# Patient Record
Sex: FEMALE | Race: WHITE | NOT HISPANIC OR LATINO | Employment: FULL TIME | URBAN - METROPOLITAN AREA
[De-identification: names, ages, dates, MRNs, and addresses within clinical notes are randomized per-mention and may not be internally consistent; named-entity substitution may affect disease eponyms.]

---

## 2017-02-01 ENCOUNTER — HOSPITAL ENCOUNTER (EMERGENCY)
Facility: HOSPITAL | Age: 42
Discharge: HOME | End: 2017-02-01
Attending: EMERGENCY MEDICINE | Admitting: EMERGENCY MEDICINE
Payer: COMMERCIAL

## 2017-02-01 ENCOUNTER — APPOINTMENT (EMERGENCY)
Dept: RADIOLOGY | Facility: HOSPITAL | Age: 42
End: 2017-02-01
Payer: COMMERCIAL

## 2017-02-01 VITALS
TEMPERATURE: 98.2 F | SYSTOLIC BLOOD PRESSURE: 154 MMHG | OXYGEN SATURATION: 96 % | DIASTOLIC BLOOD PRESSURE: 67 MMHG | RESPIRATION RATE: 22 BRPM | HEART RATE: 102 BPM | WEIGHT: 293 LBS

## 2017-02-01 DIAGNOSIS — M25.561 CHRONIC PAIN OF RIGHT KNEE: Primary | ICD-10-CM

## 2017-02-01 DIAGNOSIS — G89.29 CHRONIC PAIN OF RIGHT KNEE: Primary | ICD-10-CM

## 2017-02-01 PROCEDURE — 96372 THER/PROPH/DIAG INJ SC/IM: CPT

## 2017-02-01 PROCEDURE — 99283 EMERGENCY DEPT VISIT LOW MDM: CPT

## 2017-02-01 PROCEDURE — 73562 X-RAY EXAM OF KNEE 3: CPT

## 2017-02-01 RX ORDER — KETOROLAC TROMETHAMINE 30 MG/ML
30 INJECTION, SOLUTION INTRAMUSCULAR; INTRAVENOUS ONCE
Status: COMPLETED | OUTPATIENT
Start: 2017-02-01 | End: 2017-02-01

## 2017-02-01 RX ORDER — TRAMADOL HYDROCHLORIDE 50 MG/1
50 TABLET ORAL EVERY 6 HOURS PRN
Qty: 20 TABLET | Refills: 0 | Status: SHIPPED | OUTPATIENT
Start: 2017-02-01 | End: 2017-02-11

## 2017-02-01 RX ADMIN — KETOROLAC TROMETHAMINE 30 MG: 30 INJECTION, SOLUTION INTRAMUSCULAR at 11:08

## 2017-02-15 ENCOUNTER — ALLSCRIPTS OFFICE VISIT (OUTPATIENT)
Dept: OTHER | Facility: OTHER | Age: 42
End: 2017-02-15

## 2017-06-30 ENCOUNTER — GENERIC CONVERSION - ENCOUNTER (OUTPATIENT)
Dept: OTHER | Facility: OTHER | Age: 42
End: 2017-06-30

## 2017-08-07 ENCOUNTER — GENERIC CONVERSION - ENCOUNTER (OUTPATIENT)
Dept: OTHER | Facility: OTHER | Age: 42
End: 2017-08-07

## 2017-12-13 ENCOUNTER — APPOINTMENT (EMERGENCY)
Dept: CT IMAGING | Facility: HOSPITAL | Age: 42
End: 2017-12-13
Payer: COMMERCIAL

## 2017-12-13 ENCOUNTER — HOSPITAL ENCOUNTER (EMERGENCY)
Facility: HOSPITAL | Age: 42
Discharge: HOME | End: 2017-12-13
Attending: EMERGENCY MEDICINE
Payer: COMMERCIAL

## 2017-12-13 VITALS
DIASTOLIC BLOOD PRESSURE: 51 MMHG | RESPIRATION RATE: 20 BRPM | OXYGEN SATURATION: 94 % | HEART RATE: 94 BPM | SYSTOLIC BLOOD PRESSURE: 109 MMHG | TEMPERATURE: 99.5 F

## 2017-12-13 DIAGNOSIS — R19.7 ABDOMINAL PAIN, VOMITING, AND DIARRHEA: Primary | ICD-10-CM

## 2017-12-13 DIAGNOSIS — R10.9 ABDOMINAL PAIN, VOMITING, AND DIARRHEA: Primary | ICD-10-CM

## 2017-12-13 DIAGNOSIS — R11.10 ABDOMINAL PAIN, VOMITING, AND DIARRHEA: Primary | ICD-10-CM

## 2017-12-13 DIAGNOSIS — L21.9 SEBORRHEIC DERMATITIS OF SCALP: ICD-10-CM

## 2017-12-13 LAB
ALBUMIN SERPL BCP-MCNC: 3.7 G/DL (ref 3.5–5)
ALP SERPL-CCNC: 72 U/L (ref 46–116)
ALT SERPL W P-5'-P-CCNC: 73 U/L (ref 12–78)
ANION GAP SERPL CALCULATED.3IONS-SCNC: 7 MMOL/L (ref 4–13)
AST SERPL W P-5'-P-CCNC: 43 U/L (ref 5–45)
BASOPHILS # BLD AUTO: 0.02 THOUSANDS/ΜL (ref 0–0.1)
BASOPHILS NFR BLD AUTO: 0 % (ref 0–1)
BILIRUB SERPL-MCNC: 0.5 MG/DL (ref 0.2–1)
BILIRUB UR QL STRIP: NEGATIVE
BUN SERPL-MCNC: 12 MG/DL (ref 5–25)
CALCIUM SERPL-MCNC: 9.7 MG/DL (ref 8.3–10.1)
CHLORIDE SERPL-SCNC: 100 MMOL/L (ref 100–108)
CLARITY UR: CLEAR
CO2 SERPL-SCNC: 31 MMOL/L (ref 21–32)
COLOR UR: YELLOW
CREAT SERPL-MCNC: 0.82 MG/DL (ref 0.6–1.3)
EOSINOPHIL # BLD AUTO: 0.12 THOUSAND/ΜL (ref 0–0.61)
EOSINOPHIL NFR BLD AUTO: 1 % (ref 0–6)
ERYTHROCYTE [DISTWIDTH] IN BLOOD BY AUTOMATED COUNT: 18.2 % (ref 11.6–15.1)
EXT PREG TEST URINE: NEGATIVE
GFR SERPL CREATININE-BSD FRML MDRD: 87 ML/MIN/1.73SQ M
GLUCOSE SERPL-MCNC: 147 MG/DL (ref 65–140)
GLUCOSE UR STRIP-MCNC: NEGATIVE MG/DL
HCT VFR BLD AUTO: 44 % (ref 34.8–46.1)
HGB BLD-MCNC: 13.8 G/DL (ref 11.5–15.4)
HGB UR QL STRIP.AUTO: NEGATIVE
KETONES UR STRIP-MCNC: NEGATIVE MG/DL
LACTATE SERPL-SCNC: 1.9 MMOL/L (ref 0.5–2)
LEUKOCYTE ESTERASE UR QL STRIP: NEGATIVE
LIPASE SERPL-CCNC: 273 U/L (ref 73–393)
LYMPHOCYTES # BLD AUTO: 2.22 THOUSANDS/ΜL (ref 0.6–4.47)
LYMPHOCYTES NFR BLD AUTO: 21 % (ref 14–44)
MCH RBC QN AUTO: 27.1 PG (ref 26.8–34.3)
MCHC RBC AUTO-ENTMCNC: 31.4 G/DL (ref 31.4–37.4)
MCV RBC AUTO: 86 FL (ref 82–98)
MONOCYTES # BLD AUTO: 0.79 THOUSAND/ΜL (ref 0.17–1.22)
MONOCYTES NFR BLD AUTO: 8 % (ref 4–12)
NEUTROPHILS # BLD AUTO: 7.45 THOUSANDS/ΜL (ref 1.85–7.62)
NEUTS SEG NFR BLD AUTO: 70 % (ref 43–75)
NITRITE UR QL STRIP: NEGATIVE
PH UR STRIP.AUTO: 6 [PH] (ref 4.5–8)
PLATELET # BLD AUTO: 295 THOUSANDS/UL (ref 149–390)
PMV BLD AUTO: 9.6 FL (ref 8.9–12.7)
POTASSIUM SERPL-SCNC: 3.9 MMOL/L (ref 3.5–5.3)
PROT SERPL-MCNC: 8.1 G/DL (ref 6.4–8.2)
PROT UR STRIP-MCNC: NEGATIVE MG/DL
RBC # BLD AUTO: 5.1 MILLION/UL (ref 3.81–5.12)
SODIUM SERPL-SCNC: 138 MMOL/L (ref 136–145)
SP GR UR STRIP.AUTO: 1.02 (ref 1–1.03)
UROBILINOGEN UR QL STRIP.AUTO: 0.2 E.U./DL
WBC # BLD AUTO: 10.6 THOUSAND/UL (ref 4.31–10.16)

## 2017-12-13 PROCEDURE — 96361 HYDRATE IV INFUSION ADD-ON: CPT

## 2017-12-13 PROCEDURE — 80053 COMPREHEN METABOLIC PANEL: CPT | Performed by: PHYSICIAN ASSISTANT

## 2017-12-13 PROCEDURE — 85025 COMPLETE CBC W/AUTO DIFF WBC: CPT | Performed by: PHYSICIAN ASSISTANT

## 2017-12-13 PROCEDURE — 81003 URINALYSIS AUTO W/O SCOPE: CPT | Performed by: PHYSICIAN ASSISTANT

## 2017-12-13 PROCEDURE — 83690 ASSAY OF LIPASE: CPT | Performed by: PHYSICIAN ASSISTANT

## 2017-12-13 PROCEDURE — 99284 EMERGENCY DEPT VISIT MOD MDM: CPT

## 2017-12-13 PROCEDURE — 74177 CT ABD & PELVIS W/CONTRAST: CPT

## 2017-12-13 PROCEDURE — 36415 COLL VENOUS BLD VENIPUNCTURE: CPT | Performed by: PHYSICIAN ASSISTANT

## 2017-12-13 PROCEDURE — 96374 THER/PROPH/DIAG INJ IV PUSH: CPT

## 2017-12-13 PROCEDURE — 81025 URINE PREGNANCY TEST: CPT | Performed by: PHYSICIAN ASSISTANT

## 2017-12-13 PROCEDURE — 83605 ASSAY OF LACTIC ACID: CPT | Performed by: PHYSICIAN ASSISTANT

## 2017-12-13 RX ORDER — ONDANSETRON 2 MG/ML
4 INJECTION INTRAMUSCULAR; INTRAVENOUS ONCE
Status: COMPLETED | OUTPATIENT
Start: 2017-12-13 | End: 2017-12-13

## 2017-12-13 RX ORDER — DICYCLOMINE HCL 20 MG
20 TABLET ORAL EVERY 6 HOURS
Qty: 15 TABLET | Refills: 0 | Status: SHIPPED | OUTPATIENT
Start: 2017-12-13 | End: 2018-02-20

## 2017-12-13 RX ORDER — MAGNESIUM HYDROXIDE/ALUMINUM HYDROXICE/SIMETHICONE 120; 1200; 1200 MG/30ML; MG/30ML; MG/30ML
30 SUSPENSION ORAL ONCE
Status: COMPLETED | OUTPATIENT
Start: 2017-12-13 | End: 2017-12-13

## 2017-12-13 RX ORDER — DICYCLOMINE HCL 20 MG
20 TABLET ORAL ONCE
Status: COMPLETED | OUTPATIENT
Start: 2017-12-13 | End: 2017-12-13

## 2017-12-13 RX ORDER — KETOCONAZOLE 20 MG/ML
1 SHAMPOO TOPICAL 2 TIMES WEEKLY
Qty: 120 ML | Refills: 0 | Status: SHIPPED | OUTPATIENT
Start: 2017-12-14 | End: 2019-09-10 | Stop reason: HOSPADM

## 2017-12-13 RX ADMIN — SODIUM CHLORIDE 1000 ML: 0.9 INJECTION, SOLUTION INTRAVENOUS at 12:36

## 2017-12-13 RX ADMIN — IOHEXOL 100 ML: 350 INJECTION, SOLUTION INTRAVENOUS at 14:08

## 2017-12-13 RX ADMIN — ONDANSETRON 4 MG: 2 INJECTION INTRAMUSCULAR; INTRAVENOUS at 12:36

## 2017-12-13 RX ADMIN — ALUMINUM HYDROXIDE, MAGNESIUM HYDROXIDE, AND SIMETHICONE 30 ML: 200; 200; 20 SUSPENSION ORAL at 15:46

## 2017-12-13 RX ADMIN — LIDOCAINE HYDROCHLORIDE 15 ML: 20 SOLUTION ORAL; TOPICAL at 15:46

## 2017-12-13 RX ADMIN — DICYCLOMINE HYDROCHLORIDE 20 MG: 20 TABLET ORAL at 15:48

## 2017-12-13 NOTE — ED NOTES
Pt awake and alert, no distress noted, no other questions upon d/c     April M Radha Harris RN  12/13/17 7868

## 2017-12-13 NOTE — ED PROVIDER NOTES
History  Chief Complaint   Patient presents with    Diarrhea     Pt reports diarrhea and cramping that started yesterday with "bad acid reflux and gas " Pt reports fever yesterday of 80       39 yo F with history of IBS, DMII, s/p cholecystectomy who presents today for evaluation of diarrhea x 2 days  She had 6 episodes of watery, NB diarrhea yesterday and 2 today  Also had two episodes of NBNB vomiting today while in the ER waiting room  She admits to generalized abdominal cramping and bloating and sour belch  She states she had low grade fever last evening of 100, did not check her temp today, no antipyretics taken  She has been taking her pantoprazole without relief of symptoms, also taking imodium for diarrhea with some relief  She had similar symptoms one week ago which resolved spontaneously  She denies recent changes to diet, travel, new foods  Has been tolerating soup and crackers  She denies CP, SOB, dypsnea, urinary or vaginal complaints  She is seen by GI at [facility] for her IBS, states she has flares of diarrhea and constipation, is on not on any current medication regimen and her IBS is strictly diet controlled  Also would like to be evaluated for scalp and left ear rash, states she was diagnosed with seborrheic dermatitis by PCP in past and has been using antifungal cream without relief  None       Past Medical History:   Diagnosis Date    Anxiety     Depression     Diabetes mellitus ([address] )     Hypertension     Psychiatric disorder        Past Surgical History:   Procedure Laterality Date    CHOLECYSTECTOMY         History reviewed  No pertinent family history  I have reviewed and agree with the history as documented  Social History   Substance Use Topics    Smoking status: Never Smoker    Smokeless tobacco: Not on file    Alcohol use No        Review of Systems   Constitutional: Positive for appetite change and fever (low grade)  Negative for chills     HENT: Negative for congestion, ear discharge, ear pain and sore throat  Respiratory: Negative for cough, shortness of breath and wheezing  Cardiovascular: Negative for chest pain and palpitations  Gastrointestinal: Positive for abdominal pain, diarrhea, nausea and vomiting  Negative for abdominal distention and blood in stool  Genitourinary: Negative for dysuria, frequency, hematuria, pelvic pain, urgency, vaginal bleeding, vaginal discharge and vaginal pain  Musculoskeletal: Negative for back pain  Skin: Positive for rash  Neurological: Negative for dizziness, weakness, light-headedness, numbness and headaches  Physical Exam  ED Triage Vitals   Temperature Pulse Respirations Blood Pressure SpO2   12/13/17 1055 12/13/17 1055 12/13/17 1055 12/13/17 1055 12/13/17 1055   99 5 °F (37 5 °C) 96 20 (!) 190/96 98 %      Temp Source Heart Rate Source Patient Position - Orthostatic VS BP Location FiO2 (%)   12/13/17 1055 12/13/17 1055 12/13/17 1055 12/13/17 1055 --   Oral Monitor Sitting Left arm       Pain Score       12/13/17 1339       1           Orthostatic Vital Signs  Vitals:    12/13/17 1055 12/13/17 1339 12/13/17 1604 12/13/17 1645   BP: (!) 190/96 (!) 172/79 (!) 107/48 109/51   Pulse: 96 92 94    Patient Position - Orthostatic VS: Sitting Lying Lying Lying       Physical Exam   Constitutional: She is oriented to person, place, and time  She appears well-developed and well-nourished  Non-toxic appearance  She does not have a sickly appearance  She does not appear ill  No distress  Well in appearance, no apparent distress or discomfort  Obese  HENT:   Head: Normocephalic and atraumatic     Right Ear: Hearing and external ear normal    Left Ear: Hearing and external ear normal    Nose: Nose normal    Mouth/Throat: Oropharynx is clear and moist    Dried, erythematous scaly patches with flaking around scalp and external ears, no drainage   Eyes: Conjunctivae, EOM and lids are normal  Pupils are equal, round, and reactive to light  Neck: Normal range of motion  Neck supple  Cardiovascular: Normal rate, regular rhythm and normal heart sounds  Exam reveals no gallop and no friction rub  No murmur heard  Pulmonary/Chest: Effort normal and breath sounds normal  No respiratory distress  She has no decreased breath sounds  She has no wheezes  She has no rhonchi  She has no rales  Abdominal: Soft  Normal appearance  Bowel sounds are increased  There is generalized tenderness and tenderness in the periumbilical area  There is no rigidity, no rebound, no guarding and no CVA tenderness  Obese abdomen   Musculoskeletal: Normal range of motion  Neurological: She is alert and oriented to person, place, and time  Skin: Skin is warm and dry  Capillary refill takes less than 2 seconds  She is not diaphoretic  Psychiatric: She has a normal mood and affect  Nursing note and vitals reviewed        ED Medications  Medications   ondansetron (ZOFRAN) injection 4 mg (4 mg Intravenous Given 12/13/17 1236)   sodium chloride 0 9 % bolus 1,000 mL (0 mL Intravenous Stopped 12/13/17 1549)   iohexol (OMNIPAQUE) 350 MG/ML injection (MULTI-DOSE) 100 mL (100 mL Intravenous Given 12/13/17 1408)   dicyclomine (BENTYL) tablet 20 mg (20 mg Oral Given 12/13/17 1548)   lidocaine viscous (XYLOCAINE) 2 % mucosal solution 15 mL (15 mL Swish & Swallow Given 12/13/17 1546)   aluminum-magnesium hydroxide-simethicone (MYLANTA) 200-200-20 mg/5 mL oral suspension 30 mL (30 mL Oral Given 12/13/17 1546)       Diagnostic Studies  Results Reviewed     Procedure Component Value Units Date/Time    POCT pregnancy, urine [[id number]]  (Normal) Resulted:  12/13/17 1401    Lab Status:  Final result Specimen:  Urine Updated:  12/13/17 1401     EXT PREG TEST UR (Ref: Negative) Negative    UA w Reflex to Microscopic [[id number]]  (Normal) Collected:  12/13/17 1351    Lab Status:  Final result Specimen:  Urine from Urine, Clean Catch Updated:  12/13/17 1356 Color, UA Yellow     Clarity, UA Clear     Specific Gravity, UA 1 025     pH, UA 6 0     Leukocytes, UA Negative     Nitrite, UA Negative     Protein, UA Negative mg/dl      Glucose, UA Negative mg/dl      Ketones, UA Negative mg/dl      Urobilinogen, UA 0 2 E U /dl      Bilirubin, UA Negative     Blood, UA Negative    Comprehensive metabolic panel [[id number]]  (Abnormal) Collected:  12/13/17 1235    Lab Status:  Final result Specimen:  Blood from Arm, Right Updated:  12/13/17 1308     Sodium 138 mmol/L      Potassium 3 9 mmol/L      Chloride 100 mmol/L      CO2 31 mmol/L      Anion Gap 7 mmol/L      BUN 12 mg/dL      Creatinine 0 82 mg/dL      Glucose 147 (H) mg/dL      Calcium 9 7 mg/dL      AST 43 U/L      ALT 73 U/L      Alkaline Phosphatase 72 U/L      Total Protein 8 1 g/dL      Albumin 3 7 g/dL      Total Bilirubin 0 50 mg/dL      eGFR 87 ml/min/1 73sq m     Narrative:         National Kidney Disease Education Program recommendations are as follows:  GFR calculation is accurate only with a steady state creatinine  Chronic Kidney disease less than 60 ml/min/1 73 sq  meters  Kidney failure less than 15 ml/min/1 73 sq  meters  Lactic acid, plasma [[id number]]  (Normal) Collected:  12/13/17 1235    Lab Status:  Final result Specimen:  Blood from Arm, Right Updated:  12/13/17 1304     LACTIC ACID 1 9 mmol/L     Narrative:         Result may be elevated if tourniquet was used during collection      Lipase [[id number]]  (Normal) Collected:  12/13/17 1235    Lab Status:  Final result Specimen:  Blood from Arm, Right Updated:  12/13/17 1301     Lipase 273 u/L     CBC and differential [[id number]]  (Abnormal) Collected:  12/13/17 1235    Lab Status:  Final result Specimen:  Blood from Arm, Right Updated:  12/13/17 1247     WBC 10 60 (H) Thousand/uL      RBC 5 10 Million/uL      Hemoglobin 13 8 g/dL      Hematocrit 44 0 %      MCV 86 fL      MCH 27 1 pg      MCHC 31 4 g/dL      RDW 18 2 (H) %      MPV 9 6 fL      Platelets 295 Thousands/uL      Neutrophils Relative 70 %      Lymphocytes Relative 21 %      Monocytes Relative 8 %      Eosinophils Relative 1 %      Basophils Relative 0 %      Neutrophils Absolute 7 45 Thousands/µL      Lymphocytes Absolute 2 22 Thousands/µL      Monocytes Absolute 0 79 Thousand/µL      Eosinophils Absolute 0 12 Thousand/µL      Basophils Absolute 0 02 Thousands/µL                  CT abdomen pelvis with contrast   Final Result by Mello Sandy MD (12/13 2009)      No CT abnormality of bowel to definitively account for the patient's symptoms  Hepatomegaly and hepatic steatosis  Prominence of the uterus which is a nonspecific finding that may indicate presence of uterine fibroids  Workstation performed: [id number]                    Procedures  Procedures       Phone Contacts  ED Phone Contact    ED Course  ED Course as of Dec 15 1154   Wed Dec 13, 2017   1621 Patient reassessed, feeling better will d/c home                                MDM  Number of Diagnoses or Management Options  Abdominal pain, vomiting, and diarrhea: new and requires workup  Seborrheic dermatitis of scalp: established and worsening  Diagnosis management comments:   37 yo F with IBS presents c/o abdominal cramping and non-bloody diarrhea and vomiting  Labs and urine returned WNL, CT imaging negative for acute abnormality  Likely gastroenteritis vs IBS flare  No vomiting during ED course  Patient felt relief with GI cocktail and bentyl  Also requesting treatment for her Seborrheic dermatitis  Will d/c home with ketoconazole shampoo, bentyl for cramping  Recommended bland diet  Recommended f/u with her GI, Dr Ale Dutton, and her OBGYN for evaluation of enlarged uterus  RTED Precautions were given, patient verbalizes understanding and agrees with plan         Amount and/or Complexity of Data Reviewed  Clinical lab tests: ordered and reviewed  Tests in the radiology section of CPT®: ordered and reviewed    Patient Progress  Patient progress: stable    CritCare Time    Disposition  Final diagnoses:   Abdominal pain, vomiting, and diarrhea   Seborrheic dermatitis of scalp     Time reflects when diagnosis was documented in both MDM as applicable and the Disposition within this note     Time User Action Codes Description Comment    12/13/2017  4:23 PM Manuela Sanchez Add [R10 9,  R11 10,  R19 7] Abdominal pain, vomiting, and diarrhea     12/13/2017  4:23 PM Errol Sanchez Add [L21 9] Seborrheic dermatitis of scalp       ED Disposition     ED Disposition Condition Comment    Discharge  Ambar Smith discharge to home/self care  Condition at discharge: Good        Follow-up Information     Follow up With Specialties Details Why Contact Info Additional Information    Toshia Hardin MD Family Medicine Schedule an appointment as soon as possible for a visit  [address] [place] [id number]  [phone]       [facility]  Schedule an appointment as soon as possible for a visit       [address] [facility] Emergency Department Emergency Medicine  If symptoms worsen [address] [facility]  AN ED, [address], [place], [place], [address]        Discharge Medication List as of 12/13/2017  4:35 PM      START taking these medications    Details   dicyclomine (BENTYL) 20 mg tablet Take 1 tablet by mouth every 6 (six) hours for 5 days, Starting Wed 12/13/2017, Until Mon 12/18/2017, Print      ketoconazole (NIZORAL) 2 % shampoo Apply 1 application topically 2 (two) times a week, Starting Thu 12/14/2017, Print           No discharge procedures on file      ED Provider  Electronically Signed by           Nay Zabala PA-C  12/15/17 6271

## 2017-12-13 NOTE — DISCHARGE INSTRUCTIONS
Abdominal Pain   WHAT YOU NEED TO KNOW:   Abdominal pain can be dull, achy, or sharp  You may have pain in one area of your abdomen, or in your entire abdomen  Your pain may be caused by a condition such as constipation, food sensitivity or poisoning, infection, or a blockage  Abdominal pain can also be from a hernia, appendicitis, or an ulcer  Liver, gallbladder, or kidney conditions can also cause abdominal pain  The cause of your abdominal pain may be unknown  DISCHARGE INSTRUCTIONS:   Return to the emergency department if:   · You have new chest pain or shortness of breath  · You have pulsing pain in your upper abdomen or lower back that suddenly becomes constant  · Your pain is in the right lower abdominal area and worsens with movement  · You have a fever over 100 4°F (38°C) or shaking chills  · You are vomiting and cannot keep food or liquids down  · Your pain does not improve or gets worse over the next 8 to 12 hours  · You see blood in your vomit or bowel movements, or they look black and tarry  · Your skin or the whites of your eyes turn yellow  · You are a woman and have a large amount of vaginal bleeding that is not your monthly period  Contact your healthcare provider if:   · You have pain in your lower back  · You are a man and have pain in your testicles  · You have pain when you urinate  · You have questions or concerns about your condition or care  Follow up with your healthcare provider within 24 hours or as directed:  Write down your questions so you remember to ask them during your visits  Medicines:   · Medicines  may be given to calm your stomach and prevent vomiting or to decrease pain  Ask how to take pain medicine safely  · Take your medicine as directed  Contact your healthcare provider if you think your medicine is not helping or if you have side effects  Tell him of her if you are allergic to any medicine   Keep a list of the medicines, vitamins, and herbs you take  Include the amounts, and when and why you take them  Bring the list or the pill bottles to follow-up visits  Carry your medicine list with you in case of an emergency  © 2017 [address] Information is for End User's use only and may not be sold, redistributed or otherwise used for commercial purposes  All illustrations and images included in CareNotes® are the copyrighted property of A [employer] Inc  or [employer]  The above information is an  only  It is not intended as medical advice for individual conditions or treatments  Talk to your doctor, nurse or pharmacist before following any medical regimen to see if it is safe and effective for you  Irritable Bowel Syndrome   WHAT YOU NEED TO KNOW:   Irritable bowel syndrome (IBS) is a condition that prevents food from moving through your intestines normally  The food may move through too slowly or too quickly  This causes bloating, increased gas, constipation, or diarrhea  DISCHARGE INSTRUCTIONS:   Return to the emergency department if:   · You have severe abdominal pain  · Your bowel movements are dark or have blood in them  Contact your healthcare provider if:   · You have a fever  · You have pain in your rectum  · Your abdominal pain does not go away, even after treatment  · You have questions or concerns about your condition or care  Medicines:   · Diarrhea medicine  helps decrease the amount of diarrhea you have  Some of these medicines coat the intestine and make bowel movements less watery  Other medicines work by slowing down how fast the intestines move food through  · Laxatives  help treat constipation by moving food and liquids out of your stomach faster  · Stool softeners  soften your bowel movements to prevent straining  · Muscle relaxers  decrease abdominal pain and muscle spasms  · Take your medicine as directed    Contact your healthcare provider if you think your medicine is not helping or if you have side effects  Tell him of her if you are allergic to any medicine  Keep a list of the medicines, vitamins, and herbs you take  Include the amounts, and when and why you take them  Bring the list or the pill bottles to follow-up visits  Carry your medicine list with you in case of an emergency  Manage IBS:   · Eat a variety of healthy foods  Healthy foods include fruits, vegetables, whole-grain breads, low-fat dairy products, beans, lean meats, and fish  You may need to avoid certain foods to decrease your symptoms  · Drink liquids as directed  Ask how much liquid to drink each day and which liquids are best for you  For most people, good liquids to drink are water, juice, and milk  · Exercise regularly  Ask about the best exercise plan for you  Exercise can decrease your blood pressure and improve your health  · Manage stress  Stress may slow healing and cause illness  Learn new ways to relax, such as deep breathing  · Keep a record  of everything you eat and drink, and your symptoms, for 3 weeks  Bring this record with you to your follow-up visits  Follow up with your healthcare provider as directed:  Write down your questions so you remember to ask them during your visits  © 2017 [facility] Information is for End User's use only and may not be sold, redistributed or otherwise used for commercial purposes  All illustrations and images included in CareNotes® are the copyrighted property of [employer]  or [employer] Bryant  The above information is an  only  It is not intended as medical advice for individual conditions or treatments  Talk to your doctor, nurse or pharmacist before following any medical regimen to see if it is safe and effective for you

## 2018-01-11 NOTE — MISCELLANEOUS
Provider Comments  Provider Comments:   Dear Pat Lowry,    We called you about your scheduled appointment for today but were unable to reach you  It is very important that you follow up with us so that we can assess your physical and nutritional safety  Please call our office at [phone] to reschedule your appointment       Sincerely,     [facility]          Signatures   Electronically signed by : Brielle Lion, ; Aug  7 2017 11:19AM EST                       (Author)

## 2018-01-13 NOTE — MISCELLANEOUS
Provider Comments  Provider Comments:   Dear Daniel,    We called you about your scheduled appointment for today but were unable to reach you  It is very important that you follow up with us so that we can assess your physical and nutritional safety  Please call our office at [phone] to reschedule your appointment       Sincerely,     [facility]          Signatures   Electronically signed by : Hunter Hernandez, ; Jun 30 2017  2:15PM EST                       (Author)

## 2018-01-14 VITALS
DIASTOLIC BLOOD PRESSURE: 84 MMHG | HEIGHT: 60 IN | WEIGHT: 293 LBS | BODY MASS INDEX: 57.52 KG/M2 | SYSTOLIC BLOOD PRESSURE: 162 MMHG | HEART RATE: 101 BPM

## 2018-02-20 ENCOUNTER — APPOINTMENT (EMERGENCY)
Dept: RADIOLOGY | Facility: HOSPITAL | Age: 43
End: 2018-02-20
Payer: COMMERCIAL

## 2018-02-20 ENCOUNTER — HOSPITAL ENCOUNTER (EMERGENCY)
Facility: HOSPITAL | Age: 43
Discharge: HOME | End: 2018-02-20
Attending: EMERGENCY MEDICINE | Admitting: EMERGENCY MEDICINE
Payer: COMMERCIAL

## 2018-02-20 VITALS
SYSTOLIC BLOOD PRESSURE: 132 MMHG | HEART RATE: 96 BPM | TEMPERATURE: 98.8 F | DIASTOLIC BLOOD PRESSURE: 60 MMHG | BODY MASS INDEX: 65.82 KG/M2 | RESPIRATION RATE: 15 BRPM | OXYGEN SATURATION: 96 % | WEIGHT: 293 LBS

## 2018-02-20 DIAGNOSIS — T50.905A ADVERSE EFFECT OF DRUG, INITIAL ENCOUNTER: Primary | ICD-10-CM

## 2018-02-20 LAB
ANION GAP SERPL CALCULATED.3IONS-SCNC: 11 MMOL/L (ref 4–13)
BASOPHILS # BLD AUTO: 0.03 THOUSANDS/ΜL (ref 0–0.1)
BASOPHILS NFR BLD AUTO: 0 % (ref 0–1)
BUN SERPL-MCNC: 14 MG/DL (ref 5–25)
CALCIUM SERPL-MCNC: 10 MG/DL (ref 8.3–10.1)
CHLORIDE SERPL-SCNC: 99 MMOL/L (ref 100–108)
CO2 SERPL-SCNC: 30 MMOL/L (ref 21–32)
CREAT SERPL-MCNC: 0.86 MG/DL (ref 0.6–1.3)
EOSINOPHIL # BLD AUTO: 0.13 THOUSAND/ΜL (ref 0–0.61)
EOSINOPHIL NFR BLD AUTO: 1 % (ref 0–6)
ERYTHROCYTE [DISTWIDTH] IN BLOOD BY AUTOMATED COUNT: 14.3 % (ref 11.6–15.1)
GFR SERPL CREATININE-BSD FRML MDRD: 82 ML/MIN/1.73SQ M
GLUCOSE SERPL-MCNC: 173 MG/DL (ref 65–140)
GLUCOSE SERPL-MCNC: 178 MG/DL (ref 65–140)
HCT VFR BLD AUTO: 41.5 % (ref 34.8–46.1)
HGB BLD-MCNC: 13.2 G/DL (ref 11.5–15.4)
LYMPHOCYTES # BLD AUTO: 2.4 THOUSANDS/ΜL (ref 0.6–4.47)
LYMPHOCYTES NFR BLD AUTO: 24 % (ref 14–44)
MCH RBC QN AUTO: 28.1 PG (ref 26.8–34.3)
MCHC RBC AUTO-ENTMCNC: 31.8 G/DL (ref 31.4–37.4)
MCV RBC AUTO: 89 FL (ref 82–98)
MONOCYTES # BLD AUTO: 0.72 THOUSAND/ΜL (ref 0.17–1.22)
MONOCYTES NFR BLD AUTO: 7 % (ref 4–12)
NEUTROPHILS # BLD AUTO: 6.55 THOUSANDS/ΜL (ref 1.85–7.62)
NEUTS SEG NFR BLD AUTO: 68 % (ref 43–75)
PLATELET # BLD AUTO: 381 THOUSANDS/UL (ref 149–390)
PMV BLD AUTO: 9.4 FL (ref 8.9–12.7)
POTASSIUM SERPL-SCNC: 4.3 MMOL/L (ref 3.5–5.3)
RBC # BLD AUTO: 4.69 MILLION/UL (ref 3.81–5.12)
SODIUM SERPL-SCNC: 140 MMOL/L (ref 136–145)
TSH SERPL DL<=0.05 MIU/L-ACNC: 2.48 UIU/ML (ref 0.36–3.74)
WBC # BLD AUTO: 9.83 THOUSAND/UL (ref 4.31–10.16)

## 2018-02-20 PROCEDURE — 36415 COLL VENOUS BLD VENIPUNCTURE: CPT | Performed by: EMERGENCY MEDICINE

## 2018-02-20 PROCEDURE — 80048 BASIC METABOLIC PNL TOTAL CA: CPT | Performed by: EMERGENCY MEDICINE

## 2018-02-20 PROCEDURE — 85025 COMPLETE CBC W/AUTO DIFF WBC: CPT | Performed by: EMERGENCY MEDICINE

## 2018-02-20 PROCEDURE — 96360 HYDRATION IV INFUSION INIT: CPT

## 2018-02-20 PROCEDURE — 96361 HYDRATE IV INFUSION ADD-ON: CPT

## 2018-02-20 PROCEDURE — 99284 EMERGENCY DEPT VISIT MOD MDM: CPT

## 2018-02-20 PROCEDURE — 84443 ASSAY THYROID STIM HORMONE: CPT | Performed by: EMERGENCY MEDICINE

## 2018-02-20 PROCEDURE — 71046 X-RAY EXAM CHEST 2 VIEWS: CPT

## 2018-02-20 PROCEDURE — 93005 ELECTROCARDIOGRAM TRACING: CPT

## 2018-02-20 PROCEDURE — 82948 REAGENT STRIP/BLOOD GLUCOSE: CPT

## 2018-02-20 RX ORDER — LORAZEPAM 0.5 MG/1
TABLET ORAL EVERY 8 HOURS PRN
COMMUNITY
End: 2021-08-03 | Stop reason: HOSPADM

## 2018-02-20 RX ORDER — HYDROCHLOROTHIAZIDE 12.5 MG/1
12.5 TABLET ORAL EVERY OTHER DAY
COMMUNITY
End: 2020-06-26 | Stop reason: SDUPTHER

## 2018-02-20 RX ORDER — LISINOPRIL 10 MG/1
10 TABLET ORAL DAILY
COMMUNITY
End: 2020-06-26 | Stop reason: SDUPTHER

## 2018-02-20 RX ORDER — ESCITALOPRAM OXALATE 20 MG/1
20 TABLET ORAL DAILY
COMMUNITY
End: 2019-09-10 | Stop reason: HOSPADM

## 2018-02-20 RX ORDER — LAMOTRIGINE 200 MG/1
150 TABLET ORAL 2 TIMES DAILY
COMMUNITY
End: 2021-08-03 | Stop reason: HOSPADM

## 2018-02-20 RX ORDER — DICYCLOMINE HCL 20 MG
20 TABLET ORAL DAILY
COMMUNITY
End: 2020-08-24

## 2018-02-20 RX ADMIN — SODIUM CHLORIDE 1000 ML: 0.9 INJECTION, SOLUTION INTRAVENOUS at 20:17

## 2018-02-21 LAB
ATRIAL RATE: 103 BPM
P AXIS: 53 DEGREES
PR INTERVAL: 158 MS
QRS AXIS: 42 DEGREES
QRSD INTERVAL: 74 MS
QT INTERVAL: 322 MS
QTC INTERVAL: 421 MS
T WAVE AXIS: 36 DEGREES
VENTRICULAR RATE: 103 BPM

## 2018-02-21 NOTE — ED PROVIDER NOTES
History  Chief Complaint   Patient presents with    Medication Problem     Patient reports having "allergic reaction to marshall isl "  States, "I feel really shakey, sweaty, and weird after I take that medication " Patient denies taking too much insulin  Also on 2 other diabetic meds  13-year-old female presents with palpitations, sweats, nausea, all symptoms consistent previous episodes of hypoglycemia  Patient states she has not been hypoglycemic  This all started after being started on Tresiba  All the past month patient's blood sugars have been between 250 and 150 , was started on Tresiba, since then blood sugars have been 100-150 for check blood sugar just prior to coming to ER blood sugar was 170  Otherwise increasing anxiety, no cough no chest pain no fevers no chills no modifying or alleviating factors  Thinking initially was hypoglycemia, patient did each sugar containing foods but this did not resolve her symptoms as does with hypoglycemia  Patient believes it is a side effect of the new medication  History provided by:  Patient and spouse      Prior to Admission Medications   Prescriptions Last Dose Informant Patient Reported? Taking?    LORazepam (ATIVAN) 0 5 mg tablet   Yes Yes   Sig: Take by mouth every 8 (eight) hours as needed for anxiety   Liraglutide (VICTOZA SC)   Yes Yes   Sig: Inject 1 8 mL under the skin daily   dicyclomine (BENTYL) 20 mg tablet   Yes Yes   Sig: Take 20 mg by mouth daily   escitalopram (LEXAPRO) 20 mg tablet   Yes Yes   Sig: Take 5 mg by mouth daily   glucose blood test strip   Yes Yes   Si each by Other route as needed Use as instructed   hydrochlorothiazide (HYDRODIURIL) 12 5 mg tablet   Yes Yes   Sig: Take 12 5 mg by mouth every other day   insulin degludec (TRESIBA) injection pen 100 units/mL   Yes Yes   Sig: Inject 8 Units under the skin daily   ketoconazole (NIZORAL) 2 % shampoo   No No   Sig: Apply 1 application topically 2 (two) times a week lamoTRIgine (LaMICtal) 200 MG tablet   Yes Yes   Sig: Take 25 mg by mouth daily   lisinopril (ZESTRIL) 10 mg tablet   Yes Yes   Sig: Take 10 mg by mouth daily   metFORMIN (GLUCOPHAGE) 500 mg tablet   Yes Yes   Sig: Take 500 mg by mouth 2 (two) times a day with meals      Facility-Administered Medications: None       Past Medical History:   Diagnosis Date    Anxiety     Depression     Diabetes mellitus ([facility] [address] )     Hypertension     Psychiatric disorder        Past Surgical History:   Procedure Laterality Date    CHOLECYSTECTOMY         History reviewed  No pertinent family history  I have reviewed and agree with the history as documented  Social History   Substance Use Topics    Smoking status: Never Smoker    Smokeless tobacco: Not on file    Alcohol use No        Review of Systems   Constitutional: Positive for diaphoresis ("clamy")  Negative for activity change, chills and fever  HENT: Negative for congestion, sinus pressure and sore throat  Eyes: Negative for pain and visual disturbance  Respiratory: Negative for cough, chest tightness, shortness of breath, wheezing and stridor  Cardiovascular: Positive for palpitations  Negative for chest pain  Gastrointestinal: Negative for abdominal distention, abdominal pain, constipation, diarrhea, nausea and vomiting  Genitourinary: Negative for dysuria and frequency  Musculoskeletal: Negative for neck pain and neck stiffness  Skin: Negative for rash  Neurological: Negative for dizziness, speech difficulty, light-headedness, numbness and headaches         Physical Exam  ED Triage Vitals   Temperature Pulse Respirations Blood Pressure SpO2   02/20/18 1955 02/20/18 1954 02/20/18 1954 02/20/18 1955 02/20/18 1954   98 8 °F (37 1 °C) (!) 116 20 153/98 95 %      Temp Source Heart Rate Source Patient Position - Orthostatic VS BP Location FiO2 (%)   02/20/18 1954 02/20/18 1954 02/20/18 1954 02/20/18 1954 --   Oral Monitor Sitting Left arm       Pain Score       02/20/18 1954       No Pain           Orthostatic Vital Signs  Vitals:    02/20/18 1954 02/20/18 1955 02/20/18 2030 02/20/18 2048   BP:  153/98  170/73   Pulse: (!) 116  98 99   Patient Position - Orthostatic VS: Sitting Lying  Lying       Physical Exam   Constitutional: She is oriented to person, place, and time  She appears well-developed  No distress  HENT:   Head: Normocephalic and atraumatic  Eyes: Pupils are equal, round, and reactive to light  Neck: Normal range of motion  Neck supple  No tracheal deviation present  Cardiovascular: Normal rate, regular rhythm, normal heart sounds and intact distal pulses  No murmur heard  Pulmonary/Chest: Effort normal and breath sounds normal  No stridor  No respiratory distress  Abdominal: Soft  She exhibits no distension  There is no tenderness  There is no rebound and no guarding  Musculoskeletal: Normal range of motion  Neurological: She is alert and oriented to person, place, and time  Skin: Skin is warm and dry  She is not diaphoretic  No erythema  No pallor  Psychiatric: She has a normal mood and affect  Vitals reviewed  ED Medications  Medications   sodium chloride 0 9 % bolus 1,000 mL (1,000 mL Intravenous New Bag 2/20/18 2017)       Diagnostic Studies  Results Reviewed     Procedure Component Value Units Date/Time    Basic metabolic panel [[id number]]  (Abnormal) Collected:  02/20/18 2014    Lab Status:  Final result Specimen:  Blood from Arm, Right Updated:  02/20/18 2121     Sodium 140 mmol/L      Potassium 4 3 mmol/L      Chloride 99 (L) mmol/L      CO2 30 mmol/L      Anion Gap 11 mmol/L      BUN 14 mg/dL      Creatinine 0 86 mg/dL      Glucose 178 (H) mg/dL      Calcium 10 0 mg/dL      eGFR 82 ml/min/1 73sq m     Narrative:         National Kidney Disease Education Program recommendations are as follows:  GFR calculation is accurate only with a steady state creatinine  Chronic Kidney disease less than 60 ml/min/1 73 sq  meters  Kidney failure less than 15 ml/min/1 73 sq  meters  TSH [[id number]]  (Normal) Collected:  02/20/18 2014    Lab Status:  Final result Specimen:  Blood from Arm, Right Updated:  02/20/18 2121     TSH 3RD GENERATON 2 480 uIU/mL     Narrative:         Patients undergoing fluorescein dye angiography may retain small amounts of fluorescein in the body for 48-72 hours post procedure  Samples containing fluorescein can produce falsely depressed TSH values  If the patient had this procedure,a specimen should be resubmitted post fluorescein clearance            The recommended reference ranges for TSH during pregnancy are as follows:  First trimester 0 1 to 2 5 uIU/mL  Second trimester  0 2 to 3 0 uIU/mL  Third trimester 0 3 to 3 0 uIU/m      CBC and differential [[id number]]  (Normal) Collected:  02/20/18 2014    Lab Status:  Final result Specimen:  Blood from Arm, Right Updated:  02/20/18 2025     WBC 9 83 Thousand/uL      RBC 4 69 Million/uL      Hemoglobin 13 2 g/dL      Hematocrit 41 5 %      MCV 89 fL      MCH 28 1 pg      MCHC 31 8 g/dL      RDW 14 3 %      MPV 9 4 fL      Platelets 423 Thousands/uL      Neutrophils Relative 68 %      Lymphocytes Relative 24 %      Monocytes Relative 7 %      Eosinophils Relative 1 %      Basophils Relative 0 %      Neutrophils Absolute 6 55 Thousands/µL      Lymphocytes Absolute 2 40 Thousands/µL      Monocytes Absolute 0 72 Thousand/µL      Eosinophils Absolute 0 13 Thousand/µL      Basophils Absolute 0 03 Thousands/µL     Fingerstick Glucose (POCT) [[id number]]  (Abnormal) Collected:  02/20/18 1959    Lab Status:  Final result Updated:  02/20/18 2001     POC Glucose 173 (H) mg/dl                  XR chest 2 views   ED Interpretation by Shan Mensah DO (02/20 2135)   No acute pathology                 Procedures  ECG 12 Lead Documentation  Date/Time: 2/20/2018 9:01 PM  Performed by: Deep Acuna  Authorized by: Deep Acuna     ECG reviewed by me, the ED Provider: yes    Patient location:  ED  Previous ECG:     Previous ECG:  Compared to current  Interpretation:     Interpretation: non-specific    Rate:     ECG rate:  103    ECG rate assessment: tachycardic    Rhythm:     Rhythm: sinus tachycardia    Ectopy:     Ectopy: aberrant    QRS:     QRS axis:  Normal    QRS intervals:  Normal  Conduction:     Conduction: normal    ST segments:     ST segments:  Normal  T waves:     T waves: normal             Phone Contacts  ED Phone Contact    ED Course  ED Course as of Feb 20 2143   Tue Feb 20, 2018 2139  Patient feels large amount of improvement, will discharge, will follow up with her PCP tomorrow or later in the week agrees to return for any worsening symptoms                                MDM  Number of Diagnoses or Management Options  Adverse effect of drug, initial encounter: new and requires workup     Amount and/or Complexity of Data Reviewed  Clinical lab tests: ordered and reviewed  Decide to obtain previous medical records or to obtain history from someone other than the patient: yes  Obtain history from someone other than the patient: yes  Review and summarize past medical records: yes  Independent visualization of images, tracings, or specimens: yes      CritCare Time    Disposition  Final diagnoses: Adverse effect of drug, initial encounter     Time reflects when diagnosis was documented in both MDM as applicable and the Disposition within this note     Time User Action Codes Description Comment    2/20/2018  9:43 PM Jes Barrett Add [T88  7XXA] Adverse effect of drug, initial encounter       ED Disposition     ED Disposition Condition Comment    Discharge  [facility] discharge to home/self care  Condition at discharge: Good        Follow-up Information    None       Patient's Medications   Discharge Prescriptions    No medications on file     No discharge procedures on file      ED Provider  Electronically Signed by           Renetta Callahan DO  02/20/18 4368

## 2018-02-21 NOTE — ED NOTES
Pt discharge instructions reviewed, Pt has no further questions at this time  Pt awake and alert, no signs of acute distress noted        Yanira Ceballos, JANUSZ  02/20/18 0848

## 2018-05-18 ENCOUNTER — HOSPITAL ENCOUNTER (EMERGENCY)
Facility: HOSPITAL | Age: 43
Discharge: HOME | End: 2018-05-18
Attending: EMERGENCY MEDICINE
Payer: COMMERCIAL

## 2018-05-18 VITALS
RESPIRATION RATE: 16 BRPM | DIASTOLIC BLOOD PRESSURE: 64 MMHG | HEART RATE: 96 BPM | SYSTOLIC BLOOD PRESSURE: 156 MMHG | TEMPERATURE: 98.1 F | OXYGEN SATURATION: 100 % | WEIGHT: 293 LBS | BODY MASS INDEX: 67.98 KG/M2

## 2018-05-18 DIAGNOSIS — K52.9 GASTROENTERITIS, ACUTE: Primary | ICD-10-CM

## 2018-05-18 PROCEDURE — 99284 EMERGENCY DEPT VISIT MOD MDM: CPT

## 2018-05-18 RX ORDER — ONDANSETRON 4 MG/1
8 TABLET, ORALLY DISINTEGRATING ORAL ONCE
Status: COMPLETED | OUTPATIENT
Start: 2018-05-18 | End: 2018-05-18

## 2018-05-18 RX ORDER — ONDANSETRON 8 MG/1
8 TABLET, ORALLY DISINTEGRATING ORAL EVERY 8 HOURS PRN
Qty: 10 TABLET | Refills: 3 | Status: SHIPPED | OUTPATIENT
Start: 2018-05-18 | End: 2019-09-10 | Stop reason: HOSPADM

## 2018-05-18 RX ORDER — ACETAMINOPHEN 325 MG/1
975 TABLET ORAL ONCE
Status: COMPLETED | OUTPATIENT
Start: 2018-05-18 | End: 2018-05-18

## 2018-05-18 RX ADMIN — ONDANSETRON 8 MG: 4 TABLET, ORALLY DISINTEGRATING ORAL at 13:10

## 2018-05-18 RX ADMIN — ACETAMINOPHEN 975 MG: 325 TABLET ORAL at 13:50

## 2018-05-18 NOTE — ED PROVIDER NOTES
History  Chief Complaint   Patient presents with    Abdominal Pain     Pt  with abdomianl pain, nausea, vomiting, diarrhea since Tuesday  Pt  also reports fever 100 8  Pt with history of IBS     43 yr  feamle -- with ibs-- 3 days of imrpoving abd cramping- n/v/d-- 2-3 tiems per say - all non bloody with fevers-- no ill contacts/travel/ exposures- recent antibx --         History provided by:  Patient   used: No        Prior to Admission Medications   Prescriptions Last Dose Informant Patient Reported? Taking? LORazepam (ATIVAN) 0 5 mg tablet   Yes No   Sig: Take by mouth every 8 (eight) hours as needed for anxiety   Liraglutide (VICTOZA SC)   Yes No   Sig: Inject 1 8 mL under the skin daily   dicyclomine (BENTYL) 20 mg tablet   Yes No   Sig: Take 20 mg by mouth daily   escitalopram (LEXAPRO) 20 mg tablet   Yes No   Sig: Take 5 mg by mouth daily   glucose blood test strip   Yes No   Si each by Other route as needed Use as instructed   hydrochlorothiazide (HYDRODIURIL) 12 5 mg tablet   Yes No   Sig: Take 12 5 mg by mouth every other day   insulin degludec (TRESIBA) injection pen 100 units/mL   Yes No   Sig: Inject 8 Units under the skin daily   ketoconazole (NIZORAL) 2 % shampoo   No No   Sig: Apply 1 application topically 2 (two) times a week   lamoTRIgine (LaMICtal) 200 MG tablet   Yes No   Sig: Take 25 mg by mouth daily   lisinopril (ZESTRIL) 10 mg tablet   Yes No   Sig: Take 10 mg by mouth daily   metFORMIN (GLUCOPHAGE) 500 mg tablet   Yes No   Sig: Take 500 mg by mouth 2 (two) times a day with meals      Facility-Administered Medications: None       Past Medical History:   Diagnosis Date    Anxiety     Depression     [facility] ([facility] [address]     Hypertension     Psychiatric disorder        Past Surgical History:   Procedure Laterality Date    CHOLECYSTECTOMY         History reviewed  No pertinent family history  I have reviewed and agree with the history as documented      Social History   Substance Use Topics    Smoking status: Never Smoker    Smokeless tobacco: Never Used    Alcohol use No        Review of Systems   Constitutional: Positive for fatigue and fever  Negative for activity change, appetite change, chills, diaphoresis and unexpected weight change  HENT: Negative  Eyes: Negative  Respiratory: Negative  Cardiovascular: Negative  Gastrointestinal: Positive for abdominal pain, diarrhea, nausea and vomiting  Negative for abdominal distention, anal bleeding, blood in stool, constipation and rectal pain  Endocrine: Negative  Genitourinary: Negative  Musculoskeletal: Negative  Skin: Negative  Allergic/Immunologic: Negative  Neurological: Negative  Hematological: Negative  Psychiatric/Behavioral: Negative  Physical Exam  Physical Exam   Constitutional: She is oriented to person, place, and time  She appears well-developed and well-nourished  No distress  avss- htnsive-- pulse ox 100 % on ra- I ntepretation is normal- no intervention - in nad   HENT:   Head: Normocephalic and atraumatic  Eyes: Conjunctivae and EOM are normal  Pupils are equal, round, and reactive to light  Right eye exhibits no discharge  Left eye exhibits no discharge  No scleral icterus  Mm pink   Neck: Normal range of motion  Neck supple  No JVD present  No tracheal deviation present  No thyromegaly present  Cardiovascular: Normal rate, regular rhythm, normal heart sounds and intact distal pulses  Exam reveals no gallop and no friction rub  No murmur heard  Pulmonary/Chest: Effort normal and breath sounds normal  No stridor  No respiratory distress  She has no wheezes  She has no rales  She exhibits no tenderness  Abdominal: Soft  Bowel sounds are normal  She exhibits no distension and no mass  There is no tenderness  There is no rebound and no guarding  No hernia  No peritoenal signs- no cva tendenress   Musculoskeletal: Normal range of motion   She exhibits no edema, tenderness or deformity  Lymphadenopathy:     She has no cervical adenopathy  Neurological: She is alert and oriented to person, place, and time  No cranial nerve deficit or sensory deficit  She exhibits normal muscle tone  Coordination normal    Skin: Skin is warm  Capillary refill takes less than 2 seconds  No rash noted  She is not diaphoretic  No erythema  No pallor  Psychiatric: She has a normal mood and affect  Her behavior is normal  Judgment and thought content normal    Nursing note and vitals reviewed  Vital Signs  ED Triage Vitals [05/18/18 1204]   Temperature Pulse Respirations Blood Pressure SpO2   98 1 °F (36 7 °C) 105 18 (!) 187/88 98 %      Temp Source Heart Rate Source Patient Position - Orthostatic VS BP Location FiO2 (%)   Oral Monitor Lying Right arm --      Pain Score       8           Vitals:    05/18/18 1204 05/18/18 1354   BP: (!) 187/88 156/64   Pulse: 105 96   Patient Position - Orthostatic VS: Lying Lying       Visual Acuity      ED Medications  Medications   acetaminophen (TYLENOL) tablet 975 mg (975 mg Oral Given 5/18/18 1350)   ondansetron (ZOFRAN-ODT) dispersible tablet 8 mg (8 mg Oral Given 5/18/18 1310)       Diagnostic Studies  Results Reviewed     None                 No orders to display              Procedures  Procedures       Phone Contacts  ED Phone Contact    ED Course  ED Course as of May 18 1510   Fri May 18, 2018   1330 ER MD NOTE- PT NOW STARTING PO CHALLENGE- WILL CONTINUE TO - RE-EVAL    1509 - er md note- pt tolerated po challenge in er with no complaints prior to er d/c- feels improved- will d/c                                [facility]  CritCare Time    Disposition  Final diagnoses:   None     ED Disposition     None      Follow-up Information    None         Patient's Medications   Discharge Prescriptions    No medications on file     No discharge procedures on file      ED Provider  Electronically Signed by           Emerson Méndez MD  05/19/18 1012

## 2018-05-18 NOTE — DISCHARGE INSTRUCTIONS
Diagnosis: gastroenteritis - a vomiting/dairrheal illness- usually viral can last for several days to a week    - please keep well hydrated -- starting with sips and advancing gradually to more solid foods as tolerated     - expect more vomiting and even diarrhea    - for abdominal pain- over the counter tylenol 500 mg every 4 hrs     - for nausea/ vomiting: zofran 1 tablet - 8 mg- dissolves in the mouth every 6-8 hrs as needed     - for diarrhea- over the counter imodium ad as directed    - please return to  The er for any persistent/ intractable vomiting/ any bloody /coffee ground vomiting// any worsening/ intractable abdominal pain / any bloody /black tarry stools or any new/ worsening/concerning symptoms to you

## 2018-09-24 ENCOUNTER — HOSPITAL ENCOUNTER (EMERGENCY)
Facility: HOSPITAL | Age: 43
Discharge: HOME | End: 2018-09-24
Attending: EMERGENCY MEDICINE
Payer: COMMERCIAL

## 2018-09-24 VITALS
SYSTOLIC BLOOD PRESSURE: 164 MMHG | HEART RATE: 83 BPM | TEMPERATURE: 98.8 F | DIASTOLIC BLOOD PRESSURE: 76 MMHG | OXYGEN SATURATION: 98 % | RESPIRATION RATE: 18 BRPM

## 2018-09-24 DIAGNOSIS — H61.103: ICD-10-CM

## 2018-09-24 DIAGNOSIS — H92.03 EAR PAIN, BILATERAL: ICD-10-CM

## 2018-09-24 DIAGNOSIS — H60.90 OTITIS EXTERNA: Primary | ICD-10-CM

## 2018-09-24 PROCEDURE — 99282 EMERGENCY DEPT VISIT SF MDM: CPT

## 2018-09-24 RX ORDER — CIPROFLOXACIN AND DEXAMETHASONE 3; 1 MG/ML; MG/ML
4 SUSPENSION/ DROPS AURICULAR (OTIC) 2 TIMES DAILY
Qty: 7.5 ML | Refills: 0 | Status: SHIPPED | OUTPATIENT
Start: 2018-09-24 | End: 2019-09-10 | Stop reason: HOSPADM

## 2018-09-24 NOTE — ED PROVIDER NOTES
History  Chief Complaint   Patient presents with    Earache     pt here with bilateral ear pain and swelling  Left worse than right  Stef Wylie is a 37 y o  female who presents to the ED with complaints of left ear pain and swelling x 5 days and right ear pain and swelling x 1 day  Patient tried OTC ear pain drop with no relief  Patient admits to placing Q-tip in the ear as well with no relief  Patient has a history of this issue last occurred 1 year ago, treated with "drops" in the ear which improved  Patient admits to mild muffled sounds in both ear  Patient admits to intermittent watery discharge from the eyes and itching x 1 week  Denies pruritis, tinnitus, ear drainage, bleeding from the ear, dizziness, head injury, fever, chills, chest pain, SOB  Patient tried OTC ibuprofen with some relief  Prior to Admission Medications   Prescriptions Last Dose Informant Patient Reported? Taking?    LORazepam (ATIVAN) 0 5 mg tablet   Yes No   Sig: Take by mouth every 8 (eight) hours as needed for anxiety   Liraglutide (VICTOZA SC)   Yes No   Sig: Inject 1 8 mL under the skin daily   dicyclomine (BENTYL) 20 mg tablet   Yes No   Sig: Take 20 mg by mouth daily   escitalopram (LEXAPRO) 20 mg tablet   Yes No   Sig: Take 5 mg by mouth daily   glucose blood test strip   Yes No   Si each by Other route as needed Use as instructed   hydrochlorothiazide (HYDRODIURIL) 12 5 mg tablet   Yes No   Sig: Take 12 5 mg by mouth every other day   insulin degludec (TRESIBA) injection pen 100 units/mL   Yes No   Sig: Inject 8 Units under the skin daily   ketoconazole (NIZORAL) 2 % shampoo   No No   Sig: Apply 1 application topically 2 (two) times a week   lamoTRIgine (LaMICtal) 200 MG tablet   Yes No   Sig: Take 25 mg by mouth daily   lisinopril (ZESTRIL) 10 mg tablet   Yes No   Sig: Take 10 mg by mouth daily   metFORMIN (GLUCOPHAGE) 500 mg tablet   Yes No   Sig: Take 500 mg by mouth 2 (two) times a day with meals ondansetron (ZOFRAN-ODT) 8 mg disintegrating tablet   No No   Sig: Take 1 tablet (8 mg total) by mouth every 8 (eight) hours as needed for nausea or vomiting for up to 10 doses      Facility-Administered Medications: None       Past Medical History:   Diagnosis Date    Anxiety     Depression     Diabetes mellitus ([facility] [address] )     Hypertension     Psychiatric disorder        Past Surgical History:   Procedure Laterality Date    CHOLECYSTECTOMY         History reviewed  No pertinent family history  I have reviewed and agree with the history as documented  Social History   Substance Use Topics    Smoking status: Never Smoker    Smokeless tobacco: Never Used    Alcohol use No        Review of Systems   Constitutional: Negative for appetite change, chills, fever and unexpected weight change  HENT: Positive for ear pain  Negative for congestion, dental problem, drooling, ear discharge, facial swelling, hearing loss, mouth sores, nosebleeds, rhinorrhea, sinus pressure, sneezing, sore throat, tinnitus, trouble swallowing and voice change  Eyes: Negative for pain, discharge, redness and visual disturbance  Respiratory: Negative for cough, shortness of breath, wheezing and stridor  Cardiovascular: Negative for chest pain, palpitations and leg swelling  Gastrointestinal: Negative for abdominal pain, blood in stool, constipation, diarrhea, nausea and vomiting  Genitourinary: Negative for dysuria, flank pain, frequency, hematuria and urgency  Musculoskeletal: Negative for gait problem, joint swelling, neck pain and neck stiffness  Skin: Negative for color change, pallor, rash and wound  Neurological: Negative for dizziness, tremors, seizures, syncope, facial asymmetry, speech difficulty, weakness, light-headedness, numbness and headaches  Physical Exam  Physical Exam   Constitutional: She is oriented to person, place, and time  She appears well-developed and well-nourished  She is active  Non-toxic appearance  No distress  HENT:   Head: Normocephalic and atraumatic  Right Ear: There is swelling and tenderness  No drainage  No foreign bodies  No mastoid tenderness  Tympanic membrane is not erythematous and not retracted  Decreased hearing is noted  Left Ear: There is swelling and tenderness  No drainage  No foreign bodies  No mastoid tenderness  Tympanic membrane is not erythematous and not retracted  Decreased hearing is noted  Nose: Nose normal    Mouth/Throat: Uvula is midline, oropharynx is clear and moist and mucous membranes are normal    Erythema and edema noted to the pinna of the ear and external auditory canal, no drainage, TTP of the tragus and pinna, surrounding raised flaking erythematous skin   Eyes: Conjunctivae and EOM are normal  Pupils are equal, round, and reactive to light  Neck: Normal range of motion  Neck supple  No JVD present  Cardiovascular: Normal rate and regular rhythm  Pulmonary/Chest: Effort normal and breath sounds normal  No respiratory distress  She has no wheezes  She has no rales  Lymphadenopathy:     She has no cervical adenopathy  Neurological: She is alert and oriented to person, place, and time  Skin: Skin is warm and dry  Capillary refill takes less than 2 seconds  Psychiatric: She has a normal mood and affect  Nursing note and vitals reviewed        Vital Signs  ED Triage Vitals [09/24/18 1014]   Temperature Pulse Respirations Blood Pressure SpO2   98 8 °F (37 1 °C) (!) 106 18 (!) 196/88 98 %      Temp Source Heart Rate Source Patient Position - Orthostatic VS BP Location FiO2 (%)   Oral Monitor Sitting Right arm --      Pain Score       9           Vitals:    09/24/18 1014 09/24/18 1047   BP: (!) 196/88 164/76   Pulse: (!) 106 83   Patient Position - Orthostatic VS: Sitting Lying       Visual Acuity      ED Medications  Medications - No data to display    Diagnostic Studies  Results Reviewed     None                 No orders to display              Procedures  Procedures       Phone Contacts  ED Phone Contact    ED Course  ED Course as of Sep 24 1048   Mon Sep 24, 2018   1042 Educated patient regarding diagnosis and management  Advised patient to follow up with PCP  Advised patient to RTER for persistent or worsening symptoms  MDM  Number of Diagnoses or Management Options  Diagnosis management comments: Differential diagnosis included but not limited to: fungal otitis externa, otitis externa in a diabetic patient    Given abrupt onset - will treat with topical antibacterials at this time     Patient Progress  Patient progress: improved    CritCare Time    Disposition  Final diagnoses:   Otitis externa   Ear pain, bilateral   Pinna disorder, bilateral     Time reflects when diagnosis was documented in both MDM as applicable and the Disposition within this note     Time User Action Codes Description Comment    9/24/2018 10:45 AM Marvene Belvidere Add [H60 90] Otitis externa     9/24/2018 10:46 AM Marvene Belvidere Add [H92 03] Ear pain, bilateral     9/24/2018 10:46 AM Marvene Belvidere Add [H61 103] Pinna disorder, bilateral       ED Disposition     ED Disposition Condition Comment    Discharge  Keyla Zamora discharge to home/self care      Condition at discharge: Good        Follow-up Information     Follow up With Specialties Details Why Contact Info Additional [address] Emergency Department Emergency Medicine Go to If symptoms worsen [address] [facility] [address] [phone] [facility] ED, [address], [place], [place], [address]    Tabby Mitchell MD [facility]   [address] [facility] [address]  [phone]       [facility] Schedule an appointment as soon as possible for a visit  [address]  [phone]             Patient's Medications   Discharge Prescriptions    CIPROFLOXACIN-DEXAMETHASONE (CIPRODEX) OTIC SUSPENSION    Administer 4 drops into both ears 2 (two) times a day for 7 days       Start Date: 9/24/2018 End Date: 10/1/2018       Order Dose: 4 drops       Quantity: 7 5 mL    Refills: 0     No discharge procedures on file      ED Provider  Electronically Signed by           Winnie Narayan PA-C  09/24/18 1045

## 2018-09-24 NOTE — DISCHARGE INSTRUCTIONS
Otitis Externa   WHAT YOU NEED TO KNOW:   Otitis externa, or swimmer's ear, is an infection in the outer ear canal  This canal goes from the outside of the ear to the eardrum  DISCHARGE INSTRUCTIONS:   Return to the emergency department if:   · You have severe ear pain  · You are suddenly unable to hear at all  · You have new swelling in your face, behind your ears, or in your neck  · You suddenly cannot move part of your face  · Your face suddenly feels numb  Contact your healthcare provider if:   · You have a fever  · Your signs and symptoms do not get better after 2 days of treatment  · Your signs and symptoms go away for a time, but then come back  · You have questions or concerns about your condition or care  Medicines:   · NSAIDs , such as ibuprofen, help decrease swelling, pain, and fever  This medicine is available with or without a doctor's order  NSAIDs can cause stomach bleeding or kidney problems in certain people  If you take blood thinner medicine, always ask if NSAIDs are safe for you  Always read the medicine label and follow directions  Do not give these medicines to children under 10months of age without direction from your child's healthcare provider  · Acetaminophen  decreases pain and fever  It is available without a doctor's order  Ask how much to take and how often to take it  Follow directions  Acetaminophen can cause liver damage if not taken correctly  · Ear drops  that contain an antibiotic may be given  The antibiotic helps treat a bacterial infection  You may also be given steroid medicine  The steroid helps decrease redness, swelling, and pain  · Take your medicine as directed  Contact your healthcare provider if you think your medicine is not helping or if you have side effects  Tell him or her if you are allergic to any medicine  Keep a list of the medicines, vitamins, and herbs you take   Include the amounts, and when and why you take them  Bring the list or the pill bottles to follow-up visits  Carry your medicine list with you in case of an emergency  Follow up with your healthcare provider as directed:  Write down your questions so you remember to ask them during your visits  How to use eardrops:   · Lie down on your side with your infected ear facing up  · Carefully drip the correct number of eardrops into your ear  Have another person help you if possible  · Gently move the outside part of your ear back and forth to help the medicine reach your ear canal      · Stay lying down in the same position (with your ear facing up) for 3 to 5 minutes  Prevent otitis externa:   · Do not put cotton swabs or foreign objects in your ears  · Wrap a clean moist washcloth around your finger, and use it to clean your outer ear and remove extra ear wax  · Use ear plugs when you swim  Dry your outer ears completely after you swim or bathe  © 2017 [address] [facility] Information is for End User's use only and may not be sold, redistributed or otherwise used for commercial purposes  All illustrations and images included in CareNotes® are the copyrighted property of A D A M , Inc  or [employer]  The above information is an  only  It is not intended as medical advice for individual conditions or treatments  Talk to your doctor, nurse or pharmacist before following any medical regimen to see if it is safe and effective for you  Earache   WHAT YOU NEED TO KNOW:   An earache can be caused by a problem within your ear or from another body area  Common causes include earwax buildup, objects in your ear, injury, infections, or jaw or dental problems  Less often, earaches may be caused by arthritis in your upper spine  DISCHARGE INSTRUCTIONS:   Return to the emergency department if:   · You have a severe earache      · You have ear pain with itching, hearing loss, dizziness, a feeling of fullness in your ear, or ringing in your ears  Contact your healthcare provider if:   · Your ear pain worsens or does not go away with treatment  · You have drainage from your ear  · You have a fever  · Your outer ear becomes red, swollen, and warm  · You have questions or concerns about your condition or care  Medicines: You may need any of the following:  · NSAIDs , such as ibuprofen, help decrease swelling, pain, and fever  This medicine is available with or without a doctor's order  NSAIDs can cause stomach bleeding or kidney problems in certain people  If you take blood thinner medicine, always ask if NSAIDs are safe for you  Always read the medicine label and follow directions  Do not give these medicines to children under 10months of age without direction from your child's healthcare provider  · Acetaminophen  decreases pain and fever  It is available without a doctor's order  Ask how much to take and how often to take it  Follow directions  Acetaminophen can cause liver damage if not taken correctly  · Do not give aspirin to children under 25years of age  Your child could develop Reye syndrome if he takes aspirin  Reye syndrome can cause life-threatening brain and liver damage  Check your child's medicine labels for aspirin, salicylates, or oil of wintergreen  · Take your medicine as directed  Call your healthcare provider if you think your medicine is not helping or if you have side effects  Tell him if you are allergic to any medicine  Keep a list of the medicines, vitamins, and herbs you take  Include the amounts, and when and why you take them  Bring the list or the pill bottles to follow-up visits  Carry your medicine list with you in case of an emergency  Follow up with your healthcare provider as directed:  Write down your questions so you remember to ask them during your visits    © 2017 [employer] Information is for End User's use only and may not be sold, redistributed or otherwise used for commercial purposes  All illustrations and images included in CareNotes® are the copyrighted property of A D A M , Inc  or Demet[employer] Bryant  The above information is an  only  It is not intended as medical advice for individual conditions or treatments  Talk to your doctor, nurse or pharmacist before following any medical regimen to see if it is safe and effective for you

## 2018-09-24 NOTE — ED NOTES
D/C instructions discussed  Educated on f/u, medication, and s/s of when to return to the ED  All questions answered  Ambulatory off unit with steady gait and no s/s of acute distress        Becky Metcalf RN  09/24/18 0605

## 2018-09-29 ENCOUNTER — HOSPITAL ENCOUNTER (EMERGENCY)
Facility: HOSPITAL | Age: 43
Discharge: HOME | End: 2018-09-29
Attending: EMERGENCY MEDICINE
Payer: COMMERCIAL

## 2018-09-29 VITALS
BODY MASS INDEX: 67.98 KG/M2 | HEIGHT: 60 IN | OXYGEN SATURATION: 96 % | HEART RATE: 94 BPM | TEMPERATURE: 98.6 F | RESPIRATION RATE: 20 BRPM | DIASTOLIC BLOOD PRESSURE: 82 MMHG | SYSTOLIC BLOOD PRESSURE: 147 MMHG

## 2018-09-29 DIAGNOSIS — H60.8X3 OTHER OTITIS EXTERNA, BILATERAL: Primary | ICD-10-CM

## 2018-09-29 DIAGNOSIS — H61.20 CERUMEN IMPACTION: ICD-10-CM

## 2018-09-29 PROCEDURE — 99282 EMERGENCY DEPT VISIT SF MDM: CPT

## 2018-09-29 NOTE — DISCHARGE INSTRUCTIONS
Otitis Externa   WHAT YOU NEED TO KNOW:   Otitis externa, or swimmer's ear, is an infection in the outer ear canal  This canal goes from the outside of the ear to the eardrum  DISCHARGE INSTRUCTIONS:   Return to the emergency department if:   · You have severe ear pain  · You are suddenly unable to hear at all  · You have new swelling in your face, behind your ears, or in your neck  · You suddenly cannot move part of your face  · Your face suddenly feels numb  Contact your healthcare provider if:   · You have a fever  · Your signs and symptoms do not get better after 2 days of treatment  · Your signs and symptoms go away for a time, but then come back  · You have questions or concerns about your condition or care  Medicines:   · NSAIDs , such as ibuprofen, help decrease swelling, pain, and fever  This medicine is available with or without a doctor's order  NSAIDs can cause stomach bleeding or kidney problems in certain people  If you take blood thinner medicine, always ask if NSAIDs are safe for you  Always read the medicine label and follow directions  Do not give these medicines to children under 10months of age without direction from your child's healthcare provider  · Acetaminophen  decreases pain and fever  It is available without a doctor's order  Ask how much to take and how often to take it  Follow directions  Acetaminophen can cause liver damage if not taken correctly  · Ear drops  that contain an antibiotic may be given  The antibiotic helps treat a bacterial infection  You may also be given steroid medicine  The steroid helps decrease redness, swelling, and pain  · Take your medicine as directed  Contact your healthcare provider if you think your medicine is not helping or if you have side effects  Tell him or her if you are allergic to any medicine  Keep a list of the medicines, vitamins, and herbs you take   Include the amounts, and when and why you take them  Bring the list or the pill bottles to follow-up visits  Carry your medicine list with you in case of an emergency  Follow up with your healthcare provider as directed:  Write down your questions so you remember to ask them during your visits  How to use eardrops:   · Lie down on your side with your infected ear facing up  · Carefully drip the correct number of eardrops into your ear  Have another person help you if possible  · Gently move the outside part of your ear back and forth to help the medicine reach your ear canal      · Stay lying down in the same position (with your ear facing up) for 3 to 5 minutes  Prevent otitis externa:   · Do not put cotton swabs or foreign objects in your ears  · Wrap a clean moist washcloth around your finger, and use it to clean your outer ear and remove extra ear wax  · Use ear plugs when you swim  Dry your outer ears completely after you swim or bathe  © 2017 [address] [facility] Information is for End User's use only and may not be sold, redistributed or otherwise used for commercial purposes  All illustrations and images included in CareNotes® are the copyrighted property of A D A M , Inc  or [employer] Bryant  The above information is an  only  It is not intended as medical advice for individual conditions or treatments  Talk to your doctor, nurse or pharmacist before following any medical regimen to see if it is safe and effective for you  Cerumen Impaction   WHAT YOU NEED TO KNOW:   Cerumen impaction is the blockage of the outer ear canal by tightly packed cerumen (earwax)  It is generally treated with procedures such as flushing or suctioning the ear canal or the use of instruments to remove the impaction  DISCHARGE INSTRUCTIONS:   Medicines:  · Ear drops: These are used to soften the wax in your ear  Wax softening ear drops may be bought without a prescription   Ask your healthcare provider how often you should use this medicine  Read the instructions carefully before you use the ear drops  Do the following when you put in ear drops:     ¨ Warm the drops by holding the bottle in your hands for a few minutes  Cold ear drops may make you dizzy  ¨ Lie down with the affected ear toward the ceiling  You may also stand with your head tilted to one side  ¨ Pull your ear lobe up and back, and place the correct number of drops into the ear  ¨ Keep your ear facing up for 5 to 10 minutes so the drops coat the outer ear canal      ¨ Gently clean the outer part of the ear with a cotton swab  Do not  place the cotton swab or anything inside your ear canal  This increases the risk of damaging your eardrum  · Take your medicine as directed  Contact your healthcare provider if you think your medicine is not helping or if you have side effects  Tell him of her if you are allergic to any medicine  Keep a list of the medicines, vitamins, and herbs you take  Include the amounts, and when and why you take them  Bring the list or the pill bottles to follow-up visits  Carry your medicine list with you in case of an emergency  Follow up with your healthcare provider as directed:  Write down your questions so you remember to ask them during your visits  Contact your healthcare provider if:   · You have a fever  · You have trouble hearing or ringing in your ear  · You have questions about your condition or care  Return to the emergency department if:   · You feel dizzy  · You have discharge or blood coming out of your ear  · Your ear pain does not go away or gets worse  © 2017 [address] [employer] Information is for End User's use only and may not be sold, redistributed or otherwise used for commercial purposes  All illustrations and images included in CareNotes® are the copyrighted property of [employer]  or [employer]et[employer] Bryant  The above information is an  only   It is not intended as medical advice for individual conditions or treatments  Talk to your doctor, nurse or pharmacist before following any medical regimen to see if it is safe and effective for you

## 2018-09-29 NOTE — ED PROVIDER NOTES
History  Chief Complaint   Patient presents with   Phyllis Wilks     Was seen last monday here for ear pain, found to have bilateral external ear infection, provided drops at that time  States it is not gettign better now has muffled hearing in both ears and  increased pain in the R  Patient is a 53-year-old female who reports continued ear symptoms now complains of muffled hearing and clogged sensation in both ears  She has been taking Ciprodex as prescribed seen here last Monday for bilateral otitis externa  She has had some underlying congestion so started ibuprofen, Zyrtec and Flonase for symptoms  She denies fevers, chills, headaches, ear drainage, worsening ear pain or swelling  ROS:  Gen: no fevers, chills, fatigue  Eyes: no redness, pain, vision change, discharge  ENT: +decreased hearing, no ear pain, no runny nose, + congestion, no sinus pain, no sore throat  Heart: no chest pain, palpitations  Lungs: no cough, no chest tightness, no wheezing, no sob  GI: no abdominal pain, no nausea, no vomiting, no diarrhea  Gu: no dysuria  Skin: no rash  Neuro: no dizziness, headaches, weakness  Ms: no myalgias, no arthralgias     Physical:  Vitals reviewed  Gen: well nourished, cooperative, comfortable in no acute distress  Eyes: PERRL, EOM's intact, conjunctiva/sclera clear, no discharge  Ears: B/l pinnas with dermatitis not new per patient  Pinnas nontender to palpation bilaterally  Bilateral tragus with mild tenderness to palpation  Bilateral EACs with edema and trace erythema  No ear drainage  Left TM not visualized secondary to impacted cerumen  + cerumen and dry drops right EAC  TM partially visualized dull    Nose: no active drainage, turbinates with + inflammation, sinuses NTTP  Mouth: mucous membranes moist, pos pharynx clear with no erythema, no edema, uvula is midline, no tonsillar hypertrophy, no exudates  Neck: supple, no masses, no lymphadenopathy, FROM intact  Heart: regular rate and rhythm, no murmurs  Lungs: clear bilat, no wheezes, rales, rhonchi, no tachypnea  Abd: soft, +BS, NTTP, no guarding, no rebound  Skin: good skin turgor, no rash  Musc: Non-focal with FROM BL upper/lower extremities, neck, chest and back  Neuro: Non-focal  No sensory deficits noted  No motor deficits noted  Prior to Admission Medications   Prescriptions Last Dose Informant Patient Reported? Taking?    LORazepam (ATIVAN) 0 5 mg tablet   Yes No   Sig: Take by mouth every 8 (eight) hours as needed for anxiety   Liraglutide (VICTOZA SC)   Yes No   Sig: Inject 1 8 mL under the skin daily   ciprofloxacin-dexamethasone (CIPRODEX) otic suspension   No No   Sig: Administer 4 drops into both ears 2 (two) times a day for 7 days   dicyclomine (BENTYL) 20 mg tablet   Yes No   Sig: Take 20 mg by mouth daily   escitalopram (LEXAPRO) 20 mg tablet   Yes No   Sig: Take 5 mg by mouth daily   glucose blood test strip   Yes No   Si each by Other route as needed Use as instructed   hydrochlorothiazide (HYDRODIURIL) 12 5 mg tablet   Yes No   Sig: Take 12 5 mg by mouth every other day   insulin degludec (TRESIBA) injection pen 100 units/mL   Yes No   Sig: Inject 8 Units under the skin daily   ketoconazole (NIZORAL) 2 % shampoo   No No   Sig: Apply 1 application topically 2 (two) times a week   lamoTRIgine (LaMICtal) 200 MG tablet   Yes No   Sig: Take 25 mg by mouth daily   lisinopril (ZESTRIL) 10 mg tablet   Yes No   Sig: Take 10 mg by mouth daily   metFORMIN (GLUCOPHAGE) 500 mg tablet   Yes No   Sig: Take 500 mg by mouth 2 (two) times a day with meals   ondansetron (ZOFRAN-ODT) 8 mg disintegrating tablet   No No   Sig: Take 1 tablet (8 mg total) by mouth every 8 (eight) hours as needed for nausea or vomiting for up to 10 doses      Facility-Administered Medications: None       Past Medical History:   Diagnosis Date    Anxiety     Depression     Diabetes mellitus ([facility] [address] )     Hypertension     Psychiatric disorder Past Surgical History:   Procedure Laterality Date    CHOLECYSTECTOMY         No family history on file  I have reviewed and agree with the history as documented  Social History   Substance Use Topics    Smoking status: Never Smoker    Smokeless tobacco: Never Used    Alcohol use No        Review of Systems    Physical Exam  Physical Exam    Vital Signs  ED Triage Vitals [09/29/18 1015]   Temperature Pulse Respirations Blood Pressure SpO2   98 6 °F (37 °C) (!) 107 20 (!) 185/91 97 %      Temp Source Heart Rate Source Patient Position - Orthostatic VS BP Location FiO2 (%)   Oral Monitor Sitting Right arm --      Pain Score       8           Vitals:    09/29/18 1015 09/29/18 1158 09/29/18 1200   BP: (!) 185/91 147/82 147/82   Pulse: (!) 107 92 94   Patient Position - Orthostatic VS: Sitting Lying        Visual Acuity      ED Medications  Medications - No data to display    Diagnostic Studies  Results Reviewed     None                 No orders to display              Procedures  Procedures       Phone Contacts  ED Phone Contact    ED Course                               MDM  Number of Diagnoses or Management Options  Diagnosis management comments: Plan is to have patient use debrox solution bilaterally to loosen wax as well as continue full course Ciprodex treatments and follow up with PCP next week for ear recheck and possible ear flushing  Both verbal and written discharge instructions provided  Adequate time was allowed to answer any questions  Recommend follow up with family doctor or referral physician as discussed, sooner if symptoms persist, change or worsen  Red flag symptoms as well as reasons to return to the ED discussed  Pt verbally understood treatment plan and was discharged home in stable condition        CritCare Time    Disposition  Final diagnoses:   Other otitis externa, bilateral - Improving   Cerumen impaction     Time reflects when diagnosis was documented in both MDM as applicable and the Disposition within this note     Time User Action Codes Description Comment    9/29/2018 12:03 PM Andrew Roca Add [H60 8X3] Other otitis externa, bilateral     9/29/2018 12:03 PM Andrew Roca Modify [J13 6B1] Other otitis externa, bilateral Improving    9/29/2018 12:03 PM Andrew Roca Add [H61 20] Cerumen impaction       ED Disposition     ED Disposition Condition Comment    Discharge  Shweta Callahan discharge to home/self care  Condition at discharge: Good        Follow-up Information     Follow up With Specialties Details Why Contact Info    Mike Maria MD Family Medicine In 3 days Recommend follow up with PCP for ear recheck/?flushing in 3-5 days, sooner if symptoms change or worsen or return to ED   [id number] [address] [facility] [address] [facility] [address]  [phone]            Discharge Medication List as of 9/29/2018 12:05 PM      START taking these medications    Details   carbamide peroxide (DEBROX) 6 5 % otic solution Administer 5 drops into ears 2 (two) times a day, Starting Sat 9/29/2018, Print         CONTINUE these medications which have NOT CHANGED    Details   ciprofloxacin-dexamethasone (CIPRODEX) otic suspension Administer 4 drops into both ears 2 (two) times a day for 7 days, Starting Mon 9/24/2018, Until Mon 10/1/2018, Print      dicyclomine (BENTYL) 20 mg tablet Take 20 mg by mouth daily, Historical Med      escitalopram (LEXAPRO) 20 mg tablet Take 5 mg by mouth daily, Historical Med      glucose blood test strip 1 each by Other route as needed Use as instructed, Historical Med      hydrochlorothiazide (HYDRODIURIL) 12 5 mg tablet Take 12 5 mg by mouth every other day, Historical Med      insulin degludec (TRESIBA) injection pen 100 units/mL Inject 8 Units under the skin daily, Historical Med      ketoconazole (NIZORAL) 2 % shampoo Apply 1 application topically 2 (two) times a week, Starting Thu 12/14/2017, Print      lamoTRIgine (LaMICtal) 200 MG tablet Take 25 mg by mouth daily, Historical Med      Liraglutide (VICTOZA SC) Inject 1 8 mL under the skin daily, Historical Med      lisinopril (ZESTRIL) 10 mg tablet Take 10 mg by mouth daily, Historical Med      LORazepam (ATIVAN) 0 5 mg tablet Take by mouth every 8 (eight) hours as needed for anxiety, Historical Med      metFORMIN (GLUCOPHAGE) 500 mg tablet Take 500 mg by mouth 2 (two) times a day with meals, Historical Med      ondansetron (ZOFRAN-ODT) 8 mg disintegrating tablet Take 1 tablet (8 mg total) by mouth every 8 (eight) hours as needed for nausea or vomiting for up to 10 doses, Starting Fri 5/18/2018, Print           No discharge procedures on file      ED Provider  Electronically Signed by           Modesto Langston PA-C  09/29/18 8574

## 2018-10-09 ENCOUNTER — HOSPITAL ENCOUNTER (EMERGENCY)
Facility: HOSPITAL | Age: 43
Discharge: HOME | End: 2018-10-09
Attending: EMERGENCY MEDICINE | Admitting: EMERGENCY MEDICINE
Payer: COMMERCIAL

## 2018-10-09 ENCOUNTER — APPOINTMENT (EMERGENCY)
Dept: CT IMAGING | Facility: HOSPITAL | Age: 43
End: 2018-10-09
Payer: COMMERCIAL

## 2018-10-09 VITALS
SYSTOLIC BLOOD PRESSURE: 131 MMHG | OXYGEN SATURATION: 94 % | RESPIRATION RATE: 18 BRPM | DIASTOLIC BLOOD PRESSURE: 59 MMHG | TEMPERATURE: 98.7 F | HEART RATE: 101 BPM

## 2018-10-09 DIAGNOSIS — J31.0 RHINITIS: Primary | ICD-10-CM

## 2018-10-09 LAB
ANION GAP SERPL CALCULATED.3IONS-SCNC: 8 MMOL/L (ref 4–13)
BASOPHILS # BLD AUTO: 0.05 THOUSANDS/ΜL (ref 0–0.1)
BASOPHILS NFR BLD AUTO: 1 % (ref 0–1)
BUN SERPL-MCNC: 12 MG/DL (ref 5–25)
CALCIUM SERPL-MCNC: 9.5 MG/DL (ref 8.3–10.1)
CHLORIDE SERPL-SCNC: 99 MMOL/L (ref 100–108)
CO2 SERPL-SCNC: 29 MMOL/L (ref 21–32)
CREAT SERPL-MCNC: 0.86 MG/DL (ref 0.6–1.3)
EOSINOPHIL # BLD AUTO: 0.12 THOUSAND/ΜL (ref 0–0.61)
EOSINOPHIL NFR BLD AUTO: 1 % (ref 0–6)
ERYTHROCYTE [DISTWIDTH] IN BLOOD BY AUTOMATED COUNT: 16.6 % (ref 11.6–15.1)
GFR SERPL CREATININE-BSD FRML MDRD: 82 ML/MIN/1.73SQ M
GLUCOSE SERPL-MCNC: 190 MG/DL (ref 65–140)
HCT VFR BLD AUTO: 35.7 % (ref 34.8–46.1)
HGB BLD-MCNC: 10.7 G/DL (ref 11.5–15.4)
IMM GRANULOCYTES # BLD AUTO: 0.04 THOUSAND/UL (ref 0–0.2)
IMM GRANULOCYTES NFR BLD AUTO: 1 % (ref 0–2)
LYMPHOCYTES # BLD AUTO: 1.86 THOUSANDS/ΜL (ref 0.6–4.47)
LYMPHOCYTES NFR BLD AUTO: 21 % (ref 14–44)
MCH RBC QN AUTO: 24.3 PG (ref 26.8–34.3)
MCHC RBC AUTO-ENTMCNC: 30 G/DL (ref 31.4–37.4)
MCV RBC AUTO: 81 FL (ref 82–98)
MONOCYTES # BLD AUTO: 0.61 THOUSAND/ΜL (ref 0.17–1.22)
MONOCYTES NFR BLD AUTO: 7 % (ref 4–12)
NEUTROPHILS # BLD AUTO: 6.16 THOUSANDS/ΜL (ref 1.85–7.62)
NEUTS SEG NFR BLD AUTO: 69 % (ref 43–75)
NRBC BLD AUTO-RTO: 0 /100 WBCS
PLATELET # BLD AUTO: 368 THOUSANDS/UL (ref 149–390)
PMV BLD AUTO: 9.2 FL (ref 8.9–12.7)
POTASSIUM SERPL-SCNC: 4.1 MMOL/L (ref 3.5–5.3)
RBC # BLD AUTO: 4.41 MILLION/UL (ref 3.81–5.12)
SODIUM SERPL-SCNC: 136 MMOL/L (ref 136–145)
WBC # BLD AUTO: 8.84 THOUSAND/UL (ref 4.31–10.16)

## 2018-10-09 PROCEDURE — 99284 EMERGENCY DEPT VISIT MOD MDM: CPT

## 2018-10-09 PROCEDURE — 96374 THER/PROPH/DIAG INJ IV PUSH: CPT

## 2018-10-09 PROCEDURE — 36415 COLL VENOUS BLD VENIPUNCTURE: CPT | Performed by: PHYSICIAN ASSISTANT

## 2018-10-09 PROCEDURE — 96361 HYDRATE IV INFUSION ADD-ON: CPT

## 2018-10-09 PROCEDURE — 80048 BASIC METABOLIC PNL TOTAL CA: CPT | Performed by: PHYSICIAN ASSISTANT

## 2018-10-09 PROCEDURE — 70486 CT MAXILLOFACIAL W/O DYE: CPT

## 2018-10-09 PROCEDURE — 85025 COMPLETE CBC W/AUTO DIFF WBC: CPT | Performed by: PHYSICIAN ASSISTANT

## 2018-10-09 RX ORDER — ONDANSETRON 2 MG/ML
4 INJECTION INTRAMUSCULAR; INTRAVENOUS ONCE
Status: COMPLETED | OUTPATIENT
Start: 2018-10-09 | End: 2018-10-09

## 2018-10-09 RX ORDER — FLUTICASONE PROPIONATE 50 MCG
2 SPRAY, SUSPENSION (ML) NASAL EVERY 12 HOURS
Qty: 16 G | Refills: 0 | Status: SHIPPED | OUTPATIENT
Start: 2018-10-09 | End: 2019-09-10 | Stop reason: HOSPADM

## 2018-10-09 RX ORDER — LORATADINE 10 MG/1
10 TABLET ORAL DAILY
Qty: 20 TABLET | Refills: 0 | Status: SHIPPED | OUTPATIENT
Start: 2018-10-09 | End: 2019-09-10 | Stop reason: HOSPADM

## 2018-10-09 RX ADMIN — SODIUM CHLORIDE 1000 ML: 0.9 INJECTION, SOLUTION INTRAVENOUS at 12:57

## 2018-10-09 RX ADMIN — ONDANSETRON 4 MG: 2 INJECTION INTRAMUSCULAR; INTRAVENOUS at 12:56

## 2018-10-09 NOTE — DISCHARGE INSTRUCTIONS
Allergic Rhinitis   WHAT YOU NEED TO KNOW:   Allergic rhinitis, or hay fever, is swelling of the inside of your nose  The swelling is a reaction to allergens in the air  An allergen can be anything that causes an allergic reaction  Allergies to weeds, grass, trees, or mold often cause seasonal allergic rhinitis  Indoor dust mites, cockroaches, pet dander, or mold can also cause allergic rhinitis  DISCHARGE INSTRUCTIONS:   Call 911 for the following:   · You have chest pain or shortness of breath  Return to the emergency department if:   · You have severe pain  · You cough up blood  Contact your healthcare provider if:   · You have a fever  · You have ear or sinus pain, or a headache  · Your symptoms get worse, even after treatment  · You have yellow, green, brown, or bloody mucus coming from your nose  · Your nose is bleeding or you have pain inside your nose  · You have trouble sleeping because of your symptoms  · You have questions or concerns about your condition or care  Medicines:   · Medicines  help decrease your symptoms and clear your stuffy nose  · Take your medicine as directed  Contact your healthcare provider if you think your medicine is not helping or if you have side effects  Tell him of her if you are allergic to any medicine  Keep a list of the medicines, vitamins, and herbs you take  Include the amounts, and when and why you take them  Bring the list or the pill bottles to follow-up visits  Carry your medicine list with you in case of an emergency  How to manage allergic rhinitis:  The best way to manage allergic rhinitis is to avoid allergens that can trigger your symptoms  Any of the following may help decrease your symptoms:  · Rinse your nose and sinuses  with a salt water solution or use a salt water nasal spray  This will help thin the mucus in your nose and rinse away pollen and dirt   It will also help reduce swelling so you can breathe normally  Ask your healthcare provider how often to rinse your nose  · Reduce exposure to dust mites  Wash sheets and towels in hot water every week  Cover your pillows and mattresses with allergen-free covers  Limit the number of stuffed animals and soft toys your child has  Wash your child's toys in hot water regularly  Vacuum weekly and use a vacuum  with an air filter  If possible, get rid of carpets and curtains  These collect dust and dust mites  · Reduce exposure to pollen  Keep windows and doors closed in your house and car  Stay inside when air pollution or the pollen count is high  Run your air conditioner on recycle, and change air filters often  Shower and wash your hair before bed every night to rinse away pollen  · Reduce exposure to pet dander  If possible, do not keep cats, dogs, birds, or other pets  If you do keep pets in your home, keep them out of bedrooms and carpeted rooms  Bathe them often  · Reduce exposure to mold  Do not spend time in basements  Choose artificial plants instead of live plants  Keep your home's humidity at less than 45%  Do not have ponds or standing water in your home or yard  · Do not smoke  Avoid others who smoke  Ask your healthcare provider for information if you currently smoke and need help to quit  Follow up with your healthcare provider as directed: You may need to see an allergist often to control your symptoms  Write down your questions so you remember to ask them during your visits  © 2017 [address] Information is for End User's use only and may not be sold, redistributed or otherwise used for commercial purposes  All illustrations and images included in CareNotes® are the copyrighted property of [employer]  or [employer]  The above information is an  only  It is not intended as medical advice for individual conditions or treatments   Talk to your doctor, nurse or pharmacist before following any medical regimen to see if it is safe and effective for you  Rhinosinusitis   WHAT YOU NEED TO KNOW:   Rhinosinusitis (RS) is inflammation of your nose and sinuses  It commonly begins as a virus, often as a common cold  Viruses usually last 7 to 10 days and do not need treatment  When the virus does not get better on its own, you may have bacterial RS  This means that bacteria have begun to grow inside your sinuses  Acute RS lasts less than 4 weeks  Chronic RS lasts 12 weeks or more  Recurrent RS is when you have 4 or more episodes of RS in one year  DISCHARGE INSTRUCTIONS:   Return to the emergency department if:   · Your eye and eyelid are red, swollen, and painful  · You cannot open your eye  · You have double vision or you cannot see  · Your eyeball bulges out or you cannot move your eye  · You are more sleepy than normal or you notice changes in your ability to think, move, or talk  · You have a stiff neck, a fever, or a bad headache  · You have swelling of your forehead or scalp  Contact your healthcare provider if:   · Your symptoms are worse or do not improve after 3 to 5 days of treatment  · You have questions or concerns about your condition or care  Medicines: You may need any of the following:  · Acetaminophen  decreases pain and fever  It is available without a doctor's order  Ask how much to take and how often to take it  Follow directions  Acetaminophen can cause liver damage if not taken correctly  · NSAIDs , such as ibuprofen, help decrease swelling, pain, and fever  This medicine is available with or without a doctor's order  NSAIDs can cause stomach bleeding or kidney problems in certain people  If you take blood thinner medicine, always ask your healthcare provider if NSAIDs are safe for you  Always read the medicine label and follow directions  · Nasal steroid sprays  decrease inflammation in your nose and sinuses      · Decongestants reduce swelling and drain mucus in the nose and sinuses  They may help you breathe easier  · Antihistamines  dry mucus in the nose and relieve sneezing  · Antibiotics  treat a bacterial infection and may be needed if your symptoms do not improve or they get worse  · Take your medicine as directed  Contact your healthcare provider if you think your medicine is not helping or if you have side effects  Tell him or her if you are allergic to any medicine  Keep a list of the medicines, vitamins, and herbs you take  Include the amounts, and when and why you take them  Bring the list or the pill bottles to follow-up visits  Carry your medicine list with you in case of an emergency  Self-care:   · Rinse your sinuses  Use a sinus rinse device to rinse your nasal passages with a saline (salt water) solution  This will help thin the mucus in your nose and rinse away pollen and dirt  It will also help reduce swelling so you can breathe normally  Ask your healthcare provider how often to do this  · Breathe in steam   Heat a bowl of water until you see steam  Lean over the bowl and make a tent over your head with a large towel  Breathe deeply for about 20 minutes  Be careful not to get too close to the steam or burn yourself  Do this 3 times a day  You can also breathe deeply when you take a hot shower  · Sleep with your head elevated  Place an extra pillow under your head before you go to sleep to help your sinuses drain  · Drink liquids as directed  Ask your healthcare provider how much liquid to drink each day and which liquids are best for you  Liquids will thin the mucus in your nose and help it drain  Avoid drinks that contain alcohol or caffeine  · Do not smoke, and avoid secondhand smoke  Nicotine and other chemicals in cigarettes and cigars can make your symptoms worse  Ask your healthcare provider for information if you currently smoke and need help to quit   E-cigarettes or smokeless tobacco still contain nicotine  Talk to your healthcare provider before you use these products  Follow up with your healthcare provider as directed: Follow up if your symptoms are worse or not better after 3 to 5 days of treatment  Write down your questions so you remember to ask them during your visits  © 2017 [employer] Information is for End User's use only and may not be sold, redistributed or otherwise used for commercial purposes  All illustrations and images included in CareNotes® are the copyrighted property of [employer]  or [employer]  The above information is an  only  It is not intended as medical advice for individual conditions or treatments  Talk to your doctor, nurse or pharmacist before following any medical regimen to see if it is safe and effective for you

## 2018-10-09 NOTE — ED PROVIDER NOTES
History  Chief Complaint   Patient presents with    Sinus Problem     Pt c/o nasal pressure w/ post nasal drip, N/V even after taking an antibiotic from PCP  Nate Oliveira is a 37 y o  Female with a past medical history of hypertension, anxiety, diabetes who presents to the ED with complaints of nasal pressure, rhinorrhea, bilateral ear discomfort, bilateral muffled hearing, intermittent sinus pressure x2 weeks  Patient states she also had nausea beginning this morning  Patient states she also experienced intermittent body aches and chills beginning yesterday  Denies epistaxis, sore throat, dysphagia, trismus, ear discharge, fever, abdominal pain, leg pain, neck stiffness, chest pain, shortness breath, cough  Per chart review patient has been seen in the ER on multiple occasions in the past 2 weeks for multiple ear nose and throat complaints  Patient was diagnosed with otitis externa and given topical Cipro drops without relief  Patient states she was also told that she has excessive ear wax in her ear and begin taking Debrox with some relief  Patient was seen by PCP and given cefdinir for 10 days and Flonase  Patient has been taking Zyrtec as well  Patient states she does feel like her symptoms have improved           History provided by:  Patient  Sinus Problem   Pain details:     Location:  Maxillary and frontal    Quality:  Pressure and aching    Severity:  Moderate    Duration:  2 weeks    Timing:  Intermittent  Progression:  Worsening  Chronicity:  New  Context: allergies    Context: not chemical odor, not deviated nasal septum, not recent URI and not smoke inhalation    Ineffective treatments:  Saline sprays, steroid sprays, oral decongestants and acetaminophen  Associated symptoms: chills, congestion, ear pain, nausea and rhinorrhea    Associated symptoms: no chest pain, no cough, no fatigue, no fever, no headaches, no hoarse voice, no mouth breathing, no shortness of breath, no sneezing, no snoring, no sore throat, no swollen glands, no tooth pain, no vertigo, no vomiting and no wheezing    Congestion:     Location:  Nasal    Interferes with sleep: yes      Interferes with eating/drinking: no    Ear pain:     Location:  Bilateral    Severity:  Moderate    Onset quality:  Gradual    Duration:  2 weeks    Timing:  Intermittent    Progression:  Unchanged    Chronicity:  New  Nausea:     Severity:  Mild    Onset quality:  Gradual    Duration:  1 day    Timing:  Constant    Progression:  Unchanged  Rhinorrhea:     Quality:  Clear    Severity:  Moderate    Duration:  2 weeks    Timing:  Intermittent    Progression:  Unchanged  Risk factors: diabetes    Risk factors: no allergic reaction, no asthma, no COPD, no CPAP use, no nasal cannula, no nasal polyps and no smoke exposure        Prior to Admission Medications   Prescriptions Last Dose Informant Patient Reported? Taking?    LORazepam (ATIVAN) 0 5 mg tablet   Yes No   Sig: Take by mouth every 8 (eight) hours as needed for anxiety   Liraglutide (VICTOZA SC)   Yes No   Sig: Inject 1 8 mL under the skin daily   carbamide peroxide (DEBROX) 6 5 % otic solution   No No   Sig: Administer 5 drops into ears 2 (two) times a day   ciprofloxacin-dexamethasone (CIPRODEX) otic suspension   No No   Sig: Administer 4 drops into both ears 2 (two) times a day for 7 days   dicyclomine (BENTYL) 20 mg tablet   Yes No   Sig: Take 20 mg by mouth daily   escitalopram (LEXAPRO) 20 mg tablet   Yes No   Sig: Take 5 mg by mouth daily   glucose blood test strip   Yes No   Si each by Other route as needed Use as instructed   hydrochlorothiazide (HYDRODIURIL) 12 5 mg tablet   Yes No   Sig: Take 12 5 mg by mouth every other day   insulin degludec (TRESIBA) injection pen 100 units/mL   Yes No   Sig: Inject 8 Units under the skin daily   ketoconazole (NIZORAL) 2 % shampoo   No No   Sig: Apply 1 application topically 2 (two) times a week   lamoTRIgine (LaMICtal) 200 MG tablet   Yes No Sig: Take 25 mg by mouth daily   lisinopril (ZESTRIL) 10 mg tablet   Yes No   Sig: Take 10 mg by mouth daily   metFORMIN (GLUCOPHAGE) 500 mg tablet   Yes No   Sig: Take 500 mg by mouth 2 (two) times a day with meals   ondansetron (ZOFRAN-ODT) 8 mg disintegrating tablet   No No   Sig: Take 1 tablet (8 mg total) by mouth every 8 (eight) hours as needed for nausea or vomiting for up to 10 doses      Facility-Administered Medications: None       Past Medical History:   Diagnosis Date    Anxiety     Depression     Diabetes mellitus ([facility] [address] )     Hypertension     Psychiatric disorder        Past Surgical History:   Procedure Laterality Date    CHOLECYSTECTOMY         History reviewed  No pertinent family history  I have reviewed and agree with the history as documented  Social History   Substance Use Topics    Smoking status: Never Smoker    Smokeless tobacco: Never Used    Alcohol use No        Review of Systems   Constitutional: Positive for chills  Negative for appetite change, fatigue, fever and unexpected weight change  HENT: Positive for congestion, ear pain, hearing loss, postnasal drip, rhinorrhea, sinus pain and sinus pressure  Negative for drooling, ear discharge, facial swelling, hoarse voice, mouth sores, nosebleeds, sneezing, sore throat, tinnitus, trouble swallowing and voice change  Eyes: Negative for pain, discharge, redness and visual disturbance  Respiratory: Negative for snoring, cough, shortness of breath, wheezing and stridor  Cardiovascular: Negative for chest pain, palpitations and leg swelling  Gastrointestinal: Positive for nausea  Negative for abdominal pain, blood in stool, constipation, diarrhea and vomiting  Genitourinary: Negative for dysuria, flank pain, frequency, hematuria and urgency  Musculoskeletal: Positive for myalgias  Negative for arthralgias, back pain, gait problem, joint swelling, neck pain and neck stiffness     Skin: Negative for color change, pallor, rash and wound  Neurological: Negative for dizziness, vertigo, seizures, light-headedness and headaches  Physical Exam  Physical Exam   Constitutional: She is oriented to person, place, and time  Vital signs are normal  She appears well-developed and well-nourished  She is cooperative  Non-toxic appearance  No distress  Obese   HENT:   Head: Normocephalic and atraumatic  Right Ear: No drainage, swelling or tenderness  No mastoid tenderness  Tympanic membrane is bulging  Tympanic membrane is not erythematous and not retracted  A middle ear effusion is present  Left Ear: No drainage, swelling or tenderness  No mastoid tenderness  Tympanic membrane is bulging  Tympanic membrane is not erythematous and not retracted  A middle ear effusion is present  Nose: Mucosal edema and sinus tenderness present  No septal deviation  No epistaxis  No foreign bodies  Right sinus exhibits maxillary sinus tenderness and frontal sinus tenderness  Left sinus exhibits maxillary sinus tenderness and frontal sinus tenderness  Mouth/Throat: Uvula is midline and mucous membranes are normal  Posterior oropharyngeal erythema present  Tried flaking dermatitis noticed to the pinna of both ears  Tried flaking noted on the external auditory canal, no edema  No discharge noted  No bleeding  Eyes: Pupils are equal, round, and reactive to light  Conjunctivae and EOM are normal    Neck: Normal range of motion  Neck supple  Cardiovascular: Normal rate and regular rhythm  Pulmonary/Chest: Effort normal and breath sounds normal  No respiratory distress  She has no wheezes  Abdominal: Soft  Bowel sounds are normal  There is no tenderness  Neurological: She is alert and oriented to person, place, and time  Skin: Skin is warm and dry  Capillary refill takes less than 2 seconds  Erythematous raised plaques noted surrounding the auricle bilaterally, nontender to palpation, temperature appears normal by touch  Psychiatric: She has a normal mood and affect  Nursing note and vitals reviewed  Vital Signs  ED Triage Vitals   Temperature Pulse Respirations Blood Pressure SpO2   10/09/18 1157 10/09/18 1159 10/09/18 1159 10/09/18 1159 10/09/18 1159   98 7 °F (37 1 °C) (!) 114 20 (!) 176/95 97 %      Temp Source Heart Rate Source Patient Position - Orthostatic VS BP Location FiO2 (%)   10/09/18 1157 10/09/18 1434 10/09/18 1159 10/09/18 1159 --   Oral Monitor Lying Right arm       Pain Score       --                  Vitals:    10/09/18 1159 10/09/18 1434   BP: (!) 176/95 131/59   Pulse: (!) 114 101   Patient Position - Orthostatic VS: Lying Lying       Visual Acuity      ED Medications  Medications   ondansetron (ZOFRAN) injection 4 mg (4 mg Intravenous Given 10/9/18 1256)   sodium chloride 0 9 % bolus 1,000 mL (0 mL Intravenous Stopped 10/9/18 1449)       Diagnostic Studies  Results Reviewed     Procedure Component Value Units Date/Time    UA w Reflex to Microscopic [[id number]] Collected:  10/09/18 1351    Lab Status:  No result Specimen:  Urine from Urine, Clean Catch     Basic metabolic panel [[id number]]  (Abnormal) Collected:  10/09/18 1252    Lab Status:  Final result Specimen:  Blood from Arm, Right Updated:  10/09/18 1321     Sodium 136 mmol/L      Potassium 4 1 mmol/L      Chloride 99 (L) mmol/L      CO2 29 mmol/L      ANION GAP 8 mmol/L      BUN 12 mg/dL      Creatinine 0 86 mg/dL      Glucose 190 (H) mg/dL      Calcium 9 5 mg/dL      eGFR 82 ml/min/1 73sq m     Narrative:         National Kidney Disease Education Program recommendations are as follows:  GFR calculation is accurate only with a steady state creatinine  Chronic Kidney disease less than 60 ml/min/1 73 sq  meters  Kidney failure less than 15 ml/min/1 73 sq  meters      CBC and differential [[id number]]  (Abnormal) Collected:  10/09/18 1252    Lab Status:  Final result Specimen:  Blood from Arm, Right Updated:  10/09/18 1256     WBC 8 84 Thousand/uL RBC 4 41 Million/uL      Hemoglobin 10 7 (L) g/dL      Hematocrit 35 7 %      MCV 81 (L) fL      MCH 24 3 (L) pg      MCHC 30 0 (L) g/dL      RDW 16 6 (H) %      MPV 9 2 fL      Platelets 270 Thousands/uL      nRBC 0 /100 WBCs      Neutrophils Relative 69 %      Immat GRANS % 1 %      Lymphocytes Relative 21 %      Monocytes Relative 7 %      Eosinophils Relative 1 %      Basophils Relative 1 %      Neutrophils Absolute 6 16 Thousands/µL      Immature Grans Absolute 0 04 Thousand/uL      Lymphocytes Absolute 1 86 Thousands/µL      Monocytes Absolute 0 61 Thousand/µL      Eosinophils Absolute 0 12 Thousand/µL      Basophils Absolute 0 05 Thousands/µL                  CT sinus wo contrast   Final Result by Rosemary Osborn MD (10/09 1400)      Clear paranasal sinuses  Small amount of fluid within the mastoid air cells bilaterally, correlate for uncomplicated mastoiditis  Workstation performed: [id number]                    Procedures  Procedures       Phone Contacts  ED Phone Contact    ED Course  ED Course as of Oct 09 1629   Tue Oct 09, 2018   1411 Paged ENT    [phone] Spokes to PA ENT who reviewed the recommended outpatient long acting antihistamines and Flonase 2 sprays BID  Recommended patient follow-up with ENT at this time  56 Educated patient regarding diagnosis and management  Advised patient to follow up with PCP  Advised patient to RTER for persistent or worsening symptoms  MDM  Number of Diagnoses or Management Options  Rhinitis: new and does not require workup  Diagnosis management comments: Shayla Cross is a 37 y o  Female with a past medical history of hypertension, anxiety, diabetes who presented to the ED with complaints of nasal pressure, rhinorrhea, bilateral ear discomfort, bilateral muffled hearing, intermittent sinus pressure x2 weeks  Patient has been seen in the emergency room on multiple occasions in the past 2 weeks for ear and nasal complaints  Patient completed course of topical ciprofloxacin and had ear irrigation done by PCP  Patient recently finished a course of cefdinir for some suspected sinus infection  Patient has been taking Flonase and over-the-counter Zyrtec for nasal congestion without relief  Patient reports worsening symptoms, nausea, chills  Labs within normal limits  Nausea resolved with 1 dose of IV Zofran  CT sinus without evidence of sinusitis but small amount of fluid within the mastoid air cells bilaterally  Spoke to ENT who reviewed scan, believe there may be an allergic component to disease at this time, no concern for infectious mastoiditis at this time  Recommended increased Flonase 2 sprays each nostril b i d  And use a long-acting antihistamine such as Claritin  Advised patient follow up with ENT on an outpatient basis, welcomed patient to make an appointment in office  Patient verbalized understanding  Provided patient with strict return to ER precautions  Patient Progress  Patient progress: improved    CritCare Time    Disposition  Final diagnoses:   Rhinitis     Time reflects when diagnosis was documented in both MDM as applicable and the Disposition within this note     Time User Action Codes Description Comment    10/9/2018  2:39 PM Lexi Smith Add [J31 0] Rhinitis       ED Disposition     ED Disposition Condition Comment    Discharge  Li Schmitt discharge to home/self care      Condition at discharge: Good        Follow-up Information     Follow up With Specialties Details Why Contact Info Additional [address] Emergency Department Emergency Medicine Go to If symptoms worsen [address] [facility] [address] [phone] [facility] ED, [address] [facility], [address]    Shaji Rodriguez MD Family Medicine Schedule an appointment as soon as possible for a visit  [address] [facility] [address] [facility] [address] [address] [facility] Otolaryngology Schedule an appointment as soon as possible for a visit  [address] [address] [place] [address]  [phone]             Discharge Medication List as of 10/9/2018  2:43 PM      START taking these medications    Details   fluticasone (FLONASE) 50 mcg/act nasal spray 2 sprays into each nostril every 12 (twelve) hours, Starting Tue 10/9/2018, Print      loratadine (CLARITIN) 10 mg tablet Take 1 tablet (10 mg total) by mouth daily, Starting Tue 10/9/2018, Print         CONTINUE these medications which have NOT CHANGED    Details   carbamide peroxide (DEBROX) 6 5 % otic solution Administer 5 drops into ears 2 (two) times a day, Starting Sat 9/29/2018, Print      ciprofloxacin-dexamethasone (CIPRODEX) otic suspension Administer 4 drops into both ears 2 (two) times a day for 7 days, Starting Mon 9/24/2018, Until Mon 10/1/2018, Print      dicyclomine (BENTYL) 20 mg tablet Take 20 mg by mouth daily, Historical Med      escitalopram (LEXAPRO) 20 mg tablet Take 5 mg by mouth daily, Historical Med      glucose blood test strip 1 each by Other route as needed Use as instructed, Historical Med      hydrochlorothiazide (HYDRODIURIL) 12 5 mg tablet Take 12 5 mg by mouth every other day, Historical Med      insulin degludec (TRESIBA) injection pen 100 units/mL Inject 8 Units under the skin daily, Historical Med      ketoconazole (NIZORAL) 2 % shampoo Apply 1 application topically 2 (two) times a week, Starting u 12/14/2017, Print      lamoTRIgine (LaMICtal) 200 MG tablet Take 25 mg by mouth daily, Historical Med      Liraglutide (VICTOZA SC) Inject 1 8 mL under the skin daily, Historical Med      lisinopril (ZESTRIL) 10 mg tablet Take 10 mg by mouth daily, Historical Med      LORazepam (ATIVAN) 0 5 mg tablet Take by mouth every 8 (eight) hours as needed for anxiety, Historical Med      metFORMIN (GLUCOPHAGE) 500 mg tablet Take 500 mg by mouth 2 (two) times a day with meals, Historical Med      ondansetron (ZOFRAN-ODT) 8 mg disintegrating tablet Take 1 tablet (8 mg total) by mouth every 8 (eight) hours as needed for nausea or vomiting for up to 10 doses, Starting Fri 5/18/2018, Print           No discharge procedures on file      ED Provider  Electronically Signed by           Shweta Davis PA-C  10/09/18 1423

## 2019-03-12 ENCOUNTER — HOSPITAL ENCOUNTER (EMERGENCY)
Facility: HOSPITAL | Age: 44
Discharge: HOME | End: 2019-03-12
Attending: EMERGENCY MEDICINE | Admitting: EMERGENCY MEDICINE
Payer: COMMERCIAL

## 2019-03-12 VITALS
OXYGEN SATURATION: 97 % | HEART RATE: 99 BPM | DIASTOLIC BLOOD PRESSURE: 74 MMHG | SYSTOLIC BLOOD PRESSURE: 156 MMHG | TEMPERATURE: 98.6 F | RESPIRATION RATE: 20 BRPM

## 2019-03-12 DIAGNOSIS — J32.9 SINUSITIS: Primary | ICD-10-CM

## 2019-03-12 PROCEDURE — 99283 EMERGENCY DEPT VISIT LOW MDM: CPT

## 2019-03-12 RX ORDER — ONDANSETRON 4 MG/1
4 TABLET, ORALLY DISINTEGRATING ORAL ONCE
Status: COMPLETED | OUTPATIENT
Start: 2019-03-12 | End: 2019-03-12

## 2019-03-12 RX ORDER — GUAIFENESIN 600 MG
600 TABLET, EXTENDED RELEASE 12 HR ORAL ONCE
Status: COMPLETED | OUTPATIENT
Start: 2019-03-12 | End: 2019-03-12

## 2019-03-12 RX ORDER — GUAIFENESIN 600 MG
600 TABLET, EXTENDED RELEASE 12 HR ORAL 2 TIMES DAILY
Status: DISCONTINUED | OUTPATIENT
Start: 2019-03-13 | End: 2019-03-12

## 2019-03-12 RX ORDER — AZITHROMYCIN 250 MG/1
250 TABLET, FILM COATED ORAL DAILY
Qty: 4 TABLET | Refills: 0 | Status: SHIPPED | OUTPATIENT
Start: 2019-03-12 | End: 2019-03-16

## 2019-03-12 RX ORDER — GUAIFENESIN 600 MG
1200 TABLET, EXTENDED RELEASE 12 HR ORAL EVERY 12 HOURS SCHEDULED
Qty: 15 TABLET | Refills: 0 | Status: SHIPPED | OUTPATIENT
Start: 2019-03-12 | End: 2019-09-10 | Stop reason: HOSPADM

## 2019-03-12 RX ORDER — AZITHROMYCIN 250 MG/1
500 TABLET, FILM COATED ORAL ONCE
Status: COMPLETED | OUTPATIENT
Start: 2019-03-12 | End: 2019-03-12

## 2019-03-12 RX ADMIN — ONDANSETRON 4 MG: 4 TABLET, ORALLY DISINTEGRATING ORAL at 23:04

## 2019-03-12 RX ADMIN — AZITHROMYCIN 500 MG: 250 TABLET, FILM COATED ORAL at 23:26

## 2019-03-12 RX ADMIN — GUAIFENESIN 600 MG: 600 TABLET, EXTENDED RELEASE ORAL at 23:26

## 2019-03-13 NOTE — ED PROVIDER NOTES
History  Chief Complaint   Patient presents with    Nausea     Pt c/o nausea, vomiting, and sinus congestion since sunday  12-year-old female comes in with multiple complaints  Patient states it started off with sinus pressure and congestion and now postnasal drip and headache since Sunday  Patient states that she now has some nausea and vomiting  Patient denies any fever chest pain abdominal pain or diarrhea  Discussed with patient she is mostly looking for antibiotics and a work note  History provided by:  Patient   used: No    Sinus Problem   Pain details:     Location:  Maxillary    Quality:  Aching    Severity:  Moderate    Duration:  3 days    Timing:  Constant  Duration:  3 days  Progression:  Worsening  Chronicity:  Recurrent  Context: recent URI    Ineffective treatments:  Spray decongestants  Associated symptoms: nausea and vomiting    Associated symptoms: no chest pain, no congestion, no cough, no ear pain, no fatigue, no fever, no headaches, no shortness of breath and no wheezing    Nausea:     Severity:  Mild    Onset quality:  Gradual    Duration:  3 days    Timing:  Intermittent    Progression:  Waxing and waning  Vomiting:     Quality:  Stomach contents    Number of occurrences:  1    Severity:  Mild    Duration:  1 day    Progression:  Resolved  Risk factors: no allergic reaction, no nasal cannula and no smoke exposure        Prior to Admission Medications   Prescriptions Last Dose Informant Patient Reported? Taking?    LORazepam (ATIVAN) 0 5 mg tablet   Yes No   Sig: Take by mouth every 8 (eight) hours as needed for anxiety   Liraglutide (VICTOZA SC)   Yes No   Sig: Inject 1 8 mL under the skin daily   carbamide peroxide (DEBROX) 6 5 % otic solution   No No   Sig: Administer 5 drops into ears 2 (two) times a day   ciprofloxacin-dexamethasone (CIPRODEX) otic suspension   No No   Sig: Administer 4 drops into both ears 2 (two) times a day for 7 days   dicyclomine (BENTYL) 20 mg tablet   Yes No   Sig: Take 20 mg by mouth daily   escitalopram (LEXAPRO) 20 mg tablet   Yes No   Sig: Take 5 mg by mouth daily   fluticasone (FLONASE) 50 mcg/act nasal spray   No No   Si sprays into each nostril every 12 (twelve) hours   glucose blood test strip   Yes No   Si each by Other route as needed Use as instructed   hydrochlorothiazide (HYDRODIURIL) 12 5 mg tablet   Yes No   Sig: Take 12 5 mg by mouth every other day   insulin degludec (TRESIBA) injection pen 100 units/mL   Yes No   Sig: Inject 8 Units under the skin daily   ketoconazole (NIZORAL) 2 % shampoo   No No   Sig: Apply 1 application topically 2 (two) times a week   lamoTRIgine (LaMICtal) 200 MG tablet   Yes No   Sig: Take 25 mg by mouth daily   lisinopril (ZESTRIL) 10 mg tablet   Yes No   Sig: Take 10 mg by mouth daily   loratadine (CLARITIN) 10 mg tablet   No No   Sig: Take 1 tablet (10 mg total) by mouth daily   metFORMIN (GLUCOPHAGE) 500 mg tablet   Yes No   Sig: Take 500 mg by mouth 2 (two) times a day with meals   ondansetron (ZOFRAN-ODT) 8 mg disintegrating tablet   No No   Sig: Take 1 tablet (8 mg total) by mouth every 8 (eight) hours as needed for nausea or vomiting for up to 10 doses      Facility-Administered Medications: None       Past Medical History:   Diagnosis Date    Anxiety     Depression     Diabetes mellitus ([address] )     Hypertension     Psychiatric disorder        Past Surgical History:   Procedure Laterality Date    CHOLECYSTECTOMY         History reviewed  No pertinent family history  I have reviewed and agree with the history as documented  Social History     Tobacco Use    Smoking status: Never Smoker    Smokeless tobacco: Never Used   Substance Use Topics    Alcohol use: No    Drug use: No        Review of Systems   Constitutional: Negative for fatigue and fever  HENT: Negative for congestion and ear pain  Eyes: Negative for discharge and redness     Respiratory: Negative for apnea, cough, shortness of breath and wheezing  Cardiovascular: Negative for chest pain  Gastrointestinal: Positive for nausea and vomiting  Negative for abdominal pain and diarrhea  Endocrine: Negative for cold intolerance and polydipsia  Genitourinary: Negative for difficulty urinating and hematuria  Musculoskeletal: Negative for arthralgias and back pain  Skin: Negative for color change and rash  Allergic/Immunologic: Negative for environmental allergies and immunocompromised state  Neurological: Negative for numbness and headaches  Hematological: Negative for adenopathy  Does not bruise/bleed easily  Psychiatric/Behavioral: Negative for agitation and behavioral problems  Physical Exam  Physical Exam   Constitutional: She is oriented to person, place, and time  Vital signs are normal  She appears well-developed and well-nourished  Non-toxic appearance  HENT:   Head: Normocephalic and atraumatic  Right Ear: Tympanic membrane and external ear normal    Left Ear: Tympanic membrane and external ear normal    Nose: Mucosal edema, rhinorrhea and sinus tenderness present  No nasal deformity  Right sinus exhibits maxillary sinus tenderness  Left sinus exhibits maxillary sinus tenderness  Mouth/Throat: Uvula is midline and oropharynx is clear and moist  Normal dentition  Eyes: Pupils are equal, round, and reactive to light  Conjunctivae, EOM and lids are normal  Right eye exhibits no discharge  Left eye exhibits no discharge  Neck: Trachea normal and normal range of motion  Neck supple  No JVD present  Carotid bruit is not present  Cardiovascular: Normal rate, regular rhythm, intact distal pulses and normal pulses  No extrasystoles are present  PMI is not displaced  Pulmonary/Chest: Effort normal and breath sounds normal  No accessory muscle usage  No respiratory distress  She has no wheezes  She has no rhonchi  She has no rales  Abdominal: Soft   Normal appearance and bowel sounds are normal  She exhibits no mass  There is no tenderness  There is no rigidity, no rebound and no guarding  Musculoskeletal:        Right shoulder: She exhibits normal range of motion, no bony tenderness, no swelling and no deformity  Cervical back: Normal  She exhibits normal range of motion, no tenderness, no bony tenderness and no deformity  Lymphadenopathy:     She has no cervical adenopathy  She has no axillary adenopathy  Neurological: She is alert and oriented to person, place, and time  She has normal strength and normal reflexes  No cranial nerve deficit or sensory deficit  GCS eye subscore is 4  GCS verbal subscore is 5  GCS motor subscore is 6  Skin: Skin is warm and dry  No rash noted  Psychiatric: She has a normal mood and affect  Her speech is normal and behavior is normal    Nursing note and vitals reviewed        Vital Signs  ED Triage Vitals [03/12/19 2121]   Temperature Pulse Respirations Blood Pressure SpO2   98 6 °F (37 °C) 101 20 (!) 196/82 95 %      Temp Source Heart Rate Source Patient Position - Orthostatic VS BP Location FiO2 (%)   Oral Monitor Sitting Left arm --      Pain Score       --           Vitals:    03/12/19 2121 03/12/19 2232   BP: (!) 196/82 156/74   Pulse: 101 99   Patient Position - Orthostatic VS: Sitting Sitting       qSOFA     Row Name 03/12/19 2232 03/12/19 2121             Altered mental status GCS < 15  --  --       Respiratory Rate > / =22  0  0       Systolic BP < / =780  0  0       Q Sofa Score  0  0             Visual Acuity      ED Medications  Medications   ondansetron (ZOFRAN-ODT) dispersible tablet 4 mg (4 mg Oral Given 3/12/19 2304)   azithromycin (ZITHROMAX) tablet 500 mg (500 mg Oral Given 3/12/19 2326)   guaiFENesin (MUCINEX) 12 hr tablet 600 mg (600 mg Oral Given 3/12/19 2326)       Diagnostic Studies  Results Reviewed     None                 No orders to display              Procedures  Procedures       Phone Contacts  ED Phone Contact    ED Course                               MDM  Number of Diagnoses or Management Options  Sinusitis: new and does not require workup  Risk of Complications, Morbidity, and/or Mortality  General comments: Gave patient Zofran ODT for the nausea and started her on antibiotics she is currently taking Zyrtec and Flonase at home I told her to continue that and also added Mucinex as well    Patient Progress  Patient progress: improved      Disposition  Final diagnoses:   Sinusitis     Time reflects when diagnosis was documented in both MDM as applicable and the Disposition within this note     Time User Action Codes Description Comment    3/12/2019 11:00 PM Thu Jake Add [J32 9] Sinusitis       ED Disposition     ED Disposition Condition Date/Time Comment    Discharge Stable Tue Mar 12, 2019 11:00 PM Shayla Double discharge to home/self care              Follow-up Information     Follow up With Specialties Details Why Contact Info    Oneyda Wheatley MD Family Medicine   [address] [facility] Male [address] [address]  [phone]            Discharge Medication List as of 3/12/2019 11:02 PM      START taking these medications    Details   azithromycin (ZITHROMAX) 250 mg tablet Take 1 tablet (250 mg total) by mouth daily for 4 days, Starting Tue 3/12/2019, Until Sat 3/16/2019, Print      guaiFENesin (MUCINEX) 600 mg 12 hr tablet Take 2 tablets (1,200 mg total) by mouth every 12 (twelve) hours, Starting Tue 3/12/2019, Normal         CONTINUE these medications which have NOT CHANGED    Details   carbamide peroxide (DEBROX) 6 5 % otic solution Administer 5 drops into ears 2 (two) times a day, Starting Sat 9/29/2018, Print      ciprofloxacin-dexamethasone (CIPRODEX) otic suspension Administer 4 drops into both ears 2 (two) times a day for 7 days, Starting Mon 9/24/2018, Until Mon 10/1/2018, Print      dicyclomine (BENTYL) 20 mg tablet Take 20 mg by mouth daily, Historical Med      escitalopram (LEXAPRO) 20 mg tablet Take 5 mg by mouth daily, Historical Med      fluticasone (FLONASE) 50 mcg/act nasal spray 2 sprays into each nostril every 12 (twelve) hours, Starting Tue 10/9/2018, Print      glucose blood test strip 1 each by Other route as needed Use as instructed, Historical Med      hydrochlorothiazide (HYDRODIURIL) 12 5 mg tablet Take 12 5 mg by mouth every other day, Historical Med      insulin degludec (TRESIBA) injection pen 100 units/mL Inject 8 Units under the skin daily, Historical Med      ketoconazole (NIZORAL) 2 % shampoo Apply 1 application topically 2 (two) times a week, Starting u 12/14/2017, Print      lamoTRIgine (LaMICtal) 200 MG tablet Take 25 mg by mouth daily, Historical Med      Liraglutide (VICTOZA SC) Inject 1 8 mL under the skin daily, Historical Med      lisinopril (ZESTRIL) 10 mg tablet Take 10 mg by mouth daily, Historical Med      loratadine (CLARITIN) 10 mg tablet Take 1 tablet (10 mg total) by mouth daily, Starting Tue 10/9/2018, Print      LORazepam (ATIVAN) 0 5 mg tablet Take by mouth every 8 (eight) hours as needed for anxiety, Historical Med      metFORMIN (GLUCOPHAGE) 500 mg tablet Take 500 mg by mouth 2 (two) times a day with meals, Historical Med      ondansetron (ZOFRAN-ODT) 8 mg disintegrating tablet Take 1 tablet (8 mg total) by mouth every 8 (eight) hours as needed for nausea or vomiting for up to 10 doses, Starting Fri 5/18/2018, Print           No discharge procedures on file      ED Provider  Electronically Signed by           Tonya Ponce DO  03/14/19 1016

## 2019-06-12 ENCOUNTER — HOSPITAL ENCOUNTER (EMERGENCY)
Facility: HOSPITAL | Age: 44
Discharge: HOME | End: 2019-06-12
Attending: EMERGENCY MEDICINE | Admitting: EMERGENCY MEDICINE
Payer: COMMERCIAL

## 2019-06-12 VITALS
TEMPERATURE: 99.1 F | DIASTOLIC BLOOD PRESSURE: 125 MMHG | RESPIRATION RATE: 20 BRPM | HEART RATE: 94 BPM | BODY MASS INDEX: 68.2 KG/M2 | WEIGHT: 293 LBS | SYSTOLIC BLOOD PRESSURE: 232 MMHG | OXYGEN SATURATION: 98 %

## 2019-06-12 DIAGNOSIS — M25.50 POLYARTHRALGIA: Primary | ICD-10-CM

## 2019-06-12 LAB
ALBUMIN SERPL BCP-MCNC: 3.4 G/DL (ref 3.5–5)
ALP SERPL-CCNC: 85 U/L (ref 46–116)
ALT SERPL W P-5'-P-CCNC: 46 U/L (ref 12–78)
ANION GAP SERPL CALCULATED.3IONS-SCNC: 6 MMOL/L (ref 4–13)
AST SERPL W P-5'-P-CCNC: 21 U/L (ref 5–45)
BACTERIA UR QL AUTO: ABNORMAL /HPF
BASOPHILS # BLD AUTO: 0.06 THOUSANDS/ΜL (ref 0–0.1)
BASOPHILS NFR BLD AUTO: 1 % (ref 0–1)
BILIRUB SERPL-MCNC: 0.3 MG/DL (ref 0.2–1)
BILIRUB UR QL STRIP: NEGATIVE
BUN SERPL-MCNC: 14 MG/DL (ref 5–25)
CALCIUM SERPL-MCNC: 9.2 MG/DL (ref 8.3–10.1)
CHLORIDE SERPL-SCNC: 102 MMOL/L (ref 100–108)
CLARITY UR: CLEAR
CO2 SERPL-SCNC: 31 MMOL/L (ref 21–32)
COLOR UR: YELLOW
CREAT SERPL-MCNC: 0.74 MG/DL (ref 0.6–1.3)
CRP SERPL HS-MCNC: 11.53 MG/L
EOSINOPHIL # BLD AUTO: 0.11 THOUSAND/ΜL (ref 0–0.61)
EOSINOPHIL NFR BLD AUTO: 1 % (ref 0–6)
ERYTHROCYTE [DISTWIDTH] IN BLOOD BY AUTOMATED COUNT: 15.9 % (ref 11.6–15.1)
ERYTHROCYTE [SEDIMENTATION RATE] IN BLOOD: 38 MM/HOUR (ref 0–20)
GFR SERPL CREATININE-BSD FRML MDRD: 97 ML/MIN/1.73SQ M
GLUCOSE SERPL-MCNC: 153 MG/DL (ref 65–140)
GLUCOSE UR STRIP-MCNC: NEGATIVE MG/DL
HCT VFR BLD AUTO: 39.5 % (ref 34.8–46.1)
HGB BLD-MCNC: 11.9 G/DL (ref 11.5–15.4)
HGB UR QL STRIP.AUTO: NEGATIVE
IMM GRANULOCYTES # BLD AUTO: 0.05 THOUSAND/UL (ref 0–0.2)
IMM GRANULOCYTES NFR BLD AUTO: 1 % (ref 0–2)
KETONES UR STRIP-MCNC: NEGATIVE MG/DL
LEUKOCYTE ESTERASE UR QL STRIP: ABNORMAL
LYMPHOCYTES # BLD AUTO: 2.28 THOUSANDS/ΜL (ref 0.6–4.47)
LYMPHOCYTES NFR BLD AUTO: 24 % (ref 14–44)
MCH RBC QN AUTO: 25.3 PG (ref 26.8–34.3)
MCHC RBC AUTO-ENTMCNC: 30.1 G/DL (ref 31.4–37.4)
MCV RBC AUTO: 84 FL (ref 82–98)
MONOCYTES # BLD AUTO: 0.66 THOUSAND/ΜL (ref 0.17–1.22)
MONOCYTES NFR BLD AUTO: 7 % (ref 4–12)
NEUTROPHILS # BLD AUTO: 6.3 THOUSANDS/ΜL (ref 1.85–7.62)
NEUTS SEG NFR BLD AUTO: 66 % (ref 43–75)
NITRITE UR QL STRIP: NEGATIVE
NON-SQ EPI CELLS URNS QL MICRO: ABNORMAL /HPF
NRBC BLD AUTO-RTO: 0 /100 WBCS
OTHER STN SPEC: ABNORMAL
PH UR STRIP.AUTO: 6.5 [PH] (ref 4.5–8)
PLATELET # BLD AUTO: 351 THOUSANDS/UL (ref 149–390)
PMV BLD AUTO: 9.1 FL (ref 8.9–12.7)
POTASSIUM SERPL-SCNC: 4.2 MMOL/L (ref 3.5–5.3)
PROT SERPL-MCNC: 7.7 G/DL (ref 6.4–8.2)
PROT UR STRIP-MCNC: NEGATIVE MG/DL
RBC # BLD AUTO: 4.71 MILLION/UL (ref 3.81–5.12)
RBC #/AREA URNS AUTO: ABNORMAL /HPF
RHEUMATOID FACT SER QL LA: NEGATIVE
SODIUM SERPL-SCNC: 139 MMOL/L (ref 136–145)
SP GR UR STRIP.AUTO: 1.02 (ref 1–1.03)
UROBILINOGEN UR QL STRIP.AUTO: 0.2 E.U./DL
WBC # BLD AUTO: 9.46 THOUSAND/UL (ref 4.31–10.16)
WBC #/AREA URNS AUTO: ABNORMAL /HPF

## 2019-06-12 PROCEDURE — 86430 RHEUMATOID FACTOR TEST QUAL: CPT | Performed by: PHYSICIAN ASSISTANT

## 2019-06-12 PROCEDURE — 99284 EMERGENCY DEPT VISIT MOD MDM: CPT | Performed by: PHYSICIAN ASSISTANT

## 2019-06-12 PROCEDURE — 86141 C-REACTIVE PROTEIN HS: CPT | Performed by: PHYSICIAN ASSISTANT

## 2019-06-12 PROCEDURE — 85652 RBC SED RATE AUTOMATED: CPT | Performed by: PHYSICIAN ASSISTANT

## 2019-06-12 PROCEDURE — 86618 LYME DISEASE ANTIBODY: CPT | Performed by: PHYSICIAN ASSISTANT

## 2019-06-12 PROCEDURE — 86038 ANTINUCLEAR ANTIBODIES: CPT | Performed by: PHYSICIAN ASSISTANT

## 2019-06-12 PROCEDURE — 85025 COMPLETE CBC W/AUTO DIFF WBC: CPT | Performed by: PHYSICIAN ASSISTANT

## 2019-06-12 PROCEDURE — 36415 COLL VENOUS BLD VENIPUNCTURE: CPT | Performed by: PHYSICIAN ASSISTANT

## 2019-06-12 PROCEDURE — 80053 COMPREHEN METABOLIC PANEL: CPT | Performed by: PHYSICIAN ASSISTANT

## 2019-06-12 PROCEDURE — 81001 URINALYSIS AUTO W/SCOPE: CPT

## 2019-06-12 PROCEDURE — 96374 THER/PROPH/DIAG INJ IV PUSH: CPT

## 2019-06-12 PROCEDURE — 99283 EMERGENCY DEPT VISIT LOW MDM: CPT

## 2019-06-12 PROCEDURE — 96361 HYDRATE IV INFUSION ADD-ON: CPT

## 2019-06-12 RX ORDER — KETOROLAC TROMETHAMINE 30 MG/ML
15 INJECTION, SOLUTION INTRAMUSCULAR; INTRAVENOUS ONCE
Status: COMPLETED | OUTPATIENT
Start: 2019-06-12 | End: 2019-06-12

## 2019-06-12 RX ORDER — LISINOPRIL 5 MG/1
10 TABLET ORAL ONCE
Status: COMPLETED | OUTPATIENT
Start: 2019-06-12 | End: 2019-06-12

## 2019-06-12 RX ORDER — IBUPROFEN 800 MG/1
800 TABLET ORAL EVERY 6 HOURS PRN
Qty: 30 TABLET | Refills: 0 | Status: SHIPPED | OUTPATIENT
Start: 2019-06-12 | End: 2019-09-10 | Stop reason: HOSPADM

## 2019-06-12 RX ADMIN — SODIUM CHLORIDE 1000 ML: 0.9 INJECTION, SOLUTION INTRAVENOUS at 10:09

## 2019-06-12 RX ADMIN — KETOROLAC TROMETHAMINE 15 MG: 30 INJECTION, SOLUTION INTRAMUSCULAR at 10:21

## 2019-06-12 RX ADMIN — LISINOPRIL 10 MG: 5 TABLET ORAL at 11:10

## 2019-06-13 LAB
B BURGDOR IGG SER IA-ACNC: 0.12
B BURGDOR IGM SER IA-ACNC: 0.1

## 2019-06-14 LAB — RYE IGE QN: NEGATIVE

## 2019-09-03 ENCOUNTER — HOSPITAL ENCOUNTER (EMERGENCY)
Facility: HOSPITAL | Age: 44
End: 2019-09-04
Attending: EMERGENCY MEDICINE | Admitting: EMERGENCY MEDICINE
Payer: COMMERCIAL

## 2019-09-03 DIAGNOSIS — F32.A DEPRESSION: Primary | ICD-10-CM

## 2019-09-03 DIAGNOSIS — R45.851 SUICIDAL IDEATION: ICD-10-CM

## 2019-09-03 LAB
AMPHETAMINES SERPL QL SCN: NEGATIVE
BARBITURATES UR QL: NEGATIVE
BENZODIAZ UR QL: NEGATIVE
COCAINE UR QL: NEGATIVE
ETHANOL EXG-MCNC: 0 MG/DL
EXT PREG TEST URINE: NEGATIVE
EXT. CONTROL ED NAV: NORMAL
METHADONE UR QL: NEGATIVE
OPIATES UR QL SCN: NEGATIVE
PCP UR QL: NEGATIVE
THC UR QL: NEGATIVE

## 2019-09-03 PROCEDURE — 99284 EMERGENCY DEPT VISIT MOD MDM: CPT | Performed by: EMERGENCY MEDICINE

## 2019-09-03 PROCEDURE — 81025 URINE PREGNANCY TEST: CPT | Performed by: EMERGENCY MEDICINE

## 2019-09-03 PROCEDURE — 80307 DRUG TEST PRSMV CHEM ANLYZR: CPT | Performed by: EMERGENCY MEDICINE

## 2019-09-03 PROCEDURE — 82075 ASSAY OF BREATH ETHANOL: CPT | Performed by: EMERGENCY MEDICINE

## 2019-09-03 PROCEDURE — 99285 EMERGENCY DEPT VISIT HI MDM: CPT

## 2019-09-03 NOTE — ED PROVIDER NOTES
History  Chief Complaint   Patient presents with    Depression     Depressed because significant other is abusing prescription pills at home and their relationship is unstable  Pt took "too many" of her gabapentin yesterday and seen at PCP for eval  Pt is tearfull and reports having suicidal thoughts  Pt has planned to OD on medications    Suicidal     Patient presents to the emergency department via private transportation for psychiatric evaluation  Patient admittedly is depressed and suicidal   She does not have a specific plan but mentions taking medications and overdosing  Patient does of psychiatric history  She denies physical complaints at this time other than chronic pain which she takes gabapentin for  She denies other drug and alcohol issues  Prior to Admission Medications   Prescriptions Last Dose Informant Patient Reported? Taking?    LORazepam (ATIVAN) 0 5 mg tablet   Yes No   Sig: Take by mouth every 8 (eight) hours as needed for anxiety   Liraglutide (VICTOZA SC)   Yes No   Sig: Inject 1 8 mL under the skin daily   carbamide peroxide (DEBROX) 6 5 % otic solution   No No   Sig: Administer 5 drops into ears 2 (two) times a day   ciprofloxacin-dexamethasone (CIPRODEX) otic suspension   No No   Sig: Administer 4 drops into both ears 2 (two) times a day for 7 days   dicyclomine (BENTYL) 20 mg tablet   Yes No   Sig: Take 20 mg by mouth daily   escitalopram (LEXAPRO) 20 mg tablet   Yes No   Sig: Take 5 mg by mouth daily   fluticasone (FLONASE) 50 mcg/act nasal spray   No No   Si sprays into each nostril every 12 (twelve) hours   glucose blood test strip   Yes No   Si each by Other route as needed Use as instructed   guaiFENesin (MUCINEX) 600 mg 12 hr tablet   No No   Sig: Take 2 tablets (1,200 mg total) by mouth every 12 (twelve) hours   hydrochlorothiazide (HYDRODIURIL) 12 5 mg tablet   Yes No   Sig: Take 12 5 mg by mouth every other day   ibuprofen (MOTRIN) 800 mg tablet   No No Sig: Take 1 tablet (800 mg total) by mouth every 6 (six) hours as needed for mild pain for up to 10 days   insulin degludec (TRESIBA) injection pen 100 units/mL   Yes No   Sig: Inject 8 Units under the skin daily   ketoconazole (NIZORAL) 2 % shampoo   No No   Sig: Apply 1 application topically 2 (two) times a week   lamoTRIgine (LaMICtal) 200 MG tablet   Yes No   Sig: Take 25 mg by mouth daily   lisinopril (ZESTRIL) 10 mg tablet   Yes No   Sig: Take 10 mg by mouth daily   loratadine (CLARITIN) 10 mg tablet   No No   Sig: Take 1 tablet (10 mg total) by mouth daily   metFORMIN (GLUCOPHAGE) 500 mg tablet   Yes No   Sig: Take 500 mg by mouth 2 (two) times a day with meals   ondansetron (ZOFRAN-ODT) 8 mg disintegrating tablet   No No   Sig: Take 1 tablet (8 mg total) by mouth every 8 (eight) hours as needed for nausea or vomiting for up to 10 doses      Facility-Administered Medications: None       Past Medical History:   Diagnosis Date    Anxiety     Depression     Diabetes mellitus ([facility] [address]     Hypertension     Psychiatric disorder        Past Surgical History:   Procedure Laterality Date    CHOLECYSTECTOMY         History reviewed  No pertinent family history  I have reviewed and agree with the history as documented  Social History     Tobacco Use    Smoking status: Never Smoker    Smokeless tobacco: Never Used   Substance Use Topics    Alcohol use: No    Drug use: No        Review of Systems   Constitutional: Negative for activity change, appetite change, chills, diaphoresis, fatigue and fever  HENT: Negative  Negative for congestion, ear discharge, ear pain, rhinorrhea, sinus pain, sneezing, tinnitus, trouble swallowing and voice change  Eyes: Negative  Negative for photophobia and visual disturbance  Respiratory: Negative  Negative for cough, choking, chest tightness, shortness of breath, wheezing and stridor  Cardiovascular: Negative    Negative for chest pain, palpitations and leg swelling  Gastrointestinal: Negative  Negative for abdominal pain, anal bleeding, blood in stool, diarrhea, nausea, rectal pain and vomiting  Endocrine: Negative  Genitourinary: Negative  Negative for difficulty urinating, dysuria, flank pain, frequency, hematuria, urgency, vaginal bleeding and vaginal pain  Musculoskeletal: Negative for back pain, gait problem, myalgias, neck pain and neck stiffness  Skin: Negative  Negative for rash and wound  Allergic/Immunologic: Negative  Neurological: Negative  Negative for dizziness, tremors, seizures, syncope, facial asymmetry, speech difficulty, weakness, light-headedness, numbness and headaches  Hematological: Negative  Does not bruise/bleed easily  Psychiatric/Behavioral: Negative  Negative for confusion  Physical Exam  Physical Exam   Constitutional: She is oriented to person, place, and time  She appears well-developed and well-nourished  No distress  Nontoxic appearance  No respiratory distress  Patient looks comfortable sitting upright on the stretcher  HENT:   Head: Normocephalic and atraumatic  Right Ear: External ear normal    Left Ear: External ear normal    Mouth/Throat: Oropharynx is clear and moist    Eyes: Pupils are equal, round, and reactive to light  Conjunctivae and EOM are normal    Neck: Normal range of motion  Neck supple  Cardiovascular: Normal rate, regular rhythm, normal heart sounds and intact distal pulses  Pulmonary/Chest: Effort normal and breath sounds normal  No stridor  No respiratory distress  She has no wheezes  She has no rales  She exhibits no tenderness  Abdominal: Soft  Bowel sounds are normal  She exhibits no distension and no mass  There is no tenderness  There is no rebound and no guarding  No hernia  Musculoskeletal: Normal range of motion  She exhibits no edema, tenderness or deformity  Neurological: She is alert and oriented to person, place, and time  She has normal reflexes   She displays normal reflexes  No cranial nerve deficit or sensory deficit  She exhibits normal muscle tone  Coordination normal    Skin: Skin is warm and dry  No rash noted  She is not diaphoretic  Psychiatric: Her behavior is normal  Judgment and thought content normal    Flat affect   Nursing note and vitals reviewed  Vital Signs  ED Triage Vitals   Temperature Pulse Respirations Blood Pressure SpO2   09/03/19 1849 09/03/19 1848 09/03/19 1848 09/03/19 1848 09/03/19 1848   98 7 °F (37 1 °C) (!) 106 20 160/95 96 %      Temp Source Heart Rate Source Patient Position - Orthostatic VS BP Location FiO2 (%)   09/03/19 1849 09/03/19 1848 09/03/19 1848 09/03/19 1848 --   Oral Monitor Sitting Right arm       Pain Score       --                  Vitals:    09/03/19 1848 09/03/19 2237   BP: 160/95 118/54   Pulse: (!) 106 88   Patient Position - Orthostatic VS: Sitting Lying         Visual Acuity      ED Medications  Medications - No data to display    Diagnostic Studies  Results Reviewed     Procedure Component Value Units Date/Time    Rapid drug screen, urine [[id number]]  (Normal) Collected:  09/03/19 2230    Lab Status:  Final result Specimen:  Urine, Clean Catch Updated:  09/03/19 2247     Amph/Meth UR Negative     Barbiturate Ur Negative     Benzodiazepine Urine Negative     Cocaine Urine Negative     Methadone Urine Negative     Opiate Urine Negative     PCP Ur Negative     THC Urine Negative    Narrative:       FOR MEDICAL PURPOSES ONLY  IF CONFIRMATION NEEDED PLEASE CONTACT THE LAB WITHIN 5 DAYS      Drug Screen Cutoff Levels:  AMPHETAMINE/METHAMPHETAMINES  1000 ng/mL  BARBITURATES     200 ng/mL  BENZODIAZEPINES     200 ng/mL  COCAINE      300 ng/mL  METHADONE      300 ng/mL  OPIATES      300 ng/mL  PHENCYCLIDINE     25 ng/mL  THC       50 ng/mL      POCT pregnancy, urine [[id number]]  (Normal) Resulted:  09/03/19 2243    Lab Status:  Final result Updated:  09/03/19 2243     EXT PREG TEST UR (Ref: Negative) negative Control valid    POCT alcohol breath test [[id number]]  (Normal) Resulted:  09/03/19 2021    Lab Status:  Final result Updated:  09/03/19 2021     EXTBreath Alcohol 0 000                 No orders to display              Procedures  Procedures       ED Course  ED Course as of Sep 03 2339   Tue Sep 03, 2019   2026 Crisis aware of patient in the ED  Patient will be seen in the emergency department  2338 Bed placement still pending  Patient will be signed over to Carlos Delgado for definitive disposition  patient is medically cleared and medically stable be admitted to a psychiatric facility  MDM    Disposition  Final diagnoses:   Depression   Suicidal ideation     Time reflects when diagnosis was documented in both MDM as applicable and the Disposition within this note     Time User Action Codes Description Comment    9/3/2019 11:38 PM Lisandro Dowell Add [F32 9] Depression     9/3/2019 11:38 PM Alvaro Edmondson Add [R45 851] Suicidal ideation       ED Disposition     None      Follow-up Information    None         Patient's Medications   Discharge Prescriptions    No medications on file     No discharge procedures on file      ED Provider  Electronically Signed by           Christie Overton MD  09/03/19 Raymundo Hawley MD  09/03/19 9945

## 2019-09-04 ENCOUNTER — HOSPITAL ENCOUNTER (INPATIENT)
Facility: HOSPITAL | Age: 44
LOS: 6 days | Discharge: HOME | DRG: 885 | End: 2019-09-10
Attending: PSYCHIATRY & NEUROLOGY | Admitting: PSYCHIATRY & NEUROLOGY
Payer: COMMERCIAL

## 2019-09-04 VITALS
DIASTOLIC BLOOD PRESSURE: 57 MMHG | SYSTOLIC BLOOD PRESSURE: 132 MMHG | OXYGEN SATURATION: 95 % | RESPIRATION RATE: 16 BRPM | TEMPERATURE: 99 F | HEART RATE: 94 BPM

## 2019-09-04 DIAGNOSIS — F32.A DEPRESSION: ICD-10-CM

## 2019-09-04 DIAGNOSIS — F33.2 SEVERE EPISODE OF RECURRENT MAJOR DEPRESSIVE DISORDER, WITHOUT PSYCHOTIC FEATURES (HCC): Primary | ICD-10-CM

## 2019-09-04 DIAGNOSIS — E11.8 TYPE 2 DIABETES MELLITUS WITH COMPLICATION, WITH LONG-TERM CURRENT USE OF INSULIN (HCC): ICD-10-CM

## 2019-09-04 DIAGNOSIS — Z79.4 TYPE 2 DIABETES MELLITUS WITH COMPLICATION, WITH LONG-TERM CURRENT USE OF INSULIN (HCC): ICD-10-CM

## 2019-09-04 DIAGNOSIS — E13.9 DIABETES 1.5, MANAGED AS TYPE 2 (HCC): ICD-10-CM

## 2019-09-04 PROBLEM — K58.0 IRRITABLE BOWEL SYNDROME WITH DIARRHEA: Status: ACTIVE | Noted: 2019-09-04

## 2019-09-04 PROBLEM — I10 ESSENTIAL HYPERTENSION: Status: ACTIVE | Noted: 2019-09-04

## 2019-09-04 PROBLEM — Z00.8 MEDICAL CLEARANCE FOR PSYCHIATRIC ADMISSION: Status: ACTIVE | Noted: 2019-09-04

## 2019-09-04 LAB
GLUCOSE SERPL-MCNC: 109 MG/DL (ref 65–140)
GLUCOSE SERPL-MCNC: 138 MG/DL (ref 65–140)
GLUCOSE SERPL-MCNC: 141 MG/DL (ref 65–140)
GLUCOSE SERPL-MCNC: 144 MG/DL (ref 65–140)

## 2019-09-04 PROCEDURE — 82948 REAGENT STRIP/BLOOD GLUCOSE: CPT

## 2019-09-04 PROCEDURE — 99253 IP/OBS CNSLTJ NEW/EST LOW 45: CPT | Performed by: NURSE PRACTITIONER

## 2019-09-04 RX ORDER — TRAZODONE HYDROCHLORIDE 50 MG/1
50 TABLET ORAL
Status: DISCONTINUED | OUTPATIENT
Start: 2019-09-04 | End: 2019-09-10 | Stop reason: HOSPADM

## 2019-09-04 RX ORDER — LORAZEPAM 2 MG/ML
2 INJECTION INTRAMUSCULAR EVERY 6 HOURS PRN
Status: DISCONTINUED | OUTPATIENT
Start: 2019-09-04 | End: 2019-09-10 | Stop reason: HOSPADM

## 2019-09-04 RX ORDER — ACETAMINOPHEN 325 MG/1
975 TABLET ORAL EVERY 6 HOURS PRN
Status: DISCONTINUED | OUTPATIENT
Start: 2019-09-04 | End: 2019-09-10 | Stop reason: HOSPADM

## 2019-09-04 RX ORDER — FLUTICASONE PROPIONATE 50 MCG
2 SPRAY, SUSPENSION (ML) NASAL EVERY 12 HOURS
Status: DISCONTINUED | OUTPATIENT
Start: 2019-09-04 | End: 2019-09-08

## 2019-09-04 RX ORDER — ACETAMINOPHEN 325 MG/1
650 TABLET ORAL EVERY 4 HOURS PRN
Status: DISCONTINUED | OUTPATIENT
Start: 2019-09-04 | End: 2019-09-10 | Stop reason: HOSPADM

## 2019-09-04 RX ORDER — HALOPERIDOL 5 MG
5 TABLET ORAL EVERY 6 HOURS PRN
Status: DISCONTINUED | OUTPATIENT
Start: 2019-09-04 | End: 2019-09-10 | Stop reason: HOSPADM

## 2019-09-04 RX ORDER — LISINOPRIL 10 MG/1
10 TABLET ORAL ONCE
Status: COMPLETED | OUTPATIENT
Start: 2019-09-04 | End: 2019-09-04

## 2019-09-04 RX ORDER — OLANZAPINE 10 MG/1
10 INJECTION, POWDER, LYOPHILIZED, FOR SOLUTION INTRAMUSCULAR
Status: DISCONTINUED | OUTPATIENT
Start: 2019-09-04 | End: 2019-09-10 | Stop reason: HOSPADM

## 2019-09-04 RX ORDER — LORAZEPAM 1 MG/1
1 TABLET ORAL EVERY 6 HOURS PRN
Status: CANCELLED | OUTPATIENT
Start: 2019-09-04

## 2019-09-04 RX ORDER — PANTOPRAZOLE SODIUM 40 MG/1
40 TABLET, DELAYED RELEASE ORAL DAILY
Status: DISCONTINUED | OUTPATIENT
Start: 2019-09-05 | End: 2019-09-10 | Stop reason: HOSPADM

## 2019-09-04 RX ORDER — BENZTROPINE MESYLATE 0.5 MG/1
1 TABLET ORAL EVERY 6 HOURS PRN
Status: CANCELLED | OUTPATIENT
Start: 2019-09-04

## 2019-09-04 RX ORDER — TRAZODONE HYDROCHLORIDE 50 MG/1
50 TABLET ORAL
Status: CANCELLED | OUTPATIENT
Start: 2019-09-04

## 2019-09-04 RX ORDER — HALOPERIDOL 5 MG
5 TABLET ORAL EVERY 6 HOURS PRN
Status: DISCONTINUED | OUTPATIENT
Start: 2019-09-04 | End: 2019-09-04

## 2019-09-04 RX ORDER — MAGNESIUM HYDROXIDE/ALUMINUM HYDROXICE/SIMETHICONE 120; 1200; 1200 MG/30ML; MG/30ML; MG/30ML
30 SUSPENSION ORAL EVERY 4 HOURS PRN
Status: DISCONTINUED | OUTPATIENT
Start: 2019-09-04 | End: 2019-09-10 | Stop reason: HOSPADM

## 2019-09-04 RX ORDER — HALOPERIDOL 5 MG/ML
5 INJECTION INTRAMUSCULAR EVERY 6 HOURS PRN
Status: CANCELLED | OUTPATIENT
Start: 2019-09-04

## 2019-09-04 RX ORDER — ESCITALOPRAM OXALATE 20 MG/1
20 TABLET ORAL DAILY
Status: DISCONTINUED | OUTPATIENT
Start: 2019-09-04 | End: 2019-09-04 | Stop reason: HOSPADM

## 2019-09-04 RX ORDER — TOPIRAMATE 25 MG/1
25 TABLET ORAL 2 TIMES DAILY
Status: DISCONTINUED | OUTPATIENT
Start: 2019-09-04 | End: 2019-09-10 | Stop reason: HOSPADM

## 2019-09-04 RX ORDER — LORAZEPAM 2 MG/ML
2 INJECTION INTRAMUSCULAR EVERY 6 HOURS PRN
Status: CANCELLED | OUTPATIENT
Start: 2019-09-04

## 2019-09-04 RX ORDER — ACETAMINOPHEN 325 MG/1
650 TABLET ORAL EVERY 6 HOURS PRN
Status: CANCELLED | OUTPATIENT
Start: 2019-09-04

## 2019-09-04 RX ORDER — DICYCLOMINE HCL 20 MG
20 TABLET ORAL 3 TIMES DAILY PRN
Status: DISCONTINUED | OUTPATIENT
Start: 2019-09-04 | End: 2019-09-10 | Stop reason: HOSPADM

## 2019-09-04 RX ORDER — RISPERIDONE 1 MG/1
1 TABLET, ORALLY DISINTEGRATING ORAL EVERY 4 HOURS PRN
Status: DISCONTINUED | OUTPATIENT
Start: 2019-09-04 | End: 2019-09-10 | Stop reason: HOSPADM

## 2019-09-04 RX ORDER — LORATADINE 10 MG/1
10 TABLET ORAL DAILY
Status: DISCONTINUED | OUTPATIENT
Start: 2019-09-05 | End: 2019-09-10 | Stop reason: HOSPADM

## 2019-09-04 RX ORDER — BENZTROPINE MESYLATE 1 MG/ML
1 INJECTION INTRAMUSCULAR; INTRAVENOUS EVERY 6 HOURS PRN
Status: DISCONTINUED | OUTPATIENT
Start: 2019-09-04 | End: 2019-09-10 | Stop reason: HOSPADM

## 2019-09-04 RX ORDER — HALOPERIDOL 5 MG
5 TABLET ORAL EVERY 6 HOURS PRN
Status: CANCELLED | OUTPATIENT
Start: 2019-09-04

## 2019-09-04 RX ORDER — LAMOTRIGINE 100 MG/1
200 TABLET ORAL ONCE
Status: COMPLETED | OUTPATIENT
Start: 2019-09-04 | End: 2019-09-04

## 2019-09-04 RX ORDER — BENZTROPINE MESYLATE 1 MG/1
1 TABLET ORAL EVERY 6 HOURS PRN
Status: DISCONTINUED | OUTPATIENT
Start: 2019-09-04 | End: 2019-09-10 | Stop reason: HOSPADM

## 2019-09-04 RX ORDER — LISINOPRIL 10 MG/1
10 TABLET ORAL DAILY
Status: DISCONTINUED | OUTPATIENT
Start: 2019-09-05 | End: 2019-09-10 | Stop reason: HOSPADM

## 2019-09-04 RX ORDER — LORAZEPAM 1 MG/1
1 TABLET ORAL EVERY 6 HOURS PRN
Status: DISCONTINUED | OUTPATIENT
Start: 2019-09-04 | End: 2019-09-04

## 2019-09-04 RX ORDER — LAMOTRIGINE 100 MG/1
200 TABLET ORAL DAILY
Status: DISCONTINUED | OUTPATIENT
Start: 2019-09-05 | End: 2019-09-10 | Stop reason: HOSPADM

## 2019-09-04 RX ORDER — LORAZEPAM 2 MG/ML
2 INJECTION INTRAMUSCULAR EVERY 6 HOURS PRN
Status: DISCONTINUED | OUTPATIENT
Start: 2019-09-04 | End: 2019-09-04

## 2019-09-04 RX ORDER — MAGNESIUM HYDROXIDE/ALUMINUM HYDROXICE/SIMETHICONE 120; 1200; 1200 MG/30ML; MG/30ML; MG/30ML
30 SUSPENSION ORAL EVERY 4 HOURS PRN
Status: CANCELLED | OUTPATIENT
Start: 2019-09-04

## 2019-09-04 RX ORDER — HYDROCHLOROTHIAZIDE 12.5 MG/1
12.5 TABLET ORAL EVERY OTHER DAY
Status: DISCONTINUED | OUTPATIENT
Start: 2019-09-04 | End: 2019-09-10 | Stop reason: HOSPADM

## 2019-09-04 RX ORDER — ESCITALOPRAM OXALATE 10 MG/1
30 TABLET ORAL DAILY
Status: DISCONTINUED | OUTPATIENT
Start: 2019-09-04 | End: 2019-09-10 | Stop reason: HOSPADM

## 2019-09-04 RX ORDER — OXCARBAZEPINE 150 MG/1
150 TABLET, FILM COATED ORAL 2 TIMES DAILY
COMMUNITY
End: 2019-09-10 | Stop reason: HOSPADM

## 2019-09-04 RX ORDER — HYDROCHLOROTHIAZIDE 12.5 MG/1
12.5 TABLET ORAL DAILY
Status: DISCONTINUED | OUTPATIENT
Start: 2019-09-04 | End: 2019-09-04 | Stop reason: HOSPADM

## 2019-09-04 RX ORDER — HYDROXYZINE HYDROCHLORIDE 25 MG/1
50 TABLET, FILM COATED ORAL EVERY 4 HOURS PRN
Status: DISCONTINUED | OUTPATIENT
Start: 2019-09-04 | End: 2019-09-10 | Stop reason: HOSPADM

## 2019-09-04 RX ORDER — HALOPERIDOL 5 MG/ML
5 INJECTION INTRAMUSCULAR EVERY 6 HOURS PRN
Status: DISCONTINUED | OUTPATIENT
Start: 2019-09-04 | End: 2019-09-04

## 2019-09-04 RX ORDER — ACETAMINOPHEN 325 MG/1
650 TABLET ORAL EVERY 6 HOURS PRN
Status: DISCONTINUED | OUTPATIENT
Start: 2019-09-04 | End: 2019-09-10 | Stop reason: HOSPADM

## 2019-09-04 RX ORDER — HALOPERIDOL 5 MG/ML
5 INJECTION INTRAMUSCULAR EVERY 6 HOURS PRN
Status: DISCONTINUED | OUTPATIENT
Start: 2019-09-04 | End: 2019-09-10 | Stop reason: HOSPADM

## 2019-09-04 RX ORDER — LORAZEPAM 0.5 MG/1
0.5 TABLET ORAL ONCE
Status: COMPLETED | OUTPATIENT
Start: 2019-09-04 | End: 2019-09-04

## 2019-09-04 RX ORDER — INSULIN GLARGINE 100 [IU]/ML
40 INJECTION, SOLUTION SUBCUTANEOUS
COMMUNITY
End: 2020-08-31 | Stop reason: SDUPTHER

## 2019-09-04 RX ORDER — HYDROXYZINE HYDROCHLORIDE 25 MG/1
25 TABLET, FILM COATED ORAL EVERY 6 HOURS PRN
Status: DISCONTINUED | OUTPATIENT
Start: 2019-09-04 | End: 2019-09-10 | Stop reason: HOSPADM

## 2019-09-04 RX ORDER — INSULIN GLARGINE 100 [IU]/ML
40 INJECTION, SOLUTION SUBCUTANEOUS
Status: DISCONTINUED | OUTPATIENT
Start: 2019-09-04 | End: 2019-09-10 | Stop reason: HOSPADM

## 2019-09-04 RX ORDER — BENZTROPINE MESYLATE 1 MG/ML
1 INJECTION INTRAMUSCULAR; INTRAVENOUS EVERY 6 HOURS PRN
Status: CANCELLED | OUTPATIENT
Start: 2019-09-04

## 2019-09-04 RX ORDER — PANTOPRAZOLE SODIUM 40 MG/1
40 TABLET, DELAYED RELEASE ORAL ONCE
Status: COMPLETED | OUTPATIENT
Start: 2019-09-04 | End: 2019-09-04

## 2019-09-04 RX ORDER — PANTOPRAZOLE SODIUM 40 MG/1
40 TABLET, DELAYED RELEASE ORAL DAILY
COMMUNITY
End: 2020-04-01 | Stop reason: SDUPTHER

## 2019-09-04 RX ORDER — DICYCLOMINE HCL 20 MG
20 TABLET ORAL DAILY
Status: DISCONTINUED | OUTPATIENT
Start: 2019-09-05 | End: 2019-09-04

## 2019-09-04 RX ORDER — ACETAMINOPHEN 325 MG/1
650 TABLET ORAL EVERY 6 HOURS PRN
Status: DISCONTINUED | OUTPATIENT
Start: 2019-09-04 | End: 2019-09-04

## 2019-09-04 RX ADMIN — INSULIN GLARGINE 40 UNITS: 100 INJECTION, SOLUTION SUBCUTANEOUS at 22:43

## 2019-09-04 RX ADMIN — ESCITALOPRAM OXALATE 20 MG: 20 TABLET ORAL at 09:11

## 2019-09-04 RX ADMIN — PANTOPRAZOLE SODIUM 40 MG: 40 TABLET, DELAYED RELEASE ORAL at 09:12

## 2019-09-04 RX ADMIN — LORAZEPAM 0.5 MG: 0.5 TABLET ORAL at 09:11

## 2019-09-04 RX ADMIN — METFORMIN HYDROCHLORIDE 500 MG: 500 TABLET ORAL at 17:45

## 2019-09-04 RX ADMIN — TOPIRAMATE 25 MG: 25 TABLET, FILM COATED ORAL at 17:45

## 2019-09-04 RX ADMIN — LAMOTRIGINE 200 MG: 100 TABLET ORAL at 09:11

## 2019-09-04 RX ADMIN — HYDROXYZINE HYDROCHLORIDE 50 MG: 25 TABLET ORAL at 17:45

## 2019-09-04 RX ADMIN — HYDROCHLOROTHIAZIDE 12.5 MG: 12.5 TABLET ORAL at 09:12

## 2019-09-04 RX ADMIN — LISINOPRIL 10 MG: 10 TABLET ORAL at 09:12

## 2019-09-04 RX ADMIN — METFORMIN HYDROCHLORIDE 500 MG: 500 TABLET, FILM COATED ORAL at 09:12

## 2019-09-04 NOTE — ED NOTES
PT given hospital gown, ties cut off for safety, because pt unable to fit largest scrubs available  Charge RN, hosp sup, and secure holding RN aware and ok with pt wearing gown   given [employer] and gold [employer] after she stated she's diabetic and hasn't eaten all day     [address]  09/03/19 2046 [address]  09/03/19 2048

## 2019-09-04 NOTE — ASSESSMENT & PLAN NOTE
No results found for: HGBA1C    Recent Labs     09/04/19  0049 09/04/19  0910 09/04/19  1656   POCGLU 109 138 141*       Blood Sugar Average: Last 72 hrs:  (P) 141   Will place on insulin sliding scale  Continue metformin and Lantus

## 2019-09-04 NOTE — ED NOTES
Intake / Safety assessment completed with patient  Patient presents to ED due to increased depression / anxiety  Suicidal ideation with no specific plan, but patient reports taking more of her medication than prescribed  According to patient she reports taking more of her gabapentin to feel numb  Patient  reports being unable to cope with life at this time  She  notes she and her significant other have been having relationship isssues  She reports she feels he has been abusing drugs and refuses help  According to patient her significant other has told her he no longer wants to be together  She reports crying all the time  She also reports having financial difficulties  Patient reports poor sleep and poor appetite  Patient denies any past suicidal attempts  She also denies any homicidal ideation or hallucinations  She did note one past inpatient treatment episode for increased depression  Patient is requesting to sign 201 for psych admission which  is in agreement with

## 2019-09-04 NOTE — PLAN OF CARE
Problem: SELF HARM/SUICIDALITY  Goal: Will have no self-injury during hospital stay  Description  INTERVENTIONS:  - Q 15 MINUTES: Routine safety checks  - Q WAKING SHIFT & PRN: Assess risk to determine if routine checks are adequate to maintain patient safety  - Encourage patient to participate actively in care by formulating a plan to combat response to suicidal ideation, identify supports and resources  Outcome: Progressing     Problem: DEPRESSION  Goal: Will be euthymic at discharge  Description  INTERVENTIONS:  - Administer medication as ordered  - Provide emotional support via 1:1 interaction with staff  - Encourage involvement in milieu/groups/activities  - Monitor for social isolation  Outcome: Not Progressing     Problem: ANXIETY  Goal: Will report anxiety at manageable levels  Description  INTERVENTIONS:  - Administer medication as ordered  - Teach and encourage coping skills  - Provide emotional support  - Assess patient/family for anxiety and ability to cope  Outcome: Not Progressing  Goal: By discharge: Patient will verbalize 2 strategies to deal with anxiety  Description  Interventions:  - Identify any obvious source/trigger to anxiety  - Staff will assist patient in applying identified coping technique/skills  - Encourage attendance of scheduled groups and activities  Outcome: Not Progressing     Problem: SLEEP DISTURBANCE  Goal: Will exhibit normal sleeping pattern  Description  Interventions:  -  Assess the patients sleep pattern, noting recent changes  - Administer medication as ordered  - Decrease environmental stimuli, including noise, as appropriate during the night  - Encourage the patient to actively participate in unit groups and or exercise during the day to enhance ability to achieve adequate sleep at night  - Assess the patient, in the morning, encouraging a description of sleep experience  Outcome: Not Progressing

## 2019-09-04 NOTE — ED NOTES
Insurance Authorization:   Phone call placed to [employer]  Phone number: [phone]     Spoke to Anahi Christian stated they will not give days to transporting facility  Level of care: IPU  Review on pending placement  Authorization # call upon arrival         EVS (Eligibility Verification System) called - [phone]  Automated system indicates: not on file

## 2019-09-04 NOTE — ED NOTES
Ordered food tray     [facility] [facility]  09/04/19 0836

## 2019-09-04 NOTE — ED NOTES
Pt soundly asleep on bed, with lights and tv off  No concerns at this time       Kaleigh Gomez  09/04/19 3667

## 2019-09-04 NOTE — ED CARE HANDOFF
Emergency Department Sign Out Note        Sign out and transfer of care from Dr Dhaval Kuhn  See Separate Emergency Department note  The patient, Charley Montalvo, was evaluated by the previous provider for Depression, SI  Workup Completed:  Medically Cleared, 201 signed  Pending Crisis dispo  ED Course / Workup Pending (followup):  201 needed to be resigned due to technical error in processing - I signed with the patient  [facility] ALAN Melgoza  at 1200  Procedures  MDM    Disposition  Final diagnoses:   Depression   Suicidal ideation     Time reflects when diagnosis was documented in both MDM as applicable and the Disposition within this note     Time User Action Codes Description Comment    9/3/2019 11:38 PM En Jacob Add [F32 9] Depression     9/3/2019 11:38 PM Tammi Melendez Add [H36 374] Suicidal ideation       ED Disposition     ED Disposition Condition Date/Time Comment    Transfer to [facility] 7066 Sep 4, 2019  5:02 AM Charley Montalvo should be transferred out to [facility] and has been medically cleared  MD Documentation      Most Recent Value   Accepting Physician  Dr Anu De La Garza Name, Merle Patel      RN Documentation      Most [facility] Name, [address] [place], [place]      Follow-up Information    None       Patient's Medications   Discharge Prescriptions    No medications on file     No discharge procedures on file         ED Provider  Electronically Signed by     Lilian Forde MD  09/04/19 0458

## 2019-09-04 NOTE — PROGRESS NOTES
Discussed assessment findings with provider MAGGIE ERNST  Patient denies any current SI, Denies Taking extra pills as SA  Provider agrees on continuing with Q 7 minute checks  1-1 observation not necessary

## 2019-09-04 NOTE — PROGRESS NOTES
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

## 2019-09-04 NOTE — CONSULTS
Consult- Diana Deleon 1975, 53 y o  female MRN: [id number]    Unit/Bed#: [facility] 249-01 Encounter: [id number]    Primary Care Provider: Harman Stover MD   Date and time admitted to hospital: 9/4/2019  1:32 PM      Inpatient consult to Internal Medicine  Consult performed by: SUJATA Askew  Consult ordered by: Javier Luong MD          * Type 2 diabetes mellitus with complication, with long-term current use of insulin [facility])  Assessment & Plan  No results found for: HGBA1C    Recent Labs     09/04/19  0049 09/04/19  0910 09/04/19  1656   POCGLU 109 138 141*       Blood Sugar Average: Last 72 hrs:  (P) 141   Will place on insulin sliding scale  Continue metformin and Lantus    Irritable bowel syndrome with diarrhea  Assessment & Plan  · Continue with Bentyl  · Will monitor closely if worsens will consider getting stool culture and ova parasite to rule out for any bacteria infection    Essential hypertension  Assessment & Plan  · BP appears to be controlled  · Continue with lisinopril and hydrochlorothiazide  · Monitor BP closely    Medical clearance for psychiatric admission  Assessment & Plan  · Please refer to chronic medical conditions stated  · Currently symptoms are well controlled will continue with current medications      VTE Prophylaxis: Reason for no pharmacologic prophylaxis patient is ambulatory       Recommendations for Discharge:  · Per Primary Service    History of Present Illness:  Diana Deleon is a 53 y o  female who is originally admitted to the [facility] service due to depression and suicidal ideation  We are consulted for medical clearance/medical management  The patient was brought to the ED with depression and suicidal ideation with no specific plan but mentions taking medications and overdosing  The patient denies any chest pain, dyspnea, nausea, vomiting, abdominal pain  She has history of IBS-diarrhea and is having some loose stools      Review of Systems:  Review of Systems Constitutional: Negative for activity change, appetite change, chills, diaphoresis and fever  HENT: Negative for congestion, ear pain, hearing loss, tinnitus and trouble swallowing  Eyes: Negative for photophobia, pain, discharge, itching and visual disturbance  Respiratory: Negative for cough, shortness of breath, wheezing and stridor  Cardiovascular: Negative for chest pain, palpitations and leg swelling  Gastrointestinal: Positive for diarrhea  Negative for abdominal pain, blood in stool, constipation, nausea and vomiting  Endocrine: Negative for cold intolerance, heat intolerance, polydipsia, polyphagia and polyuria  Genitourinary: Negative for difficulty urinating, dysuria, frequency, hematuria and urgency  Musculoskeletal: Negative for back pain, gait problem and neck stiffness  Skin: Negative for pallor, rash and wound  Allergic/Immunologic: Negative for environmental allergies, food allergies and immunocompromised state  Neurological: Negative for dizziness, tremors, speech difficulty, weakness, light-headedness, numbness and headaches  Hematological: Negative for adenopathy  Does not bruise/bleed easily  Psychiatric/Behavioral: Negative for confusion, hallucinations and sleep disturbance  Past Medical and Surgical History:   Past Medical History:   Diagnosis Date    Anxiety     Depression     Diabetes mellitus ([facility] [address] )     Hypertension     Psychiatric disorder        Past Surgical History:   Procedure Laterality Date    CHOLECYSTECTOMY         Meds/Allergies:  all medications and allergies reviewed    Allergies:    Allergies   Allergen Reactions    Bactrim [Sulfamethoxazole-Trimethoprim]     Keflex [Cephalexin]        Social History:     Marital Status:     Substance Use History:   Social History     Substance and Sexual Activity   Alcohol Use No     Social History     Tobacco Use   Smoking Status Never Smoker   Smokeless Tobacco Never Used     Social History Substance and Sexual Activity   Drug Use No       Family History:  I have reviewed the patients family history    Physical Exam:   Vitals:   Blood Pressure: 144/80 (09/04/19 1638)  Pulse: 102 (09/04/19 1638)  Temperature: 99 °F (37 2 °C) (09/04/19 1638)  Temp Source: Temporal (09/04/19 1638)  Respirations: 16 (09/04/19 1638)  Height: 5' (152 4 cm) (09/04/19 1432)  Weight - Scale: (!) 152 kg (334 lb) (09/04/19 1432)  SpO2: 95 % (09/04/19 1638)    Physical Exam   Constitutional: She is oriented to person, place, and time  She appears well-developed and well-nourished  No distress  HENT:   Head: Normocephalic and atraumatic  Mouth/Throat: Oropharynx is clear and moist    Eyes: Pupils are equal, round, and reactive to light  Conjunctivae and EOM are normal    Neck: Normal range of motion  Neck supple  No thyromegaly present  Cardiovascular: Normal rate, regular rhythm and normal heart sounds  Exam reveals no gallop and no friction rub  No murmur heard  Pulmonary/Chest: Effort normal and breath sounds normal  She has no wheezes  She has no rales  Abdominal: Soft  Bowel sounds are normal  She exhibits no distension  There is no tenderness  There is no rebound  Musculoskeletal: Normal range of motion  She exhibits no edema, tenderness or deformity  Neurological: She is alert and oriented to person, place, and time  No cranial nerve deficit  Skin: Skin is warm and dry  No rash noted  No erythema  Psychiatric: She has a normal mood and affect  Her behavior is normal  Judgment and thought content normal    Vitals reviewed  Additional Data:   Lab Results: I have personally reviewed pertinent reports  Invalid input(s): LABALBU          No results found for: HGBA1C  Results from last 7 days   Lab Units 09/04/19  1656 09/04/19  0910 09/04/19  0049   POC GLUCOSE mg/dl 141* 138 109       Imaging: I have personally reviewed pertinent reports      No orders to display       EKG, Pathology, and Other Studies Reviewed on Admission:   · EKG: n/a     ** Please Note: This note has been constructed using a voice recognition system   **

## 2019-09-04 NOTE — ED NOTES
Room was checked for behavioral patient  No signs of clutter, nothing pt  Could use to harm self with  Pt  Sleeping  Will continue continual observation at this time        Preet Russell, JANUSZ  09/04/19 1080

## 2019-09-04 NOTE — CONSULTS
Telepsychiatry Telephone Admission Note    I was consulted by phone to review the case of this 51yo woman who was medically cleared and admitted for suicidal ideation  PMH significant for HTN, DM  Allergy to Bactrim, Kelfex  AVSS, reassuring admission labs  UDS/BAL negative  Pregnancy test negative  Plan:   --Admit to psychiatry under voluntary status  --Suicide precautions  --Hold psychiatric medications; inpatient team to decide on standing psychopharmacology pending reevaluation  --Medical consult to advise on medial medications     --FSG, ISS

## 2019-09-04 NOTE — ED NOTES
Hospital sup asked to get bigger scrubs that will fit patient     [address]  09/03/19 2023

## 2019-09-04 NOTE — LETTER
September 11, 2019     Harman Stover MD  [address] [facility] [address] [facility] [address]    Patient: Diana Deleon   YOB: 1975   Date of Visit: 9/4/2019       Dear Dr Joel Wade:    Thank you for referring Jackie Vazquez to me for evaluation  Below are the relevant portions of my H&P  If you have questions, please do not hesitate to call me  I look forward to following Rosalinda Sen along with you  Sincerely,        No name on file  CC: MD Dr Harrison Nunes MD  9/5/2019 12:32 PM  Signed  Psychiatric Evaluation - [address] 53 y o  female MRN: [id number]  Unit/Bed#: [facility] 249-01 Encounter: [id number]    Assessment/Plan   Principal Problem:    Severe episode of recurrent major depressive disorder, without psychotic features ([facility] [address] )  Active Problems:    Type 2 diabetes mellitus with complication, with long-term current use of insulin ([facility])    Irritable bowel syndrome with diarrhea    Plan:   Risks, benefits and possible side effects of Medications:   Risks, benefits, and possible side effects of medications explained to patient and patient verbalizes understanding  1  Admit to acute psychiatric level of care for safety and stability  2  Start Topamax 25 mg b i d  To help with mood  3  Start Librium 10 mg t i d  P r n  instead of the Ativan to help with anxiety  Chief Complaint: "I wanted to die"    History of Present Illness     Patient is a 53 y o  female with long history of depression who reports she was getting increasingly depressed lately and her main stressor was problems with her boyfriend and the relationship she is becoming strained  She reports that she had suicidal thoughts and she took a handful of gabapentin pills with suicidal intention  Patient reports history of previous psychiatric hospitalization but no history of suicide attempts    Patient reports she has been on multiple medications but had numerous side effects from Wellbutrin, Seroquel and others  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  Patient denies any alcohol or substance use  Psychiatric Review Of Systems:  sleep: no  appetite changes: no  weight changes: yes  energy/anergy: no  interest/pleasure/anhedonia: no  somatic symptoms: yes  anxiety/panic: yes  cholo: no  guilty/hopeless: yes  self injurious behavior/risky behavior: no    Historical Information     Past Psychiatric History:   Therapy, Out Patient     Substance Abuse History:  Social History     Tobacco History     Smoking Status  Never Smoker    Smokeless Tobacco Use  Never Used          Alcohol History     Alcohol Use Status  No          Drug Use     Drug Use Status  No          Sexual Activity     Sexually Active  Yes          Activities of Daily Living    Not Asked                 I have assessed this patient for substance use within the past 12 months    Family Psychiatric History:   Psychiatric Illness Mother  Illness: depression    Social History:  Education: high school diploma/GED      Past Medical History:   Diagnosis Date    Anxiety     Depression     Diabetes mellitus ([facility] [address] )     Hypertension     Psychiatric disorder        Medical Review Of Systems:  Pertinent items are noted in HPI      Meds/Allergies   all current active meds have been reviewed  Allergies   Allergen Reactions    Bactrim [Sulfamethoxazole-Trimethoprim]     Keflex [Cephalexin]        Objective   Vital signs in last 24 hours:  Temp:  [98 °F (36 7 °C)-99 °F (37 2 °C)] 98 °F (36 7 °C)  HR:  [] 79  Resp:  [16] 16  BP: (131-169)/(57-95) 131/57    No intake or output data in the 24 hours ending 09/05/19 1224    Mental Status Evaluation:  Appearance:  disheveled   Behavior:  guarded   Speech:  soft   Mood:  anxious and depressed   Affect:  constricted   Language: repeating phrases   Thought Process:  circumstantial   Thought Content:  normal Perceptual Disturbances: None   Risk Potential: Suicidal Ideations without plan   Sensorium:  person and place   Cognition:  grossly intact   Consciousness:  awake    Attention: attention span appeared shorter than expected for age   Intellect: within normal limits   Fund of Knowledge: vocabulary: fair   Insight:  limited   Judgment: limited   Muscle Strength and Tone: face   Gait/Station: normal balance   Motor Activity: no abnormal movements     Lab Results: I have personally reviewed pertinent lab results  Patient Strengths/Assets: ability for insight    Patient Barriers/Limitations: unresourceful    Counseling / Coordination of Care  Total floor / unit time spent today 60 minutes  Greater than 50% of total time was spent with the patient and / or family counseling and / or coordination of care   A description of the counseling / coordination of care:

## 2019-09-04 NOTE — ASSESSMENT & PLAN NOTE
· Continue with Bentyl  · Will monitor closely if worsens will consider getting stool culture and ova parasite to rule out for any bacteria infection

## 2019-09-04 NOTE — ED NOTES
Patient request to see her nurse she is feeling anxious     Maggi Sánchez [facility]  09/04/19 4941

## 2019-09-04 NOTE — ED NOTES
Per Jose Luis Schmitz from intake 201 given to [facility] unit, Emilie Thomason told him it might take awhile because she is working on a new admission

## 2019-09-04 NOTE — ASSESSMENT & PLAN NOTE
· Please refer to chronic medical conditions stated    · Currently symptoms are well controlled will continue with current medications

## 2019-09-04 NOTE — EMTALA/ACUTE CARE TRANSFER
[address] [place] [address]  Dept: [phone]      EMTALA TRANSFER CONSENT    NAME Bria Alcaraz                                         1975                              MRN [id number]    I have been informed of my rights regarding examination, treatment, and transfer   by Dr Moo Pineda MD    Benefits: Specialized equipment and/or services available at the receiving facility (Include comment)________________________(IP Psych)    Risks: Potential for delay in receiving treatment, Potential deterioration of medical condition, Increased discomfort during transfer, Possible worsening of condition or death during transfer      Consent for Transfer:  I acknowledge that my medical condition has been evaluated and explained to me by the emergency department physician or other qualified medical person and/or my attending physician, who has recommended that I be transferred to the service of  Accepting Physician: Dr Barron Ganser at [address] [facility] Name, [address] : [place]  The above potential benefits of such transfer, the potential risks associated with such transfer, and the probable risks of not being transferred have been explained to me, and I fully understand them  The doctor has explained that, in my case, the benefits of transfer outweigh the risks  I agree to be transferred  I authorize the performance of emergency medical procedures and treatments upon me in both transit and upon arrival at the receiving facility  Additionally, I authorize the release of any and all medical records to the receiving facility and request they be transported with me, if possible  I understand that the safest mode of transportation during a medical emergency is an ambulance and that the Hospital advocates the use of this mode of transport   Risks of traveling to the receiving facility by car, including absence of medical control, life sustaining equipment, such as oxygen, and medical personnel has been explained to me and I fully understand them  (MICAH CORRECT BOX BELOW)  [  ]  I consent to the stated transfer and to be transported by ambulance/helicopter  [  ]  I consent to the stated transfer, but refuse transportation by ambulance and accept full responsibility for my transportation by car  I understand the risks of non-ambulance transfers and I exonerate the Hospital and its staff from any deterioration in my condition that results from this refusal     X___________________________________________    DATE  19  TIME________  Signature of patient or legally responsible individual signing on patient behalf           RELATIONSHIP TO PATIENT_________________________          Provider Certification    NAME Morales Arias                                         1975                              MRN [id number]    A medical screening exam was performed on the above named patient  Based on the examination:    Condition Necessitating Transfer The primary encounter diagnosis was Depression  A diagnosis of Suicidal ideation was also pertinent to this visit      Patient Condition: The patient has been stabilized such that within reasonable medical probability, no material deterioration of the patient condition or the condition of the unborn child(kellee) is likely to result from the transfer    Reason for Transfer: Level of Care needed not available at this facility    Transfer Requirements: Facility [address] [place], [place]   · Space available and qualified personnel available for treatment as acknowledged by [employer]  · Agreed to accept transfer and to provide appropriate medical treatment as acknowledged by       Dr Nimisha Harrison  · Appropriate medical records of the examination and treatment of the patient are provided at the time of transfer   [address] _______  · Transfer will be performed by qualified personnel from [facility]  and appropriate transfer equipment as required, including the use of necessary and appropriate life support measures  Provider Certification: I have examined the patient and explained the following risks and benefits of being transferred/refusing transfer to the patient/family:  General risk, such as traffic hazards, adverse weather conditions, rough terrain or turbulence, possible failure of equipment (including vehicle or aircraft), or consequences of actions of persons outside the control of the transport personnel, The patient is stable for psychiatric transfer because they are medically stable, and is protected from harming him/herself or others during transport      Based on these reasonable risks and benefits to the patient and/or the unborn child(kellee), and based upon the information available at the time of the patients examination, I certify that the medical benefits reasonably to be expected from the provision of appropriate medical treatments at another medical facility outweigh the increasing risks, if any, to the individuals medical condition, and in the case of labor to the unborn child, from effecting the transfer      X____________________________________________ DATE 09/04/19        TIME_______      ORIGINAL - SEND TO MEDICAL RECORDS   COPY - SEND WITH PATIENT DURING TRANSFER

## 2019-09-04 NOTE — ED NOTES
Pt sign behavioral health crisis form  At [place]  09/04/19 0018       Ke[facility]  09/04/19 0019

## 2019-09-04 NOTE — ED NOTES
Patient is accepted at [facility]  Patient is accepted by Dr Ahmet Landry is arranged with [facility] [employer] [facility]inder is scheduled for 0930  Nurse report is to be called to [phone] prior to patient transfer

## 2019-09-04 NOTE — ASSESSMENT & PLAN NOTE
· BP appears to be controlled  · Continue with lisinopril and hydrochlorothiazide  · Monitor BP closely

## 2019-09-04 NOTE — ED NOTES
Home medications reviewed with pt  Updated med rec  Dr Luzma Morales aware, will get pt  Am medications when available  Will continue to monitor  Pt  Calm and cooperative at this time        Dot Pacheco RN  09/04/19 2333

## 2019-09-05 PROBLEM — F20.9 SCHIZOPHRENIA (HCC): Status: ACTIVE | Noted: 2019-09-05

## 2019-09-05 PROBLEM — F33.2 SEVERE EPISODE OF RECURRENT MAJOR DEPRESSIVE DISORDER, WITHOUT PSYCHOTIC FEATURES (HCC): Status: ACTIVE | Noted: 2019-09-05

## 2019-09-05 LAB
ALBUMIN SERPL BCP-MCNC: 3.8 G/DL (ref 3.5–5.7)
ALP SERPL-CCNC: 57 U/L (ref 40–150)
ALT SERPL W P-5'-P-CCNC: 31 U/L (ref 7–52)
ANION GAP SERPL CALCULATED.3IONS-SCNC: 6 MMOL/L (ref 4–13)
AST SERPL W P-5'-P-CCNC: 19 U/L (ref 13–39)
ATRIAL RATE: 80 BPM
BASOPHILS # BLD AUTO: 0 THOUSANDS/ΜL (ref 0–0.1)
BASOPHILS NFR BLD AUTO: 1 % (ref 0–2)
BILIRUB SERPL-MCNC: 0.4 MG/DL (ref 0.2–1)
BUN SERPL-MCNC: 13 MG/DL (ref 7–25)
CALCIUM SERPL-MCNC: 9.6 MG/DL (ref 8.6–10.5)
CHLORIDE SERPL-SCNC: 102 MMOL/L (ref 98–107)
CHOLEST SERPL-MCNC: 188 MG/DL (ref 0–200)
CO2 SERPL-SCNC: 30 MMOL/L (ref 21–31)
CREAT SERPL-MCNC: 0.81 MG/DL (ref 0.6–1.2)
EOSINOPHIL # BLD AUTO: 0.1 THOUSAND/ΜL (ref 0–0.61)
EOSINOPHIL NFR BLD AUTO: 2 % (ref 0–5)
ERYTHROCYTE [DISTWIDTH] IN BLOOD BY AUTOMATED COUNT: 16.5 % (ref 11.5–14.5)
GFR SERPL CREATININE-BSD FRML MDRD: 87 ML/MIN/1.73SQ M
GLUCOSE P FAST SERPL-MCNC: 147 MG/DL (ref 65–99)
GLUCOSE SERPL-MCNC: 143 MG/DL (ref 65–140)
GLUCOSE SERPL-MCNC: 146 MG/DL (ref 65–140)
GLUCOSE SERPL-MCNC: 147 MG/DL (ref 65–99)
GLUCOSE SERPL-MCNC: 148 MG/DL (ref 65–140)
GLUCOSE SERPL-MCNC: 148 MG/DL (ref 65–140)
HCT VFR BLD AUTO: 35.6 % (ref 42–47)
HDLC SERPL-MCNC: 38 MG/DL (ref 40–60)
HGB BLD-MCNC: 11.3 G/DL (ref 12–16)
LDLC SERPL CALC-MCNC: 130 MG/DL (ref 0–100)
LYMPHOCYTES # BLD AUTO: 2.2 THOUSANDS/ΜL (ref 0.6–4.47)
LYMPHOCYTES NFR BLD AUTO: 36 % (ref 21–51)
MCH RBC QN AUTO: 25.3 PG (ref 26–34)
MCHC RBC AUTO-ENTMCNC: 31.8 G/DL (ref 31–37)
MCV RBC AUTO: 80 FL (ref 81–99)
MONOCYTES # BLD AUTO: 0.5 THOUSAND/ΜL (ref 0.17–1.22)
MONOCYTES NFR BLD AUTO: 9 % (ref 2–12)
NEUTROPHILS # BLD AUTO: 3.2 THOUSANDS/ΜL (ref 1.4–6.5)
NEUTS SEG NFR BLD AUTO: 53 % (ref 42–75)
NONHDLC SERPL-MCNC: 150 MG/DL
P AXIS: 58 DEGREES
PLATELET # BLD AUTO: 332 THOUSANDS/UL (ref 149–390)
PMV BLD AUTO: 7.6 FL (ref 8.6–11.7)
POTASSIUM SERPL-SCNC: 3.8 MMOL/L (ref 3.5–5.5)
PR INTERVAL: 160 MS
PROT SERPL-MCNC: 7.2 G/DL (ref 6.4–8.9)
QRS AXIS: 30 DEGREES
QRSD INTERVAL: 84 MS
QT INTERVAL: 378 MS
QTC INTERVAL: 435 MS
RBC # BLD AUTO: 4.48 MILLION/UL (ref 3.9–5.2)
SODIUM SERPL-SCNC: 138 MMOL/L (ref 134–143)
T WAVE AXIS: 44 DEGREES
TRIGL SERPL-MCNC: 101 MG/DL (ref 44–166)
TSH SERPL DL<=0.05 MIU/L-ACNC: 1.23 UIU/ML (ref 0.45–5.33)
VENTRICULAR RATE: 80 BPM
WBC # BLD AUTO: 6 THOUSAND/UL (ref 4.8–10.8)

## 2019-09-05 PROCEDURE — 85025 COMPLETE CBC W/AUTO DIFF WBC: CPT | Performed by: NURSE PRACTITIONER

## 2019-09-05 PROCEDURE — 84443 ASSAY THYROID STIM HORMONE: CPT | Performed by: NURSE PRACTITIONER

## 2019-09-05 PROCEDURE — 80053 COMPREHEN METABOLIC PANEL: CPT | Performed by: NURSE PRACTITIONER

## 2019-09-05 PROCEDURE — 93010 ELECTROCARDIOGRAM REPORT: CPT | Performed by: INTERNAL MEDICINE

## 2019-09-05 PROCEDURE — 99223 1ST HOSP IP/OBS HIGH 75: CPT | Performed by: PSYCHIATRY & NEUROLOGY

## 2019-09-05 PROCEDURE — 93005 ELECTROCARDIOGRAM TRACING: CPT

## 2019-09-05 PROCEDURE — 80061 LIPID PANEL: CPT | Performed by: NURSE PRACTITIONER

## 2019-09-05 PROCEDURE — 82948 REAGENT STRIP/BLOOD GLUCOSE: CPT

## 2019-09-05 RX ORDER — CHLORDIAZEPOXIDE HYDROCHLORIDE 5 MG/1
10 CAPSULE, GELATIN COATED ORAL EVERY 6 HOURS PRN
Status: DISCONTINUED | OUTPATIENT
Start: 2019-09-05 | End: 2019-09-06

## 2019-09-05 RX ADMIN — INSULIN GLARGINE 40 UNITS: 100 INJECTION, SOLUTION SUBCUTANEOUS at 21:15

## 2019-09-05 RX ADMIN — PANTOPRAZOLE SODIUM 40 MG: 40 TABLET, DELAYED RELEASE ORAL at 08:10

## 2019-09-05 RX ADMIN — TOPIRAMATE 25 MG: 25 TABLET, FILM COATED ORAL at 17:11

## 2019-09-05 RX ADMIN — CHLORDIAZEPOXIDE HYDROCHLORIDE 10 MG: 5 CAPSULE ORAL at 21:22

## 2019-09-05 RX ADMIN — METFORMIN HYDROCHLORIDE 500 MG: 500 TABLET ORAL at 16:21

## 2019-09-05 RX ADMIN — LISINOPRIL 10 MG: 10 TABLET ORAL at 08:10

## 2019-09-05 RX ADMIN — LAMOTRIGINE 200 MG: 100 TABLET ORAL at 08:10

## 2019-09-05 RX ADMIN — ACETAMINOPHEN 650 MG: 325 TABLET ORAL at 08:24

## 2019-09-05 RX ADMIN — FLUTICASONE PROPIONATE 2 SPRAY: 50 SPRAY, METERED NASAL at 17:11

## 2019-09-05 RX ADMIN — LORATADINE 10 MG: 10 TABLET ORAL at 08:10

## 2019-09-05 RX ADMIN — TOPIRAMATE 25 MG: 25 TABLET, FILM COATED ORAL at 08:08

## 2019-09-05 RX ADMIN — DICYCLOMINE HYDROCHLORIDE 20 MG: 20 TABLET ORAL at 08:14

## 2019-09-05 RX ADMIN — METFORMIN HYDROCHLORIDE 500 MG: 500 TABLET ORAL at 08:08

## 2019-09-05 RX ADMIN — ESCITALOPRAM OXALATE 30 MG: 10 TABLET ORAL at 08:08

## 2019-09-05 NOTE — PROGRESS NOTES
Pt was seen on the unit  Pt was reporting leg pain due to arthritis and abdominal cramping due to her IBS  Pt appears to be calm and cooperative, pt denies any current thoughts of si/hi at this time  Pt remains disheveled and malodorous at this time  Will continue to monitor

## 2019-09-05 NOTE — MALNUTRITION/BMI
This medical record reflects one or more clinical indicators suggestive of malnutrition and/or morbid obesity  Malnutrition Findings:              BMI Findings:  BMI Classifications: Morbid Obesity 60-69 9     Body mass index is 65 23 kg/m²  See Nutrition note dated 9-5-19for additional details  Completed nutrition assessment is viewable in the nutrition documentation

## 2019-09-05 NOTE — CASE MANAGEMENT
Insurance Authorization for admission:   Phone call placed to [employer])  Phone number: [phone]  Spoke to Topher Guerrero  3 days approved    Level of care: 201 inpatient psychiatric treatment   Review on 9/6/19  Authorization # [id number]

## 2019-09-05 NOTE — PROGRESS NOTES
09/05/19 1437   Team Meeting   Meeting Type Tx Team Meeting   Initial Conference Date 09/05/19   Next Conference Date 10/04/19   Team Members Present   Team Members Present Physician;Nurse;   Physician Team Member Dr Emily Muñoz MD   Nursing Team Member Deny Tejada, RN   Social Work Team Member Nusrat Segovia, [place]   Patient/Family Present   Patient Present Yes   Patient's Family Present No     Purpose of the meeting explained  Pt started by sharing her goals: attend groups, improve anxiety (pt has been having stomach issues), and to improve coping  Pt is very concerned about her job as she called her boss and she was not happy she is out and stated that they will need to talk when she returns; she also states this was said in front of a coworker  RN in agreement with pt goals; feels she has a good plan  Pt then expressed concern about her boyfriend who is abusing pills but won't admit it and also she is unsure if he wants the relationship   SW discussed improvement in depression; pt agreeable  Pt states that her functioning is impaired  Discussed sleeping; slept well with medication last night  Reports self harm thoughts/SI is resolved  Psychiatrist spoke to pt about seeing a therapist; there is a financial barrier but she is agreeable

## 2019-09-05 NOTE — TREATMENT PLAN
Treatment Plan [address] 53 y o  female MRN: [id number]    [address] [facility], [facility] Encounter: [id number]          Admit Date/Time:  9/4/2019  1:32 PM    Treatment Team: Attending Provider: Delio Gandhi MD; Consulting Physician: Yamileth Garcia MD; Patient Care Assistant: Gloria Velázquez; Patient Care Assistant: Karin Soto; Patient Care Technician: Kiana Carpenter; Patient Care Technician: Radha Mayorga; Patient Care Assistant: Mary Baxter; Care Manager: Adair Boucher, JANUSZ; Registered Nurse: Marga Severance, JANUSZ; Recreational Therapist: Devan Rojas;  : MARIBEL Beard; Patient Care Assistant: Mariluz Sheppard    Diagnosis: Principal Problem:    Severe episode of recurrent major depressive disorder, without psychotic features ([facility] [address] )  Active Problems:    Type 2 diabetes mellitus with complication, with long-term current use of insulin ([facility])    Irritable bowel syndrome with diarrhea      Mental Status Evaluation:  Appearance:  casually dressed   Behavior:  guarded   Speech:  soft   Mood:  sad   Affect:  constricted   Language: repeating phrases   Thought Process:  circumstantial   Thought Content:  normal   Perceptual Disturbances: None   Risk Potential: Suicidal Ideations without plan   Sensorium:  person and place   Cognition:  grossly intact   Consciousness:  awake    Attention: attention span appeared shorter than expected for age   Intellect: within normal limits   Fund of Knowledge: awareness of current events: fair   Insight:  limited   Judgment: limited   Muscle Location: face   Gait/Station: slow   Motor Activity: no abnormal movements     Patient Strengths: work skills     Patient Barriers: self-care deficit    Progress Toward Goals: ongoing    Recommended Treatment: discharge planning     Treatment Frequency: 2-3 times per week     Expected Discharge Date:     Discharge Plan: referrals as indicated     Treatment Plan Created/Updated By: Ebony Moncada MD

## 2019-09-05 NOTE — SOCIAL WORK
SW met with pt privately in her room to complete psychosocial assessment  Pt found in bed  Pt presents as depressed but was pleasant and cooperative with assessment  Pt admitted to the unit due to SI and increased depression and anxiety; she took extra Gabapentin in an attempt to numb herself  Triggers include issues with her significant other; he is abusing pills and unsure if he wants to be with her and financial stressors  Pt reports that she is having a hard time with coping with her relationship issues and is unmotivated  Pt denies current SI  Pt reports that SI was present for 3 days prior to admission  Pt denies a hx of SA or self harm  Pt denies current and past HI/HA/aggression towards others/AH/VH  Pt is not paranoid or delusional   PT has no access to fire arms  Pt reports that she is diagnosed with MDD and Generalized Anxiety  Pt reports that she was last hospitalized in 2010 at [place] [address]  Pt's only other hospitalization was at the age of 14 at [facility]   Pt reports that she was on medication prior to admission ad other than over taking the Gabapentin she has been compliant  Pt sees Dr Lin Houser for medication management; JASMIN signed  Pt would like a therapist but has always refrained from getting one due to financial barriers; pt agreeable to referral but may only be able to go monthly  Pt's PCP is Dr Florin Rangel; JASMIN signed; she will schedule her own appointment  Pt would like to know if there are any support groups for people that have loved ones struggling with addiction  Pt declines referral for PHP due to FT work  Pt denies current and past trauma and abuse  Pt is not a smoker  Pt denies current and past substance abuse  Pt denies current and past legal issues  Pt lives in an apartment with her significant other Lyle Carmona; there are significant stressors  Pt will return home on discharge  Pt has one child; uYe Edwards ( 32) whom is supportive    Pt is also close with her mother Benjamin Echavarria; JASMIN signed  Pt's father passed 3 years ago  Pt reports having two very supportive friends: Fariba Duran and Love Valadez  Pt has no siblings  Pt has a dog; no concerns  Pt reports that her mom has Bipolar and that dad had depression and anxiety  Pt reports that her mom attempted suicide several times since she was 3 up until 6 or 7 years ago  She denies a family hx of substance abuse  Father had early onset Dementia and MGM has Dementia  Pt works for time for [employer]  Pt uses [facility] in [facility] and can afford her medications  Pt drives  Pt's goal for this hospitalization is to improve her depression, anxiety, and coping  Pt's limitations include her depression, poor coping, and financial   Pt's strengths include her work ethic, helping others, and her support system  Coping skills include her dog and talking to her friends

## 2019-09-05 NOTE — SOCIAL WORK
Phone call placed to pt's PCP office; spoke to Sarah Thompson and informed her of pt's admission and reason for admission  Phone call placed to Dr Eula Miller office; spoke to [facility] and informed her of pt's admission and reason for admission  Next appointment in October was cancelled and sooner appointment given for 9/16/19 at 11:30AM       Phone call placed to pt's mother Stacey Real  JENN provided an update on pt  SW spoke about aftercare plans for pt; she agrees that pt needs a therapist   Lamont Soria informed her of visiting hours and provided her contact number should she need anything  No other questions or concerns noted at this time

## 2019-09-05 NOTE — PROGRESS NOTES
Patient withdrawn to room  Affect flat, appeared guarded in conversation  Patient endorsed anxiety/depression however unable to assign number of severity  Patient denied SI/HI, hallucinations  Remains medication compliant and on 7" checks for safety and behaviors

## 2019-09-05 NOTE — H&P
Psychiatric Evaluation - [address] [facility] 53 y o  female MRN: [id number]  Unit/Bed#: [facility] 249-01 Encounter: [id number]    Assessment/Plan   Principal Problem:    Severe episode of recurrent major depressive disorder, without psychotic features ([address] )  Active Problems:    Type 2 diabetes mellitus with complication, with long-term current use of insulin ([facility])    Irritable bowel syndrome with diarrhea    Plan:   Risks, benefits and possible side effects of Medications:   Risks, benefits, and possible side effects of medications explained to patient and patient verbalizes understanding  1  Admit to acute psychiatric level of care for safety and stability  2  Start Topamax 25 mg b i d  To help with mood  3  Start Librium 10 mg t i d  P r n  instead of the Ativan to help with anxiety  Chief Complaint: "I wanted to die"    History of Present Illness     Patient is a 53 y o  female with long history of depression who reports she was getting increasingly depressed lately and her main stressor was problems with her boyfriend and the relationship she is becoming strained  She reports that she had suicidal thoughts and she took a handful of gabapentin pills with suicidal intention  Patient reports history of previous psychiatric hospitalization but no history of suicide attempts  Patient reports she has been on multiple medications but had numerous side effects from Wellbutrin, Seroquel and others  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  Patient denies any alcohol or substance use    Psychiatric Review Of Systems:  sleep: no  appetite changes: no  weight changes: yes  energy/anergy: no  interest/pleasure/anhedonia: no  somatic symptoms: yes  anxiety/panic: yes  cholo: no  guilty/hopeless: yes  self injurious behavior/risky behavior: no    Historical Information     Past Psychiatric History:   Therapy, Out Patient Substance Abuse History:  Social History     Tobacco History     Smoking Status  Never Smoker    Smokeless Tobacco Use  Never Used          Alcohol History     Alcohol Use Status  No          Drug Use     Drug Use Status  No          Sexual Activity     Sexually Active  Yes          Activities of Daily Living    Not Asked                 I have assessed this patient for substance use within the past 12 months    Family Psychiatric History:   Psychiatric Illness Mother  Illness: depression    Social History:  Education: high school diploma/GED      Past Medical History:   Diagnosis Date    Anxiety     Depression     Diabetes mellitus ([address] )     Hypertension     Psychiatric disorder        Medical Review Of Systems:  Pertinent items are noted in HPI  Meds/Allergies   all current active meds have been reviewed  Allergies   Allergen Reactions    Bactrim [Sulfamethoxazole-Trimethoprim]     Keflex [Cephalexin]        Objective   Vital signs in last 24 hours:  Temp:  [98 °F (36 7 °C)-99 °F (37 2 °C)] 98 °F (36 7 °C)  HR:  [] 79  Resp:  [16] 16  BP: (131-169)/(57-95) 131/57    No intake or output data in the 24 hours ending 09/05/19 1224    Mental Status Evaluation:  Appearance:  disheveled   Behavior:  guarded   Speech:  soft   Mood:  anxious and depressed   Affect:  constricted   Language: repeating phrases   Thought Process:  circumstantial   Thought Content:  normal   Perceptual Disturbances: None   Risk Potential: Suicidal Ideations without plan   Sensorium:  person and place   Cognition:  grossly intact   Consciousness:  awake    Attention: attention span appeared shorter than expected for age   Intellect: within normal limits   Fund of Knowledge: vocabulary: fair   Insight:  limited   Judgment: limited   Muscle Strength and Tone: face   Gait/Station: normal balance   Motor Activity: no abnormal movements     Lab Results: I have personally reviewed pertinent lab results        Patient Strengths/Assets: ability for insight    Patient Barriers/Limitations: unresourceful    Counseling / Coordination of Care  Total floor / unit time spent today 60 minutes  Greater than 50% of total time was spent with the patient and / or family counseling and / or coordination of care   A description of the counseling / coordination of care:

## 2019-09-05 NOTE — PROGRESS NOTES
Team Members Present:   MD Washington Ortega, SUJATA Montanez, RN, SUJATA Student  Dina Morales, JANUSZ Ott, [address] [place] [address]  SI, no plan  OD due to rejection by boyfriend  Works in mental health field  Has insight

## 2019-09-06 LAB
GLUCOSE SERPL-MCNC: 135 MG/DL (ref 65–140)
GLUCOSE SERPL-MCNC: 152 MG/DL (ref 65–140)
GLUCOSE SERPL-MCNC: 159 MG/DL (ref 65–140)
GLUCOSE SERPL-MCNC: 181 MG/DL (ref 65–140)

## 2019-09-06 PROCEDURE — 99231 SBSQ HOSP IP/OBS SF/LOW 25: CPT | Performed by: PSYCHIATRY & NEUROLOGY

## 2019-09-06 PROCEDURE — 82948 REAGENT STRIP/BLOOD GLUCOSE: CPT

## 2019-09-06 RX ORDER — LORAZEPAM 0.5 MG/1
0.5 TABLET ORAL 3 TIMES DAILY PRN
Status: DISCONTINUED | OUTPATIENT
Start: 2019-09-06 | End: 2019-09-10 | Stop reason: HOSPADM

## 2019-09-06 RX ADMIN — HYDROCHLOROTHIAZIDE 12.5 MG: 12.5 TABLET ORAL at 08:20

## 2019-09-06 RX ADMIN — LORATADINE 10 MG: 10 TABLET ORAL at 08:21

## 2019-09-06 RX ADMIN — METFORMIN HYDROCHLORIDE 500 MG: 500 TABLET ORAL at 08:21

## 2019-09-06 RX ADMIN — TOPIRAMATE 25 MG: 25 TABLET, FILM COATED ORAL at 08:21

## 2019-09-06 RX ADMIN — METFORMIN HYDROCHLORIDE 500 MG: 500 TABLET ORAL at 17:12

## 2019-09-06 RX ADMIN — LORAZEPAM 0.5 MG: 0.5 TABLET ORAL at 17:32

## 2019-09-06 RX ADMIN — PANTOPRAZOLE SODIUM 40 MG: 40 TABLET, DELAYED RELEASE ORAL at 08:20

## 2019-09-06 RX ADMIN — LISINOPRIL 10 MG: 10 TABLET ORAL at 08:20

## 2019-09-06 RX ADMIN — INSULIN LISPRO 2 UNITS: 100 INJECTION, SOLUTION INTRAVENOUS; SUBCUTANEOUS at 08:23

## 2019-09-06 RX ADMIN — TOPIRAMATE 25 MG: 25 TABLET, FILM COATED ORAL at 17:12

## 2019-09-06 RX ADMIN — FLUTICASONE PROPIONATE 2 SPRAY: 50 SPRAY, METERED NASAL at 17:14

## 2019-09-06 RX ADMIN — INSULIN LISPRO 2 UNITS: 100 INJECTION, SOLUTION INTRAVENOUS; SUBCUTANEOUS at 21:49

## 2019-09-06 RX ADMIN — INSULIN GLARGINE 40 UNITS: 100 INJECTION, SOLUTION SUBCUTANEOUS at 21:48

## 2019-09-06 RX ADMIN — FLUTICASONE PROPIONATE 2 SPRAY: 50 SPRAY, METERED NASAL at 05:55

## 2019-09-06 RX ADMIN — INSULIN LISPRO 2 UNITS: 100 INJECTION, SOLUTION INTRAVENOUS; SUBCUTANEOUS at 12:30

## 2019-09-06 RX ADMIN — LAMOTRIGINE 200 MG: 100 TABLET ORAL at 08:20

## 2019-09-06 RX ADMIN — ESCITALOPRAM OXALATE 30 MG: 10 TABLET ORAL at 08:21

## 2019-09-06 RX ADMIN — DICYCLOMINE HYDROCHLORIDE 20 MG: 20 TABLET ORAL at 10:21

## 2019-09-06 NOTE — PROGRESS NOTES
No overt signs or symptoms of hypo/hyperglycemia  Slept well during most q 7 minute safety checks  No respiratory distress or changes in medical condition  Sleep has been sound and undisturbed  No suicidal ideations noted  Will continue to monitor

## 2019-09-06 NOTE — PROGRESS NOTES
Withdrawn to room  Appears very depressed  Brightens upon approach  No overt signs or symptoms of hypo/hyperglycemia  Compliant with medications  Maintained on Q 7 minute safety checks  No acting out, suicidal ideations, and/or homicidal behaviors  No changes in medical condition or complaints voiced  Fluids maintained at bedside to promote hydration

## 2019-09-06 NOTE — PROGRESS NOTES
Pt was seen on theu nit, pt present with no changes at this time, pt remains compliant with both medication and meals  Pt denies any current si/hi at this time  Will continue to monitor

## 2019-09-06 NOTE — PROGRESS NOTES
09/06/19 0936   Team Meeting   Meeting Type Daily Rounds   Initial Conference Date 09/06/19   Patient/Family Present   Patient Present No   Patient's Family Present No     Daily Rounds Documentation    Team Members Present:   Dr Orlena Olmsted, MD Lesle Dancer, CRNP Tillie Beecham, RN  Nilda Flores, [facility], [facility]    Anxious  Mostly withdrawn to room

## 2019-09-07 LAB
GLUCOSE SERPL-MCNC: 133 MG/DL (ref 65–140)
GLUCOSE SERPL-MCNC: 135 MG/DL (ref 65–140)
GLUCOSE SERPL-MCNC: 162 MG/DL (ref 65–140)
GLUCOSE SERPL-MCNC: 168 MG/DL (ref 65–140)

## 2019-09-07 PROCEDURE — 99231 SBSQ HOSP IP/OBS SF/LOW 25: CPT | Performed by: NURSE PRACTITIONER

## 2019-09-07 PROCEDURE — 82948 REAGENT STRIP/BLOOD GLUCOSE: CPT

## 2019-09-07 RX ADMIN — DICYCLOMINE HYDROCHLORIDE 20 MG: 20 TABLET ORAL at 08:48

## 2019-09-07 RX ADMIN — METFORMIN HYDROCHLORIDE 500 MG: 500 TABLET ORAL at 08:30

## 2019-09-07 RX ADMIN — METFORMIN HYDROCHLORIDE 500 MG: 500 TABLET ORAL at 17:30

## 2019-09-07 RX ADMIN — FLUTICASONE PROPIONATE 2 SPRAY: 50 SPRAY, METERED NASAL at 05:44

## 2019-09-07 RX ADMIN — LISINOPRIL 10 MG: 10 TABLET ORAL at 08:49

## 2019-09-07 RX ADMIN — LORAZEPAM 0.5 MG: 0.5 TABLET ORAL at 08:48

## 2019-09-07 RX ADMIN — INSULIN GLARGINE 40 UNITS: 100 INJECTION, SOLUTION SUBCUTANEOUS at 21:38

## 2019-09-07 RX ADMIN — LORAZEPAM 0.5 MG: 0.5 TABLET ORAL at 17:51

## 2019-09-07 RX ADMIN — INSULIN LISPRO 2 UNITS: 100 INJECTION, SOLUTION INTRAVENOUS; SUBCUTANEOUS at 21:38

## 2019-09-07 RX ADMIN — TOPIRAMATE 25 MG: 25 TABLET, FILM COATED ORAL at 08:48

## 2019-09-07 RX ADMIN — ESCITALOPRAM OXALATE 30 MG: 10 TABLET ORAL at 08:48

## 2019-09-07 RX ADMIN — LORATADINE 10 MG: 10 TABLET ORAL at 08:48

## 2019-09-07 RX ADMIN — LAMOTRIGINE 200 MG: 100 TABLET ORAL at 08:48

## 2019-09-07 RX ADMIN — TOPIRAMATE 25 MG: 25 TABLET, FILM COATED ORAL at 17:41

## 2019-09-07 RX ADMIN — INSULIN LISPRO 2 UNITS: 100 INJECTION, SOLUTION INTRAVENOUS; SUBCUTANEOUS at 08:00

## 2019-09-07 RX ADMIN — PANTOPRAZOLE SODIUM 40 MG: 40 TABLET, DELAYED RELEASE ORAL at 08:48

## 2019-09-07 NOTE — QUICK NOTE
Staff reports that patient is having orthostatic dizziness  Will check orthostatic blood pressure  Will check urinalysis for foul smelling urine

## 2019-09-07 NOTE — PROGRESS NOTES
Progress Note - [address] [facility] 53 y o  female MRN: [id number]  Unit/Bed#: [facility] 249-01 Encounter: [id number]    Patient was seen for continuing care and treatment  Case was discussed with the nursing staff  On examination today, the patient is reporting that she is feeling slightly better from admission  She is also experiencing some mild orthostasis  We will check ortho BP every shift while awake  Will do this for the next 3 days  Mood remains anxious  We will continue to monitor closely  Discharge planning and disposition is ongoing      Behavior over the last 24 hours:  Remains anxious  Sleep: normal  Appetite: normal  Medication side effects: No  ROS: no complaints    Medications:   Current Facility-Administered Medications   Medication Dose Route Frequency    acetaminophen (TYLENOL) tablet 650 mg  650 mg Oral Q6H PRN    acetaminophen (TYLENOL) tablet 650 mg  650 mg Oral Q4H PRN    acetaminophen (TYLENOL) tablet 975 mg  975 mg Oral Q6H PRN    aluminum-magnesium hydroxide-simethicone (MYLANTA) 200-200-20 mg/5 mL oral suspension 30 mL  30 mL Oral Q4H PRN    benztropine (COGENTIN) injection 1 mg  1 mg Intramuscular Q6H PRN    benztropine (COGENTIN) tablet 1 mg  1 mg Oral Q6H PRN    dicyclomine (BENTYL) tablet 20 mg  20 mg Oral TID PRN    escitalopram (LEXAPRO) tablet 30 mg  30 mg Oral Daily    fluticasone (FLONASE) 50 mcg/act nasal spray 2 spray  2 spray Nasal Q12H    haloperidol (HALDOL) tablet 5 mg  5 mg Oral Q6H PRN    haloperidol lactate (HALDOL) injection 5 mg  5 mg Intramuscular Q6H PRN    hydrochlorothiazide (HYDRODIURIL) tablet 12 5 mg  12 5 mg Oral Every Other Day    hydrOXYzine HCL (ATARAX) tablet 25 mg  25 mg Oral Q6H PRN    hydrOXYzine HCL (ATARAX) tablet 50 mg  50 mg Oral Q4H PRN    insulin glargine (LANTUS) subcutaneous injection 40 Units 0 4 mL  40 Units Subcutaneous HS    insulin lispro (HumaLOG) 100 units/mL subcutaneous injection 2-12 Units  2-12 Units Subcutaneous TID AC    insulin lispro (HumaLOG) 100 units/mL subcutaneous injection 2-12 Units  2-12 Units Subcutaneous HS    lamoTRIgine (LaMICtal) tablet 200 mg  200 mg Oral Daily    lisinopril (ZESTRIL) tablet 10 mg  10 mg Oral Daily    loratadine (CLARITIN) tablet 10 mg  10 mg Oral Daily    LORazepam (ATIVAN) 2 mg/mL injection 2 mg  2 mg Intramuscular Q6H PRN    LORazepam (ATIVAN) tablet 0 5 mg  0 5 mg Oral TID PRN    magnesium hydroxide (MILK OF MAGNESIA) 400 mg/5 mL oral suspension 30 mL  30 mL Oral Daily PRN    metFORMIN (GLUCOPHAGE) tablet 500 mg  500 mg Oral BID With Meals    OLANZapine (ZyPREXA) IM injection 10 mg  10 mg Intramuscular Q3H PRN    pantoprazole (PROTONIX) EC tablet 40 mg  40 mg Oral Daily    pneumococcal 23-valent polysaccharide vaccine (PNEUMOVAX-23) injection 0 5 mL  0 5 mL Subcutaneous Prior to discharge    risperiDONE (RisperDAL M-TABS) dispersible tablet 1 mg  1 mg Oral Q4H PRN    topiramate (TOPAMAX) tablet 25 mg  25 mg Oral BID    traZODone (DESYREL) tablet 50 mg  50 mg Oral HS PRN       Labs:   Admission on 09/04/2019   Component Date Value Ref Range Status    POC Glucose 09/04/2019 141* 65 - 140 mg/dl Final    POC Glucose 09/04/2019 144* 65 - 140 mg/dl Final    WBC 09/05/2019 6 00  4 80 - 10 80 Thousand/uL Final    RBC 09/05/2019 4 48  3 90 - 5 20 Million/uL Final    Hemoglobin 09/05/2019 11 3* 12 0 - 16 0 g/dL Final    Hematocrit 09/05/2019 35 6* 42 0 - 47 0 % Final    MCV 09/05/2019 80* 81 - 99 fL Final    MCH 09/05/2019 25 3* 26 0 - 34 0 pg Final    MCHC 09/05/2019 31 8  31 0 - 37 0 g/dL Final    RDW 09/05/2019 16 5* 11 5 - 14 5 % Final    MPV 09/05/2019 7 6* 8 6 - 11 7 fL Final    Platelets 52/93/3505 332  149 - 390 Thousands/uL Final    Neutrophils Relative 09/05/2019 53  42 - 75 % Final    Lymphocytes Relative 09/05/2019 36  21 - 51 % Final    Monocytes Relative 09/05/2019 9  2 - 12 % Final    Eosinophils Relative 09/05/2019 2  0 - 5 % Final    Basophils Relative 09/05/2019 1  0 - 2 % Final    Neutrophils Absolute 09/05/2019 3 20  1 40 - 6 50 Thousands/µL Final    Lymphocytes Absolute 09/05/2019 2 20  0 60 - 4 47 Thousands/µL Final    Monocytes Absolute 09/05/2019 0 50  0 17 - 1 22 Thousand/µL Final    Eosinophils Absolute 09/05/2019 0 10  0 00 - 0 61 Thousand/µL Final    Basophils Absolute 09/05/2019 0 00  0 00 - 0 10 Thousands/µL Final    Sodium 09/05/2019 138  134 - 143 mmol/L Final    Potassium 09/05/2019 3 8  3 5 - 5 5 mmol/L Final    Chloride 09/05/2019 102  98 - 107 mmol/L Final    CO2 09/05/2019 30  21 - 31 mmol/L Final    ANION GAP 09/05/2019 6  4 - 13 mmol/L Final    BUN 09/05/2019 13  7 - 25 mg/dL Final    Creatinine 09/05/2019 0 81  0 60 - 1 20 mg/dL Final    Glucose 09/05/2019 147* 65 - 99 mg/dL Final    Glucose, Fasting 09/05/2019 147* 65 - 99 mg/dL Final    Calcium 09/05/2019 9 6  8 6 - 10 5 mg/dL Final    AST 09/05/2019 19  13 - 39 U/L Final    ALT 09/05/2019 31  7 - 52 U/L Final    Alkaline Phosphatase 09/05/2019 57  40 - 150 U/L Final    Total Protein 09/05/2019 7 2  6 4 - 8 9 g/dL Final    Albumin 09/05/2019 3 8  3 5 - 5 7 g/dL Final    Total Bilirubin 09/05/2019 0 40  0 20 - 1 00 mg/dL Final    eGFR 09/05/2019 87  ml/min/1 73sq m Final    TSH 3RD GENERATON 09/05/2019 1 230  0 450 - 5 330 uIU/mL Final    Cholesterol 09/05/2019 188  0 - 200 mg/dL Final    Triglycerides 09/05/2019 101  44 - 166 mg/dL Final    HDL, Direct 09/05/2019 38* 40 - 60 mg/dL Final    LDL Calculated 09/05/2019 130* 0 - 100 mg/dL Final    Non-HDL-Chol (CHOL-HDL) 09/05/2019 150  mg/dl Final    POC Glucose 09/05/2019 143* 65 - 140 mg/dl Final    Ventricular Rate 09/05/2019 80  BPM Final    Atrial Rate 09/05/2019 80  BPM Final    MS Interval 09/05/2019 160  ms Final    QRSD Interval 09/05/2019 84  ms Final    QT Interval 09/05/2019 378  ms Final    QTC Interval 09/05/2019 435  ms Final    P Axis 09/05/2019 58  degrees Final    QRS Thayer 09/05/2019 30  degrees Final    T Wave Axis 09/05/2019 44  degrees Final    POC Glucose 09/05/2019 148* 65 - 140 mg/dl Final    POC Glucose 09/05/2019 148* 65 - 140 mg/dl Final    POC Glucose 09/05/2019 146* 65 - 140 mg/dl Final    POC Glucose 09/06/2019 152* 65 - 140 mg/dl Final    POC Glucose 09/06/2019 159* 65 - 140 mg/dl Final    POC Glucose 09/06/2019 135  65 - 140 mg/dl Final    POC Glucose 09/06/2019 181* 65 - 140 mg/dl Final    POC Glucose 09/07/2019 162* 65 - 140 mg/dl Final    POC Glucose 09/07/2019 135  65 - 140 mg/dl Final       Mental Status Evaluation:  Appearance:  casually dressed and disheveled   Behavior:  normal   Speech:  normal volume   Mood:  normal   Affect:  constricted   Thought Process:  circumstantial   Thought Content:  obsessions   Perceptual Disturbances: None   Risk Potential: none   Sensorium:  person and place   Cognition:  grossly intact   Consciousness:  alert and awake    Attention: attention span appeared shorter than expected for age   Insight:  fair   Judgment: fair   Gait/Station: normal gait/station   Motor Activity: no abnormal movements     Progress Toward Goals: Remains anxious    Assessment/Plan   Principal Problem:    Severe episode of recurrent major depressive disorder, without psychotic features ([facility])  Active Problems:    Type 2 diabetes mellitus with complication, with long-term current use of insulin ([facility])    Irritable bowel syndrome with diarrhea      Recommended Treatment: Continue with group therapy, milieu therapy and occupational therapy  Risks, benefits and possible side effects of Medications:   Risks, benefits, and possible side effects of medications explained to patient and patient verbalizes understanding  Counseling / Coordination of Care  Total floor / unit time spent today 15 minutes  Greater than 50% of total time was spent with the patient and / or family counseling and / or coordination of care   A description of the counseling / coordination of care: 10

## 2019-09-07 NOTE — PLAN OF CARE
Problem: DEPRESSION  Goal: Will be euthymic at discharge  Description  INTERVENTIONS:  - Administer medication as ordered  - Provide emotional support via 1:1 interaction with staff  - Encourage involvement in milieu/groups/activities  - Monitor for social isolation  Outcome: Progressing     Problem: ANXIETY  Goal: Will report anxiety at manageable levels  Description  INTERVENTIONS:  - Administer medication as ordered  - Teach and encourage coping skills  - Provide emotional support  - Assess patient/family for anxiety and ability to cope  Outcome: Progressing  Goal: By discharge: Patient will verbalize 2 strategies to deal with anxiety  Description  Interventions:  - Identify any obvious source/trigger to anxiety  - Staff will assist patient in applying identified coping technique/skills  - Encourage attendance of scheduled groups and activities  Outcome: Progressing     Problem: SLEEP DISTURBANCE  Goal: Will exhibit normal sleeping pattern  Description  Interventions:  -  Assess the patients sleep pattern, noting recent changes  - Administer medication as ordered  - Decrease environmental stimuli, including noise, as appropriate during the night  - Encourage the patient to actively participate in unit groups and or exercise during the day to enhance ability to achieve adequate sleep at night  - Assess the patient, in the morning, encouraging a description of sleep experience  Outcome: Progressing     Problem: Nutrition/Hydration-ADULT  Goal: Nutrient/Hydration intake appropriate for improving, restoring or maintaining nutritional needs  Description  Monitor and assess patient's nutrition/hydration status for malnutrition  Collaborate with interdisciplinary team and initiate plan and interventions as ordered  Monitor patient's weight and dietary intake as ordered or per policy  Utilize nutrition screening tool and intervene as necessary   Determine patient's food preferences and provide high-protein, high-caloric foods as appropriate       INTERVENTIONS:  - Monitor oral intake, urinary output, labs, and treatment plans  - Assess nutrition and hydration status and recommend course of action  - Evaluate amount of meals eaten  - Assist patient with eating if necessary   - Allow adequate time for meals  - Recommend/ encourage appropriate diets, oral nutritional supplements, and vitamin/mineral supplements  - Order, calculate, and assess calorie counts as needed  - Recommend, monitor, and adjust tube feedings and TPN/PPN based on assessed needs  - Assess need for intravenous fluids  - Provide specific nutrition/hydration education as appropriate  - Include patient/family/caregiver in decisions related to nutrition  Outcome: Progressing     Problem: DISCHARGE PLANNING - CARE MANAGEMENT  Goal: Discharge to post-acute care or home with appropriate resources  Description  INTERVENTIONS:  - Conduct assessment to determine patient/family and health care team treatment goals, and need for post-acute services based on payer coverage, community resources, and patient preferences, and barriers to discharge  - Address psychosocial, clinical, and financial barriers to discharge as identified in assessment in conjunction with the patient/family and health care team  - Arrange appropriate level of post-acute services according to patient's   needs and preference and payer coverage in collaboration with the physician and health care team  - Communicate with and update the patient/family, physician, and health care team regarding progress on the discharge plan  - Arrange appropriate transportation to post-acute venues   Outcome: Progressing

## 2019-09-07 NOTE — PROGRESS NOTES
Patient complained of dizziness when going from sitting to standing  Also noted, urine has a foul odor this morning

## 2019-09-07 NOTE — PROGRESS NOTES
Progress Note - Behavioral Health   Meli García 53 y o  female MRN: [id number]  Unit/Bed#: [facility] 249-01 Encounter: [id number]    Assessment/Plan   Principal Problem:    Severe episode of recurrent major depressive disorder, without psychotic features ([facility] [address] )  Active Problems:    Type 2 diabetes mellitus with complication, with long-term current use of insulin ([facility])    Irritable bowel syndrome with diarrhea      Behavior over the last 24 hours:  Unchanged  Patient reports that Librium made her feel groggy and tired and she would prefer to go back on using ativan instead  Reports her mood is a bit better but continues to be anxious  Sleep: normal  Appetite: normal  Medication side effects: No  ROS: no complaints    Mental Status Evaluation:  Appearance:  age appropriate   Behavior:  normal   Speech:  soft   Mood:  anxious   Affect:  normal   Thought Process:  normal   Thought Content:  obsessions   Perceptual Disturbances: None   Risk Potential: Suicidal Ideations none   Sensorium:  person and place   Cognition:  grossly intact   Consciousness:  awake    Attention: attention span appeared shorter than expected for age   Insight:  age appropriate   Judgment: limited   Gait/Station: slow   Motor Activity: no abnormal movements     Progress Toward Goals: ongoing    Recommended Treatment: Continue with group therapy, milieu therapy and occupational therapy  Risks, benefits and possible side effects of Medications:   Risks, benefits, and possible side effects of medications explained to patient and patient verbalizes understanding  Medications: continue current psychiatric medications  Labs: reviewed    Counseling / Coordination of Care  Total floor / unit time spent today 15 minutes  Greater than 50% of total time was spent with the patient and / or family counseling and / or coordination of care   A description of the counseling / coordination of care:

## 2019-09-07 NOTE — PROGRESS NOTES
No overt signs or symptoms of hypo/hyperglycemia  Sleep has been sound and undisturbed  No suicidal ideations noted  Will continue to monitor  Slept well during most q 7 minute safety checks    No respiratory distress or changes in medical condition

## 2019-09-07 NOTE — PROGRESS NOTES
Out in day room most of shift  Accucheck at 2000 was 181  Pleasant and complaint with treatment  Brightens upon approach  No overt signs or symptoms of hypo/hyperglycemia  Compliant with medications  Maintained on Q 7 minute safety checks  No acting out, suicidal ideations, and/or homicidal behaviors  No changes in medical condition or complaints voiced    Fluids maintained at bedside to promote hydration

## 2019-09-07 NOTE — PROGRESS NOTES
Pt is flat, but will brighten on approach  Pt had c/o stomach cramping and diarrhea  Pt reports having IBS and is requesting her PRN Bentyl  PRN Bentyl given @ 0848  Pt also reported having some anxiety and requested PRN Ativan  PRN Ativan 0 5 mg given @ 0848 for 5/10 anxiety  Pt stated she was anxious about "life stressors" and is worried about losing her job  Pt stated that her boss did not seem supportive of her being in the hospital  Pt has been compliant with meds and meals  Pt visible on the unit at times  Pt will socialize with select peers at times  Cooperative with staff  Behaviors appropriate  Calm and controlled  Denies SI/HI  Will continue to monitor and assess

## 2019-09-08 LAB
GLUCOSE SERPL-MCNC: 147 MG/DL (ref 65–140)
GLUCOSE SERPL-MCNC: 174 MG/DL (ref 65–140)
GLUCOSE SERPL-MCNC: 174 MG/DL (ref 65–140)

## 2019-09-08 PROCEDURE — 82948 REAGENT STRIP/BLOOD GLUCOSE: CPT

## 2019-09-08 PROCEDURE — 99231 SBSQ HOSP IP/OBS SF/LOW 25: CPT | Performed by: NURSE PRACTITIONER

## 2019-09-08 RX ORDER — FLUTICASONE PROPIONATE 50 MCG
2 SPRAY, SUSPENSION (ML) NASAL 2 TIMES DAILY
Status: DISCONTINUED | OUTPATIENT
Start: 2019-09-08 | End: 2019-09-10 | Stop reason: HOSPADM

## 2019-09-08 RX ADMIN — LISINOPRIL 10 MG: 10 TABLET ORAL at 08:24

## 2019-09-08 RX ADMIN — INSULIN GLARGINE 40 UNITS: 100 INJECTION, SOLUTION SUBCUTANEOUS at 21:55

## 2019-09-08 RX ADMIN — ESCITALOPRAM OXALATE 30 MG: 10 TABLET ORAL at 08:21

## 2019-09-08 RX ADMIN — FLUTICASONE PROPIONATE 2 SPRAY: 50 SPRAY, METERED NASAL at 05:41

## 2019-09-08 RX ADMIN — TOPIRAMATE 25 MG: 25 TABLET, FILM COATED ORAL at 08:21

## 2019-09-08 RX ADMIN — LORAZEPAM 0.5 MG: 0.5 TABLET ORAL at 08:23

## 2019-09-08 RX ADMIN — PANTOPRAZOLE SODIUM 40 MG: 40 TABLET, DELAYED RELEASE ORAL at 08:21

## 2019-09-08 RX ADMIN — INSULIN LISPRO 2 UNITS: 100 INJECTION, SOLUTION INTRAVENOUS; SUBCUTANEOUS at 21:54

## 2019-09-08 RX ADMIN — METFORMIN HYDROCHLORIDE 500 MG: 500 TABLET ORAL at 17:20

## 2019-09-08 RX ADMIN — TOPIRAMATE 25 MG: 25 TABLET, FILM COATED ORAL at 17:20

## 2019-09-08 RX ADMIN — DICYCLOMINE HYDROCHLORIDE 20 MG: 20 TABLET ORAL at 20:25

## 2019-09-08 RX ADMIN — Medication 2 SPRAY: at 17:34

## 2019-09-08 RX ADMIN — INSULIN LISPRO 2 UNITS: 100 INJECTION, SOLUTION INTRAVENOUS; SUBCUTANEOUS at 12:30

## 2019-09-08 RX ADMIN — HYDROCHLOROTHIAZIDE 12.5 MG: 12.5 TABLET ORAL at 08:21

## 2019-09-08 RX ADMIN — METFORMIN HYDROCHLORIDE 500 MG: 500 TABLET ORAL at 08:21

## 2019-09-08 RX ADMIN — INSULIN LISPRO 2 UNITS: 100 INJECTION, SOLUTION INTRAVENOUS; SUBCUTANEOUS at 17:00

## 2019-09-08 RX ADMIN — DICYCLOMINE HYDROCHLORIDE 20 MG: 20 TABLET ORAL at 09:44

## 2019-09-08 RX ADMIN — LORAZEPAM 0.5 MG: 0.5 TABLET ORAL at 17:34

## 2019-09-08 RX ADMIN — LORATADINE 10 MG: 10 TABLET ORAL at 08:22

## 2019-09-08 RX ADMIN — LAMOTRIGINE 200 MG: 100 TABLET ORAL at 08:21

## 2019-09-08 NOTE — PROGRESS NOTES
Pt is pleasant and brightens on approach, but presents with some anxiety  Pt requested PRN Ativan  PRN Ativan 0 5 mg given @ 8520 for 5/10 anxiety  Pt reported good sleep last night  Pt visible on the unit and socializing with peers  Compliant with meals and medications  Behaviors appropriate  Calm and controlled  Denies hallucinations  Denies SI/HI  Cooperative with staff  Will continue to monitor and assess

## 2019-09-08 NOTE — PROGRESS NOTES
Progress Note - Behavioral Health   Opal Renee 53 y o  female MRN: [id number]  Unit/Bed#: [facility] 249-01 Encounter: [id number]    Assessment/Plan   Principal Problem:    Severe episode of recurrent major depressive disorder, without psychotic features ([facility] [address] )  Active Problems:    Type 2 diabetes mellitus with complication, with long-term current use of insulin ([facility])    Irritable bowel syndrome with diarrhea    Patient was seen for continuing care and treatment  Case was discussed with the nursing staff  On examination today, the patient's mood is pleasant stable  Have been checking orthostatic blood pressures on this patient in thus far, her blood pressures been stable  No orthostasis noted  We will continue current meds and treatment  Discharge plan and disposition is ongoing  Behavior over the last 24 hours:  improved  Sleep: normal  Appetite: normal  Medication side effects: No  ROS: no complaints    Mental Status Evaluation:  Appearance:  casually dressed   Behavior:  normal   Speech:  normal volume   Mood:  normal   Affect:  normal   Thought Process:  normal   Thought Content:  normal   Perceptual Disturbances: None   Risk Potential: None   Sensorium:  person and place   Cognition:  grossly intact   Consciousness:  alert and awake    Attention: attention span and concentration were age appropriate   Insight:  fair   Judgment: fair   Gait/Station: normal gait/station   Motor Activity: no abnormal movements     Progress Toward Goals: Stabilizing mood    Recommended Treatment: Continue with group therapy, milieu therapy and occupational therapy  Risks, benefits and possible side effects of Medications:   Risks, benefits, and possible side effects of medications explained to patient and patient verbalizes understanding  Medications: continue current psychiatric medications  Labs: Reviewed    Counseling / Coordination of Care  Total floor / unit time spent today 15 minutes   Greater than 50% of total time was spent with the patient and / or family counseling and / or coordination of care   A description of the counseling / coordination of care: 10

## 2019-09-08 NOTE — PROGRESS NOTES
No issues with anxiety 2nd portion of shift  Patient observed sleeping during most Q 7 minute safety checks (second portion of shift)  No suicidal ideations, acting out or homicidal behaviors  No change in medical condition or complaints voiced  Fluids maintained at bedside to promote hydration

## 2019-09-08 NOTE — PROGRESS NOTES
Assessed patient in her room  Withdrawn this shift  States her depression has lessoned  Anxiety remains controlled, states Ativan has been effective for decreasing symptoms  No suicidal ideations presently  Hygiene is fair  Maintained on q 7 minute checks

## 2019-09-08 NOTE — PROGRESS NOTES
Pt requested PRN Bentyl for c/o diarrhea  Pt was given PRN Bentyl @ [phone]  Will continue to monitor and assess

## 2019-09-09 ENCOUNTER — TELEPHONE (OUTPATIENT)
Dept: OBGYN CLINIC | Facility: CLINIC | Age: 44
End: 2019-09-09

## 2019-09-09 LAB
BILIRUB UR QL STRIP: NEGATIVE
CLARITY UR: CLEAR
COLOR UR: YELLOW
GLUCOSE SERPL-MCNC: 151 MG/DL (ref 65–140)
GLUCOSE SERPL-MCNC: 156 MG/DL (ref 65–140)
GLUCOSE SERPL-MCNC: 158 MG/DL (ref 65–140)
GLUCOSE SERPL-MCNC: 165 MG/DL (ref 65–140)
GLUCOSE SERPL-MCNC: 179 MG/DL (ref 65–140)
GLUCOSE UR STRIP-MCNC: NEGATIVE MG/DL
HGB UR QL STRIP.AUTO: NEGATIVE
KETONES UR STRIP-MCNC: NEGATIVE MG/DL
LEUKOCYTE ESTERASE UR QL STRIP: NEGATIVE
NITRITE UR QL STRIP: NEGATIVE
PH UR STRIP.AUTO: 5.5 [PH]
PROT UR STRIP-MCNC: NEGATIVE MG/DL
SP GR UR STRIP.AUTO: 1.01 (ref 1–1.03)
UROBILINOGEN UR QL STRIP.AUTO: 0.2 E.U./DL

## 2019-09-09 PROCEDURE — 81003 URINALYSIS AUTO W/O SCOPE: CPT | Performed by: PHYSICIAN ASSISTANT

## 2019-09-09 PROCEDURE — 99232 SBSQ HOSP IP/OBS MODERATE 35: CPT | Performed by: PSYCHIATRY & NEUROLOGY

## 2019-09-09 PROCEDURE — 82948 REAGENT STRIP/BLOOD GLUCOSE: CPT

## 2019-09-09 RX ADMIN — INSULIN GLARGINE 40 UNITS: 100 INJECTION, SOLUTION SUBCUTANEOUS at 21:51

## 2019-09-09 RX ADMIN — LORAZEPAM 0.5 MG: 0.5 TABLET ORAL at 08:49

## 2019-09-09 RX ADMIN — INSULIN LISPRO 2 UNITS: 100 INJECTION, SOLUTION INTRAVENOUS; SUBCUTANEOUS at 16:48

## 2019-09-09 RX ADMIN — Medication 2 SPRAY: at 18:00

## 2019-09-09 RX ADMIN — LISINOPRIL 10 MG: 10 TABLET ORAL at 08:49

## 2019-09-09 RX ADMIN — LAMOTRIGINE 200 MG: 100 TABLET ORAL at 08:48

## 2019-09-09 RX ADMIN — TOPIRAMATE 25 MG: 25 TABLET, FILM COATED ORAL at 17:59

## 2019-09-09 RX ADMIN — INSULIN LISPRO 2 UNITS: 100 INJECTION, SOLUTION INTRAVENOUS; SUBCUTANEOUS at 22:01

## 2019-09-09 RX ADMIN — DICYCLOMINE HYDROCHLORIDE 20 MG: 20 TABLET ORAL at 08:48

## 2019-09-09 RX ADMIN — ESCITALOPRAM OXALATE 30 MG: 10 TABLET ORAL at 08:48

## 2019-09-09 RX ADMIN — METFORMIN HYDROCHLORIDE 500 MG: 500 TABLET ORAL at 08:30

## 2019-09-09 RX ADMIN — INSULIN LISPRO 2 UNITS: 100 INJECTION, SOLUTION INTRAVENOUS; SUBCUTANEOUS at 12:30

## 2019-09-09 RX ADMIN — INSULIN LISPRO 2 UNITS: 100 INJECTION, SOLUTION INTRAVENOUS; SUBCUTANEOUS at 08:00

## 2019-09-09 RX ADMIN — TOPIRAMATE 25 MG: 25 TABLET, FILM COATED ORAL at 08:48

## 2019-09-09 RX ADMIN — LORATADINE 10 MG: 10 TABLET ORAL at 08:48

## 2019-09-09 RX ADMIN — PANTOPRAZOLE SODIUM 40 MG: 40 TABLET, DELAYED RELEASE ORAL at 08:49

## 2019-09-09 RX ADMIN — METFORMIN HYDROCHLORIDE 500 MG: 500 TABLET ORAL at 16:48

## 2019-09-09 NOTE — QUICK NOTE
Nursing contacted me stating that patient was having increased urinary frequency with foul-smelling urine was requesting order for UA be placed and order for same was entered

## 2019-09-09 NOTE — PROGRESS NOTES
Patient has been visible on the unit  Attending and participating in evening unit activities  Req and received prn Bentyl for IBS symptoms  Denies s/i or thoughts to self harm  Reports depression is down to 3/10 from 9/10 on admission  States she continues to have some anxiety, however she related that to "being cooped up in the hospital"  Otherwise she says her anxiety has been well controlled

## 2019-09-09 NOTE — PROGRESS NOTES
Team Members Present:   MD Arjun Braga, SUJATA Delgadillo, RN  Sherri Feliciano, LSW   Marina Thompson, [facility]    Doing well  Some anxiety  Concerned about job  DC tomorrow  Needs work note

## 2019-09-09 NOTE — TELEPHONE ENCOUNTER
lmom for patient to be evaluated by her PCP before being seen by us and to call us back regarding her appt on 9/11

## 2019-09-09 NOTE — PROGRESS NOTES
Pt is pleasant and brightens on approach  Pt reports having "some anxiety," but it has "really improved" since she has been here  Pt had c/o stomach pains and diarrhea this morning  PRN Bentyl was given @ 0848  PRN Ativan 0 5 mg given @ 3684 for 5/10 anxiety  Compliant with meds and meals  Pt visible in the community and socializing with peers  Pt attending groups  Behaviors appropriate  Calm and controlled  Cooperative with staff  Denies hallucinations  Denies SI/HI  Will continue to monitor and assess

## 2019-09-09 NOTE — SOCIAL WORK
Aftercare Planning:    [facility] ([place]): left message  Linda Quiles LCSW ([place]): left message  [facility]: Accepts pt's insurance and have available providers but must speak to pt directly  Met with pt to contact [facility] directly  We were transferred to the intake line and left message stating we'd call back tomorrow at [employer]  Pt presents as bright  She reports that her anxiety is much improved; she feels that at this point being inside is giving her more anxiety  Pt reports that she feels safe for discharge tomorrow  Pt reports that her depression is improved  She denies thoughts of SI  She has made friends on the unit and has been participating in groups

## 2019-09-09 NOTE — SOCIAL WORK
Phone call placed to pt's mother Lianna Prince  Lianna Prince informed SW that she is concerned about pt as she is still pretty anxious and she isn't sure how she will be able to cope with her relationship issues when she gets home  Lianna Prince does not feel that pt is a danger to herself or others  SW explained that pt's daughter Garrett Patel has been calling but she can not speak to her because there is no release  Lianna Prince reports that aGrrett Patel has the same concerns as she was in for a visit last night  SW explained that anxiety alone isn't a reason to keep someone on the unit but that she will pass the concern on to the provider and they will assess pt today  SW did confirm that as of right now pt is set for discharge tomorrow but if anything changes she will call back  SW asked Lianna Prince to tell Garrett Patel why she can't call her but if she does have concerns she can leave it on her vm or have Lianna Prince tell SW  No other concerns reported or needs  Phone call ended mutually  Message received from pt's daughter stating she is concerned for pt outside of the hospital   She reports that pt has not been taking care of her hygiene, sleeping excessively, not going to all her appointments, and sometimes taking too much of her medications  She reports that this doesn't seem to be just anxiety

## 2019-09-09 NOTE — PROGRESS NOTES
Progress Note - [address] 53 y o  female MRN: [id number]   Unit/Bed#: [facility] 249-01 Encounter: [id number]    Behavior over the last 24 hours: Angela Meza was seen for an inpatient follow-up psychiatric visit this date  She denies any suicidal or homicidal ideation  Her affect was bright and she stated she is feeling much better than when she was admitted  She still complains of anxiety but attributes it to being unable to leave hospital   Appetite and sleep are within normal limits  She is looking forward to discharge tomorrow and states she feels ready to leave  ROS: no complaints    Mental Status Evaluation:    Appearance:  casually dressed, dressed appropriately   Behavior:  pleasant, cooperative   Speech:  normal rate and volume   Mood:  improved   Affect:  normal range and intensity   Thought Process:  organized, logical, coherent   Associations: intact associations   Thought Content:  normal, no overt delusions   Perceptual Disturbances: none   Risk Potential: Suicidal ideation - None  Homicidal ideation - None  Potential for aggression - No   Sensorium:  oriented to person, place and time/date   Memory:  recent and remote memory grossly intact   Consciousness:  alert and awake   Attention: attention span and concentration are age appropriate   Insight:  fair   Judgment: fair   Gait/Station: normal gait/station, normal balance   Motor Activity: no abnormal movements     Vital signs in last 24 hours:    Temp:  [98 3 °F (36 8 °C)-98 6 °F (37 °C)] 98 6 °F (37 °C)  HR:  [] 84  Resp:  [16-18] 16  BP: (103-179)/(51-77) 179/76    Laboratory results:  I have personally reviewed all pertinent laboratory/tests results      Progress Toward Goals: progressing    Assessment/Plan   Principal Problem:    Severe episode of recurrent major depressive disorder, without [facility] features [facility] )  Active Problems:    Type 2 diabetes mellitus with complication, with long-term current use of insulin ([facility] [address] )    Irritable bowel syndrome with diarrhea    Recommended Treatment:   Continue current medications as prescribed  Continue to monitor  Plan is for discharge tomorrow pending any change in patient's status  All current active medications have been reviewed    Encourage group therapy, milieu therapy and occupational therapy  Behavioral Health checks every 7 minutes      Current Facility-Administered Medications:  acetaminophen 650 mg Oral Q6H PRN Bessie Coving[employer]ics, CRNP   acetaminophen 650 mg Oral Q4H PRN Bessie Cha, CRNP   acetaminophen 975 mg Oral Q6H PRN Bessie Cha, CRNP   aluminum-magnesium hydroxide-simethicone 30 mL Oral Q4H PRN Bessie Cha, CRNP   benztropine 1 mg Intramuscular Q6H PRN Bessie Cha, CRNP   benztropine 1 mg Oral Q6H PRN Bessie Cha, CRNP   dicyclomine 20 mg Oral TID PRN Calvin Aleman, CRNP   escitalopram 30 mg Oral Daily Rogerio Ramos MD   fluticasone 2 spray Nasal BID Bessie Cha, CRNP   haloperidol 5 mg Oral Q6H PRN Bessie Cha, CRNP   haloperidol lactate 5 mg Intramuscular Q6H PRN Bessie Cha, CRNP   hydrochlorothiazide 12 5 mg Oral Every Other Day Twilla Ash, CRNP   hydrOXYzine HCL 25 mg Oral Q6H PRN Bessie Cha, CRNP   hydrOXYzine HCL 50 mg Oral Q4H PRN Bessie Cha, CRNP   insulin glargine 40 Units Subcutaneous HS Twilla Ash, CRNP   insulin lispro 2-12 Units Subcutaneous TID AC Miguelina Page, CRNP   insulin lispro 2-12 Units Subcutaneous HS Twilla Ash, CRNP   lamoTRIgine 200 mg Oral Daily Rogerio Ramos MD   lisinopril 10 mg Oral Daily Twilla Ash, CRGLORIA   loratadine 10 mg Oral Daily Miguelina Page, CRNP   LORazepam 2 mg Intramuscular Q6H PRN Bessie Cha, CRNP   LORazepam 0 5 mg Oral TID PRN Rogerio aRmos MD   magnesium hydroxide 30 mL Oral Daily PRN Bessie Cha, CRNP   metFORMIN 500 mg Oral BID With Meals Twshyam Aleman, SUJATA   OLANZapine 10 mg Intramuscular Q3H PRN SUJATA Cummings   pantoprazole 40 mg Oral Daily SUJATA Carter   pneumococcal 23-valent polysaccharide vaccine 0 5 mL Subcutaneous Prior to discharge SUJATA Cummings   risperiDONE 1 mg Oral Q4H PRN SUJATA Cummings   topiramate 25 mg Oral BID Estiven Silva MD   traZODone 50 mg Oral HS PRN SUJATA Cummings       Risks / Benefits of Treatment:    Risks, benefits, and possible side effects of medications explained to patient and patient verbalizes understanding and agreement for treatment  Counseling / Coordination of Care:      Medication changes reviewed with staff in treatment team meeting  Medications, treatment progress and treatment plan reviewed with patient

## 2019-09-10 VITALS
WEIGHT: 293 LBS | RESPIRATION RATE: 16 BRPM | TEMPERATURE: 98.6 F | OXYGEN SATURATION: 97 % | DIASTOLIC BLOOD PRESSURE: 63 MMHG | BODY MASS INDEX: 57.52 KG/M2 | HEIGHT: 60 IN | HEART RATE: 82 BPM | SYSTOLIC BLOOD PRESSURE: 98 MMHG

## 2019-09-10 LAB
GLUCOSE SERPL-MCNC: 141 MG/DL (ref 65–140)
GLUCOSE SERPL-MCNC: 145 MG/DL (ref 65–140)

## 2019-09-10 PROCEDURE — 82948 REAGENT STRIP/BLOOD GLUCOSE: CPT

## 2019-09-10 PROCEDURE — 99239 HOSP IP/OBS DSCHRG MGMT >30: CPT | Performed by: NURSE PRACTITIONER

## 2019-09-10 RX ORDER — TOPIRAMATE 25 MG/1
25 TABLET ORAL 2 TIMES DAILY
Qty: 60 TABLET | Refills: 0 | Status: SHIPPED | OUTPATIENT
Start: 2019-09-10 | End: 2020-08-24

## 2019-09-10 RX ORDER — ESCITALOPRAM OXALATE 10 MG/1
30 TABLET ORAL DAILY
Qty: 90 TABLET | Refills: 0 | Status: SHIPPED | OUTPATIENT
Start: 2019-09-11 | End: 2021-08-03 | Stop reason: HOSPADM

## 2019-09-10 RX ADMIN — DICYCLOMINE HYDROCHLORIDE 20 MG: 20 TABLET ORAL at 08:25

## 2019-09-10 RX ADMIN — ESCITALOPRAM OXALATE 30 MG: 10 TABLET ORAL at 08:19

## 2019-09-10 RX ADMIN — LORATADINE 10 MG: 10 TABLET ORAL at 08:17

## 2019-09-10 RX ADMIN — Medication 2 SPRAY: at 08:19

## 2019-09-10 RX ADMIN — TOPIRAMATE 25 MG: 25 TABLET, FILM COATED ORAL at 08:17

## 2019-09-10 RX ADMIN — LAMOTRIGINE 200 MG: 100 TABLET ORAL at 08:18

## 2019-09-10 RX ADMIN — PANTOPRAZOLE SODIUM 40 MG: 40 TABLET, DELAYED RELEASE ORAL at 08:18

## 2019-09-10 RX ADMIN — METFORMIN HYDROCHLORIDE 500 MG: 500 TABLET ORAL at 08:18

## 2019-09-10 RX ADMIN — LORAZEPAM 0.5 MG: 0.5 TABLET ORAL at 08:25

## 2019-09-10 NOTE — DISCHARGE INSTR - OTHER ORDERS
You will discharge home to [address], [address] Phone: [phone]  Crisis Plan:    Triggers you identified during your hospital stay that led to suicidal ideations, increased depression, and increased anxiety causing you to take extra doses of your medications include: relationship issues and financial stressors  Coping skills you identified during your hospital stay include: spending time with your dog and talking with your friends  After discharge, if you find your coping skills are not effective and you continue to feel distressed please reach out to your friends and schedule an appointment with your psychologist     If that is not effective and you feel you are a danger to yourself or others please contact [facility] Intervention: [phone], Peer Support Talk Line: Seven days a week, 1:00 PM  9:00 PM) Call: [phone] or Text: [phone], [address]:  [phone], [facility] on [id number] [place] [address] ([facility]) [employer]: [phone]/Email: www mary  org, or Substance Abuse and [place] [address] [place] [address])  [employer], which is a confidential, free, 24-hour-a-day, 365-day-a-year, information service for individuals and family members facing mental health and/or substance use disorders  This service provides referrals to local treatment facilities, support groups, and community-based organizations  Callers can also order free publications and other information    Call 8-892.440.8968/UUPLP: www [facility] gov

## 2019-09-10 NOTE — SOCIAL WORK
SW met with pt to contact [facility]; we again reached the mailbox of the intake line; SW left another message and requested a return phone call  Phone call placed to [facility] in [place]; spoke to Dr Kelle Harada who agreed to accept client for services  Appointment scheduled for 9/12/19 at 11AM  Pt signed JASMIN for provider  Pt bright but malodorous  Pt feels safe for discharge  Pt has transport coming at 1PM today

## 2019-09-10 NOTE — PLAN OF CARE
Problem: DEPRESSION  Goal: Will be euthymic at discharge  Description  INTERVENTIONS:  - Administer medication as ordered  - Provide emotional support via 1:1 interaction with staff  - Encourage involvement in milieu/groups/activities  - Monitor for social isolation  Outcome: Completed     Problem: ANXIETY  Goal: Will report anxiety at manageable levels  Description  INTERVENTIONS:  - Administer medication as ordered  - Teach and encourage coping skills  - Provide emotional support  - Assess patient/family for anxiety and ability to cope  Outcome: Completed  Goal: By discharge: Patient will verbalize 2 strategies to deal with anxiety  Description  Interventions:  - Identify any obvious source/trigger to anxiety  - Staff will assist patient in applying identified coping technique/skills  - Encourage attendance of scheduled groups and activities  Outcome: Completed     Problem: SLEEP DISTURBANCE  Goal: Will exhibit normal sleeping pattern  Description  Interventions:  -  Assess the patients sleep pattern, noting recent changes  - Administer medication as ordered  - Decrease environmental stimuli, including noise, as appropriate during the night  - Encourage the patient to actively participate in unit groups and or exercise during the day to enhance ability to achieve adequate sleep at night  - Assess the patient, in the morning, encouraging a description of sleep experience  Outcome: Completed     Problem: Ineffective Coping  Goal: Participates in unit activities  Description  Interventions:  - Provide therapeutic environment   - Provide required programming   - Redirect inappropriate behaviors   Outcome: Completed     Problem: Nutrition/Hydration-ADULT  Goal: Nutrient/Hydration intake appropriate for improving, restoring or maintaining nutritional needs  Description  Monitor and assess patient's nutrition/hydration status for malnutrition   Collaborate with interdisciplinary team and initiate plan and interventions as ordered  Monitor patient's weight and dietary intake as ordered or per policy  Utilize nutrition screening tool and intervene as necessary  Determine patient's food preferences and provide high-protein, high-caloric foods as appropriate       INTERVENTIONS:  - Monitor oral intake, urinary output, labs, and treatment plans  - Assess nutrition and hydration status and recommend course of action  - Evaluate amount of meals eaten  - Assist patient with eating if necessary   - Allow adequate time for meals  - Recommend/ encourage appropriate diets, oral nutritional supplements, and vitamin/mineral supplements  - Order, calculate, and assess calorie counts as needed  - Recommend, monitor, and adjust tube feedings and TPN/PPN based on assessed needs  - Assess need for intravenous fluids  - Provide specific nutrition/hydration education as appropriate  - Include patient/family/caregiver in decisions related to nutrition  Outcome: Completed

## 2019-09-10 NOTE — PROGRESS NOTES
Patient has been visible on the unit  Attending and participation in evening unit activities  Social with peers  Denies s/i or thoughts to self harm  Depression is down to 3/10 and anxiety is 2/10    Feels ready for dc tomorrow

## 2019-09-10 NOTE — DISCHARGE SUMMARY
Discharge Summary - [address] [facility] 53 y o  female MRN: [id number]  Unit/Bed#: Adrian[facility] Chacko 249-01 Encounter: [id number]     Admission Date: 9/4/2019         Discharge Date: 9/10/2019  1:14 PM    Attending Psychiatrist:  Stephen Ross MD    Reason for Admission/HPI:     Principal Problem:    Severe episode of recurrent major depressive disorder, without psychotic features ([facility] )  Active Problems:    Type 2 diabetes mellitus with complication, with long-term current use of insulin ([facility] )    Irritable bowel syndrome with diarrhea    Janeice Felty is a 40-year-old female patient admitted on a voluntary 201 commitment basis to the adult behavioral health unit due to depression with suicidal ideation  Per initial psychiatric evaluation completed by Dr Isidro Guerra:    "Patient presents to the emergency department via private transportation for psychiatric evaluation  Patient admittedly is depressed and suicidal   She does not have a specific plan but mentions taking medications and overdosing  Patient does of psychiatric history  She denies physical complaints at this time other than chronic pain which she takes gabapentin for  She denies other drug and alcohol issues "    Per initial psychiatric evaluation completed by Dr Stephen Ross:    "Patient is a 53 y o  female with long history of depression who reports she was getting increasingly depressed lately and her main stressor was problems with her boyfriend and the relationship she is becoming strained  She reports that she had suicidal thoughts and she took a handful of gabapentin pills with suicidal intention  Patient reports history of previous psychiatric hospitalization but no history of suicide attempts  Patient reports she has been on multiple medications but had numerous side effects from Wellbutrin, Seroquel and others    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  Patient denies any alcohol or substance use "    Dionne Florian has a psychiatric history of major depressive disorder and sees both a psychiatrist and a therapist on an outpatient basis  She is compliant with both medications and treatment  She has been hospitalized in the past on the psychiatric unit  Hospital Course: Dionne Florian was admitted to the [facility] unit after being medically cleared  Once on the unit, she was seen by medical doctor for a physical examination  She was placed on 7 minutes behavioral health checks for patient safety  She was encouraged to attend both group and occupational therapy  Psychiatric medications were adjusted to appropriate dosages  Before any medications were administered, risk versus benefit was discussed  Dionne Florian agreed to take medications as prescribed and participate in psychiatric treatment  Initially after admission, Dionne Florian remained very depressed and anxious  Her Lexapro was increased to 30 mg daily and the rest of her psychiatric medications remained the same as prescribed  Once she adjusted to the therapeutic milieu of the unit as well as her medication increase, her mood began to improve  Her appetite and sleep returned to normal   Her anxiety return to a manageable level  She remains slightly depressed but was feeling much better than when admitted  She became less isolative and enjoyed participating in group activities on the unit  She was taking her medication without side effects  Because she was doing so much better, the Psychiatric Care Team felt it would be both safe and appropriate to discharge her to care on an outpatient basis  Follow-up appointments were scheduled on her behalf with her outpatient psychiatrist and therapist   On day of discharge, Dionne Florian denied any suicidal or homicidal ideations as well as any symptoms of psychosis  Her mood was stable, and she was looking forward to going home      Mental Status at time of Discharge: Appearance:  casually dressed   Behavior:  normal   Speech:  normal pitch and normal volume   Mood:  normal   Affect:  normal   Thought Process:  normal   Thought Content:  normal   Perceptual Disturbances: None   Risk Potential: Patient denies any suicidal or homicidal ideations  Sensorium:  person, place, time/date and situation   Cognition:  grossly intact   Consciousness:  alert and awake    Attention: attention span and concentration were age appropriate   Insight:  fair   Judgment: fair   Gait/Station: normal gait/station and normal balance   Motor Activity: no abnormal movements     Admission Diagnosis:Major depression [F32 9]    Discharge Diagnosis:   Principal Problem:    Severe episode of recurrent major depressive disorder, without psychotic features ([facility] )  Active Problems:    Type 2 diabetes mellitus with complication, with long-term current use of insulin ([facility] )    Irritable bowel syndrome with diarrhea  Resolved Problems:    * No resolved hospital problems   *        Lab results:  Admission on 09/04/2019, Discharged on 09/10/2019   Component Date Value    POC Glucose 09/04/2019 141*    POC Glucose 09/04/2019 144*    WBC 09/05/2019 6 00     RBC 09/05/2019 4 48     Hemoglobin 09/05/2019 11 3*    Hematocrit 09/05/2019 35 6*    MCV 09/05/2019 80*    MCH 09/05/2019 25 3*    MCHC 09/05/2019 31 8     RDW 09/05/2019 16 5*    MPV 09/05/2019 7 6*    Platelets 60/33/0927 332     Neutrophils Relative 09/05/2019 53     Lymphocytes Relative 09/05/2019 36     Monocytes Relative 09/05/2019 9     Eosinophils Relative 09/05/2019 2     Basophils Relative 09/05/2019 1     Neutrophils Absolute 09/05/2019 3 20     Lymphocytes Absolute 09/05/2019 2 20     Monocytes Absolute 09/05/2019 0 50     Eosinophils Absolute 09/05/2019 0 10     Basophils Absolute 09/05/2019 0 00     Sodium 09/05/2019 138     Potassium 09/05/2019 3 8     Chloride 09/05/2019 102     CO2 09/05/2019 30     ANION GAP 09/05/2019 6     BUN 09/05/2019 13     Creatinine 09/05/2019 0 81     Glucose 09/05/2019 147*    Glucose, Fasting 09/05/2019 147*    Calcium 09/05/2019 9 6     AST 09/05/2019 19     ALT 09/05/2019 31     Alkaline Phosphatase 09/05/2019 57     Total Protein 09/05/2019 7 2     Albumin 09/05/2019 3 8     Total Bilirubin 09/05/2019 0 40     eGFR 09/05/2019 87     TSH 3RD GENERATON 09/05/2019 1 230     Cholesterol 09/05/2019 188     Triglycerides 09/05/2019 101     HDL, Direct 09/05/2019 38*    LDL Calculated 09/05/2019 130*    Non-HDL-Chol (CHOL-HDL) 09/05/2019 150     POC Glucose 09/05/2019 143*    Ventricular Rate 09/05/2019 80     Atrial Rate 09/05/2019 80     MN Interval 09/05/2019 160     QRSD Interval 09/05/2019 84     QT Interval 09/05/2019 378     QTC Interval 09/05/2019 435     P Axis 09/05/2019 58     QRS Axis 09/05/2019 30     T Wave Axis 09/05/2019 44     POC Glucose 09/05/2019 148*    POC Glucose 09/05/2019 148*    POC Glucose 09/05/2019 146*    POC Glucose 09/06/2019 152*    POC Glucose 09/06/2019 159*    POC Glucose 09/06/2019 135     POC Glucose 09/06/2019 181*    POC Glucose 09/07/2019 162*    POC Glucose 09/07/2019 135     POC Glucose 09/07/2019 133     POC Glucose 09/07/2019 168*    POC Glucose 09/08/2019 147*    POC Glucose 09/08/2019 174*    POC Glucose 09/08/2019 174*    Color, UA 09/09/2019 Yellow     Clarity, UA 09/09/2019 Clear     Specific Gravity, UA 09/09/2019 1 015     pH, UA 09/09/2019 5 5     Leukocytes, UA 09/09/2019 Negative     Nitrite, UA 09/09/2019 Negative     Protein, UA 09/09/2019 Negative     Glucose, UA 09/09/2019 Negative     Ketones, UA 09/09/2019 Negative     Urobilinogen, UA 09/09/2019 0 2     Bilirubin, UA 09/09/2019 Negative     Blood, UA 09/09/2019 Negative     POC Glucose 09/08/2019 165*    POC Glucose 09/09/2019 158*    POC Glucose 09/09/2019 156*    POC Glucose 09/09/2019 151*    POC Glucose 09/09/2019 179*    POC Glucose 09/10/2019 141*    POC Glucose 09/10/2019 145*       Discharge Medications:  Discharge Medication List as of 9/10/2019 11:59 AM      START taking these medications    Details   topiramate (TOPAMAX) 25 mg tablet Take 1 tablet (25 mg total) by mouth 2 (two) times a day for 30 days, Starting Tue 9/10/2019, Until Thu 10/10/2019, Print            Discharge Medication List as of 9/10/2019 11:59 AM      STOP taking these medications       carbamide peroxide (DEBROX) 6 5 % otic solution Comments:   Reason for Stopping:         ciprofloxacin-dexamethasone (CIPRODEX) otic suspension Comments:   Reason for Stopping:         fluticasone (FLONASE) 50 mcg/act nasal spray Comments:   Reason for Stopping:         glucose blood test strip Comments:   Reason for Stopping:         guaiFENesin (MUCINEX) 600 mg 12 hr tablet Comments:   Reason for Stopping:         ibuprofen (MOTRIN) 800 mg tablet Comments:   Reason for Stopping:         insulin degludec (TRESIBA) injection pen 100 units/mL Comments:   Reason for Stopping:         ketoconazole (NIZORAL) 2 % shampoo Comments:   Reason for Stopping:         Liraglutide (VICTOZA SC) Comments:   Reason for Stopping:         loratadine (CLARITIN) 10 mg tablet Comments:   Reason for Stopping:         ondansetron (ZOFRAN-ODT) 8 mg disintegrating tablet Comments:   Reason for Stopping:         OXcarbazepine (TRILEPTAL) 150 mg tablet Comments:   Reason for Stopping:              Discharge Medication List as of 9/10/2019 11:59 AM      CONTINUE these medications which have CHANGED    Details   escitalopram (LEXAPRO) 10 mg tablet Take 3 tablets (30 mg total) by mouth daily for 30 days, Starting Wed 9/11/2019, Until Fri 10/11/2019, Print            Discharge Medication List as of 9/10/2019 11:59 AM      CONTINUE these medications which have NOT CHANGED    Details   hydrochlorothiazide (HYDRODIURIL) 12 5 mg tablet Take 12 5 mg by mouth every other day, Historical Med      insulin glargine (LANTUS) 100 units/mL subcutaneous injection Inject 40 Units under the skin daily at bedtime, Historical Med      lamoTRIgine (LaMICtal) 200 MG tablet Take 200 mg by mouth daily , Historical Med      lisinopril (ZESTRIL) 10 mg tablet Take 10 mg by mouth daily, Historical Med      LORazepam (ATIVAN) 0 5 mg tablet Take by mouth every 8 (eight) hours as needed for anxiety, Historical Med      metFORMIN (GLUCOPHAGE) 500 mg tablet Take 500 mg by mouth 2 (two) times a day with meals, Historical Med      pantoprazole (PROTONIX) 40 mg tablet Take 40 mg by mouth daily, Historical Med      dicyclomine (BENTYL) 20 mg tablet Take 20 mg by mouth daily, Historical Med              Discharge instructions/Information to patient and family:   See after visit summary for information provided to patient and family  Provisions for Follow-Up Care:  See after visit summary for information related to follow-up care and any pertinent home health orders  Discharge Statement   I spent 40 minutes discharging the patient  This time was spent on the day of discharge  I had direct contact with the patient on the day of discharge  Additional documentation is required if more than 30 minutes were spent on discharge

## 2019-09-10 NOTE — PROGRESS NOTES
Pt visible on unit for groups  Social with select peers  Mild anxiety of unknown origin this AM  PRN Ativan given as ordered  Denies SI, HI, A/T/V  Pt is for DC today and feels ready to go  Monitoring continues

## 2019-09-10 NOTE — DISCHARGE INSTR - APPOINTMENTS
The treatment team recommends ongoing medication management and individual therapy  A referral has been made on your behalf to [facility] Phone: [phone] Fax: [phone]) for individual therapy  You will see Dr Lary Babin on Thursday, September 12, 2019 at 11:AM; please arrive 15 minutes early and bring your insurance card  Your summary of care will be faxed to this provider  Please continue with Dr Dilma Parks ([id number] [facility], [address] [facility] Phone: [phone] Fax: [phone]) for ongoing medication management  Your next appointment is scheduled for Monday, September 16, 2019 at 11:30AM   Your summary of care will be faxed to this provider  You identified your Primary Care Physician as Dr Debbie Ortega  You declined to have an appointment scheduled on your behalf  Your summary of care will be faxed to this provider        You requested a list of [facility] groups (support groups for families and friends affected by loved one's addictions) in your area; the closest group is located at [facility] ([address] [place] [address], [address] [place] [address]; groups are held [id number] [facility] at 7:30PM

## 2019-09-10 NOTE — DISCHARGE INSTRUCTIONS
Depression   WHAT YOU NEED TO KNOW:   Depression is a medical condition that causes feelings of sadness or hopelessness that do not go away  Depression may cause you to lose interest in things you used to enjoy  These feelings may interfere with your daily life  DISCHARGE INSTRUCTIONS:   Call 911 for any of the following:   · You think about harming yourself or someone else  Contact your healthcare provider if:   · Your symptoms do not improve  · You cannot make it to your next appointment  · You have new symptoms  · You have questions or concerns about your condition or care  Medicines:   · Antidepressants  may be given to improve or balance your mood  You may need to take this medicine for several weeks before you begin to feel better  · Take your medicine as directed  Contact your healthcare provider if you think your medicine is not helping or if you have side effects  Tell him of her if you are allergic to any medicine  Keep a list of the medicines, vitamins, and herbs you take  Include the amounts, and when and why you take them  Bring the list or the pill bottles to follow-up visits  Carry your medicine list with you in case of an emergency  Therapy  may be used to treat your depression  A therapist will help you learn to cope with your thoughts and feelings  This can be done alone or in a group  It may also be done with family members or a significant other  Self-care:   · Get regular physical activity  Try to exercise for 30 minutes, 3 to 5 days a week  Work with your healthcare provider to develop an exercise plan that you enjoy  Physical activity may improve your symptoms  · Get enough sleep  Create a routine to help you relax before bed  You can listen to music, read, or do yoga  Try to go to bed and wake up at the same time every day  Sleep is important for emotional health  · Eat a variety of healthy foods from all of the food groups    A healthy meal plan is low in fat, salt, and added sugar  Ask your healthcare provider for more information about a meal plan that is right for you  · Do not drink alcohol or use drugs  Alcohol and drugs can make your symptoms worse  Follow up with your healthcare provider as directed: Your healthcare provider will monitor your progress at follow-up visits  He or she will also monitor your medicine if you take antidepressants  Your healthcare provider will ask if the medicine is helping  Tell him or her about any side effects or problems you may have with your medicine  The type or amount of medicine may need to be changed  Write down your questions so you remember to ask them during your visits  © 2017 [address] [employer] Information is for End User's use only and may not be sold, redistributed or otherwise used for commercial purposes  All illustrations and images included in CareNotes® are the copyrighted property of [employer] D [employer]  or Demetrio Bryant  The above information is an  only  It is not intended as medical advice for individual conditions or treatments  Talk to your doctor, nurse or pharmacist before following any medical regimen to see if it is safe and effective for you  Dicyclomine (By mouth)   Dicyclomine (dye-SYE-kloe-meen)  Treats irritable bowel syndrome  Brand Name(s): Bentyl   There may be other brand names for this medicine  When This Medicine Should Not Be Used: You should not use this medicine if you have had an allergic reaction to dicyclomine  Do not use this medicine if you have glaucoma, myasthenia gravis, or trouble urinating because of a blockage (such as an enlarged prostate)  Make sure your doctor knows about all digestion problems you have, including reflux esophagitis (GERD), or severe ulcerative colitis  You should not use this medicine if you are breast feeding   This medicine should not be given to infants less than 6 months old   How to Use This Medicine:   Capsule, Tablet, Long Acting Tablet, Liquid  · Take your medicine as directed  Your dose may need to be changed several times to find what works best for you  · Swallow the extended-release tablet whole  Do not break, crush or chew  · Measure the oral liquid medicine with a marked measuring spoon, oral syringe, or medicine cup  If a dose is missed:   · Take a dose as soon as you remember  If it is almost time for your next dose, wait until then and take a regular dose  Do not take extra medicine to make up for a missed dose  How to Store and Dispose of This Medicine:   · Store the medicine in a closed container at room temperature, away from heat, moisture, and direct light  Do not freeze the oral liquid  · Ask your pharmacist, doctor, or health caregiver about the best way to dispose of any outdated medicine or medicine no longer needed  · Keep all medicine out of the reach of children  Never share your medicine with anyone  Drugs and Foods to Avoid:   Ask your doctor or pharmacist before using any other medicine, including over-the-counter medicines, vitamins, and herbal products  · Make sure your doctor knows if you are using amantadine (Symmetrel®), digoxin (Lanoxin®), or a belladonna medicine such as atropine, hyoscyamine, scopolamine, Anaspaz®, Arco-Lase® Plus, or Donnatal®  Tell your doctor if you are using an MAO inhibitor such as Eldepryl®, Marplan®, [place], or Parnate®  · Tell your doctor if you are also using narcotic pain medicine, medicine for heart rhythm problems, an antihistamine, medicine to treat depression, phenothiazines (medicines to treat certain mental problems or severe nausea or vomiting), or a steroid medicine  Meperidine (Demerol®) is a narcotic pain medicine  Some medicines for heart rhythm problems are disopyramide, procainamide, quinidine, Cardioquin®, Norpace®, Procanbid®, or Basia Colace  Diphenhydramine (Benadryl®) is an antihistamine   Some phenothiazine medicines are Compazine®, Mellaril®, Phenergan®, Serentil®, Tacaryl®, Thorazine®, or Trilafon®  Some medicines to treat depression are amitriptyline, nortriptyline, Norpramin®, or Vivactil®  Cortisone and prednisone are steroid medicines  · You should not use dicyclomine within 2 to 3 hours of taking antacids (Maalox®, Mylanta®) or medicine to stop diarrhea  Warnings While Using This Medicine:   · Make sure your doctor knows if you are pregnant, if you have liver disease, kidney disease, or overactive thyroid  Tell your doctor about any heart or blood vessel problems you have, including heart rhythm problems, congestive heart failure, or high blood pressure  Make sure your doctor knows if you have ongoing diarrhea, or an ileostomy or colostomy  Tell your doctor if you have autonomic neuropathy (a nerve problem), or a hiatal hernia (problems with your esophagus)  · This medicine may make you sweat less  Your body could get too hot if you do not sweat enough  Stay out of hot places  Try to stay indoors or somewhere cool during hot weather  If you have a fever, call your doctor for advice  If your body gets too hot, you might feel dizzy, weak, tired, or confused  You might have an upset stomach or vomit  Call your doctor if you are too hot and cannot cool down  · This medicine may make you drowsy  Avoid driving, using machines, or doing anything else that could be dangerous if you are not alert  · Your eyes may be more sensitive to bright light while you are using this medicine  You may want to wear sunglasses in bright sunlight  Possible Side Effects While Using This Medicine:   Call your doctor right away if you notice any of these side effects:  · Allergic reaction: Itching or hives, swelling in your face or hands, swelling or tingling in your mouth or throat, chest tightness, trouble breathing  · Fast or uneven heartbeat  · Restlessness, agitation, or confusion    · Severe dizziness or light-headedness  If you notice these less serious side effects, talk with your doctor:   · Decrease in how much or how often you urinate  · Drowsiness or weakness  · Dry mouth  · Nausea, vomiting, constipation, or stomach pain  · Trouble focusing or other vision changes  If you notice other side effects that you think are caused by this medicine, tell your doctor  Call your doctor for medical advice about side effects  You may report side effects to FDA at 7-491-FDA-5105  © 2017 [address] [employer] Information is for End User's use only and may not be sold, redistributed or otherwise used for commercial purposes  The above information is an  only  It is not intended as medical advice for individual conditions or treatments  Talk to your doctor, nurse or pharmacist before following any medical regimen to see if it is safe and effective for you  Escitalopram (By mouth)   Escitalopram (fi-coq-NFI-oh-pram)  Treats depression and generalized anxiety disorder (ALLYSON)  Brand Name(s): Lexapro   There may be other brand names for this medicine  When This Medicine Should Not Be Used: This medicine is not right for everyone  Do not use it if you had an allergic reaction to escitalopram or citalopram   How to Use This Medicine:   Liquid, Tablet  · Take this medicine as directed  You may need to take it for a month or more before you feel better  Your dose may need to be changed to find out what works best for you  · Measure the oral liquid medicine with a marked measuring spoon, oral syringe, or medicine cup  · This medicine should come with a Medication Guide  Ask your pharmacist for a copy if you do not have one  · Missed dose: Take a dose as soon as you remember  If it is almost time for your next dose, wait until then and take a regular dose  Do not take extra medicine to make up for a missed dose    · Store the medicine in a closed container at room temperature, away from heat, moisture, and direct light  Drugs and Foods to Avoid:   Ask your doctor or pharmacist before using any other medicine, including over-the-counter medicines, vitamins, and herbal products  · Do not use this medicine together with pimozide  Do not use this medicine and an MAO inhibitor (MAOI) within 14 days of each other  · Some medicines can affect how escitalopram works  Tell your doctor if you are using the following:   ¨ Buspirone, carbamazepine, fentanyl, lithium, Roxane's wort, tramadol, or tryptophan supplements  ¨ Amphetamines  ¨ Blood thinner (including warfarin)  ¨ Diuretic (water pill)  ¨ NSAID pain or arthritis medicine (including aspirin, celecoxib, diclofenac, ibuprofen, naproxen)  ¨ Triptan medicine to treat migraine headaches (including sumatriptan)  · Tell your doctor if you use anything else that makes you sleepy  Some examples are allergy medicine, narcotic pain medicine, and alcohol  · Do not drink alcohol while you are using this medicine  Warnings While Using This Medicine:   · Tell your doctor if you are pregnant or breastfeeding, or if you have kidney disease, liver disease, bleeding problems, glaucoma, heart disease, or a seizure disorder  · For some children, teenagers, and young adults, this medicine may increase mental or emotional problems  This may lead to thoughts of suicide and violence  Talk with your doctor right away if you have any thoughts or behavior changes that concern you  Tell your doctor if you or anyone in your family has a history of bipolar disorder or suicide attempts  · This medicine may cause the following problems:   ¨ Serotonin syndrome (more likely when taken with certain medicines)  ¨ Low sodium levels  ¨ Increased risk of bleeding problems  · This medicine may make you dizzy or drowsy  Do not drive or do anything that could be dangerous until you know how this medicine affects you    · Your doctor may want to monitor your child's weight and height, because this medicine may cause decreased appetite and weight loss in children  · Do not stop using this medicine suddenly  Your doctor will need to slowly decrease your dose before you stop it completely  · Your doctor will check your progress and the effects of this medicine at regular visits  Keep all appointments  · Keep all medicine out of the reach of children  Never share your medicine with anyone  Possible Side Effects While Using This Medicine:   Call your doctor right away if you notice any of these side effects:  · Allergic reaction: Itching or hives, swelling in your face or hands, swelling or tingling in your mouth or throat, chest tightness, trouble breathing  · Anxiety, restlessness, fever, sweating, muscle spasms, nausea, vomiting, diarrhea, seeing or hearing things that are not there  · Confusion, weakness, and muscle twitching  · Eye pain, vision changes, seeing halos around lights  · Fast, pounding, or uneven heartbeat  · Feeling more excited or energetic than usual, racing thoughts, trouble sleeping  · Seizures  · Thoughts of hurting yourself or others, unusual behavior  · Unusual bleeding or bruising  If you notice these less serious side effects, talk with your doctor:   · Dizziness, drowsiness, or sleepiness  · Dry mouth  · Headache  · Nausea, constipation, diarrhea  · Sexual problems  If you notice other side effects that you think are caused by this medicine, tell your doctor  Call your doctor for medical advice about side effects  You may report side effects to FDA at 2-003-FDA-4457  © 2017 [address] [employer] Information is for End User's use only and may not be sold, redistributed or otherwise used for commercial purposes  The above information is an  only  It is not intended as medical advice for individual conditions or treatments   Talk to your doctor, nurse or pharmacist before following any medical regimen to see if it is safe and effective for you       Hydrochlorothiazide (By mouth)   Hydrochlorothiazide (young-droe-klor-cf-XTAT-d-zide)  Treats high blood pressure and fluid retention (edema)  This medicine is a diuretic (water pill)  Brand Name(s): Microzide   There may be other brand names for this medicine  When This Medicine Should Not Be Used: This medicine is not right for everyone  Do not use this medicine if you had an allergic reaction to hydrochlorothiazide or a sulfa drug  How to Use This Medicine:   Capsule, Liquid, Tablet  · Take your medicine as directed  Your dose may need to be changed several times to find what works best for you  · Measure the oral liquid medicine with a marked measuring spoon, oral syringe, or medicine cup  · Missed dose: Take a dose as soon as you remember  If it is almost time for your next dose, wait until then and take a regular dose  Do not take extra medicine to make up for a missed dose  · Store the medicine in a closed container at room temperature, away from heat, moisture, and direct light  Drugs and Foods to Avoid:   Ask your doctor or pharmacist before using any other medicine, including over-the-counter medicines, vitamins, and herbal products  · Some medicines and foods can affect how hydrochlorothiazide works  Tell your doctor if you are also using any of the following:   ¨ Cholestyramine, colestipol, digoxin, lithium, insulin or other diabetes medicine  ¨ An NSAID pain or arthritis medicine (such as aspirin, diclofenac, ibuprofen, naproxen, celecoxib), or a steroid medicine (such as hydrocortisone, methylprednisolone, prednisone, prednisolone, dexamethasone)  Warnings While Using This Medicine:   · Tell your doctor if you are pregnant or breastfeeding, or if you have kidney disease, liver disease, heart disease or heart failure, high cholesterol, diabetes, gout, trouble urinating, or lupus    · This medicine may cause the following problems:  ¨ Glaucoma and other vision problems  ¨ Acute gout  ¨ Damage to the parathyroid gland  ¨ Low or high levels of minerals in your blood (including potassium and sodium)  · This medicine may make you dizzy  Do not drive or do anything else that could be dangerous until you know how this medicine affects you  · This medicine could lower your blood pressure too much, especially when you first use it or if you are dehydrated  Stand or sit up slowly if you feel lightheaded or dizzy  Alcohol may make this problem worse  · Tell any doctor or dentist who treats you that you are using this medicine  · Your doctor will check your progress and the effects of this medicine at regular visits  Keep all appointments  · Keep all medicine out of the reach of children  Never share your medicine with anyone  Possible Side Effects While Using This Medicine:   Call your doctor right away if you notice any of these side effects:  · Allergic reaction: Itching or hives, swelling in your face or hands, swelling or tingling in your mouth or throat, chest tightness, trouble breathing  · Blistering, peeling, or red skin rash  · Confusion, weakness, and muscle twitching  · Dry mouth, increased thirst, muscle cramps, nausea or vomiting, uneven heartbeat  · Lightheadedness, dizziness, or fainting  · Nausea, vomiting, unusual tiredness or weakness, muscle cramps, confusion  · Trouble seeing, eye pain, blurred vision or other vision changes  If you notice these less serious side effects, talk with your doctor:   · Headache  · Mild diarrhea, constipation, nausea  If you notice other side effects that you think are caused by this medicine, tell your doctor  Call your doctor for medical advice about side effects  You may report side effects to FDA at 7-253-FDA-6482  © 2017 [address] [employer] Information is for End User's use only and may not be sold, redistributed or otherwise used for commercial purposes  The above information is an  only   It is not intended as medical advice for individual conditions or treatments  Talk to your doctor, nurse or pharmacist before following any medical regimen to see if it is safe and effective for you  Insulin Glargine (By injection)   Insulin Glargine, Recombinant (IN-miriamlb GLAR-jeen, ree-KOM-bi-nant)  Treats diabetes  Brand Name(s): Basaglar, Lantus, Lantus SoloStar, Toujeo   There may be other brand names for this medicine  When This Medicine Should Not Be Used: This medicine is not right for everyone  Do not use it if you had an allergic reaction to insulin glargine  How to Use This Medicine:   Injectable  · Your healthcare provider will work with you to personalize your dose and treatment based on your insulin needs and lifestyle  You will be taught how to give yourself the injections  Make sure you understand all instructions  Ask the doctor, nurse, or pharmacist if you have questions  · If you use insulin once a day, it is best to use it at about the same time every day  · Always double-check both the concentration (strength) of your insulin and your dose  Concentration and dose are not the same  The dose is how many units of insulin you will use  The concentration tells how many units of insulin are in each milliliter (mL), such as 100 units/mL (U-100), but this does not mean you will use 100 units at a time  · Read and follow the patient instructions that come with this medicine  Talk to your doctor or pharmacist if you have any questions  · This medicine should look clear before you use it  Do not shake the vial  Do not mix this medicine with any other insulin or with water  · You will be shown the body areas where this shot can be given  Use a different body area each time you give yourself a shot  Keep track of where you give each shot to make sure you rotate body areas  · Use a new needle and syringe each time you inject your medicine   If you use a syringe, use only the kind that is made for insulin injections  Some insulin must be given with a specific type of syringe or needle  Ask your pharmacist if you are not sure which one to use  · Always check the label before use, to make sure you have the correct type of insulin  Do not change the brand, type, or concentration unless your doctor tells you to  If you use a pump or other device, make sure the insulin is made for that device  · Unopened medicine: Store the vials, cartridges, and SoloStar® pens in the refrigerator  Protect from light  Do not freeze  · Opened medicine:   ¨ Vials: Store in the refrigerator or at room temperature in a cool place, away from sunlight and heat  Use within 28 days  ¨ Cartridge or [employer]: Store at room temperature, away from direct heat and light  Do not refrigerate  Throw away any opened cartridge or Lantus® SoloStar® pen after 28 days  Throw away any opened [employer] after 42 days  · Throw away used needles in a hard, closed container that the needles cannot poke through  Keep this container away from children and pets  Drugs and Foods to Avoid:   Ask your doctor or pharmacist before using any other medicine, including over-the-counter medicines, vitamins, and herbal products  · Some medicines can change the amount of insulin you need to use and make it harder for you to control your diabetes  Tell your doctor about all other medicines that you are using  · Do not drink alcohol while you are using this medicine  Warnings While Using This Medicine:   · Tell your doctor if you are pregnant or breastfeeding, or if you have kidney disease, liver disease, heart disease, or heart failure  · This medicine may cause the following problems:  ¨ Low blood sugar or low potassium levels in the blood  ¨ Fluid retention or heart failure (when used with thiazolidinedione [TZD] medicine)  · Never share insulin pens, needles, or cartridges with anyone   Sharing these can pass hepatitis viruses, HIV, or other illnesses from one person to another  · Keep all medicine out of the reach of children  Never share your medicine with anyone  Possible Side Effects While Using This Medicine:   Call your doctor right away if you notice any of these side effects:  · Allergic reaction: Itching or hives, swelling in your face or hands, swelling or tingling in your mouth or throat, chest tightness, trouble breathing  · Dry mouth, increased thirst, muscle cramps, nausea or vomiting, uneven heartbeat  · Rapid weight gain, swelling in your hands, ankles, or feet, trouble breathing, tiredness  · Shaking, trembling, sweating, fast or pounding heartbeat, lightheadedness, hunger, confusion  If you notice these less serious side effects, talk with your doctor:   · Redness, pain, itching, swelling, or any skin changes where the shot was given  If you notice other side effects that you think are caused by this medicine, tell your doctor  Call your doctor for medical advice about side effects  You may report side effects to FDA at 9-335-FDA-9540  © 2017 [address] [facility] Information is for End User's use only and may not be sold, redistributed or otherwise used for commercial purposes  The above information is an  only  It is not intended as medical advice for individual conditions or treatments  Talk to your doctor, nurse or pharmacist before following any medical regimen to see if it is safe and effective for you  Lamotrigine (By mouth)   Lamotrigine (la-TEMO-tri-jeen)  Treats seizures and bipolar disorder  Brand Name(s):  LaMICtal, LaMICtal CD, LaMICtal ODT, LaMICtal ODT Patient Titration Kit Blue, LaMICtal ODT Patient Titration Kit Roxanna Pitkin, LaMICtal ODT Patient Titration Kit Orange, [employer] Kit Green, [employer], LaMICtal XR, LaMICtal XR Patient Titration Kit Blue, LaMICtal XR Patient Titration Kit Green, LaMICtal XR Patient Titration Kit Orange   There may be other brand names for this medicine  When This Medicine Should Not Be Used: This medicine is not right for everyone  Do not use it if you had an allergic reaction to lamotrigine  How to Use This Medicine:   Tablet, Chewable Tablet, Dissolving Tablet, Long Acting Tablet  · Take your medicine as directed  Your dose may need to be changed several times to find what works best for you  · Chewable tablet: You may swallow the tablet whole, or you may chew it and then swallow a small amount of water or diluted fruit juice  You may also dissolve the chewable tablet  To do this, put about 1 teaspoon of water or juice in a glass, drop in the tablet, let it sit for about 1 minute so it dissolves, swirl the glass to mix, and then swallow the entire mixture  · Disintegrating tablet: Make sure your hands are dry before you handle the tablet  Place the tablet on your tongue  Move the tablet around in your mouth so it dissolves  · Regular tablet: Swallow the tablet whole  You may break or crush the tablet if your doctor tells you to, but the medicine might leave a bitter taste in your mouth  · Swallow the extended-release tablet whole  Do not crush, break, or chew it  · This medicine should come with a Medication Guide  Ask your pharmacist for a copy if you do not have one  · Missed dose: Take a dose as soon as you remember  If it is almost time for your next dose, wait until then and take a regular dose  Do not take extra medicine to make up for a missed dose  · Store the medicine in a closed container at room temperature, away from heat, moisture, and direct light  Do not use if the blister pack is torn or broken  Drugs and Foods to Avoid:   Ask your doctor or pharmacist before using any other medicine, including over-the-counter medicines, vitamins, and herbal products  · Some foods and medicines can affect how lamotrigine works   Tell your doctor if you are using any of the following:  ¨ Atazanavir, ritonavir, lopinavir, rifampin  ¨ Birth control pills  ¨ Other medicine to control seizures, including carbamazepine, divalproex, oxcarbazepine, phenobarbital, phenytoin, primidone, valproic acid, valproate  Warnings While Using This Medicine:   · Tell your doctor if you are pregnant or breastfeeding, or if you have kidney disease, liver disease, or a history of depression  Tell your doctor if you have had a rash or an allergic reaction to other seizure medicine  · This medicine may cause the following problems:  ¨ Drug reaction with eosinophilia and systemic symptoms (DRESS), which may damage organs such as the liver, kidney, or heart  ¨ Increased risk of thoughts of suicide or other serious mood changes  ¨ Low blood cell counts, which may cause bleeding problems or increase your risk for infection  ¨ Meningitis  ¨ Severe skin rash that can lead to hospitalization or death  · This medicine may make you dizzy or drowsy  Do not drive or do anything else that could be dangerous until you know how this medicine affects you  · Do not stop using this medicine suddenly  Your doctor will need to slowly decrease your dose before you stop it completely  · Your doctor will do lab tests at regular visits to check on the effects of this medicine  Keep all appointments  · Keep all medicine out of the reach of children  Never share your medicine with anyone    Possible Side Effects While Using This Medicine:   Call your doctor right away if you notice any of these side effects:  · Allergic reaction: Itching or hives, swelling in your face or hands, swelling or tingling in your mouth or throat, chest tightness, trouble breathing  · Blistering, peeling, or red skin rash  · Feeling depressed, irritable, or restless  · Fever, chills, cough, sore throat, and body aches  · Fever, skin rash, or swollen glands in your armpits, neck, or groin  · Painful sores in your mouth or around your eyes  · Stiff neck or back, headache, fever, nausea, vomiting  · Thoughts of hurting yourself, other unusual thoughts or behaviors  · Unusual bleeding, bruising, or weakness  · Yellow skin or eyes  If you notice these less serious side effects, talk with your doctor:   · Blurred vision, double vision, or other vision problems  · Clumsiness, dizziness, sleepiness, problems with balance or walking  · Nausea, vomiting  · Runny or stuffy nose  If you notice other side effects that you think are caused by this medicine, tell your doctor  Call your doctor for medical advice about side effects  You may report side effects to FDA at 0-158-FDA-0989  © 2017 [address]  Information is for End User's use only and may not be sold, redistributed or otherwise used for commercial purposes  The above information is an  only  It is not intended as medical advice for individual conditions or treatments  Talk to your doctor, nurse or pharmacist before following any medical regimen to see if it is safe and effective for you  Lisinopril (By mouth)   Lisinopril (lye-SIN-oh-pril)  Treats high blood pressure and heart failure  Also given to reduce the risk of death after a heart attack  This medicine is an ACE inhibitor  Brand Name(s): Prinivil, Qbrelis, Zestril   There may be other brand names for this medicine  When This Medicine Should Not Be Used: This medicine is not right for everyone  Do not use it if you had an allergic reaction to lisinopril or another ACE inhibitor, or if you are pregnant  How to Use This Medicine:   Liquid, Tablet  · Take your medicine as directed  Your dose may need to be changed several times to find what works best for you  · Oral liquid: Measure the oral liquid medicine with a marked measuring spoon, oral syringe, or medicine cup  · Missed dose: Take a dose as soon as you remember  If it is almost time for your next dose, wait until then and take a regular dose  Do not take extra medicine to make up for a missed dose    · Store the medicine in a closed container at room temperature, away from heat, moisture, and direct light  Drugs and Foods to Avoid:   Ask your doctor or pharmacist before using any other medicine, including over-the-counter medicines, vitamins, and herbal products  · Do not use this medicine together with aliskiren if you have diabetes  · Some foods and medicines may affect how lisinopril works  Tell your doctor if you are using any of the following:   ¨ Aliskiren, everolimus, lithium, sirolimus, temsirolimus  ¨ Another blood pressure medicine, including an angiotensin receptor blocker (ARB)  ¨ Diuretic (water pill, including amiloride, spironolactone, triamterene)  ¨ Insulin or diabetes medicine  ¨ NSAID pain or arthritis medicine (including aspirin, celecoxib, diclofenac, ibuprofen, naproxen)  · Ask your doctor before you use any medicine, supplement, or salt substitute that contains potassium  Warnings While Using This Medicine:   · It is not safe to take this medicine during pregnancy  It could harm an unborn baby  Tell your doctor right away if you become pregnant  · Tell your doctor if you are breastfeeding, or if you have kidney disease, liver disease, diabetes, or heart or blood vessel disease  · This medicine may cause the following problems:  ¨ Angioedema (severe swelling)  ¨ Kidney problems  ¨ Serious liver problems  · This medicine could lower your blood pressure too much, especially when you first use it or if you are dehydrated  Stand or sit up slowly if you feel lightheaded or dizzy  · Do not stop using this medicine without asking your doctor, even if you feel well  This medicine will not cure your high blood pressure, but it will help keep it in a normal range  You may have to take blood pressure medicine for the rest of your life  · Tell any doctor or dentist who treats you that you are using this medicine  · Your doctor will do lab tests at regular visits to check on the effects of this medicine   Keep all appointments  · Keep all medicine out of the reach of children  Never share your medicine with anyone  Possible Side Effects While Using This Medicine:   Call your doctor right away if you notice any of these side effects:  · Allergic reaction: Itching or hives, swelling in your face or hands, swelling or tingling in your mouth or throat, chest tightness, trouble breathing  · Blistering, peeling, or red skin rash  · Change in how much or how often you urinate  · Confusion, weakness, uneven heartbeat, trouble breathing, numbness or tingling in your hands, feet, or lips  · Dark urine or pale stools, nausea, vomiting, loss of appetite, stomach pain, yellow skin or eyes  · Fever, chills, sore throat, body aches  · Lightheadedness, dizziness, fainting  · Severe stomach pain (with or without nausea or vomiting)  If you notice these less serious side effects, talk with your doctor:   · Dry cough  If you notice other side effects that you think are caused by this medicine, tell your doctor  Call your doctor for medical advice about side effects  You may report side effects to FDA at 9-174-FDA-3581  © 2017 [address]  Information is for End User's use only and may not be sold, redistributed or otherwise used for commercial purposes  The above information is an  only  It is not intended as medical advice for individual conditions or treatments  Talk to your doctor, nurse or pharmacist before following any medical regimen to see if it is safe and effective for you  Lorazepam (By mouth)   Lorazepam (mkm-NZ-n-nura)  Treats anxiety  Brand Name(s): Ativan, LORazepam Intensol   There may be other brand names for this medicine  When This Medicine Should Not Be Used: This medicine is not right for everyone  Do not use it if you had an allergic reaction to lorazepam or similar medicines, or you are pregnant or breastfeeding, or you have acute narrow-angle glaucoma    How to Use This Medicine:   Liquid, Tablet  · Take your medicine as directed  Your dose may need to be changed several times to find what works best for you  · Oral liquid:   ¨ Measure the oral liquid medicine with a marked measuring spoon, oral syringe, or medicine cup  ¨ Mix the medicine with water, juice, soda, applesauce, or pudding  Drink or eat the mixture right away  Do not store it for later use  · This medicine should come with a Medication Guide  Ask your pharmacist for a copy if you do not have one  · Missed dose: Take a dose as soon as you remember  If you are more than 1 hour late, skip the missed dose and wait until it is time for your next dose  Do not use extra medicine to make up for a missed dose  ·   ¨ Oral liquid: Refrigerate the oral liquid  Throw away an opened bottle after 90 days  ¨ Tablets: Store the medicine in a closed container at room temperature, away from heat, moisture, and direct light  Drugs and Foods to Avoid:   Ask your doctor or pharmacist before using any other medicine, including over-the-counter medicines, vitamins, and herbal products  · Some medicines can affect how lorazepam works  Tell your doctor if you are using any of the following:   ¨ Aminophylline, clozapine, probenecid, theophylline, valproate  ¨ Medicine to treat depression or mental health problems  ¨ Medicine to treat seizures  · Do not drink alcohol while you are using this medicine  · Tell your doctor if you use anything else that makes you sleepy  Some examples are allergy medicine, narcotic pain medicine, and alcohol  Warnings While Using This Medicine:   · It is not safe to take this medicine during pregnancy  It could harm an unborn baby  Tell your doctor right away if you become pregnant  · Tell your doctor if you have kidney disease, liver disease, lung or breathing problems (such as COPD, sleep apnea), or a history of drug or alcohol abuse, depression, or seizures  · This medicine can be habit-forming   Do not use more than your prescribed dose  Call your doctor if you think your medicine is not working  · Do not stop using this medicine suddenly  Your doctor will need to slowly decrease your dose before you stop it completely  · This medicine may make you drowsy  Do not drive or do anything else that could be dangerous until you know how this medicine affects you  · Your doctor will do lab tests at regular visits to check on the effects of this medicine  Keep all appointments  · Keep all medicine out of the reach of children  Never share your medicine with anyone  Possible Side Effects While Using This Medicine:   Call your doctor right away if you notice any of these side effects:  · Allergic reaction: Itching or hives, swelling in your face or hands, swelling or tingling in your mouth or throat, chest tightness, trouble breathing  · Confusion, unusual mood or behavior, thoughts of hurting yourself  · Seizures  · Severe drowsiness or weakness, slow heartbeat, trouble breathing  · Worsening of depression  If you notice these less serious side effects, talk with your doctor:   · Dizziness, clumsiness  If you notice other side effects that you think are caused by this medicine, tell your doctor  Call your doctor for medical advice about side effects  You may report side effects to FDA at 8-125-FDA-6955  © 2017 [address]  Information is for End User's use only and may not be sold, redistributed or otherwise used for commercial purposes  The above information is an  only  It is not intended as medical advice for individual conditions or treatments  Talk to your doctor, nurse or pharmacist before following any medical regimen to see if it is safe and effective for you  Metformin (By mouth)   Metformin Hydrochloride (met-FOR-min young-droe-KLOR-david)  Treats type 2 diabetes     Brand Name(s): Fortamet, Glucophage, Glucophage XR, Glumetza, Riomet   There may be other brand names for this medicine  When This Medicine Should Not Be Used: This medicine is not right for everyone  Do not use if you had an allergic reaction to metformin, or if you have severe kidney problems or metabolic acidosis  How to Use This Medicine:   Liquid, Tablet, Long Acting Tablet  · Take your medicine as directed  Your dose may need to be changed several times to find what works best for you  · It is best to take this medicine with food or milk  · Swallow the extended-release tablet whole  Do not crush, break, or chew it  Tell your doctor if you have trouble swallowing the tablets whole  · Measure the oral liquid medicine with a marked measuring spoon, oral syringe, or medicine cup  · Read and follow the patient instructions that come with this medicine  Talk to your doctor or pharmacist if you have any questions  · Missed dose: Take a dose as soon as you remember  If it is almost time for your next dose, wait until then and take a regular dose  Do not take extra medicine to make up for a missed dose  · Store the medicine in a closed container at room temperature, away from heat, moisture, and direct light  Drugs and Foods to Avoid:   Ask your doctor or pharmacist before using any other medicine, including over-the-counter medicines, vitamins, and herbal products  · Some medicines can affect how metformin works   Tell your doctor if you are using any of the following:  ¨ Acetazolamide  ¨ Dichlorphenamide  ¨ Diuretics (water pills)  ¨ Estrogen or birth control pills  ¨ Heart or blood pressure medicine  ¨ Isoniazid  ¨ Nicotinic acid  ¨ Phenothiazine medicine  ¨ Phenytoin  ¨ Steroid medicine  ¨ Thyroid medicine  ¨ Topiramate  ¨ Zonisamide  Warnings While Using This Medicine:   · Tell your doctor if you are pregnant or breastfeeding, or if you have heart or blood vessel disease, heart failure, blood circulation problems, kidney disease, liver disease, anemia, an adrenal gland or pituitary gland disorder, or a vitamin B12 deficiency  Tell your doctor if you had a heart attack  Tell your doctor if you drink alcohol  · Too much of this medicine can cause a rare, but serious condition called lactic acidosis  · Part of the extended-release tablet may pass in your stool  This is normal   · Make sure any doctor or dentist who treats you knows that you are using this medicine  You may need to stop using this medicine before you have surgery, an x-ray, CT scan, or other medical test   · Your doctor will do lab tests at regular visits to check on the effects of this medicine  Keep all appointments  · Keep all medicine out of the reach of children  Never share your medicine with anyone  Possible Side Effects While Using This Medicine:   Call your doctor right away if you notice any of these side effects:  · Allergic reaction: Itching or hives, swelling in your face or hands, swelling or tingling in your mouth or throat, chest tightness, trouble breathing  · Confusion, fast heartbeat, increased hunger, shakiness  · Fever or chills  · Stomach pain, nausea, vomiting, muscle pain or cramping  · Trouble breathing, slow heartbeat, lightheadedness, dizziness  · Unusual tiredness or weakness  If you notice these less serious side effects, talk with your doctor:   · Diarrhea, gas, nausea  If you notice other side effects that you think are caused by this medicine, tell your doctor  Call your doctor for medical advice about side effects  You may report side effects to FDA at 0-255-FDA-8223  © 2017 [address] Information is for End User's use only and may not be sold, redistributed or otherwise used for commercial purposes  The above information is an  only  It is not intended as medical advice for individual conditions or treatments  Talk to your doctor, nurse or pharmacist before following any medical regimen to see if it is safe and effective for you        Pantoprazole (By mouth)   Pantoprazole (pan-TOE-pra-zole)  Treats gastroesophageal reflux disease (GERD), a damaged esophagus, and high levels of stomach acid  This medicine is a proton pump inhibitor (PPI)  Brand Name(s): Protonix   There may be other brand names for this medicine  When This Medicine Should Not Be Used: This medicine is not right for everyone  Do not use it if you had an allergic reaction to pantoprazole or similar medicines  How to Use This Medicine:   Packet, Tablet, Delayed Release Tablet, Long Acting Tablet  · Your doctor will tell you how much medicine to use  Do not use more than directed  Take the medicine at least 30 minutes before a meal   · Delayed-release tablet: Swallow the tablet whole  Do not crush, break, or chew it  · Delayed-release packet:   ¨ To prepare with applesauce:   § Mix the packet contents with 1 teaspoon of applesauce  Do not mix with water, or other liquids or food  Do not divide the packet contents to make smaller doses  § Swallow the mixture within 10 minutes after you mix it  Do not chew or crush the granules  § Sip some water after you take the mixture to make sure you swallow all of the medicine  ¨ To prepare with apple juice:   § Mix the packet contents with 1 teaspoon of apple juice in a small cup  Do not divide the packet contents to make smaller doses  § Stir for 5 seconds and drink the mixture immediately  Do not chew or crush the granules  § To make sure you get all of the medicine, add more apple juice to the cup  Drink it immediately  ¨ To prepare for a feeding tube:   § Pour the packet contents in a 2-ounce (60 milliliter [mL]) catheter-tip syringe  § Add 10 mL of apple juice to the syringe  Add the mixture to the tube  Gently tap or shake the barrel of the syringe to help empty it  § Add 10 mL of apple juice to the syringe and put it in the tube  Do this at least 2 times  There should be no granules left in the syringe  · This medicine should come with a Medication Guide   Ask your pharmacist for a copy if you do not have one  · Missed dose: Take a dose as soon as you remember  If it is almost time for your next dose, wait until then and take a regular dose  Do not take extra medicine to make up for a missed dose  · Store the medicine in a closed container at room temperature, away from heat, moisture, and direct light  Drugs and Foods to Avoid:   Ask your doctor or pharmacist before using any other medicine, including over-the-counter medicines, vitamins, and herbal products  · Some foods and medicines can affect how pantoprazole works  Tell your doctor if you are using any of the following:   ¨ Ampicillin, atazanavir, digoxin, erlotinib, ketoconazole, methotrexate, mycophenolate mofetil, nelfinavir  ¨ Blood thinner (including warfarin)  ¨ Diuretic (water pill)  ¨ Iron supplements  Warnings While Using This Medicine:   · Tell your doctor if you are pregnant or breastfeeding, or if you have liver disease, lupus, or osteoporosis  · This medicine may cause the following problems:  ¨ Kidney problems  ¨ Low vitamin B12 or magnesium levels  ¨ Increased risk of broken bones in the hip, wrist, or spine  · This medicine can cause diarrhea  Call your doctor if the diarrhea becomes severe, does not stop, or is bloody  Do not take any medicine to stop diarrhea until you have talked to your doctor  Diarrhea can occur 2 months or more after you stop taking this medicine  · Tell any doctor or dentist who treats you that you are using this medicine  This medicine may affect certain medical test results  · Your doctor will check your progress and the effects of this medicine at regular visits  Keep all appointments  · Keep all medicine out of the reach of children  Never share your medicine with anyone    Possible Side Effects While Using This Medicine:   Call your doctor right away if you notice any of these side effects:  · Allergic reaction: Itching or hives, swelling in your face or hands, swelling or tingling in your mouth or throat, chest tightness, trouble breathing  · Blistering, peeling, red skin rash  · Fever, joint pain, swelling in your body, unusual weight gain, change in how much or how often you urinate  · Joint pain, rash on your cheeks or arms that gets worse in the sun  · Seizures, dizziness, uneven heartbeat, muscle cramps or twitching  · Severe diarrhea, stomach cramps, fever  · Stomach pain, nausea, vomiting, weight loss  If you notice other side effects that you think are caused by this medicine, tell your doctor  Call your doctor for medical advice about side effects  You may report side effects to FDA at 0-283-FDA-1960  © 2017 [address]  Information is for End User's use only and may not be sold, redistributed or otherwise used for commercial purposes  The above information is an  only  It is not intended as medical advice for individual conditions or treatments  Talk to your doctor, nurse or pharmacist before following any medical regimen to see if it is safe and effective for you  Topiramate (By mouth)   Topiramate (toe-PIR-a-mate)  Treats and prevents seizures, and helps prevent migraine headaches  Brand Name(s): Qudexy XR, Topamax, Trokendi XR   There may be other brand names for this medicine  When This Medicine Should Not Be Used: This medicine is not right for everyone  Do not use it if you had an allergic reaction to topiramate, or if you are pregnant  How to Use This Medicine:   Capsule, Long Acting Capsule, Tablet  · Take your medicine as directed  Your dose may need to be changed several times to find what works best for you  · Tablet: Swallow whole  Do not break, crush, or chew the tablet  It has a very bitter taste  · Capsule or extended-release capsule: Do not crush or chew the capsule  Swallow whole or open the capsule and pour the medicine into a small amount (1 teaspoon) of soft food, such as applesauce   Swallow the mixture right away without chewing  Do not store the mixture for use at a later time  · Drink extra fluids so you will urinate more often and help prevent kidney problems  · This medicine should come with a Medication Guide  Ask your pharmacist for a copy if you do not have one  · Missed dose: Take a dose as soon as you remember  If it is almost time for your next dose, wait until then and take a regular dose  Do not take extra medicine to make up for a missed dose  If you miss a dose or forget to use your medicine, use it as soon as you can  If your next regular dose of Topamax® is less than 6 hours away, wait until then to use the medicine and skip the missed dose  If you miss more than 1 dose of Topamax®, call your doctor for instructions  · Store the medicine in a closed container at room temperature, away from heat, moisture, and direct light  Drugs and Foods to Avoid:   Ask your doctor or pharmacist before using any other medicine, including over-the-counter medicines, vitamins, and herbal products  · Do not drink alcohol with Qudexy XR or Topamax®  Do not drink alcohol for 6 hours before and 6 hours after you take the Trokendi XR capsule  · Some medicines can affect how topiramate works  Tell your doctor if you are using acetazolamide, dichlorphenamide, dichloralphenazone, digoxin, lithium, metformin, zonisamide, other medicine for seizures (such as carbamazepine, phenytoin, valproic acid), or birth control pills  · Tell your doctor if you are using any medicine that makes you sleepy, such as allergy medicine or narcotic pain medicine  Warnings While Using This Medicine:   · It is not safe to take this medicine during pregnancy  It could harm an unborn baby  Tell your doctor right away if you become pregnant  · Tell your doctor if you are breastfeeding, or if you have kidney disease, liver disease, glaucoma, lung or breathing problems, osteoporosis, or a history of depression or mood disorders  Tell your doctor if you are on a ketogenic diet (high in fat and low in carbohydrates)  · This medicine may cause the following problems:  ¨ Eye pain or vision changes, including glaucoma  ¨ Changes in body temperature  ¨ Metabolic acidosis (too much acid in the blood)  ¨ Kidney stones  · This medicine may increase depression or thoughts of suicide  Tell your doctor right away if you start to feel more depressed or think about hurting yourself  · This medicine may make you dizzy, drowsy, or tired  Do not drive or do anything else that could be dangerous until you know how this medicine affects you  · Do not stop using this medicine suddenly  Your doctor will need to slowly decrease your dose before you stop it completely  · Your doctor will do lab tests at regular visits to check on the effects of this medicine  Keep all appointments  · Keep all medicine out of the reach of children  Never share your medicine with anyone    Possible Side Effects While Using This Medicine:   Call your doctor right away if you notice any of these side effects:  · Allergic reaction: Itching or hives, swelling in your face or hands, swelling or tingling in your mouth or throat, chest tightness, trouble breathing  · Bloody or cloudy urine, painful urination, sudden lower back or stomach pain  · Changes in vision, eye pain  · Confusion, problems with walking, clumsiness, dizziness, or trouble talking, concentrating, or remembering  · Feeling agitated, depressed, nervous, or irritable, thoughts of hurting yourself or others, unusual mood or behavior  · Fever, decreased sweating  · Numbness, tingling, or burning pain in your hands, arms, legs, or feet  · Rapid, deep breathing, loss of appetite, fast or uneven heartbeat  · Vomiting, unusual drowsiness, tiredness, or weakness  If you notice these less serious side effects, talk with your doctor:   · Change in taste  · Nausea, diarrhea  · Stuffy or runny nose  · Weight loss  If you notice other side effects that you think are caused by this medicine, tell your doctor  Call your doctor for medical advice about side effects  You may report side effects to FDA at 4-427-FDA-2461  © 2017 [address] Information is for End User's use only and may not be sold, redistributed or otherwise used for commercial purposes  The above information is an  only  It is not intended as medical advice for individual conditions or treatments  Talk to your doctor, nurse or pharmacist before following any medical regimen to see if it is safe and effective for you

## 2019-09-10 NOTE — PROGRESS NOTES
DC instructions reviewed; pt verbalized understanding  Belongings returned and Rxs given  PT picked up by spouse

## 2019-09-20 ENCOUNTER — HOSPITAL ENCOUNTER (EMERGENCY)
Facility: HOSPITAL | Age: 44
Discharge: HOME | End: 2019-09-20
Attending: EMERGENCY MEDICINE
Payer: COMMERCIAL

## 2019-09-20 VITALS
SYSTOLIC BLOOD PRESSURE: 166 MMHG | WEIGHT: 293 LBS | BODY MASS INDEX: 65.62 KG/M2 | RESPIRATION RATE: 18 BRPM | OXYGEN SATURATION: 96 % | DIASTOLIC BLOOD PRESSURE: 93 MMHG | HEART RATE: 81 BPM | TEMPERATURE: 98.4 F

## 2019-09-20 DIAGNOSIS — R07.89 CHEST TIGHTNESS: ICD-10-CM

## 2019-09-20 DIAGNOSIS — T50.905A ADVERSE EFFECT OF DRUG, INITIAL ENCOUNTER: Primary | ICD-10-CM

## 2019-09-20 LAB
ATRIAL RATE: 87 BPM
P AXIS: 39 DEGREES
PR INTERVAL: 160 MS
QRS AXIS: 24 DEGREES
QRSD INTERVAL: 70 MS
QT INTERVAL: 352 MS
QTC INTERVAL: 418 MS
T WAVE AXIS: 46 DEGREES
VENTRICULAR RATE: 85 BPM

## 2019-09-20 PROCEDURE — 99283 EMERGENCY DEPT VISIT LOW MDM: CPT

## 2019-09-20 PROCEDURE — 93010 ELECTROCARDIOGRAM REPORT: CPT | Performed by: INTERNAL MEDICINE

## 2019-09-20 PROCEDURE — 93005 ELECTROCARDIOGRAM TRACING: CPT

## 2019-09-20 PROCEDURE — 99284 EMERGENCY DEPT VISIT MOD MDM: CPT | Performed by: EMERGENCY MEDICINE

## 2019-09-20 NOTE — ED PROVIDER NOTES
History  Chief Complaint   Patient presents with    Medical Problem     Pt reports adverse reactions such as dizziness, headache, nervousness from new antidepressant medication  History provided by:  Patient and medical records   used: No     42-year-old female with history of diabetes, anxiety, depressed sent with recent inpatient behavioral health admission started on Topamax and increased dose of Lexapro with improvement in her mood and anxiety but now over the last week has felt increased jitteriness, increased anxiety, headaches and feels it is related to the Topamax  She has not had this before  She has been on Lexapro for years and has not had any issues  She states that she was taking 25 mg twice daily  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 Taking?    LORazepam (ATIVAN) 0 5 mg tablet   Yes No   Sig: Take by mouth every 8 (eight) hours as needed for anxiety   dicyclomine (BENTYL) 20 mg tablet   Yes No   Sig: Take 20 mg by mouth daily   escitalopram (LEXAPRO) 10 mg tablet   No No   Sig: Take 3 tablets (30 mg total) by mouth daily for 30 days   hydrochlorothiazide (HYDRODIURIL) 12 5 mg tablet   Yes No   Sig: Take 12 5 mg by mouth every other day   insulin glargine (LANTUS) 100 units/mL subcutaneous injection   Yes No   Sig: Inject 40 Units under the skin daily at bedtime   lamoTRIgine (LaMICtal) 200 MG tablet   Yes No   Sig: Take 200 mg by mouth daily    lisinopril (ZESTRIL) 10 mg tablet   Yes No   Sig: Take 10 mg by mouth daily   metFORMIN (GLUCOPHAGE) 500 mg tablet   Yes No   Sig: Take 500 mg by mouth 2 (two) times a day with meals   pantoprazole (PROTONIX) 40 mg tablet   Yes No   Sig: Take 40 mg by mouth daily   topiramate (TOPAMAX) 25 mg tablet   No No   Sig: Take 1 tablet (25 mg total) by mouth 2 (two) times a day for 30 days      Facility-Administered Medications: None       Past Medical History:   Diagnosis Date    Anxiety     Depression     Diabetes mellitus ([facility] [address] )     Hypertension     Psychiatric disorder        Past Surgical History:   Procedure Laterality Date    CHOLECYSTECTOMY         History reviewed  No pertinent family history  I have reviewed and agree with the history as documented  Social History     Tobacco Use    Smoking status: Never Smoker    Smokeless tobacco: Never Used   Substance Use Topics    Alcohol use: No    Drug use: No        Review of Systems   Constitutional: Negative for activity change, appetite change, fatigue and fever  Respiratory: Positive for chest tightness  Negative for shortness of breath  Gastrointestinal: Negative for abdominal pain, nausea and vomiting  Musculoskeletal: Negative for back pain and neck pain  Skin: Negative for color change  Neurological: Positive for tremors and headaches  Psychiatric/Behavioral: Negative for confusion and decreased concentration  The patient is nervous/anxious  All other systems reviewed and are negative  Physical Exam  Physical Exam   Constitutional: She is oriented to person, place, and time  She appears well-developed and well-nourished  No distress  HENT:   Head: Normocephalic  Mouth/Throat: Oropharynx is clear and moist    Cardiovascular: Normal rate and regular rhythm  Pulmonary/Chest: Effort normal and breath sounds normal    Musculoskeletal: Normal range of motion   She exhibits no edema  Neurological: She is alert and oriented to person, place, and time  She exhibits normal muscle tone  Skin: Skin is warm and dry  Psychiatric: She has a normal mood and affect  Her behavior is normal    Nursing note and vitals reviewed  Vital Signs  ED Triage Vitals [09/20/19 1709]   Temperature Pulse Respirations Blood Pressure SpO2   98 4 °F (36 9 °C) 81 18 166/93 96 %      Temp Source Heart Rate Source Patient Position - Orthostatic VS BP Location FiO2 (%)   Oral Monitor Sitting Right arm --      Pain Score       No Pain           Vitals:    09/20/19 1709   BP: 166/93   Pulse: 81   Patient Position - Orthostatic VS: Sitting         Visual Acuity      ED Medications  Medications - No data to display    Diagnostic Studies  Results Reviewed     None                 No orders to display              Procedures  ECG 12 Lead Documentation Only  Date/Time: 9/20/2019 6:16 PM  Performed by: Dante Jc MD  Authorized by: Dante Jc MD     Indications / Diagnosis:  Chest tightness  ECG reviewed by me, the ED Provider: yes    Patient location:  ED  Previous ECG:     Previous ECG:  Compared to current    Comparison ECG info:  9/5/19    Similarity:  No change  Rate:     ECG rate:  85  Rhythm:     Rhythm: sinus rhythm    Ectopy:     Ectopy: none    QRS:     QRS axis:  Normal  Conduction:     Conduction: normal    ST segments:     ST segments:  Normal  T waves:     T waves: normal             ED Course                               MDM  Number of Diagnoses or Management Options  Diagnosis management comments: 49-year-old female presented for evaluation of tremors/headache/increased tiredness since starting Topamax about 10 days ago while in inpatient behavioral health for anxiety/depression  Her Lexapro was also increased  She started decreasing the Topamax dose on her own with improvement in side effects but not complete resolution  Would like to discontinue    Discussed continuing to taper off with taking 25 mg once daily for the next few days than 12 5 mg daily for a few days after that then stop  She will follow up with her psychiatrist   No SI/HI  She had complained of some intermittent chest tightness as well  Asymptomatic now  EKG normal        Amount and/or Complexity of Data Reviewed  Independent visualization of images, tracings, or specimens: yes    Patient Progress  Patient progress: stable      Disposition  Final diagnoses: Adverse effect of drug, initial encounter   Chest tightness     Time reflects when diagnosis was documented in both MDM as applicable and the Disposition within this note     Time User Action Codes Description Comment    9/20/2019  6:18 PM Snehal, [facility] Adverse effect of drug, initial encounter     9/20/2019  6:18 PM Adrienne Woodward Add [R07 [id number]] Chest tightness       ED Disposition     ED Disposition Condition Date/Time Comment    Discharge Stable Fri Sep 20, 2019  6:18 PM Perlita Dutton discharge to home/self care  Follow-up Information     Follow up With Specialties Details Why Contact Info Additional Information    Your psychiatrist         [facility]  If symptoms worsen [address] [facility]  AN ED, [address], [place], [place], [address]          Patient's Medications   Discharge Prescriptions    No medications on file     No discharge procedures on file      ED Provider  Electronically Signed by           Mohsen Burroughs MD  09/20/19 2673

## 2019-12-12 ENCOUNTER — HOSPITAL ENCOUNTER (EMERGENCY)
Facility: HOSPITAL | Age: 44
Discharge: HOME | End: 2019-12-12
Attending: EMERGENCY MEDICINE | Admitting: EMERGENCY MEDICINE
Payer: COMMERCIAL

## 2019-12-12 VITALS
DIASTOLIC BLOOD PRESSURE: 96 MMHG | SYSTOLIC BLOOD PRESSURE: 180 MMHG | BODY MASS INDEX: 70.74 KG/M2 | WEIGHT: 293 LBS | TEMPERATURE: 98.5 F | HEART RATE: 99 BPM | RESPIRATION RATE: 18 BRPM | OXYGEN SATURATION: 98 %

## 2019-12-12 DIAGNOSIS — M79.604 RIGHT LEG PAIN: ICD-10-CM

## 2019-12-12 DIAGNOSIS — M79.605 LEFT LEG PAIN: Primary | ICD-10-CM

## 2019-12-12 LAB
ALBUMIN SERPL BCP-MCNC: 3.2 G/DL (ref 3.5–5)
ALP SERPL-CCNC: 79 U/L (ref 46–116)
ALT SERPL W P-5'-P-CCNC: 44 U/L (ref 12–78)
ANION GAP SERPL CALCULATED.3IONS-SCNC: 8 MMOL/L (ref 4–13)
AST SERPL W P-5'-P-CCNC: 24 U/L (ref 5–45)
BACTERIA UR QL AUTO: ABNORMAL /HPF
BASOPHILS # BLD AUTO: 0.05 THOUSANDS/ΜL (ref 0–0.1)
BASOPHILS NFR BLD AUTO: 1 % (ref 0–1)
BILIRUB SERPL-MCNC: 0.29 MG/DL (ref 0.2–1)
BILIRUB UR QL STRIP: NEGATIVE
BUN SERPL-MCNC: 17 MG/DL (ref 5–25)
CALCIUM SERPL-MCNC: 9.1 MG/DL (ref 8.3–10.1)
CHLORIDE SERPL-SCNC: 103 MMOL/L (ref 100–108)
CLARITY UR: CLEAR
CO2 SERPL-SCNC: 29 MMOL/L (ref 21–32)
COLOR UR: YELLOW
CREAT SERPL-MCNC: 0.92 MG/DL (ref 0.6–1.3)
EOSINOPHIL # BLD AUTO: 0.33 THOUSAND/ΜL (ref 0–0.61)
EOSINOPHIL NFR BLD AUTO: 4 % (ref 0–6)
ERYTHROCYTE [DISTWIDTH] IN BLOOD BY AUTOMATED COUNT: 15.9 % (ref 11.6–15.1)
GFR SERPL CREATININE-BSD FRML MDRD: 75 ML/MIN/1.73SQ M
GLUCOSE SERPL-MCNC: 147 MG/DL (ref 65–140)
GLUCOSE UR STRIP-MCNC: NEGATIVE MG/DL
HCT VFR BLD AUTO: 36.9 % (ref 34.8–46.1)
HGB BLD-MCNC: 11.2 G/DL (ref 11.5–15.4)
HGB UR QL STRIP.AUTO: ABNORMAL
IMM GRANULOCYTES # BLD AUTO: 0.03 THOUSAND/UL (ref 0–0.2)
IMM GRANULOCYTES NFR BLD AUTO: 0 % (ref 0–2)
KETONES UR STRIP-MCNC: NEGATIVE MG/DL
LEUKOCYTE ESTERASE UR QL STRIP: NEGATIVE
LYMPHOCYTES # BLD AUTO: 2.33 THOUSANDS/ΜL (ref 0.6–4.47)
LYMPHOCYTES NFR BLD AUTO: 27 % (ref 14–44)
MCH RBC QN AUTO: 25.6 PG (ref 26.8–34.3)
MCHC RBC AUTO-ENTMCNC: 30.4 G/DL (ref 31.4–37.4)
MCV RBC AUTO: 84 FL (ref 82–98)
MONOCYTES # BLD AUTO: 0.64 THOUSAND/ΜL (ref 0.17–1.22)
MONOCYTES NFR BLD AUTO: 8 % (ref 4–12)
NEUTROPHILS # BLD AUTO: 5.21 THOUSANDS/ΜL (ref 1.85–7.62)
NEUTS SEG NFR BLD AUTO: 60 % (ref 43–75)
NITRITE UR QL STRIP: NEGATIVE
NON-SQ EPI CELLS URNS QL MICRO: ABNORMAL /HPF
NRBC BLD AUTO-RTO: 0 /100 WBCS
PH UR STRIP.AUTO: 5 [PH]
PLATELET # BLD AUTO: 340 THOUSANDS/UL (ref 149–390)
PMV BLD AUTO: 9.5 FL (ref 8.9–12.7)
POTASSIUM SERPL-SCNC: 3.7 MMOL/L (ref 3.5–5.3)
PROT SERPL-MCNC: 7 G/DL (ref 6.4–8.2)
PROT UR STRIP-MCNC: NEGATIVE MG/DL
RBC # BLD AUTO: 4.38 MILLION/UL (ref 3.81–5.12)
RBC #/AREA URNS AUTO: ABNORMAL /HPF
SODIUM SERPL-SCNC: 140 MMOL/L (ref 136–145)
SP GR UR STRIP.AUTO: >=1.03 (ref 1–1.03)
UROBILINOGEN UR QL STRIP.AUTO: 0.2 E.U./DL
WBC # BLD AUTO: 8.59 THOUSAND/UL (ref 4.31–10.16)
WBC #/AREA URNS AUTO: ABNORMAL /HPF

## 2019-12-12 PROCEDURE — 36415 COLL VENOUS BLD VENIPUNCTURE: CPT | Performed by: EMERGENCY MEDICINE

## 2019-12-12 PROCEDURE — 99283 EMERGENCY DEPT VISIT LOW MDM: CPT | Performed by: EMERGENCY MEDICINE

## 2019-12-12 PROCEDURE — 80053 COMPREHEN METABOLIC PANEL: CPT | Performed by: EMERGENCY MEDICINE

## 2019-12-12 PROCEDURE — 99283 EMERGENCY DEPT VISIT LOW MDM: CPT

## 2019-12-12 PROCEDURE — 81001 URINALYSIS AUTO W/SCOPE: CPT | Performed by: EMERGENCY MEDICINE

## 2019-12-12 PROCEDURE — 85025 COMPLETE CBC W/AUTO DIFF WBC: CPT | Performed by: EMERGENCY MEDICINE

## 2019-12-12 RX ADMIN — DICLOFENAC 4 G: 10 GEL TOPICAL at 09:19

## 2019-12-12 NOTE — ED PROVIDER NOTES
History  Chief Complaint   Patient presents with    Leg Pain     Pt  reports arthritis both legs, pain ongoing for "sometime now" Recently noting swelling of b/l lower legs and feet, and last night pt  noted "throbbing pain in calfs  "     53 yr  Female-- c/o   Long time of bilateral knee pain -- right greater than left-- no injury -  States has r knee bone on bone --  -- and gives out at times-- also c/o  throbbing in both calves  For last week - no tupon ambulation --  --and bel swelling -- tinging of bilateral feet-- no cp/sob/ bowles/ normal amount of urine output-- no melena?brbpr- pt has been taking a lot of ibuprofen for knee pain --       History provided by:  Patient   used: No        Prior to Admission Medications   Prescriptions Last Dose Informant Patient Reported? Taking?    LORazepam (ATIVAN) 0 5 mg tablet   Yes No   Sig: Take by mouth every 8 (eight) hours as needed for anxiety   dicyclomine (BENTYL) 20 mg tablet   Yes No   Sig: Take 20 mg by mouth daily   escitalopram (LEXAPRO) 10 mg tablet   No No   Sig: Take 3 tablets (30 mg total) by mouth daily for 30 days   hydrochlorothiazide (HYDRODIURIL) 12 5 mg tablet   Yes No   Sig: Take 12 5 mg by mouth every other day   insulin glargine (LANTUS) 100 units/mL subcutaneous injection   Yes No   Sig: Inject 40 Units under the skin daily at bedtime   lamoTRIgine (LaMICtal) 200 MG tablet   Yes No   Sig: Take 200 mg by mouth daily    lisinopril (ZESTRIL) 10 mg tablet   Yes No   Sig: Take 10 mg by mouth daily   metFORMIN (GLUCOPHAGE) 500 mg tablet   Yes No   Sig: Take 500 mg by mouth 2 (two) times a day with meals   pantoprazole (PROTONIX) 40 mg tablet   Yes No   Sig: Take 40 mg by mouth daily   topiramate (TOPAMAX) 25 mg tablet   No No   Sig: Take 1 tablet (25 mg total) by mouth 2 (two) times a day for 30 days      Facility-Administered Medications: None       Past Medical History:   Diagnosis Date    Anxiety     Depression     [facility] mellitus [facility] [address] [facility]     Hypertension     Psychiatric disorder        Past Surgical History:   Procedure Laterality Date     SECTION      CHOLECYSTECTOMY         History reviewed  No pertinent family history  I have reviewed and agree with the history as documented  Social History     Tobacco Use    Smoking status: Never Smoker    Smokeless tobacco: Never Used   Substance Use Topics    Alcohol use: No    Drug use: No        Review of Systems   Constitutional: Negative  HENT: Negative  Eyes: Negative  Respiratory: Negative  Cardiovascular: Positive for leg swelling  Negative for chest pain and palpitations  Gastrointestinal: Negative  Endocrine: Negative  Genitourinary: Negative  Musculoskeletal: Negative  Bilateral knee pain    Skin: Negative  Allergic/Immunologic: Negative  Neurological: Negative  Hematological: Negative  Psychiatric/Behavioral: Negative  Physical Exam  Physical Exam   Constitutional: She is oriented to person, place, and time  No distress  avss-- htnsive--    HENT:   Head: Normocephalic and atraumatic  Eyes: Pupils are equal, round, and reactive to light  Conjunctivae and EOM are normal  Right eye exhibits no discharge  Left eye exhibits no discharge  No scleral icterus  Mm pink   Neck: Normal range of motion  Neck supple  No JVD present  No tracheal deviation present  No thyromegaly present  Cardiovascular: Normal rate, regular rhythm and intact distal pulses  Exam reveals no gallop and no friction rub  No murmur heard  distant hs    Pulmonary/Chest: Effort normal and breath sounds normal  No stridor  No respiratory distress  She has no wheezes  She has no rales  She exhibits no tenderness  Abdominal: Soft  Bowel sounds are normal  She exhibits no distension and no mass  There is no tenderness  There is no rebound and no guarding  No hernia     Obese abd- soft nt/nd- no cva tenderness- no peritoneal signs Musculoskeletal: Normal range of motion  She exhibits edema  She exhibits no tenderness or deformity  Trace ble pretibial edema-- nt- no asym/ erythema- no signs of cellulitis-- equal bilateral radial/dp pulses   Lymphadenopathy:     She has no cervical adenopathy  Neurological: She is alert and oriented to person, place, and time  No cranial nerve deficit or sensory deficit  She exhibits normal muscle tone  Coordination normal    Skin: Skin is warm  Capillary refill takes less than 2 seconds  No rash noted  She is not diaphoretic  No erythema  No pallor  Psychiatric: She has a normal mood and affect  Her behavior is normal  Judgment and thought content normal    Nursing note and vitals reviewed        Vital Signs  ED Triage Vitals [12/12/19 0806]   Temperature Pulse Respirations Blood Pressure SpO2   98 5 °F (36 9 °C) 99 18 (!) 180/96 --      Temp Source Heart Rate Source Patient Position - Orthostatic VS BP Location FiO2 (%)   Oral Monitor Lying Right arm --      Pain Score       9           Vitals:    12/12/19 0806   BP: (!) 180/96   Pulse: 99   Patient Position - Orthostatic VS: Lying         Visual Acuity      ED Medications  Medications   diclofenac sodium (VOLTAREN) 1 % topical gel 4 g (has no administration in time range)       Diagnostic Studies  Results Reviewed     Procedure Component Value Units Date/Time    CBC and differential [[id number]]     Lab Status:  No result Specimen:  Blood     Comprehensive metabolic panel [[id number]]     Lab Status:  No result Specimen:  Blood     UA (URINE) with reflex to Scope [[id number]]     Lab Status:  No result Specimen:  Urine                  No orders to display              Procedures  Procedures         ED Course  ED Course as of Dec 15 1545   Thu Dec 12, 2019   1204 - NOTE- D/C DELAY TO TO WAITING FOR LAB- PT AWARE OF THIS                                  MDM      Disposition  Final diagnoses:   None     ED Disposition     None      Follow-up Information None         Patient's Medications   Discharge Prescriptions    No medications on file     No discharge procedures on file      ED Provider  Electronically Signed by           Gela Elizabeth MD  12/15/19 3507

## 2019-12-12 NOTE — DISCHARGE INSTRUCTIONS
DIAGNOSIS; BILATERAL LOWER EXTREMITY PAIN -- BILATERAL KNEE OSTEOARTHRITIS     - ACTIVITY AS TOLERATED    - CAN APPLY DICLOFENAC GEL - OVER BOTH KNEES  4 GRAM STRIP 4 TIMES PER DAY -- PLEASE DO NOT TAKE ANY  OVER THE COUNTER IBUPROFEN / NAPROXEN    - PLEASE CALL YOUR PRIMARY DOCTOR  WHEN YOU GET HOME TO SCHEDULE AN APPOINTMENT FOR A RECHECK WITHIN 1 WEEK     - PLEASE RETURN TO  THE ER FOR ANY NEW/ WORSENING/CONCERNING SYMPTOMS TO YOU

## 2020-02-28 ENCOUNTER — APPOINTMENT (EMERGENCY)
Dept: RADIOLOGY | Facility: HOSPITAL | Age: 45
End: 2020-02-28
Payer: COMMERCIAL

## 2020-02-28 ENCOUNTER — HOSPITAL ENCOUNTER (EMERGENCY)
Facility: HOSPITAL | Age: 45
Discharge: HOME | End: 2020-02-28
Attending: EMERGENCY MEDICINE | Admitting: EMERGENCY MEDICINE
Payer: COMMERCIAL

## 2020-02-28 VITALS
HEART RATE: 89 BPM | WEIGHT: 293 LBS | BODY MASS INDEX: 57.52 KG/M2 | RESPIRATION RATE: 20 BRPM | SYSTOLIC BLOOD PRESSURE: 152 MMHG | DIASTOLIC BLOOD PRESSURE: 86 MMHG | HEIGHT: 60 IN | TEMPERATURE: 98 F | OXYGEN SATURATION: 98 %

## 2020-02-28 DIAGNOSIS — M25.562 ACUTE PAIN OF LEFT KNEE: Primary | ICD-10-CM

## 2020-02-28 PROCEDURE — 99283 EMERGENCY DEPT VISIT LOW MDM: CPT

## 2020-02-28 PROCEDURE — 99284 EMERGENCY DEPT VISIT MOD MDM: CPT | Performed by: PHYSICIAN ASSISTANT

## 2020-02-28 PROCEDURE — 73564 X-RAY EXAM KNEE 4 OR MORE: CPT

## 2020-02-28 RX ORDER — OXYCODONE HYDROCHLORIDE 5 MG/1
5 TABLET ORAL ONCE
Status: COMPLETED | OUTPATIENT
Start: 2020-02-28 | End: 2020-02-28

## 2020-02-28 RX ORDER — OXYCODONE HYDROCHLORIDE 5 MG/1
5 TABLET ORAL EVERY 6 HOURS PRN
Qty: 12 TABLET | Refills: 0 | Status: SHIPPED | OUTPATIENT
Start: 2020-02-28 | End: 2020-03-02

## 2020-02-28 RX ADMIN — OXYCODONE HYDROCHLORIDE 5 MG: 5 TABLET ORAL at 12:39

## 2020-02-28 NOTE — ED PROVIDER NOTES
History  Chief Complaint   Patient presents with    Knee Pain     c/o left knee pain x 1 5 weeks  Increased pain going down steps vs up  Pt denies recent trauma or numbness/tingling     39-year-old female presents to the emergency department with complaints left-sided knee pain  States she has had worsening pain over the past week  Describes pain as sharp in nature with radiation down the front of the left leg to the foot  States that symptoms are worse with ambulation  Denies injury to the knee  States she has history of arthritis in the right knee was previously seen by Orthopedics for cortisone shots  Currently taking ibuprofen 800 mg every 6-7 hours as needed for her symptoms over the past 3 days  History provided by:  Patient   used: No    Knee Pain   Location:  Knee  Time since incident:  1 week  Injury: no    Knee location:  L knee  Pain details:     Quality:  Aching    Radiates to:  L leg    Severity:  Moderate    Onset quality:  Gradual    Duration:  1 week    Timing:  Constant    Progression:  Waxing and waning  Chronicity:  New  Prior injury to area:  No  Relieved by:  Rest  Worsened by:  Bearing weight  Ineffective treatments:  NSAIDs  Associated symptoms: decreased ROM    Associated symptoms: no back pain, no fatigue, no fever, no itching, no muscle weakness, no neck pain, no numbness, no stiffness, no swelling and no tingling        Prior to Admission Medications   Prescriptions Last Dose Informant Patient Reported? Taking?    LORazepam (ATIVAN) 0 5 mg tablet   Yes No   Sig: Take by mouth every 8 (eight) hours as needed for anxiety   dicyclomine (BENTYL) 20 mg tablet   Yes No   Sig: Take 20 mg by mouth daily   escitalopram (LEXAPRO) 10 mg tablet   No No   Sig: Take 3 tablets (30 mg total) by mouth daily for 30 days   hydrochlorothiazide (HYDRODIURIL) 12 5 mg tablet   Yes No   Sig: Take 12 5 mg by mouth every other day   insulin glargine (LANTUS) 100 units/mL subcutaneous injection   Yes No   Sig: Inject 40 Units under the skin daily at bedtime   lamoTRIgine (LaMICtal) 200 MG tablet   Yes No   Sig: Take 200 mg by mouth daily    lisinopril (ZESTRIL) 10 mg tablet   Yes No   Sig: Take 10 mg by mouth daily   metFORMIN (GLUCOPHAGE) 500 mg tablet   Yes No   Sig: Take 500 mg by mouth 2 (two) times a day with meals   pantoprazole (PROTONIX) 40 mg tablet   Yes No   Sig: Take 40 mg by mouth daily   topiramate (TOPAMAX) 25 mg tablet   No No   Sig: Take 1 tablet (25 mg total) by mouth 2 (two) times a day for 30 days      Facility-Administered Medications: None       Past Medical History:   Diagnosis Date    Anxiety     Depression     Diabetes mellitus ([facility] [address]     Hypertension     Psychiatric disorder        Past Surgical History:   Procedure Laterality Date     SECTION      CHOLECYSTECTOMY         History reviewed  No pertinent family history  I have reviewed and agree with the history as documented  E-Cigarette/Vaping    E-Cigarette Use Never User      E-Cigarette/Vaping Substances     Social History     Tobacco Use    Smoking status: Never Smoker    Smokeless tobacco: Never Used   Substance Use Topics    Alcohol use: No    Drug use: No       Review of Systems   Constitutional: Negative for chills, fatigue and fever  HENT: Negative for congestion, dental problem and sore throat  Respiratory: Negative for cough  Cardiovascular: Negative for chest pain  Gastrointestinal: Negative for abdominal pain  Musculoskeletal: Negative for back pain, neck pain and stiffness  Knee pain     Skin: Negative for itching, rash and wound  All other systems reviewed and are negative  Physical Exam  Physical Exam   Constitutional: She is oriented to person, place, and time  Vital signs are normal  She appears well-developed and well-nourished  HENT:   Head: Normocephalic and atraumatic  Cardiovascular: Normal rate and regular rhythm     Pulmonary/Chest: Effort normal and breath sounds normal  No respiratory distress  She has no wheezes  She has no rhonchi  She has no rales  Musculoskeletal:        Left knee: She exhibits decreased range of motion  She exhibits no swelling, no effusion, no ecchymosis, no deformity, no laceration and no erythema  Tenderness (anteriorly) found  Neurological: She is alert and oriented to person, place, and time  Skin: Skin is warm and dry  Psychiatric: She has a normal mood and affect  Her behavior is normal    Nursing note and vitals reviewed  Vital Signs  ED Triage Vitals [02/28/20 0900]   Temperature Pulse Respirations Blood Pressure SpO2   98 °F (36 7 °C) 89 20 152/86 98 %      Temp Source Heart Rate Source Patient Position - Orthostatic VS BP Location FiO2 (%)   Oral Monitor Sitting Left arm --      Pain Score       Worst Possible Pain           Vitals:    02/28/20 0900   BP: 152/86   Pulse: 89   Patient Position - Orthostatic VS: Sitting         Visual Acuity      ED Medications  Medications   oxyCODONE (ROXICODONE) IR tablet 5 mg (has no administration in time range)       Diagnostic Studies  Results Reviewed     None                 XR knee 4+ views left injury   ED Interpretation by Davida Robledo PA-C (02/28 1200)   Degenerative changes  No fracture                   Procedures  Procedures         ED Course                               MDM  Number of Diagnoses or Management Options  Acute pain of left knee:   Diagnosis management comments: Differential diagnosis includes but not limited to:  Knee strain, arthritis bursitis         Amount and/or Complexity of Data Reviewed  Tests in the radiology section of CPT®: ordered and reviewed  Independent visualization of images, tracings, or specimens: yes          Disposition  Final diagnoses:   Acute pain of left knee     Time reflects when diagnosis was documented in both MDM as applicable and the Disposition within this note     Time User Action Codes Description Comment 2/28/2020 12:09 PM Brennan Lord Pert Add [P51 [id number]] Acute pain of left knee       ED Disposition     ED Disposition Condition Date/Time Comment    Discharge Stable Fri Feb 28, 2020 12:09 PM Suzie Given discharge to home/self care  Follow-up Information     Follow up With Specialties Details Why Contact Info Additional [address] [address] Orthopedic Surgery Schedule an appointment as soon as possible for a visit   [address] [place] [address], [phone]          Patient's Medications   Discharge Prescriptions    OXYCODONE (ROXICODONE) 5 MG IMMEDIATE RELEASE TABLET    Take 1 tablet (5 mg total) by mouth every 6 (six) hours as needed for moderate pain for up to 3 daysMax Daily Amount: 20 mg       Start Date: 2/28/2020 End Date: 3/2/2020       Order Dose: 5 mg       Quantity: 12 tablet    Refills: 0     No discharge procedures on file      PDMP Review     None          ED Provider  Electronically Signed by           Jabari Molina PA-C  02/28/20 7111

## 2020-04-01 DIAGNOSIS — K21.9 GASTROESOPHAGEAL REFLUX DISEASE WITHOUT ESOPHAGITIS: Primary | ICD-10-CM

## 2020-04-01 RX ORDER — PANTOPRAZOLE SODIUM 40 MG/1
40 TABLET, DELAYED RELEASE ORAL DAILY
Qty: 90 TABLET | Refills: 1 | Status: SHIPPED | OUTPATIENT
Start: 2020-04-01 | End: 2021-04-03

## 2020-04-06 ENCOUNTER — TELEMEDICINE (OUTPATIENT)
Dept: FAMILY MEDICINE CLINIC | Facility: CLINIC | Age: 45
End: 2020-04-06
Payer: COMMERCIAL

## 2020-04-06 DIAGNOSIS — N34.3 DYSURIA-FREQUENCY SYNDROME: ICD-10-CM

## 2020-04-06 DIAGNOSIS — R35.0 INCREASED URINARY FREQUENCY: Primary | ICD-10-CM

## 2020-04-06 PROCEDURE — 99213 OFFICE O/P EST LOW 20 MIN: CPT | Performed by: NURSE PRACTITIONER

## 2020-04-06 RX ORDER — NITROFURANTOIN 25; 75 MG/1; MG/1
100 CAPSULE ORAL 2 TIMES DAILY
Qty: 14 CAPSULE | Refills: 0 | Status: SHIPPED | OUTPATIENT
Start: 2020-04-06 | End: 2020-04-13

## 2020-04-10 ENCOUNTER — APPOINTMENT (EMERGENCY)
Dept: CT IMAGING | Facility: HOSPITAL | Age: 45
End: 2020-04-10
Payer: COMMERCIAL

## 2020-04-10 ENCOUNTER — HOSPITAL ENCOUNTER (EMERGENCY)
Facility: HOSPITAL | Age: 45
Discharge: HOME | End: 2020-04-10
Attending: EMERGENCY MEDICINE | Admitting: EMERGENCY MEDICINE
Payer: COMMERCIAL

## 2020-04-10 VITALS
TEMPERATURE: 98 F | BODY MASS INDEX: 66.4 KG/M2 | HEART RATE: 108 BPM | RESPIRATION RATE: 18 BRPM | DIASTOLIC BLOOD PRESSURE: 63 MMHG | OXYGEN SATURATION: 94 % | SYSTOLIC BLOOD PRESSURE: 136 MMHG | WEIGHT: 293 LBS

## 2020-04-10 DIAGNOSIS — R10.9 FLANK PAIN: Primary | ICD-10-CM

## 2020-04-10 DIAGNOSIS — R11.2 NAUSEA AND VOMITING: ICD-10-CM

## 2020-04-10 LAB
ALBUMIN SERPL BCP-MCNC: 3.6 G/DL (ref 3.5–5)
ALP SERPL-CCNC: 98 U/L (ref 46–116)
ALT SERPL W P-5'-P-CCNC: 58 U/L (ref 12–78)
ANION GAP SERPL CALCULATED.3IONS-SCNC: 8 MMOL/L (ref 4–13)
AST SERPL W P-5'-P-CCNC: 24 U/L (ref 5–45)
BASOPHILS # BLD AUTO: 0.05 THOUSANDS/ΜL (ref 0–0.1)
BASOPHILS NFR BLD AUTO: 1 % (ref 0–1)
BILIRUB SERPL-MCNC: 0.31 MG/DL (ref 0.2–1)
BILIRUB UR QL STRIP: NEGATIVE
BUN SERPL-MCNC: 12 MG/DL (ref 5–25)
CALCIUM SERPL-MCNC: 9.5 MG/DL (ref 8.3–10.1)
CHLORIDE SERPL-SCNC: 99 MMOL/L (ref 100–108)
CLARITY UR: CLEAR
CO2 SERPL-SCNC: 30 MMOL/L (ref 21–32)
COLOR UR: YELLOW
CREAT SERPL-MCNC: 0.91 MG/DL (ref 0.6–1.3)
EOSINOPHIL # BLD AUTO: 0.18 THOUSAND/ΜL (ref 0–0.61)
EOSINOPHIL NFR BLD AUTO: 2 % (ref 0–6)
ERYTHROCYTE [DISTWIDTH] IN BLOOD BY AUTOMATED COUNT: 15.2 % (ref 11.6–15.1)
EXT PREG TEST URINE: NEGATIVE
EXT. CONTROL ED NAV: NORMAL
GFR SERPL CREATININE-BSD FRML MDRD: 75 ML/MIN/1.73SQ M
GLUCOSE SERPL-MCNC: 227 MG/DL (ref 65–140)
GLUCOSE UR STRIP-MCNC: ABNORMAL MG/DL
HCG SERPL QL: NEGATIVE
HCT VFR BLD AUTO: 40.5 % (ref 34.8–46.1)
HGB BLD-MCNC: 12.4 G/DL (ref 11.5–15.4)
HGB UR QL STRIP.AUTO: NEGATIVE
IMM GRANULOCYTES # BLD AUTO: 0.05 THOUSAND/UL (ref 0–0.2)
IMM GRANULOCYTES NFR BLD AUTO: 1 % (ref 0–2)
KETONES UR STRIP-MCNC: NEGATIVE MG/DL
LEUKOCYTE ESTERASE UR QL STRIP: NEGATIVE
LYMPHOCYTES # BLD AUTO: 2.57 THOUSANDS/ΜL (ref 0.6–4.47)
LYMPHOCYTES NFR BLD AUTO: 27 % (ref 14–44)
MAGNESIUM SERPL-MCNC: 1.7 MG/DL (ref 1.6–2.6)
MCH RBC QN AUTO: 26.3 PG (ref 26.8–34.3)
MCHC RBC AUTO-ENTMCNC: 30.6 G/DL (ref 31.4–37.4)
MCV RBC AUTO: 86 FL (ref 82–98)
MONOCYTES # BLD AUTO: 0.82 THOUSAND/ΜL (ref 0.17–1.22)
MONOCYTES NFR BLD AUTO: 9 % (ref 4–12)
NEUTROPHILS # BLD AUTO: 5.94 THOUSANDS/ΜL (ref 1.85–7.62)
NEUTS SEG NFR BLD AUTO: 60 % (ref 43–75)
NITRITE UR QL STRIP: NEGATIVE
NRBC BLD AUTO-RTO: 0 /100 WBCS
PH UR STRIP.AUTO: 6 [PH]
PLATELET # BLD AUTO: 392 THOUSANDS/UL (ref 149–390)
PMV BLD AUTO: 9.2 FL (ref 8.9–12.7)
POTASSIUM SERPL-SCNC: 4.1 MMOL/L (ref 3.5–5.3)
PROT SERPL-MCNC: 8.3 G/DL (ref 6.4–8.2)
PROT UR STRIP-MCNC: NEGATIVE MG/DL
RBC # BLD AUTO: 4.72 MILLION/UL (ref 3.81–5.12)
SODIUM SERPL-SCNC: 137 MMOL/L (ref 136–145)
SP GR UR STRIP.AUTO: 1.02 (ref 1–1.03)
UROBILINOGEN UR QL STRIP.AUTO: 0.2 E.U./DL
WBC # BLD AUTO: 9.61 THOUSAND/UL (ref 4.31–10.16)

## 2020-04-10 PROCEDURE — 96374 THER/PROPH/DIAG INJ IV PUSH: CPT

## 2020-04-10 PROCEDURE — 36415 COLL VENOUS BLD VENIPUNCTURE: CPT | Performed by: EMERGENCY MEDICINE

## 2020-04-10 PROCEDURE — 80053 COMPREHEN METABOLIC PANEL: CPT | Performed by: EMERGENCY MEDICINE

## 2020-04-10 PROCEDURE — 99284 EMERGENCY DEPT VISIT MOD MDM: CPT

## 2020-04-10 PROCEDURE — 83735 ASSAY OF MAGNESIUM: CPT | Performed by: EMERGENCY MEDICINE

## 2020-04-10 PROCEDURE — 81025 URINE PREGNANCY TEST: CPT | Performed by: EMERGENCY MEDICINE

## 2020-04-10 PROCEDURE — 96375 TX/PRO/DX INJ NEW DRUG ADDON: CPT

## 2020-04-10 PROCEDURE — 85025 COMPLETE CBC W/AUTO DIFF WBC: CPT | Performed by: EMERGENCY MEDICINE

## 2020-04-10 PROCEDURE — 96361 HYDRATE IV INFUSION ADD-ON: CPT

## 2020-04-10 PROCEDURE — 81003 URINALYSIS AUTO W/O SCOPE: CPT | Performed by: EMERGENCY MEDICINE

## 2020-04-10 PROCEDURE — 99284 EMERGENCY DEPT VISIT MOD MDM: CPT | Performed by: EMERGENCY MEDICINE

## 2020-04-10 PROCEDURE — 84703 CHORIONIC GONADOTROPIN ASSAY: CPT | Performed by: EMERGENCY MEDICINE

## 2020-04-10 PROCEDURE — 74177 CT ABD & PELVIS W/CONTRAST: CPT

## 2020-04-10 RX ORDER — ONDANSETRON 2 MG/ML
4 INJECTION INTRAMUSCULAR; INTRAVENOUS ONCE
Status: COMPLETED | OUTPATIENT
Start: 2020-04-10 | End: 2020-04-10

## 2020-04-10 RX ORDER — KETOROLAC TROMETHAMINE 30 MG/ML
15 INJECTION, SOLUTION INTRAMUSCULAR; INTRAVENOUS ONCE
Status: COMPLETED | OUTPATIENT
Start: 2020-04-10 | End: 2020-04-10

## 2020-04-10 RX ORDER — METOCLOPRAMIDE HYDROCHLORIDE 5 MG/ML
10 INJECTION INTRAMUSCULAR; INTRAVENOUS ONCE
Status: COMPLETED | OUTPATIENT
Start: 2020-04-10 | End: 2020-04-10

## 2020-04-10 RX ORDER — ONDANSETRON 4 MG/1
4 TABLET, ORALLY DISINTEGRATING ORAL EVERY 8 HOURS PRN
Qty: 10 TABLET | Refills: 0 | Status: SHIPPED | OUTPATIENT
Start: 2020-04-10 | End: 2020-08-24

## 2020-04-10 RX ADMIN — METOCLOPRAMIDE HYDROCHLORIDE 10 MG: 5 INJECTION INTRAMUSCULAR; INTRAVENOUS at 18:24

## 2020-04-10 RX ADMIN — ONDANSETRON 4 MG: 2 INJECTION INTRAMUSCULAR; INTRAVENOUS at 15:55

## 2020-04-10 RX ADMIN — KETOROLAC TROMETHAMINE 15 MG: 30 INJECTION, SOLUTION INTRAMUSCULAR at 18:22

## 2020-04-10 RX ADMIN — HYOSCYAMINE SULFATE 0.12 MG: 0.12 TABLET SUBLINGUAL at 18:21

## 2020-04-10 RX ADMIN — IOHEXOL 100 ML: 350 INJECTION, SOLUTION INTRAVENOUS at 17:10

## 2020-04-10 RX ADMIN — SODIUM CHLORIDE 1000 ML: 0.9 INJECTION, SOLUTION INTRAVENOUS at 15:55

## 2020-06-26 ENCOUNTER — TELEMEDICINE (OUTPATIENT)
Dept: FAMILY MEDICINE CLINIC | Facility: CLINIC | Age: 45
End: 2020-06-26
Payer: COMMERCIAL

## 2020-06-26 DIAGNOSIS — E78.2 MIXED HYPERLIPIDEMIA: ICD-10-CM

## 2020-06-26 DIAGNOSIS — R53.83 OTHER FATIGUE: ICD-10-CM

## 2020-06-26 DIAGNOSIS — I10 ESSENTIAL HYPERTENSION: Primary | ICD-10-CM

## 2020-06-26 DIAGNOSIS — E66.01 CLASS 3 SEVERE OBESITY DUE TO EXCESS CALORIES WITHOUT SERIOUS COMORBIDITY IN ADULT, UNSPECIFIED BMI (HCC): ICD-10-CM

## 2020-06-26 DIAGNOSIS — D50.8 OTHER IRON DEFICIENCY ANEMIA: ICD-10-CM

## 2020-06-26 DIAGNOSIS — E55.9 VITAMIN D DEFICIENCY: ICD-10-CM

## 2020-06-26 DIAGNOSIS — E11.8 TYPE 2 DIABETES MELLITUS WITH COMPLICATION, WITH LONG-TERM CURRENT USE OF INSULIN (HCC): ICD-10-CM

## 2020-06-26 DIAGNOSIS — Z79.4 TYPE 2 DIABETES MELLITUS WITH COMPLICATION, WITH LONG-TERM CURRENT USE OF INSULIN (HCC): ICD-10-CM

## 2020-06-26 PROCEDURE — 99442 PR PHYS/QHP TELEPHONE EVALUATION 11-20 MIN: CPT | Performed by: NURSE PRACTITIONER

## 2020-06-26 PROCEDURE — 4010F ACE/ARB THERAPY RXD/TAKEN: CPT | Performed by: INTERNAL MEDICINE

## 2020-06-26 RX ORDER — LISINOPRIL 10 MG/1
10 TABLET ORAL DAILY
Qty: 30 TABLET | Refills: 1 | Status: SHIPPED | OUTPATIENT
Start: 2020-06-26 | End: 2020-09-08

## 2020-06-26 RX ORDER — HYDROCHLOROTHIAZIDE 12.5 MG/1
12.5 TABLET ORAL EVERY OTHER DAY
Qty: 30 TABLET | Refills: 0 | Status: SHIPPED | OUTPATIENT
Start: 2020-06-26 | End: 2020-08-31 | Stop reason: SDUPTHER

## 2020-07-10 ENCOUNTER — TELEPHONE (OUTPATIENT)
Dept: OBGYN CLINIC | Facility: HOSPITAL | Age: 45
End: 2020-07-10

## 2020-07-18 DIAGNOSIS — Z79.4 TYPE 2 DIABETES MELLITUS WITH COMPLICATION, WITH LONG-TERM CURRENT USE OF INSULIN (HCC): ICD-10-CM

## 2020-07-18 DIAGNOSIS — R53.83 OTHER FATIGUE: ICD-10-CM

## 2020-07-18 DIAGNOSIS — E11.8 TYPE 2 DIABETES MELLITUS WITH COMPLICATION, WITH LONG-TERM CURRENT USE OF INSULIN (HCC): ICD-10-CM

## 2020-07-23 ENCOUNTER — HOSPITAL ENCOUNTER (EMERGENCY)
Facility: HOSPITAL | Age: 45
Discharge: HOME | End: 2020-07-23
Attending: EMERGENCY MEDICINE
Payer: COMMERCIAL

## 2020-07-23 ENCOUNTER — APPOINTMENT (EMERGENCY)
Dept: CT IMAGING | Facility: HOSPITAL | Age: 45
End: 2020-07-23
Payer: COMMERCIAL

## 2020-07-23 VITALS
HEART RATE: 104 BPM | OXYGEN SATURATION: 95 % | SYSTOLIC BLOOD PRESSURE: 136 MMHG | RESPIRATION RATE: 20 BRPM | BODY MASS INDEX: 57.52 KG/M2 | DIASTOLIC BLOOD PRESSURE: 69 MMHG | TEMPERATURE: 97.9 F | WEIGHT: 293 LBS | HEIGHT: 60 IN

## 2020-07-23 DIAGNOSIS — N39.0 UTI (URINARY TRACT INFECTION): ICD-10-CM

## 2020-07-23 DIAGNOSIS — R21 RASH: ICD-10-CM

## 2020-07-23 DIAGNOSIS — K21.9 GERD (GASTROESOPHAGEAL REFLUX DISEASE): ICD-10-CM

## 2020-07-23 DIAGNOSIS — K29.70 GASTRITIS: Primary | ICD-10-CM

## 2020-07-23 LAB
ALBUMIN SERPL BCP-MCNC: 3.6 G/DL (ref 3.5–5)
ALP SERPL-CCNC: 87 U/L (ref 46–116)
ALT SERPL W P-5'-P-CCNC: 61 U/L (ref 12–78)
ANION GAP SERPL CALCULATED.3IONS-SCNC: 10 MMOL/L (ref 4–13)
AST SERPL W P-5'-P-CCNC: 31 U/L (ref 5–45)
BACTERIA UR QL AUTO: ABNORMAL /HPF
BASOPHILS # BLD AUTO: 0.04 THOUSANDS/ΜL (ref 0–0.1)
BASOPHILS NFR BLD AUTO: 0 % (ref 0–1)
BILIRUB SERPL-MCNC: 0.22 MG/DL (ref 0.2–1)
BILIRUB UR QL STRIP: ABNORMAL
BUN SERPL-MCNC: 15 MG/DL (ref 5–25)
CALCIUM SERPL-MCNC: 9.6 MG/DL (ref 8.3–10.1)
CHLORIDE SERPL-SCNC: 102 MMOL/L (ref 100–108)
CLARITY UR: ABNORMAL
CO2 SERPL-SCNC: 27 MMOL/L (ref 21–32)
COLOR UR: YELLOW
CREAT SERPL-MCNC: 1 MG/DL (ref 0.6–1.3)
EOSINOPHIL # BLD AUTO: 0.18 THOUSAND/ΜL (ref 0–0.61)
EOSINOPHIL NFR BLD AUTO: 2 % (ref 0–6)
ERYTHROCYTE [DISTWIDTH] IN BLOOD BY AUTOMATED COUNT: 14.8 % (ref 11.6–15.1)
GFR SERPL CREATININE-BSD FRML MDRD: 67 ML/MIN/1.73SQ M
GLUCOSE SERPL-MCNC: 160 MG/DL (ref 65–140)
GLUCOSE UR STRIP-MCNC: NEGATIVE MG/DL
HCT VFR BLD AUTO: 38.4 % (ref 34.8–46.1)
HGB BLD-MCNC: 11.5 G/DL (ref 11.5–15.4)
HGB UR QL STRIP.AUTO: ABNORMAL
IMM GRANULOCYTES # BLD AUTO: 0.04 THOUSAND/UL (ref 0–0.2)
IMM GRANULOCYTES NFR BLD AUTO: 0 % (ref 0–2)
KETONES UR STRIP-MCNC: NEGATIVE MG/DL
LEUKOCYTE ESTERASE UR QL STRIP: NEGATIVE
LIPASE SERPL-CCNC: 208 U/L (ref 73–393)
LYMPHOCYTES # BLD AUTO: 2.22 THOUSANDS/ΜL (ref 0.6–4.47)
LYMPHOCYTES NFR BLD AUTO: 22 % (ref 14–44)
MCH RBC QN AUTO: 25.8 PG (ref 26.8–34.3)
MCHC RBC AUTO-ENTMCNC: 29.9 G/DL (ref 31.4–37.4)
MCV RBC AUTO: 86 FL (ref 82–98)
MONOCYTES # BLD AUTO: 0.71 THOUSAND/ΜL (ref 0.17–1.22)
MONOCYTES NFR BLD AUTO: 7 % (ref 4–12)
NEUTROPHILS # BLD AUTO: 6.95 THOUSANDS/ΜL (ref 1.85–7.62)
NEUTS SEG NFR BLD AUTO: 69 % (ref 43–75)
NITRITE UR QL STRIP: NEGATIVE
NON-SQ EPI CELLS URNS QL MICRO: ABNORMAL /HPF
NRBC BLD AUTO-RTO: 0 /100 WBCS
PH UR STRIP.AUTO: 6 [PH] (ref 4.5–8)
PLATELET # BLD AUTO: 365 THOUSANDS/UL (ref 149–390)
PMV BLD AUTO: 9.7 FL (ref 8.9–12.7)
POTASSIUM SERPL-SCNC: 4.8 MMOL/L (ref 3.5–5.3)
PROT SERPL-MCNC: 7.5 G/DL (ref 6.4–8.2)
PROT UR STRIP-MCNC: ABNORMAL MG/DL
RBC # BLD AUTO: 4.46 MILLION/UL (ref 3.81–5.12)
RBC #/AREA URNS AUTO: ABNORMAL /HPF
SODIUM SERPL-SCNC: 139 MMOL/L (ref 136–145)
SP GR UR STRIP.AUTO: >=1.03 (ref 1–1.03)
UROBILINOGEN UR QL STRIP.AUTO: 0.2 E.U./DL
WBC # BLD AUTO: 10.14 THOUSAND/UL (ref 4.31–10.16)
WBC #/AREA URNS AUTO: ABNORMAL /HPF

## 2020-07-23 PROCEDURE — 99284 EMERGENCY DEPT VISIT MOD MDM: CPT | Performed by: PHYSICIAN ASSISTANT

## 2020-07-23 PROCEDURE — 99284 EMERGENCY DEPT VISIT MOD MDM: CPT

## 2020-07-23 PROCEDURE — 85025 COMPLETE CBC W/AUTO DIFF WBC: CPT | Performed by: PHYSICIAN ASSISTANT

## 2020-07-23 PROCEDURE — 96376 TX/PRO/DX INJ SAME DRUG ADON: CPT

## 2020-07-23 PROCEDURE — 36415 COLL VENOUS BLD VENIPUNCTURE: CPT | Performed by: PHYSICIAN ASSISTANT

## 2020-07-23 PROCEDURE — 81001 URINALYSIS AUTO W/SCOPE: CPT

## 2020-07-23 PROCEDURE — 83690 ASSAY OF LIPASE: CPT | Performed by: PHYSICIAN ASSISTANT

## 2020-07-23 PROCEDURE — 96374 THER/PROPH/DIAG INJ IV PUSH: CPT

## 2020-07-23 PROCEDURE — 74177 CT ABD & PELVIS W/CONTRAST: CPT

## 2020-07-23 PROCEDURE — 80053 COMPREHEN METABOLIC PANEL: CPT | Performed by: PHYSICIAN ASSISTANT

## 2020-07-23 RX ORDER — MAGNESIUM HYDROXIDE/ALUMINUM HYDROXICE/SIMETHICONE 120; 1200; 1200 MG/30ML; MG/30ML; MG/30ML
30 SUSPENSION ORAL ONCE
Status: COMPLETED | OUTPATIENT
Start: 2020-07-23 | End: 2020-07-23

## 2020-07-23 RX ORDER — SUCRALFATE ORAL 1 G/10ML
1 SUSPENSION ORAL 4 TIMES DAILY
Qty: 420 ML | Refills: 0 | Status: SHIPPED | OUTPATIENT
Start: 2020-07-23 | End: 2020-08-11 | Stop reason: SDUPTHER

## 2020-07-23 RX ORDER — CLOTRIMAZOLE 1 %
CREAM (GRAM) TOPICAL
Qty: 15 G | Refills: 0 | Status: SHIPPED | OUTPATIENT
Start: 2020-07-23 | End: 2021-08-03 | Stop reason: HOSPADM

## 2020-07-23 RX ORDER — LIDOCAINE HYDROCHLORIDE 20 MG/ML
15 SOLUTION OROPHARYNGEAL ONCE
Status: COMPLETED | OUTPATIENT
Start: 2020-07-23 | End: 2020-07-23

## 2020-07-23 RX ORDER — ONDANSETRON 2 MG/ML
4 INJECTION INTRAMUSCULAR; INTRAVENOUS ONCE
Status: COMPLETED | OUTPATIENT
Start: 2020-07-23 | End: 2020-07-23

## 2020-07-23 RX ORDER — ONDANSETRON 4 MG/1
4 TABLET, FILM COATED ORAL EVERY 6 HOURS
Qty: 12 TABLET | Refills: 0 | Status: SHIPPED | OUTPATIENT
Start: 2020-07-23 | End: 2020-08-24

## 2020-07-23 RX ADMIN — LIDOCAINE HYDROCHLORIDE 15 ML: 20 SOLUTION ORAL; TOPICAL at 18:10

## 2020-07-23 RX ADMIN — IOHEXOL 100 ML: 350 INJECTION, SOLUTION INTRAVENOUS at 19:02

## 2020-07-23 RX ADMIN — ONDANSETRON 4 MG: 2 INJECTION INTRAMUSCULAR; INTRAVENOUS at 18:09

## 2020-07-23 RX ADMIN — ONDANSETRON 4 MG: 2 INJECTION INTRAMUSCULAR; INTRAVENOUS at 19:58

## 2020-07-23 RX ADMIN — ALUMINUM HYDROXIDE, MAGNESIUM HYDROXIDE, AND SIMETHICONE 30 ML: 200; 200; 20 SUSPENSION ORAL at 18:09

## 2020-07-23 NOTE — ED PROVIDER NOTES
History  Chief Complaint   Patient presents with    Abdominal Pain     Pt reports having a lot of stomach issues  Pt reports gas, diarhea, vomiting, nausea, burning pain that started about 1 5 weeks ago  Pt thought it was her acid reflex but it has gotten worse  This is a 50year old female with a history of GERD and IBS presenting with 10 days of increasing acid reflux  For the past two days she has also had worsening burning epigastric pain which is worse immediately following eating  She states the pain radiates to her back  It is not improved or worsened by eating certain foods  She has a decreased appetite and has vomited once today  She has had multiple episodes of loose stools in the past 48 hours  She denies hematemesis, hematochezia, or melena  She has had a cholecystectomy but no other abdominal surgeries  She denies fevers, chills, dysphagia, odynophagia, or urinary symptoms  The patient generally takes Protonix and did take a dose of Protonix today  She states that has not been helping over the last week and a half  History provided by:  Patient   used: No        Prior to Admission Medications   Prescriptions Last Dose Informant Patient Reported? Taking?    LORazepam (ATIVAN) 0 5 mg tablet   Yes No   Sig: Take by mouth every 8 (eight) hours as needed for anxiety   dicyclomine (BENTYL) 20 mg tablet   Yes No   Sig: Take 20 mg by mouth daily   escitalopram (LEXAPRO) 10 mg tablet   No No   Sig: Take 3 tablets (30 mg total) by mouth daily for 30 days   hydrochlorothiazide (HYDRODIURIL) 12 5 mg tablet   No No   Sig: Take 1 tablet (12 5 mg total) by mouth every other day   insulin glargine (LANTUS) 100 units/mL subcutaneous injection   Yes No   Sig: Inject 40 Units under the skin daily at bedtime   lamoTRIgine (LaMICtal) 200 MG tablet   Yes No   Sig: Take 200 mg by mouth daily    lisinopril (ZESTRIL) 10 mg tablet   No No   Sig: Take 1 tablet (10 mg total) by mouth daily metFORMIN (GLUCOPHAGE) 500 mg tablet   No No   Sig: Take 1 tablet (500 mg total) by mouth 2 (two) times a day with meals   ondansetron (ZOFRAN-ODT) 4 mg disintegrating tablet   No No   Sig: Take 1 tablet (4 mg total) by mouth every 8 (eight) hours as needed for nausea or vomiting   pantoprazole (PROTONIX) 40 mg tablet   No No   Sig: Take 1 tablet (40 mg total) by mouth daily   topiramate (TOPAMAX) 25 mg tablet   No No   Sig: Take 1 tablet (25 mg total) by mouth 2 (two) times a day for 30 days      Facility-Administered Medications: None       Past Medical History:   Diagnosis Date    Anemia     Anxiety     Depression     Diabetes mellitus (HCC)     GERD (gastroesophageal reflux disease)     Hypertension     Irritable bowel syndrome     Obesity     Psychiatric disorder     Seasonal allergies        Past Surgical History:   Procedure Laterality Date     SECTION      CHOLECYSTECTOMY      WISDOM TOOTH EXTRACTION         Family History   Problem Relation Age of Onset    Diabetes Mother     Hypertension Father      I have reviewed and agree with the history as documented  E-Cigarette/Vaping    E-Cigarette Use Never User      E-Cigarette/Vaping Substances     Social History     Tobacco Use    Smoking status: Never Smoker    Smokeless tobacco: Never Used   Substance Use Topics    Alcohol use: No    Drug use: No       Review of Systems   Constitutional: Negative for chills and fever  HENT: Negative for ear pain and sore throat  Eyes: Negative for redness and visual disturbance  Respiratory: Negative for cough and shortness of breath  Cardiovascular: Negative for chest pain  Gastrointestinal: Positive for abdominal pain, nausea and vomiting  Negative for diarrhea  Genitourinary: Negative for dysuria and hematuria  Musculoskeletal: Negative for back pain, neck pain and neck stiffness  Skin: Negative for color change and rash     Neurological: Negative for dizziness, light-headedness and headaches  All other systems reviewed and are negative  Physical Exam  Physical Exam   Constitutional: She is oriented to person, place, and time  She appears well-developed and well-nourished  Non-toxic appearance  She does not have a sickly appearance  She does not appear ill  No distress  HENT:   Head: Normocephalic and atraumatic  Mouth/Throat: Uvula is midline, oropharynx is clear and moist and mucous membranes are normal    Eyes: Conjunctivae and lids are normal    Neck: Normal range of motion  Neck supple  Cardiovascular: Normal rate, regular rhythm and normal heart sounds  Pulmonary/Chest: Effort normal and breath sounds normal    Abdominal: Soft  She exhibits no distension  There is tenderness in the epigastric area and left lower quadrant  Neurological: She is alert and oriented to person, place, and time  Skin: Skin is warm and dry  Nursing note and vitals reviewed        Vital Signs  ED Triage Vitals [07/23/20 1645]   Temperature Pulse Respirations Blood Pressure SpO2   97 9 °F (36 6 °C) 105 20 (!) 196/95 98 %      Temp Source Heart Rate Source Patient Position - Orthostatic VS BP Location FiO2 (%)   Oral Monitor Sitting Right arm --      Pain Score       8           Vitals:    07/23/20 1645 07/23/20 1935   BP: (!) 196/95 136/69   Pulse: 105 104   Patient Position - Orthostatic VS: Sitting Lying         Visual Acuity      ED Medications  Medications   ondansetron (ZOFRAN) injection 4 mg (4 mg Intravenous Given 7/23/20 1809)   Lidocaine Viscous HCl (XYLOCAINE) 2 % mucosal solution 15 mL (15 mL Swish & Swallow Given 7/23/20 1810)   aluminum-magnesium hydroxide-simethicone (MYLANTA) 200-200-20 mg/5 mL oral suspension 30 mL (30 mL Oral Given 7/23/20 1809)   iohexol (OMNIPAQUE) 350 MG/ML injection (MULTI-DOSE) 100 mL (100 mL Intravenous Given 7/23/20 1902)   ondansetron (ZOFRAN) injection 4 mg (4 mg Intravenous Given 7/23/20 1958)       Diagnostic Studies  Results Reviewed     Procedure Component Value Units Date/Time    Comprehensive metabolic panel [[id number]]  (Abnormal) Collected:  07/23/20 1747    Lab Status:  Final result Specimen:  Blood from Arm, Left Updated:  07/23/20 1833     Sodium 139 mmol/L      Potassium 4 8 mmol/L      Chloride 102 mmol/L      CO2 27 mmol/L      ANION GAP 10 mmol/L      BUN 15 mg/dL      Creatinine 1 00 mg/dL      Glucose 160 mg/dL      Calcium 9 6 mg/dL      AST 31 U/L      ALT 61 U/L      Alkaline Phosphatase 87 U/L      Total Protein 7 5 g/dL      Albumin 3 6 g/dL      Total Bilirubin 0 22 mg/dL      eGFR 67 ml/min/1 73sq m     Narrative:       [place] guidelines for Chronic Kidney Disease (CKD):     Stage 1 with normal or high GFR (GFR > 90 mL/min/1 73 square meters)    Stage 2 Mild CKD (GFR = 60-89 mL/min/1 73 square meters)    Stage 3A Moderate CKD (GFR = 45-59 mL/min/1 73 square meters)    Stage 3B Moderate CKD (GFR = 30-44 mL/min/1 73 square meters)    Stage 4 Severe CKD (GFR = 15-29 mL/min/1 73 square meters)    Stage 5 End Stage CKD (GFR <15 mL/min/1 73 square meters)  Note: GFR calculation is accurate only with a steady state creatinine    Lipase [[id number]]  (Normal) Collected:  07/23/20 1747    Lab Status:  Final result Specimen:  Blood from Arm, Left Updated:  07/23/20 1829     Lipase 208 u/L     Urine Microscopic [[id number]]  (Abnormal) Collected:  07/23/20 1739    Lab Status:  Final result Specimen:  Urine, Clean Catch Updated:  07/23/20 1823     RBC, UA 10-20 /hpf      WBC, UA 4-10 /hpf      Epithelial Cells Occasional /hpf      Bacteria, UA Innumerable /hpf     CBC and differential [[id number]]  (Abnormal) Collected:  07/23/20 1746    Lab Status:  Final result Specimen:  Blood from Arm, Left Updated:  07/23/20 1755     WBC 10 14 Thousand/uL      RBC 4 46 Million/uL      Hemoglobin 11 5 g/dL      Hematocrit 38 4 %      MCV 86 fL      MCH 25 8 pg      MCHC 29 9 g/dL      RDW 14 8 %      MPV 9 7 fL      Platelets 745 Thousands/uL      nRBC 0 /100 WBCs      Neutrophils Relative 69 %      Immat GRANS % 0 %      Lymphocytes Relative 22 %      Monocytes Relative 7 %      Eosinophils Relative 2 %      Basophils Relative 0 %      Neutrophils Absolute 6 95 Thousands/µL      Immature Grans Absolute 0 04 Thousand/uL      Lymphocytes Absolute 2 22 Thousands/µL      Monocytes Absolute 0 71 Thousand/µL      Eosinophils Absolute 0 18 Thousand/µL      Basophils Absolute 0 04 Thousands/µL     Urine Macroscopic, POC [[id number]]  (Abnormal) Collected:  07/23/20 1739    Lab Status:  Final result Specimen:  Urine Updated:  07/23/20 1723     Color, UA Yellow     Clarity, UA Cloudy     pH, UA 6 0     Leukocytes, UA Negative     Nitrite, UA Negative     Protein,  (2+) mg/dl      Glucose, UA Negative mg/dl      Ketones, UA Negative mg/dl      Urobilinogen, UA 0 2 E U /dl      Bilirubin, UA Interference- unable to analyze     Blood, UA Small     Specific Hulbert, UA >=1 030    Narrative:       CLINITEK RESULT                 CT abdomen pelvis with contrast   Final Result by Komal Hoang MD (07/23 1914)      Hepatic steatosis and hepatomegaly  2 to 3 mm scattered left renal nonobstructing calculi  Colonic diverticulosis  Workstation performed: [id number]                    Procedures  Procedures         ED Course       US AUDIT      Most Recent Value   Initial Alcohol Screen: US AUDIT-C    1  How often do you have a drink containing alcohol?  0 Filed at: 07/23/2020 1643   2  How many drinks containing alcohol do you have on a typical day you are drinking? 0 Filed at: 07/23/2020 1643   3b  FEMALE Any Age, or MALE 65+: How often do you have 4 or more drinks on one occassion? 0 Filed at: 07/23/2020 1643   Audit-C Score  0 Filed at: 07/23/2020 1643                  JUAN/DAST-10      Most Recent Value   How many times in the past year have you       Used an illegal drug or used a prescription medication for non-medical reasons? Never Filed at: 07/23/2020 1644                                [facility]  Number of Diagnoses or Management Options  Gastritis: new and requires workup  GERD (gastroesophageal reflux disease): new and requires workup  Rash: new and requires workup  Diagnosis management comments: Patient presents for epigastric abdominal pain along with nausea, vomiting and diarrhea  Differential includes but not to gastritis versus PUD versus pancreatitis versus GERD versus gastroenteritis versus bowel obstruction    Labs and imaging ordered  GI cocktail and Zofran ordered for symptoms management  Labs reviewed with significant findings  These results were discussed with the patient  CT reviewed acute findings  Incidental findings were discussed patient  I advised the patient that she needs follow-up with gastroenterology and likely needs to be scheduled endoscopy  She acknowledged understanding  Will prescribe patient Zofran and Carafate to further help manage her symptoms  Of note, the patient asked me to look at a rash on her neck  She reports a history of fungal infections  She is prescription for clotrimazole as that is what has worked for her in the past   There is a area on her neck skin breakdown  It appears more consistent with chafing than fungal, however I agreed to prescribe her with the cream   I advised she follow up with her doctor reference this  Patient was given strict return to ED precautions for any new or significantly worsening symptoms  At the time of discharge, the patient reports feeling a little better  She is reporting little bit of ongoing nausea  She has had no episodes of vomiting  She is able to tolerate fluid intake  Patient advised stay well hydrated  Patient appropriate for discharge         Amount and/or Complexity of Data Reviewed  Clinical lab tests: ordered and reviewed  Tests in the radiology section of CPT®: ordered and reviewed  Decide to obtain previous medical records or to obtain history from someone other than the patient: yes  Review and summarize past medical records: yes    Risk of Complications, Morbidity, and/or Mortality  Presenting problems: low  Diagnostic procedures: low  Management options: low    Patient Progress  Patient progress: improved        Disposition  Final diagnoses:   Gastritis   GERD (gastroesophageal reflux disease)   Rash     Time reflects when diagnosis was documented in both MDM as applicable and the Disposition within this note     Time User Action Codes Description Comment    7/23/2020  7:48 PM Ankit Cheney Add [K29 70] Gastritis     7/23/2020  7:48 PM Sonal Duncan Add [K21 9] GERD (gastroesophageal reflux disease)     7/23/2020  7:48 PM Ankit Cheney Add [R21] Rash       ED Disposition     ED Disposition Condition Date/Time Comment    Discharge Stable u Jul 23, 2020  7:48 PM Kana Solorzano discharge to home/self care              Follow-up Information     Follow up With Specialties Details Why Contact Info Additional Paige 68, CRNP Nurse Practitioner Schedule an appointment as soon as possible for a visit  As needed [id number] [facility] Male [address]  [address] [facility] [place] [facility] Schedule an appointment as soon as possible for a visit in 1 week  Andrew Dukes [address] [facility] [place], Andrew Manzanares [address] [facility], [address] [facility], [facility] [address] Emergency Department Emergency Medicine  If symptoms worsen [address] [facility]  AN ED, [address], [facility], [address] [facility], [address]          Discharge Medication List as of 7/23/2020  7:50 PM      START taking these medications    Details   clotrimazole (LOTRIMIN) 1 % cream Apply to affected area 2 times daily, Normal      ondansetron (ZOFRAN) 4 mg tablet Take 1 tablet (4 mg total) by mouth every 6 (six) hours, Starting Thu 7/23/2020, Normal      sucralfate (CARAFATE) 1 g/10 mL suspension Take 10 mL (1 g total) by mouth 4 (four) times a day, Starting Thu 7/23/2020, Normal         CONTINUE these medications which have NOT CHANGED    Details   dicyclomine (BENTYL) 20 mg tablet Take 20 mg by mouth daily, Historical Med      escitalopram (LEXAPRO) 10 mg tablet Take 3 tablets (30 mg total) by mouth daily for 30 days, Starting Wed 9/11/2019, Until Fri 10/11/2019, Print      hydrochlorothiazide (HYDRODIURIL) 12 5 mg tablet Take 1 tablet (12 5 mg total) by mouth every other day, Starting Fri 6/26/2020, Until Sun 7/26/2020, Normal      insulin glargine (LANTUS) 100 units/mL subcutaneous injection Inject 40 Units under the skin daily at bedtime, Historical Med      lamoTRIgine (LaMICtal) 200 MG tablet Take 200 mg by mouth daily , Historical Med      lisinopril (ZESTRIL) 10 mg tablet Take 1 tablet (10 mg total) by mouth daily, Starting Fri 6/26/2020, Normal      LORazepam (ATIVAN) 0 5 mg tablet Take by mouth every 8 (eight) hours as needed for anxiety, Historical Med      metFORMIN (GLUCOPHAGE) 500 mg tablet Take 1 tablet (500 mg total) by mouth 2 (two) times a day with meals, Starting Fri 6/26/2020, Until Sun 7/26/2020, Normal      ondansetron (ZOFRAN-ODT) 4 mg disintegrating tablet Take 1 tablet (4 mg total) by mouth every 8 (eight) hours as needed for nausea or vomiting, Starting Fri 4/10/2020, Print      pantoprazole (PROTONIX) 40 mg tablet Take 1 tablet (40 mg total) by mouth daily, Starting Wed 4/1/2020, Normal      topiramate (TOPAMAX) 25 mg tablet Take 1 tablet (25 mg total) by mouth 2 (two) times a day for 30 days, Starting Tue 9/10/2019, Until Thu 10/10/2019, Print           No discharge procedures on file      PDMP Review     None          ED Provider  Electronically Signed by           Arjun Hung, BRYNN  07/23/20 2020

## 2020-07-28 RX ORDER — NITROFURANTOIN 25; 75 MG/1; MG/1
100 CAPSULE ORAL 2 TIMES DAILY
Qty: 10 CAPSULE | Refills: 0 | Status: SHIPPED | OUTPATIENT
Start: 2020-07-28 | End: 2020-08-24

## 2020-07-28 NOTE — QUICK NOTE
Identified patient by name and   Patient called in stating that she never received her Abx for UTI  She states that she continues to have dysuria, urinary frequency, pelvic and back pain  Chart review revealed that patient did have innumerable bacteria, +WBCs, +RBCs in urine  Prescription for macrobid sent to patient pharmacy  Patient was instructed on Abx use  Advised to follow up with PCP

## 2020-07-30 ENCOUNTER — HOSPITAL ENCOUNTER (EMERGENCY)
Facility: HOSPITAL | Age: 45
Discharge: HOME | End: 2020-07-31
Attending: EMERGENCY MEDICINE | Admitting: EMERGENCY MEDICINE
Payer: COMMERCIAL

## 2020-07-30 VITALS
RESPIRATION RATE: 20 BRPM | DIASTOLIC BLOOD PRESSURE: 70 MMHG | HEART RATE: 95 BPM | WEIGHT: 293 LBS | BODY MASS INDEX: 57.52 KG/M2 | TEMPERATURE: 98.5 F | HEIGHT: 60 IN | OXYGEN SATURATION: 98 % | SYSTOLIC BLOOD PRESSURE: 143 MMHG

## 2020-07-30 DIAGNOSIS — K29.70 GASTRITIS: Primary | ICD-10-CM

## 2020-07-30 LAB
ALBUMIN SERPL BCP-MCNC: 3.7 G/DL (ref 3.5–5)
ALP SERPL-CCNC: 78 U/L (ref 46–116)
ALT SERPL W P-5'-P-CCNC: 69 U/L (ref 12–78)
ANION GAP SERPL CALCULATED.3IONS-SCNC: 10 MMOL/L (ref 4–13)
AST SERPL W P-5'-P-CCNC: 36 U/L (ref 5–45)
BASOPHILS # BLD AUTO: 0.06 THOUSANDS/ΜL (ref 0–0.1)
BASOPHILS NFR BLD AUTO: 1 % (ref 0–1)
BILIRUB SERPL-MCNC: 0.39 MG/DL (ref 0.2–1)
BUN SERPL-MCNC: 13 MG/DL (ref 5–25)
CALCIUM SERPL-MCNC: 9.1 MG/DL (ref 8.3–10.1)
CHLORIDE SERPL-SCNC: 99 MMOL/L (ref 100–108)
CO2 SERPL-SCNC: 29 MMOL/L (ref 21–32)
CREAT SERPL-MCNC: 0.82 MG/DL (ref 0.6–1.3)
EOSINOPHIL # BLD AUTO: 0.18 THOUSAND/ΜL (ref 0–0.61)
EOSINOPHIL NFR BLD AUTO: 2 % (ref 0–6)
ERYTHROCYTE [DISTWIDTH] IN BLOOD BY AUTOMATED COUNT: 14.8 % (ref 11.6–15.1)
GFR SERPL CREATININE-BSD FRML MDRD: 85 ML/MIN/1.73SQ M
GLUCOSE SERPL-MCNC: 142 MG/DL (ref 65–140)
HCT VFR BLD AUTO: 39.9 % (ref 34.8–46.1)
HGB BLD-MCNC: 11.9 G/DL (ref 11.5–15.4)
HOLD SPECIMEN: NORMAL
IMM GRANULOCYTES # BLD AUTO: 0.05 THOUSAND/UL (ref 0–0.2)
IMM GRANULOCYTES NFR BLD AUTO: 1 % (ref 0–2)
LIPASE SERPL-CCNC: 175 U/L (ref 73–393)
LYMPHOCYTES # BLD AUTO: 2.98 THOUSANDS/ΜL (ref 0.6–4.47)
LYMPHOCYTES NFR BLD AUTO: 33 % (ref 14–44)
MCH RBC QN AUTO: 25.5 PG (ref 26.8–34.3)
MCHC RBC AUTO-ENTMCNC: 29.8 G/DL (ref 31.4–37.4)
MCV RBC AUTO: 85 FL (ref 82–98)
MONOCYTES # BLD AUTO: 0.81 THOUSAND/ΜL (ref 0.17–1.22)
MONOCYTES NFR BLD AUTO: 9 % (ref 4–12)
NEUTROPHILS # BLD AUTO: 5.09 THOUSANDS/ΜL (ref 1.85–7.62)
NEUTS SEG NFR BLD AUTO: 54 % (ref 43–75)
NRBC BLD AUTO-RTO: 0 /100 WBCS
PLATELET # BLD AUTO: 415 THOUSANDS/UL (ref 149–390)
PMV BLD AUTO: 9.1 FL (ref 8.9–12.7)
POTASSIUM SERPL-SCNC: 3.9 MMOL/L (ref 3.5–5.3)
PROT SERPL-MCNC: 8 G/DL (ref 6.4–8.2)
RBC # BLD AUTO: 4.67 MILLION/UL (ref 3.81–5.12)
SODIUM SERPL-SCNC: 138 MMOL/L (ref 136–145)
WBC # BLD AUTO: 9.17 THOUSAND/UL (ref 4.31–10.16)

## 2020-07-30 PROCEDURE — C9113 INJ PANTOPRAZOLE SODIUM, VIA: HCPCS | Performed by: EMERGENCY MEDICINE

## 2020-07-30 PROCEDURE — 99284 EMERGENCY DEPT VISIT MOD MDM: CPT

## 2020-07-30 PROCEDURE — 96375 TX/PRO/DX INJ NEW DRUG ADDON: CPT

## 2020-07-30 PROCEDURE — 99284 EMERGENCY DEPT VISIT MOD MDM: CPT | Performed by: EMERGENCY MEDICINE

## 2020-07-30 PROCEDURE — 85025 COMPLETE CBC W/AUTO DIFF WBC: CPT | Performed by: EMERGENCY MEDICINE

## 2020-07-30 PROCEDURE — 80053 COMPREHEN METABOLIC PANEL: CPT | Performed by: EMERGENCY MEDICINE

## 2020-07-30 PROCEDURE — 36415 COLL VENOUS BLD VENIPUNCTURE: CPT

## 2020-07-30 PROCEDURE — 96361 HYDRATE IV INFUSION ADD-ON: CPT

## 2020-07-30 PROCEDURE — 83690 ASSAY OF LIPASE: CPT | Performed by: EMERGENCY MEDICINE

## 2020-07-30 PROCEDURE — 96374 THER/PROPH/DIAG INJ IV PUSH: CPT

## 2020-07-30 RX ORDER — MAGNESIUM HYDROXIDE/ALUMINUM HYDROXICE/SIMETHICONE 120; 1200; 1200 MG/30ML; MG/30ML; MG/30ML
30 SUSPENSION ORAL ONCE
Status: COMPLETED | OUTPATIENT
Start: 2020-07-30 | End: 2020-07-30

## 2020-07-30 RX ORDER — DIPHENHYDRAMINE HYDROCHLORIDE 50 MG/ML
25 INJECTION INTRAMUSCULAR; INTRAVENOUS ONCE
Status: COMPLETED | OUTPATIENT
Start: 2020-07-30 | End: 2020-07-30

## 2020-07-30 RX ORDER — LIDOCAINE HYDROCHLORIDE 20 MG/ML
15 SOLUTION OROPHARYNGEAL ONCE
Status: COMPLETED | OUTPATIENT
Start: 2020-07-30 | End: 2020-07-30

## 2020-07-30 RX ORDER — PANTOPRAZOLE SODIUM 40 MG/1
40 INJECTION, POWDER, FOR SOLUTION INTRAVENOUS ONCE
Status: COMPLETED | OUTPATIENT
Start: 2020-07-30 | End: 2020-07-30

## 2020-07-30 RX ORDER — METOCLOPRAMIDE HYDROCHLORIDE 5 MG/ML
10 INJECTION INTRAMUSCULAR; INTRAVENOUS ONCE
Status: COMPLETED | OUTPATIENT
Start: 2020-07-30 | End: 2020-07-30

## 2020-07-30 RX ADMIN — PANTOPRAZOLE SODIUM 40 MG: 40 INJECTION, POWDER, FOR SOLUTION INTRAVENOUS at 22:12

## 2020-07-30 RX ADMIN — DIPHENHYDRAMINE HYDROCHLORIDE 25 MG: 50 INJECTION, SOLUTION INTRAMUSCULAR; INTRAVENOUS at 23:38

## 2020-07-30 RX ADMIN — SODIUM CHLORIDE 1000 ML: 0.9 INJECTION, SOLUTION INTRAVENOUS at 22:12

## 2020-07-30 RX ADMIN — METOCLOPRAMIDE HYDROCHLORIDE 10 MG: 5 INJECTION INTRAMUSCULAR; INTRAVENOUS at 23:40

## 2020-07-30 RX ADMIN — ALUMINUM HYDROXIDE, MAGNESIUM HYDROXIDE, AND SIMETHICONE 30 ML: 200; 200; 20 SUSPENSION ORAL at 22:14

## 2020-07-30 RX ADMIN — LIDOCAINE HYDROCHLORIDE 15 ML: 20 SOLUTION ORAL; TOPICAL at 22:14

## 2020-07-31 NOTE — ED PROVIDER NOTES
History  Chief Complaint   Patient presents with    Abdominal Pain     Patient c/o ongoing abd pain/burning (central, upper), nausea and vomitting - present x2 wks, and unrelieved by prescribed Zofran and carafate   Nausea     51 yo female with depression, DM, GERD, HTN, obesity,and IBS presents with intermittent nausea and burning epigastric pain  Seen for same one week prior, had reassuring labs and negative CT scan  Was prescribed carafate and zofran with some improvement but became intolerable tonight  Pt has appointment with GI in next 5 days  Denies f/c/cp/sob  Denies dark/tarry or black stool  Reports last BM yesterday wnl  Reports adhering to bland diet, avoiding NSAIDs, EtOH and caffeine  Takes protonix daily, does not think it is helping  History provided by:  Medical records and patient   used: No    Abdominal Pain   Pain location:  Epigastric  Pain quality: burning    Pain radiates to:  Does not radiate  Pain severity:  Moderate  Onset quality:  Gradual  Duration:  1 week  Timing:  Intermittent  Progression:  Worsening  Chronicity:  New  Context: not alcohol use, not retching, not sick contacts, not suspicious food intake and not trauma    Relieved by:  Nothing  Worsened by:  Nothing  Associated symptoms: anorexia, nausea and vomiting    Associated symptoms: no constipation, no cough, no diarrhea, no dysuria, no fever, no hematochezia and no melena    Risk factors: no alcohol abuse and no NSAID use    Nausea   The primary symptoms include abdominal pain, nausea and vomiting  Primary symptoms do not include fever, diarrhea, melena, hematochezia or dysuria  The illness is also significant for anorexia  The illness does not include constipation  Prior to Admission Medications   Prescriptions Last Dose Informant Patient Reported? Taking?    LORazepam (ATIVAN) 0 5 mg tablet   Yes No   Sig: Take by mouth every 8 (eight) hours as needed for anxiety   clotrimazole (LOTRIMIN) 1 % cream   No No   Sig: Apply to affected area 2 times daily   dicyclomine (BENTYL) 20 mg tablet   Yes No   Sig: Take 20 mg by mouth daily   escitalopram (LEXAPRO) 10 mg tablet   No No   Sig: Take 3 tablets (30 mg total) by mouth daily for 30 days   hydrochlorothiazide (HYDRODIURIL) 12 5 mg tablet   No No   Sig: Take 1 tablet (12 5 mg total) by mouth every other day   insulin glargine (LANTUS) 100 units/mL subcutaneous injection   Yes No   Sig: Inject 40 Units under the skin daily at bedtime   lamoTRIgine (LaMICtal) 200 MG tablet   Yes No   Sig: Take 200 mg by mouth daily    lisinopril (ZESTRIL) 10 mg tablet   No No   Sig: Take 1 tablet (10 mg total) by mouth daily   metFORMIN (GLUCOPHAGE) 500 mg tablet   No No   Sig: Take 1 tablet (500 mg total) by mouth 2 (two) times a day with meals   nitrofurantoin (MACROBID) 100 mg capsule   No No   Sig: Take 1 capsule (100 mg total) by mouth 2 (two) times a day   ondansetron (ZOFRAN) 4 mg tablet   No No   Sig: Take 1 tablet (4 mg total) by mouth every 6 (six) hours   ondansetron (ZOFRAN-ODT) 4 mg disintegrating tablet   No No   Sig: Take 1 tablet (4 mg total) by mouth every 8 (eight) hours as needed for nausea or vomiting   pantoprazole (PROTONIX) 40 mg tablet   No No   Sig: Take 1 tablet (40 mg total) by mouth daily   sucralfate (CARAFATE) 1 g/10 mL suspension   No No   Sig: Take 10 mL (1 g total) by mouth 4 (four) times a day   topiramate (TOPAMAX) 25 mg tablet   No No   Sig: Take 1 tablet (25 mg total) by mouth 2 (two) times a day for 30 days      Facility-Administered Medications: None       Past Medical History:   Diagnosis Date    Anemia     Anxiety     Depression     Diabetes mellitus (HCC)     GERD (gastroesophageal reflux disease)     Hypertension     Irritable bowel syndrome     Obesity     Psychiatric disorder     Seasonal allergies        Past Surgical History:   Procedure Laterality Date     SECTION  1992    CHOLECYSTECTOMY    WISDOM TOOTH EXTRACTION  2009       Family History   Problem Relation Age of Onset    Diabetes Mother     Hypertension Father      I have reviewed and agree with the history as documented  E-Cigarette/Vaping    E-Cigarette Use Never User      E-Cigarette/Vaping Substances     Social History     Tobacco Use    Smoking status: Never Smoker    Smokeless tobacco: Never Used   Substance Use Topics    Alcohol use: No    Drug use: No       Review of Systems   Constitutional: Negative for fever  Respiratory: Negative for cough  Gastrointestinal: Positive for abdominal pain, anorexia, nausea and vomiting  Negative for constipation, diarrhea, hematochezia and melena  Genitourinary: Negative for dysuria  All other systems reviewed and are negative  Physical Exam  Physical Exam   Constitutional: She is oriented to person, place, and time  She appears well-developed and well-nourished  HENT:   Head: Normocephalic and atraumatic  Eyes: Conjunctivae are normal    Neck: Normal range of motion  Cardiovascular: Normal rate, regular rhythm, normal heart sounds and intact distal pulses  No murmur heard  Pulmonary/Chest: Effort normal and breath sounds normal  No respiratory distress  Abdominal: Soft  Bowel sounds are normal  There is no tenderness  There is no rigidity, no rebound and no guarding  Musculoskeletal: Normal range of motion  She exhibits no deformity  Neurological: She is alert and oriented to person, place, and time  Skin: Skin is warm and dry  She is not diaphoretic  Psychiatric: She has a normal mood and affect  Her behavior is normal  Judgment and thought content normal    Nursing note and vitals reviewed        Vital Signs  ED Triage Vitals [07/30/20 1937]   Temperature Pulse Respirations Blood Pressure SpO2   98 5 °F (36 9 °C) 95 20 (!) 179/97 97 %      Temp Source Heart Rate Source Patient Position - Orthostatic VS BP Location FiO2 (%)   Oral Monitor Sitting Left arm -- Pain Score       Worst Possible Pain           Vitals:    07/30/20 1937 07/30/20 2125   BP: (!) 179/97 (!) 192/84   Pulse: 95 104   Patient Position - Orthostatic VS: Sitting Lying         Visual Acuity      ED Medications  Medications   sodium chloride 0 9 % bolus 1,000 mL (1,000 mL Intravenous New Bag 7/30/20 2212)   pantoprazole (PROTONIX) injection 40 mg (40 mg Intravenous Given 7/30/20 2212)   aluminum-magnesium hydroxide-simethicone (MYLANTA) 200-200-20 mg/5 mL oral suspension 30 mL (30 mL Oral Given 7/30/20 2214)   Lidocaine Viscous HCl (XYLOCAINE) 2 % mucosal solution 15 mL (15 mL Swish & Spit Given 7/30/20 2214)       Diagnostic Studies  Results Reviewed     Procedure Component Value Units Date/Time    Comprehensive metabolic panel [[id number]]  (Abnormal) Collected:  07/30/20 2012    Lab Status:  Final result Specimen:  Blood from Arm, Right Updated:  07/30/20 2048     Sodium 138 mmol/L      Potassium 3 9 mmol/L      Chloride 99 mmol/L      CO2 29 mmol/L      ANION GAP 10 mmol/L      BUN 13 mg/dL      Creatinine 0 82 mg/dL      Glucose 142 mg/dL      Calcium 9 1 mg/dL      AST 36 U/L      ALT 69 U/L      Alkaline Phosphatase 78 U/L      Total Protein 8 0 g/dL      Albumin 3 7 g/dL      Total Bilirubin 0 39 mg/dL      eGFR 85 ml/min/1 73sq m     Narrative:       [place] guidelines for Chronic Kidney Disease (CKD):     Stage 1 with normal or high GFR (GFR > 90 mL/min/1 73 square meters)    Stage 2 Mild CKD (GFR = 60-89 mL/min/1 73 square meters)    Stage 3A Moderate CKD (GFR = 45-59 mL/min/1 73 square meters)    Stage 3B Moderate CKD (GFR = 30-44 mL/min/1 73 square meters)    Stage 4 Severe CKD (GFR = 15-29 mL/min/1 73 square meters)    Stage 5 End Stage CKD (GFR <15 mL/min/1 73 square meters)  Note: GFR calculation is accurate only with a steady state creatinine    Lipase [[id number]]  (Normal) Collected:  07/30/20 2012    Lab Status:  Final result Specimen:  Blood from Sánchez, Right Updated:  07/30/20 2041     Lipase 175 u/L     CBC and differential [[id number]]  (Abnormal) Collected:  07/30/20 2012    Lab Status:  Final result Specimen:  Blood from Arm, Right Updated:  07/30/20 2023     WBC 9 17 Thousand/uL      RBC 4 67 Million/uL      Hemoglobin 11 9 g/dL      Hematocrit 39 9 %      MCV 85 fL      MCH 25 5 pg      MCHC 29 8 g/dL      RDW 14 8 %      MPV 9 1 fL      Platelets 063 Thousands/uL      nRBC 0 /100 WBCs      Neutrophils Relative 54 %      Immat GRANS % 1 %      Lymphocytes Relative 33 %      Monocytes Relative 9 %      Eosinophils Relative 2 %      Basophils Relative 1 %      Neutrophils Absolute 5 09 Thousands/µL      Immature Grans Absolute 0 05 Thousand/uL      Lymphocytes Absolute 2 98 Thousands/µL      Monocytes Absolute 0 81 Thousand/µL      Eosinophils Absolute 0 18 Thousand/µL      Basophils Absolute 0 06 Thousands/µL     UA (URINE) with reflex to Scope [[id number]]     Lab Status:  No result Specimen:  Urine                  No orders to display              Procedures  Procedures         ED Course       US AUDIT      Most Recent Value   Initial Alcohol Screen: US AUDIT-C    1  How often do you have a drink containing alcohol?  0 Filed at: 07/30/2020 2023   2  How many drinks containing alcohol do you have on a typical day you are drinking? 0 Filed at: 07/30/2020 2023   3a  Male UNDER 65: How often do you have five or more drinks on one occasion? 0 Filed at: 07/30/2020 2023   3b  FEMALE Any Age, or MALE 65+: How often do you have 4 or more drinks on one occassion? 0 Filed at: 07/30/2020 2023   Audit-C Score  0 Filed at: 07/30/2020 2023                  JUAN/DAST-10      Most Recent Value   How many times in the past year have you    Used an illegal drug or used a prescription medication for non-medical reasons?   Never Filed at: 07/30/2020 1940                                MDM  Number of Diagnoses or Management Options  Gastritis:   Diagnosis management comments: Pt with reassuring labs, benign abdomen, mild symptomatic improvement with ED treatment  No h/o GI bleeding  Pt safe for previously scheduled GI follow up in several days  Return with worsening symptoms or any new concerns  Amount and/or Complexity of Data Reviewed  Clinical lab tests: ordered and reviewed  Tests in the radiology section of CPT®: ordered and reviewed  Tests in the medicine section of CPT®: ordered and reviewed  Review and summarize past medical records: yes  Independent visualization of images, tracings, or specimens: yes    Risk of Complications, Morbidity, and/or Mortality  Presenting problems: moderate  Diagnostic procedures: moderate  Management options: moderate    Patient Progress  Patient progress: stable        Disposition  Final diagnoses:   None     ED Disposition     None      Follow-up Information    None         Patient's Medications   Discharge Prescriptions    No medications on file     No discharge procedures on file      PDMP Review     None          ED Provider  Electronically Signed by           Emmanuel Ceballos MD  07/31/20 6400

## 2020-07-31 NOTE — ED NOTES
Pt in negative distress at this time  Ambulated off unit with steady gait  No other questions upon discharge       Clarisa Zhao RN  07/31/20 2728

## 2020-07-31 NOTE — DISCHARGE INSTRUCTIONS
Your labs and exam were reassuring  You had some improvement of symptoms and have follow up already arranged with GI  Please adhere to a bland diet, stay hydrated, and follow up with GI as previously scheduled  Return to the ER if your symptoms progress, you cannot eat/drink, have dark/tarry or bloody bowel movements, have chest pain or shortness of breath or any new concerns

## 2020-08-04 ENCOUNTER — TELEPHONE (OUTPATIENT)
Dept: GASTROENTEROLOGY | Facility: AMBULARY SURGERY CENTER | Age: 45
End: 2020-08-04

## 2020-08-11 ENCOUNTER — OFFICE VISIT (OUTPATIENT)
Dept: GASTROENTEROLOGY | Facility: AMBULARY SURGERY CENTER | Age: 45
End: 2020-08-11
Payer: COMMERCIAL

## 2020-08-11 VITALS — TEMPERATURE: 97.9 F | BODY MASS INDEX: 57.52 KG/M2 | WEIGHT: 293 LBS | HEART RATE: 108 BPM | HEIGHT: 60 IN

## 2020-08-11 DIAGNOSIS — R11.0 NAUSEA: ICD-10-CM

## 2020-08-11 DIAGNOSIS — K29.70 GASTRITIS: ICD-10-CM

## 2020-08-11 DIAGNOSIS — K21.9 GERD (GASTROESOPHAGEAL REFLUX DISEASE): ICD-10-CM

## 2020-08-11 DIAGNOSIS — R10.13 EPIGASTRIC PAIN: Primary | ICD-10-CM

## 2020-08-11 DIAGNOSIS — R19.7 DIARRHEA, UNSPECIFIED TYPE: ICD-10-CM

## 2020-08-11 PROCEDURE — 99244 OFF/OP CNSLTJ NEW/EST MOD 40: CPT | Performed by: INTERNAL MEDICINE

## 2020-08-11 PROCEDURE — 1036F TOBACCO NON-USER: CPT | Performed by: INTERNAL MEDICINE

## 2020-08-11 RX ORDER — DULOXETIN HYDROCHLORIDE 30 MG/1
CAPSULE, DELAYED RELEASE ORAL
COMMUNITY
Start: 2020-07-25 | End: 2021-08-03 | Stop reason: HOSPADM

## 2020-08-11 RX ORDER — PROCHLORPERAZINE MALEATE 10 MG
10 TABLET ORAL EVERY 8 HOURS PRN
Qty: 15 TABLET | Refills: 1 | Status: SHIPPED | OUTPATIENT
Start: 2020-08-11 | End: 2020-08-24

## 2020-08-11 RX ORDER — SUCRALFATE ORAL 1 G/10ML
1 SUSPENSION ORAL 4 TIMES DAILY
Qty: 420 ML | Refills: 1 | Status: SHIPPED | OUTPATIENT
Start: 2020-08-11 | End: 2021-08-03 | Stop reason: HOSPADM

## 2020-08-11 RX ORDER — LIRAGLUTIDE 6 MG/ML
INJECTION SUBCUTANEOUS
COMMUNITY
End: 2020-12-29 | Stop reason: SDUPTHER

## 2020-08-11 RX ORDER — SODIUM, POTASSIUM,MAG SULFATES 17.5-3.13G
2 SOLUTION, RECONSTITUTED, ORAL ORAL SEE ADMIN INSTRUCTIONS
Qty: 2 BOTTLE | Refills: 0 | Status: SHIPPED | OUTPATIENT
Start: 2020-08-11 | End: 2020-08-24

## 2020-08-11 NOTE — ASSESSMENT & PLAN NOTE
Burning pain in the epigastric area, appear to most likely from gastritis or functional nonulcer dyspepsia  She also has associated nausea which could be possible from diabetic gastroparesis  -continue Protonix and Carafate    -advised about small frequent low-fat diet    -schedule for EGD    -patient reports that Zofran is not helping her  Will put her on Compazine as needed  Discussed about the side effects  Pt reports mid bilateral back pain starting for the past 2 days. Denies urinary s/s, n/v/d. No known injury.

## 2020-08-11 NOTE — PROGRESS NOTES
Consultation - [facility]  Chula Adjutant 1975 female         Chief Complaint:  Abdominal pain, nausea, and diarrhea    HPI:  78-year-old morbidly obese female with history of diabetes mellitus, depression, IBS reports having burning epigastric pain for about a month  She also has associated nausea and also vomiting at times  Complaining about diarrhea which she describes as episodes of loose bowel movements associated with gassiness  Denies any blood or mucus in the stool  Good appetite, no recent weight loss  She has problems with chronic acid reflux for which she takes Protonix regularly  She was seen in the emergency room recently because of abdominal pain and had a negative CT scan/lab workup  She was discharged home on Carafate  Good appetite, no recent weight loss  REVIEW OF SYSTEMS: Review of Systems   Constitutional: Negative for activity change, appetite change, chills, diaphoresis, fatigue, fever and unexpected weight change  HENT: Negative for ear discharge, ear pain, facial swelling, hearing loss, nosebleeds, sore throat, tinnitus and voice change  Eyes: Negative for pain, discharge, redness, itching and visual disturbance  Respiratory: Negative for apnea, cough, chest tightness, shortness of breath and wheezing  Cardiovascular: Negative for chest pain and palpitations  Gastrointestinal:        As noted in HPI   Endocrine: Negative for cold intolerance, heat intolerance and polyuria  Genitourinary: Negative for difficulty urinating, dysuria, flank pain, hematuria and urgency  Musculoskeletal: Negative for arthralgias, back pain, gait problem, joint swelling and myalgias  Skin: Negative for rash and wound  Neurological: Negative for dizziness, tremors, seizures, speech difficulty, light-headedness, numbness and headaches  Hematological: Negative for adenopathy  Does not bruise/bleed easily     Psychiatric/Behavioral: Negative for agitation, behavioral problems and confusion  The patient is not nervous/anxious           Past Medical History:   Diagnosis Date    Anemia     Anxiety     Depression     Diabetes mellitus (HCC)     GERD (gastroesophageal reflux disease)     Hypertension     Irritable bowel syndrome     Obesity     Psychiatric disorder     Seasonal allergies       Past Surgical History:   Procedure Laterality Date     SECTION  1992    CHOLECYSTECTOMY  2008    WISDOM TOOTH EXTRACTION  2009     Social History     Socioeconomic History    Marital status:      Spouse name: Not on file    Number of children: 1    Years of education: Not on file    Highest education level: Not on file   Occupational History    Not on file   Social Needs    Financial resource strain: Not on file    Food insecurity     Worry: Not on file     Inability: Not on file   [employer] needs     Medical: Not on file     Non-medical: Not on file   Tobacco Use    Smoking status: Never Smoker    Smokeless tobacco: Never Used   Substance and Sexual Activity    Alcohol use: No    Drug use: No    Sexual activity: Yes   Lifestyle    Physical activity     Days per week: Not on file     Minutes per session: Not on file    Stress: Not on file   Relationships    Social connections     Talks on phone: Not on file     Gets together: Not on file     Attends Gnosticist service: Not on file     Active member of club or organization: Not on file     Attends meetings of clubs or organizations: Not on file     Relationship status: Not on file    Intimate partner violence     Fear of current or ex partner: Not on file     Emotionally abused: Not on file     Physically abused: Not on file     Forced sexual activity: Not on file   Other Topics Concern    Not on file   Social History Narrative    · Most recent tobacco use screenin2019      · Do you currently or have you served in the [employer]:   No      · Were you activated, into active duty, as a member of the [id number] or as a Reservist:   No      · General stress level:   High       · Caffeine intake:   Occasional      · Seat belts used routinely:   Yes      · Sunscreen used routinely:   Yes      · Smoke alarm in home:   Yes      Family History   Problem Relation Age of Onset    Diabetes Mother     Hypertension Father      Cephalexin;  Insulin degludec; and Sulfamethoxazole-trimethoprim  Current Outpatient Medications   Medication Sig Dispense Refill    clotrimazole (LOTRIMIN) 1 % cream Apply to affected area 2 times daily 15 g 0    dicyclomine (BENTYL) 20 mg tablet Take 20 mg by mouth daily      DULoxetine (CYMBALTA) 30 mg delayed release capsule       escitalopram (LEXAPRO) 10 mg tablet Take 3 tablets (30 mg total) by mouth daily for 30 days 90 tablet 0    insulin glargine (LANTUS) 100 units/mL subcutaneous injection Inject 40 Units under the skin daily at bedtime      lamoTRIgine (LaMICtal) 200 MG tablet Take 200 mg by mouth daily       liraglutide (Victoza) injection Victoza 3-Chi 0 6 mg/0 1 mL (18 mg/3 mL) subcutaneous pen injector   INJECT 1 8MG SUBCUTANEOUSLY EVERY DAY      lisinopril (ZESTRIL) 10 mg tablet Take 1 tablet (10 mg total) by mouth daily 30 tablet 1    LORazepam (ATIVAN) 0 5 mg tablet Take by mouth every 8 (eight) hours as needed for anxiety      ondansetron (ZOFRAN) 4 mg tablet Take 1 tablet (4 mg total) by mouth every 6 (six) hours 12 tablet 0    pantoprazole (PROTONIX) 40 mg tablet Take 1 tablet (40 mg total) by mouth daily 90 tablet 1    sucralfate (CARAFATE) 1 g/10 mL suspension Take 10 mL (1 g total) by mouth 4 (four) times a day 420 mL 1    hydrochlorothiazide (HYDRODIURIL) 12 5 mg tablet Take 1 tablet (12 5 mg total) by mouth every other day 30 tablet 0    metFORMIN (GLUCOPHAGE) 500 mg tablet Take 1 tablet (500 mg total) by mouth 2 (two) times a day with meals 60 tablet 0    Na Sulfate-K Sulfate-Mg Sulf (Suprep Bowel Prep Kit) 17 5-3 13-1 6 GM/177ML SOLN Take 2 Bottles by mouth see administration instructions Please follow the instructions from the office 2 Bottle 0    nitrofurantoin (MACROBID) 100 mg capsule Take 1 capsule (100 mg total) by mouth 2 (two) times a day (Patient not taking: Reported on 8/11/2020) 10 capsule 0    ondansetron (ZOFRAN-ODT) 4 mg disintegrating tablet Take 1 tablet (4 mg total) by mouth every 8 (eight) hours as needed for nausea or vomiting (Patient not taking: Reported on 8/11/2020) 10 tablet 0    prochlorperazine (COMPAZINE) 10 mg tablet Take 1 tablet (10 mg total) by mouth every 8 (eight) hours as needed for nausea or vomiting 15 tablet 1    topiramate (TOPAMAX) 25 mg tablet Take 1 tablet (25 mg total) by mouth 2 (two) times a day for 30 days (Patient not taking: Reported on 8/11/2020) 60 tablet 0     No current facility-administered medications for this visit  Pulse (!) 108, temperature 97 9 °F (36 6 °C), height 5' (1 524 m), weight (!) 162 kg (358 lb)  PHYSICAL EXAM: Physical Exam     Lab Results   Component Value Date    WBC 9 17 07/30/2020    HGB 11 9 07/30/2020    HCT 39 9 07/30/2020    MCV 85 07/30/2020     (H) 07/30/2020     Lab Results   Component Value Date    GLUCOSE 159 (H) 09/30/2015    CALCIUM 9 1 07/30/2020     09/30/2015    K 3 9 07/30/2020    CO2 29 07/30/2020    CL 99 (L) 07/30/2020    BUN 13 07/30/2020    CREATININE 0 82 07/30/2020     Lab Results   Component Value Date    ALT 69 07/30/2020    AST 36 07/30/2020    ALKPHOS 78 07/30/2020    BILITOT 0 27 09/30/2015     No results found for: INR, PROTIME    No results found  ASSESSMENT & PLAN:    Epigastric pain  Burning pain in the epigastric area, appear to most likely from gastritis or functional nonulcer dyspepsia  She also has associated nausea which could be possible from diabetic gastroparesis      -continue Protonix and Carafate    -advised about small frequent low-fat diet    -schedule for EGD    -patient reports that Zofran is not helping her  Will put her on Compazine as needed  Discussed about the side effects  GERD (gastroesophageal reflux disease)  Gastroesophageal reflux disease - Patient has the symptoms of chronic acid reflux for a long time  Possible hiatal hernia or LES weakness  Should rule out Camilo's esophagus because of chronic symptoms         -Patient was explained about the lifestyle and dietary modifications  Advised to avoid fatty foods, chocolates, caffeine, alcohol and any other triggering foods  Avoid eating for at least 3 hours before going to bed         -continue Protonix  Discussed about the long-term side effects     -schedule for EGD    Diarrhea  Possible from irritable bowel syndrome or diabetic autonomic neuropathy  Rule out infectious etiology or IBD which seems to be less likely  Rule out celiac disease or lactose intolerance     -check stool studies including C diff, cultures    -celiac disease panel and food allergy profile    -may try to stop milk and dairy products for few weeks to see the response    -Schedule for colonoscopy  -High-fiber diet     -Patient was given instructions about the colonoscopy prep     -Patient was explained about  the risks and benefits of the procedure  Risks including but not limited to bleeding, infection, perforation were explained in detail  Also explained about less than 100% sensitivity with the exam and other alternatives

## 2020-08-11 NOTE — ASSESSMENT & PLAN NOTE
Gastroesophageal reflux disease - Patient has the symptoms of chronic acid reflux for a long time  Possible hiatal hernia or LES weakness  Should rule out Camilo's esophagus because of chronic symptoms         -Patient was explained about the lifestyle and dietary modifications  Advised to avoid fatty foods, chocolates, caffeine, alcohol and any other triggering foods  Avoid eating for at least 3 hours before going to bed         -continue Protonix    Discussed about the long-term side effects     -schedule for EGD

## 2020-08-11 NOTE — ASSESSMENT & PLAN NOTE
Possible from irritable bowel syndrome or diabetic autonomic neuropathy  Rule out infectious etiology or IBD which seems to be less likely  Rule out celiac disease or lactose intolerance     -check stool studies including C diff, cultures    -celiac disease panel and food allergy profile    -may try to stop milk and dairy products for few weeks to see the response    -Schedule for colonoscopy  -High-fiber diet     -Patient was given instructions about the colonoscopy prep     -Patient was explained about  the risks and benefits of the procedure  Risks including but not limited to bleeding, infection, perforation were explained in detail  Also explained about less than 100% sensitivity with the exam and other alternatives

## 2020-08-11 NOTE — H&P (VIEW-ONLY)
Consultation - [facility]  Leopold Sou 1975 female         Chief Complaint:  Abdominal pain, nausea, and diarrhea    HPI:  18-year-old morbidly obese female with history of diabetes mellitus, depression, IBS reports having burning epigastric pain for about a month  She also has associated nausea and also vomiting at times  Complaining about diarrhea which she describes as episodes of loose bowel movements associated with gassiness  Denies any blood or mucus in the stool  Good appetite, no recent weight loss  She has problems with chronic acid reflux for which she takes Protonix regularly  She was seen in the emergency room recently because of abdominal pain and had a negative CT scan/lab workup  She was discharged home on Carafate  Good appetite, no recent weight loss  REVIEW OF SYSTEMS: Review of Systems   Constitutional: Negative for activity change, appetite change, chills, diaphoresis, fatigue, fever and unexpected weight change  HENT: Negative for ear discharge, ear pain, facial swelling, hearing loss, nosebleeds, sore throat, tinnitus and voice change  Eyes: Negative for pain, discharge, redness, itching and visual disturbance  Respiratory: Negative for apnea, cough, chest tightness, shortness of breath and wheezing  Cardiovascular: Negative for chest pain and palpitations  Gastrointestinal:        As noted in HPI   Endocrine: Negative for cold intolerance, heat intolerance and polyuria  Genitourinary: Negative for difficulty urinating, dysuria, flank pain, hematuria and urgency  Musculoskeletal: Negative for arthralgias, back pain, gait problem, joint swelling and myalgias  Skin: Negative for rash and wound  Neurological: Negative for dizziness, tremors, seizures, speech difficulty, light-headedness, numbness and headaches  Hematological: Negative for adenopathy  Does not bruise/bleed easily     Psychiatric/Behavioral: Negative for agitation, behavioral problems and confusion  The patient is not nervous/anxious           Past Medical History:   Diagnosis Date    Anemia     Anxiety     Depression     Diabetes mellitus (HCC)     GERD (gastroesophageal reflux disease)     Hypertension     Irritable bowel syndrome     Obesity     Psychiatric disorder     Seasonal allergies       Past Surgical History:   Procedure Laterality Date     SECTION  1992    CHOLECYSTECTOMY  2008    WISDOM TOOTH EXTRACTION  2009     Social History     Socioeconomic History    Marital status:      Spouse name: Not on file    Number of children: 1    Years of education: Not on file    Highest education level: Not on file   Occupational History    Not on file   Social Needs    Financial resource strain: Not on file    Food insecurity     Worry: Not on file     Inability: Not on file   [employer] needs     Medical: Not on file     Non-medical: Not on file   Tobacco Use    Smoking status: Never Smoker    Smokeless tobacco: Never Used   Substance and Sexual Activity    Alcohol use: No    Drug use: No    Sexual activity: Yes   Lifestyle    Physical activity     Days per week: Not on file     Minutes per session: Not on file    Stress: Not on file   Relationships    Social connections     Talks on phone: Not on file     Gets together: Not on file     Attends Judaism service: Not on file     Active member of club or organization: Not on file     Attends meetings of clubs or organizations: Not on file     Relationship status: Not on file    Intimate partner violence     Fear of current or ex partner: Not on file     Emotionally abused: Not on file     Physically abused: Not on file     Forced sexual activity: Not on file   Other Topics Concern    Not on file   Social History Narrative    · Most recent tobacco use screenin2019      · Do you currently or have you served in the [employer]:   No      · Were you activated, into active duty, as a member of the [employer] or as a Reservist:   No      · General stress level:   High       · Caffeine intake:   Occasional      · Seat belts used routinely:   Yes      · Sunscreen used routinely:   Yes      · Smoke alarm in home:   Yes      Family History   Problem Relation Age of Onset    Diabetes Mother     Hypertension Father      Cephalexin;  Insulin degludec; and Sulfamethoxazole-trimethoprim  Current Outpatient Medications   Medication Sig Dispense Refill    clotrimazole (LOTRIMIN) 1 % cream Apply to affected area 2 times daily 15 g 0    dicyclomine (BENTYL) 20 mg tablet Take 20 mg by mouth daily      DULoxetine (CYMBALTA) 30 mg delayed release capsule       escitalopram (LEXAPRO) 10 mg tablet Take 3 tablets (30 mg total) by mouth daily for 30 days 90 tablet 0    insulin glargine (LANTUS) 100 units/mL subcutaneous injection Inject 40 Units under the skin daily at bedtime      lamoTRIgine (LaMICtal) 200 MG tablet Take 200 mg by mouth daily       liraglutide (Victoza) injection Victoza 3-Chi 0 6 mg/0 1 mL (18 mg/3 mL) subcutaneous pen injector   INJECT 1 8MG SUBCUTANEOUSLY EVERY DAY      lisinopril (ZESTRIL) 10 mg tablet Take 1 tablet (10 mg total) by mouth daily 30 tablet 1    LORazepam (ATIVAN) 0 5 mg tablet Take by mouth every 8 (eight) hours as needed for anxiety      ondansetron (ZOFRAN) 4 mg tablet Take 1 tablet (4 mg total) by mouth every 6 (six) hours 12 tablet 0    pantoprazole (PROTONIX) 40 mg tablet Take 1 tablet (40 mg total) by mouth daily 90 tablet 1    sucralfate (CARAFATE) 1 g/10 mL suspension Take 10 mL (1 g total) by mouth 4 (four) times a day 420 mL 1    hydrochlorothiazide (HYDRODIURIL) 12 5 mg tablet Take 1 tablet (12 5 mg total) by mouth every other day 30 tablet 0    metFORMIN (GLUCOPHAGE) 500 mg tablet Take 1 tablet (500 mg total) by mouth 2 (two) times a day with meals 60 tablet 0    Na Sulfate-K Sulfate-Mg Sulf (Suprep Bowel Prep Kit) 17 5-3 13-1 6 GM/177ML SOLN Take 2 Bottles by mouth see administration instructions Please follow the instructions from the office 2 Bottle 0    nitrofurantoin (MACROBID) 100 mg capsule Take 1 capsule (100 mg total) by mouth 2 (two) times a day (Patient not taking: Reported on 8/11/2020) 10 capsule 0    ondansetron (ZOFRAN-ODT) 4 mg disintegrating tablet Take 1 tablet (4 mg total) by mouth every 8 (eight) hours as needed for nausea or vomiting (Patient not taking: Reported on 8/11/2020) 10 tablet 0    prochlorperazine (COMPAZINE) 10 mg tablet Take 1 tablet (10 mg total) by mouth every 8 (eight) hours as needed for nausea or vomiting 15 tablet 1    topiramate (TOPAMAX) 25 mg tablet Take 1 tablet (25 mg total) by mouth 2 (two) times a day for 30 days (Patient not taking: Reported on 8/11/2020) 60 tablet 0     No current facility-administered medications for this visit  Pulse (!) 108, temperature 97 9 °F (36 6 °C), height 5' (1 524 m), weight (!) 162 kg (358 lb)  PHYSICAL EXAM: Physical Exam     Lab Results   Component Value Date    WBC 9 17 07/30/2020    HGB 11 9 07/30/2020    HCT 39 9 07/30/2020    MCV 85 07/30/2020     (H) 07/30/2020     Lab Results   Component Value Date    GLUCOSE 159 (H) 09/30/2015    CALCIUM 9 1 07/30/2020     09/30/2015    K 3 9 07/30/2020    CO2 29 07/30/2020    CL 99 (L) 07/30/2020    BUN 13 07/30/2020    CREATININE 0 82 07/30/2020     Lab Results   Component Value Date    ALT 69 07/30/2020    AST 36 07/30/2020    ALKPHOS 78 07/30/2020    BILITOT 0 27 09/30/2015     No results found for: INR, PROTIME    No results found  ASSESSMENT & PLAN:    Epigastric pain  Burning pain in the epigastric area, appear to most likely from gastritis or functional nonulcer dyspepsia  She also has associated nausea which could be possible from diabetic gastroparesis      -continue Protonix and Carafate    -advised about small frequent low-fat diet    -schedule for EGD    -patient reports that Zofran is not helping her  Will put her on Compazine as needed  Discussed about the side effects  GERD (gastroesophageal reflux disease)  Gastroesophageal reflux disease - Patient has the symptoms of chronic acid reflux for a long time  Possible hiatal hernia or LES weakness  Should rule out Camilo's esophagus because of chronic symptoms         -Patient was explained about the lifestyle and dietary modifications  Advised to avoid fatty foods, chocolates, caffeine, alcohol and any other triggering foods  Avoid eating for at least 3 hours before going to bed         -continue Protonix  Discussed about the long-term side effects     -schedule for EGD    Diarrhea  Possible from irritable bowel syndrome or diabetic autonomic neuropathy  Rule out infectious etiology or IBD which seems to be less likely  Rule out celiac disease or lactose intolerance     -check stool studies including C diff, cultures    -celiac disease panel and food allergy profile    -may try to stop milk and dairy products for few weeks to see the response    -Schedule for colonoscopy  -High-fiber diet     -Patient was given instructions about the colonoscopy prep     -Patient was explained about  the risks and benefits of the procedure  Risks including but not limited to bleeding, infection, perforation were explained in detail  Also explained about less than 100% sensitivity with the exam and other alternatives

## 2020-08-14 ENCOUNTER — APPOINTMENT (OUTPATIENT)
Dept: LAB | Facility: MEDICAL CENTER | Age: 45
End: 2020-08-14
Payer: COMMERCIAL

## 2020-08-14 DIAGNOSIS — E78.2 MIXED HYPERLIPIDEMIA: ICD-10-CM

## 2020-08-14 DIAGNOSIS — E11.8 TYPE 2 DIABETES MELLITUS WITH COMPLICATION, WITH LONG-TERM CURRENT USE OF INSULIN (HCC): ICD-10-CM

## 2020-08-14 DIAGNOSIS — R53.83 OTHER FATIGUE: ICD-10-CM

## 2020-08-14 DIAGNOSIS — I10 ESSENTIAL HYPERTENSION: ICD-10-CM

## 2020-08-14 DIAGNOSIS — Z79.4 TYPE 2 DIABETES MELLITUS WITH COMPLICATION, WITH LONG-TERM CURRENT USE OF INSULIN (HCC): ICD-10-CM

## 2020-08-14 DIAGNOSIS — R19.7 DIARRHEA, UNSPECIFIED TYPE: ICD-10-CM

## 2020-08-14 DIAGNOSIS — D50.8 OTHER IRON DEFICIENCY ANEMIA: ICD-10-CM

## 2020-08-14 DIAGNOSIS — E55.9 VITAMIN D DEFICIENCY: ICD-10-CM

## 2020-08-14 LAB
25(OH)D3 SERPL-MCNC: 7.9 NG/ML (ref 30–100)
ALBUMIN SERPL BCP-MCNC: 3.5 G/DL (ref 3.5–5)
ALP SERPL-CCNC: 84 U/L (ref 46–116)
ALT SERPL W P-5'-P-CCNC: 41 U/L (ref 12–78)
ANION GAP SERPL CALCULATED.3IONS-SCNC: 8 MMOL/L (ref 4–13)
AST SERPL W P-5'-P-CCNC: 26 U/L (ref 5–45)
BASOPHILS # BLD AUTO: 0.06 THOUSANDS/ΜL (ref 0–0.1)
BASOPHILS NFR BLD AUTO: 1 % (ref 0–1)
BILIRUB SERPL-MCNC: 0.34 MG/DL (ref 0.2–1)
BUN SERPL-MCNC: 14 MG/DL (ref 5–25)
CALCIUM SERPL-MCNC: 9 MG/DL (ref 8.3–10.1)
CHLORIDE SERPL-SCNC: 104 MMOL/L (ref 100–108)
CHOLEST SERPL-MCNC: 198 MG/DL (ref 50–200)
CO2 SERPL-SCNC: 26 MMOL/L (ref 21–32)
CREAT SERPL-MCNC: 0.81 MG/DL (ref 0.6–1.3)
EOSINOPHIL # BLD AUTO: 0.21 THOUSAND/ΜL (ref 0–0.61)
EOSINOPHIL NFR BLD AUTO: 2 % (ref 0–6)
ERYTHROCYTE [DISTWIDTH] IN BLOOD BY AUTOMATED COUNT: 15.5 % (ref 11.6–15.1)
EST. AVERAGE GLUCOSE BLD GHB EST-MCNC: 183 MG/DL
FERRITIN SERPL-MCNC: 10 NG/ML (ref 8–388)
GFR SERPL CREATININE-BSD FRML MDRD: 87 ML/MIN/1.73SQ M
GLUCOSE P FAST SERPL-MCNC: 170 MG/DL (ref 65–99)
HBA1C MFR BLD: 8 %
HCT VFR BLD AUTO: 37.5 % (ref 34.8–46.1)
HDLC SERPL-MCNC: 48 MG/DL
HGB BLD-MCNC: 11.2 G/DL (ref 11.5–15.4)
IMM GRANULOCYTES # BLD AUTO: 0.07 THOUSAND/UL (ref 0–0.2)
IMM GRANULOCYTES NFR BLD AUTO: 1 % (ref 0–2)
IRON SATN MFR SERPL: 10 %
IRON SERPL-MCNC: 39 UG/DL (ref 50–170)
LDLC SERPL CALC-MCNC: 124 MG/DL (ref 0–100)
LYMPHOCYTES # BLD AUTO: 2.21 THOUSANDS/ΜL (ref 0.6–4.47)
LYMPHOCYTES NFR BLD AUTO: 25 % (ref 14–44)
MCH RBC QN AUTO: 25.4 PG (ref 26.8–34.3)
MCHC RBC AUTO-ENTMCNC: 29.9 G/DL (ref 31.4–37.4)
MCV RBC AUTO: 85 FL (ref 82–98)
MONOCYTES # BLD AUTO: 0.66 THOUSAND/ΜL (ref 0.17–1.22)
MONOCYTES NFR BLD AUTO: 7 % (ref 4–12)
NEUTROPHILS # BLD AUTO: 5.78 THOUSANDS/ΜL (ref 1.85–7.62)
NEUTS SEG NFR BLD AUTO: 64 % (ref 43–75)
NONHDLC SERPL-MCNC: 150 MG/DL
NRBC BLD AUTO-RTO: 0 /100 WBCS
PLATELET # BLD AUTO: 301 THOUSANDS/UL (ref 149–390)
PMV BLD AUTO: 10.8 FL (ref 8.9–12.7)
POTASSIUM SERPL-SCNC: 4.3 MMOL/L (ref 3.5–5.3)
PROT SERPL-MCNC: 7.9 G/DL (ref 6.4–8.2)
RBC # BLD AUTO: 4.41 MILLION/UL (ref 3.81–5.12)
SODIUM SERPL-SCNC: 138 MMOL/L (ref 136–145)
TIBC SERPL-MCNC: 395 UG/DL (ref 250–450)
TRIGL SERPL-MCNC: 128 MG/DL
TSH SERPL DL<=0.05 MIU/L-ACNC: 1.67 UIU/ML (ref 0.36–3.74)
WBC # BLD AUTO: 8.99 THOUSAND/UL (ref 4.31–10.16)

## 2020-08-14 PROCEDURE — 85025 COMPLETE CBC W/AUTO DIFF WBC: CPT

## 2020-08-14 PROCEDURE — 82784 ASSAY IGA/IGD/IGG/IGM EACH: CPT

## 2020-08-14 PROCEDURE — 3052F HG A1C>EQUAL 8.0%<EQUAL 9.0%: CPT | Performed by: INTERNAL MEDICINE

## 2020-08-14 PROCEDURE — 83540 ASSAY OF IRON: CPT

## 2020-08-14 PROCEDURE — 36415 COLL VENOUS BLD VENIPUNCTURE: CPT

## 2020-08-14 PROCEDURE — 83550 IRON BINDING TEST: CPT

## 2020-08-14 PROCEDURE — 86255 FLUORESCENT ANTIBODY SCREEN: CPT

## 2020-08-14 PROCEDURE — 82785 ASSAY OF IGE: CPT

## 2020-08-14 PROCEDURE — 80061 LIPID PANEL: CPT

## 2020-08-14 PROCEDURE — 82728 ASSAY OF FERRITIN: CPT

## 2020-08-14 PROCEDURE — 86003 ALLG SPEC IGE CRUDE XTRC EA: CPT

## 2020-08-14 PROCEDURE — 83516 IMMUNOASSAY NONANTIBODY: CPT

## 2020-08-14 PROCEDURE — 82306 VITAMIN D 25 HYDROXY: CPT

## 2020-08-14 PROCEDURE — 83036 HEMOGLOBIN GLYCOSYLATED A1C: CPT

## 2020-08-14 PROCEDURE — 80053 COMPREHEN METABOLIC PANEL: CPT

## 2020-08-14 PROCEDURE — 84443 ASSAY THYROID STIM HORMONE: CPT

## 2020-08-15 LAB
ENDOMYSIUM IGA SER QL: NEGATIVE
GLIADIN PEPTIDE IGA SER-ACNC: 27 UNITS (ref 0–19)
GLIADIN PEPTIDE IGG SER-ACNC: 3 UNITS (ref 0–19)
IGA SERPL-MCNC: 200 MG/DL (ref 87–352)
TTG IGA SER-ACNC: <2 U/ML (ref 0–3)
TTG IGG SER-ACNC: <2 U/ML (ref 0–5)

## 2020-08-17 LAB
ALLERGEN COMMENT: NORMAL
ALMOND IGE QN: <0.1 KUA/I
CASHEW NUT IGE QN: <0.1 KUA/I
CODFISH IGE QN: <0.1 KUA/I
EGG WHITE IGE QN: <0.1 KUA/I
GLUTEN IGE QN: <0.1 KUA/I
HAZELNUT IGE QN: <0.1 KUA/L
MILK IGE QN: <0.1 KUA/I
PEANUT IGE QN: <0.1 KUA/I
SALMON IGE QN: <0.1 KUA/I
SCALLOP IGE QN: <0.1 KUA/L
SESAME SEED IGE QN: <0.1 KUA/I
SHRIMP IGE QN: <0.1 KUA/L
SOYBEAN IGE QN: <0.1 KUA/I
TOTAL IGE SMQN RAST: 18.3 KU/L (ref 0–113)
TUNA IGE QN: <0.1 KUA/I
WALNUT IGE QN: <0.1 KUA/I
WHEAT IGE QN: <0.1 KUA/I

## 2020-08-21 ENCOUNTER — TELEPHONE (OUTPATIENT)
Dept: GASTROENTEROLOGY | Facility: AMBULARY SURGERY CENTER | Age: 45
End: 2020-08-21

## 2020-08-21 NOTE — TELEPHONE ENCOUNTER
----- Message from Mekhi Love MD sent at 8/18/2020  1:45 PM EDT -----  Celiac disease panel and food allergy profile are negative

## 2020-08-24 ENCOUNTER — ANESTHESIA EVENT (OUTPATIENT)
Dept: GASTROENTEROLOGY | Facility: HOSPITAL | Age: 45
End: 2020-08-24

## 2020-08-24 ENCOUNTER — HOSPITAL ENCOUNTER (OUTPATIENT)
Dept: GASTROENTEROLOGY | Facility: HOSPITAL | Age: 45
Setting detail: OUTPATIENT SURGERY
Discharge: HOME | End: 2020-08-24
Attending: INTERNAL MEDICINE | Admitting: INTERNAL MEDICINE
Payer: COMMERCIAL

## 2020-08-24 ENCOUNTER — APPOINTMENT (OUTPATIENT)
Dept: LAB | Facility: MEDICAL CENTER | Age: 45
End: 2020-08-24
Payer: COMMERCIAL

## 2020-08-24 ENCOUNTER — ANESTHESIA (OUTPATIENT)
Dept: GASTROENTEROLOGY | Facility: HOSPITAL | Age: 45
End: 2020-08-24

## 2020-08-24 VITALS
DIASTOLIC BLOOD PRESSURE: 70 MMHG | SYSTOLIC BLOOD PRESSURE: 136 MMHG | OXYGEN SATURATION: 95 % | WEIGHT: 293 LBS | BODY MASS INDEX: 57.52 KG/M2 | HEART RATE: 91 BPM | RESPIRATION RATE: 17 BRPM | HEIGHT: 60 IN | TEMPERATURE: 97 F

## 2020-08-24 DIAGNOSIS — R10.13 EPIGASTRIC PAIN: ICD-10-CM

## 2020-08-24 DIAGNOSIS — R19.7 DIARRHEA, UNSPECIFIED TYPE: ICD-10-CM

## 2020-08-24 DIAGNOSIS — R11.0 NAUSEA: ICD-10-CM

## 2020-08-24 DIAGNOSIS — K21.9 GASTROESOPHAGEAL REFLUX DISEASE WITHOUT ESOPHAGITIS: ICD-10-CM

## 2020-08-24 DIAGNOSIS — K29.70 GASTRITIS WITHOUT BLEEDING, UNSPECIFIED CHRONICITY, UNSPECIFIED GASTRITIS TYPE: ICD-10-CM

## 2020-08-24 LAB
GLUCOSE SERPL-MCNC: 202 MG/DL (ref 65–140)
GLUCOSE SERPL-MCNC: 243 MG/DL (ref 65–140)

## 2020-08-24 PROCEDURE — 87209 SMEAR COMPLEX STAIN: CPT

## 2020-08-24 PROCEDURE — 88305 TISSUE EXAM BY PATHOLOGIST: CPT | Performed by: PATHOLOGY

## 2020-08-24 PROCEDURE — 87505 NFCT AGENT DETECTION GI: CPT

## 2020-08-24 PROCEDURE — 82948 REAGENT STRIP/BLOOD GLUCOSE: CPT

## 2020-08-24 PROCEDURE — 89055 LEUKOCYTE ASSESSMENT FECAL: CPT

## 2020-08-24 PROCEDURE — 45380 COLONOSCOPY AND BIOPSY: CPT | Performed by: INTERNAL MEDICINE

## 2020-08-24 PROCEDURE — 43239 EGD BIOPSY SINGLE/MULTIPLE: CPT | Performed by: INTERNAL MEDICINE

## 2020-08-24 PROCEDURE — 87177 OVA AND PARASITES SMEARS: CPT

## 2020-08-24 PROCEDURE — 43251 EGD REMOVE LESION SNARE: CPT | Performed by: INTERNAL MEDICINE

## 2020-08-24 RX ORDER — PROPOFOL 10 MG/ML
INJECTION, EMULSION INTRAVENOUS AS NEEDED
Status: DISCONTINUED | OUTPATIENT
Start: 2020-08-24 | End: 2020-08-24

## 2020-08-24 RX ORDER — PROPOFOL 10 MG/ML
INJECTION, EMULSION INTRAVENOUS CONTINUOUS PRN
Status: DISCONTINUED | OUTPATIENT
Start: 2020-08-24 | End: 2020-08-24

## 2020-08-24 RX ORDER — SODIUM CHLORIDE, SODIUM LACTATE, POTASSIUM CHLORIDE, CALCIUM CHLORIDE 600; 310; 30; 20 MG/100ML; MG/100ML; MG/100ML; MG/100ML
INJECTION, SOLUTION INTRAVENOUS CONTINUOUS PRN
Status: DISCONTINUED | OUTPATIENT
Start: 2020-08-24 | End: 2020-08-24

## 2020-08-24 RX ORDER — LIDOCAINE HYDROCHLORIDE 10 MG/ML
INJECTION, SOLUTION EPIDURAL; INFILTRATION; INTRACAUDAL; PERINEURAL AS NEEDED
Status: DISCONTINUED | OUTPATIENT
Start: 2020-08-24 | End: 2020-08-24

## 2020-08-24 RX ORDER — GLYCOPYRROLATE 0.2 MG/ML
INJECTION INTRAMUSCULAR; INTRAVENOUS AS NEEDED
Status: DISCONTINUED | OUTPATIENT
Start: 2020-08-24 | End: 2020-08-24

## 2020-08-24 RX ADMIN — PROPOFOL 50 MG: 10 INJECTION, EMULSION INTRAVENOUS at 13:02

## 2020-08-24 RX ADMIN — INSULIN HUMAN 6 UNITS: 100 INJECTION, SOLUTION PARENTERAL at 12:21

## 2020-08-24 RX ADMIN — GLYCOPYRROLATE 0.2 MG: 0.2 INJECTION, SOLUTION INTRAMUSCULAR; INTRAVENOUS at 12:50

## 2020-08-24 RX ADMIN — PROPOFOL 100 MCG/KG/MIN: 10 INJECTION, EMULSION INTRAVENOUS at 12:45

## 2020-08-24 RX ADMIN — PROPOFOL 50 MG: 10 INJECTION, EMULSION INTRAVENOUS at 12:48

## 2020-08-24 RX ADMIN — SODIUM CHLORIDE, SODIUM LACTATE, POTASSIUM CHLORIDE, AND CALCIUM CHLORIDE: .6; .31; .03; .02 INJECTION, SOLUTION INTRAVENOUS at 12:40

## 2020-08-24 RX ADMIN — PROPOFOL 50 MG: 10 INJECTION, EMULSION INTRAVENOUS at 12:47

## 2020-08-24 RX ADMIN — PROPOFOL 100 MG: 10 INJECTION, EMULSION INTRAVENOUS at 13:00

## 2020-08-24 RX ADMIN — PROPOFOL 100 MG: 10 INJECTION, EMULSION INTRAVENOUS at 12:45

## 2020-08-24 RX ADMIN — LIDOCAINE HYDROCHLORIDE 50 MG: 10 INJECTION, SOLUTION EPIDURAL; INFILTRATION; INTRACAUDAL; PERINEURAL at 12:45

## 2020-08-24 NOTE — DISCHARGE INSTRUCTIONS
Diverticulosis   WHAT YOU NEED TO KNOW:   Diverticulosis is a condition that causes small pockets called diverticula to form in your intestine  These pockets make it difficult for bowel movements to pass through your digestive system  DISCHARGE INSTRUCTIONS:   Seek care immediately if:   · You have severe pain on the left side of your lower abdomen  · Your bowel movements are bright or dark red  Contact your healthcare provider if:   · You have a fever and chills  · You feel dizzy or lightheaded  · You have nausea, or you are vomiting  · You have a change in your bowel movements  · You have questions or concerns about your condition or care  Medicines:   · Medicines  to soften your bowel movements may be given  You may also need medicines to treat symptoms such as bloating and pain  · Take your medicine as directed  Contact your healthcare provider if you think your medicine is not helping or if you have side effects  Tell him or her if you are allergic to any medicine  Keep a list of the medicines, vitamins, and herbs you take  Include the amounts, and when and why you take them  Bring the list or the pill bottles to follow-up visits  Carry your medicine list with you in case of an emergency  Self-care: The goal of treatment is to manage any symptoms you have and prevent other problems such as diverticulitis  Diverticulitis is swelling or infection of the diverticula  Your healthcare provider may recommend any of the following:  · Eat a variety of high-fiber foods  High-fiber foods help you have regular bowel movements  High-fiber foods include cooked beans, fruits, vegetables, and some cereals  Most adults need 25 to 35 grams of fiber each day  Your healthcare provider may recommend that you have more  Ask your healthcare provider how much fiber you need  Increase fiber slowly  You may have abdominal discomfort, bloating, and gas if you add fiber to your diet too quickly   You may need to take a fiber supplement if you are not getting enough fiber from food  · Drink liquids as directed  You may need to drink 2 to 3 liters (8 to 12 cups) of liquids every day  Ask your healthcare provider how much liquid to drink each day and which liquids are best for you  · Apply heat  on your abdomen for 20 to 30 minutes every 2 hours for as many days as directed  Heat helps decrease pain and muscle spasms  Help prevent diverticulitis or other symptoms: The following may help decrease your risk for diverticulitis or symptoms, such as bleeding  Talk to your provider about these or other things you can do to prevent problems that may occur with diverticulosis  · Exercise regularly  Ask your healthcare provider about the best exercise plan for you  Exercise can help you have regular bowel movements  Get 30 minutes of exercise on most days of the week  · Maintain a healthy weight  Ask your healthcare provider how much you should weigh  Ask him or her to help you create a weight loss plan if you are overweight  · Do not smoke  Nicotine and other chemicals in cigarettes increase your risk for diverticulitis  Ask your healthcare provider for information if you currently smoke and need help to quit  E-cigarettes or smokeless tobacco still contain nicotine  Talk to your healthcare provider before you use these products  · Ask your healthcare provider if it is safe to take NSAIDs  NSAIDs may increase your risk of diverticulitis  Follow up with your healthcare provider as directed:  Write down your questions so you remember to ask them during your visits  © 2017 [address] Information is for End User's use only and may not be sold, redistributed or otherwise used for commercial purposes  All illustrations and images included in CareNotes® are the copyrighted property of [employer]  or Demetrio Bryant  The above information is an  only  It is not intended as medical advice for individual conditions or treatments  Talk to your doctor, nurse or pharmacist before following any medical regimen to see if it is safe and effective for you  Gastric Polyps   WHAT YOU NEED TO KNOW:   Gastric polyps are growths that form in the lining of your stomach  They are not cancerous, but certain types of polyps can change into cancer  DISCHARGE INSTRUCTIONS:   Follow up with your healthcare provider as directed: You may need more tests or procedures  Write down any questions so you remember to ask them at your visits  Medicines:   · Medicines may  be given if you have an infection caused by H  pylori bacteria  · Take your medicine as directed  Contact your healthcare provider if you think your medicine is not helping or if you have side effects  Tell him or her if you are allergic to any medicine  Keep a list of the medicines, vitamins, and herbs you take  Include the amounts, and when and why you take them  Bring the list or the pill bottles to follow-up visits  Carry your medicine list with you in case of an emergency  Seek care immediately if:   · You have blood in your vomit  · You have dark bowel movements  · You have severe pain in your abdomen that does not go away after you take medicine  Contact your healthcare provider if:   · You have indigestion that does not go away with treatment  · You vomit after meals  · You have questions or concerns about your condition or care  © 2017 [facility] Information is for End User's use only and may not be sold, redistributed or otherwise used for commercial purposes  All illustrations and images included in CareNotes® are the copyrighted property of A D A M [employer] Inc  or Demetrio Bryant  The above information is an  only  It is not intended as medical advice for individual conditions or treatments   Talk to your doctor, nurse or pharmacist before following any medical regimen to see if it is safe and effective for you

## 2020-08-24 NOTE — ANESTHESIA PREPROCEDURE EVALUATION
Procedure:  COLONOSCOPY  EGD    Relevant Problems   CARDIO   (+) Essential hypertension      ENDO   (+) Diabetes 1 5, managed as type 2 (HCC)   (+) Type 2 diabetes mellitus with complication, with long-term current use of insulin (HCC)      GI/HEPATIC   (+) GERD (gastroesophageal reflux disease)      NEURO/PSYCH   (+) Severe episode of recurrent major depressive disorder, without psychotic features (HCC)        Physical Exam    Airway    Mallampati score: II  TM Distance: >3 FB  Neck ROM: full     Dental   No notable dental hx     Cardiovascular  Cardiovascular exam normal    Pulmonary  Pulmonary exam normal     Other Findings        Anesthesia Plan  ASA Score- 3     Anesthesia Type- IV sedation with anesthesia with ASA Monitors  Additional Monitors:   Airway Plan:           Plan Factors-Exercise tolerance (METS): <4 METS  Chart reviewed  Imaging results reviewed  Existing labs reviewed  Patient is not a current smoker  Obstructive sleep apnea risk education given perioperatively  Induction-     Postoperative Plan-     Informed Consent- Anesthetic plan and risks discussed with patient  I personally reviewed this patient with the CRNA  Discussed and agreed on the Anesthesia Plan with the CRNA  Misael Gonzalez

## 2020-08-24 NOTE — ANESTHESIA POSTPROCEDURE EVALUATION
Post-Op Assessment Note    CV Status:  Stable    Pain management: adequate     Mental Status:  Alert and awake   Hydration Status:  Euvolemic   PONV Controlled:  Controlled   Airway Patency:  Patent      Post Op Vitals Reviewed: Yes      Staff: CRNA   Comments: vss report rn        No complications documented      BP     Temp     Pulse    Resp      SpO2

## 2020-08-25 ENCOUNTER — TELEPHONE (OUTPATIENT)
Dept: GASTROENTEROLOGY | Facility: AMBULARY SURGERY CENTER | Age: 45
End: 2020-08-25

## 2020-08-25 LAB
C DIFF TOX GENS STL QL NAA+PROBE: NEGATIVE
CAMPYLOBACTER DNA SPEC NAA+PROBE: NORMAL
SALMONELLA DNA SPEC QL NAA+PROBE: NORMAL
SHIGA TOXIN STX GENE SPEC NAA+PROBE: NORMAL
SHIGELLA DNA SPEC QL NAA+PROBE: NORMAL

## 2020-08-25 NOTE — TELEPHONE ENCOUNTER
----- Message from Rashaun Contreras MD sent at 8/25/2020  9:49 AM EDT -----  Stool studies are negative for infection

## 2020-08-26 LAB — O+P STL CONC: NORMAL

## 2020-08-27 LAB — WBC SPEC QL GRAM STN: NORMAL

## 2020-08-28 ENCOUNTER — TELEPHONE (OUTPATIENT)
Dept: GASTROENTEROLOGY | Facility: AMBULARY SURGERY CENTER | Age: 45
End: 2020-08-28

## 2020-08-28 NOTE — TELEPHONE ENCOUNTER
----- Message from Alirio Ibrahim MD sent at 8/27/2020  4:50 PM EDT -----  Gastric biopsies are benign and negative for H pylori  Duodenal biopsies are negative for celiac disease    Colon biopsies are negative for microscopic colitis    Office visit with PA in couple of months

## 2020-08-31 ENCOUNTER — TELEPHONE (OUTPATIENT)
Dept: SLEEP CENTER | Facility: CLINIC | Age: 45
End: 2020-08-31

## 2020-08-31 ENCOUNTER — OFFICE VISIT (OUTPATIENT)
Dept: FAMILY MEDICINE CLINIC | Facility: CLINIC | Age: 45
End: 2020-08-31
Payer: COMMERCIAL

## 2020-08-31 VITALS
HEART RATE: 98 BPM | WEIGHT: 293 LBS | BODY MASS INDEX: 57.52 KG/M2 | TEMPERATURE: 98.6 F | RESPIRATION RATE: 20 BRPM | HEIGHT: 60 IN | OXYGEN SATURATION: 96 % | SYSTOLIC BLOOD PRESSURE: 126 MMHG | DIASTOLIC BLOOD PRESSURE: 82 MMHG

## 2020-08-31 DIAGNOSIS — E78.2 MIXED HYPERLIPIDEMIA: ICD-10-CM

## 2020-08-31 DIAGNOSIS — B37.0 ORAL THRUSH: ICD-10-CM

## 2020-08-31 DIAGNOSIS — E11.8 TYPE 2 DIABETES MELLITUS WITH COMPLICATION, WITH LONG-TERM CURRENT USE OF INSULIN (HCC): Primary | ICD-10-CM

## 2020-08-31 DIAGNOSIS — R53.83 OTHER FATIGUE: ICD-10-CM

## 2020-08-31 DIAGNOSIS — E66.01 MORBID OBESITY WITH BMI OF 60.0-69.9, ADULT (HCC): ICD-10-CM

## 2020-08-31 DIAGNOSIS — E66.01 MORBID OBESITY WITH BODY MASS INDEX OF 60.0-69.9 IN ADULT (HCC): Primary | ICD-10-CM

## 2020-08-31 DIAGNOSIS — E11.8 TYPE 2 DIABETES MELLITUS WITH COMPLICATION, WITH LONG-TERM CURRENT USE OF INSULIN (HCC): ICD-10-CM

## 2020-08-31 DIAGNOSIS — Z12.39 SCREENING FOR BREAST CANCER: ICD-10-CM

## 2020-08-31 DIAGNOSIS — Z79.4 TYPE 2 DIABETES MELLITUS WITH COMPLICATION, WITH LONG-TERM CURRENT USE OF INSULIN (HCC): Primary | ICD-10-CM

## 2020-08-31 DIAGNOSIS — Z79.4 TYPE 2 DIABETES MELLITUS WITH COMPLICATION, WITH LONG-TERM CURRENT USE OF INSULIN (HCC): ICD-10-CM

## 2020-08-31 DIAGNOSIS — I10 ESSENTIAL HYPERTENSION: ICD-10-CM

## 2020-08-31 DIAGNOSIS — R11.0 NAUSEA: ICD-10-CM

## 2020-08-31 DIAGNOSIS — G47.33 OSA (OBSTRUCTIVE SLEEP APNEA): ICD-10-CM

## 2020-08-31 PROCEDURE — 3074F SYST BP LT 130 MM HG: CPT | Performed by: NURSE PRACTITIONER

## 2020-08-31 PROCEDURE — 3008F BODY MASS INDEX DOCD: CPT | Performed by: NURSE PRACTITIONER

## 2020-08-31 PROCEDURE — 1036F TOBACCO NON-USER: CPT | Performed by: NURSE PRACTITIONER

## 2020-08-31 PROCEDURE — 3079F DIAST BP 80-89 MM HG: CPT | Performed by: NURSE PRACTITIONER

## 2020-08-31 PROCEDURE — 3052F HG A1C>EQUAL 8.0%<EQUAL 9.0%: CPT | Performed by: NURSE PRACTITIONER

## 2020-08-31 PROCEDURE — 99215 OFFICE O/P EST HI 40 MIN: CPT | Performed by: NURSE PRACTITIONER

## 2020-08-31 PROCEDURE — 3008F BODY MASS INDEX DOCD: CPT | Performed by: INTERNAL MEDICINE

## 2020-08-31 RX ORDER — HYDROCHLOROTHIAZIDE 12.5 MG/1
12.5 TABLET ORAL EVERY OTHER DAY
Qty: 30 TABLET | Refills: 3 | Status: SHIPPED | OUTPATIENT
Start: 2020-08-31 | End: 2021-10-20 | Stop reason: SDUPTHER

## 2020-08-31 RX ORDER — ONDANSETRON 4 MG/1
4 TABLET, FILM COATED ORAL EVERY 8 HOURS PRN
Qty: 20 TABLET | Refills: 0 | Status: SHIPPED | OUTPATIENT
Start: 2020-08-31 | End: 2021-07-29

## 2020-08-31 RX ORDER — INSULIN GLARGINE 100 [IU]/ML
40 INJECTION, SOLUTION SUBCUTANEOUS
Qty: 15 ML | Refills: 2 | Status: ON HOLD | OUTPATIENT
Start: 2020-08-31 | End: 2021-08-02 | Stop reason: SDUPTHER

## 2020-08-31 NOTE — PROGRESS NOTES
Patient's shoes and socks removed  Right Foot/Ankle   Right Foot Inspection  Skin Exam: skin normal skin not intact, no dry skin, no warmth, no callus, no erythema, no maceration, no abnormal color, no pre-ulcer, no ulcer and no callus                          Toe Exam: ROM and strength within normal limits  Sensory     Proprioception: intact   Monofilament testing: intact  Vascular    The right DP pulse is 2+  Left Foot/Ankle  Left Foot Inspection  Skin Exam: skin normalskin not intact, no dry skin, no warmth, no erythema, no maceration, normal color, no pre-ulcer, no ulcer and no callus                         Toe Exam: ROM and strength within normal limits                   Sensory     Proprioception: intact  Monofilament: intact  Vascular    The left DP pulse is 2+  Assign Risk Category:  No deformity present; No loss of protective sensation; No weak pulses       Risk: 0    Assessment/Plan:  Obdulio Escalante if she continues down her current health path she will have an early death with several complications  I let her know if she is ready to address all her health issues I can facilitate getting her to the right specialities but she has to be ready  She informed ne that she is ready  Until she gets into see Endo I refilled her current medications as I consider her a candidate for rapid acting insulin but endo can make that change as she will need education concerning dosage etc  I ordered a sleep study for her FAREED  Also a consult with weight management  Dosing and all possible se of the rx medications were discussed and all questions were answered  Pt verbalized apreciation for listening and encouraging her to take the next step  I advised It is my job to facilitate her being the happiest and healthiest person she can be  Pt verbalized agreement and understanding of the plan of care as outlined during her visit  No problem-specific Assessment & Plan notes found for this encounter  BMI Counseling:  Body mass index is 68 35 kg/m²  The BMI is above normal  Nutrition recommendations include decreasing portion sizes, encouraging healthy choices of fruits and vegetables, decreasing fast food intake, limiting drinks that contain sugar, moderation in carbohydrate intake, increasing intake of lean protein and reducing intake of saturated and trans fat  Exercise recommendations include vigorous physical activity 75 minutes/week, exercising 3-5 times per week and strength training exercises  Patient referred to weight management, bariatric surgery and PCP due to patient being overweight  Problem List Items Addressed This Visit        Endocrine    Type 2 diabetes mellitus with complication, with long-term current use of insulin ([facility] [address] ) - Primary       Cardiovascular and Mediastinum    Essential hypertension      Other Visit Diagnoses     Morbid obesity with BMI of 60 0-69 9, adult ([facility] [address] )        Mixed hyperlipidemia        Other fatigue        Nausea        FAREED (obstructive sleep apnea)        Oral thrush        Screening for breast cancer                Subjective:      Patient ID: Leopold Sou is a 50 y o  female  Follow up to review labs  The following portions of the patient's history were reviewed and updated as appropriate: allergies, current medications, past family history, past medical history, past social history, past surgical history and problem list     Review of Systems   Constitutional: Negative for appetite change and fever  HENT: Negative for sinus pressure and sore throat  Eyes: Negative for pain  Respiratory: Negative for shortness of breath  Cardiovascular: Negative for chest pain  Gastrointestinal: Positive for diarrhea and nausea  Negative for abdominal pain  Genitourinary: Negative for dysuria  Musculoskeletal: Positive for arthralgias (bl knee pain)  Negative for myalgias  Skin: Negative for color change  Neurological: Negative for light-headedness  Psychiatric/Behavioral: Negative for behavioral problems  The patient is nervous/anxious  Objective:      /82 (BP Location: Left arm, Patient Position: Sitting, Cuff Size: Large)   Pulse 98   Temp 98 6 °F (37 °C) (Temporal)   Resp 20   Ht 5' (1 524 m)   Wt (!) 159 kg (350 lb)   SpO2 96%   BMI 68 35 kg/m²          Physical Exam  Vitals signs and nursing note reviewed  Constitutional:       General: She is not in acute distress  Appearance: She is well-developed  She is obese  She is not diaphoretic  Comments: Morbidly  obese   HENT:      Head: Normocephalic and atraumatic  Right Ear: External ear normal       Left Ear: External ear normal       Mouth/Throat:      Mouth: Mucous membranes are moist       Comments: Slight coating glossal  Eyes:      Pupils: Pupils are equal, round, and reactive to light  Neck:      Musculoskeletal: Normal range of motion and neck supple  Cardiovascular:      Rate and Rhythm: Normal rate and regular rhythm  Pulses: no weak pulses          Dorsalis pedis pulses are 2+ on the right side and 2+ on the left side  Pulmonary:      Effort: Pulmonary effort is normal       Breath sounds: Normal breath sounds  Abdominal:      General: Bowel sounds are normal       Palpations: Abdomen is soft  Musculoskeletal: Normal range of motion  General: Tenderness (bl knees) present  Right lower leg: No edema  Left lower leg: No edema  Feet:      Right foot:      Skin integrity: No ulcer, skin breakdown, erythema, warmth, callus or dry skin  Left foot:      Skin integrity: No ulcer, skin breakdown, erythema, warmth, callus or dry skin  Skin:     General: Skin is dry  Neurological:      General: No focal deficit present  Mental Status: She is alert and oriented to person, place, and time  Psychiatric:         Mood and Affect: Mood normal          Behavior: Behavior normal          Thought Content:  Thought content normal          Judgment: Judgment normal

## 2020-09-01 ENCOUNTER — TRANSCRIBE ORDERS (OUTPATIENT)
Dept: SLEEP CENTER | Facility: CLINIC | Age: 45
End: 2020-09-01

## 2020-09-08 DIAGNOSIS — I10 ESSENTIAL HYPERTENSION: ICD-10-CM

## 2020-09-08 DIAGNOSIS — R53.83 OTHER FATIGUE: ICD-10-CM

## 2020-09-08 RX ORDER — LISINOPRIL 10 MG/1
TABLET ORAL
Qty: 30 TABLET | Refills: 1 | Status: SHIPPED | OUTPATIENT
Start: 2020-09-08 | End: 2021-01-15

## 2020-09-12 ENCOUNTER — TELEPHONE (OUTPATIENT)
Dept: SLEEP CENTER | Facility: CLINIC | Age: 45
End: 2020-09-12

## 2020-09-12 NOTE — TELEPHONE ENCOUNTER
----- Message from Xiang Carranza MD sent at 9/7/2020  2:36 PM EDT -----  approved  ----- Message -----  From: Denece Riedel, MA  Sent: 9/2/2020   3:40 PM EDT  To: Sleep [facility]    This sleep study needs approval      If approved please sign and return to clerical pool  If denied please include reasons why  Also provide alternative testing if warranted  Please sign and return to clerical pool

## 2020-10-12 ENCOUNTER — APPOINTMENT (EMERGENCY)
Dept: CT IMAGING | Facility: HOSPITAL | Age: 45
End: 2020-10-12
Payer: COMMERCIAL

## 2020-10-12 ENCOUNTER — HOSPITAL ENCOUNTER (EMERGENCY)
Facility: HOSPITAL | Age: 45
Discharge: HOME | End: 2020-10-12
Attending: EMERGENCY MEDICINE
Payer: COMMERCIAL

## 2020-10-12 VITALS
WEIGHT: 293 LBS | RESPIRATION RATE: 20 BRPM | HEART RATE: 104 BPM | DIASTOLIC BLOOD PRESSURE: 79 MMHG | HEIGHT: 60 IN | BODY MASS INDEX: 57.52 KG/M2 | TEMPERATURE: 98.5 F | SYSTOLIC BLOOD PRESSURE: 179 MMHG | OXYGEN SATURATION: 99 %

## 2020-10-12 DIAGNOSIS — R10.9 ABDOMINAL PAIN: ICD-10-CM

## 2020-10-12 DIAGNOSIS — R10.9 FLANK PAIN: Primary | ICD-10-CM

## 2020-10-12 DIAGNOSIS — R19.7 NAUSEA VOMITING AND DIARRHEA: ICD-10-CM

## 2020-10-12 DIAGNOSIS — R11.2 NAUSEA VOMITING AND DIARRHEA: ICD-10-CM

## 2020-10-12 LAB
ALBUMIN SERPL BCP-MCNC: 3.1 G/DL (ref 3.5–5)
ALP SERPL-CCNC: 78 U/L (ref 46–116)
ALT SERPL W P-5'-P-CCNC: 44 U/L (ref 12–78)
ANION GAP SERPL CALCULATED.3IONS-SCNC: 4 MMOL/L (ref 4–13)
AST SERPL W P-5'-P-CCNC: 21 U/L (ref 5–45)
BASOPHILS # BLD AUTO: 0.04 THOUSANDS/ΜL (ref 0–0.1)
BASOPHILS NFR BLD AUTO: 0 % (ref 0–1)
BILIRUB SERPL-MCNC: 0.29 MG/DL (ref 0.2–1)
BILIRUB UR QL STRIP: NEGATIVE
BUN SERPL-MCNC: 13 MG/DL (ref 5–25)
CALCIUM ALBUM COR SERPL-MCNC: 9.8 MG/DL (ref 8.3–10.1)
CALCIUM SERPL-MCNC: 9.1 MG/DL (ref 8.3–10.1)
CHLORIDE SERPL-SCNC: 103 MMOL/L (ref 100–108)
CLARITY UR: NORMAL
CO2 SERPL-SCNC: 30 MMOL/L (ref 21–32)
COLOR UR: NORMAL
CREAT SERPL-MCNC: 0.82 MG/DL (ref 0.6–1.3)
EOSINOPHIL # BLD AUTO: 0.33 THOUSAND/ΜL (ref 0–0.61)
EOSINOPHIL NFR BLD AUTO: 3 % (ref 0–6)
ERYTHROCYTE [DISTWIDTH] IN BLOOD BY AUTOMATED COUNT: 15.9 % (ref 11.6–15.1)
GFR SERPL CREATININE-BSD FRML MDRD: 85 ML/MIN/1.73SQ M
GLUCOSE SERPL-MCNC: 240 MG/DL (ref 65–140)
GLUCOSE UR STRIP-MCNC: NEGATIVE MG/DL
HCG SERPL QL: NEGATIVE
HCT VFR BLD AUTO: 35.8 % (ref 34.8–46.1)
HGB BLD-MCNC: 10.8 G/DL (ref 11.5–15.4)
HGB UR QL STRIP.AUTO: NEGATIVE
IMM GRANULOCYTES # BLD AUTO: 0.06 THOUSAND/UL (ref 0–0.2)
IMM GRANULOCYTES NFR BLD AUTO: 1 % (ref 0–2)
KETONES UR STRIP-MCNC: NEGATIVE MG/DL
LEUKOCYTE ESTERASE UR QL STRIP: NEGATIVE
LIPASE SERPL-CCNC: 172 U/L (ref 73–393)
LYMPHOCYTES # BLD AUTO: 1.78 THOUSANDS/ΜL (ref 0.6–4.47)
LYMPHOCYTES NFR BLD AUTO: 15 % (ref 14–44)
MCH RBC QN AUTO: 25.7 PG (ref 26.8–34.3)
MCHC RBC AUTO-ENTMCNC: 30.2 G/DL (ref 31.4–37.4)
MCV RBC AUTO: 85 FL (ref 82–98)
MONOCYTES # BLD AUTO: 0.85 THOUSAND/ΜL (ref 0.17–1.22)
MONOCYTES NFR BLD AUTO: 7 % (ref 4–12)
NEUTROPHILS # BLD AUTO: 8.51 THOUSANDS/ΜL (ref 1.85–7.62)
NEUTS SEG NFR BLD AUTO: 74 % (ref 43–75)
NITRITE UR QL STRIP: NEGATIVE
NRBC BLD AUTO-RTO: 0 /100 WBCS
PH UR STRIP.AUTO: 6 [PH] (ref 4.5–8)
PLATELET # BLD AUTO: 331 THOUSANDS/UL (ref 149–390)
PMV BLD AUTO: 9.4 FL (ref 8.9–12.7)
POTASSIUM SERPL-SCNC: 4.1 MMOL/L (ref 3.5–5.3)
PROT SERPL-MCNC: 7.1 G/DL (ref 6.4–8.2)
PROT UR STRIP-MCNC: NEGATIVE MG/DL
RBC # BLD AUTO: 4.21 MILLION/UL (ref 3.81–5.12)
SODIUM SERPL-SCNC: 137 MMOL/L (ref 136–145)
SP GR UR STRIP.AUTO: 1.02 (ref 1–1.03)
UROBILINOGEN UR QL STRIP.AUTO: 1 E.U./DL
WBC # BLD AUTO: 11.57 THOUSAND/UL (ref 4.31–10.16)

## 2020-10-12 PROCEDURE — 80053 COMPREHEN METABOLIC PANEL: CPT | Performed by: PHYSICIAN ASSISTANT

## 2020-10-12 PROCEDURE — 81003 URINALYSIS AUTO W/O SCOPE: CPT

## 2020-10-12 PROCEDURE — 84703 CHORIONIC GONADOTROPIN ASSAY: CPT | Performed by: PHYSICIAN ASSISTANT

## 2020-10-12 PROCEDURE — 96361 HYDRATE IV INFUSION ADD-ON: CPT

## 2020-10-12 PROCEDURE — 83690 ASSAY OF LIPASE: CPT | Performed by: PHYSICIAN ASSISTANT

## 2020-10-12 PROCEDURE — 99284 EMERGENCY DEPT VISIT MOD MDM: CPT

## 2020-10-12 PROCEDURE — G1004 CDSM NDSC: HCPCS

## 2020-10-12 PROCEDURE — 96375 TX/PRO/DX INJ NEW DRUG ADDON: CPT

## 2020-10-12 PROCEDURE — 96374 THER/PROPH/DIAG INJ IV PUSH: CPT

## 2020-10-12 PROCEDURE — 96376 TX/PRO/DX INJ SAME DRUG ADON: CPT

## 2020-10-12 PROCEDURE — 74176 CT ABD & PELVIS W/O CONTRAST: CPT

## 2020-10-12 PROCEDURE — 36415 COLL VENOUS BLD VENIPUNCTURE: CPT | Performed by: PHYSICIAN ASSISTANT

## 2020-10-12 PROCEDURE — 99285 EMERGENCY DEPT VISIT HI MDM: CPT | Performed by: PHYSICIAN ASSISTANT

## 2020-10-12 PROCEDURE — 85025 COMPLETE CBC W/AUTO DIFF WBC: CPT | Performed by: PHYSICIAN ASSISTANT

## 2020-10-12 RX ORDER — KETOROLAC TROMETHAMINE 30 MG/ML
15 INJECTION, SOLUTION INTRAMUSCULAR; INTRAVENOUS ONCE
Status: COMPLETED | OUTPATIENT
Start: 2020-10-12 | End: 2020-10-12

## 2020-10-12 RX ORDER — DICYCLOMINE HCL 20 MG
20 TABLET ORAL 2 TIMES DAILY
Qty: 30 TABLET | Refills: 0 | Status: SHIPPED | OUTPATIENT
Start: 2020-10-12 | End: 2021-07-29

## 2020-10-12 RX ORDER — DICYCLOMINE HCL 20 MG
20 TABLET ORAL ONCE
Status: COMPLETED | OUTPATIENT
Start: 2020-10-12 | End: 2020-10-12

## 2020-10-12 RX ORDER — HYDROMORPHONE HCL/PF 1 MG/ML
0.5 SYRINGE (ML) INJECTION ONCE
Status: COMPLETED | OUTPATIENT
Start: 2020-10-12 | End: 2020-10-12

## 2020-10-12 RX ORDER — ONDANSETRON 2 MG/ML
4 INJECTION INTRAMUSCULAR; INTRAVENOUS ONCE
Status: COMPLETED | OUTPATIENT
Start: 2020-10-12 | End: 2020-10-12

## 2020-10-12 RX ORDER — ONDANSETRON 4 MG/1
4 TABLET, ORALLY DISINTEGRATING ORAL EVERY 8 HOURS PRN
Qty: 10 TABLET | Refills: 0 | Status: SHIPPED | OUTPATIENT
Start: 2020-10-12

## 2020-10-12 RX ADMIN — ONDANSETRON 4 MG: 2 INJECTION INTRAMUSCULAR; INTRAVENOUS at 02:41

## 2020-10-12 RX ADMIN — HYDROMORPHONE HYDROCHLORIDE 0.5 MG: 1 INJECTION, SOLUTION INTRAMUSCULAR; INTRAVENOUS; SUBCUTANEOUS at 03:27

## 2020-10-12 RX ADMIN — DICYCLOMINE HYDROCHLORIDE 20 MG: 20 TABLET ORAL at 04:59

## 2020-10-12 RX ADMIN — ONDANSETRON 4 MG: 2 INJECTION INTRAMUSCULAR; INTRAVENOUS at 03:30

## 2020-10-12 RX ADMIN — SODIUM CHLORIDE 1000 ML: 0.9 INJECTION, SOLUTION INTRAVENOUS at 02:48

## 2020-10-12 RX ADMIN — KETOROLAC TROMETHAMINE 15 MG: 30 INJECTION, SOLUTION INTRAMUSCULAR at 02:41

## 2020-12-29 DIAGNOSIS — E11.9 TYPE II DIABETES MELLITUS, WELL CONTROLLED (HCC): Primary | ICD-10-CM

## 2021-01-01 RX ORDER — LIRAGLUTIDE 6 MG/ML
INJECTION SUBCUTANEOUS
Qty: 3 PEN | Refills: 3 | Status: SHIPPED | OUTPATIENT
Start: 2021-01-01

## 2021-01-15 DIAGNOSIS — I10 ESSENTIAL HYPERTENSION: ICD-10-CM

## 2021-01-15 DIAGNOSIS — R53.83 OTHER FATIGUE: ICD-10-CM

## 2021-01-15 RX ORDER — LISINOPRIL 10 MG/1
TABLET ORAL
Qty: 30 TABLET | Refills: 1 | Status: SHIPPED | OUTPATIENT
Start: 2021-01-15 | End: 2021-04-14 | Stop reason: SDUPTHER

## 2021-02-03 RX ORDER — INSULIN ASPART 100 [IU]/ML
16 INJECTION, SOLUTION INTRAVENOUS; SUBCUTANEOUS
COMMUNITY
Start: 2020-12-11 | End: 2021-10-20 | Stop reason: ALTCHOICE

## 2021-02-03 RX ORDER — DOXEPIN HYDROCHLORIDE 25 MG/1
CAPSULE ORAL
COMMUNITY
Start: 2020-11-02 | End: 2021-07-29

## 2021-02-05 NOTE — BH TRANSITION RECORD
Contact Information: If you have any questions, concerns, pended studies, tests and/or procedures, or emergencies regarding your inpatient behavioral health visit  Please contact [facility] [phone] and ask to speak to a , nurse or physician  A contact is available 24 hours/ 7 days a week at this number  Summary of Procedures Performed During your Stay:  Below is a list of major procedures performed during your hospital stay and a summary of results:  - No major procedures performed  Pending Studies (From admission, onward)    None        If studies are pending at discharge, follow up with your PCP and/or referring provider 
show

## 2021-02-21 DIAGNOSIS — R53.83 OTHER FATIGUE: ICD-10-CM

## 2021-02-21 DIAGNOSIS — E11.8 TYPE 2 DIABETES MELLITUS WITH COMPLICATION, WITH LONG-TERM CURRENT USE OF INSULIN (HCC): ICD-10-CM

## 2021-02-21 DIAGNOSIS — Z79.4 TYPE 2 DIABETES MELLITUS WITH COMPLICATION, WITH LONG-TERM CURRENT USE OF INSULIN (HCC): ICD-10-CM

## 2021-03-24 ENCOUNTER — TELEPHONE (OUTPATIENT)
Dept: BARIATRICS | Facility: CLINIC | Age: 46
End: 2021-03-24

## 2021-04-01 DIAGNOSIS — K21.9 GASTROESOPHAGEAL REFLUX DISEASE WITHOUT ESOPHAGITIS: ICD-10-CM

## 2021-04-03 RX ORDER — PANTOPRAZOLE SODIUM 40 MG/1
TABLET, DELAYED RELEASE ORAL
Qty: 30 TABLET | Refills: 5 | Status: SHIPPED | OUTPATIENT
Start: 2021-04-03 | End: 2021-08-03 | Stop reason: HOSPADM

## 2021-04-11 ENCOUNTER — HOSPITAL ENCOUNTER (EMERGENCY)
Facility: HOSPITAL | Age: 46
Discharge: HOME | End: 2021-04-11
Attending: EMERGENCY MEDICINE
Payer: COMMERCIAL

## 2021-04-11 VITALS
RESPIRATION RATE: 18 BRPM | DIASTOLIC BLOOD PRESSURE: 87 MMHG | HEART RATE: 97 BPM | SYSTOLIC BLOOD PRESSURE: 175 MMHG | OXYGEN SATURATION: 97 % | BODY MASS INDEX: 68.98 KG/M2 | WEIGHT: 293 LBS | TEMPERATURE: 98.1 F

## 2021-04-11 DIAGNOSIS — R19.7 DIARRHEA: Primary | ICD-10-CM

## 2021-04-11 DIAGNOSIS — K58.0 IRRITABLE BOWEL SYNDROME WITH DIARRHEA: ICD-10-CM

## 2021-04-11 DIAGNOSIS — N39.0 UTI (URINARY TRACT INFECTION): ICD-10-CM

## 2021-04-11 LAB
ALBUMIN SERPL BCP-MCNC: 3.5 G/DL (ref 3.5–5)
ALP SERPL-CCNC: 92 U/L (ref 46–116)
ALT SERPL W P-5'-P-CCNC: 71 U/L (ref 12–78)
ANION GAP SERPL CALCULATED.3IONS-SCNC: 12 MMOL/L (ref 4–13)
APTT PPP: 34 SECONDS (ref 23–37)
AST SERPL W P-5'-P-CCNC: 25 U/L (ref 5–45)
BACTERIA UR QL AUTO: ABNORMAL /HPF
BASOPHILS # BLD AUTO: 0.06 THOUSANDS/ΜL (ref 0–0.1)
BASOPHILS NFR BLD AUTO: 1 % (ref 0–1)
BILIRUB SERPL-MCNC: 0.32 MG/DL (ref 0.2–1)
BILIRUB UR QL STRIP: NEGATIVE
BUN SERPL-MCNC: 16 MG/DL (ref 5–25)
CALCIUM SERPL-MCNC: 9.2 MG/DL (ref 8.3–10.1)
CHLORIDE SERPL-SCNC: 101 MMOL/L (ref 100–108)
CLARITY UR: CLEAR
CO2 SERPL-SCNC: 26 MMOL/L (ref 21–32)
COLOR UR: YELLOW
CREAT SERPL-MCNC: 0.93 MG/DL (ref 0.6–1.3)
EOSINOPHIL # BLD AUTO: 0.21 THOUSAND/ΜL (ref 0–0.61)
EOSINOPHIL NFR BLD AUTO: 2 % (ref 0–6)
ERYTHROCYTE [DISTWIDTH] IN BLOOD BY AUTOMATED COUNT: 15.6 % (ref 11.6–15.1)
EXT PREG TEST URINE: NEGATIVE
EXT. CONTROL ED NAV: NORMAL
FLUAV RNA RESP QL NAA+PROBE: NEGATIVE
FLUBV RNA RESP QL NAA+PROBE: NEGATIVE
GFR SERPL CREATININE-BSD FRML MDRD: 73 ML/MIN/1.73SQ M
GLUCOSE SERPL-MCNC: 222 MG/DL (ref 65–140)
GLUCOSE UR STRIP-MCNC: NEGATIVE MG/DL
HCT VFR BLD AUTO: 42 % (ref 34.8–46.1)
HGB BLD-MCNC: 12.7 G/DL (ref 11.5–15.4)
HGB UR QL STRIP.AUTO: ABNORMAL
IMM GRANULOCYTES # BLD AUTO: 0.06 THOUSAND/UL (ref 0–0.2)
IMM GRANULOCYTES NFR BLD AUTO: 1 % (ref 0–2)
INR PPP: 0.9 (ref 0.84–1.19)
KETONES UR STRIP-MCNC: ABNORMAL MG/DL
LEUKOCYTE ESTERASE UR QL STRIP: ABNORMAL
LIPASE SERPL-CCNC: 243 U/L (ref 73–393)
LYMPHOCYTES # BLD AUTO: 2.33 THOUSANDS/ΜL (ref 0.6–4.47)
LYMPHOCYTES NFR BLD AUTO: 23 % (ref 14–44)
MCH RBC QN AUTO: 25.1 PG (ref 26.8–34.3)
MCHC RBC AUTO-ENTMCNC: 30.2 G/DL (ref 31.4–37.4)
MCV RBC AUTO: 83 FL (ref 82–98)
MONOCYTES # BLD AUTO: 0.88 THOUSAND/ΜL (ref 0.17–1.22)
MONOCYTES NFR BLD AUTO: 9 % (ref 4–12)
NEUTROPHILS # BLD AUTO: 6.63 THOUSANDS/ΜL (ref 1.85–7.62)
NEUTS SEG NFR BLD AUTO: 64 % (ref 43–75)
NITRITE UR QL STRIP: NEGATIVE
NON-SQ EPI CELLS URNS QL MICRO: ABNORMAL /HPF
NRBC BLD AUTO-RTO: 0 /100 WBCS
PH UR STRIP.AUTO: 6 [PH]
PLATELET # BLD AUTO: 370 THOUSANDS/UL (ref 149–390)
PMV BLD AUTO: 9.2 FL (ref 8.9–12.7)
POTASSIUM SERPL-SCNC: 4 MMOL/L (ref 3.5–5.3)
PROT SERPL-MCNC: 8.2 G/DL (ref 6.4–8.2)
PROT UR STRIP-MCNC: ABNORMAL MG/DL
PROTHROMBIN TIME: 12.2 SECONDS (ref 11.6–14.5)
RBC # BLD AUTO: 5.05 MILLION/UL (ref 3.81–5.12)
RBC #/AREA URNS AUTO: ABNORMAL /HPF
RSV RNA RESP QL NAA+PROBE: NEGATIVE
SARS-COV-2 RNA RESP QL NAA+PROBE: NEGATIVE
SODIUM SERPL-SCNC: 139 MMOL/L (ref 136–145)
SP GR UR STRIP.AUTO: 1.02 (ref 1–1.03)
UROBILINOGEN UR QL STRIP.AUTO: 1 E.U./DL
WBC # BLD AUTO: 10.17 THOUSAND/UL (ref 4.31–10.16)
WBC #/AREA URNS AUTO: ABNORMAL /HPF

## 2021-04-11 PROCEDURE — 85730 THROMBOPLASTIN TIME PARTIAL: CPT | Performed by: EMERGENCY MEDICINE

## 2021-04-11 PROCEDURE — 36415 COLL VENOUS BLD VENIPUNCTURE: CPT | Performed by: EMERGENCY MEDICINE

## 2021-04-11 PROCEDURE — 99284 EMERGENCY DEPT VISIT MOD MDM: CPT

## 2021-04-11 PROCEDURE — 96374 THER/PROPH/DIAG INJ IV PUSH: CPT

## 2021-04-11 PROCEDURE — 81025 URINE PREGNANCY TEST: CPT | Performed by: EMERGENCY MEDICINE

## 2021-04-11 PROCEDURE — 81001 URINALYSIS AUTO W/SCOPE: CPT | Performed by: EMERGENCY MEDICINE

## 2021-04-11 PROCEDURE — 85025 COMPLETE CBC W/AUTO DIFF WBC: CPT | Performed by: EMERGENCY MEDICINE

## 2021-04-11 PROCEDURE — 80053 COMPREHEN METABOLIC PANEL: CPT | Performed by: EMERGENCY MEDICINE

## 2021-04-11 PROCEDURE — 96361 HYDRATE IV INFUSION ADD-ON: CPT

## 2021-04-11 PROCEDURE — 87186 SC STD MICRODIL/AGAR DIL: CPT | Performed by: EMERGENCY MEDICINE

## 2021-04-11 PROCEDURE — 96375 TX/PRO/DX INJ NEW DRUG ADDON: CPT

## 2021-04-11 PROCEDURE — 87077 CULTURE AEROBIC IDENTIFY: CPT | Performed by: EMERGENCY MEDICINE

## 2021-04-11 PROCEDURE — 87086 URINE CULTURE/COLONY COUNT: CPT | Performed by: EMERGENCY MEDICINE

## 2021-04-11 PROCEDURE — 85610 PROTHROMBIN TIME: CPT | Performed by: EMERGENCY MEDICINE

## 2021-04-11 PROCEDURE — 99284 EMERGENCY DEPT VISIT MOD MDM: CPT | Performed by: EMERGENCY MEDICINE

## 2021-04-11 PROCEDURE — 83690 ASSAY OF LIPASE: CPT | Performed by: EMERGENCY MEDICINE

## 2021-04-11 PROCEDURE — 0241U HB NFCT DS VIR RESP RNA 4 TRGT: CPT | Performed by: EMERGENCY MEDICINE

## 2021-04-11 RX ORDER — LEVOFLOXACIN 250 MG/1
750 TABLET ORAL DAILY
Qty: 18 TABLET | Refills: 0 | Status: SHIPPED | OUTPATIENT
Start: 2021-04-11 | End: 2021-04-17

## 2021-04-11 RX ORDER — SUCRALFATE 1 G/1
1 TABLET ORAL ONCE
Status: COMPLETED | OUTPATIENT
Start: 2021-04-11 | End: 2021-04-11

## 2021-04-11 RX ORDER — ONDANSETRON 4 MG/1
4 TABLET, ORALLY DISINTEGRATING ORAL EVERY 6 HOURS PRN
Qty: 16 TABLET | Refills: 0 | Status: SHIPPED | OUTPATIENT
Start: 2021-04-11 | End: 2021-07-29

## 2021-04-11 RX ORDER — ONDANSETRON 2 MG/ML
4 INJECTION INTRAMUSCULAR; INTRAVENOUS ONCE
Status: COMPLETED | OUTPATIENT
Start: 2021-04-11 | End: 2021-04-11

## 2021-04-11 RX ORDER — NITROFURANTOIN 25; 75 MG/1; MG/1
100 CAPSULE ORAL 2 TIMES DAILY
Qty: 10 CAPSULE | Refills: 0 | Status: SHIPPED | OUTPATIENT
Start: 2021-04-11 | End: 2021-04-16

## 2021-04-11 RX ORDER — KETOROLAC TROMETHAMINE 30 MG/ML
15 INJECTION, SOLUTION INTRAMUSCULAR; INTRAVENOUS ONCE
Status: COMPLETED | OUTPATIENT
Start: 2021-04-11 | End: 2021-04-11

## 2021-04-11 RX ORDER — DICYCLOMINE HCL 20 MG
20 TABLET ORAL 2 TIMES DAILY
Qty: 20 TABLET | Refills: 0 | Status: SHIPPED | OUTPATIENT
Start: 2021-04-11 | End: 2021-08-03 | Stop reason: HOSPADM

## 2021-04-11 RX ORDER — ACETAMINOPHEN 325 MG/1
650 TABLET ORAL ONCE
Status: COMPLETED | OUTPATIENT
Start: 2021-04-11 | End: 2021-04-11

## 2021-04-11 RX ORDER — DICYCLOMINE HCL 20 MG
20 TABLET ORAL ONCE
Status: COMPLETED | OUTPATIENT
Start: 2021-04-11 | End: 2021-04-11

## 2021-04-11 RX ORDER — LISINOPRIL 10 MG/1
10 TABLET ORAL ONCE
Status: COMPLETED | OUTPATIENT
Start: 2021-04-11 | End: 2021-04-11

## 2021-04-11 RX ADMIN — DICYCLOMINE HYDROCHLORIDE 20 MG: 20 TABLET ORAL at 02:14

## 2021-04-11 RX ADMIN — KETOROLAC TROMETHAMINE 15 MG: 30 INJECTION, SOLUTION INTRAMUSCULAR at 02:13

## 2021-04-11 RX ADMIN — SODIUM CHLORIDE 1000 ML: 0.9 INJECTION, SOLUTION INTRAVENOUS at 02:07

## 2021-04-11 RX ADMIN — LISINOPRIL 10 MG: 10 TABLET ORAL at 02:14

## 2021-04-11 RX ADMIN — ACETAMINOPHEN 650 MG: 325 TABLET, FILM COATED ORAL at 02:14

## 2021-04-11 RX ADMIN — SUCRALFATE 1 G: 1 TABLET ORAL at 02:14

## 2021-04-11 RX ADMIN — LEVOFLOXACIN 750 MG: 500 TABLET, FILM COATED ORAL at 02:45

## 2021-04-11 RX ADMIN — ONDANSETRON 4 MG: 2 INJECTION INTRAMUSCULAR; INTRAVENOUS at 02:13

## 2021-04-11 NOTE — Clinical Note
Yrisindio Severin was seen and treated in our emergency department on 4/11/2021  Diagnosis:     Dulce Maria Swenson  may return to work on return date  She may return on this date: 04/13/2021         If you have any questions or concerns, please don't hesitate to call        Swetha Keller MD    ______________________________           _______________          _______________  [facility] Representative                              Date                                Time

## 2021-04-11 NOTE — ED PROVIDER NOTES
History  Chief Complaint   Patient presents with    Diarrhea     patient states her BS has been in the 200's has had diarrhea beginning tonight with back pain     HPI    Patient is a 48 yof with several days of fatigue, achi low back pain and diarrhea  Denies fevers, chills, sweats  No saddle anesthesia  Full strength and sensation bilateral lower extremities  No loss of bowel or bladder function  Denies IV drug use  Denies history of cancer  Denies direct trauma  No pulsatile abdominal mass  No hematuria  No HIV, Transplant or systemic corticosteroids  No midline tenderness  Notes some mild lower abdominal cramping  No chest pain or sob  No vomiting but does note nausea  No focal neuro defects  No bloody diarrhea  Medical decision makin-year-old female, fatigue, achy low back pain, diarrhea, will check labs, check glucose, treat symptoms, suspect viral syndrome, possible viral diarrhea, discussed possibly imaging abdomen, however, for now, will defer, if patient without any abdominal pain and feeling well after fluids, antiemetics, will discharge home, strict return precautions  Of note, no dysuria hematuria but given innumerable bacteria, will treat as uti  Patient feeling much better at time of discharge  Prior to Admission Medications   Prescriptions Last Dose Informant Patient Reported? Taking?    DULoxetine (CYMBALTA) 30 mg delayed release capsule  Self Yes No   LORazepam (ATIVAN) 0 5 mg tablet  Self Yes No   Sig: Take by mouth every 8 (eight) hours as needed for anxiety   NovoLOG FlexPen 100 units/mL injection pen   Yes No   Sig: TAKE 8 UNITS WITH LARGEST MEAL DAILY   clotrimazole (LOTRIMIN) 1 % cream  Self No No   Sig: Apply to affected area 2 times daily   dicyclomine (BENTYL) 20 mg tablet   No No   Sig: Take 1 tablet (20 mg total) by mouth 2 (two) times a day   doxepin (SINEquan) 25 mg capsule   Yes No   Sig: TAKE 2 TO 3 CAPSULES AT BEDTIME   escitalopram (LEXAPRO) 10 mg tablet  Self No No   Sig: Take 3 tablets (30 mg total) by mouth daily for 30 days   hydrochlorothiazide (HYDRODIURIL) 12 5 mg tablet   No No   Sig: Take 1 tablet (12 5 mg total) by mouth every other day   insulin glargine (LANTUS) 100 units/mL subcutaneous injection   No No   Sig: Inject 40 Units under the skin daily at bedtime   lamoTRIgine (LaMICtal) 200 MG tablet  Self Yes No   Sig: Take 200 mg by mouth daily    liraglutide (Victoza) injection   No No   Sig: Inject 1 8 MG daily     lisinopril (ZESTRIL) 10 mg tablet   No No   Sig: TAKE 1 TABLET BY MOUTH EVERY DAY   metFORMIN (GLUCOPHAGE) 500 mg tablet   No No   Sig: Take 1 tablet (500 mg total) by mouth 2 (two) times a day with meals   nystatin (MYCOSTATIN) 500,000 units/5 mL suspension   No No   Sig: Apply 5 mL (500,000 Units total) to the mouth or throat 4 (four) times a day   ondansetron (ZOFRAN) 4 mg tablet   No No   Sig: Take 1 tablet (4 mg total) by mouth every 8 (eight) hours as needed for nausea or vomiting   ondansetron (ZOFRAN-ODT) 4 mg disintegrating tablet   No No   Sig: Take 1 tablet (4 mg total) by mouth every 8 (eight) hours as needed for nausea or vomiting   pantoprazole (PROTONIX) 40 mg tablet   No No   Sig: TAKE 1 TABLET BY MOUTH EVERY DAY   sucralfate (CARAFATE) 1 g/10 mL suspension   No No   Sig: Take 10 mL (1 g total) by mouth 4 (four) times a day      Facility-Administered Medications: None       Past Medical History:   Diagnosis Date    Anemia     Anxiety     Depression     Diabetes mellitus (HCC)     GERD (gastroesophageal reflux disease)     Hypertension     Irritable bowel syndrome     Morbid obesity with BMI of 60 0-69 9, adult (HCC)     Obesity     Psychiatric disorder     Seasonal allergies        Past Surgical History:   Procedure Laterality Date     SECTION      CHOLECYSTECTOMY      WISDOM TOOTH EXTRACTION         Family History   Problem Relation Age of Onset    Diabetes Mother    Sherley Dao Hypertension Father      I have reviewed and agree with the history as documented  E-Cigarette/Vaping    E-Cigarette Use Never User      E-Cigarette/Vaping Substances    Nicotine No     THC No     CBD No     Flavoring No     Other No     Unknown No      Social History     Tobacco Use    Smoking status: Never Smoker    Smokeless tobacco: Never Used   Substance Use Topics    Alcohol use: No    Drug use: No       Review of Systems   Constitutional: Positive for fatigue  Gastrointestinal: Positive for diarrhea  Musculoskeletal: Positive for back pain  All other systems reviewed and are negative  Physical Exam  Physical Exam  Vitals signs and nursing note reviewed  Constitutional:       Appearance: She is well-developed  HENT:      Head: Normocephalic and atraumatic  Right Ear: External ear normal       Left Ear: External ear normal    Eyes:      Conjunctiva/sclera: Conjunctivae normal    Neck:      Musculoskeletal: Normal range of motion and neck supple  Vascular: No JVD  Trachea: No tracheal deviation  Cardiovascular:      Rate and Rhythm: Normal rate and regular rhythm  Heart sounds: Normal heart sounds  Pulmonary:      Effort: Pulmonary effort is normal  No respiratory distress  Breath sounds: No wheezing or rales  Abdominal:      Palpations: Abdomen is soft  Tenderness: There is no abdominal tenderness  There is no guarding or rebound  Musculoskeletal:         General: No tenderness  Skin:     General: Skin is warm and dry  Findings: No erythema or rash  Neurological:      General: No focal deficit present  Mental Status: She is alert and oriented to person, place, and time  Motor: No weakness  Psychiatric:         Behavior: Behavior normal          Thought Content:  Thought content normal          Vital Signs  ED Triage Vitals   Temperature Pulse Respirations Blood Pressure SpO2   04/11/21 0127 04/11/21 0127 04/11/21 0127 04/11/21 0130 04/11/21 0127   98 1 °F (36 7 °C) 100 18 (!) 247/107 100 %      Temp Source Heart Rate Source Patient Position - Orthostatic VS BP Location FiO2 (%)   04/11/21 0127 04/11/21 0127 04/11/21 0246 04/11/21 0246 --   Oral Monitor Lying Right arm       Pain Score       04/11/21 0213       5           Vitals:    04/11/21 0130 04/11/21 0136 04/11/21 0228 04/11/21 0246   BP: (!) 247/107 (!) 177/95 163/94 (!) 175/87   Pulse: 102  95 97   Patient Position - Orthostatic VS:    Lying         Visual Acuity      ED Medications  Medications   lisinopril (ZESTRIL) tablet 10 mg (10 mg Oral Given 4/11/21 0214)   sodium chloride 0 9 % bolus 1,000 mL (0 mL Intravenous Stopped 4/11/21 0300)   ondansetron (ZOFRAN) injection 4 mg (4 mg Intravenous Given 4/11/21 0213)   dicyclomine (BENTYL) tablet 20 mg (20 mg Oral Given 4/11/21 0214)   ketorolac (TORADOL) injection 15 mg (15 mg Intravenous Given 4/11/21 0213)   acetaminophen (TYLENOL) tablet 650 mg (650 mg Oral Given 4/11/21 0214)   sucralfate (CARAFATE) tablet 1 g (1 g Oral Given 4/11/21 0214)   levofloxacin (LEVAQUIN) tablet 750 mg (750 mg Oral Given 4/11/21 0245)       Diagnostic Studies  Results Reviewed     Procedure Component Value Units Date/Time    COVID19, Influenza A/B, RSV PCR, [facility] [[id number]]  (Normal) Collected: 04/11/21 0206    Lab Status: Final result Specimen: Nares from Nose Updated: 04/11/21 0253     SARS-CoV-2 Negative     INFLUENZA A PCR Negative     INFLUENZA B PCR Negative     RSV PCR Negative    Narrative: This test has been authorized by FDA under an EUA (Emergency Use Assay) for use by authorized laboratories  Clinical caution and judgement should be used with the interpretation of these results with consideration of the clinical impression and other laboratory testing  Testing reported as "Positive" or "Negative" has been proven to be accurate according to standard laboratory validation requirements    All testing is performed with control materials showing appropriate reactivity at standard intervals  Comprehensive metabolic panel [[id number]]  (Abnormal) Collected: 04/11/21 0206    Lab Status: Final result Specimen: Blood from Arm, Left Updated: 04/11/21 0229     Sodium 139 mmol/L      Potassium 4 0 mmol/L      Chloride 101 mmol/L      CO2 26 mmol/L      ANION GAP 12 mmol/L      BUN 16 mg/dL      Creatinine 0 93 mg/dL      Glucose 222 mg/dL      Calcium 9 2 mg/dL      AST 25 U/L      ALT 71 U/L      Alkaline Phosphatase 92 U/L      Total Protein 8 2 g/dL      Albumin 3 5 g/dL      Total Bilirubin 0 32 mg/dL      eGFR 73 ml/min/1 73sq m     Narrative:      [place] guidelines for Chronic Kidney Disease (CKD):     Stage 1 with normal or high GFR (GFR > 90 mL/min/1 73 square meters)    Stage 2 Mild CKD (GFR = 60-89 mL/min/1 73 square meters)    Stage 3A Moderate CKD (GFR = 45-59 mL/min/1 73 square meters)    Stage 3B Moderate CKD (GFR = 30-44 mL/min/1 73 square meters)    Stage 4 Severe CKD (GFR = 15-29 mL/min/1 73 square meters)    Stage 5 End Stage CKD (GFR <15 mL/min/1 73 square meters)  Note: GFR calculation is accurate only with a steady state creatinine    Lipase [[id number]]  (Normal) Collected: 04/11/21 0206    Lab Status: Final result Specimen: Blood from Arm, Left Updated: 04/11/21 0229     Lipase 243 u/L     Urine Microscopic [[id number]]  (Abnormal) Collected: 04/11/21 0156    Lab Status: Final result Specimen: Urine, Clean Catch Updated: 04/11/21 0226     RBC, UA 0-1 /hpf      WBC, UA 10-20 /hpf      Epithelial Cells Innumerable /hpf      Bacteria, UA Innumerable /hpf     Urine culture [[id number]] Collected: 04/11/21 0156    Lab Status:  In process Specimen: Urine, Clean Catch Updated: 04/11/21 0226    Protime-INR [[id number]]  (Normal) Collected: 04/11/21 0206    Lab Status: Final result Specimen: Blood from Arm, Left Updated: 04/11/21 0223     Protime 12 2 seconds      INR 0 90    APTT [[id number]]  (Normal) Collected: 04/11/21 0206    Lab Status: Final result Specimen: Blood from Arm, Left Updated: 04/11/21 0223     PTT 34 seconds     UA w Reflex to Microscopic w Reflex to Culture [[id number]]  (Abnormal) Collected: 04/11/21 0156    Lab Status: Final result Specimen: Urine, Clean Catch Updated: 04/11/21 0214     Color, UA Yellow     Clarity, UA Clear     Specific Gravity, UA 1 025     pH, UA 6 0     Leukocytes, UA Small     Nitrite, UA Negative     Protein, UA Trace mg/dl      Glucose, UA Negative mg/dl      Ketones, UA Trace mg/dl      Urobilinogen, UA 1 0 E U /dl      Bilirubin, UA Negative     Blood, UA Small    CBC and differential [[id number]]  (Abnormal) Collected: 04/11/21 0206    Lab Status: Final result Specimen: Blood from Arm, Left Updated: 04/11/21 0212     WBC 10 17 Thousand/uL      RBC 5 05 Million/uL      Hemoglobin 12 7 g/dL      Hematocrit 42 0 %      MCV 83 fL      MCH 25 1 pg      MCHC 30 2 g/dL      RDW 15 6 %      MPV 9 2 fL      Platelets 027 Thousands/uL      nRBC 0 /100 WBCs      Neutrophils Relative 64 %      Immat GRANS % 1 %      Lymphocytes Relative 23 %      Monocytes Relative 9 %      Eosinophils Relative 2 %      Basophils Relative 1 %      Neutrophils Absolute 6 63 Thousands/µL      Immature Grans Absolute 0 06 Thousand/uL      Lymphocytes Absolute 2 33 Thousands/µL      Monocytes Absolute 0 88 Thousand/µL      Eosinophils Absolute 0 21 Thousand/µL      Basophils Absolute 0 06 Thousands/µL     POCT pregnancy, urine [[id number]]  (Normal) Resulted: 04/11/21 0200    Lab Status: Final result Updated: 04/11/21 0203     EXT PREG TEST UR (Ref: Negative) negative     Control valid                 No orders to display              Procedures  Procedures         ED Course                                           MDM    Disposition  Final diagnoses:   Diarrhea   Irritable bowel syndrome with diarrhea   UTI (urinary tract infection)     Time reflects when diagnosis was documented in both MDM as applicable and the Disposition within this note     Time User Action Codes Description Comment    4/11/2021  2:19 AM GloCecilia Mcclure Add [R19 7] Diarrhea     4/11/2021  2:19 AM Cecilia Carnes Add [K58 0] Irritable bowel syndrome with diarrhea     4/11/2021  2:23 AM Cecilia Garcia Add [N39 0] UTI (urinary tract infection)       ED Disposition     ED Disposition Condition Date/Time Comment    Discharge Stable Sun Apr 11, 2021  2:19 AM Rufina Munoz discharge to home/self care  Follow-up Information     Follow up With Specialties Details Why [id number], SUJATA Nurse Practitioner In 1 day  [address] [facility] Male [address] [place] [address]  [phone]            Discharge Medication List as of 4/11/2021  2:32 AM      START taking these medications    Details   !! dicyclomine (BENTYL) 20 mg tablet Take 1 tablet (20 mg total) by mouth 2 (two) times a day, Starting Sun 4/11/2021, Print      levofloxacin (LEVAQUIN) 250 mg tablet Take 3 tablets (750 mg total) by mouth daily for 6 days, Starting Sun 4/11/2021, Until Sat 4/17/2021, Print      nitrofurantoin (MACROBID) 100 mg capsule Take 1 capsule (100 mg total) by mouth 2 (two) times a day for 5 days, Starting Sun 4/11/2021, Until Fri 4/16/2021, Print      !! ondansetron (ZOFRAN-ODT) 4 mg disintegrating tablet Take 1 tablet (4 mg total) by mouth every 6 (six) hours as needed for nausea or vomiting, Starting Sun 4/11/2021, Print       !! - Potential duplicate medications found  Please discuss with provider        CONTINUE these medications which have NOT CHANGED    Details   clotrimazole (LOTRIMIN) 1 % cream Apply to affected area 2 times daily, Normal      !! dicyclomine (BENTYL) 20 mg tablet Take 1 tablet (20 mg total) by mouth 2 (two) times a day, Starting Mon 10/12/2020, Normal      doxepin (SINEquan) 25 mg capsule TAKE 2 TO 3 CAPSULES AT BEDTIME, Historical Med      DULoxetine (CYMBALTA) 30 mg delayed release capsule Starting Sat 7/25/2020, Historical Med escitalopram (LEXAPRO) 10 mg tablet Take 3 tablets (30 mg total) by mouth daily for 30 days, Starting Wed 9/11/2019, Until Mon 10/12/2020, Print      hydrochlorothiazide (HYDRODIURIL) 12 5 mg tablet Take 1 tablet (12 5 mg total) by mouth every other day, Starting Mon 8/31/2020, Until Mon 10/12/2020, Normal      insulin glargine (LANTUS) 100 units/mL subcutaneous injection Inject 40 Units under the skin daily at bedtime, Starting Mon 8/31/2020, Normal      lamoTRIgine (LaMICtal) 200 MG tablet Take 200 mg by mouth daily , Historical Med      liraglutide (Victoza) injection Inject 1 8 MG daily  , Normal      lisinopril (ZESTRIL) 10 mg tablet TAKE 1 TABLET BY MOUTH EVERY DAY, Normal      LORazepam (ATIVAN) 0 5 mg tablet Take by mouth every 8 (eight) hours as needed for anxiety, Historical Med      metFORMIN (GLUCOPHAGE) 500 mg tablet Take 1 tablet (500 mg total) by mouth 2 (two) times a day with meals, Starting Mon 8/31/2020, Until Mon 10/12/2020, Normal      NovoLOG FlexPen 100 units/mL injection pen TAKE 8 UNITS WITH LARGEST MEAL DAILY, Historical Med      nystatin (MYCOSTATIN) 500,000 units/5 mL suspension Apply 5 mL (500,000 Units total) to the mouth or throat 4 (four) times a day, Starting Mon 8/31/2020, Normal      ondansetron (ZOFRAN) 4 mg tablet Take 1 tablet (4 mg total) by mouth every 8 (eight) hours as needed for nausea or vomiting, Starting Mon 8/31/2020, Normal      !! ondansetron (ZOFRAN-ODT) 4 mg disintegrating tablet Take 1 tablet (4 mg total) by mouth every 8 (eight) hours as needed for nausea or vomiting, Starting Mon 10/12/2020, Normal      pantoprazole (PROTONIX) 40 mg tablet TAKE 1 TABLET BY MOUTH EVERY DAY, Normal      sucralfate (CARAFATE) 1 g/10 mL suspension Take 10 mL (1 g total) by mouth 4 (four) times a day, Starting Tue 8/11/2020, Normal       !! - Potential duplicate medications found  Please discuss with provider  No discharge procedures on file      PDMP Review     None ED Provider  Electronically Signed by           Eleazar Bryant MD  04/12/21 1016

## 2021-04-13 LAB — BACTERIA UR CULT: ABNORMAL

## 2021-04-14 DIAGNOSIS — R53.83 OTHER FATIGUE: ICD-10-CM

## 2021-04-14 DIAGNOSIS — I10 ESSENTIAL HYPERTENSION: ICD-10-CM

## 2021-04-14 RX ORDER — LISINOPRIL 10 MG/1
10 TABLET ORAL DAILY
Qty: 30 TABLET | Refills: 0 | Status: SHIPPED | OUTPATIENT
Start: 2021-04-14 | End: 2021-07-23

## 2021-04-14 RX ORDER — LISINOPRIL 10 MG/1
TABLET ORAL
Qty: 30 TABLET | Refills: 1 | OUTPATIENT
Start: 2021-04-14

## 2021-04-17 ENCOUNTER — HOSPITAL ENCOUNTER (EMERGENCY)
Facility: HOSPITAL | Age: 46
Discharge: HOME | End: 2021-04-17
Attending: EMERGENCY MEDICINE | Admitting: EMERGENCY MEDICINE
Payer: COMMERCIAL

## 2021-04-17 VITALS
HEART RATE: 100 BPM | BODY MASS INDEX: 69.71 KG/M2 | SYSTOLIC BLOOD PRESSURE: 153 MMHG | RESPIRATION RATE: 18 BRPM | DIASTOLIC BLOOD PRESSURE: 64 MMHG | WEIGHT: 293 LBS | OXYGEN SATURATION: 95 % | TEMPERATURE: 98.6 F

## 2021-04-17 DIAGNOSIS — E11.65 TYPE 2 DIABETES MELLITUS WITH HYPERGLYCEMIA, WITH LONG-TERM CURRENT USE OF INSULIN (HCC): Primary | ICD-10-CM

## 2021-04-17 DIAGNOSIS — Z79.4 TYPE 2 DIABETES MELLITUS WITH HYPERGLYCEMIA, WITH LONG-TERM CURRENT USE OF INSULIN (HCC): Primary | ICD-10-CM

## 2021-04-17 LAB
ANION GAP SERPL CALCULATED.3IONS-SCNC: 7 MMOL/L (ref 4–13)
BACTERIA UR QL AUTO: ABNORMAL /HPF
BASE EX.OXY STD BLDV CALC-SCNC: 87 % (ref 60–80)
BASE EXCESS BLDV CALC-SCNC: 3.2 MMOL/L
BASOPHILS # BLD AUTO: 0.04 THOUSANDS/ΜL (ref 0–0.1)
BASOPHILS NFR BLD AUTO: 1 % (ref 0–1)
BETA-HYDROXYBUTYRATE: 0.1 MMOL/L
BILIRUB UR QL STRIP: ABNORMAL
BILIRUB UR QL STRIP: NEGATIVE
BUN SERPL-MCNC: 14 MG/DL (ref 5–25)
CALCIUM SERPL-MCNC: 9.4 MG/DL (ref 8.3–10.1)
CHLORIDE SERPL-SCNC: 100 MMOL/L (ref 100–108)
CLARITY UR: ABNORMAL
CLARITY UR: ABNORMAL
CO2 SERPL-SCNC: 28 MMOL/L (ref 21–32)
COLOR UR: ABNORMAL
COLOR UR: YELLOW
CREAT SERPL-MCNC: 0.87 MG/DL (ref 0.6–1.3)
EOSINOPHIL # BLD AUTO: 0.18 THOUSAND/ΜL (ref 0–0.61)
EOSINOPHIL NFR BLD AUTO: 3 % (ref 0–6)
ERYTHROCYTE [DISTWIDTH] IN BLOOD BY AUTOMATED COUNT: 15.7 % (ref 11.6–15.1)
GFR SERPL CREATININE-BSD FRML MDRD: 79 ML/MIN/1.73SQ M
GLUCOSE SERPL-MCNC: 219 MG/DL (ref 65–140)
GLUCOSE SERPL-MCNC: 253 MG/DL (ref 65–140)
GLUCOSE UR STRIP-MCNC: NEGATIVE MG/DL
GLUCOSE UR STRIP-MCNC: NEGATIVE MG/DL
HCO3 BLDV-SCNC: 27.6 MMOL/L (ref 24–30)
HCT VFR BLD AUTO: 38.8 % (ref 34.8–46.1)
HGB BLD-MCNC: 11.7 G/DL (ref 11.5–15.4)
HGB UR QL STRIP.AUTO: ABNORMAL
HGB UR QL STRIP.AUTO: ABNORMAL
IMM GRANULOCYTES # BLD AUTO: 0.04 THOUSAND/UL (ref 0–0.2)
IMM GRANULOCYTES NFR BLD AUTO: 1 % (ref 0–2)
KETONES UR STRIP-MCNC: NEGATIVE MG/DL
KETONES UR STRIP-MCNC: NEGATIVE MG/DL
LEUKOCYTE ESTERASE UR QL STRIP: ABNORMAL
LEUKOCYTE ESTERASE UR QL STRIP: ABNORMAL
LYMPHOCYTES # BLD AUTO: 2 THOUSANDS/ΜL (ref 0.6–4.47)
LYMPHOCYTES NFR BLD AUTO: 28 % (ref 14–44)
MCH RBC QN AUTO: 25.1 PG (ref 26.8–34.3)
MCHC RBC AUTO-ENTMCNC: 30.2 G/DL (ref 31.4–37.4)
MCV RBC AUTO: 83 FL (ref 82–98)
MONOCYTES # BLD AUTO: 0.59 THOUSAND/ΜL (ref 0.17–1.22)
MONOCYTES NFR BLD AUTO: 8 % (ref 4–12)
NEUTROPHILS # BLD AUTO: 4.42 THOUSANDS/ΜL (ref 1.85–7.62)
NEUTS SEG NFR BLD AUTO: 59 % (ref 43–75)
NITRITE UR QL STRIP: NEGATIVE
NITRITE UR QL STRIP: NEGATIVE
NON-SQ EPI CELLS URNS QL MICRO: ABNORMAL /HPF
NRBC BLD AUTO-RTO: 0 /100 WBCS
O2 CT BLDV-SCNC: 16 ML/DL
OTHER STN SPEC: ABNORMAL
PCO2 BLDV: 41.3 MM HG (ref 42–50)
PH BLDV: 7.44 [PH] (ref 7.3–7.4)
PH UR STRIP.AUTO: 5.5 [PH]
PH UR STRIP.AUTO: 5.5 [PH] (ref 4.5–8)
PLATELET # BLD AUTO: 340 THOUSANDS/UL (ref 149–390)
PMV BLD AUTO: 9.5 FL (ref 8.9–12.7)
PO2 BLDV: 52.8 MM HG (ref 35–45)
POTASSIUM SERPL-SCNC: 4.8 MMOL/L (ref 3.5–5.3)
PROT UR STRIP-MCNC: ABNORMAL MG/DL
PROT UR STRIP-MCNC: ABNORMAL MG/DL
RBC # BLD AUTO: 4.66 MILLION/UL (ref 3.81–5.12)
RBC #/AREA URNS AUTO: ABNORMAL /HPF
SODIUM SERPL-SCNC: 135 MMOL/L (ref 136–145)
SP GR UR STRIP.AUTO: >=1.03 (ref 1–1.03)
SP GR UR STRIP.AUTO: >=1.03 (ref 1–1.03)
UROBILINOGEN UR QL STRIP.AUTO: 0.2 E.U./DL
UROBILINOGEN UR QL STRIP.AUTO: 0.2 E.U./DL
WBC # BLD AUTO: 7.27 THOUSAND/UL (ref 4.31–10.16)
WBC #/AREA URNS AUTO: ABNORMAL /HPF

## 2021-04-17 PROCEDURE — 81001 URINALYSIS AUTO W/SCOPE: CPT | Performed by: EMERGENCY MEDICINE

## 2021-04-17 PROCEDURE — 87086 URINE CULTURE/COLONY COUNT: CPT | Performed by: EMERGENCY MEDICINE

## 2021-04-17 PROCEDURE — 96360 HYDRATION IV INFUSION INIT: CPT

## 2021-04-17 PROCEDURE — 82010 KETONE BODYS QUAN: CPT | Performed by: EMERGENCY MEDICINE

## 2021-04-17 PROCEDURE — 82805 BLOOD GASES W/O2 SATURATION: CPT | Performed by: EMERGENCY MEDICINE

## 2021-04-17 PROCEDURE — 85025 COMPLETE CBC W/AUTO DIFF WBC: CPT | Performed by: EMERGENCY MEDICINE

## 2021-04-17 PROCEDURE — 36415 COLL VENOUS BLD VENIPUNCTURE: CPT | Performed by: EMERGENCY MEDICINE

## 2021-04-17 PROCEDURE — 99285 EMERGENCY DEPT VISIT HI MDM: CPT

## 2021-04-17 PROCEDURE — 87106 FUNGI IDENTIFICATION YEAST: CPT | Performed by: EMERGENCY MEDICINE

## 2021-04-17 PROCEDURE — 99282 EMERGENCY DEPT VISIT SF MDM: CPT | Performed by: EMERGENCY MEDICINE

## 2021-04-17 PROCEDURE — 96361 HYDRATE IV INFUSION ADD-ON: CPT

## 2021-04-17 PROCEDURE — 80048 BASIC METABOLIC PNL TOTAL CA: CPT | Performed by: EMERGENCY MEDICINE

## 2021-04-17 PROCEDURE — 82948 REAGENT STRIP/BLOOD GLUCOSE: CPT

## 2021-04-17 RX ADMIN — SODIUM CHLORIDE 1000 ML: 0.9 INJECTION, SOLUTION INTRAVENOUS at 10:01

## 2021-04-17 RX ADMIN — SODIUM CHLORIDE 1000 ML: 0.9 INJECTION, SOLUTION INTRAVENOUS at 08:09

## 2021-04-17 NOTE — ED NOTES
Pt resting at this time with family at the bedside  Lights were turned off for comfort, call bell within reach and side rails up accordingly  No distress noted at this time, will continue to monitor        Edwin Thomas RN  04/17/21 4853

## 2021-04-17 NOTE — ED PROVIDER NOTES
History  Chief Complaint   Patient presents with    Hyperglycemia - Symptomatic     Blood sugar at 0600 today was 282, reports vomiting and did not take any medicaiton for blood sugar this morning  Has not take victoza for 1 week, had insulin increased monday by PCP       History provided by:  Patient and spouse  Hyperglycemia - Symptomatic  Blood sugar level PTA:  300  Severity:  Moderate  Onset quality:  Gradual  Duration:  1 week  Timing:  Intermittent  Progression:  Waxing and waning  Chronicity:  New  Diabetes status:  Controlled with insulin  Current diabetic therapy:  Mealtime insulin, long-acting insulin, metformin, Victoza, unfortunately Victoza is not being administered right now secondary to insurance change  Context: change in medication    Relieved by:  None tried  Ineffective treatments:  None tried  Associated symptoms: no abdominal pain, no chest pain, no dehydration, no diaphoresis, no dizziness, no dysuria, no fever, no nausea, no shortness of breath and no vomiting    Associated symptoms comment:  Recent UTI currently on Levaquin      Prior to Admission Medications   Prescriptions Last Dose Informant Patient Reported? Taking?    DULoxetine (CYMBALTA) 30 mg delayed release capsule  Self Yes No   LORazepam (ATIVAN) 0 5 mg tablet  Self Yes No   Sig: Take by mouth every 8 (eight) hours as needed for anxiety   NovoLOG FlexPen 100 units/mL injection pen   Yes No   Sig: TAKE 8 UNITS WITH LARGEST MEAL DAILY   clotrimazole (LOTRIMIN) 1 % cream  Self No No   Sig: Apply to affected area 2 times daily   dicyclomine (BENTYL) 20 mg tablet   No No   Sig: Take 1 tablet (20 mg total) by mouth 2 (two) times a day   dicyclomine (BENTYL) 20 mg tablet   No No   Sig: Take 1 tablet (20 mg total) by mouth 2 (two) times a day   doxepin (SINEquan) 25 mg capsule   Yes No   Sig: TAKE 2 TO 3 CAPSULES AT BEDTIME   escitalopram (LEXAPRO) 10 mg tablet  Self No No   Sig: Take 3 tablets (30 mg total) by mouth daily for 30 days hydrochlorothiazide (HYDRODIURIL) 12 5 mg tablet   No No   Sig: Take 1 tablet (12 5 mg total) by mouth every other day   insulin glargine (LANTUS) 100 units/mL subcutaneous injection   No No   Sig: Inject 40 Units under the skin daily at bedtime   lamoTRIgine (LaMICtal) 200 MG tablet  Self Yes No   Sig: Take 200 mg by mouth daily    levofloxacin (LEVAQUIN) 250 mg tablet   No No   Sig: Take 3 tablets (750 mg total) by mouth daily for 6 days   liraglutide (Victoza) injection   No No   Sig: Inject 1 8 MG daily     lisinopril (ZESTRIL) 10 mg tablet   No No   Sig: Take 1 tablet (10 mg total) by mouth daily   metFORMIN (GLUCOPHAGE) 500 mg tablet   No No   Sig: Take 1 tablet (500 mg total) by mouth 2 (two) times a day with meals   nitrofurantoin (MACROBID) 100 mg capsule   No No   Sig: Take 1 capsule (100 mg total) by mouth 2 (two) times a day for 5 days   nystatin (MYCOSTATIN) 500,000 units/5 mL suspension   No No   Sig: Apply 5 mL (500,000 Units total) to the mouth or throat 4 (four) times a day   ondansetron (ZOFRAN) 4 mg tablet   No No   Sig: Take 1 tablet (4 mg total) by mouth every 8 (eight) hours as needed for nausea or vomiting   ondansetron (ZOFRAN-ODT) 4 mg disintegrating tablet   No No   Sig: Take 1 tablet (4 mg total) by mouth every 8 (eight) hours as needed for nausea or vomiting   ondansetron (ZOFRAN-ODT) 4 mg disintegrating tablet   No No   Sig: Take 1 tablet (4 mg total) by mouth every 6 (six) hours as needed for nausea or vomiting   pantoprazole (PROTONIX) 40 mg tablet   No No   Sig: TAKE 1 TABLET BY MOUTH EVERY DAY   sucralfate (CARAFATE) 1 g/10 mL suspension   No No   Sig: Take 10 mL (1 g total) by mouth 4 (four) times a day      Facility-Administered Medications: None       Past Medical History:   Diagnosis Date    Anemia     Anxiety     Depression     Diabetes mellitus (HCC)     GERD (gastroesophageal reflux disease)     Hypertension     Irritable bowel syndrome     Morbid obesity with BMI of 60 0-69 9, adult ([facility] [address] )     Obesity     Psychiatric disorder     Seasonal allergies        Past Surgical History:   Procedure Laterality Date     SECTION  1992    CHOLECYSTECTOMY  2008    WISDOM TOOTH EXTRACTION  2009       Family History   Problem Relation Age of Onset    Diabetes Mother     Hypertension Father      I have reviewed and agree with the history as documented  E-Cigarette/Vaping    E-Cigarette Use Never User      E-Cigarette/Vaping Substances    Nicotine No     THC No     CBD No     Flavoring No     Other No     Unknown No      Social History     Tobacco Use    Smoking status: Never Smoker    Smokeless tobacco: Never Used   Substance Use Topics    Alcohol use: No    Drug use: No       Review of Systems   Constitutional: Negative for activity change, chills, diaphoresis and fever  HENT: Negative for congestion, sinus pressure and sore throat  Eyes: Negative for pain and visual disturbance  Respiratory: Negative for cough, chest tightness, shortness of breath, wheezing and stridor  Cardiovascular: Negative for chest pain and palpitations  Gastrointestinal: Negative for abdominal distention, abdominal pain, constipation, diarrhea, nausea and vomiting  Genitourinary: Negative for dysuria and frequency  Musculoskeletal: Negative for neck pain and neck stiffness  Skin: Negative for rash  Neurological: Negative for dizziness, speech difficulty, light-headedness, numbness and headaches  Physical Exam  Physical Exam  Vitals signs reviewed  Constitutional:       General: She is not in acute distress  Appearance: She is well-developed  She is not diaphoretic  HENT:      Head: Normocephalic and atraumatic  Right Ear: External ear normal       Left Ear: External ear normal       Nose: Nose normal    Eyes:      General: No scleral icterus  Right eye: No discharge  Left eye: No discharge        Conjunctiva/sclera: Conjunctivae normal  Pupils: Pupils are equal, round, and reactive to light  Neck:      Musculoskeletal: Normal range of motion and neck supple  Trachea: No tracheal deviation  Cardiovascular:      Rate and Rhythm: Normal rate and regular rhythm  Heart sounds: Normal heart sounds  No murmur  Pulmonary:      Effort: Pulmonary effort is normal  No respiratory distress  Breath sounds: Normal breath sounds  No stridor  Abdominal:      General: There is no distension  Palpations: Abdomen is soft  Tenderness: There is no abdominal tenderness  There is no guarding or rebound  Musculoskeletal: Normal range of motion  General: No deformity  Skin:     General: Skin is warm and dry  Coloration: Skin is not pale  Findings: No erythema or rash  Neurological:      General: No focal deficit present  Mental Status: She is alert and oriented to person, place, and time  Motor: No abnormal muscle tone  Coordination: Coordination normal          Vital Signs  ED Triage Vitals [04/17/21 0736]   Temperature Pulse Respirations Blood Pressure SpO2   98 6 °F (37 °C) (!) 106 18 165/74 95 %      Temp Source Heart Rate Source Patient Position - Orthostatic VS BP Location FiO2 (%)   Oral Monitor -- Right arm --      Pain Score       6           Vitals:    04/17/21 0736 04/17/21 0815 04/17/21 0900   BP: 165/74 158/74 153/64   Pulse: (!) 106 96 100         Visual Acuity      ED Medications  Medications   sodium chloride 0 9 % bolus 1,000 mL (1,000 mL Intravenous New Bag 4/17/21 1001)   sodium chloride 0 9 % bolus 1,000 mL (0 mL Intravenous Stopped 4/17/21 1001)       Diagnostic Studies  Results Reviewed     Procedure Component Value Units Date/Time    UA w Reflex to Microscopic w Reflex to Culture [[id number]] Collected: 04/17/21 0955    Lab Status: In process Specimen: Urine, Other Updated: 04/17/21 1015    Urine Microscopic [[id number]] Collected: 04/17/21 0955    Lab Status:  In process Specimen: Urine, Other Updated: 04/17/21 1015    Urine Macroscopic, POC [[id number]]  (Abnormal) Collected: 04/17/21 0955    Lab Status: Final result Specimen: Urine Updated: 04/17/21 0956     Color, UA Sonal     Clarity, UA Slightly Cloudy     pH, UA 5 5     Leukocytes, UA Small     Nitrite, UA Negative     Protein, UA 30 (1+) mg/dl      Glucose, UA Negative mg/dl      Ketones, UA Negative mg/dl      Urobilinogen, UA 0 2 E U /dl      Bilirubin, UA Interference- unable to analyze     Blood, UA Small     Specific [place], UA >=1 030    Narrative:      CLINITEK RESULT    Basic metabolic panel [[id number]]  (Abnormal) Collected: 04/17/21 0810    Lab Status: Final result Specimen: Blood from Arm, Right Updated: 04/17/21 0849     Sodium 135 mmol/L      Potassium 4 8 mmol/L      Chloride 100 mmol/L      CO2 28 mmol/L      ANION GAP 7 mmol/L      BUN 14 mg/dL      Creatinine 0 87 mg/dL      Glucose 253 mg/dL      Calcium 9 4 mg/dL      eGFR 79 ml/min/1 73sq m     Narrative:      [facility] guidelines for Chronic Kidney Disease (CKD):     Stage 1 with normal or high GFR (GFR > 90 mL/min/1 73 square meters)    Stage 2 Mild CKD (GFR = 60-89 mL/min/1 73 square meters)    Stage 3A Moderate CKD (GFR = 45-59 mL/min/1 73 square meters)    Stage 3B Moderate CKD (GFR = 30-44 mL/min/1 73 square meters)    Stage 4 Severe CKD (GFR = 15-29 mL/min/1 73 square meters)    Stage 5 End Stage CKD (GFR <15 mL/min/1 73 square meters)  Note: GFR calculation is accurate only with a steady state creatinine    Beta Hydroxybutyrate [[id number]]  (Normal) Collected: 04/17/21 0810    Lab Status: Final result Specimen: Blood from Arm, Right Updated: 04/17/21 0824     BETA-HYDROXYBUTYRATE 0 1 mmol/L     CBC and differential [[id number]]  (Abnormal) Collected: 04/17/21 0810    Lab Status: Final result Specimen: Blood from Arm, Right Updated: 04/17/21 0819     WBC 7 27 Thousand/uL      RBC 4 66 Million/uL      Hemoglobin 11 7 g/dL      Hematocrit 38 8 %      MCV 83 fL      MCH 25 1 pg      MCHC 30 2 g/dL      RDW 15 7 %      MPV 9 5 fL      Platelets 576 Thousands/uL      nRBC 0 /100 WBCs      Neutrophils Relative 59 %      Immat GRANS % 1 %      Lymphocytes Relative 28 %      Monocytes Relative 8 %      Eosinophils Relative 3 %      Basophils Relative 1 %      Neutrophils Absolute 4 42 Thousands/µL      Immature Grans Absolute 0 04 Thousand/uL      Lymphocytes Absolute 2 00 Thousands/µL      Monocytes Absolute 0 59 Thousand/µL      Eosinophils Absolute 0 18 Thousand/µL      Basophils Absolute 0 04 Thousands/µL     Blood gas, venous [[id number]]  (Abnormal) Collected: 04/17/21 0810    Lab Status: Final result Specimen: Blood from Arm, Right Updated: 04/17/21 0818     pH, Gavin 7 443     pCO2, Gavin 41 3 mm Hg      pO2, Gavin 52 8 mm Hg      HCO3, Gavin 27 6 mmol/L      Base Excess, Gavin 3 2 mmol/L      O2 Content, Gavin 16 0 ml/dL      O2 HGB, VENOUS 87 0 %     Fingerstick Glucose (POCT) [[id number]]  (Abnormal) Collected: 04/17/21 0756    Lab Status: Final result Updated: 04/17/21 0759     POC Glucose 219 mg/dl                  No orders to display              Procedures  Procedures         ED Course                             SBIRT 20yo+      Most Recent Value   SBIRT (24 yo +)   In order to provide better care to our patients, we are screening all of our patients for alcohol and drug use  Would it be okay to ask you these screening questions? No Filed at: 04/17/2021 0815   Initial Alcohol Screen: US AUDIT-C    1  How often do you have a drink containing alcohol?  0 Filed at: 04/17/2021 0815   2  How many drinks containing alcohol do you have on a typical day you are drinking? 0 Filed at: 04/17/2021 0815   3a  Male UNDER 65: How often do you have five or more drinks on one occasion? 0 Filed at: 04/17/2021 0815   3b  FEMALE Any Age, or MALE 65+: How often do you have 4 or more drinks on one occassion?   0 Filed at: 04/17/2021 0815   Audit-C Score  0 Filed at: 04/17/2021 3487   JUAN: How many times in the past year have you    Used an illegal drug or used a prescription medication for non-medical reasons? Never Filed at: 04/17/2021 0815                    [facility]  Number of Diagnoses or Management Options  Type 2 diabetes mellitus with hyperglycemia, with long-term [facility] ([facility] ): new and requires workup  Diagnosis management comments:       Initial ED assessment:  53-year-old female, elevated blood sugars, recent UTI currently on Levaquin, also medication change that her fit ptosis not being covered by insurance so she is not taking it    Initial DDx includes but is not limited to:   Levaquin induced hyperglycemia, hypoglycemia secondary to not taking Victoza, less likely infectious etiology  Unlikely DKA but possibly    Initial ED plan:   Blood work IV fluids urinalysis check for signs DKA        Final ED summary/disposition:   After evaluation and workup in the emergency department, workup on ventrally unremarkable given L of fluid patient feels improvement  Encouraged increased oral hydration  , no change to be made a medications at this time leave this for Endocrinology    Patient be discharge        Amount and/or Complexity of Data Reviewed  Clinical lab tests: ordered  Decide to obtain previous medical records or to obtain history from someone other than the patient: yes  Obtain history from someone other than the patient: yes  Review and summarize past medical records: yes        Disposition  Final diagnoses:   Type 2 diabetes mellitus with hyperglycemia, with long-term current use of insulin ([facility] )     Time reflects when diagnosis was documented in both MDM as applicable and the Disposition within this note     Time User Action Codes Description Comment    4/17/2021 10:14 AM Virginia Do Add [R73 9] Hyperglycemia     4/17/2021 10:14 AM Virginia Do Add [E11 65,  Z79 4] Type 2 diabetes mellitus with hyperglycemia, with long-term current use of insulin ([facility] )     4/17/2021 10:14 AM Demetrius Morales Modify [E11 65,  Z79 4] Type 2 diabetes mellitus with hyperglycemia, with long-term current use of insulin ([facility] )     4/17/2021 10:14 AM Demetrius Morales Remove [R73 9] Hyperglycemia       ED Disposition     ED Disposition Condition Date/Time Comment    Discharge Stable Sat Apr 17, 2021 10:14 AM Maicol oDan discharge to home/self care  Follow-up Information    None         Patient's Medications   Discharge Prescriptions    No medications on file     No discharge procedures on file      PDMP Review     None          ED Provider  Electronically Signed by           Shan Mensah DO  04/17/21 1012

## 2021-04-19 LAB
BACTERIA UR CULT: ABNORMAL
BACTERIA UR CULT: ABNORMAL

## 2021-05-15 DIAGNOSIS — E11.9 TYPE II DIABETES MELLITUS, WELL CONTROLLED (HCC): ICD-10-CM

## 2021-05-17 RX ORDER — LIRAGLUTIDE 6 MG/ML
INJECTION SUBCUTANEOUS
Refills: 3 | OUTPATIENT
Start: 2021-05-17

## 2021-05-24 DIAGNOSIS — E11.9 TYPE II DIABETES MELLITUS, WELL CONTROLLED (HCC): ICD-10-CM

## 2021-05-25 RX ORDER — LIRAGLUTIDE 6 MG/ML
INJECTION SUBCUTANEOUS
Refills: 3 | OUTPATIENT
Start: 2021-05-25

## 2021-06-30 ENCOUNTER — APPOINTMENT (EMERGENCY)
Dept: RADIOLOGY | Facility: HOSPITAL | Age: 46
End: 2021-06-30
Payer: COMMERCIAL

## 2021-06-30 ENCOUNTER — HOSPITAL ENCOUNTER (EMERGENCY)
Facility: HOSPITAL | Age: 46
Discharge: HOME | End: 2021-06-30
Attending: EMERGENCY MEDICINE | Admitting: EMERGENCY MEDICINE
Payer: COMMERCIAL

## 2021-06-30 VITALS
OXYGEN SATURATION: 95 % | SYSTOLIC BLOOD PRESSURE: 190 MMHG | BODY MASS INDEX: 57.52 KG/M2 | HEIGHT: 60 IN | WEIGHT: 293 LBS | RESPIRATION RATE: 18 BRPM | HEART RATE: 112 BPM | DIASTOLIC BLOOD PRESSURE: 93 MMHG | TEMPERATURE: 98.1 F

## 2021-06-30 DIAGNOSIS — R42 DIZZINESS: ICD-10-CM

## 2021-06-30 DIAGNOSIS — E86.0 DEHYDRATION: ICD-10-CM

## 2021-06-30 DIAGNOSIS — R73.9 HYPERGLYCEMIA: ICD-10-CM

## 2021-06-30 DIAGNOSIS — F41.9 ANXIETY: Primary | ICD-10-CM

## 2021-06-30 DIAGNOSIS — I10 HYPERTENSION, UNSPECIFIED TYPE: ICD-10-CM

## 2021-06-30 LAB
ALBUMIN SERPL BCP-MCNC: 3.8 G/DL (ref 3.5–5)
ALP SERPL-CCNC: 82 U/L (ref 46–116)
ALT SERPL W P-5'-P-CCNC: 69 U/L (ref 12–78)
ANION GAP SERPL CALCULATED.3IONS-SCNC: 9 MMOL/L (ref 4–13)
AST SERPL W P-5'-P-CCNC: 38 U/L (ref 5–45)
BACTERIA UR QL AUTO: ABNORMAL /HPF
BASE EX.OXY STD BLDV CALC-SCNC: 84.5 % (ref 60–80)
BASE EXCESS BLDV CALC-SCNC: -1 MMOL/L
BASOPHILS # BLD AUTO: 0.06 THOUSANDS/ΜL (ref 0–0.1)
BASOPHILS NFR BLD AUTO: 1 % (ref 0–1)
BILIRUB SERPL-MCNC: 0.48 MG/DL (ref 0.2–1)
BILIRUB UR QL STRIP: NEGATIVE
BUN SERPL-MCNC: 14 MG/DL (ref 5–25)
CALCIUM SERPL-MCNC: 9.4 MG/DL (ref 8.3–10.1)
CHLORIDE SERPL-SCNC: 104 MMOL/L (ref 100–108)
CLARITY UR: CLEAR
CO2 SERPL-SCNC: 28 MMOL/L (ref 21–32)
COLOR UR: YELLOW
CREAT SERPL-MCNC: 1.07 MG/DL (ref 0.6–1.3)
EOSINOPHIL # BLD AUTO: 0.16 THOUSAND/ΜL (ref 0–0.61)
EOSINOPHIL NFR BLD AUTO: 2 % (ref 0–6)
ERYTHROCYTE [DISTWIDTH] IN BLOOD BY AUTOMATED COUNT: 15.7 % (ref 11.6–15.1)
EXT PREG TEST URINE: NEGATIVE
EXT. CONTROL ED NAV: NORMAL
GFR SERPL CREATININE-BSD FRML MDRD: 62 ML/MIN/1.73SQ M
GLUCOSE SERPL-MCNC: 176 MG/DL (ref 65–140)
GLUCOSE SERPL-MCNC: 185 MG/DL (ref 65–140)
GLUCOSE UR STRIP-MCNC: NEGATIVE MG/DL
HCO3 BLDV-SCNC: 25.2 MMOL/L (ref 24–30)
HCT VFR BLD AUTO: 42.4 % (ref 34.8–46.1)
HGB BLD-MCNC: 13.1 G/DL (ref 11.5–15.4)
HGB UR QL STRIP.AUTO: NEGATIVE
IMM GRANULOCYTES # BLD AUTO: 0.03 THOUSAND/UL (ref 0–0.2)
IMM GRANULOCYTES NFR BLD AUTO: 0 % (ref 0–2)
KETONES UR STRIP-MCNC: NEGATIVE MG/DL
LEUKOCYTE ESTERASE UR QL STRIP: NEGATIVE
LIPASE SERPL-CCNC: 119 U/L (ref 73–393)
LYMPHOCYTES # BLD AUTO: 2.04 THOUSANDS/ΜL (ref 0.6–4.47)
LYMPHOCYTES NFR BLD AUTO: 28 % (ref 14–44)
MAGNESIUM SERPL-MCNC: 1.6 MG/DL (ref 1.6–2.6)
MCH RBC QN AUTO: 25.8 PG (ref 26.8–34.3)
MCHC RBC AUTO-ENTMCNC: 30.9 G/DL (ref 31.4–37.4)
MCV RBC AUTO: 84 FL (ref 82–98)
MONOCYTES # BLD AUTO: 0.55 THOUSAND/ΜL (ref 0.17–1.22)
MONOCYTES NFR BLD AUTO: 8 % (ref 4–12)
MUCOUS THREADS UR QL AUTO: ABNORMAL
NEUTROPHILS # BLD AUTO: 4.41 THOUSANDS/ΜL (ref 1.85–7.62)
NEUTS SEG NFR BLD AUTO: 61 % (ref 43–75)
NITRITE UR QL STRIP: NEGATIVE
NON-SQ EPI CELLS URNS QL MICRO: ABNORMAL /HPF
NRBC BLD AUTO-RTO: 0 /100 WBCS
O2 CT BLDV-SCNC: 16.8 ML/DL
PCO2 BLDV: 47.3 MM HG (ref 42–50)
PH BLDV: 7.34 [PH] (ref 7.3–7.4)
PH UR STRIP.AUTO: 6 [PH]
PLATELET # BLD AUTO: 330 THOUSANDS/UL (ref 149–390)
PMV BLD AUTO: 9.9 FL (ref 8.9–12.7)
PO2 BLDV: 50.5 MM HG (ref 35–45)
POTASSIUM SERPL-SCNC: 3.5 MMOL/L (ref 3.5–5.3)
PROT SERPL-MCNC: 8.1 G/DL (ref 6.4–8.2)
PROT UR STRIP-MCNC: ABNORMAL MG/DL
RBC # BLD AUTO: 5.07 MILLION/UL (ref 3.81–5.12)
RBC #/AREA URNS AUTO: ABNORMAL /HPF
SODIUM SERPL-SCNC: 141 MMOL/L (ref 136–145)
SP GR UR STRIP.AUTO: >=1.03 (ref 1–1.03)
TROPONIN I SERPL-MCNC: <0.03 NG/ML
UROBILINOGEN UR QL STRIP.AUTO: 0.2 E.U./DL
WBC # BLD AUTO: 7.25 THOUSAND/UL (ref 4.31–10.16)
WBC #/AREA URNS AUTO: ABNORMAL /HPF

## 2021-06-30 PROCEDURE — 71046 X-RAY EXAM CHEST 2 VIEWS: CPT

## 2021-06-30 PROCEDURE — 99284 EMERGENCY DEPT VISIT MOD MDM: CPT

## 2021-06-30 PROCEDURE — 81025 URINE PREGNANCY TEST: CPT | Performed by: EMERGENCY MEDICINE

## 2021-06-30 PROCEDURE — 83690 ASSAY OF LIPASE: CPT | Performed by: EMERGENCY MEDICINE

## 2021-06-30 PROCEDURE — 85025 COMPLETE CBC W/AUTO DIFF WBC: CPT | Performed by: EMERGENCY MEDICINE

## 2021-06-30 PROCEDURE — 96361 HYDRATE IV INFUSION ADD-ON: CPT

## 2021-06-30 PROCEDURE — 84484 ASSAY OF TROPONIN QUANT: CPT | Performed by: EMERGENCY MEDICINE

## 2021-06-30 PROCEDURE — 82805 BLOOD GASES W/O2 SATURATION: CPT | Performed by: EMERGENCY MEDICINE

## 2021-06-30 PROCEDURE — 93005 ELECTROCARDIOGRAM TRACING: CPT

## 2021-06-30 PROCEDURE — 36415 COLL VENOUS BLD VENIPUNCTURE: CPT | Performed by: EMERGENCY MEDICINE

## 2021-06-30 PROCEDURE — 80053 COMPREHEN METABOLIC PANEL: CPT | Performed by: EMERGENCY MEDICINE

## 2021-06-30 PROCEDURE — 81001 URINALYSIS AUTO W/SCOPE: CPT | Performed by: EMERGENCY MEDICINE

## 2021-06-30 PROCEDURE — 96374 THER/PROPH/DIAG INJ IV PUSH: CPT

## 2021-06-30 PROCEDURE — 83735 ASSAY OF MAGNESIUM: CPT | Performed by: EMERGENCY MEDICINE

## 2021-06-30 PROCEDURE — 99285 EMERGENCY DEPT VISIT HI MDM: CPT | Performed by: EMERGENCY MEDICINE

## 2021-06-30 PROCEDURE — 87086 URINE CULTURE/COLONY COUNT: CPT | Performed by: EMERGENCY MEDICINE

## 2021-06-30 PROCEDURE — 82948 REAGENT STRIP/BLOOD GLUCOSE: CPT

## 2021-06-30 RX ORDER — ONDANSETRON 2 MG/ML
4 INJECTION INTRAMUSCULAR; INTRAVENOUS ONCE
Status: COMPLETED | OUTPATIENT
Start: 2021-06-30 | End: 2021-06-30

## 2021-06-30 RX ORDER — LORAZEPAM 1 MG/1
1 TABLET ORAL ONCE
Status: COMPLETED | OUTPATIENT
Start: 2021-06-30 | End: 2021-06-30

## 2021-06-30 RX ADMIN — ONDANSETRON 4 MG: 2 INJECTION INTRAMUSCULAR; INTRAVENOUS at 08:57

## 2021-06-30 RX ADMIN — SODIUM CHLORIDE 1000 ML: 0.9 INJECTION, SOLUTION INTRAVENOUS at 12:15

## 2021-06-30 RX ADMIN — SODIUM CHLORIDE 1000 ML: 0.9 INJECTION, SOLUTION INTRAVENOUS at 08:56

## 2021-06-30 RX ADMIN — LORAZEPAM 1 MG: 1 TABLET ORAL at 14:35

## 2021-06-30 NOTE — ED PROVIDER NOTES
History  Chief Complaint   Patient presents with    Dizziness     PT c/o dizziness x2 weeks with nausea and vomiting, PT reports NOT taking meds consistantly at home     43-year-old female presenting to the ED for evaluation of nausea, diarrhea, anxiety for the past 2 weeks, gradually worsening  She states that she has been under lot of stress anxiety lately related to a lot of stress with relationship difficulties  She states that because that she is not really able to eat or drink much now, has no appetite and has not been taking her insulin medications regularly  She is a type 2 diabetic but takes combination of pills and insulin  She denies any fevers or chills, no urinary symptoms  History provided by:  Patient   used: No    Dizziness  Associated symptoms: diarrhea and nausea        Prior to Admission Medications   Prescriptions Last Dose Informant Patient Reported? Taking?    DULoxetine (CYMBALTA) 30 mg delayed release capsule  Self Yes No   LORazepam (ATIVAN) 0 5 mg tablet  Self Yes No   Sig: Take by mouth every 8 (eight) hours as needed for anxiety   NovoLOG FlexPen 100 units/mL injection pen   Yes No   Sig: TAKE 8 UNITS WITH LARGEST MEAL DAILY   clotrimazole (LOTRIMIN) 1 % cream  Self No No   Sig: Apply to affected area 2 times daily   dicyclomine (BENTYL) 20 mg tablet   No No   Sig: Take 1 tablet (20 mg total) by mouth 2 (two) times a day   dicyclomine (BENTYL) 20 mg tablet   No No   Sig: Take 1 tablet (20 mg total) by mouth 2 (two) times a day   doxepin (SINEquan) 25 mg capsule   Yes No   Sig: TAKE 2 TO 3 CAPSULES AT BEDTIME   escitalopram (LEXAPRO) 10 mg tablet  Self No No   Sig: Take 3 tablets (30 mg total) by mouth daily for 30 days   hydrochlorothiazide (HYDRODIURIL) 12 5 mg tablet   No No   Sig: Take 1 tablet (12 5 mg total) by mouth every other day   insulin glargine (LANTUS) 100 units/mL subcutaneous injection   No No   Sig: Inject 40 Units under the skin daily at bedtime   lamoTRIgine (LaMICtal) 200 MG tablet  Self Yes No   Sig: Take 200 mg by mouth daily    lamoTRIgine (LaMICtal) 25 mg tablet   Yes No   liraglutide (Victoza) injection   No No   Sig: Inject 1 8 MG daily  lisinopril (ZESTRIL) 10 mg tablet   No No   Sig: Take 1 tablet (10 mg total) by mouth daily   metFORMIN (GLUCOPHAGE) 500 mg tablet   No No   Sig: Take 1 tablet (500 mg total) by mouth 2 (two) times a day with meals   nystatin (MYCOSTATIN) 500,000 units/5 mL suspension   No No   Sig: Apply 5 mL (500,000 Units total) to the mouth or throat 4 (four) times a day   ondansetron (ZOFRAN) 4 mg tablet   No No   Sig: Take 1 tablet (4 mg total) by mouth every 8 (eight) hours as needed for nausea or vomiting   ondansetron (ZOFRAN-ODT) 4 mg disintegrating tablet   No No   Sig: Take 1 tablet (4 mg total) by mouth every 8 (eight) hours as needed for nausea or vomiting   ondansetron (ZOFRAN-ODT) 4 mg disintegrating tablet   No No   Sig: Take 1 tablet (4 mg total) by mouth every 6 (six) hours as needed for nausea or vomiting   pantoprazole (PROTONIX) 40 mg tablet   No No   Sig: TAKE 1 TABLET BY MOUTH EVERY DAY   sucralfate (CARAFATE) 1 g/10 mL suspension   No No   Sig: Take 10 mL (1 g total) by mouth 4 (four) times a day      Facility-Administered Medications: None       Past Medical History:   Diagnosis Date    Anemia     Anxiety     Depression     Diabetes mellitus (HCC)     GERD (gastroesophageal reflux disease)     Hypertension     Irritable bowel syndrome     Morbid obesity with BMI of 60 0-69 9, adult (HCC)     Obesity     Psychiatric disorder     Seasonal allergies        Past Surgical History:   Procedure Laterality Date     SECTION  1992    CHOLECYSTECTOMY  2008    WISDOM TOOTH EXTRACTION         Family History   Problem Relation Age of Onset    Diabetes Mother     Hypertension Father      I have reviewed and agree with the history as documented      E-Cigarette/Vaping    E-Cigarette Use Never User      E-Cigarette/Vaping Substances    Nicotine No     THC No     CBD No     Flavoring No     Other No     Unknown No      Social History     Tobacco Use    Smoking status: Never Smoker    Smokeless tobacco: Never Used   Vaping Use    Vaping Use: Never used   Substance Use Topics    Alcohol use: Yes     Comment: occ    Drug use: No       Review of Systems   Gastrointestinal: Positive for diarrhea and nausea  Neurological: Positive for dizziness  Psychiatric/Behavioral: The patient is nervous/anxious  All other systems reviewed and are negative  Physical Exam  Physical Exam  Vitals and nursing note reviewed  Constitutional:       General: She is not in acute distress  Appearance: She is well-developed  She is obese  She is not ill-appearing, toxic-appearing or diaphoretic  HENT:      Head: Normocephalic and atraumatic  Right Ear: External ear normal       Left Ear: External ear normal       Nose: Nose normal       Mouth/Throat:      Mouth: Mucous membranes are moist       Pharynx: Oropharynx is clear  Eyes:      Extraocular Movements: Extraocular movements intact  Conjunctiva/sclera: Conjunctivae normal       Pupils: Pupils are equal, round, and reactive to light  Cardiovascular:      Rate and Rhythm: Normal rate and regular rhythm  Pulses: Normal pulses  Heart sounds: Normal heart sounds  Pulmonary:      Effort: Pulmonary effort is normal       Breath sounds: Normal breath sounds  Abdominal:      General: Abdomen is flat  Bowel sounds are normal  There is no distension or abdominal bruit  There are no signs of injury  Palpations: Abdomen is soft  There is no shifting dullness  Tenderness: There is no abdominal tenderness  Genitourinary:     Adnexa: Right adnexa normal and left adnexa normal    Musculoskeletal:         General: Normal range of motion  Skin:     General: Skin is warm and dry        Capillary Refill: Capillary refill takes less than 2 seconds  Neurological:      General: No focal deficit present  Mental Status: She is alert and oriented to person, place, and time  Mental status is at baseline  Psychiatric:         Mood and Affect: Mood normal          Behavior: Behavior normal          Vital Signs  ED Triage Vitals [06/30/21 0811]   Temperature Pulse Respirations Blood Pressure SpO2   98 1 °F (36 7 °C) (!) 107 18 167/77 93 %      Temp Source Heart Rate Source Patient Position - Orthostatic VS BP Location FiO2 (%)   Oral -- -- -- --      Pain Score       No Pain           Vitals:    06/30/21 0811 06/30/21 1007 06/30/21 1245   BP: 167/77 (!) 176/81 (!) 190/93   Pulse: (!) 107 102 (!) 112         Visual Acuity      ED Medications  Medications   sodium chloride 0 9 % bolus 1,000 mL (0 mL Intravenous Stopped 6/30/21 1008)   ondansetron (ZOFRAN) injection 4 mg (4 mg Intravenous Given 6/30/21 0857)   sodium chloride 0 9 % bolus 1,000 mL (0 mL Intravenous Stopped 6/30/21 1436)   LORazepam (ATIVAN) tablet 1 mg (1 mg Oral Given 6/30/21 1435)       Diagnostic Studies  Results Reviewed     Procedure Component Value Units Date/Time    Urine culture [[id number]] Collected: 06/30/21 1332    Lab Status:  In process Specimen: Urine, Clean Catch Updated: 06/30/21 1632    UA w Reflex to Microscopic w Reflex to Culture [[id number]]  (Abnormal) Collected: 06/30/21 1332    Lab Status: Final result Specimen: Urine, Clean Catch Updated: 06/30/21 1421     Color, UA Yellow     Clarity, UA Clear     Specific Gravity, UA >=1 030     pH, UA 6 0     Leukocytes, UA Negative     Nitrite, UA Negative     Protein, UA Trace mg/dl      Glucose, UA Negative mg/dl      Ketones, UA Negative mg/dl      Urobilinogen, UA 0 2 E U /dl      Bilirubin, UA Negative     Blood, UA Negative    Urine Microscopic [[id number]]  (Abnormal) Collected: 06/30/21 1332    Lab Status: Final result Specimen: Urine, Clean Catch Updated: 06/30/21 1421     RBC, UA 0-1 /hpf      WBC, UA 4-10 /hpf      Epithelial Cells Moderate /hpf      Bacteria, UA Occasional /hpf      MUCUS THREADS Moderate    POCT pregnancy, urine [[id number]]  (Normal) Resulted: 06/30/21 1339    Lab Status: Final result Updated: 06/30/21 1339     EXT PREG TEST UR (Ref: Negative) negative     Control valid    Troponin I [[id number]]  (Normal) Collected: 06/30/21 0905    Lab Status: Final result Specimen: Blood from Arm, Right Updated: 06/30/21 1225     Troponin I <0 03 ng/mL     Comprehensive metabolic panel [[id number]]  (Abnormal) Collected: 06/30/21 0905    Lab Status: Final result Specimen: Blood from Arm, Right Updated: 06/30/21 1007     Sodium 141 mmol/L      Potassium 3 5 mmol/L      Chloride 104 mmol/L      CO2 28 mmol/L      ANION GAP 9 mmol/L      BUN 14 mg/dL      Creatinine 1 07 mg/dL      Glucose 185 mg/dL      Calcium 9 4 mg/dL      AST 38 U/L      ALT 69 U/L      Alkaline Phosphatase 82 U/L      Total Protein 8 1 g/dL      Albumin 3 8 g/dL      Total Bilirubin 0 48 mg/dL      eGFR 62 ml/min/1 73sq m     Narrative:      [place] guidelines for Chronic Kidney Disease (CKD):     Stage 1 with normal or high GFR (GFR > 90 mL/min/1 73 square meters)    Stage 2 Mild CKD (GFR = 60-89 mL/min/1 73 square meters)    Stage 3A Moderate CKD (GFR = 45-59 mL/min/1 73 square meters)    Stage 3B Moderate CKD (GFR = 30-44 mL/min/1 73 square meters)    Stage 4 Severe CKD (GFR = 15-29 mL/min/1 73 square meters)    Stage 5 End Stage CKD (GFR <15 mL/min/1 73 square meters)  Note: GFR calculation is accurate only with a steady state creatinine    Lipase [[id number]]  (Normal) Collected: 06/30/21 0905    Lab Status: Final result Specimen: Blood from Arm, Right Updated: 06/30/21 0947     Lipase 119 u/L     Magnesium [[id number]]  (Normal) Collected: 06/30/21 0905    Lab Status: Final result Specimen: Blood from Arm, Right Updated: 06/30/21 0947     Magnesium 1 6 mg/dL     Blood gas, venous [[id number]]  (Abnormal) Collected: 06/30/21 0905    Lab Status: Final result Specimen: Blood from Arm, Right Updated: 06/30/21 0935     pH, Gavin 7 344     pCO2, Gavin 47 3 mm Hg      pO2, Gavin 50 5 mm Hg      HCO3, Gavin 25 2 mmol/L      Base Excess, Gavin -1 0 mmol/L      O2 Content, Gavin 16 8 ml/dL      O2 HGB, VENOUS 84 5 %     CBC and differential [[id number]]  (Abnormal) Collected: 06/30/21 0905    Lab Status: Final result Specimen: Blood from Arm, Right Updated: 06/30/21 0922     WBC 7 25 Thousand/uL      RBC 5 07 Million/uL      Hemoglobin 13 1 g/dL      Hematocrit 42 4 %      MCV 84 fL      MCH 25 8 pg      MCHC 30 9 g/dL      RDW 15 7 %      MPV 9 9 fL      Platelets 020 Thousands/uL      nRBC 0 /100 WBCs      Neutrophils Relative 61 %      Immat GRANS % 0 %      Lymphocytes Relative 28 %      Monocytes Relative 8 %      Eosinophils Relative 2 %      Basophils Relative 1 %      Neutrophils Absolute 4 41 Thousands/µL      Immature Grans Absolute 0 03 Thousand/uL      Lymphocytes Absolute 2 04 Thousands/µL      Monocytes Absolute 0 55 Thousand/µL      Eosinophils Absolute 0 16 Thousand/µL      Basophils Absolute 0 06 Thousands/µL     Fingerstick Glucose (POCT) [[id number]]  (Abnormal) Collected: 06/30/21 0911    Lab Status: Final result Updated: 06/30/21 0914     POC Glucose 176 mg/dl                  XR chest 2 views    (Results Pending)              Procedures  ECG 12 Lead Documentation Only    Date/Time: 6/30/2021 9:40 AM  Performed by: Odalys Hogan DO  Authorized by: Odalys Hogan DO     Indications / Diagnosis:  Dizzy, weak  ECG reviewed by me, the ED Provider: yes    Patient location:  ED  Previous ECG:     Previous ECG:  Compared to current    Similarity:  No change  Interpretation:     Interpretation: normal    Rate:     ECG rate:  102    ECG rate assessment: tachycardic    Rhythm:     Rhythm: sinus tachycardia    Ectopy:     Ectopy: none    QRS:     QRS axis:  Normal    QRS intervals:  Normal  Conduction:     Conduction: normal ST segments:     ST segments:  Normal  T waves:     T waves: normal               ED Course  ED Course as of Jun 30 2150   Wed Jun 30, 2021   1427 Workup here unremarkable, the patient does appear markedly anxious  We are giving her Ativan although she already has at home  When she follow-up with her therapist for medication adjustments, discussed ways to address her anxiety at home                                                MDM  Number of Diagnoses or Management Options  Anxiety: established and worsening  Dehydration: new and requires workup  Dizziness: new and requires workup  Hypertension, unspecified type: established and worsening     Amount and/or Complexity of Data Reviewed  Clinical lab tests: ordered and reviewed  Tests in the radiology section of CPT®: ordered and reviewed  Tests in the medicine section of CPT®: ordered and reviewed  Decide to obtain previous medical records or to obtain history from someone other than the patient: yes    Risk of Complications, Morbidity, and/or Mortality  Presenting problems: moderate  Diagnostic procedures: moderate  Management options: moderate    Patient Progress  Patient progress: stable      Disposition  Final diagnoses:   Dizziness   Hypertension, unspecified type   Anxiety   Dehydration   Hyperglycemia     Time reflects when diagnosis was documented in both MDM as applicable and the Disposition within this note     Time User Action Codes Description Comment    6/30/2021  2:33 PM Margarito Cogan Add [R42] Dizziness     6/30/2021  2:33 PM Darryn Ziegler Add [I10] Hypertension, unspecified type     6/30/2021  2:33 PM Misha Rich [F41 9] Anxiety     6/30/2021  2:33 PM Misha Rich [E86 0] Dehydration     6/30/2021  2:33 PM Darryn Sepulveda Add [R73 9] Hyperglycemia     6/30/2021  2:33 PM Margarito Cogan Modify [R42] Dizziness     6/30/2021  2:33 PM Jensen Cogan Modify [F41 9] Anxiety       ED Disposition     ED Disposition Condition Date/Time Comment Discharge Stable Wed Jun 30, 2021  2:33 PM Nonda Ni discharge to home/self care  Follow-up Information     Follow up With Specialties Details Why [address] Nurse Practitioner In 3 days  [address] [facility] [address] [place] [id number]  [phone]            Discharge Medication List as of 6/30/2021  2:33 PM      CONTINUE these medications which have NOT CHANGED    Details   clotrimazole (LOTRIMIN) 1 % cream Apply to affected area 2 times daily, Normal      !! dicyclomine (BENTYL) 20 mg tablet Take 1 tablet (20 mg total) by mouth 2 (two) times a day, Starting Mon 10/12/2020, Normal      !! dicyclomine (BENTYL) 20 mg tablet Take 1 tablet (20 mg total) by mouth 2 (two) times a day, Starting Sun 4/11/2021, Print      doxepin (SINEquan) 25 mg capsule TAKE 2 TO 3 CAPSULES AT BEDTIME, Historical Med      DULoxetine (CYMBALTA) 30 mg delayed release capsule Starting Sat 7/25/2020, Historical Med      escitalopram (LEXAPRO) 10 mg tablet Take 3 tablets (30 mg total) by mouth daily for 30 days, Starting Wed 9/11/2019, Until Mon 10/12/2020, Print      hydrochlorothiazide (HYDRODIURIL) 12 5 mg tablet Take 1 tablet (12 5 mg total) by mouth every other day, Starting Mon 8/31/2020, Until Mon 10/12/2020, Normal      insulin glargine (LANTUS) 100 units/mL subcutaneous injection Inject 40 Units under the skin daily at bedtime, Starting Mon 8/31/2020, Normal      !! lamoTRIgine (LaMICtal) 200 MG tablet Take 200 mg by mouth daily , Historical Med      !! lamoTRIgine (LaMICtal) 25 mg tablet Starting Sun 5/9/2021, Historical Med      liraglutide (Victoza) injection Inject 1 8 MG daily  , Normal      lisinopril (ZESTRIL) 10 mg tablet Take 1 tablet (10 mg total) by mouth daily, Starting Wed 4/14/2021, Normal      LORazepam (ATIVAN) 0 5 mg tablet Take by mouth every 8 (eight) hours as needed for anxiety, Historical Med      metFORMIN (GLUCOPHAGE) 500 mg tablet Take 1 tablet (500 mg total) by mouth 2 (two) times a day with meals, Starting Mon 8/31/2020, Until Mon 10/12/2020, Normal      NovoLOG FlexPen 100 units/mL injection pen TAKE 8 UNITS WITH LARGEST MEAL DAILY, Historical Med      nystatin (MYCOSTATIN) 500,000 units/5 mL suspension Apply 5 mL (500,000 Units total) to the mouth or throat 4 (four) times a day, Starting Mon 8/31/2020, Normal      ondansetron (ZOFRAN) 4 mg tablet Take 1 tablet (4 mg total) by mouth every 8 (eight) hours as needed for nausea or vomiting, Starting Mon 8/31/2020, Normal      !! ondansetron (ZOFRAN-ODT) 4 mg disintegrating tablet Take 1 tablet (4 mg total) by mouth every 8 (eight) hours as needed for nausea or vomiting, Starting Mon 10/12/2020, Normal      !! ondansetron (ZOFRAN-ODT) 4 mg disintegrating tablet Take 1 tablet (4 mg total) by mouth every 6 (six) hours as needed for nausea or vomiting, Starting Sun 4/11/2021, Print      pantoprazole (PROTONIX) 40 mg tablet TAKE 1 TABLET BY MOUTH EVERY DAY, Normal      sucralfate (CARAFATE) 1 g/10 mL suspension Take 10 mL (1 g total) by mouth 4 (four) times a day, Starting Tue 8/11/2020, Normal       !! - Potential duplicate medications found  Please discuss with provider  No discharge procedures on file      PDMP Review     None          ED Provider  Electronically Signed by           Alisha Cleaning DO  06/30/21 3351

## 2021-06-30 NOTE — Clinical Note
Minerva Tian was seen and treated in our emergency department on 6/30/2021  Diagnosis:     Gely Villalba  may return to work on return date  She may return on this date: 07/01/2021         If you have any questions or concerns, please don't hesitate to call        David Clark RN    ______________________________           _______________          _______________  Hospital Representative                              Date                                Time

## 2021-07-01 LAB
ATRIAL RATE: 102 BPM
BACTERIA UR CULT: NORMAL
P AXIS: 59 DEGREES
PR INTERVAL: 146 MS
QRS AXIS: 18 DEGREES
QRSD INTERVAL: 76 MS
QT INTERVAL: 352 MS
QTC INTERVAL: 458 MS
T WAVE AXIS: 55 DEGREES
VENTRICULAR RATE: 102 BPM

## 2021-07-01 PROCEDURE — 93010 ELECTROCARDIOGRAM REPORT: CPT | Performed by: INTERNAL MEDICINE

## 2021-07-01 NOTE — ED NOTES
Crisis worker at bedside     [employer]  09/03/19 3469 Patient/Caregiver provided printed discharge information.

## 2021-07-23 DIAGNOSIS — I10 ESSENTIAL HYPERTENSION: ICD-10-CM

## 2021-07-23 RX ORDER — LISINOPRIL 10 MG/1
TABLET ORAL
Qty: 30 TABLET | Refills: 0 | Status: SHIPPED | OUTPATIENT
Start: 2021-07-23 | End: 2021-10-20 | Stop reason: SDUPTHER

## 2021-07-23 NOTE — TELEPHONE ENCOUNTER
Patient is [facility] office visit for refill on this I do not know if she is even actually still taking it   Let me know

## 2021-07-29 ENCOUNTER — HOSPITAL ENCOUNTER (EMERGENCY)
Facility: HOSPITAL | Age: 46
End: 2021-07-30
Attending: EMERGENCY MEDICINE | Admitting: EMERGENCY MEDICINE
Payer: COMMERCIAL

## 2021-07-29 DIAGNOSIS — T50.902A INTENTIONAL DRUG OVERDOSE, INITIAL ENCOUNTER (HCC): Primary | ICD-10-CM

## 2021-07-29 DIAGNOSIS — R45.851 SUICIDAL IDEATION: ICD-10-CM

## 2021-07-29 LAB
ALBUMIN SERPL BCP-MCNC: 3.9 G/DL (ref 3.5–5)
ALP SERPL-CCNC: 86 U/L (ref 46–116)
ALT SERPL W P-5'-P-CCNC: 75 U/L (ref 12–78)
ANION GAP SERPL CALCULATED.3IONS-SCNC: 13 MMOL/L (ref 4–13)
APAP SERPL-MCNC: <2 UG/ML (ref 10–20)
AST SERPL W P-5'-P-CCNC: 38 U/L (ref 5–45)
BASOPHILS # BLD AUTO: 0.04 THOUSANDS/ΜL (ref 0–0.1)
BASOPHILS NFR BLD AUTO: 0 % (ref 0–1)
BILIRUB SERPL-MCNC: 0.39 MG/DL (ref 0.2–1)
BUN SERPL-MCNC: 18 MG/DL (ref 5–25)
CALCIUM SERPL-MCNC: 9.4 MG/DL (ref 8.3–10.1)
CHLORIDE SERPL-SCNC: 101 MMOL/L (ref 100–108)
CO2 SERPL-SCNC: 24 MMOL/L (ref 21–32)
CREAT SERPL-MCNC: 0.91 MG/DL (ref 0.6–1.3)
EOSINOPHIL # BLD AUTO: 0.16 THOUSAND/ΜL (ref 0–0.61)
EOSINOPHIL NFR BLD AUTO: 2 % (ref 0–6)
ERYTHROCYTE [DISTWIDTH] IN BLOOD BY AUTOMATED COUNT: 15.6 % (ref 11.6–15.1)
ETHANOL SERPL-MCNC: <3 MG/DL (ref 0–3)
GFR SERPL CREATININE-BSD FRML MDRD: 75 ML/MIN/1.73SQ M
GLUCOSE SERPL-MCNC: 158 MG/DL (ref 65–140)
GLUCOSE SERPL-MCNC: 169 MG/DL (ref 65–140)
HCG SERPL QL: NEGATIVE
HCT VFR BLD AUTO: 43 % (ref 34.8–46.1)
HGB BLD-MCNC: 13.1 G/DL (ref 11.5–15.4)
IMM GRANULOCYTES # BLD AUTO: 0.05 THOUSAND/UL (ref 0–0.2)
IMM GRANULOCYTES NFR BLD AUTO: 1 % (ref 0–2)
LYMPHOCYTES # BLD AUTO: 2.18 THOUSANDS/ΜL (ref 0.6–4.47)
LYMPHOCYTES NFR BLD AUTO: 25 % (ref 14–44)
MCH RBC QN AUTO: 25.9 PG (ref 26.8–34.3)
MCHC RBC AUTO-ENTMCNC: 30.5 G/DL (ref 31.4–37.4)
MCV RBC AUTO: 85 FL (ref 82–98)
MONOCYTES # BLD AUTO: 0.63 THOUSAND/ΜL (ref 0.17–1.22)
MONOCYTES NFR BLD AUTO: 7 % (ref 4–12)
NEUTROPHILS # BLD AUTO: 5.85 THOUSANDS/ΜL (ref 1.85–7.62)
NEUTS SEG NFR BLD AUTO: 65 % (ref 43–75)
NRBC BLD AUTO-RTO: 0 /100 WBCS
PLATELET # BLD AUTO: 331 THOUSANDS/UL (ref 149–390)
PMV BLD AUTO: 9.5 FL (ref 8.9–12.7)
POTASSIUM SERPL-SCNC: 4.1 MMOL/L (ref 3.5–5.3)
PROT SERPL-MCNC: 7.7 G/DL (ref 6.4–8.2)
RBC # BLD AUTO: 5.05 MILLION/UL (ref 3.81–5.12)
SALICYLATES SERPL-MCNC: <3 MG/DL (ref 3–20)
SARS-COV-2 RNA RESP QL NAA+PROBE: NEGATIVE
SODIUM SERPL-SCNC: 138 MMOL/L (ref 136–145)
WBC # BLD AUTO: 8.91 THOUSAND/UL (ref 4.31–10.16)

## 2021-07-29 PROCEDURE — 82077 ASSAY SPEC XCP UR&BREATH IA: CPT | Performed by: EMERGENCY MEDICINE

## 2021-07-29 PROCEDURE — 80053 COMPREHEN METABOLIC PANEL: CPT | Performed by: EMERGENCY MEDICINE

## 2021-07-29 PROCEDURE — 99285 EMERGENCY DEPT VISIT HI MDM: CPT

## 2021-07-29 PROCEDURE — 82948 REAGENT STRIP/BLOOD GLUCOSE: CPT

## 2021-07-29 PROCEDURE — 80179 DRUG ASSAY SALICYLATE: CPT | Performed by: EMERGENCY MEDICINE

## 2021-07-29 PROCEDURE — U0003 INFECTIOUS AGENT DETECTION BY NUCLEIC ACID (DNA OR RNA); SEVERE ACUTE RESPIRATORY SYNDROME CORONAVIRUS 2 (SARS-COV-2) (CORONAVIRUS DISEASE [COVID-19]), AMPLIFIED PROBE TECHNIQUE, MAKING USE OF HIGH THROUGHPUT TECHNOLOGIES AS DESCRIBED BY CMS-2020-01-R: HCPCS | Performed by: EMERGENCY MEDICINE

## 2021-07-29 PROCEDURE — 84703 CHORIONIC GONADOTROPIN ASSAY: CPT | Performed by: EMERGENCY MEDICINE

## 2021-07-29 PROCEDURE — 96360 HYDRATION IV INFUSION INIT: CPT

## 2021-07-29 PROCEDURE — 93005 ELECTROCARDIOGRAM TRACING: CPT

## 2021-07-29 PROCEDURE — U0005 INFEC AGEN DETEC AMPLI PROBE: HCPCS | Performed by: EMERGENCY MEDICINE

## 2021-07-29 PROCEDURE — 85025 COMPLETE CBC W/AUTO DIFF WBC: CPT | Performed by: EMERGENCY MEDICINE

## 2021-07-29 PROCEDURE — 36415 COLL VENOUS BLD VENIPUNCTURE: CPT | Performed by: EMERGENCY MEDICINE

## 2021-07-29 PROCEDURE — 80143 DRUG ASSAY ACETAMINOPHEN: CPT | Performed by: EMERGENCY MEDICINE

## 2021-07-29 PROCEDURE — 96361 HYDRATE IV INFUSION ADD-ON: CPT

## 2021-07-29 RX ORDER — ARIPIPRAZOLE 2 MG/1
2 TABLET ORAL DAILY
COMMUNITY
Start: 2021-07-13 | End: 2021-08-03 | Stop reason: HOSPADM

## 2021-07-29 RX ADMIN — SODIUM CHLORIDE 1000 ML: 0.9 INJECTION, SOLUTION INTRAVENOUS at 21:43

## 2021-07-30 ENCOUNTER — HOSPITAL ENCOUNTER (INPATIENT)
Facility: HOSPITAL | Age: 46
LOS: 4 days | Discharge: HOME | DRG: 885 | End: 2021-08-03
Attending: PSYCHIATRY & NEUROLOGY | Admitting: PSYCHIATRY & NEUROLOGY
Payer: COMMERCIAL

## 2021-07-30 VITALS
OXYGEN SATURATION: 97 % | WEIGHT: 293 LBS | HEART RATE: 90 BPM | BODY MASS INDEX: 57.52 KG/M2 | RESPIRATION RATE: 18 BRPM | TEMPERATURE: 98 F | SYSTOLIC BLOOD PRESSURE: 131 MMHG | DIASTOLIC BLOOD PRESSURE: 60 MMHG | HEIGHT: 60 IN

## 2021-07-30 DIAGNOSIS — E11.8 TYPE 2 DIABETES MELLITUS WITH COMPLICATION, WITH LONG-TERM CURRENT USE OF INSULIN (HCC): ICD-10-CM

## 2021-07-30 DIAGNOSIS — Z79.4 TYPE 2 DIABETES MELLITUS WITH COMPLICATION, WITH LONG-TERM CURRENT USE OF INSULIN (HCC): ICD-10-CM

## 2021-07-30 DIAGNOSIS — F33.2 MAJOR DEPRESSIVE DISORDER, RECURRENT SEVERE WITHOUT PSYCHOTIC FEATURES (HCC): Primary | ICD-10-CM

## 2021-07-30 DIAGNOSIS — T50.902A INTENTIONAL DRUG OVERDOSE, INITIAL ENCOUNTER (HCC): ICD-10-CM

## 2021-07-30 LAB
AMPHETAMINES SERPL QL SCN: NEGATIVE
BARBITURATES UR QL: NEGATIVE
BENZODIAZ UR QL: NEGATIVE
COCAINE UR QL: NEGATIVE
GLUCOSE SERPL-MCNC: 223 MG/DL (ref 65–140)
GLUCOSE SERPL-MCNC: 276 MG/DL (ref 65–140)
METHADONE UR QL: NEGATIVE
OPIATES UR QL SCN: NEGATIVE
OXYCODONE+OXYMORPHONE UR QL SCN: NEGATIVE
PCP UR QL: NEGATIVE
THC UR QL: NEGATIVE

## 2021-07-30 PROCEDURE — 99253 IP/OBS CNSLTJ NEW/EST LOW 45: CPT | Performed by: PHYSICIAN ASSISTANT

## 2021-07-30 PROCEDURE — 99243 OFF/OP CNSLTJ NEW/EST LOW 30: CPT | Performed by: PSYCHIATRY & NEUROLOGY

## 2021-07-30 PROCEDURE — 80307 DRUG TEST PRSMV CHEM ANLYZR: CPT | Performed by: EMERGENCY MEDICINE

## 2021-07-30 PROCEDURE — 99285 EMERGENCY DEPT VISIT HI MDM: CPT | Performed by: EMERGENCY MEDICINE

## 2021-07-30 PROCEDURE — 99252 IP/OBS CONSLTJ NEW/EST SF 35: CPT | Performed by: PHYSICIAN ASSISTANT

## 2021-07-30 PROCEDURE — 82948 REAGENT STRIP/BLOOD GLUCOSE: CPT

## 2021-07-30 RX ORDER — LISINOPRIL 10 MG/1
10 TABLET ORAL DAILY
Status: DISCONTINUED | OUTPATIENT
Start: 2021-07-31 | End: 2021-08-03 | Stop reason: HOSPADM

## 2021-07-30 RX ORDER — TRAZODONE HYDROCHLORIDE 50 MG/1
50 TABLET ORAL
Status: DISCONTINUED | OUTPATIENT
Start: 2021-07-30 | End: 2021-08-03 | Stop reason: HOSPADM

## 2021-07-30 RX ORDER — BENZTROPINE MESYLATE 1 MG/ML
1 INJECTION INTRAMUSCULAR; INTRAVENOUS
Status: DISCONTINUED | OUTPATIENT
Start: 2021-07-30 | End: 2021-08-03 | Stop reason: HOSPADM

## 2021-07-30 RX ORDER — LISINOPRIL 10 MG/1
10 TABLET ORAL ONCE
Status: COMPLETED | OUTPATIENT
Start: 2021-07-30 | End: 2021-07-30

## 2021-07-30 RX ORDER — HYDROXYZINE HYDROCHLORIDE 25 MG/1
50 TABLET, FILM COATED ORAL
Status: CANCELLED | OUTPATIENT
Start: 2021-07-30

## 2021-07-30 RX ORDER — HYDROXYZINE 50 MG/1
50 TABLET, FILM COATED ORAL
Status: DISCONTINUED | OUTPATIENT
Start: 2021-07-30 | End: 2021-08-03 | Stop reason: HOSPADM

## 2021-07-30 RX ORDER — OLANZAPINE 5 MG/1
5 TABLET ORAL
Status: DISCONTINUED | OUTPATIENT
Start: 2021-07-30 | End: 2021-08-03 | Stop reason: HOSPADM

## 2021-07-30 RX ORDER — OLANZAPINE 10 MG/1
10 INJECTION, POWDER, LYOPHILIZED, FOR SOLUTION INTRAMUSCULAR
Status: CANCELLED | OUTPATIENT
Start: 2021-07-30

## 2021-07-30 RX ORDER — MAGNESIUM HYDROXIDE/ALUMINUM HYDROXICE/SIMETHICONE 120; 1200; 1200 MG/30ML; MG/30ML; MG/30ML
30 SUSPENSION ORAL EVERY 4 HOURS PRN
Status: DISCONTINUED | OUTPATIENT
Start: 2021-07-30 | End: 2021-08-03 | Stop reason: HOSPADM

## 2021-07-30 RX ORDER — AMOXICILLIN 250 MG
1 CAPSULE ORAL DAILY PRN
Status: CANCELLED | OUTPATIENT
Start: 2021-07-30

## 2021-07-30 RX ORDER — HYDROXYZINE HYDROCHLORIDE 25 MG/1
25 TABLET, FILM COATED ORAL
Status: CANCELLED | OUTPATIENT
Start: 2021-07-30

## 2021-07-30 RX ORDER — OLANZAPINE 10 MG/1
5 INJECTION, POWDER, LYOPHILIZED, FOR SOLUTION INTRAMUSCULAR
Status: DISCONTINUED | OUTPATIENT
Start: 2021-07-30 | End: 2021-08-03 | Stop reason: HOSPADM

## 2021-07-30 RX ORDER — OLANZAPINE 10 MG/1
10 TABLET ORAL
Status: DISCONTINUED | OUTPATIENT
Start: 2021-07-30 | End: 2021-08-03 | Stop reason: HOSPADM

## 2021-07-30 RX ORDER — OLANZAPINE 2.5 MG/1
5 TABLET ORAL
Status: CANCELLED | OUTPATIENT
Start: 2021-07-30

## 2021-07-30 RX ORDER — NYSTATIN 100000 U/G
CREAM TOPICAL 2 TIMES DAILY
Status: DISCONTINUED | OUTPATIENT
Start: 2021-07-30 | End: 2021-07-30 | Stop reason: HOSPADM

## 2021-07-30 RX ORDER — ACETAMINOPHEN 325 MG/1
650 TABLET ORAL EVERY 6 HOURS PRN
Status: CANCELLED | OUTPATIENT
Start: 2021-07-30

## 2021-07-30 RX ORDER — LISINOPRIL 5 MG/1
5 TABLET ORAL ONCE
Status: COMPLETED | OUTPATIENT
Start: 2021-07-30 | End: 2021-07-30

## 2021-07-30 RX ORDER — LAMOTRIGINE 100 MG/1
300 TABLET ORAL ONCE
Status: COMPLETED | OUTPATIENT
Start: 2021-07-30 | End: 2021-07-30

## 2021-07-30 RX ORDER — NYSTATIN 100000 [USP'U]/G
POWDER TOPICAL 2 TIMES DAILY
Status: DISCONTINUED | OUTPATIENT
Start: 2021-07-31 | End: 2021-07-30

## 2021-07-30 RX ORDER — ACETAMINOPHEN 325 MG/1
975 TABLET ORAL EVERY 6 HOURS PRN
Status: DISCONTINUED | OUTPATIENT
Start: 2021-07-30 | End: 2021-07-31

## 2021-07-30 RX ORDER — HYDROXYZINE 50 MG/1
100 TABLET, FILM COATED ORAL
Status: DISCONTINUED | OUTPATIENT
Start: 2021-07-30 | End: 2021-08-03 | Stop reason: HOSPADM

## 2021-07-30 RX ORDER — TRAZODONE HYDROCHLORIDE 50 MG/1
50 TABLET ORAL
Status: CANCELLED | OUTPATIENT
Start: 2021-07-30

## 2021-07-30 RX ORDER — HYDROCHLOROTHIAZIDE 12.5 MG/1
12.5 TABLET ORAL DAILY
Status: DISCONTINUED | OUTPATIENT
Start: 2021-07-30 | End: 2021-07-30 | Stop reason: HOSPADM

## 2021-07-30 RX ORDER — LORAZEPAM 2 MG/ML
2 INJECTION INTRAMUSCULAR EVERY 6 HOURS PRN
Status: DISCONTINUED | OUTPATIENT
Start: 2021-07-30 | End: 2021-08-03 | Stop reason: HOSPADM

## 2021-07-30 RX ORDER — ACETAMINOPHEN 325 MG/1
650 TABLET ORAL EVERY 4 HOURS PRN
Status: DISCONTINUED | OUTPATIENT
Start: 2021-07-30 | End: 2021-07-31

## 2021-07-30 RX ORDER — BENZTROPINE MESYLATE 1 MG/ML
1 INJECTION INTRAMUSCULAR; INTRAVENOUS
Status: CANCELLED | OUTPATIENT
Start: 2021-07-30

## 2021-07-30 RX ORDER — HYDROXYZINE HYDROCHLORIDE 25 MG/1
100 TABLET, FILM COATED ORAL
Status: CANCELLED | OUTPATIENT
Start: 2021-07-30

## 2021-07-30 RX ORDER — NYSTATIN 100000 U/G
CREAM TOPICAL 2 TIMES DAILY
Status: DISCONTINUED | OUTPATIENT
Start: 2021-07-31 | End: 2021-08-03 | Stop reason: HOSPADM

## 2021-07-30 RX ORDER — HYDROCHLOROTHIAZIDE 12.5 MG/1
12.5 TABLET ORAL EVERY OTHER DAY
Status: DISCONTINUED | OUTPATIENT
Start: 2021-07-31 | End: 2021-08-03 | Stop reason: HOSPADM

## 2021-07-30 RX ORDER — AMOXICILLIN 250 MG
1 CAPSULE ORAL DAILY PRN
Status: DISCONTINUED | OUTPATIENT
Start: 2021-07-30 | End: 2021-08-03 | Stop reason: HOSPADM

## 2021-07-30 RX ORDER — OLANZAPINE 10 MG/1
10 TABLET ORAL
Status: CANCELLED | OUTPATIENT
Start: 2021-07-30

## 2021-07-30 RX ORDER — ACETAMINOPHEN 325 MG/1
650 TABLET ORAL EVERY 4 HOURS PRN
Status: CANCELLED | OUTPATIENT
Start: 2021-07-30

## 2021-07-30 RX ORDER — LORAZEPAM 0.5 MG/1
0.5 TABLET ORAL ONCE
Status: COMPLETED | OUTPATIENT
Start: 2021-07-30 | End: 2021-07-30

## 2021-07-30 RX ORDER — ACETAMINOPHEN 325 MG/1
975 TABLET ORAL EVERY 6 HOURS PRN
Status: CANCELLED | OUTPATIENT
Start: 2021-07-30

## 2021-07-30 RX ORDER — INSULIN GLARGINE 100 [IU]/ML
64 INJECTION, SOLUTION SUBCUTANEOUS
Status: DISCONTINUED | OUTPATIENT
Start: 2021-07-30 | End: 2021-08-03 | Stop reason: HOSPADM

## 2021-07-30 RX ORDER — OLANZAPINE 10 MG/1
10 INJECTION, POWDER, LYOPHILIZED, FOR SOLUTION INTRAMUSCULAR
Status: DISCONTINUED | OUTPATIENT
Start: 2021-07-30 | End: 2021-08-03 | Stop reason: HOSPADM

## 2021-07-30 RX ORDER — HYDROXYZINE HYDROCHLORIDE 25 MG/1
25 TABLET, FILM COATED ORAL
Status: DISCONTINUED | OUTPATIENT
Start: 2021-07-30 | End: 2021-08-03 | Stop reason: HOSPADM

## 2021-07-30 RX ORDER — DIPHENHYDRAMINE HYDROCHLORIDE 50 MG/ML
50 INJECTION INTRAMUSCULAR; INTRAVENOUS EVERY 6 HOURS PRN
Status: DISCONTINUED | OUTPATIENT
Start: 2021-07-30 | End: 2021-08-03 | Stop reason: HOSPADM

## 2021-07-30 RX ORDER — ACETAMINOPHEN 325 MG/1
650 TABLET ORAL EVERY 6 HOURS PRN
Status: DISCONTINUED | OUTPATIENT
Start: 2021-07-30 | End: 2021-07-31

## 2021-07-30 RX ORDER — DULOXETIN HYDROCHLORIDE 30 MG/1
30 CAPSULE, DELAYED RELEASE ORAL ONCE
Status: COMPLETED | OUTPATIENT
Start: 2021-07-30 | End: 2021-07-30

## 2021-07-30 RX ORDER — MAGNESIUM HYDROXIDE/ALUMINUM HYDROXICE/SIMETHICONE 120; 1200; 1200 MG/30ML; MG/30ML; MG/30ML
30 SUSPENSION ORAL EVERY 4 HOURS PRN
Status: CANCELLED | OUTPATIENT
Start: 2021-07-30

## 2021-07-30 RX ORDER — PANTOPRAZOLE SODIUM 40 MG/1
40 TABLET, DELAYED RELEASE ORAL DAILY
Status: DISCONTINUED | OUTPATIENT
Start: 2021-07-31 | End: 2021-08-03 | Stop reason: HOSPADM

## 2021-07-30 RX ORDER — LORAZEPAM 2 MG/ML
2 INJECTION INTRAMUSCULAR EVERY 6 HOURS PRN
Status: CANCELLED | OUTPATIENT
Start: 2021-07-30

## 2021-07-30 RX ORDER — ARIPIPRAZOLE 2 MG/1
2 TABLET ORAL ONCE
Status: COMPLETED | OUTPATIENT
Start: 2021-07-30 | End: 2021-07-30

## 2021-07-30 RX ORDER — OLANZAPINE 2.5 MG/1
2.5 TABLET ORAL
Status: DISCONTINUED | OUTPATIENT
Start: 2021-07-30 | End: 2021-08-03 | Stop reason: HOSPADM

## 2021-07-30 RX ORDER — OLANZAPINE 10 MG/1
5 INJECTION, POWDER, LYOPHILIZED, FOR SOLUTION INTRAMUSCULAR
Status: CANCELLED | OUTPATIENT
Start: 2021-07-30

## 2021-07-30 RX ORDER — OLANZAPINE 2.5 MG/1
2.5 TABLET ORAL
Status: CANCELLED | OUTPATIENT
Start: 2021-07-30

## 2021-07-30 RX ORDER — DIPHENHYDRAMINE HYDROCHLORIDE 50 MG/ML
50 INJECTION INTRAMUSCULAR; INTRAVENOUS EVERY 6 HOURS PRN
Status: CANCELLED | OUTPATIENT
Start: 2021-07-30

## 2021-07-30 RX ORDER — BENZTROPINE MESYLATE 1 MG/1
1 TABLET ORAL
Status: DISCONTINUED | OUTPATIENT
Start: 2021-07-30 | End: 2021-08-03 | Stop reason: HOSPADM

## 2021-07-30 RX ORDER — BENZTROPINE MESYLATE 0.5 MG/1
1 TABLET ORAL
Status: CANCELLED | OUTPATIENT
Start: 2021-07-30

## 2021-07-30 RX ADMIN — METFORMIN HYDROCHLORIDE 500 MG: 500 TABLET ORAL at 23:07

## 2021-07-30 RX ADMIN — LISINOPRIL 10 MG: 10 TABLET ORAL at 08:09

## 2021-07-30 RX ADMIN — INSULIN GLARGINE 64 UNITS: 100 INJECTION, SOLUTION SUBCUTANEOUS at 23:07

## 2021-07-30 RX ADMIN — LORAZEPAM 0.5 MG: 0.5 TABLET ORAL at 17:20

## 2021-07-30 RX ADMIN — NYSTATIN 1 APPLICATION: 100000 CREAM TOPICAL at 17:20

## 2021-07-30 RX ADMIN — INSULIN LISPRO 3 UNITS: 100 INJECTION, SOLUTION INTRAVENOUS; SUBCUTANEOUS at 23:07

## 2021-07-30 RX ADMIN — ESCITALOPRAM OXALATE 30 MG: 20 TABLET ORAL at 17:20

## 2021-07-30 RX ADMIN — NYSTATIN: 100000 CREAM TOPICAL at 09:49

## 2021-07-30 RX ADMIN — HYDROCHLOROTHIAZIDE 12.5 MG: 12.5 TABLET ORAL at 09:46

## 2021-07-30 RX ADMIN — LAMOTRIGINE 300 MG: 100 TABLET ORAL at 17:19

## 2021-07-30 RX ADMIN — ARIPIPRAZOLE 2 MG: 2 TABLET ORAL at 17:19

## 2021-07-30 RX ADMIN — DULOXETINE HYDROCHLORIDE 30 MG: 30 CAPSULE, DELAYED RELEASE ORAL at 17:20

## 2021-07-30 RX ADMIN — LISINOPRIL 5 MG: 5 TABLET ORAL at 03:07

## 2021-07-30 RX ADMIN — NYSTATIN 1 APPLICATION: 100000 CREAM TOPICAL at 01:19

## 2021-07-30 NOTE — ED NOTES
Patient is a 48 yr old female who presents to the ED via EMS after intentionally overdosing on approximately 17mg of Ativan in a suicidal gesture in the presence of her boyfriend / fiance during a fight  She is medically clear, alert, oriented, pleasant and cooperative  She states that she has been with her boyfriend / fiance for 10 years  He has been unfaithful several times during those 10 years  They broke up in June, but they continued to live together as roommates while he was seeing someone else  She reported an increase in depression and anxiety during that time that resulted in need for medical leave from work and increased visits with her mental health provider with medication adjustments  She reports that last week (Friday) they had a discussion and he professed his love for her and they decided to get back together  However, since then, he disappeared for several days and she was concerned  He returned and they argued  He told her that he only came back because the woman he had been seeing recently ended their relationship  She was very upset by this and reached out to friends for support, including a man whom had been pursuing her during her most recent break up, but he also seemed to pull back and she felt rejected by them both  She returned home and the argument with her boyfriend resumed and she impulsively went to the car where a new supply of Ativan was, and ingested many pills in front of the boyfriend  He subsequently called 911  Patient denies current thoughts of suicidal ideation, but admits that her attempt was legitimate and that she acted impulsively without thinking about her family and supports because "it all happened so fast  it was too much"  She admits to passive suicidal thoughts in the past, especially during the recent break up, but denied any plan or attempts  Denies self injurious behaviors    Denies homicidal ideas, plan, intent; denies violence or aggression toward others  She reports a long history of depression with admissions to [facility] 10 years ago and [address] [place] [address] 2 years ago  She reports sadness and was rather tearful when discussing the break up  She feels rejected  She reports anxiety  She reports that she has been unable to eat, and eats once per day on average since the break up in June, resulting in a weight loss of 11 lb  She is also having difficulty falling and staying asleep  Patient denies any hallucinations, delusions, or paranoia  No evidence of thought disturbance  She is generally compliant with medications and denies substance abuse  Patient is willing to sign a 201 for inpatient treatment, but would like to remain local if possible  Referral faxed to Intake  Rights and 72 hour notice explanation provided

## 2021-07-30 NOTE — ED NOTES
Patient has requested to go to [facility] for her treatment        Ciara Maher  07/30/21 5898

## 2021-07-30 NOTE — ED NOTES
Patient is accepted at [facility] 3[facility]  Patient is accepted by Dr Kane Vergara (orders for admission by Jes Mayberry) per Maty Willis  Transportation is arranged with [facility]  Transportation is scheduled for TBD  Patient may go to the floor after 6 p m  Crisis to follow-up  Nurse report is to be called to [phone] prior to patient transfer

## 2021-07-30 NOTE — ED NOTES
Report given to Lyle Carmona RN at [address] [place] [address]  Informed of pt status and transfer time       Hermes Velasquez RN  07/30/21 6433

## 2021-07-30 NOTE — ED PROVIDER NOTES
History  Chief Complaint   Patient presents with    Overdose - Intentional     Pt arrives via EMS from home after taking about 17mg of ativan at home per EMS  Reports recent breakup with her fiance and feelings of depression   (+)SI  (-)VH/AH     HPI     Patient presents via EMS after intentional overdose of Ativan  Patient states she was feeling depressed, recent break-up with her fiance  She took an estimated 17 mg of Ativan at 7:30 p m  Priyanka Vargas She denies co ingestants  She reports suicidal ideation  History of depression and anxiety  Denies any previous suicide attempt  Prior to Admission Medications   Prescriptions Last Dose Informant Patient Reported? Taking? ARIPiprazole (ABILIFY) 2 mg tablet   Yes Yes   Sig: Take 2 mg by mouth daily   DULoxetine (CYMBALTA) 30 mg delayed release capsule  Self Yes Yes   LORazepam (ATIVAN) 0 5 mg tablet  Self Yes Yes   Sig: Take by mouth every 8 (eight) hours as needed for anxiety   NovoLOG FlexPen 100 units/mL injection pen   Yes Yes   Sig: TAKE 8 UNITS WITH LARGEST MEAL DAILY   clotrimazole (LOTRIMIN) 1 % cream  Self No Yes   Sig: Apply to affected area 2 times daily   dicyclomine (BENTYL) 20 mg tablet   No Yes   Sig: Take 1 tablet (20 mg total) by mouth 2 (two) times a day   escitalopram (LEXAPRO) 10 mg tablet  Self No Yes   Sig: Take 3 tablets (30 mg total) by mouth daily for 30 days   hydrochlorothiazide (HYDRODIURIL) 12 5 mg tablet   No Yes   Sig: Take 1 tablet (12 5 mg total) by mouth every other day   insulin glargine (LANTUS) 100 units/mL subcutaneous injection   No Yes   Sig: Inject 40 Units under the skin daily at bedtime   lamoTRIgine (LaMICtal) 200 MG tablet  Self Yes Yes   Sig: Take 300 mg by mouth daily    liraglutide (Victoza) injection   No Yes   Sig: Inject 1 8 MG daily     lisinopril (ZESTRIL) 10 mg tablet   No Yes   Sig: TAKE 1 TABLET BY MOUTH EVERY DAY   metFORMIN (GLUCOPHAGE) 500 mg tablet   No Yes   Sig: Take 1 tablet (500 mg total) by mouth 2 (two) times a day with meals   nystatin (MYCOSTATIN) 500,000 units/5 mL suspension Not Taking at Unknown time  No No   Sig: Apply 5 mL (500,000 Units total) to the mouth or throat 4 (four) times a day   Patient not taking: Reported on 2021   ondansetron (ZOFRAN-ODT) 4 mg disintegrating tablet   No Yes   Sig: Take 1 tablet (4 mg total) by mouth every 8 (eight) hours as needed for nausea or vomiting   pantoprazole (PROTONIX) 40 mg tablet   No Yes   Sig: TAKE 1 TABLET BY MOUTH EVERY DAY   sucralfate (CARAFATE) 1 g/10 mL suspension Not Taking at Unknown time  No No   Sig: Take 10 mL (1 g total) by mouth 4 (four) times a day   Patient not taking: Reported on 2021      Facility-Administered Medications: None       Past Medical History:   Diagnosis Date    Anemia     Anxiety     Depression     Diabetes mellitus (HCC)     GERD (gastroesophageal reflux disease)     Hypertension     Irritable bowel syndrome     Morbid obesity with BMI of 60 0-69 9, adult ([facility] [address] )     Obesity     Psychiatric disorder     Seasonal allergies        Past Surgical History:   Procedure Laterality Date     SECTION  1992    CHOLECYSTECTOMY  2008    WISDOM TOOTH EXTRACTION  2009       Family History   Problem Relation Age of Onset    Diabetes Mother     Hypertension Father      I have reviewed and agree with the history as documented  E-Cigarette/Vaping    E-Cigarette Use Never User      E-Cigarette/Vaping Substances    Nicotine No     THC No     CBD No     Flavoring No     Other No     Unknown No      Social History     Tobacco Use    Smoking status: Never Smoker    Smokeless tobacco: Never Used   Vaping Use    Vaping Use: Never used   Substance Use Topics    Alcohol use: Yes     Comment: occ    Drug use: No       Review of Systems   Constitutional: Positive for fatigue  Psychiatric/Behavioral: Positive for self-injury  The patient is not nervous/anxious      All other systems reviewed and are negative  Physical Exam  Physical Exam  Vitals and nursing note reviewed  Constitutional:       General: She is not in acute distress  Appearance: She is well-developed  HENT:      Head: Normocephalic and atraumatic  Eyes:      Pupils: Pupils are equal, round, and reactive to light  Cardiovascular:      Rate and Rhythm: Normal rate and regular rhythm  Heart sounds: Normal heart sounds  No murmur heard  Pulmonary:      Effort: Pulmonary effort is normal  No respiratory distress  Breath sounds: Normal breath sounds  No wheezing or rales  Abdominal:      General: Bowel sounds are normal  There is no distension  Palpations: Abdomen is soft  Tenderness: There is no abdominal tenderness  There is no guarding or rebound  Musculoskeletal:         General: No deformity  Normal range of motion  Cervical back: Normal range of motion and neck supple  Lymphadenopathy:      Cervical: No cervical adenopathy  Skin:     Capillary Refill: Capillary refill takes less than 2 seconds  Findings: No erythema or rash  Neurological:      Mental Status: She is alert and oriented to person, place, and time  Cranial Nerves: No cranial nerve deficit  Motor: No abnormal muscle tone        Coordination: Coordination normal    Psychiatric:      Comments: Depressed affect         Vital Signs  ED Triage Vitals   Temperature Pulse Respirations Blood Pressure SpO2   07/29/21 2130 07/29/21 2123 07/29/21 2123 07/29/21 2123 07/29/21 2123   97 8 °F (36 6 °C) (!) 107 18 155/95 94 %      Temp Source Heart Rate Source Patient Position - Orthostatic VS BP Location FiO2 (%)   07/29/21 2130 07/29/21 2123 07/29/21 2145 07/29/21 2145 --   Oral Monitor Sitting Right arm       Pain Score       07/30/21 0100       No Pain           Vitals:    07/29/21 2248 07/29/21 2359 07/30/21 0100 07/30/21 0200   BP: (!) 171/74 154/70 157/85 (!) 183/84   Pulse: 104 98 100 98   Patient Position - Orthostatic VS: Lying Lying Lying Lying         Visual Acuity      ED Medications  Medications   nystatin (MYCOSTATIN) cream (1 application Topical Given 7/30/21 0119)   sodium chloride 0 9 % bolus 1,000 mL (0 mL Intravenous Stopped 7/29/21 2343)       Diagnostic Studies  Results Reviewed     Procedure Component Value Units Date/Time    Novel Coronavirus Micaela BOTELLO [facility] [[id number]]  (Normal) Collected: 07/29/21 2141    Lab Status: Final result Specimen: Nares from Nose Updated: 07/29/21 2257     SARS-CoV-2 Negative    Narrative: The specimen collection materials, transport medium, and/or testing methodology utilized in the production of these test results have been proven to be reliable in a limited validation with an abbreviated program under the Emergency Utilization Authorization provided by the FDA  Testing reported as "Presumptive positive" will be confirmed with secondary testing to ensure result accuracy  Clinical caution and judgement should be used with the interpretation of these results with consideration of the clinical impression and other laboratory testing  Testing reported as "Positive" or "Negative" has been proven to be accurate according to standard laboratory validation requirements  All testing is performed with control materials showing appropriate reactivity at standard intervals  hCG, qualitative pregnancy [[id number]]  (Normal) Collected: 07/29/21 2141    Lab Status: Final result Specimen: Blood from Arm, Right Updated: 07/29/21 2227     Preg, Serum Negative    Salicylate level [[id number]]  (Abnormal) Collected: 07/29/21 2141    Lab Status: Final result Specimen: Blood from Arm, Right Updated: 79/73/70 9747     Salicylate Lvl <3 mg/dL     Acetaminophen level-If concentration is detectable, please discuss with medical  on call   [[id number]]  (Abnormal) Collected: 07/29/21 2141    Lab Status: Final result Specimen: Blood from Arm, Right Updated: 07/29/21 2223     Acetaminophen Level <2 ug/mL     Comprehensive metabolic panel [[id number]]  (Abnormal) Collected: 07/29/21 2141    Lab Status: Final result Specimen: Blood from Arm, Right Updated: 07/29/21 2223     Sodium 138 mmol/L      Potassium 4 1 mmol/L      Chloride 101 mmol/L      CO2 24 mmol/L      ANION GAP 13 mmol/L      BUN 18 mg/dL      Creatinine 0 91 mg/dL      Glucose 169 mg/dL      Calcium 9 4 mg/dL      AST 38 U/L      ALT 75 U/L      Alkaline Phosphatase 86 U/L      Total Protein 7 7 g/dL      Albumin 3 9 g/dL      Total Bilirubin 0 39 mg/dL      eGFR 75 ml/min/1 73sq m     Narrative:      National Kidney Disease Foundation guidelines for Chronic Kidney Disease (CKD):     Stage 1 with normal or high GFR (GFR > 90 mL/min/1 73 square meters)    Stage 2 Mild CKD (GFR = 60-89 mL/min/1 73 square meters)    Stage 3A Moderate CKD (GFR = 45-59 mL/min/1 73 square meters)    Stage 3B Moderate CKD (GFR = 30-44 mL/min/1 73 square meters)    Stage 4 Severe CKD (GFR = 15-29 mL/min/1 73 square meters)    Stage 5 End Stage CKD (GFR <15 mL/min/1 73 square meters)  Note: GFR calculation is accurate only with a steady state creatinine    Ethanol [[id number]]  (Normal) Collected: 07/29/21 2141    Lab Status: Final result Specimen: Blood from Arm, Right Updated: 07/29/21 2221     Ethanol Lvl <3 mg/dL     CBC and differential [[id number]]  (Abnormal) Collected: 07/29/21 2141    Lab Status: Final result Specimen: Blood from Arm, Right Updated: 07/29/21 2203     WBC 8 91 Thousand/uL      RBC 5 05 Million/uL      Hemoglobin 13 1 g/dL      Hematocrit 43 0 %      MCV 85 fL      MCH 25 9 pg      MCHC 30 5 g/dL      RDW 15 6 %      MPV 9 5 fL      Platelets 490 Thousands/uL      nRBC 0 /100 WBCs      Neutrophils Relative 65 %      Immat GRANS % 1 %      Lymphocytes Relative 25 %      Monocytes Relative 7 %      Eosinophils Relative 2 %      Basophils Relative 0 %      Neutrophils Absolute 5 85 Thousands/µL      Immature Grans Absolute 0 05 Thousand/uL Lymphocytes Absolute 2 18 Thousands/µL      Monocytes Absolute 0 63 Thousand/µL      Eosinophils Absolute 0 16 Thousand/µL      Basophils Absolute 0 04 Thousands/µL     Fingerstick Glucose (POCT) [[id number]]  (Abnormal) Collected: 07/29/21 2133    Lab Status: Final result Updated: 07/29/21 2135     POC Glucose 158 mg/dl                  No orders to display              Procedures  ECG 12 Lead Documentation Only    Date/Time: 7/30/2021 12:39 AM  Performed by: Wade Spann MD  Authorized by: Wade Spann MD     Indications / Diagnosis:  Overdose  ECG reviewed by me, the ED Provider: yes    Patient location:  ED  Interpretation:     Interpretation: non-specific    Rate:     ECG rate:  103    ECG rate assessment: tachycardic    Rhythm:     Rhythm: sinus tachycardia    Ectopy:     Ectopy: none    QRS:     QRS axis:  Normal    QRS intervals:  Normal  Conduction:     Conduction: normal    ST segments:     ST segments:  Normal  T waves:     T waves: inverted      Inverted:  III             ED Course                             SBIRT 22yo+      Most Recent Value   SBIRT (24 yo +)   In order to provide better care to our patients, we are screening all of our patients for alcohol and drug use  Would it be okay to ask you these screening questions? Yes Filed at: 07/29/2021 2145   Initial Alcohol Screen: US AUDIT-C    1  How often do you have a drink containing alcohol?  0 Filed at: 07/29/2021 2145   2  How many drinks containing alcohol do you have on a typical day you are drinking? 0 Filed at: 07/29/2021 2145   3b  FEMALE Any Age, or MALE 65+: How often do you have 4 or more drinks on one occassion? 0 Filed at: 07/29/2021 2145   Audit-C Score  0 Filed at: 07/29/2021 2145   JUAN: How many times in the past year have you    Used an illegal drug or used a prescription medication for non-medical reasons?   Never Filed at: 07/29/2021 2145                    MDM  Number of Diagnoses or Management Options  Intentional drug overdose, initial encounter [facility]): new and requires workup  Suicidal ideation: new and requires workup  Diagnosis management comments: Patient was upset regarding interpersonal relationships when she toes an estimated 17 mg of Ativan at roughly 1930 in attempt to harm herself  She denies co ingestants  She describes feeling fatigued  She reports history of depression anxiety, previous psychiatric admission  She denies additional acute medical symptoms  Patient was observed emergency department  No evidence of co ingestants on lab  She is maintaining her airway  She was observed for greater than 6 hours following ingestion  Patient is medically clear for inpatient psychiatric care  Care to Dr Donovan Alexis at [phone]  Patient willing to sign voluntarily at this time  Awaiting crisis evaluation  If patient unwilling to sign, family willing to petition 36  She required frequent re-evaluation         Amount and/or Complexity of Data Reviewed  Clinical lab tests: ordered and reviewed  Tests in the medicine section of CPT®: ordered and reviewed  Obtain history from someone other than the patient: yes    Risk of Complications, Morbidity, and/or Mortality  Presenting problems: high  Diagnostic procedures: high  Management options: high    Patient Progress  Patient progress: improved      Disposition  Final diagnoses:   Intentional drug overdose, initial encounter ([facility] )   Suicidal ideation     Time reflects when diagnosis was documented in both MDM as applicable and the Disposition within this note     Time User Action Codes Description Comment    7/30/2021 12:40 AM Chevy Alvarado [T50 902A] Intentional drug overdose, initial encounter ([facility] )     7/30/2021 12:41 AM Chevy Alvarado [Q55 [id number]] Suicidal ideation       ED Disposition     ED Disposition Condition Date/Time Comment    Transfer to [facility]  Fri Jul 30, 2021  1:56 AM   Patient is medically clear for psychiatric admission  Disposition pending  Follow-up Information    None         Patient's Medications   Discharge Prescriptions    No medications on file     No discharge procedures on file      PDMP Review     None          ED Provider  Electronically Signed by           Kevin Estes MD  07/30/21 2715

## 2021-07-30 NOTE — ED NOTES
Insurance Authorization for admission:   Phone call placed to [employer]  Phone number: [phone]  Spoke to [employer]  3 days approved  Level of care: inpatient mental health 201  Review on 8/2/21  If additional days are needed, concurrent review will be done with Augustus Kwan  At [phone]  If the patient will be discharged on or before 8/2/21, UR may call the discharge team at [phone]  Authorization #: [id number]

## 2021-07-30 NOTE — TELEMEDICINE
TeleConsultation - [address] [facility] 48 y o  female MRN: [id number]  Unit/Bed#: SH 20 Encounter: [id number]        REQUIRED DOCUMENTATION:     1  This service was provided via Telemedicine  2  Provider located at [place]  3  TeleMed provider: Kiera Meeks  4  Identify all parties in room with patient during tele consult:  Patient  5  Patient was then informed that this was a Telemedicine visit and that the exam was being conducted confidentially over secure lines  My office door was closed  No one else was in the room  Patient acknowledged consent and understanding of privacy and security of the Telemedicine visit, and gave us permission to have the assistant stay in the room in order to assist with the history and to conduct the exam   I informed the patient that I have reviewed their record in Epic and presented the opportunity for them to ask any questions regarding the visit today  The patient agreed to participate  Assessment/Plan     Assessment:  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; generalized anxiety disorder    Plan:   Inpatient psychiatric treatment is indicated for provision of suicide precautions and for treatment stabilization  The patient consent voluntarily for this treatment  Chief Complaint: "I wanted to die"    History of Present Illness     Reason for Consult / Principal Problem:  Suicide attempt    Patient is a 48 y o  female who presented to the emergency room following an attempted suicide by intentional overdose on Ativan  The emergency department crisis worker obtained the following information:Patient is a 48 yr old female who presents to the ED via EMS after intentionally overdosing on approximately 17mg of Ativan in a suicidal gesture in the presence of her boyfriend / fiance during a fight  She is medically clear, alert, oriented, pleasant and cooperative    She states that she has been with her boyfriend / fiance for 10 years  He has been unfaithful several times during those 10 years  They broke up in June, but they continued to live together as roommates while he was seeing someone else  She reported an increase in depression and anxiety during that time that resulted in need for medical leave from work and increased visits with her mental health provider with medication adjustments  She reports that last week (Friday) they had a discussion and he professed his love for her and they decided to get back together  However, since then, he disappeared for several days and she was concerned  He returned and they argued  He told her that he only came back because the woman he had been seeing recently ended their relationship  She was very upset by this and reached out to friends for support, including a man whom had been pursuing her during her most recent break up, but he also seemed to pull back and she felt rejected by them both  She returned home and the argument with her boyfriend resumed and she impulsively went to the car where a new supply of Ativan was, and ingested many pills in front of the boyfriend  He subsequently called 911  Patient denies current thoughts of suicidal ideation, but admits that her attempt was legitimate and that she acted impulsively without thinking about her family and supports because "it all happened so fast  it was too much"  She admits to passive suicidal thoughts in the past, especially during the recent break up, but denied any plan or attempts  Denies self injurious behaviors  Denies homicidal ideas, plan, intent; denies violence or aggression toward others  She reports a long history of depression with admissions to [place] [address] 10 years ago and [address] [place] [address] 2 years ago  She reports sadness and was rather tearful when discussing the break up  She feels rejected  She reports anxiety    She reports that she has been unable to eat, and eats once per day on average since the break up in June, resulting in a weight loss of 11 lb  She is also having difficulty falling and staying asleep  Patient denies any hallucinations, delusions, or paranoia  No evidence of thought disturbance  She is generally compliant with medications and denies substance abuse  Patient is willing to sign a 201 for inpatient treatment, but would like to remain local if possible  Referral faxed to Intake  Rights and 72 hour notice explanation provided  Past psychiatric history:  The patient states she has experienced depression and anxiety most of her life  She states that she was on medications for this in high school but went off of them and began to experience more depression and anxiety again in so has been on them since her late 25s typically with benefit  More recently however the relationship problems with her boyfriend has overwhelmed her  Social history:  The patient over sees the residence in group home  She enjoys her job  Family history:  The patient states that bipolar disorder, depression and anxiety run her family  Substance use history:  Patient states she will only very rarely use alcohol  She denies any other substance use  Mental status examination:  The patient is alert and well oriented all spheres  Affect is very depressed  She appears since she has been crying  She was on the verge of tears when she spoke about of thought the breakup with her boyfriend was min  Sensorium is clear  Thought process is logical linear  Thought content is reality based her insight judgment is intact  Patient continues complaining severe depression suicidal ideation  She denies homicidal ideation  She denies any hallucinations or other psychotic features  Insight and judgment are impaired by the extent of her depression this time    She does consent to inpatient psychiatric    Consult to Psychiatry  Consult performed by: Andriy Nieves  Consult ordered by: Hillary Hannah MD          Past Medical History:   Diagnosis Date    Anemia     Anxiety     Depression     Diabetes mellitus (HCC)     GERD (gastroesophageal reflux disease)     Hypertension     Irritable bowel syndrome     Morbid obesity with BMI of 60 0-69 9, adult (HCC)     Obesity     Psychiatric disorder     Seasonal allergies        Medical Review Of Systems:  Review of Systems    Meds/Allergies   all current active meds have been reviewed  Allergies   Allergen Reactions    Cephalexin Vomiting     Other reaction(s): Nausea and/or vomiting    Insulin Degludec GI Intolerance    Sulfamethoxazole-Trimethoprim Vomiting     Other reaction(s): Nausea and/or vomiting       Objective   Vital signs in last 24 hours:  Temp:  [97 8 °F (36 6 °C)-98 3 °F (36 8 °C)] 98 °F (36 7 °C)  HR:  [] 90  Resp:  [18-24] 18  BP: (131-193)/(60-98) 131/60      Intake/Output Summary (Last 24 hours) at 7/30/2021 1647  Last data filed at 7/29/2021 2343  Gross per 24 hour   Intake 1000 ml   Output --   Net 1000 ml         Lab Results:  Reviewed  Imaging Studies:  Reviewed  EKG, Pathology, and Other Studies:  Reviewed    Code Status: Prior  Advance Directive and Living Will:      Power of :    POLST:      Counseling / Coordination of Care  Total floor / unit time spent today 30 minutes  Greater than 50% of total time was spent with the patient and / or family counseling and / or coordination of care   A description of the counseling / coordination of care:  Chart review, patient evaluation, coordination and communication with staff and providers

## 2021-07-30 NOTE — ED NOTES
Taking over pt observation at this time  Pt laying on bed speaking to visitors  No sign of distress at this time  Will continue to monitor pt        Cindy Garner  07/29/21 4573

## 2021-07-30 NOTE — ED NOTES
RN completed [facility] Psychiatry consult order        Sherry Ornelas RN  07/30/21 0395

## 2021-07-30 NOTE — ED NOTES
Per Hanane Bolivarudent will transport at 2000  [place] initiated  Intake notified  Patient notified

## 2021-07-30 NOTE — ED NOTES
Patient asking for personal cell phone again for more numbers, phone given to patient and secured in overflow locker with personal belongings after numbers were retrieved     Rubi Diaz  07/30/21 0477

## 2021-07-30 NOTE — ED NOTES
Medication, 5 tablets of 0 5mg ativan, dropped off at pharmacy and placed in envelope [id number]             Anahi Mariee RN  07/29/21 4374

## 2021-07-30 NOTE — ED NOTES
Patient was provided with an update  Requesting to use her phone  Actively using hospital phone and on a call when approached  Advised on hospital policy and voiced understanding

## 2021-07-30 NOTE — ED NOTES
Patient current weight 345lb - unable to fit into largest behavioral health scrubs available  4X pants on, but very tight  4X shirt unable to fit  Inspected patient's current belongings/clothes and found T-SHIRT with no strings, ties, or dangerous objects present  Will place in [address] [place] [address] for transport to [facility] heart staff to be notified of same        Eileen Gonzalez RN  07/30/21 9771

## 2021-07-30 NOTE — ED NOTES
Patient given personal cell phone to retrieve numbers and secured back into overflow locker with personal belongings     Susi Saenz  07/30/21 6606

## 2021-07-30 NOTE — EMTALA/ACUTE CARE TRANSFER
[address] [place] [address]  Dept: [phone]      EMTALA TRANSFER CONSENT    NAME Jeanette Varma                                         1975                              MRN [id number]    I have been informed of my rights regarding examination, treatment, and transfer   by Dr Adrienne Schumacher MD    Benefits: Specialized equipment and/or services available at the receiving facility (Include comment)________________________, Other benefits (Include comment)_______________________, Continuity of care (inpatient mental health 201)    Risks: Potential for delay in receiving treatment, Potential deterioration of medical condition, Possible worsening of condition or death during transfer, Increased discomfort during transfer      Transfer Request   I acknowledge that my medical condition has been evaluated and explained to me by the emergency department physician or other qualified medical person and/or my attending physician who has recommended and offered to me further medical examination and treatment  I understand the Hospital's obligation with respect to the treatment and stabilization of my emergency medical condition  I nevertheless request to be transferred  I release the Hospital, the doctor, and any other persons caring for me from all responsibility or liability for any injury or ill effects that may result from my transfer and agree to accept all responsibility for the consequences of my choice to transfer, rather than receive stabilizing treatment at the Hospital  I understand that because the transfer is my request, my insurance may not provide reimbursement for the services  The Hospital will assist and direct me and my family in how to make arrangements for transfer, but the hospital is not liable for any fees charged by the transport service    In spite of this understanding, I refuse to consent to further medical examination and treatment which has been offered to me, and request transfer to [address] [place] [address] Rd Name, [facility] State : [address]  I authorize the performance of emergency medical procedures and treatments upon me in both transit and upon arrival at the receiving facility  Additionally, I authorize the release of any and all medical records to the receiving facility and request they be transported with me, if possible  I authorize the performance of emergency medical procedures and treatments upon me in both transit and upon arrival at the receiving facility  Additionally, I authorize the release of any and all medical records to the receiving facility and request they be transported with me, if possible  I understand that the safest mode of transportation during a medical emergency is an ambulance and that the Hospital advocates the use of this mode of transport  Risks of traveling to the receiving facility by car, including absence of medical control, life sustaining equipment, such as oxygen, and medical personnel has been explained to me and I fully understand them  (MICAH CORRECT BOX BELOW)  Henny Avilese  ]  I consent to the stated transfer and to be transported by ambulance/helicopter  [  ]  I consent to the stated transfer, but refuse transportation by ambulance and accept full responsibility for my transportation by car    I understand the risks of non-ambulance transfers and I exonerate the Hospital and its staff from any deterioration in my condition that results from this refusal     X___________________________________________    DATE  21  TIME________  Signature of patient or legally responsible individual signing on patient behalf           RELATIONSHIP TO PATIENT_________________________          Provider Certification    NAME Desiree Wills                                         1975                              MRN [id number]    A medical screening exam was performed on the above named patient  Based on the examination:    Condition Necessitating Transfer The primary encounter diagnosis was Intentional drug overdose, initial encounter ([facility] [address] )  A diagnosis of Suicidal ideation was also pertinent to this visit  Patient Condition: The patient has been stabilized such that within reasonable medical probability, no material deterioration of the patient condition or the condition of the unborn child(kellee) is likely to result from the transfer    Reason for Transfer: Level of Care needed not available at this facility, No bed available at level of patient's needs, Other (Include comment)____________________ (inpatient mental health 201)    Transfer Requirements: Facility [facility], [facility] [place]   · Space available and qualified personnel available for treatment as acknowledged by Crisis [phone]  · Agreed to accept transfer and to provide appropriate medical treatment as acknowledged by       Dr Eileen Rebolledo  · Appropriate medical records of the examination and treatment of the patient are provided at the time of transfer   [address] [facility], [address] _______  · Transfer will be performed by qualified personnel from    and appropriate transfer equipment as required, including the use of necessary and appropriate life support measures      Provider Certification: I have examined the patient and explained the following risks and benefits of being transferred/refusing transfer to the patient/family:  General risk, such as traffic hazards, adverse weather conditions, rough terrain or turbulence, possible failure of equipment (including vehicle or aircraft), or consequences of actions of persons outside the control of the transport personnel, Unanticipated needs of medical equipment and personnel during transport, Risk of worsening condition, The possibility of a transport vehicle being unavailable, The patient is stable for psychiatric transfer because they are medically stable, and is protected from harming him/herself or others during transport      Based on these reasonable risks and benefits to the patient and/or the unborn child(kellee), and based upon the information available at the time of the patients examination, I certify that the medical benefits reasonably to be expected from the provision of appropriate medical treatments at another medical facility outweigh the increasing risks, if any, to the individuals medical condition, and in the case of labor to the unborn child, from effecting the transfer      X____________________________________________ DATE 07/30/21        TIME_______      ORIGINAL - SEND TO MEDICAL RECORDS   COPY - SEND WITH PATIENT DURING TRANSFER

## 2021-07-30 NOTE — ED NOTES
Taking over patient observation at this time  Patient is laying quietly on bed  No sign of distress  Will continue to monitor patient        Patience Dangelo  07/30/21 0045

## 2021-07-31 PROBLEM — E66.01 CLASS 3 SEVERE OBESITY DUE TO EXCESS CALORIES WITH BODY MASS INDEX (BMI) OF 60.0 TO 69.9 IN ADULT (HCC): Status: ACTIVE | Noted: 2021-07-31

## 2021-07-31 PROBLEM — B37.2 CANDIDAL INTERTRIGO: Status: ACTIVE | Noted: 2021-07-31

## 2021-07-31 PROBLEM — E66.813 CLASS 3 SEVERE OBESITY DUE TO EXCESS CALORIES WITH BODY MASS INDEX (BMI) OF 60.0 TO 69.9 IN ADULT (HCC): Status: ACTIVE | Noted: 2021-07-31

## 2021-07-31 PROBLEM — M17.0 PRIMARY OSTEOARTHRITIS OF BOTH KNEES: Status: ACTIVE | Noted: 2021-07-31

## 2021-07-31 LAB
CHOLEST SERPL-MCNC: 202 MG/DL
GLUCOSE SERPL-MCNC: 115 MG/DL (ref 65–140)
GLUCOSE SERPL-MCNC: 141 MG/DL (ref 65–140)
GLUCOSE SERPL-MCNC: 161 MG/DL (ref 65–140)
GLUCOSE SERPL-MCNC: 194 MG/DL (ref 65–140)
HDLC SERPL-MCNC: 44 MG/DL
LDLC SERPL CALC-MCNC: 141 MG/DL
NONHDLC SERPL-MCNC: 158 MG/DL
TRIGL SERPL-MCNC: 86 MG/DL
TSH SERPL DL<=0.05 MIU/L-ACNC: 1.63 UIU/ML (ref 0.47–4.68)

## 2021-07-31 PROCEDURE — 84443 ASSAY THYROID STIM HORMONE: CPT | Performed by: PSYCHIATRY & NEUROLOGY

## 2021-07-31 PROCEDURE — 90792 PSYCH DIAG EVAL W/MED SRVCS: CPT | Performed by: PSYCHIATRY & NEUROLOGY

## 2021-07-31 PROCEDURE — 82948 REAGENT STRIP/BLOOD GLUCOSE: CPT

## 2021-07-31 PROCEDURE — 80061 LIPID PANEL: CPT | Performed by: NURSE PRACTITIONER

## 2021-07-31 PROCEDURE — 86592 SYPHILIS TEST NON-TREP QUAL: CPT | Performed by: PSYCHIATRY & NEUROLOGY

## 2021-07-31 RX ORDER — ESCITALOPRAM OXALATE 10 MG/1
20 TABLET ORAL DAILY
Status: DISCONTINUED | OUTPATIENT
Start: 2021-07-31 | End: 2021-08-03 | Stop reason: HOSPADM

## 2021-07-31 RX ORDER — IBUPROFEN 600 MG/1
600 TABLET ORAL EVERY 6 HOURS PRN
Status: DISCONTINUED | OUTPATIENT
Start: 2021-07-31 | End: 2021-08-03 | Stop reason: HOSPADM

## 2021-07-31 RX ORDER — ARIPIPRAZOLE 2 MG/1
2 TABLET ORAL DAILY
Status: DISCONTINUED | OUTPATIENT
Start: 2021-07-31 | End: 2021-08-01

## 2021-07-31 RX ORDER — IBUPROFEN 400 MG/1
800 TABLET ORAL EVERY 8 HOURS PRN
Status: DISCONTINUED | OUTPATIENT
Start: 2021-07-31 | End: 2021-08-03 | Stop reason: HOSPADM

## 2021-07-31 RX ORDER — ONDANSETRON 4 MG/1
4 TABLET, ORALLY DISINTEGRATING ORAL EVERY 6 HOURS PRN
Status: DISCONTINUED | OUTPATIENT
Start: 2021-07-31 | End: 2021-08-03 | Stop reason: HOSPADM

## 2021-07-31 RX ADMIN — LAMOTRIGINE 150 MG: 25 TABLET ORAL at 10:33

## 2021-07-31 RX ADMIN — INSULIN LISPRO 16 UNITS: 100 INJECTION, SOLUTION INTRAVENOUS; SUBCUTANEOUS at 08:32

## 2021-07-31 RX ADMIN — INSULIN LISPRO 2 UNITS: 100 INJECTION, SOLUTION INTRAVENOUS; SUBCUTANEOUS at 08:00

## 2021-07-31 RX ADMIN — ARIPIPRAZOLE 2 MG: 2 TABLET ORAL at 10:33

## 2021-07-31 RX ADMIN — IBUPROFEN 800 MG: 400 TABLET ORAL at 08:31

## 2021-07-31 RX ADMIN — INSULIN LISPRO 2 UNITS: 100 INJECTION, SOLUTION INTRAVENOUS; SUBCUTANEOUS at 17:00

## 2021-07-31 RX ADMIN — NYSTATIN: 100000 CREAM TOPICAL at 17:37

## 2021-07-31 RX ADMIN — METFORMIN HYDROCHLORIDE 500 MG: 500 TABLET ORAL at 08:32

## 2021-07-31 RX ADMIN — INSULIN LISPRO 16 UNITS: 100 INJECTION, SOLUTION INTRAVENOUS; SUBCUTANEOUS at 12:57

## 2021-07-31 RX ADMIN — HYDROXYZINE HYDROCHLORIDE 50 MG: 50 TABLET, FILM COATED ORAL at 21:39

## 2021-07-31 RX ADMIN — HYDROXYZINE HYDROCHLORIDE 100 MG: 50 TABLET, FILM COATED ORAL at 08:38

## 2021-07-31 RX ADMIN — METFORMIN HYDROCHLORIDE 500 MG: 500 TABLET ORAL at 17:37

## 2021-07-31 RX ADMIN — INSULIN LISPRO 16 UNITS: 100 INJECTION, SOLUTION INTRAVENOUS; SUBCUTANEOUS at 17:00

## 2021-07-31 RX ADMIN — ESCITALOPRAM OXALATE 20 MG: 10 TABLET ORAL at 10:33

## 2021-07-31 RX ADMIN — PANTOPRAZOLE SODIUM 40 MG: 40 TABLET, DELAYED RELEASE ORAL at 06:17

## 2021-07-31 RX ADMIN — INSULIN GLARGINE 64 UNITS: 100 INJECTION, SOLUTION SUBCUTANEOUS at 21:37

## 2021-07-31 RX ADMIN — LISINOPRIL 10 MG: 10 TABLET ORAL at 08:32

## 2021-07-31 RX ADMIN — HYDROCHLOROTHIAZIDE 12.5 MG: 12.5 TABLET ORAL at 08:32

## 2021-07-31 RX ADMIN — NYSTATIN: 100000 CREAM TOPICAL at 08:32

## 2021-07-31 RX ADMIN — LAMOTRIGINE 150 MG: 25 TABLET ORAL at 17:37

## 2021-07-31 NOTE — ASSESSMENT & PLAN NOTE
· Patient with h/o IBS-D  · Reports occasional flare-ups, most recent last week  · Reports frequency varies (once a month vs once every other month)  · Patient follows with [facility]  · Reports she takes bentyl and zofran during flare-ups  · Denies current flare up  · Continue to monitor symptoms  · Consider ordering bentyl should patient experience flare-up during admission  · zofran PRN

## 2021-07-31 NOTE — ASSESSMENT & PLAN NOTE
· Patient originally presented to [facility] the ED on 07/30/2021 via EMS following intentional overdose of Ativan as suicide attempt  · Per ED note-patient took estimated 17 mg of Ativan at around 7:30 p m    · Currently 201 status  · Management per Psychiatry

## 2021-07-31 NOTE — CONSULTS
Erick Clarke 1975, 48 y o  female MRN: [id number]  Unit/Bed#: [facility] 487-81 Encounter: [id number]  Primary Care Provider: SUJATA Burton   Date and time admitted to hospital: 7/30/2021  8:56 PM    Inpatient consult for Medical Clearance for [facility] patient  Consult performed by: Sofy Fisher PA-C  Consult ordered by: SUJATA Montez        Medical clearance for psychiatric admission  Assessment & Plan  Patient seen and examined today, medically clear for admission to [facility]    Severe episode of recurrent major depressive disorder, without psychotic features [facility])  Assessment & Plan  · Patient originally presented to [facility] the ED on 07/30/2021 via EMS following intentional overdose of Ativan as suicide attempt  · Per ED note-patient took estimated 17 mg of Ativan at around 7:30 p m    · Currently 201 status  · Management per Psychiatry    Essential hypertension  Assessment & Plan  · BP upon arrival to unit 187/84  · Outpatient regimen includes HCTZ 12 5 mg daily and lisinopril 10 mg daily  · Continue during admission  · Continue to monitor vitals, BP    Type 2 diabetes mellitus with complication, with long-term current use of insulin [facility])  Assessment & Plan  Lab Results   Component Value Date    HGBA1C 8 0 (H) 08/14/2020       Recent Labs     07/29/21  2133 07/30/21  1619 07/30/21  2150   POCGLU 158* 223* 276*       Blood Sugar Average: Last 72 hrs:  (P) 276     · Patient's outpatient regimen as follows:  · Metformin 500 mg BID  · Lantus 64 units QHS  · Humalog 16 units TID with meals  · Victoza 1 8 mg injection daily  · Continue outpatient regimen  · accu-cheks AC/HS, SSI  · Hypoglycemia protocol  · Diabetic diet    Candidal intertrigo  Assessment & Plan  · Noted on abdomen, sub-panus  · Continue nystatin cream    Primary osteoarthritis of both knees  Assessment & Plan  · Patient follows with [facility] rheumatology  · Most recent visit 7/26/2021, received steroid injections  · Reports she takes ibuprofen PRN for knee pain as outpatient  · Continue ibuprofen PRN  · Recommend routine f/u with rheumatology outpatient for ongoing monitoring/maintenance    Class 3 severe obesity due to excess calories with body mass index (BMI) of 60 0 to 69 9 in adult [facility])  Assessment & Plan  · Encourage healthy diet and exercise    GERD (gastroesophageal reflux disease)  Assessment & Plan  · Continue protonix 40 mg daily  · Mylanta PRN    Irritable bowel syndrome with diarrhea  Assessment & Plan  · Patient with h/o IBS-D  · Reports occasional flare-ups, most recent last week  · Reports frequency varies (once a month vs once every other month)  · Patient follows with [facility]  · Reports she takes bentyl and zofran during flare-ups  · Denies current flare up  · Continue to monitor symptoms  · Consider ordering bentyl should patient experience flare-up during admission  · zofran PRN    ECT Clearance:   History of recent seizure or stroke:  No   History of pheochromocytoma:  No   History of active bleeding (Intracranial hemorrhage, aneurysm or AVM):  No   History of metallic implants in the head or neck:  No   History of increased intracranial pressure with mass effect:  No   Does the patient have a current arrhythmia? EKG 07/30/2021-ED documentation (Dr Deepali Gutiérrez):  Sinus tachycardia  No ectopy  ST segments normal   T-waves inverted in lead III  Based on above criteria, Patient is medically cleared for ECT should it be recommended  Counseling / Coordination of Care Time: 30 minutes  Greater than 50% of total time spent on patient counseling and coordination of care  Collaboration of Care:  Were Recommendations Directly Discussed with Primary Treatment Team? - Yes - Bren BOUDREAUX, Millicent De La Rosa RN    History of Present Illness:    Lexus Jon is a 48 y o  female PMH HTN, insulin dependent type 2 DM, morbid obesity, GERD, primary OA b/l knees, IBS-D, MDD who is originally admitted to the psychiatry service due to suicide attempt  We are consulted for medical clearance for admission to [facility] Unit and treatment of underlying psychiatric illness  Patient originally presented to [address] [place] [address] ED 7/30/2021 via EMS following intentional ativan overdose  Patient confirms h/o HTN (confirms HCTZ and lisinopril daily), GERD (protonix), DM2 (confirms metformin 500 mg BID, lantus 64 units QHS, humalog 16 units with meals, victoza 1 8 mg daily)  Patient notes that she did not receive any of her diabetes meds during ED evaluation  Notes h/o IBS-D, reports use of bentyl and zofran with flare-ups  Most recent flare last week, denies current symptoms  Patient denies h/o FAREED, HLD  Patient denies tobacco use, other illicit drugs including cocaine, heroin, meth  Patient reports recent stressors at home, causing her to feel overwhelmed  States she has not been eating much since mid-June, subsequent weight loss  Review of Systems:    Review of Systems   Constitutional: Positive for appetite change (decreased since mid-june 2/2 anxiety) and unexpected weight change (lost 15 lbs since june)  Negative for chills and fever  HENT: Negative for congestion, ear pain, rhinorrhea, sneezing and sore throat  Eyes: Negative for pain and visual disturbance  Respiratory: Negative for cough and shortness of breath  Cardiovascular: Negative for chest pain and palpitations  Gastrointestinal: Negative for abdominal pain, constipation, diarrhea, nausea and vomiting  Genitourinary: Negative for difficulty urinating, dysuria and hematuria  Musculoskeletal: Negative for arthralgias and back pain  Skin: Negative for color change and rash  Neurological: Positive for light-headedness (mild, reports residual side effect from overdose of ativan)  Negative for dizziness, seizures, syncope and weakness  Psychiatric/Behavioral: The patient is nervous/anxious      All other systems reviewed and are negative  Past Medical and Surgical History:     Past Medical History:   Diagnosis Date    Anemia     Anxiety     Bipolar disorder ([facility] )     Depression     Diabetes mellitus ([facility] )     GERD (gastroesophageal reflux disease)     Hypertension     Irritable bowel syndrome     Morbid obesity with BMI of 60 0-69 9, adult ([facility] )     Obesity     Psychiatric disorder     Psychiatric illness     Seasonal allergies     Sleep difficulties     Suicide attempt [facility])        Past Surgical History:   Procedure Laterality Date     SECTION  1992    CHOLECYSTECTOMY      WISDOM TOOTH EXTRACTION         Meds/Allergies:    PTA meds:   Prior to Admission Medications   Prescriptions Last Dose Informant Patient Reported? Taking?    ARIPiprazole (ABILIFY) 2 mg tablet   Yes No   Sig: Take 2 mg by mouth daily   DULoxetine (CYMBALTA) 30 mg delayed release capsule  Self Yes No   LORazepam (ATIVAN) 0 5 mg tablet  Self Yes No   Sig: Take by mouth every 8 (eight) hours as needed for anxiety   NovoLOG FlexPen 100 units/mL injection pen   Yes No   Siunits   clotrimazole (LOTRIMIN) 1 % cream  Self No No   Sig: Apply to affected area 2 times daily   Patient not taking: Reported on 2021   dicyclomine (BENTYL) 20 mg tablet Not Taking at Unknown time  No No   Sig: Take 1 tablet (20 mg total) by mouth 2 (two) times a day   Patient not taking: Reported on 2021   escitalopram (LEXAPRO) 10 mg tablet  Self No No   Sig: Take 3 tablets (30 mg total) by mouth daily for 30 days   hydrochlorothiazide (HYDRODIURIL) 12 5 mg tablet   No No   Sig: Take 1 tablet (12 5 mg total) by mouth every other day   insulin glargine (LANTUS) 100 units/mL subcutaneous injection   No No   Sig: Inject 40 Units under the skin daily at bedtime   Patient taking differently: Inject 64 Units under the skin daily at bedtime    lamoTRIgine (LaMICtal) 200 MG tablet  Self Yes No   Sig: Take 150 mg by mouth 2 (two) times a day    liraglutide (Victoza) injection   No No   Sig: Inject 1 8 MG daily  lisinopril (ZESTRIL) 10 mg tablet   No No   Sig: TAKE 1 TABLET BY MOUTH EVERY DAY   metFORMIN (GLUCOPHAGE) 500 mg tablet   No No   Sig: Take 1 tablet (500 mg total) by mouth 2 (two) times a day with meals   nystatin (MYCOSTATIN) 500,000 units/5 mL suspension   No No   Sig: Apply 5 mL (500,000 Units total) to the mouth or throat 4 (four) times a day   Patient not taking: Reported on 7/29/2021   ondansetron (ZOFRAN-ODT) 4 mg disintegrating tablet   No No   Sig: Take 1 tablet (4 mg total) by mouth every 8 (eight) hours as needed for nausea or vomiting   pantoprazole (PROTONIX) 40 mg tablet Not Taking at Unknown time  No No   Sig: TAKE 1 TABLET BY MOUTH EVERY DAY   Patient not taking: Reported on 7/30/2021   sucralfate (CARAFATE) 1 g/10 mL suspension Not Taking at Unknown time  No No   Sig: Take 10 mL (1 g total) by mouth 4 (four) times a day   Patient not taking: Reported on 7/29/2021      Facility-Administered Medications: None       Allergies:    Allergies   Allergen Reactions    Cephalexin Vomiting     Other reaction(s): Nausea and/or vomiting    Insulin Degludec GI Intolerance    Sulfamethoxazole-Trimethoprim Vomiting     Other reaction(s): Nausea and/or vomiting       Social History:     Marital Status:     Substance Use History:   Social History     Substance and Sexual Activity   Alcohol Use Yes    Comment: occassionaly      Social History     Tobacco Use   Smoking Status Never Smoker   Smokeless Tobacco Never Used     Social History     Substance and Sexual Activity   Drug Use No       Family History:    Family History   Problem Relation Age of Onset    Diabetes Mother     Hypertension Father        Physical Exam:     Vitals:   Blood Pressure: (!) 172/96 (07/31/21 0744)  Pulse: (!) 107 (07/31/21 0744)  Temperature: 97 5 °F (36 4 °C) (07/31/21 0744)  Temp Source: Temporal (07/31/21 0744)  Respirations: 16 (07/31/21 2821)  Height: 5' (152 4 cm) (07/30/21 2056)  Weight - Scale: (!) 152 kg (335 lb) (07/30/21 2056)  SpO2: 99 % (07/31/21 0744)    Physical Exam  Vitals reviewed  Constitutional:       General: She is not in acute distress  Appearance: Normal appearance  She is obese  She is not ill-appearing or diaphoretic  HENT:      Head: Normocephalic and atraumatic  Nose: Nose normal  No rhinorrhea  Mouth/Throat:      Mouth: Mucous membranes are moist       Pharynx: Oropharynx is clear  Eyes:      General: No scleral icterus  Extraocular Movements: Extraocular movements intact  Conjunctiva/sclera: Conjunctivae normal       Pupils: Pupils are equal, round, and reactive to light  Cardiovascular:      Rate and Rhythm: Normal rate and regular rhythm  Pulses: Normal pulses  Heart sounds: Normal heart sounds  No murmur heard  No friction rub  No gallop  Pulmonary:      Effort: Pulmonary effort is normal  No respiratory distress  Breath sounds: No wheezing, rhonchi or rales  Abdominal:      General: Abdomen is flat  Bowel sounds are normal  There is no distension  Palpations: Abdomen is soft  Tenderness: There is no abdominal tenderness  There is no guarding  Musculoskeletal:         General: No swelling  Normal range of motion  Cervical back: Normal range of motion  Right lower leg: No edema  Left lower leg: No edema  Skin:     General: Skin is warm and dry  Capillary Refill: Capillary refill takes less than 2 seconds  Neurological:      General: No focal deficit present  Mental Status: She is alert and oriented to person, place, and time  Mental status is at baseline  Sensory: No sensory deficit  Psychiatric:         Attention and Perception: Attention normal          Mood and Affect: Mood is anxious and depressed  Affect is tearful  Speech: Speech normal          Behavior: Behavior is withdrawn  Behavior is cooperative  Additional Data:     Lab Results: I have personally reviewed pertinent reports  Results from last 7 days   Lab Units 07/29/21  2141   WBC Thousand/uL 8 91   HEMOGLOBIN g/dL 13 1   HEMATOCRIT % 43 0   PLATELETS Thousands/uL 331   NEUTROS PCT % 65   LYMPHS PCT % 25   MONOS PCT % 7   EOS PCT % 2     Results from last 7 days   Lab Units 07/29/21  2141   SODIUM mmol/L 138   POTASSIUM mmol/L 4 1   CHLORIDE mmol/L 101   CO2 mmol/L 24   BUN mg/dL 18   CREATININE mg/dL 0 91   ANION GAP mmol/L 13   CALCIUM mg/dL 9 4   ALBUMIN g/dL 3 9   TOTAL BILIRUBIN mg/dL 0 39   ALK PHOS U/L 86   ALT U/L 75   AST U/L 38   GLUCOSE RANDOM mg/dL 169*             Lab Results   Component Value Date/Time    HGBA1C 8 0 (H) 08/14/2020 08:04 AM     Results from last 7 days   Lab Units 07/31/21  0748 07/30/21  2150 07/30/21  1619 07/29/21  2133   POC GLUCOSE mg/dl 161* 276* 223* 158*       EKG, Pathology, and Other Studies Reviewed on Admission:   · EKG 07/30/2021-ED documentation (Dr Yamini Yuan):  Sinus tachycardia  No ectopy  ST segments normal   T-waves inverted in lead III  · EKG 06/30/2021:  "Sinus tachycardia, possible left atrial enlargement  Borderline EKG  When compared to EKG 09/20/2019, no significant change found "  (Confirmed by Rossana Lizarraga)  · QT//458 ms    ** Please Note: This note has been constructed using a voice recognition system   **

## 2021-07-31 NOTE — PLAN OF CARE
Problem: Risk for Self Injury/Neglect  Goal: Treatment Goal: Remain safe during length of stay, learn and adopt new coping skills, and be free of self-injurious ideation, impulses and acts at the time of discharge  Outcome: Progressing  Goal: Verbalize thoughts and feelings  Description: Interventions:  - Assess and re-assess patient's lethality and potential for self-injury  - Engage patient in 1:1 interactions, daily, for a minimum of 15 minutes  - Encourage patient to express feelings, fears, frustrations, hopes  - Establish rapport/trust with patient   Outcome: Progressing     Problem: Depression  Goal: Treatment Goal: Demonstrate behavioral control of depressive symptoms, verbalize feelings of improved mood/affect, and adopt new coping skills prior to discharge  Outcome: Progressing     Problem: Anxiety  Goal: Anxiety is at manageable level  Description: Interventions:  - Assess and monitor patient's anxiety level  - Monitor for signs and symptoms (heart palpitations, chest pain, shortness of breath, headaches, nausea, feeling jumpy, restlessness, irritable, apprehensive)  - Collaborate with interdisciplinary team and initiate plan and interventions as ordered    - [place] patient to unit/surroundings  - Explain treatment plan  - Encourage participation in care  - Encourage verbalization of concerns/fears  - Identify coping mechanisms  - Assist in developing anxiety-reducing skills  - Administer/offer alternative therapies  - Limit or eliminate stimulants  Outcome: Progressing     Problem: Ineffective Coping  Goal: Participates in unit activities  Description: Interventions:  - Provide therapeutic environment   - Provide required programming   - Redirect inappropriate behaviors   Outcome: Progressing     Problem: DISCHARGE PLANNING  Goal: Discharge to home or other facility with appropriate resources  Description: INTERVENTIONS:  - Identify barriers to discharge w/patient and caregiver  - Arrange for needed discharge resources and transportation as appropriate  - Identify discharge learning needs (meds, wound care, etc )  - Arrange for interpretive services to assist at discharge as needed  - Refer to Case Management Department for coordinating discharge planning if the patient needs post-hospital services based on physician/advanced practitioner order or complex needs related to functional status, cognitive ability, or social support system  Outcome: Progressing

## 2021-07-31 NOTE — TREATMENT PLAN
TREATMENT PLAN REVIEW - [facility] [facility] 48 y o  1975 female MRN: [id number]    [address] Room / Bed: Neisha Cash [facility] 905-81 Encounter: [id number]        Admit Date/Time:  7/30/2021  8:56 PM    Treatment Team: Attending Provider: Phillip Sánchez MD; Consulting Physician: Melissa Owen PA-C; Patient Care Assistant: Blossom Elizondo;  Patient Care Technician: Maurizio Sosa; Registered Nurse: Michael Smith RN    Diagnosis: Principal Problem:    Severe episode of recurrent major depressive disorder, without psychotic features ([facility] )  Active Problems:    Type 2 diabetes mellitus with complication, with long-term current use of insulin ([facility] )    Medical clearance for psychiatric admission    Essential hypertension    Irritable bowel syndrome with diarrhea    GERD (gastroesophageal reflux disease)    Class 3 severe obesity due to excess calories with body mass index (BMI) of 60 0 to 69 9 in adult [facility])    Primary osteoarthritis of both knees    Candidal intertrigo      Patient Strengths/Assets: average or above intelligence, capable of independent living, cooperative, communication skills, good past treatment response, interpersonal skills, patient is on a voluntary commitment, supportive family, work skills      Patient Barriers/Limitations: chronic mental illness, limited insight, marital conflict    Short Term Goals: decrease in depressive symptoms, decrease in frequency of self abusive behaviors, ability to stay safe on the unit, ability to stay free from restraints, improvement in impulse control, sleep improvement, increase in group attendance, increase in group participation, increase in socialization with peers on the unit    Long Term Goals: improvement in depression, improvement in anxiety, free of suicidal thoughts, improved impulse control, improved insight, no agitation on the unit, no aggressive behavior on the unit, adequate self care, adequate sleep, appropriate interaction with peers, appropriate interaction with family    Progress Towards Goals: starting psychiatric medications as prescribed    Recommended Treatment: medication management, patient medication education, group therapy, milieu therapy, continued Behavioral Health psychiatric evaluation/assessment process     Treatment Frequency: daily medication monitoring, group and milieu therapy daily, monitoring through interdisciplinary rounds, monitoring through weekly patient care conferences    Expected Discharge Date: 5 days - 8/5/2021    Discharge Plan: discharge to home, referral for outpatient medication management with a psychiatrist, referral for outpatient psychotherapy, referrals as indicated    Treatment Plan Created/Updated By: Stan Ma MD

## 2021-07-31 NOTE — PROGRESS NOTES
Progress Note - Behavioral Health     Harish Cooper 48 y o  female MRN: [id number]  Unit/Bed#: [facility] 120-51 Encounter: [id number]    Harish Cooper was seen for continuing care and reviewed with treatment team   Pt is here s/p depression with OD on Ativan (with a completely negative UDS)  She presently reports "I have some anxiety, my hands are shaking, but I feel better depression-wise    Pt reports anxiety level is the same  She goes on to say that I do not even know why I did that--in reference to her OD on Ativan, but states it was Strictly on impulse "  She presently denies SI, HI, or psychotic Sxs  She reported sleeping and eating adequately but felt dizzy --she thinks due to not drinking enough water and partly due to Hydroxyzine (I encouraged her to do so and encouraged her to get up slowly from lying and seated positions )  She requested an HIV test because her fiance was cheating with someone whom she believes had an exposure  Floor team relayed that Pt has been generally calm and medication compliant but hygiene was not very good  She has been mainly isolative to her room  She signed a 72hour notice 7/31/2021 at 1:00PM and I encouraged her stay as long for the purpose of fully managing her Sxs in light of reason for admission and she is giving consideration to staying longer for her health  Trazodone 100mg helps at night for sleep but can be oversedating in the day at that dose  I encouraged Pt to use the 25mg dose during the day       Sleep:Adequate  Appetite: Adequate  Medication side effects: As per HPI    ROS: As per HPI, (All else negative)    Labs/Tests: Reviewed    Mental Status Evaluation:  Appearance:  Dressed, disheveled, impaired hygiene, good eye contact   Behavior:  Calm, cooperative, pleasant   Speech:  Clear, normal rate and volume   Mood:  Anxious, Less depressed   Affect:  Constricted   Thought Process:  Organized, Goal directed, Ruminative   Associations: Intact associations   Thought Content:  No verbalized delusions   Perceptual Disturbances: Pt denies any hallucinations or paranoia and does not appear to be responding to internal stimuli   Risk Potential: Pt presently denies SI or HI    Sensorium:  Self, birthday, Place, Day of the week, Month, Year   Memory:  short term memory grossly intact   Consciousness:  alert, awake   Attention: Good   Insight:  Limited   Judgment: Limited   Gait/Station: Normal gait/station   Motor Activity: Mild tremors of hands CADENCE     Vitals:    07/31/21 1458 07/31/21 1559 08/01/21 0745 08/01/21 1008   BP: 143/68 130/70 148/68    BP Location: Left arm Right arm Left arm    Pulse: 97 102 93    Resp: 18 16 18    Temp:  98 4 °F (36 9 °C) 98 °F (36 7 °C)    TempSrc:  Temporal Temporal    SpO2:  98% 94%    Weight:    (!) 151 kg (332 lb 6 4 oz)   Height:           Admission on 07/30/2021   Component Date Value    POC Glucose 07/30/2021 276*    Cholesterol 07/31/2021 202*    Triglycerides 07/31/2021 86     HDL, Direct 07/31/2021 44     LDL Calculated 07/31/2021 141*    Non-HDL-Chol (CHOL-HDL) 07/31/2021 158     TSH 3RD GENERATON 07/31/2021 1 630     POC Glucose 07/31/2021 161*    POC Glucose 07/31/2021 115     POC Glucose 07/31/2021 194*    POC Glucose 07/31/2021 141*    POC Glucose 08/01/2021 137           Most Recent Labs:   Lab Results   Component Value Date    WBC 8 91 07/29/2021    RBC 5 05 07/29/2021    HGB 13 1 07/29/2021    HCT 43 0 07/29/2021     07/29/2021    RDW 15 6 (H) 07/29/2021    NEUTROABS 5 85 07/29/2021    SODIUM 138 07/29/2021    K 4 1 07/29/2021     07/29/2021    CO2 24 07/29/2021    BUN 18 07/29/2021    CREATININE 0 91 07/29/2021    GLUC 169 (H) 07/29/2021    GLUF 170 (H) 08/14/2020    CALCIUM 9 4 07/29/2021    AST 38 07/29/2021    ALT 75 07/29/2021    ALKPHOS 86 07/29/2021    TP 7 7 07/29/2021    ALB 3 9 07/29/2021    TBILI 0 39 07/29/2021    CHOLESTEROL 202 (H) 07/31/2021    HDL 44 07/31/2021    TRIG 86 07/31/2021    LDLCALC 141 (H) 07/31/2021    [place] 158 07/31/2021    DEO6TDEFRYDO 1 630 07/31/2021    PREGSERUM Negative 07/29/2021    HGBA1C 8 0 (H) 08/14/2020     08/14/2020     COVID19:   Lab Results   Component Value Date    SARSCOV2 Negative 07/29/2021       Magnesium   Lab Results   Component Value Date    MG 1 6 06/30/2021     Lipase   Lab Results   Component Value Date    LIPASE 119 06/30/2021     Urinalysis   Lab Results   Component Value Date    COLORU Yellow 06/30/2021    CLARITYU Clear 06/30/2021    SPECGRAV >=1 030 06/30/2021    PHUR 6 0 06/30/2021    LEUKOCYTESUR Negative 06/30/2021    NITRITE Negative 06/30/2021    PROTEIN UA Trace (A) 06/30/2021    GLUCOSEU Negative 06/30/2021    KETONESU Negative 06/30/2021    UROBILINOGEN 0 2 06/30/2021    BILIRUBINUR Negative 06/30/2021    BLOODU Negative 06/30/2021    RBCUA 0-1 06/30/2021    WBCUA 4-10 (A) 06/30/2021    EPIS Moderate (A) 06/30/2021    BACTERIA Occasional 06/30/2021     Urine Culture   Lab Results   Component Value Date    URINECX 10,000-19,000 cfu/ml  06/30/2021     EKG   Lab Results   Component Value Date    VENTRATE 102 06/30/2021    ATRIALRATE 102 06/30/2021    PRINT 146 06/30/2021    QRSDINT 76 06/30/2021    QTINT 352 06/30/2021    QTCINT 458 06/30/2021    PAXIS 59 06/30/2021    QRSAXIS 18 06/30/2021    TWAVEAXIS 55 06/30/2021     Cardiac Profile   Lab Results   Component Value Date    TROPONINI <0 03 06/30/2021         Imaging Studies: 6/30/2021 CXR No acute cardiopulmonary disease    Progress Toward Goals:  Based on today's interview and review of prior notes, No major change over the weekend  Insight is limited and historical circumstances a bit contradictory  It concerns me to get an HIV test if she is not in a stable enough state of mind to handle the potential results  I will increase Aripiprazole to 5mg qhs for improved mood management  I encouraged use of the 25mg dose of Hydroxyzine for anxiety in the daytime to mitigate SE of sedation  Assessment/Plan   Principal Problem:    Severe episode of recurrent major depressive disorder, without psychotic features ([address] )  Active Problems:    Type 2 diabetes mellitus with complication, with long-term current use of insulin ([facility])    Medical clearance for psychiatric admission    Essential hypertension    Irritable bowel syndrome with diarrhea    GERD (gastroesophageal reflux disease)    Class 3 severe obesity due to excess calories with body mass index (BMI) of 60 0 to 69 9 in adult [facility])    Primary osteoarthritis of both knees    Candidal intertrigo      Recommended Treatment: Continue with pharmacotherapy, group therapy, milieu therapy and occupational therapy    The patient will be maintained on the following medications:  Current Facility-Administered Medications   Medication Dose Route Frequency Provider Last Rate    aluminum-magnesium hydroxide-simethicone  30 mL Oral Q4H PRN SUJATA Cummings      [START ON 8/2/2021] ARIPiprazole  5 mg Oral Daily Margareth Luong PA-C      benztropine  1 mg Intramuscular Q4H PRN Max 6/day SUJATA Cummings      benztropine  1 mg Oral Q4H PRN Max 6/day SUJATA Cummings      hydrOXYzine HCL  50 mg Oral Q6H PRN Max 4/day SUJATA Cummings      Or    diphenhydrAMINE  50 mg Intramuscular Q6H PRN SUJATA Tate      escitalopram  20 mg Oral Daily Trinity Marie MD      hydrochlorothiazide  12 5 mg Oral Every Other Day Maryandrew Allen PA-C      hydrOXYzine HCL  100 mg Oral Q6H PRN Max 4/day SUJATA Cummings      Or    LORazepam  2 mg Intramuscular Q6H PRN SUJATA Tate      hydrOXYzine HCL  25 mg Oral Q6H PRN Max 4/day SUJATA Cummings      ibuprofen  600 mg Oral Q6H PRN Mary DefranciBRYNN starks      ibuprofen  800 mg Oral Q8H PRN Mary Defranciraudel PA-C      insulin glargine  64 Units Subcutaneous HS Mary Defrancisco, PA-C      insulin lispro  1-6 Units Subcutaneous HS Mary CAROL Allen-C      insulin lispro  16 Units Subcutaneous Daily With Breakfast Mary Alejandro PA-C      insulin lispro  16 Units Subcutaneous Daily With [employer] CAROL Allen-CANDACE      insulin lispro  16 Units Subcutaneous Daily With [employer] CAROL Allen-CANDACE      insulin lispro  2-12 Units Subcutaneous TID AC Mary CAROL Allen-C      lamoTRIgine  150 mg Oral BID Shanta Bhagat MD      liraglutide  1 8 mg Subcutaneous Daily Mary BRYNN Allen      lisinopril  10 mg Oral Daily Mary CADENCE AllenC      metFORMIN  500 mg Oral BID With Meals Marymarilynn Allen PA-C      nystatin   Topical BID Mary CAROL Allen-C      OLANZapine  10 mg Oral Q3H PRN Max 3/day SUJATA Cummings      Or    OLANZapine  10 mg Intramuscular Q3H PRN Max 3/day SUJATA Cummings      OLANZapine  5 mg Oral Q3H PRN Max 6/day SUJATA Cummings      Or    OLANZapine  5 mg Intramuscular Q3H PRN Max 6/day SUJATA Cummings      OLANZapine  2 5 mg Oral Q3H PRN Max 8/day SUJATA Cummings      ondansetron  4 mg Oral Q6H PRN CAROL Diamond-C      pantoprazole  40 mg Oral Daily Mary CAROL Allen-C      senna-docusate sodium  1 tablet Oral Daily PRN SUJATA Cummings      traZODone  50 mg Oral HS PRN SUJATA Cummings         Risks, benefits and possible side effects of Medications:   Risks, benefits, and possible side effects of medications explained to patient and patient verbalizes understanding and Risks of medications explained if female patient   Patient verbalizes understanding and agrees to notify her doctor if she becomes pregnant

## 2021-07-31 NOTE — ASSESSMENT & PLAN NOTE
Lab Results   Component Value Date    HGBA1C 8 0 (H) 08/14/2020       Recent Labs     07/29/21  2133 07/30/21  1619 07/30/21  2150   POCGLU 158* 223* 276*       Blood Sugar Average: Last 72 hrs:  (P) 276     · Patient's outpatient regimen as follows:  · Metformin 500 mg BID  · Lantus 64 units QHS  · Humalog 16 units TID with meals  · Victoza 1 8 mg injection daily  · Continue outpatient regimen  · accu-cheks AC/HS, SSI  · Hypoglycemia protocol  · Diabetic diet

## 2021-07-31 NOTE — H&P
Psychiatric Evaluation - Behavioral Health     Identification Data:Montse Ward 48 y o  female MRN: [id number]  Unit/Bed#: [facility] 376-02 Encounter: [id number]    Chief Complaint: "I had a meltdown"    History of Present Illness     Tesha Monroy is a 48 y o  female with a history of major depressive disorder without psychotic features who presents after intentional overdose of Ativan  As per records, patient took an estimated 17mg of her Ativan tablets, however, patient's urine drug screen was negative for substances including benzodiazepines  As per records, patient did this in the presence of her boyfriend/fiance during a fight that they were having  Patient is currently on a voluntary 201 commitment  During the interview, patient reports that her ex-fiance and her split up 6-7 weeks ago, and her fiance was dating another woman  She states, we got back together little while ago and then the last few nights, he did not end up coming home at night, so I ended up getting really angry after a fight with him and then I took my lorazepam in front of him and he ended up calling the     Patient admits that this was an impulsive attempt step, stating that she was angry about his behavior and being unclear about her fiance's behavior and being unclear whether or not she and her fiance were still in a relationship patient describes her mood as doing a little better until I got angry with him and reports that Abilify was added to her medication regimen, and states, I have not felt this good in a long time, and I feel like the Abilify has been really helpful for me  Patient reports that she has been feeling hopeful and positive  She does note decreased sleep over the last few weeks and notes that she has been getting 4-5 hours of sleep at night  She does report doing "ok" with her motivation and has been pushing herself to clean up around her house, spend time with her family, and spend time with friends    She reports decreased overall energy  She describes her appetite as up and down and notes a 15 lb weight loss over the last 2-3 weeks  She reports doing well overall with her concentration  She denies SI/HI/AVH/delusions and denies any plan or intent to harm herself or others  She denies any manic symptoms including decreased need for sleep, increase in goal oriented activity, expansive or elated mood, rapid or pressured speech, or impulsivity  She reports feeling hopeful overall  She reports increase to her overall anxiety and states that she has racing thoughts which makes it difficult for her to fall asleep at night and makes her feel restless during the day  She denies any symptoms of OCD, or eating disorder  She denies any access to firearms at home  Psychiatric Review Of Systems:    Sleep changes: decreased  Appetite changes: "up and down"  Weight changes: weight loss 15 lb over last 2-3 weeks  Energy/anergy: decreased  Interest/pleasure/anhedonia: no change  Somatic symptoms: no  Anxiety/panic: worrying  Nita: no  Guilty/hopeless: no  Self injurious behavior/risky behavior: yes, Overdose on 17 mg of Ativan  Suicidal ideation: status post suicidal gesture by Overdose on 17 mg of Ativan  Homicidal ideation: no  Auditory hallucinations: no  Visual hallucinations: no  Other hallucinations: no  Delusional thinking: no  Eating disorder history: no  Obsessive/compulsive symptoms: no    Historical Information     Past Psychiatric History:     Past Inpatient Psychiatric Treatment:   Patient reports 5 total inpatient hospitalizations for mental health since the age of 14   She was last admitted for similar presentation 2 years ago at [facility]   Past Outpatient Psychiatric Treatment:    Patient sees a mental health provider, Debbie Mckinnon, at [facility]  Past Suicide Attempts: no  Past Violent Behavior: no  Past Psychiatric Medication Trials: Patient reports that she has been tried on Zoloft, Effexor, Abilify, Lamictal, Lexapro, and Cymbalta     Substance Abuse History:    Social History     Tobacco History     Smoking Status  Never Smoker    Smokeless Tobacco Use  Never Used          Alcohol History     Alcohol Use Status  Yes Comment  occassionaly           Drug Use     Drug Use Status  No          Sexual Activity     Sexually Active  Not Currently          Activities of Daily Living    Not Asked               Additional Substance Use Detail     Questions Responses    Problems Due to Past Use of Alcohol? No    Problems Due to Past Use of Substances?  No    Substance Use Assessment Denies substance use within the past 12 months    Alcohol Use Frequency Denies use in past 12 months    Cannabis frequency Never used    Comment: Never used on 7/30/2021     Heroin Frequency Denies use in past 12 months    Cocaine frequency Never used    Comment: Never used on 7/30/2021     Crack Cocaine Frequency Denies use in past 12 months    Methamphetamine Frequency Denies use in past 12 months    Narcotic Frequency Denies use in past 12 months    Benzodiazepine Frequency Denies use in past 12 months    Amphetamine frequency Denies use in past 12 months    Barbituate Frequency Denies use use in past 12 months    Inhalant frequency Never used    Comment: Never used on 7/30/2021     Hallucinogen frequency Never used    Comment: Never used on 7/30/2021     Ecstasy frequency Never used    Comment: Never used on 7/30/2021     Other drug frequency Never used    Comment: Never used on 7/30/2021     Opiate frequency Denies use in past 12 months    Last reviewed by Yazan Duarte RN on 7/30/2021        I have assessed this patient for substance use within the past 12 months    Alcohol use: Patient reports last having alcohol 1 week ago where she had 1 wine cooler and notes that she drinks alcohol occasionally"  Recreational drug use:   Cocaine:  denies use  Heroin:  denies use  Marijuana:  denies use  Other drugs: denies use Longest clean time: not applicable  History of Inpatient/Outpatient rehabilitation program: no  Smoking history: denies use  Use of caffeine: denies use and Patient reports drinking 1 iced coffee 2-3 times per week    Family Psychiatric History:     Psychiatric Illness:  Patient reports that her mother has a history of bipolar disorder, her daughter has history of bipolar disorder, and her father who is  had a history of anxiety and depression    Substance Abuse:  no family history of substance abuse  Suicide Attempts:  no family history of suicide attempts    Social History:    Education: Some college" completed high school  Learning Disabilities: none  Marital History: Was  from [id number], currently single  Children: 3 daughter 34years old  Living Arrangement: Patient lives on her own and notes that her ex-fiance comes to visit her occasionally  Occupational History: 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  Functioning Relationships: Mother and daughter are supportive  Legal History: none   History: None    Traumatic History:     Abuse: none  Other Traumatic Events: none     Past Medical History:    History of Seizures: no  History of Head injury with loss of consciousness: no    Past Medical History:   Diagnosis Date    Anemia     Anxiety     [facility] disorder ([facility] )     Depression     Diabetes mellitus ([place] [address] )     GERD (gastroesophageal reflux disease)     Hypertension     Irritable bowel syndrome     Morbid obesity with BMI of 60 0-69 9, adult ([place] [address] )     Obesity     Psychiatric disorder     Psychiatric illness     Seasonal allergies     Sleep difficulties     Suicide attempt [facility])      Past Surgical History:   Procedure Laterality Date     SECTION  1992    CHOLECYSTECTOMY      WISDOM TOOTH EXTRACTION         Medical Review Of Systems:    A comprehensive review of systems was negative  Allergies: Allergies   Allergen Reactions    Cephalexin Vomiting     Other reaction(s): Nausea and/or vomiting    Insulin Degludec GI Intolerance    Sulfamethoxazole-Trimethoprim Vomiting     Other reaction(s): Nausea and/or vomiting       Medications: All current active medications have been reviewed  Medications prior to admission:    Prior to Admission Medications   Prescriptions Last Dose Informant Patient Reported? Taking? ARIPiprazole (ABILIFY) 2 mg tablet   Yes No   Sig: Take 2 mg by mouth daily   DULoxetine (CYMBALTA) 30 mg delayed release capsule  Self Yes No   LORazepam (ATIVAN) 0 5 mg tablet  Self Yes No   Sig: Take by mouth every 8 (eight) hours as needed for anxiety   NovoLOG FlexPen 100 units/mL injection pen   Yes No   Siunits   clotrimazole (LOTRIMIN) 1 % cream  Self No No   Sig: Apply to affected area 2 times daily   Patient not taking: Reported on 2021   dicyclomine (BENTYL) 20 mg tablet Not Taking at Unknown time  No No   Sig: Take 1 tablet (20 mg total) by mouth 2 (two) times a day   Patient not taking: Reported on 2021   escitalopram (LEXAPRO) 10 mg tablet  Self No No   Sig: Take 3 tablets (30 mg total) by mouth daily for 30 days   hydrochlorothiazide (HYDRODIURIL) 12 5 mg tablet   No No   Sig: Take 1 tablet (12 5 mg total) by mouth every other day   insulin glargine (LANTUS) 100 units/mL subcutaneous injection   No No   Sig: Inject 40 Units under the skin daily at bedtime   Patient taking differently: Inject 64 Units under the skin daily at bedtime    lamoTRIgine (LaMICtal) 200 MG tablet  Self Yes No   Sig: Take 150 mg by mouth 2 (two) times a day    liraglutide (Victoza) injection   No No   Sig: Inject 1 8 MG daily     lisinopril (ZESTRIL) 10 mg tablet   No No   Sig: TAKE 1 TABLET BY MOUTH EVERY DAY   metFORMIN (GLUCOPHAGE) 500 mg tablet   No No   Sig: Take 1 tablet (500 mg total) by mouth 2 (two) times a day with meals   nystatin (MYCOSTATIN) 500,000 units/5 mL suspension   No No   Sig: Apply 5 mL (500,000 Units total) to the mouth or throat 4 (four) times a day   Patient not taking: Reported on 7/29/2021   ondansetron (ZOFRAN-ODT) 4 mg disintegrating tablet   No No   Sig: Take 1 tablet (4 mg total) by mouth every 8 (eight) hours as needed for nausea or vomiting   pantoprazole (PROTONIX) 40 mg tablet Not Taking at Unknown time  No No   Sig: TAKE 1 TABLET BY MOUTH EVERY DAY   Patient not taking: Reported on 7/30/2021   sucralfate (CARAFATE) 1 g/10 mL suspension Not Taking at Unknown time  No No   Sig: Take 10 mL (1 g total) by mouth 4 (four) times a day   Patient not taking: Reported on 7/29/2021      Facility-Administered Medications: None       OBJECTIVE:    Vital signs in last 24 hours:    Temp:  [97 5 °F (36 4 °C)-98 °F (36 7 °C)] 97 5 °F (36 4 °C)  HR:  [] 107  Resp:  [16-20] 16  BP: (131-187)/(60-96) 172/96    No intake or output data in the 24 hours ending 07/31/21 1121     Mental Status Evaluation:    Appearance:  disheveled,  female, no acute distress   Behavior:  cooperative, Anxious appearing, good eye contact   Speech:  clear, coherent, Soft at times, spontaneous   Mood:  Was doing better angry   Affect:  constricted, anxious   Language: naming objects   Thought Process:  organized, logical, goal directed   Associations: intact associations   Thought Content:  normal, no overt delusions   Perceptual Disturbances: denies auditory hallucinations when asked, does not appear responding to internal stimuli   Risk Potential: Suicidal ideation - status post suicidal gesture by Overdose on 17 mg of Ativan  Homicidal ideation - None  Potential for aggression - No   Sensorium:  oriented to person, place and time/date   Memory:  recent and remote memory grossly intact   Consciousness:  alert and awake   Attention/Concentration: attention span and concentration are age appropriate   Intellect: within normal limits   Fund of Knowledge: awareness of current events: yes   Insight:  limited   Judgment: limited   Muscle Strength Muscle Tone: normal  normal   Gait/Station: normal gait/station   Motor Activity: no abnormal movements       Laboratory Results: I have personally reviewed all pertinent laboratory/tests results    Most Recent Labs:   Lab Results   Component Value Date    WBC 8 91 07/29/2021    RBC 5 05 07/29/2021    HGB 13 1 07/29/2021    HCT 43 0 07/29/2021     07/29/2021    RDW 15 6 (H) 07/29/2021    NEUTROABS 5 85 07/29/2021    SODIUM 138 07/29/2021    K 4 1 07/29/2021     07/29/2021    CO2 24 07/29/2021    BUN 18 07/29/2021    CREATININE 0 91 07/29/2021    GLUC 169 (H) 07/29/2021    CALCIUM 9 4 07/29/2021    AST 38 07/29/2021    ALT 75 07/29/2021    ALKPHOS 86 07/29/2021    TP 7 7 07/29/2021    ALB 3 9 07/29/2021    TBILI 0 39 07/29/2021    CHOLESTEROL 202 (H) 07/31/2021    HDL 44 07/31/2021    TRIG 86 07/31/2021    LDLCALC 141 (H) 07/31/2021    NONHDLC 158 07/31/2021    CHL9ORGLPOUC 1 630 07/31/2021    PREGUR negative 06/30/2021    PREGSERUM Negative 07/29/2021    HGBA1C 8 0 (H) 08/14/2020     08/14/2020       Imaging Studies: No results found      Code Status: Level 1 - Full Code  Advance Directive and Living Will: <no information>    Suicide/Homicide Risk Assessment:    Risk of Harm to Self:   The following ratings are based on assessment at the time of the interview  Nursing Suicide Risk Assessment Last 24 hours: C-SSRS Risk (Since Last Contact)  Calculated C-SSRS Risk Score (Since Last Contact): No Risk Indicated  Demographic risk factors include:   Historical Risk Factors include: chronic psychiatric problems, history of anxiety, history of mood disorder  Current Specific Risk Factors include: recent suicidal gesture, diagnosis of depression, current anxiety symptoms  Protective Factors: no current suicidal plan or intent, ability to communicate with staff on the unit, able to contract for safety on the unit, taking medications as ordered on the unit, ability to make plans for the future, stable job, responsibilities and duties to others, supportive family  Weapons/Firearms: none  The following steps have been taken to ensure weapons are properly secured: not applicable  Based on today's Vicky Ruiz presents the following risk of harm to self: minimal    Risk of Harm to Others: The following ratings are based on assessment at the time of the interview  Nursing Homicide Risk Assessment: Violence Risk to Others: Denies within past 6 months  Demographic Risk Factors include: none  Historical Risk Factors include: none  Current Specific Risk Factors include: poor impulse control, diagnosis of mood disorder, multiple stressors, risk taking  Protective Factors: no current homicidal ideation, able to communicate with staff on the unit, compliant with medications on the unit as ordered, compliant with unit milieu, follows staff redirection, no current psychotic symptoms, no current substance use problems, supportive family, restricted access to lethal means  Based on today's assessment, Tom Humble presents the following risk of harm to others: minimal    The following interventions are recommended: behavioral checks every 7 minutes, continued hospitalization on locked unit     Assessment/Plan   Principal Problem:    Severe episode of recurrent major depressive disorder, without psychotic features ([facility] )  Active Problems:    Type 2 diabetes mellitus with complication, with long-term current use of insulin ([facility] )    Medical clearance for psychiatric admission    Essential hypertension    Irritable bowel syndrome with diarrhea    GERD (gastroesophageal reflux disease)    Class 3 severe obesity due to excess calories with body mass index (BMI) of 60 0 to 69 9 in adult [facility])    Primary osteoarthritis of both knees    Candidal intertrigo      Treatment Plan:     Planned Treatment and Medication Changes:     All current active medications have been reviewed  Encourage group therapy, milieu therapy and occupational therapy  Behavioral Health checks every 7 minutes    1) Restart home medications:  -Lexapro 20mg daily for mood  -Abilify 2mg daily as mood adjunct  -Lamictal 150mg BID for mood (as patient did receive a 300mg dose in ED last night and has been medication adherent; patient agrees to continue with twice a day dosing)  -D/C Cymbalta for now due to risk of serotonin syndrome (patient is in agreement with plan)  2) Encourage groups  3) Encourage patient to stay visible on the unit  4) Encourage patient to communicate thoughts and feelings with nursing/staff    Current Facility-Administered Medications   Medication Dose Route Frequency Provider Last Rate    aluminum-magnesium hydroxide-simethicone  30 mL Oral Q4H PRN Bessie Cha, CRNP      ARIPiprazole  2 mg Oral Daily Vincente Olszewski, MD      benztropine  1 mg Intramuscular Q4H PRN Max 6/day Bessie Cha, CRNP      benztropine  1 mg Oral Q4H PRN Max 6/day Bessie Cha, CRNP      hydrOXYzine HCL  50 mg Oral Q6H PRN Max 4/day Bessie Cha, CRNP      Or    diphenhydrAMINE  50 mg Intramuscular Q6H PRN Manya Kanner Lodics, MINNANP      escitalopram  20 mg Oral Daily Vincente Olszewski, MD      hydrochlorothiazide  12 5 mg Oral Every Other Day Mary Lesleyranciscjose alberto PA-C      hydrOXYzine HCL  100 mg Oral Q6H PRN Max 4/day Bessie Cha, CRNP      Or    LORazepam  2 mg Intramuscular Q6H PRN Bessie Cha, CRNP      hydrOXYzine HCL  25 mg Oral Q6H PRN Max 4/day Bessie Cha, CRNP      ibuprofen  600 mg Oral Q6H PRN Mary Defrancisco, PA-C      ibuprofen  800 mg Oral Q8H PRN Mary Defrancisco, PA-C      insulin glargine  64 Units Subcutaneous HS Mary Defrancisco, PA-C      insulin lispro  1-6 Units Subcutaneous HS Mary Defrancisco, PA-C      insulin lispro  16 Units Subcutaneous Daily With Breakfast Mary Defrancisco, PA-C      insulin lispro  16 Units Subcutaneous Daily With Yohannesashley Allen PA-C      insulin lispro  16 Units Subcutaneous Daily With ADAM Saba BRYNN Allen      insulin lispro  2-12 Units Subcutaneous TID AC Mary BRYNN Allen      lamoTRIgine  150 mg Oral BID Aden Lezama MD      liraglutide  1 8 mg Subcutaneous Daily Mary BRYNN Allen      lisinopril  10 mg Oral Daily Mary BRYNN Allen      metFORMIN  500 mg Oral BID With Meals Mary Allen PA-C      nystatin   Topical BID Mary BRYNN Allen      OLANZapine  10 mg Oral Q3H PRN Max 3/day SUJATA Cummings      Or    OLANZapine  10 mg Intramuscular Q3H PRN Max 3/day SUJATA Cummings      OLANZapine  5 mg Oral Q3H PRN Max 6/day SUJATA Cummings      Or    OLANZapine  5 mg Intramuscular Q3H PRN Max 6/day SUJATA Cummings      OLANZapine  2 5 mg Oral Q3H PRN Max 8/day SUJATA Cummings      ondansetron  4 mg Oral Q6H PRN Mary Allen PA-C      pantoprazole  40 mg Oral Daily Mary Allen PA-C      senna-docusate sodium  1 tablet Oral Daily PRN SUJATA Cummings      traZODone  50 mg Oral HS PRN SUJATA Cummings       Risks / Benefits of Treatment:    Risks, benefits, and possible side effects of medications explained to patient and patient verbalizes understanding and agreement for treatment  Counseling / Coordination of Care: Total floor / unit time spent today 45 minutes  Greater than 50% of total time was spent with the patient and / or family counseling and / or coordination of care  A description of the counseling / coordination of care:   Patient's presentation on admission and proposed treatment plan discussed with treatment team   Diagnosis, medication changes and treatment plan reviewed with patient  Coping with relationship issues and stress discussed with patient  Supportive therapy provided to patient      Inpatient Psychiatric Certification:    Estimated length of stay: 5 midnights    Based upon physical, mental and social evaluations, I certify that inpatient psychiatric services are medically necessary for this patient for a duration of 5 midnights for the treatment of Severe episode of recurrent major depressive disorder, without psychotic features ([facility] )    Available alternative community resources do not meet the patient's mental health care needs  I further attest that an established written individualized plan of care has been implemented and is outlined in the patient's medical records      Aden Lezama MD 07/31/21

## 2021-07-31 NOTE — NURSING NOTE
Per ER note:  Patient presents via EMS after intentional overdose of Ativan  Patient states she was feeling depressed, recent break-up with her fiance  She took an estimated 17 mg of Ativan at 7:30 p m  Padmini Guzman She denies co ingestants  She reports suicidal ideation  History of depression and anxiety  Denies any previous suicide attempt  Per this writer:  Pt adm as a 10 [facility] [address] [facility] after she took " 17 pills of my Ativan because my ex boyfriend started to see another woman  " Pt's UDS was negative but insists she took 17 pills of Ativan  Pt stated she realized that she made a big mistake after she took the pills  Pt stated that was her first suicidal attempt and denies SI/HI and AVH on adm  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

## 2021-07-31 NOTE — ASSESSMENT & PLAN NOTE
· Patient follows with [facility] rheumatology  · Most recent visit 7/26/2021, received steroid injections  · Reports she takes ibuprofen PRN for knee pain as outpatient  · Continue ibuprofen PRN  · Recommend routine f/u with rheumatology outpatient for ongoing monitoring/maintenance

## 2021-07-31 NOTE — ASSESSMENT & PLAN NOTE
· BP upon arrival to unit 187/84  · Outpatient regimen includes HCTZ 12 5 mg daily and lisinopril 10 mg daily  · Continue during admission  · Continue to monitor vitals, BP

## 2021-07-31 NOTE — PLAN OF CARE
Problem: Risk for Self Injury/Neglect  Goal: Treatment Goal: Remain safe during length of stay, learn and adopt new coping skills, and be free of self-injurious ideation, impulses and acts at the time of discharge  Outcome: Not Progressing  Goal: Verbalize thoughts and feelings  Description: Interventions:  - Assess and re-assess patient's lethality and potential for self-injury  - Engage patient in 1:1 interactions, daily, for a minimum of 15 minutes  - Encourage patient to express feelings, fears, frustrations, hopes  - Establish rapport/trust with patient   Outcome: Not Progressing     Problem: Depression  Goal: Treatment Goal: Demonstrate behavioral control of depressive symptoms, verbalize feelings of improved mood/affect, and adopt new coping skills prior to discharge  Outcome: Not Progressing     Problem: Anxiety  Goal: Anxiety is at manageable level  Description: Interventions:  - Assess and monitor patient's anxiety level  - Monitor for signs and symptoms (heart palpitations, chest pain, shortness of breath, headaches, nausea, feeling jumpy, restlessness, irritable, apprehensive)  - Collaborate with interdisciplinary team and initiate plan and interventions as ordered    - [place] patient to unit/surroundings  - Explain treatment plan  - Encourage participation in care  - Encourage verbalization of concerns/fears  - Identify coping mechanisms  - Assist in developing anxiety-reducing skills  - Administer/offer alternative therapies  - Limit or eliminate stimulants  Outcome: Not Progressing     Problem: DISCHARGE PLANNING  Goal: Discharge to home or other facility with appropriate resources  Description: INTERVENTIONS:  - Identify barriers to discharge w/patient and caregiver  - Arrange for needed discharge resources and transportation as appropriate  - Identify discharge learning needs (meds, wound care, etc )  - Arrange for interpretive services to assist at discharge as needed  - Refer to Case Management Department for coordinating discharge planning if the patient needs post-hospital services based on physician/advanced practitioner order or complex needs related to functional status, cognitive ability, or social support system  Outcome: Not Progressing     Problem: Ineffective Coping  Goal: Participates in unit activities  Description: Interventions:  - Provide therapeutic environment   - Provide required programming   - Redirect inappropriate behaviors   Outcome: Not Progressing

## 2021-07-31 NOTE — NURSING NOTE
Patient denies SI, HI, AH, VH  She reports feeling anxious but states that her medications have been helping  Pt was focused on being d/c today and signed a 72 hour notice 7/31/21 @ 1:00 pm  Pleasant and cooperative  Mainly seclusive to her room sleeping  Out of room for needs  Will continue to monitor

## 2021-08-01 LAB
GLUCOSE SERPL-MCNC: 113 MG/DL (ref 65–140)
GLUCOSE SERPL-MCNC: 137 MG/DL (ref 65–140)
GLUCOSE SERPL-MCNC: 169 MG/DL (ref 65–140)
GLUCOSE SERPL-MCNC: 179 MG/DL (ref 65–140)

## 2021-08-01 PROCEDURE — 82948 REAGENT STRIP/BLOOD GLUCOSE: CPT

## 2021-08-01 PROCEDURE — 99232 SBSQ HOSP IP/OBS MODERATE 35: CPT | Performed by: PHYSICIAN ASSISTANT

## 2021-08-01 RX ORDER — ARIPIPRAZOLE 5 MG/1
5 TABLET ORAL DAILY
Status: DISCONTINUED | OUTPATIENT
Start: 2021-08-02 | End: 2021-08-03 | Stop reason: HOSPADM

## 2021-08-01 RX ADMIN — LISINOPRIL 10 MG: 10 TABLET ORAL at 09:18

## 2021-08-01 RX ADMIN — INSULIN LISPRO 16 UNITS: 100 INJECTION, SOLUTION INTRAVENOUS; SUBCUTANEOUS at 12:00

## 2021-08-01 RX ADMIN — INSULIN LISPRO 2 UNITS: 100 INJECTION, SOLUTION INTRAVENOUS; SUBCUTANEOUS at 17:00

## 2021-08-01 RX ADMIN — IBUPROFEN 800 MG: 400 TABLET ORAL at 16:11

## 2021-08-01 RX ADMIN — INSULIN GLARGINE 64 UNITS: 100 INJECTION, SOLUTION SUBCUTANEOUS at 21:33

## 2021-08-01 RX ADMIN — INSULIN LISPRO 16 UNITS: 100 INJECTION, SOLUTION INTRAVENOUS; SUBCUTANEOUS at 17:00

## 2021-08-01 RX ADMIN — LAMOTRIGINE 150 MG: 25 TABLET ORAL at 17:07

## 2021-08-01 RX ADMIN — METFORMIN HYDROCHLORIDE 500 MG: 500 TABLET ORAL at 08:00

## 2021-08-01 RX ADMIN — LAMOTRIGINE 150 MG: 25 TABLET ORAL at 09:18

## 2021-08-01 RX ADMIN — PANTOPRAZOLE SODIUM 40 MG: 40 TABLET, DELAYED RELEASE ORAL at 06:29

## 2021-08-01 RX ADMIN — IBUPROFEN 800 MG: 400 TABLET ORAL at 08:05

## 2021-08-01 RX ADMIN — METFORMIN HYDROCHLORIDE 500 MG: 500 TABLET ORAL at 17:08

## 2021-08-01 RX ADMIN — ESCITALOPRAM OXALATE 20 MG: 10 TABLET ORAL at 09:18

## 2021-08-01 RX ADMIN — ARIPIPRAZOLE 2 MG: 2 TABLET ORAL at 09:19

## 2021-08-01 RX ADMIN — INSULIN LISPRO 16 UNITS: 100 INJECTION, SOLUTION INTRAVENOUS; SUBCUTANEOUS at 08:00

## 2021-08-01 RX ADMIN — ONDANSETRON 4 MG: 4 TABLET, ORALLY DISINTEGRATING ORAL at 08:06

## 2021-08-01 RX ADMIN — HYDROXYZINE HYDROCHLORIDE 25 MG: 25 TABLET, FILM COATED ORAL at 23:00

## 2021-08-01 RX ADMIN — INSULIN LISPRO 2 UNITS: 100 INJECTION, SOLUTION INTRAVENOUS; SUBCUTANEOUS at 12:00

## 2021-08-01 RX ADMIN — HYDROXYZINE HYDROCHLORIDE 100 MG: 50 TABLET, FILM COATED ORAL at 09:19

## 2021-08-01 NOTE — NURSING NOTE
Rosalinda Sen is pleasant and brightens upon approach  She reports feeling tired most of the day and also felt nauseous and dizzy today  She also reports knee pain  Vital signs were taken and are stable  Pt was given PRN Zofran and ibuprofen  She reports depression, anxiety, denies SI  Present in the milieu for breakfast  She does not report any unmet needs  Will continue to monitor         PRN effective

## 2021-08-01 NOTE — NURSING NOTE
Patient first requested Atarax 100 mg po prn at this time but then reported she would rather take only 50 mg because she felt really tired all day but likes the way it helps her sleep because Trazadone does not work for her  Patient visible all evening with peers in the dining room  Cooperative with staff and had a bright affect  Appears less depressed  Atarax 50 mg po prn given at this time

## 2021-08-01 NOTE — PLAN OF CARE
Problem: Risk for Self Injury/Neglect  Goal: Treatment Goal: Remain safe during length of stay, learn and adopt new coping skills, and be free of self-injurious ideation, impulses and acts at the time of discharge  Outcome: Progressing  Goal: Verbalize thoughts and feelings  Description: Interventions:  - Assess and re-assess patient's lethality and potential for self-injury  - Engage patient in 1:1 interactions, daily, for a minimum of 15 minutes  - Encourage patient to express feelings, fears, frustrations, hopes  - Establish rapport/trust with patient   Outcome: Progressing     Problem: Depression  Goal: Treatment Goal: Demonstrate behavioral control of depressive symptoms, verbalize feelings of improved mood/affect, and adopt new coping skills prior to discharge  Outcome: Progressing     Problem: Anxiety  Goal: Anxiety is at manageable level  Description: Interventions:  - Assess and monitor patient's anxiety level  - Monitor for signs and symptoms (heart palpitations, chest pain, shortness of breath, headaches, nausea, feeling jumpy, restlessness, irritable, apprehensive)  - Collaborate with interdisciplinary team and initiate plan and interventions as ordered    - [place] patient to unit/surroundings  - Explain treatment plan  - Encourage participation in care  - Encourage verbalization of concerns/fears  - Identify coping mechanisms  - Assist in developing anxiety-reducing skills  - Administer/offer alternative therapies  - Limit or eliminate stimulants  Outcome: Progressing     Problem: Ineffective Coping  Goal: Participates in unit activities  Description: Interventions:  - Provide therapeutic environment   - Provide required programming   - Redirect inappropriate behaviors   Outcome: Progressing     Problem: DISCHARGE PLANNING  Goal: Discharge to home or other facility with appropriate resources  Description: INTERVENTIONS:  - Identify barriers to discharge w/patient and caregiver  - Arrange for needed discharge resources and transportation as appropriate  - Identify discharge learning needs (meds, wound care, etc )  - Arrange for interpretive services to assist at discharge as needed  - Refer to Case Management Department for coordinating discharge planning if the patient needs post-hospital services based on physician/advanced practitioner order or complex needs related to functional status, cognitive ability, or social support system  Outcome: Progressing

## 2021-08-02 PROBLEM — E78.2 MIXED HYPERLIPIDEMIA: Status: ACTIVE | Noted: 2018-06-13

## 2021-08-02 PROBLEM — F33.2 SEVERE EPISODE OF RECURRENT MAJOR DEPRESSIVE DISORDER, WITHOUT PSYCHOTIC FEATURES (HCC): Chronic | Status: ACTIVE | Noted: 2019-09-05

## 2021-08-02 PROBLEM — M17.11 PRIMARY OSTEOARTHRITIS OF RIGHT KNEE: Status: ACTIVE | Noted: 2017-01-25

## 2021-08-02 LAB
ATRIAL RATE: 103 BPM
GLUCOSE SERPL-MCNC: 136 MG/DL (ref 65–140)
GLUCOSE SERPL-MCNC: 157 MG/DL (ref 65–140)
GLUCOSE SERPL-MCNC: 162 MG/DL (ref 65–140)
GLUCOSE SERPL-MCNC: 183 MG/DL (ref 65–140)
P AXIS: 51 DEGREES
PR INTERVAL: 152 MS
QRS AXIS: 118 DEGREES
QRSD INTERVAL: 72 MS
QT INTERVAL: 316 MS
QTC INTERVAL: 413 MS
RPR SER QL: NORMAL
T WAVE AXIS: 6 DEGREES
VENTRICULAR RATE: 103 BPM

## 2021-08-02 PROCEDURE — 93010 ELECTROCARDIOGRAM REPORT: CPT | Performed by: INTERNAL MEDICINE

## 2021-08-02 PROCEDURE — 99232 SBSQ HOSP IP/OBS MODERATE 35: CPT | Performed by: PSYCHIATRY & NEUROLOGY

## 2021-08-02 PROCEDURE — 82948 REAGENT STRIP/BLOOD GLUCOSE: CPT

## 2021-08-02 RX ORDER — HYDROXYZINE HYDROCHLORIDE 25 MG/1
25 TABLET, FILM COATED ORAL 3 TIMES DAILY
Status: DISCONTINUED | OUTPATIENT
Start: 2021-08-02 | End: 2021-08-03 | Stop reason: HOSPADM

## 2021-08-02 RX ORDER — INSULIN GLARGINE 100 [IU]/ML
64 INJECTION, SOLUTION SUBCUTANEOUS
Start: 2021-08-02

## 2021-08-02 RX ADMIN — INSULIN LISPRO 16 UNITS: 100 INJECTION, SOLUTION INTRAVENOUS; SUBCUTANEOUS at 17:00

## 2021-08-02 RX ADMIN — INSULIN LISPRO 2 UNITS: 100 INJECTION, SOLUTION INTRAVENOUS; SUBCUTANEOUS at 12:06

## 2021-08-02 RX ADMIN — INSULIN LISPRO 2 UNITS: 100 INJECTION, SOLUTION INTRAVENOUS; SUBCUTANEOUS at 17:00

## 2021-08-02 RX ADMIN — ESCITALOPRAM OXALATE 20 MG: 10 TABLET ORAL at 08:21

## 2021-08-02 RX ADMIN — INSULIN GLARGINE 64 UNITS: 100 INJECTION, SOLUTION SUBCUTANEOUS at 21:21

## 2021-08-02 RX ADMIN — LAMOTRIGINE 150 MG: 25 TABLET ORAL at 17:50

## 2021-08-02 RX ADMIN — HYDROCHLOROTHIAZIDE 12.5 MG: 12.5 TABLET ORAL at 08:22

## 2021-08-02 RX ADMIN — INSULIN LISPRO 2 UNITS: 100 INJECTION, SOLUTION INTRAVENOUS; SUBCUTANEOUS at 08:10

## 2021-08-02 RX ADMIN — HYDROXYZINE HYDROCHLORIDE 25 MG: 25 TABLET, FILM COATED ORAL at 07:29

## 2021-08-02 RX ADMIN — NYSTATIN: 100000 CREAM TOPICAL at 09:34

## 2021-08-02 RX ADMIN — LAMOTRIGINE 150 MG: 25 TABLET ORAL at 08:21

## 2021-08-02 RX ADMIN — METFORMIN HYDROCHLORIDE 500 MG: 500 TABLET ORAL at 17:00

## 2021-08-02 RX ADMIN — HYDROXYZINE HYDROCHLORIDE 25 MG: 25 TABLET, FILM COATED ORAL at 21:21

## 2021-08-02 RX ADMIN — LISINOPRIL 10 MG: 10 TABLET ORAL at 08:21

## 2021-08-02 RX ADMIN — INSULIN LISPRO 16 UNITS: 100 INJECTION, SOLUTION INTRAVENOUS; SUBCUTANEOUS at 08:11

## 2021-08-02 RX ADMIN — HYDROXYZINE HYDROCHLORIDE 25 MG: 25 TABLET, FILM COATED ORAL at 17:00

## 2021-08-02 RX ADMIN — IBUPROFEN 800 MG: 400 TABLET ORAL at 08:22

## 2021-08-02 RX ADMIN — METFORMIN HYDROCHLORIDE 500 MG: 500 TABLET ORAL at 08:22

## 2021-08-02 RX ADMIN — INSULIN LISPRO 16 UNITS: 100 INJECTION, SOLUTION INTRAVENOUS; SUBCUTANEOUS at 12:06

## 2021-08-02 RX ADMIN — PANTOPRAZOLE SODIUM 40 MG: 40 TABLET, DELAYED RELEASE ORAL at 06:18

## 2021-08-02 RX ADMIN — ARIPIPRAZOLE 5 MG: 5 TABLET ORAL at 08:22

## 2021-08-02 RX ADMIN — HYDROXYZINE HYDROCHLORIDE 25 MG: 25 TABLET, FILM COATED ORAL at 11:53

## 2021-08-02 NOTE — PROGRESS NOTES
08/02/21 1349   [facility] Admission Notification   Notification of Admission Provided to: Psychiatrist   Psychiatrist Notified via: Phone call   CM contacted [facility] and notified of admission and dc planned for tomorrow  Pt has follow up med management appt on 8/26 at 0915  Pt is on therapy wait list at [facility]/SURGICAL [facility] [place] [facility]\""

## 2021-08-02 NOTE — SOCIAL WORK
[address] [facility] currently has wait lists for [employer] individuals  CM made the following attempts to schedule outpatient therapy  No identifying information provided at this time  [facility]-will call CM back to confirm if pt's insurance is in network  [facility]- left voicemail  [facility]- left voicemail  [facility]- left voicemail  [facility]- left voicemail      [facility] accepts pt's insurance  Telehealth only at this time  CM provided demographic information  Pt's information sent to   CM to contact office in the morning for appt date/time

## 2021-08-02 NOTE — PROGRESS NOTES
08/02/21 1458   Team Meeting   Meeting Type Tx Team Meeting   Team Members Present   Team Members Present Physician;Nurse;   Physician Team Member [address] Team Member YANCY MALDONADO [facility] Management Team Member [place]   Patient/Family Present   Patient Present Yes   Patient's Family Present No   Pt seen for tx team meeting  Pt educated on med management, case management and group therapy  DC plans discussed as dc is planned for 8/3  Tx plan reviewed and signed

## 2021-08-02 NOTE — PROGRESS NOTES
Progress Note - Behavioral Health   Stef Wylie 48 y o  female MRN: [id number]  Unit/Bed#: U 376-02 Encounter: [id number]    Assessment/Plan   Principal Problem:    Major depressive disorder, recurrent severe without psychotic features ([facility] [address] )  Active Problems:    Generalized anxiety disorder    Class 3 severe obesity due to excess calories with body mass index (BMI) of 60 0 to 69 9 in adult [facility])    Candidal intertrigo    Type 2 diabetes mellitus with complication, with long-term current use of insulin ([facility])    Essential hypertension    Irritable bowel syndrome with diarrhea    GERD (gastroesophageal reflux disease)    Primary osteoarthritis of both knees    Medical clearance for psychiatric admission       Continue Lexapro 20 mg daily for depression anxiety   Continue Abilify 5 mg daily for mood augmentation   Continue Lamictal 150 mg b i d , for mood stabilization   Will schedule Atarax 25 mg 3 times daily, for anxiety     Continue to promote patient participation in therapeutic milieu   Continue medical management by primary team   Claus Sanders to review home medications including insulin regimen as patient will likely be discharged tomorrow  Patient stated she does take Victoza   Discharge disposition:  PATIENT SIGNED A 72 HOUR NOTICE ON 07/31/2021 AT 1:00 P  M , will honor patient's right for 72 hour notice and plan for discharge tomorrow as there does not appear to be any grounds for 302 at this time  Patient is aware  Subjective: The patient was evaluated this morning for continuity of care and no acute distress noted throughout the evaluation  Over the past 24 hours staff noted the patient was reporting anxiety and feeling fatigue after Atarax administration but was visible in the evening playing games with her peers, cooperative, and medication compliant  Patient took ibuprofen 800 milligrams at 16:11 yesterday and Atarax 25 milligrams at 23:00 for bedtime sleep    Today on exam the patient was seen in the office room, and reports her sleep and appetite are improving but she continues to feel anxious  Patient stated Atarax at low dose was not enough, but at high dosages made her feel foggy  Patient was amenable to trying Atarax at low doses but scheduled throughout the day to cover her anxiety  Patient stated she has tried gabapentin and BuSpar in the past but to no affect  Reason for admission was reviewed with the patient who stated her ex fiancee of 10 years has been causing problems for the past 2 years, and after leaving 6 weeks ago but coming back 1 week ago, patient has become increasingly depressed and anxious as he is with another woman  Patient stated she was impulsive and despite informing her of the negative UDS, patient stated she did take 17 Ativan 1 milligram tablets impulsively  Patient regrets her actions, consents for safety, denies SI/HI/AVH or paranoia, and wants to hold her 72 hour in place for discharge tomorrow  Patient denied ever having a manic episode or bipolar diagnosis  Patient denied access to firearms      Current Medications:  Current Facility-Administered Medications   Medication Dose Route Frequency Provider Last Rate    aluminum-magnesium hydroxide-simethicone  30 mL Oral Q4H PRN SUJATA Cummings      ARIPiprazole  5 mg Oral Daily Margareth Luong PA-C      benztropine  1 mg Intramuscular Q4H PRN Max 6/day SUJATA Cummings      benztropine  1 mg Oral Q4H PRN Max 6/day SUJATA Cummings      hydrOXYzine HCL  50 mg Oral Q6H PRN Max 4/day SUJATA Cummings      Or    diphenhydrAMINE  50 mg Intramuscular Q6H PRN SUJATA Bueno      escitalopram  20 mg Oral Daily Carl Bardales MD      hydrochlorothiazide  12 5 mg Oral Every Other Day Mary Allen PA-C      hydrOXYzine HCL  100 mg Oral Q6H PRN Max 4/day SUJATA Cummings      Or    LORazepam  2 mg Intramuscular Q6H PRN SUJATA Siddiqui  hydrOXYzine HCL  25 mg Oral Q6H PRN Max 4/day SUJATA Cummings      hydrOXYzine HCL  25 mg Oral TID Laureano aVladez MD      ibuprofen  600 mg Oral Q6H PRN Mary Defrancisco PA-C      ibuprofen  800 mg Oral Q8H PRN Mary Defrancisco PA-C      insulin glargine  64 Units Subcutaneous HS Mary Defrancisco, PA-C      insulin lispro  1-6 Units Subcutaneous HS Mary Defrancisco, PA-C      insulin lispro  16 Units Subcutaneous Daily With Breakfast Mary Defrancisco PA-C      insulin lispro  16 Units Subcutaneous Daily With [employer] Deftristancisco PA-C      insulin lispro  16 Units Subcutaneous Daily With [employer] Defrancisco, PA-C      insulin lispro  2-12 Units Subcutaneous TID AC Mary Khoaciraudel PA-C      lamoTRIgine  150 mg Oral BID Lorane Essex, MD      lisinopril  10 mg Oral Daily Mary KhoaciCAROL starks-C      metFORMIN  500 mg Oral BID With Meals Mary CAROL Allen-C      nystatin   Topical BID Mary Deftristanciscjose alberto PA-C      OLANZapine  10 mg Oral Q3H PRN Max 3/day SUJATA Cummings      Or    OLANZapine  10 mg Intramuscular Q3H PRN Max 3/day SUJATA Cummings      OLANZapine  5 mg Oral Q3H PRN Max 6/day SUJATA Cummings      Or    OLANZapine  5 mg Intramuscular Q3H PRN Max 6/day SUJATA Cummings      OLANZapine  2 5 mg Oral Q3H PRN Max 8/day SUJATA Cummings      ondansetron  4 mg Oral Q6H PRN Mary Lesleyranciscjose alberto PA-C      pantoprazole  40 mg Oral Daily Mary DeftristanciscCAROL abrams-C      senna-docusate sodium  1 tablet Oral Daily PRN SUJATA Cummings      traZODone  50 mg Oral HS PRN SUJATA Hemphill         Behavioral Health Medications: all current active meds have been reviewed      Vital signs in last 24 hours:  Temp:  [97 6 °F (36 4 °C)-97 9 °F (36 6 °C)] 97 9 °F (36 6 °C)  HR:  [96-99] 96  Resp:  [18-20] 18  BP: (116134)/(79-86 116/79    Laboratory results:  I have personally reviewed all pertinent laboratory/tests results  Psychiatric Review of Systems:  Behavior over the last 24 hours:  improved  Sleep: improved  Appetite: improving  Medication side effects: No   ROS: hand tremor is not observed despite extending out bilateral arms, all other systems are negative    Mental Status Evaluation:    Appearance:  age appropriate, casually dressed, marginal hygiene, Morbidly obese   Behavior:  cooperative, calm   Speech:  normal rate and volume, hypertalkative   Mood:  Anxious   Affect:  constricted, slightly tearful, Anxious   Thought Process:  organized, logical, normal rate of thoughts, perseverative   Associations: intact associations   Thought Content:  no overt delusions   Perceptual Disturbances: denies auditory hallucinations when asked, no visual hallucinations, does not appear responding to internal stimuli   Risk Potential: Suicidal ideation - None, contracts for safety on the unit  Homicidal ideation - None  Potential for aggression - No   Sensorium:  oriented to person, place, time/date and situation   Memory:  recent and remote memory grossly intact   Consciousness:  alert and awake   Attention/Concentration: attention span and concentration are age appropriate   Insight:  improving   Judgment: improving   Gait/Station: normal gait/station   Motor Activity: no abnormal movements         Progress Toward Goals:  Some progress, patient perseverates on needing a benzodiazepine for anxiety    Recommended Treatment:   See above for assessment and plan  Risks, benefits and possible side effects of Medications:   Risks, benefits, and possible side effects of medications explained to patient and patient verbalizes understanding  This note has been constructed using a voice recognition system  There may be translation, syntax, or grammatical errors  If you have any questions, please contact the dictating provider  MAGGIE Blank    Psychiatry PGY-2

## 2021-08-02 NOTE — NURSING NOTE
Pt denies SI/HI/AH/VH  Present in dayroom and milieu  Medication and meal compliant  Brightens on approach  Social with  Peers  Pt received PRN Hydroxyzine 25 mg po at 0729 for anxiety  0820 pt states medication was only slightly effective  Pt c/o 7/10 B/L knee pain  4665 PRN Ibuprofen 800 mg PO given  2380 pt states medication was mostly effective  Pt remained calm and pleasant this afternoon  Will continue to monitor

## 2021-08-02 NOTE — NURSING NOTE
Patient requested Atarax 25 mg po prn at this time before getting to bed and reported her anxiety was scoring 3 out of 4  Patient also reported that she felt tired all day even after trying Atarax 50 mg last night instead of the 100 mg  Patient visible all evening playing games with peers in the dining room  Patient is cooperative and medication compliant  No issues related to behaviors this evening

## 2021-08-02 NOTE — PROGRESS NOTES
08/02/21 1000   Activity/Group Checklist   Group   (recovery group)   Attendance Attended  (Minus time with MD)   Attendance Duration (min) 46-60   Interactions Interacted appropriately   Affect/Mood Appropriate   Goals Achieved Discussed coping strategies; Discussed self-esteem issues; Displayed empathy;Able to listen to others; Able to engage in interactions; Able to reflect/comment on own behavior;Able to self-disclose; Able to recieve feedback

## 2021-08-02 NOTE — PROGRESS NOTES
08/02/21 0838   Team Meeting   Meeting Type Daily Rounds   Team Members Present   Team Members Present Physician;Nurse;   Physician Team Member [address] Team Member [facility]   Care Management Team Member [place]   Patient/Family Present   Patient Present No   Patient's Family Present No   Readmit score 20  Pt new 201 admission via EMS after intentional OD on ativan due to relationship conflict  Pt denies hx of any previous SA  UDS -  72 hour notice expires tomorrow

## 2021-08-02 NOTE — PROGRESS NOTES
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   School grade/year Some college   Marital Status/Children Pt currently single, recent break up with fiance  Pt was previously   Pt has one adult daughter (28)   Admission Status    Status of admission [address]   Patient History   Stressor/Trigger Recent breakup with fiance   Treatment History Hx of inpatient psych admissions  Most recent in 2019 at [facility]  [facility]H over 10 years ago  Current psychiatrist/therapist [facility]  Pt in need of therapy services   Suicide Attempts Pt denies hx of SA prior to current overdose  Family History of Mental Health Unknown   ACT/ICM None   Legal Issues Denies   Substance Abuse UDS -  Pt denies D/A  Pt denies TOB   Trauma/Losses Pt hesitantly denies hx of abuse  Pt reports psychosocial loss of her father about 5 years ago         Releases of [address]  Therapy referral

## 2021-08-02 NOTE — QUICK NOTE
Medical clearance for psychiatric admission  Assessment & Plan  Admission labs reviewed including lipid panel and blood sugar checks  Total cholesterol: 202   Triglycerides: 86   LDL: 141   CBC and CMP within normal limits  · No need for intervention at this time based on lab results

## 2021-08-02 NOTE — NURSING NOTE
Pt reports improvement in depression and anxiety  Denies SI  Ptis visible and social on the unit  Med and meal complaint  Does not report any unmet needs   Will continue to monitor

## 2021-08-02 NOTE — PLAN OF CARE
Problem: Risk for Self Injury/Neglect  Goal: Treatment Goal: Remain safe during length of stay, learn and adopt new coping skills, and be free of self-injurious ideation, impulses and acts at the time of discharge  Outcome: Progressing  Goal: Verbalize thoughts and feelings  Description: Interventions:  - Assess and re-assess patient's lethality and potential for self-injury  - Engage patient in 1:1 interactions, daily, for a minimum of 15 minutes  - Encourage patient to express feelings, fears, frustrations, hopes  - Establish rapport/trust with patient   Outcome: Progressing     Problem: Depression  Goal: Treatment Goal: Demonstrate behavioral control of depressive symptoms, verbalize feelings of improved mood/affect, and adopt new coping skills prior to discharge  Outcome: Progressing     Problem: Anxiety  Goal: Anxiety is at manageable level  Description: Interventions:  - Assess and monitor patient's anxiety level  - Monitor for signs and symptoms (heart palpitations, chest pain, shortness of breath, headaches, nausea, feeling jumpy, restlessness, irritable, apprehensive)  - Collaborate with interdisciplinary team and initiate plan and interventions as ordered    - [place] patient to unit/surroundings  - Explain treatment plan  - Encourage participation in care  - Encourage verbalization of concerns/fears  - Identify coping mechanisms  - Assist in developing anxiety-reducing skills  - Administer/offer alternative therapies  - Limit or eliminate stimulants  Outcome: Progressing

## 2021-08-02 NOTE — DISCHARGE INSTR - APPOINTMENTS
Gaston Sams RN, our [employer], will be calling you after your discharge, on the phone number that you provided  She will be available as an additional support, if needed  If you wish to speak with her, you may contact Isabella Pandey at [phone]

## 2021-08-02 NOTE — ASSESSMENT & PLAN NOTE
Admission labs reviewed including lipid panel and blood sugar checks  Total cholesterol: 202   Triglycerides: 86   LDL: 141   CBC and CMP within normal limits  · No need for intervention at this time based on lab results

## 2021-08-02 NOTE — DISCHARGE INSTR - OTHER ORDERS
If you are experiencing a mental health emergency, you may call the [address] [facility] 24 hours a day, 7 days per week at [phone]  In [facility], call [phone]  When you need someone to listen, the Patel Railing is available for 16 hours a day, 7 days a week, from the time of 7-10am and 2pm-2am   It is not available from the hours of 2am-6am and 10am-2pm  A representative can be reached at [phone]  HOW TO GET SUBSTANCE ABUSE HELP:  If you or someone you know has a drug or alcohol problem, there is help:  [place] [address]: [address]: [phone]  An assessment is the first step  In addition to those listed there are other programs available in the area but assessment is best to determine an appropriate level of care  If you DO NOT have Medical Assistance (MA) or [employer], an assessment can be scheduled at one of these providers:  425 [facility] [place] Julian Braun 13, [address]  [phone]   101 [facility] 15 [address] , [facility], [address]  [facility] 75  [facility], [address]   Step by 8012 [facility] [address] , [facility], 98 [facility] [address] 1320 [facility] , [facility], [address] [place] [address]  2000 [facility],[address] 111 Jermaine Alanis , [address], [facility], [address] [phone]     If you [address], an assessment can be scheduled at one of these providers:  [facility]  [address] , [facility], [address] [place] [address] [address]  Julian Calcirell[address]rea 13, [address]  [address]  D&A Intake Unit  620 [facility]  [address] , 1st Floor, [place], [address] [facility] [address]  [address] [facility] [address], [address], [place], 4420 [facility] Ralph [phone]   [facility]  [address] , [place], [address]  [address], 122 [facility] New [address], [address] [place], 75 Markos Moulton   Step by 8012 [facility] [address] , [place], 98 [facility] [address] 1320 [facility] , [place], 98 [facility]  2000 [facility],[address] 111 [address] Alan[address] , [address], [place], [address] Evy Sagarnohelia [phone]     If you W [employer], an assessment can be scheduled at one of these providers  Please contact these Providers to determine if they are in your network plan:  [place] [facility] Intake Unit  620 [facility] [address] , 1st Floor, [place], [address]  [address] , [place], [address]  [address], 122 [facility]) New [address], [address] [place], 102 E [facility] [address] [facility] [address] [facility] [address] 111 [address] , [address], [place], [address] Rosy FAYE Crawford 94

## 2021-08-03 VITALS
TEMPERATURE: 98.2 F | OXYGEN SATURATION: 98 % | HEIGHT: 60 IN | HEART RATE: 101 BPM | RESPIRATION RATE: 18 BRPM | SYSTOLIC BLOOD PRESSURE: 131 MMHG | WEIGHT: 293 LBS | DIASTOLIC BLOOD PRESSURE: 74 MMHG | BODY MASS INDEX: 57.52 KG/M2

## 2021-08-03 PROBLEM — B37.2 CANDIDAL INTERTRIGO: Status: RESOLVED | Noted: 2021-07-31 | Resolved: 2021-08-03

## 2021-08-03 PROBLEM — Z00.8 MEDICAL CLEARANCE FOR PSYCHIATRIC ADMISSION: Status: RESOLVED | Noted: 2019-09-04 | Resolved: 2021-08-03

## 2021-08-03 LAB — GLUCOSE SERPL-MCNC: 153 MG/DL (ref 65–140)

## 2021-08-03 PROCEDURE — 99238 HOSP IP/OBS DSCHRG MGMT 30/<: CPT | Performed by: PSYCHIATRY & NEUROLOGY

## 2021-08-03 PROCEDURE — 82948 REAGENT STRIP/BLOOD GLUCOSE: CPT

## 2021-08-03 RX ORDER — ARIPIPRAZOLE 5 MG/1
5 TABLET ORAL DAILY
Qty: 30 TABLET | Refills: 0 | Status: SHIPPED | OUTPATIENT
Start: 2021-08-04 | End: 2021-10-20

## 2021-08-03 RX ORDER — HYDROXYZINE HYDROCHLORIDE 25 MG/1
25 TABLET, FILM COATED ORAL 3 TIMES DAILY
Qty: 90 TABLET | Refills: 0 | Status: SHIPPED | OUTPATIENT
Start: 2021-08-03 | End: 2021-10-20 | Stop reason: ALTCHOICE

## 2021-08-03 RX ORDER — ESCITALOPRAM OXALATE 20 MG/1
20 TABLET ORAL DAILY
Qty: 30 TABLET | Refills: 0 | Status: SHIPPED | OUTPATIENT
Start: 2021-08-04 | End: 2021-10-20

## 2021-08-03 RX ORDER — LAMOTRIGINE 150 MG/1
150 TABLET ORAL 2 TIMES DAILY
Qty: 60 TABLET | Refills: 0 | Status: SHIPPED | OUTPATIENT
Start: 2021-08-03 | End: 2021-10-20

## 2021-08-03 RX ADMIN — LAMOTRIGINE 150 MG: 25 TABLET ORAL at 08:28

## 2021-08-03 RX ADMIN — INSULIN LISPRO 16 UNITS: 100 INJECTION, SOLUTION INTRAVENOUS; SUBCUTANEOUS at 08:26

## 2021-08-03 RX ADMIN — INSULIN LISPRO 2 UNITS: 100 INJECTION, SOLUTION INTRAVENOUS; SUBCUTANEOUS at 08:26

## 2021-08-03 RX ADMIN — NYSTATIN 1 APPLICATION: 100000 CREAM TOPICAL at 08:29

## 2021-08-03 RX ADMIN — ARIPIPRAZOLE 5 MG: 5 TABLET ORAL at 08:28

## 2021-08-03 RX ADMIN — HYDROXYZINE HYDROCHLORIDE 25 MG: 25 TABLET, FILM COATED ORAL at 08:28

## 2021-08-03 RX ADMIN — METFORMIN HYDROCHLORIDE 500 MG: 500 TABLET ORAL at 08:28

## 2021-08-03 RX ADMIN — PANTOPRAZOLE SODIUM 40 MG: 40 TABLET, DELAYED RELEASE ORAL at 06:38

## 2021-08-03 RX ADMIN — ESCITALOPRAM OXALATE 20 MG: 10 TABLET ORAL at 08:28

## 2021-08-03 RX ADMIN — LISINOPRIL 10 MG: 10 TABLET ORAL at 08:27

## 2021-08-03 NOTE — PROGRESS NOTES
08/03/21 1009   1150 [facility] Discharge Notification   Notification of Discharge Provided to: Family   Family Notified via: Phone call   JASMIN signed for Vira GUPTA left voicemail for pt's daughter, Vira ([phone]) to notify of dc planned for today

## 2021-08-03 NOTE — PLAN OF CARE
Problem: Risk for Self Injury/Neglect  Goal: Treatment Goal: Remain safe during length of stay, learn and adopt new coping skills, and be free of self-injurious ideation, impulses and acts at the time of discharge  Outcome: Adequate for Discharge  Goal: Verbalize thoughts and feelings  Description: Interventions:  - Assess and re-assess patient's lethality and potential for self-injury  - Engage patient in 1:1 interactions, daily, for a minimum of 15 minutes  - Encourage patient to express feelings, fears, frustrations, hopes  - Establish rapport/trust with patient   Outcome: Adequate for Discharge     Problem: Depression  Goal: Treatment Goal: Demonstrate behavioral control of depressive symptoms, verbalize feelings of improved mood/affect, and adopt new coping skills prior to discharge  Outcome: Adequate for Discharge     Problem: Anxiety  Goal: Anxiety is at manageable level  Description: Interventions:  - Assess and monitor patient's anxiety level  - Monitor for signs and symptoms (heart palpitations, chest pain, shortness of breath, headaches, nausea, feeling jumpy, restlessness, irritable, apprehensive)  - Collaborate with interdisciplinary team and initiate plan and interventions as ordered    - [place] patient to unit/surroundings  - Explain treatment plan  - Encourage participation in care  - Encourage verbalization of concerns/fears  - Identify coping mechanisms  - Assist in developing anxiety-reducing skills  - Administer/offer alternative therapies  - Limit or eliminate stimulants  Outcome: Adequate for Discharge     Problem: DISCHARGE PLANNING  Goal: Discharge to home or other facility with appropriate resources  Description: INTERVENTIONS:  - Identify barriers to discharge w/patient and caregiver  - Arrange for needed discharge resources and transportation as appropriate  - Identify discharge learning needs (meds, wound care, etc )  - Arrange for interpretive services to assist at discharge as needed  - Refer to Case Management Department for coordinating discharge planning if the patient needs post-hospital services based on physician/advanced practitioner order or complex needs related to functional status, cognitive ability, or social support system  Outcome: Adequate for Discharge     Problem: Ineffective Coping  Goal: Participates in unit activities  Description: Interventions:  - Provide therapeutic environment   - Provide required programming   - Redirect inappropriate behaviors   Outcome: Adequate for Discharge

## 2021-08-03 NOTE — PROGRESS NOTES
08/03/21 0839   Team Meeting   Meeting Type Daily Rounds   Team Members Present   Team Members Present Physician;Nurse;   Physician Team Member [address] Team Member YANCY MALDONADO [facility] Management Team Member [place]   Patient/Family Present   Patient Present No   Patient's Family Present No   Pt denies all  Visible, social, med/meal compliant  Pt to dc today

## 2021-08-03 NOTE — DISCHARGE SUMMARY
Discharge Summary - 1224 [facility] 48 y o  female MRN: [id number]  Unit/Bed#: Greg Dooley 225-02 Encounter: [id number]     Admission Date:   Admission Orders (From admission, onward)     Ordered        07/30/21 2118  ED TO DIFFERENT CAMPUS IP 1150 [facility] UNIT or INPATIENT MEDICAL UNIT to [facility] 82 (using Discharge Readmit Navigator) - Admit Patient to 12 [facility]  Once                         Discharge Date: 08/03/2021    Attending Psychiatrist: Massiel Miller MD    Reason for Admission:     From H&P of Lea Flores MD on 07/31/2021: Morales Arias is a 48 y o  female with a history of major depressive disorder without psychotic features who presents after intentional overdose of Ativan  As per records, patient took an estimated 17mg of her Ativan tablets, however, patient's urine drug screen was negative for substances including benzodiazepines  As per records, patient did this in the presence of her boyfriend/fiance during a fight that they were having  Patient is currently on a voluntary 201 commitment  During the interview, patient reports that her ex-fiance and her split up 6-7 weeks ago, and her fiance was dating another woman  She states, we got back together little while ago and then the last few nights, he did not end up coming home at night, so I ended up getting really angry after a fight with him and then I took my lorazepam in front of him and he ended up calling the     Patient admits that this was an impulsive attempt step, stating that she was angry about his behavior and being unclear about her fiance's behavior and being unclear whether or not she and her fiance were still in a relationship patient describes her mood as doing a little better until I got angry with him and reports that Abilify was added to her medication regimen, and states, I have not felt this good in a long time, and I feel like the Abilify has been really helpful for me    Patient reports that she has been feeling hopeful and positive  She does note decreased sleep over the last few weeks and notes that she has been getting 4-5 hours of sleep at night  She does report doing "ok" with her motivation and has been pushing herself to clean up around her house, spend time with her family, and spend time with friends  She reports decreased overall energy  She describes her appetite as up and down and notes a 15 lb weight loss over the last 2-3 weeks  She reports doing well overall with her concentration  She denies SI/HI/AVH/delusions and denies any plan or intent to harm herself or others  She denies any manic symptoms including decreased need for sleep, increase in goal oriented activity, expansive or elated mood, rapid or pressured speech, or impulsivity  She reports feeling hopeful overall  She reports increase to her overall anxiety and states that she has racing thoughts which makes it difficult for her to fall asleep at night and makes her feel restless during the day  She denies any symptoms of OCD, or eating disorder  She denies any access to firearms at home  Please refer to the initial H&P for full details  Hospital Course: Throughout the patient's hospital course the following medication adjustments were made:   Started Lexapro 20 mg daily for mood (previously on 30mg in addition to Cymbalta 30 mg, which was discontinued)   Retarted Abilify 2 mg daily for adjunct and increased to 5 mg   Restarted Lamictal 150 mg twice daily for mood stabilization   Started Atarax 25 mg three times daily for anxiety    The patient adhered to their medication regimen and denied any acute adverse effects  Their mood brightened throughout the course of psychiatric management and she was seen in [facility] interacting appropriately with peers  [facility] did not demonstrate dangerous behavior to self or their peers her inpatient stay   [facility] denied suicidal and homicidal ideations at the time of her discharge  On the day of discharge the patient reported feeling very good, experienced less grogginess from her Atarax, and noticed significant improvement in her anxiety  Patient also stated no more anxiety driven hand shakings  Patient was in agreement with plan to  her prescriptions from [facility] in [address] [place] [address], follow-up with her [facility] psychiatrist and therapist, and reviewed her crisis plan with staff  Patient had no questions or concerns and understood benefits of medication, side effect profiles, and risks of discontinuation  Behavioral Health Medications: all current active meds have been reviewed and continue current psychiatric medications  Labs/Imaging:   I have personally reviewed all pertinent laboratory/tests results      Mental Status at time of Discharge:   Appearance:  age appropriate, dressed appropriately, looks stated age, morbidly obese  sitting comfortably in chair, adequate hygiene and grooming, cooperative with interview, good eye contact    Behavior:  cooperative   Speech:  normal rate, normal volume, normal pitch, fluent, clear and coherent   Mood:  "I feel very good "   Affect:  mood-congruent   Language Within normal limits   Thought Process:  organized, logical, goal directed, normal rate of thoughts   Thought Content:  No verbalized delusions   Perceptual Disturbances: Denies auditory or visual hallucinations and Does not appear to be responding to internal stimuli   Risk Potential: Denies suicidal or homicidal ideation, intent, or plan   Sensorium:  person, place, time and current situation   Cognition:  Grossly intact   Consciousness:  alert and awake   Attention: attention span and concentration were age appropriate   Insight:  fair   Judgment: fair   Intellect appears to be of average intelligence   Gait/Station: normal gait/station and normal balance   Motor Activity: no abnormal movements     Suicide/Homicide Risk Assessment:  Risk of Harm to Self:   The following ratings are based on assessment at the time of discharge and review of the hospital stay progress  Demographic risk factors include: ,   Historical Risk Factors include: history of depression, history of anxiety  Current Specific Risk Factors include: recent inpatient psychiatric admission - being discharged today, recent suicidal gesture  Protective Factors: no current suicidal ideation, improved mood, improved anxiety symptoms, improved impulse control, being a parent, connection to own children, ability to contract for safety with staff  Weapons/Firearms: none  The following steps have been taken to ensure weapons are properly secured: not applicable  Based on today's Osmanmae Long presents the following risk of harm to self: none    Risk of Harm to Others: The following ratings are based on assessment at the time of discharge and review of the hospital stay progress  Demographic Risk Factors include: none  Historical Risk Factors include: none  Current Specific Risk Factors include: recent difficulty with impulse control, recent episode of mood instability, risk taking  Protective Factors: no current homicidal ideation, improved impulse control, improved mood, compliant with medications, willing to continue psychiatric treatment, being a parent, connection to own children  Weapons/Firearms: none   The following steps have been taken to ensure weapons are properly secured: not applicable  Based on today's Osman Long presents the following risk of harm to others: none    The following interventions are recommended: outpatient follow up with a psychiatrist, outpatient follow up with a therapist, follow up with family physician for medical issues    Admission Diagnosis:  Principal Problem:    Major depressive disorder, recurrent severe without psychotic features ([facility] )  Active Problems:    Generalized anxiety disorder    Class 3 severe obesity due to excess calories with body mass index (BMI) of 60 0 to 69 9 in adult [facility])    Type 2 diabetes mellitus with complication, with long-term current use of insulin ([facility])    Essential hypertension    Irritable bowel syndrome with diarrhea    GERD (gastroesophageal reflux disease)    Primary osteoarthritis of both knees      Discharge Diagnosis:  Principal Problem:    Major depressive disorder, recurrent severe without psychotic features ([facility] [address] )  Active Problems:    Generalized anxiety disorder    Class 3 severe obesity due to excess calories with body mass index (BMI) of 60 0 to 69 9 in adult [facility])    Type 2 diabetes mellitus with complication, with long-term current use of insulin ([facility])    Essential hypertension    Irritable bowel syndrome with diarrhea    GERD (gastroesophageal reflux disease)    Primary osteoarthritis of both knees  Resolved Problems:    Candidal intertrigo    Medical clearance for psychiatric admission        Discharge Medications:  See list above, as well as the after visit summary containing reconciled discharge medications provided to patient and family  Discharge instructions/Information to patient and family:   See after visit summary for information provided to patient and family  Provisions for Follow-Up Care:  See after visit summary for information related to follow-up care and any pertinent home health orders  This note has been constructed using a voice recognition system  There may be translation, syntax,  or grammatical errors  If you have any questions, please contact the dictating provider      Pavan Torrez DO  Psychiatry PGY-2

## 2021-08-03 NOTE — PLAN OF CARE
Pt to dc today  Pt denies SI/HI, AVH  Pt oriented x3  Pt to follow up with [facility] on 8/10 at 1300  Pt to follow up with Cynthia on 8/26 at 0915 for med management  Pt sent with scripts  dc address: [address] [address], [address]  P: [phone]

## 2021-08-03 NOTE — BH TRANSITION RECORD
Contact Information: If you have any questions, concerns, pended studies, tests and/or procedures, or emergencies regarding your inpatient behavioral health visit  Please contact [address] [place] [address] behavioral health unit 3P [phone] and ask to speak to a , nurse or physician  A contact is available 24 hours/ 7 days a week at this number  Summary of Procedures Performed During your Stay:  Below is a list of major procedures performed during your hospital stay and a summary of results:  - Cardiac Procedures/Studies: EKG  If studies are pending at discharge, follow up with your PCP and/or referring provider

## 2021-08-03 NOTE — PROGRESS NOTES
Completed Relapse Prevention Plan and D/C DERs  Written Plan completed with no assistance  Identified Early Warning Signs/Symptoms, Coping Skills, Support People, and signs of a potential Crisis  [facility] and [facility] brittany also provided  Copy of Plan placed in folder to go with patient upon D/C

## 2021-09-28 DIAGNOSIS — I10 ESSENTIAL HYPERTENSION: ICD-10-CM

## 2021-10-20 ENCOUNTER — OFFICE VISIT (OUTPATIENT)
Dept: FAMILY MEDICINE CLINIC | Facility: CLINIC | Age: 46
End: 2021-10-20
Payer: COMMERCIAL

## 2021-10-20 VITALS
SYSTOLIC BLOOD PRESSURE: 130 MMHG | RESPIRATION RATE: 18 BRPM | HEIGHT: 60 IN | OXYGEN SATURATION: 96 % | TEMPERATURE: 98.1 F | BODY MASS INDEX: 57.52 KG/M2 | HEART RATE: 100 BPM | DIASTOLIC BLOOD PRESSURE: 82 MMHG | WEIGHT: 293 LBS

## 2021-10-20 DIAGNOSIS — I10 ESSENTIAL HYPERTENSION: ICD-10-CM

## 2021-10-20 DIAGNOSIS — Z12.4 SCREENING FOR CERVICAL CANCER: ICD-10-CM

## 2021-10-20 DIAGNOSIS — Z11.59 NEED FOR HEPATITIS C SCREENING TEST: ICD-10-CM

## 2021-10-20 DIAGNOSIS — Z00.01 ENCOUNTER FOR ROUTINE ADULT HEALTH EXAMINATION WITH ABNORMAL FINDINGS: Primary | ICD-10-CM

## 2021-10-20 DIAGNOSIS — Z79.4 TYPE 2 DIABETES MELLITUS WITH COMPLICATION, WITH LONG-TERM CURRENT USE OF INSULIN (HCC): ICD-10-CM

## 2021-10-20 DIAGNOSIS — K21.9 GASTROESOPHAGEAL REFLUX DISEASE WITHOUT ESOPHAGITIS: ICD-10-CM

## 2021-10-20 DIAGNOSIS — Z12.31 ENCOUNTER FOR SCREENING MAMMOGRAM FOR MALIGNANT NEOPLASM OF BREAST: ICD-10-CM

## 2021-10-20 DIAGNOSIS — Z23 ENCOUNTER FOR IMMUNIZATION: ICD-10-CM

## 2021-10-20 DIAGNOSIS — R21 RASH IN ADULT: ICD-10-CM

## 2021-10-20 DIAGNOSIS — R53.83 OTHER FATIGUE: ICD-10-CM

## 2021-10-20 DIAGNOSIS — E11.8 TYPE 2 DIABETES MELLITUS WITH COMPLICATION, WITH LONG-TERM CURRENT USE OF INSULIN (HCC): ICD-10-CM

## 2021-10-20 PROCEDURE — 3075F SYST BP GE 130 - 139MM HG: CPT

## 2021-10-20 PROCEDURE — 99396 PREV VISIT EST AGE 40-64: CPT

## 2021-10-20 PROCEDURE — 4010F ACE/ARB THERAPY RXD/TAKEN: CPT

## 2021-10-20 PROCEDURE — 3079F DIAST BP 80-89 MM HG: CPT

## 2021-10-20 PROCEDURE — 3725F SCREEN DEPRESSION PERFORMED: CPT

## 2021-10-20 PROCEDURE — 90471 IMMUNIZATION ADMIN: CPT

## 2021-10-20 PROCEDURE — 1036F TOBACCO NON-USER: CPT

## 2021-10-20 PROCEDURE — 90682 RIV4 VACC RECOMBINANT DNA IM: CPT

## 2021-10-20 PROCEDURE — 3008F BODY MASS INDEX DOCD: CPT

## 2021-10-20 RX ORDER — INSULIN LISPRO 100 [IU]/ML
INJECTION, SOLUTION INTRAVENOUS; SUBCUTANEOUS
COMMUNITY
Start: 2021-09-13

## 2021-10-20 RX ORDER — TRIAMCINOLONE ACETONIDE 5 MG/G
CREAM TOPICAL 3 TIMES DAILY
Qty: 30 G | Refills: 0 | Status: SHIPPED | OUTPATIENT
Start: 2021-10-20

## 2021-10-20 RX ORDER — LISINOPRIL 10 MG/1
10 TABLET ORAL DAILY
Qty: 90 TABLET | Refills: 1 | Status: SHIPPED | OUTPATIENT
Start: 2021-10-20

## 2021-10-20 RX ORDER — PANTOPRAZOLE SODIUM 40 MG/1
40 TABLET, DELAYED RELEASE ORAL DAILY
COMMUNITY
End: 2021-10-20 | Stop reason: SDUPTHER

## 2021-10-20 RX ORDER — PANTOPRAZOLE SODIUM 40 MG/1
40 TABLET, DELAYED RELEASE ORAL DAILY
Qty: 90 TABLET | Refills: 1 | Status: SHIPPED | OUTPATIENT
Start: 2021-10-20

## 2021-10-20 RX ORDER — LORAZEPAM 0.5 MG/1
TABLET ORAL
COMMUNITY
Start: 2021-10-06

## 2021-10-20 RX ORDER — HYDROCHLOROTHIAZIDE 12.5 MG/1
12.5 TABLET ORAL EVERY OTHER DAY
Qty: 30 TABLET | Refills: 1 | Status: SHIPPED | OUTPATIENT
Start: 2021-10-20

## 2021-10-25 ENCOUNTER — TELEPHONE (OUTPATIENT)
Dept: FAMILY MEDICINE CLINIC | Facility: CLINIC | Age: 46
End: 2021-10-25

## 2021-12-16 ENCOUNTER — HOSPITAL ENCOUNTER (EMERGENCY)
Facility: HOSPITAL | Age: 46
Discharge: HOME | End: 2021-12-16
Attending: EMERGENCY MEDICINE | Admitting: EMERGENCY MEDICINE
Payer: COMMERCIAL

## 2021-12-16 VITALS
DIASTOLIC BLOOD PRESSURE: 88 MMHG | RESPIRATION RATE: 20 BRPM | TEMPERATURE: 97.9 F | BODY MASS INDEX: 69.75 KG/M2 | HEART RATE: 105 BPM | OXYGEN SATURATION: 97 % | WEIGHT: 293 LBS | SYSTOLIC BLOOD PRESSURE: 183 MMHG

## 2021-12-16 DIAGNOSIS — J32.9 SINUSITIS: Primary | ICD-10-CM

## 2021-12-16 LAB
FLUAV RNA RESP QL NAA+PROBE: NEGATIVE
FLUBV RNA RESP QL NAA+PROBE: NEGATIVE
RSV RNA RESP QL NAA+PROBE: NEGATIVE
SARS-COV-2 RNA RESP QL NAA+PROBE: NEGATIVE

## 2021-12-16 PROCEDURE — 99284 EMERGENCY DEPT VISIT MOD MDM: CPT | Performed by: EMERGENCY MEDICINE

## 2021-12-16 PROCEDURE — 0241U HB NFCT DS VIR RESP RNA 4 TRGT: CPT

## 2021-12-16 PROCEDURE — 99282 EMERGENCY DEPT VISIT SF MDM: CPT

## 2021-12-16 RX ORDER — LEVOFLOXACIN 750 MG/1
750 TABLET ORAL EVERY 24 HOURS
Qty: 5 TABLET | Refills: 0 | Status: SHIPPED | OUTPATIENT
Start: 2021-12-16 | End: 2021-12-21

## 2021-12-23 RX ORDER — LISINOPRIL 10 MG/1
TABLET ORAL
Qty: 30 TABLET | Refills: 0 | OUTPATIENT
Start: 2021-12-23

## 2022-01-08 ENCOUNTER — APPOINTMENT (EMERGENCY)
Dept: CT IMAGING | Facility: HOSPITAL | Age: 47
End: 2022-01-08
Payer: COMMERCIAL

## 2022-01-08 ENCOUNTER — HOSPITAL ENCOUNTER (EMERGENCY)
Facility: HOSPITAL | Age: 47
Discharge: HOME | End: 2022-01-08
Attending: EMERGENCY MEDICINE | Admitting: EMERGENCY MEDICINE
Payer: COMMERCIAL

## 2022-01-08 VITALS
RESPIRATION RATE: 26 BRPM | TEMPERATURE: 98.6 F | OXYGEN SATURATION: 96 % | DIASTOLIC BLOOD PRESSURE: 82 MMHG | SYSTOLIC BLOOD PRESSURE: 143 MMHG | HEART RATE: 82 BPM

## 2022-01-08 DIAGNOSIS — N17.9 AKI (ACUTE KIDNEY INJURY) (HCC): Primary | ICD-10-CM

## 2022-01-08 DIAGNOSIS — N39.0 UTI (URINARY TRACT INFECTION): ICD-10-CM

## 2022-01-08 LAB
ALBUMIN SERPL BCP-MCNC: 3.3 G/DL (ref 3.5–5)
ALBUMIN SERPL BCP-MCNC: 3.6 G/DL (ref 3.5–5)
ALP SERPL-CCNC: 82 U/L (ref 46–116)
ALP SERPL-CCNC: 89 U/L (ref 46–116)
ALT SERPL W P-5'-P-CCNC: 54 U/L (ref 12–78)
ALT SERPL W P-5'-P-CCNC: 55 U/L (ref 12–78)
ANION GAP SERPL CALCULATED.3IONS-SCNC: 10 MMOL/L (ref 4–13)
ANION GAP SERPL CALCULATED.3IONS-SCNC: 13 MMOL/L (ref 4–13)
AST SERPL W P-5'-P-CCNC: 16 U/L (ref 5–45)
AST SERPL W P-5'-P-CCNC: 22 U/L (ref 5–45)
BACTERIA UR QL AUTO: ABNORMAL /HPF
BASOPHILS # BLD AUTO: 0.05 THOUSANDS/ΜL (ref 0–0.1)
BASOPHILS NFR BLD AUTO: 0 % (ref 0–1)
BILIRUB SERPL-MCNC: 0.2 MG/DL (ref 0.2–1)
BILIRUB SERPL-MCNC: 0.24 MG/DL (ref 0.2–1)
BILIRUB UR QL STRIP: NEGATIVE
BUN SERPL-MCNC: 30 MG/DL (ref 5–25)
BUN SERPL-MCNC: 34 MG/DL (ref 5–25)
CALCIUM ALBUM COR SERPL-MCNC: 9.4 MG/DL (ref 8.3–10.1)
CALCIUM SERPL-MCNC: 8.8 MG/DL (ref 8.3–10.1)
CALCIUM SERPL-MCNC: 9.5 MG/DL (ref 8.3–10.1)
CHLORIDE SERPL-SCNC: 100 MMOL/L (ref 100–108)
CHLORIDE SERPL-SCNC: 102 MMOL/L (ref 100–108)
CLARITY UR: ABNORMAL
CO2 SERPL-SCNC: 23 MMOL/L (ref 21–32)
CO2 SERPL-SCNC: 25 MMOL/L (ref 21–32)
COLOR UR: YELLOW
CREAT SERPL-MCNC: 1.12 MG/DL (ref 0.6–1.3)
CREAT SERPL-MCNC: 1.42 MG/DL (ref 0.6–1.3)
EOSINOPHIL # BLD AUTO: 0.2 THOUSAND/ΜL (ref 0–0.61)
EOSINOPHIL NFR BLD AUTO: 2 % (ref 0–6)
ERYTHROCYTE [DISTWIDTH] IN BLOOD BY AUTOMATED COUNT: 15.1 % (ref 11.6–15.1)
EXT PREG TEST URINE: NEGATIVE
EXT. CONTROL ED NAV: NORMAL
GFR SERPL CREATININE-BSD FRML MDRD: 43 ML/MIN/1.73SQ M
GFR SERPL CREATININE-BSD FRML MDRD: 58 ML/MIN/1.73SQ M
GLUCOSE SERPL-MCNC: 181 MG/DL (ref 65–140)
GLUCOSE SERPL-MCNC: 260 MG/DL (ref 65–140)
GLUCOSE SERPL-MCNC: 266 MG/DL (ref 65–140)
GLUCOSE UR STRIP-MCNC: NEGATIVE MG/DL
HCT VFR BLD AUTO: 43.6 % (ref 34.8–46.1)
HGB BLD-MCNC: 13.6 G/DL (ref 11.5–15.4)
HGB UR QL STRIP.AUTO: NEGATIVE
HYALINE CASTS #/AREA URNS LPF: ABNORMAL /LPF
IMM GRANULOCYTES # BLD AUTO: 0.07 THOUSAND/UL (ref 0–0.2)
IMM GRANULOCYTES NFR BLD AUTO: 1 % (ref 0–2)
KETONES UR STRIP-MCNC: NEGATIVE MG/DL
LACTATE SERPL-SCNC: 1.9 MMOL/L (ref 0.5–2)
LACTATE SERPL-SCNC: 2.6 MMOL/L (ref 0.5–2)
LEUKOCYTE ESTERASE UR QL STRIP: ABNORMAL
LYMPHOCYTES # BLD AUTO: 3.2 THOUSANDS/ΜL (ref 0.6–4.47)
LYMPHOCYTES NFR BLD AUTO: 27 % (ref 14–44)
MCH RBC QN AUTO: 27.1 PG (ref 26.8–34.3)
MCHC RBC AUTO-ENTMCNC: 31.2 G/DL (ref 31.4–37.4)
MCV RBC AUTO: 87 FL (ref 82–98)
MONOCYTES # BLD AUTO: 1.13 THOUSAND/ΜL (ref 0.17–1.22)
MONOCYTES NFR BLD AUTO: 9 % (ref 4–12)
NEUTROPHILS # BLD AUTO: 7.44 THOUSANDS/ΜL (ref 1.85–7.62)
NEUTS SEG NFR BLD AUTO: 61 % (ref 43–75)
NITRITE UR QL STRIP: NEGATIVE
NON-SQ EPI CELLS URNS QL MICRO: ABNORMAL /HPF
NRBC BLD AUTO-RTO: 0 /100 WBCS
PH UR STRIP.AUTO: 5 [PH] (ref 4.5–8)
PLATELET # BLD AUTO: 362 THOUSANDS/UL (ref 149–390)
PMV BLD AUTO: 9.7 FL (ref 8.9–12.7)
POTASSIUM SERPL-SCNC: 4 MMOL/L (ref 3.5–5.3)
POTASSIUM SERPL-SCNC: 4.4 MMOL/L (ref 3.5–5.3)
PROT SERPL-MCNC: 7.2 G/DL (ref 6.4–8.2)
PROT SERPL-MCNC: 7.9 G/DL (ref 6.4–8.2)
PROT UR STRIP-MCNC: ABNORMAL MG/DL
RBC # BLD AUTO: 5.02 MILLION/UL (ref 3.81–5.12)
RBC #/AREA URNS AUTO: ABNORMAL /HPF
SODIUM SERPL-SCNC: 136 MMOL/L (ref 136–145)
SODIUM SERPL-SCNC: 137 MMOL/L (ref 136–145)
SP GR UR STRIP.AUTO: >=1.03 (ref 1–1.03)
UROBILINOGEN UR QL STRIP.AUTO: 0.2 E.U./DL
WBC # BLD AUTO: 12.09 THOUSAND/UL (ref 4.31–10.16)
WBC #/AREA URNS AUTO: ABNORMAL /HPF

## 2022-01-08 PROCEDURE — 82948 REAGENT STRIP/BLOOD GLUCOSE: CPT

## 2022-01-08 PROCEDURE — 36415 COLL VENOUS BLD VENIPUNCTURE: CPT | Performed by: EMERGENCY MEDICINE

## 2022-01-08 PROCEDURE — 99285 EMERGENCY DEPT VISIT HI MDM: CPT | Performed by: EMERGENCY MEDICINE

## 2022-01-08 PROCEDURE — U0003 INFECTIOUS AGENT DETECTION BY NUCLEIC ACID (DNA OR RNA); SEVERE ACUTE RESPIRATORY SYNDROME CORONAVIRUS 2 (SARS-COV-2) (CORONAVIRUS DISEASE [COVID-19]), AMPLIFIED PROBE TECHNIQUE, MAKING USE OF HIGH THROUGHPUT TECHNOLOGIES AS DESCRIBED BY CMS-2020-01-R: HCPCS | Performed by: EMERGENCY MEDICINE

## 2022-01-08 PROCEDURE — 87040 BLOOD CULTURE FOR BACTERIA: CPT | Performed by: EMERGENCY MEDICINE

## 2022-01-08 PROCEDURE — 80053 COMPREHEN METABOLIC PANEL: CPT | Performed by: EMERGENCY MEDICINE

## 2022-01-08 PROCEDURE — 87086 URINE CULTURE/COLONY COUNT: CPT | Performed by: EMERGENCY MEDICINE

## 2022-01-08 PROCEDURE — 81001 URINALYSIS AUTO W/SCOPE: CPT

## 2022-01-08 PROCEDURE — 81025 URINE PREGNANCY TEST: CPT | Performed by: EMERGENCY MEDICINE

## 2022-01-08 PROCEDURE — 83605 ASSAY OF LACTIC ACID: CPT | Performed by: EMERGENCY MEDICINE

## 2022-01-08 PROCEDURE — G1004 CDSM NDSC: HCPCS

## 2022-01-08 PROCEDURE — 96361 HYDRATE IV INFUSION ADD-ON: CPT

## 2022-01-08 PROCEDURE — 96365 THER/PROPH/DIAG IV INF INIT: CPT

## 2022-01-08 PROCEDURE — U0005 INFEC AGEN DETEC AMPLI PROBE: HCPCS | Performed by: EMERGENCY MEDICINE

## 2022-01-08 PROCEDURE — 85025 COMPLETE CBC W/AUTO DIFF WBC: CPT | Performed by: EMERGENCY MEDICINE

## 2022-01-08 PROCEDURE — 99284 EMERGENCY DEPT VISIT MOD MDM: CPT

## 2022-01-08 PROCEDURE — 74177 CT ABD & PELVIS W/CONTRAST: CPT

## 2022-01-08 RX ORDER — CIPROFLOXACIN 2 MG/ML
400 INJECTION, SOLUTION INTRAVENOUS ONCE
Status: COMPLETED | OUTPATIENT
Start: 2022-01-08 | End: 2022-01-08

## 2022-01-08 RX ORDER — CIPROFLOXACIN 500 MG/1
500 TABLET, FILM COATED ORAL 2 TIMES DAILY
Qty: 10 TABLET | Refills: 0 | Status: SHIPPED | OUTPATIENT
Start: 2022-01-08 | End: 2022-01-13

## 2022-01-08 RX ADMIN — IOHEXOL 100 ML: 350 INJECTION, SOLUTION INTRAVENOUS at 11:10

## 2022-01-08 RX ADMIN — SODIUM CHLORIDE 1000 ML: 0.9 INJECTION, SOLUTION INTRAVENOUS at 10:33

## 2022-01-08 RX ADMIN — SODIUM CHLORIDE 1000 ML: 0.9 INJECTION, SOLUTION INTRAVENOUS at 09:13

## 2022-01-08 RX ADMIN — CIPROFLOXACIN 400 MG: 2 INJECTION INTRAVENOUS at 10:50

## 2022-01-08 NOTE — DISCHARGE INSTRUCTIONS
Please make sure to follow-up with family doctor  Get repeat blood work for kidney function and blood sugar  Return to ER if feeling worse

## 2022-01-08 NOTE — ED PROVIDER NOTES
History  Chief Complaint   Patient presents with    Urinary Frequency     Pt presents to the ED from home with c/o of urinary frequency, urgency x 1 week  Denies pain  Reports episodes of incontinence  History provided by:  Patient   used: No    Urinary Frequency  Associated symptoms: no abdominal pain, no chest pain, no congestion, no cough, no diarrhea, no fever, no headaches, no nausea, no rash, no shortness of breath, no sore throat, no vomiting and no wheezing      Patient is a 69-year-old female presenting to emergency department with urinary frequency and urgency for 1 week  Has some abdominal discomfort  No back pain  No nausea vomiting  No weakness numbness or tingling  Has had some incontinence urinary  No IV drug use  No fevers or chills  No chest pain  No shortness of breath  Patient is diabetic  MDM for checks are signs of urinary infection, CT abdomen pelvis, check electrolytes, CBC    Patient's DEEP and lactic acidosis resolved  Outpatient follow-up  Prior to Admission Medications   Prescriptions Last Dose Informant Patient Reported? Taking? ARIPiprazole (ABILIFY) 5 mg tablet   No No   Sig: Take 1 tablet (5 mg total) by mouth daily   HumaLOG KwikPen 100 units/mL injection pen   Yes No   Sig: INJECT 16 UNITS 3 TIMES A DAY BEFORE MEALS   LORazepam (ATIVAN) 0 5 mg tablet   Yes No   escitalopram (LEXAPRO) 20 mg tablet   No No   Sig: Take 1 tablet (20 mg total) by mouth daily   hydrochlorothiazide (HYDRODIURIL) 12 5 mg tablet   No No   Sig: Take 1 tablet (12 5 mg total) by mouth every other day   insulin glargine (LANTUS) 100 units/mL subcutaneous injection   No No   Sig: Inject 64 Units under the skin daily at bedtime   lamoTRIgine (LaMICtal) 150 MG tablet   No No   Sig: Take 1 tablet (150 mg total) by mouth 2 (two) times a day   liraglutide (Victoza) injection   No No   Sig: Inject 1 8 MG daily     lisinopril (ZESTRIL) 10 mg tablet   No No   Sig: Take 1 tablet (10 mg total) by mouth daily   metFORMIN (GLUCOPHAGE) 500 mg tablet   No No   Sig: Take 1 tablet (500 mg total) by mouth 2 (two) times a day with meals   ondansetron (ZOFRAN-ODT) 4 mg disintegrating tablet   No No   Sig: Take 1 tablet (4 mg total) by mouth every 8 (eight) hours as needed for nausea or vomiting   pantoprazole (PROTONIX) 40 mg tablet   No No   Sig: Take 1 tablet (40 mg total) by mouth daily   triamcinolone (KENALOG) 0 5 % cream   No No   Sig: Apply topically 3 (three) times a day      Facility-Administered Medications: None       Past Medical History:   Diagnosis Date    Anemia     Anxiety     Candidal intertrigo     Depression     Diabetes mellitus (HCC)     GERD (gastroesophageal reflux disease)     Hyperlipidemia     Hypertension     Irritable bowel syndrome     Morbid obesity with BMI of 60 0-69 9, adult ([address] )     Osteoarthritis     Seasonal allergies     Sleep difficulties     Su[facility] attempt [facility])        Past Surgical History:   Procedure Laterality Date     SECTION      CHOLECYSTECTOMY      WISDOM TOOTH EXTRACTION         Family History   Problem Relation Age of Onset    Diabetes Mother     Hypertension Father      I have reviewed and agree with the history as documented  E-Cigarette/Vaping    E-Cigarette Use Never User      E-Cigarette/Vaping Substances    Nicotine No     THC No     CBD No     Flavoring No     Other No     Unknown No      Social History     Tobacco Use    Smoking status: Never Smoker    Smokeless tobacco: Never Used   Vaping Use    Vaping Use: Never used   Substance Use Topics    Alcohol use: Yes     Comment: occassionaly     Drug use: No       Review of Systems   Constitutional: Negative for chills, diaphoresis and fever  HENT: Negative for congestion and sore throat  Respiratory: Negative for cough, shortness of breath, wheezing and stridor      Cardiovascular: Negative for chest pain, palpitations and leg swelling  Gastrointestinal: Negative for abdominal pain, blood in stool, diarrhea, nausea and vomiting  Genitourinary: Positive for frequency and urgency  Negative for dysuria  Musculoskeletal: Negative for neck pain and neck stiffness  Skin: Negative for pallor and rash  Neurological: Negative for dizziness, syncope, weakness, light-headedness and headaches  All other systems reviewed and are negative  Physical Exam  Physical Exam  Vitals reviewed  Constitutional:       Appearance: Normal appearance  She is well-developed  HENT:      Head: Normocephalic and atraumatic  Eyes:      Extraocular Movements: Extraocular movements intact  Pupils: Pupils are equal, round, and reactive to light  Cardiovascular:      Rate and Rhythm: Regular rhythm  Tachycardia present  Heart sounds: Normal heart sounds  Pulmonary:      Effort: Pulmonary effort is normal  No respiratory distress  Breath sounds: Normal breath sounds  Abdominal:      General: Bowel sounds are normal       Palpations: Abdomen is soft  Tenderness: There is no abdominal tenderness  Musculoskeletal:         General: No swelling or tenderness  Normal range of motion  Cervical back: Normal range of motion and neck supple  Skin:     General: Skin is warm and dry  Capillary Refill: Capillary refill takes less than 2 seconds  Neurological:      General: No focal deficit present  Mental Status: She is alert and oriented to person, place, and time           Vital Signs  ED Triage Vitals   Temperature Pulse Respirations Blood Pressure SpO2   01/08/22 0806 01/08/22 0806 01/08/22 0806 01/08/22 0808 01/08/22 0806   98 6 °F (37 °C) (!) 120 (!) 26 (!) 198/91 96 %      Temp Source Heart Rate Source Patient Position - Orthostatic VS BP Location FiO2 (%)   01/08/22 0806 01/08/22 0806 -- -- --   Oral Monitor         Pain Score       --                  Vitals:    01/08/22 0815 01/08/22 0830 01/08/22 1215 01/08/22 1230   BP: (!) 198/91  143/82    Pulse: 96 94 90 82         Visual Acuity      ED Medications  Medications   sodium chloride 0 9 % bolus 1,000 mL (0 mL Intravenous Stopped 1/8/22 1013)   sodium chloride 0 9 % bolus 1,000 mL (0 mL Intravenous Stopped 1/8/22 1210)   ciprofloxacin (CIPRO) IVPB (premix in 5% dextrose) 400 mg 200 mL (0 mg Intravenous Stopped 1/8/22 1210)   iohexol (OMNIPAQUE) 350 MG/ML injection (SINGLE-DOSE) 100 mL (100 mL Intravenous Given 1/8/22 1110)       Diagnostic Studies  Results Reviewed     Procedure Component Value Units Date/Time    COVID only - 48 hour TAT [[id number]] Collected: 01/08/22 1324    Lab Status:  In process Specimen: Nares from Nose Updated: 01/08/22 1331    Comprehensive metabolic panel [[id number]]  (Abnormal) Collected: 01/08/22 1219    Lab Status: Final result Specimen: Blood from Arm, Right Updated: 01/08/22 1302     Sodium 137 mmol/L      Potassium 4 0 mmol/L      Chloride 102 mmol/L      CO2 25 mmol/L      ANION GAP 10 mmol/L      BUN 30 mg/dL      Creatinine 1 12 mg/dL      Glucose 181 mg/dL      Calcium 8 8 mg/dL      Corrected Calcium 9 4 mg/dL      AST 16 U/L      ALT 54 U/L      Alkaline Phosphatase 82 U/L      Total Protein 7 2 g/dL      Albumin 3 3 g/dL      Total Bilirubin 0 24 mg/dL      eGFR 58 ml/min/1 73sq m     Narrative:      [place] guidelines for Chronic Kidney Disease (CKD):     Stage 1 with normal or high GFR (GFR > 90 mL/min/1 73 square meters)    Stage 2 Mild CKD (GFR = 60-89 mL/min/1 73 square meters)    Stage 3A Moderate CKD (GFR = 45-59 mL/min/1 73 square meters)    Stage 3B Moderate CKD (GFR = 30-44 mL/min/1 73 square meters)    Stage 4 Severe CKD (GFR = 15-29 mL/min/1 73 square meters)    Stage 5 End Stage CKD (GFR <15 mL/min/1 73 square meters)  Note: GFR calculation is accurate only with a steady state creatinine    Lactic acid 2 Hours [[id number]]  (Normal) Collected: 01/08/22 1055    Lab Status: Final result Specimen: Blood from Arm, Right Updated: 01/08/22 1141     LACTIC ACID 1 9 mmol/L     Narrative:      Result may be elevated if tourniquet was used during collection  Urine culture [[id number]] Collected: 01/08/22 1055    Lab Status: In process Specimen: Urine, Clean Catch Updated: 01/08/22 1106    Blood culture #2 [[id number]] Collected: 01/08/22 1055    Lab Status: In process Specimen: Blood from Arm, Right Updated: 01/08/22 1106    Blood culture #1 [[id number]] Collected: 01/08/22 1049    Lab Status: In process Specimen: Blood from Arm, Right Updated: 01/08/22 1106    Urine Microscopic [[id number]]  (Abnormal) Collected: 01/08/22 0933    Lab Status: Final result Specimen: Urine, Clean Catch Updated: 01/08/22 1023     RBC, UA None Seen /hpf      WBC, UA 4-10 /hpf      Epithelial Cells Moderate /hpf      Bacteria, UA Moderate /hpf      Hyaline Casts, UA 0-1 /lpf     Lactic acid [[id number]]  (Abnormal) Collected: 01/08/22 0914    Lab Status: Final result Specimen: Blood from Arm, Right Updated: 01/08/22 1004     LACTIC ACID 2 6 mmol/L     Narrative:      Result may be elevated if tourniquet was used during collection      Comprehensive metabolic panel [[id number]]  (Abnormal) Collected: 01/08/22 0841    Lab Status: Final result Specimen: Blood from Arm, Right Updated: 01/08/22 0955     Sodium 136 mmol/L      Potassium 4 4 mmol/L      Chloride 100 mmol/L      CO2 23 mmol/L      ANION GAP 13 mmol/L      BUN 34 mg/dL      Creatinine 1 42 mg/dL      Glucose 266 mg/dL      Calcium 9 5 mg/dL      AST 22 U/L      ALT 55 U/L      Alkaline Phosphatase 89 U/L      Total Protein 7 9 g/dL      Albumin 3 6 g/dL      Total Bilirubin 0 20 mg/dL      eGFR 43 ml/min/1 73sq m     Narrative:      [place] guidelines for Chronic Kidney Disease (CKD):     Stage 1 with normal or high GFR (GFR > 90 mL/min/1 73 square meters)    Stage 2 Mild CKD (GFR = 60-89 mL/min/1 73 square meters)    Stage 3A Moderate CKD (GFR = 45-59 mL/min/1 73 square meters)    Stage 3B Moderate CKD (GFR = 30-44 mL/min/1 73 square meters)    Stage 4 Severe CKD (GFR = 15-29 mL/min/1 73 square meters)    Stage 5 End Stage CKD (GFR <15 mL/min/1 73 square meters)  Note: GFR calculation is accurate only with a steady state creatinine    Urine Macroscopic, POC [[id number]]  (Abnormal) Collected: 01/08/22 0933    Lab Status: Final result Specimen: Urine Updated: 01/08/22 0935     Color, UA Yellow     Clarity, UA Slightly Cloudy     pH, UA 5 0     Leukocytes, UA Trace     Nitrite, UA Negative     Protein, UA 30 (1+) mg/dl      Glucose, UA Negative mg/dl      Ketones, UA Negative mg/dl      Urobilinogen, UA 0 2 E U /dl      Bilirubin, UA Negative     Blood, UA Negative     Specific Gravity, UA >=1 030    Narrative:      CLINITEK RESULT    POCT pregnancy, urine [[id number]]  (Normal) Resulted: 01/08/22 0930    Lab Status: Final result Updated: 01/08/22 0931     EXT PREG TEST UR (Ref: Negative) negative     Control valid    CBC and differential [[id number]]  (Abnormal) Collected: 01/08/22 0841    Lab Status: Final result Specimen: Blood from Arm, Right Updated: 01/08/22 0855     WBC 12 09 Thousand/uL      RBC 5 02 Million/uL      Hemoglobin 13 6 g/dL      Hematocrit 43 6 %      MCV 87 fL      MCH 27 1 pg      MCHC 31 2 g/dL      RDW 15 1 %      MPV 9 7 fL      Platelets 890 Thousands/uL      nRBC 0 /100 WBCs      Neutrophils Relative 61 %      Immat GRANS % 1 %      Lymphocytes Relative 27 %      Monocytes Relative 9 %      Eosinophils Relative 2 %      Basophils Relative 0 %      Neutrophils Absolute 7 44 Thousands/µL      Immature Grans Absolute 0 07 Thousand/uL      Lymphocytes Absolute 3 20 Thousands/µL      Monocytes Absolute 1 13 Thousand/µL      Eosinophils Absolute 0 20 Thousand/µL      Basophils Absolute 0 05 Thousands/µL     Fingerstick Glucose (POCT) [[id number]]  (Abnormal) Collected: 01/08/22 0826    Lab Status: Final result Updated: 01/08/22 0827 POC Glucose 260 mg/dl                  CT abdomen pelvis with contrast   Final Result by Zahira Sánchez MD (01/08 1126)      1  No acute findings in the abdomen and pelvis  2   3 mm intrarenal calculus at the left lower pole  No hydronephrosis or hydroureter  Workstation performed: [id number]                    Procedures  Procedures         ED Course  ED Course as of 01/08/22 1624   Sat Jan 08, 2022   1311 Discussed results with patient  She would like to go home  Will follow-up with family doctor get repeat blood work in 3 days  She verbalized understanding the importance of this  SBIRT 22yo+      Most Recent Value   SBIRT (24 yo +)    In order to provide better care to our patients, we are screening all of our patients for alcohol and drug use  Would it be okay to ask you these screening questions? No Filed at: 01/08/2022 0947   Initial Alcohol Screen: US AUDIT-C     1  How often do you have a drink containing alcohol? 0 Filed at: 01/08/2022 0947   2  How many drinks containing alcohol do you have on a typical day you are drinking? 0 Filed at: 01/08/2022 0947   3a  Male UNDER 65: How often do you have five or more drinks on one occasion? 0 Filed at: 01/08/2022 0947   3b  FEMALE Any Age, or MALE 65+: How often do you have 4 or more drinks on one occassion? 0 Filed at: 01/08/2022 0947   Audit-C Score 0 Filed at: 01/08/2022 8467   JUAN: How many times in the past year have you    Used an illegal drug or used a prescription medication for non-medical reasons? Never Filed at: 01/08/2022 9283                    MDM     Patient aware she has a kidney stone  Aware she is to follow up as outpatient  Will return if worse      Disposition  Final diagnoses:   DEEP (acute kidney injury) ([facility] [address] )   UTI (urinary tract infection)     Time reflects when diagnosis was documented in both MDM as applicable and the Disposition within this note     Time User Action Codes Description Comment 1/8/2022  1:12 PM Aamnda Rivers Add [N17 9] DEEP (acute kidney injury) ([facility] [address] )     1/8/2022  1:12 PM Amanda Rivers Add [N39 0] UTI (urinary tract infection)       ED Disposition     ED Disposition Condition Date/Time Comment    Discharge Stable Sat Jan 8, 2022  1:12 PM Eli Tracey discharge to home/self care              Follow-up Information     Follow up With Specialties Details Why Contact Info Additional Paige Moy, [place] Nurse Practitioner In 3 days Please follow-up and get repeat blood work to evaluate your kidney function and blood sugar [address] [facility] [address]  [facility] Emergency Department Emergency Medicine  As needed, If symptoms worsen [address] [facility] [id number] [place] [address] Emergency Department, [address], [place], [place], [address]          Discharge Medication List as of 1/8/2022  1:16 PM      START taking these medications    Details   ciprofloxacin (CIPRO) 500 mg tablet Take 1 tablet (500 mg total) by mouth 2 (two) times a day for 5 days, Starting Sat 1/8/2022, Until Thu 1/13/2022, Normal         CONTINUE these medications which have NOT CHANGED    Details   ARIPiprazole (ABILIFY) 5 mg tablet Take 1 tablet (5 mg total) by mouth daily, Starting Wed 8/4/2021, Until Wed 10/20/2021, Normal      escitalopram (LEXAPRO) 20 mg tablet Take 1 tablet (20 mg total) by mouth daily, Starting Wed 8/4/2021, Until Wed 10/20/2021, Normal      HumaLOG KwikPen 100 units/mL injection pen INJECT 16 UNITS 3 TIMES A DAY BEFORE MEALS, Historical Med      hydrochlorothiazide (HYDRODIURIL) 12 5 mg tablet Take 1 tablet (12 5 mg total) by mouth every other day, Starting Wed 10/20/2021, Normal      insulin glargine (LANTUS) 100 units/mL subcutaneous injection Inject 64 Units under the skin daily at bedtime, Starting Mon 8/2/2021, No Print      lamoTRIgine (LaMICtal) 150 MG tablet Take 1 tablet (150 mg total) by mouth 2 (two) times a day, Starting Tue 8/3/2021, Until Wed 10/20/2021, Normal      liraglutide (Victoza) injection Inject 1 8 MG daily  , Normal      lisinopril (ZESTRIL) 10 mg tablet Take 1 tablet (10 mg total) by mouth daily, Starting Wed 10/20/2021, Normal      LORazepam (ATIVAN) 0 5 mg tablet Starting Wed 10/6/2021, Historical Med      metFORMIN (GLUCOPHAGE) 500 mg tablet Take 1 tablet (500 mg total) by mouth 2 (two) times a day with meals, Starting Mon 8/31/2020, Until Wed 10/20/2021, Normal      ondansetron (ZOFRAN-ODT) 4 mg disintegrating tablet Take 1 tablet (4 mg total) by mouth every 8 (eight) hours as needed for nausea or vomiting, Starting Mon 10/12/2020, Normal      pantoprazole (PROTONIX) 40 mg tablet Take 1 tablet (40 mg total) by mouth daily, Starting Wed 10/20/2021, Normal      triamcinolone (KENALOG) 0 5 % cream Apply topically 3 (three) times a day, Starting Wed 10/20/2021, Normal             No discharge procedures on file      PDMP Review       Value Time User    PDMP Reviewed  Yes 8/3/2021  8:09 AM Guille Garcia DO          ED Provider  Electronically Signed by           Didi Peters MD  01/08/22 2236

## 2022-01-10 LAB
BACTERIA UR CULT: ABNORMAL
BACTERIA UR CULT: ABNORMAL
SARS-COV-2 RNA RESP QL NAA+PROBE: NEGATIVE

## 2022-01-10 NOTE — RESULT ENCOUNTER NOTE
Patient called at [phone]  Name and date of birth use as positive patient identifiers  Made aware of negative test results

## 2022-01-13 LAB
BACTERIA BLD CULT: NORMAL
BACTERIA BLD CULT: NORMAL

## 2022-03-23 ENCOUNTER — HOSPITAL ENCOUNTER (EMERGENCY)
Facility: HOSPITAL | Age: 47
Discharge: HOME | End: 2022-03-23
Attending: EMERGENCY MEDICINE
Payer: COMMERCIAL

## 2022-03-23 ENCOUNTER — APPOINTMENT (EMERGENCY)
Dept: CT IMAGING | Facility: HOSPITAL | Age: 47
End: 2022-03-23
Payer: COMMERCIAL

## 2022-03-23 VITALS
DIASTOLIC BLOOD PRESSURE: 82 MMHG | HEART RATE: 101 BPM | RESPIRATION RATE: 22 BRPM | TEMPERATURE: 98.2 F | OXYGEN SATURATION: 94 % | SYSTOLIC BLOOD PRESSURE: 174 MMHG

## 2022-03-23 DIAGNOSIS — L03.90 CELLULITIS: Primary | ICD-10-CM

## 2022-03-23 DIAGNOSIS — L02.91 ABSCESS: ICD-10-CM

## 2022-03-23 LAB
ANION GAP SERPL CALCULATED.3IONS-SCNC: 8 MMOL/L (ref 4–13)
BASOPHILS # BLD AUTO: 0.08 THOUSANDS/ΜL (ref 0–0.1)
BASOPHILS NFR BLD AUTO: 1 % (ref 0–1)
BUN SERPL-MCNC: 16 MG/DL (ref 5–25)
CALCIUM SERPL-MCNC: 9 MG/DL (ref 8.3–10.1)
CHLORIDE SERPL-SCNC: 102 MMOL/L (ref 100–108)
CO2 SERPL-SCNC: 28 MMOL/L (ref 21–32)
CREAT SERPL-MCNC: 0.85 MG/DL (ref 0.6–1.3)
EOSINOPHIL # BLD AUTO: 0.22 THOUSAND/ΜL (ref 0–0.61)
EOSINOPHIL NFR BLD AUTO: 2 % (ref 0–6)
ERYTHROCYTE [DISTWIDTH] IN BLOOD BY AUTOMATED COUNT: 15.7 % (ref 11.6–15.1)
GFR SERPL CREATININE-BSD FRML MDRD: 80 ML/MIN/1.73SQ M
GLUCOSE SERPL-MCNC: 169 MG/DL (ref 65–140)
HCT VFR BLD AUTO: 39.3 % (ref 34.8–46.1)
HGB BLD-MCNC: 12.2 G/DL (ref 11.5–15.4)
IMM GRANULOCYTES # BLD AUTO: 0.06 THOUSAND/UL (ref 0–0.2)
IMM GRANULOCYTES NFR BLD AUTO: 1 % (ref 0–2)
LACTATE SERPL-SCNC: 1.6 MMOL/L (ref 0.5–2)
LYMPHOCYTES # BLD AUTO: 2.31 THOUSANDS/ΜL (ref 0.6–4.47)
LYMPHOCYTES NFR BLD AUTO: 24 % (ref 14–44)
MCH RBC QN AUTO: 27.7 PG (ref 26.8–34.3)
MCHC RBC AUTO-ENTMCNC: 31 G/DL (ref 31.4–37.4)
MCV RBC AUTO: 89 FL (ref 82–98)
MONOCYTES # BLD AUTO: 1 THOUSAND/ΜL (ref 0.17–1.22)
MONOCYTES NFR BLD AUTO: 10 % (ref 4–12)
NEUTROPHILS # BLD AUTO: 6.1 THOUSANDS/ΜL (ref 1.85–7.62)
NEUTS SEG NFR BLD AUTO: 62 % (ref 43–75)
NRBC BLD AUTO-RTO: 0 /100 WBCS
PLATELET # BLD AUTO: 346 THOUSANDS/UL (ref 149–390)
PMV BLD AUTO: 9.7 FL (ref 8.9–12.7)
POTASSIUM SERPL-SCNC: 4.3 MMOL/L (ref 3.5–5.3)
RBC # BLD AUTO: 4.4 MILLION/UL (ref 3.81–5.12)
SODIUM SERPL-SCNC: 138 MMOL/L (ref 136–145)
WBC # BLD AUTO: 9.77 THOUSAND/UL (ref 4.31–10.16)

## 2022-03-23 PROCEDURE — 96361 HYDRATE IV INFUSION ADD-ON: CPT

## 2022-03-23 PROCEDURE — 85025 COMPLETE CBC W/AUTO DIFF WBC: CPT | Performed by: EMERGENCY MEDICINE

## 2022-03-23 PROCEDURE — 96375 TX/PRO/DX INJ NEW DRUG ADDON: CPT

## 2022-03-23 PROCEDURE — 83605 ASSAY OF LACTIC ACID: CPT | Performed by: EMERGENCY MEDICINE

## 2022-03-23 PROCEDURE — 87205 SMEAR GRAM STAIN: CPT | Performed by: EMERGENCY MEDICINE

## 2022-03-23 PROCEDURE — 87040 BLOOD CULTURE FOR BACTERIA: CPT | Performed by: EMERGENCY MEDICINE

## 2022-03-23 PROCEDURE — G1004 CDSM NDSC: HCPCS

## 2022-03-23 PROCEDURE — 10060 I&D ABSCESS SIMPLE/SINGLE: CPT | Performed by: EMERGENCY MEDICINE

## 2022-03-23 PROCEDURE — 74177 CT ABD & PELVIS W/CONTRAST: CPT

## 2022-03-23 PROCEDURE — 80048 BASIC METABOLIC PNL TOTAL CA: CPT | Performed by: EMERGENCY MEDICINE

## 2022-03-23 PROCEDURE — 96365 THER/PROPH/DIAG IV INF INIT: CPT

## 2022-03-23 PROCEDURE — 87070 CULTURE OTHR SPECIMN AEROBIC: CPT | Performed by: EMERGENCY MEDICINE

## 2022-03-23 PROCEDURE — 99284 EMERGENCY DEPT VISIT MOD MDM: CPT | Performed by: EMERGENCY MEDICINE

## 2022-03-23 PROCEDURE — 99283 EMERGENCY DEPT VISIT LOW MDM: CPT

## 2022-03-23 PROCEDURE — 36415 COLL VENOUS BLD VENIPUNCTURE: CPT | Performed by: EMERGENCY MEDICINE

## 2022-03-23 RX ORDER — SACCHAROMYCES BOULARDII 250 MG
250 CAPSULE ORAL 2 TIMES DAILY
Qty: 14 CAPSULE | Refills: 0 | Status: SHIPPED | OUTPATIENT
Start: 2022-03-23 | End: 2022-03-30

## 2022-03-23 RX ORDER — CLINDAMYCIN HYDROCHLORIDE 150 MG/1
300 CAPSULE ORAL 4 TIMES DAILY
Qty: 56 CAPSULE | Refills: 0 | Status: SHIPPED | OUTPATIENT
Start: 2022-03-23 | End: 2022-03-23 | Stop reason: CLARIF

## 2022-03-23 RX ORDER — CLINDAMYCIN PHOSPHATE 600 MG/50ML
600 INJECTION INTRAVENOUS ONCE
Status: COMPLETED | OUTPATIENT
Start: 2022-03-23 | End: 2022-03-23

## 2022-03-23 RX ORDER — ONDANSETRON 2 MG/ML
4 INJECTION INTRAMUSCULAR; INTRAVENOUS ONCE
Status: COMPLETED | OUTPATIENT
Start: 2022-03-23 | End: 2022-03-23

## 2022-03-23 RX ORDER — MORPHINE SULFATE 4 MG/ML
4 INJECTION, SOLUTION INTRAMUSCULAR; INTRAVENOUS ONCE
Status: COMPLETED | OUTPATIENT
Start: 2022-03-23 | End: 2022-03-23

## 2022-03-23 RX ORDER — LIDOCAINE HYDROCHLORIDE AND EPINEPHRINE 10; 10 MG/ML; UG/ML
10 INJECTION, SOLUTION INFILTRATION; PERINEURAL ONCE
Status: COMPLETED | OUTPATIENT
Start: 2022-03-23 | End: 2022-03-23

## 2022-03-23 RX ORDER — CLINDAMYCIN HYDROCHLORIDE 150 MG/1
300 CAPSULE ORAL 4 TIMES DAILY
Qty: 56 CAPSULE | Refills: 0 | Status: SHIPPED | OUTPATIENT
Start: 2022-03-23 | End: 2022-03-30

## 2022-03-23 RX ORDER — ACETAMINOPHEN 325 MG/1
975 TABLET ORAL ONCE
Status: COMPLETED | OUTPATIENT
Start: 2022-03-23 | End: 2022-03-23

## 2022-03-23 RX ADMIN — ACETAMINOPHEN 975 MG: 325 TABLET ORAL at 15:40

## 2022-03-23 RX ADMIN — ONDANSETRON 4 MG: 2 INJECTION INTRAMUSCULAR; INTRAVENOUS at 15:37

## 2022-03-23 RX ADMIN — CLINDAMYCIN PHOSPHATE 600 MG: 600 INJECTION, SOLUTION INTRAVENOUS at 15:38

## 2022-03-23 RX ADMIN — LIDOCAINE HYDROCHLORIDE,EPINEPHRINE BITARTRATE 10 ML: 10; .01 INJECTION, SOLUTION INFILTRATION; PERINEURAL at 18:09

## 2022-03-23 RX ADMIN — MORPHINE SULFATE 4 MG: 4 INJECTION INTRAVENOUS at 15:37

## 2022-03-23 RX ADMIN — IOHEXOL 100 ML: 350 INJECTION, SOLUTION INTRAVENOUS at 17:22

## 2022-03-23 RX ADMIN — SODIUM CHLORIDE 1000 ML: 0.9 INJECTION, SOLUTION INTRAVENOUS at 15:37

## 2022-03-23 NOTE — DISCHARGE INSTRUCTIONS
Please return to the emergency department or follow-up with your PCP in 2 days time for a wound check  Take the antibiotics as prescribed, he will take 300 mg 4 times daily  Take probiotics as prescribed  Please return to the emergency department should you develop any new and worsening fevers, any spreading rash around the site of your infection, or any other concerning symptoms

## 2022-03-23 NOTE — Clinical Note
Dylan Robbins was seen and treated in our emergency department on 3/23/2022  Diagnosis:     Parag Russo  may return to work on return date  She may return on this date: 03/24/2022         If you have any questions or concerns, please don't hesitate to call        Tho Sherman, DO    ______________________________           _______________          _______________  Hospital Representative                              Date                                Time

## 2022-03-23 NOTE — ED PROVIDER NOTES
History  Chief Complaint   Patient presents with    Abscess     Pt reports abscess to vaginal region x 2 days  Pt reports that is has gotten bigger and is painful  Darion Chau is a 50year old female presenting for evaluation of a possible abscess around her groin  The patient said that she 1st noticed it several days ago, she believes it is since increased in size and has become more painful  She says that she believes it is now actively draining as well  She endorses some chills and bodyaches, but is unaware of any fevers  History provided by:  Patient   used: No    Abscess  Location:  Pelvis  Pelvic abscess location:  Groin  Abscess quality: draining    Progression:  Worsening  Context: diabetes    Associated symptoms: no fever and no vomiting        Prior to Admission Medications   Prescriptions Last Dose Informant Patient Reported? Taking? ARIPiprazole (ABILIFY) 5 mg tablet   No No   Sig: Take 1 tablet (5 mg total) by mouth daily   HumaLOG KwikPen 100 units/mL injection pen   Yes No   Sig: INJECT 16 UNITS 3 TIMES A DAY BEFORE MEALS   LORazepam (ATIVAN) 0 5 mg tablet   Yes No   escitalopram (LEXAPRO) 20 mg tablet   No No   Sig: Take 1 tablet (20 mg total) by mouth daily   hydrochlorothiazide (HYDRODIURIL) 12 5 mg tablet   No No   Sig: Take 1 tablet (12 5 mg total) by mouth every other day   insulin glargine (LANTUS) 100 units/mL subcutaneous injection   No No   Sig: Inject 64 Units under the skin daily at bedtime   lamoTRIgine (LaMICtal) 150 MG tablet   No No   Sig: Take 1 tablet (150 mg total) by mouth 2 (two) times a day   liraglutide (Victoza) injection   No No   Sig: Inject 1 8 MG daily     lisinopril (ZESTRIL) 10 mg tablet   No No   Sig: Take 1 tablet (10 mg total) by mouth daily   metFORMIN (GLUCOPHAGE) 500 mg tablet   No No   Sig: Take 1 tablet (500 mg total) by mouth 2 (two) times a day with meals   ondansetron (ZOFRAN-ODT) 4 mg disintegrating tablet   No No   Sig: Take 1 tablet (4 mg total) by mouth every 8 (eight) hours as needed for nausea or vomiting   pantoprazole (PROTONIX) 40 mg tablet   No No   Sig: Take 1 tablet (40 mg total) by mouth daily   triamcinolone (KENALOG) 0 5 % cream   No No   Sig: Apply topically 3 (three) times a day      Facility-Administered Medications: None       Past Medical History:   Diagnosis Date    Anemia     Anxiety     Candidal intertrigo     Depression     Diabetes mellitus ([place] [address] )     GERD (gastroesophageal reflux disease)     Hyperlipidemia     Hypertension     Irritable bowel syndrome     Morbid obesity with BMI of 60 0-69 9, adult ([facility] )     Osteoarthritis     Seasonal allergies     Sleep difficulties     [facility])        Past Surgical History:   Procedure Laterality Date     SECTION  1992    CHOLECYSTECTOMY      WISDOM TOOTH EXTRACTION  2009       Family History   Problem Relation Age of Onset    Diabetes Mother     Hypertension Father      I have reviewed and agree with the history as documented  E-Cigarette/Vaping    E-Cigarette Use Never User      E-Cigarette/Vaping Substances    Nicotine No     THC No     CBD No     Flavoring No     Other No     Unknown No      Social History     Tobacco Use    Smoking status: Never Smoker    Smokeless tobacco: Never Used   Vaping Use    Vaping Use: Never used   Substance Use Topics    Alcohol use: Yes     Comment: occassionaly     Drug use: No        Review of Systems   Constitutional: Positive for chills  Negative for fever  HENT: Negative for ear pain and sore throat  Eyes: Negative for pain and visual disturbance  Respiratory: Negative for cough and shortness of breath  Cardiovascular: Negative for chest pain and palpitations  Gastrointestinal: Negative for abdominal pain and vomiting  Genitourinary: Negative for dysuria and hematuria  Musculoskeletal: Negative for arthralgias and back pain  Skin: Positive for rash   Negative for color change  Abscess in groin   Neurological: Negative for seizures and syncope  All other systems reviewed and are negative  Physical Exam  ED Triage Vitals   Temperature Pulse Respirations Blood Pressure SpO2   03/23/22 1216 03/23/22 1216 03/23/22 1216 03/23/22 1216 03/23/22 1216   98 2 °F (36 8 °C) 101 22 (!) 174/82 94 %      Temp Source Heart Rate Source Patient Position - Orthostatic VS BP Location FiO2 (%)   03/23/22 1216 03/23/22 1216 03/23/22 1216 03/23/22 1216 --   Oral Monitor Sitting Left arm       Pain Score       03/23/22 1537       5             Orthostatic Vital Signs  Vitals:    03/23/22 1216   BP: (!) 174/82   Pulse: 101   Patient Position - Orthostatic VS: Sitting       Physical Exam  Vitals and nursing note reviewed  Constitutional:       General: She is not in acute distress  Appearance: She is obese  HENT:      Head: Normocephalic and atraumatic  Mouth/Throat:      Mouth: Mucous membranes are moist       Pharynx: Oropharynx is clear  Eyes:      General: No scleral icterus  Conjunctiva/sclera: Conjunctivae normal    Cardiovascular:      Rate and Rhythm: Normal rate and regular rhythm  Heart sounds: Normal heart sounds  Pulmonary:      Effort: Pulmonary effort is normal  No respiratory distress  Breath sounds: Normal breath sounds  No wheezing, rhonchi or rales  Abdominal:      General: Abdomen is flat  There is no distension  Tenderness: There is no abdominal tenderness  Musculoskeletal:         General: No tenderness or signs of injury  Cervical back: Neck supple  No rigidity  Right lower leg: Edema present  Left lower leg: Edema present  Skin:     General: Skin is warm  Coloration: Skin is not jaundiced  Findings: Abscess present  No erythema or rash               Comments: Patient has a 4 cm abscess on her pannus that is open and draining purulence material      Neurological:      General: No focal deficit present  Mental Status: She is alert  Mental status is at baseline  Psychiatric:         Mood and Affect: Mood normal          Behavior: Behavior normal          ED Medications  Medications   clindamycin (CLEOCIN) IVPB (premix in dextrose) 600 mg 50 mL (0 mg Intravenous Stopped 3/23/22 1608)   sodium chloride 0 9 % bolus 1,000 mL (0 mL Intravenous Stopped 3/23/22 1920)   acetaminophen (TYLENOL) tablet 975 mg (975 mg Oral Given 3/23/22 1540)   morphine (PF) 4 mg/mL injection 4 mg (4 mg Intravenous Given 3/23/22 1537)   ondansetron (ZOFRAN) injection 4 mg (4 mg Intravenous Given 3/23/22 1537)   iohexol (OMNIPAQUE) 350 MG/ML injection (SINGLE-DOSE) 100 mL (100 mL Intravenous Given 3/23/22 1722)   lidocaine-epinephrine (XYLOCAINE/EPINEPHRINE) 1 %-1:100,000 injection 10 mL (10 mL Infiltration Given by Other 3/23/22 1809)       Diagnostic Studies  Results Reviewed     Procedure Component Value Units Date/Time    Lactic acid [[id number]]  (Normal) Collected: 03/23/22 1523    Lab Status: Final result Specimen: Blood from Arm, Right Updated: 03/23/22 1626     LACTIC ACID 1 6 mmol/L     Narrative:      Result may be elevated if tourniquet was used during collection      Basic metabolic panel [[id number]]  (Abnormal) Collected: 03/23/22 1523    Lab Status: Final result Specimen: Blood from Arm, Right Updated: 03/23/22 1603     Sodium 138 mmol/L      Potassium 4 3 mmol/L      Chloride 102 mmol/L      CO2 28 mmol/L      ANION GAP 8 mmol/L      BUN 16 mg/dL      Creatinine 0 85 mg/dL      Glucose 169 mg/dL      Calcium 9 0 mg/dL      eGFR 80 ml/min/1 73sq m     Narrative:      [place] guidelines for Chronic Kidney Disease (CKD):     Stage 1 with normal or high GFR (GFR > 90 mL/min/1 73 square meters)    Stage 2 Mild CKD (GFR = 60-89 mL/min/1 73 square meters)    Stage 3A Moderate CKD (GFR = 45-59 mL/min/1 73 square meters)    Stage 3B Moderate CKD (GFR = 30-44 mL/min/1 73 square meters)   Stage 4 Severe CKD (GFR = 15-29 mL/min/1 73 square meters)    Stage 5 End Stage CKD (GFR <15 mL/min/1 73 square meters)  Note: GFR calculation is accurate only with a steady state creatinine    CBC and differential [[id number]]  (Abnormal) Collected: 03/23/22 1523    Lab Status: Final result Specimen: Blood from Arm, Right Updated: 03/23/22 1542     WBC 9 77 Thousand/uL      RBC 4 40 Million/uL      Hemoglobin 12 2 g/dL      Hematocrit 39 3 %      MCV 89 fL      MCH 27 7 pg      MCHC 31 0 g/dL      RDW 15 7 %      MPV 9 7 fL      Platelets 086 Thousands/uL      nRBC 0 /100 WBCs      Neutrophils Relative 62 %      Immat GRANS % 1 %      Lymphocytes Relative 24 %      Monocytes Relative 10 %      Eosinophils Relative 2 %      Basophils Relative 1 %      Neutrophils Absolute 6 10 Thousands/µL      Immature Grans Absolute 0 06 Thousand/uL      Lymphocytes Absolute 2 31 Thousands/µL      Monocytes Absolute 1 00 Thousand/µL      Eosinophils Absolute 0 22 Thousand/µL      Basophils Absolute 0 08 Thousands/µL     Blood culture #1 [[id number]] Collected: 03/23/22 1523    Lab Status: In process Specimen: Blood from Arm, Right Updated: 03/23/22 1537    Blood culture #2 [[id number]] Collected: 03/23/22 1523    Lab Status: In process Specimen: Blood from Arm, Right Updated: 03/23/22 1537    Wound culture and Gram stain [[id number]] Collected: 03/23/22 1523    Lab Status: In process Specimen: Wound from Groin Updated: 03/23/22 1536                 CT abdomen pelvis with contrast   Final Result by Alice Sprague MD (03/23 1756)      Diffuse skin thickening and subcutaneous edema of the left lower aspect of the patient's pannus in keeping with cellulitis  No loculated collection to indicate an abscess  The study was marked in [facility] for immediate notification              Workstation performed: [id number]               Procedures  Incision and drain    Date/Time: 3/23/2022 7:11 PM  Performed by: Garett Adame 1701 Taylor Mcmillan DO  Authorized by: Brandi Lafleur DO   Universal Protocol:  Consent: Verbal consent obtained  Risks and benefits: risks, benefits and alternatives were discussed  Consent given by: patient  Time out: Immediately prior to procedure a "time out" was called to verify the correct patient, procedure, equipment, support staff and site/side marked as required  Timeout called at: 3/23/2022 6:11 PM   Patient understanding: patient states understanding of the procedure being performed  Patient consent: the patient's understanding of the procedure matches consent given  Procedure consent: procedure consent matches procedure scheduled  Patient identity confirmed: verbally with patient and arm band      Patient location:  ED  Location:     Type:  Abscess    Size:  4 cm    Location:  Trunk    Trunk location:  Abdomen  Anesthesia (see MAR for exact dosages): Anesthesia method:  Local infiltration    Local anesthetic:  Lidocaine 1% WITH epi  Procedure details:     Complexity:  Simple    Incision types:  Single straight    Scalpel blade:  11    Approach:  Open    Incision depth:  Subcutaneous    Drainage:  Bloody and serosanguinous    Drainage amount: Moderate    Wound treatment:  Wound left open    Packing materials:  None  Post-procedure details:     Patient tolerance of procedure:   Tolerated well, no immediate complications    POC MSK/Soft Tissue US    Date/Time: 3/23/2022 7:54 PM  Performed by: Brandi Lafleur DO  Authorized by: Brandi Lafleur DO     Patient location:  ED  Performed by:  Resident  Other Assisting Provider: Yes (comment) (Dr Heather Clement)    Procedure:     Performed: soft tissue ultrasound    Procedure details:     Exam Type:  Diagnostic    Longitudinal view:  Obtained    Transverse view:  Obtained    Image quality: limited diagnostic      Image availability:  Not saved  Soft tissue ultrasound:     Soft tissue indications: suspected abscess      Anatomic location: Abdominal wall    Soft tissue findings: cobblestoning and subcutaneous collection (comment)      Collection size (cm):  4  Interpretation:     Soft tissue impressions: consistent with abscess            ED Course                             SBIRT 20yo+      Most Recent Value   SBIRT (22 yo +)    In order to provide better care to our patients, we are screening all of our patients for alcohol and drug use  Would it be okay to ask you these screening questions? Yes Filed at: 03/23/2022 1314   Initial Alcohol Screen: US AUDIT-C     1  How often do you have a drink containing alcohol? 0 Filed at: 03/23/2022 1314   2  How many drinks containing alcohol do you have on a typical day you are drinking? 0 Filed at: 03/23/2022 1314   3a  Male UNDER 65: How often do you have five or more drinks on one occasion? 0 Filed at: 03/23/2022 1314   3b  FEMALE Any Age, or MALE 65+: How often do you have 4 or more drinks on one occassion? 0 Filed at: 03/23/2022 1314   Audit-C Score 0 Filed at: 03/23/2022 1314   JUAN: How many times in the past year have you    Used an illegal drug or used a prescription medication for non-medical reasons? Never Filed at: 03/23/2022 1314                MDM  Number of Diagnoses or Management Options  Abscess: new and requires workup  Cellulitis: new and requires workup     Amount and/or Complexity of Data Reviewed  Clinical lab tests: ordered and reviewed  Tests in the radiology section of CPT®: reviewed and ordered  Review and summarize past medical records: yes  Independent visualization of images, tracings, or specimens: yes    Risk of Complications, Morbidity, and/or Mortality  General comments: Dorene Villareal is a 53-year-old female who presented today for evaluation of a possible abscess  She noticed the abscess several days ago, she believes that it started draining today  She denies any fevers, but she does endorse chills and body aches  Patient was afebrile today    Physical exam showed a open and draining abscess on the left side of her lower pannus  There is surrounding erythema  It was indurated  Bedside ultrasound was completed which showed what appeared to be a 3-4 cm abscess without any tracking  Given the location, I did have concern that the abscess may track, CT scan was ordered for further evaluation  CT scan of the abdomen showed cellulitis, no distinct abscess was visualized  Incision and drainage was performed at the site that was open and actively draining  A moderate amount of serosanguineous fluid mixed with blood was appreciated  A wound culture was taken  Patient tolerated the procedure well  Patient stable for discharge, patient given a dose of antibiotics here in the emergency department, patient discharged with a prescription for clindamycin for 1 weeks time  I advised her to return to the emergency department for in 2 days for re-evaluation of the wound  I gave her strict return precautions, she was agreeable to the plan  Patient Progress  Patient progress: stable      Disposition  Final diagnoses:   Cellulitis   Abscess     Time reflects when diagnosis was documented in both MDM as applicable and the Disposition within this note     Time User Action Codes Description Comment    3/23/2022  6:42 PM Tho Krishna Add [L03 90] Cellulitis     3/23/2022  6:43 PM Tho Krishna Add [L02 91] Abscess       ED Disposition     ED Disposition Condition Date/Time Comment    Discharge Stable Wed Mar 23, 2022  6:44 PM Elisabeth Osorio discharge to home/self care              Follow-up Information     Follow up With Specialties Details Why Contact Info Additional Information    [facility] Emergency Department Emergency Medicine Go in 2 days  [address] Emergency Department,  [address], [place], [place], 29 [facility], [address] [place] [address] Nurse Practitioner Go in 2 days  [address] [facility] Male [address] [place] [id number]  [phone]             Discharge Medication List as of 3/23/2022  6:46 PM      START taking these medications    Details   saccharomyces boulardii (FLORASTOR) 250 mg capsule Take 1 capsule (250 mg total) by mouth 2 (two) times a day for 7 days, Starting Wed 3/23/2022, Until Wed 3/30/2022, Normal      clindamycin (CLEOCIN) 150 mg capsule Take 2 capsules (300 mg total) by mouth 4 (four) times a day for 7 days, Starting Wed 3/23/2022, Until Wed 3/30/2022, Print         CONTINUE these medications which have NOT CHANGED    Details   ARIPiprazole (ABILIFY) 5 mg tablet Take 1 tablet (5 mg total) by mouth daily, Starting Wed 8/4/2021, Until Wed 10/20/2021, Normal      escitalopram (LEXAPRO) 20 mg tablet Take 1 tablet (20 mg total) by mouth daily, Starting Wed 8/4/2021, Until Wed 10/20/2021, Normal      HumaLOG KwikPen 100 units/mL injection pen INJECT 16 UNITS 3 TIMES A DAY BEFORE MEALS, Historical Med      hydrochlorothiazide (HYDRODIURIL) 12 5 mg tablet Take 1 tablet (12 5 mg total) by mouth every other day, Starting Wed 10/20/2021, Normal      insulin glargine (LANTUS) 100 units/mL subcutaneous injection Inject 64 Units under the skin daily at bedtime, Starting Mon 8/2/2021, No Print      lamoTRIgine (LaMICtal) 150 MG tablet Take 1 tablet (150 mg total) by mouth 2 (two) times a day, Starting Tue 8/3/2021, Until Wed 10/20/2021, Normal      liraglutide (Victoza) injection Inject 1 8 MG daily  , Normal      lisinopril (ZESTRIL) 10 mg tablet Take 1 tablet (10 mg total) by mouth daily, Starting Wed 10/20/2021, Normal      LORazepam (ATIVAN) 0 5 mg tablet Starting Wed 10/6/2021, Historical Med      metFORMIN (GLUCOPHAGE) 500 mg tablet Take 1 tablet (500 mg total) by mouth 2 (two) times a day with meals, Starting Mon 8/31/2020, Until Wed 10/20/2021, Normal      ondansetron (ZOFRAN-ODT) 4 mg disintegrating tablet Take 1 tablet (4 mg total) by mouth every 8 (eight) hours as needed for nausea or vomiting, Starting Mon 10/12/2020, Normal      pantoprazole (PROTONIX) 40 mg tablet Take 1 tablet (40 mg total) by mouth daily, Starting Wed 10/20/2021, Normal      triamcinolone (KENALOG) 0 5 % cream Apply topically 3 (three) times a day, Starting Wed 10/20/2021, Normal           No discharge procedures on file  PDMP Review       Value Time User    PDMP Reviewed  Yes 8/3/2021  8:09 AM Ruby Brink DO           ED Provider  Attending physically available and evaluated Chula Adjutant  I managed the patient along with the ED Attending      Electronically Signed by         Harvinder Mcintyre DO  03/23/22 1955

## 2022-03-25 LAB
BACTERIA WND AEROBE CULT: ABNORMAL
GRAM STN SPEC: ABNORMAL
GRAM STN SPEC: ABNORMAL

## 2022-03-27 ENCOUNTER — HOSPITAL ENCOUNTER (EMERGENCY)
Facility: HOSPITAL | Age: 47
Discharge: HOME | End: 2022-03-27
Attending: EMERGENCY MEDICINE
Payer: COMMERCIAL

## 2022-03-27 VITALS
OXYGEN SATURATION: 95 % | BODY MASS INDEX: 69.72 KG/M2 | SYSTOLIC BLOOD PRESSURE: 179 MMHG | WEIGHT: 293 LBS | HEART RATE: 104 BPM | DIASTOLIC BLOOD PRESSURE: 85 MMHG | TEMPERATURE: 98 F | RESPIRATION RATE: 20 BRPM

## 2022-03-27 DIAGNOSIS — Z51.89 WOUND CHECK, ABSCESS: Primary | ICD-10-CM

## 2022-03-27 PROCEDURE — 99283 EMERGENCY DEPT VISIT LOW MDM: CPT

## 2022-03-27 PROCEDURE — 99282 EMERGENCY DEPT VISIT SF MDM: CPT | Performed by: EMERGENCY MEDICINE

## 2022-03-27 RX ORDER — ONDANSETRON 4 MG/1
4 TABLET, ORALLY DISINTEGRATING ORAL ONCE
Status: COMPLETED | OUTPATIENT
Start: 2022-03-27 | End: 2022-03-27

## 2022-03-27 RX ORDER — ONDANSETRON 4 MG/1
4 TABLET, ORALLY DISINTEGRATING ORAL EVERY 6 HOURS PRN
Qty: 20 TABLET | Refills: 0 | Status: SHIPPED | OUTPATIENT
Start: 2022-03-27 | End: 2022-04-03

## 2022-03-27 RX ADMIN — ONDANSETRON 4 MG: 4 TABLET, ORALLY DISINTEGRATING ORAL at 09:58

## 2022-03-27 NOTE — ED PROVIDER NOTES
History  Chief Complaint   Patient presents with    Wound Check     here for wound recheck - states cellulitis to pelvis, on abx since Thursday - making her sick     20-year-old female past medical history of hypertension, hyperlipidemia, morbid obesity and diabetes presents today following incision and drainage of an abscess 4 days ago on 3/23  Patient was told to return to the ED for re-evaluation of the abscess and surrounding cellulitis  Patient was prescribed clindamycin 4 times a day for 7 days  Patient has been compliant with taking the medication however she states it is causing her nausea and vomiting  Patient reports 2-3 episodes of vomiting per day  Patient also reports chills and sweats but took her temperature last night and denies any fevers  Patient reports the wound is improving and not worsening  Patient denies any more active drainage  Wound Check         Prior to Admission Medications   Prescriptions Last Dose Informant Patient Reported? Taking? ARIPiprazole (ABILIFY) 5 mg tablet   No No   Sig: Take 1 tablet (5 mg total) by mouth daily   HumaLOG KwikPen 100 units/mL injection pen   Yes No   Sig: INJECT 16 UNITS 3 TIMES A DAY BEFORE MEALS   LORazepam (ATIVAN) 0 5 mg tablet   Yes No   clindamycin (CLEOCIN) 150 mg capsule   No No   Sig: Take 2 capsules (300 mg total) by mouth 4 (four) times a day for 7 days   escitalopram (LEXAPRO) 20 mg tablet   No No   Sig: Take 1 tablet (20 mg total) by mouth daily   hydrochlorothiazide (HYDRODIURIL) 12 5 mg tablet   No No   Sig: Take 1 tablet (12 5 mg total) by mouth every other day   insulin glargine (LANTUS) 100 units/mL subcutaneous injection   No No   Sig: Inject 64 Units under the skin daily at bedtime   lamoTRIgine (LaMICtal) 150 MG tablet   No No   Sig: Take 1 tablet (150 mg total) by mouth 2 (two) times a day   liraglutide (Victoza) injection   No No   Sig: Inject 1 8 MG daily     lisinopril (ZESTRIL) 10 mg tablet   No No   Sig: Take 1 tablet (10 mg total) by mouth daily   metFORMIN (GLUCOPHAGE) 500 mg tablet   No No   Sig: Take 1 tablet (500 mg total) by mouth 2 (two) times a day with meals   ondansetron (ZOFRAN-ODT) 4 mg disintegrating tablet   No No   Sig: Take 1 tablet (4 mg total) by mouth every 8 (eight) hours as needed for nausea or vomiting   pantoprazole (PROTONIX) 40 mg tablet   No No   Sig: Take 1 tablet (40 mg total) by mouth daily   saccharomyces boulardii (FLORASTOR) 250 mg capsule   No No   Sig: Take 1 capsule (250 mg total) by mouth 2 (two) times a day for 7 days   triamcinolone (KENALOG) 0 5 % cream   No No   Sig: Apply topically 3 (three) times a day      Facility-Administered Medications: None       Past Medical History:   Diagnosis Date    Anemia     Anxiety     Candidal intertrigo     Depression     Diabetes mellitus (HCC)     GERD (gastroesophageal reflux disease)     Hyperlipidemia     Hypertension     Irritable bowel syndrome     Morbid obesity with BMI of 60 0-69 9, adult ([address] )     Osteoarthritis     Seasonal allergies     Sleep difficulties     Suicide attempt [facility])        Past Surgical History:   Procedure Laterality Date     SECTION  1992    CHOLECYSTECTOMY  2008    WISDOM TOOTH EXTRACTION  2009       Family History   Problem Relation Age of Onset    Diabetes Mother     Hypertension Father      I have reviewed and agree with the history as documented  E-Cigarette/Vaping    E-Cigarette Use Never User      E-Cigarette/Vaping Substances    Nicotine No     THC No     CBD No     Flavoring No     Other No     Unknown No      Social History     Tobacco Use    Smoking status: Never Smoker    Smokeless tobacco: Never Used   Vaping Use    Vaping Use: Never used   Substance Use Topics    Alcohol use: Yes     Comment: occassionaly     Drug use: No        Review of Systems   Constitutional: Positive for chills  Negative for fever  HENT: Negative for ear pain and sore throat      Eyes: Negative for pain and visual disturbance  Respiratory: Negative for cough and shortness of breath  Cardiovascular: Negative for chest pain and palpitations  Gastrointestinal: Positive for nausea and vomiting  Negative for abdominal pain  Genitourinary: Negative for dysuria and hematuria  Musculoskeletal: Negative for arthralgias and back pain  Skin: Positive for wound  Negative for color change and rash  Neurological: Negative for seizures and syncope  All other systems reviewed and are negative  Physical Exam  ED Triage Vitals [03/27/22 0914]   Temperature Pulse Respirations Blood Pressure SpO2   98 °F (36 7 °C) 104 20 (!) 179/85 95 %      Temp Source Heart Rate Source Patient Position - Orthostatic VS BP Location FiO2 (%)   Oral Monitor -- -- --      Pain Score       3             Orthostatic Vital Signs  Vitals:    03/27/22 0914   BP: (!) 179/85   Pulse: 104       Physical Exam  Vitals and nursing note reviewed  Constitutional:       General: She is not in acute distress  Appearance: Normal appearance  She is well-developed  She is not ill-appearing or toxic-appearing  HENT:      Head: Normocephalic and atraumatic  Eyes:      Conjunctiva/sclera: Conjunctivae normal    Cardiovascular:      Rate and Rhythm: Normal rate and regular rhythm  Heart sounds: No murmur heard  Pulmonary:      Effort: Pulmonary effort is normal  No respiratory distress  Breath sounds: Normal breath sounds  Abdominal:      Palpations: Abdomen is soft  Tenderness: There is no abdominal tenderness  There is no guarding or rebound  Musculoskeletal:      Cervical back: Neck supple  Skin:     General: Skin is warm and dry        Comments: Healing wound on lower pannus, with evidence of incision from prior I&D, not actively draining, small area of surrounding induration around I&D wound, no evidence of cellulitis, warmth, tenderness or streaking to suggest active infection    Neurological: Mental Status: She is alert  ED Medications  Medications   ondansetron (ZOFRAN-ODT) dispersible tablet 4 mg (4 mg Oral Given 3/27/22 5792)       Diagnostic Studies  Results Reviewed     None                 No orders to display         Procedures  Procedures      ED Course                             SBIRT 22yo+      Most Recent Value   SBIRT (23 yo +)    In order to provide better care to our patients, we are screening all of our patients for alcohol and drug use  Would it be okay to ask you these screening questions? Yes Filed at: 03/27/2022 2559   Initial Alcohol Screen: US AUDIT-C     1  How often do you have a drink containing alcohol? 0 Filed at: 03/27/2022 0933   2  How many drinks containing alcohol do you have on a typical day you are drinking? 0 Filed at: 03/27/2022 0933   3a  Male UNDER 65: How often do you have five or more drinks on one occasion? 0 Filed at: 03/27/2022 0933   3b  FEMALE Any Age, or MALE 65+: How often do you have 4 or more drinks on one occassion? 0 Filed at: 03/27/2022 0933   Audit-C Score 0 Filed at: 03/27/2022 6589   JUAN: How many times in the past year have you    Used an illegal drug or used a prescription medication for non-medical reasons? Never Filed at: 03/27/2022 2462                MDM  Number of Diagnoses or Management Options  Wound check, abscess  Diagnosis management comments: 49-year-old female past medical history of hypertension, hyperlipidemia, morbid obesity and diabetes presents today for wound check following incision and drainage of an abscess 4 days ago on 3/23  Patient reports the wound is improving and not worsening  Patient is non-toxic appearing with no evidence of sepsis  On exam, patient has no evidence of cellulitis, warmth, tenderness or streaking to suggest active infection  Patient has well healing incision from prior I&D  Patient requesting antibiotic change due to nausea, vomiting from Clindamycin    Given that gram stain from wound was growing gram negative diplococci, which is not covered by Clindamycin, in the setting of healing wound that does not appear to be actively infected, the decision to stop the antibiotic until culture results return tomorrow was made  Patient agreeable to plan  Reviewed return precautions, including redness, warmth, tenderness, streaking or fevers  Amount and/or Complexity of Data Reviewed  Obtain history from someone other than the patient: yes  Review and summarize past medical records: yes    Risk of Complications, Morbidity, and/or Mortality  Presenting problems: low  Diagnostic procedures: low  Management options: low    Patient Progress  Patient progress: stable      Disposition  Final diagnoses:   Wound check, abscess     Time reflects when diagnosis was documented in both MDM as applicable and the Disposition within this note     Time User Action Codes Description Comment    3/27/2022 10:00 AM Raiza Alvarado [Z51 [id number]] Wound check, abscess       ED Disposition     ED Disposition Condition Date/Time Comment    Discharge Stable Sun Mar 27, 2022 10:00 AM Lima Geller discharge to home/self care              Follow-up Information     Follow up With Specialties Details Why Contact Info Additional Information    [facility] Emergency Department Emergency Medicine  If symptoms worsen including development of fevers, worsening redness, increased pain or tenderness, warmth [address] [facility] [id number] [place] [address] Emergency Department, [address], [address], [facility], [address] [place] [address] Nurse Practitioner Schedule an appointment as soon as possible for a visit  For wound re-check [id number] [facility] Male [address] [facility] [address] [phone]             Patient's Medications   Discharge Prescriptions    ONDANSETRON (ZOFRAN ODT) 4 MG DISINTEGRATING TABLET    Take 1 tablet (4 mg total) by mouth every 6 (six) hours as needed for nausea or vomiting for up to 7 days       Start Date: 3/27/2022 End Date: 4/3/2022       Order Dose: 4 mg       Quantity: 20 tablet    Refills: 0     No discharge procedures on file  PDMP Review       Value Time User    PDMP Reviewed  Yes 8/3/2021  8:09 AM Jed rWight DO           ED Provider  Attending physically available and evaluated Oscar Mondragon  I managed the patient along with the ED Attending      Electronically Signed by         Tyler Obrien MD  03/27/22 1014

## 2022-03-27 NOTE — ED ATTENDING ATTESTATION
3/27/2022  ISaritha DO, saw and evaluated the patient  I have discussed the patient with the resident/non-physician practitioner and agree with the resident's/non-physician practitioner's findings, Plan of Care, and MDM as documented in the resident's/non-physician practitioner's note, except where noted  All available labs and Radiology studies were reviewed  I was present for key portions of any procedure(s) performed by the resident/non-physician practitioner and I was immediately available to provide assistance  At this point I agree with the current assessment done in the Emergency Department  I have conducted an independent evaluation of this patient a history and physical is as follows:    ED Course    49-year-old female presents for a wound recheck  Patient had incision and drainage of a pannus abscess on 03/23  Patient states that she has been taking clindamycin since she was discharged due to having an intolerance to Keflex and Bactrim  Patient has had nausea with intermittent episodes of vomiting  Patient believes that she has vomited some of the medication back up  She denies fevers  She had a wound culture obtained on the 23rd which is preliminary but growing 1+ Gram-negative diplococci  Clindamycin is not typically active against Gram-negative organisms  Due to the patient's side effects and presumed lack of effectiveness of the clindamycin, will have her discontinue the medication at this time and await the final wound culture results  Patient is afebrile and nontoxic and the wound is clean and healing well  She does not have signs of cellulitis at this time  There is minimal induration around the incision site but no fluctuance or drainage  We discussed signs and symptoms return to the emergency department    Critical Care Time  Procedures

## 2022-03-28 LAB
BACTERIA BLD CULT: NORMAL
BACTERIA BLD CULT: NORMAL

## 2022-04-18 NOTE — TELEPHONE ENCOUNTER
She must have appt for any more refills [facility]:  I discussed with the patient the risks of golimumab including but not limited to myelosuppression, immunosuppression, autoimmune hepatitis, demyelinating diseases, lymphoma, and serious infections.  The patient understands that monitoring is required including a PPD at baseline and must alert us or the primary physician if symptoms of infection or other concerning signs are noted.

## 2022-04-26 ENCOUNTER — HOSPITAL ENCOUNTER (EMERGENCY)
Facility: HOSPITAL | Age: 47
Discharge: HOME | End: 2022-04-26
Attending: EMERGENCY MEDICINE
Payer: COMMERCIAL

## 2022-04-26 VITALS
OXYGEN SATURATION: 97 % | DIASTOLIC BLOOD PRESSURE: 80 MMHG | RESPIRATION RATE: 22 BRPM | HEART RATE: 89 BPM | SYSTOLIC BLOOD PRESSURE: 175 MMHG | TEMPERATURE: 98.1 F

## 2022-04-26 DIAGNOSIS — S05.00XA CORNEAL ABRASION: Primary | ICD-10-CM

## 2022-04-26 PROCEDURE — 99283 EMERGENCY DEPT VISIT LOW MDM: CPT

## 2022-04-26 PROCEDURE — 99284 EMERGENCY DEPT VISIT MOD MDM: CPT | Performed by: EMERGENCY MEDICINE

## 2022-04-26 RX ORDER — ERYTHROMYCIN 5 MG/G
0.5 OINTMENT OPHTHALMIC ONCE
Status: DISCONTINUED | OUTPATIENT
Start: 2022-04-26 | End: 2022-04-26 | Stop reason: HOSPADM

## 2022-04-26 RX ORDER — ERYTHROMYCIN 5 MG/G
0.5 OINTMENT OPHTHALMIC EVERY 6 HOURS
Qty: 1 G | Refills: 0 | Status: SHIPPED | OUTPATIENT
Start: 2022-04-26

## 2022-04-26 RX ORDER — TETRACAINE HYDROCHLORIDE 5 MG/ML
2 SOLUTION OPHTHALMIC ONCE
Status: COMPLETED | OUTPATIENT
Start: 2022-04-26 | End: 2022-04-26

## 2022-04-26 RX ADMIN — TETRACAINE HYDROCHLORIDE 2 DROP: 5 SOLUTION OPHTHALMIC at 09:11

## 2022-04-26 RX ADMIN — FLUORESCEIN SODIUM 1 STRIP: 1 STRIP OPHTHALMIC at 09:11

## 2022-04-26 NOTE — Clinical Note
Riley Crain was seen and treated in our emergency department on 4/26/2022  No restrictions            Diagnosis: Acute Right Corneal Abrasion    Dana Alexander  may return to work on return date  She may return on this date: 04/28/2022         If you have any questions or concerns, please don't hesitate to call        Juan De Anda MD    ______________________________           _______________          _______________  Hospital Representative                              Date                                Time

## 2022-04-26 NOTE — ED PROVIDER NOTES
History  Chief Complaint   Patient presents with    Eye Problem     pt c/o R eye pain that started yesterday and is now watering, also c/o that is belcher     Jose Krishnamurthy is a 50 y o  female who presents with chief complaint of pain in her right eye with constant tearing and inability to keep the eye open  She started with some discomfort yesterday but this morning it was worse  She cannot recall any inciting incident although her  states that while they were fooling around 2 days ago she was accidentally poked in the eye  No discharge other than tears  History provided by:  Patient   used: No    Eye Pain  Location:  Right eye  Quality:  Foreign body sensation, pain  Severity:  Moderate  Onset quality:  Gradual  Duration:  2 days  Timing:  Constant  Progression:  Worsening  Chronicity:  New  Context:  Possibly poked in the eye  Relieved by:  Keeping eye closed  Worsened by:  Opening eye  Ineffective treatments:  Nothing tried  Associated symptoms: no cough, no ear pain, no fever, no headaches, no rash, no rhinorrhea and no sore throat        Prior to Admission Medications   Prescriptions Last Dose Informant Patient Reported? Taking? ARIPiprazole (ABILIFY) 5 mg tablet   No No   Sig: Take 1 tablet (5 mg total) by mouth daily   HumaLOG KwikPen 100 units/mL injection pen   Yes No   Sig: INJECT 16 UNITS 3 TIMES A DAY BEFORE MEALS   LORazepam (ATIVAN) 0 5 mg tablet   Yes No   escitalopram (LEXAPRO) 20 mg tablet   No No   Sig: Take 1 tablet (20 mg total) by mouth daily   hydrochlorothiazide (HYDRODIURIL) 12 5 mg tablet   No No   Sig: Take 1 tablet (12 5 mg total) by mouth every other day   insulin glargine (LANTUS) 100 units/mL subcutaneous injection   No No   Sig: Inject 64 Units under the skin daily at bedtime   lamoTRIgine (LaMICtal) 150 MG tablet   No No   Sig: Take 1 tablet (150 mg total) by mouth 2 (two) times a day   liraglutide (Victoza) injection   No No   Sig: Inject 1 8 MG daily  lisinopril (ZESTRIL) 10 mg tablet   No No   Sig: Take 1 tablet (10 mg total) by mouth daily   metFORMIN (GLUCOPHAGE) 500 mg tablet   No No   Sig: Take 1 tablet (500 mg total) by mouth 2 (two) times a day with meals   ondansetron (ZOFRAN-ODT) 4 mg disintegrating tablet   No No   Sig: Take 1 tablet (4 mg total) by mouth every 8 (eight) hours as needed for nausea or vomiting   ondansetron (Zofran ODT) 4 mg disintegrating tablet   No No   Sig: Take 1 tablet (4 mg total) by mouth every 6 (six) hours as needed for nausea or vomiting for up to 7 days   pantoprazole (PROTONIX) 40 mg tablet   No No   Sig: Take 1 tablet (40 mg total) by mouth daily   triamcinolone (KENALOG) 0 5 % cream   No No   Sig: Apply topically 3 (three) times a day      Facility-Administered Medications: None       Past Medical History:   Diagnosis Date    Anemia     Anxiety     Candidal intertrigo     Depression     Diabetes mellitus (HCC)     GERD (gastroesophageal reflux disease)     Hyperlipidemia     Hypertension     Irritable bowel syndrome     Morbid obesity with BMI of 60 0-69 9, adult ([facility] [address] )     Osteoarthritis     Seasonal allergies     Sleep difficulties     [facility])        Past Surgical History:   Procedure Laterality Date     SECTION  1992    CHOLECYSTECTOMY  2008    WISDOM TOOTH EXTRACTION  2009       Family History   Problem Relation Age of Onset    Diabetes Mother     Hypertension Father      I have reviewed and agree with the history as documented      E-Cigarette/Vaping    E-Cigarette Use Never User      E-Cigarette/Vaping Substances    Nicotine No     THC No     CBD No     Flavoring No     Other No     Unknown No      Social History     Tobacco Use    Smoking status: Never Smoker    Smokeless tobacco: Never Used   Vaping Use    Vaping Use: Never used   Substance Use Topics    Alcohol use: Yes     Comment: occassionaly     Drug use: No       Review of Systems   Constitutional: Negative for chills and fever  HENT: Negative for ear pain, facial swelling, rhinorrhea and sore throat  Eyes: Positive for photophobia and pain  Negative for discharge, redness and visual disturbance  Respiratory: Negative for cough  Skin: Negative for rash  Neurological: Negative for speech difficulty and headaches  All other systems reviewed and are negative  Physical Exam  Physical Exam  Vitals and nursing note reviewed  Constitutional:       General: She is not in acute distress  Appearance: She is well-developed  She is obese  HENT:      Head: Normocephalic and atraumatic  Eyes:      Pupils: Pupils are equal, round, and reactive to light  Right eye: Corneal abrasion and fluorescein uptake present  Neck:      Vascular: No JVD  Cardiovascular:      Rate and Rhythm: Normal rate and regular rhythm  Heart sounds: Normal heart sounds  No murmur heard  No friction rub  No gallop  Pulmonary:      Effort: Pulmonary effort is normal  No respiratory distress  Breath sounds: Normal breath sounds  No wheezing or rales  Chest:      Chest wall: No tenderness  Musculoskeletal:         General: No tenderness  Normal range of motion  Cervical back: Normal range of motion  Skin:     General: Skin is warm and dry  Neurological:      General: No focal deficit present  Mental Status: She is alert and oriented to person, place, and time  Psychiatric:         Behavior: Behavior normal          Thought Content:  Thought content normal          Judgment: Judgment normal          Vital Signs  ED Triage Vitals [04/26/22 0854]   Temperature Pulse Respirations Blood Pressure SpO2   98 1 °F (36 7 °C) 89 22 (!) 175/80 97 %      Temp Source Heart Rate Source Patient Position - Orthostatic VS BP Location FiO2 (%)   Oral Monitor Sitting Left arm --      Pain Score       --           Vitals:    04/26/22 0854   BP: (!) 175/80   Pulse: 89   Patient Position - Orthostatic VS: Sitting Visual Acuity      ED Medications  Medications   erythromycin (ILOTYCIN) 0 5 % ophthalmic ointment 0 5 inch (has no administration in time range)   tetracaine 0 5 % ophthalmic solution 2 drop (2 drops Right Eye Given 4/26/22 0911)   fluorescein sodium sterile ophthalmic strip 1 strip (1 strip Right Eye Given 4/26/22 0911)       Diagnostic Studies  Results Reviewed     None                 No orders to display              Procedures  Procedures         ED Course                                             MDM  Number of Diagnoses or Management Options  Corneal abrasion: new and requires workup  Diagnosis management comments: 50 y o  female presents with chief complaint of right eye discomfort  History and exam are consistent with a corneal abrasion  Will treat with topical antibiotics and analgesia and will refer to ophthalmology  Patient Progress  Patient progress: stable      Disposition  Final diagnoses:   Corneal abrasion - acute right eye     Time reflects when diagnosis was documented in both MDM as applicable and the Disposition within this note     Time User Action Codes Description Comment    4/26/2022  9:25 AM Saumya AGUILAR Add [S05 00XA] Corneal abrasion     4/26/2022  9:25 AM Saumya AGUILAR Modify [S05 00XA] Corneal abrasion acute right clive    4/26/2022  9:25 AM Saumya AGUILAR Modify [S05 00XA] Corneal abrasion acute right eye      ED Disposition     ED Disposition Condition Date/Time Comment    Discharge Stable Tue Apr 26, 2022  9:25 AM Tavo Schultz discharge to home/self care              Follow-up Information     Follow up With Specialties Details Why Contact Info    Kathleen Alonzo MD Ophthalmology Schedule an appointment as soon as possible for a visit  As needed Denita Swann  Senait Turner  [phone]            Discharge Medication List as of 4/26/2022  9:26 AM      START taking these medications    Details   erythromycin (ILOTYCIN) ophthalmic ointment Administer 0 5 inches to the right eye every 6 (six) hours, Starting Tue 4/26/2022, Normal         CONTINUE these medications which have NOT CHANGED    Details   ARIPiprazole (ABILIFY) 5 mg tablet Take 1 tablet (5 mg total) by mouth daily, Starting Wed 8/4/2021, Until Wed 10/20/2021, Normal      escitalopram (LEXAPRO) 20 mg tablet Take 1 tablet (20 mg total) by mouth daily, Starting Wed 8/4/2021, Until Wed 10/20/2021, Normal      HumaLOG KwikPen 100 units/mL injection pen INJECT 16 UNITS 3 TIMES A DAY BEFORE MEALS, Historical Med      hydrochlorothiazide (HYDRODIURIL) 12 5 mg tablet Take 1 tablet (12 5 mg total) by mouth every other day, Starting Wed 10/20/2021, Normal      insulin glargine (LANTUS) 100 units/mL subcutaneous injection Inject 64 Units under the skin daily at bedtime, Starting Mon 8/2/2021, No Print      lamoTRIgine (LaMICtal) 150 MG tablet Take 1 tablet (150 mg total) by mouth 2 (two) times a day, Starting Tue 8/3/2021, Until Wed 10/20/2021, Normal      liraglutide (Victoza) injection Inject 1 8 MG daily  , Normal      lisinopril (ZESTRIL) 10 mg tablet Take 1 tablet (10 mg total) by mouth daily, Starting Wed 10/20/2021, Normal      LORazepam (ATIVAN) 0 5 mg tablet Starting Wed 10/6/2021, Historical Med      metFORMIN (GLUCOPHAGE) 500 mg tablet Take 1 tablet (500 mg total) by mouth 2 (two) times a day with meals, Starting Mon 8/31/2020, Until Wed 10/20/2021, Normal      ondansetron (ZOFRAN-ODT) 4 mg disintegrating tablet Take 1 tablet (4 mg total) by mouth every 8 (eight) hours as needed for nausea or vomiting, Starting Mon 10/12/2020, Normal      pantoprazole (PROTONIX) 40 mg tablet Take 1 tablet (40 mg total) by mouth daily, Starting Wed 10/20/2021, Normal      triamcinolone (KENALOG) 0 5 % cream Apply topically 3 (three) times a day, Starting Wed 10/20/2021, Normal             No discharge procedures on file      PDMP Review       Value Time User    PDMP Reviewed  Yes 8/3/2021  8:09 AM Sabrina Germain Dee Ansari [place]          ED Provider  Electronically Signed by           Beth Almodovar MD  04/26/22 2042

## 2022-06-06 ENCOUNTER — HOSPITAL ENCOUNTER (EMERGENCY)
Facility: HOSPITAL | Age: 47
Discharge: HOME | End: 2022-06-06
Attending: EMERGENCY MEDICINE
Payer: COMMERCIAL

## 2022-06-06 ENCOUNTER — APPOINTMENT (EMERGENCY)
Dept: CT IMAGING | Facility: HOSPITAL | Age: 47
End: 2022-06-06
Payer: COMMERCIAL

## 2022-06-06 VITALS
DIASTOLIC BLOOD PRESSURE: 89 MMHG | TEMPERATURE: 97.7 F | HEART RATE: 93 BPM | OXYGEN SATURATION: 92 % | SYSTOLIC BLOOD PRESSURE: 143 MMHG | RESPIRATION RATE: 16 BRPM

## 2022-06-06 DIAGNOSIS — R10.9 ABDOMINAL PAIN: ICD-10-CM

## 2022-06-06 DIAGNOSIS — R11.2 NAUSEA AND VOMITING: Primary | ICD-10-CM

## 2022-06-06 LAB
ALBUMIN SERPL BCP-MCNC: 4.2 G/DL (ref 3.5–5)
ALP SERPL-CCNC: 71 U/L (ref 34–104)
ALT SERPL W P-5'-P-CCNC: 47 U/L (ref 7–52)
ANION GAP SERPL CALCULATED.3IONS-SCNC: 9 MMOL/L (ref 4–13)
AST SERPL W P-5'-P-CCNC: 28 U/L (ref 13–39)
ATRIAL RATE: 88 BPM
BACTERIA UR QL AUTO: ABNORMAL /HPF
BASOPHILS # BLD AUTO: 0.04 THOUSANDS/ΜL (ref 0–0.1)
BASOPHILS NFR BLD AUTO: 1 % (ref 0–1)
BILIRUB SERPL-MCNC: 0.49 MG/DL (ref 0.2–1)
BILIRUB UR QL STRIP: NEGATIVE
BUN SERPL-MCNC: 17 MG/DL (ref 5–25)
CALCIUM SERPL-MCNC: 9.7 MG/DL (ref 8.4–10.2)
CHLORIDE SERPL-SCNC: 102 MMOL/L (ref 96–108)
CLARITY UR: CLEAR
CO2 SERPL-SCNC: 27 MMOL/L (ref 21–32)
COLOR UR: YELLOW
CREAT SERPL-MCNC: 0.95 MG/DL (ref 0.6–1.3)
EOSINOPHIL # BLD AUTO: 0.09 THOUSAND/ΜL (ref 0–0.61)
EOSINOPHIL NFR BLD AUTO: 1 % (ref 0–6)
ERYTHROCYTE [DISTWIDTH] IN BLOOD BY AUTOMATED COUNT: 14.6 % (ref 11.6–15.1)
EXT PREG TEST URINE: NEGATIVE
EXT. CONTROL ED NAV: NORMAL
GFR SERPL CREATININE-BSD FRML MDRD: 70 ML/MIN/1.73SQ M
GLUCOSE SERPL-MCNC: 234 MG/DL (ref 65–140)
GLUCOSE UR STRIP-MCNC: NEGATIVE MG/DL
HCT VFR BLD AUTO: 42.2 % (ref 34.8–46.1)
HGB BLD-MCNC: 13.6 G/DL (ref 11.5–15.4)
HGB UR QL STRIP.AUTO: ABNORMAL
IMM GRANULOCYTES # BLD AUTO: 0.05 THOUSAND/UL (ref 0–0.2)
IMM GRANULOCYTES NFR BLD AUTO: 1 % (ref 0–2)
KETONES UR STRIP-MCNC: ABNORMAL MG/DL
LEUKOCYTE ESTERASE UR QL STRIP: NEGATIVE
LIPASE SERPL-CCNC: 62 U/L (ref 11–82)
LYMPHOCYTES # BLD AUTO: 1.04 THOUSANDS/ΜL (ref 0.6–4.47)
LYMPHOCYTES NFR BLD AUTO: 12 % (ref 14–44)
MCH RBC QN AUTO: 28.7 PG (ref 26.8–34.3)
MCHC RBC AUTO-ENTMCNC: 32.2 G/DL (ref 31.4–37.4)
MCV RBC AUTO: 89 FL (ref 82–98)
MONOCYTES # BLD AUTO: 0.38 THOUSAND/ΜL (ref 0.17–1.22)
MONOCYTES NFR BLD AUTO: 4 % (ref 4–12)
NEUTROPHILS # BLD AUTO: 6.99 THOUSANDS/ΜL (ref 1.85–7.62)
NEUTS SEG NFR BLD AUTO: 81 % (ref 43–75)
NITRITE UR QL STRIP: NEGATIVE
NON-SQ EPI CELLS URNS QL MICRO: ABNORMAL /HPF
NRBC BLD AUTO-RTO: 0 /100 WBCS
P AXIS: 40 DEGREES
PH UR STRIP.AUTO: 6 [PH]
PLATELET # BLD AUTO: 285 THOUSANDS/UL (ref 149–390)
PMV BLD AUTO: 9.5 FL (ref 8.9–12.7)
POTASSIUM SERPL-SCNC: 4.7 MMOL/L (ref 3.5–5.3)
PR INTERVAL: 162 MS
PROT SERPL-MCNC: 7.5 G/DL (ref 6.4–8.4)
PROT UR STRIP-MCNC: NEGATIVE MG/DL
QRS AXIS: 30 DEGREES
QRSD INTERVAL: 78 MS
QT INTERVAL: 336 MS
QTC INTERVAL: 406 MS
RBC # BLD AUTO: 4.74 MILLION/UL (ref 3.81–5.12)
RBC #/AREA URNS AUTO: ABNORMAL /HPF
SODIUM SERPL-SCNC: 138 MMOL/L (ref 135–147)
SP GR UR STRIP.AUTO: 1.02 (ref 1–1.03)
T WAVE AXIS: 27 DEGREES
UROBILINOGEN UR QL STRIP.AUTO: 0.2 E.U./DL
VENTRICULAR RATE: 88 BPM
WBC # BLD AUTO: 8.59 THOUSAND/UL (ref 4.31–10.16)
WBC #/AREA URNS AUTO: ABNORMAL /HPF

## 2022-06-06 PROCEDURE — 85025 COMPLETE CBC W/AUTO DIFF WBC: CPT | Performed by: EMERGENCY MEDICINE

## 2022-06-06 PROCEDURE — 83690 ASSAY OF LIPASE: CPT | Performed by: EMERGENCY MEDICINE

## 2022-06-06 PROCEDURE — G1004 CDSM NDSC: HCPCS

## 2022-06-06 PROCEDURE — 74176 CT ABD & PELVIS W/O CONTRAST: CPT

## 2022-06-06 PROCEDURE — 80053 COMPREHEN METABOLIC PANEL: CPT | Performed by: EMERGENCY MEDICINE

## 2022-06-06 PROCEDURE — 36415 COLL VENOUS BLD VENIPUNCTURE: CPT | Performed by: EMERGENCY MEDICINE

## 2022-06-06 PROCEDURE — 96374 THER/PROPH/DIAG INJ IV PUSH: CPT

## 2022-06-06 PROCEDURE — 99284 EMERGENCY DEPT VISIT MOD MDM: CPT

## 2022-06-06 PROCEDURE — 99285 EMERGENCY DEPT VISIT HI MDM: CPT | Performed by: EMERGENCY MEDICINE

## 2022-06-06 PROCEDURE — 81001 URINALYSIS AUTO W/SCOPE: CPT | Performed by: EMERGENCY MEDICINE

## 2022-06-06 PROCEDURE — 93005 ELECTROCARDIOGRAM TRACING: CPT

## 2022-06-06 PROCEDURE — 96361 HYDRATE IV INFUSION ADD-ON: CPT

## 2022-06-06 PROCEDURE — 96375 TX/PRO/DX INJ NEW DRUG ADDON: CPT

## 2022-06-06 PROCEDURE — 93010 ELECTROCARDIOGRAM REPORT: CPT | Performed by: INTERNAL MEDICINE

## 2022-06-06 PROCEDURE — 81025 URINE PREGNANCY TEST: CPT | Performed by: EMERGENCY MEDICINE

## 2022-06-06 RX ORDER — KETOROLAC TROMETHAMINE 30 MG/ML
15 INJECTION, SOLUTION INTRAMUSCULAR; INTRAVENOUS ONCE
Status: COMPLETED | OUTPATIENT
Start: 2022-06-06 | End: 2022-06-06

## 2022-06-06 RX ORDER — ONDANSETRON 4 MG/1
4 TABLET, ORALLY DISINTEGRATING ORAL EVERY 8 HOURS PRN
Qty: 20 TABLET | Refills: 0 | Status: SHIPPED | OUTPATIENT
Start: 2022-06-06

## 2022-06-06 RX ORDER — ONDANSETRON 2 MG/ML
4 INJECTION INTRAMUSCULAR; INTRAVENOUS ONCE
Status: COMPLETED | OUTPATIENT
Start: 2022-06-06 | End: 2022-06-06

## 2022-06-06 RX ADMIN — SODIUM CHLORIDE 1000 ML: 0.9 INJECTION, SOLUTION INTRAVENOUS at 12:12

## 2022-06-06 RX ADMIN — ONDANSETRON 4 MG: 2 INJECTION INTRAMUSCULAR; INTRAVENOUS at 12:18

## 2022-06-06 RX ADMIN — KETOROLAC TROMETHAMINE 15 MG: 30 INJECTION, SOLUTION INTRAMUSCULAR at 12:18

## 2022-06-06 NOTE — Clinical Note
Cherry Kevin was seen and treated in our emergency department on 6/6/2022  Diagnosis:     Inez Childs  may return to work on return date  She may return on this date: 06/07/2022         If you have any questions or concerns, please don't hesitate to call        Danita Lara, DO    ______________________________           _______________          _______________  Hospital Representative                              Date                                Time

## 2022-06-06 NOTE — ED PROVIDER NOTES
History  Chief Complaint   Patient presents with    Diarrhea     Pt reports diarrhea and nausea x 3 days  Patient is a 38-year-old female with a history of diabetes, hypertension and IBS who presents with abdominal pain and diarrhea  Patient states that her symptoms started about 3 days ago  She describes lower abdominal cramping, which has been intermittent for 3 days  She also describes 3-4 episodes of loose stools each day  She denies any black or bloody stools  She does admit to nausea and 1 episode of vomiting  She has been able to tolerate p o  But it is decreased from baseline  She denies fevers, chills, hematuria, dysuria, vaginal discharge  Yesterday, she did develop right flank pain which persisted today  She has not had similar symptoms previously  She is not sure whether the symptoms are consistent with her IBS  History provided by:  Patient  Abdominal Pain  Pain location:  Generalized  Pain radiates to:  Does not radiate  Pain severity:  Mild  Duration:  3 days  Timing:  Intermittent  Progression:  Unchanged  Chronicity:  New  Ineffective treatments:  None tried  Associated symptoms: diarrhea, nausea and vomiting    Associated symptoms: no chest pain, no chills, no constipation, no cough, no dysuria, no fever, no hematuria, no shortness of breath, no sore throat, no vaginal bleeding and no vaginal discharge        Prior to Admission Medications   Prescriptions Last Dose Informant Patient Reported? Taking?    ARIPiprazole (ABILIFY) 5 mg tablet   No No   Sig: Take 1 tablet (5 mg total) by mouth daily   HumaLOG KwikPen 100 units/mL injection pen   Yes No   Sig: INJECT 16 UNITS 3 TIMES A DAY BEFORE MEALS   LORazepam (ATIVAN) 0 5 mg tablet   Yes No   erythromycin (ILOTYCIN) ophthalmic ointment   No No   Sig: Administer 0 5 inches to the right eye every 6 (six) hours   escitalopram (LEXAPRO) 20 mg tablet   No No   Sig: Take 1 tablet (20 mg total) by mouth daily   hydrochlorothiazide (HYDRODIURIL) 12 5 mg tablet   No No   Sig: Take 1 tablet (12 5 mg total) by mouth every other day   insulin glargine (LANTUS) 100 units/mL subcutaneous injection   No No   Sig: Inject 64 Units under the skin daily at bedtime   lamoTRIgine (LaMICtal) 150 MG tablet   No No   Sig: Take 1 tablet (150 mg total) by mouth 2 (two) times a day   liraglutide (Victoza) injection   No No   Sig: Inject 1 8 MG daily  lisinopril (ZESTRIL) 10 mg tablet   No No   Sig: Take 1 tablet (10 mg total) by mouth daily   metFORMIN (GLUCOPHAGE) 500 mg tablet   No No   Sig: Take 1 tablet (500 mg total) by mouth 2 (two) times a day with meals   ondansetron (ZOFRAN-ODT) 4 mg disintegrating tablet   No No   Sig: Take 1 tablet (4 mg total) by mouth every 8 (eight) hours as needed for nausea or vomiting   ondansetron (Zofran ODT) 4 mg disintegrating tablet   No No   Sig: Take 1 tablet (4 mg total) by mouth every 6 (six) hours as needed for nausea or vomiting for up to 7 days   pantoprazole (PROTONIX) 40 mg tablet   No No   Sig: Take 1 tablet (40 mg total) by mouth daily   triamcinolone (KENALOG) 0 5 % cream   No No   Sig: Apply topically 3 (three) times a day      Facility-Administered Medications: None       Past Medical History:   Diagnosis Date    Anemia     Anxiety     Candidal intertrigo     Depression     Diabetes mellitus (HCC)     GERD (gastroesophageal reflux disease)     Hyperlipidemia     Hypertension     Irritable bowel syndrome     Morbid obesity with BMI of 60 0-69 9, adult ([facility] [address] )     Osteoarthritis     Seasonal allergies     Sleep difficulties     Suicide attempt [facility])        Past Surgical History:   Procedure Laterality Date     SECTION  1992    CHOLECYSTECTOMY  2008    WISDOM TOOTH EXTRACTION  2009       Family History   Problem Relation Age of Onset    Diabetes Mother     Hypertension Father      I have reviewed and agree with the history as documented      E-Cigarette/Vaping    E-Cigarette Use Never User      E-Cigarette/Vaping Substances    Nicotine No     THC No     CBD No     Flavoring No     Other No     Unknown No      Social History     Tobacco Use    Smoking status: Never Smoker    Smokeless tobacco: Never Used   Vaping Use    Vaping Use: Never used   Substance Use Topics    Alcohol use: Yes     Comment: occassionaly     Drug use: No       Review of Systems   Constitutional: Negative for chills, diaphoresis and fever  HENT: Negative for nosebleeds, sore throat and trouble swallowing  Eyes: Negative for photophobia, pain and visual disturbance  Respiratory: Negative for cough, chest tightness and shortness of breath  Cardiovascular: Negative for chest pain, palpitations and leg swelling  Gastrointestinal: Positive for abdominal pain, diarrhea, nausea and vomiting  Negative for constipation  Endocrine: Negative for polydipsia and polyuria  Genitourinary: Negative for difficulty urinating, dysuria, hematuria, pelvic pain, vaginal bleeding and vaginal discharge  Musculoskeletal: Negative for back pain, neck pain and neck stiffness  Skin: Negative for pallor and rash  Neurological: Negative for dizziness, seizures, light-headedness and headaches  All other systems reviewed and are negative  Physical Exam  Physical Exam  Vitals and nursing note reviewed  Constitutional:       General: She is not in acute distress  Appearance: She is well-developed  She is obese  HENT:      Head: Normocephalic and atraumatic  Eyes:      Pupils: Pupils are equal, round, and reactive to light  Cardiovascular:      Rate and Rhythm: Normal rate and regular rhythm  Pulses: Normal pulses  Heart sounds: Normal heart sounds  Pulmonary:      Effort: Pulmonary effort is normal  No respiratory distress  Breath sounds: Normal breath sounds  Abdominal:      General: There is no distension  Palpations: Abdomen is soft  Abdomen is not rigid  Tenderness:  There is abdominal tenderness in the right lower quadrant, suprapubic area and left lower quadrant  There is right CVA tenderness  There is no left CVA tenderness, guarding or rebound  Musculoskeletal:         General: No tenderness  Normal range of motion  Cervical back: Normal range of motion and neck supple  Lymphadenopathy:      Cervical: No cervical adenopathy  Skin:     General: Skin is warm and dry  Capillary Refill: Capillary refill takes less than 2 seconds  Neurological:      Mental Status: She is alert and oriented to person, place, and time  Cranial Nerves: No cranial nerve deficit  Sensory: No sensory deficit           Vital Signs  ED Triage Vitals   Temperature Pulse Respirations Blood Pressure SpO2   06/06/22 1018 06/06/22 1019 06/06/22 1019 06/06/22 1019 06/06/22 1019   97 7 °F (36 5 °C) 97 16 (!) 184/88 95 %      Temp Source Heart Rate Source Patient Position - Orthostatic VS BP Location FiO2 (%)   06/06/22 1018 06/06/22 1417 06/06/22 1019 06/06/22 1019 --   Oral Monitor Sitting Left arm       Pain Score       06/06/22 1218       6           Vitals:    06/06/22 1019 06/06/22 1417   BP: (!) 184/88 143/89   Pulse: 97 93   Patient Position - Orthostatic VS: Sitting          Visual Acuity      ED Medications  Medications   sodium chloride 0 9 % bolus 1,000 mL (0 mL Intravenous Stopped 6/6/22 1425)   ondansetron (ZOFRAN) injection 4 mg (4 mg Intravenous Given 6/6/22 1218)   ketorolac (TORADOL) injection 15 mg (15 mg Intravenous Given 6/6/22 1218)       Diagnostic Studies  Results Reviewed     Procedure Component Value Units Date/Time    Urine Microscopic [[id number]]  (Abnormal) Collected: 06/06/22 1341    Lab Status: Final result Specimen: Urine, Clean Catch Updated: 06/06/22 1411     RBC, UA 4-10 /hpf      WBC, UA 0-1 /hpf      Epithelial Cells Occasional /hpf      Bacteria, UA Occasional /hpf     UA (URINE) with reflex to Scope [[id number]]  (Abnormal) Collected: 06/06/22 1341    Lab Status: Final result Specimen: Urine, Clean Catch Updated: 06/06/22 1356     Color, UA Yellow     Clarity, UA Clear     Specific Gravity, UA 1 025     pH, UA 6 0     Leukocytes, UA Negative     Nitrite, UA Negative     Protein, UA Negative mg/dl      Glucose, UA Negative mg/dl      Ketones, UA Trace mg/dl      Urobilinogen, UA 0 2 E U /dl      Bilirubin, UA Negative     Blood, UA Small    POCT pregnancy, urine [[id number]]  (Normal) Resulted: 06/06/22 1344    Lab Status: Final result Updated: 06/06/22 1345     EXT PREG TEST UR (Ref: Negative) negative     Control valid    Comprehensive metabolic panel [[id number]]  (Abnormal) Collected: 06/06/22 1155    Lab Status: Final result Specimen: Blood from Arm, Right Updated: 06/06/22 1244     Sodium 138 mmol/L      Potassium 4 7 mmol/L      Chloride 102 mmol/L      CO2 27 mmol/L      ANION GAP 9 mmol/L      BUN 17 mg/dL      Creatinine 0 95 mg/dL      Glucose 234 mg/dL      Calcium 9 7 mg/dL      AST 28 U/L      ALT 47 U/L      Alkaline Phosphatase 71 U/L      Total Protein 7 5 g/dL      Albumin 4 2 g/dL      Total Bilirubin 0 49 mg/dL      eGFR 70 ml/min/1 73sq m     Narrative:      [place] guidelines for Chronic Kidney Disease (CKD):     Stage 1 with normal or high GFR (GFR > 90 mL/min/1 73 square meters)    Stage 2 Mild CKD (GFR = 60-89 mL/min/1 73 square meters)    Stage 3A Moderate CKD (GFR = 45-59 mL/min/1 73 square meters)    Stage 3B Moderate CKD (GFR = 30-44 mL/min/1 73 square meters)    Stage 4 Severe CKD (GFR = 15-29 mL/min/1 73 square meters)    Stage 5 End Stage CKD (GFR <15 mL/min/1 73 square meters)  Note: GFR calculation is accurate only with a steady state creatinine    Lipase [[id number]]  (Normal) Collected: 06/06/22 1155    Lab Status: Final result Specimen: Blood from Arm, Right Updated: 06/06/22 1244     Lipase 62 u/L     CBC and differential [[id number]]  (Abnormal) Collected: 06/06/22 1155    Lab Status: Final result Specimen: Blood from Arm, Right Updated: 06/06/22 1210     WBC 8 59 Thousand/uL      RBC 4 74 Million/uL      Hemoglobin 13 6 g/dL      Hematocrit 42 2 %      MCV 89 fL      MCH 28 7 pg      MCHC 32 2 g/dL      RDW 14 6 %      MPV 9 5 fL      Platelets 840 Thousands/uL      nRBC 0 /100 WBCs      Neutrophils Relative 81 %      Immat GRANS % 1 %      Lymphocytes Relative 12 %      Monocytes Relative 4 %      Eosinophils Relative 1 %      Basophils Relative 1 %      Neutrophils Absolute 6 99 Thousands/µL      Immature Grans Absolute 0 05 Thousand/uL      Lymphocytes Absolute 1 04 Thousands/µL      Monocytes Absolute 0 38 Thousand/µL      Eosinophils Absolute 0 09 Thousand/µL      Basophils Absolute 0 04 Thousands/µL                  CT abdomen pelvis wo contrast   Final Result by Michelle Chandler MD (06/06 1425)      No acute pathology to convincingly calcified patient's symptoms  Hepatomegaly and hepatic steatosis  Small nonobstructing intrarenal calculi in the left kidney, similar from previous examination  Workstation performed: [id number]                    Procedures  ECG 12 Lead Documentation Only    Date/Time: 6/6/2022 12:29 PM  Performed by: Dash Izquierdo DO  Authorized by: Dash Izquierdo DO     ECG reviewed by me, the ED Provider: yes    Patient location:  ED  Previous ECG:     Previous ECG:  Compared to current    Similarity:  Changes noted    Comparison to cardiac monitor: Yes    Comments:      Normal sinus rhythm at a rate of 88 beats per minute  Normal intervals  Normal axis  Normal QRS  No ST T wave abnormalities  Rate change from previous EKG from 07/29/2021  ED Course  ED Course as of 06/06/22 1619   Mon Jun 06, 2022   1254 Glucose, Random(!): 234  Hyperglycemia without evidence of DKA  1457 Patient feeling better and is tolerating p o  She is stable for discharge                                 SBIRT 20yo+    Flowsheet Row Most Recent Value   SBIRT (23 yo +)    In order to provide better care to our patients, we are screening all of our patients for alcohol and drug use  Would it be okay to ask you these screening questions? Yes Filed at: 06/06/2022 1408   Initial Alcohol Screen: US AUDIT-C     1  How often do you have a drink containing alcohol? 0 Filed at: 06/06/2022 1408   2  How many drinks containing alcohol do you have on a typical day you are drinking? 0 Filed at: 06/06/2022 1408   3a  Male UNDER 65: How often do you have five or more drinks on one occasion? 0 Filed at: 06/06/2022 1408   3b  FEMALE Any Age, or MALE 65+: How often do you have 4 or more drinks on one occassion? 0 Filed at: 06/06/2022 1408   Audit-C Score 0 Filed at: 06/06/2022 1408   JUAN: How many times in the past year have you    Used an illegal drug or used a prescription medication for non-medical reasons? Never Filed at: 06/06/2022 1408                    MDM  Number of Diagnoses or Management Options  Abdominal pain: new and requires workup  Nausea and vomiting: new and requires workup  Diagnosis management comments: Patient presents with abdominal cramping, nausea, vomiting and diarrhea  Do not suspect acute surgical process including but not limited to acute cholecystitis, acute appendicitis, SBO, mesenteric ischemia, AAA, vascular dissection, vascular occlusion, perforated viscus, ectopic pregnancy  Do not suspect intrathoracic cause of abdominal pain  Symptoms improved and patient is tolerating po  Patient advised to return to ED if symptoms worsen or persist  Patient also advised to follow up with PCP  Portions of the above record have been created with voice recognition software   Occasional wrong word or "sound alike" substitutions may have occurred due to the inherent limitations of voice recognition software   Read the chart carefully and recognize, using context, where substitutions may have occurred           Amount and/or Complexity of Data Reviewed  Clinical lab tests: ordered and reviewed  Tests in the radiology section of CPT®: ordered and reviewed  Tests in the medicine section of CPT®: ordered and reviewed  Review and summarize past medical records: yes  Independent visualization of images, tracings, or specimens: yes    Risk of Complications, Morbidity, and/or Mortality  Presenting problems: high  Diagnostic procedures: high  Management options: moderate    Patient Progress  Patient progress: stable      Disposition  Final diagnoses:   Nausea and vomiting   Abdominal pain     Time reflects when diagnosis was documented in both MDM as applicable and the Disposition within this note     Time User Action Codes Description Comment    6/6/2022  2:52 PM Mack GU Add [R11 2] Nausea and vomiting     6/6/2022  2:52 PM Luca Iveynicole Add [R10 9] Abdominal pain       ED Disposition     ED Disposition   Discharge    Condition   Stable    Date/Time   Mon Jun 6, 2022  2:52 PM    Onofre BRAY  2  discharge to home/self care  Follow-up Information     Follow up With Specialties Details Why [id number], [address] Nurse Practitioner Schedule an appointment as soon as possible for a visit  Return to ED sooner if symptoms worsen or persist  [address] [facility] Male [address] [place] [id number]  [phone]            Discharge Medication List as of 6/6/2022  2:53 PM      CONTINUE these medications which have CHANGED    Details   !! ondansetron (ZOFRAN-ODT) 4 mg disintegrating tablet Take 1 tablet (4 mg total) by mouth every 8 (eight) hours as needed for nausea, Starting Mon 6/6/2022, Normal       !! - Potential duplicate medications found  Please discuss with provider        CONTINUE these medications which have NOT CHANGED    Details   ARIPiprazole (ABILIFY) 5 mg tablet Take 1 tablet (5 mg total) by mouth daily, Starting Wed 8/4/2021, Until Wed 10/20/2021, Normal      erythromycin (ILOTYCIN) ophthalmic ointment Administer 0 5 inches to the right eye every 6 (six) hours, Starting Tue 4/26/2022, Normal      escitalopram (LEXAPRO) 20 mg tablet Take 1 tablet (20 mg total) by mouth daily, Starting Wed 8/4/2021, Until Wed 10/20/2021, Normal      HumaLOG KwikPen 100 units/mL injection pen INJECT 16 UNITS 3 TIMES A DAY BEFORE MEALS, Historical Med      hydrochlorothiazide (HYDRODIURIL) 12 5 mg tablet Take 1 tablet (12 5 mg total) by mouth every other day, Starting Wed 10/20/2021, Normal      insulin glargine (LANTUS) 100 units/mL subcutaneous injection Inject 64 Units under the skin daily at bedtime, Starting Mon 8/2/2021, No Print      lamoTRIgine (LaMICtal) 150 MG tablet Take 1 tablet (150 mg total) by mouth 2 (two) times a day, Starting Tue 8/3/2021, Until Wed 10/20/2021, Normal      liraglutide (Victoza) injection Inject 1 8 MG daily  , Normal      lisinopril (ZESTRIL) 10 mg tablet Take 1 tablet (10 mg total) by mouth daily, Starting Wed 10/20/2021, Normal      LORazepam (ATIVAN) 0 5 mg tablet Starting Wed 10/6/2021, Historical Med      metFORMIN (GLUCOPHAGE) 500 mg tablet Take 1 tablet (500 mg total) by mouth 2 (two) times a day with meals, Starting Mon 8/31/2020, Until Wed 10/20/2021, Normal      !! ondansetron (ZOFRAN-ODT) 4 mg disintegrating tablet Take 1 tablet (4 mg total) by mouth every 8 (eight) hours as needed for nausea or vomiting, Starting Mon 10/12/2020, Normal      pantoprazole (PROTONIX) 40 mg tablet Take 1 tablet (40 mg total) by mouth daily, Starting Wed 10/20/2021, Normal      triamcinolone (KENALOG) 0 5 % cream Apply topically 3 (three) times a day, Starting Wed 10/20/2021, Normal       !! - Potential duplicate medications found  Please discuss with provider  No discharge procedures on file      PDMP Review       Value Time User    PDMP Reviewed  Yes 8/3/2021  8:09 AM Valarie Beach DO          ED Provider  Electronically Signed by           Samaria Silva DO  06/06/22 8782

## 2022-07-26 ENCOUNTER — HOSPITAL ENCOUNTER (EMERGENCY)
Facility: HOSPITAL | Age: 47
Discharge: HOME | End: 2022-07-26
Attending: EMERGENCY MEDICINE | Admitting: EMERGENCY MEDICINE
Payer: COMMERCIAL

## 2022-07-26 VITALS
WEIGHT: 293 LBS | HEIGHT: 60 IN | SYSTOLIC BLOOD PRESSURE: 175 MMHG | TEMPERATURE: 98.4 F | RESPIRATION RATE: 20 BRPM | BODY MASS INDEX: 57.52 KG/M2 | DIASTOLIC BLOOD PRESSURE: 91 MMHG | OXYGEN SATURATION: 95 % | HEART RATE: 110 BPM

## 2022-07-26 DIAGNOSIS — R21 RASH AND NONSPECIFIC SKIN ERUPTION: Primary | ICD-10-CM

## 2022-07-26 PROCEDURE — 99282 EMERGENCY DEPT VISIT SF MDM: CPT

## 2022-07-26 PROCEDURE — 99284 EMERGENCY DEPT VISIT MOD MDM: CPT | Performed by: PHYSICIAN ASSISTANT

## 2022-07-26 RX ORDER — CLOTRIMAZOLE AND BETAMETHASONE DIPROPIONATE 10; .64 MG/G; MG/G
CREAM TOPICAL
Qty: 15 G | Refills: 0 | Status: SHIPPED | OUTPATIENT
Start: 2022-07-26

## 2022-07-26 NOTE — Clinical Note
Mili Hooks was seen and treated in our emergency department on 7/26/2022  No restrictions            Diagnosis:     Estela Garcia  may return to work on return date  She may return on this date: 07/27/2022         If you have any questions or concerns, please don't hesitate to call        Meghana Delgado RN    ______________________________           _______________          _______________  Hospital Representative                              Date                                Time

## 2022-07-26 NOTE — Clinical Note
Oscar Polanco was seen and treated in our emergency department on 7/26/2022  No restrictions            Diagnosis:     Mansi Montez  may return to work on return date  She may return on this date: 07/27/2022         If you have any questions or concerns, please don't hesitate to call        Shari Walden RN    ______________________________           _______________          _______________  Hospital Representative                              Date                                Time

## 2022-07-26 NOTE — Clinical Note
Kal Matthias was seen and treated in our emergency department on 7/26/2022  No restrictions            Diagnosis:     Malinda Naik  may return to work on return date  She may return on this date: 07/27/2022         If you have any questions or concerns, please don't hesitate to call        Janeth Guzman RN    ______________________________           _______________          _______________  Hospital Representative                              Date                                Time

## 2022-07-26 NOTE — Clinical Note
Sveta Pacheco was seen and treated in our emergency department on 7/26/2022  No restrictions            Diagnosis:     Mitchell Linares  may return to work on return date  She may return on this date: 07/27/2022         If you have any questions or concerns, please don't hesitate to call        Radha Medina, JANUSZ    ______________________________           _______________          _______________  Hospital Representative                              Date                                Time

## 2022-07-26 NOTE — Clinical Note
Pam Alberto was seen and treated in our emergency department on 7/26/2022  No restrictions            Diagnosis:     Shweta Murry  may return to work on return date  She may return on this date: 07/27/2022         If you have any questions or concerns, please don't hesitate to call        Lj Valero RN    ______________________________           _______________          _______________  Hospital Representative                              Date                                Time

## 2022-07-26 NOTE — Clinical Note
Rocío Oliver was seen and treated in our emergency department on 7/26/2022  No restrictions            Diagnosis:     Miller Porter  may return to work on return date  She may return on this date: 07/27/2022         If you have any questions or concerns, please don't hesitate to call        Vianca Johnson RN    ______________________________           _______________          _______________  Hospital Representative                              Date                                Time

## 2022-07-26 NOTE — ED PROVIDER NOTES
History  Chief Complaint   Patient presents with    Rash     Pt reports rash on RLE/knee, above ankle, originally started as scabs, now turned into more of a skin irritation, +itchiness      75-year-old female presents to the emergency department with complaints of a rash  States she has had a rash on her right knee and the side of her right ankle over the past several months  States that the area started make a small scab which has now gotten increasingly red and irritated  States that she does have some bleeding when she picks at it  Has not been using any over-the-counter medications at this time  History provided by:  Patient   used: No    Rash  Location:  Leg  Leg rash location: right knee, right ankle  Severity:  Moderate  Onset quality:  Gradual  Timing:  Constant  Progression:  Spreading  Relieved by:  Nothing  Ineffective treatments:  None tried  Associated symptoms: no abdominal pain, no fever and no sore throat        Prior to Admission Medications   Prescriptions Last Dose Informant Patient Reported? Taking?    ARIPiprazole (ABILIFY) 5 mg tablet   No No   Sig: Take 1 tablet (5 mg total) by mouth daily   HumaLOG KwikPen 100 units/mL injection pen   Yes No   Sig: INJECT 16 UNITS 3 TIMES A DAY BEFORE MEALS   LORazepam (ATIVAN) 0 5 mg tablet   Yes No   erythromycin (ILOTYCIN) ophthalmic ointment   No No   Sig: Administer 0 5 inches to the right eye every 6 (six) hours   escitalopram (LEXAPRO) 20 mg tablet   No No   Sig: Take 1 tablet (20 mg total) by mouth daily   hydrochlorothiazide (HYDRODIURIL) 12 5 mg tablet   No No   Sig: Take 1 tablet (12 5 mg total) by mouth every other day   insulin glargine (LANTUS) 100 units/mL subcutaneous injection   No No   Sig: Inject 64 Units under the skin daily at bedtime   lamoTRIgine (LaMICtal) 150 MG tablet   No No   Sig: Take 1 tablet (150 mg total) by mouth 2 (two) times a day   liraglutide (Victoza) injection   No No   Sig: Inject 1 8 MG daily    lisinopril (ZESTRIL) 10 mg tablet   No No   Sig: Take 1 tablet (10 mg total) by mouth daily   metFORMIN (GLUCOPHAGE) 500 mg tablet   No No   Sig: Take 1 tablet (500 mg total) by mouth 2 (two) times a day with meals   ondansetron (ZOFRAN-ODT) 4 mg disintegrating tablet   No No   Sig: Take 1 tablet (4 mg total) by mouth every 8 (eight) hours as needed for nausea or vomiting   ondansetron (ZOFRAN-ODT) 4 mg disintegrating tablet   No No   Sig: Take 1 tablet (4 mg total) by mouth every 8 (eight) hours as needed for nausea   ondansetron (Zofran ODT) 4 mg disintegrating tablet   No No   Sig: Take 1 tablet (4 mg total) by mouth every 6 (six) hours as needed for nausea or vomiting for up to 7 days   pantoprazole (PROTONIX) 40 mg tablet   No No   Sig: Take 1 tablet (40 mg total) by mouth daily   triamcinolone (KENALOG) 0 5 % cream   No No   Sig: Apply topically 3 (three) times a day      Facility-Administered Medications: None       Past Medical History:   Diagnosis Date    Anemia     Anxiety     Candidal intertrigo     Depression     Diabetes mellitus (HCC)     GERD (gastroesophageal reflux disease)     Hyperlipidemia     Hypertension     Irritable bowel syndrome     Morbid obesity with BMI of 60 0-69 9, adult (HCC)     Osteoarthritis     Seasonal allergies     Sleep difficulties     Suicide attempt [facility])        Past Surgical History:   Procedure Laterality Date     SECTION      CHOLECYSTECTOMY      WISDOM TOOTH EXTRACTION  2009       Family History   Problem Relation Age of Onset    Diabetes Mother     Hypertension Father      I have reviewed and agree with the history as documented      E-Cigarette/Vaping    E-Cigarette Use Never User      E-Cigarette/Vaping Substances    Nicotine No     THC No     CBD No     Flavoring No     Other No     Unknown No      Social History     Tobacco Use    Smoking status: Never Smoker    Smokeless tobacco: Never Used   Vaping Use    Vaping Use: Never used   Substance Use Topics    Alcohol use: Yes     Comment: occassionaly     Drug use: No       Review of Systems   Constitutional: Negative for chills and fever  HENT: Negative for congestion, dental problem and sore throat  Respiratory: Negative for cough  Cardiovascular: Negative for chest pain  Gastrointestinal: Negative for abdominal pain  Musculoskeletal: Negative for back pain and neck pain  Skin: Positive for rash  Negative for wound  All other systems reviewed and are negative  Physical Exam  Physical Exam  Vitals and nursing note reviewed  Constitutional:       Appearance: She is well-developed  HENT:      Head: Normocephalic and atraumatic  Cardiovascular:      Rate and Rhythm: Normal rate  Pulmonary:      Effort: Pulmonary effort is normal  No respiratory distress  Skin:     General: Skin is warm and dry  Findings: Rash present  Comments: Margin scabbed plaques on the anterior aspect of the knee and lateral aspect of the right ankle  Some satelite areas present on the area near the knee which are erythematous in nature  Neurological:      Mental Status: She is alert and oriented to person, place, and time  Mental status is at baseline     Psychiatric:         Mood and Affect: Mood normal          Behavior: Behavior normal                  Vital Signs  ED Triage Vitals [07/26/22 1309]   Temperature Pulse Respirations Blood Pressure SpO2   98 4 °F (36 9 °C) (!) 110 20 (!) 175/91 95 %      Temp Source Heart Rate Source Patient Position - Orthostatic VS BP Location FiO2 (%)   Oral Monitor Sitting Left arm --      Pain Score       3           Vitals:    07/26/22 1309   BP: (!) 175/91   Pulse: (!) 110   Patient Position - Orthostatic VS: Sitting         Visual Acuity      ED Medications  Medications - No data to display    Diagnostic Studies  Results Reviewed     None                 No orders to display              Procedures  Procedures         ED Course                                             MDM  Number of Diagnoses or Management Options  Rash and nonspecific skin eruption  Diagnosis management comments: Differential diagnosis includes but not limited to:  Fungal infection, nonspecific dermatitis        Disposition  Final diagnoses:   Rash and nonspecific skin eruption     Time reflects when diagnosis was documented in both MDM as applicable and the Disposition within this note     Time User Action Codes Description Comment    7/26/2022  1:32 PM Tommy Lord Add [R21] Rash and nonspecific skin eruption       ED Disposition     ED Disposition   Discharge    Condition   Stable    Date/Time   Tue Jul 26, 2022  1:32 PM    Comment   German Smith discharge to home/self care                 Follow-up Information     Follow up With Specialties Details Why Contact Info Additional Information    [address] [facility] Schedule an appointment as soon as possible for a visit   [facility] [address] [phone] [facility] [place], [address] [place], [place], [address] [phone]          Patient's Medications   Discharge Prescriptions    CLOTRIMAZOLE-BETAMETHASONE (Heloise Hait) 1-0 05 % CREAM    Apply to affected area 2 times daily prn       Start Date: 7/26/2022 End Date: --       Order Dose: --       Quantity: 15 g    Refills: 0           PDMP Review       Value Time User    PDMP Reviewed  Yes 8/3/2021  8:09 AM Patric Mcneal DO          ED Provider  Electronically Signed by           Feelcia Mena PA-C  07/26/22 2007

## 2022-08-23 ENCOUNTER — HOSPITAL ENCOUNTER (EMERGENCY)
Facility: HOSPITAL | Age: 47
End: 2022-08-24
Attending: EMERGENCY MEDICINE
Payer: COMMERCIAL

## 2022-08-23 DIAGNOSIS — F32.A DEPRESSION: Primary | ICD-10-CM

## 2022-08-23 DIAGNOSIS — R45.851 SUICIDAL IDEATION: ICD-10-CM

## 2022-08-23 DIAGNOSIS — Z00.8 MEDICAL CLEARANCE FOR PSYCHIATRIC ADMISSION: ICD-10-CM

## 2022-08-23 LAB
AMPHETAMINES SERPL QL SCN: NEGATIVE
BARBITURATES UR QL: NEGATIVE
BENZODIAZ UR QL: NEGATIVE
COCAINE UR QL: NEGATIVE
ETHANOL EXG-MCNC: 0 MG/DL
EXT PREG TEST URINE: NEGATIVE
EXT. CONTROL ED NAV: NORMAL
FLUAV RNA RESP QL NAA+PROBE: NEGATIVE
FLUBV RNA RESP QL NAA+PROBE: NEGATIVE
METHADONE UR QL: NEGATIVE
OPIATES UR QL SCN: NEGATIVE
OXYCODONE+OXYMORPHONE UR QL SCN: NEGATIVE
PCP UR QL: NEGATIVE
RSV RNA RESP QL NAA+PROBE: NEGATIVE
SARS-COV-2 RNA RESP QL NAA+PROBE: NEGATIVE
THC UR QL: NEGATIVE

## 2022-08-23 PROCEDURE — 82075 ASSAY OF BREATH ETHANOL: CPT | Performed by: EMERGENCY MEDICINE

## 2022-08-23 PROCEDURE — 0241U HB NFCT DS VIR RESP RNA 4 TRGT: CPT | Performed by: EMERGENCY MEDICINE

## 2022-08-23 PROCEDURE — 80307 DRUG TEST PRSMV CHEM ANLYZR: CPT | Performed by: EMERGENCY MEDICINE

## 2022-08-23 PROCEDURE — 99285 EMERGENCY DEPT VISIT HI MDM: CPT

## 2022-08-23 PROCEDURE — 99285 EMERGENCY DEPT VISIT HI MDM: CPT | Performed by: EMERGENCY MEDICINE

## 2022-08-23 PROCEDURE — 81025 URINE PREGNANCY TEST: CPT | Performed by: EMERGENCY MEDICINE

## 2022-08-23 PROCEDURE — 96372 THER/PROPH/DIAG INJ SC/IM: CPT

## 2022-08-23 RX ORDER — ESCITALOPRAM OXALATE 20 MG/1
20 TABLET ORAL DAILY
Status: DISCONTINUED | OUTPATIENT
Start: 2022-08-24 | End: 2022-08-24 | Stop reason: HOSPADM

## 2022-08-23 RX ORDER — ARIPIPRAZOLE 5 MG/1
5 TABLET ORAL DAILY
Status: DISCONTINUED | OUTPATIENT
Start: 2022-08-24 | End: 2022-08-24 | Stop reason: HOSPADM

## 2022-08-23 RX ORDER — PANTOPRAZOLE SODIUM 40 MG/1
40 TABLET, DELAYED RELEASE ORAL
Status: DISCONTINUED | OUTPATIENT
Start: 2022-08-24 | End: 2022-08-24 | Stop reason: HOSPADM

## 2022-08-23 RX ORDER — LISINOPRIL 10 MG/1
10 TABLET ORAL ONCE
Status: COMPLETED | OUTPATIENT
Start: 2022-08-23 | End: 2022-08-23

## 2022-08-23 RX ORDER — INSULIN GLARGINE 100 [IU]/ML
64 INJECTION, SOLUTION SUBCUTANEOUS
Status: DISCONTINUED | OUTPATIENT
Start: 2022-08-23 | End: 2022-08-24 | Stop reason: HOSPADM

## 2022-08-23 RX ADMIN — LISINOPRIL 10 MG: 10 TABLET ORAL at 21:20

## 2022-08-23 RX ADMIN — INSULIN GLARGINE 64 UNITS: 100 INJECTION, SOLUTION SUBCUTANEOUS at 21:19

## 2022-08-23 NOTE — ED PROVIDER NOTES
History  Chief Complaint   Patient presents with    Depression     Pt reports increased depression/anxiety over the last few weeks, passive SI thoughts, denies plans, previous attempt 1 yr ago  Seeking inpt treatment  59-year-old female, presents with anxiety and depression  Patient states symptoms began worse over the past few weeks, is having difficulty function in due to her increased depression anxiety  Reports some mild suicidal ideation, has not attempted to harm herself and has no specific plan  Patient states that she has been taking her medications regularly, denies any drug or alcohol use  History provided by:  Patient   used: No    Depression      Prior to Admission Medications   Prescriptions Last Dose Informant Patient Reported? Taking?    ARIPiprazole (ABILIFY) 5 mg tablet   No No   Sig: Take 1 tablet (5 mg total) by mouth daily   HumaLOG KwikPen 100 units/mL injection pen 8/23/2022 at Unknown time Self Yes Yes   Sig: INJECT 16 UNITS 3 TIMES A DAY BEFORE MEALS   LORazepam (ATIVAN) 0 5 mg tablet 8/22/2022 at Unknown time  Yes Yes   clotrimazole-betamethasone (LOTRISONE) 1-0 05 % cream 8/22/2022 at Unknown time  No Yes   Sig: Apply to affected area 2 times daily prn   erythromycin (ILOTYCIN) ophthalmic ointment Not Taking at Unknown time  No No   Sig: Administer 0 5 inches to the right eye every 6 (six) hours   Patient not taking: Reported on 8/23/2022   escitalopram (LEXAPRO) 20 mg tablet 8/23/2022 at Unknown time Self No Yes   Sig: Take 1 tablet (20 mg total) by mouth daily   hydrochlorothiazide (HYDRODIURIL) 12 5 mg tablet 8/22/2022 at Unknown time  No Yes   Sig: Take 1 tablet (12 5 mg total) by mouth every other day   insulin glargine (LANTUS) 100 units/mL subcutaneous injection 8/22/2022 at Unknown time  No Yes   Sig: Inject 64 Units under the skin daily at bedtime   lamoTRIgine (LaMICtal) 150 MG tablet 8/23/2022 at Unknown time  No Yes   Sig: Take 1 tablet (150 mg total) by mouth 2 (two) times a day   Patient taking differently: Take 150 mg by mouth daily   liraglutide (Victoza) injection 2022 at Unknown time  No Yes   Sig: Inject 1 8 MG daily     lisinopril (ZESTRIL) 10 mg tablet 2022 at Unknown time  No Yes   Sig: Take 1 tablet (10 mg total) by mouth daily   metFORMIN (GLUCOPHAGE) 500 mg tablet 2022 at Unknown time Self No Yes   Sig: Take 1 tablet (500 mg total) by mouth 2 (two) times a day with meals   ondansetron (ZOFRAN-ODT) 4 mg disintegrating tablet More than a month at Unknown time  No No   Sig: Take 1 tablet (4 mg total) by mouth every 8 (eight) hours as needed for nausea or vomiting   ondansetron (ZOFRAN-ODT) 4 mg disintegrating tablet More than a month at Unknown time  No No   Sig: Take 1 tablet (4 mg total) by mouth every 8 (eight) hours as needed for nausea   ondansetron (Zofran ODT) 4 mg disintegrating tablet   No Yes   Sig: Take 1 tablet (4 mg total) by mouth every 6 (six) hours as needed for nausea or vomiting for up to 7 days   pantoprazole (PROTONIX) 40 mg tablet 2022 at Unknown time  No Yes   Sig: Take 1 tablet (40 mg total) by mouth daily   triamcinolone (KENALOG) 0 5 % cream Not Taking at Unknown time  No No   Sig: Apply topically 3 (three) times a day   Patient not taking: Reported on 2022      Facility-Administered Medications: None       Past Medical History:   Diagnosis Date    Anemia     Anxiety     Candidal intertrigo     Depression     Diabetes mellitus (HCC)     GERD (gastroesophageal reflux disease)     Hyperlipidemia     Hypertension     Irritable bowel syndrome     Morbid obesity with BMI of 60 0-69 9, adult ([address] )     Osteoarthritis     Seasonal allergies     Sleep difficulties     Su[facility])        Past Surgical History:   Procedure Laterality Date     SECTION  1992    CHOLECYSTECTOMY  2008    WISDOM TOOTH EXTRACTION  2009       Family History   Problem Relation Age of Onset    Diabetes Mother     Hypertension Father      I have reviewed and agree with the history as documented  E-Cigarette/Vaping    E-Cigarette Use Never User      E-Cigarette/Vaping Substances    Nicotine No     THC No     CBD No     Flavoring No     Other No     Unknown No      Social History     Tobacco Use    Smoking status: Never Smoker    Smokeless tobacco: Never Used   Vaping Use    Vaping Use: Never used   Substance Use Topics    Alcohol use: Yes     Comment: occassionaly     Drug use: No       Review of Systems   Constitutional: Negative  HENT: Negative  Eyes: Negative  Respiratory: Negative  Cardiovascular: Negative  Gastrointestinal: Negative  Musculoskeletal: Negative  Skin: Negative  Neurological: Negative  Psychiatric/Behavioral: Positive for depression  Physical Exam  Physical Exam  Vitals and nursing note reviewed  Constitutional:       General: She is not in acute distress  HENT:      Head: Normocephalic and atraumatic  Eyes:      Extraocular Movements: Extraocular movements intact  Pupils: Pupils are equal, round, and reactive to light  Cardiovascular:      Rate and Rhythm: Normal rate  Pulmonary:      Effort: Pulmonary effort is normal    Musculoskeletal:         General: Normal range of motion  Skin:     General: Skin is warm and dry  Neurological:      General: No focal deficit present  Mental Status: She is alert and oriented to person, place, and time  Psychiatric:         Thought Content:  Thought content normal          Vital Signs  ED Triage Vitals [08/23/22 1337]   Temperature Pulse Respirations Blood Pressure SpO2   98 7 °F (37 1 °C) 102 17 165/84 96 %      Temp Source Heart Rate Source Patient Position - Orthostatic VS BP Location FiO2 (%)   Oral Monitor Sitting Left arm --      Pain Score       5           Vitals:    08/23/22 1337   BP: 165/84   Pulse: 102   Patient Position - Orthostatic VS: Sitting         Visual Acuity      ED Medications  Medications - No data to display    Diagnostic Studies  Results Reviewed     Procedure Component Value Units Date/Time    POCT pregnancy, urine [[id number]]  (Normal) Resulted: 08/23/22 1756    Lab Status: Final result Updated: 08/23/22 1756     EXT PREG TEST UR (Ref: Negative) negative     Control valid    FLU/RSV/COVID - if FLU/RSV clinically relevant [[id number]] Collected: 08/23/22 1747    Lab Status: In process Specimen: Nares from Nose Updated: 08/23/22 1755    Rapid drug screen, urine [[id number]]  (Normal) Collected: 08/23/22 1516    Lab Status: Final result Specimen: Urine, Other Updated: 08/23/22 1539     Amph/Meth UR Negative     Barbiturate Ur Negative     Benzodiazepine Urine Negative     Cocaine Urine Negative     Methadone Urine Negative     Opiate Urine Negative     PCP Ur Negative     THC Urine Negative     Oxycodone Urine Negative    Narrative:      FOR MEDICAL PURPOSES ONLY  IF CONFIRMATION NEEDED PLEASE CONTACT THE LAB WITHIN 5 DAYS  Drug Screen Cutoff Levels:  AMPHETAMINE/METHAMPHETAMINES  1000 ng/mL  BARBITURATES     200 ng/mL  BENZODIAZEPINES     200 ng/mL  COCAINE      300 ng/mL  METHADONE      300 ng/mL  OPIATES      300 ng/mL  PHENCYCLIDINE     25 ng/mL  THC       50 ng/mL  OXYCODONE      100 ng/mL    POCT alcohol breath test [[id number]]  (Normal) Resulted: 08/23/22 1512    Lab Status: Final result Updated: 08/23/22 1512     EXTBreath Alcohol 0 0                 No orders to display              Procedures  Procedures         ED Course  ED Course as of 08/23/22 1813   Tue Aug 23, 2022   1812 Patient has signed 201 for voluntary placement, is medically cleared for transfer to psychiatric facility  SBIRT 20yo+    Flowsheet Row Most Recent Value   SBIRT (25 yo +)    In order to provide better care to our patients, we are screening all of our patients for alcohol and drug use  Would it be okay to ask you these screening questions?  Yes Filed at: 08/23/2022 1502   Initial Alcohol Screen: US AUDIT-C     1  How often do you have a drink containing alcohol? 0 Filed at: 08/23/2022 1502   2  How many drinks containing alcohol do you have on a typical day you are drinking? 0 Filed at: 08/23/2022 1502   3a  Male UNDER 65: How often do you have five or more drinks on one occasion? 0 Filed at: 08/23/2022 1502   3b  FEMALE Any Age, or MALE 65+: How often do you have 4 or more drinks on one occassion? 0 Filed at: 08/23/2022 1502   Audit-C Score 0 Filed at: 08/23/2022 1502   JUAN: How many times in the past year have you    Used an illegal drug or used a prescription medication for non-medical reasons? Never Filed at: 08/23/2022 1502                    MDM  Number of Diagnoses or Management Options  Diagnosis management comments: 53-year-old female, presented with depression and anxiety  Differential diagnosis includes depression, suicidal ideation, psychosis, intoxication among other diagnosis  Patient, cooperative, answering questions appropriately  Patient believes that she needs inpatient psychiatric treatment, screening test ordered and will consult with crisis worker  Amount and/or Complexity of Data Reviewed  Clinical lab tests: ordered and reviewed  Discuss the patient with other providers: yes        Disposition  Final diagnoses:   Depression   Suicidal ideation     Time reflects when diagnosis was documented in both MDM as applicable and the Disposition within this note     Time User Action Codes Description Comment    8/23/2022  6:13 PM Halley Rick Add [F32  A] Depression     8/23/2022  6:13 PM Halley Rick Add [X12 372] Suicidal ideation       ED Disposition     ED Disposition   Transfer to [address] [place] [address]   --    Date/Time   Tue Aug 23, 2022  6:13 PM    Comment   Pako Hazard should be transferred out to psych and has been medically cleared             MD Documentation    Rachid Khan Most Recent Value   Sending MD Maira Moore      Follow-up Information    None         Patient's Medications   Discharge Prescriptions    No medications on file       No discharge procedures on file      PDMP Review       Value Time User    PDMP Reviewed  Yes 8/3/2021  8:09 AM Sameera Fernandez DO          ED Provider  Electronically Signed by           Pierce Amaya MD  08/23/22 05

## 2022-08-23 NOTE — ED NOTES
Provider made aware of need for patient's daily medications to be ordered     Xiomy Gr RN  08/23/22 2759

## 2022-08-23 NOTE — ED NOTES
Intake / safety assessment completed with patient who presents to ED on her own due to increased depression / anxiety  Patient states symptoms began to worsen over the past few weeks  Patient states that she is having difficulty functioning due to her increased depression  / anxiety  Reports not taking care of ADL's at this time  States she hasn't showered in a few days  Patient  states she has little to no motivation to do anything other than sleep  Patient reports that she is employed full time overnights and lately she reports not being able to function at work  Reports having passive suicidal thoughts  Denies having a plan  Past suicidal attempt shania one year ago  Patient reports that she attempted to OD on pills  Patient reports eating a lot  Patient states she has been over eating  States that she eats due to stress  Financial stressors and stress related to pain from arthritis  Patient denies wanting to harm anyone else  Patient also denies any hallucinations  Patient reports being [facility] by [facility]  In the past patient reports that she was being seen by a therapist   Patient reports taking all medications as prescribed  Patient reports having high blood pressure and diabetes  Patient lives with her josemanuel who she reports is supportive  Other supports include her mother and daughter  Patient is requesting to sign 201 for inpatient treatment  Partial was discussed, but patient stated this would not work for her as her work schedule requires her to work overnights  Rights reviewed    201 signed by patient and

## 2022-08-24 ENCOUNTER — HOSPITAL ENCOUNTER (INPATIENT)
Facility: HOSPITAL | Age: 47
LOS: 7 days | Discharge: HOME | DRG: 885 | End: 2022-08-31
Attending: PSYCHIATRY & NEUROLOGY | Admitting: STUDENT IN AN ORGANIZED HEALTH CARE EDUCATION/TRAINING PROGRAM
Payer: COMMERCIAL

## 2022-08-24 VITALS
WEIGHT: 293 LBS | DIASTOLIC BLOOD PRESSURE: 67 MMHG | OXYGEN SATURATION: 96 % | RESPIRATION RATE: 18 BRPM | HEIGHT: 60 IN | SYSTOLIC BLOOD PRESSURE: 144 MMHG | TEMPERATURE: 98.7 F | HEART RATE: 87 BPM | BODY MASS INDEX: 57.52 KG/M2

## 2022-08-24 DIAGNOSIS — Z00.8 MEDICAL CLEARANCE FOR PSYCHIATRIC ADMISSION: ICD-10-CM

## 2022-08-24 DIAGNOSIS — F33.2 MAJOR DEPRESSIVE DISORDER, RECURRENT SEVERE WITHOUT PSYCHOTIC FEATURES (HCC): Primary | Chronic | ICD-10-CM

## 2022-08-24 DIAGNOSIS — M17.11 PRIMARY OSTEOARTHRITIS OF RIGHT KNEE: ICD-10-CM

## 2022-08-24 LAB
FLUAV RNA RESP QL NAA+PROBE: NEGATIVE
FLUBV RNA RESP QL NAA+PROBE: NEGATIVE
GLUCOSE SERPL-MCNC: 219 MG/DL (ref 65–140)
GLUCOSE SERPL-MCNC: 275 MG/DL (ref 65–140)
RSV RNA RESP QL NAA+PROBE: NEGATIVE
SARS-COV-2 RNA RESP QL NAA+PROBE: NEGATIVE

## 2022-08-24 PROCEDURE — 96372 THER/PROPH/DIAG INJ SC/IM: CPT

## 2022-08-24 PROCEDURE — 0241U HB NFCT DS VIR RESP RNA 4 TRGT: CPT | Performed by: EMERGENCY MEDICINE

## 2022-08-24 PROCEDURE — 82948 REAGENT STRIP/BLOOD GLUCOSE: CPT

## 2022-08-24 RX ORDER — ACETAMINOPHEN 325 MG/1
650 TABLET ORAL EVERY 4 HOURS PRN
Status: DISCONTINUED | OUTPATIENT
Start: 2022-08-24 | End: 2022-08-31 | Stop reason: HOSPADM

## 2022-08-24 RX ORDER — INSULIN LISPRO 100 [IU]/ML
1-6 INJECTION, SOLUTION INTRAVENOUS; SUBCUTANEOUS
Status: DISCONTINUED | OUTPATIENT
Start: 2022-08-25 | End: 2022-08-31 | Stop reason: HOSPADM

## 2022-08-24 RX ORDER — OLANZAPINE 10 MG/1
5 INJECTION, POWDER, LYOPHILIZED, FOR SOLUTION INTRAMUSCULAR
Status: CANCELLED | OUTPATIENT
Start: 2022-08-24

## 2022-08-24 RX ORDER — ACETAMINOPHEN 325 MG/1
975 TABLET ORAL EVERY 6 HOURS PRN
Status: DISCONTINUED | OUTPATIENT
Start: 2022-08-24 | End: 2022-08-31 | Stop reason: HOSPADM

## 2022-08-24 RX ORDER — INSULIN GLARGINE 100 [IU]/ML
64 INJECTION, SOLUTION SUBCUTANEOUS
Status: CANCELLED | OUTPATIENT
Start: 2022-08-24

## 2022-08-24 RX ORDER — DIPHENHYDRAMINE HYDROCHLORIDE 50 MG/ML
50 INJECTION INTRAMUSCULAR; INTRAVENOUS EVERY 6 HOURS PRN
Status: DISCONTINUED | OUTPATIENT
Start: 2022-08-24 | End: 2022-08-31 | Stop reason: HOSPADM

## 2022-08-24 RX ORDER — HYDROXYZINE 50 MG/1
50 TABLET, FILM COATED ORAL
Status: DISCONTINUED | OUTPATIENT
Start: 2022-08-24 | End: 2022-08-31 | Stop reason: HOSPADM

## 2022-08-24 RX ORDER — PROPRANOLOL HYDROCHLORIDE 20 MG/1
10 TABLET ORAL EVERY 8 HOURS PRN
Status: CANCELLED | OUTPATIENT
Start: 2022-08-24

## 2022-08-24 RX ORDER — LORAZEPAM 2 MG/ML
2 INJECTION INTRAMUSCULAR EVERY 6 HOURS PRN
Status: CANCELLED | OUTPATIENT
Start: 2022-08-24

## 2022-08-24 RX ORDER — OLANZAPINE 2.5 MG/1
5 TABLET ORAL
Status: CANCELLED | OUTPATIENT
Start: 2022-08-24

## 2022-08-24 RX ORDER — LISINOPRIL 10 MG/1
10 TABLET ORAL DAILY
Status: DISCONTINUED | OUTPATIENT
Start: 2022-08-25 | End: 2022-08-31 | Stop reason: HOSPADM

## 2022-08-24 RX ORDER — LORAZEPAM 2 MG/ML
2 INJECTION INTRAMUSCULAR EVERY 6 HOURS PRN
Status: DISCONTINUED | OUTPATIENT
Start: 2022-08-24 | End: 2022-08-31 | Stop reason: HOSPADM

## 2022-08-24 RX ORDER — POLYETHYLENE GLYCOL 3350 17 G/17G
17 POWDER, FOR SOLUTION ORAL DAILY PRN
Status: DISCONTINUED | OUTPATIENT
Start: 2022-08-24 | End: 2022-08-31 | Stop reason: HOSPADM

## 2022-08-24 RX ORDER — OLANZAPINE 2.5 MG/1
2.5 TABLET ORAL
Status: DISCONTINUED | OUTPATIENT
Start: 2022-08-24 | End: 2022-08-31 | Stop reason: HOSPADM

## 2022-08-24 RX ORDER — PROPRANOLOL HYDROCHLORIDE 10 MG/1
10 TABLET ORAL EVERY 8 HOURS PRN
Status: DISCONTINUED | OUTPATIENT
Start: 2022-08-24 | End: 2022-08-31 | Stop reason: HOSPADM

## 2022-08-24 RX ORDER — BENZTROPINE MESYLATE 1 MG/1
1 TABLET ORAL
Status: DISCONTINUED | OUTPATIENT
Start: 2022-08-24 | End: 2022-08-31 | Stop reason: HOSPADM

## 2022-08-24 RX ORDER — HYDROXYZINE HYDROCHLORIDE 25 MG/1
100 TABLET, FILM COATED ORAL
Status: CANCELLED | OUTPATIENT
Start: 2022-08-24

## 2022-08-24 RX ORDER — HYDROXYZINE HYDROCHLORIDE 25 MG/1
50 TABLET, FILM COATED ORAL
Status: CANCELLED | OUTPATIENT
Start: 2022-08-24

## 2022-08-24 RX ORDER — OLANZAPINE 2.5 MG/1
2.5 TABLET ORAL
Status: CANCELLED | OUTPATIENT
Start: 2022-08-24

## 2022-08-24 RX ORDER — OLANZAPINE 10 MG/1
10 INJECTION, POWDER, LYOPHILIZED, FOR SOLUTION INTRAMUSCULAR
Status: CANCELLED | OUTPATIENT
Start: 2022-08-24

## 2022-08-24 RX ORDER — INSULIN LISPRO 100 [IU]/ML
1-6 INJECTION, SOLUTION INTRAVENOUS; SUBCUTANEOUS
Status: DISCONTINUED | OUTPATIENT
Start: 2022-08-25 | End: 2022-08-25

## 2022-08-24 RX ORDER — HYDROCHLOROTHIAZIDE 12.5 MG/1
12.5 TABLET ORAL EVERY OTHER DAY
Status: DISCONTINUED | OUTPATIENT
Start: 2022-08-25 | End: 2022-08-31 | Stop reason: HOSPADM

## 2022-08-24 RX ORDER — ACETAMINOPHEN 325 MG/1
975 TABLET ORAL EVERY 6 HOURS PRN
Status: CANCELLED | OUTPATIENT
Start: 2022-08-24

## 2022-08-24 RX ORDER — PANTOPRAZOLE SODIUM 40 MG/1
40 TABLET, DELAYED RELEASE ORAL
Status: CANCELLED | OUTPATIENT
Start: 2022-08-25

## 2022-08-24 RX ORDER — AMOXICILLIN 250 MG
1 CAPSULE ORAL DAILY PRN
Status: DISCONTINUED | OUTPATIENT
Start: 2022-08-24 | End: 2022-08-31 | Stop reason: HOSPADM

## 2022-08-24 RX ORDER — ARIPIPRAZOLE 5 MG/1
5 TABLET ORAL DAILY
Status: DISCONTINUED | OUTPATIENT
Start: 2022-08-25 | End: 2022-08-25

## 2022-08-24 RX ORDER — LORAZEPAM 0.5 MG/1
0.5 TABLET ORAL 3 TIMES DAILY PRN
Status: DISCONTINUED | OUTPATIENT
Start: 2022-08-24 | End: 2022-08-24 | Stop reason: HOSPADM

## 2022-08-24 RX ORDER — POLYETHYLENE GLYCOL 3350 17 G/17G
17 POWDER, FOR SOLUTION ORAL DAILY PRN
Status: CANCELLED | OUTPATIENT
Start: 2022-08-24

## 2022-08-24 RX ORDER — BENZTROPINE MESYLATE 1 MG/ML
1 INJECTION INTRAMUSCULAR; INTRAVENOUS
Status: DISCONTINUED | OUTPATIENT
Start: 2022-08-24 | End: 2022-08-31 | Stop reason: HOSPADM

## 2022-08-24 RX ORDER — TRAZODONE HYDROCHLORIDE 50 MG/1
50 TABLET ORAL
Status: CANCELLED | OUTPATIENT
Start: 2022-08-24

## 2022-08-24 RX ORDER — INSULIN GLARGINE 100 [IU]/ML
64 INJECTION, SOLUTION SUBCUTANEOUS
Status: DISCONTINUED | OUTPATIENT
Start: 2022-08-24 | End: 2022-08-31 | Stop reason: HOSPADM

## 2022-08-24 RX ORDER — BENZTROPINE MESYLATE 0.5 MG/1
1 TABLET ORAL
Status: CANCELLED | OUTPATIENT
Start: 2022-08-24

## 2022-08-24 RX ORDER — OLANZAPINE 10 MG/1
10 TABLET ORAL
Status: CANCELLED | OUTPATIENT
Start: 2022-08-24

## 2022-08-24 RX ORDER — HYDROXYZINE 50 MG/1
100 TABLET, FILM COATED ORAL
Status: DISCONTINUED | OUTPATIENT
Start: 2022-08-24 | End: 2022-08-31 | Stop reason: HOSPADM

## 2022-08-24 RX ORDER — OLANZAPINE 10 MG/1
10 INJECTION, POWDER, LYOPHILIZED, FOR SOLUTION INTRAMUSCULAR
Status: DISCONTINUED | OUTPATIENT
Start: 2022-08-24 | End: 2022-08-31 | Stop reason: HOSPADM

## 2022-08-24 RX ORDER — MAGNESIUM HYDROXIDE/ALUMINUM HYDROXICE/SIMETHICONE 120; 1200; 1200 MG/30ML; MG/30ML; MG/30ML
30 SUSPENSION ORAL EVERY 4 HOURS PRN
Status: DISCONTINUED | OUTPATIENT
Start: 2022-08-24 | End: 2022-08-31 | Stop reason: HOSPADM

## 2022-08-24 RX ORDER — ESCITALOPRAM OXALATE 10 MG/1
20 TABLET ORAL DAILY
Status: DISCONTINUED | OUTPATIENT
Start: 2022-08-25 | End: 2022-08-31 | Stop reason: HOSPADM

## 2022-08-24 RX ORDER — ACETAMINOPHEN 325 MG/1
650 TABLET ORAL EVERY 6 HOURS PRN
Status: CANCELLED | OUTPATIENT
Start: 2022-08-24

## 2022-08-24 RX ORDER — OLANZAPINE 5 MG/1
5 TABLET ORAL
Status: DISCONTINUED | OUTPATIENT
Start: 2022-08-24 | End: 2022-08-31 | Stop reason: HOSPADM

## 2022-08-24 RX ORDER — AMOXICILLIN 250 MG
1 CAPSULE ORAL DAILY PRN
Status: CANCELLED | OUTPATIENT
Start: 2022-08-24

## 2022-08-24 RX ORDER — MINERAL OIL AND PETROLATUM 150; 830 MG/G; MG/G
1 OINTMENT OPHTHALMIC
Status: DISCONTINUED | OUTPATIENT
Start: 2022-08-24 | End: 2022-08-31 | Stop reason: HOSPADM

## 2022-08-24 RX ORDER — HYDROXYZINE HYDROCHLORIDE 25 MG/1
25 TABLET, FILM COATED ORAL
Status: CANCELLED | OUTPATIENT
Start: 2022-08-24

## 2022-08-24 RX ORDER — OLANZAPINE 10 MG/1
10 TABLET ORAL
Status: DISCONTINUED | OUTPATIENT
Start: 2022-08-24 | End: 2022-08-31 | Stop reason: HOSPADM

## 2022-08-24 RX ORDER — ARIPIPRAZOLE 5 MG/1
5 TABLET ORAL DAILY
Status: CANCELLED | OUTPATIENT
Start: 2022-08-25

## 2022-08-24 RX ORDER — ACETAMINOPHEN 325 MG/1
650 TABLET ORAL EVERY 4 HOURS PRN
Status: CANCELLED | OUTPATIENT
Start: 2022-08-24

## 2022-08-24 RX ORDER — HYDROXYZINE HYDROCHLORIDE 25 MG/1
25 TABLET, FILM COATED ORAL
Status: DISCONTINUED | OUTPATIENT
Start: 2022-08-24 | End: 2022-08-31 | Stop reason: HOSPADM

## 2022-08-24 RX ORDER — PANTOPRAZOLE SODIUM 40 MG/1
40 TABLET, DELAYED RELEASE ORAL
Status: DISCONTINUED | OUTPATIENT
Start: 2022-08-25 | End: 2022-08-31 | Stop reason: HOSPADM

## 2022-08-24 RX ORDER — MINERAL OIL AND PETROLATUM 150; 830 MG/G; MG/G
1 OINTMENT OPHTHALMIC
Status: CANCELLED | OUTPATIENT
Start: 2022-08-24

## 2022-08-24 RX ORDER — ESCITALOPRAM OXALATE 20 MG/1
20 TABLET ORAL DAILY
Status: CANCELLED | OUTPATIENT
Start: 2022-08-25

## 2022-08-24 RX ORDER — DIPHENHYDRAMINE HYDROCHLORIDE 50 MG/ML
50 INJECTION INTRAMUSCULAR; INTRAVENOUS EVERY 6 HOURS PRN
Status: CANCELLED | OUTPATIENT
Start: 2022-08-24

## 2022-08-24 RX ORDER — OLANZAPINE 10 MG/1
5 INJECTION, POWDER, LYOPHILIZED, FOR SOLUTION INTRAMUSCULAR
Status: DISCONTINUED | OUTPATIENT
Start: 2022-08-24 | End: 2022-08-31 | Stop reason: HOSPADM

## 2022-08-24 RX ORDER — TRAZODONE HYDROCHLORIDE 50 MG/1
50 TABLET ORAL
Status: DISCONTINUED | OUTPATIENT
Start: 2022-08-24 | End: 2022-08-31 | Stop reason: HOSPADM

## 2022-08-24 RX ORDER — ACETAMINOPHEN 325 MG/1
650 TABLET ORAL EVERY 6 HOURS PRN
Status: DISCONTINUED | OUTPATIENT
Start: 2022-08-24 | End: 2022-08-31 | Stop reason: HOSPADM

## 2022-08-24 RX ORDER — BENZTROPINE MESYLATE 1 MG/ML
1 INJECTION INTRAMUSCULAR; INTRAVENOUS
Status: CANCELLED | OUTPATIENT
Start: 2022-08-24

## 2022-08-24 RX ORDER — MAGNESIUM HYDROXIDE/ALUMINUM HYDROXICE/SIMETHICONE 120; 1200; 1200 MG/30ML; MG/30ML; MG/30ML
30 SUSPENSION ORAL EVERY 4 HOURS PRN
Status: CANCELLED | OUTPATIENT
Start: 2022-08-24

## 2022-08-24 RX ADMIN — METFORMIN HYDROCHLORIDE 500 MG: 500 TABLET ORAL at 06:48

## 2022-08-24 RX ADMIN — ESCITALOPRAM OXALATE 20 MG: 20 TABLET ORAL at 10:13

## 2022-08-24 RX ADMIN — ARIPIPRAZOLE 5 MG: 5 TABLET ORAL at 10:13

## 2022-08-24 RX ADMIN — INSULIN HUMAN 16 UNITS: 100 INJECTION, SOLUTION PARENTERAL at 14:29

## 2022-08-24 RX ADMIN — INSULIN GLARGINE 64 UNITS: 100 INJECTION, SOLUTION SUBCUTANEOUS at 21:52

## 2022-08-24 RX ADMIN — METFORMIN HYDROCHLORIDE 500 MG: 500 TABLET ORAL at 16:27

## 2022-08-24 RX ADMIN — LORAZEPAM 0.5 MG: 0.5 TABLET ORAL at 15:11

## 2022-08-24 RX ADMIN — LORAZEPAM 0.5 MG: 0.5 TABLET ORAL at 03:12

## 2022-08-24 RX ADMIN — PANTOPRAZOLE SODIUM 40 MG: 40 TABLET, DELAYED RELEASE ORAL at 06:48

## 2022-08-24 NOTE — ED NOTES
Patient requires repeat COVID test (unit requires a negative result within 24 hours of arrival   Order placed by ED MD   Crisis to notify Intake once resulted
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
## 2022-08-24 NOTE — ED NOTES
Insurance Authorization for admission:   Phone call placed to [employer]  Phone number: [phone]  Spoke to [place]  4 days approved  Level of care: inpatient mental health 201  Review on 8/29/22 (due to first uncovered day being a Sunday); review can be completed by calling [phone] if additional days are requested, or for discharges, UR may call the dedicated discharge line at [phone]  Authorization #: [id number]  Per [place], this authorization covers transportation as well

## 2022-08-24 NOTE — LETTER
[facility]  [address] [place] [id number]  [phone]  Dept: [phone]    August 31, 2022     Patient: Shayla Cross   YOB: 1975   Date of Visit: 8/24/2022       To Whom it May Concern:    Keira Molina is under my professional care  She was seen in the hospital from 8/24/2022   to 08/31/22  She may return to work on 09/05/22 without limitations  If you have any questions or concerns, please don't hesitate to call  Sincerely,          Delvis Mobley

## 2022-08-24 NOTE — ED CARE HANDOFF
Emergency Department Sign Out Note        Sign out and transfer of care from Dr Rick Mack  See Separate Emergency Department note  The patient, Norberto Lewis, was evaluated by the previous provider for anxiety/drepression   Workup Completed:  medically cleared by previous team    ED Course / Workup Pending (followup):  201 signed, dispo per crisis, no events overnight  Procedures  MDM        Disposition  Final diagnoses:   Depression   Suicidal ideation     Time reflects when diagnosis was documented in both MDM as applicable and the Disposition within this note     Time User Action Codes Description Comment    8/23/2022  6:13 PM Demetrius Sins Add [I79  A] Depression     8/23/2022  6:13 PM Demertius Sins Add [Q18 311] Suicidal ideation       ED Disposition     ED Disposition   Transfer to [facility]    Condition   --    Date/Time   Tue Aug 23, 2022  6:13 PM    Comment   Norberto Lewis should be transferred out to [facility] and has been medically cleared  MD Documentation    Flowsheet Row Most Recent Value   Sending MD Lynn Woodruff      Follow-up Information    None       Patient's Medications   Discharge Prescriptions    No medications on file     No discharge procedures on file         ED Provider  Electronically Signed by     Eva King MD  08/24/22 8117

## 2022-08-24 NOTE — ED NOTES
Referrals were faxed to the following facilities:    [facility] (spoke with Ema Smith)  [facility] [address])  [facility] (Meri Lawler)  [facility]  [facility])    No answer at [facility] reported only male bed availability  [facility] has no beds for today, but could review for tomorrow if a bed is still needed per Yung Stoddard

## 2022-08-24 NOTE — ED NOTES
Yonathan Connors at [facility], still no time for transport
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
## 2022-08-24 NOTE — ED NOTES
Patient is accepted at [facility]  Patient is accepted by Dr Anthony Flanagan per Daniele mansfield  Transportation is arranged with [employer]  Transportation is scheduled for TBD  Patient may go to the floor at 1900 or later  Nurse report is to be called to [phone] prior to patient transfer

## 2022-08-24 NOTE — EMTALA/ACUTE CARE TRANSFER
[address] [place] [address]  Dept: [phone]      EMTALA TRANSFER CONSENT    NAME Elisabeth Osorio                                        [facility] 1975                              MRN [id number]    I have been informed of my rights regarding examination, treatment, and transfer   by Dr Sarina Osborn MD    Benefits:      Risks:        { ED EMTALA TRANSFER CHOICES:[id number]}    I authorize the performance of emergency medical procedures and treatments upon me in both transit and upon arrival at the receiving facility  Additionally, I authorize the release of any and all medical records to the receiving facility and request they be transported with me, if possible  I understand that the safest mode of transportation during a medical emergency is an ambulance and that the Hospital advocates the use of this mode of transport  Risks of traveling to the receiving facility by car, including absence of medical control, life sustaining equipment, such as oxygen, and medical personnel has been explained to me and I fully understand them  (MICAH CORRECT BOX BELOW)  [  ]  I consent to the stated transfer and to be transported by ambulance/helicopter  [  ]  I consent to the stated transfer, but refuse transportation by ambulance and accept full responsibility for my transportation by car    I understand the risks of non-ambulance transfers and I exonerate the Hospital and its staff from any deterioration in my condition that results from this refusal     X___________________________________________    DATE  22  TIME________  Signature of patient or legally responsible individual signing on patient behalf           RELATIONSHIP TO PATIENT_________________________          Provider Certification    NAME Elisabeth Osorio                                        [facility] 1975                              MRN [id number]    A medical screening exam was performed on the above named patient  Based on the examination:    Condition Necessitating Transfer The primary encounter diagnosis was Depression  Diagnoses of Suicidal ideation and Medical clearance for psychiatric admission were also pertinent to this visit  Patient Condition:      Reason for Transfer:      Transfer Requirements: Facility [facility] 3[facility]   · Space available and qualified personnel available for treatment as acknowledged by CTS  · Agreed to accept transfer and to provide appropriate medical treatment as acknowledged by       Dr Rohan Vences  · Appropriate medical records of the examination and treatment of the patient are provided at the time of transfer   [address] [facility], [address] _______  · Transfer will be performed by qualified personnel from    and appropriate transfer equipment as required, including the use of necessary and appropriate life support measures  Provider Certification: I have examined the patient and explained the following risks and benefits of being transferred/refusing transfer to the patient/family:         Based on these reasonable risks and benefits to the patient and/or the unborn child(kellee), and based upon the information available at the time of the patients examination, I certify that the medical benefits reasonably to be expected from the provision of appropriate medical treatments at another medical facility outweigh the increasing risks, if any, to the individuals medical condition, and in the case of labor to the unborn child, from effecting the transfer      X____________________________________________ DATE 08/24/22        TIME_______      ORIGINAL - SEND TO MEDICAL RECORDS   COPY - SEND WITH PATIENT DURING TRANSFER treatments at another medical facility outweigh the increasing risks, if any, to the individuals medical condition, and in the case of labor to the unborn child, from effecting the transfer      X____________________________________________ DATE 08/24/22        TIME_______      ORIGINAL - SEND TO MEDICAL RECORDS   COPY - SEND WITH PATIENT DURING TRANSFER

## 2022-08-25 PROBLEM — E66.01 CLASS 3 SEVERE OBESITY DUE TO EXCESS CALORIES WITH SERIOUS COMORBIDITY AND BODY MASS INDEX (BMI) GREATER THAN OR EQUAL TO 70 IN ADULT (HCC): Status: ACTIVE | Noted: 2022-08-25

## 2022-08-25 PROBLEM — E78.5 ELEVATED LIPIDS: Status: ACTIVE | Noted: 2022-08-25

## 2022-08-25 PROBLEM — E66.813 CLASS 3 SEVERE OBESITY DUE TO EXCESS CALORIES WITH SERIOUS COMORBIDITY AND BODY MASS INDEX (BMI) GREATER THAN OR EQUAL TO 70 IN ADULT (HCC): Status: ACTIVE | Noted: 2022-08-25

## 2022-08-25 LAB
25(OH)D3 SERPL-MCNC: 9.2 NG/ML (ref 30–100)
ALBUMIN SERPL BCP-MCNC: 4 G/DL (ref 3.5–5)
ALP SERPL-CCNC: 87 U/L (ref 43–122)
ALT SERPL W P-5'-P-CCNC: 81 U/L
ANION GAP SERPL CALCULATED.3IONS-SCNC: 10 MMOL/L (ref 5–14)
AST SERPL W P-5'-P-CCNC: 62 U/L (ref 14–36)
BASOPHILS # BLD AUTO: 0.04 THOUSANDS/ΜL (ref 0–0.1)
BASOPHILS NFR BLD AUTO: 1 % (ref 0–1)
BILIRUB SERPL-MCNC: 0.68 MG/DL (ref 0.2–1)
BUN SERPL-MCNC: 14 MG/DL (ref 5–25)
CALCIUM SERPL-MCNC: 9.3 MG/DL (ref 8.4–10.2)
CHLORIDE SERPL-SCNC: 99 MMOL/L (ref 96–108)
CHOLEST SERPL-MCNC: 214 MG/DL
CO2 SERPL-SCNC: 27 MMOL/L (ref 21–32)
CREAT SERPL-MCNC: 0.67 MG/DL (ref 0.6–1.2)
EOSINOPHIL # BLD AUTO: 0.15 THOUSAND/ΜL (ref 0–0.61)
EOSINOPHIL NFR BLD AUTO: 2 % (ref 0–6)
ERYTHROCYTE [DISTWIDTH] IN BLOOD BY AUTOMATED COUNT: 13.6 % (ref 11.6–15.1)
EST. AVERAGE GLUCOSE BLD GHB EST-MCNC: 232 MG/DL
FOLATE SERPL-MCNC: 14.3 NG/ML (ref 3.1–17.5)
GFR SERPL CREATININE-BSD FRML MDRD: 103 ML/MIN/1.73SQ M
GLUCOSE P FAST SERPL-MCNC: 234 MG/DL (ref 70–99)
GLUCOSE SERPL-MCNC: 215 MG/DL (ref 65–140)
GLUCOSE SERPL-MCNC: 215 MG/DL (ref 65–140)
GLUCOSE SERPL-MCNC: 234 MG/DL (ref 70–99)
GLUCOSE SERPL-MCNC: 292 MG/DL (ref 65–140)
GLUCOSE SERPL-MCNC: 293 MG/DL (ref 65–140)
HBA1C MFR BLD: 9.7 %
HCT VFR BLD AUTO: 43.4 % (ref 34.8–46.1)
HDLC SERPL-MCNC: 34 MG/DL
HGB BLD-MCNC: 13.6 G/DL (ref 11.5–15.4)
IMM GRANULOCYTES # BLD AUTO: 0.03 THOUSAND/UL (ref 0–0.2)
IMM GRANULOCYTES NFR BLD AUTO: 1 % (ref 0–2)
LDLC SERPL CALC-MCNC: 154 MG/DL
LYMPHOCYTES # BLD AUTO: 2.08 THOUSANDS/ΜL (ref 0.6–4.47)
LYMPHOCYTES NFR BLD AUTO: 32 % (ref 14–44)
MCH RBC QN AUTO: 28.4 PG (ref 26.8–34.3)
MCHC RBC AUTO-ENTMCNC: 31.3 G/DL (ref 31.4–37.4)
MCV RBC AUTO: 91 FL (ref 82–98)
MONOCYTES # BLD AUTO: 0.5 THOUSAND/ΜL (ref 0.17–1.22)
MONOCYTES NFR BLD AUTO: 8 % (ref 4–12)
NEUTROPHILS # BLD AUTO: 3.8 THOUSANDS/ΜL (ref 1.85–7.62)
NEUTS SEG NFR BLD AUTO: 56 % (ref 43–75)
NONHDLC SERPL-MCNC: 180 MG/DL
NRBC BLD AUTO-RTO: 0 /100 WBCS
PLATELET # BLD AUTO: 258 THOUSANDS/UL (ref 149–390)
PMV BLD AUTO: 10.2 FL (ref 8.9–12.7)
POTASSIUM SERPL-SCNC: 4.1 MMOL/L (ref 3.5–5.3)
PROT SERPL-MCNC: 7.4 G/DL (ref 6.4–8.4)
RBC # BLD AUTO: 4.79 MILLION/UL (ref 3.81–5.12)
RPR SER QL: NORMAL
SODIUM SERPL-SCNC: 136 MMOL/L (ref 135–147)
TRIGL SERPL-MCNC: 129 MG/DL
TSH SERPL DL<=0.05 MIU/L-ACNC: 1.11 UIU/ML (ref 0.45–4.5)
VIT B12 SERPL-MCNC: 507 PG/ML (ref 100–900)
WBC # BLD AUTO: 6.6 THOUSAND/UL (ref 4.31–10.16)

## 2022-08-25 PROCEDURE — 86592 SYPHILIS TEST NON-TREP QUAL: CPT | Performed by: STUDENT IN AN ORGANIZED HEALTH CARE EDUCATION/TRAINING PROGRAM

## 2022-08-25 PROCEDURE — 82948 REAGENT STRIP/BLOOD GLUCOSE: CPT

## 2022-08-25 PROCEDURE — 99223 1ST HOSP IP/OBS HIGH 75: CPT | Performed by: STUDENT IN AN ORGANIZED HEALTH CARE EDUCATION/TRAINING PROGRAM

## 2022-08-25 PROCEDURE — 82607 VITAMIN B-12: CPT | Performed by: STUDENT IN AN ORGANIZED HEALTH CARE EDUCATION/TRAINING PROGRAM

## 2022-08-25 PROCEDURE — 80061 LIPID PANEL: CPT | Performed by: STUDENT IN AN ORGANIZED HEALTH CARE EDUCATION/TRAINING PROGRAM

## 2022-08-25 PROCEDURE — 82306 VITAMIN D 25 HYDROXY: CPT | Performed by: STUDENT IN AN ORGANIZED HEALTH CARE EDUCATION/TRAINING PROGRAM

## 2022-08-25 PROCEDURE — 85025 COMPLETE CBC W/AUTO DIFF WBC: CPT | Performed by: STUDENT IN AN ORGANIZED HEALTH CARE EDUCATION/TRAINING PROGRAM

## 2022-08-25 PROCEDURE — 84443 ASSAY THYROID STIM HORMONE: CPT | Performed by: STUDENT IN AN ORGANIZED HEALTH CARE EDUCATION/TRAINING PROGRAM

## 2022-08-25 PROCEDURE — 80053 COMPREHEN METABOLIC PANEL: CPT | Performed by: STUDENT IN AN ORGANIZED HEALTH CARE EDUCATION/TRAINING PROGRAM

## 2022-08-25 PROCEDURE — 82746 ASSAY OF FOLIC ACID SERUM: CPT | Performed by: STUDENT IN AN ORGANIZED HEALTH CARE EDUCATION/TRAINING PROGRAM

## 2022-08-25 PROCEDURE — 99253 IP/OBS CNSLTJ NEW/EST LOW 45: CPT | Performed by: PHYSICIAN ASSISTANT

## 2022-08-25 PROCEDURE — 83036 HEMOGLOBIN GLYCOSYLATED A1C: CPT

## 2022-08-25 RX ORDER — LORAZEPAM 0.5 MG/1
0.5 TABLET ORAL EVERY 6 HOURS PRN
Status: DISCONTINUED | OUTPATIENT
Start: 2022-08-25 | End: 2022-08-31 | Stop reason: HOSPADM

## 2022-08-25 RX ORDER — INSULIN LISPRO 100 [IU]/ML
1-6 INJECTION, SOLUTION INTRAVENOUS; SUBCUTANEOUS
Status: DISCONTINUED | OUTPATIENT
Start: 2022-08-25 | End: 2022-08-31 | Stop reason: HOSPADM

## 2022-08-25 RX ORDER — INSULIN LISPRO 100 [IU]/ML
10 INJECTION, SOLUTION INTRAVENOUS; SUBCUTANEOUS
Status: DISCONTINUED | OUTPATIENT
Start: 2022-08-25 | End: 2022-08-27

## 2022-08-25 RX ADMIN — ACETAMINOPHEN 650 MG: 325 TABLET ORAL at 05:57

## 2022-08-25 RX ADMIN — INSULIN GLARGINE 64 UNITS: 100 INJECTION, SOLUTION SUBCUTANEOUS at 20:53

## 2022-08-25 RX ADMIN — ARIPIPRAZOLE 5 MG: 5 TABLET ORAL at 08:32

## 2022-08-25 RX ADMIN — METFORMIN HYDROCHLORIDE 500 MG: 500 TABLET ORAL at 08:31

## 2022-08-25 RX ADMIN — BREXPIPRAZOLE 3 MG: 3 TABLET ORAL at 12:35

## 2022-08-25 RX ADMIN — PANTOPRAZOLE SODIUM 40 MG: 40 TABLET, DELAYED RELEASE ORAL at 05:57

## 2022-08-25 RX ADMIN — LAMOTRIGINE 150 MG: 100 TABLET ORAL at 08:31

## 2022-08-25 RX ADMIN — LORAZEPAM 0.5 MG: 0.5 TABLET ORAL at 17:39

## 2022-08-25 RX ADMIN — HYDROCHLOROTHIAZIDE 12.5 MG: 12.5 TABLET ORAL at 08:32

## 2022-08-25 RX ADMIN — LAMOTRIGINE 150 MG: 100 TABLET ORAL at 17:35

## 2022-08-25 RX ADMIN — INSULIN LISPRO 2 UNITS: 100 INJECTION, SOLUTION INTRAVENOUS; SUBCUTANEOUS at 17:38

## 2022-08-25 RX ADMIN — INSULIN LISPRO 4 UNITS: 100 INJECTION, SOLUTION INTRAVENOUS; SUBCUTANEOUS at 20:55

## 2022-08-25 RX ADMIN — INSULIN LISPRO 4 UNITS: 100 INJECTION, SOLUTION INTRAVENOUS; SUBCUTANEOUS at 08:32

## 2022-08-25 RX ADMIN — ESCITALOPRAM OXALATE 20 MG: 10 TABLET ORAL at 08:31

## 2022-08-25 RX ADMIN — INSULIN LISPRO 10 UNITS: 100 INJECTION, SOLUTION INTRAVENOUS; SUBCUTANEOUS at 12:35

## 2022-08-25 RX ADMIN — METFORMIN HYDROCHLORIDE 500 MG: 500 TABLET ORAL at 17:35

## 2022-08-25 RX ADMIN — ACETAMINOPHEN 975 MG: 325 TABLET ORAL at 20:53

## 2022-08-25 RX ADMIN — INSULIN LISPRO 10 UNITS: 100 INJECTION, SOLUTION INTRAVENOUS; SUBCUTANEOUS at 17:39

## 2022-08-25 RX ADMIN — LISINOPRIL 10 MG: 10 TABLET ORAL at 08:31

## 2022-08-25 NOTE — ASSESSMENT & PLAN NOTE
Lab Results   Component Value Date    HGBA1C 8 0 (H) 08/14/2020       Recent Labs     08/24/22  1348 08/24/22 2032 08/25/22  0754   POCGLU 275* 219* 292*       Blood Sugar Average: Last 72 hrs:  (P) 255 5     · Outpatient antihyperglycemic regimen includes:  · Metformin 500 mg b i d  · Lantus 64 units q h s   · Humalog 16 units t i d  With meals  · Victoza 1 8 mg injection daily  · Will continue outpatient anti hyperglycemic regimen with the exception of Victoza as it is not on hospital formulary  Will decrease humalog from 16units with meals to 10 units with meals as patient reports she has not been following a carb controlled diet at home    · Will add correctional coverage with sliding scale insulin  · Hypoglycemia protocol  · Carb controlled diet

## 2022-08-25 NOTE — ASSESSMENT & PLAN NOTE
· Patient with history of elevated lipid on current and prior lipid panels  · Lipid panel on 08/25/22 significant for:  · Total cholesterol 214  · Triglycerides 129  ·   · Patient reports he has never been on a statin  · Encourage PCP follow-up for maintenance and ongoing monitoring

## 2022-08-25 NOTE — CONSULTS
Erick Vince 1975, 50 y o  female MRN: [id number]  Unit/Bed#: U 379-01 Encounter: [id number]  Primary Care Provider: SUJATA Dwyer   Date and time admitted to hospital: 8/24/2022  7:33 PM    Inpatient consult for Medical Clearance for [address] patient  Consult performed by: Pat Chirinos PA-C  Consult ordered by: Greg Becker MD          Medical clearance for psychiatric admission  Assessment & Plan  Patient is medically cleared for admission to the [facility] for treatment of the underlying psychiatric illness    Elevated lipids  Assessment & Plan  · Patient with history of elevated lipid on current and prior lipid panels  · Lipid panel on 08/25/22 significant for:  · Total cholesterol 214  · Triglycerides 129  ·   · Patient reports he has never been on a statin  · Encouraged lifestyle modifications  · Encourage PCP follow-up for maintenance and ongoing monitoring    Class 3 severe obesity due to excess calories with serious comorbidity and body mass index (BMI) greater than or equal to 70 in adult [facility])  Assessment & Plan  · BMI 71 4  · Encouraged lifestyle modifications    GERD (gastroesophageal reflux disease)  Assessment & Plan  · Continue pre-hospital pantoprazole 40 mg daily    Essential hypertension  Assessment & Plan  · BP mildly elevated since arrival to the unit  · Outpatient antihypertensive regimen includes:  · Lisinopril 10 mg daily  · Hydrochlorothiazide 12 5 mg every other day  · Continue to monitor vital signs per unit protocol    Type 2 diabetes mellitus with complication, with long-term current use of insulin [facility])  Assessment & Plan  Lab Results   Component Value Date    HGBA1C 8 0 (H) 08/14/2020       Recent Labs     08/24/22  1348 08/24/22 2032 08/25/22  0754   POCGLU 275* 219* 292*       Blood Sugar Average: Last 72 hrs:  (P) 255 5     · Outpatient antihyperglycemic regimen includes:  · Metformin 500 mg b i d    · Lantus 64 units q h s   · Humalog 16 units t i d  With meals  · Victoza 1 8 mg injection daily  · Will continue outpatient anti hyperglycemic regimen with the exception of Victoza as it is not on hospital formulary  Will decrease humalog from 16units with meals to 10 units with meals as patient reports she has not been following a carb controlled diet at home  · Will add correctional coverage with sliding scale insulin  · Hypoglycemia protocol  · Carb controlled diet    * Major depressive disorder, recurrent severe without psychotic features ([facility] [address] )  Assessment & Plan  · Management per Psychiatry    ECT Clearance:   History of recent seizure or stroke:  No   History of pheochromocytoma:  No   History of active bleeding (Intracranial hemorrhage, aneurysm or AVM):  No   History of metallic implants in the head or neck:  No   History of increased intracranial pressure with mass effect:  No  ·   EKG within 3 months? Yes 06/06/2022  o If yes, was an arrhythmia present and at baseline? No arrhythmia  o If yes, what is the baseline QT/QTC interval?  336/406  o If no, obtain prior to ECT for arrhythmia evaluation and baseline QT interval     Based on above criteria, Patient is medically cleared for ECT should it be recommended  Counseling / Coordination of Care Time: 45 minutes  Greater than 50% of total time spent on patient counseling and coordination of care  Collaboration of Care: Were Recommendations Directly Discussed with Primary Treatment Team? - No     History of Present Illness:    Daniel Sousa is a 50 y o  female who is originally admitted to the psychiatry service due to anxiety and depression  We are consulted for medical clearance for admission to [facility] and treatment of underlying psychiatric illness  This patient has a past medical history significant for GERD, type 2 diabetes mellitus, hypertension, and class 3 severe obesity    Per chart review she initially presented to the ED for increased depression and anxiety with mild suicidal ideations  On evaluation patient denies any physical complaints such as headache, dizziness, chest pain, shortness of breath, nausea/vomiting/diarrhea at this time  She signed a 201 for inpatient psychiatric treatment    Review of Systems:    Review of Systems   Constitutional: Negative for activity change, chills, diaphoresis and fever  HENT: Negative for congestion, rhinorrhea, sinus pressure, sinus pain and sore throat  Eyes: Negative for visual disturbance  Respiratory: Negative for cough, shortness of breath and wheezing  Cardiovascular: Negative for chest pain and palpitations  Gastrointestinal: Negative for abdominal distention, abdominal pain, constipation, diarrhea, nausea and vomiting  Genitourinary: Negative for dysuria, frequency, hematuria and urgency  Musculoskeletal: Negative for arthralgias, back pain and myalgias  Skin: Negative for rash  Neurological: Negative for dizziness, weakness, light-headedness and headaches  Past Medical and Surgical History:     Past Medical History:   Diagnosis Date    Anemia     Anxiety     Candidal intertrigo     Depression     Diabetes mellitus ([facility] )     GERD (gastroesophageal reflux disease)     Hyperlipidemia     Hypertension     Irritable bowel syndrome     Morbid obesity with BMI of 60 0-69 9, adult ([facility] )     Osteoarthritis     Seasonal allergies     Sleep difficulties     Suicide attempt [facility])        Past Surgical History:   Procedure Laterality Date     SECTION  1992    CHOLECYSTECTOMY      WISDOM TOOTH EXTRACTION         Meds/Allergies:    all medications and allergies reviewed    Allergies:    Allergies   Allergen Reactions    Cephalexin Vomiting     Other reaction(s): Nausea and/or vomiting    Insulin Degludec GI Intolerance    Sulfamethoxazole-Trimethoprim Vomiting     Other reaction(s): Nausea and/or vomiting       Social History:     Marital Status:     Substance Use History:   Social History     Substance and Sexual Activity   Alcohol Use Yes    Comment: occassionaly      Social History     Tobacco Use   Smoking Status Never Smoker   Smokeless Tobacco Never Used     Social History     Substance and Sexual Activity   Drug Use No       Family History:    non-contributory    Physical Exam:     Vitals:   Blood Pressure: 155/79 (08/25/22 0749)  Pulse: 96 (08/25/22 0749)  Temperature: (!) 97 °F (36 1 °C) (08/25/22 0749)  Temp Source: Temporal (08/25/22 0749)  Respirations: 17 (08/25/22 0749)  Height: 5' (152 4 cm) (08/24/22 1933)  Weight - Scale: (!) 166 kg (365 lb 9 6 oz) (08/24/22 1933)  SpO2: 94 % (08/25/22 0749)    Physical Exam  Constitutional:       General: She is not in acute distress  Appearance: Normal appearance  She is obese  She is not ill-appearing, toxic-appearing or diaphoretic  HENT:      Head: Normocephalic and atraumatic  Nose: Nose normal  No congestion or rhinorrhea  Mouth/Throat:      Mouth: Mucous membranes are moist    Eyes:      General: No scleral icterus  Extraocular Movements: Extraocular movements intact  Cardiovascular:      Rate and Rhythm: Normal rate and regular rhythm  Heart sounds: Normal heart sounds  No murmur heard  Pulmonary:      Effort: Pulmonary effort is normal  No respiratory distress  Breath sounds: Normal breath sounds  No wheezing  Abdominal:      General: Abdomen is flat  Bowel sounds are normal  There is no distension  Palpations: Abdomen is soft  Tenderness: There is no abdominal tenderness  Musculoskeletal:      Right lower leg: No edema  Left lower leg: No edema  Skin:     General: Skin is warm and dry  Coloration: Skin is not jaundiced  Neurological:      General: No focal deficit present  Mental Status: She is alert and oriented to person, place, and time           Additional Data:     Lab Results: I have personally reviewed pertinent reports  Results from last 7 days   Lab Units 08/25/22  0640   WBC Thousand/uL 6 60   HEMOGLOBIN g/dL 13 6   HEMATOCRIT % 43 4   PLATELETS Thousands/uL 258   NEUTROS PCT % 56   LYMPHS PCT % 32   MONOS PCT % 8   EOS PCT % 2     Results from last 7 days   Lab Units 08/25/22  0640   SODIUM mmol/L 136   POTASSIUM mmol/L 4 1   CHLORIDE mmol/L 99   CO2 mmol/L 27   BUN mg/dL 14   CREATININE mg/dL 0 67   ANION GAP mmol/L 10   CALCIUM mg/dL 9 3   ALBUMIN g/dL 4 0   TOTAL BILIRUBIN mg/dL 0 68   ALK PHOS U/L 87   ALT U/L 81*   AST U/L 62*   GLUCOSE RANDOM mg/dL 234*             Lab Results   Component Value Date/Time    HGBA1C 8 0 (H) 08/14/2020 08:04 AM     Results from last 7 days   Lab Units 08/25/22  0754 08/24/22 2032 08/24/22  1348   POC GLUCOSE mg/dl 292* 219* 275*       EKG, Pathology, and Other Studies Reviewed on Admission:   · EKG:  Normal sinus rhythm  Normal ECG  When compared with ECG of 29-JUL-2021 21:23,  Left posterior fascicular block is no longer Present  Confirmed by Natan Mireles ([id number]) on 6/6/2022 7:18:49 PM    ** Please Note: This note has been constructed using a voice recognition system   **

## 2022-08-25 NOTE — PLAN OF CARE
Problem: Depression  Goal: Refrain from isolation  Description: Interventions:  - Develop a trusting relationship   - Encourage socialization   Outcome: Progressing  Goal: Refrain from self-neglect  Outcome: Progressing  Goal: Attend and participate in unit activities, including therapeutic, recreational, and educational groups  Description: Interventions:  - Provide therapeutic and educational activities daily, encourage attendance and participation, and document same in the medical record   Outcome: Progressing     Problem: Anxiety  Goal: Anxiety is at manageable level  Description: Interventions:  - Assess and monitor patient's anxiety level  - Monitor for signs and symptoms (heart palpitations, chest pain, shortness of breath, headaches, nausea, feeling jumpy, restlessness, irritable, apprehensive)  - Collaborate with interdisciplinary team and initiate plan and interventions as ordered    - [place] patient to unit/surroundings  - Explain treatment plan  - Encourage participation in care  - Encourage verbalization of concerns/fears  - Identify coping mechanisms  - Assist in developing anxiety-reducing skills  - Administer/offer alternative therapies  - Limit or eliminate stimulants  Outcome: Progressing

## 2022-08-25 NOTE — H&P
Psychiatric Evaluation - 1224 [facility] 50 y o  female MRN: [id number]  Unit/Bed#: [facility] 379-01 Encounter: [id number]    Assessment/Plan   Principal Problem:    Major depressive disorder, recurrent severe without psychotic features ([facility] 75 )  Active Problems:    Type 2 diabetes mellitus with complication, with long-term current use of insulin ([facility])    Medical clearance for psychiatric admission    Essential hypertension    GERD (gastroesophageal reflux disease)    Class 3 severe obesity due to excess calories with serious comorbidity and body mass index (BMI) greater than or equal to 70 in adult ([facility] 75 )    Elevated lipids   Pt is a 51 yo female w/ a past psychiatric hx of major depressive disorder and generalized anxiety disorder and a past medical hx of type 2 diabetes, hypertension and osteoarthritis who presents voluntarily via a 201 for suicidal ideation and inability to care for self due to worsening depressive symptoms  Pt says that her symptoms worsened over the past few months as she has not been able to see her provider for medication adjustment and says that she has experienced depressed mood, hypersomnia, increased appetite, ten pound weight gain, anhedonia, hopelessness, decreased concentration, decreased energy level, apathy and avolition leading to a decrease in her ability to function and take care of herself  Pt says that she is has been unable to bathe herself, brush her teeth or take her insulin shots regularly due to the depression and that it has also affected her ability to perform and function at her job  Pt reports some mild suicidal ideation but denies any specific plan as well as any prior attempts or access to firearms  Pt also denies any HI or A/VH  Overall, pt was calm and cooperative, with a blunted, depressed affect, though no significant psychomotor retardation was noted at this time      Plan:   Start Brexpiprazole 3 mg qd, for depression  Increase Lexapro to 20 mg qd, for depression  Continue Lamictal 150 mg bid    Continue the following medications for medical management:  Continue hydrochlorothiazide 12 5 mg, for HTN  Continue Metformin 500 mg bid, for DM2  Continue Pantoprazole 40 mg qd, for GERD  Continue fingerstick glucose qid, for DM2  Continue insulin glargine 64 Units subQ injection qhs, for DM2  Continue insulin lispro 100 units/mL tid, for DM2  Continue lisinopril 10 mg qd, for HTN    Admission labs  Frequent safety checks and vitals per unit protocol  Collaborate with family for baseline assessment and disposition planning  Case discussed with treatment team   Treatment options and alternatives were reviewed with the patient       -----------------------------------    Chief Complaint: suicidal ideation and inability to care for self due to depressive symptoms    History of Present Illness     Melani Benavides is a 50 y o  female w/ a past psychiatric hx of major depressive disorder and generalized anxiety disorder and a past medical hx of type 2 diabetes, hypertension and osteoarthritis who presents voluntarily via a 201 for suicidal ideation and inability to care for self due to worsening depressive symptoms  Pt says that she has been experiencing episodes of major depression for some years now and that her symptoms recently got worse over the past few months  Pt reports significantly increased sleep and increased appetite with a ten pound weight gain over the past year  Pt also reports anhedonia, decreased concentration and decreased energy level over the past few months along with depressed mood  Pt reports feelings of hopelessness, saying that she has been depressed for so long and tried so many medications that she doesn't know if anything will work this time   She also reports psychomotor retardation, apathy and significant avolition, making it difficult to get out of bed each day and attend to activities of daily living, including showering, brushing her teeth and attending to many other basic needs, including taking her insulin medications  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 and has had dififculty functioning at and performing her job as well, leading to a reprimand  Pt said that she had to take a one month medical leave from her position in February due to the depression  During this recent period of depression, pt also endorses mild suicidal ideation, though she denies any specific plan nor any prior suicide attempts  She also denies any hx of self-harm behaviors or any access to firearms  Pt says that she was hospitalized about a year ago for her depression and reports two other hospitalizations prior to that approximately ten years ago  Pt denies any hx of homicidal ideation or any hx of auditory or visual hallucinations  Pt reports that she last saw her provider Argelia Austin) at [facility] a few months ago  She says she was placed on Rexulti at the time after a failed trial of other medications which included Abilify 5 mg and Zoloft as well as Effexor (which she says caused heart palpitations) and Wellbutrin (which she says caused anxiety)  Pt reports that the  [address] helped for a while but then her symptoms worsened again and she hasn't been able to see her provider in a few months to get her medication adjusted, leading up to the current time  Pt denies any hx of manic symptoms, including long periods without sleep, racing thoughts or significant increases in energy or activity level  In terms of anxiety, pt reports ongoing anxiety as it relates to her medical illnesses and her job  She reports a hx of panic attacks but does not mention any recent attacks  She denies any hx of abuse or significant trauma as well as any flashbacks or nightmares related to any significantly distressing events      In terms of family hx, pt reports both her mother and her daughter suffer from Bipolar disorder and that her father, who is , suffered from anxiety and depression  She does not report any known substance abuse in her immediate family  In terms of social hx, pt says that 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  Pt says she went to college and wanted to obtain her LPN and eventually become a nurse, but was unable to  She is , lives at home and has a 27 yr old daughter with whom she says she has a good relationship with  She also has a boyfriend who also suffers from depression, as well as bipolar disorder  She has a good relationship with her mother and says she has a good support system overall  Pt reports occasional alcohol use and denies any illicit substance use, tobacco use or hx of rehab or detox programs  In terms of psychiatric medication hx, pt is currently taking Rexulti 2 mg, Lexapro 10 mg, and Lamictal 150 mg bid  Per chart review, pt was also taking Ativan 0 5 mg tid prn  In terms of medical hx, pt reports having type 2 diabetes, hypertension and arthritis for which she takes hydrochlorothiazide, Metformin and insulin (glargine and lispro)  Pt denies any current SI, HI or A/VH  Medical Review Of Systems:  pain knee, Complete review of systems is negative except as noted above      Psychiatric Review Of Systems:  Problems with sleep: yes, increased  Appetite changes: yes, increased  Weight changes: yes, increased  Low energy/anergy: yes  Low interest/pleasure/anhedonia: yes  Somatic symptoms: no  Anxiety/panic: yes  Nita: no  Guilt/hopeless: yes  Self injurious behavior/risky behavior: no    Historical Information     Psychiatric History:   Prior psychiatric diagnoses: Major depressive disorder, generalized anxiety disoder  Inpatient hospitalizations:  yes, one last year and two additional hospitalizations prior to that, approximately ten years ago  Suicide attempts: denies  Self-harm behaviors: denies  Violent behavior: denies  Outpatient treatment: [facility])  Psychiatric medication trial: Abilify, Lexapro, Zoloft, Effexor, Wellbutrin, Ativan, Lamictal    Substance Abuse History:  Social History     Tobacco Use    Smoking status: Never Smoker    Smokeless tobacco: Never Used   Vaping Use    Vaping Use: Never used   Substance Use Topics    Alcohol use: Yes     Comment: occassionaly     Drug use: No      Patient denies use of tobacco or illicit drugs with the exception of occasional alcohol use   I have assessed this patient for substance use within the past 12 months  Family Psychiatric History:   Pt reports both her mother and daughter suffer from bipolar disorder and her father, who is , suffered from anxiety and depression  She denies any known family hx of substance use      Social History  Marital history:   Children: one daughter, age 27  Living arrangement: lives at home  Functioning Relationships: her mother, daughter and boyfriend  Education: some college  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  Other Pertinent History: none      Traumatic History:   Abuse: denies  Other Traumatic Events: none reported    Past Medical History:   Past Medical History:   Diagnosis Date    Anemia     Anxiety     Candidal intertrigo     Depression     Diabetes mellitus ([place] [address] )     GERD (gastroesophageal reflux disease)     Hyperlipidemia     Hypertension     Irritable bowel syndrome     Morbid obesity with BMI of 60 0-69 9, adult ([place] [address] )     Osteoarthritis     Seasonal allergies     Sleep difficulties     Suicide attempt ([place] [address] )         -----------------------------------  Objective    Temp:  [97 °F (36 1 °C)-98 3 °F (36 8 °C)] 97 °F (36 1 °C)  HR:  [] 96  Resp:  [16-18] 17  BP: (128-168)/(63-79) 155/79    Mental Status Exam:  Appearance:  alert, good eye contact, appears stated age, casually dressed, appropriate grooming and hygiene and obese   Behavior:  calm and cooperative   Motor: no abnormal movements and normal gait and balance   Speech:  spontaneous and coherent   Mood:  depressed   Affect:  blunted and depressed   Thought Process:  Organized, logical, goal-directed   Thought Content: no verbalized delusions or overt paranoia   Perceptual disturbances: no reported hallucinations and does not appear to be responding to internal stimuli at this time   Risk Potential: No active or passive suicidal or homicidal ideation was verbalized during interview   Cognition: oriented to self and situation, oriented to person, place, time, and situation, appears to be of average intelligence and cognition not formally tested   Insight:  Improving   Judgment: Limited       Meds/Allergies   Allergies   Allergen Reactions    Cephalexin Vomiting     Other reaction(s): Nausea and/or vomiting    Insulin Degludec GI Intolerance    Sulfamethoxazole-Trimethoprim Vomiting     Other reaction(s): Nausea and/or vomiting     all current active meds have been reviewed    Behavioral Health Medications: all current active meds have been reviewed  Changes as in Plan section above  Laboratory results:  I have personally reviewed all pertinent laboratory/tests results    Recent Results (from the past 48 hour(s))   POCT alcohol breath test    Collection Time: 08/23/22  3:12 PM   Result Value Ref Range    EXTBreath Alcohol 0 0    Rapid drug screen, urine    Collection Time: 08/23/22  3:16 PM   Result Value Ref Range    Amph/Meth UR Negative Negative    Barbiturate Ur Negative Negative    Benzodiazepine Urine Negative Negative    Cocaine Urine Negative Negative    Methadone Urine Negative Negative    Opiate Urine Negative Negative    PCP Ur Negative Negative    THC Urine Negative Negative    Oxycodone Urine Negative Negative   FLU/RSV/COVID - if FLU/RSV clinically relevant    Collection Time: 08/23/22  5:47 PM    Specimen: Nose; Nares   Result Value Ref Range    SARS-CoV-2 Negative Negative    INFLUENZA A PCR Negative Negative    INFLUENZA B PCR Negative Negative    RSV PCR Negative Negative   POCT pregnancy, urine    Collection Time: 08/23/22  5:56 PM   Result Value Ref Range    EXT PREG TEST UR (Ref: Negative) negative     Control valid    Fingerstick Glucose (POCT)    Collection Time: 08/24/22  1:48 PM   Result Value Ref Range    POC Glucose 275 (H) 65 - 140 mg/dl   FLU/RSV/COVID - if FLU/RSV clinically relevant    Collection Time: 08/24/22  2:54 PM    Specimen: Nose; Nares   Result Value Ref Range    SARS-CoV-2 Negative Negative    INFLUENZA A PCR Negative Negative    INFLUENZA B PCR Negative Negative    RSV PCR Negative Negative   Fingerstick Glucose (POCT)    Collection Time: 08/24/22  8:32 PM   Result Value Ref Range    POC Glucose 219 (H) 65 - 140 mg/dl   CBC and differential    Collection Time: 08/25/22  6:40 AM   Result Value Ref Range    WBC 6 60 4 31 - 10 16 Thousand/uL    RBC 4 79 3 81 - 5 12 Million/uL    Hemoglobin 13 6 11 5 - 15 4 g/dL    Hematocrit 43 4 34 8 - 46 1 %    MCV 91 82 - 98 fL    MCH 28 4 26 8 - 34 3 pg    MCHC 31 3 (L) 31 4 - 37 4 g/dL    RDW 13 6 11 6 - 15 1 %    MPV 10 2 8 9 - 12 7 fL    Platelets 524 445 - 347 Thousands/uL    nRBC 0 /100 WBCs    Neutrophils Relative 56 43 - 75 %    Immat GRANS % 1 0 - 2 %    Lymphocytes Relative 32 14 - 44 %    Monocytes Relative 8 4 - 12 %    Eosinophils Relative 2 0 - 6 %    Basophils Relative 1 0 - 1 %    Neutrophils Absolute 3 80 1 85 - 7 62 Thousands/µL    Immature Grans Absolute 0 03 0 00 - 0 20 Thousand/uL    Lymphocytes Absolute 2 08 0 60 - 4 47 Thousands/µL    Monocytes Absolute 0 50 0 17 - 1 22 Thousand/µL    Eosinophils Absolute 0 15 0 00 - 0 61 Thousand/µL    Basophils Absolute 0 04 0 00 - 0 10 Thousands/µL   Comprehensive metabolic panel    Collection Time: 08/25/22  6:40 AM   Result Value Ref Range    Sodium 136 135 - 147 mmol/L    Potassium 4 1 3 5 - 5 3 mmol/L    Chloride 99 96 - 108 mmol/L    CO2 27 21 - 32 mmol/L    ANION GAP 10 5 - 14 mmol/L BUN 14 5 - 25 mg/dL    Creatinine 0 67 0 60 - 1 20 mg/dL    Glucose 234 (H) 70 - 99 mg/dL    Glucose, Fasting 234 (H) 70 - 99 mg/dL    Calcium 9 3 8 4 - 10 2 mg/dL    AST 62 (H) 14 - 36 U/L    ALT 81 (H) <35 U/L    Alkaline Phosphatase 87 43 - 122 U/L    Total Protein 7 4 6 4 - 8 4 g/dL    Albumin 4 0 3 5 - 5 0 g/dL    Total Bilirubin 0 68 0 20 - 1 00 mg/dL    eGFR 103 ml/min/1 73sq m   TSH, 3rd generation with Free T4 reflex    Collection Time: 08/25/22  6:40 AM   Result Value Ref Range    TSH 3RD GENERATON 1 110 0 450 - 4 500 uIU/mL   Lipid panel    Collection Time: 08/25/22  6:40 AM   Result Value Ref Range    Cholesterol 214 (H) See Comment mg/dL    Triglycerides 129 See Comment mg/dL    HDL, Direct 34 (L) >=50 mg/dL    LDL Calculated 154 (H) <130 mg/dL    Non-HDL-Chol (CHOL-HDL) 180 mg/dl   Fingerstick Glucose (POCT)    Collection Time: 08/25/22  7:54 AM   Result Value Ref Range    POC Glucose 292 (H) 65 - 140 mg/dl        Imaging Studies: CT abdomen pelvis w/o contrast (6/6/22):    No acute pathology to convincingly calcified patient's symptoms  No orders to display            -----------------------------------    Risks / Benefits of Treatment:  Risks, benefits, and possible side effects of medications were explained to patient  The patient was able to verbalize understanding and agree for treatment  Counseling / Coordination of Care:  Patient's presentation on admission and proposed treatment plan were discussed with the treatment team   Diagnosis, medication changes and treatment plan were reviewed with the patient  Recent stressors were discussed with the patient  Events leading to admission were reviewed with the patient  Importance of medication and treatment compliance was reviewed with the patient            Inpatient Psychiatric Certification:     Certification: Based upon physical, mental and social evaluations, I certify that inpatient psychiatric services are medically necessary for this patient for a duration of 7 midnights for the treatment of Major depressive disorder, recurrent severe without psychotic features ([facility] )  Available alternative community resources do not meet the patient's mental health care needs  I further attest that an established written individualized plan of care has been implemented and is outlined in the patient's medical records      Gurwinder Nam MD  Psychiatry Resident, PGY-I  (8/25/22)

## 2022-08-25 NOTE — CASE MANAGEMENT
Case Management Assessment    Patient name Rufina Munoz  Location [email or web address] 379/[email or web address] 556-52 MRN [id number]  : 1975 Date 2022       Current Admission Date: 2022  Current Admission Diagnosis:Major depressive disorder, recurrent severe without psychotic features [facility])   Patient Active Problem List    Diagnosis Date Noted    Class 3 severe obesity due to excess calories with serious comorbidity and body mass index (BMI) greater than or equal to 70 in adult [facility]) 2022    Elevated lipids 2022    Class 3 severe obesity due to excess calories with body mass index (BMI) of 60 0 to 69 9 in adult ([facility] ) 2021    Primary osteoarthritis of both knees 2021    Epigastric pain 2020    GERD (gastroesophageal reflux disease) 2020    Diarrhea 2020    Major depressive disorder, recurrent severe without psychotic features ([place] [address] ) 2019    Diabetes 1 5, managed as type 2 ([place] [address] ) 2019    Type 2 diabetes mellitus with complication, with long-term current use of insulin ([place] [address] ) 2019    Medical clearance for psychiatric admission 2019    Essential hypertension 2019    Irritable bowel syndrome with diarrhea 2019    Mixed hyperlipidemia 2018    Primary osteoarthritis of right knee 2017    Generalized anxiety disorder 10/15/2012      LOS (days): 1  Geometric Mean LOS (GMLOS) (days):   Days to GMLOS:     OBJECTIVE:    Risk of Unplanned Readmission Score: 20 67         Current admission status: Inpatient Psych  Referral Reason: Psych    Preferred Pharmacy:   [id number] [place] - [address] [place] [address]  Phone: [phone] Fax: [phone]    Primary Care Provider: SUJATA Gonzalez    Primary Insurance: BLUE CROSS  Secondary Insurance:     ASSESSMENT:  Alfonzo Barrera Proxies    There are no active Health Care Proxies on file  This writer met with the patient to complete her intake  Patient presents for increased depression and thoughts of suicide  Patient is compliant with outpatient treatment and mediations  Patient works FT and lives withg her fisofi  She is not able to identify issues leading to her depression  Patient had prior mental health treatment  Patient reports her family is her primary support  Her symptoms presented 1 month ago  With increased depression and anxiety with decreased ADL's  She currently has OP services at [facility] but she does not a therapist     Psychosocial Assessment 1:1      Admission / Details: Increased depression and anxiety        County: [facility] Status: 201  Insurance: Blue Cross  Rx coverage: [employer]   Marital Status: Single   Children: 2  Family: Bishop, Mother and Children   Residence: Private Home   Can return home: Yes  Lives with: Bishop   Level of Ed: High School  Work History: Employed   Income/Source:[employer]   Jainism: None   Transportation: Drives self  Legal Issues: None   Pharmacy:  [facility] HX:  [facility]    Trauma HX: Denies   Family hx: Denies history of mental illness or substance abuse   D&A HX: None   Medical: See Chart   DME: None   Tobacco:    HX: No   Access to firearms: No  UDS Results: Negative   PCP:  Psych: [facility]   Therapist: None   ICM/ACT:  None   Community Supports: None   Stressors: Patient cannot identify   Strengths:  Employed, family support   Coping Skills: None identified   ROIS Signed: Cynthia farah [facility]   Treatment Plan Signed: Yes   IMM Signed:   Sasha Text Reminder Signed:  Upcoming Appointments:   Covid vaccine received:          Readmission [facility]  30 Day Readmission: No    Patient Information  Mental Status: Alert  Primary Caregiver: Self              Patient Information Continued  Income Source: Employed ($2000)  Current Status[de-identified] 201         Means of Transportation  Means of Transport to [facility][de-identified] Drives Self

## 2022-08-25 NOTE — NURSING NOTE
Pt denies SI, HI, AH and VH  Pt reporting anxiety and was given PRN ativan 0 5mg @ 1739 for HAM scale score 20  Pt isolative to room with brief intervals in the milieu for meals and needs  Pt malodorous  Medication and meal compliant  Will monitor

## 2022-08-25 NOTE — TREATMENT PLAN
TREATMENT PLAN REVIEW - [facility] 50 y o  1975 female MRN: [id number]    [address] Room / Bed: [facility] 379/[facility] 887-14 Encounter: [id number]        Admit Date/Time:  8/24/2022  7:33 PM    Treatment Team: Attending Provider: Christelle Amor MD; Patient Care Technician: Jasmine Mariscal; Care Manager: Marcella Hall, [facility]; Registered Nurse: Onel Olvera RN; Patient Care Assistant: Zeinab Mondragon;  : Mary Lowery; Patient Care Assistant: Lori Stone    Diagnosis: Principal Problem:    Major depressive disorder, recurrent severe without psychotic features ([facility] )  Active Problems:    Type 2 diabetes mellitus with complication, with long-term current use of insulin ([facility])    Medical clearance for psychiatric admission    Essential hypertension    GERD (gastroesophageal reflux disease)    Class 3 severe obesity due to excess calories with serious comorbidity and body mass index (BMI) greater than or equal to 70 in adult ([facility])    Elevated lipids      Patient Strengths/Assets: ability for insight, average or above intelligence, capable of independent living, communication skills, compliant with medication, family ties, financially secure, general fund of knowledge, good insight, good support system, insightful, interpersonal skills, motivated, motivation for treatment/growth, negotiates basic needs, patient is on a voluntary commitment, patient is willing to work on problems, reasoning ability, resoureceful, self reliant, stable housing, supportive family, well educated, work skills      Patient Barriers/Limitations: chronic pain, limited motivation, poor self-care    Short Term Goals: decrease in depressive symptoms, decrease in anxiety symptoms, decrease in suicidal thoughts, ability to stay safe on the unit, improvement in ability to express basic needs, improvement in self care, sleep improvement, tolerate medications, mood stabilization, increase in group participation, increase in socialization with peers on the unit    Long Term Goals: improvement in depression, improvement in anxiety, stabilization of mood, free of suicidal thoughts, able to express basic needs, adequate self care, adequate sleep, adequate appetite, appropriate interaction with peers    Progress Towards Goals: starting psychiatric medications as prescribed, improving gradually, no longer suicidal    Recommended Treatment: medication management, patient medication education, group therapy, milieu therapy, supportive therapy, continued Behavioral Health psychiatric evaluation/assessment process, medical follow up with medical team     Treatment Frequency: daily medication monitoring, group and milieu therapy daily, monitoring through interdisciplinary rounds, monitoring through weekly patient care conferences    Expected Discharge Date: 5 days - 8/30/2022    Discharge Plan: discharge to home, Follow up with current mental health providers at [facility], including Dr Clifton Evans Created/Updated By: Jazmin Spaulding MD

## 2022-08-25 NOTE — NURSING NOTE
50years-old female,with h/o Diabetes , high blood presser, and arthritis  Patient   admitted after worsening depression , and anxiety  Patient reported  symptoms began worse over the past few weeks, PT is having difficulty function in due to her increased depression anxiety  Past suicidal attempt on 2021   PT is AAOx4,  pleasant, and is cooperative with [facility] and body assessment  However,  Patient is denying any SI, HI, visual hallucinations, or pain at this time  Will continue to monitor and provide therapeutic support  Oriented to unit and regulations; unit expectations reviewed  Q 7 minute checks initiated

## 2022-08-25 NOTE — ASSESSMENT & PLAN NOTE
Patient is medically cleared for admission to the [facility] for treatment of the underlying psychiatric illness

## 2022-08-25 NOTE — NURSING NOTE
Pt denies SI, HI, AH and VH  Pt reports mild depression and anxiety  Pt is mainly isolative to room with brief intervals in the milieu for meals and needs  Pt has no complaints or concerns  Medication and meal compliant  Will monitor

## 2022-08-25 NOTE — PLAN OF CARE
Problem: Ineffective Coping  Goal: Participates in unit activities  Description: Interventions:  - Provide therapeutic environment   - Provide required programming   - Redirect inappropriate behaviors   8/24/2022 2047 by Yovani Beltran RN  Outcome: Not Progressing  8/24/2022 2047 by Yovani Beltran, RN  Outcome: Not Progressing

## 2022-08-25 NOTE — ASSESSMENT & PLAN NOTE
· BP mildly elevated since arrival to the unit  · Outpatient antihypertensive regimen includes:  · Lisinopril 10 mg daily  · Hydrochlorothiazide 12 5 mg every other day  · Continue to monitor vital signs per unit protocol

## 2022-08-26 LAB
GLUCOSE SERPL-MCNC: 189 MG/DL (ref 65–140)
GLUCOSE SERPL-MCNC: 193 MG/DL (ref 65–140)
GLUCOSE SERPL-MCNC: 238 MG/DL (ref 65–140)
GLUCOSE SERPL-MCNC: 275 MG/DL (ref 65–140)

## 2022-08-26 PROCEDURE — 82948 REAGENT STRIP/BLOOD GLUCOSE: CPT

## 2022-08-26 PROCEDURE — 99232 SBSQ HOSP IP/OBS MODERATE 35: CPT | Performed by: STUDENT IN AN ORGANIZED HEALTH CARE EDUCATION/TRAINING PROGRAM

## 2022-08-26 RX ADMIN — INSULIN LISPRO 10 UNITS: 100 INJECTION, SOLUTION INTRAVENOUS; SUBCUTANEOUS at 17:05

## 2022-08-26 RX ADMIN — INSULIN LISPRO 1 UNITS: 100 INJECTION, SOLUTION INTRAVENOUS; SUBCUTANEOUS at 12:18

## 2022-08-26 RX ADMIN — LAMOTRIGINE 150 MG: 100 TABLET ORAL at 08:24

## 2022-08-26 RX ADMIN — PANTOPRAZOLE SODIUM 40 MG: 40 TABLET, DELAYED RELEASE ORAL at 06:06

## 2022-08-26 RX ADMIN — LORAZEPAM 0.5 MG: 0.5 TABLET ORAL at 09:15

## 2022-08-26 RX ADMIN — INSULIN LISPRO 10 UNITS: 100 INJECTION, SOLUTION INTRAVENOUS; SUBCUTANEOUS at 12:17

## 2022-08-26 RX ADMIN — INSULIN LISPRO 4 UNITS: 100 INJECTION, SOLUTION INTRAVENOUS; SUBCUTANEOUS at 21:48

## 2022-08-26 RX ADMIN — LISINOPRIL 10 MG: 10 TABLET ORAL at 08:39

## 2022-08-26 RX ADMIN — INSULIN LISPRO 2 UNITS: 100 INJECTION, SOLUTION INTRAVENOUS; SUBCUTANEOUS at 08:23

## 2022-08-26 RX ADMIN — ESCITALOPRAM OXALATE 20 MG: 10 TABLET ORAL at 08:24

## 2022-08-26 RX ADMIN — METFORMIN HYDROCHLORIDE 500 MG: 500 TABLET ORAL at 08:24

## 2022-08-26 RX ADMIN — INSULIN GLARGINE 64 UNITS: 100 INJECTION, SOLUTION SUBCUTANEOUS at 21:48

## 2022-08-26 RX ADMIN — BREXPIPRAZOLE 3 MG: 3 TABLET ORAL at 08:24

## 2022-08-26 RX ADMIN — METFORMIN HYDROCHLORIDE 500 MG: 500 TABLET ORAL at 16:54

## 2022-08-26 RX ADMIN — LAMOTRIGINE 150 MG: 100 TABLET ORAL at 17:05

## 2022-08-26 RX ADMIN — INSULIN LISPRO 3 UNITS: 100 INJECTION, SOLUTION INTRAVENOUS; SUBCUTANEOUS at 16:54

## 2022-08-26 RX ADMIN — INSULIN LISPRO 10 UNITS: 100 INJECTION, SOLUTION INTRAVENOUS; SUBCUTANEOUS at 08:23

## 2022-08-26 NOTE — NURSING NOTE
Patient reported feeling anxious and requested medication to assist  PRN ativan 0 5 mg po given at 0915 and was effective

## 2022-08-26 NOTE — NURSING NOTE
Patient calm and cooperative, medication and meal compliant  Patient states she was having anxiety and was requesting ativan during morning med pass

## 2022-08-26 NOTE — PLAN OF CARE
Problem: Depression  Goal: Refrain from isolation  Description: Interventions:  - Develop a trusting relationship   - Encourage socialization   Outcome: Progressing  Goal: Refrain from self-neglect  Outcome: Progressing  Goal: Attend and participate in unit activities, including therapeutic, recreational, and educational groups  Description: Interventions:  - Provide therapeutic and educational activities daily, encourage attendance and participation, and document same in the medical record   Outcome: Progressing     Problem: Anxiety  Goal: Anxiety is at manageable level  Description: Interventions:  - Assess and monitor patient's anxiety level  - Monitor for signs and symptoms (heart palpitations, chest pain, shortness of breath, headaches, nausea, feeling jumpy, restlessness, irritable, apprehensive)  - Collaborate with interdisciplinary team and initiate plan and interventions as ordered    - [place] patient to unit/surroundings  - Explain treatment plan  - Encourage participation in care  - Encourage verbalization of concerns/fears  - Identify coping mechanisms  - Assist in developing anxiety-reducing skills  - Administer/offer alternative therapies  - Limit or eliminate stimulants  Outcome: Progressing     Problem: DISCHARGE PLANNING  Goal: Discharge to home or other facility with appropriate resources  Description: INTERVENTIONS:  - Identify barriers to discharge w/patient and caregiver  - Arrange for needed discharge resources and transportation as appropriate  - Identify discharge learning needs (meds, wound care, etc )  - Refer to Case Management Department for coordinating discharge planning if the patient needs post-hospital services based on physician/advanced practitioner order or complex needs related to functional status, cognitive ability, or social support system  Outcome: Progressing

## 2022-08-26 NOTE — PROGRESS NOTES
08/26/22 0829   Team Meeting   Meeting Type Daily Rounds   Team Members Present   Team Members Present Physician;Nurse;   Physician Team Member Yessenia Heard   Nursing Team Member Win Parker   Social Work Team Member Sonal   Patient/Family Present   Patient Present No   Patient's Family Present No     Patient isolates, depressed, anxious, slept, medication changes, possible ECT

## 2022-08-26 NOTE — PROGRESS NOTES
Progress Note - Behavioral Health   Carolina Art 50 y o  female MRN: [id number]  Unit/Bed#: [facility] 379-01 Encounter: [id number]    Assessment/Plan   Principal Problem:    Major depressive disorder, recurrent severe without psychotic features ([facility] )  Active Problems:    Type 2 diabetes mellitus with complication, with long-term current use of insulin ([facility])    Medical clearance for psychiatric admission    Essential hypertension    GERD (gastroesophageal reflux disease)    Class 3 severe obesity due to excess calories with serious comorbidity and body mass index (BMI) greater than or equal to 70 in adult ([facility])    Elevated lipids    Recommended Treatment:   Continue Rexulti 3 mg daily  Continue Lexapro 20 mg daily  Continue Lamictal 150 mg b i d   Can consider ECT if no further improvement with current medication regimen  Encourage group therapy, milieu therapy and occupational therapy  All current active medications have been reviewed  Encourage group therapy, milieu therapy and occupational therapy  Behavioral Health checks every 7 minutes    ----------------------------------------      Subjective:   Per nursing report, patient has been without any acute behavioral issues on the unit  She has been compliant with medications  Patient reports today that she feels overall well  She reports feeling tired and still feels depressed  She notes no significant change after starting 3 mg dose of Rexulti yesterday  However, patient did express understanding that this may take some time and did take time after she had been started on Rexulti for her to appreciate any benefit in the past   She currently denies SI, HI, or AVH  She denies any problems tolerating medications  Discussed with patient possibility of pursuing ECT if no further medication benefit appreciated  Patient educated on ECT procedure and risks and benefits    She is unsure if this is something that she would be willing to pursue at this time but was given opportunity to ask questions or concerns  She currently denies any questions or concerns today  Behavior over the last 24 hours:  unchanged  Sleep: normal  Appetite: normal  Medication side effects: No  ROS: all other systems are negative    Mental Status Evaluation:  Appearance:  dressed appropriately, marginal hygiene   Behavior:  pleasant, cooperative, calm   Speech:  normal rate and volume   Mood:  depressed   Affect:  blunted   Thought Process:  logical, goal directed, linear   Associations: intact associations   Thought Content:  normal   Perceptual Disturbances: none   Risk Potential: Suicidal ideation - None at present  Homicidal ideation - None  Potential for aggression - Not at present   Sensorium:  oriented to person, place and time/date   Memory:  recent and remote memory grossly intact   Consciousness:  alert and awake   Attention/Concentration: attention span and concentration are age appropriate   Insight:  fair   Judgment: fair   Gait/Station: normal gait/station   Motor Activity: no abnormal movements     Medications: all current active meds have been reviewed    Current Facility-Administered Medications   Medication Dose Route Frequency Provider Last Rate    acetaminophen  650 mg Oral Q6H PRN Bonnie Chapa MD      acetaminophen  650 mg Oral Q4H PRN Bonnie Chapa MD      acetaminophen  975 mg Oral Q6H PRN Bonnie Chapa MD      aluminum-magnesium hydroxide-simethicone  30 mL Oral Q4H PRN Bonnie Chapa MD      artificial tear  1 application Both Eyes G8V PRN Bonnie Chapa MD      benztropine  1 mg Intramuscular Q4H PRN Max 6/day Bonnie Chapa MD      benztropine  1 mg Oral Q4H PRN Max 6/day Bonnie Chapa MD      Brexpiprazole  3 mg Oral Daily Concha Stephenson MD      hydrOXYzine HCL  50 mg Oral Q6H PRN Max 4/day Bonnie Chapa MD      Or    diphenhydrAMINE  50 mg Intramuscular Q6H PRN Bonnie Chapa MD      escitalopram  20 mg Oral Daily Yusuf Morillo MD      hydrochlorothiazide  12 5 mg Oral Every Other Day Julieta Murray PA-C      hydrOXYzine HCL  100 mg Oral Q6H PRN Max 4/day Yusuf Morillo MD      Or    LORazepam  2 mg Intramuscular Q6H PRN Yusuf Morillo MD      hydrOXYzine HCL  25 mg Oral Q6H PRN Max 4/day Yusuf Morillo MD      insulin glargine  64 Units Subcutaneous HS Yusuf Morillo MD      insulin lispro  1-6 Units Subcutaneous HS Julieta Murray PA-C      insulin lispro  1-6 Units Subcutaneous TID  Malinda Lincoln PlaceBRYNN      insulin lispro  10 Units Subcutaneous TID With Meals Carolyne Bosworth, PA-C      lamoTRIgine  150 mg Oral BID Julieta Murray PA-C      lisinopril  10 mg Oral Daily Julieta Murray PA-C      LORazepam  0 5 mg Oral Q6H PRN Jazmin Spaulding MD      metFORMIN  500 mg Oral BID With Meals Yusuf Morillo MD      OLANZapine  10 mg Oral Q3H PRN Max 3/day Yusuf Morillo MD      Or    OLANZapine  10 mg Intramuscular Q3H PRN Max 3/day Yusuf Morillo MD      OLANZapine  5 mg Oral Q3H PRN Max 6/day Yusuf Morillo MD      Or    OLANZapine  5 mg Intramuscular Q3H PRN Max 6/day Yusuf Morillo MD      OLANZapine  2 5 mg Oral Q3H PRN Max 8/day Yusuf Morillo MD      pantoprazole  40 mg Oral Early Morning Yusuf Morillo MD      polyethylene glycol  17 g Oral Daily PRN Yusuf Morillo MD      propranolol  10 mg Oral Q8H PRN Yusuf Morillo MD      senna-docusate sodium  1 tablet Oral Daily PRN Yusuf Morillo MD      traZODone  50 mg Oral HS PRN Yusuf Morillo MD         Labs:  I have personally reviewed all pertinent laboratory/tests results  Results from the past 24 hours:   Recent Results (from the past 24 hour(s))   Fingerstick Glucose (POCT)    Collection Time: 08/25/22  4:27 PM   Result Value Ref Range    POC Glucose 215 (H) 65 - 140 mg/dl Fingerstick Glucose (POCT)    Collection Time: 08/25/22  7:55 PM   Result Value Ref Range    POC Glucose 293 (H) 65 - 140 mg/dl   Fingerstick Glucose (POCT)    Collection Time: 08/26/22  7:56 AM   Result Value Ref Range    POC Glucose 193 (H) 65 - 140 mg/dl   Fingerstick Glucose (POCT)    Collection Time: 08/26/22 12:05 PM   Result Value Ref Range    POC Glucose 189 (H) 65 - 140 mg/dl       Progress Toward Goals: progressing    Risks / Benefits of Treatment:    Risks, benefits, and possible side effects of medications explained to patient and patient verbalizes understanding and agreement for treatment  Counseling / Coordination of Care:    Patient's progress discussed with staff in treatment team meeting  Medications, treatment progress and treatment plan reviewed with patient      Greg Becker MD 08/26/22

## 2022-08-26 NOTE — NURSING NOTE
Pt asleep but easily aroused  Malodorous however admitted to showering  Pt states she uses antifungal powder under her breast when at home  Denies SI/HI/AVH  Elevated BS at HS sliding scale given  Pt encourage to eat CC diet until order is updated  C/O B/L knee pain  Prn tylenol provided

## 2022-08-26 NOTE — NURSING NOTE
Patient reports severe anxiety and requested medication to assist  PRN ativan 0 5 mg po given at 0915 and was effective

## 2022-08-27 LAB
GLUCOSE SERPL-MCNC: 181 MG/DL (ref 65–140)
GLUCOSE SERPL-MCNC: 203 MG/DL (ref 65–140)
GLUCOSE SERPL-MCNC: 212 MG/DL (ref 65–140)
GLUCOSE SERPL-MCNC: 212 MG/DL (ref 65–140)

## 2022-08-27 PROCEDURE — 99232 SBSQ HOSP IP/OBS MODERATE 35: CPT | Performed by: STUDENT IN AN ORGANIZED HEALTH CARE EDUCATION/TRAINING PROGRAM

## 2022-08-27 PROCEDURE — 82948 REAGENT STRIP/BLOOD GLUCOSE: CPT

## 2022-08-27 RX ORDER — INSULIN LISPRO 100 [IU]/ML
12 INJECTION, SOLUTION INTRAVENOUS; SUBCUTANEOUS
Status: DISCONTINUED | OUTPATIENT
Start: 2022-08-27 | End: 2022-08-31 | Stop reason: HOSPADM

## 2022-08-27 RX ADMIN — INSULIN LISPRO 2 UNITS: 100 INJECTION, SOLUTION INTRAVENOUS; SUBCUTANEOUS at 21:43

## 2022-08-27 RX ADMIN — PANTOPRAZOLE SODIUM 40 MG: 40 TABLET, DELAYED RELEASE ORAL at 06:25

## 2022-08-27 RX ADMIN — INSULIN LISPRO 10 UNITS: 100 INJECTION, SOLUTION INTRAVENOUS; SUBCUTANEOUS at 08:12

## 2022-08-27 RX ADMIN — LAMOTRIGINE 150 MG: 100 TABLET ORAL at 08:14

## 2022-08-27 RX ADMIN — ACETAMINOPHEN 975 MG: 325 TABLET ORAL at 12:42

## 2022-08-27 RX ADMIN — BREXPIPRAZOLE 3 MG: 3 TABLET ORAL at 08:13

## 2022-08-27 RX ADMIN — INSULIN LISPRO 2 UNITS: 100 INJECTION, SOLUTION INTRAVENOUS; SUBCUTANEOUS at 17:57

## 2022-08-27 RX ADMIN — INSULIN GLARGINE 64 UNITS: 100 INJECTION, SOLUTION SUBCUTANEOUS at 21:43

## 2022-08-27 RX ADMIN — INSULIN LISPRO 10 UNITS: 100 INJECTION, SOLUTION INTRAVENOUS; SUBCUTANEOUS at 12:36

## 2022-08-27 RX ADMIN — METFORMIN HYDROCHLORIDE 500 MG: 500 TABLET ORAL at 17:57

## 2022-08-27 RX ADMIN — LISINOPRIL 10 MG: 10 TABLET ORAL at 08:13

## 2022-08-27 RX ADMIN — HYDROCHLOROTHIAZIDE 12.5 MG: 12.5 TABLET ORAL at 08:13

## 2022-08-27 RX ADMIN — METFORMIN HYDROCHLORIDE 500 MG: 500 TABLET ORAL at 08:13

## 2022-08-27 RX ADMIN — LAMOTRIGINE 150 MG: 100 TABLET ORAL at 17:57

## 2022-08-27 RX ADMIN — INSULIN LISPRO 1 UNITS: 100 INJECTION, SOLUTION INTRAVENOUS; SUBCUTANEOUS at 12:36

## 2022-08-27 RX ADMIN — INSULIN LISPRO 12 UNITS: 100 INJECTION, SOLUTION INTRAVENOUS; SUBCUTANEOUS at 17:57

## 2022-08-27 RX ADMIN — INSULIN LISPRO 2 UNITS: 100 INJECTION, SOLUTION INTRAVENOUS; SUBCUTANEOUS at 08:12

## 2022-08-27 RX ADMIN — ESCITALOPRAM OXALATE 20 MG: 10 TABLET ORAL at 08:13

## 2022-08-27 NOTE — NURSING NOTE
Pt remained isolative to room  Denied SI/HI/AVH  Appears sad  Cooperative and cooperative with care  Continued q7m checks for safety

## 2022-08-27 NOTE — PLAN OF CARE
Problem: Depression  Goal: Refrain from isolation  Description: Interventions:  - Develop a trusting relationship   - Encourage socialization   Outcome: Progressing  Goal: Refrain from self-neglect  Outcome: Progressing     Problem: Anxiety  Goal: Anxiety is at manageable level  Description: Interventions:  - Assess and monitor patient's anxiety level  - Monitor for signs and symptoms (heart palpitations, chest pain, shortness of breath, headaches, nausea, feeling jumpy, restlessness, irritable, apprehensive)  - Collaborate with interdisciplinary team and initiate plan and interventions as ordered    - [place] patient to unit/surroundings  - Explain treatment plan  - Encourage participation in care  - Encourage verbalization of concerns/fears  - Identify coping mechanisms  - Assist in developing anxiety-reducing skills  - Administer/offer alternative therapies  - Limit or eliminate stimulants  Outcome: Progressing     Problem: DISCHARGE PLANNING  Goal: Discharge to home or other facility with appropriate resources  Description: INTERVENTIONS:  - Identify barriers to discharge w/patient and caregiver  - Arrange for needed discharge resources and transportation as appropriate  - Identify discharge learning needs (meds, wound care, etc )  - Refer to Case Management Department for coordinating discharge planning if the patient needs post-hospital services based on physician/advanced practitioner order or complex needs related to functional status, cognitive ability, or social support system  Outcome: Progressing

## 2022-08-27 NOTE — NURSING NOTE
Patient calm and cooperative, medication and meal compliant   Patient denies symptoms and remains isolative to room at this time

## 2022-08-27 NOTE — QUICK NOTE
Patient with blood sugars consistently elevated above goal of 140-180 since admission  Current inpatient antihyperglycemic regimen includes Lantus 64units QHS and humalog 10 units tid with meals  Patient has been requiring an additional 2-4 units of correctional coverage with meals  Will increase humalog to 12 units tid with meals  SLIM will continue to follow and adjust insulin as needed

## 2022-08-27 NOTE — PROGRESS NOTES
Progress Note - [facility] 50 y o  female MRN: [id number]  Unit/Bed#: -01 Encounter: [id number]       Patient was visited on unit for continuing care; chart reviewed and discussed with the nursing staff  On approach, the patient was calm and cooperative, lying on the bed in her room  She endorsed better mood, and denied any changes in her appetite, and energy level  No problem initiating and maintaining sleep  She reported having a nightmare last night about being shot which made her frustrated  Denied A/VH currently  Denied SI/HI, intent or plan upon direct inquiry at this time  Patient continues to be withdrawn with minimal interactions with staff and peers  No reports of aggression or self-injurious behavior on unit  No PRN medications used in the past 24 hours except Ativan 0 5 mg PRN yesterday morning at 9:15 for anxiety  Patient accepted all offered medications and reported feeling better  No adverse effects of medications noted or reported      Current Mental Status Evaluation:  Appearance and attitude: appeared as stated age, casually dressed, overweight, with good hygiene, lying on the bed  Eye contact: poor  Motor Function: within normal limits, No PMA/PMR  Gait/station: Not observed  Speech: normal for rate, rhythm, volume and amount with increased latency  Language: No overt abnormality  Mood/affect: depressed, but reported feeling better / Affect was constricted, mood-congruent  Thought Processes: linear  Thought content: denies suicidal ideation or homicidal ideation; no delusions or first rank symptoms  Associations: concrete associations  Perceptual disturbances: denies Auditory/Visual/Tactile Hallucinations  Orientation: oriented to time, person, place and to the situational context  Cognitive Function: intact  Memory: recent and remote memory grossly intact  Intellect: unable to assess  Fund of knowledge: aware of current events, aware of past history and vocabulary average  Impulse control: good  Insight/judgment: fair/fair    Pain: reported having pain in L knee  Pain scale: 7    Vital signs in last 24 hours:    Temp:  [97 °F (36 1 °C)-98 4 °F (36 9 °C)] 98 4 °F (36 9 °C)  HR:  [101] 101  Resp:  [16] 16  BP: (129-133)/(71-76) 133/71    Laboratory results: I have personally reviewed all pertinent laboratory/tests results    Results from the past 24 hours:   Recent Results (from the past 24 hour(s))   Fingerstick Glucose (POCT)    Collection Time: 08/26/22 12:05 PM   Result Value Ref Range    POC Glucose 189 (H) 65 - 140 mg/dl   Fingerstick Glucose (POCT)    Collection Time: 08/26/22  4:34 PM   Result Value Ref Range    POC Glucose 238 (H) 65 - 140 mg/dl   Fingerstick Glucose (POCT)    Collection Time: 08/26/22  8:19 PM   Result Value Ref Range    POC Glucose 275 (H) 65 - 140 mg/dl   Fingerstick Glucose (POCT)    Collection Time: 08/27/22  7:55 AM   Result Value Ref Range    POC Glucose 212 (H) 65 - 140 mg/dl       Progress Toward Goals: some improvement    Assessment:  Principal Problem:    Major depressive disorder, recurrent severe without psychotic features [facility]  Active Problems:    Type 2 diabetes mellitus with complication, with long-term current use of insulin ([facility])    Medical clearance for psychiatric admission    Essential hypertension    GERD (gastroesophageal reflux disease)    Class 3 severe obesity due to excess calories with serious comorbidity and body mass index (BMI) greater than or equal to 70 in adult ([facility])    Elevated lipids        Plan:  - f/u SLIM recs regarding the medical problems  - Continue medication titration and treatment plan; adjust medication to optimize treatment response and as clinically indicated       Scheduled medications:  Current Facility-Administered Medications   Medication Dose Route Frequency Provider Last Rate    acetaminophen  650 mg Oral Q6H PRN Morenita Ferreira MD      acetaminophen  650 mg Oral Q4H PRN Liam Huff Fredy Duncan MD      acetaminophen  975 mg Oral Q6H PRN Noni Parham MD      aluminum-magnesium hydroxide-simethicone  30 mL Oral Q4H PRN Noni Parham MD      artificial tear  1 application Both Eyes Y8Y PRN Noni Parham MD      benztropine  1 mg Intramuscular Q4H PRN Max 6/day Noni Parham MD      benztropine  1 mg Oral Q4H PRN Max 6/day Noni Parham MD      Brexpiprazole  3 mg Oral Daily Isabel Munoz MD      hydrOXYzine HCL  50 mg Oral Q6H PRN Max 4/day Noni Parham MD      Or    diphenhydrAMINE  50 mg Intramuscular Q6H PRN Noni Parham MD      escitalopram  20 mg Oral Daily Noni Parham MD      hydrochlorothiazide  12 5 mg Oral Every Other Day Julieta Najera PA-C      hydrOXYzine HCL  100 mg Oral Q6H PRN Max 4/day Noni Parham MD      Or    LORazepam  2 mg Intramuscular Q6H PRN Noni Parham MD      hydrOXYzine HCL  25 mg Oral Q6H PRN Max 4/day Noni Parham MD      insulin glargine  64 Units Subcutaneous HS Noni Parham MD      insulin lispro  1-6 Units Subcutaneous HS Julieta Najera PA-C      insulin lispro  1-6 Units Subcutaneous TID AC Malinda Aparicio PA-C      insulin lispro  10 Units Subcutaneous TID With Meals Tahir Martinez PA-C      lamoTRIgine  150 mg Oral BID Julieta Najera PA-C      lisinopril  10 mg Oral Daily Julieta Najera PA-C      LORazepam  0 5 mg Oral Q6H PRN Isabel Munoz MD      metFORMIN  500 mg Oral BID With Meals Noni Parham MD      OLANZapine  10 mg Oral Q3H PRN Max 3/day Noni Parham MD      Or    OLANZapine  10 mg Intramuscular Q3H PRN Max 3/day Noni Parham MD      OLANZapine  5 mg Oral Q3H PRN Max 6/day Noni Parham MD      Or    OLANZapine  5 mg Intramuscular Q3H PRN Max 6/day Noni Parham MD      OLANZapine  2 5 mg Oral Q3H PRN Max 8/day Noni Parham MD      pantoprazole  40 mg Oral Early Morning Joseluis Loza MD      polyethylene glycol  17 g Oral Daily PRN Joseluis Loza MD      propranolol  10 mg Oral Q8H PRN Joseluis Loza MD      senna-docusate sodium  1 tablet Oral Daily PRN Joseluis Loza MD      traZODone  50 mg Oral HS PRN Joseluis Loza MD          PRN:  [facility]  acetaminophen    acetaminophen    acetaminophen    aluminum-magnesium hydroxide-simethicone    artificial tear    benztropine    benztropine    hydrOXYzine HCL **OR** diphenhydrAMINE    hydrOXYzine HCL **OR** LORazepam    hydrOXYzine HCL    LORazepam    OLANZapine **OR** OLANZapine    OLANZapine **OR** OLANZapine    OLANZapine    polyethylene glycol    propranolol    senna-docusate sodium    traZODone    - Observation: routine    - VS: as per unit protocol  - Diet: Regular diet  - Psychoeducation (benefits and potential risks) discussed, importance of compliance with the psychiatric treatment reiterated, and the patient verbalized understanding of the matter  - Encourage group attendance and milieu therapy    - The pt was educated and agreed to verbalize any suicidal thoughts, frustrations or concerns to the nursing staff, immediately  - Dispo: TBD       Next of Kin  · Extended Emergency Contact Information  · Primary Emergency Contact: Jimenez See  · Mobile Phone: [phone]  · Relation: Significant Other  · Secondary Emergency Contact: Coty Cain  · Address: [address] [place] [address]  ·          [address] [facility], [address] [place] [address]  · Home Phone: [phone]  · Mobile Phone: [phone]  · Relation: Mother      Counseling / Coordination of Care     Total floor / unit time spent today 20 minutes  Greater than 50% of total time was spent with the patient and / or family counseling and / or coordination of care  A description of the counseling / coordination of care        Patient's Rights, confidentiality and exceptions to confidentiality, use of automated medical record, [facility] Markus Maciel staff access to medical record, and consent to treatment reviewed      Antonette Byrnes MD  Attending [phone]

## 2022-08-28 LAB
GLUCOSE SERPL-MCNC: 176 MG/DL (ref 65–140)
GLUCOSE SERPL-MCNC: 186 MG/DL (ref 65–140)
GLUCOSE SERPL-MCNC: 188 MG/DL (ref 65–140)
GLUCOSE SERPL-MCNC: 198 MG/DL (ref 65–140)

## 2022-08-28 PROCEDURE — 82948 REAGENT STRIP/BLOOD GLUCOSE: CPT

## 2022-08-28 PROCEDURE — 99232 SBSQ HOSP IP/OBS MODERATE 35: CPT | Performed by: STUDENT IN AN ORGANIZED HEALTH CARE EDUCATION/TRAINING PROGRAM

## 2022-08-28 RX ORDER — MELATONIN
2000 DAILY
Status: DISCONTINUED | OUTPATIENT
Start: 2022-08-28 | End: 2022-08-31 | Stop reason: HOSPADM

## 2022-08-28 RX ORDER — NAPROXEN 375 MG/1
375 TABLET ORAL 2 TIMES DAILY WITH MEALS
Status: DISCONTINUED | OUTPATIENT
Start: 2022-08-28 | End: 2022-08-31 | Stop reason: HOSPADM

## 2022-08-28 RX ADMIN — BREXPIPRAZOLE 3 MG: 3 TABLET ORAL at 08:15

## 2022-08-28 RX ADMIN — INSULIN GLARGINE 64 UNITS: 100 INJECTION, SOLUTION SUBCUTANEOUS at 21:12

## 2022-08-28 RX ADMIN — INSULIN LISPRO 12 UNITS: 100 INJECTION, SOLUTION INTRAVENOUS; SUBCUTANEOUS at 17:38

## 2022-08-28 RX ADMIN — PANTOPRAZOLE SODIUM 40 MG: 40 TABLET, DELAYED RELEASE ORAL at 05:33

## 2022-08-28 RX ADMIN — LISINOPRIL 10 MG: 10 TABLET ORAL at 08:15

## 2022-08-28 RX ADMIN — LORAZEPAM 0.5 MG: 0.5 TABLET ORAL at 12:35

## 2022-08-28 RX ADMIN — LAMOTRIGINE 150 MG: 100 TABLET ORAL at 08:15

## 2022-08-28 RX ADMIN — INSULIN LISPRO 12 UNITS: 100 INJECTION, SOLUTION INTRAVENOUS; SUBCUTANEOUS at 08:15

## 2022-08-28 RX ADMIN — ACETAMINOPHEN 975 MG: 325 TABLET ORAL at 08:20

## 2022-08-28 RX ADMIN — METFORMIN HYDROCHLORIDE 500 MG: 500 TABLET ORAL at 17:37

## 2022-08-28 RX ADMIN — ESCITALOPRAM OXALATE 20 MG: 10 TABLET ORAL at 08:15

## 2022-08-28 RX ADMIN — INSULIN LISPRO 2 UNITS: 100 INJECTION, SOLUTION INTRAVENOUS; SUBCUTANEOUS at 08:14

## 2022-08-28 RX ADMIN — LAMOTRIGINE 150 MG: 100 TABLET ORAL at 17:37

## 2022-08-28 RX ADMIN — INSULIN LISPRO 12 UNITS: 100 INJECTION, SOLUTION INTRAVENOUS; SUBCUTANEOUS at 12:29

## 2022-08-28 RX ADMIN — INSULIN LISPRO 1 UNITS: 100 INJECTION, SOLUTION INTRAVENOUS; SUBCUTANEOUS at 17:38

## 2022-08-28 RX ADMIN — INSULIN LISPRO 1 UNITS: 100 INJECTION, SOLUTION INTRAVENOUS; SUBCUTANEOUS at 12:29

## 2022-08-28 RX ADMIN — NAPROXEN 375 MG: 375 TABLET ORAL at 17:37

## 2022-08-28 RX ADMIN — METFORMIN HYDROCHLORIDE 500 MG: 500 TABLET ORAL at 08:15

## 2022-08-28 RX ADMIN — INSULIN LISPRO 1 UNITS: 100 INJECTION, SOLUTION INTRAVENOUS; SUBCUTANEOUS at 21:10

## 2022-08-28 NOTE — NURSING NOTE
Pt remained isolative to self in her room  Denied all but reported feeling depressed  - Scheduled insulin and coverage given   No s/s of hypo/ hyperglycemia

## 2022-08-28 NOTE — PROGRESS NOTES
Progress Note - [facility] 50 y o  female MRN: [id number]  Unit/Bed#: U 379-01 Encounter: [id number]       Patient was visited on unit for continuing care; chart reviewed and discussed with the nursing staff  On approach, the patient was calm and cooperative, lying in her bed  She reported feeling "a little depressed" as her knee pain has been worse today  She reported h/o arthritis, and stated that she slipped in bathroom a couple of month ago, and her L knee has been more painful, and reportedly has been sad as was told she could not get the knee replacement due to her weight, and reportedly her insurance did not cover weight management services and surgery for weight loss  Denied any changes in her appetite, and energy level  No problem initiating and maintaining sleep  Denied A/VH currently  Denied SI/HI, intent or plan upon direct inquiry at this time  Patient continues to be withdrawn with minimal interactions with staff and peers  No reports of aggression or self-injurious behavior on unit  No PRN medications used in the past 24 hours  Patient accepted all offered medications and reported feeling better  No adverse effects of medications noted or reported          Current Mental Status Evaluation:  Appearance and attitude: appeared as stated age, casually dressed, overweight, with fair hygiene  Eye contact: fair  Motor Function: within normal limits, No PMA/PMR  Gait/station: Not observed  Speech: normal for rate, rhythm, volume, and latency with decreased amount  Language: No overt abnormality  Mood/affect: depressed / Affect was blunted  Thought Processes: linear  Thought content: denied suicidal ideations or homicidal ideations, no overt delusions elicited  Associations: concrete associations  Perceptual disturbances: denies Auditory/Visual/Tactile Hallucinations  Orientation: oriented to time, person, place and to the situational context  Cognitive Function: intact  Memory: recent and remote memory grossly intact  Intellect: average  Fund of knowledge: aware of current events, aware of past history and vocabulary average  Impulse control: good  Insight/judgment: fair/fair    Pain: reported having pain in L knee  Pain scale: 9    Vital signs in last 24 hours:    Temp:  [96 9 °F (36 1 °C)] 96 9 °F (36 1 °C)  HR:  [104] 104  Resp:  [16] 16  BP: (125)/(70) 125/70    Laboratory results: I have personally reviewed all pertinent laboratory/tests results    Results from the past 24 hours:   Recent Results (from the past 24 hour(s))   Fingerstick Glucose (POCT)    Collection Time: 08/27/22 11:38 AM   Result Value Ref Range    POC Glucose 181 (H) 65 - 140 mg/dl   Fingerstick Glucose (POCT)    Collection Time: 08/27/22  4:39 PM   Result Value Ref Range    POC Glucose 212 (H) 65 - 140 mg/dl   Fingerstick Glucose (POCT)    Collection Time: 08/27/22  8:19 PM   Result Value Ref Range    POC Glucose 203 (H) 65 - 140 mg/dl   Fingerstick Glucose (POCT)    Collection Time: 08/28/22  8:03 AM   Result Value Ref Range    POC Glucose 198 (H) 65 - 140 mg/dl       Progress Toward Goals: minimal improvement    Assessment:  Principal Problem:    Major depressive disorder, recurrent severe without psychotic features ([facility] [address] )  Active Problems:    Type 2 diabetes mellitus with complication, with long-term current use of insulin ([facility])    Medical clearance for psychiatric admission    Essential hypertension    GERD (gastroesophageal reflux disease)    Class 3 severe obesity due to excess calories with serious comorbidity and body mass index (BMI) greater than or equal to 70 in adult ([facility])    Elevated lipids        Plan:  - f/u SLIM recs regarding the medical problems  - Continue medication titration and treatment plan; adjust medication to optimize treatment response and as clinically indicated       Scheduled medications:  Current Facility-Administered Medications   Medication Dose Route Frequency Provider Last Rate    acetaminophen  650 mg Oral Q6H PRN Chantel Lindsey MD      acetaminophen  650 mg Oral Q4H PRN Chantel Lindsey MD      acetaminophen  975 mg Oral Q6H PRN Chantel Lindsey MD      aluminum-magnesium hydroxide-simethicone  30 mL Oral Q4H PRN Chantel Lindsey MD      artificial tear  1 application Both Eyes M7W PRN Chantel Lindsey MD      benztropine  1 mg Intramuscular Q4H PRN Max 6/day Chantel Lindsey MD      benztropine  1 mg Oral Q4H PRN Max 6/day Chantel Lindsey MD      Brexpiprazole  3 mg Oral Daily Mike Pennington MD      hydrOXYzine HCL  50 mg Oral Q6H PRN Max 4/day Chantel Lindsey MD      Or    diphenhydrAMINE  50 mg Intramuscular Q6H PRN Chantel Lindsey MD      escitalopram  20 mg Oral Daily Chantel Lindsey MD      hydrochlorothiazide  12 5 mg Oral Every Other Day Julieta Haas PA-C      hydrOXYzine HCL  100 mg Oral Q6H PRN Max 4/day Chantel Lindsey MD      Or    LORazepam  2 mg Intramuscular Q6H PRN Chantel Lindsey MD      hydrOXYzine HCL  25 mg Oral Q6H PRN Max 4/day Chantel Lindsey MD      insulin glargine  64 Units Subcutaneous HS Chantel Lindsey MD      insulin lispro  1-6 Units Subcutaneous HS Julieta Haas PA-C      insulin lispro  1-6 Units Subcutaneous TID AC Malinda Elizalde PA-C      insulin lispro  12 Units Subcutaneous TID With Meals Fior ReddishBRYNN      lamoTRIgine  150 mg Oral BID Julieta Haas PA-C      lisinopril  10 mg Oral Daily Julieta Haas PA-C      LORazepam  0 5 mg Oral Q6H PRN Mike Pennington MD      metFORMIN  500 mg Oral BID With Meals Chantel Lindsey MD      OLANZapine  10 mg Oral Q3H PRN Max 3/day Chantel Lindsey MD      Or    OLANZapine  10 mg Intramuscular Q3H PRN Max 3/day Chantel Lindsey MD      OLANZapine  5 mg Oral Q3H PRN Max 6/day Chantel Lidnsey MD      Or    OLANZapine  5 mg Intramuscular Q3H PRN Max 6/day Yulia Ruvalcaba MD      OLANZapine  2 5 mg Oral Q3H PRN Max 8/day Yulia Ruvalcaba MD      pantoprazole  40 mg Oral Early Morning Yulia Ruvalcaba MD      polyethylene glycol  17 g Oral Daily PRN Yulia Ruvalcaba MD      propranolol  10 mg Oral Q8H PRN Yulia Ruvalcaba MD      senna-docusate sodium  1 tablet Oral Daily PRN Yulia Ruvalcaba MD      traZODone  50 mg Oral HS PRN Yulia Ruvalcaba MD          PRN:  [facility]  acetaminophen    acetaminophen    acetaminophen    aluminum-magnesium hydroxide-simethicone    artificial tear    benztropine    benztropine    hydrOXYzine HCL **OR** diphenhydrAMINE    hydrOXYzine HCL **OR** LORazepam    hydrOXYzine HCL    LORazepam    OLANZapine **OR** OLANZapine    OLANZapine **OR** OLANZapine    OLANZapine    polyethylene glycol    propranolol    senna-docusate sodium    traZODone    - Observation: routine    - VS: as per unit protocol  - Diet: Regular diet  - Psychoeducation (benefits and potential risks) discussed, importance of compliance with the psychiatric treatment reiterated, and the patient verbalized understanding of the matter  - Encourage group attendance and milieu therapy    - The pt was educated and agreed to verbalize any suicidal thoughts, frustrations or concerns to the nursing staff, immediately  - Dispo: TBD       Next of Kin  · Extended Emergency Contact Information  · Primary Emergency Contact: Jimenez See  · Mobile Phone: [phone]  · Relation: Significant Other  · Secondary Emergency Contact: Coty Cain  · Address: [address] [place] [address]  ·          [address]  · Home Phone: [phone]  · Mobile Phone: [phone]  · Relation: Mother      Counseling / Coordination of Care     Total floor / unit time spent today 20 minutes  Greater than 50% of total time was spent with the patient and / or family counseling and / or coordination of care   A description of the counseling / coordination of care  Patient's Rights, confidentiality and exceptions to confidentiality, use of automated medical record, [facility] staff access to medical record, and consent to treatment reviewed      Neisha Chowdary MD  Attending [address]

## 2022-08-28 NOTE — NURSING NOTE
Patient calm and cooperative, medication and meal compliant   Patient denies symptoms, stating she only has leg pain at this time, however patient is largely isolative to her room and unsocial with peers

## 2022-08-28 NOTE — PLAN OF CARE
Problem: Depression  Goal: Refrain from isolation  Description: Interventions:  - Develop a trusting relationship   - Encourage socialization   Outcome: Progressing  Goal: Refrain from self-neglect  Outcome: Progressing     Problem: Anxiety  Goal: Anxiety is at manageable level  Description: Interventions:  - Assess and monitor patient's anxiety level  - Monitor for signs and symptoms (heart palpitations, chest pain, shortness of breath, headaches, nausea, feeling jumpy, restlessness, irritable, apprehensive)  - Collaborate with interdisciplinary team and initiate plan and interventions as ordered    - [place] patient to unit/surroundings  - Explain treatment plan  - Encourage participation in care  - Encourage verbalization of concerns/fears  - Identify coping mechanisms  - Assist in developing anxiety-reducing skills  - Administer/offer alternative therapies  - Limit or eliminate stimulants  Outcome: Progressing     Problem: DISCHARGE PLANNING  Goal: Discharge to home or other facility with appropriate resources  Description: INTERVENTIONS:  - Identify barriers to discharge w/patient and caregiver  - Arrange for needed discharge resources and transportation as appropriate  - Identify discharge learning needs (meds, wound care, etc )  - Refer to Case Management Department for coordinating discharge planning if the patient needs post-hospital services based on physician/advanced practitioner order or complex needs related to functional status, cognitive ability, or social support system  Outcome: Progressing

## 2022-08-29 LAB
GLUCOSE SERPL-MCNC: 149 MG/DL (ref 65–140)
GLUCOSE SERPL-MCNC: 173 MG/DL (ref 65–140)
GLUCOSE SERPL-MCNC: 198 MG/DL (ref 65–140)
GLUCOSE SERPL-MCNC: 208 MG/DL (ref 65–140)

## 2022-08-29 PROCEDURE — 99232 SBSQ HOSP IP/OBS MODERATE 35: CPT | Performed by: STUDENT IN AN ORGANIZED HEALTH CARE EDUCATION/TRAINING PROGRAM

## 2022-08-29 PROCEDURE — 82948 REAGENT STRIP/BLOOD GLUCOSE: CPT

## 2022-08-29 RX ADMIN — INSULIN GLARGINE 64 UNITS: 100 INJECTION, SOLUTION SUBCUTANEOUS at 21:37

## 2022-08-29 RX ADMIN — NAPROXEN 375 MG: 375 TABLET ORAL at 08:26

## 2022-08-29 RX ADMIN — INSULIN LISPRO 12 UNITS: 100 INJECTION, SOLUTION INTRAVENOUS; SUBCUTANEOUS at 16:55

## 2022-08-29 RX ADMIN — NAPROXEN 375 MG: 375 TABLET ORAL at 16:55

## 2022-08-29 RX ADMIN — INSULIN LISPRO 2 UNITS: 100 INJECTION, SOLUTION INTRAVENOUS; SUBCUTANEOUS at 16:55

## 2022-08-29 RX ADMIN — METFORMIN HYDROCHLORIDE 500 MG: 500 TABLET ORAL at 16:55

## 2022-08-29 RX ADMIN — CHOLECALCIFEROL TAB 25 MCG (1000 UNIT) 2000 UNITS: 25 TAB at 08:26

## 2022-08-29 RX ADMIN — ESCITALOPRAM OXALATE 20 MG: 10 TABLET ORAL at 08:27

## 2022-08-29 RX ADMIN — LORAZEPAM 0.5 MG: 0.5 TABLET ORAL at 08:56

## 2022-08-29 RX ADMIN — LAMOTRIGINE 150 MG: 100 TABLET ORAL at 08:26

## 2022-08-29 RX ADMIN — LAMOTRIGINE 150 MG: 100 TABLET ORAL at 17:47

## 2022-08-29 RX ADMIN — INSULIN LISPRO 12 UNITS: 100 INJECTION, SOLUTION INTRAVENOUS; SUBCUTANEOUS at 08:00

## 2022-08-29 RX ADMIN — INSULIN LISPRO 12 UNITS: 100 INJECTION, SOLUTION INTRAVENOUS; SUBCUTANEOUS at 12:57

## 2022-08-29 RX ADMIN — BREXPIPRAZOLE 3 MG: 3 TABLET ORAL at 08:27

## 2022-08-29 RX ADMIN — PANTOPRAZOLE SODIUM 40 MG: 40 TABLET, DELAYED RELEASE ORAL at 06:45

## 2022-08-29 RX ADMIN — INSULIN LISPRO 1 UNITS: 100 INJECTION, SOLUTION INTRAVENOUS; SUBCUTANEOUS at 21:38

## 2022-08-29 RX ADMIN — HYDROCHLOROTHIAZIDE 12.5 MG: 12.5 TABLET ORAL at 08:26

## 2022-08-29 RX ADMIN — LISINOPRIL 10 MG: 10 TABLET ORAL at 08:27

## 2022-08-29 RX ADMIN — METFORMIN HYDROCHLORIDE 500 MG: 500 TABLET ORAL at 08:27

## 2022-08-29 RX ADMIN — INSULIN LISPRO 1 UNITS: 100 INJECTION, SOLUTION INTRAVENOUS; SUBCUTANEOUS at 08:00

## 2022-08-29 NOTE — PROGRESS NOTES
08/26/22 1200   Team Meeting   Meeting Type Tx Team Meeting   Initial Conference Date 08/26/22   Next Conference Date 09/26/22   Team Members Present   Team Members Present Physician;Nurse;   Physician Team Member 50 Mor,25 A Team Member Beryl Bruce   Social Work Team Member [place] [address]   Patient/Family Present   Patient Present Yes   Patient's Family Present No     Treatment plan reviewed with patient  Patient is in agreement with treatment plan

## 2022-08-29 NOTE — PLAN OF CARE
Problem: Ineffective Coping  Goal: Participates in unit activities  Description: Interventions:  - Provide therapeutic environment   - Provide required programming   - Redirect inappropriate behaviors   Outcome: Progressing     Problem: Depression  Goal: Refrain from isolation  Description: Interventions:  - Develop a trusting relationship   - Encourage socialization   Outcome: Progressing  Goal: Refrain from self-neglect  Outcome: Progressing  Goal: Attend and participate in unit activities, including therapeutic, recreational, and educational groups  Description: Interventions:  - Provide therapeutic and educational activities daily, encourage attendance and participation, and document same in the medical record   Outcome: Progressing     Problem: Anxiety  Goal: Anxiety is at manageable level  Description: Interventions:  - Assess and monitor patient's anxiety level  - Monitor for signs and symptoms (heart palpitations, chest pain, shortness of breath, headaches, nausea, feeling jumpy, restlessness, irritable, apprehensive)  - Collaborate with interdisciplinary team and initiate plan and interventions as ordered    - [place] patient to unit/surroundings  - Explain treatment plan  - Encourage participation in care  - Encourage verbalization of concerns/fears  - Identify coping mechanisms  - Assist in developing anxiety-reducing skills  - Administer/offer alternative therapies  - Limit or eliminate stimulants  Outcome: Progressing

## 2022-08-29 NOTE — NURSING NOTE
Pt denies SI, HI and A/V hallucinations, is visible in bedroom and common areas at select times  Is behaviorally appropriate on unit and cooperative with unit rules and denies complaints  No evidence of adverse symptoms from meds  Observed with calm affect but verbalized depression and anxiety  Pt requested med for severe anxiety; noted that atarax makes her "too sleepy" and declined that prn  Was administered prn ativan  Medication was effective in symptom reduction

## 2022-08-29 NOTE — PROGRESS NOTES
Progress Note - Behavioral Health   Isaak Vera 50 y o  female MRN: [id number]  Unit/Bed#: -01 Encounter: [id number]    Assessment/Plan   Principal Problem:    Major depressive disorder, recurrent severe without psychotic features ([facility] [address] )  Active Problems:    Type 2 diabetes mellitus with complication, with long-term current use of insulin ([facility])    Medical clearance for psychiatric admission    Essential hypertension    GERD (gastroesophageal reflux disease)    Class 3 severe obesity due to excess calories with serious comorbidity and body mass index (BMI) greater than or equal to 70 in adult ([facility])    Elevated lipids    Legal status: 201 voluntary commitment  Disposition: Unknown, pending clinical stabilization    Recommended Treatment:   Continue PO Rexulti 3 mg daily for depression  Continue PO Lexapro 20 mg daily for depression  Continue with group therapy, milieu therapy and occupational therapy  Continue frequent safety checks and vitals per unit protocol  Continue medical management per SLIM recommendations  Case discussed with treatment team  Continue coordinating disposition with case management  Risks, benefits and possible side effects of Medications: Risks, benefits, and possible side effects of medications have been explained to the patient, who verbalizes understanding    ------------------------------------------------------------    Subjective: Patient is a 51 yo female w/ a past psychiatric hx of major depressive disorder and generalized anxiety disorder and a past medical hx of type 2 diabetes, hypertension and osteoarthritis who presents voluntarily via a 201 for suicidal ideation and inability to care for self due to worsening depressive symptoms  Per nursing report, Jesús Antunez has been out of room for meals, tries to socialize on the unit, required insulin correction over the weekend, and compliant with medications      Over the last 24 hours  PRN medications: PO Ativan 0 5 mg for anxiety (8/28 1235, 8/29 0856); Tylenol 975 mg for pain (8/28 0820)    Shweta Murry was seen and evaluated today for continuity of care  On interview, the patient has blunted affect and is scant at times  She appears depressed from her mannerisms and is a limited historian as she is somewhat guarded  She reports "upset" mood  Patient endorses "low" energy levels  She expresses feeling worried and sad because she has been unable to get in touch with her fiance and her daughter said no one was at his home when the daughter drove there  She reports "a lot of" sleep, about 10 hours  She did attend group for spirituality yesterday, which she found beneficial  Per patient, appetite is "okay" and that she is eating adequately  Patient reports that she does not "feel different" from her medications, later adding it may be "helping some" and that she as "little bit of motivation back"  She denies adverse effects from medication  Team discussed option of ECT with patient, who is amenable to watching a video to learn more about the treatment modality  She states she initially felt "scared about it" but is now "thinking" about the option and would be interested in getting more information  She expressed her concerns with ECT are related to how she does not have a lot of medical leave remaining and that she would no longer be able to "get paid" or to "pay bills"  She denies current passive or active suicidal ideation, intent, or plan  Patient denies current passive or active homicidal ideation, intent, or plan  Today, Shweta Murry is consenting for safety on the unit  Progress Toward Goals: Progressing    Psychiatric Review of Systems:  Behavior over the last 24 hours: unchanged  Sleep: "a lot of", about 10 hours  Appetite: adequate  Medication side effects: none verbalized  ROS: Complete review of systems is negative except as noted above      Vital signs in last 24 hours:  Temp:  [98 1 °F (36 7 °C)-98 4 °F (36 9 °C)] 98 1 °F (36 7 °C)  HR: [] 101  Resp:  [16] 16  BP: (122-162)/(71-87) 162/87    Mental Status Exam:  Appearance:  White female, alert, good eye contact, gray eyes, appears stated age, casually dressed in navy striped shirt and black adwoa pants, black shoes, erythematous patches on face, bruise on L forearm, appropriate grooming and hygiene, red dyed hair and ID on Lt wrist   Behavior:  calm and guarded   Motor: no abnormal movements and normal gait and balance   Speech:  spontaneous, clear, coherent and scant at times   Mood:  "upset"   Affect:  blunted and depressed   Thought Process:  organized, normal rate of thoughts   Thought Content: no verbalized delusions or overt paranoia   Perceptual disturbances: no reported hallucinations and does not appear to be responding to internal stimuli at this time   Risk Potential: No active or passive suicidal or homicidal ideation was verbalized during interview   Cognition: oriented to self and situation, appears to be of average intelligence and cognition not formally tested   Insight:  Limited   Judgment: Limited     Current Medications:  Current Facility-Administered Medications   Medication Dose Route Frequency Provider Last Rate    acetaminophen  650 mg Oral Q6H PRN Greg Becker MD      acetaminophen  650 mg Oral Q4H PRN Greg Becker MD      acetaminophen  975 mg Oral Q6H PRN Greg Becker MD      aluminum-magnesium hydroxide-simethicone  30 mL Oral Q4H PRN Greg Becker MD      artificial tear  1 application Both Eyes P0E PRN Greg Becker MD      benztropine  1 mg Intramuscular Q4H PRN Max 6/day Greg Becker MD      benztropine  1 mg Oral Q4H PRN Max 6/day Greg Becker MD      Brexpiprazole  3 mg Oral Daily Darwin Wilson MD      cholecalciferol  2,000 Units Oral Daily Pat Chirinos PA-C      hydrOXYzine HCL  50 mg Oral Q6H PRN Max 4/day Greg Becker MD      Or    diphenhydrAMINE  50 mg Intramuscular Q6H PRN Govind Pompa MD      escitalopram  20 mg Oral Daily Govind Pompa MD      hydrochlorothiazide  12 5 mg Oral Every Other Day Benji Colbert PA-C      hydrOXYzine HCL  100 mg Oral Q6H PRN Max 4/day Govind Pompa MD      Or    LORazepam  2 mg Intramuscular Q6H PRN Govind Pompa MD      hydrOXYzine HCL  25 mg Oral Q6H PRN Max 4/day Govind Pompa MD      insulin glargine  64 Units Subcutaneous HS Govind Pompa MD      insulin lispro  1-6 Units Subcutaneous HS Julieta Guillermo PA-C      insulin lispro  1-6 Units Subcutaneous TID AC Malindatosha Forrester PA-C      insulin lispro  12 Units Subcutaneous TID With Meals Juan Quarles PA-C      lamoTRIgine  150 mg Oral BID Julieta Guillermo PA-C      lisinopril  10 mg Oral Daily Julieta Guillermo PA-C      LORazepam  0 5 mg Oral Q6H PRN Ronit Matute MD      metFORMIN  500 mg Oral BID With Meals Govind Pompa MD      naproxen  375 mg Oral BID With Meals Juan Quarles PA-C      OLANZapine  10 mg Oral Q3H PRN Max 3/day Govind Pompa MD      Or    OLANZapine  10 mg Intramuscular Q3H PRN Max 3/day Govind Pompa MD      OLANZapine  5 mg Oral Q3H PRN Max 6/day Govind Pompa MD      Or    OLANZapine  5 mg Intramuscular Q3H PRN Max 6/day Govind Pompa MD      OLANZapine  2 5 mg Oral Q3H PRN Max 8/day Govind Pompa MD      pantoprazole  40 mg Oral Early Morning Govind Pompa MD      polyethylene glycol  17 g Oral Daily PRN Govind Pompa MD      propranolol  10 mg Oral Q8H PRN Govind Pompa MD      senna-docusate sodium  1 tablet Oral Daily PRN Govind Pompa MD      traZODone  50 mg Oral HS PRN Govind Pompa MD         Behavioral Health Medications: all current active meds have been reviewed  Changes as in plan section above      Laboratory results:  I have personally reviewed all pertinent laboratory/tests results    Recent Results (from the past 48 hour(s))   Fingerstick Glucose (POCT)    Collection Time: 08/27/22  4:39 PM   Result Value Ref Range    POC Glucose 212 (H) 65 - 140 mg/dl   Fingerstick Glucose (POCT)    Collection Time: 08/27/22  8:19 PM   Result Value Ref Range    POC Glucose 203 (H) 65 - 140 mg/dl   Fingerstick Glucose (POCT)    Collection Time: 08/28/22  8:03 AM   Result Value Ref Range    POC Glucose 198 (H) 65 - 140 mg/dl   Fingerstick Glucose (POCT)    Collection Time: 08/28/22 11:49 AM   Result Value Ref Range    POC Glucose 176 (H) 65 - 140 mg/dl   Fingerstick Glucose (POCT)    Collection Time: 08/28/22  4:45 PM   Result Value Ref Range    POC Glucose 186 (H) 65 - 140 mg/dl   Fingerstick Glucose (POCT)    Collection Time: 08/28/22  8:31 PM   Result Value Ref Range    POC Glucose 188 (H) 65 - 140 mg/dl   Fingerstick Glucose (POCT)    Collection Time: 08/29/22  8:21 AM   Result Value Ref Range    POC Glucose 198 (H) 65 - 140 mg/dl   Fingerstick Glucose (POCT)    Collection Time: 08/29/22 12:11 PM   Result Value Ref Range    POC Glucose 149 (H) 65 - 140 mg/dl        Uri Dhillon MD

## 2022-08-29 NOTE — NURSING NOTE
Pt's isolative to self in her room most of the shift, but comes out for needs  Denied all  - Scheduled Lantus and coverage given  No s/s of hypo/ hyperglycemia  No behavioral outburst, pain, discomfort reported  Will monitor

## 2022-08-29 NOTE — PROGRESS NOTES
08/29/22 0828   Team Meeting   Meeting Type Daily Rounds   Team Members Present   Team Members Present Physician;Nurse;   Physician Team Member [place]   Nursing Team Member TONEY Brower 8   Social Work Team Member [place] [address]   Patient/Family Present   Patient Present No   Patient's Family Present No     Patient is social at times, isolates, pleasant, medication compliant  Possible ECT if patient is in agreement

## 2022-08-30 LAB
GLUCOSE SERPL-MCNC: 170 MG/DL (ref 65–140)
GLUCOSE SERPL-MCNC: 177 MG/DL (ref 65–140)
GLUCOSE SERPL-MCNC: 188 MG/DL (ref 65–140)
SARS-COV-2 RNA RESP QL NAA+PROBE: NEGATIVE

## 2022-08-30 PROCEDURE — U0005 INFEC AGEN DETEC AMPLI PROBE: HCPCS | Performed by: PSYCHIATRY & NEUROLOGY

## 2022-08-30 PROCEDURE — 82948 REAGENT STRIP/BLOOD GLUCOSE: CPT

## 2022-08-30 PROCEDURE — U0003 INFECTIOUS AGENT DETECTION BY NUCLEIC ACID (DNA OR RNA); SEVERE ACUTE RESPIRATORY SYNDROME CORONAVIRUS 2 (SARS-COV-2) (CORONAVIRUS DISEASE [COVID-19]), AMPLIFIED PROBE TECHNIQUE, MAKING USE OF HIGH THROUGHPUT TECHNOLOGIES AS DESCRIBED BY CMS-2020-01-R: HCPCS | Performed by: PSYCHIATRY & NEUROLOGY

## 2022-08-30 PROCEDURE — 97163 PT EVAL HIGH COMPLEX 45 MIN: CPT

## 2022-08-30 PROCEDURE — 99232 SBSQ HOSP IP/OBS MODERATE 35: CPT | Performed by: STUDENT IN AN ORGANIZED HEALTH CARE EDUCATION/TRAINING PROGRAM

## 2022-08-30 RX ORDER — LAMOTRIGINE 150 MG/1
150 TABLET ORAL 2 TIMES DAILY
Qty: 60 TABLET | Refills: 0 | Status: CANCELLED | OUTPATIENT
Start: 2022-08-30 | End: 2022-09-29

## 2022-08-30 RX ORDER — MELATONIN
2000 DAILY
Qty: 60 TABLET | Refills: 0 | Status: SHIPPED | OUTPATIENT
Start: 2022-08-31 | End: 2022-09-30

## 2022-08-30 RX ORDER — NAPROXEN 375 MG/1
375 TABLET ORAL 2 TIMES DAILY WITH MEALS
Qty: 60 TABLET | Refills: 0 | Status: SHIPPED | OUTPATIENT
Start: 2022-08-30 | End: 2022-09-29

## 2022-08-30 RX ADMIN — NAPROXEN 375 MG: 375 TABLET ORAL at 17:18

## 2022-08-30 RX ADMIN — INSULIN GLARGINE 64 UNITS: 100 INJECTION, SOLUTION SUBCUTANEOUS at 21:14

## 2022-08-30 RX ADMIN — METFORMIN HYDROCHLORIDE 500 MG: 500 TABLET ORAL at 08:23

## 2022-08-30 RX ADMIN — NAPROXEN 375 MG: 375 TABLET ORAL at 08:22

## 2022-08-30 RX ADMIN — BREXPIPRAZOLE 3 MG: 3 TABLET ORAL at 08:23

## 2022-08-30 RX ADMIN — INSULIN LISPRO 1 UNITS: 100 INJECTION, SOLUTION INTRAVENOUS; SUBCUTANEOUS at 08:59

## 2022-08-30 RX ADMIN — CHOLECALCIFEROL TAB 25 MCG (1000 UNIT) 2000 UNITS: 25 TAB at 08:22

## 2022-08-30 RX ADMIN — METFORMIN HYDROCHLORIDE 500 MG: 500 TABLET ORAL at 17:18

## 2022-08-30 RX ADMIN — INSULIN LISPRO 12 UNITS: 100 INJECTION, SOLUTION INTRAVENOUS; SUBCUTANEOUS at 12:24

## 2022-08-30 RX ADMIN — INSULIN LISPRO 1 UNITS: 100 INJECTION, SOLUTION INTRAVENOUS; SUBCUTANEOUS at 21:14

## 2022-08-30 RX ADMIN — LAMOTRIGINE 150 MG: 100 TABLET ORAL at 17:18

## 2022-08-30 RX ADMIN — INSULIN LISPRO 1 UNITS: 100 INJECTION, SOLUTION INTRAVENOUS; SUBCUTANEOUS at 17:19

## 2022-08-30 RX ADMIN — INSULIN LISPRO 12 UNITS: 100 INJECTION, SOLUTION INTRAVENOUS; SUBCUTANEOUS at 17:18

## 2022-08-30 RX ADMIN — ESCITALOPRAM OXALATE 20 MG: 10 TABLET ORAL at 08:22

## 2022-08-30 RX ADMIN — LAMOTRIGINE 150 MG: 100 TABLET ORAL at 08:22

## 2022-08-30 RX ADMIN — INSULIN LISPRO 12 UNITS: 100 INJECTION, SOLUTION INTRAVENOUS; SUBCUTANEOUS at 09:03

## 2022-08-30 RX ADMIN — PANTOPRAZOLE SODIUM 40 MG: 40 TABLET, DELAYED RELEASE ORAL at 06:30

## 2022-08-30 RX ADMIN — LORAZEPAM 0.5 MG: 0.5 TABLET ORAL at 06:33

## 2022-08-30 RX ADMIN — LISINOPRIL 10 MG: 10 TABLET ORAL at 09:39

## 2022-08-30 RX ADMIN — INSULIN LISPRO 1 UNITS: 100 INJECTION, SOLUTION INTRAVENOUS; SUBCUTANEOUS at 12:24

## 2022-08-30 NOTE — PROGRESS NOTES
Progress Note - Behavioral Health   Shraddha White 50 y o  female MRN: [id number]  Unit/Bed#: [facility] 379-01 Encounter: [id number]    Assessment/Plan   Principal Problem:    Major depressive disorder, recurrent severe without psychotic features ([facility] [address] )  Active Problems:    Type 2 diabetes mellitus with complication, with long-term current use of insulin ([facility])    Medical clearance for psychiatric admission    Essential hypertension    GERD (gastroesophageal reflux disease)    Class 3 severe obesity due to excess calories with serious comorbidity and body mass index (BMI) greater than or equal to 70 in adult [facility])    Elevated lipids    Legal status: 201 voluntary commitment  Disposition: Discharge tomorrow 8/31, pending clinical stabilization    Recommended Treatment:   Continue PO Rexulti 3 mg daily for depression  Continue PO Lexapro 20 mg daily for depression  Continue with group therapy, milieu therapy and occupational therapy  Continue frequent safety checks and vitals per unit protocol  Continue medical management per SLIM recommendations  Case discussed with treatment team  Continue coordinating disposition with case management  Risks, benefits and possible side effects of Medications: Risks, benefits, and possible side effects of medications have been explained to the patient, who verbalizes understanding    ------------------------------------------------------------    Subjective: Patient is a 51 yo female w/ a past psychiatric hx of major depressive disorder and generalized anxiety disorder and a past medical hx of type 2 diabetes, hypertension and osteoarthritis who presents voluntarily via a 201 for suicidal ideation and inability to care for self due to worsening depressive symptoms  Per nursing report, Myrna Chris has been social on the unit, calm, complaining of knee pain, requested PRN Ativan for anxiety, and compliant with medications      Over the last 24 hours  PRN medications: PO Ativan 0 5 mg for anxiety (8/29 9179, 08/30 7256)    Miller Porter was seen and evaluated today for continuity of care  On interview, the patient is calm, somewhat guarded, and  appears depressed  She reports "better" mood  Patient endorses "little bit better" energy levels, stating she feels "more energy"  She reports "fine" sleep, sleeping without nightmares or awakenings from 9:30 PM to 7:30 AM  She did attend group for spirituality, which she found beneficial  Per patient, appetite is "good"  Patient denies having any questions or concerns about her medication regimen and denies experiencing adverse effects  She asks about whether Rexulti can cause weight gain, so team discussed side effects of medication and risks associated with the medication, to which patient expressed understanding  She expresses interest in seeing the informational video on ECT but states she would like to be discharged as she is concerned about her ability to manage finances and pay bills if she remains hospitalized  Patient demonstrates blunted affect, although she appears brighter than previous  She expresses feeling "less stressed" than yesterday, "not ruminating as much", "smiling a lot more"  She also shares that she is no longer having worries as she was able to get in touch and find out how her fiance is doing, having discovered she could not contact him because he lost his phone  She denies current auditory or visual hallucinations  She denies current passive or active suicidal ideation, intent, or plan  Patient denies current passive or active homicidal ideation, intent, or plan  Patient shares that goals are to "go to group" and "talk to people"  When discussing discharge tomorrow, patient expressed wanting to "see my dog, daughter, and fiance", "want to go out and socialize"  Today, Miller Porter is consenting for safety on the unit       Progress Toward Goals: Progressing    Psychiatric Review of Systems:  Behavior over the last 24 hours: improved  Sleep:  "fine" sleep, sleeping without nightmares or awakenings from 9:30 PM to 7:30 AM  Appetite: adequate, "good"  Medication side effects: none verbalized  ROS: Complete review of systems is negative except as noted above      Vital signs in last 24 hours:  Temp:  [98 °F (36 7 °C)] 98 °F (36 7 °C)  HR:  [] 97  Resp:  [16] 16  BP: (134-171)/(70-80) 134/70    Mental Status Exam:  Appearance:  White female, alert, good eye contact, gray eyes, appears stated age, casually dressed in navy striped shirt and black adwoa pants, black shoes, erythematous patches on forehead, bruise on L forearm, appropriate grooming and hygiene, red dyed hair and ID on Lt wrist   Behavior:  calm and guarded   Motor: no abnormal movements and normal gait and balance   Speech:  spontaneous, normal rate, normal volume and coherent   Mood:  "better"   Affect:  blunted and depressed   Thought Process:  organized, logical, normal rate of thoughts   Thought Content: no verbalized delusions or overt paranoia   Perceptual disturbances: no reported hallucinations and does not appear to be responding to internal stimuli at this time   Risk Potential: No active or passive suicidal or homicidal ideation was verbalized during interview   Cognition: oriented to self and situation, appears to be of average intelligence and cognition not formally tested   Insight:  Improving   Judgment: Improving     Current Medications:  Current Facility-Administered Medications   Medication Dose Route Frequency Provider Last Rate    acetaminophen  650 mg Oral Q6H PRN Adina Li MD      acetaminophen  650 mg Oral Q4H PRN Adina Li MD      acetaminophen  975 mg Oral Q6H PRN Adina Li MD      aluminum-magnesium hydroxide-simethicone  30 mL Oral Q4H PRN Adina Li MD      artificial tear  1 application Both Eyes J0Z PRN Adina Li MD      benztropine  1 mg Intramuscular Q4H PRN Max 6/day MD Sandra Calderon benztropine  1 mg Oral Q4H PRN Max 6/day Andrea MD Rikki      Brexpiprazole  3 mg Oral Daily Bhakti Nguyen MD      cholecalciferol  2,000 Units Oral Daily De Lancey BRYNN Perdue      hydrOXYzine HCL  50 mg Oral Q6H PRN Max 4/day Andrea MD Rikki      Or    diphenhydrAMINE  50 mg Intramuscular Q6H PRN Andrea MD Rikki      escitalopram  20 mg Oral Daily Andrea Brandt MD      hydrochlorothiazide  12 5 mg Oral Every Other Day CADENCE DempseyC      hydrOXYzine HCL  100 mg Oral Q6H PRN Max 4/day Andrea MD Rikki      Or    LORazepam  2 mg Intramuscular Q6H PRN Andrea Brandt MD      hydrOXYzine HCL  25 mg Oral Q6H PRN Max 4/day Andrea MD Rikki      insulin glargine  64 Units Subcutaneous HS Andrea Brandt MD      insulin lispro  1-6 Units Subcutaneous HS Julieta Candelario PA-C      insulin lispro  1-6 Units Subcutaneous TID AC Malinda Pierce PA-C      insulin lispro  12 Units Subcutaneous TID With Meals Erika Perdue PA-C      lamoTRIgine  150 mg Oral BID Julieta Candelario PA-C      lisinopril  10 mg Oral Daily Julieta Candelario PA-C      LORazepam  0 5 mg Oral Q6H PRN Bhakti Nguyen MD      metFORMIN  500 mg Oral BID With Meals Andrea Brandt MD      naproxen  375 mg Oral BID With Meals Erika Perdue PA-C      OLANZapine  10 mg Oral Q3H PRN Max 3/day Andrea Brandt MD      Or    OLANZapine  10 mg Intramuscular Q3H PRN Max 3/day Andrea Brandt MD      OLANZapine  5 mg Oral Q3H PRN Max 6/day Andrea Brandt MD      Or    OLANZapine  5 mg Intramuscular Q3H PRN Max 6/day Andrea Brandt MD      OLANZapine  2 5 mg Oral Q3H PRN Max 8/day Andrea MD Rikki      pantoprazole  40 mg Oral Early Morning Andrea Brandt MD      polyethylene glycol  17 g Oral Daily PRN Andrea MD Rikki      propranolol  10 mg Oral Q8H PRN Sophia Estrella Rohan Vences MD      senna-docusate sodium  1 tablet Oral Daily PRN Cruz Kemp MD      traZODone  50 mg Oral HS PRN Cruz Kemp MD         Behavioral Health Medications: all current active meds have been reviewed  Changes as in plan section above  Laboratory results:  I have personally reviewed all pertinent laboratory/tests results    Recent Results (from the past 48 hour(s))   Fingerstick Glucose (POCT)    Collection Time: 08/28/22  4:45 PM   Result Value Ref Range    POC Glucose 186 (H) 65 - 140 mg/dl   Fingerstick Glucose (POCT)    Collection Time: 08/28/22  8:31 PM   Result Value Ref Range    POC Glucose 188 (H) 65 - 140 mg/dl   Fingerstick Glucose (POCT)    Collection Time: 08/29/22  8:21 AM   Result Value Ref Range    POC Glucose 198 (H) 65 - 140 mg/dl   Fingerstick Glucose (POCT)    Collection Time: 08/29/22 12:11 PM   Result Value Ref Range    POC Glucose 149 (H) 65 - 140 mg/dl   Fingerstick Glucose (POCT)    Collection Time: 08/29/22  4:34 PM   Result Value Ref Range    POC Glucose 208 (H) 65 - 140 mg/dl   Fingerstick Glucose (POCT)    Collection Time: 08/29/22  8:28 PM   Result Value Ref Range    POC Glucose 173 (H) 65 - 140 mg/dl   COVID only    Collection Time: 08/30/22  8:28 AM    Specimen: Nose; Nares   Result Value Ref Range    SARS-CoV-2 Negative Negative        Barbra Woods MD

## 2022-08-30 NOTE — NURSING NOTE
Pt denies psych symtoms  Pt is isolative to room with brief intervals in the milieu for meals and needs  Pt has no complaints or concerns  Medication and meal compliant  Will monitor

## 2022-08-30 NOTE — PROGRESS NOTES
08/30/22 0827   Team Meeting   Meeting Type Daily Rounds   Team Members Present   Team Members Present Physician;Nurse;   Physician Team Member Rachel Morocho   Nursing Team Member [place]   Social Work Team Member Sonal   Patient/Family Present   Patient Present No   Patient's Family Present No     Patient denies SI, visible, isolates, increased anxiety, quiet, com-pliant with medications  Discharge to determined

## 2022-08-30 NOTE — NURSING NOTE
Pt denies SI, HI, AH and VH  Pt is visible in the milieu and social with peers  Pt has no complaints or concerns  Pt is calm and cooperative  Medication and meal compliant  Will monitor

## 2022-08-30 NOTE — NURSING NOTE
Pt is visible this evening, quiet and selectively social in common areas  Pt states that she did have some anxiety earlier today, but that this evening she feels much improved  Pt endorses some knee pain that is ongoing but refused medications  Pt denies unmet needs at this time  Denies SI/HI/AH/VH

## 2022-08-30 NOTE — PLAN OF CARE
Problem: Depression  Goal: Refrain from isolation  Description: Interventions:  - Develop a trusting relationship   - Encourage socialization   Outcome: Progressing  Goal: Refrain from self-neglect  Outcome: Progressing  Goal: Attend and participate in unit activities, including therapeutic, recreational, and educational groups  Description: Interventions:  - Provide therapeutic and educational activities daily, encourage attendance and participation, and document same in the medical record   Outcome: Progressing     Problem: Anxiety  Goal: Anxiety is at manageable level  Description: Interventions:  - Assess and monitor patient's anxiety level  - Monitor for signs and symptoms (heart palpitations, chest pain, shortness of breath, headaches, nausea, feeling jumpy, restlessness, irritable, apprehensive)  - Collaborate with interdisciplinary team and initiate plan and interventions as ordered    - [place] patient to unit/surroundings  - Explain treatment plan  - Encourage participation in care  - Encourage verbalization of concerns/fears  - Identify coping mechanisms  - Assist in developing anxiety-reducing skills  - Administer/offer alternative therapies  - Limit or eliminate stimulants  Outcome: Progressing

## 2022-08-30 NOTE — PHYSICAL THERAPY NOTE
Physical Therapy Evaluation    Patient's Name: Shayla Cross    Admitting Diagnosis  Major depressive disorder, single episode, unspecified [F32 9]  Mood disorder ([place] [address] ) [F39]    Problem List  Patient Active Problem List   Diagnosis    Diabetes 1 5, managed as type 2 ([place] [address] )    Type 2 diabetes mellitus with complication, with long-term current use of insulin ([place] [address] )    Medical clearance for psychiatric admission    Essential hypertension    Irritable bowel syndrome with diarrhea    Major depressive disorder, recurrent severe without psychotic features ([place] [address] )    Epigastric pain    GERD (gastroesophageal reflux disease)    Diarrhea    Class 3 severe obesity due to excess calories with body mass index (BMI) of 60 0 to 69 9 in adult [facility])    Primary osteoarthritis of both knees    Generalized anxiety disorder    Mixed hyperlipidemia    Primary osteoarthritis of right knee    Class 3 severe obesity due to excess calories with serious comorbidity and body mass index (BMI) greater than or equal to 70 in adult ([place] [address] )    Elevated lipids       Past Medical History  Past Medical History:   Diagnosis Date    Anemia     Anxiety     Candidal intertrigo     Depression     Diabetes mellitus ([place] [address] )     GERD (gastroesophageal reflux disease)     Hyperlipidemia     Hypertension     Irritable bowel syndrome     Morbid obesity with BMI of 60 0-69 9, adult ([place] [address] )     Osteoarthritis     Seasonal allergies     Sleep difficulties     Suicide attempt [facility])        Past Surgical History  Past Surgical History:   Procedure Laterality Date     SECTION  1992    CHOLECYSTECTOMY  2008    WISDOM TOOTH EXTRACTION  2009       Recent Imaging  No orders to display       Recent Vital Signs  Vitals:    22 1535 22 2031 22 2124 22 0939   BP: 167/79 (!) 171/80 152/77 134/70   BP Location: Right arm Right arm Left arm Left arm   Pulse: 102 102 99 97   Resp: 16      Temp: 98 °F (36 7 °C)      TempSrc: Temporal      SpO2: 97% 97% Weight:       Height:            08/30/22 1045   PT Last Visit   PT Visit Date 08/30/22   Note Type   Note type Evaluation   Pain Assessment   Pain Assessment Tool 0-10   Pain Score 8   Pain Location/Orientation Orientation: Left; Location: Knee   Restrictions/Precautions   Other Precautions Pain   Home Living   Type of [address] [place] [address] Two level;Bed/bath upstairs;Stairs to enter with rails   Bathroom Shower/Tub Tub/shower unit   Bathroom Accessibility Accessible   Prior Function   Level of [place] Independent with ADLs and functional mobility   Lives With Spouse   Receives Help From Family   ADL Assistance Independent   IADLs Independent   Falls in the last 6 months 1 to 4   General   Family/Caregiver Present No   Cognition   Overall Cognitive Status WFL   Arousal/Participation Alert   Orientation Level Oriented X4   Memory Within functional limits   Following Commands Follows all commands and directions without difficulty   RLE Assessment   RLE Assessment WFL   LLE Assessment   LLE Assessment   (3+/5 limited by pain in the knee greater in ext than flexion)   Coordination   Movements are Fluid and Coordinated 0   Coordination and Movement Description antalgic movment LLE   Sensation WFL   Light Touch   RLE Light Touch Grossly intact   LLE Light Touch Grossly intact   Bed Mobility   Supine to Sit 7  Independent   Sit to Supine 7  Independent   Transfers   Sit to Stand 7  Independent   Stand to Sit 7  Independent   Ambulation/Elevation   Gait pattern Decreased L stance; Excessively slow; Short stride; Step through pattern   Gait Assistance 7  Independent   Assistive Device None   Distance 100ft   Balance   Static Sitting Good   Dynamic Sitting Good   Static Standing Fair +   Dynamic Standing Fair   Ambulatory Fair   Endurance Deficit   Endurance Deficit Yes   Endurance Deficit Description L knee pain   Activity Tolerance   Activity Tolerance Patient limited by fatigue;Patient limited by pain   Medical Staff Made Aware spoke to [id number]   Nurse Made Aware spoke to RN   Assessment   Prognosis Good   Problem List Decreased strength;Decreased range of motion;Decreased endurance; Impaired balance;Decreased mobility; Decreased coordination;Obesity;Pain   Barriers to Discharge Inaccessible home environment   Goals   Patient Goals to have less knee pain   Plan   Treatment/Interventions Therapeutic exercise  (provide HEP)   Recommendation   PT Discharge Recommendation Home with outpatient rehabilitation   AM-PAC Basic Mobility Inpatient   Turning in Bed Without Bedrails 4   Lying on Back to Sitting on Edge of Flat Bed 4   Moving Bed to Chair 4   Standing Up From Chair 4   Walk in Room 4   Climb 3-5 Stairs 4   Basic Mobility Inpatient Raw Score 24   Basic Mobility Standardized Score 57 68   Highest Level Of Mobility   JH-HLM Goal 8: Walk 250 feet or more   JH-HLM Achieved 7: Walk 25 feet or more   End of Consult   Patient Position at End of Consult Bedside chair         ASSESSMENT                                                                                                                     Lee Ann Schmitz is a 50 y o  female admitted to [facility] on 8/24/2022 for Major depressive disorder, recurrent severe without psychotic features ([facility] [address] )  Pt  has a past medical history of Anemia, Anxiety, Candidal intertrigo, Depression, Diabetes mellitus ([facility] [address] ), GERD (gastroesophageal reflux disease), Hyperlipidemia, Hypertension, Irritable bowel syndrome, Morbid obesity with BMI of 60 0-69 9, adult ([facility] [address] ), Osteoarthritis, Seasonal allergies, Sleep difficulties, and Suicide attempt ([facility] [address] )    PT was consulted and pt was seen on 8/30/2022 for mobility assessment and d/c planning  Pt presents seated in common room alert and agreeable to therapy   Impairments limiting pt at this time include decreased ROM, impaired balance, decreased endurance, decreased coordination, new onset of impairment of functional mobility, decreased IADLS, pain, decreased activity tolerance, and decreased strength  Pt is currently functioning at a independent level for bed mobility, independent level for transfers, independent level for ambulation with no assistive device,  The patient's AM-PAC Basic Mobility Inpatient Short Form Raw Score can be found above  A Raw score of greater than 16 suggests the patient may benefit from discharge to home  Please also refer to the recommendation of the Physical Therapist for safe discharge planning  Recommendations                                                                                                              Will provide pt with HEP to be completed as per instructions, recommend outpatient physical therapy f/u for treatment of L knee pain likely due to OA  Recommend shower chair to improve safety during bathing      DME: Shower Chair    Discharge Disposition:  Home with Outpatient Physical Therapy       Anthony Wisdom PT, DPT

## 2022-08-30 NOTE — NURSING NOTE
0 5mg of ativan given for anxiety  Pt found upright in bed staring blankly at the wall  Pt reported severe anxiety attack and requested ativan  Pt now sleeping, intervention effective

## 2022-08-30 NOTE — BH TRANSITION RECORD
Contact Information: If you have any questions, concerns, pended studies, tests and/or procedures, or emergencies regarding your inpatient behavioral health visit  Please contact [address] [place] [address] behavioral health unit 3P [phone] and ask to speak to a , nurse or physician  A contact is available 24 hours/ 7 days a week at this number  Summary of Procedures Performed During your Stay:  Below is a list of major procedures performed during your hospital stay and a summary of results:  - No major procedures performed  If studies are pending at discharge, follow up with your PCP and/or referring provider

## 2022-08-31 VITALS
TEMPERATURE: 98.5 F | BODY MASS INDEX: 57.52 KG/M2 | OXYGEN SATURATION: 97 % | HEART RATE: 97 BPM | SYSTOLIC BLOOD PRESSURE: 112 MMHG | HEIGHT: 60 IN | WEIGHT: 293 LBS | RESPIRATION RATE: 16 BRPM | DIASTOLIC BLOOD PRESSURE: 70 MMHG

## 2022-08-31 PROBLEM — Z00.8 MEDICAL CLEARANCE FOR PSYCHIATRIC ADMISSION: Status: RESOLVED | Noted: 2019-09-04 | Resolved: 2022-08-31

## 2022-08-31 LAB
DME PARACHUTE DELIVERY DATE REQUESTED: NORMAL
DME PARACHUTE ITEM DESCRIPTION: NORMAL
DME PARACHUTE ORDER STATUS: NORMAL
DME PARACHUTE SUPPLIER NAME: NORMAL
DME PARACHUTE SUPPLIER PHONE: NORMAL
GLUCOSE SERPL-MCNC: 153 MG/DL (ref 65–140)
GLUCOSE SERPL-MCNC: 167 MG/DL (ref 65–140)

## 2022-08-31 PROCEDURE — 99238 HOSP IP/OBS DSCHRG MGMT 30/<: CPT | Performed by: STUDENT IN AN ORGANIZED HEALTH CARE EDUCATION/TRAINING PROGRAM

## 2022-08-31 PROCEDURE — 82948 REAGENT STRIP/BLOOD GLUCOSE: CPT

## 2022-08-31 RX ADMIN — ESCITALOPRAM OXALATE 20 MG: 10 TABLET ORAL at 08:07

## 2022-08-31 RX ADMIN — METFORMIN HYDROCHLORIDE 500 MG: 500 TABLET ORAL at 08:06

## 2022-08-31 RX ADMIN — INSULIN LISPRO 1 UNITS: 100 INJECTION, SOLUTION INTRAVENOUS; SUBCUTANEOUS at 08:08

## 2022-08-31 RX ADMIN — INSULIN LISPRO 12 UNITS: 100 INJECTION, SOLUTION INTRAVENOUS; SUBCUTANEOUS at 08:07

## 2022-08-31 RX ADMIN — CHOLECALCIFEROL TAB 25 MCG (1000 UNIT) 2000 UNITS: 25 TAB at 08:07

## 2022-08-31 RX ADMIN — NAPROXEN 375 MG: 375 TABLET ORAL at 08:06

## 2022-08-31 RX ADMIN — LAMOTRIGINE 150 MG: 100 TABLET ORAL at 08:07

## 2022-08-31 RX ADMIN — LISINOPRIL 10 MG: 10 TABLET ORAL at 08:07

## 2022-08-31 RX ADMIN — INSULIN LISPRO 1 UNITS: 100 INJECTION, SOLUTION INTRAVENOUS; SUBCUTANEOUS at 12:09

## 2022-08-31 RX ADMIN — INSULIN LISPRO 12 UNITS: 100 INJECTION, SOLUTION INTRAVENOUS; SUBCUTANEOUS at 12:09

## 2022-08-31 RX ADMIN — BREXPIPRAZOLE 3 MG: 3 TABLET ORAL at 08:07

## 2022-08-31 RX ADMIN — PANTOPRAZOLE SODIUM 40 MG: 40 TABLET, DELAYED RELEASE ORAL at 06:32

## 2022-08-31 RX ADMIN — HYDROCHLOROTHIAZIDE 12.5 MG: 12.5 TABLET ORAL at 08:07

## 2022-08-31 NOTE — PROGRESS NOTES
08/31/22 0829   Team Meeting   Meeting Type Daily Rounds   Team Members Present   Team Members Present Physician;Nurse;; Other (Discipline and Name)   Physician Team Member [email or web address] Team Member Jesús Sandoval   Social Work Team Member Monalisa   Other (Discipline and Name) Britt Coronel CC   Patient/Family Present   Patient Present No   Patient's Family Present No     Patient continues to isolate, she is depressed, she will be discharged today

## 2022-08-31 NOTE — NURSING NOTE
Patient awoke and completed all morning routines  AVS printed and reviewed with patient  Patient verbalized understanding of discharge instructions and agreed to be compliant with their medication regimen  Patient discharged to home at 1310  Patient was discharged with all of their belongings at time of discharge

## 2022-08-31 NOTE — NURSING NOTE
Patient is calm and cooperative on the unit  Denies SI, HI, SIB, auditory and visual hallucinations  Med and meal compliant  Patient is wearing her own clothes  Denies any unmet needs  Q7 safety checks to continue

## 2022-08-31 NOTE — PLAN OF CARE
Problem: Depression  Goal: Refrain from isolation  Description: Interventions:  - Develop a trusting relationship   - Encourage socialization   Outcome: Progressing  Goal: Refrain from self-neglect  Outcome: Progressing     Problem: Anxiety  Goal: Anxiety is at manageable level  Description: Interventions:  - Assess and monitor patient's anxiety level  - Monitor for signs and symptoms (heart palpitations, chest pain, shortness of breath, headaches, nausea, feeling jumpy, restlessness, irritable, apprehensive)  - Collaborate with interdisciplinary team and initiate plan and interventions as ordered    - [place] patient to unit/surroundings  - Explain treatment plan  - Encourage participation in care  - Encourage verbalization of concerns/fears  - Identify coping mechanisms  - Assist in developing anxiety-reducing skills  - Administer/offer alternative therapies  - Limit or eliminate stimulants  Outcome: Progressing     Problem: DISCHARGE PLANNING  Goal: Discharge to home or other facility with appropriate resources  Description: INTERVENTIONS:  - Identify barriers to discharge w/patient and caregiver  - Arrange for needed discharge resources and transportation as appropriate  - Identify discharge learning needs (meds, wound care, etc )  - Refer to Case Management Department for coordinating discharge planning if the patient needs post-hospital services based on physician/advanced practitioner order or complex needs related to functional status, cognitive ability, or social support system  Outcome: Progressing

## 2022-08-31 NOTE — DISCHARGE INSTR - APPOINTMENTS
Iliana Powell or Kat, our Tamera and Nuvia, will be calling you after your discharge, on the phone number that you provided  They will be available as an additional support, if needed  If you wish to speak with one of them, you may contact Larissa Pina at [phone] or Gillian Guillermo at [phone]  A shower chair has been ordered and will be delivered to your home on 8/31

## 2022-08-31 NOTE — DISCHARGE INSTR - OTHER ORDERS
You are being discharged home to [address] Crisis Intervention: [address] Crisis Intervention: [phone]  *National Suicide Prevention Lifeline:  7-464.301.5446  *Peer Support Talk Line (Seven days a week, 1:00 PM - 9:00 PM) Call: 400.592.7978 or Text: [address] on [address] [facility]) HELPLINE: 845.626.3557/Website: www mary org  *Substance Abuse and [address] [facility] [employer], which is a confidential, free, 24-hour-a-day, 365-day-a-year, information service for individuals and family members facing mental health and/or substance use disorders  This service provides referrals to local treatment facilities, support groups, and community-based organizations  Callers can also order free publications and other information  Call 6-301.709.5287/Website: www [facility] gov  *[employer] 2-1-1: This is a toll free, confidential, 24-hour-a-day service which connects you to a community  in your area who can help you find services and resources that are available to you locally and provide critical services that can improve and save lives    Call: 211  /Website: https://gena silva/

## 2022-08-31 NOTE — DISCHARGE SUMMARY
Discharge Summary - Behavioral Health Unit  Veronica Coronel 50 y o  female MRN: [id number]  Unit/Bed#: -01 Encounter: [id number]     Admission Date: 8/24/2022 1933   Admission Orders (From admission, onward)     Ordered        08/24/22 1944  ED TO DIFFERENT CAMPUS IP [facility] UN[facility] or INPATIENT MEDICAL UNIT to [place] [address] (using Discharge Readmit Navigator) - Admit Patient to [address] [place] [address]  Once                          Discharge Date: 8/31/22    Attending Psychiatrist: Javier Brown MD    Reason for Admission:   Veronica Coronel is a 50 y o  female, admitted to the inpatient behavioral health unit at [address] [place] [address] Heart unit 3P, as a voluntarily 201 commitment, subsequent to suicidal ideation and inability to care for self due to worsening depressive symptoms  Please refer to the initial H&P below for full details  See below H&P from Baltazar Mitchell MD on 8/25/22 11:53 am :     "Edith Alves is a 50 y o  female w/ a past psychiatric hx of major depressive disorder and generalized anxiety disorder and a past medical hx of type 2 diabetes, hypertension and osteoarthritis who presents voluntarily via a 201 for suicidal ideation and inability to care for self due to worsening depressive symptoms      Pt says that she has been experiencing episodes of major depression for some years now and that her symptoms recently got worse over the past few months  Pt reports significantly increased sleep and increased appetite with a ten pound weight gain over the past year  Pt also reports anhedonia, decreased concentration and decreased energy level over the past few months along with depressed mood  Pt reports feelings of hopelessness, saying that she has been depressed for so long and tried so many medications that she doesn't know if anything will work this time   She also reports psychomotor retardation, apathy and significant avolition, making it difficult to get out of bed each day and attend to activities of daily living, including showering, brushing her teeth and attending to many other basic needs, including taking her insulin medications      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 and has had dififculty functioning at and performing her job as well, leading to a reprimand  Pt said that she had to take a one month medical leave from her position in February due to the depression  During this recent period of depression, pt also endorses mild suicidal ideation, though she denies any specific plan nor any prior suicide attempts  She also denies any hx of self-harm behaviors or any access to firearms  Pt says that she was hospitalized about a year ago for her depression and reports two other hospitalizations prior to that approximately ten years ago  Pt denies any hx of homicidal ideation or any hx of auditory or visual hallucinations      Pt reports that she last saw her provider Kamala Banerjee) at [facility] a few months ago  She says she was placed on Rexulti at the time after a failed trial of other medications which included Abilify 5 mg and Zoloft as well as Effexor (which she says caused heart palpitations) and Wellbutrin (which she says caused anxiety)  Pt reports that the [address] helped for a while but then her symptoms worsened again and she hasn't been able to see her provider in a few months to get her medication adjusted, leading up to the current time  Pt denies any hx of manic symptoms, including long periods without sleep, racing thoughts or significant increases in energy or activity level      In terms of anxiety, pt reports ongoing anxiety as it relates to her medical illnesses and her job  She reports a hx of panic attacks but does not mention any recent attacks   She denies any hx of abuse or significant trauma as well as any flashbacks or nightmares related to any significantly distressing events      In terms of family hx, pt reports both her mother and her daughter suffer from Bipolar disorder and that her father, who is , suffered from anxiety and depression  She does not report any known substance abuse in her immediate family      In terms of social hx, pt says that 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  Pt says she went to college and wanted to obtain her LPN and eventually become a nurse, but was unable to  She is , lives at home and has a 27 yr old daughter with whom she says she has a good relationship with  She also has a boyfriend who also suffers from depression, as well as bipolar disorder  She has a good relationship with her mother and says she has a good support system overall       Pt reports occasional alcohol use and denies any illicit substance use, tobacco use or hx of rehab or detox programs  In terms of psychiatric medication hx, pt is currently taking Rexulti 2 mg, Lexapro 10 mg, and Lamictal 150 mg bid  Per chart review, pt was also taking Ativan 0 5 mg tid prn      In terms of medical hx, pt reports having type 2 diabetes, hypertension and arthritis for which she takes hydrochlorothiazide, Metformin and insulin (glargine and lispro)       Pt denies any current SI, HI or A/VH "    See below attestation from Vladimir Hutton MD on 22 4:43 pm :    "Pako Ferreira is a 50 y o  female with a history of Major Depressive Disorder and anxiety who was admitted to the inpatient psychiatric unit on a voluntary 201 commitment basis due to depression and suicidal ideation        Symptoms prior to admission included worsening depression, suicidal ideation, hopelessness, increased appetite, difficulty sleeping, weight gain, and anxiety symptoms  Records reviewed  Patient had presented to the emergency department at [address] [place] [address] due to worsening depressive symptoms and suicidal ideation  She was without any specific suicidal plan  She had endorsed difficulty with attending to her ADLs  While in the emergency department, patient had been calm and cooperative without any acute behavioral issues  On arrival to the unit, patient has also remained calm and cooperative        On initial evaluation after admission to the inpatient psychiatric unit   Michaela Simental reports that she has been having steadily worsening depression for the past few weeks  She cannot identify any particular stressors recently  She states that she has chronically had difficulty with medication management as medications initially worked but then will stop working after a while  She notes that she did do well after being started on Rexulti by her outpatient provider but lately has not been finding this to be helpful  She notes persistent low mood, increased appetite, poor sleep, inability to properly care for self, poor motivation, and passive SI as above, without any specific plan or intention to harm herself  She reports that her depression has interfered with her ability to do her work and has resulted in her getting in trouble with her job  She reports having been tried on numerous medications in her life, with only limited success with certain medications  She has found Lexapro their salty to the to the more helpful medications that she has been on in addition to her current dose of Lamictal   She endorses that she has been taking her medications every day  Patient reports she is unable take Wellbutrin due to having worsening panic attacks when taking this  She currently denies any active SI, HI, or AVH while she is in the hospital reports feeling safe here  Discussed plan to titrate Rexulti slightly further and patient was agreeable to this plan  She denies any questions or concerns at this time  "    Hospital Course: The patient was admitted to the inpatient psychiatric unit and started on behavioral health checks every 15 minutes per unit protocol   Upon admission, the patient was evaluated by the medical service for medical clearance and further management of medical issues as needed  At the start of hospitalization, she was exhibiting symptoms of depressed mood, hypersomnia, increased appetite, ten pound weight gain, anhedonia, hopelessness, decreased concentration, decreased energy level, apathy and avolition leading to a decreased ability to function and care for self, lack of motivation (to bathe self, brush teeth or take insulin shots regularly), decreased ability to perform work responsibilities, and mild suicidal ideation without specific plan or prior attempts  During hospitalization, Marisa Pro was encouraged to participate in group therapy, milieu therapy, and occupational therapy  Patient was admitted on a 201 voluntary basis for treatment  Upon evaluation, she was started on Rexulti 3 mg daily for depression and Lexapro was increased to 20 mg daily  Possible side effects were discussed with the patient prior to initiation, and she verbalized understanding  Medications were appropriately titrated to PO Rexulti 3 mg daily for depression and PO Lexapro 20 mg daily for depression  PO Lamictal was kept at home dose of 150 mg BID  The patient adhered to her medication regimen and denied any acute adverse effects not otherwise mentioned  Her symptoms began to improve, and her affect brightened throughout  the course of psychiatric management  She reported improvements in sleep, appetite, mood, motivation, and energy levels  She was seen in [facility] interacting appropriately with peers and participating in group therapy  Dorene Villareal did not demonstrate dangerous behavior to self or peers during her inpatient stay  As she began to improve, the treatment team agreed she was safe for discharge with plan to continue outpatient treatment      At the time of discharge, nursing staff noted that she appears to be brighter on approach and appears to be willing to be social  Dorene Villareal reports feeling "better" mood, "better" energy, "good" sleep, and "good" appetite  She endorsed feeling that the medication was working for her and denies any adverse effects  Discussed with team that ECT and [address] [place] are possible treatment options should she experience additional struggles  She denied suicidal and homicidal ideations at the time of discharge, as well as auditory and visual hallucinations  Patient expressed feeling "excited" about discharge, adding the first thing she wants to do after getting home is to hug her dog  She states that she plans to live with her fiance and dog at her house, reporting feeling safe at home and denying presence of firearms or weapons  Her goals are to "take care of myself", "get back into the swing of things", "not miss my insulin shots"  She stated that she would go to the ED, reach out to her doctor, or call the 9-8-8 hotline or 9-1-1 should she experience symptoms similar to those that led to her hospitalization  Applicable follow up and safety plan was reviewed with the patient prior to discharge      Risk of Harm to Self:   The following ratings are based on assessment at the time of discharge, review of the hospital stay progress, assessment at the time of the interview, observation over the last several days and review of records  Demographic risk factors include: ,  status  Historical Risk Factors include: chronic depression, chronic depressive symptoms, history of depression, history of mood disorder  Current Specific Risk Factors include: recent inpatient psychiatric admission - being discharged today, diagnosis of depression, diagnosis of mood disorder, mental illness diagnosis  Protective Factors: no current suicidal ideation, stable mood, no current anxiety symptoms, no current psychotic symptoms, improved mood, improved impulse control, ability to adapt to change, ability to manage anger well, ability to make plans for the future, no current suicidal plan or intent, outpatient psychiatric follow up established, being a parent, compliant with medications, compliant with mental health treatment, stable job, stable housing, connection to own children, effective coping skills, effective decision-making skills, effective problem solving skills, having a desire to be alive, having a desire to live, having a sense of purpose or meaning in life, having pets, sense of determination, sense of personal control, ability to contract for safety with staff, ability to communicate with staff  Weapons/Firearms: none  The following steps have been taken to ensure weapons are properly secured: not applicable  Based on today's [place] presents the following risk of harm to self: minimal    Risk of Harm to Others: The following ratings are based on assessment at the time of discharge, review of the hospital stay progress, assessment at the time of the interview, observation over the last several days and review of records  Demographic Risk Factors include: none  Historical Risk Factors include: none  Current Specific Risk Factors include: none  Protective Factors: no current homicidal ideation, good impulse control, improved impulse control, improved mood, no current psychotic symptoms, compliant with medications, compliant with treatment, willing to continue psychiatric treatment, willing to remain free from substance use, ability to adapt to change, able to manage anger well, effective coping skills, no current substance use problems, stable living environment, being a parent, connection to community, connection to own children, effective decision-making skills, effective problem solving skills, restricted access to lethal means, sense of personal control, access to mental health treatment  Weapons/Firearms: none   The following steps have been taken to ensure weapons are properly secured: not applicable   Based on today's [place] presents the following risk of harm to others: minimal     Discharge Medications:    Psychiatric medications include:    · Rexulti 3 mg daily for depression  · Lexapro 20 mg daily for depression  · Lamictal 150 mg BID    Other home medications for medical conditions were continued throughout hospitalization, if applicable  See AVS for more details  Follow ups:     The patient is scheduled for follow up at:     Psychotherapy [facility] on 9/2/22 at 2 PM   Medication management at [facility] on 9/13/22 at 10:30 AM    Behavioral Health Medications:   all current active meds have been reviewed and current meds:   Current Facility-Administered Medications   Medication Dose Route Frequency    acetaminophen (TYLENOL) tablet 650 mg  650 mg Oral Q6H PRN    acetaminophen (TYLENOL) tablet 650 mg  650 mg Oral Q4H PRN    acetaminophen (TYLENOL) tablet 975 mg  975 mg Oral Q6H PRN    aluminum-magnesium hydroxide-simethicone (MYLANTA) oral suspension 30 mL  30 mL Oral Q4H PRN    artificial tear (LUBRIFRESH P M ) ophthalmic ointment 1 application  1 application Both Eyes W5V PRN    benztropine (COGENTIN) injection 1 mg  1 mg Intramuscular Q4H PRN Max 6/day    benztropine (COGENTIN) tablet 1 mg  1 mg Oral Q4H PRN Max 6/day    Brexpiprazole (REXULTI) tablet 3 mg  3 mg Oral Daily    cholecalciferol (VITAMIN D3) tablet 2,000 Units  2,000 Units Oral Daily    hydrOXYzine HCL (ATARAX) tablet 50 mg  50 mg Oral Q6H PRN Max 4/day    Or    diphenhydrAMINE (BENADRYL) injection 50 mg  50 mg Intramuscular Q6H PRN    escitalopram (LEXAPRO) tablet 20 mg  20 mg Oral Daily    hydrochlorothiazide (HYDRODIURIL) tablet 12 5 mg  12 5 mg Oral Every Other Day    hydrOXYzine HCL (ATARAX) tablet 100 mg  100 mg Oral Q6H PRN Max 4/day    Or    LORazepam (ATIVAN) injection 2 mg  2 mg Intramuscular Q6H PRN    hydrOXYzine HCL (ATARAX) tablet 25 mg  25 mg Oral Q6H PRN Max 4/day    insulin glargine (LANTUS) subcutaneous injection 64 Units 0 64 mL  64 Units Subcutaneous HS    insulin lispro (HumaLOG) 100 units/mL subcutaneous injection 1-6 Units  1-6 Units Subcutaneous HS    insulin lispro (HumaLOG) 100 units/mL subcutaneous injection 1-6 Units  1-6 Units Subcutaneous TID AC    insulin lispro (HumaLOG) 100 units/mL subcutaneous injection 12 Units  12 Units Subcutaneous TID With Meals    lamoTRIgine (LaMICtal) tablet 150 mg  150 mg Oral BID    lisinopril (ZESTRIL) tablet 10 mg  10 mg Oral Daily    LORazepam (ATIVAN) tablet 0 5 mg  0 5 mg Oral Q6H PRN    metFORMIN (GLUCOPHAGE) tablet 500 mg  500 mg Oral BID With Meals    naproxen (NAPROSYN) tablet 375 mg  375 mg Oral BID With Meals    OLANZapine (ZyPREXA) tablet 10 mg  10 mg Oral Q3H PRN Max 3/day    Or    OLANZapine (ZyPREXA) IM injection 10 mg  10 mg Intramuscular Q3H PRN Max 3/day    OLANZapine (ZyPREXA) tablet 5 mg  5 mg Oral Q3H PRN Max 6/day    Or    OLANZapine (ZyPREXA) IM injection 5 mg  5 mg Intramuscular Q3H PRN Max 6/day    OLANZapine (ZyPREXA) tablet 2 5 mg  2 5 mg Oral Q3H PRN Max 8/day    pantoprazole (PROTONIX) EC tablet 40 mg  40 mg Oral Early Morning    polyethylene glycol (MIRALAX) packet 17 g  17 g Oral Daily PRN    propranolol (INDERAL) tablet 10 mg  10 mg Oral Q8H PRN    senna-docusate sodium (SENOKOT S) 8 6-50 mg per tablet 1 tablet  1 tablet Oral Daily PRN    traZODone (DESYREL) tablet 50 mg  50 mg Oral HS PRN     Labs/Imaging:   I have personally reviewed all pertinent laboratory/tests results    Most Recent Labs:   Lab Results   Component Value Date    WBC 6 60 08/25/2022    RBC 4 79 08/25/2022    HGB 13 6 08/25/2022    HCT 43 4 08/25/2022     08/25/2022    RDW 13 6 08/25/2022    NEUTROABS 3 80 08/25/2022    SODIUM 136 08/25/2022    K 4 1 08/25/2022    CL 99 08/25/2022    CO2 27 08/25/2022    BUN 14 08/25/2022    CREATININE 0 67 08/25/2022    GLUC 234 (H) 08/25/2022    GLUF 234 (H) 08/25/2022    CALCIUM 9 3 08/25/2022    AST 62 (H) 08/25/2022    ALT 81 (H) 08/25/2022    ALKPHOS 87 08/25/2022    TP 7 4 08/25/2022    ALB 4 0 08/25/2022    TBILI 0 68 08/25/2022    CHOLESTEROL 214 (H) 08/25/2022    HDL 34 (L) 08/25/2022    TRIG 129 08/25/2022    LDLCALC 154 (H) 08/25/2022    [place] 180 08/25/2022    FRH3JHQLTEFT 1 110 08/25/2022    PREGUR negative 08/23/2022    PREGSERUM Negative 07/29/2021    RPR Non-Reactive 08/25/2022    HGBA1C 9 7 (H) 08/25/2022     08/25/2022       Mental Status at time of Discharge:   Appearance:  White female, alert, good eye contact, gray eyes, appears stated age, casually dressed in navy striped shirt and black adwoa pants, black shoes, erythematous patches on forehead, few skin-colored warts on R side of nose, bruise on L forearm, appropriate grooming and hygiene, red dyed hair and ID on Lt wrist   Behavior:  cooperative, makes good eye contact and calm   Speech:  normal rate, normal volume, normal pitch, fluent, clear and coherent   Mood:  "better"   Affect:  blunted and brighter than previous   Language Within normal limits   Thought Process:  organized, logical, goal directed, normal rate of thoughts   Thought Content:  No verbalized delusions   Perceptual Disturbances: Denies auditory or visual hallucinations and Does not appear to be responding to internal stimuli   Risk Potential: Denies suicidal or homicidal ideation, intent, or plan   Sensorium:  person, place, time and current situation   Cognition:  Grossly intact   Consciousness:  alert and awake   Attention: attention span and concentration were age appropriate   Insight:  fair   Judgment: fair   Intellect appears to be of average intelligence   Gait/Station: normal gait/station and normal balance   Motor Activity: no abnormal movements     Discharge Diagnosis:   Patient Active Problem List   Diagnosis    Diabetes 1 5, [facility] as type 2 [facility]    Type 2 diabetes mellitus with complication, with long-term current use of insulin ([facility] )    Essential hypertension    Irritable bowel syndrome with diarrhea    Major depressive disorder, recurrent severe without psychotic features ([facility] )    Epigastric pain    GERD (gastroesophageal reflux disease)    Diarrhea    Class 3 severe obesity due to excess calories with body mass index (BMI) of 60 0 to 69 9 in adult [facility])    Primary osteoarthritis of both knees    Generalized anxiety disorder    Mixed hyperlipidemia    Primary osteoarthritis of right knee    Class 3 severe obesity due to excess calories with serious comorbidity and body mass index (BMI) greater than or equal to 70 in [facility])    Elevated lipids       Discharge Medications:  See list above, as well as the after visit summary containing reconciled discharge medications provided to patient and family  Discharge instructions/Information to patient and family:   See after visit summary for information provided to patient and family  Provisions for Follow-Up Care:  See after visit summary for information related to follow-up care and any pertinent home health orders  This note has been constructed using a voice recognition system  There may be translation, syntax,  or grammatical errors  If you have any questions, please contact the dictating provider      Clementine Jones MD

## 2022-08-31 NOTE — PLAN OF CARE
Problem: Ineffective Coping  Goal: Participates in unit activities  Description: Interventions:  - Provide therapeutic environment   - Provide required programming   - Redirect inappropriate behaviors   Outcome: Completed     Problem: Depression  Goal: Refrain from isolation  Description: Interventions:  - Develop a trusting relationship   - Encourage socialization   Outcome: Completed  Goal: Refrain from self-neglect  Outcome: Completed  Goal: Attend and participate in unit activities, including therapeutic, recreational, and educational groups  Description: Interventions:  - Provide therapeutic and educational activities daily, encourage attendance and participation, and document same in the medical record   Outcome: Completed     Problem: Anxiety  Goal: Anxiety is at manageable level  Description: Interventions:  - Assess and monitor patient's anxiety level  - Monitor for signs and symptoms (heart palpitations, chest pain, shortness of breath, headaches, nausea, feeling jumpy, restlessness, irritable, apprehensive)  - Collaborate with interdisciplinary team and initiate plan and interventions as ordered    - [place] patient to unit/surroundings  - Explain treatment plan  - Encourage participation in care  - Encourage verbalization of concerns/fears  - Identify coping mechanisms  - Assist in developing anxiety-reducing skills  - Administer/offer alternative therapies  - Limit or eliminate stimulants  Outcome: Completed     Problem: DISCHARGE PLANNING  Goal: Discharge to home or other facility with appropriate resources  Description: INTERVENTIONS:  - Identify barriers to discharge w/patient and caregiver  - Arrange for needed discharge resources and transportation as appropriate  - Identify discharge learning needs (meds, wound care, etc )  - Refer to Case Management Department for coordinating discharge planning if the patient needs post-hospital services based on physician/advanced practitioner order or complex needs related to functional status, cognitive ability, or social support system  Outcome: Completed

## 2022-08-31 NOTE — CASE MANAGEMENT
Case Management Discharge Planning Note    Patient name Gerardo Yañez  Location [facility] [address]/[facility] 555-82 MRN [id number]  : 1975 Date 2022       Current Admission Date: 2022  Current Admission Diagnosis:Major depressive disorder, recurrent severe without psychotic features [facility])   Patient Active Problem List    Diagnosis Date Noted    Class 3 severe obesity due to excess calories with serious comorbidity and body mass index (BMI) greater than or equal to 70 in adult [facility]) 2022    Elevated lipids 2022    Class 3 severe obesity due to excess calories with body mass index (BMI) of 60 0 to 69 9 in adult ([facility] ) 2021    Primary osteoarthritis of both knees 2021    Epigastric pain 2020    GERD (gastroesophageal reflux disease) 2020    Diarrhea 2020    Major depressive disorder, recurrent severe without psychotic features ([place] [address] ) 2019    Diabetes 1 5, managed as type 2 ([place] [address] ) 2019    Type 2 diabetes mellitus with complication, with long-term current use of insulin ([place] [address] ) 2019    Essential hypertension 2019    Irritable bowel syndrome with diarrhea 2019    Mixed hyperlipidemia 2018    Primary osteoarthritis of right knee 2017    Generalized anxiety disorder 10/15/2012      LOS (days): 7  Geometric Mean LOS (GMLOS) (days):   Days to GMLOS:     OBJECTIVE:  Risk of Unplanned Readmission Score: 17 43         Current admission status: Inpatient Psych   Preferred Pharmacy:   [facility]/pharmacy #[id number]- [place] [address] [place] [address]  Phone: [phone] Fax: [phone]    Primary Care Provider: SUJATA Alva    Primary Insurance: BLUE CROSS  Secondary Insurance:     DISCHARGE DETAILS:    Discharge planning discussed with[de-identified] Patient       Patient is discharged from the unit on this day  Denied SI/HI, psychosis and/or A/V  No questions verbalized  Patient is in agreement with discharge    Patient provided with after care appointment, discharge instructions and medication regime reviewed  Affect appropriate  Accompanied to the lobby where pt was met by  daughter  Patient transported to home by daughter  All belongings returned                             DME Referral Provided  DME Needed[de-identified] Shower Chair  DME Company Name[de-identified] [employer]    Other Referral/Resources/Interventions Provided:  Referrals Provided[de-identified] Therapist    Would you like to participate in our [address] service program?  : No - Declined

## 2022-08-31 NOTE — NURSING NOTE
Patient Isolative to her room for the majority of the shift  She remains malodorous but changes her clothing  She was polite on approach, denied all mental health issues  She was out of her room for about 2 hours watching a movie with peers  She accepted her lantus 64 units and humalog 1 unit in left arm  BS was 188  She voices no complaints and is social with select peers  She remains on 7 minute checks for safety

## 2022-09-04 ENCOUNTER — HOSPITAL ENCOUNTER (EMERGENCY)
Facility: HOSPITAL | Age: 47
Discharge: HOME | End: 2022-09-04
Attending: EMERGENCY MEDICINE
Payer: COMMERCIAL

## 2022-09-04 VITALS
OXYGEN SATURATION: 93 % | RESPIRATION RATE: 18 BRPM | HEART RATE: 105 BPM | DIASTOLIC BLOOD PRESSURE: 76 MMHG | SYSTOLIC BLOOD PRESSURE: 166 MMHG | TEMPERATURE: 99.6 F

## 2022-09-04 DIAGNOSIS — U07.1 COVID-19 VIRUS INFECTION: Primary | ICD-10-CM

## 2022-09-04 LAB — SARS-COV-2 RNA RESP QL NAA+PROBE: POSITIVE

## 2022-09-04 PROCEDURE — 99283 EMERGENCY DEPT VISIT LOW MDM: CPT

## 2022-09-04 PROCEDURE — 99282 EMERGENCY DEPT VISIT SF MDM: CPT | Performed by: EMERGENCY MEDICINE

## 2022-09-04 PROCEDURE — U0005 INFEC AGEN DETEC AMPLI PROBE: HCPCS | Performed by: EMERGENCY MEDICINE

## 2022-09-04 PROCEDURE — U0003 INFECTIOUS AGENT DETECTION BY NUCLEIC ACID (DNA OR RNA); SEVERE ACUTE RESPIRATORY SYNDROME CORONAVIRUS 2 (SARS-COV-2) (CORONAVIRUS DISEASE [COVID-19]), AMPLIFIED PROBE TECHNIQUE, MAKING USE OF HIGH THROUGHPUT TECHNOLOGIES AS DESCRIBED BY CMS-2020-01-R: HCPCS | Performed by: EMERGENCY MEDICINE

## 2022-09-04 RX ORDER — ACETAMINOPHEN 325 MG/1
650 TABLET ORAL ONCE
Status: COMPLETED | OUTPATIENT
Start: 2022-09-04 | End: 2022-09-04

## 2022-09-04 RX ADMIN — ACETAMINOPHEN 650 MG: 325 TABLET ORAL at 17:15

## 2022-09-04 NOTE — Clinical Note
Jac Jaeger was seen and treated in our emergency department on 9/4/2022  No restrictions            Diagnosis: COVID-19    Dorene Villareal  may return to work on return date  She may return on this date: 09/12/2022         If you have any questions or concerns, please don't hesitate to call        Sonja Prado MD    ______________________________           _______________          _______________  Hospital Representative                              Date                                Time

## 2022-09-04 NOTE — ED ATTENDING ATTESTATION
9/4/2022  IKathe MD, saw and evaluated the patient  I have discussed the patient with the resident/non-physician practitioner and agree with the resident's/non-physician practitioner's findings, Plan of Care, and MDM as documented in the resident's/non-physician practitioner's note, except where noted  All available labs and Radiology studies were reviewed  I was present for key portions of any procedure(s) performed by the resident/non-physician practitioner and I was immediately available to provide assistance  At this point I agree with the current assessment done in the Emergency Department  I have conducted an independent evaluation of this patient a history and physical is as follows:    20-year-old female with history of obesity, diabetes, hypertension, GERD, hyperlipidemia presented for evaluation COVID like symptoms beginning 2 days ago  Reports that she was recently discharged from inpatient psychiatry at [facility] and was exposed to a patient with COVID-19 infection there  She reports body aches, cough, some slight dyspnea with exertion  She came in by a EMS on oxygen  On room air she maintains saturation 92-94% room  She was able to ambulate around the room without desaturation and reported some mild dyspnea doing so  Temperature mildly elevated  Heart rate mildly elevated  Breath sounds are clear  Will send COVID test, give Tylenol for aches, re-evaluate  She is vaccinated for COVID-19  Does not fall into any high risk category necessitating antiviral medication, monoclonal antibody  Discussed supportive care including OTC analgesics, vitamin-D supplementation which she states she already takes  ED Course  ED Course as of 09/04/22 1723   [facility] Sep 04, 2022   1714 SARS-COV-2(!): Positive   1721 Discussed positive result with the patient  She still breathing comfortably on room air maintaining normal oxygen saturation    Plan discharge home and continue outpatient supportive care  Will give note for work           Critical Care Time  Procedures

## 2022-09-04 NOTE — ED PROVIDER NOTES
History  Chief Complaint   Patient presents with    COVID-19 Exposure     Per pt  "She was in the psych unit at [facility] where she was exposed to [place] and she is having some symptoms of Covid "     Patient presents as a 50year old female with a past medical history significant for major depressive disorder, generalized anxiety disorder, DM II, HTN, OA who was recently hospitalized at [facility] for SI, who has been experiencing chills, myalgias and generalized fatigue starting yesterday, and rhinorrhea starting last Thursday the day after she was discharged from inpatient psychiatry  The patient states she had a positive covid exposure before her discharge on the psychiatric unit but that she tested negative for covid right before her DC  The patient denies any SOB, chest pain, or apnea and states she is only experiencing mild fever with chills and generalized myalgia but that she wanted to get checked out before returning home because her partner is currently away from home while receiving treatment for his own health  The patient has no other concerns at this time and does not appear in any acute distress  She denies any smoking history and states that she is covid vaccinated and has never had covid in the past that she is aware of  The patient is not immunocompromised and is able to attend the interview without supplemental O2 while keeping her saturation well above 90%  Prior to Admission Medications   Prescriptions Last Dose Informant Patient Reported? Taking?    Brexpiprazole (REXULTI) 3 MG tablet   No No   Sig: Take 1 tablet (3 mg total) by mouth in the morning   HumaLOG KwikPen 100 units/mL injection pen  Self Yes No   Sig: INJECT 16 UNITS 3 TIMES A DAY BEFORE MEALS   LORazepam (ATIVAN) 0 5 mg tablet   Yes No   cholecalciferol (VITAMIN D3) 1,000 units tablet   No No   Sig: Take 2 tablets (2,000 Units total) by mouth daily   clotrimazole-betamethasone (LOTRISONE) 1-0 05 % cream   No No Sig: Apply to affected area 2 times daily prn   escitalopram (LEXAPRO) 20 mg tablet  Self No No   Sig: Take 1 tablet (20 mg total) by mouth daily   hydrochlorothiazide (HYDRODIURIL) 12 5 mg tablet   No No   Sig: Take 1 tablet (12 5 mg total) by mouth every other day   insulin glargine (LANTUS) 100 units/mL subcutaneous injection   No No   Sig: Inject 64 Units under the skin daily at bedtime   lamoTRIgine (LaMICtal) 150 MG tablet   No No   Sig: Take 1 tablet (150 mg total) by mouth 2 (two) times a day   liraglutide (Victoza) injection   No No   Sig: Inject 1 8 MG daily  lisinopril (ZESTRIL) 10 mg tablet   No No   Sig: Take 1 tablet (10 mg total) by mouth daily   metFORMIN (GLUCOPHAGE) 500 mg tablet  Self No No   Sig: Take 1 tablet (500 mg total) by mouth 2 (two) times a day with meals   naproxen (NAPROSYN) 375 mg tablet   No No   Sig: Take 1 tablet (375 mg total) by mouth 2 (two) times a day with meals   pantoprazole (PROTONIX) 40 mg tablet   No No   Sig: Take 1 tablet (40 mg total) by mouth daily      Facility-Administered Medications: None       Past Medical History:   Diagnosis Date    Anemia     Anxiety     Candidal intertrigo     Depression     Diabetes mellitus (HCC)     GERD (gastroesophageal reflux disease)     Hyperlipidemia     Hypertension     Irritable bowel syndrome     Morbid obesity with BMI of 60 0-69 9, adult ([facility] [address] )     Osteoarthritis     Seasonal allergies     Sleep difficulties     Suicide attempt [facility])        Past Surgical History:   Procedure Laterality Date     SECTION  1992    CHOLECYSTECTOMY  2008    WISDOM TOOTH EXTRACTION  2009       Family History   Problem Relation Age of Onset    Diabetes Mother     Hypertension Father      I have reviewed and agree with the history as documented      E-Cigarette/Vaping    E-Cigarette Use Never User      E-Cigarette/Vaping Substances    Nicotine No     THC No     CBD No     Flavoring No     Other No     Unknown No Social History     Tobacco Use    Smoking status: Never Smoker    Smokeless tobacco: Never Used   Vaping Use    Vaping Use: Never used   Substance Use Topics    Alcohol use: Yes     Comment: occassionaly     Drug use: No        Review of Systems   Constitutional: Positive for activity change, appetite change, chills, fatigue and fever  Negative for diaphoresis  HENT: Positive for rhinorrhea  Negative for congestion, ear pain and sore throat  Eyes: Negative for photophobia, pain and visual disturbance  Respiratory: Positive for cough  Negative for apnea, chest tightness and shortness of breath  Cardiovascular: Negative for chest pain, palpitations and leg swelling  Gastrointestinal: Negative for abdominal distention, abdominal pain, blood in stool, constipation, diarrhea, nausea and vomiting  Genitourinary: Negative for difficulty urinating, dysuria and hematuria  Musculoskeletal: Positive for myalgias  Negative for arthralgias, back pain and neck stiffness  Skin: Negative for color change and rash  Neurological: Negative for dizziness, seizures, syncope, weakness, light-headedness and headaches  Psychiatric/Behavioral: Positive for dysphoric mood  Negative for confusion, hallucinations and suicidal ideas  The patient is nervous/anxious  All other systems reviewed and are negative  Physical Exam  ED Triage Vitals [09/04/22 1605]   Temperature Pulse Respirations Blood Pressure SpO2   99 6 °F (37 6 °C) 105 18 166/76 97 %      Temp Source Heart Rate Source Patient Position - Orthostatic VS BP Location FiO2 (%)   Oral Monitor Lying Right arm --      Pain Score       --             Orthostatic Vital Signs  Vitals:    09/04/22 1605 09/04/22 1630   BP: 166/76    Pulse: 105 105   Patient Position - Orthostatic VS: Lying        Physical Exam  Vitals and nursing note reviewed  Constitutional:       General: She is not in acute distress  Appearance: Normal appearance   She is well-developed  She is obese  She is not ill-appearing, toxic-appearing or diaphoretic  HENT:      Head: Normocephalic and atraumatic  Nose: No rhinorrhea  Mouth/Throat:      Mouth: Mucous membranes are moist       Pharynx: Oropharynx is clear  Eyes:      Conjunctiva/sclera: Conjunctivae normal    Cardiovascular:      Rate and Rhythm: Regular rhythm  Tachycardia present  Pulses: Normal pulses  Heart sounds: Normal heart sounds  No murmur heard  Pulmonary:      Effort: Pulmonary effort is normal  No respiratory distress  Breath sounds: Normal breath sounds  No wheezing, rhonchi or rales  Chest:      Chest wall: No tenderness  Abdominal:      General: Bowel sounds are normal  There is no distension  Palpations: Abdomen is soft  Tenderness: There is no abdominal tenderness  There is no guarding  Comments: Obese   Musculoskeletal:         General: No swelling or tenderness  Cervical back: Neck supple  No rigidity  Right lower leg: No edema  Left lower leg: No edema  Lymphadenopathy:      Cervical: No cervical adenopathy  Skin:     General: Skin is warm and dry  Capillary Refill: Capillary refill takes less than 2 seconds  Coloration: Skin is not jaundiced or pale  Findings: No erythema or rash  Neurological:      General: No focal deficit present  Mental Status: She is alert and oriented to person, place, and time  Mental status is at baseline  Psychiatric:         Mood and Affect: Mood normal          Behavior: Behavior normal          Thought Content: Thought content normal          Judgment: Judgment normal          ED Medications  Medications   acetaminophen (TYLENOL) tablet 650 mg (has no administration in time range)       Diagnostic Studies  Results Reviewed     Procedure Component Value Units Date/Time    COVID only [[id number]] Collected: 09/04/22 1626    Lab Status:  In process Specimen: Nares from Nose Updated: 09/04/22 1630                 No orders to display         Procedures  Procedures      ED Course  ED Course as of 09/04/22 1721   Sun Sep 04, 2022   1646 Patient currently saturating at >92% O2 on room air, O2 discontinued, swabbed for covid/RSV/Flu   1721 Patient covid +, still saturating appropriately, stable for DC                                       MDM  Number of Diagnoses or Management Options  COVID-19 virus infection  Diagnosis management comments: Covid-19 infection vs  PE vs  Pneumothorax vs  Panic attack       Amount and/or Complexity of Data Reviewed  Clinical lab tests: ordered and reviewed  Tests in the medicine section of CPT®: ordered and reviewed  Review and summarize past medical records: yes  Discuss the patient with other providers: yes        Disposition  Final diagnoses:   None     ED Disposition     None      Follow-up Information    None         Patient's Medications   Discharge Prescriptions    No medications on file     No discharge procedures on file  PDMP Review       Value Time User    PDMP Reviewed  Yes 8/24/2022  2:53 AM Kathe Elmore MD           ED Provider  Attending physically available and evaluated Rossana Blair  HEIDI managed the patient along with the ED Attending      Electronically Signed by         Gretchen Stack DO  09/04/22 1721

## 2022-09-10 ENCOUNTER — APPOINTMENT (EMERGENCY)
Dept: CT IMAGING | Facility: HOSPITAL | Age: 47
End: 2022-09-10
Payer: COMMERCIAL

## 2022-09-10 ENCOUNTER — HOSPITAL ENCOUNTER (EMERGENCY)
Facility: HOSPITAL | Age: 47
Discharge: HOME | End: 2022-09-10
Attending: EMERGENCY MEDICINE
Payer: COMMERCIAL

## 2022-09-10 VITALS
WEIGHT: 293 LBS | BODY MASS INDEX: 57.52 KG/M2 | OXYGEN SATURATION: 100 % | HEIGHT: 60 IN | DIASTOLIC BLOOD PRESSURE: 78 MMHG | HEART RATE: 100 BPM | RESPIRATION RATE: 20 BRPM | SYSTOLIC BLOOD PRESSURE: 134 MMHG | TEMPERATURE: 99.9 F

## 2022-09-10 DIAGNOSIS — R10.9 ABDOMINAL PAIN: ICD-10-CM

## 2022-09-10 DIAGNOSIS — U07.1 COVID: Primary | ICD-10-CM

## 2022-09-10 DIAGNOSIS — K52.9 GASTROENTERITIS: ICD-10-CM

## 2022-09-10 LAB
ALBUMIN SERPL BCP-MCNC: 3.9 G/DL (ref 3.5–5)
ALP SERPL-CCNC: 64 U/L (ref 34–104)
ALT SERPL W P-5'-P-CCNC: 53 U/L (ref 7–52)
ANION GAP SERPL CALCULATED.3IONS-SCNC: 10 MMOL/L (ref 4–13)
AST SERPL W P-5'-P-CCNC: 34 U/L (ref 13–39)
BACTERIA UR QL AUTO: ABNORMAL /HPF
BASOPHILS # BLD AUTO: 0.04 THOUSANDS/ΜL (ref 0–0.1)
BASOPHILS NFR BLD AUTO: 0 % (ref 0–1)
BILIRUB SERPL-MCNC: 0.73 MG/DL (ref 0.2–1)
BILIRUB UR QL STRIP: NEGATIVE
BUN SERPL-MCNC: 13 MG/DL (ref 5–25)
CALCIUM SERPL-MCNC: 9.4 MG/DL (ref 8.4–10.2)
CHLORIDE SERPL-SCNC: 99 MMOL/L (ref 96–108)
CLARITY UR: CLEAR
CO2 SERPL-SCNC: 26 MMOL/L (ref 21–32)
COLOR UR: YELLOW
CREAT SERPL-MCNC: 0.79 MG/DL (ref 0.6–1.3)
EOSINOPHIL # BLD AUTO: 0.04 THOUSAND/ΜL (ref 0–0.61)
EOSINOPHIL NFR BLD AUTO: 0 % (ref 0–6)
ERYTHROCYTE [DISTWIDTH] IN BLOOD BY AUTOMATED COUNT: 13.4 % (ref 11.6–15.1)
GFR SERPL CREATININE-BSD FRML MDRD: 88 ML/MIN/1.73SQ M
GLUCOSE SERPL-MCNC: 308 MG/DL (ref 65–140)
GLUCOSE UR STRIP-MCNC: ABNORMAL MG/DL
HCT VFR BLD AUTO: 45.2 % (ref 34.8–46.1)
HGB BLD-MCNC: 14.4 G/DL (ref 11.5–15.4)
HGB UR QL STRIP.AUTO: NEGATIVE
HYALINE CASTS #/AREA URNS LPF: ABNORMAL /LPF
IMM GRANULOCYTES # BLD AUTO: 0.09 THOUSAND/UL (ref 0–0.2)
IMM GRANULOCYTES NFR BLD AUTO: 1 % (ref 0–2)
KETONES UR STRIP-MCNC: ABNORMAL MG/DL
LEUKOCYTE ESTERASE UR QL STRIP: ABNORMAL
LYMPHOCYTES # BLD AUTO: 1.23 THOUSANDS/ΜL (ref 0.6–4.47)
LYMPHOCYTES NFR BLD AUTO: 8 % (ref 14–44)
MCH RBC QN AUTO: 28.9 PG (ref 26.8–34.3)
MCHC RBC AUTO-ENTMCNC: 31.9 G/DL (ref 31.4–37.4)
MCV RBC AUTO: 91 FL (ref 82–98)
MONOCYTES # BLD AUTO: 0.72 THOUSAND/ΜL (ref 0.17–1.22)
MONOCYTES NFR BLD AUTO: 5 % (ref 4–12)
MUCOUS THREADS UR QL AUTO: ABNORMAL
NEUTROPHILS # BLD AUTO: 13.59 THOUSANDS/ΜL (ref 1.85–7.62)
NEUTS SEG NFR BLD AUTO: 86 % (ref 43–75)
NITRITE UR QL STRIP: NEGATIVE
NON-SQ EPI CELLS URNS QL MICRO: ABNORMAL /HPF
NRBC BLD AUTO-RTO: 0 /100 WBCS
PH UR STRIP.AUTO: 6 [PH]
PLATELET # BLD AUTO: 260 THOUSANDS/UL (ref 149–390)
PMV BLD AUTO: 10.5 FL (ref 8.9–12.7)
POTASSIUM SERPL-SCNC: 4.3 MMOL/L (ref 3.5–5.3)
PROT SERPL-MCNC: 7.3 G/DL (ref 6.4–8.4)
PROT UR STRIP-MCNC: ABNORMAL MG/DL
RBC # BLD AUTO: 4.98 MILLION/UL (ref 3.81–5.12)
RBC #/AREA URNS AUTO: ABNORMAL /HPF
SODIUM SERPL-SCNC: 135 MMOL/L (ref 135–147)
SP GR UR STRIP.AUTO: >=1.05 (ref 1–1.03)
UROBILINOGEN UR STRIP-ACNC: <2 MG/DL
WBC # BLD AUTO: 15.71 THOUSAND/UL (ref 4.31–10.16)
WBC #/AREA URNS AUTO: ABNORMAL /HPF

## 2022-09-10 PROCEDURE — 96374 THER/PROPH/DIAG INJ IV PUSH: CPT

## 2022-09-10 PROCEDURE — 99284 EMERGENCY DEPT VISIT MOD MDM: CPT

## 2022-09-10 PROCEDURE — 80053 COMPREHEN METABOLIC PANEL: CPT | Performed by: PHYSICIAN ASSISTANT

## 2022-09-10 PROCEDURE — 74177 CT ABD & PELVIS W/CONTRAST: CPT

## 2022-09-10 PROCEDURE — 96361 HYDRATE IV INFUSION ADD-ON: CPT

## 2022-09-10 PROCEDURE — 81001 URINALYSIS AUTO W/SCOPE: CPT | Performed by: PHYSICIAN ASSISTANT

## 2022-09-10 PROCEDURE — 36415 COLL VENOUS BLD VENIPUNCTURE: CPT | Performed by: PHYSICIAN ASSISTANT

## 2022-09-10 PROCEDURE — 85025 COMPLETE CBC W/AUTO DIFF WBC: CPT | Performed by: PHYSICIAN ASSISTANT

## 2022-09-10 PROCEDURE — G1004 CDSM NDSC: HCPCS

## 2022-09-10 PROCEDURE — NC001 PR NO CHARGE: Performed by: PHYSICIAN ASSISTANT

## 2022-09-10 PROCEDURE — 99285 EMERGENCY DEPT VISIT HI MDM: CPT | Performed by: PHYSICIAN ASSISTANT

## 2022-09-10 RX ORDER — MORPHINE SULFATE 4 MG/ML
4 INJECTION, SOLUTION INTRAMUSCULAR; INTRAVENOUS ONCE
Status: COMPLETED | OUTPATIENT
Start: 2022-09-10 | End: 2022-09-10

## 2022-09-10 RX ADMIN — IOHEXOL 100 ML: 350 INJECTION, SOLUTION INTRAVENOUS at 15:39

## 2022-09-10 RX ADMIN — SODIUM CHLORIDE 1000 ML: 0.9 INJECTION, SOLUTION INTRAVENOUS at 17:44

## 2022-09-10 RX ADMIN — MORPHINE SULFATE 4 MG: 4 INJECTION INTRAVENOUS at 14:34

## 2022-09-10 NOTE — ED PROVIDER NOTES
History  Chief Complaint   Patient presents with    Abdominal Pain     Since this morning  C/o diarrhea  Hx of ibs     57-year-old female presents to emergency department with complaints of abdominal pain  She has had some abdominal cramping with diarrhea since this morning  No blood in the stool  Also reports frequency of urination and leaking of urine over past several weeks  No known fevers at home  States she was tested positive for COVID on 9/4/2022  History provided by:  Patient   used: No    Abdominal Pain  Pain location:  Generalized  Pain quality: cramping    Pain radiates to:  Does not radiate  Pain severity:  Mild  Onset quality:  Gradual  Duration:  2 days  Timing:  Constant  Progression:  Waxing and waning  Relieved by:  Nothing  Ineffective treatments:  None tried  Associated symptoms: diarrhea and fatigue    Associated symptoms: no anorexia, no belching, no chest pain, no chills, no constipation, no cough, no dysuria, no fever, no flatus, no hematemesis, no hematochezia, no hematuria, no melena, no nausea, no shortness of breath, no sore throat and no vomiting        Prior to Admission Medications   Prescriptions Last Dose Informant Patient Reported? Taking?    Brexpiprazole (REXULTI) 3 MG tablet   No No   Sig: Take 1 tablet (3 mg total) by mouth in the morning   HumaLOG KwikPen 100 units/mL injection pen  Self Yes No   Sig: INJECT 16 UNITS 3 TIMES A DAY BEFORE MEALS   LORazepam (ATIVAN) 0 5 mg tablet   Yes No   cholecalciferol (VITAMIN D3) 1,000 units tablet   No No   Sig: Take 2 tablets (2,000 Units total) by mouth daily   clotrimazole-betamethasone (LOTRISONE) 1-0 05 % cream   No No   Sig: Apply to affected area 2 times daily prn   escitalopram (LEXAPRO) 20 mg tablet  Self No No   Sig: Take 1 tablet (20 mg total) by mouth daily   hydrochlorothiazide (HYDRODIURIL) 12 5 mg tablet   No No   Sig: Take 1 tablet (12 5 mg total) by mouth every other day   insulin glargine (LANTUS) 100 units/mL subcutaneous injection   No No   Sig: Inject 64 Units under the skin daily at bedtime   lamoTRIgine (LaMICtal) 150 MG tablet   No No   Sig: Take 1 tablet (150 mg total) by mouth 2 (two) times a day   liraglutide (Victoza) injection   No No   Sig: Inject 1 8 MG daily  lisinopril (ZESTRIL) 10 mg tablet   No No   Sig: Take 1 tablet (10 mg total) by mouth daily   metFORMIN (GLUCOPHAGE) 500 mg tablet  Self No No   Sig: Take 1 tablet (500 mg total) by mouth 2 (two) times a day with meals   naproxen (NAPROSYN) 375 mg tablet   No No   Sig: Take 1 tablet (375 mg total) by mouth 2 (two) times a day with meals   pantoprazole (PROTONIX) 40 mg tablet   No No   Sig: Take 1 tablet (40 mg total) by mouth daily      Facility-Administered Medications: None       Past Medical History:   Diagnosis Date    Anemia     Anxiety     Candidal intertrigo     Depression     Diabetes mellitus (HCC)     GERD (gastroesophageal reflux disease)     Hyperlipidemia     Hypertension     Irritable bowel syndrome     Morbid obesity with BMI of 60 0-69 9, adult ([facility] [address] )     Osteoarthritis     Seasonal allergies     Sleep difficulties     Suicide attempt [facility])        Past Surgical History:   Procedure Laterality Date     SECTION  1992    CHOLECYSTECTOMY  2008    WISDOM TOOTH EXTRACTION  2009       Family History   Problem Relation Age of Onset    Diabetes Mother     Hypertension Father      I have reviewed and agree with the history as documented      E-Cigarette/Vaping    E-Cigarette Use Never User      E-Cigarette/Vaping Substances    Nicotine No     THC No     CBD No     Flavoring No     Other No     Unknown No      Social History     Tobacco Use    Smoking status: Never Smoker    Smokeless tobacco: Never Used   Vaping Use    Vaping Use: Never used   Substance Use Topics    Alcohol use: Yes     Comment: occassionaly     Drug use: No       Review of Systems   Constitutional: Positive for fatigue  Negative for activity change, appetite change, chills and fever  HENT: Negative for congestion, dental problem, drooling, ear discharge, ear pain, mouth sores, nosebleeds, rhinorrhea, sore throat and trouble swallowing  Eyes: Negative for pain, discharge and itching  Respiratory: Negative for cough, chest tightness, shortness of breath and wheezing  Cardiovascular: Negative for chest pain and palpitations  Gastrointestinal: Positive for abdominal pain and diarrhea  Negative for anorexia, blood in stool, constipation, flatus, hematemesis, hematochezia, melena, nausea and vomiting  Endocrine: Negative for cold intolerance and heat intolerance  Genitourinary: Positive for frequency  Negative for difficulty urinating, dysuria, flank pain, hematuria and urgency  Skin: Negative for rash and wound  Allergic/Immunologic: Negative for food allergies and immunocompromised state  Neurological: Negative for dizziness, seizures, syncope, weakness, numbness and headaches  Psychiatric/Behavioral: Negative for agitation, behavioral problems and confusion  Physical Exam  Physical Exam  Vitals and nursing note reviewed  Constitutional:       General: She is not in acute distress  Appearance: She is not diaphoretic  HENT:      Head: Normocephalic and atraumatic  Right Ear: External ear normal       Left Ear: External ear normal       Mouth/Throat:      Pharynx: No oropharyngeal exudate  Eyes:      Conjunctiva/sclera: Conjunctivae normal    Neck:      Vascular: No JVD  Trachea: No tracheal deviation  Cardiovascular:      Rate and Rhythm: Normal rate and regular rhythm  Heart sounds: Normal heart sounds  No murmur heard  No friction rub  No gallop  Pulmonary:      Effort: No respiratory distress  Breath sounds: No wheezing or rales  Chest:      Chest wall: No tenderness  Abdominal:      General: Bowel sounds are normal  There is no distension  Palpations: Abdomen is soft  Tenderness: There is generalized abdominal tenderness  There is no guarding  Musculoskeletal:         General: No tenderness or deformity  Normal range of motion  Lymphadenopathy:      Cervical: No cervical adenopathy  Skin:     General: Skin is warm and dry  Findings: No erythema or rash  Neurological:      General: No focal deficit present  Mental Status: She is alert and oriented to person, place, and time     Psychiatric:         Mood and Affect: Mood normal          Behavior: Behavior normal          Vital Signs  ED Triage Vitals   Temperature Pulse Respirations Blood Pressure SpO2   09/10/22 1335 09/10/22 1334 09/10/22 1334 09/10/22 1334 09/10/22 1334   99 9 °F (37 7 °C) (!) 114 22 127/60 92 %      Temp Source Heart Rate Source Patient Position - Orthostatic VS BP Location FiO2 (%)   09/10/22 1335 09/10/22 1334 09/10/22 1500 09/10/22 1500 --   Oral Monitor Lying Right arm       Pain Score       09/10/22 1335       7           Vitals:    09/10/22 1430 09/10/22 1500 09/10/22 1645 09/10/22 1833   BP: 139/66 140/72 138/63 134/78   Pulse: (!) 111 (!) 116 (!) 115 100   Patient Position - Orthostatic VS:  Lying Lying Lying         Visual Acuity      ED Medications  Medications   morphine injection 4 mg (4 mg Intravenous Given 9/10/22 1434)   iohexol (OMNIPAQUE) 350 MG/ML injection (SINGLE-DOSE) 100 mL (100 mL Intravenous Given 9/10/22 1539)   sodium chloride 0 9 % bolus 1,000 mL (0 mL Intravenous Stopped 9/10/22 1833)       Diagnostic Studies  Results Reviewed     Procedure Component Value Units Date/Time    Urine Microscopic [[id number]]  (Abnormal) Collected: 09/10/22 1708    Lab Status: Final result Specimen: Urine, Clean Catch Updated: 09/10/22 1854     RBC, UA None Seen /hpf      WBC, UA 1-2 /hpf      Epithelial Cells Occasional /hpf      Bacteria, UA Occasional /hpf      MUCUS THREADS Moderate     Hyaline Casts, UA 0-3 /lpf     UA w Reflex to Microscopic w Reflex to Culture [[id number]]  (Abnormal) Collected: 09/10/22 1708    Lab Status: Final result Specimen: Urine, Clean Catch Updated: 09/10/22 1834     Color, UA Yellow     Clarity, UA Clear     Specific Gravity, UA >=1 050     pH, UA 6 0     Leukocytes, UA Trace     Nitrite, UA Negative     Protein, UA 50 (1+) mg/dl      Glucose, UA Trace mg/dl      Ketones, UA Trace mg/dl      Urobilinogen, UA <2 0 mg/dl      Bilirubin, UA Negative     Occult Blood, UA Negative    Comprehensive metabolic panel [[id number]]  (Abnormal) Collected: 09/10/22 1428    Lab Status: Final result Specimen: Blood from Arm, Left Updated: 09/10/22 1456     Sodium 135 mmol/L      Potassium 4 3 mmol/L      Chloride 99 mmol/L      CO2 26 mmol/L      ANION GAP 10 mmol/L      BUN 13 mg/dL      Creatinine 0 79 mg/dL      Glucose 308 mg/dL      Calcium 9 4 mg/dL      AST 34 U/L      ALT 53 U/L      Alkaline Phosphatase 64 U/L      Total Protein 7 3 g/dL      Albumin 3 9 g/dL      Total Bilirubin 0 73 mg/dL      eGFR 88 ml/min/1 73sq m     Narrative:      [place] guidelines for Chronic Kidney Disease (CKD):     Stage 1 with normal or high GFR (GFR > 90 mL/min/1 73 square meters)    Stage 2 Mild CKD (GFR = 60-89 mL/min/1 73 square meters)    Stage 3A Moderate CKD (GFR = 45-59 mL/min/1 73 square meters)    Stage 3B Moderate CKD (GFR = 30-44 mL/min/1 73 square meters)    Stage 4 Severe CKD (GFR = 15-29 mL/min/1 73 square meters)    Stage 5 End Stage CKD (GFR <15 mL/min/1 73 square meters)  Note: GFR calculation is accurate only with a steady state creatinine    CBC and differential [[id number]]  (Abnormal) Collected: 09/10/22 1428    Lab Status: Final result Specimen: Blood from Arm, Left Updated: 09/10/22 1441     WBC 15 71 Thousand/uL      RBC 4 98 Million/uL      Hemoglobin 14 4 g/dL      Hematocrit 45 2 %      MCV 91 fL      MCH 28 9 pg      MCHC 31 9 g/dL      RDW 13 4 %      MPV 10 5 fL      Platelets 663 Thousands/uL nRBC 0 /100 WBCs      Neutrophils Relative 86 %      Immat GRANS % 1 %      Lymphocytes Relative 8 %      Monocytes Relative 5 %      Eosinophils Relative 0 %      Basophils Relative 0 %      Neutrophils Absolute 13 59 Thousands/µL      Immature Grans Absolute 0 09 Thousand/uL      Lymphocytes Absolute 1 23 Thousands/µL      Monocytes Absolute 0 72 Thousand/µL      Eosinophils Absolute 0 04 Thousand/µL      Basophils Absolute 0 04 Thousands/µL                  CT abdomen pelvis with contrast   Final Result by Joseph Lugo MD (09/10 1650)   1  No acute findings  2   Nonobstructive nephrolithiasis  3   Marked hepatomegaly with diffuse fatty infiltration  Workstation performed: [id number]                    Procedures  Procedures         ED Course  ED Course as of 09/11/22 0859   Sat Sep 10, 2022   1807 Call placed to the lab  UA still running  MDM  Number of Diagnoses or Management Options  Abdominal pain  COVID  Gastroenteritis  Diagnosis management comments: Differential diagnosis includes but not limited to:   Urinary tract infection, colitis, diverticulitis, dehydration, COVID       Amount and/or Complexity of Data Reviewed  Clinical lab tests: reviewed and ordered  Tests in the radiology section of CPT®: ordered and reviewed        Disposition  Final diagnoses:   COVID   Abdominal pain   Gastroenteritis     Time reflects when diagnosis was documented in both MDM as applicable and the Disposition within this note     Time User Action Codes Description Comment    9/10/2022  7:16 PM Regino Power Add [U07 1] COVID     9/10/2022  7:16 PM Regino Power Add [R10 9] Abdominal pain     9/10/2022  7:16 PM Regino Power Add [K52 9] Gastroenteritis       ED Disposition     ED Disposition   Discharge    Condition   Stable    Date/Time   Sat Sep 10, 2022  7:16 PM    Tompa U  2  discharge to home/self care                 Follow-up Information     Follow up With Specialties Details [address]  Nurse Practitioner Schedule an appointment as soon as possible for a visit in 2 days As needed [id number] [facility] Male [address]  [phone]            Discharge Medication List as of 9/10/2022  7:19 PM      CONTINUE these medications which have NOT CHANGED    Details   Brexpiprazole (REXULTI) 3 MG tablet Take 1 tablet (3 mg total) by mouth in the morning, Starting Wed 8/31/2022, Until Fri 9/30/2022, Normal      cholecalciferol (VITAMIN D3) 1,000 units tablet Take 2 tablets (2,000 Units total) by mouth daily, Starting Wed 8/31/2022, Until Fri 9/30/2022, Normal      clotrimazole-betamethasone (LOTRISONE) 1-0 05 % cream Apply to affected area 2 times daily prn, Normal      escitalopram (LEXAPRO) 20 mg tablet Take 1 tablet (20 mg total) by mouth daily, Starting Wed 8/4/2021, Until Thu 8/25/2022, Normal      HumaLOG KwikPen 100 units/mL injection pen INJECT 16 UNITS 3 TIMES A DAY BEFORE MEALS, Historical Med      hydrochlorothiazide (HYDRODIURIL) 12 5 mg tablet Take 1 tablet (12 5 mg total) by mouth every other day, Starting Wed 10/20/2021, Normal      insulin glargine (LANTUS) 100 units/mL subcutaneous injection Inject 64 Units under the skin daily at bedtime, Starting Mon 8/2/2021, No Print      lamoTRIgine (LaMICtal) 150 MG tablet Take 1 tablet (150 mg total) by mouth 2 (two) times a day, Starting Tue 8/3/2021, Until Thu 8/25/2022, Normal      liraglutide (Victoza) injection Inject 1 8 MG daily  , Normal      lisinopril (ZESTRIL) 10 mg tablet Take 1 tablet (10 mg total) by mouth daily, Starting Wed 10/20/2021, Normal      LORazepam (ATIVAN) 0 5 mg tablet Starting Wed 10/6/2021, Historical Med      metFORMIN (GLUCOPHAGE) 500 mg tablet Take 1 tablet (500 mg total) by mouth 2 (two) times a day with meals, Starting Mon 8/31/2020, Until Thu 8/25/2022, Normal      naproxen (NAPROSYN) 375 mg tablet Take 1 tablet (375 mg total) by mouth 2 (two) times a day with meals, Starting Tue 8/30/2022, Until Thu 9/29/2022, Normal      pantoprazole (PROTONIX) 40 mg tablet Take 1 tablet (40 mg total) by mouth daily, Starting Wed 10/20/2021, Normal             No discharge procedures on file      PDMP Review       Value Time User    PDMP Reviewed  Yes 8/24/2022  2:53 AM Carlos Sexton MD          ED Provider  Electronically Signed by           Henrey Brunner, PA-C  09/11/22 3852

## 2022-09-10 NOTE — ED CARE HANDOFF
Emergency Department Sign Out Note        Sign out and transfer of care from [facility]  See Separate Emergency Department note  The patient, Daniel Sousa, was evaluated by the previous provider for abdominal pain, covid, urinary frequency and incontinence  Workup Completed:  CBC and diff, CMP,CT abd and pelvis  ED Course / Workup Pending (followup): Urinalysis                                       Procedures  MDM        Disposition  Final diagnoses:   None     ED Disposition     None      Follow-up Information    None       Patient's Medications   Discharge Prescriptions    No medications on file     No discharge procedures on file         ED Provider  Electronically Signed by     Uri Fields PA-C  09/10/22 2479

## 2022-09-11 NOTE — ED PROVIDER NOTES
History  Chief Complaint   Patient presents with    Abdominal Pain     Since this morning  C/o diarrhea  Hx of ibs     HPI    Prior to Admission Medications   Prescriptions Last Dose Informant Patient Reported? Taking? Brexpiprazole (REXULTI) 3 MG tablet   No No   Sig: Take 1 tablet (3 mg total) by mouth in the morning   HumaLOG KwikPen 100 units/mL injection pen  Self Yes No   Sig: INJECT 16 UNITS 3 TIMES A DAY BEFORE MEALS   LORazepam (ATIVAN) 0 5 mg tablet   Yes No   cholecalciferol (VITAMIN D3) 1,000 units tablet   No No   Sig: Take 2 tablets (2,000 Units total) by mouth daily   clotrimazole-betamethasone (LOTRISONE) 1-0 05 % cream   No No   Sig: Apply to affected area 2 times daily prn   escitalopram (LEXAPRO) 20 mg tablet  Self No No   Sig: Take 1 tablet (20 mg total) by mouth daily   hydrochlorothiazide (HYDRODIURIL) 12 5 mg tablet   No No   Sig: Take 1 tablet (12 5 mg total) by mouth every other day   insulin glargine (LANTUS) 100 units/mL subcutaneous injection   No No   Sig: Inject 64 Units under the skin daily at bedtime   lamoTRIgine (LaMICtal) 150 MG tablet   No No   Sig: Take 1 tablet (150 mg total) by mouth 2 (two) times a day   liraglutide (Victoza) injection   No No   Sig: Inject 1 8 MG daily     lisinopril (ZESTRIL) 10 mg tablet   No No   Sig: Take 1 tablet (10 mg total) by mouth daily   metFORMIN (GLUCOPHAGE) 500 mg tablet  Self No No   Sig: Take 1 tablet (500 mg total) by mouth 2 (two) times a day with meals   naproxen (NAPROSYN) 375 mg tablet   No No   Sig: Take 1 tablet (375 mg total) by mouth 2 (two) times a day with meals   pantoprazole (PROTONIX) 40 mg tablet   No No   Sig: Take 1 tablet (40 mg total) by mouth daily      Facility-Administered Medications: None       Past Medical History:   Diagnosis Date    Anemia     Anxiety     Candidal intertrigo     Depression     Diabetes mellitus (HCC)     GERD (gastroesophageal reflux disease)     Hyperlipidemia     Hypertension     Irritable bowel syndrome     Morbid obesity with BMI of 60 0-69 9, adult ([address] )     Osteoarthritis     Seasonal allergies     Sleep difficulties     Su[facility] attempt [facility])        Past Surgical History:   Procedure Laterality Date     SECTION  1992    CHOLECYSTECTOMY  2008    WISDOM TOOTH EXTRACTION  2009       Family History   Problem Relation Age of Onset    Diabetes Mother     Hypertension Father      I have reviewed and agree with the history as documented      E-Cigarette/Vaping    E-Cigarette Use Never User      E-Cigarette/Vaping Substances    Nicotine No     THC No     CBD No     Flavoring No     Other No     Unknown No      Social History     Tobacco Use    Smoking status: Never Smoker    Smokeless tobacco: Never Used   Vaping Use    Vaping Use: Never used   Substance Use Topics    Alcohol use: Yes     Comment: occassionaly     Drug use: No       Review of Systems    Physical Exam  Physical Exam    Vital Signs  ED Triage Vitals   Temperature Pulse Respirations Blood Pressure SpO2   09/10/22 1335 09/10/22 1334 09/10/22 1334 09/10/22 1334 09/10/22 1334   99 9 °F (37 7 °C) (!) 114 22 127/60 92 %      Temp Source Heart Rate Source Patient Position - Orthostatic VS BP Location FiO2 (%)   09/10/22 1335 09/10/22 1334 09/10/22 1500 09/10/22 1500 --   Oral Monitor Lying Right arm       Pain Score       09/10/22 1335       7           Vitals:    09/10/22 1430 09/10/22 1500 09/10/22 1645 09/10/22 1833   BP: 139/66 140/72 138/63 134/78   Pulse: (!) 111 (!) 116 (!) 115 100   Patient Position - Orthostatic VS:  Lying Lying Lying         Visual Acuity      ED Medications  Medications   morphine injection 4 mg (4 mg Intravenous Given 9/10/22 1434)   iohexol (OMNIPAQUE) 350 MG/ML injection (SINGLE-DOSE) 100 mL (100 mL Intravenous Given 9/10/22 1539)   sodium chloride 0 9 % bolus 1,000 mL (0 mL Intravenous Stopped 9/10/22 1833)       Diagnostic Studies  Results Reviewed     Procedure Component Value Units Date/Time    Urine Microscopic [[id number]]  (Abnormal) Collected: 09/10/22 1708    Lab Status: Final result Specimen: Urine, Clean Catch Updated: 09/10/22 1854     RBC, UA None Seen /hpf      WBC, UA 1-2 /hpf      Epithelial Cells Occasional /hpf      Bacteria, UA Occasional /hpf      MUCUS THREADS Moderate     Hyaline Casts, UA 0-3 /lpf     UA w Reflex to Microscopic w Reflex to Culture [[id number]]  (Abnormal) Collected: 09/10/22 1708    Lab Status: Final result Specimen: Urine, Clean Catch Updated: 09/10/22 1834     Color, UA Yellow     Clarity, UA Clear     Specific Gravity, UA >=1 050     pH, UA 6 0     Leukocytes, UA Trace     Nitrite, UA Negative     Protein, UA 50 (1+) mg/dl      Glucose, UA Trace mg/dl      Ketones, UA Trace mg/dl      Urobilinogen, UA <2 0 mg/dl      Bilirubin, UA Negative     Occult Blood, UA Negative    Comprehensive metabolic panel [[id number]]  (Abnormal) Collected: 09/10/22 1428    Lab Status: Final result Specimen: Blood from Arm, Left Updated: 09/10/22 1456     Sodium 135 mmol/L      Potassium 4 3 mmol/L      Chloride 99 mmol/L      CO2 26 mmol/L      ANION GAP 10 mmol/L      BUN 13 mg/dL      Creatinine 0 79 mg/dL      Glucose 308 mg/dL      Calcium 9 4 mg/dL      AST 34 U/L      ALT 53 U/L      Alkaline Phosphatase 64 U/L      Total Protein 7 3 g/dL      Albumin 3 9 g/dL      Total Bilirubin 0 73 mg/dL      eGFR 88 ml/min/1 73sq m     Narrative:      [place] guidelines for Chronic Kidney Disease (CKD):     Stage 1 with normal or high GFR (GFR > 90 mL/min/1 73 square meters)    Stage 2 Mild CKD (GFR = 60-89 mL/min/1 73 square meters)    Stage 3A Moderate CKD (GFR = 45-59 mL/min/1 73 square meters)    Stage 3B Moderate CKD (GFR = 30-44 mL/min/1 73 square meters)    Stage 4 Severe CKD (GFR = 15-29 mL/min/1 73 square meters)    Stage 5 End Stage CKD (GFR <15 mL/min/1 73 square meters)  Note: GFR calculation is accurate only with a steady state creatinine    CBC and differential [[id number]]  (Abnormal) Collected: 09/10/22 1428    Lab Status: Final result Specimen: Blood from Arm, Left Updated: 09/10/22 1441     WBC 15 71 Thousand/uL      RBC 4 98 Million/uL      Hemoglobin 14 4 g/dL      Hematocrit 45 2 %      MCV 91 fL      MCH 28 9 pg      MCHC 31 9 g/dL      RDW 13 4 %      MPV 10 5 fL      Platelets 124 Thousands/uL      nRBC 0 /100 WBCs      Neutrophils Relative 86 %      Immat GRANS % 1 %      Lymphocytes Relative 8 %      Monocytes Relative 5 %      Eosinophils Relative 0 %      Basophils Relative 0 %      Neutrophils Absolute 13 59 Thousands/µL      Immature Grans Absolute 0 09 Thousand/uL      Lymphocytes Absolute 1 23 Thousands/µL      Monocytes Absolute 0 72 Thousand/µL      Eosinophils Absolute 0 04 Thousand/µL      Basophils Absolute 0 04 Thousands/µL                  CT abdomen pelvis with contrast   Final Result by Karina Stone MD (09/10 1650)   1  No acute findings  2   Nonobstructive nephrolithiasis  3   Marked hepatomegaly with diffuse fatty infiltration  Workstation performed: [id number]                    Procedures  Procedures         ED Course                                             MDM    Disposition  Final diagnoses:   COVID   Abdominal pain   Gastroenteritis     Time reflects when diagnosis was documented in both MDM as applicable and the Disposition within this note     Time User Action Codes Description Comment    9/10/2022  7:16 PM Ordonez Jose Add [U07 1] COVID     9/10/2022  7:16 PM Ordonez Jose Add [R10 9] Abdominal pain     9/10/2022  7:16 PM Ordonez Jose Add [K52 9] Gastroenteritis       ED Disposition     ED Disposition   Discharge    Condition   Stable    Date/Time   Sat Sep 10, 2022  7:16 PM    Tompa U  2  discharge to home/self care                 Follow-up Information     Follow up With Specialties Details Why Bill 8057, [address] Nurse Practitioner Schedule an appointment as soon as possible for a visit in 2 days As needed [id number] [facility] Male [address]  [phone]            Discharge Medication List as of 9/10/2022  7:19 PM      CONTINUE these medications which have NOT CHANGED    Details   Brexpiprazole (REXULTI) 3 MG tablet Take 1 tablet (3 mg total) by mouth in the morning, Starting Wed 8/31/2022, Until Fri 9/30/2022, Normal      cholecalciferol (VITAMIN D3) 1,000 units tablet Take 2 tablets (2,000 Units total) by mouth daily, Starting Wed 8/31/2022, Until Fri 9/30/2022, Normal      clotrimazole-betamethasone (LOTRISONE) 1-0 05 % cream Apply to affected area 2 times daily prn, Normal      escitalopram (LEXAPRO) 20 mg tablet Take 1 tablet (20 mg total) by mouth daily, Starting Wed 8/4/2021, Until Thu 8/25/2022, Normal      HumaLOG KwikPen 100 units/mL injection pen INJECT 16 UNITS 3 TIMES A DAY BEFORE MEALS, Historical Med      hydrochlorothiazide (HYDRODIURIL) 12 5 mg tablet Take 1 tablet (12 5 mg total) by mouth every other day, Starting Wed 10/20/2021, Normal      insulin glargine (LANTUS) 100 units/mL subcutaneous injection Inject 64 Units under the skin daily at bedtime, Starting Mon 8/2/2021, No Print      lamoTRIgine (LaMICtal) 150 MG tablet Take 1 tablet (150 mg total) by mouth 2 (two) times a day, Starting Tue 8/3/2021, Until Thu 8/25/2022, Normal      liraglutide (Victoza) injection Inject 1 8 MG daily  , Normal      lisinopril (ZESTRIL) 10 mg tablet Take 1 tablet (10 mg total) by mouth daily, Starting Wed 10/20/2021, Normal      LORazepam (ATIVAN) 0 5 mg tablet Starting Wed 10/6/2021, Historical Med      metFORMIN (GLUCOPHAGE) 500 mg tablet Take 1 tablet (500 mg total) by mouth 2 (two) times a day with meals, Starting Mon 8/31/2020, Until Thu 8/25/2022, Normal      naproxen (NAPROSYN) 375 mg tablet Take 1 tablet (375 mg total) by mouth 2 (two) times a day with meals, Starting Tue 8/30/2022, Until Thu 9/29/2022, Normal      pantoprazole (PROTONIX) 40 mg tablet Take 1 tablet (40 mg total) by mouth daily, Starting Wed 10/20/2021, Normal             No discharge procedures on file      PDMP Review       Value Time User    PDMP Reviewed  Yes 8/24/2022  2:53 AM Malinda Baumann MD          ED Provider  Electronically Signed by           Judith Glasgow PA-C  09/10/22 7844

## 2022-09-20 ENCOUNTER — OFFICE VISIT (OUTPATIENT)
Dept: FAMILY MEDICINE CLINIC | Facility: CLINIC | Age: 47
End: 2022-09-20
Payer: COMMERCIAL

## 2022-09-20 VITALS
WEIGHT: 293 LBS | BODY MASS INDEX: 57.52 KG/M2 | HEART RATE: 92 BPM | DIASTOLIC BLOOD PRESSURE: 82 MMHG | TEMPERATURE: 97.2 F | HEIGHT: 60 IN | RESPIRATION RATE: 22 BRPM | SYSTOLIC BLOOD PRESSURE: 140 MMHG | OXYGEN SATURATION: 96 %

## 2022-09-20 DIAGNOSIS — I10 ESSENTIAL HYPERTENSION: ICD-10-CM

## 2022-09-20 DIAGNOSIS — E78.2 MIXED HYPERLIPIDEMIA: ICD-10-CM

## 2022-09-20 DIAGNOSIS — U07.1 COVID: Primary | ICD-10-CM

## 2022-09-20 DIAGNOSIS — E11.65 POORLY CONTROLLED TYPE 2 DIABETES MELLITUS (HCC): ICD-10-CM

## 2022-09-20 DIAGNOSIS — K21.9 GASTROESOPHAGEAL REFLUX DISEASE WITHOUT ESOPHAGITIS: ICD-10-CM

## 2022-09-20 PROCEDURE — 4010F ACE/ARB THERAPY RXD/TAKEN: CPT | Performed by: NURSE PRACTITIONER

## 2022-09-20 PROCEDURE — 99214 OFFICE O/P EST MOD 30 MIN: CPT | Performed by: NURSE PRACTITIONER

## 2022-09-20 PROCEDURE — 3079F DIAST BP 80-89 MM HG: CPT | Performed by: NURSE PRACTITIONER

## 2022-09-20 PROCEDURE — 3725F SCREEN DEPRESSION PERFORMED: CPT | Performed by: NURSE PRACTITIONER

## 2022-09-20 PROCEDURE — 3077F SYST BP >= 140 MM HG: CPT | Performed by: NURSE PRACTITIONER

## 2022-09-20 RX ORDER — LISINOPRIL 10 MG/1
10 TABLET ORAL DAILY
Qty: 90 TABLET | Refills: 1 | Status: SHIPPED | OUTPATIENT
Start: 2022-09-20

## 2022-09-20 RX ORDER — PANTOPRAZOLE SODIUM 40 MG/1
40 TABLET, DELAYED RELEASE ORAL DAILY
Qty: 90 TABLET | Refills: 1 | Status: SHIPPED | OUTPATIENT
Start: 2022-09-20

## 2022-09-20 RX ORDER — ROSUVASTATIN CALCIUM 5 MG/1
5 TABLET, COATED ORAL DAILY
Qty: 90 TABLET | Refills: 3 | Status: SHIPPED | OUTPATIENT
Start: 2022-09-20

## 2022-09-20 NOTE — PROGRESS NOTES
Name: Maicol Doan      : 1975      MRN: [id number]  Encounter Provider: SUJATA Doe  Encounter Date: 2022   Encounter department: [facility] So  [facility]    Assessment & Plan   Physical assessment demonstrates a very poorly kept very morbidly obese female  Blood pressure slightly elevated stating that she is over coming COVID and will need paperwork filled out  It should be noted patient is very noncompliant with her office visits last office visit was October of last year almost a 1 full year  When asked where she has been getting her medication she states that she has not run out of medication has been taking it regularly however the dates of prescription fill from me do not coincide with continuation it appears as though she has had no medication for 6 months  Patient has made multiple appointments and canceled or no showed several of those appointments  Patient currently under the care of a behavioral health specialist this provider has been refilling her medication and has been ordering routine labs for her  Did inform her for me to continue to be her PCP routine visits will have to be kept otherwise I would seek another provider who is more available to facilitate her office visits  I stresses this is very important for her of continuation of care and for good care  Patient does have a several on very serious comorbidities which I reviewed with her today  Patient states she uses several providers in  the [facility] which I did inform her again it makes it very difficult for me to provide the highest quality care possible for her as there is limited communication with these providers    Patient verbalized understanding I did put routine labs in her chart she is advised to complete those in 1-2 weeks I would like to see her back in the office in 4-6 weeks of medication was refilled for the lisinopril no changes in dosage for the Protonix and for the Crestor no changes in dosage  All questions answered [place] paperwork was filled out however I was unable to fill out her physical capacity in order to lift push pull specific weight as we do not have the abilities to do that type of testing in my office  Dosing all possible side effects of the prescribed medications or medications that had been prescribed in the past were reviewed and all questions were answered  Patient verbalized agreement that she is partly responsible for her care and that failing to address what I discussed with her today could results in very poor health possible hospitalization possible early death and understanding of the plan of care as outlined during the office visit today return to office as indicated or sooner if a problem arises     1  [address]    2  Poorly controlled type 2 diabetes mellitus (HCC)  -     CBC and differential; Future  -     Comprehensive metabolic panel; Future  -     Lipid panel; Future  -     UA w Reflex to Microscopic w Reflex to Culture  -     TSH, 3rd generation with Free T4 reflex; Future    3  Mixed hyperlipidemia  -     CBC and differential; Future  -     Comprehensive metabolic panel; Future  -     Lipid panel; Future  -     UA w Reflex to Microscopic w Reflex to Culture  -     TSH, 3rd generation with Free T4 reflex; Future  -     rosuvastatin (CRESTOR) 5 mg tablet; Take 1 tablet (5 mg total) by mouth daily    4  BMI 60 0-69 9, adult (HCC)  -     CBC and differential; Future  -     Comprehensive metabolic panel; Future  -     Lipid panel; Future  -     UA w Reflex to Microscopic w Reflex to Culture  -     TSH, 3rd generation with Free T4 reflex; Future    5  Essential hypertension  -     lisinopril (ZESTRIL) 10 mg tablet; Take 1 tablet (10 mg total) by mouth daily    6  Gastroesophageal reflux disease without esophagitis  -     pantoprazole (PROTONIX) 40 mg tablet;  Take 1 tablet (40 mg total) by mouth daily    Patient's shoes and socks removed  Right Foot/Ankle   Right Foot Inspection  Skin Exam: skin normal  Skin not intact, no dry skin, no warmth, no callus, no erythema, no maceration, no abnormal color, no pre-ulcer, no ulcer and no callus  Sensory   Monofilament testing: intact    Left Foot/Ankle  Left Foot Inspection  Skin Exam: skin normal and skin intact  No dry skin, no warmth, no erythema, no maceration, normal color, no pre-ulcer, no ulcer and no callus  Sensory   Monofilament testing: intact    Assign Risk Category  No deformity present  No loss of protective sensation  No weak pulses  Risk: 0           Subjective      HPI  Review of Systems    Current Outpatient Medications on File Prior to Visit   Medication Sig    Brexpiprazole (REXULTI) 3 MG tablet Take 1 tablet (3 mg total) by mouth in the morning    cholecalciferol (VITAMIN D3) 1,000 units tablet Take 2 tablets (2,000 Units total) by mouth daily    clotrimazole-betamethasone (LOTRISONE) 1-0 05 % cream Apply to affected area 2 times daily prn    escitalopram (LEXAPRO) 20 mg tablet Take 1 tablet (20 mg total) by mouth daily    HumaLOG KwikPen 100 units/mL injection pen INJECT 16 UNITS 3 TIMES A DAY BEFORE MEALS    hydrochlorothiazide (HYDRODIURIL) 12 5 mg tablet Take 1 tablet (12 5 mg total) by mouth every other day    insulin glargine (LANTUS) 100 units/mL subcutaneous injection Inject 64 Units under the skin daily at bedtime    lamoTRIgine (LaMICtal) 150 MG tablet Take 1 tablet (150 mg total) by mouth 2 (two) times a day    liraglutide (Victoza) injection Inject 1 8 MG daily      LORazepam (ATIVAN) 0 5 mg tablet     metFORMIN (GLUCOPHAGE) 500 mg tablet Take 1 tablet (500 mg total) by mouth 2 (two) times a day with meals    naproxen (NAPROSYN) 375 mg tablet Take 1 tablet (375 mg total) by mouth 2 (two) times a day with meals    [DISCONTINUED] lisinopril (ZESTRIL) 10 mg tablet Take 1 tablet (10 mg total) by mouth daily    [DISCONTINUED] pantoprazole (PROTONIX) 40 mg tablet Take 1 tablet (40 mg total) by mouth daily       Objective     /82 (BP Location: Left arm, Patient Position: Sitting, Cuff Size: Large)   Pulse (!) 110   Temp (!) 97 2 °F (36 2 °C) (Temporal)   Resp 22   Ht 5' (1 524 m)   Wt (!) 157 kg (346 lb)   SpO2 96%   BMI 67 57 kg/m²     Physical Exam  Cardiovascular:      Pulses: no weak pulses  Feet:      Right foot:      Skin integrity: No ulcer, skin breakdown, erythema, warmth, callus or dry skin  Left foot:      Skin integrity: No ulcer, skin breakdown, erythema, warmth, callus or dry skin         Martínez Nose, CRNP

## 2022-12-04 ENCOUNTER — HOSPITAL ENCOUNTER (EMERGENCY)
Facility: HOSPITAL | Age: 47
Discharge: HOME | End: 2022-12-04
Attending: EMERGENCY MEDICINE

## 2022-12-04 VITALS
SYSTOLIC BLOOD PRESSURE: 177 MMHG | RESPIRATION RATE: 20 BRPM | DIASTOLIC BLOOD PRESSURE: 73 MMHG | OXYGEN SATURATION: 97 % | TEMPERATURE: 98.2 F | HEART RATE: 96 BPM

## 2022-12-04 DIAGNOSIS — I10 ESSENTIAL HYPERTENSION: ICD-10-CM

## 2022-12-04 DIAGNOSIS — H60.90 OTITIS EXTERNA: Primary | ICD-10-CM

## 2022-12-04 RX ORDER — LISINOPRIL 10 MG/1
20 TABLET ORAL DAILY
Qty: 90 TABLET | Refills: 0
Start: 2022-12-04

## 2022-12-04 RX ORDER — OFLOXACIN 3 MG/ML
10 SOLUTION AURICULAR (OTIC) 2 TIMES DAILY
Qty: 7 ML | Refills: 0 | Status: SHIPPED | OUTPATIENT
Start: 2022-12-04 | End: 2022-12-11

## 2022-12-04 NOTE — ED PROVIDER NOTES
History  Chief Complaint   Patient presents with   • Earache     Pt c/o ear infection dx 2 weeks ago  Still c/o pressure      This is a 49-year-old female with a relevant past medical history of diabetes, hypertension, presenting to the ED today for complaint of an earache  Patient states that her left ear has been hurting her for the past 2-3 weeks  She states that she was diagnosed with an otitis externa, placed on Ciprodex ear drops, and has completed her prescription and continues to have symptoms  She has been having symptoms for approximately 2 weeks now  She denies any fever, chills, sweats, nausea, vomiting, abdominal pain, focal weakness, focal numbness or tingling, cough, congestion, sore throat or any other significantly related symptoms  Prior to Admission Medications   Prescriptions Last Dose Informant Patient Reported? Taking? Brexpiprazole (REXULTI) 3 MG tablet   No No   Sig: Take 1 tablet (3 mg total) by mouth in the morning   HumaLOG KwikPen 100 units/mL injection pen   Yes No   Sig: INJECT 16 UNITS 3 TIMES A DAY BEFORE MEALS   LORazepam (ATIVAN) 0 5 mg tablet   Yes No   cholecalciferol (VITAMIN D3) 1,000 units tablet   No No   Sig: Take 2 tablets (2,000 Units total) by mouth daily   clotrimazole-betamethasone (LOTRISONE) 1-0 05 % cream   No No   Sig: Apply to affected area 2 times daily prn   escitalopram (LEXAPRO) 20 mg tablet   No No   Sig: Take 1 tablet (20 mg total) by mouth daily   hydrochlorothiazide (HYDRODIURIL) 12 5 mg tablet   No No   Sig: Take 1 tablet (12 5 mg total) by mouth every other day   insulin glargine (LANTUS) 100 units/mL subcutaneous injection   No No   Sig: Inject 64 Units under the skin daily at bedtime   lamoTRIgine (LaMICtal) 150 MG tablet   No No   Sig: Take 1 tablet (150 mg total) by mouth 2 (two) times a day   liraglutide (Victoza) injection   No No   Sig: Inject 1 8 MG daily     lisinopril (ZESTRIL) 10 mg tablet   No No   Sig: Take 1 tablet (10 mg total) by mouth daily   metFORMIN (GLUCOPHAGE) 500 mg tablet   No No   Sig: Take 1 tablet (500 mg total) by mouth 2 (two) times a day with meals   naproxen (NAPROSYN) 375 mg tablet   No No   Sig: Take 1 tablet (375 mg total) by mouth 2 (two) times a day with meals   pantoprazole (PROTONIX) 40 mg tablet   No No   Sig: Take 1 tablet (40 mg total) by mouth daily   rosuvastatin (CRESTOR) 5 mg tablet   No No   Sig: Take 1 tablet (5 mg total) by mouth daily      Facility-Administered Medications: None       Past Medical History:   Diagnosis Date   • Anemia    • Anxiety    • Candidal intertrigo    • Depression    • Diabetes mellitus (HCC)    • GERD (gastroesophageal reflux disease)    • Hyperlipidemia    • Hypertension    • Irritable bowel syndrome    • Morbid obesity with BMI of 60 0-69 9, adult (HCC)    • Osteoarthritis    • Seasonal allergies    • Sleep difficulties    • Suicide attempt [facility])        Past Surgical History:   Procedure Laterality Date   •  SECTION     • CHOLECYSTECTOMY     • WISDOM TOOTH EXTRACTION         Family History   Problem Relation Age of Onset   • Diabetes Mother    • Hypertension Father      I have reviewed and agree with the history as documented  E-Cigarette/Vaping   • E-Cigarette Use Never User      E-Cigarette/Vaping Substances   • Nicotine No    • THC No    • CBD No    • Flavoring No    • Other No    • Unknown No      Social History     Tobacco Use   • Smoking status: Never   • Smokeless tobacco: Never   Vaping Use   • Vaping Use: Never used   Substance Use Topics   • Alcohol use: Yes     Comment: occassionaly    • Drug use: No       Review of Systems   Constitutional: Negative for activity change, chills and fever  HENT: Positive for ear pain  Negative for congestion and rhinorrhea  Eyes: Negative for photophobia and visual disturbance  Respiratory: Negative for cough, chest tightness and shortness of breath      Cardiovascular: Negative for chest pain and leg swelling  Gastrointestinal: Negative for abdominal distention, nausea and vomiting  Genitourinary: Negative for dysuria and frequency  Musculoskeletal: Negative for back pain and neck stiffness  Skin: Negative for rash and wound  Neurological: Negative for dizziness and weakness  Psychiatric/Behavioral: Negative for agitation and suicidal ideas  Physical Exam  Physical Exam  Vitals and nursing note reviewed  Constitutional:       Appearance: Normal appearance  She is obese  She is not ill-appearing or toxic-appearing  HENT:      Head: Normocephalic and atraumatic  Right Ear: Tympanic membrane, ear canal and external ear normal       Left Ear: Tympanic membrane normal  There is no impacted cerumen  Ears:      Comments: Left ear canal with significant exudate surrounding the canal   Able to see to the TM, without any significant erythema or bulging or opacity of the TM  Nose: Nose normal  No congestion or rhinorrhea  Mouth/Throat:      Mouth: Mucous membranes are moist       Pharynx: Oropharynx is clear  No oropharyngeal exudate  Eyes:      General: No scleral icterus  Conjunctiva/sclera: Conjunctivae normal    Cardiovascular:      Rate and Rhythm: Normal rate and regular rhythm  Pulmonary:      Effort: Pulmonary effort is normal  No respiratory distress  Abdominal:      General: Abdomen is flat  There is no distension  Palpations: Abdomen is soft  Musculoskeletal:         General: No swelling or tenderness  Normal range of motion  Cervical back: Normal range of motion and neck supple  No tenderness  Skin:     General: Skin is warm and dry  Capillary Refill: Capillary refill takes less than 2 seconds  Coloration: Skin is not jaundiced  Findings: No bruising  Neurological:      General: No focal deficit present  Mental Status: She is alert and oriented to person, place, and time  Mental status is at baseline     Psychiatric: Mood and Affect: Mood normal          Behavior: Behavior normal          Thought Content: Thought content normal          Judgment: Judgment normal          Vital Signs  ED Triage Vitals   Temperature Pulse Respirations Blood Pressure SpO2   12/04/22 0843 12/04/22 0843 12/04/22 0843 12/04/22 0843 12/04/22 0843   98 2 °F (36 8 °C) (!) 122 (!) 24 (!) 210/95 97 %      Temp Source Heart Rate Source Patient Position - Orthostatic VS BP Location FiO2 (%)   12/04/22 0843 12/04/22 0843 12/04/22 0855 12/04/22 0843 --   Oral Monitor Sitting Right arm       Pain Score       --                  Vitals:    12/04/22 0843 12/04/22 0854 12/04/22 0855   BP: (!) 210/95  (!) 177/73   Pulse: (!) 122 96    Patient Position - Orthostatic VS:   Sitting         Visual Acuity      ED Medications  Medications - No data to display    Diagnostic Studies  Results Reviewed     None                 No orders to display              Procedures  Procedures         ED Course                                             MDM  Number of Diagnoses or Management Options  Essential hypertension  Otitis externa  Diagnosis management comments: This is a 42-year-old female with a history of diabetes presenting to the ED for left ear pain  Patient states that her ear pain has been present for the past 2 weeks, and she has taken Ciprodex ear drops without improvement in her symptoms  Patient denies any systemic symptoms  She denies any hearing loss  She does have some occasional tinnitus in the left ear  On exam she does have exudate and her ear canal, without any spread to the TM  Her TM does seem to be clear, nonbulging, nonerythematous  She also has hypertensive on exam, and is only on lisinopril 10 mg  Her differential diagnosis includes:  Otitis externa versus otitis media versus malignant otitis externa versus other    She does not have any signs to suggest otitis externa, she does not have any mastoid tenderness, does not have any significant lymphadenopathy  Her exam otherwise is unremarkable  She does not have any systemic symptoms  I do not see evidence of an otitis media on my exam either  Since patient has been on Ciprodex ear drops, will prescribe ofloxacin ear drops, with the expectation that she will be re-evaluated within the next week  If her symptoms do not improve she may need oral antibiotics as she does have diabetes and this may progress to a malignant otitis externa  She was given strict return precautions with which she agreed to comply  She was discharged home in stable condition felt safe going home  She also had elevated blood pressure, her lisinopril was increased to 20 mg or 10 mg  And she was instructed to follow-up with her primary care physician about blood pressure management in the future  Disposition  Final diagnoses:   None     ED Disposition     None      Follow-up Information    None         Patient's Medications   Discharge Prescriptions    No medications on file       No discharge procedures on file      PDMP Review       Value Time User    PDMP Reviewed  Yes 8/24/2022  2:53 AM Shannan Ley MD          ED Provider  Electronically Signed by           Dawn Payne MD  12/04/22 0844

## 2022-12-04 NOTE — DISCHARGE INSTRUCTIONS
You were seen in the ED for an external ear infection  You had an examination showing persistent ear infection  You have been discharged with ofloxacin ear drops to be taken twice daily for the next 7 days  Please follow up with your primary care physician within the next 1 week for continued management of your conditions  Please come back to the ED if you develop uncontrollable pain, inability to here, persistent ringing in your ears, uncontrollable nausea or vomiting  Thank you very much for utilizing the ED this morning

## 2023-01-26 ENCOUNTER — APPOINTMENT (EMERGENCY)
Dept: CT IMAGING | Facility: HOSPITAL | Age: 48
End: 2023-01-26

## 2023-01-26 ENCOUNTER — HOSPITAL ENCOUNTER (EMERGENCY)
Facility: HOSPITAL | Age: 48
Discharge: HOME | End: 2023-01-27
Attending: INTERNAL MEDICINE

## 2023-01-26 DIAGNOSIS — N39.0 E. COLI UTI: ICD-10-CM

## 2023-01-26 DIAGNOSIS — M79.18 ABDOMINAL MUSCLE PAIN: Primary | ICD-10-CM

## 2023-01-26 DIAGNOSIS — R10.9 ABDOMINAL PAIN: ICD-10-CM

## 2023-01-26 DIAGNOSIS — B96.20 E. COLI UTI: ICD-10-CM

## 2023-01-26 LAB
ALBUMIN SERPL BCP-MCNC: 4.2 G/DL (ref 3.5–5)
ALP SERPL-CCNC: 72 U/L (ref 34–104)
ALT SERPL W P-5'-P-CCNC: 29 U/L (ref 7–52)
ANION GAP SERPL CALCULATED.3IONS-SCNC: 9 MMOL/L (ref 4–13)
AST SERPL W P-5'-P-CCNC: 16 U/L (ref 13–39)
BASOPHILS # BLD AUTO: 0.06 THOUSANDS/ÂΜL (ref 0–0.1)
BASOPHILS NFR BLD AUTO: 1 % (ref 0–1)
BILIRUB SERPL-MCNC: 0.77 MG/DL (ref 0.2–1)
BUN SERPL-MCNC: 14 MG/DL (ref 5–25)
CALCIUM SERPL-MCNC: 10.2 MG/DL (ref 8.4–10.2)
CHLORIDE SERPL-SCNC: 97 MMOL/L (ref 96–108)
CO2 SERPL-SCNC: 26 MMOL/L (ref 21–32)
CREAT SERPL-MCNC: 0.79 MG/DL (ref 0.6–1.3)
EOSINOPHIL # BLD AUTO: 0.15 THOUSAND/ÂΜL (ref 0–0.61)
EOSINOPHIL NFR BLD AUTO: 1 % (ref 0–6)
ERYTHROCYTE [DISTWIDTH] IN BLOOD BY AUTOMATED COUNT: 13.3 % (ref 11.6–15.1)
GFR SERPL CREATININE-BSD FRML MDRD: 88 ML/MIN/1.73SQ M
GLUCOSE SERPL-MCNC: 330 MG/DL (ref 65–140)
HCT VFR BLD AUTO: 47.7 % (ref 34.8–46.1)
HGB BLD-MCNC: 15.7 G/DL (ref 11.5–15.4)
IMM GRANULOCYTES # BLD AUTO: 0.09 THOUSAND/UL (ref 0–0.2)
IMM GRANULOCYTES NFR BLD AUTO: 1 % (ref 0–2)
LIPASE SERPL-CCNC: 63 U/L (ref 11–82)
LYMPHOCYTES # BLD AUTO: 2.7 THOUSANDS/ÂΜL (ref 0.6–4.47)
LYMPHOCYTES NFR BLD AUTO: 25 % (ref 14–44)
MCH RBC QN AUTO: 30 PG (ref 26.8–34.3)
MCHC RBC AUTO-ENTMCNC: 32.9 G/DL (ref 31.4–37.4)
MCV RBC AUTO: 91 FL (ref 82–98)
MONOCYTES # BLD AUTO: 0.82 THOUSAND/ÂΜL (ref 0.17–1.22)
MONOCYTES NFR BLD AUTO: 8 % (ref 4–12)
NEUTROPHILS # BLD AUTO: 7.13 THOUSANDS/ÂΜL (ref 1.85–7.62)
NEUTS SEG NFR BLD AUTO: 64 % (ref 43–75)
NRBC BLD AUTO-RTO: 0 /100 WBCS
PLATELET # BLD AUTO: 299 THOUSANDS/UL (ref 149–390)
PMV BLD AUTO: 10.2 FL (ref 8.9–12.7)
POTASSIUM SERPL-SCNC: 4.6 MMOL/L (ref 3.5–5.3)
PROT SERPL-MCNC: 7.7 G/DL (ref 6.4–8.4)
RBC # BLD AUTO: 5.23 MILLION/UL (ref 3.81–5.12)
SODIUM SERPL-SCNC: 132 MMOL/L (ref 135–147)
WBC # BLD AUTO: 10.95 THOUSAND/UL (ref 4.31–10.16)

## 2023-01-26 RX ORDER — KETOROLAC TROMETHAMINE 30 MG/ML
15 INJECTION, SOLUTION INTRAMUSCULAR; INTRAVENOUS ONCE
Status: COMPLETED | OUTPATIENT
Start: 2023-01-26 | End: 2023-01-26

## 2023-01-26 RX ADMIN — IOHEXOL 100 ML: 350 INJECTION, SOLUTION INTRAVENOUS at 23:28

## 2023-01-26 RX ADMIN — KETOROLAC TROMETHAMINE 15 MG: 30 INJECTION, SOLUTION INTRAMUSCULAR at 23:53

## 2023-01-27 ENCOUNTER — TELEPHONE (OUTPATIENT)
Dept: EMERGENCY DEPT | Facility: HOSPITAL | Age: 48
End: 2023-01-27

## 2023-01-27 VITALS
HEART RATE: 97 BPM | SYSTOLIC BLOOD PRESSURE: 164 MMHG | DIASTOLIC BLOOD PRESSURE: 79 MMHG | TEMPERATURE: 97.8 F | RESPIRATION RATE: 16 BRPM | OXYGEN SATURATION: 96 %

## 2023-01-27 LAB
BACTERIA UR QL AUTO: ABNORMAL /HPF
BILIRUB UR QL STRIP: NEGATIVE
CLARITY UR: CLEAR
COLOR UR: YELLOW
EXT PREGNANCY TEST URINE: NEGATIVE
EXT. CONTROL: NORMAL
FLUAV RNA RESP QL NAA+PROBE: NEGATIVE
FLUBV RNA RESP QL NAA+PROBE: NEGATIVE
GLUCOSE UR STRIP-MCNC: ABNORMAL MG/DL
HGB UR QL STRIP.AUTO: NEGATIVE
HOLD SPECIMEN: NORMAL
KETONES UR STRIP-MCNC: NEGATIVE MG/DL
LEUKOCYTE ESTERASE UR QL STRIP: NEGATIVE
MUCOUS THREADS UR QL AUTO: ABNORMAL
NITRITE UR QL STRIP: NEGATIVE
NON-SQ EPI CELLS URNS QL MICRO: ABNORMAL /HPF
PH UR STRIP.AUTO: 6.5 [PH]
PROT UR STRIP-MCNC: ABNORMAL MG/DL
RBC #/AREA URNS AUTO: ABNORMAL /HPF
RSV RNA RESP QL NAA+PROBE: NEGATIVE
SARS-COV-2 RNA RESP QL NAA+PROBE: NEGATIVE
SP GR UR STRIP.AUTO: >=1.05 (ref 1–1.03)
UROBILINOGEN UR STRIP-ACNC: <2 MG/DL
WBC #/AREA URNS AUTO: ABNORMAL /HPF

## 2023-01-27 RX ADMIN — SODIUM CHLORIDE 1000 ML: 0.9 INJECTION, SOLUTION INTRAVENOUS at 00:10

## 2023-01-27 NOTE — ED PROVIDER NOTES
History  Chief Complaint   Patient presents with   • Abdominal Pain     Pt arrives via EMS from home for evaluation of abdominal pain that started last night  Pt also reports she has "bladder spasms" and having urinary incontinence  Denies any n/v/d       78-year-old female with extensive medical history including diabetes, hyperlipidemia, hypertension, depression, super morbid obesity presents today with 1 day of diffuse abdominal pain worse in the left lower quadrant  She also noted some urinary incontinence after she had an episode of forceful emptying of her bladder  She denies any other symptoms at this time including chest pain, shortness of breath, fever/chills, diarrhea/constipation, nausea/vomiting, fecal incontinence, back pain, saddle anesthesia  Prior to Admission Medications   Prescriptions Last Dose Informant Patient Reported? Taking? Brexpiprazole (REXULTI) 3 MG tablet   No No   Sig: Take 1 tablet (3 mg total) by mouth in the morning   HumaLOG KwikPen 100 units/mL injection pen   Yes No   Sig: INJECT 16 UNITS 3 TIMES A DAY BEFORE MEALS   LORazepam (ATIVAN) 0 5 mg tablet   Yes No   cholecalciferol (VITAMIN D3) 1,000 units tablet   No No   Sig: Take 2 tablets (2,000 Units total) by mouth daily   clotrimazole-betamethasone (LOTRISONE) 1-0 05 % cream   No No   Sig: Apply to affected area 2 times daily prn   escitalopram (LEXAPRO) 20 mg tablet   No No   Sig: Take 1 tablet (20 mg total) by mouth daily   insulin glargine (LANTUS) 100 units/mL subcutaneous injection   No No   Sig: Inject 64 Units under the skin daily at bedtime   lamoTRIgine (LaMICtal) 150 MG tablet   No No   Sig: Take 1 tablet (150 mg total) by mouth 2 (two) times a day   liraglutide (Victoza) injection   No No   Sig: Inject 1 8 MG daily     lisinopril (ZESTRIL) 10 mg tablet   No No   Sig: Take 2 tablets (20 mg total) by mouth daily   metFORMIN (GLUCOPHAGE) 500 mg tablet   No No   Sig: Take 1 tablet (500 mg total) by mouth 2 (two) times a day with meals   naproxen (NAPROSYN) 375 mg tablet   No No   Sig: Take 1 tablet (375 mg total) by mouth 2 (two) times a day with meals   pantoprazole (PROTONIX) 40 mg tablet   No No   Sig: Take 1 tablet (40 mg total) by mouth daily   rosuvastatin (CRESTOR) 5 mg tablet   No No   Sig: Take 1 tablet (5 mg total) by mouth daily      Facility-Administered Medications: None       Past Medical History:   Diagnosis Date   • Anemia    • Anxiety    • Candidal intertrigo    • Depression    • Diabetes mellitus (Formerly [facility])    • GERD (gastroesophageal reflux disease)    • Hyperlipidemia    • Hypertension    • Irritable bowel syndrome    • Morbid obesity with BMI of 60 0-69 9, adult (Formerly [facility])    • Osteoarthritis    • Seasonal allergies    • Sleep difficulties    • Suicide attempt [facility])        Past Surgical History:   Procedure Laterality Date   •  SECTION     • CHOLECYSTECTOMY     • WISDOM TOOTH EXTRACTION         Family History   Problem Relation Age of Onset   • Diabetes Mother    • Hypertension Father      I have reviewed and agree with the history as documented  E-Cigarette/Vaping   • E-Cigarette Use Never User      E-Cigarette/Vaping Substances   • Nicotine No    • THC No    • CBD No    • Flavoring No    • Other No    • Unknown No      Social History     Tobacco Use   • Smoking status: Never   • Smokeless tobacco: Never   Vaping Use   • Vaping Use: Never used   Substance Use Topics   • Alcohol use: Yes     Comment: occassionaly    • Drug use: No        Review of Systems   Gastrointestinal: Positive for abdominal pain  Genitourinary: Positive for urgency (with follow on minor incontinence)         Physical Exam  ED Triage Vitals   Temperature Pulse Respirations Blood Pressure SpO2   230 23 2150 23   97 8 °F (36 6 °C) (!) 112 22 146/65 95 %      Temp Source Heart Rate Source Patient Position - Orthostatic VS BP Location FiO2 (%)   23 01/26/23 2150 01/27/23 0030 01/26/23 2150 --   Oral Monitor Lying Right arm       Pain Score       01/26/23 2150       8             Orthostatic Vital Signs  Vitals:    01/27/23 0030 01/27/23 0130 01/27/23 0300 01/27/23 0500   BP: 154/80 159/72 161/79 164/79   Pulse: (!) 112 105 102 97   Patient Position - Orthostatic VS: Lying Lying Lying Lying       Physical Exam  Vitals and nursing note reviewed  Constitutional:       Appearance: She is obese  HENT:      Head: Normocephalic and atraumatic  Mouth/Throat:      Mouth: Mucous membranes are moist       Pharynx: Oropharynx is clear  Eyes:      General: No scleral icterus  Extraocular Movements: Extraocular movements intact  Cardiovascular:      Rate and Rhythm: Normal rate and regular rhythm  Heart sounds: Normal heart sounds  No murmur heard  Pulmonary:      Effort: Pulmonary effort is normal       Breath sounds: Normal breath sounds  Abdominal:      General: Abdomen is protuberant  Bowel sounds are normal       Palpations: Abdomen is soft  Tenderness: There is generalized abdominal tenderness and tenderness in the right lower quadrant, suprapubic area and left lower quadrant  Hernia: No hernia is present  Genitourinary:     Vagina: Normal  No erythema  Skin:     General: Skin is warm  Neurological:      General: No focal deficit present  Mental Status: She is alert and oriented to person, place, and time     Psychiatric:         Mood and Affect: Mood normal          Behavior: Behavior normal          ED Medications  Medications   iohexol (OMNIPAQUE) 350 MG/ML injection (SINGLE-DOSE) 100 mL (100 mL Intravenous Given 1/26/23 2328)   ketorolac (TORADOL) injection 15 mg (15 mg Intravenous Given 1/26/23 2353)   sodium chloride 0 9 % bolus 1,000 mL (0 mL Intravenous Stopped 1/27/23 0110)       Diagnostic Studies  Results Reviewed     Procedure Component Value Units Date/Time    Urine Microscopic [[id number]]  (Abnormal) Collected: 01/27/23 0442    Lab Status: Final result Specimen: Urine, Clean Catch Updated: 01/27/23 0647     RBC, UA 10-20 /hpf      WBC, UA 10-20 /hpf      Epithelial Cells Moderate /hpf      Bacteria, UA Occasional /hpf      MUCUS THREADS Occasional    Urine culture [[id number]] Collected: 01/27/23 0442    Lab Status: In process Specimen: Urine, Clean Catch Updated: 01/27/23 0647    UA w Reflex to Microscopic w Reflex to Culture [[id number]]  (Abnormal) Collected: 01/27/23 0442    Lab Status: Final result Specimen: Urine, Clean Catch Updated: 01/27/23 0500     Color, UA Yellow     Clarity, UA Clear     Specific Gravity, UA >=1 050     pH, UA 6 5     Leukocytes, UA Negative     Nitrite, UA Negative     Protein,  (2+) mg/dl      Glucose,  (1/5%) mg/dl      Ketones, UA Negative mg/dl      Urobilinogen, UA <2 0 mg/dl      Bilirubin, UA Negative     Occult Blood, UA Negative    POCT pregnancy, urine [[id number]]  (Normal) Resulted: 01/27/23 0442    Lab Status: Final result Updated: 01/27/23 0447     EXT Preg Test, Ur Negative     Control Valid    FLU/RSV/COVID - if FLU/RSV clinically relevant [[id number]]  (Normal) Collected: 01/26/23 2337    Lab Status: Final result Specimen: Nares from Nose Updated: 01/27/23 0022     SARS-CoV-2 Negative     INFLUENZA A PCR Negative     INFLUENZA B PCR Negative     RSV PCR Negative    Narrative:      FOR PEDIATRIC PATIENTS - copy/paste COVID Guidelines URL to browser: [email or web address]  [employer]    SARS-CoV-2 assay is a Nucleic Acid Amplification assay intended for the  qualitative detection of nucleic acid from SARS-CoV-2 in nasopharyngeal  swabs  Results are for the presumptive identification of SARS-CoV-2 RNA  Positive results are indicative of infection with SARS-CoV-2, the virus  causing COVID-19, but do not rule out bacterial infection or co-infection  with other viruses   Laboratories within the [place] and its  territories are required to report all positive results to the appropriate  public health authorities  Negative results do not preclude SARS-CoV-2  infection and should not be used as the sole basis for treatment or other  patient management decisions  Negative results must be combined with  clinical observations, patient history, and epidemiological information  This test has not been FDA cleared or approved  This test has been authorized by FDA under an Emergency Use Authorization  (EUA)  This test is only authorized for the duration of time the  declaration that circumstances exist justifying the authorization of the  emergency use of an in vitro diagnostic tests for detection of SARS-CoV-2  virus and/or diagnosis of COVID-19 infection under section 564(b)(1) of  the Act, 21 U  S C  915FPJ-0(N)(1), unless the authorization is terminated  or revoked sooner  The test has been validated but independent review by FDA  and CLIA is pending  Test performed using [email or web address] Gene[employer]pert: This RT-PCR assay targets N2,  a region unique to SARS-CoV-2  A conserved region in the E-gene was chosen  for pan-Sarbecovirus detection which includes SARS-CoV-2  According to CMS-2020-01-R, this platform meets the definition of high-throughput technology  [place] draw [[id number]] Collected: 01/26/23 2215    Lab Status: Final result Specimen: Blood from Arm, Left Updated: 01/27/23 0002    Narrative: The following orders were created for panel order [place] draw  Procedure                               Abnormality         Status                     ---------                               -----------         ------                     Jenny Common Top on BDCK[[id number]]                           Final result               Green / Black tube on IFIC[[id number]]                       Final result                 Please view results for these tests on the individual orders      Comprehensive metabolic panel [[id number]] (Abnormal) Collected: 01/26/23 2215    Lab Status: Final result Specimen: Blood from Arm, Left Updated: 01/26/23 2244     Sodium 132 mmol/L      Potassium 4 6 mmol/L      Chloride 97 mmol/L      CO2 26 mmol/L      ANION GAP 9 mmol/L      BUN 14 mg/dL      Creatinine 0 79 mg/dL      Glucose 330 mg/dL      Calcium 10 2 mg/dL      AST 16 U/L      ALT 29 U/L      Alkaline Phosphatase 72 U/L      Total Protein 7 7 g/dL      Albumin 4 2 g/dL      Total Bilirubin 0 77 mg/dL      eGFR 88 ml/min/1 73sq m     Narrative:      National Kidney Disease Foundation guidelines for Chronic Kidney Disease (CKD):   •  Stage 1 with normal or high GFR (GFR > 90 mL/min/1 73 square meters)  •  Stage 2 Mild CKD (GFR = 60-89 mL/min/1 73 square meters)  •  Stage 3A Moderate CKD (GFR = 45-59 mL/min/1 73 square meters)  •  Stage 3B Moderate CKD (GFR = 30-44 mL/min/1 73 square meters)  •  Stage 4 Severe CKD (GFR = 15-29 mL/min/1 73 square meters)  •  Stage 5 End Stage CKD (GFR <15 mL/min/1 73 square meters)  Note: GFR calculation is accurate only with a steady state creatinine    Lipase [[id number]]  (Normal) Collected: 01/26/23 2215    Lab Status: Final result Specimen: Blood from Arm, Left Updated: 01/26/23 2244     Lipase 63 u/L     CBC and differential [[id number]]  (Abnormal) Collected: 01/26/23 2215    Lab Status: Final result Specimen: Blood from Arm, Left Updated: 01/26/23 2223     WBC 10 95 Thousand/uL      RBC 5 23 Million/uL      Hemoglobin 15 7 g/dL      Hematocrit 47 7 %      MCV 91 fL      MCH 30 0 pg      MCHC 32 9 g/dL      RDW 13 3 %      MPV 10 2 fL      Platelets 599 Thousands/uL      nRBC 0 /100 WBCs      Neutrophils Relative 64 %      Immat GRANS % 1 %      Lymphocytes Relative 25 %      Monocytes Relative 8 %      Eosinophils Relative 1 %      Basophils Relative 1 %      Neutrophils Absolute 7 13 Thousands/µL      Immature Grans Absolute 0 09 Thousand/uL      Lymphocytes Absolute 2 70 Thousands/µL      Monocytes Absolute 0 82 Thousand/µL      Eosinophils Absolute 0 15 Thousand/µL      Basophils Absolute 0 06 Thousands/µL                  CT abdomen pelvis with contrast   Final Result by Samantha Perez MD (01/26 2340)         1  No evidence of diverticulitis, colitis or bowel obstruction  2   Nonobstructing 3 mm bilateral renal calculi  No pyelonephritis or obstructive uropathy  Workstation performed: [id number]               Procedures  ECG 12 Lead Documentation Only    Date/Time: 1/27/2023 1:34 AM  Performed by: Luke Rangel DO  Authorized by: Luke Rangel DO     Indications / Diagnosis:  Tachycardia  ECG reviewed by me, the ED Provider: yes    Patient location:  ED  Previous ECG:     Previous ECG:  Unavailable    Comparison to cardiac monitor: Yes    Interpretation:     Interpretation: abnormal    Rate:     ECG rate:  111    ECG rate assessment: tachycardic    Rhythm:     Rhythm: sinus rhythm    Ectopy:     Ectopy: none    QRS:     QRS axis:  Normal    QRS intervals:  Normal  Conduction:     Conduction: normal    ST segments:     ST segments:  Normal  T waves:     T waves: normal            ED Course  ED Course as of 01/27/23 0715   Thu Jan 26, 2023   2306 Lipase: 63   2306 WBC(!): 10 95   2306 Sodium(!): 132   Fri Jan 27, 2023   0000 Glucose, Random(!): 330   0004 CT abdomen pelvis with contrast  IMPRESSION:        1  No evidence of diverticulitis, colitis or bowel obstruction  2   Nonobstructing 3 mm bilateral renal calculi  No pyelonephritis or obstructive uropathy  0004 Pulse(!): 111  Will give 1L NS to address tachycardia, renal stones, and slightly decreased sodium level     0751 FLU/RSV/COVID - if FLU/RSV clinically relevant  negative   0513 UA w Reflex to Microscopic w Reflex to Culture(!)  wnl   0514 PREGNANCY TEST URINE: Negative                                       Medical Decision Making  Patient presented with 1 day of diffuse abdominal pain which is worse in the left lower quadrant  She was tender to palpation in that area as well as tender, though not as much, in the suprapubic and right lower quadrant  Work-up was largely unremarkable, which included CT abdomen pelvis with contrast, CBC, CMP, UA, lipase  Patient reported significant relief of pain with Toradol  CT showed no evidence of diverticulitis, colitis or SBO  It did note an incidental finding of nonobstructing 3 mm bilateral renal calculi  She was given 2 L total normal saline  Patient was also tachycardic through most of her stay which resolved before discharge  Patient instructed follow-up closely with her PCP  Patient stable upon discharge, strict return precautions given if symptoms are worsening or not resolving  Abdominal muscle pain: acute illness or injury  Abdominal pain: acute illness or injury  Amount and/or Complexity of Data Reviewed  Labs: ordered  Decision-making details documented in ED Course  Radiology: ordered  Decision-making details documented in ED Course  Risk  Prescription drug management  Disposition  Final diagnoses:   Abdominal pain   Abdominal muscle pain     Time reflects when diagnosis was documented in both MDM as applicable and the Disposition within this note     Time User Action Codes Description Comment    1/27/2023  5:40 AM Mary CHE Add [R10 9] Abdominal pain     1/27/2023  5:41 AM Kirstie Beavers Add [M79 18] Abdominal muscle pain     1/27/2023  5:41 AM Kirstie Beavers Modify [R10 9] Abdominal pain     1/27/2023  5:41 AM Kirstie Beavers Modify [T59 59] Abdominal muscle pain       ED Disposition     ED Disposition   Discharge    Condition   Stable    Date/Time   Fri Jan 27, 2023  5:40 AM    Comment   Edwardo Smith discharge to home/self care                 Follow-up Information     Follow up With Specialties Details Why Contact Info Additional [address] Nurse Practitioner Call in 3 days As needed, For ED follow-up [id number] [facility] [address] [facility] [address] [facility] Emergency Department Emergency Medicine Go to  If symptoms worsen or do not resolve [address] [facility] [address] Emergency [address], [address], [place], [place], [address]          Discharge Medication List as of 1/27/2023  5:42 AM      CONTINUE these medications which have NOT CHANGED    Details   Brexpiprazole (REXULTI) 3 MG tablet Take 1 tablet (3 mg total) by mouth in the morning, Starting Wed 8/31/2022, Until Fri 9/30/2022, Normal      cholecalciferol (VITAMIN D3) 1,000 units tablet Take 2 tablets (2,000 Units total) by mouth daily, Starting Wed 8/31/2022, Until Fri 9/30/2022, Normal      clotrimazole-betamethasone (LOTRISONE) 1-0 05 % cream Apply to affected area 2 times daily prn, Normal      escitalopram (LEXAPRO) 20 mg tablet Take 1 tablet (20 mg total) by mouth daily, Starting Wed 8/4/2021, Until Tue 9/20/2022, Normal      HumaLOG KwikPen 100 units/mL injection pen INJECT 16 UNITS 3 TIMES A DAY BEFORE MEALS, Historical Med      insulin glargine (LANTUS) 100 units/mL subcutaneous injection Inject 64 Units under the skin daily at bedtime, Starting Mon 8/2/2021, No Print      lamoTRIgine (LaMICtal) 150 MG tablet Take 1 tablet (150 mg total) by mouth 2 (two) times a day, Starting Tue 8/3/2021, Until Tue 9/20/2022, Normal      liraglutide (Victoza) injection Inject 1 8 MG daily  , Normal      lisinopril (ZESTRIL) 10 mg tablet Take 2 tablets (20 mg total) by mouth daily, Starting Sun 12/4/2022, No Print      LORazepam (ATIVAN) 0 5 mg tablet Starting Wed 10/6/2021, Historical Med      metFORMIN (GLUCOPHAGE) 500 mg tablet Take 1 tablet (500 mg total) by mouth 2 (two) times a day with meals, Starting Mon 8/31/2020, Until Tue 9/20/2022, Normal      naproxen (NAPROSYN) 375 mg tablet Take 1 tablet (375 mg total) by mouth 2 (two) times a day with meals, Starting Tue 8/30/2022, Until Thu 9/29/2022, Normal      pantoprazole (PROTONIX) 40 mg tablet Take 1 tablet (40 mg total) by mouth daily, Starting Tue 9/20/2022, Normal      rosuvastatin (CRESTOR) 5 mg tablet Take 1 tablet (5 mg total) by mouth daily, Starting Tue 9/20/2022, Normal           No discharge procedures on file  PDMP Review       Value Time User    PDMP Reviewed  Yes 8/24/2022  2:53 AM Suyapa Morley MD           ED Provider  Attending physically available and evaluated Sepideh Adams  HEIDI managed the patient along with the ED Attending      Electronically Signed by         Haley Jones DO  01/27/23 0715

## 2023-01-27 NOTE — ED ATTENDING ATTESTATION
1/26/2023  ISusu MD, saw and evaluated the patient  I have discussed the patient with the resident/non-physician practitioner and agree with the resident's/non-physician practitioner's findings, Plan of Care, and MDM as documented in the resident's/non-physician practitioner's note, except where noted  All available labs and Radiology studies were reviewed  I was present for key portions of any procedure(s) performed by the resident/non-physician practitioner and I was immediately available to provide assistance  At this point I agree with the current assessment done in the Emergency Department    I have conducted an independent evaluation of this patient a history and physical is as follows:    ED Course         Critical Care Time  Procedures

## 2023-01-28 LAB
ATRIAL RATE: 111 BPM
P AXIS: 69 DEGREES
PR INTERVAL: 144 MS
QRS AXIS: 32 DEGREES
QRSD INTERVAL: 74 MS
QT INTERVAL: 326 MS
QTC INTERVAL: 443 MS
T WAVE AXIS: 57 DEGREES
VENTRICULAR RATE: 111 BPM

## 2023-01-28 RX ORDER — CIPROFLOXACIN 250 MG/1
250 TABLET, FILM COATED ORAL EVERY 12 HOURS SCHEDULED
Qty: 6 TABLET | Refills: 0 | Status: SHIPPED | OUTPATIENT
Start: 2023-01-28 | End: 2023-01-29

## 2023-01-29 ENCOUNTER — APPOINTMENT (EMERGENCY)
Dept: CT IMAGING | Facility: HOSPITAL | Age: 48
End: 2023-01-29

## 2023-01-29 ENCOUNTER — HOSPITAL ENCOUNTER (OUTPATIENT)
Facility: HOSPITAL | Age: 48
Setting detail: OBSERVATION
Discharge: HOME | End: 2023-01-31
Attending: EMERGENCY MEDICINE | Admitting: HOSPITALIST

## 2023-01-29 ENCOUNTER — APPOINTMENT (EMERGENCY)
Dept: ULTRASOUND IMAGING | Facility: HOSPITAL | Age: 48
End: 2023-01-29

## 2023-01-29 DIAGNOSIS — N39.0 UTI (URINARY TRACT INFECTION): ICD-10-CM

## 2023-01-29 DIAGNOSIS — R73.9 HYPERGLYCEMIA: ICD-10-CM

## 2023-01-29 DIAGNOSIS — R81 GLUCOSURIA: ICD-10-CM

## 2023-01-29 DIAGNOSIS — R10.9 ABDOMINAL PAIN: Primary | ICD-10-CM

## 2023-01-29 DIAGNOSIS — R31.9 HEMATURIA: ICD-10-CM

## 2023-01-29 DIAGNOSIS — N83.209 OVARIAN CYST: ICD-10-CM

## 2023-01-29 DIAGNOSIS — R79.89 ELEVATED LACTIC ACID LEVEL: ICD-10-CM

## 2023-01-29 DIAGNOSIS — F33.2 MAJOR DEPRESSIVE DISORDER, RECURRENT SEVERE WITHOUT PSYCHOTIC FEATURES (HCC): ICD-10-CM

## 2023-01-29 PROBLEM — A41.9 SEVERE SEPSIS (HCC): Status: ACTIVE | Noted: 2023-01-29

## 2023-01-29 PROBLEM — R65.20 SEVERE SEPSIS (HCC): Status: ACTIVE | Noted: 2023-01-29

## 2023-01-29 LAB
ALBUMIN SERPL BCP-MCNC: 3.7 G/DL (ref 3.5–5)
ALP SERPL-CCNC: 83 U/L (ref 34–104)
ALT SERPL W P-5'-P-CCNC: 30 U/L (ref 7–52)
ANION GAP SERPL CALCULATED.3IONS-SCNC: 11 MMOL/L (ref 4–13)
APTT PPP: 34 SECONDS (ref 23–37)
AST SERPL W P-5'-P-CCNC: 20 U/L (ref 13–39)
ATRIAL RATE: 106 BPM
BACTERIA UR CULT: ABNORMAL
BACTERIA UR QL AUTO: ABNORMAL /HPF
BASOPHILS # BLD AUTO: 0.06 THOUSANDS/ÂΜL (ref 0–0.1)
BASOPHILS NFR BLD AUTO: 1 % (ref 0–1)
BILIRUB SERPL-MCNC: 0.45 MG/DL (ref 0.2–1)
BILIRUB UR QL STRIP: NEGATIVE
BUN SERPL-MCNC: 20 MG/DL (ref 5–25)
CALCIUM SERPL-MCNC: 9.4 MG/DL (ref 8.4–10.2)
CHLORIDE SERPL-SCNC: 98 MMOL/L (ref 96–108)
CLARITY UR: CLEAR
CO2 SERPL-SCNC: 23 MMOL/L (ref 21–32)
COLOR UR: ABNORMAL
CREAT SERPL-MCNC: 0.99 MG/DL (ref 0.6–1.3)
EOSINOPHIL # BLD AUTO: 0.32 THOUSAND/ÂΜL (ref 0–0.61)
EOSINOPHIL NFR BLD AUTO: 4 % (ref 0–6)
ERYTHROCYTE [DISTWIDTH] IN BLOOD BY AUTOMATED COUNT: 13.2 % (ref 11.6–15.1)
EXT PREGNANCY TEST URINE: NEGATIVE
EXT. CONTROL: NORMAL
GFR SERPL CREATININE-BSD FRML MDRD: 67 ML/MIN/1.73SQ M
GLUCOSE SERPL-MCNC: 198 MG/DL (ref 65–140)
GLUCOSE SERPL-MCNC: 299 MG/DL (ref 65–140)
GLUCOSE SERPL-MCNC: 299 MG/DL (ref 65–140)
GLUCOSE SERPL-MCNC: 444 MG/DL (ref 65–140)
GLUCOSE UR STRIP-MCNC: ABNORMAL MG/DL
HCT VFR BLD AUTO: 42.1 % (ref 34.8–46.1)
HGB BLD-MCNC: 13.4 G/DL (ref 11.5–15.4)
HGB UR QL STRIP.AUTO: ABNORMAL
IMM GRANULOCYTES # BLD AUTO: 0.09 THOUSAND/UL (ref 0–0.2)
IMM GRANULOCYTES NFR BLD AUTO: 1 % (ref 0–2)
INR PPP: 0.88 (ref 0.84–1.19)
KETONES UR STRIP-MCNC: ABNORMAL MG/DL
LACTATE SERPL-SCNC: 1.7 MMOL/L (ref 0.5–2)
LACTATE SERPL-SCNC: 2.3 MMOL/L (ref 0.5–2)
LACTATE SERPL-SCNC: 2.9 MMOL/L (ref 0.5–2)
LEUKOCYTE ESTERASE UR QL STRIP: ABNORMAL
LIPASE SERPL-CCNC: 74 U/L (ref 11–82)
LYMPHOCYTES # BLD AUTO: 1.9 THOUSANDS/ÂΜL (ref 0.6–4.47)
LYMPHOCYTES NFR BLD AUTO: 22 % (ref 14–44)
MCH RBC QN AUTO: 28.9 PG (ref 26.8–34.3)
MCHC RBC AUTO-ENTMCNC: 31.8 G/DL (ref 31.4–37.4)
MCV RBC AUTO: 91 FL (ref 82–98)
MONOCYTES # BLD AUTO: 0.73 THOUSAND/ÂΜL (ref 0.17–1.22)
MONOCYTES NFR BLD AUTO: 9 % (ref 4–12)
MUCOUS THREADS UR QL AUTO: ABNORMAL
NEUTROPHILS # BLD AUTO: 5.42 THOUSANDS/ÂΜL (ref 1.85–7.62)
NEUTS SEG NFR BLD AUTO: 63 % (ref 43–75)
NITRITE UR QL STRIP: NEGATIVE
NON-SQ EPI CELLS URNS QL MICRO: ABNORMAL /HPF
NRBC BLD AUTO-RTO: 0 /100 WBCS
P AXIS: 55 DEGREES
PH UR STRIP.AUTO: 6 [PH]
PLATELET # BLD AUTO: 267 THOUSANDS/UL (ref 149–390)
PLATELET # BLD AUTO: 271 THOUSANDS/UL (ref 149–390)
PMV BLD AUTO: 10.1 FL (ref 8.9–12.7)
PMV BLD AUTO: 9.7 FL (ref 8.9–12.7)
POTASSIUM SERPL-SCNC: 4.2 MMOL/L (ref 3.5–5.3)
PR INTERVAL: 146 MS
PROCALCITONIN SERPL-MCNC: 0.08 NG/ML
PROT SERPL-MCNC: 7.2 G/DL (ref 6.4–8.4)
PROT UR STRIP-MCNC: NEGATIVE MG/DL
PROTHROMBIN TIME: 12.1 SECONDS (ref 11.6–14.5)
QRS AXIS: 62 DEGREES
QRSD INTERVAL: 76 MS
QT INTERVAL: 332 MS
QTC INTERVAL: 441 MS
RBC # BLD AUTO: 4.64 MILLION/UL (ref 3.81–5.12)
RBC #/AREA URNS AUTO: ABNORMAL /HPF
SODIUM SERPL-SCNC: 132 MMOL/L (ref 135–147)
SP GR UR STRIP.AUTO: 1.03 (ref 1–1.03)
T WAVE AXIS: 37 DEGREES
UROBILINOGEN UR STRIP-ACNC: 2 MG/DL
VENTRICULAR RATE: 106 BPM
WBC # BLD AUTO: 8.52 THOUSAND/UL (ref 4.31–10.16)
WBC #/AREA URNS AUTO: ABNORMAL /HPF

## 2023-01-29 RX ORDER — PANTOPRAZOLE SODIUM 40 MG/1
40 TABLET, DELAYED RELEASE ORAL DAILY
Status: DISCONTINUED | OUTPATIENT
Start: 2023-01-29 | End: 2023-01-31 | Stop reason: HOSPADM

## 2023-01-29 RX ORDER — HYDROMORPHONE HCL/PF 1 MG/ML
0.5 SYRINGE (ML) INJECTION ONCE
Status: COMPLETED | OUTPATIENT
Start: 2023-01-29 | End: 2023-01-29

## 2023-01-29 RX ORDER — ONDANSETRON 2 MG/ML
4 INJECTION INTRAMUSCULAR; INTRAVENOUS EVERY 6 HOURS PRN
Status: DISCONTINUED | OUTPATIENT
Start: 2023-01-29 | End: 2023-01-31 | Stop reason: HOSPADM

## 2023-01-29 RX ORDER — LAMOTRIGINE 100 MG/1
200 TABLET ORAL 2 TIMES DAILY
Status: DISCONTINUED | OUTPATIENT
Start: 2023-01-29 | End: 2023-01-31 | Stop reason: HOSPADM

## 2023-01-29 RX ORDER — LAMOTRIGINE 100 MG/1
150 TABLET ORAL 2 TIMES DAILY
Status: DISCONTINUED | OUTPATIENT
Start: 2023-01-29 | End: 2023-01-29

## 2023-01-29 RX ORDER — DESVENLAFAXINE 50 MG/1
100 TABLET, EXTENDED RELEASE ORAL DAILY
Status: DISCONTINUED | OUTPATIENT
Start: 2023-01-29 | End: 2023-01-31 | Stop reason: HOSPADM

## 2023-01-29 RX ORDER — INSULIN GLARGINE 100 [IU]/ML
64 INJECTION, SOLUTION SUBCUTANEOUS
Status: DISCONTINUED | OUTPATIENT
Start: 2023-01-29 | End: 2023-01-31 | Stop reason: HOSPADM

## 2023-01-29 RX ORDER — LORAZEPAM 0.5 MG/1
0.5 TABLET ORAL EVERY 8 HOURS PRN
Status: DISCONTINUED | OUTPATIENT
Start: 2023-01-29 | End: 2023-01-31 | Stop reason: HOSPADM

## 2023-01-29 RX ORDER — ONDANSETRON 2 MG/ML
4 INJECTION INTRAMUSCULAR; INTRAVENOUS ONCE
Status: COMPLETED | OUTPATIENT
Start: 2023-01-29 | End: 2023-01-29

## 2023-01-29 RX ORDER — ESCITALOPRAM OXALATE 20 MG/1
20 TABLET ORAL DAILY
Status: DISCONTINUED | OUTPATIENT
Start: 2023-01-29 | End: 2023-01-29

## 2023-01-29 RX ORDER — DESVENLAFAXINE 50 MG/1
50 TABLET, EXTENDED RELEASE ORAL DAILY
Status: DISCONTINUED | OUTPATIENT
Start: 2023-01-29 | End: 2023-01-29

## 2023-01-29 RX ORDER — LEVOFLOXACIN 5 MG/ML
750 INJECTION, SOLUTION INTRAVENOUS ONCE
Status: COMPLETED | OUTPATIENT
Start: 2023-01-29 | End: 2023-01-29

## 2023-01-29 RX ORDER — OXYCODONE HYDROCHLORIDE 5 MG/1
5 TABLET ORAL EVERY 4 HOURS PRN
Status: DISCONTINUED | OUTPATIENT
Start: 2023-01-29 | End: 2023-01-31 | Stop reason: HOSPADM

## 2023-01-29 RX ORDER — PRAVASTATIN SODIUM 40 MG
40 TABLET ORAL
Status: DISCONTINUED | OUTPATIENT
Start: 2023-01-29 | End: 2023-01-31 | Stop reason: HOSPADM

## 2023-01-29 RX ORDER — NYSTATIN 100000 [USP'U]/G
POWDER TOPICAL 2 TIMES DAILY
Status: DISCONTINUED | OUTPATIENT
Start: 2023-01-29 | End: 2023-01-31 | Stop reason: HOSPADM

## 2023-01-29 RX ORDER — HYDROMORPHONE HCL/PF 1 MG/ML
0.5 SYRINGE (ML) INJECTION ONCE AS NEEDED
Status: DISCONTINUED | OUTPATIENT
Start: 2023-01-29 | End: 2023-01-31 | Stop reason: HOSPADM

## 2023-01-29 RX ORDER — INSULIN LISPRO 100 [IU]/ML
16 INJECTION, SOLUTION INTRAVENOUS; SUBCUTANEOUS
Status: DISCONTINUED | OUTPATIENT
Start: 2023-01-29 | End: 2023-01-31 | Stop reason: HOSPADM

## 2023-01-29 RX ORDER — ACETAMINOPHEN 325 MG/1
650 TABLET ORAL EVERY 6 HOURS PRN
Status: DISCONTINUED | OUTPATIENT
Start: 2023-01-29 | End: 2023-01-31 | Stop reason: HOSPADM

## 2023-01-29 RX ORDER — ENOXAPARIN SODIUM 100 MG/ML
60 INJECTION SUBCUTANEOUS 2 TIMES DAILY
Status: DISCONTINUED | OUTPATIENT
Start: 2023-01-29 | End: 2023-01-31 | Stop reason: HOSPADM

## 2023-01-29 RX ORDER — SODIUM CHLORIDE 9 MG/ML
125 INJECTION, SOLUTION INTRAVENOUS CONTINUOUS
Status: DISCONTINUED | OUTPATIENT
Start: 2023-01-29 | End: 2023-01-30

## 2023-01-29 RX ORDER — LISINOPRIL 20 MG/1
20 TABLET ORAL DAILY
Status: DISCONTINUED | OUTPATIENT
Start: 2023-01-29 | End: 2023-01-31 | Stop reason: HOSPADM

## 2023-01-29 RX ORDER — ONDANSETRON 2 MG/ML
4 INJECTION INTRAMUSCULAR; INTRAVENOUS ONCE AS NEEDED
Status: DISCONTINUED | OUTPATIENT
Start: 2023-01-29 | End: 2023-01-31 | Stop reason: HOSPADM

## 2023-01-29 RX ADMIN — HYDROMORPHONE HYDROCHLORIDE 0.5 MG: 1 INJECTION, SOLUTION INTRAMUSCULAR; INTRAVENOUS; SUBCUTANEOUS at 07:58

## 2023-01-29 RX ADMIN — LEVOFLOXACIN 750 MG: 750 INJECTION, SOLUTION INTRAVENOUS at 07:26

## 2023-01-29 RX ADMIN — PRAVASTATIN SODIUM 40 MG: 40 TABLET ORAL at 17:31

## 2023-01-29 RX ADMIN — INSULIN HUMAN 4 UNITS: 100 INJECTION, SOLUTION PARENTERAL at 09:30

## 2023-01-29 RX ADMIN — SODIUM CHLORIDE 125 ML/HR: 0.9 INJECTION, SOLUTION INTRAVENOUS at 14:07

## 2023-01-29 RX ADMIN — PANTOPRAZOLE SODIUM 40 MG: 40 TABLET, DELAYED RELEASE ORAL at 13:58

## 2023-01-29 RX ADMIN — INSULIN LISPRO 16 UNITS: 100 INJECTION, SOLUTION INTRAVENOUS; SUBCUTANEOUS at 17:31

## 2023-01-29 RX ADMIN — SODIUM CHLORIDE 125 ML/HR: 0.9 INJECTION, SOLUTION INTRAVENOUS at 20:57

## 2023-01-29 RX ADMIN — ENOXAPARIN SODIUM 60 MG: 60 INJECTION SUBCUTANEOUS at 13:58

## 2023-01-29 RX ADMIN — ENOXAPARIN SODIUM 60 MG: 60 INJECTION SUBCUTANEOUS at 17:33

## 2023-01-29 RX ADMIN — LISINOPRIL 20 MG: 20 TABLET ORAL at 13:58

## 2023-01-29 RX ADMIN — DESVENLAFAXINE 100 MG: 50 TABLET, FILM COATED, EXTENDED RELEASE ORAL at 15:02

## 2023-01-29 RX ADMIN — INSULIN GLARGINE 64 UNITS: 100 INJECTION, SOLUTION SUBCUTANEOUS at 21:37

## 2023-01-29 RX ADMIN — OXYCODONE HYDROCHLORIDE 5 MG: 5 TABLET ORAL at 21:00

## 2023-01-29 RX ADMIN — INFLUENZA VIRUS VACCINE 0.5 ML: 15; 15; 15; 15 SUSPENSION INTRAMUSCULAR at 14:44

## 2023-01-29 RX ADMIN — SODIUM CHLORIDE 1000 ML: 0.9 INJECTION, SOLUTION INTRAVENOUS at 05:41

## 2023-01-29 RX ADMIN — LAMOTRIGINE 200 MG: 100 TABLET ORAL at 17:31

## 2023-01-29 RX ADMIN — SODIUM CHLORIDE 125 ML/HR: 0.9 INJECTION, SOLUTION INTRAVENOUS at 08:55

## 2023-01-29 RX ADMIN — HYDROMORPHONE HYDROCHLORIDE 0.5 MG: 1 INJECTION, SOLUTION INTRAMUSCULAR; INTRAVENOUS; SUBCUTANEOUS at 05:41

## 2023-01-29 RX ADMIN — CARIPRAZINE 1.5 MG: 1.5 CAPSULE, GELATIN COATED ORAL at 19:36

## 2023-01-29 RX ADMIN — ONDANSETRON 4 MG: 2 INJECTION INTRAMUSCULAR; INTRAVENOUS at 11:03

## 2023-01-29 RX ADMIN — IOHEXOL 100 ML: 350 INJECTION, SOLUTION INTRAVENOUS at 06:26

## 2023-01-29 RX ADMIN — OXYCODONE HYDROCHLORIDE 5 MG: 5 TABLET ORAL at 15:45

## 2023-01-29 RX ADMIN — INSULIN LISPRO 16 UNITS: 100 INJECTION, SOLUTION INTRAVENOUS; SUBCUTANEOUS at 14:17

## 2023-01-29 NOTE — ASSESSMENT & PLAN NOTE
Lab Results   Component Value Date    HGBA1C 7 8 (H) 12/14/2022       No results for input(s): POCGLU in the last 72 hours  Blood Sugar Average: Last 72 hrs:  · Presents with a blood sugar in the 400s, possibly worsened due to active infection  · A1c is 7 8  · Continue Lantus 64 units nightly, Humalog 16 units 3 times daily with meals

## 2023-01-29 NOTE — ASSESSMENT & PLAN NOTE
• SIRS criteria: Tachypnea, tachycardia  • Suspected source: UTI  • Lactic acid: Initial 2 9, repeat 2 3  Trend until normal   • End organ damage: Lactic acidosis  • IV Fluids: Normal saline at 125 mL/h  • IV antibiotics: IV ceftriaxone day 1  • Follow up on culture results  • Monitor vital signs, laboratory studies

## 2023-01-29 NOTE — ED ATTENDING ATTESTATION
1/29/2023  I, Flaquita Arredondo MD, saw and evaluated the patient  I have discussed the patient with the resident/non-physician practitioner and agree with the resident's/non-physician practitioner's findings, Plan of Care, and MDM as documented in the resident's/non-physician practitioner's note, except where noted  All available labs and Radiology studies were reviewed  I was present for key portions of any procedure(s) performed by the resident/non-physician practitioner and I was immediately available to provide assistance  At this point I agree with the current assessment done in the Emergency Department  I have conducted an independent evaluation of this patient a history and physical is as follows:  Patient is a 50year old female with a few days of worsening lower abdominal pain and dysuria and frequency and urgency and urinary incontinence  No fever  No N/V/D  Has had prior ccy  Patient is menopausal  Was last seen in this ED on 1/26/23 for abdominal pain and patient has UTI with GNR and cipro 250 mg Rx was called in for her and patient has taken one dose  No cough  No travel  [facility] and last Rx filled was on 1/19/23 for ativan for 10 day supply  NCAT  Moist mucous membranes  Lungs clear  Tachycardia without murmur  Abdomen soft  No guarding or rebound  LLQ and suprapubic tenderness greater than RLQ tenderness  (+) LE edema  No rash or jaundice noted  DDx including but not limited to: appendicitis, gastroenteritis, gastritis, PUD, GERD, gastroparesis, hepatitis, pancreatitis, colitis, enteritis, food poisoning, mesenteric adenitis, epiploic appendagitis, IBD, IBS, ileus, bowel obstruction, volvulus, choledocholithiasis, perforated viscus, tumor, splenic etiology, diverticulitis, internal hernia, constipation, pelvic pathology, renal colic, pyelonephritis, UTI  Will check labs and CT and give IV levaquin for UTI       ED Course         Critical Care Time  Procedures

## 2023-01-29 NOTE — ED PROVIDER NOTES
History  Chief Complaint   Patient presents with   • Pelvic Pain     Pt complains of pelvic pain for the last 4 days  Pt was seen 2 days ago and was called with results that she had ecoli in the urine  Pt reports worsening pelvic pain and incontinence as well as pain with urination  Junie Cooper is a 50year old female presenting for evaluation of 4 days of lower abdominal/pelvic pain with associated urinary frequency, urgency, urinary incontinence, dysuria  Patient was evaluated in the emergency department 2 days ago for the same symptoms, she had a full work-up done at that time which was unremarkable and she was discharged  She has had worsening in her symptoms and pain since that time  Yesterday, her urine culture grew gram-negative rods, suspected E  coli, she was sent a prescription of antibiotics, she only was able to take 1 dose prior to coming to the emergency department this morning  She denies any nausea or vomiting  No changes in bowel movements  No fevers or chills  Patient has a history of nephrolithiasis but not obstructive uropathy  She denies any vaginal discharge  History provided by:  Patient   used: No    Pelvic Pain  Associated symptoms: abdominal pain    Associated symptoms: no chest pain, no cough, no ear pain, no fever, no nausea, no rash, no shortness of breath, no sore throat and no vomiting        Prior to Admission Medications   Prescriptions Last Dose Informant Patient Reported? Taking?    Brexpiprazole (REXULTI) 3 MG tablet   No No   Sig: Take 1 tablet (3 mg total) by mouth in the morning   HumaLOG KwikPen 100 units/mL injection pen   Yes No   Sig: INJECT 16 UNITS 3 TIMES A DAY BEFORE MEALS   LORazepam (ATIVAN) 0 5 mg tablet   Yes No   cholecalciferol (VITAMIN D3) 1,000 units tablet   No No   Sig: Take 2 tablets (2,000 Units total) by mouth daily   ciprofloxacin (CIPRO) 250 mg tablet   No No   Sig: Take 1 tablet (250 mg total) by mouth every 12 (twelve) hours for 3 days   clotrimazole-betamethasone (LOTRISONE) 1-0 05 % cream   No No   Sig: Apply to affected area 2 times daily prn   escitalopram (LEXAPRO) 20 mg tablet   No No   Sig: Take 1 tablet (20 mg total) by mouth daily   insulin glargine (LANTUS) 100 units/mL subcutaneous injection   No No   Sig: Inject 64 Units under the skin daily at bedtime   lamoTRIgine (LaMICtal) 150 MG tablet   No No   Sig: Take 1 tablet (150 mg total) by mouth 2 (two) times a day   liraglutide (Victoza) injection   No No   Sig: Inject 1 8 MG daily  lisinopril (ZESTRIL) 10 mg tablet   No No   Sig: Take 2 tablets (20 mg total) by mouth daily   metFORMIN (GLUCOPHAGE) 500 mg tablet   No No   Sig: Take 1 tablet (500 mg total) by mouth 2 (two) times a day with meals   naproxen (NAPROSYN) 375 mg tablet   No No   Sig: Take 1 tablet (375 mg total) by mouth 2 (two) times a day with meals   pantoprazole (PROTONIX) 40 mg tablet   No No   Sig: Take 1 tablet (40 mg total) by mouth daily   rosuvastatin (CRESTOR) 5 mg tablet   No No   Sig: Take 1 tablet (5 mg total) by mouth daily      Facility-Administered Medications: None       Past Medical History:   Diagnosis Date   • Anemia    • Anxiety    • Candidal intertrigo    • Depression    • Diabetes mellitus (HCC)    • GERD (gastroesophageal reflux disease)    • Hyperlipidemia    • Hypertension    • Irritable bowel syndrome    • Morbid obesity with BMI of 60 0-69 9, adult (HCC)    • Osteoarthritis    • Seasonal allergies    • Sleep difficulties    • Suicide attempt [facility])        Past Surgical History:   Procedure Laterality Date   •  SECTION     • CHOLECYSTECTOMY     • WISDOM TOOTH EXTRACTION         Family History   Problem Relation Age of Onset   • Diabetes Mother    • Hypertension Father      I have reviewed and agree with the history as documented      E-Cigarette/Vaping   • E-Cigarette Use Never User      E-Cigarette/Vaping Substances   • Nicotine No    • THC No    • CBD No    • Flavoring No    • Other No    • Unknown No      Social History     Tobacco Use   • Smoking status: Never   • Smokeless tobacco: Never   Vaping Use   • Vaping Use: Never used   Substance Use Topics   • Alcohol use: Not Currently     Comment: occassionaly    • Drug use: No        Review of Systems   Constitutional: Negative for chills and fever  HENT: Negative for ear pain and sore throat  Eyes: Negative for pain and visual disturbance  Respiratory: Negative for cough and shortness of breath  Cardiovascular: Negative for chest pain and palpitations  Gastrointestinal: Positive for abdominal pain  Negative for nausea and vomiting  Genitourinary: Positive for frequency, pelvic pain and urgency  Negative for dysuria, flank pain and hematuria  Urinary incontinence   Musculoskeletal: Negative for arthralgias and back pain  Skin: Negative for color change and rash  Neurological: Negative for seizures and syncope  All other systems reviewed and are negative  Physical Exam  ED Triage Vitals   Temperature Pulse Respirations Blood Pressure SpO2   01/29/23 0437 01/29/23 0435 01/29/23 0435 01/29/23 0435 01/29/23 0435   98 2 °F (36 8 °C) (!) 109 18 170/82 96 %      Temp Source Heart Rate Source Patient Position - Orthostatic VS BP Location FiO2 (%)   01/29/23 0435 01/29/23 0435 01/29/23 0545 01/29/23 0545 --   Oral Monitor Lying Left arm       Pain Score       01/29/23 0435       10 - Worst Possible Pain             Orthostatic Vital Signs  Vitals:    01/29/23 0435 01/29/23 0545 01/29/23 0745   BP: 170/82 134/69 139/73   Pulse: (!) 109 104 (!) 106   Patient Position - Orthostatic VS:  Lying Sitting       Physical Exam  Vitals and nursing note reviewed  Constitutional:       Appearance: She is not ill-appearing  HENT:      Head: Normocephalic and atraumatic  Mouth/Throat:      Mouth: Mucous membranes are moist       Pharynx: Oropharynx is clear  Eyes:      General: No scleral icterus  Conjunctiva/sclera: Conjunctivae normal    Cardiovascular:      Rate and Rhythm: Regular rhythm  Tachycardia present  Heart sounds: Normal heart sounds  Pulmonary:      Effort: Pulmonary effort is normal  No respiratory distress  Breath sounds: Normal breath sounds  No wheezing, rhonchi or rales  Abdominal:      General: Abdomen is flat  There is no distension  Palpations: Abdomen is soft  Tenderness: There is abdominal tenderness in the right lower quadrant, suprapubic area and left lower quadrant  There is no right CVA tenderness, left CVA tenderness, guarding or rebound  Hernia: No hernia is present  Musculoskeletal:         General: No tenderness or signs of injury  Cervical back: Neck supple  No rigidity  Skin:     General: Skin is warm  Coloration: Skin is not jaundiced  Findings: No erythema or rash  Neurological:      General: No focal deficit present  Mental Status: She is alert  Mental status is at baseline  Psychiatric:         Mood and Affect: Mood normal          Behavior: Behavior normal          ED Medications  Medications   levofloxacin (LEVAQUIN) IVPB (premix in dextrose) 750 mg 150 mL (750 mg Intravenous New Bag 1/29/23 0726)   sodium chloride 0 9 % infusion (has no administration in time range)   HYDROmorphone (DILAUDID) injection 0 5 mg (0 5 mg Intravenous Given 1/29/23 0541)   sodium chloride 0 9 % bolus 1,000 mL (1,000 mL Intravenous New Bag 1/29/23 0541)   iohexol (OMNIPAQUE) 350 MG/ML injection (SINGLE-DOSE) 100 mL (100 mL Intravenous Given 1/29/23 0626)   HYDROmorphone (DILAUDID) injection 0 5 mg (0 5 mg Intravenous Given 1/29/23 0758)       Diagnostic Studies  Results Reviewed     Procedure Component Value Units Date/Time    Urine culture [[id number]] Collected: 01/29/23 6277    Lab Status:  In process Specimen: Urine, Clean Catch Updated: 01/29/23 0836    Urine Microscopic [[id number]]  (Abnormal) Collected: 01/29/23 0623    Lab Status: Final result Specimen: Urine, Clean Catch Updated: 01/29/23 0830     RBC, UA 10-20 /hpf      WBC, UA 4-10 /hpf      Epithelial Cells Occasional /hpf      Bacteria, UA Occasional /hpf      MUCUS THREADS Occasional    UA w Reflex to Microscopic w Reflex to Culture [[id number]]  (Abnormal) Collected: 01/29/23 0623    Lab Status: Final result Specimen: Urine, Clean Catch Updated: 01/29/23 0824     Color, UA Light Yellow     Clarity, UA Clear     Specific Gravity, UA 1 035     pH, UA 6 0     Leukocytes, UA Elevated glucose may cause decreased leukocyte values  See urine microscopic for [facility] result/     Nitrite, UA Negative     Protein, UA Negative mg/dl      Glucose, UA >=1000 (1%) mg/dl      Ketones, UA Trace mg/dl      Urobilinogen, UA 2 0 mg/dl      Bilirubin, UA Negative     Occult Blood, UA Small    Procalcitonin [[id number]]  (Normal) Collected: 01/29/23 0518    Lab Status: Final result Specimen: Blood from Arm, Right Updated: 01/29/23 0631     Procalcitonin 0 08 ng/ml     Chlamydia/GC amplified DNA by PCR [[id number]] Collected: 01/29/23 0624    Lab Status: In process Specimen: Urine, Other Updated: 01/29/23 0630    Lactic acid [[id number]]  (Abnormal) Collected: 01/29/23 0518    Lab Status: Final result Specimen: Blood from Arm, Right Updated: 01/29/23 0486     LACTIC ACID 2 9 mmol/L     Narrative:      Result may be elevated if tourniquet was used during collection      Lactic acid 2 Hours [[id number]]     Lab Status: No result Specimen: Blood     POCT pregnancy, urine [[id number]]  (Normal) Resulted: 01/29/23 0625    Lab Status: Final result Updated: 01/29/23 0625     EXT Preg Test, Ur Negative     Control Valid    Lipase [[id number]]  (Normal) Collected: 01/29/23 0518    Lab Status: Final result Specimen: Blood from Arm, Right Updated: 01/29/23 0617     Lipase 74 u/L     Comprehensive metabolic panel [[id number]]  (Abnormal) Collected: 01/29/23 0518    Lab Status: Final result Specimen: Blood from Arm, Right Updated: 01/29/23 0617     Sodium 132 mmol/L      Potassium 4 2 mmol/L      Chloride 98 mmol/L      CO2 23 mmol/L      ANION GAP 11 mmol/L      BUN 20 mg/dL      Creatinine 0 99 mg/dL      Glucose 444 mg/dL      Calcium 9 4 mg/dL      AST 20 U/L      ALT 30 U/L      Alkaline Phosphatase 83 U/L      Total Protein 7 2 g/dL      Albumin 3 7 g/dL      Total Bilirubin 0 45 mg/dL      eGFR 67 ml/min/1 73sq m     Narrative:      [place] guidelines for Chronic Kidney Disease (CKD):   •  Stage 1 with normal or high GFR (GFR > 90 mL/min/1 73 square meters)  •  Stage 2 Mild CKD (GFR = 60-89 mL/min/1 73 square meters)  •  Stage 3A Moderate CKD (GFR = 45-59 mL/min/1 73 square meters)  •  Stage 3B Moderate CKD (GFR = 30-44 mL/min/1 73 square meters)  •  Stage 4 Severe CKD (GFR = 15-29 mL/min/1 73 square meters)  •  Stage 5 End Stage CKD (GFR <15 mL/min/1 73 square meters)  Note: GFR calculation is accurate only with a steady state creatinine    Protime-INR [[id number]]  (Normal) Collected: 01/29/23 0518    Lab Status: Final result Specimen: Blood from Arm, Right Updated: 01/29/23 0609     Protime 12 1 seconds      INR 0 88    APTT [[id number]]  (Normal) Collected: 01/29/23 0518    Lab Status: Final result Specimen: Blood from Arm, Right Updated: 01/29/23 0609     PTT 34 seconds     CBC and differential [[id number]] Collected: 01/29/23 0518    Lab Status: Final result Specimen: Blood from Arm, Right Updated: 01/29/23 0556     WBC 8 52 Thousand/uL      RBC 4 64 Million/uL      Hemoglobin 13 4 g/dL      Hematocrit 42 1 %      MCV 91 fL      MCH 28 9 pg      MCHC 31 8 g/dL      RDW 13 2 %      MPV 10 1 fL      Platelets 541 Thousands/uL      nRBC 0 /100 WBCs      Neutrophils Relative 63 %      Immat GRANS % 1 %      Lymphocytes Relative 22 %      Monocytes Relative 9 %      Eosinophils Relative 4 %      Basophils Relative 1 %      Neutrophils Absolute 5 42 Thousands/µL      Immature Grans Absolute 0 09 Thousand/uL Lymphocytes Absolute 1 90 Thousands/µL      Monocytes Absolute 0 73 Thousand/µL      Eosinophils Absolute 0 32 Thousand/µL      Basophils Absolute 0 06 Thousands/µL     Blood culture #2 [[id number]] Collected: 01/29/23 0518    Lab Status: In process Specimen: Blood from Arm, Right Updated: 01/29/23 0537    Blood culture #1 [[id number]] Collected: 01/29/23 0525    Lab Status: In process Specimen: Blood from Arm, Left Updated: 01/29/23 0537                 CT abdomen pelvis with contrast   Final Result by Pavan West MD (01/29 1888)      There is an approximately 2 9 x 2 4 cm hypodense structure in the left adnexal region  This may possibly represent a left ovarian cyst   If clinically appropriate, pelvic ultrasound could be performed for further evaluation  The urinary bladder wall appears somewhat thickened  Reportedly, the patient has a urinary tract infection  Bilateral nephrolithiasis  No evidence of obstructive uropathy  No hydronephrosis  Hepatomegaly  Mild hepatic steatosis  Other nonemergent findings as above              Workstation performed: [id number]         US pelvis complete w transvaginal    (Results Pending)         Procedures  ECG 12 Lead Documentation Only    Date/Time: 1/29/2023 5:39 AM  Performed by: Ken Rueda DO  Authorized by: Ken Rueda DO     Indications / Diagnosis:  Tachycardia  ECG reviewed by me, the ED Provider: yes    Patient location:  ED  Previous ECG:     Previous ECG:  Compared to current    Similarity:  No change  Interpretation:     Interpretation: normal    Rate:     ECG rate:  106    ECG rate assessment: tachycardic    Rhythm:     Rhythm: sinus rhythm    Ectopy:     Ectopy: none    QRS:     QRS axis:  Normal    QRS intervals:  Normal  Conduction:     Conduction: normal    ST segments:     ST segments:  Normal  T waves:     T waves: normal    Comments:      Sinus tachycardia unchanged from prior          ED Course  ED Course as of 01/29/23 6541   Ludwig Rang Jan 29, 2023   0737 CT of the abdomen and pelvis showed a hypodense structure in the adnexal region, this may represent an ovarian cyst but was difficult to tell, we will proceed with a transvaginal ultrasound for further evaluation                             SBIRT 22yo+    Flowsheet Row Most Recent Value   SBIRT (25 yo +)    In order to provide better care to our patients, we are screening all of our patients for alcohol and drug use  Would it be okay to ask you these screening questions? Unable to answer at this time Filed at: 01/29/2023 Krys Bellamy is a 50year old female presenting for evaluation of 4 days of lower abdominal/pelvic pain with associated urinary frequency, urgency, urinary incontinence, dysuria  Patient was evaluated in the emergency department 2 days ago for the same complaint  Work-up at that time was negative  Her urine was sent for culture during that examination, grew E  coli, she was informed of this yesterday, she took 1 dose of ciprofloxacin prior to arrival to the ED  Patient was not in significant distress on examination, not ill-appearing  She was tachycardic, afebrile  Tenderness to palpation of her lower abdomen/pannus, more significant on the left  I have high suspicion that she has worsening of her urinary tract infection, possible development of pyelonephritis, abscess  Blood work showed elevated lactic acid at 2 9, patient was given IV fluids  Patient also hyperglycemic in the 400s, her baseline glucose levels appear to be in the 300s  CT of the abdomen and pelvis showed a hypodense structure in the left adnexal region which may represent a left ovarian cyst, recommended further work-up with a pelvic ultrasound  Pelvic ultrasound was pending  Patient signed out to Dr Melanie Costa       Abdominal pain: acute illness or injury  Ovarian cyst: undiagnosed new problem with uncertain prognosis  UTI (urinary tract infection): acute illness or injury  Amount and/or Complexity of Data Reviewed  Labs: ordered  Radiology: ordered  Risk  Prescription drug management  Disposition  Final diagnoses:   UTI (urinary tract infection)   Abdominal pain   Ovarian cyst     Time reflects when diagnosis was documented in both MDM as applicable and the Disposition within this note     Time User Action Codes Description Comment    1/29/2023  8:36 AM Tho Krishna Add [N39 0] UTI (urinary tract infection)     1/29/2023  8:36 AM Tho Krishna Add [R10 9] Abdominal pain     1/29/2023  8:36 AM Tho Krishna Add [N83 209] Ovarian cyst     1/29/2023  8:36 AM Tho Krishna Modify [N39 0] UTI (urinary tract infection)     1/29/2023  8:36 AM Tho Krishna Modify [R10 9] Abdominal pain       ED Disposition     None      Follow-up Information    None         Patient's Medications   Discharge Prescriptions    No medications on file     No discharge procedures on file  PDMP Review       Value Time User    PDMP Reviewed  Yes 1/29/2023  4:14 AM Bridget Jernigan MD           ED Provider  Attending physically available and evaluated Mis Lopez I managed the patient along with the ED Attending      Electronically Signed by         Ken Rueda DO  01/29/23 0757

## 2023-01-29 NOTE — H&P
[address] 1975, 50 y o  female MRN: [id number]  Unit/Bed#: S -01 Encounter: [id number]  Primary Care Provider: SUJATA Torre   Date and time admitted to hospital: 1/29/2023  4:30 AM    * UTI (urinary tract infection)  Assessment & Plan  · Presents with lower abdominal pain, found to have a UTI in the ER 3 days prior to admission  She was only able to take 1 dose of antibiotic  · Started on IV ceftriaxone and tailor according to urine culture results  Severe sepsis (HCC)  Assessment & Plan  • SIRS criteria: Tachypnea, tachycardia  • Suspected source: UTI  • Lactic acid: Initial 2 9, repeat 2 3  Trend until normal   • End organ damage: Lactic acidosis  • IV Fluids: Normal saline at 125 mL/h  • IV antibiotics: IV ceftriaxone day 1  • Follow up on culture results  • Monitor vital signs, laboratory studies  Class 3 severe obesity due to excess calories with body mass index (BMI) of 60 0 to 69 9 in adult [facility])  Assessment & Plan  · Body mass index is 67 57 kg/m²  · Needs diet and weight loss      Essential hypertension  Assessment & Plan  · BP stable  · Continue lisinopril  Type 2 diabetes mellitus with complication, with long-term current use of insulin ([facility])  Assessment & Plan  Lab Results   Component Value Date    HGBA1C 7 8 (H) 12/14/2022       No results for input(s): POCGLU in the last 72 hours  Blood Sugar Average: Last 72 hrs:  · Presents with a blood sugar in the 400s, possibly worsened due to active infection  · A1c is 7 8  · Continue Lantus 64 units nightly, Humalog 16 units 3 times daily with meals  VTE Pharmacologic Prophylaxis: VTE Score: 5 High Risk (Score >/= 5) - Pharmacological DVT Prophylaxis Ordered: enoxaparin (Lovenox)  Sequential Compression Devices Ordered  Code Status: Level 1 - Full Code   Discussion with family: Updated  (significant other) at bedside      Anticipated Length of Stay: Patient will be admitted on an observation basis with an anticipated length of stay of less than 2 midnights secondary to UTI on IV abx  Total Time for Visit, including Counseling / Coordination of Care: 70 minutes Greater than 50% of this total time spent on direct patient counseling and coordination of care  Chief Complaint: abd pain    History of Present Illness:  Kristie Baker is a 50 y o  female presenting with UTI symptoms  Presented with lower abdominal pain and incontinence  She is typically not incontinent at baseline and came to the ER for evaluation on 1/26  Was found to have a UTI at that time but was not given antibiotics and only took 1 dose of antibiotics prior to coming in to the ER today  Has had episodes of vomiting and with severe pain will be admitted for observation overnight for IV antibiotics  Review of Systems:  Review of Systems   Constitutional: Negative for activity change, appetite change, chills, fatigue and fever  HENT: Negative for congestion, rhinorrhea, sinus pressure and sore throat  Eyes: Negative for photophobia, pain and visual disturbance  Respiratory: Negative for cough, shortness of breath and wheezing  Cardiovascular: Negative for chest pain, palpitations and leg swelling  Gastrointestinal: Negative for abdominal distention, abdominal pain, constipation, diarrhea, nausea and vomiting  Endocrine: Negative for cold intolerance, heat intolerance, polydipsia and polyuria  Genitourinary: Positive for frequency  Negative for difficulty urinating, dysuria, flank pain and hematuria  Musculoskeletal: Negative for arthralgias, back pain and joint swelling  Skin: Negative for color change, pallor and rash  Allergic/Immunologic: Negative  Neurological: Negative for dizziness, syncope, weakness, light-headedness and headaches  Hematological: Negative  Psychiatric/Behavioral: Negative          Past Medical and Surgical History:   Past Medical History:   Diagnosis Date   • Anemia    • Anxiety    • Candidal intertrigo    • Depression    • Diabetes mellitus ([facility] )    • GERD (gastroesophageal reflux disease)    • Hyperlipidemia    • Hypertension    • Irritable bowel syndrome    • Morbid obesity with BMI of 60 0-69 9, adult ([facility] )    • Osteoarthritis    • Seasonal allergies    • Sleep difficulties    • Suicide attempt [facility])        Past Surgical History:   Procedure Laterality Date   •  SECTION     • CHOLECYSTECTOMY     • WISDOM TOOTH EXTRACTION         Meds/Allergies:  Prior to Admission medications    Medication Sig Start Date End Date Taking? Authorizing Provider   Brexpiprazole (REXULTI) 3 MG tablet Take 1 tablet (3 mg total) by mouth in the morning 22  Geovany Beyer MD   cholecalciferol (VITAMIN D3) 1,000 units tablet Take 2 tablets (2,000 Units total) by mouth daily 22  Geovany Beyer MD   clotrimazole-betamethasone (LOTRISONE) 1-0 05 % cream Apply to affected area 2 times daily prn 22   Caitlin Lord PA-C   escitalopram (LEXAPRO) 20 mg tablet Take 1 tablet (20 mg total) by mouth daily 21  Devon Hagan DO   HumaLOG KwikPen 100 units/mL injection pen INJECT 16 UNITS 3 TIMES A DAY BEFORE MEALS 21   Historical Provider, MD   insulin glargine (LANTUS) 100 units/mL subcutaneous injection Inject 64 Units under the skin daily at bedtime 21   Sherin Estevez PA-C   lamoTRIgine (LaMICtal) 150 MG tablet Take 1 tablet (150 mg total) by mouth 2 (two) times a day 8/3/21 9/20/22  Markell Atkinson DO   liraglutide (Victoza) injection Inject 1 8 MG daily   21   SUJATA Mariano   lisinopril (ZESTRIL) 10 mg tablet Take 2 tablets (20 mg total) by mouth daily 22   Stacia Pardo MD   LORazepam (ATIVAN) 0 5 mg tablet  10/6/21   Historical Provider, MD   metFORMIN (GLUCOPHAGE) 500 mg tablet Take 1 tablet (500 mg total) by mouth 2 (two) times a day with meals 20  SUJATA Mariano   naproxen (NAPROSYN) 375 mg tablet Take 1 tablet (375 mg total) by mouth 2 (two) times a day with meals 8/30/22 9/29/22  Chantale Mackey MD   pantoprazole (PROTONIX) 40 mg tablet Take 1 tablet (40 mg total) by mouth daily 9/20/22   [facility] CRNP   rosuvastatin (CRESTOR) 5 mg tablet Take 1 tablet (5 mg total) by mouth daily 9/20/22   [facility] SUJATA Live   ciprofloxacin (CIPRO) 250 mg tablet Take 1 tablet (250 mg total) by mouth every 12 (twelve) hours for 3 days 1/28/23 1/29/23  Amanda Angelo PA-C     I have reviewed home medications with patient personally  Allergies: Allergies   Allergen Reactions   • Cephalexin Vomiting     Other reaction(s): Nausea and/or vomiting   • Insulin Degludec GI Intolerance   • Sulfamethoxazole-Trimethoprim Vomiting     Other reaction(s): Nausea and/or vomiting       Social History:  Marital Status:    Occupation: non-contrib  Patient Pre-hospital Living Situation: Home  Patient Pre-hospital Level of Mobility: walks  Patient Pre-hospital Diet Restrictions: diabetic  Substance Use History:   Social History     Substance and Sexual Activity   Alcohol Use Not Currently    Comment: occassionaly      Social History     Tobacco Use   Smoking Status Never   Smokeless Tobacco Never     Social History     Substance and Sexual Activity   Drug Use No       Family History:  Family History   Problem Relation Age of Onset   • Diabetes Mother    • Hypertension Father        Physical Exam:     Vitals:   Blood Pressure: 155/92 (01/29/23 1238)  Pulse: 90 (01/29/23 1200)  Temperature: 98 2 °F (36 8 °C) (01/29/23 1238)  Temp Source: Oral (01/29/23 0437)  Respirations: 16 (01/29/23 1238)  Height: 5' (152 4 cm) (01/29/23 0435)  Weight - Scale: (!) 157 kg (346 lb) (01/29/23 0435)  SpO2: 97 % (01/29/23 1200)    Physical Exam  Vitals and nursing note reviewed  Constitutional:       General: She is not in acute distress  Appearance: Normal appearance  She is obese     HENT:      Head: Normocephalic and atraumatic  Mouth/Throat:      Mouth: Mucous membranes are moist    Eyes:      General: No scleral icterus  Extraocular Movements: Extraocular movements intact  Pupils: Pupils are equal, round, and reactive to light  Cardiovascular:      Rate and Rhythm: Normal rate and regular rhythm  Heart sounds: Normal heart sounds  No murmur heard  No friction rub  Pulmonary:      Effort: Pulmonary effort is normal       Breath sounds: Normal breath sounds  No wheezing or rhonchi  Abdominal:      General: Bowel sounds are normal  There is no distension  Palpations: Abdomen is soft  Tenderness: There is abdominal tenderness (LLQ)  There is no guarding  Musculoskeletal:         General: No swelling  Cervical back: Neck supple  Right lower leg: No edema  Left lower leg: No edema  Skin:     General: Skin is warm and dry  Capillary Refill: Capillary refill takes less than 2 seconds  Coloration: Skin is not jaundiced or pale  Neurological:      General: No focal deficit present  Mental Status: She is alert and oriented to person, place, and time  Mental status is at baseline            Additional Data:     Lab Results:  Results from last 7 days   Lab Units 01/29/23  0518   WBC Thousand/uL 8 52   HEMOGLOBIN g/dL 13 4   HEMATOCRIT % 42 1   PLATELETS Thousands/uL 271   NEUTROS PCT % 63   LYMPHS PCT % 22   MONOS PCT % 9   EOS PCT % 4     Results from last 7 days   Lab Units 01/29/23  0518   SODIUM mmol/L 132*   POTASSIUM mmol/L 4 2   CHLORIDE mmol/L 98   CO2 mmol/L 23   BUN mg/dL 20   CREATININE mg/dL 0 99   ANION GAP mmol/L 11   CALCIUM mg/dL 9 4   ALBUMIN g/dL 3 7   TOTAL BILIRUBIN mg/dL 0 45   ALK PHOS U/L 83   ALT U/L 30   AST U/L 20   GLUCOSE RANDOM mg/dL 444*     Results from last 7 days   Lab Units 01/29/23  0518   INR  0 88             Results from last 7 days   Lab Units 01/29/23  0855 01/29/23  0518   LACTIC ACID mmol/L 2 3* 2 9*   PROCALCITONIN ng/ml --  0 08       Imaging: Reviewed radiology reports from this admission including: abdominal/pelvic CT  US pelvis complete w transvaginal   Final Result by Jahaira Dillon DO (01/29 5477)       1  Negative for left ovarian torsion  3 4 cm left ovarian simple cyst corresponding to earlier CT finding  Based on O - rads ultrasound risk stratification and management system this is consistent with Score 2 - almost certainly benign (< 1% chance of    malignancy)  If this patient is truly postmenopausal, follow-up ultrasound in 1 year is recommended  2   Endometrium measures 9 mm thickness which is increased in a truly postmenopausal female  Follow-up should be based on clinical grounds  3  Small uterine fibroid  4  Nonvisualization right ovary  Workstation performed: [id number]         CT abdomen pelvis with contrast   Final Result by Erum Bui MD (01/29 4455)      There is an approximately 2 9 x 2 4 cm hypodense structure in the left adnexal region  This may possibly represent a left ovarian cyst   If clinically appropriate, pelvic ultrasound could be performed for further evaluation  The urinary bladder wall appears somewhat thickened  Reportedly, the patient has a urinary tract infection  Bilateral nephrolithiasis  No evidence of obstructive uropathy  No hydronephrosis  Hepatomegaly  Mild hepatic steatosis  Other nonemergent findings as above  Workstation performed: [id number]             EKG and Other Studies Reviewed on Admission:   · EKG: No EKG obtained  ** Please Note: This note has been constructed using a voice recognition system   **

## 2023-01-29 NOTE — ASSESSMENT & PLAN NOTE
· Presents with lower abdominal pain, found to have a UTI in the ER 3 days prior to admission  She was only able to take 1 dose of antibiotic  · Started on IV ceftriaxone and tailor according to urine culture results

## 2023-01-29 NOTE — ED CARE HANDOFF
Emergency Department Sign Out Note        Sign out and transfer of care from [address] [place] [address]  See Separate Emergency Department note  The patient, Mis Lopez, was evaluated by the previous provider for [address] [place] [address]  4 days left lower quadrant abdominal pain, work-up -4 days ago  Urine positive for E  coli yesterday, given Cipro  Sepsis panel  Given Levaquin for cystitis  Hypoechoic region around left ovary on CT scan, advised for ultrasound  Lactic 2 9, given fluids  Workup Completed:  Sepsis panel  ED Course / Workup Pending (followup):  Pending transvaginal ultrasound for   Pending 2-hour lactic  Pending GC chlamydia  Plan to admit for continued worsening symptoms, pain, infection                                ED Course as of 01/29/23 1720   Sun Jan 29, 2023   1012 LACTIC ACID(!!): 2 3  Elevated 2h lactic     1013 IMPRESSION:     1  Negative for left ovarian torsion  3 4 cm left ovarian simple cyst corresponding to earlier CT finding  Based on O - rads ultrasound risk stratification and management system this is consistent with Score 2 - almost certainly benign (< 1% chance of   malignancy)  If this patient is truly postmenopausal, follow-up ultrasound in 1 year is recommended      2   Endometrium measures 9 mm thickness which is increased in a truly postmenopausal female  Follow-up should be based on clinical grounds      3  Small uterine fibroid      4  Nonvisualization right ovary      Workstation performed: [id number]     Procedures  Medical Decision Making  38-year-old female presents with 4 days of left lower quadrant abdominal pain with previous work-up 4 days ago  Urine positive for E  coli yesterday and has been started on Cipro  Sepsis panel ordered by previous ER attending and started on Levaquin by previous ER attending  CT scan was notable for hypoechoic region around the left ovary which was concerning for possible abscess at that time    Lactic was 2 9 and patient was started on fluids  Transvaginal ultrasound was noted for ovarian cysts, benign in nature, no torsion or abscess noted  2-hour lactate had improved to 2 3   GC still pending  Patient stated continued pain despite Dilaudid  Noted hyperglycemia, normal anion gap, patient started on insulin  No concerns for DKA or HHS at this time  Will admit to observation for continued pain control, control of hyperglycemia, cystitis  Abdominal pain: acute illness or injury  Elevated lactic acid level: acute illness or injury  Glucosuria: acute illness or injury  Hematuria: acute illness or injury  Hyperglycemia: acute illness or injury  Ovarian cyst: acute illness or injury  UTI (urinary tract infection): self-limited or minor problem  Amount and/or Complexity of Data Reviewed  Labs: ordered  Decision-making details documented in ED Course  Radiology: ordered  Risk  OTC drugs  Prescription drug management  Decision regarding hospitalization                Disposition  Final diagnoses:   UTI (urinary tract infection)   Abdominal pain   Ovarian cyst   Hyperglycemia   Hematuria   Glucosuria   Elevated lactic acid level     Time reflects when diagnosis was documented in both MDM as applicable and the Disposition within this note     Time User Action Codes Description Comment    1/29/2023  8:36 AM Tho Krishna Add [N39 0] UTI (urinary tract infection)     1/29/2023  8:36 AM Tho Krishna Add [R10 9] Abdominal pain     1/29/2023  8:36 AM Tho Krishna Add [N83 209] Ovarian cyst     1/29/2023  8:36 AM Tho Krishna Modify [N39 0] UTI (urinary tract infection)     1/29/2023  8:36 AM Tho Krishna Modify [R10 9] Abdominal pain     1/29/2023  9:23 AM Abby Mitchell Add [R73 9] Hyperglycemia     1/29/2023 10:58 AM Mendy Kerns Add [R31 9] Hematuria     1/29/2023 10:59 AM Georgina Kerns Add [R81] Glucosuria     1/29/2023 10:59 AM Mendy Kerns Add [R79 89] Elevated lactic acid level ED Disposition     ED Disposition   Admit    Condition   Stable    Date/Time   Sun Jan 29, 2023 11:32 AM    Comment   Case was discussed with Mariam Galvan and the patient's admission status was agreed to be Admission Status: observation status to the service of Dr Mariam Galvan   Follow-up Information    None       Current Discharge Medication List      CONTINUE these medications which have NOT CHANGED    Details   Brexpiprazole (REXULTI) 3 MG tablet Take 1 tablet (3 mg total) by mouth in the morning  Qty: 30 tablet, Refills: 0    Associated Diagnoses: Major depressive disorder, recurrent severe without psychotic features (HCC)      cholecalciferol (VITAMIN D3) 1,000 units tablet Take 2 tablets (2,000 Units total) by mouth daily  Qty: 60 tablet, Refills: 0    Associated Diagnoses: Major depressive disorder, recurrent severe without psychotic features ([facility] )      clotrimazole-betamethasone (LOTRISONE) 1-0 05 % cream Apply to affected area 2 times daily prn  Qty: 15 g, Refills: 0    Associated Diagnoses: Rash and nonspecific skin eruption      escitalopram (LEXAPRO) 20 mg tablet Take 1 tablet (20 mg total) by mouth daily  Qty: 30 tablet, Refills: 0    Associated Diagnoses: Major depressive disorder, recurrent severe without psychotic features (HCC)      HumaLOG KwikPen 100 units/mL injection pen INJECT 16 UNITS 3 TIMES A DAY BEFORE MEALS      insulin glargine (LANTUS) 100 units/mL subcutaneous injection Inject 64 Units under the skin daily at bedtime    Associated Diagnoses: Type 2 diabetes mellitus with complication, with long-term current use of insulin (HCC)      lamoTRIgine (LaMICtal) 150 MG tablet Take 1 tablet (150 mg total) by mouth 2 (two) times a day  Qty: 60 tablet, Refills: 0    Associated Diagnoses: Major depressive disorder, recurrent severe without psychotic features (HCC)      liraglutide (Victoza) injection Inject 1 8 MG daily    Qty: 3 pen, Refills: 3    Associated Diagnoses: Type II diabetes mellitus, well controlled (HCC)      lisinopril (ZESTRIL) 10 mg tablet Take 2 tablets (20 mg total) by mouth daily  Qty: 90 tablet, Refills: 0    Associated Diagnoses: Essential hypertension      LORazepam (ATIVAN) 0 5 mg tablet       metFORMIN (GLUCOPHAGE) 500 mg tablet Take 1 tablet (500 mg total) by mouth 2 (two) times a day with meals  Qty: 60 tablet, Refills: 4    Associated Diagnoses: Type 2 diabetes mellitus with complication, with long-term current use of insulin ([facility] ); Other fatigue      naproxen (NAPROSYN) 375 mg tablet Take 1 tablet (375 mg total) by mouth 2 (two) times a day with meals  Qty: 60 tablet, Refills: 0    Associated Diagnoses: Primary osteoarthritis of right knee      pantoprazole (PROTONIX) 40 mg tablet Take 1 tablet (40 mg total) by mouth daily  Qty: 90 tablet, Refills: 1    Associated Diagnoses: Gastroesophageal reflux disease without esophagitis      rosuvastatin (CRESTOR) 5 mg tablet Take 1 tablet (5 mg total) by mouth daily  Qty: 90 tablet, Refills: 3    Associated Diagnoses: Mixed hyperlipidemia           No discharge procedures on file         ED Provider  Electronically Signed by     Hood Rosales MD  01/29/23 3185

## 2023-01-30 LAB
ANION GAP SERPL CALCULATED.3IONS-SCNC: 3 MMOL/L (ref 4–13)
BACTERIA UR CULT: ABNORMAL
BACTERIA UR CULT: ABNORMAL
BASOPHILS # BLD AUTO: 0.07 THOUSANDS/ÂΜL (ref 0–0.1)
BASOPHILS NFR BLD AUTO: 1 % (ref 0–1)
BUN SERPL-MCNC: 12 MG/DL (ref 5–25)
C TRACH DNA SPEC QL NAA+PROBE: NEGATIVE
CALCIUM SERPL-MCNC: 8.4 MG/DL (ref 8.4–10.2)
CHLORIDE SERPL-SCNC: 103 MMOL/L (ref 96–108)
CO2 SERPL-SCNC: 29 MMOL/L (ref 21–32)
CREAT SERPL-MCNC: 0.72 MG/DL (ref 0.6–1.3)
EOSINOPHIL # BLD AUTO: 0.29 THOUSAND/ÂΜL (ref 0–0.61)
EOSINOPHIL NFR BLD AUTO: 4 % (ref 0–6)
ERYTHROCYTE [DISTWIDTH] IN BLOOD BY AUTOMATED COUNT: 13.7 % (ref 11.6–15.1)
GFR SERPL CREATININE-BSD FRML MDRD: 98 ML/MIN/1.73SQ M
GLUCOSE SERPL-MCNC: 176 MG/DL (ref 65–140)
GLUCOSE SERPL-MCNC: 188 MG/DL (ref 65–140)
GLUCOSE SERPL-MCNC: 196 MG/DL (ref 65–140)
GLUCOSE SERPL-MCNC: 197 MG/DL (ref 65–140)
GLUCOSE SERPL-MCNC: 209 MG/DL (ref 65–140)
HCT VFR BLD AUTO: 38.3 % (ref 34.8–46.1)
HGB BLD-MCNC: 12.5 G/DL (ref 11.5–15.4)
IMM GRANULOCYTES # BLD AUTO: 0.1 THOUSAND/UL (ref 0–0.2)
IMM GRANULOCYTES NFR BLD AUTO: 1 % (ref 0–2)
LYMPHOCYTES # BLD AUTO: 2.13 THOUSANDS/ÂΜL (ref 0.6–4.47)
LYMPHOCYTES NFR BLD AUTO: 28 % (ref 14–44)
MCH RBC QN AUTO: 30.2 PG (ref 26.8–34.3)
MCHC RBC AUTO-ENTMCNC: 32.6 G/DL (ref 31.4–37.4)
MCV RBC AUTO: 93 FL (ref 82–98)
MONOCYTES # BLD AUTO: 0.71 THOUSAND/ÂΜL (ref 0.17–1.22)
MONOCYTES NFR BLD AUTO: 10 % (ref 4–12)
N GONORRHOEA DNA SPEC QL NAA+PROBE: NEGATIVE
NEUTROPHILS # BLD AUTO: 4.19 THOUSANDS/ÂΜL (ref 1.85–7.62)
NEUTS SEG NFR BLD AUTO: 56 % (ref 43–75)
NRBC BLD AUTO-RTO: 0 /100 WBCS
PLATELET # BLD AUTO: 241 THOUSANDS/UL (ref 149–390)
PMV BLD AUTO: 9.6 FL (ref 8.9–12.7)
POTASSIUM SERPL-SCNC: 3.7 MMOL/L (ref 3.5–5.3)
RBC # BLD AUTO: 4.14 MILLION/UL (ref 3.81–5.12)
SODIUM SERPL-SCNC: 135 MMOL/L (ref 135–147)
WBC # BLD AUTO: 7.49 THOUSAND/UL (ref 4.31–10.16)

## 2023-01-30 RX ORDER — PHENAZOPYRIDINE HYDROCHLORIDE 100 MG/1
100 TABLET, FILM COATED ORAL
Status: DISCONTINUED | OUTPATIENT
Start: 2023-01-30 | End: 2023-01-31 | Stop reason: HOSPADM

## 2023-01-30 RX ORDER — AMOXICILLIN 250 MG
1 CAPSULE ORAL 2 TIMES DAILY
Status: DISCONTINUED | OUTPATIENT
Start: 2023-01-30 | End: 2023-01-31 | Stop reason: HOSPADM

## 2023-01-30 RX ORDER — POLYETHYLENE GLYCOL 3350 17 G/17G
17 POWDER, FOR SOLUTION ORAL DAILY PRN
Status: DISCONTINUED | OUTPATIENT
Start: 2023-01-30 | End: 2023-01-31 | Stop reason: HOSPADM

## 2023-01-30 RX ADMIN — SENNOSIDES AND DOCUSATE SODIUM 1 TABLET: 8.6; 5 TABLET ORAL at 18:08

## 2023-01-30 RX ADMIN — LAMOTRIGINE 200 MG: 100 TABLET ORAL at 18:08

## 2023-01-30 RX ADMIN — PHENAZOPYRIDINE 100 MG: 100 TABLET ORAL at 18:08

## 2023-01-30 RX ADMIN — DESVENLAFAXINE 100 MG: 50 TABLET, FILM COATED, EXTENDED RELEASE ORAL at 08:20

## 2023-01-30 RX ADMIN — NYSTATIN: 100000 POWDER TOPICAL at 18:09

## 2023-01-30 RX ADMIN — INSULIN GLARGINE 64 UNITS: 100 INJECTION, SOLUTION SUBCUTANEOUS at 21:52

## 2023-01-30 RX ADMIN — PRAVASTATIN SODIUM 40 MG: 40 TABLET ORAL at 18:08

## 2023-01-30 RX ADMIN — INSULIN LISPRO 16 UNITS: 100 INJECTION, SOLUTION INTRAVENOUS; SUBCUTANEOUS at 12:39

## 2023-01-30 RX ADMIN — SODIUM CHLORIDE 125 ML/HR: 0.9 INJECTION, SOLUTION INTRAVENOUS at 05:42

## 2023-01-30 RX ADMIN — ENOXAPARIN SODIUM 60 MG: 60 INJECTION SUBCUTANEOUS at 18:08

## 2023-01-30 RX ADMIN — INSULIN LISPRO 16 UNITS: 100 INJECTION, SOLUTION INTRAVENOUS; SUBCUTANEOUS at 18:09

## 2023-01-30 RX ADMIN — ENOXAPARIN SODIUM 60 MG: 60 INJECTION SUBCUTANEOUS at 08:19

## 2023-01-30 RX ADMIN — PHENAZOPYRIDINE 100 MG: 100 TABLET ORAL at 12:39

## 2023-01-30 RX ADMIN — INSULIN LISPRO 16 UNITS: 100 INJECTION, SOLUTION INTRAVENOUS; SUBCUTANEOUS at 08:20

## 2023-01-30 RX ADMIN — PANTOPRAZOLE SODIUM 40 MG: 40 TABLET, DELAYED RELEASE ORAL at 08:19

## 2023-01-30 RX ADMIN — SENNOSIDES AND DOCUSATE SODIUM 1 TABLET: 8.6; 5 TABLET ORAL at 12:39

## 2023-01-30 RX ADMIN — OXYCODONE HYDROCHLORIDE 5 MG: 5 TABLET ORAL at 20:06

## 2023-01-30 RX ADMIN — CARIPRAZINE 1.5 MG: 1.5 CAPSULE, GELATIN COATED ORAL at 08:20

## 2023-01-30 RX ADMIN — LAMOTRIGINE 200 MG: 100 TABLET ORAL at 08:19

## 2023-01-30 RX ADMIN — LISINOPRIL 20 MG: 20 TABLET ORAL at 08:19

## 2023-01-30 RX ADMIN — CEFTRIAXONE SODIUM 2000 MG: 10 INJECTION, POWDER, FOR SOLUTION INTRAVENOUS at 04:31

## 2023-01-30 RX ADMIN — OXYCODONE HYDROCHLORIDE 5 MG: 5 TABLET ORAL at 08:24

## 2023-01-30 RX ADMIN — NYSTATIN: 100000 POWDER TOPICAL at 08:20

## 2023-01-30 NOTE — PROGRESS NOTES
[facility]  Progress Note - Lizzy Galarza 1975, 50 y o  female MRN: [id number]  Unit/Bed#: S -01 Encounter: [id number]  Primary Care Provider: SUJATA Edwards   Date and time admitted to hospital: 1/29/2023  4:30 AM    * UTI (urinary tract infection)  Assessment & Plan  · Presents with lower abdominal pain, found to have a UTI in the ER 3 days prior to admission  She was only able to take 1 dose of antibiotic  · Started on IV ceftriaxone and tailor according to urine culture results  Severe sepsis (HCC)  Assessment & Plan  • SIRS criteria: Tachypnea, tachycardia  • Suspected source: UTI  • Lactic acid: Initial 2 9, repeat 2 3  Now normal  • End organ damage: Lactic acidosis  • IV antibiotics: IV ceftriaxone day 2  • UC growing pan sensative E  coli  • Monitor vital signs, laboratory studies  Class 3 severe obesity due to excess calories with body mass index (BMI) of 60 0 to 69 9 in adult [facility])  Assessment & Plan  · Body mass index is 67 57 kg/m²  · Needs diet and weight loss      Essential hypertension  Assessment & Plan  · BP stable  · Continue lisinopril  Type 2 diabetes mellitus with complication, with long-term current use of insulin [facility])  Assessment & Plan  Lab Results   Component Value Date    HGBA1C 7 8 (H) 12/14/2022       Recent Labs     01/29/23  1554 01/29/23  2104 01/30/23  0803 01/30/23  1128   POCGLU 299* 198* 197* 188*       Blood Sugar Average: Last 72 hrs:  · (P) 236 2   · Presents with a blood sugar in the 400s, possibly worsened due to active infection  · A1c is 7 8  · Continue Lantus 64 units nightly, Humalog 16 units 3 times daily with meals  VTE Pharmacologic Prophylaxis: VTE Score: 5 High Risk (Score >/= 5) - Pharmacological DVT Prophylaxis Ordered: enoxaparin (Lovenox)  Sequential Compression Devices Ordered  Patient Centered Rounds: I performed bedside rounds with nursing staff today    Discussions with Specialists or Other Care Team Provider: eleno, rn    Education and Discussions with Family / Patient: Patient declined call to   Time Spent for Care: 45 minutes  More than 50% of total time spent on counseling and coordination of care as described above  Current Length of Stay: 0 day(s)  Current Patient Status: Observation   Certification Statement: The patient, admitted on an observation basis, will now require > 2 midnight hospital stay due to UTI on abx and abdominal pain requiring pain control  Discharge Plan: Anticipate discharge tomorrow to home  Code Status: Level 1 - Full Code    Subjective:   Still with some uncontrolled suprapubic pain today  Has not had a bowel movement  Was up and ambulated to the chair  Still having uncontrolled pain although it is slightly better than yesterday  Objective:     Vitals:   Temp (24hrs), Av 5 °F (36 9 °C), Min:98 2 °F (36 8 °C), Max:98 9 °F (37 2 °C)    Temp:  [98 2 °F (36 8 °C)-98 9 °F (37 2 °C)] 98 9 °F (37 2 °C)  HR:  [] 83  Resp:  [16] 16  BP: (131-155)/(73-92) 131/73  SpO2:  [91 %-95 %] 95 %  Body mass index is 67 57 kg/m²  Input and Output Summary (last 24 hours): Intake/Output Summary (Last 24 hours) at 2023 1206  Last data filed at 2023 9368  Gross per 24 hour   Intake 440 ml   Output 500 ml   Net -60 ml       Physical Exam:   Physical Exam  Vitals and nursing note reviewed  Constitutional:       General: She is not in acute distress  Appearance: Normal appearance  She is obese  HENT:      Head: Normocephalic  Mouth/Throat:      Mouth: Mucous membranes are moist    Eyes:      General: No scleral icterus  Pupils: Pupils are equal, round, and reactive to light  Cardiovascular:      Rate and Rhythm: Normal rate and regular rhythm  Heart sounds: No murmur heard  Pulmonary:      Effort: Pulmonary effort is normal  No respiratory distress  Breath sounds: Normal breath sounds  No wheezing, rhonchi or rales  Abdominal:      General: Bowel sounds are normal  There is no distension  Palpations: Abdomen is soft  Tenderness: There is abdominal tenderness (suprapubic)  Musculoskeletal:         General: No swelling  Right lower leg: No edema  Left lower leg: No edema  Skin:     Capillary Refill: Capillary refill takes less than 2 seconds  Neurological:      General: No focal deficit present  Mental Status: She is alert and oriented to person, place, and time  Mental status is at baseline  Additional Data:     Labs:  Results from last 7 days   Lab Units 01/30/23  0431   WBC Thousand/uL 7 49   HEMOGLOBIN g/dL 12 5   HEMATOCRIT % 38 3   PLATELETS Thousands/uL 241   NEUTROS PCT % 56   LYMPHS PCT % 28   MONOS PCT % 10   EOS PCT % 4     Results from last 7 days   Lab Units 01/30/23  0431 01/29/23  0518   SODIUM mmol/L 135 132*   POTASSIUM mmol/L 3 7 4 2   CHLORIDE mmol/L 103 98   CO2 mmol/L 29 23   BUN mg/dL 12 20   CREATININE mg/dL 0 72 0 99   ANION GAP mmol/L 3* 11   CALCIUM mg/dL 8 4 9 4   ALBUMIN g/dL  --  3 7   TOTAL BILIRUBIN mg/dL  --  0 45   ALK PHOS U/L  --  83   ALT U/L  --  30   AST U/L  --  20   GLUCOSE RANDOM mg/dL 176* 444*     Results from last 7 days   Lab Units 01/29/23  0518   INR  0 88     Results from last 7 days   Lab Units 01/30/23  1128 01/30/23  0803 01/29/23  2104 01/29/23  1554 01/29/23  1250   POC GLUCOSE mg/dl 188* 197* 198* 299* 299*         Results from last 7 days   Lab Units 01/29/23  1245 01/29/23  0855 01/29/23  0518   LACTIC ACID mmol/L 1 7 2 3* 2 9*   PROCALCITONIN ng/ml  --   --  0 08       Lines/Drains:  Invasive Devices     Peripheral Intravenous Line  Duration           Peripheral IV 01/29/23 Right Antecubital 1 day                 Imaging: No pertinent imaging reviewed  Recent Cultures (last 7 days):   Results from last 7 days   Lab Units 01/29/23  0623 01/29/23  0525 01/29/23  0518 01/27/23  0442   BLOOD CULTURE   --  No Growth at 24 hrs   No Growth at 24 hrs   --    URINE CULTURE  Culture too young- will reincubate  --   --  70,000-79,000 cfu/ml Escherichia coli*       Last 24 Hours Medication List:   Current Facility-Administered Medications   Medication Dose Route Frequency Provider Last Rate   • acetaminophen  650 mg Oral Q6H PRN Josephine Ponds, PAQuincyC     • cariprazine  1 5 mg Oral Daily Mahogany Villagomez MD     • cefTRIAXone  2,000 mg Intravenous Q24H Josephine Kitty, PA-C 2,000 mg (01/30/23 0431)   • desvenlafaxine succinate  100 mg Oral Daily Mahogany Villagomez MD     • enoxaparin  60 mg Subcutaneous BID Josephine Ponds, PAQuincyC     • HYDROmorphone  0 5 mg Intravenous Once PRN Debbi Cr MD     • insulin glargine  64 Units Subcutaneous HS Josephine Ponds, PAQuincyC     • insulin lispro  16 Units Subcutaneous TID With Meals Josephine Ponds, PAQuincyC     • lamoTRIgine  200 mg Oral BID Mahogany Villagomez MD     • lisinopril  20 mg Oral Daily Josephine Ponds, PAQuincyC     • LORazepam  0 5 mg Oral Q8H PRN Josephine Ponds, PAQuincyC     • nystatin   Topical BID Josephine Ponds, PA-C     • ondansetron  4 mg Intravenous Once PRN Aurelio Kerns MD     • ondansetron  4 mg Intravenous Q6H PRN Josephine Ponds, PA-C     • oxyCODONE  5 mg Oral Q4H PRN Josephine Ponds, PA-C     • pantoprazole  40 mg Oral Daily Josephine Ponds, PAQuincyC     • phenazopyridine  100 mg Oral TID With Meals Josephine Ponds, PAQuincyC     • polyethylene glycol  17 g Oral Daily PRN Josephine Ponds, PAQuincyC     • pravastatin  40 mg Oral Daily With Dinner Josephine Ponds, PA-C     • senna-docusate sodium  1 tablet Oral BID Josephine Ponds, BRYNN          Today, Patient Was Seen By: Joey Daniel PA-C    **Please Note: This note may have been constructed using a voice recognition system  **

## 2023-01-30 NOTE — ASSESSMENT & PLAN NOTE
Lab Results   Component Value Date    HGBA1C 7 8 (H) 12/14/2022       Recent Labs     01/29/23  1554 01/29/23  2104 01/30/23  0803 01/30/23  1128   POCGLU 299* 198* 197* 188*       Blood Sugar Average: Last 72 hrs:  · (P) 236 2   · Presents with a blood sugar in the 400s, possibly worsened due to active infection  · A1c is 7 8  · Continue Lantus 64 units nightly, Humalog 16 units 3 times daily with meals

## 2023-01-30 NOTE — ASSESSMENT & PLAN NOTE
• SIRS criteria: Tachypnea, tachycardia  • Suspected source: UTI  • Lactic acid: Initial 2 9, repeat 2 3  Now normal  • End organ damage: Lactic acidosis  • IV antibiotics: IV ceftriaxone day 2  • UC growing pan sensative E  coli  • Monitor vital signs, laboratory studies

## 2023-01-30 NOTE — APP STUDENT NOTE
Assessment/Plan    Severe sepsis  SIRS criteria: Tachypnea, tachycardia  Suspected source: UTI  Lactic acid: Initial 2 9, repeat 2 3 at 2 hours, then normal at 1 7 at 6 hours  End organ damage: Lactic acidosis  IV Fluids: Normal saline at 125 mL/h  IV antibiotics: IV ceftriaxone day 1  Follow up on blood culture results  Monitor vital signs, laboratory studies  UTI  Presents with lower abdominal pain, found to have a UTI in the ER 3 days prior to admission  She was only able to take 1 dose of antibiotic  Started on IV ceftriaxone on Day 1, and tailor according to urine culture results  Plan to transition patient to oral ABX nitrofuratoin for a total of 7 days of treatment  UA with microscopy on 1/29 showed RBCs, leukocytes,   Urine culture 1/27 showed positive for E  coli    Class 3 severe obesity   Body mass index is 67 57 kg/m²  Needs diet and weight loss    Essential Hypertension  BP stable  Continue lisinopril  Type 2 diabetes  Presents with a blood sugar in the 400s, possibly worsened due to active infection  Recent blood sugars between 190s-300  A1c is 7 8 on 12/14/2022  Continue Lantus 64 units nightly, Humalog 16 units 3 times daily with meals  VTE Pharmacologic Prophylaxis: VTE Score: 5 High Risk (Score >/= 5) - Pharmacological DVT Prophylaxis Ordered: heparin  Sequential Compression Devices Ordered  Patient Centered Rounds: I performed bedside rounds with nursing staff today  Discussions with Specialists or Other Care Team Provider:     Education and Discussions with Family / Patient: Updated  () via phone  Time Spent for Care: 20 minutes  More than 50% of total time spent on counseling and coordination of care as described above      Current Length of Stay: 0 day(s)  Current Patient Status: Observation   Certification Statement: The patient will continue to require additional inpatient hospital stay due to continued severe pain  Discharge Plan: Anticipate discharge tomorrow to home  Code Status: Level 1 - Full Code    Subjective:   Patient reports she is still having severe suprapubic pain  She states the pain medication slightly dulls it but she still experiences 7/10 in severity  She also reports she has not had a bowel movement since Friday  She denies nausea, vomiting, flank pain, back pain, fever, chills, dysuria or hematuria  Objective:     Vitals:   Temp (24hrs), Av 5 °F (36 9 °C), Min:98 2 °F (36 8 °C), Max:98 9 °F (37 2 °C)    Temp:  [98 2 °F (36 8 °C)-98 9 °F (37 2 °C)] 98 9 °F (37 2 °C)  HR:  [] 83  Resp:  [16] 16  BP: (131-155)/(70-92) 131/73  SpO2:  [91 %-97 %] 95 %  Body mass index is 67 57 kg/m²  Input and Output Summary (last 24 hours): Intake/Output Summary (Last 24 hours) at 2023 0957  Last data filed at 2023 0566  Gross per 24 hour   Intake 440 ml   Output 500 ml   Net -60 ml       Physical Exam:   Physical Exam  Constitutional:       Appearance: Normal appearance  She is obese  She is not ill-appearing or toxic-appearing  Cardiovascular:      Rate and Rhythm: Normal rate and regular rhythm  Pulses: Normal pulses  Heart sounds: Normal heart sounds  Pulmonary:      Effort: Pulmonary effort is normal       Breath sounds: Normal breath sounds  Abdominal:      General: Bowel sounds are normal  There is no distension  Palpations: Abdomen is soft  Tenderness: There is abdominal tenderness (suprapubic LLQ, and RLQ)  There is no right CVA tenderness or left CVA tenderness  Musculoskeletal:      Right lower leg: No edema  Left lower leg: No edema  Skin:     General: Skin is warm and dry  Capillary Refill: Capillary refill takes less than 2 seconds  Neurological:      General: No focal deficit present  Mental Status: She is alert and oriented to person, place, and time     Psychiatric:         Mood and Affect: Mood normal          Behavior: Behavior normal  Thought Content: Thought content normal          Judgment: Judgment normal           Additional Data:     Labs:  Results from last 7 days   Lab Units 01/30/23  0431   WBC Thousand/uL 7 49   HEMOGLOBIN g/dL 12 5   HEMATOCRIT % 38 3   PLATELETS Thousands/uL 241   NEUTROS PCT % 56   LYMPHS PCT % 28   MONOS PCT % 10   EOS PCT % 4     Results from last 7 days   Lab Units 01/30/23  0431 01/29/23  0518   SODIUM mmol/L 135 132*   POTASSIUM mmol/L 3 7 4 2   CHLORIDE mmol/L 103 98   CO2 mmol/L 29 23   BUN mg/dL 12 20   CREATININE mg/dL 0 72 0 99   ANION GAP mmol/L 3* 11   CALCIUM mg/dL 8 4 9 4   ALBUMIN g/dL  --  3 7   TOTAL BILIRUBIN mg/dL  --  0 45   ALK PHOS U/L  --  83   ALT U/L  --  30   AST U/L  --  20   GLUCOSE RANDOM mg/dL 176* 444*     Results from last 7 days   Lab Units 01/29/23  0518   INR  0 88     Results from last 7 days   Lab Units 01/30/23  0803 01/29/23  2104 01/29/23  1554 01/29/23  1250   POC GLUCOSE mg/dl 197* 198* 299* 299*         Results from last 7 days   Lab Units 01/29/23  1245 01/29/23  0855 01/29/23  0518   LACTIC ACID mmol/L 1 7 2 3* 2 9*   PROCALCITONIN ng/ml  --   --  0 08       Lines/Drains:  Invasive Devices       Peripheral Intravenous Line  Duration             Peripheral IV 01/29/23 Right Antecubital 1 day                          Imaging: No pertinent imaging reviewed  Recent Cultures (last 7 days):   Results from last 7 days   Lab Units 01/29/23  0525 01/29/23  0518 01/27/23  0442   BLOOD CULTURE  Received in Microbiology Lab  Culture in Progress  Received in Microbiology Lab  Culture in Progress    --    URINE CULTURE   --   --  70,000-79,000 cfu/ml Escherichia coli*       Last 24 Hours Medication List:   Current Facility-Administered Medications   Medication Dose Route Frequency Provider Last Rate    acetaminophen  650 mg Oral Q6H PRN Lanora Andrew PA-C      cariprazine  1 5 mg Oral Daily Merline Lace, MD      cefTRIAXone  2,000 mg Intravenous Q24H Lanora Andrew, PA-C 2,000 mg (01/30/23 0431)    desvenlafaxine succinate  100 mg Oral Daily Teri Allen MD      enoxaparin  60 mg Subcutaneous BID Elaine Allen PA-C      HYDROmorphone  0 5 mg Intravenous Once PRN Aldair Brown MD      insulin glargine  64 Units Subcutaneous HS Elaine Allen PA-C      insulin lispro  16 Units Subcutaneous TID With Meals Elaine Allen PA-C      lamoTRIgine  200 mg Oral BID Teri Allen MD      lisinopril  20 mg Oral Daily Elaine Allen PA-C      LORazepam  0 5 mg Oral Q8H PRN Elaine Allen PA-C      nystatin   Topical BID Elaine Allen PA-C      ondansetron  4 mg Intravenous Once PRN Aldair Brown MD      ondansetron  4 mg Intravenous Q6H PRN Elaine Allen PA-C      oxyCODONE  5 mg Oral Q4H PRN Elaine Allen PA-C      pantoprazole  40 mg Oral Daily Elaine Allen PA-C      pravastatin  40 mg Oral Daily With Dipesh Rios PA-C          Today, Patient Was Seen By: Liam Ramos    **Please Note: This note may have been constructed using a voice recognition system  **

## 2023-01-30 NOTE — PLAN OF CARE
Problem: Prexisting or High Potential for Compromised Skin Integrity  Goal: Skin integrity is maintained or improved  Description: INTERVENTIONS:  - Identify patients at risk for skin breakdown  - Assess and monitor skin integrity  - Assess and monitor nutrition and hydration status  - Monitor labs   - Assess for incontinence   - Turn and reposition patient  - Assist with mobility/ambulation  - Relieve pressure over bony prominences  - Avoid friction and shearing  - Provide appropriate hygiene as needed including keeping skin clean and dry  - Evaluate need for skin moisturizer/barrier cream  - Collaborate with interdisciplinary team   - Patient/family teaching  - Consider wound care consult   Outcome: Progressing     Problem: PAIN - ADULT  Goal: Verbalizes/displays adequate comfort level or baseline comfort level  Description: Interventions:  - Encourage patient to monitor pain and request assistance  - Assess pain using appropriate pain scale  - Administer analgesics based on type and severity of pain and evaluate response  - Implement non-pharmacological measures as appropriate and evaluate response  - Consider cultural and social influences on pain and pain management  - Notify physician/advanced practitioner if interventions unsuccessful or patient reports new pain  Outcome: Progressing     Problem: INFECTION - ADULT  Goal: Absence or prevention of progression during hospitalization  Description: INTERVENTIONS:  - Assess and monitor for signs and symptoms of infection  - Monitor lab/diagnostic results  - Monitor all insertion sites, i e  indwelling lines, tubes, and drains  - Monitor endotracheal if appropriate and nasal secretions for changes in amount and color  - [place] appropriate cooling/warming therapies per order  - Administer medications as ordered  - Instruct and encourage patient and family to use good hand hygiene technique  - Identify and instruct in appropriate isolation precautions for identified infection/condition  Outcome: Progressing  Goal: Absence of fever/infection during neutropenic period  Description: INTERVENTIONS:  - Monitor WBC    Outcome: Progressing

## 2023-01-31 VITALS
BODY MASS INDEX: 57.52 KG/M2 | DIASTOLIC BLOOD PRESSURE: 80 MMHG | HEART RATE: 80 BPM | WEIGHT: 293 LBS | TEMPERATURE: 97.8 F | RESPIRATION RATE: 20 BRPM | OXYGEN SATURATION: 92 % | SYSTOLIC BLOOD PRESSURE: 155 MMHG | HEIGHT: 60 IN

## 2023-01-31 PROBLEM — R93.89 THICKENED ENDOMETRIUM: Status: ACTIVE | Noted: 2023-01-31

## 2023-01-31 LAB
BACTERIA UR CULT: NORMAL
GLUCOSE SERPL-MCNC: 173 MG/DL (ref 65–140)

## 2023-01-31 RX ORDER — CEFUROXIME AXETIL 500 MG/1
500 TABLET ORAL EVERY 12 HOURS SCHEDULED
Qty: 10 TABLET | Refills: 0 | Status: SHIPPED | OUTPATIENT
Start: 2023-02-01 | End: 2023-02-06

## 2023-01-31 RX ORDER — LAMOTRIGINE 200 MG/1
200 TABLET ORAL 2 TIMES DAILY
Refills: 0 | Status: ON HOLD
Start: 2023-01-31

## 2023-01-31 RX ORDER — PHENAZOPYRIDINE HYDROCHLORIDE 100 MG/1
100 TABLET, FILM COATED ORAL
Qty: 10 TABLET | Refills: 0 | Status: ON HOLD | OUTPATIENT
Start: 2023-01-31

## 2023-01-31 RX ORDER — AMOXICILLIN 250 MG
1 CAPSULE ORAL 2 TIMES DAILY
Qty: 60 TABLET | Refills: 0 | Status: ON HOLD | OUTPATIENT
Start: 2023-01-31 | End: 2023-03-02

## 2023-01-31 RX ORDER — OXYCODONE HYDROCHLORIDE 5 MG/1
5 TABLET ORAL EVERY 4 HOURS PRN
Qty: 15 TABLET | Refills: 0 | Status: SHIPPED | OUTPATIENT
Start: 2023-01-31 | End: 2023-02-10

## 2023-01-31 RX ADMIN — PANTOPRAZOLE SODIUM 40 MG: 40 TABLET, DELAYED RELEASE ORAL at 08:01

## 2023-01-31 RX ADMIN — CARIPRAZINE 1.5 MG: 1.5 CAPSULE, GELATIN COATED ORAL at 08:11

## 2023-01-31 RX ADMIN — OXYCODONE HYDROCHLORIDE 5 MG: 5 TABLET ORAL at 08:10

## 2023-01-31 RX ADMIN — ENOXAPARIN SODIUM 60 MG: 60 INJECTION SUBCUTANEOUS at 08:00

## 2023-01-31 RX ADMIN — ONDANSETRON 4 MG: 2 INJECTION INTRAMUSCULAR; INTRAVENOUS at 06:20

## 2023-01-31 RX ADMIN — SENNOSIDES AND DOCUSATE SODIUM 1 TABLET: 8.6; 5 TABLET ORAL at 08:01

## 2023-01-31 RX ADMIN — INSULIN LISPRO 16 UNITS: 100 INJECTION, SOLUTION INTRAVENOUS; SUBCUTANEOUS at 08:01

## 2023-01-31 RX ADMIN — PHENAZOPYRIDINE 100 MG: 100 TABLET ORAL at 08:01

## 2023-01-31 RX ADMIN — LISINOPRIL 20 MG: 20 TABLET ORAL at 08:01

## 2023-01-31 RX ADMIN — CEFTRIAXONE SODIUM 2000 MG: 10 INJECTION, POWDER, FOR SOLUTION INTRAVENOUS at 03:12

## 2023-01-31 RX ADMIN — LAMOTRIGINE 200 MG: 100 TABLET ORAL at 08:01

## 2023-01-31 RX ADMIN — NYSTATIN: 100000 POWDER TOPICAL at 08:01

## 2023-01-31 NOTE — ASSESSMENT & PLAN NOTE
Lab Results   Component Value Date    HGBA1C 7 8 (H) 12/14/2022       Recent Labs     01/30/23  1128 01/30/23  1659 01/30/23 2059 01/31/23  0701   POCGLU 188* 209* 196* 173*       Blood Sugar Average: Last 72 hrs:  · (P) 219 875   · Presents with a blood sugar in the 400s, possibly worsened due to active infection  · A1c is 7 8  · Continue Lantus 64 units nightly, Humalog 16 units 3 times daily with meals    · Blood sugar trend has improved

## 2023-01-31 NOTE — ASSESSMENT & PLAN NOTE
· Incidental finding note completed  · Pelvic ultrasound completed, patient needs surveillance with gynecology  · I discussed that she needs to establish with gynecology and she is agreeable  Number was placed in the patient's AVS call after discharge

## 2023-01-31 NOTE — APP STUDENT NOTE
Assessment/Plan      UTI  • Presents with lower abdominal pain, found to have a UTI in the ER 3 days prior to admission   She was only able to take 1 dose of antibiotic  • Started on IV ceftriaxone, received 3 days of treatment  • Upon discharge will transition patient to oral Ceftin 500mg BID for a total of 5 days to complete a course of 7 days on ABX  • Discontinue Pyridium after last dose today  • UA with microscopy on 1/29 showed RBCs, leukocytes,   • Urine culture 1/27 showed positive for E  coli     Severe sepsis  • SIRS criteria: Tachypnea, tachycardia  • Suspected source: UTI  • Lactic acid: Initial 2 9, repeat 2 3 at 2 hours, then normal at 1 7 at 6 hours  • End organ damage: Lactic acidosis  • IV Fluids: Normal saline at 125 mL/h  • IV antibiotics: IV ceftriaxone day 1  • Follow up on blood culture results  • Monitor vital signs, laboratory studies  Class 3 severe obesity   • Body mass index is 67 57 kg/m²    • Needs diet and weight loss     Essential Hypertension  • BP stable  • Continue lisinopril      Type 2 diabetes  • Presents with a blood sugar in the 400s, possibly worsened due to active infection  • Recent blood sugars between 190s-300  • A1c is 7 8 on 12/14/2022  • Continue Lantus 64 units nightly, Humalog 16 units 3 times daily with meals          VTE Pharmacologic Prophylaxis: VTE Score: 5 High Risk (Score >/= 5) - Pharmacological DVT Prophylaxis Ordered: heparin  Sequential Compression Devices Ordered  Patient Centered Rounds: I performed bedside rounds with nursing staff today  Discussions with Specialists or Other Care Team Provider:     Education and Discussions with Family / Patient: Updated  ( and brother) at bedside  Time Spent for Care: 20 minutes  More than 50% of total time spent on counseling and coordination of care as described above      Current Length of Stay: 0 day(s)  Current Patient Status: Observation   Certification Statement: discharged  Discharge Plan: Anticipate discharge later today or tomorrow to home  Code Status: Level 1 - Full Code    Subjective:   Patient reports she is still having pain and one episode of nausea and vomiting this morning  She states the pain is worse with movement  Patient also reports dysuria, frequency and urgency  She has not had a bowel movement since admission  Denies fever, chills, back or flank pain, weakness, or fatigue  Objective:     Vitals:   Temp (24hrs), Av 3 °F (36 8 °C), Min:97 8 °F (36 6 °C), Max:98 7 °F (37 1 °C)    Temp:  [97 8 °F (36 6 °C)-98 7 °F (37 1 °C)] 97 8 °F (36 6 °C)  HR:  [80-95] 80  Resp:  [20] 20  BP: (127-155)/(76-80) 155/80  SpO2:  [92 %-93 %] 92 %  Body mass index is 67 57 kg/m²  Input and Output Summary (last 24 hours): Intake/Output Summary (Last 24 hours) at 2023 0820  Last data filed at 2023 3442  Gross per 24 hour   Intake 240 ml   Output 1800 ml   Net -1560 ml       Physical Exam:   Physical Exam  Constitutional:       Appearance: Normal appearance  She is obese  Cardiovascular:      Rate and Rhythm: Normal rate and regular rhythm  Pulses: Normal pulses  Heart sounds: Normal heart sounds  Pulmonary:      Effort: Pulmonary effort is normal       Breath sounds: Normal breath sounds  Abdominal:      General: Bowel sounds are normal  There is no distension  Palpations: Abdomen is soft  Tenderness: There is abdominal tenderness (LLQ and suprapubic)  Musculoskeletal:      Right lower leg: No edema  Left lower leg: No edema  Skin:     Capillary Refill: Capillary refill takes less than 2 seconds  Neurological:      General: No focal deficit present  Mental Status: She is alert and oriented to person, place, and time  Psychiatric:         Mood and Affect: Mood normal          Behavior: Behavior normal          Thought Content:  Thought content normal          Judgment: Judgment normal           Additional Data: Labs:  Results from last 7 days   Lab Units 01/30/23  0431   WBC Thousand/uL 7 49   HEMOGLOBIN g/dL 12 5   HEMATOCRIT % 38 3   PLATELETS Thousands/uL 241   NEUTROS PCT % 56   LYMPHS PCT % 28   MONOS PCT % 10   EOS PCT % 4     Results from last 7 days   Lab Units 01/30/23  0431 01/29/23  0518   SODIUM mmol/L 135 132*   POTASSIUM mmol/L 3 7 4 2   CHLORIDE mmol/L 103 98   CO2 mmol/L 29 23   BUN mg/dL 12 20   CREATININE mg/dL 0 72 0 99   ANION GAP mmol/L 3* 11   CALCIUM mg/dL 8 4 9 4   ALBUMIN g/dL  --  3 7   TOTAL BILIRUBIN mg/dL  --  0 45   ALK PHOS U/L  --  83   ALT U/L  --  30   AST U/L  --  20   GLUCOSE RANDOM mg/dL 176* 444*     Results from last 7 days   Lab Units 01/29/23  0518   INR  0 88     Results from last 7 days   Lab Units 01/31/23  0701 01/30/23  2059 01/30/23  1659 01/30/23  1128 01/30/23  0803 01/29/23  2104 01/29/23  1554 01/29/23  1250   POC GLUCOSE mg/dl 173* 196* 209* 188* 197* 198* 299* 299*         Results from last 7 days   Lab Units 01/29/23  1245 01/29/23  0855 01/29/23  0518   LACTIC ACID mmol/L 1 7 2 3* 2 9*   PROCALCITONIN ng/ml  --   --  0 08       Lines/Drains:  Invasive Devices     Peripheral Intravenous Line  Duration           Peripheral IV 01/29/23 Right Antecubital 2 days                      Imaging: No pertinent imaging reviewed  Recent Cultures (last 7 days):   Results from last 7 days   Lab Units 01/29/23  0623 01/29/23  0525 01/29/23  0518 01/27/23  0442   BLOOD CULTURE   --  No Growth at 24 hrs   No Growth at 24 hrs   --    URINE CULTURE  Culture too young- will reincubate  --   --  70,000-79,000 cfu/ml Escherichia coli*  >100,000 cfu/ml Aerococcus urinae*       Last 24 Hours Medication List:   Current Facility-Administered Medications   Medication Dose Route Frequency Provider Last Rate   • acetaminophen  650 mg Oral Q6H PRN Shashank Chowdhury PA-C     • cariprazine  1 5 mg Oral Daily Shivani Romo MD     • cefTRIAXone  2,000 mg Intravenous Q24H Shashank Chowdhury PA-C 2,000 mg (01/31/23 7802)   • desvenlafaxine succinate  100 mg Oral Daily Gerardo Ding MD     • enoxaparin  60 mg Subcutaneous BID Karolyn Mena PA-C     • HYDROmorphone  0 5 mg Intravenous Once PRN Judith De Jesus MD     • insulin glargine  64 Units Subcutaneous HS Karolyn Mena PA-C     • insulin lispro  16 Units Subcutaneous TID With Meals Karolyn Mena PA-C     • lamoTRIgine  200 mg Oral BID Gerardo Ding MD     • lisinopril  20 mg Oral Daily Karolyn Mena PA-C     • LORazepam  0 5 mg Oral Q8H PRN Karolyn Mena PA-C     • nystatin   Topical BID Karolyn Mena PA-C     • ondansetron  4 mg Intravenous Once PRN Cheryle Kerns MD     • ondansetron  4 mg Intravenous Q6H PRN Karolyn Mena PA-C     • oxyCODONE  5 mg Oral Q4H PRN Karolyn Mena PA-C     • pantoprazole  40 mg Oral Daily Karolyn Mena PA-C     • phenazopyridine  100 mg Oral TID With Meals Karolyn Mena PA-C     • polyethylene glycol  17 g Oral Daily PRN Karolyn Mena PA-C     • pravastatin  40 mg Oral Daily With Dinner Karolyn Mena PA-C     • senna-docusate sodium  1 tablet Oral BID Karolyn Mena PA-C          Today, Patient Was Seen By: Asia Davis    **Please Note: This note may have been constructed using a voice recognition system  **

## 2023-01-31 NOTE — ASSESSMENT & PLAN NOTE
· Presents with lower abdominal pain, found to have a UTI in the ER 3 days prior to admission  She was only able to take 1 dose of antibiotic  · Received 3 days of IV abx while hospitalized  Transition to p o   Ceftin for total of 7 days of antibiotics

## 2023-01-31 NOTE — INCIDENTAL FINDINGS
The following findings require follow up:  Radiographic finding   Findin  Negative for left ovarian torsion  3 4 cm left ovarian simple cyst corresponding to earlier CT finding  Based on O - rads ultrasound risk stratification and management system this is consistent with Score 2 - almost certainly benign (< 1% chance of   malignancy)  If this patient is truly postmenopausal, follow-up ultrasound in 1 year is recommended      2   Endometrium measures 9 mm thickness which is increased in a truly postmenopausal female  Follow-up should be based on clinical grounds     Follow up required: Pelvic ultrasound   Follow up should be done within 3 month(s)    Please notify the following clinician to assist with the follow up:   Andrzej Mena, [address]

## 2023-01-31 NOTE — DISCHARGE SUMMARY
[facility]  Discharge- Yenny Anastasia 1975, 50 y o  female MRN: [id number]  Unit/Bed#: S -01 Encounter: [id number]  Primary Care Provider: SUJATA Nicholas   Date and time admitted to hospital: 1/29/2023  4:30 AM    * UTI (urinary tract infection)  Assessment & Plan  · Presents with lower abdominal pain, found to have a UTI in the ER 3 days prior to admission  She was only able to take 1 dose of antibiotic  · Received 3 days of IV ceftriaxone while hospitalized  Transition to p o  Ceftin for total of 7 days of antibiotics    Thickened endometrium  Assessment & Plan  · Incidental finding note completed  · Pelvic ultrasound completed, patient needs surveillance with gynecology  · I discussed that she needs to establish with gynecology and she is agreeable  Number was placed in the patient's AVS call after discharge  Severe sepsis (HCC)  Assessment & Plan  • SIRS criteria: Tachypnea, tachycardia  • Suspected source: UTI  • Lactic acid: Initial 2 9, repeat 2 3  Now normal  • End organ damage: Lactic acidosis  • Received 3 days of IV ceftriaxone, will plan to discharge with p o  Ceftin for total of 7 days of antibiotics  • UC growing pan sensative E  coli  • Monitor vital signs, laboratory studies  Class 3 severe obesity due to excess calories with body mass index (BMI) of 60 0 to 69 9 in adult [facility])  Assessment & Plan  · Body mass index is 67 57 kg/m²  · Needs diet and weight loss    Essential hypertension  Assessment & Plan  · BP stable  · Continue lisinopril      Type 2 diabetes mellitus with complication, with long-term current use of insulin [facility])  Assessment & Plan  Lab Results   Component Value Date    HGBA1C 7 8 (H) 12/14/2022       Recent Labs     01/30/23  1128 01/30/23  1659 01/30/23  2059 01/31/23  0701   POCGLU 188* 209* 196* 173*       Blood Sugar Average: Last 72 hrs:  · (P) 219 875   · Presents with a blood sugar in the 400s, possibly worsened due to active infection  · A1c is 7 8  · Continue Lantus 64 units nightly, Humalog 16 units 3 times daily with meals  · Blood sugar trend has improved      Medical Problems     Resolved Problems  Date Reviewed: 1/31/2023   None       Discharging Physician / Practitioner: Luba Fisher PA-C  PCP: Seth Lehman  Admission Date:   Admission Orders (From admission, onward)     Ordered        01/29/23 1135  Place in Observation  Once                      Discharge Date: 01/31/23    Consultations During Hospital Stay:  · None     Procedures Performed:   · None     Significant Findings / Test Results:   · Urine culture growing E  coli  · CT abdomen pelvis with cystitis as well as ovarian cyst and thickened endometrium  Incidental Findings:   · See related note regarding thickened endometrium and ovarian cyst    Test Results Pending at Discharge (will require follow up): · None     Outpatient Tests Requested:  · None     Complications:  None     Reason for Admission: uti     Hospital Course:   Sepideh Adams is a 50 y o  female patient who originally presented to the hospital on 1/29/2023 due to abdominal pain  Patient does not have abnormal urinalysis in the ER 1/26  Was sent home and took 1 dose of Cipro but due to the intractable pain came back to the ER  Was treated with 3 days of IV antibiotics for a UTI and found to have cystitis  Also during her stay was found to have a thickened endometrium for which she underwent pelvic ultrasound characterizing an ovarian cyst as well as 9 mm endometrium  Recommended outpatient follow-up with gynecology, this was discussed with the patient  She was transitioned to p o  Ceftin for total of 7 days of antibiotics and given oxycodone and Pyridium for the pain  She was discussed to follow-up with PCP in the next 1 to 2 weeks  She had a relatively uncomplicated hospitalization  Please see above list of diagnoses and related plan for additional information  Condition at Discharge: stable    Discharge Day Visit / Exam:   Subjective:  Appears comfortable in bed, pain is worse when she moves  Vitals: Blood Pressure: 155/80 (01/31/23 0700)  Pulse: 80 (01/31/23 0700)  Temperature: 97 8 °F (36 6 °C) (01/31/23 0700)  Temp Source: Oral (01/29/23 1434)  Respirations: 20 (01/31/23 0700)  Height: 5' (152 4 cm) (01/29/23 0435)  Weight - Scale: (!) 157 kg (346 lb) (01/29/23 0435)  SpO2: 92 % (01/31/23 0700)  Exam:   Physical Exam  Vitals and nursing note reviewed  Constitutional:       General: She is not in acute distress  Appearance: Normal appearance  She is obese  HENT:      Head: Normocephalic  Mouth/Throat:      Mouth: Mucous membranes are moist    Eyes:      General: No scleral icterus  Pupils: Pupils are equal, round, and reactive to light  Cardiovascular:      Rate and Rhythm: Normal rate and regular rhythm  Heart sounds: No murmur heard  Pulmonary:      Effort: Pulmonary effort is normal  No respiratory distress  Breath sounds: Normal breath sounds  No wheezing, rhonchi or rales  Abdominal:      General: Bowel sounds are normal  There is no distension  Palpations: Abdomen is soft  Tenderness: There is no abdominal tenderness  Musculoskeletal:         General: No swelling  Right lower leg: No edema  Left lower leg: No edema  Skin:     Capillary Refill: Capillary refill takes less than 2 seconds  Neurological:      General: No focal deficit present  Mental Status: She is alert and oriented to person, place, and time  Mental status is at baseline  Discussion with Family: Updated  (significant other) at bedside  Discharge instructions/Information to patient and family:   See after visit summary for information provided to patient and family  Provisions for Follow-Up Care:  See after visit summary for information related to follow-up care and any pertinent home health orders  Disposition:   Home    Planned Readmission: no     Discharge Statement:  I spent 45 minutes discharging the patient  This time was spent on the day of discharge  I had direct contact with the patient on the day of discharge  Greater than 50% of the total time was spent examining patient, answering all patient questions, arranging and discussing plan of care with patient as well as directly providing post-discharge instructions  Additional time then spent on discharge activities  Discharge Medications:  See after visit summary for reconciled discharge medications provided to patient and/or family        **Please Note: This note may have been constructed using a voice recognition system**

## 2023-01-31 NOTE — ASSESSMENT & PLAN NOTE
• SIRS criteria: Tachypnea, tachycardia  • Suspected source: UTI  • Lactic acid: Initial 2 9, repeat 2 3  Now normal  • End organ damage: Lactic acidosis  • Received 3 days of IV abx, will plan to discharge with p o  Ceftin for total of 7 days of antibiotics  • UC growing pan sensative E  coli  • Monitor vital signs, laboratory studies

## 2023-01-31 NOTE — PLAN OF CARE
Problem: Potential for Falls  Goal: Patient will remain free of falls  Description: INTERVENTIONS:  - Educate patient/family on patient safety including physical limitations  - Instruct patient to call for assistance with activity   - Consult OT/PT to assist with strengthening/mobility   - Keep Call bell within reach  - Keep bed low and locked with side rails adjusted as appropriate  - Keep care items and personal belongings within reach  - Initiate and maintain comfort rounds  - Make Fall Risk Sign visible to staff  - Apply yellow socks and bracelet for high fall risk patients  - Consider moving patient to room near nurses station  Outcome: Progressing     Problem: MOBILITY - ADULT  Goal: Maintain or return to baseline ADL function  Description: INTERVENTIONS:  -  Assess patient's ability to carry out ADLs; assess patient's baseline for ADL function and identify physical deficits which impact ability to perform ADLs (bathing, care of mouth/teeth, toileting, grooming, dressing, etc )  - Assess/evaluate cause of self-care deficits   - Assess range of motion  - Assess patient's mobility; develop plan if impaired  - Assess patient's need for assistive devices and provide as appropriate  - Encourage maximum independence but intervene and supervise when necessary  - Involve family in performance of ADLs  - Assess for home care needs following discharge   - Consider OT consult to assist with ADL evaluation and planning for discharge  - Provide patient education as appropriate  Outcome: Progressing  Goal: Maintains/Returns to pre admission functional level  Description: INTERVENTIONS:  - Perform BMAT or MOVE assessment daily    - Set and communicate daily mobility goal to care team and patient/family/caregiver     - Collaborate with rehabilitation services on mobility goals if consulted  - Out of bed for toileting  - Record patient progress and toleration of activity level   Outcome: Progressing     Problem: Prexisting or High Potential for Compromised Skin Integrity  Goal: Skin integrity is maintained or improved  Description: INTERVENTIONS:  - Identify patients at risk for skin breakdown  - Assess and monitor skin integrity  - Assess and monitor nutrition and hydration status  - Monitor labs   - Assess for incontinence   - Turn and reposition patient  - Assist with mobility/ambulation  - Relieve pressure over bony prominences  - Avoid friction and shearing  - Provide appropriate hygiene as needed including keeping skin clean and dry  - Evaluate need for skin moisturizer/barrier cream  - Collaborate with interdisciplinary team   - Patient/family teaching  - Consider wound care consult   Outcome: Progressing     Problem: PAIN - ADULT  Goal: Verbalizes/displays adequate comfort level or baseline comfort level  Description: Interventions:  - Encourage patient to monitor pain and request assistance  - Assess pain using appropriate pain scale  - Administer analgesics based on type and severity of pain and evaluate response  - Implement non-pharmacological measures as appropriate and evaluate response  - Consider cultural and social influences on pain and pain management  - Notify physician/advanced practitioner if interventions unsuccessful or patient reports new pain  Outcome: Progressing     Problem: INFECTION - ADULT  Goal: Absence or prevention of progression during hospitalization  Description: INTERVENTIONS:  - Assess and monitor for signs and symptoms of infection  - Monitor lab/diagnostic results  - Monitor all insertion sites, i e  indwelling lines, tubes, and drains  - Monitor endotracheal if appropriate and nasal secretions for changes in amount and color  - [place] appropriate cooling/warming therapies per order  - Administer medications as ordered  - Instruct and encourage patient and family to use good hand hygiene technique  - Identify and instruct in appropriate isolation precautions for identified infection/condition  Outcome: Progressing  Goal: Absence of fever/infection during neutropenic period  Description: INTERVENTIONS:  - Monitor WBC    Outcome: Progressing     Problem: SAFETY ADULT  Goal: Patient will remain free of falls  Description: INTERVENTIONS:  - Educate patient/family on patient safety including physical limitations  - Instruct patient to call for assistance with activity   - Consult OT/PT to assist with strengthening/mobility   - Keep Call bell within reach  - Keep bed low and locked with side rails adjusted as appropriate  - Keep care items and personal belongings within reach  - Initiate and maintain comfort rounds  - Make Fall Risk Sign visible to staff  - Apply yellow socks and bracelet for high fall risk patients  - Consider moving patient to room near nurses station  Outcome: Progressing  Goal: Maintain or return to baseline ADL function  Description: INTERVENTIONS:  -  Assess patient's ability to carry out ADLs; assess patient's baseline for ADL function and identify physical deficits which impact ability to perform ADLs (bathing, care of mouth/teeth, toileting, grooming, dressing, etc )  - Assess/evaluate cause of self-care deficits   - Assess range of motion  - Assess patient's mobility; develop plan if impaired  - Assess patient's need for assistive devices and provide as appropriate  - Encourage maximum independence but intervene and supervise when necessary  - Involve family in performance of ADLs  - Assess for home care needs following discharge   - Consider OT consult to assist with ADL evaluation and planning for discharge  - Provide patient education as appropriate  Outcome: Progressing  Goal: Maintains/Returns to pre admission functional level  Description: INTERVENTIONS:  - Perform BMAT or MOVE assessment daily    - Set and communicate daily mobility goal to care team and patient/family/caregiver     - Collaborate with rehabilitation services on mobility goals if consulted  - Out of bed for toileting  - Record patient progress and toleration of activity level   Outcome: Progressing     Problem: DISCHARGE PLANNING  Goal: Discharge to home or other facility with appropriate resources  Description: INTERVENTIONS:  - Identify barriers to discharge w/patient and caregiver  - Arrange for needed discharge resources and transportation as appropriate  - Identify discharge learning needs (meds, wound care, etc )  - Arrange for interpretive services to assist at discharge as needed  - Refer to Case Management Department for coordinating discharge planning if the patient needs post-hospital services based on physician/advanced practitioner order or complex needs related to functional status, cognitive ability, or social support system  Outcome: Progressing

## 2023-02-01 ENCOUNTER — TRANSITIONAL CARE MANAGEMENT (OUTPATIENT)
Dept: FAMILY MEDICINE CLINIC | Facility: CLINIC | Age: 48
End: 2023-02-01

## 2023-02-03 LAB
BACTERIA BLD CULT: NORMAL
BACTERIA BLD CULT: NORMAL

## 2023-02-16 ENCOUNTER — HOSPITAL ENCOUNTER (INPATIENT)
Facility: HOSPITAL | Age: 48
LOS: 5 days | Discharge: HOME | End: 2023-02-21
Attending: EMERGENCY MEDICINE | Admitting: HOSPITALIST

## 2023-02-16 ENCOUNTER — APPOINTMENT (EMERGENCY)
Dept: CT IMAGING | Facility: HOSPITAL | Age: 48
End: 2023-02-16

## 2023-02-16 DIAGNOSIS — E11.8 TYPE 2 DIABETES MELLITUS WITH COMPLICATION, WITH LONG-TERM CURRENT USE OF INSULIN (HCC): ICD-10-CM

## 2023-02-16 DIAGNOSIS — K52.9 COLITIS: Primary | ICD-10-CM

## 2023-02-16 DIAGNOSIS — F33.2 MAJOR DEPRESSIVE DISORDER, RECURRENT SEVERE WITHOUT PSYCHOTIC FEATURES (HCC): Chronic | ICD-10-CM

## 2023-02-16 DIAGNOSIS — Z79.4 TYPE 2 DIABETES MELLITUS WITH COMPLICATION, WITH LONG-TERM CURRENT USE OF INSULIN (HCC): ICD-10-CM

## 2023-02-16 DIAGNOSIS — K57.92 DIVERTICULITIS: ICD-10-CM

## 2023-02-16 PROBLEM — K57.32 SIGMOID DIVERTICULITIS: Status: ACTIVE | Noted: 2023-02-16

## 2023-02-16 LAB
ALBUMIN SERPL BCP-MCNC: 3.9 G/DL (ref 3.5–5)
ALP SERPL-CCNC: 66 U/L (ref 34–104)
ALT SERPL W P-5'-P-CCNC: 31 U/L (ref 7–52)
ANION GAP SERPL CALCULATED.3IONS-SCNC: 10 MMOL/L (ref 4–13)
AST SERPL W P-5'-P-CCNC: 22 U/L (ref 13–39)
BASOPHILS # BLD AUTO: 0.05 THOUSANDS/ÂΜL (ref 0–0.1)
BASOPHILS NFR BLD AUTO: 1 % (ref 0–1)
BILIRUB SERPL-MCNC: 0.76 MG/DL (ref 0.2–1)
BUN SERPL-MCNC: 11 MG/DL (ref 5–25)
CALCIUM SERPL-MCNC: 9.5 MG/DL (ref 8.4–10.2)
CHLORIDE SERPL-SCNC: 98 MMOL/L (ref 96–108)
CO2 SERPL-SCNC: 26 MMOL/L (ref 21–32)
CREAT SERPL-MCNC: 0.79 MG/DL (ref 0.6–1.3)
EOSINOPHIL # BLD AUTO: 0.1 THOUSAND/ÂΜL (ref 0–0.61)
EOSINOPHIL NFR BLD AUTO: 1 % (ref 0–6)
ERYTHROCYTE [DISTWIDTH] IN BLOOD BY AUTOMATED COUNT: 13.2 % (ref 11.6–15.1)
GFR SERPL CREATININE-BSD FRML MDRD: 88 ML/MIN/1.73SQ M
GLUCOSE SERPL-MCNC: 299 MG/DL (ref 65–140)
HCG SERPL QL: NEGATIVE
HCT VFR BLD AUTO: 45 % (ref 34.8–46.1)
HGB BLD-MCNC: 14.8 G/DL (ref 11.5–15.4)
IMM GRANULOCYTES # BLD AUTO: 0.07 THOUSAND/UL (ref 0–0.2)
IMM GRANULOCYTES NFR BLD AUTO: 1 % (ref 0–2)
LACTATE SERPL-SCNC: 1.4 MMOL/L (ref 0.5–2)
LACTATE SERPL-SCNC: 2.7 MMOL/L (ref 0.5–2)
LIPASE SERPL-CCNC: 59 U/L (ref 11–82)
LYMPHOCYTES # BLD AUTO: 2.18 THOUSANDS/ÂΜL (ref 0.6–4.47)
LYMPHOCYTES NFR BLD AUTO: 21 % (ref 14–44)
MCH RBC QN AUTO: 29.8 PG (ref 26.8–34.3)
MCHC RBC AUTO-ENTMCNC: 32.9 G/DL (ref 31.4–37.4)
MCV RBC AUTO: 91 FL (ref 82–98)
MONOCYTES # BLD AUTO: 1.03 THOUSAND/ÂΜL (ref 0.17–1.22)
MONOCYTES NFR BLD AUTO: 10 % (ref 4–12)
NEUTROPHILS # BLD AUTO: 6.95 THOUSANDS/ÂΜL (ref 1.85–7.62)
NEUTS SEG NFR BLD AUTO: 66 % (ref 43–75)
NRBC BLD AUTO-RTO: 0 /100 WBCS
PLATELET # BLD AUTO: 337 THOUSANDS/UL (ref 149–390)
PMV BLD AUTO: 9.8 FL (ref 8.9–12.7)
POTASSIUM SERPL-SCNC: 4.2 MMOL/L (ref 3.5–5.3)
PROT SERPL-MCNC: 7.4 G/DL (ref 6.4–8.4)
RBC # BLD AUTO: 4.97 MILLION/UL (ref 3.81–5.12)
SODIUM SERPL-SCNC: 134 MMOL/L (ref 135–147)
WBC # BLD AUTO: 10.38 THOUSAND/UL (ref 4.31–10.16)

## 2023-02-16 RX ORDER — LAMOTRIGINE 100 MG/1
200 TABLET ORAL 2 TIMES DAILY
Status: DISCONTINUED | OUTPATIENT
Start: 2023-02-16 | End: 2023-02-17

## 2023-02-16 RX ORDER — METRONIDAZOLE 500 MG/1
500 TABLET ORAL ONCE
Status: COMPLETED | OUTPATIENT
Start: 2023-02-16 | End: 2023-02-16

## 2023-02-16 RX ORDER — METRONIDAZOLE 500 MG/1
500 TABLET ORAL ONCE
Status: DISCONTINUED | OUTPATIENT
Start: 2023-02-16 | End: 2023-02-16

## 2023-02-16 RX ORDER — ESCITALOPRAM OXALATE 20 MG/1
20 TABLET ORAL DAILY
Status: DISCONTINUED | OUTPATIENT
Start: 2023-02-17 | End: 2023-02-18

## 2023-02-16 RX ORDER — INSULIN GLARGINE 100 [IU]/ML
64 INJECTION, SOLUTION SUBCUTANEOUS
Status: DISCONTINUED | OUTPATIENT
Start: 2023-02-16 | End: 2023-02-21 | Stop reason: HOSPADM

## 2023-02-16 RX ORDER — PANTOPRAZOLE SODIUM 40 MG/1
40 TABLET, DELAYED RELEASE ORAL DAILY
Status: DISCONTINUED | OUTPATIENT
Start: 2023-02-17 | End: 2023-02-21 | Stop reason: HOSPADM

## 2023-02-16 RX ORDER — KETOROLAC TROMETHAMINE 30 MG/ML
15 INJECTION, SOLUTION INTRAMUSCULAR; INTRAVENOUS ONCE
Status: COMPLETED | OUTPATIENT
Start: 2023-02-16 | End: 2023-02-16

## 2023-02-16 RX ORDER — MORPHINE SULFATE 4 MG/ML
4 INJECTION, SOLUTION INTRAMUSCULAR; INTRAVENOUS ONCE
Status: COMPLETED | OUTPATIENT
Start: 2023-02-16 | End: 2023-02-16

## 2023-02-16 RX ORDER — LISINOPRIL 20 MG/1
20 TABLET ORAL DAILY
Status: DISCONTINUED | OUTPATIENT
Start: 2023-02-17 | End: 2023-02-21 | Stop reason: HOSPADM

## 2023-02-16 RX ORDER — ENOXAPARIN SODIUM 100 MG/ML
60 INJECTION SUBCUTANEOUS EVERY 12 HOURS SCHEDULED
Status: DISCONTINUED | OUTPATIENT
Start: 2023-02-17 | End: 2023-02-21 | Stop reason: HOSPADM

## 2023-02-16 RX ORDER — CIPROFLOXACIN 2 MG/ML
400 INJECTION, SOLUTION INTRAVENOUS ONCE
Status: DISCONTINUED | OUTPATIENT
Start: 2023-02-16 | End: 2023-02-16

## 2023-02-16 RX ORDER — CIPROFLOXACIN 2 MG/ML
400 INJECTION, SOLUTION INTRAVENOUS EVERY 12 HOURS
Status: DISCONTINUED | OUTPATIENT
Start: 2023-02-16 | End: 2023-02-16

## 2023-02-16 RX ORDER — INSULIN LISPRO 100 [IU]/ML
16 INJECTION, SOLUTION INTRAVENOUS; SUBCUTANEOUS
Status: DISCONTINUED | OUTPATIENT
Start: 2023-02-17 | End: 2023-02-21 | Stop reason: HOSPADM

## 2023-02-16 RX ORDER — METRONIDAZOLE 500 MG/1
500 TABLET ORAL EVERY 8 HOURS
Status: DISCONTINUED | OUTPATIENT
Start: 2023-02-17 | End: 2023-02-21

## 2023-02-16 RX ORDER — ENOXAPARIN SODIUM 100 MG/ML
40 INJECTION SUBCUTANEOUS DAILY
Status: DISCONTINUED | OUTPATIENT
Start: 2023-02-17 | End: 2023-02-16

## 2023-02-16 RX ORDER — PRAVASTATIN SODIUM 40 MG
40 TABLET ORAL
Status: DISCONTINUED | OUTPATIENT
Start: 2023-02-17 | End: 2023-02-21 | Stop reason: HOSPADM

## 2023-02-16 RX ORDER — LORAZEPAM 0.5 MG/1
0.5 TABLET ORAL EVERY 8 HOURS PRN
Status: DISCONTINUED | OUTPATIENT
Start: 2023-02-16 | End: 2023-02-21 | Stop reason: HOSPADM

## 2023-02-16 RX ORDER — LEVOFLOXACIN 5 MG/ML
750 INJECTION, SOLUTION INTRAVENOUS ONCE
Status: DISCONTINUED | OUTPATIENT
Start: 2023-02-16 | End: 2023-02-16

## 2023-02-16 RX ADMIN — KETOROLAC TROMETHAMINE 15 MG: 30 INJECTION, SOLUTION INTRAMUSCULAR at 17:01

## 2023-02-16 RX ADMIN — SODIUM CHLORIDE 1000 ML: 0.9 INJECTION, SOLUTION INTRAVENOUS at 17:42

## 2023-02-16 RX ADMIN — CIPROFLOXACIN 400 MG: 2 INJECTION, SOLUTION INTRAVENOUS at 19:24

## 2023-02-16 RX ADMIN — INSULIN GLARGINE 64 UNITS: 100 INJECTION, SOLUTION SUBCUTANEOUS at 22:10

## 2023-02-16 RX ADMIN — IOHEXOL 100 ML: 350 INJECTION, SOLUTION INTRAVENOUS at 17:17

## 2023-02-16 RX ADMIN — METRONIDAZOLE 500 MG: 500 TABLET ORAL at 19:24

## 2023-02-16 RX ADMIN — MORPHINE SULFATE 4 MG: 4 INJECTION INTRAVENOUS at 19:24

## 2023-02-16 NOTE — ED PROVIDER NOTES
History  Chief Complaint   Patient presents with   • Abdominal Pain     Pt c/o abd pain started yesterday 1500  Pt reports same pain with a past UTI  Pt reports N/V       HPI  Tapan Sanches, is a 49-year-old female with past medical history of type 2 diabetes,  hypertension, anxiety depression, recently discharged 1/31 /23 for urinary tract infection-E  coli after she presented with lower abdominal pain  Patient reports she had pelvic/spasm which started yesterday, associated with urge to urinate  Pain/spasms lasted about 10 minutes, graded 6/10, denied frequency, hematuria, dysuria, incontinence  Reports that spasm eventually dissipated and now it feels  more for soreness which is worse when she moves around  She denies fever, however had nausea and 2 episodes of vomiting and non bloody diarrhea today  Took no medications at home, and  has overall been feeling uneasy            Prior to Admission Medications   Prescriptions Last Dose Informant Patient Reported? Taking? Brexpiprazole (REXULTI) 3 MG tablet   No No   Sig: Take 1 tablet (3 mg total) by mouth in the morning   HumaLOG KwikPen 100 units/mL injection pen   Yes No   Sig: INJECT 16 UNITS 3 TIMES A DAY BEFORE MEALS   LORazepam (ATIVAN) 0 5 mg tablet   Yes No   cholecalciferol (VITAMIN D3) 1,000 units tablet   No No   Sig: Take 2 tablets (2,000 Units total) by mouth daily   clotrimazole-betamethasone (LOTRISONE) 1-0 05 % cream   No No   Sig: Apply to affected area 2 times daily prn   escitalopram (LEXAPRO) 20 mg tablet   No No   Sig: Take 1 tablet (20 mg total) by mouth daily   insulin glargine (LANTUS) 100 units/mL subcutaneous injection   No No   Sig: Inject 64 Units under the skin daily at bedtime   lamoTRIgine (LaMICtal) 200 MG tablet   No No   Sig: Take 1 tablet (200 mg total) by mouth 2 (two) times a day   liraglutide (Victoza) injection   No No   Sig: Inject 1 8 MG daily     lisinopril (ZESTRIL) 10 mg tablet   No No   Sig: Take 2 tablets (20 mg total) by mouth daily   metFORMIN (GLUCOPHAGE) 500 mg tablet   No No   Sig: Take 1 tablet (500 mg total) by mouth 2 (two) times a day with meals   naproxen (NAPROSYN) 375 mg tablet   No No   Sig: Take 1 tablet (375 mg total) by mouth 2 (two) times a day with meals   pantoprazole (PROTONIX) 40 mg tablet   No No   Sig: Take 1 tablet (40 mg total) by mouth daily   phenazopyridine (PYRIDIUM) 100 mg tablet   No No   Sig: Take 1 tablet (100 mg total) by mouth 3 (three) times a day with meals   rosuvastatin (CRESTOR) 5 mg tablet   No No   Sig: Take 1 tablet (5 mg total) by mouth daily   senna-docusate sodium (SENOKOT S) 8 6-50 mg per tablet   No No   Sig: Take 1 tablet by mouth 2 (two) times a day      Facility-Administered Medications: None       Past Medical History:   Diagnosis Date   • Anemia    • Anxiety    • Candidal intertrigo    • Depression    • Diabetes mellitus (HCC)    • GERD (gastroesophageal reflux disease)    • Hyperlipidemia    • Hypertension    • Irritable bowel syndrome    • Morbid obesity with BMI of 60 0-69 9, adult (HCC)    • Osteoarthritis    • Seasonal allergies    • Sleep difficulties    • Suicide attempt [facility])        Past Surgical History:   Procedure Laterality Date   •  SECTION     • CHOLECYSTECTOMY     • WISDOM TOOTH EXTRACTION         Family History   Problem Relation Age of Onset   • Diabetes Mother    • Hypertension Father      I have reviewed and agree with the history as documented  E-Cigarette/Vaping   • E-Cigarette Use Never User      E-Cigarette/Vaping Substances   • Nicotine No    • THC No    • CBD No    • Flavoring No    • Other No    • Unknown No      Social History     Tobacco Use   • Smoking status: Never   • Smokeless tobacco: Never   Vaping Use   • Vaping Use: Never used   Substance Use Topics   • Alcohol use: Not Currently     Comment: occassionaly    • Drug use: No        Review of Systems   Constitutional: Positive for fatigue   Negative for activity change, appetite change, chills, diaphoresis, fever and unexpected weight change  Endocrine: Negative for polydipsia, polyphagia and polyuria  Genitourinary: Positive for flank pain and pelvic pain  Negative for decreased urine volume, dysuria, frequency, hematuria, urgency and vaginal bleeding  Musculoskeletal: Negative for arthralgias and back pain  Physical Exam  ED Triage Vitals [02/16/23 1503]   Temperature Pulse Respirations Blood Pressure SpO2   99 1 °F (37 3 °C) (!) 122 20 (!) 182/90 95 %      Temp Source Heart Rate Source Patient Position - Orthostatic VS BP Location FiO2 (%)   Oral Monitor Lying Right arm --      Pain Score       --             Orthostatic Vital Signs  Vitals:    02/16/23 1503   BP: (!) 182/90   Pulse: (!) 122   Patient Position - Orthostatic VS: Lying       Physical Exam  Vitals and nursing note reviewed  Constitutional:       General: She is not in acute distress  Appearance: She is well-developed  HENT:      Head: Normocephalic and atraumatic  Eyes:      Conjunctiva/sclera: Conjunctivae normal    Cardiovascular:      Rate and Rhythm: Normal rate and regular rhythm  Heart sounds: No murmur heard  Pulmonary:      Effort: Pulmonary effort is normal  No respiratory distress  Breath sounds: Normal breath sounds  Abdominal:      General: Bowel sounds are normal       Palpations: Abdomen is soft  Tenderness: There is abdominal tenderness in the suprapubic area, left upper quadrant and left lower quadrant  There is no right CVA tenderness, left CVA tenderness, guarding or rebound  Negative signs include Willis's sign  Musculoskeletal:         General: No swelling  Cervical back: Neck supple  Skin:     General: Skin is warm and dry  Capillary Refill: Capillary refill takes less than 2 seconds  Neurological:      General: No focal deficit present  Mental Status: She is alert     Psychiatric:         Mood and Affect: Mood normal  ED Medications  Medications - No data to display    Diagnostic Studies  Results Reviewed     None                 No orders to display         Procedures  Procedures      ED Course                                       MDM      Disposition  Final diagnoses:   None     ED Disposition     None      Follow-up Information    None         Patient's Medications   Discharge Prescriptions    No medications on file     No discharge procedures on file  PDMP Review       Value Time User    PDMP Reviewed  Yes 1/29/2023  4:14 AM Karl Roque MD           ED Provider  Attending physically available and evaluated [place] I managed the patient along with the ED Attending      Electronically Signed by         Sharmin Acuna MD  02/16/23 [address] Darla Gowers, MD  02/16/23 [id number]

## 2023-02-17 PROBLEM — R82.90 ABNORMAL URINALYSIS: Status: ACTIVE | Noted: 2023-02-17

## 2023-02-17 LAB
ALBUMIN SERPL BCP-MCNC: 3.6 G/DL (ref 3.5–5)
ALP SERPL-CCNC: 83 U/L (ref 34–104)
ALT SERPL W P-5'-P-CCNC: 42 U/L (ref 7–52)
ANION GAP SERPL CALCULATED.3IONS-SCNC: 10 MMOL/L (ref 4–13)
AST SERPL W P-5'-P-CCNC: 33 U/L (ref 13–39)
BACTERIA UR QL AUTO: ABNORMAL /HPF
BASOPHILS # BLD AUTO: 0.06 THOUSANDS/ÂΜL (ref 0–0.1)
BASOPHILS NFR BLD AUTO: 1 % (ref 0–1)
BILIRUB SERPL-MCNC: 0.91 MG/DL (ref 0.2–1)
BILIRUB UR QL STRIP: NEGATIVE
BUN SERPL-MCNC: 13 MG/DL (ref 5–25)
CALCIUM SERPL-MCNC: 8.9 MG/DL (ref 8.4–10.2)
CHLORIDE SERPL-SCNC: 99 MMOL/L (ref 96–108)
CLARITY UR: CLEAR
CO2 SERPL-SCNC: 25 MMOL/L (ref 21–32)
COLOR UR: YELLOW
CREAT SERPL-MCNC: 0.82 MG/DL (ref 0.6–1.3)
EOSINOPHIL # BLD AUTO: 0.15 THOUSAND/ÂΜL (ref 0–0.61)
EOSINOPHIL NFR BLD AUTO: 2 % (ref 0–6)
ERYTHROCYTE [DISTWIDTH] IN BLOOD BY AUTOMATED COUNT: 13.5 % (ref 11.6–15.1)
GFR SERPL CREATININE-BSD FRML MDRD: 84 ML/MIN/1.73SQ M
GLUCOSE SERPL-MCNC: 154 MG/DL (ref 65–140)
GLUCOSE SERPL-MCNC: 182 MG/DL (ref 65–140)
GLUCOSE SERPL-MCNC: 259 MG/DL (ref 65–140)
GLUCOSE SERPL-MCNC: 269 MG/DL (ref 65–140)
GLUCOSE SERPL-MCNC: 269 MG/DL (ref 65–140)
GLUCOSE UR STRIP-MCNC: ABNORMAL MG/DL
HCT VFR BLD AUTO: 41.1 % (ref 34.8–46.1)
HGB BLD-MCNC: 13.5 G/DL (ref 11.5–15.4)
HGB UR QL STRIP.AUTO: ABNORMAL
IMM GRANULOCYTES # BLD AUTO: 0.07 THOUSAND/UL (ref 0–0.2)
IMM GRANULOCYTES NFR BLD AUTO: 1 % (ref 0–2)
KETONES UR STRIP-MCNC: ABNORMAL MG/DL
LEUKOCYTE ESTERASE UR QL STRIP: NEGATIVE
LYMPHOCYTES # BLD AUTO: 1.84 THOUSANDS/ÂΜL (ref 0.6–4.47)
LYMPHOCYTES NFR BLD AUTO: 24 % (ref 14–44)
MCH RBC QN AUTO: 30 PG (ref 26.8–34.3)
MCHC RBC AUTO-ENTMCNC: 32.8 G/DL (ref 31.4–37.4)
MCV RBC AUTO: 91 FL (ref 82–98)
MONOCYTES # BLD AUTO: 0.84 THOUSAND/ÂΜL (ref 0.17–1.22)
MONOCYTES NFR BLD AUTO: 11 % (ref 4–12)
MUCOUS THREADS UR QL AUTO: ABNORMAL
NEUTROPHILS # BLD AUTO: 4.6 THOUSANDS/ÂΜL (ref 1.85–7.62)
NEUTS SEG NFR BLD AUTO: 61 % (ref 43–75)
NITRITE UR QL STRIP: NEGATIVE
NON-SQ EPI CELLS URNS QL MICRO: ABNORMAL /HPF
NRBC BLD AUTO-RTO: 0 /100 WBCS
PH UR STRIP.AUTO: 5.5 [PH]
PLATELET # BLD AUTO: 290 THOUSANDS/UL (ref 149–390)
PMV BLD AUTO: 9.7 FL (ref 8.9–12.7)
POTASSIUM SERPL-SCNC: 3.7 MMOL/L (ref 3.5–5.3)
PROT SERPL-MCNC: 6.9 G/DL (ref 6.4–8.4)
PROT UR STRIP-MCNC: ABNORMAL MG/DL
RBC # BLD AUTO: 4.5 MILLION/UL (ref 3.81–5.12)
RBC #/AREA URNS AUTO: ABNORMAL /HPF
SODIUM SERPL-SCNC: 134 MMOL/L (ref 135–147)
SP GR UR STRIP.AUTO: >=1.05 (ref 1–1.03)
UROBILINOGEN UR STRIP-ACNC: <2 MG/DL
WBC # BLD AUTO: 7.56 THOUSAND/UL (ref 4.31–10.16)
WBC #/AREA URNS AUTO: ABNORMAL /HPF

## 2023-02-17 RX ORDER — LAMOTRIGINE 100 MG/1
200 TABLET ORAL DAILY
Status: DISCONTINUED | OUTPATIENT
Start: 2023-02-18 | End: 2023-02-18

## 2023-02-17 RX ORDER — TRAMADOL HYDROCHLORIDE 50 MG/1
50 TABLET ORAL EVERY 6 HOURS PRN
Status: DISCONTINUED | OUTPATIENT
Start: 2023-02-17 | End: 2023-02-21 | Stop reason: HOSPADM

## 2023-02-17 RX ORDER — OXYCODONE HYDROCHLORIDE 5 MG/1
5 TABLET ORAL EVERY 6 HOURS PRN
Status: DISCONTINUED | OUTPATIENT
Start: 2023-02-17 | End: 2023-02-21 | Stop reason: HOSPADM

## 2023-02-17 RX ORDER — ACETAMINOPHEN 325 MG/1
650 TABLET ORAL EVERY 6 HOURS PRN
Status: DISCONTINUED | OUTPATIENT
Start: 2023-02-17 | End: 2023-02-21 | Stop reason: HOSPADM

## 2023-02-17 RX ADMIN — METRONIDAZOLE 500 MG: 500 TABLET ORAL at 02:35

## 2023-02-17 RX ADMIN — INSULIN GLARGINE 64 UNITS: 100 INJECTION, SOLUTION SUBCUTANEOUS at 21:34

## 2023-02-17 RX ADMIN — OXYCODONE 5 MG: 5 TABLET ORAL at 21:37

## 2023-02-17 RX ADMIN — INSULIN LISPRO 16 UNITS: 100 INJECTION, SOLUTION INTRAVENOUS; SUBCUTANEOUS at 16:51

## 2023-02-17 RX ADMIN — INSULIN LISPRO 16 UNITS: 100 INJECTION, SOLUTION INTRAVENOUS; SUBCUTANEOUS at 12:24

## 2023-02-17 RX ADMIN — METRONIDAZOLE 500 MG: 500 TABLET ORAL at 11:03

## 2023-02-17 RX ADMIN — LISINOPRIL 20 MG: 20 TABLET ORAL at 08:11

## 2023-02-17 RX ADMIN — TRAMADOL HYDROCHLORIDE 50 MG: 50 TABLET, COATED ORAL at 11:03

## 2023-02-17 RX ADMIN — PRAVASTATIN SODIUM 40 MG: 40 TABLET ORAL at 16:53

## 2023-02-17 RX ADMIN — INSULIN LISPRO 16 UNITS: 100 INJECTION, SOLUTION INTRAVENOUS; SUBCUTANEOUS at 08:10

## 2023-02-17 RX ADMIN — ENOXAPARIN SODIUM 60 MG: 60 INJECTION SUBCUTANEOUS at 08:11

## 2023-02-17 RX ADMIN — CEFTRIAXONE SODIUM 2000 MG: 10 INJECTION, POWDER, FOR SOLUTION INTRAVENOUS at 06:33

## 2023-02-17 RX ADMIN — METRONIDAZOLE 500 MG: 500 TABLET ORAL at 19:40

## 2023-02-17 RX ADMIN — LAMOTRIGINE 200 MG: 100 TABLET ORAL at 08:11

## 2023-02-17 RX ADMIN — ENOXAPARIN SODIUM 60 MG: 60 INJECTION SUBCUTANEOUS at 21:34

## 2023-02-17 RX ADMIN — PANTOPRAZOLE SODIUM 40 MG: 40 TABLET, DELAYED RELEASE ORAL at 08:11

## 2023-02-17 NOTE — H&P
[address] 1975, 50 y o  female MRN: [id number]  Unit/Bed#: ED-10 Encounter: [id number]  Primary Care Provider: SUJATA Hussein   Date and time admitted to hospital: 2/16/2023  2:59 PM    Sigmoid diverticulitis  Assessment & Plan  Continue Cipro and Flagyl  Check stool studies  CT abdomen pelvis reviewed  Pain in the left lower quadrant  Clear liquid diet for tonight    Generalized anxiety disorder  Assessment & Plan  On Ativan 3 times a day  Class 3 severe obesity due to excess calories with body mass index (BMI) of 60 0 to 69 9 in adult [facility])  Assessment & Plan  Severe obesity, counseled on nutrition  May benefit from outpatient referral to bariatric surgery    Major depressive disorder, recurrent severe without psychotic features [facility])  Assessment & Plan  Currently on Vraylar  We do not have this on formulary patient to bring it in  Takes Lamictal continue while here      Irritable bowel syndrome with diarrhea  Assessment & Plan  Currently stable however has been having diarrhea due to sigmoid diverticulitis  Essential hypertension  Assessment & Plan  Continue lisinopril    Type 2 diabetes mellitus with complication, with long-term current use of insulin (HCC)  Assessment & Plan  Lab Results   Component Value Date    HGBA1C 7 8 (H) 12/14/2022       No results for input(s): POCGLU in the last 72 hours  Blood Sugar Average: Last 72 hrs: On insulin glargine 64 units and insulin lispro 16 units 3 times a day with meals  Also takes metformin  Reportedly was on Victoza but currently no longer taking  VTE Pharmacologic Prophylaxis:   High Risk (Score >/= 5) - Pharmacological DVT Prophylaxis Ordered: enoxaparin (Lovenox)  Sequential Compression Devices Ordered  Code Status: Prior full code  Discussion with family: Updated  (liliya at bedside) at bedside      Anticipated Length of Stay: Patient will be admitted on an inpatient basis with an anticipated length of stay of greater than 2 midnights secondary to Sigmoid diverticulitis  Total Time Spent on Date of Encounter in care of patient: 55 minutes This time was spent on one or more of the following: performing physical exam; counseling and coordination of care; obtaining or reviewing history; documenting in the medical record; reviewing/ordering tests, medications or procedures; communicating with other healthcare professionals and discussing with patient's family/caregivers  Chief Complaint: Abdominal pain    History of Present Illness:  Leopold Sou is a 50 y o  female with a PMH of class III obesity, type 2 diabetes mellitus, hypertension, hyperlipidemia, major depressive disorder in remission, generalized anxiety disorder presents with 2-day history of abdominal pain and diarrhea  Patient reports left lower quadrant abdominal pain which is severe, initially she thought it was related to her IBS however her pain is never a typical feature of her flare therefore decided come to the ER for an evaluation  Reports 2 episodes of nonbloody diarrhea today  Patient recently had an urinary tract infection for which he received antibiotics  She denies any nausea or vomiting, no fevers or chills  Review of Systems:  Review of Systems   Constitutional: Negative  HENT: Negative  Eyes: Negative  Respiratory: Negative  Cardiovascular: Negative  Gastrointestinal: Positive for abdominal pain and diarrhea  Endocrine: Negative  Genitourinary: Negative  Musculoskeletal: Negative  Skin: Negative  Neurological: Negative  Hematological: Negative  Psychiatric/Behavioral: Negative          Past Medical and Surgical History:   Past Medical History:   Diagnosis Date   • Anemia    • Anxiety    • Candidal intertrigo    • Depression    • Diabetes mellitus ([address] )    • GERD (gastroesophageal reflux disease)    • Hyperlipidemia    • Hypertension    • Irritable bowel syndrome • Morbid obesity with BMI of 60 0-69 9, adult ([facility] [address] )    • Osteoarthritis    • Seasonal allergies    • Sleep difficulties    • Suicide attempt [facility])        Past Surgical History:   Procedure Laterality Date   •  SECTION     • CHOLECYSTECTOMY     • WISDOM TOOTH EXTRACTION         Meds/Allergies:  Prior to Admission medications    Medication Sig Start Date End Date Taking?  Authorizing Provider   HumaLOG KwikPen 100 units/mL injection pen INJECT 16 UNITS 3 TIMES A DAY BEFORE MEALS 21  Yes Historical Provider, MD   insulin glargine (LANTUS) 100 units/mL subcutaneous injection Inject 64 Units under the skin daily at bedtime 21  Yes Gerard Chavira PA-C   lamoTRIgine (LaMICtal) 200 MG tablet Take 1 tablet (200 mg total) by mouth 2 (two) times a day 23  Yes Apple Montiel PA-C   lisinopril (ZESTRIL) 10 mg tablet Take 2 tablets (20 mg total) by mouth daily 22  Yes Roya Rizzo MD   LORazepam (ATIVAN) 0 5 mg tablet 0 5 mg every 8 (eight) hours as needed for anxiety 10/6/21  Yes Historical Provider, MD   naproxen (NAPROSYN) 375 mg tablet Take 1 tablet (375 mg total) by mouth 2 (two) times a day with meals 22 Yes Freda Guerin MD   pantoprazole (PROTONIX) 40 mg tablet Take 1 tablet (40 mg total) by mouth daily 22  Yes SUJATA Aragon   Brexpiprazole (REXULTI) 3 MG tablet Take 1 tablet (3 mg total) by mouth in the morning 22  Freda Guerin MD   cholecalciferol (VITAMIN D3) 1,000 units tablet Take 2 tablets (2,000 Units total) by mouth daily 22  Freda Guerin MD   clotrimazole-betamethasone (LOTRISONE) 1-0 05 % cream Apply to affected area 2 times daily prn 22   Marissa Lord PA-C   escitalopram (LEXAPRO) 20 mg tablet Take 1 tablet (20 mg total) by mouth daily 21  Mike Gallagher DO   metFORMIN (GLUCOPHAGE) 500 mg tablet Take 1 tablet (500 mg total) by mouth 2 (two) times a day with meals  Patient taking differently: Take 500 mg by mouth 2 (two) times a day with meals 8/31/20 9/20/22  SUJATA Ahn   phenazopyridine (PYRIDIUM) 100 mg tablet Take 1 tablet (100 mg total) by mouth 3 (three) times a day with meals  Patient not taking: Reported on 2/16/2023 1/31/23   Huy Hu PA-C   rosuvastatin (CRESTOR) 5 mg tablet Take 1 tablet (5 mg total) by mouth daily  Patient not taking: Reported on 2/16/2023 9/20/22   SUJATA Ahn   senna-docusate sodium (SENOKOT S) 8 6-50 mg per tablet Take 1 tablet by mouth 2 (two) times a day  Patient not taking: Reported on 2/16/2023 1/31/23 3/2/23  Huy Hu PA-C   liraglutide (Victoza) injection Inject 1 8 MG daily  Patient not taking: Reported on 2/16/2023 1/1/21 2/16/23  SUJATA Ahn     I have reviewed home medications with patient personally  Allergies:    Allergies   Allergen Reactions   • Cephalexin Vomiting     Other reaction(s): Nausea and/or vomiting   • Insulin Degludec GI Intolerance   • Sulfamethoxazole-Trimethoprim Vomiting     Other reaction(s): Nausea and/or vomiting       Social History:  Marital Status:    Occupation:   Patient Pre-hospital Living Situation: Home  Patient Pre-hospital Level of Mobility: walks  Patient Pre-hospital Diet Restrictions: none  Substance Use History:   Social History     Substance and Sexual Activity   Alcohol Use Not Currently    Comment: occassionaly      Social History     Tobacco Use   Smoking Status Never   Smokeless Tobacco Never     Social History     Substance and Sexual Activity   Drug Use No       Family History:  Family History   Problem Relation Age of Onset   • Diabetes Mother    • Hypertension Father        Physical Exam:     Vitals:   Blood Pressure: 134/79 (02/16/23 1600)  Pulse: (!) 113 (02/16/23 1600)  Temperature: 99 1 °F (37 3 °C) (02/16/23 1503)  Temp Source: Oral (02/16/23 1503)  Respirations: 18 (02/16/23 1600)  SpO2: 95 % (02/16/23 1600)    Physical Exam  Constitutional: Appearance: Normal appearance  HENT:      Head: Normocephalic and atraumatic  Eyes:      Extraocular Movements: Extraocular movements intact  Cardiovascular:      Rate and Rhythm: Normal rate and regular rhythm  Pulses: Normal pulses  Heart sounds: Normal heart sounds  Pulmonary:      Effort: Pulmonary effort is normal    Abdominal:      Palpations: Abdomen is soft  Tenderness: There is abdominal tenderness  Skin:     General: Skin is warm  Neurological:      General: No focal deficit present  Mental Status: She is alert and oriented to person, place, and time  Psychiatric:         Behavior: Behavior normal          Additional Data:     Lab Results:  Results from last 7 days   Lab Units 02/16/23  1626   WBC Thousand/uL 10 38*   HEMOGLOBIN g/dL 14 8   HEMATOCRIT % 45 0   PLATELETS Thousands/uL 337   NEUTROS PCT % 66   LYMPHS PCT % 21   MONOS PCT % 10   EOS PCT % 1     Results from last 7 days   Lab Units 02/16/23  1626   SODIUM mmol/L 134*   POTASSIUM mmol/L 4 2   CHLORIDE mmol/L 98   CO2 mmol/L 26   BUN mg/dL 11   CREATININE mg/dL 0 79   ANION GAP mmol/L 10   CALCIUM mg/dL 9 5   ALBUMIN g/dL 3 9   TOTAL BILIRUBIN mg/dL 0 76   ALK PHOS U/L 66   ALT U/L 31   AST U/L 22   GLUCOSE RANDOM mg/dL 299*                 Results from last 7 days   Lab Units 02/16/23  1626   LACTIC ACID mmol/L 2 7*       Lines/Drains:  Invasive Devices     Peripheral Intravenous Line  Duration           Peripheral IV 02/16/23 Right;Ventral (anterior) Forearm <1 day                    Imaging: Reviewed radiology reports from this admission including: abdominal/pelvic CT  CT abdomen pelvis with contrast   Final Result by Magaly Asif MD (02/16 1801)      Suggestion of mild colitis and possible diverticulitis in the proximal sigmoid colon, partially obscured by the adjacent adnexa  No evidence of bowel obstruction or perforation               Workstation performed: [id number]             EKG and Other Studies Reviewed on Admission:   · EKG: No EKG obtained  ** Please Note: This note has been constructed using a voice recognition system   **

## 2023-02-17 NOTE — ASSESSMENT & PLAN NOTE
· UA with occult blood and 10-20 WBCs however is negative for nitrates, no LUTS  · Follow-up urine culture although patient has already received antibiotics, is already being covered with ceftriaxone

## 2023-02-17 NOTE — PLAN OF CARE
Problem: PAIN - ADULT  Goal: Verbalizes/displays adequate comfort level or baseline comfort level  Description: Interventions:  - Encourage patient to monitor pain and request assistance  - Assess pain using appropriate pain scale  - Administer analgesics based on type and severity of pain and evaluate response  - Implement non-pharmacological measures as appropriate and evaluate response  - Consider cultural and social influences on pain and pain management  - Notify physician/advanced practitioner if interventions unsuccessful or patient reports new pain  Outcome: Progressing     Problem: INFECTION - ADULT  Goal: Absence or prevention of progression during hospitalization  Description: INTERVENTIONS:  - Assess and monitor for signs and symptoms of infection  - Monitor lab/diagnostic results  - Monitor all insertion sites, i e  indwelling lines, tubes, and drains  - Monitor endotracheal if appropriate and nasal secretions for changes in amount and color  - [place] appropriate cooling/warming therapies per order  - Administer medications as ordered  - Instruct and encourage patient and family to use good hand hygiene technique  - Identify and instruct in appropriate isolation precautions for identified infection/condition  Outcome: Progressing  Goal: Absence of fever/infection during neutropenic period  Description: INTERVENTIONS:  - Monitor WBC    Outcome: Progressing     Problem: SAFETY ADULT  Goal: Patient will remain free of falls  Description: INTERVENTIONS:  - Educate patient/family on patient safety including physical limitations  - Instruct patient to call for assistance with activity   - Consult OT/PT to assist with strengthening/mobility   - Keep Call bell within reach  - Keep bed low and locked with side rails adjusted as appropriate  - Keep care items and personal belongings within reach  - Initiate and maintain comfort rounds  - Make Fall Risk Sign visible to staff  - Offer Toileting every  Hours, in advance of need  - Initiate/Maintain alarm  - Obtain necessary fall risk management equipment:   - Apply yellow socks and bracelet for high fall risk patients  - Consider moving patient to room near nurses station  Outcome: Progressing  Goal: Maintain or return to baseline ADL function  Description: INTERVENTIONS:  -  Assess patient's ability to carry out ADLs; assess patient's baseline for ADL function and identify physical deficits which impact ability to perform ADLs (bathing, care of mouth/teeth, toileting, grooming, dressing, etc )  - Assess/evaluate cause of self-care deficits   - Assess range of motion  - Assess patient's mobility; develop plan if impaired  - Assess patient's need for assistive devices and provide as appropriate  - Encourage maximum independence but intervene and supervise when necessary  - Involve family in performance of ADLs  - Assess for home care needs following discharge   - Consider OT consult to assist with ADL evaluation and planning for discharge  - Provide patient education as appropriate  Outcome: Progressing  Goal: Maintains/Returns to pre admission functional level  Description: INTERVENTIONS:  - Perform BMAT or MOVE assessment daily    - Set and communicate daily mobility goal to care team and patient/family/caregiver  - Collaborate with rehabilitation services on mobility goals if consulted  - Perform Range of Motion  times a day  - Reposition patient every  hours    - Dangle patient  times a day  - Stand patient  times a day  - Ambulate patient  times a day  - Out of bed to chair  times a day   - Out of bed for meal times a day  - Out of bed for toileting  - Record patient progress and toleration of activity level   Outcome: Progressing     Problem: DISCHARGE PLANNING  Goal: Discharge to home or other facility with appropriate resources  Description: INTERVENTIONS:  - Identify barriers to discharge w/patient and caregiver  - Arrange for needed discharge resources and transportation as appropriate  - Identify discharge learning needs (meds, wound care, etc )  - Arrange for interpretive services to assist at discharge as needed  - Refer to Case Management Department for coordinating discharge planning if the patient needs post-hospital services based on physician/advanced practitioner order or complex needs related to functional status, cognitive ability, or social support system  Outcome: Progressing     Problem: Knowledge Deficit  Goal: Patient/family/caregiver demonstrates understanding of disease process, treatment plan, medications, and discharge instructions  Description: Complete learning assessment and assess knowledge base    Interventions:  - Provide teaching at level of understanding  - Provide teaching via preferred learning methods  Outcome: Progressing

## 2023-02-17 NOTE — UTILIZATION REVIEW
Initial Clinical Review    Admission: Date/Time/Statement:   Admission Orders (From admission, onward)     Ordered        02/16/23 1837  INPATIENT ADMISSION  Once                      Orders Placed This Encounter   Procedures   • INPATIENT ADMISSION     Standing Status:   Standing     Number of Occurrences:   1     Order Specific Question:   Level of Care     Answer:   Med Surg [16]     Order Specific Question:   Estimated length of stay     Answer:   More than 2 Midnights     Order Specific Question:   Certification     Answer:   I certify that inpatient services are medically necessary for this patient for a duration of greater than two midnights  See H&P and MD Progress Notes for additional information about the patient's course of treatment  ED Arrival Information     Expected   -    Arrival   2/16/2023 14:52    Acuity   Urgent            Means of arrival   Walk-In    Escorted by   Self    Service   Hospitalist    Admission type   Emergency            Arrival complaint   Abdominal Pain/N/V           Chief Complaint   Patient presents with   • Abdominal Pain     Pt c/o abd pain started yesterday 1500  Pt reports same pain with a past UTI  Pt reports N/V  Initial Presentation: 50 y o  female PMH of class III obesity, DM2, HTN, HLD, major depressive disorder in remission, generalized anxiety disorder , IBS, recent UTI tx w/ abx presents with 2-day history of abdominal pain and diarrhea x2 today, non bloody  On exam, LLQ abdominal tenderness  Abdomen soft  Pt tachycardic, BP elevated   Labs WBC 10 38, LA 2 7 --->1 4 with IVF  CT A/P suggests colitis an possible diverticulitis prox sigmoid colon  Pt given IV analgesics, IVF, IV abx in ED  PT admitted as inpatient with sigmoid diverticulitis  Planh - CL diet, stool studies, IV abx- Cipro , po flagyl  Am labs   Date:2/17   Day 2:    WBC down to 7 56  UA with occult blood and 10-20 WBCs however is negative for nitrates  Obtain  urine cx    Pt denies dysuria   Pt reports continues LLQ abdominal pain, requesting pain medication   Pt reports pain is constant and dull but when she moves it becomes sharp  Tolerted CL this am   Had episode diarrhea this am , abdominal cramping   Pt reports she has soreness from vomiting PTA  PLan - Continue IV abx-pt now on Ceftriaxone and po Flagyl   Kept on CL for now   Ordered prn tramadol and prn Oxycodone for pain   CBC, BMP in am       ED Triage Vitals   Temperature Pulse Respirations Blood Pressure SpO2   02/16/23 1503 02/16/23 1503 02/16/23 1503 02/16/23 1503 02/16/23 1503   99 1 °F (37 3 °C) (!) 122 20 (!) 182/90 95 %      Temp Source Heart Rate Source Patient Position - Orthostatic VS BP Location FiO2 (%)   02/16/23 1503 02/16/23 1503 02/16/23 1503 02/16/23 1503 --   Oral Monitor Lying Right arm       Pain Score       02/16/23 1701       6          Wt Readings from Last 1 Encounters:   01/29/23 (!) 157 kg (346 lb)     Additional Vital Signs:   Date/Time Temp Pulse Resp BP MAP (mmHg) SpO2   02/17/23 08:09:54 98 °F (36 7 °C) 106 Abnormal  20 138/79 99 90 %   02/17/23 0045 -- -- -- -- -- --   02/16/23 2012 98 6 °F (37 °C) 113 Abnormal  18 128/80 96 92 %   02/16/23 1600 -- 113 Abnormal  18 134/79 100 95 %     Pertinent Labs/Diagnostic Test Results:   CT abdomen pelvis with contrast   Final Result by Travon Gallo MD (02/16 1801)      Suggestion of mild colitis and possible diverticulitis in the proximal sigmoid colon, partially obscured by the adjacent adnexa  No evidence of bowel obstruction or perforation               Workstation performed: [id number]               Results from last 7 days   Lab Units 02/17/23  0451 02/16/23  1626   WBC Thousand/uL 7 56 10 38*   HEMOGLOBIN g/dL 13 5 14 8   HEMATOCRIT % 41 1 45 0   PLATELETS Thousands/uL 290 337   NEUTROS ABS Thousands/µL 4 60 6 95         Results from last 7 days   Lab Units 02/17/23  0451 02/16/23  1626   SODIUM mmol/L 134* 134*   POTASSIUM mmol/L 3 7 4 2   CHLORIDE mmol/L 99 98   CO2 mmol/L 25 26   ANION GAP mmol/L 10 10   BUN mg/dL 13 11   CREATININE mg/dL 0 82 0 79   EGFR ml/min/1 73sq m 84 88   CALCIUM mg/dL 8 9 9 5     Results from last 7 days   Lab Units 02/17/23  0451 02/16/23  1626   AST U/L 33 22   ALT U/L 42 31   ALK PHOS U/L 83 66   TOTAL PROTEIN g/dL 6 9 7 4   ALBUMIN g/dL 3 6 3 9   TOTAL BILIRUBIN mg/dL 0 91 0 76     Results from last 7 days   Lab Units 02/17/23  1120 02/17/23  0809   POC GLUCOSE mg/dl 269* 259*     Results from last 7 days   Lab Units 02/17/23  0451 02/16/23  1626   GLUCOSE RANDOM mg/dL 269* 299*             BETA-HYDROXYBUTYRATE   Date Value Ref Range Status   04/17/2021 0 1 <0 6 mmol/L Final                        Results from last 7 days   Lab Units 02/16/23  1931 02/16/23  1626   LACTIC ACID mmol/L 1 4 2 7*               Results from last 7 days   Lab Units 02/16/23  1626   LIPASE u/L 59                   ED Treatment:   Medication Administration from 02/16/2023 1452 to 02/16/2023 2004       Date/Time Order Dose Route Action     02/16/2023 1701 EST ketorolac (TORADOL) injection 15 mg 15 mg Intravenous Given     02/16/2023 1717 EST iohexol (OMNIPAQUE) 350 MG/ML injection (SINGLE-DOSE) 100 mL 100 mL Intravenous Given     02/16/2023 1742 EST sodium chloride 0 9 % bolus 1,000 mL 1,000 mL Intravenous New Bag     02/16/2023 1924 EST ciprofloxacin (CIPRO) IVPB (premix in 5% dextrose) 400 mg 200 mL 400 mg Intravenous New Bag     02/16/2023 1924 EST metroNIDAZOLE (FLAGYL) tablet 500 mg 500 mg Oral Given     02/16/2023 1924 EST morphine injection 4 mg 4 mg Intravenous Given        Past Medical History:   Diagnosis Date   • Anemia    • Anxiety    • Candidal intertrigo    • Depression    • Diabetes mellitus ([facility] )    • GERD (gastroesophageal reflux disease)    • Hyperlipidemia    • Hypertension    • Irritable bowel syndrome    • Morbid obesity with BMI of 60 0-69 9, adult ([facility] )    • Osteoarthritis    • Seasonal allergies    • Sleep difficulties    • Suicide attempt [facility])      Present on Admission:  • Major depressive disorder, recurrent severe without psychotic features ([facility] )  • Generalized anxiety disorder  • Essential hypertension  • Irritable bowel syndrome with diarrhea      Admitting Diagnosis: Colitis [K52 9]  Diverticulitis [K57 92]  Abdominal pain [R10 9]  Age/Sex: 50 y o  female  Admission Orders:  Scheduled Medications:  cefTRIAXone, 2,000 mg, Intravenous, Q24H  Started 2/17am  enoxaparin, 60 mg, Subcutaneous, Q12H GERI  escitalopram, 20 mg, Oral, Daily  insulin glargine, 64 Units, Subcutaneous, HS  insulin lispro, 16 Units, Subcutaneous, TID With Meals  lamoTRIgine, 200 mg, Oral, BID  lisinopril, 20 mg, Oral, Daily  metroNIDAZOLE, 500 mg, Oral, Q8H  pantoprazole, 40 mg, Oral, Daily  pravastatin, 40 mg, Oral, Daily With Dinner    ciprofloxacin (CIPRO) IVPB (premix in 5% dextrose) 400 mg 200 mL  Dose: 400 mg  Freq: Once Route: IV  Last Dose: Stopped (02/16/23 2110)  Start: 02/16/23 1830 End: 02/16/23 2110  Continuous IV Infusions:     PRN Meds:  acetaminophen, 650 mg, Oral, Q6H PRN  LORazepam, 0 5 mg, Oral, Q8H PRN  oxyCODONE, 5 mg, Oral, Q6H PRN  traMADol, 50 mg, Oral, Q6H PRN x1 2/17     CL diet   OOB as radha SCD      Network Utilization Review Department  ATTENTION: Please call with any questions or concerns to [phone] and carefully listen to the prompts so that you are directed to the right person  All voicemails are confidential   [facility] all requests for admission clinical reviews, approved or denied determinations and any other requests to dedicated fax number below belonging to the campus where the patient is receiving treatment   List of dedicated fax numbers for the Facilities:  [address] DENIALS (Administrative/Medical Necessity) [phone]   [address]  (Maternity/NICU/Pediatrics) [phone]   [facility] Marni [place] [phone]   [facility] [phone]     [address] [phone]   [address] [place] [address] [facility] FloresSt[facility] Psych[facility] Center[facility]rew [address] [facility] 134 252 [facility] [phone]

## 2023-02-17 NOTE — ASSESSMENT & PLAN NOTE
Lab Results   Component Value Date    HGBA1C 7 8 (H) 12/14/2022       Recent Labs     02/17/23  0809 02/17/23  1120   POCGLU 259* 269*       Blood Sugar Average: Last 72 hrs:  (P) 264   · Home regimen: Insulin glargine 64 units daily and insulin lispro 16 units 3 times a day with meals, also takes metformin  · Glucose is acceptable here thus far  · We will continue home regimen

## 2023-02-17 NOTE — ASSESSMENT & PLAN NOTE
Currently on Vraylar  We do not have this on formulary patient to bring it in    Takes Lamictal continue while here

## 2023-02-17 NOTE — ASSESSMENT & PLAN NOTE
· Presents with left lower quadrant pain and diarrhea  · CAT scan reveals sigmoid diverticulitis  · Lactate elevated on admission but cleared after IVF bolus  · Continue CTX/Flagyl  · Follow up stool studies  · Pain control  · CLD

## 2023-02-17 NOTE — ASSESSMENT & PLAN NOTE
Lab Results   Component Value Date    HGBA1C 7 8 (H) 12/14/2022       No results for input(s): POCGLU in the last 72 hours  Blood Sugar Average: Last 72 hrs: On insulin glargine 64 units and insulin lispro 16 units 3 times a day with meals  Also takes metformin  Reportedly was on Victoza but currently no longer taking

## 2023-02-17 NOTE — PROGRESS NOTES
[facility]  Progress Note Veronique Craig 1975, 50 y o  female MRN: [id number]  Unit/Bed#:  W -01 Encounter: [id number]  Primary Care Provider: SUJATA Cook   Date and time admitted to hospital: 2/16/2023  2:59 PM    Abnormal urinalysis  Assessment & Plan  · UA with occult blood and 10-20 WBCs however is negative for nitrates, no LUTS  · Follow-up urine culture although patient has already received antibiotics, is already being covered with ceftriaxone    Sigmoid diverticulitis  Assessment & Plan  · Presents with left lower quadrant pain and diarrhea  · CAT scan reveals sigmoid diverticulitis  · Lactate elevated on admission but cleared after IVF bolus  · Continue CTX/Flagyl  · Follow up stool studies  · Pain control  · CLD    Generalized anxiety disorder  Assessment & Plan  · Continue Ativan 3 times daily for anxiety    Class 3 severe obesity due to excess calories with body mass index (BMI) of 60 0 to 69 9 in adult [facility])  Assessment & Plan  · Counseled on nutrition  · May benefit from outpatient referral to bariatric surgery    Major depressive disorder, recurrent severe without psychotic features ([facility] )  Assessment & Plan  · Continue Lamictal, will profile home Vraylar once provided    Irritable bowel syndrome with diarrhea  Assessment & Plan  · Stable prior to this episode, diarrhea likely due to diverticulitis    Essential hypertension  Assessment & Plan  · BP is acceptable  · Continue lisinopril 20 mg daily    Type 2 diabetes mellitus with complication, with long-term current use of insulin [facility])  Assessment & Plan  Lab Results   Component Value Date    HGBA1C 7 8 (H) 12/14/2022       Recent Labs     02/17/23  0809 02/17/23  1120   POCGLU 259* 269*       Blood Sugar Average: Last 72 hrs:  (P) 264   · Home regimen: Insulin glargine 64 units daily and insulin lispro 16 units 3 times a day with meals, also takes metformin  · Glucose is acceptable here thus far  · We will continue home regimen    VTE Pharmacologic Prophylaxis: VTE Score: 4 Moderate Risk (Score 3-4) - Pharmacological DVT Prophylaxis Ordered: enoxaparin (Lovenox)  Patient Centered Rounds: I performed bedside rounds with nursing staff today  Discussions with Specialists or Other Care Team Provider: Discussed with nursing    Education and Discussions with Family / Patient: Patient declined call to   Total Time Spent on Date of Encounter in care of patient: 45 minutes This time was spent on one or more of the following: performing physical exam; counseling and coordination of care; obtaining or reviewing history; documenting in the medical record; reviewing/ordering tests, medications or procedures; communicating with other healthcare professionals and discussing with patient's family/caregivers  Current Length of Stay: 1 day(s)  Current Patient Status: Inpatient   Certification Statement: The patient will continue to require additional inpatient hospital stay due to Acute diverticulitis  Discharge Plan: Anticipate discharge in 24-48 hrs to home  Code Status: Level 1 - Full Code    Subjective:   Patient request something for pain today and is still having significant left lower quadrant pain  She says its constant and dull but when she moves it becomes sharp  She does have an appetite and tolerated clears this morning  She had an episode of diarrhea earlier this morning and still has some cramping abdominally  She also has some soreness from vomiting prior to her admission  Denies any fever or chills  Denies headache or dizziness  Denies dysuria, urgency or frequency  Objective:     Vitals:   Temp (24hrs), Av 6 °F (37 °C), Min:98 °F (36 7 °C), Max:99 1 °F (37 3 °C)    Temp:  [98 °F (36 7 °C)-99 1 °F (37 3 °C)] 98 °F (36 7 °C)  HR:  [106-122] 106  Resp:  [18-20] 20  BP: (128-182)/(79-90) 138/79  SpO2:  [90 %-95 %] 90 %  Body mass index is 67 57 kg/m²       Input and Output Summary (last 24 hours):   No intake or output data in the 24 hours ending 02/17/23 1158    Physical Exam:   Physical Exam  Vitals and nursing note reviewed  Constitutional:       General: She is not in acute distress  Appearance: She is obese  She is not toxic-appearing  HENT:      Head: Normocephalic and atraumatic  Right Ear: External ear normal       Left Ear: External ear normal       Nose: Nose normal       Mouth/Throat:      Mouth: Mucous membranes are moist       Pharynx: Oropharynx is clear  Eyes:      Conjunctiva/sclera: Conjunctivae normal    Cardiovascular:      Rate and Rhythm: Normal rate and regular rhythm  Pulses: Normal pulses  Pulmonary:      Effort: Pulmonary effort is normal  No respiratory distress  Breath sounds: Normal breath sounds  No stridor  No wheezing, rhonchi or rales  Abdominal:      General: Abdomen is flat  Bowel sounds are normal  There is no distension  Palpations: Abdomen is soft  There is no mass  Tenderness: There is no abdominal tenderness  There is no guarding or rebound  Hernia: No hernia is present  Musculoskeletal:      Cervical back: Normal range of motion  Right lower leg: No edema  Left lower leg: No edema  Skin:     General: Skin is warm and dry  Neurological:      General: No focal deficit present  Mental Status: She is alert  Mental status is at baseline  Sensory: No sensory deficit  Motor: No weakness     Psychiatric:         Mood and Affect: Mood normal           Additional Data:     Labs:  Results from last 7 days   Lab Units 02/17/23  0451   WBC Thousand/uL 7 56   HEMOGLOBIN g/dL 13 5   HEMATOCRIT % 41 1   PLATELETS Thousands/uL 290   NEUTROS PCT % 61   LYMPHS PCT % 24   MONOS PCT % 11   EOS PCT % 2     Results from last 7 days   Lab Units 02/17/23  0451   SODIUM mmol/L 134*   POTASSIUM mmol/L 3 7   CHLORIDE mmol/L 99   CO2 mmol/L 25   BUN mg/dL 13   CREATININE mg/dL 0 82   ANION GAP mmol/L 10 CALCIUM mg/dL 8 9   ALBUMIN g/dL 3 6   TOTAL BILIRUBIN mg/dL 0 91   ALK PHOS U/L 83   ALT U/L 42   AST U/L 33   GLUCOSE RANDOM mg/dL 269*         Results from last 7 days   Lab Units 02/17/23  1120 02/17/23  0809   POC GLUCOSE mg/dl 269* 259*         Results from last 7 days   Lab Units 02/16/23  1931 02/16/23  1626   LACTIC ACID mmol/L 1 4 2 7*       Lines/Drains:  Invasive Devices     Peripheral Intravenous Line  Duration           Peripheral IV 02/16/23 Right;Ventral (anterior) Forearm <1 day                      Imaging: Reviewed radiology reports from this admission including: abdominal/pelvic CT    Recent Cultures (last 7 days):         Last 24 Hours Medication List:   Current Facility-Administered Medications   Medication Dose Route Frequency Provider Last Rate   • acetaminophen  650 mg Oral Q6H PRN Ana Hughes PA-C     • cefTRIAXone  2,000 mg Intravenous Q24H German Gayle MD 2,000 mg (02/17/23 9641)   • enoxaparin  60 mg Subcutaneous Q12H Kimberly Covington MD     • escitalopram  20 mg Oral Daily German Gayle MD     • insulin glargine  64 Units Subcutaneous HS German Gayle MD     • insulin lispro  16 Units Subcutaneous TID With Meals German Gayle MD     • lamoTRIgine  200 mg Oral BID German Gayle MD     • lisinopril  20 mg Oral Daily German Gayle MD     • LORazepam  0 5 mg Oral Q8H PRN German Gayle MD     • metroNIDAZOLE  500 mg Oral Q8H German Gayle MD     • oxyCODONE  5 mg Oral Q6H PRN Ana Hughes PA-C     • pantoprazole  40 mg Oral Daily German Gayle MD     • pravastatin  40 mg Oral Daily With Sophia Horne MD     • traMADol  50 mg Oral Q6H PRN Ana Hughes PA-C          Today, Patient Was Seen By: Ana Hughes PA-C    **Please Note: This note may have been constructed using a voice recognition system  **

## 2023-02-17 NOTE — PLAN OF CARE
Problem: PAIN - ADULT  Goal: Verbalizes/displays adequate comfort level or baseline comfort level  Description: Interventions:  - Encourage patient to monitor pain and request assistance  - Assess pain using appropriate pain scale  - Administer analgesics based on type and severity of pain and evaluate response  - Implement non-pharmacological measures as appropriate and evaluate response  - Consider cultural and social influences on pain and pain management  - Notify physician/advanced practitioner if interventions unsuccessful or patient reports new pain  Outcome: Progressing     Problem: INFECTION - ADULT  Goal: Absence or prevention of progression during hospitalization  Description: INTERVENTIONS:  - Assess and monitor for signs and symptoms of infection  - Monitor lab/diagnostic results  - Monitor all insertion sites, i e  indwelling lines, tubes, and drains  - Monitor endotracheal if appropriate and nasal secretions for changes in amount and color  - [place] appropriate cooling/warming therapies per order  - Administer medications as ordered  - Instruct and encourage patient and family to use good hand hygiene technique  - Identify and instruct in appropriate isolation precautions for identified infection/condition  Outcome: Progressing  Goal: Absence of fever/infection during neutropenic period  Description: INTERVENTIONS:  - Monitor WBC    Outcome: Progressing     Problem: SAFETY ADULT  Goal: Patient will remain free of falls  Description: INTERVENTIONS:  - Educate patient/family on patient safety including physical limitations  - Instruct patient to call for assistance with activity   - Consult OT/PT to assist with strengthening/mobility   - Keep Call bell within reach  - Keep bed low and locked with side rails adjusted as appropriate  - Keep care items and personal belongings within reach  - Initiate and maintain comfort rounds  - Make Fall Risk Sign visible to staff  - Offer Toileting every  Hours, in advance of need  - Initiate/Maintain alarm  - Obtain necessary fall risk management equipment:   - Apply yellow socks and bracelet for high fall risk patients  - Consider moving patient to room near nurses station  Outcome: Progressing  Goal: Maintain or return to baseline ADL function  Description: INTERVENTIONS:  -  Assess patient's ability to carry out ADLs; assess patient's baseline for ADL function and identify physical deficits which impact ability to perform ADLs (bathing, care of mouth/teeth, toileting, grooming, dressing, etc )  - Assess/evaluate cause of self-care deficits   - Assess range of motion  - Assess patient's mobility; develop plan if impaired  - Assess patient's need for assistive devices and provide as appropriate  - Encourage maximum independence but intervene and supervise when necessary  - Involve family in performance of ADLs  - Assess for home care needs following discharge   - Consider OT consult to assist with ADL evaluation and planning for discharge  - Provide patient education as appropriate  Outcome: Progressing  Goal: Maintains/Returns to pre admission functional level  Description: INTERVENTIONS:  - Perform BMAT or MOVE assessment daily    - Set and communicate daily mobility goal to care team and patient/family/caregiver  - Collaborate with rehabilitation services on mobility goals if consulted  - Perform Range of Motion  times a day  - Reposition patient every  hours    - Dangle patient  times a day  - Stand patient  times a day  - Ambulate patient times a day  - Out of bed to chair  times a day   - Out of bed for meals  times a day  - Out of bed for toileting  - Record patient progress and toleration of activity level   Outcome: Progressing     Problem: DISCHARGE PLANNING  Goal: Discharge to home or other facility with appropriate resources  Description: INTERVENTIONS:  - Identify barriers to discharge w/patient and caregiver  - Arrange for needed discharge resources and transportation as appropriate  - Identify discharge learning needs (meds, wound care, etc )  - Arrange for interpretive services to assist at discharge as needed  - Refer to Case Management Department for coordinating discharge planning if the patient needs post-hospital services based on physician/advanced practitioner order or complex needs related to functional status, cognitive ability, or social support system  Outcome: Progressing     Problem: Knowledge Deficit  Goal: Patient/family/caregiver demonstrates understanding of disease process, treatment plan, medications, and discharge instructions  Description: Complete learning assessment and assess knowledge base    Interventions:  - Provide teaching at level of understanding  - Provide teaching via preferred learning methods  Outcome: Progressing

## 2023-02-17 NOTE — ASSESSMENT & PLAN NOTE
Continue Cipro and Flagyl  Check stool studies  CT abdomen pelvis reviewed  Pain in the left lower quadrant     Clear liquid diet for tonight

## 2023-02-18 ENCOUNTER — APPOINTMENT (INPATIENT)
Dept: RADIOLOGY | Facility: HOSPITAL | Age: 48
End: 2023-02-18

## 2023-02-18 PROBLEM — R00.0 TACHYCARDIA: Status: ACTIVE | Noted: 2023-02-18

## 2023-02-18 LAB
ANION GAP SERPL CALCULATED.3IONS-SCNC: 9 MMOL/L (ref 4–13)
BACTERIA UR CULT: ABNORMAL
BASOPHILS # BLD AUTO: 0.07 THOUSANDS/ÂΜL (ref 0–0.1)
BASOPHILS NFR BLD AUTO: 1 % (ref 0–1)
BUN SERPL-MCNC: 8 MG/DL (ref 5–25)
CALCIUM SERPL-MCNC: 9.4 MG/DL (ref 8.4–10.2)
CHLORIDE SERPL-SCNC: 99 MMOL/L (ref 96–108)
CO2 SERPL-SCNC: 28 MMOL/L (ref 21–32)
CREAT SERPL-MCNC: 0.72 MG/DL (ref 0.6–1.3)
EOSINOPHIL # BLD AUTO: 0.33 THOUSAND/ÂΜL (ref 0–0.61)
EOSINOPHIL NFR BLD AUTO: 5 % (ref 0–6)
ERYTHROCYTE [DISTWIDTH] IN BLOOD BY AUTOMATED COUNT: 14 % (ref 11.6–15.1)
GFR SERPL CREATININE-BSD FRML MDRD: 98 ML/MIN/1.73SQ M
GLUCOSE SERPL-MCNC: 177 MG/DL (ref 65–140)
GLUCOSE SERPL-MCNC: 194 MG/DL (ref 65–140)
GLUCOSE SERPL-MCNC: 237 MG/DL (ref 65–140)
GLUCOSE SERPL-MCNC: 260 MG/DL (ref 65–140)
HCT VFR BLD AUTO: 41.4 % (ref 34.8–46.1)
HGB BLD-MCNC: 14.1 G/DL (ref 11.5–15.4)
IMM GRANULOCYTES # BLD AUTO: 0.05 THOUSAND/UL (ref 0–0.2)
IMM GRANULOCYTES NFR BLD AUTO: 1 % (ref 0–2)
LYMPHOCYTES # BLD AUTO: 1.19 THOUSANDS/ÂΜL (ref 0.6–4.47)
LYMPHOCYTES NFR BLD AUTO: 18 % (ref 14–44)
MCH RBC QN AUTO: 30 PG (ref 26.8–34.3)
MCHC RBC AUTO-ENTMCNC: 34.1 G/DL (ref 31.4–37.4)
MCV RBC AUTO: 88 FL (ref 82–98)
MONOCYTES # BLD AUTO: 0.59 THOUSAND/ÂΜL (ref 0.17–1.22)
MONOCYTES NFR BLD AUTO: 9 % (ref 4–12)
NEUTROPHILS # BLD AUTO: 4.52 THOUSANDS/ÂΜL (ref 1.85–7.62)
NEUTS SEG NFR BLD AUTO: 66 % (ref 43–75)
NRBC BLD AUTO-RTO: 0 /100 WBCS
PLATELET # BLD AUTO: 295 THOUSANDS/UL (ref 149–390)
PMV BLD AUTO: 9.7 FL (ref 8.9–12.7)
POTASSIUM SERPL-SCNC: 3.5 MMOL/L (ref 3.5–5.3)
RBC # BLD AUTO: 4.7 MILLION/UL (ref 3.81–5.12)
SODIUM SERPL-SCNC: 136 MMOL/L (ref 135–147)
WBC # BLD AUTO: 6.75 THOUSAND/UL (ref 4.31–10.16)

## 2023-02-18 RX ORDER — DESVENLAFAXINE 50 MG/1
50 TABLET, EXTENDED RELEASE ORAL DAILY
Status: DISCONTINUED | OUTPATIENT
Start: 2023-02-19 | End: 2023-02-21 | Stop reason: HOSPADM

## 2023-02-18 RX ORDER — LAMOTRIGINE 100 MG/1
100 TABLET ORAL 2 TIMES DAILY
Status: DISCONTINUED | OUTPATIENT
Start: 2023-02-18 | End: 2023-02-21 | Stop reason: HOSPADM

## 2023-02-18 RX ORDER — ONDANSETRON 2 MG/ML
4 INJECTION INTRAMUSCULAR; INTRAVENOUS EVERY 8 HOURS PRN
Status: DISCONTINUED | OUTPATIENT
Start: 2023-02-18 | End: 2023-02-21 | Stop reason: HOSPADM

## 2023-02-18 RX ORDER — ONDANSETRON 2 MG/ML
4 INJECTION INTRAMUSCULAR; INTRAVENOUS ONCE
Status: COMPLETED | OUTPATIENT
Start: 2023-02-18 | End: 2023-02-18

## 2023-02-18 RX ORDER — DESVENLAFAXINE 50 MG/1
50 TABLET, EXTENDED RELEASE ORAL ONCE
Status: COMPLETED | OUTPATIENT
Start: 2023-02-18 | End: 2023-02-18

## 2023-02-18 RX ADMIN — METRONIDAZOLE 500 MG: 500 TABLET ORAL at 03:11

## 2023-02-18 RX ADMIN — INSULIN LISPRO 16 UNITS: 100 INJECTION, SOLUTION INTRAVENOUS; SUBCUTANEOUS at 18:11

## 2023-02-18 RX ADMIN — CARIPRAZINE 3 MG: 3 CAPSULE, GELATIN COATED ORAL at 20:28

## 2023-02-18 RX ADMIN — METRONIDAZOLE 500 MG: 500 TABLET ORAL at 20:29

## 2023-02-18 RX ADMIN — OXYCODONE 5 MG: 5 TABLET ORAL at 06:10

## 2023-02-18 RX ADMIN — LAMOTRIGINE 100 MG: 100 TABLET ORAL at 18:10

## 2023-02-18 RX ADMIN — INSULIN LISPRO 16 UNITS: 100 INJECTION, SOLUTION INTRAVENOUS; SUBCUTANEOUS at 08:39

## 2023-02-18 RX ADMIN — LORAZEPAM 0.5 MG: 0.5 TABLET ORAL at 08:44

## 2023-02-18 RX ADMIN — ONDANSETRON 4 MG: 2 INJECTION INTRAMUSCULAR; INTRAVENOUS at 16:22

## 2023-02-18 RX ADMIN — LISINOPRIL 20 MG: 20 TABLET ORAL at 08:32

## 2023-02-18 RX ADMIN — PRAVASTATIN SODIUM 40 MG: 40 TABLET ORAL at 18:10

## 2023-02-18 RX ADMIN — ENOXAPARIN SODIUM 60 MG: 60 INJECTION SUBCUTANEOUS at 08:32

## 2023-02-18 RX ADMIN — ONDANSETRON 4 MG: 2 INJECTION INTRAMUSCULAR; INTRAVENOUS at 21:25

## 2023-02-18 RX ADMIN — CEFTRIAXONE SODIUM 2000 MG: 10 INJECTION, POWDER, FOR SOLUTION INTRAVENOUS at 06:11

## 2023-02-18 RX ADMIN — ENOXAPARIN SODIUM 60 MG: 60 INJECTION SUBCUTANEOUS at 21:06

## 2023-02-18 RX ADMIN — PANTOPRAZOLE SODIUM 40 MG: 40 TABLET, DELAYED RELEASE ORAL at 08:32

## 2023-02-18 RX ADMIN — METRONIDAZOLE 500 MG: 500 TABLET ORAL at 12:56

## 2023-02-18 RX ADMIN — INSULIN LISPRO 16 UNITS: 100 INJECTION, SOLUTION INTRAVENOUS; SUBCUTANEOUS at 12:53

## 2023-02-18 RX ADMIN — OXYCODONE 5 MG: 5 TABLET ORAL at 18:14

## 2023-02-18 RX ADMIN — INSULIN GLARGINE 64 UNITS: 100 INJECTION, SOLUTION SUBCUTANEOUS at 21:06

## 2023-02-18 RX ADMIN — DESVENLAFAXINE 50 MG: 50 TABLET, FILM COATED, EXTENDED RELEASE ORAL at 20:30

## 2023-02-18 NOTE — ASSESSMENT & PLAN NOTE
· Pt tachycardic to 120s on admission improving to low 100's today  · Suspect related to pain and dehydration  · Will monitor HR

## 2023-02-18 NOTE — ASSESSMENT & PLAN NOTE
· Presents with left lower quadrant pain and diarrhea  · CAT scan reveals sigmoid diverticulitis  · Lactate elevated on admission but cleared after IVF bolus  · Continue CTX/Flagyl  · Stool studies pending, pt missed hat upon last episode diarrhea  · Pain control  · CLD- given significant abdominal pain will hold off on advancing diet   · C/o distention and gas pain today, will obtain AXR- is passing flatus so no suspicion for obstruction  · If continues to have ttp tomorrow will consider repeat CT and possible GI consult pdg results

## 2023-02-18 NOTE — ASSESSMENT & PLAN NOTE
Lab Results   Component Value Date    HGBA1C 7 8 (H) 12/14/2022       Recent Labs     02/17/23  1120 02/17/23  1559 02/17/23 2029 02/18/23  1113   POCGLU 269* 182* 154* 260*       Blood Sugar Average: Last 72 hrs:  (P) 224 8   · Home regimen: Insulin glargine 64 units daily and insulin lispro 16 units 3 times a day with meals, also takes metformin  · Glucose is acceptable here thus far  · We will continue home regimen

## 2023-02-18 NOTE — ASSESSMENT & PLAN NOTE
· UA with occult blood and 10-20 WBCs however is negative for nitrates, no LUTS  · Follow-up urine culture, pt is already being covered with ceftriaxone

## 2023-02-18 NOTE — PROGRESS NOTES
[facility]  Progress Note Merline Masters 1975, 50 y o  female MRN: [id number]  Unit/Bed#:  W -01 Encounter: [id number]  Primary Care Provider: SUJATA Wright   Date and time admitted to hospital: 2/16/2023  2:59 PM    Tachycardia  Assessment & Plan  · Pt tachycardic to 120s on admission improving to low 100's today  · Suspect related to pain and dehydration  · Will monitor HR    Abnormal urinalysis  Assessment & Plan  · UA with occult blood and 10-20 WBCs however is negative for nitrates, no LUTS  · Follow-up urine culture, pt is already being covered with ceftriaxone    Generalized anxiety disorder  Assessment & Plan  · Continue Ativan 3 times daily for anxiety    Class 3 severe obesity due to excess calories with body mass index (BMI) of 60 0 to 69 9 in adult [facility])  Assessment & Plan  · Counseled on nutrition  · May benefit from outpatient referral to bariatric surgery    Major depressive disorder, recurrent severe without psychotic features ([facility] [address] )  Assessment & Plan  · Continue Lamictal, will profile home Vraylar once provided    Irritable bowel syndrome with diarrhea  Assessment & Plan  · Stable prior to this episode, diarrhea likely due to diverticulitis    Essential hypertension  Assessment & Plan  · BP is acceptable  · Continue lisinopril 20 mg daily    Type 2 diabetes mellitus with complication, with long-term current use of insulin [facility])  Assessment & Plan  Lab Results   Component Value Date    HGBA1C 7 8 (H) 12/14/2022       Recent Labs     02/17/23  1120 02/17/23  1559 02/17/23 2029 02/18/23  1113   POCGLU 269* 182* 154* 260*       Blood Sugar Average: Last 72 hrs:  (P) 224 8   · Home regimen: Insulin glargine 64 units daily and insulin lispro 16 units 3 times a day with meals, also takes metformin  · Glucose is acceptable here thus far  · We will continue home regimen    * Sigmoid diverticulitis  Assessment & Plan  · Presents with left lower quadrant pain and diarrhea  · CAT scan reveals sigmoid diverticulitis  · Lactate elevated on admission but cleared after IVF bolus  · Continue CTX/Flagyl  · Stool studies pending, pt missed hat upon last episode diarrhea  · Pain control  · CLD- given significant abdominal pain will hold off on advancing diet   · C/o distention and gas pain today, will obtain AXR- is passing flatus so no suspicion for obstruction  · If continues to have ttp tomorrow will consider repeat CT and possible GI consult pdg results    VTE Pharmacologic Prophylaxis: VTE Score: 4 Moderate Risk (Score 3-4) - Pharmacological DVT Prophylaxis Ordered: enoxaparin (Lovenox)  Patient Centered Rounds: I performed bedside rounds with nursing staff today  Discussions with Specialists or Other Care Team Provider: nursing    Education and Discussions with Family / Patient: Patient declined call to   Total Time Spent on Date of Encounter in care of patient: 45 minutes This time was spent on one or more of the following: performing physical exam; counseling and coordination of care; obtaining or reviewing history; documenting in the medical record; reviewing/ordering tests, medications or procedures; communicating with other healthcare professionals and discussing with patient's family/caregivers  Current Length of Stay: 2 day(s)  Current Patient Status: Inpatient   Certification Statement: The patient will continue to require additional inpatient hospital stay due to abdominal pain and diverticulitis requiring IV abx  Discharge Plan: Anticipate discharge in 24-48 hrs to home  Code Status: Level 1 - Full Code    Subjective:   Patient states her pain is unimproved today  She does still have an appetite and is tolerating clear liquid diet without nausea or vomiting  She had an episode of diarrhea last night during which she missed the hat to provide a stool sample but has had no diarrhea today thus far    She complains of a lot of distention and gas pain  Denies fever or chills  Denies chest pain or shortness of breath  Denies lower extremity edema  Objective:     Vitals:   Temp (24hrs), Av 6 °F (37 °C), Min:98 2 °F (36 8 °C), Max:99 1 °F (37 3 °C)    Temp:  [98 2 °F (36 8 °C)-99 1 °F (37 3 °C)] 98 5 °F (36 9 °C)  HR:  [101-111] 102  Resp:  [18-20] 18  BP: (121-151)/(74-82) 121/74  SpO2:  [90 %-96 %] 96 %  Body mass index is 67 57 kg/m²  Input and Output Summary (last 24 hours):   No intake or output data in the 24 hours ending 23 1351    Physical Exam:   Physical Exam  Vitals and nursing note reviewed  Constitutional:       General: She is not in acute distress  Appearance: She is obese  She is not toxic-appearing  HENT:      Head: Normocephalic and atraumatic  Right Ear: External ear normal       Left Ear: External ear normal       Nose: Nose normal       Mouth/Throat:      Mouth: Mucous membranes are moist       Pharynx: Oropharynx is clear  Eyes:      Conjunctiva/sclera: Conjunctivae normal    Cardiovascular:      Rate and Rhythm: Normal rate and regular rhythm  Pulses: Normal pulses  Pulmonary:      Effort: Pulmonary effort is normal  No respiratory distress  Breath sounds: Normal breath sounds  No stridor  No wheezing, rhonchi or rales  Abdominal:      General: Abdomen is flat  Bowel sounds are normal  There is no distension  Palpations: Abdomen is soft  There is no mass  Tenderness: There is abdominal tenderness (ttp over all of left abdomen)  There is no guarding or rebound  Hernia: No hernia is present  Musculoskeletal:      Cervical back: Normal range of motion  Right lower leg: No edema  Left lower leg: No edema  Skin:     General: Skin is warm and dry  Neurological:      General: No focal deficit present  Mental Status: She is alert  Mental status is at baseline  Sensory: No sensory deficit  Motor: No weakness     Psychiatric:         Mood and Affect: Mood normal           Additional Data:     Labs:  Results from last 7 days   Lab Units 02/18/23  0542   WBC Thousand/uL 6 75   HEMOGLOBIN g/dL 14 1   HEMATOCRIT % 41 4   PLATELETS Thousands/uL 295   NEUTROS PCT % 66   LYMPHS PCT % 18   MONOS PCT % 9   EOS PCT % 5     Results from last 7 days   Lab Units 02/18/23  0542 02/17/23  0451   SODIUM mmol/L 136 134*   POTASSIUM mmol/L 3 5 3 7   CHLORIDE mmol/L 99 99   CO2 mmol/L 28 25   BUN mg/dL 8 13   CREATININE mg/dL 0 72 0 82   ANION GAP mmol/L 9 10   CALCIUM mg/dL 9 4 8 9   ALBUMIN g/dL  --  3 6   TOTAL BILIRUBIN mg/dL  --  0 91   ALK PHOS U/L  --  83   ALT U/L  --  42   AST U/L  --  33   GLUCOSE RANDOM mg/dL 237* 269*         Results from last 7 days   Lab Units 02/18/23  1113 02/17/23  2029 02/17/23  1559 02/17/23  1120 02/17/23  0809   POC GLUCOSE mg/dl 260* 154* 182* 269* 259*         Results from last 7 days   Lab Units 02/16/23  1931 02/16/23  1626   LACTIC ACID mmol/L 1 4 2 7*       Lines/Drains:  Invasive Devices     Peripheral Intravenous Line  Duration           Peripheral IV 02/16/23 Right;Ventral (anterior) Forearm 1 day                      Imaging: No pertinent imaging reviewed      Recent Cultures (last 7 days):         Last 24 Hours Medication List:   Current Facility-Administered Medications   Medication Dose Route Frequency Provider Last Rate   • acetaminophen  650 mg Oral Q6H PRN Cuate Little PA-C     • cefTRIAXone  2,000 mg Intravenous Q24H Ramez Smith MD 2,000 mg (02/18/23 7469)   • enoxaparin  60 mg Subcutaneous Q12H Louise Balderas MD     • escitalopram  20 mg Oral Daily Ramez Smith MD     • insulin glargine  64 Units Subcutaneous HS Ramez Smith MD     • insulin lispro  16 Units Subcutaneous TID With Meals Ramez Smith MD     • lamoTRIgine  200 mg Oral Daily Cuate Little PA-C     • lisinopril  20 mg Oral Daily Ramez Smith MD     • LORazepam  0 5 mg Oral Q8H PRN Ramez Smith MD     • metroNIDAZOLE  500 mg Oral Q8H Naitik Abby Mora MD     • oxyCODONE  5 mg Oral Q6H PRN Ai Lang PA-C     • pantoprazole  40 mg Oral Daily Roni Villarreal MD     • pravastatin  40 mg Oral Daily With Hugh Moody MD     • traMADol  50 mg Oral Q6H PRN Ai Lang PA-C          Today, Patient Was Seen By: Ai Lang PA-C    **Please Note: This note may have been constructed using a voice recognition system  **

## 2023-02-19 LAB
ANION GAP SERPL CALCULATED.3IONS-SCNC: 7 MMOL/L (ref 4–13)
BASOPHILS # BLD AUTO: 0.05 THOUSANDS/ÂΜL (ref 0–0.1)
BASOPHILS NFR BLD AUTO: 1 % (ref 0–1)
BUN SERPL-MCNC: 6 MG/DL (ref 5–25)
CALCIUM SERPL-MCNC: 9.3 MG/DL (ref 8.4–10.2)
CHLORIDE SERPL-SCNC: 100 MMOL/L (ref 96–108)
CO2 SERPL-SCNC: 29 MMOL/L (ref 21–32)
CREAT SERPL-MCNC: 0.69 MG/DL (ref 0.6–1.3)
EOSINOPHIL # BLD AUTO: 0.29 THOUSAND/ÂΜL (ref 0–0.61)
EOSINOPHIL NFR BLD AUTO: 5 % (ref 0–6)
ERYTHROCYTE [DISTWIDTH] IN BLOOD BY AUTOMATED COUNT: 13.2 % (ref 11.6–15.1)
GFR SERPL CREATININE-BSD FRML MDRD: 102 ML/MIN/1.73SQ M
GLUCOSE SERPL-MCNC: 203 MG/DL (ref 65–140)
GLUCOSE SERPL-MCNC: 210 MG/DL (ref 65–140)
GLUCOSE SERPL-MCNC: 267 MG/DL (ref 65–140)
GLUCOSE SERPL-MCNC: 269 MG/DL (ref 65–140)
HCT VFR BLD AUTO: 43 % (ref 34.8–46.1)
HGB BLD-MCNC: 13.9 G/DL (ref 11.5–15.4)
IMM GRANULOCYTES # BLD AUTO: 0.05 THOUSAND/UL (ref 0–0.2)
IMM GRANULOCYTES NFR BLD AUTO: 1 % (ref 0–2)
LYMPHOCYTES # BLD AUTO: 1.72 THOUSANDS/ÂΜL (ref 0.6–4.47)
LYMPHOCYTES NFR BLD AUTO: 27 % (ref 14–44)
MCH RBC QN AUTO: 29.5 PG (ref 26.8–34.3)
MCHC RBC AUTO-ENTMCNC: 32.3 G/DL (ref 31.4–37.4)
MCV RBC AUTO: 91 FL (ref 82–98)
MONOCYTES # BLD AUTO: 0.58 THOUSAND/ÂΜL (ref 0.17–1.22)
MONOCYTES NFR BLD AUTO: 9 % (ref 4–12)
NEUTROPHILS # BLD AUTO: 3.81 THOUSANDS/ÂΜL (ref 1.85–7.62)
NEUTS SEG NFR BLD AUTO: 57 % (ref 43–75)
NRBC BLD AUTO-RTO: 0 /100 WBCS
PLATELET # BLD AUTO: 277 THOUSANDS/UL (ref 149–390)
PMV BLD AUTO: 9.3 FL (ref 8.9–12.7)
POTASSIUM SERPL-SCNC: 3.4 MMOL/L (ref 3.5–5.3)
RBC # BLD AUTO: 4.71 MILLION/UL (ref 3.81–5.12)
SODIUM SERPL-SCNC: 136 MMOL/L (ref 135–147)
WBC # BLD AUTO: 6.5 THOUSAND/UL (ref 4.31–10.16)

## 2023-02-19 RX ORDER — INSULIN LISPRO 100 [IU]/ML
2-12 INJECTION, SOLUTION INTRAVENOUS; SUBCUTANEOUS
Status: DISCONTINUED | OUTPATIENT
Start: 2023-02-19 | End: 2023-02-21 | Stop reason: HOSPADM

## 2023-02-19 RX ORDER — DICYCLOMINE HYDROCHLORIDE 10 MG/1
20 CAPSULE ORAL
Status: DISCONTINUED | OUTPATIENT
Start: 2023-02-19 | End: 2023-02-21 | Stop reason: HOSPADM

## 2023-02-19 RX ADMIN — DESVENLAFAXINE 50 MG: 50 TABLET, FILM COATED, EXTENDED RELEASE ORAL at 10:42

## 2023-02-19 RX ADMIN — LISINOPRIL 20 MG: 20 TABLET ORAL at 09:49

## 2023-02-19 RX ADMIN — CARIPRAZINE 3 MG: 3 CAPSULE, GELATIN COATED ORAL at 09:49

## 2023-02-19 RX ADMIN — INSULIN LISPRO 16 UNITS: 100 INJECTION, SOLUTION INTRAVENOUS; SUBCUTANEOUS at 12:16

## 2023-02-19 RX ADMIN — LAMOTRIGINE 100 MG: 100 TABLET ORAL at 17:07

## 2023-02-19 RX ADMIN — DICYCLOMINE HYDROCHLORIDE 20 MG: 10 CAPSULE ORAL at 16:22

## 2023-02-19 RX ADMIN — INSULIN LISPRO 6 UNITS: 100 INJECTION, SOLUTION INTRAVENOUS; SUBCUTANEOUS at 17:07

## 2023-02-19 RX ADMIN — ENOXAPARIN SODIUM 60 MG: 60 INJECTION SUBCUTANEOUS at 21:10

## 2023-02-19 RX ADMIN — INSULIN LISPRO 16 UNITS: 100 INJECTION, SOLUTION INTRAVENOUS; SUBCUTANEOUS at 09:50

## 2023-02-19 RX ADMIN — ONDANSETRON 4 MG: 2 INJECTION INTRAMUSCULAR; INTRAVENOUS at 16:20

## 2023-02-19 RX ADMIN — PANTOPRAZOLE SODIUM 40 MG: 40 TABLET, DELAYED RELEASE ORAL at 09:48

## 2023-02-19 RX ADMIN — PRAVASTATIN SODIUM 40 MG: 40 TABLET ORAL at 16:22

## 2023-02-19 RX ADMIN — INSULIN LISPRO 16 UNITS: 100 INJECTION, SOLUTION INTRAVENOUS; SUBCUTANEOUS at 17:07

## 2023-02-19 RX ADMIN — METRONIDAZOLE 500 MG: 500 TABLET ORAL at 03:51

## 2023-02-19 RX ADMIN — METRONIDAZOLE 500 MG: 500 TABLET ORAL at 11:27

## 2023-02-19 RX ADMIN — CEFTRIAXONE SODIUM 2000 MG: 10 INJECTION, POWDER, FOR SOLUTION INTRAVENOUS at 06:27

## 2023-02-19 RX ADMIN — METRONIDAZOLE 500 MG: 500 TABLET ORAL at 19:20

## 2023-02-19 RX ADMIN — LAMOTRIGINE 100 MG: 100 TABLET ORAL at 09:49

## 2023-02-19 RX ADMIN — INSULIN GLARGINE 64 UNITS: 100 INJECTION, SOLUTION SUBCUTANEOUS at 21:09

## 2023-02-19 RX ADMIN — ENOXAPARIN SODIUM 60 MG: 60 INJECTION SUBCUTANEOUS at 09:49

## 2023-02-19 RX ADMIN — INSULIN LISPRO 6 UNITS: 100 INJECTION, SOLUTION INTRAVENOUS; SUBCUTANEOUS at 22:24

## 2023-02-19 NOTE — PROGRESS NOTES
[facility]  Progress Note Reed Granville 1975, 50 y o  female MRN: [id number]  Unit/Bed#:  W -01 Encounter: [id number]  Primary Care Provider: SUJATA Dwyer   Date and time admitted to hospital: 2/16/2023  2:59 PM    Tachycardia  Assessment & Plan  · Pt tachycardic to 120s on admission, improved to 90s  · Suspect related to pain and dehydration  · Will monitor HR    Abnormal urinalysis  Assessment & Plan  · UA with occult blood and 10-20 WBCs however is negative for nitrates, no LUTS  · UCx pos for Lactobacillus  · Cont IV abx    Generalized anxiety disorder  Assessment & Plan  · Continue Ativan 3 times daily for anxiety    Class 3 severe obesity due to excess calories with body mass index (BMI) of 60 0 to 69 9 in adult [facility])  Assessment & Plan  · Counseled on nutrition  · May benefit from outpatient referral to bariatric surgery    Major depressive disorder, recurrent severe without psychotic features ([facility] [address] )  Assessment & Plan  · Continue Lamictal, home Julian Beam profiled as well    Irritable bowel syndrome with diarrhea  Assessment & Plan  · Stable prior to this episode, diarrhea likely due to diverticulitis    Essential hypertension  Assessment & Plan  · BP is acceptable  · Continue lisinopril 20 mg daily    Type 2 diabetes mellitus with complication, with long-term current use of insulin [facility])  Assessment & Plan  Lab Results   Component Value Date    HGBA1C 7 8 (H) 12/14/2022       Recent Labs     02/17/23  2029 02/18/23  1113 02/18/23  1609 02/18/23  2116   POCGLU 154* 260* 177* 194*       Blood Sugar Average: Last 72 hrs:  (P) 213 [id number]   · Home regimen: Insulin glargine 64 units daily and insulin lispro 16 units 3 times a day with meals, also takes metformin  · Glucose is acceptable here thus far  · We will continue home regimen    * Sigmoid diverticulitis  Assessment & Plan  · Presents with left lower quadrant pain and diarrhea  · CAT scan reveals sigmoid diverticulitis  · Lactate elevated on admission but cleared after IVF bolus  · Continue CTX/Flagyl  · Stool studies pending, pt missed hat upon last episode diarrhea and last sample was small  · Pain control  · CLD  · C/o distention and gas pain today, AXR with nonobstructive gas pattern  · Now complains of nausea and vomiting, although this may be related to Flagyl, given that she is not improving from a pain perspective, GI consulted  Unclear why she has had such minimal symptomatic improvement  · Follow-up GI recommendations      VTE Pharmacologic Prophylaxis: VTE Score: 4 Moderate Risk (Score 3-4) - Pharmacological DVT Prophylaxis Ordered: enoxaparin (Lovenox)  Patient Centered Rounds: I performed bedside rounds with nursing staff today  Discussions with Specialists or Other Care Team Provider: discussed with GI last evening    Education and Discussions with Family / Patient: Patient declined call to   Total Time Spent on Date of Encounter in care of patient: 45 minutes This time was spent on one or more of the following: performing physical exam; counseling and coordination of care; obtaining or reviewing history; documenting in the medical record; reviewing/ordering tests, medications or procedures; communicating with other healthcare professionals and discussing with patient's family/caregivers  Current Length of Stay: 3 day(s)  Current Patient Status: Inpatient   Certification Statement: The patient will continue to require additional inpatient hospital stay due to continued abdominal pain, n/v  Discharge Plan: Anticipate discharge in 24-48 hrs to home  Code Status: Level 1 - Full Code    Subjective:   Patient continues to complain of left-sided abdominal pain and cramping as well as distention  She now states she vomited 2 or 3 times yesterday and felt nauseated all night  She does however continue to ask for advance in her diet and seems to tolerate her diet without issues  She tells me she does not feel ready to return home and does not feel any better  Denies fever or chills  Objective:     Vitals:   Temp (24hrs), Av 1 °F (36 7 °C), Min:97 9 °F (36 6 °C), Max:98 3 °F (36 8 °C)    Temp:  [97 9 °F (36 6 °C)-98 3 °F (36 8 °C)] 98 1 °F (36 7 °C)  HR:  [92-98] 94  Resp:  [16] 16  BP: (141-142)/(68-82) 142/69  SpO2:  [80 %-95 %] 95 %  Body mass index is 67 57 kg/m²  Input and Output Summary (last 24 hours): Intake/Output Summary (Last 24 hours) at 2023 1136  Last data filed at 2023 5373  Gross per 24 hour   Intake 1160 ml   Output 250 ml   Net 910 ml       Physical Exam:   Physical Exam  Vitals and nursing note reviewed  Constitutional:       General: She is not in acute distress  Appearance: She is obese  She is not toxic-appearing  HENT:      Head: Normocephalic and atraumatic  Right Ear: External ear normal       Left Ear: External ear normal       Nose: Nose normal       Mouth/Throat:      Mouth: Mucous membranes are moist       Pharynx: Oropharynx is clear  Eyes:      Conjunctiva/sclera: Conjunctivae normal    Cardiovascular:      Rate and Rhythm: Normal rate and regular rhythm  Pulses: Normal pulses  Pulmonary:      Effort: Pulmonary effort is normal  No respiratory distress  Breath sounds: Normal breath sounds  No stridor  No wheezing, rhonchi or rales  Abdominal:      General: Abdomen is flat  Bowel sounds are normal  There is no distension  Palpations: Abdomen is soft  There is no mass  Tenderness: There is abdominal tenderness (ttp over all of left abdomen)  There is no guarding or rebound  Hernia: No hernia is present  Musculoskeletal:      Cervical back: Normal range of motion  Right lower leg: No edema  Left lower leg: No edema  Skin:     General: Skin is warm and dry  Neurological:      General: No focal deficit present  Mental Status: She is alert  Mental status is at baseline  Sensory: No sensory deficit  Motor: No weakness  Psychiatric:         Mood and Affect: Mood normal           Additional Data:     Labs:  Results from last 7 days   Lab Units 02/19/23  0517   WBC Thousand/uL 6 50   HEMOGLOBIN g/dL 13 9   HEMATOCRIT % 43 0   PLATELETS Thousands/uL 277   NEUTROS PCT % 57   LYMPHS PCT % 27   MONOS PCT % 9   EOS PCT % 5     Results from last 7 days   Lab Units 02/19/23  0517 02/18/23  0542 02/17/23  0451   SODIUM mmol/L 136   < > 134*   POTASSIUM mmol/L 3 4*   < > 3 7   CHLORIDE mmol/L 100   < > 99   CO2 mmol/L 29   < > 25   BUN mg/dL 6   < > 13   CREATININE mg/dL 0 69   < > 0 82   ANION GAP mmol/L 7   < > 10   CALCIUM mg/dL 9 3   < > 8 9   ALBUMIN g/dL  --   --  3 6   TOTAL BILIRUBIN mg/dL  --   --  0 91   ALK PHOS U/L  --   --  83   ALT U/L  --   --  42   AST U/L  --   --  33   GLUCOSE RANDOM mg/dL 210*   < > 269*    < > = values in this interval not displayed           Results from last 7 days   Lab Units 02/18/23  2116 02/18/23  1609 02/18/23  1113 02/17/23  2029 02/17/23  1559 02/17/23  1120 02/17/23  0809   POC GLUCOSE mg/dl 194* 177* 260* 154* 182* 269* 259*         Results from last 7 days   Lab Units 02/16/23  1931 02/16/23  1626   LACTIC ACID mmol/L 1 4 2 7*       Lines/Drains:  Invasive Devices     Peripheral Intravenous Line  Duration           Peripheral IV 02/18/23 Proximal;Right;Ventral (anterior) Forearm <1 day                      Imaging: Reviewed radiology reports from this admission including: xray(s)    Recent Cultures (last 7 days):   Results from last 7 days   Lab Units 02/17/23  1105   URINE CULTURE  50,000-59,000 cfu/ml Lactobacillus species*       Last 24 Hours Medication List:   Current Facility-Administered Medications   Medication Dose Route Frequency Provider Last Rate   • acetaminophen  650 mg Oral Q6H PRN Jakob Cerda PA-C     • cariprazine  3 mg Oral Daily Ze Perry MD     • cefTRIAXone  2,000 mg Intravenous Q24H Leah Guthrie MD 2,000 mg (02/19/23 5890)   • desvenlafaxine succinate  50 mg Oral Daily Micaela Joseph MD     • enoxaparin  60 mg Subcutaneous Q12H Evelio Fregoso MD     • insulin glargine  64 Units Subcutaneous HS Kwabena Collins MD     • insulin lispro  16 Units Subcutaneous TID With Meals Kwabena Collins MD     • lamoTRIgine  100 mg Oral BID Micaela Joseph MD     • lisinopril  20 mg Oral Daily Kwabena Collins MD     • LORazepam  0 5 mg Oral Q8H PRN Kwabena Collins MD     • metroNIDAZOLE  500 mg Oral Q8H Kwabena Collins MD     • ondansetron  4 mg Intravenous Q8H PRN Pete Hayward PA-C     • oxyCODONE  5 mg Oral Q6H PRN Pete Hayward PA-C     • pantoprazole  40 mg Oral Daily Kwabena Collins MD     • pravastatin  40 mg Oral Daily With Leslie Davila MD     • traMADol  50 mg Oral Q6H PRN Pete Hayward PA-C          Today, Patient Was Seen By: Pete Hayward PA-C    **Please Note: This note may have been constructed using a voice recognition system  **

## 2023-02-19 NOTE — CONSULTS
Consultation - [facility]) [facility]  Kelli Vela 50 y o  female MRN: [id number]  Unit/Bed#: W -01 Encounter: [id number]        Consults    Reason for Consult / Principal Problem: Sigmoid diverticulitis    HPI: Kelli Vela is a 50y o  year old female with history of cholecystectomy, diabetes, hypertension and hyperlipidemia, morbid obesity BMI 67 6 who was admitted 3 days ago after presenting 2/16 to the ER with complaint of lower abdominal pain, patient tells me that she experienced sudden onset of left lower quadrant pain on Thursday night while she was urinating, and the pain has been constant ever since  It does change with position and does not seem to have postprandial component  She says her bowel movements have also been diarrheal since then, although she has not been having frequent or voluminous diarrhea, she actually did not have any bowel movement yesterday  She says she did vomit a few times yesterday, she is wondering if she can have anything to eat, as she has been on a liquid diet; her CT scan here showed findings of possible diverticulitis versus colitis in the proximal sigmoid colon, without obvious complication  Her last colonoscopy and EGD were done in August 2020, showing few sigmoid diverticuli, normal-appearing TI, and a benign gastric polyp, with biopsies negative for microscopic colitis, H  pylori, or celiac disease  REVIEW OF SYSTEMS:    CONSTITUTIONAL: Denies any fever, chills, or rigors  Good appetite, and no recent weight loss  HEENT: No earache or tinnitus  Denies hearing loss or visual disturbances  CARDIOVASCULAR: No chest pain or palpitations  RESPIRATORY: Denies any cough, hemoptysis, shortness of breath or dyspnea on exertion  GASTROINTESTINAL: As noted in the History of Present Illness  GENITOURINARY: No problems with urination  Denies any hematuria or dysuria  NEUROLOGIC: No dizziness or vertigo, denies headaches     MUSCULOSKELETAL: Denies any muscle or joint pain  SKIN: Denies skin rashes or itching  ENDOCRINE: Denies excessive thirst  Denies intolerance to heat or cold  PSYCHOSOCIAL: Denies depression or anxiety  Denies any recent memory loss         Historical Information   Past Medical History:   Diagnosis Date   • Anemia    • Anxiety    • Candidal intertrigo    • Depression    • Diabetes mellitus ([facility] )    • GERD (gastroesophageal reflux disease)    • Hyperlipidemia    • Hypertension    • Irritable bowel syndrome    • Morbid obesity with BMI of 60 0-69 9, adult ([facility] )    • Osteoarthritis    • Seasonal allergies    • Sleep difficulties    • Suicide attempt [facility])      Past Surgical History:   Procedure Laterality Date   •  SECTION     • CHOLECYSTECTOMY     • WISDOM TOOTH EXTRACTION       Social History   Social History     Substance and Sexual Activity   Alcohol Use Not Currently    Comment: occassionaly      Social History     Substance and Sexual Activity   Drug Use No     Social History     Tobacco Use   Smoking Status Never   Smokeless Tobacco Never     Family History   Problem Relation Age of Onset   • Diabetes Mother    • Hypertension Father        Meds/Allergies     Medications Prior to Admission   Medication   • HumaLOG KwikPen 100 units/mL injection pen   • insulin glargine (LANTUS) 100 units/mL subcutaneous injection   • lamoTRIgine (LaMICtal) 200 MG tablet   • lisinopril (ZESTRIL) 10 mg tablet   • LORazepam (ATIVAN) 0 5 mg tablet   • naproxen (NAPROSYN) 375 mg tablet   • pantoprazole (PROTONIX) 40 mg tablet   • Brexpiprazole (REXULTI) 3 MG tablet   • cholecalciferol (VITAMIN D3) 1,000 units tablet   • clotrimazole-betamethasone (LOTRISONE) 1-0 05 % cream   • escitalopram (LEXAPRO) 20 mg tablet   • metFORMIN (GLUCOPHAGE) 500 mg tablet   • phenazopyridine (PYRIDIUM) 100 mg tablet   • rosuvastatin (CRESTOR) 5 mg tablet   • senna-docusate sodium (SENOKOT S) 8 6-50 mg per tablet     Current Facility-Administered Medications Medication Dose Route Frequency   • acetaminophen (TYLENOL) tablet 650 mg  650 mg Oral Q6H PRN   • cariprazine (VRAYLAR) capsule 3 mg  3 mg Oral Daily   • cefTRIAXone (ROCEPHIN) 2,000 mg in dextrose 5 % 50 mL IVPB  2,000 mg Intravenous Q24H   • desvenlafaxine succinate (PRISTIQ) 24 hr tablet 50 mg  50 mg Oral Daily   • dicyclomine (BENTYL) capsule 20 mg  20 mg Oral TID AC   • enoxaparin (LOVENOX) subcutaneous injection 60 mg  60 mg Subcutaneous Q12H [facility]   • insulin glargine (LANTUS) subcutaneous injection 64 Units 0 64 mL  64 Units Subcutaneous HS   • insulin lispro (HumaLOG) 100 units/mL subcutaneous injection 16 Units  16 Units Subcutaneous TID With Meals   • lamoTRIgine (LaMICtal) tablet 100 mg  100 mg Oral BID   • lisinopril (ZESTRIL) tablet 20 mg  20 mg Oral Daily   • LORazepam (ATIVAN) tablet 0 5 mg  0 5 mg Oral Q8H PRN   • metroNIDAZOLE (FLAGYL) tablet 500 mg  500 mg Oral Q8H   • ondansetron (ZOFRAN) injection 4 mg  4 mg Intravenous Q8H PRN   • oxyCODONE (ROXICODONE) IR tablet 5 mg  5 mg Oral Q6H PRN   • pantoprazole (PROTONIX) EC tablet 40 mg  40 mg Oral Daily   • pravastatin (PRAVACHOL) tablet 40 mg  40 mg Oral Daily With Dinner   • traMADol (ULTRAM) tablet 50 mg  50 mg Oral Q6H PRN       Allergies   Allergen Reactions   • Cephalexin Vomiting     Other reaction(s): Nausea and/or vomiting   • Insulin Degludec GI Intolerance   • Sulfamethoxazole-Trimethoprim Vomiting     Other reaction(s): Nausea and/or vomiting           Objective     Blood pressure 131/62, pulse 86, temperature 98 4 °F (36 9 °C), resp  rate 16, height 5' (1 524 m), SpO2 96 %        Intake/Output Summary (Last 24 hours) at 2/19/2023 1624  Last data filed at 2/19/2023 5469  Gross per 24 hour   Intake 1160 ml   Output 250 ml   Net 910 ml         PHYSICAL EXAM     General Appearance:   Alert, cooperative, no distress, appears stated age    HEENT:   Normocephalic, atraumatic, anicteric      Neck:  Supple, symmetrical, trachea midline, no adenopathy;    thyroid: no enlargement/tenderness/nodules; no carotid  bruit or JVD    Lungs:   Clear to auscultation bilaterally; no rales, rhonchi or wheezing; respirations unlabored    Heart[de-identified]   S1 and S2 normal; regular rate and rhythm; no murmur, rub, or gallop     Abdomen:   Soft, non-tender, non-distended; normal bowel sounds; no masses, no organomegaly    Genitalia:   Deferred    Rectal:   Deferred    Extremities:  No cyanosis, clubbing or edema    Pulses:  2+ and symmetric all extremities    Skin:  Skin color, texture, turgor normal, no rashes or lesions    Lymph nodes:  No palpable cervical, axillary or inguinal lymphadenopathy        Lab Results:   Admission on 02/16/2023   Component Date Value   • WBC 02/16/2023 10 38 (H)    • RBC 02/16/2023 4 97    • Hemoglobin 02/16/2023 14 8    • Hematocrit 02/16/2023 45 0    • MCV 02/16/2023 91    • MCH 02/16/2023 29 8    • MCHC 02/16/2023 32 9    • RDW 02/16/2023 13 2    • MPV 02/16/2023 9 8    • Platelets 27/10/6479 337    • nRBC 02/16/2023 0    • Neutrophils Relative 02/16/2023 66    • Immat GRANS % 02/16/2023 1    • Lymphocytes Relative 02/16/2023 21    • Monocytes Relative 02/16/2023 10    • Eosinophils Relative 02/16/2023 1    • Basophils Relative 02/16/2023 1    • Neutrophils Absolute 02/16/2023 6 95    • Immature Grans Absolute 02/16/2023 0 07    • Lymphocytes Absolute 02/16/2023 2 18    • Monocytes Absolute 02/16/2023 1 03    • Eosinophils Absolute 02/16/2023 0 10    • Basophils Absolute 02/16/2023 0 05    • Sodium 02/16/2023 134 (L)    • Potassium 02/16/2023 4 2    • Chloride 02/16/2023 98    • CO2 02/16/2023 26    • ANION GAP 02/16/2023 10    • BUN 02/16/2023 11    • Creatinine 02/16/2023 0 79    • Glucose 02/16/2023 299 (H)    • Calcium 02/16/2023 9 5    • AST 02/16/2023 22    • ALT 02/16/2023 31    • Alkaline Phosphatase 02/16/2023 66    • Total Protein 02/16/2023 7 4    • Albumin 02/16/2023 3 9    • Total Bilirubin 02/16/2023 0 76    • eGFR 02/16/2023 88 • LACTIC ACID 02/16/2023 2 7 (HH)    • Lipase 02/16/2023 59    • Color, UA 02/17/2023 Yellow    • Clarity, UA 02/17/2023 Clear    • Specific Gravity, UA 02/17/2023 >=1 050 (H)    • pH, UA 02/17/2023 5 5    • Leukocytes, UA 02/17/2023 Negative    • Nitrite, UA 02/17/2023 Negative    • Protein, UA 02/17/2023 50 (1+) (A)    • Glucose, UA 02/17/2023 200 (1/5%) (A)    • Ketones, UA 02/17/2023 Trace (A)    • Urobilinogen, UA 02/17/2023 <2 0    • Bilirubin, UA 02/17/2023 Negative    • Occult Blood, UA 02/17/2023 Small (A)    • Preg, Serum 02/16/2023 Negative    • LACTIC ACID 02/16/2023 1 4    • Sodium 02/17/2023 134 (L)    • Potassium 02/17/2023 3 7    • Chloride 02/17/2023 99    • CO2 02/17/2023 25    • ANION GAP 02/17/2023 10    • BUN 02/17/2023 13    • Creatinine 02/17/2023 0 82    • Glucose 02/17/2023 269 (H)    • Calcium 02/17/2023 8 9    • AST 02/17/2023 33    • ALT 02/17/2023 42    • Alkaline Phosphatase 02/17/2023 83    • Total Protein 02/17/2023 6 9    • Albumin 02/17/2023 3 6    • Total Bilirubin 02/17/2023 0 91    • eGFR 02/17/2023 84    • WBC 02/17/2023 7 56    • RBC 02/17/2023 4 50    • Hemoglobin 02/17/2023 13 5    • Hematocrit 02/17/2023 41 1    • MCV 02/17/2023 91    • MCH 02/17/2023 30 0    • MCHC 02/17/2023 32 8    • RDW 02/17/2023 13 5    • MPV 02/17/2023 9 7    • Platelets 26/15/9614 290    • nRBC 02/17/2023 0    • Neutrophils Relative 02/17/2023 61    • Immat GRANS % 02/17/2023 1    • Lymphocytes Relative 02/17/2023 24    • Monocytes Relative 02/17/2023 11    • Eosinophils Relative 02/17/2023 2    • Basophils Relative 02/17/2023 1    • Neutrophils Absolute 02/17/2023 4 60    • Immature Grans Absolute 02/17/2023 0 07    • Lymphocytes Absolute 02/17/2023 1 84    • Monocytes Absolute 02/17/2023 0 84    • Eosinophils Absolute 02/17/2023 0 15    • Basophils Absolute 02/17/2023 0 06    • POC Glucose 02/17/2023 259 (H)    • POC Glucose 02/17/2023 269 (H)    • RBC, UA 02/17/2023 4-10 (A)    • WBC, UA 02/17/2023 10-20 (A)    • Epithelial Cells 02/17/2023 Occasional    • Bacteria, UA 02/17/2023 Occasional    • MUCUS THREADS 02/17/2023 Moderate (A)    • Urine Culture 02/17/2023 50,000-59,000 cfu/ml Lactobacillus species (A)    • POC Glucose 02/17/2023 182 (H)    • POC Glucose 02/17/2023 154 (H)    • WBC 02/18/2023 6 75    • RBC 02/18/2023 4 70    • Hemoglobin 02/18/2023 14 1    • Hematocrit 02/18/2023 41 4    • MCV 02/18/2023 88    • MCH 02/18/2023 30 0    • MCHC 02/18/2023 34 1    • RDW 02/18/2023 14 0    • MPV 02/18/2023 9 7    • Platelets 52/29/1400 295    • nRBC 02/18/2023 0    • Neutrophils Relative 02/18/2023 66    • Immat GRANS % 02/18/2023 1    • Lymphocytes Relative 02/18/2023 18    • Monocytes Relative 02/18/2023 9    • Eosinophils Relative 02/18/2023 5    • Basophils Relative 02/18/2023 1    • Neutrophils Absolute 02/18/2023 4 52    • Immature Grans Absolute 02/18/2023 0 05    • Lymphocytes Absolute 02/18/2023 1 19    • Monocytes Absolute 02/18/2023 0 59    • Eosinophils Absolute 02/18/2023 0 33    • Basophils Absolute 02/18/2023 0 07    • Sodium 02/18/2023 136    • Potassium 02/18/2023 3 5    • Chloride 02/18/2023 99    • CO2 02/18/2023 28    • ANION GAP 02/18/2023 9    • BUN 02/18/2023 8    • Creatinine 02/18/2023 0 72    • Glucose 02/18/2023 237 (H)    • Calcium 02/18/2023 9 4    • eGFR 02/18/2023 98    • POC Glucose 02/18/2023 260 (H)    • POC Glucose 02/18/2023 177 (H)    • POC Glucose 02/18/2023 194 (H)    • WBC 02/19/2023 6 50    • RBC 02/19/2023 4 71    • Hemoglobin 02/19/2023 13 9    • Hematocrit 02/19/2023 43 0    • MCV 02/19/2023 91    • MCH 02/19/2023 29 5    • MCHC 02/19/2023 32 3    • RDW 02/19/2023 13 2    • MPV 02/19/2023 9 3    • Platelets 31/41/5290 277    • nRBC 02/19/2023 0    • Neutrophils Relative 02/19/2023 57    • Immat GRANS % 02/19/2023 1    • Lymphocytes Relative 02/19/2023 27    • Monocytes Relative 02/19/2023 9    • Eosinophils Relative 02/19/2023 5    • Basophils Relative 02/19/2023 1    • Neutrophils Absolute 02/19/2023 3 81    • Immature Grans Absolute 02/19/2023 0 05    • Lymphocytes Absolute 02/19/2023 1 72    • Monocytes Absolute 02/19/2023 0 58    • Eosinophils Absolute 02/19/2023 0 29    • Basophils Absolute 02/19/2023 0 05    • Sodium 02/19/2023 136    • Potassium 02/19/2023 3 4 (L)    • Chloride 02/19/2023 100    • CO2 02/19/2023 29    • ANION GAP 02/19/2023 7    • BUN 02/19/2023 6    • Creatinine 02/19/2023 0 69    • Glucose 02/19/2023 210 (H)    • Calcium 02/19/2023 9 3    • eGFR 02/19/2023 102    • POC Glucose 02/19/2023 203 (H)        Imaging Studies: I have personally reviewed pertinent reports  CT ABDOMEN AND PELVIS WITH IV CONTRAST     INDICATION: Suprapubic pain      COMPARISON:  None      TECHNIQUE:  CT examination of the abdomen and pelvis was performed  Axial, sagittal, and coronal 2D reformatted images were created from the source data and submitted for interpretation      Radiation dose length product (DLP) for this visit:  1932 mGy-cm   This examination, like all CT scans performed in the [facility], was performed utilizing techniques to minimize radiation dose exposure, including the use of iterative   reconstruction and automated exposure control      IV Contrast:  100 mL of iohexol (OMNIPAQUE)  Enteric Contrast:  Enteric contrast was not administered      FINDINGS:     ABDOMEN     LOWER CHEST:  Clear lung apices      LIVER/BILIARY TREE:  Unremarkable      GALLBLADDER:  Cholecystectomy 2/15/2023      SPLEEN:  Unremarkable      PANCREAS:  Unremarkable      ADRENAL GLANDS:  Unremarkable      KIDNEYS/URETERS:  Punctate nonobstructing bilateral renal calculi  Symmetric nephrographic phase enhancement of the kidneys  No obstructive uropathy      STOMACH AND BOWEL:  Mild wall thickening in the proximal sigmoid colon adjacent mesenteric fat stranding, partially obscured by the adjacent adnexa    No evidence of bowel obstruction      APPENDIX: Normal appendix      ABDOMINOPELVIC CAVITY:  No ascites  No pneumoperitoneum  No lymphadenopathy      VESSELS:  No abdominal aortic aneurysm      PELVIS     REPRODUCTIVE ORGANS:  No evidence of adnexal mass or cyst      URINARY BLADDER:  Unremarkable      ABDOMINAL WALL/INGUINAL REGIONS:  Unremarkable      OSSEOUS STRUCTURES:  No acute fracture or destructive osseous lesion      IMPRESSION:     Suggestion of mild colitis and possible diverticulitis in the proximal sigmoid colon, partially obscured by the adjacent adnexa  No evidence of bowel obstruction or perforation  ASSESSMENT/PLAN:     #1   Left lower quadrant abdominal pain with imaging findings suggestive of possible diverticulitis in the proximal sigmoid colon, patient says she has never had diverticulitis, last colonoscopy was in 2020  No evidence of complication at this time, does not appear to be having postprandial worsening of pain    -May gradually advance diet, will trial full liquid diet with toast and crackers for now; gradually advance diet toward low fiber low residue diet, informally would recommend lactose restriction for symptomatic purposes    -May also use Bentyl as needed for bowel spasm    -Monitor abdominal exam, temperature, white blood cell count    -Continue IV antibiotics    -Consider outpatient colonoscopy in 2 months to rule out underlying adenomatous polyp or malignancy    The patient was seen and examined by Dr Hanane Del Rosario, all key medical decisions were made with Dr Hanane Del Rosario  Thank you for allowing us to participate in the care of this pleasant patient  We will follow up with you closely

## 2023-02-19 NOTE — ASSESSMENT & PLAN NOTE
· UA with occult blood and 10-20 WBCs however is negative for nitrates, no LUTS  · UCx pos for Lactobacillus  · Cont IV abx

## 2023-02-19 NOTE — ASSESSMENT & PLAN NOTE
· Pt tachycardic to 120s on admission, improved to 90s  · Suspect related to pain and dehydration  · Will monitor HR

## 2023-02-19 NOTE — ASSESSMENT & PLAN NOTE
· Presents with left lower quadrant pain and diarrhea  · CAT scan reveals sigmoid diverticulitis  · Lactate elevated on admission but cleared after IVF bolus  · Continue CTX/Flagyl  · Stool studies pending, pt missed hat upon last episode diarrhea and last sample was small  · Pain control  · CLD  · C/o distention and gas pain today, AXR with nonobstructive gas pattern  · Now complains of nausea and vomiting, although this may be related to Flagyl, given that she is not improving from a pain perspective, GI consulted    Unclear why she has had such minimal symptomatic improvement  · Follow-up GI recommendations

## 2023-02-19 NOTE — ASSESSMENT & PLAN NOTE
Lab Results   Component Value Date    HGBA1C 7 8 (H) 12/14/2022       Recent Labs     02/17/23 2029 02/18/23  1113 02/18/23  1609 02/18/23 2116   POCGLU 154* 260* 177* 194*       Blood Sugar Average: Last 72 hrs:  (P) 213 [id number]   · Home regimen: Insulin glargine 64 units daily and insulin lispro 16 units 3 times a day with meals, also takes metformin  · Glucose is acceptable here thus far  · We will continue home regimen

## 2023-02-20 LAB
ALBUMIN SERPL BCP-MCNC: 3.5 G/DL (ref 3.5–5)
ALP SERPL-CCNC: 61 U/L (ref 34–104)
ALT SERPL W P-5'-P-CCNC: 37 U/L (ref 7–52)
ANION GAP SERPL CALCULATED.3IONS-SCNC: 7 MMOL/L (ref 4–13)
AST SERPL W P-5'-P-CCNC: 24 U/L (ref 13–39)
BASOPHILS # BLD AUTO: 0.05 THOUSANDS/ÂΜL (ref 0–0.1)
BASOPHILS NFR BLD AUTO: 1 % (ref 0–1)
BILIRUB SERPL-MCNC: 0.53 MG/DL (ref 0.2–1)
BUN SERPL-MCNC: 5 MG/DL (ref 5–25)
CALCIUM SERPL-MCNC: 9.1 MG/DL (ref 8.4–10.2)
CHLORIDE SERPL-SCNC: 101 MMOL/L (ref 96–108)
CO2 SERPL-SCNC: 28 MMOL/L (ref 21–32)
CREAT SERPL-MCNC: 0.65 MG/DL (ref 0.6–1.3)
EOSINOPHIL # BLD AUTO: 0.29 THOUSAND/ÂΜL (ref 0–0.61)
EOSINOPHIL NFR BLD AUTO: 5 % (ref 0–6)
ERYTHROCYTE [DISTWIDTH] IN BLOOD BY AUTOMATED COUNT: 13.2 % (ref 11.6–15.1)
GFR SERPL CREATININE-BSD FRML MDRD: 104 ML/MIN/1.73SQ M
GLUCOSE SERPL-MCNC: 193 MG/DL (ref 65–140)
GLUCOSE SERPL-MCNC: 206 MG/DL (ref 65–140)
GLUCOSE SERPL-MCNC: 217 MG/DL (ref 65–140)
GLUCOSE SERPL-MCNC: 234 MG/DL (ref 65–140)
GLUCOSE SERPL-MCNC: 271 MG/DL (ref 65–140)
HCT VFR BLD AUTO: 41.7 % (ref 34.8–46.1)
HGB BLD-MCNC: 13.8 G/DL (ref 11.5–15.4)
IMM GRANULOCYTES # BLD AUTO: 0.04 THOUSAND/UL (ref 0–0.2)
IMM GRANULOCYTES NFR BLD AUTO: 1 % (ref 0–2)
LYMPHOCYTES # BLD AUTO: 1.76 THOUSANDS/ÂΜL (ref 0.6–4.47)
LYMPHOCYTES NFR BLD AUTO: 27 % (ref 14–44)
MCH RBC QN AUTO: 30.1 PG (ref 26.8–34.3)
MCHC RBC AUTO-ENTMCNC: 33.1 G/DL (ref 31.4–37.4)
MCV RBC AUTO: 91 FL (ref 82–98)
MONOCYTES # BLD AUTO: 0.55 THOUSAND/ÂΜL (ref 0.17–1.22)
MONOCYTES NFR BLD AUTO: 9 % (ref 4–12)
NEUTROPHILS # BLD AUTO: 3.78 THOUSANDS/ÂΜL (ref 1.85–7.62)
NEUTS SEG NFR BLD AUTO: 57 % (ref 43–75)
NRBC BLD AUTO-RTO: 0 /100 WBCS
PLATELET # BLD AUTO: 263 THOUSANDS/UL (ref 149–390)
PMV BLD AUTO: 9.5 FL (ref 8.9–12.7)
POTASSIUM SERPL-SCNC: 3.5 MMOL/L (ref 3.5–5.3)
PROT SERPL-MCNC: 6.7 G/DL (ref 6.4–8.4)
RBC # BLD AUTO: 4.59 MILLION/UL (ref 3.81–5.12)
SODIUM SERPL-SCNC: 136 MMOL/L (ref 135–147)
WBC # BLD AUTO: 6.47 THOUSAND/UL (ref 4.31–10.16)

## 2023-02-20 RX ADMIN — LAMOTRIGINE 100 MG: 100 TABLET ORAL at 17:28

## 2023-02-20 RX ADMIN — DICYCLOMINE HYDROCHLORIDE 20 MG: 10 CAPSULE ORAL at 12:15

## 2023-02-20 RX ADMIN — DESVENLAFAXINE 50 MG: 50 TABLET, FILM COATED, EXTENDED RELEASE ORAL at 08:51

## 2023-02-20 RX ADMIN — INSULIN LISPRO 16 UNITS: 100 INJECTION, SOLUTION INTRAVENOUS; SUBCUTANEOUS at 17:29

## 2023-02-20 RX ADMIN — METRONIDAZOLE 500 MG: 500 TABLET ORAL at 20:13

## 2023-02-20 RX ADMIN — INSULIN GLARGINE 64 UNITS: 100 INJECTION, SOLUTION SUBCUTANEOUS at 21:13

## 2023-02-20 RX ADMIN — PRAVASTATIN SODIUM 40 MG: 40 TABLET ORAL at 16:28

## 2023-02-20 RX ADMIN — INSULIN LISPRO 16 UNITS: 100 INJECTION, SOLUTION INTRAVENOUS; SUBCUTANEOUS at 08:51

## 2023-02-20 RX ADMIN — LISINOPRIL 20 MG: 20 TABLET ORAL at 08:52

## 2023-02-20 RX ADMIN — ENOXAPARIN SODIUM 60 MG: 60 INJECTION SUBCUTANEOUS at 08:51

## 2023-02-20 RX ADMIN — DICYCLOMINE HYDROCHLORIDE 20 MG: 10 CAPSULE ORAL at 16:28

## 2023-02-20 RX ADMIN — METRONIDAZOLE 500 MG: 500 TABLET ORAL at 03:29

## 2023-02-20 RX ADMIN — LAMOTRIGINE 100 MG: 100 TABLET ORAL at 08:51

## 2023-02-20 RX ADMIN — CARIPRAZINE 3 MG: 3 CAPSULE, GELATIN COATED ORAL at 12:15

## 2023-02-20 RX ADMIN — ENOXAPARIN SODIUM 60 MG: 60 INJECTION SUBCUTANEOUS at 20:13

## 2023-02-20 RX ADMIN — INSULIN LISPRO 4 UNITS: 100 INJECTION, SOLUTION INTRAVENOUS; SUBCUTANEOUS at 17:29

## 2023-02-20 RX ADMIN — PANTOPRAZOLE SODIUM 40 MG: 40 TABLET, DELAYED RELEASE ORAL at 08:51

## 2023-02-20 RX ADMIN — INSULIN LISPRO 4 UNITS: 100 INJECTION, SOLUTION INTRAVENOUS; SUBCUTANEOUS at 08:52

## 2023-02-20 RX ADMIN — INSULIN LISPRO 16 UNITS: 100 INJECTION, SOLUTION INTRAVENOUS; SUBCUTANEOUS at 14:10

## 2023-02-20 RX ADMIN — INSULIN LISPRO 2 UNITS: 100 INJECTION, SOLUTION INTRAVENOUS; SUBCUTANEOUS at 21:12

## 2023-02-20 RX ADMIN — METRONIDAZOLE 500 MG: 500 TABLET ORAL at 12:15

## 2023-02-20 RX ADMIN — DICYCLOMINE HYDROCHLORIDE 20 MG: 10 CAPSULE ORAL at 06:07

## 2023-02-20 RX ADMIN — INSULIN LISPRO 6 UNITS: 100 INJECTION, SOLUTION INTRAVENOUS; SUBCUTANEOUS at 14:10

## 2023-02-20 RX ADMIN — CEFTRIAXONE SODIUM 2000 MG: 10 INJECTION, POWDER, FOR SOLUTION INTRAVENOUS at 06:07

## 2023-02-20 NOTE — ASSESSMENT & PLAN NOTE
· Presented with left lower quadrant pain and diarrhea  · Appreciate GI consult--plan for colonoscopy in 6 weeks  · CAT scan revealed sigmoid diverticulitis  · No leukocytosis, afebrile  · Lactate elevated on admission but cleared after IVF bolus  · Currently on day #5 CTX/Flagyl  · Diet advanced to low fiber low residue  · C/o'd distention and gas pain, AXR with nonobstructive gas pattern 2/18  · Developed n/v over the weekend, which she believes is due to Flagyl as she has had difficulty tolerating it in the past  Change to po augmentin to complete 10 day total  · Bentyl prn abdominal cramping  · C  difficile negative

## 2023-02-20 NOTE — CASE MANAGEMENT
Case Management Progress Note    Patient name Elisabeth Osorio  Location W /W -50 MRN [id number]  : 1975 Date 2023       LOS (days): 4  Geometric Mean LOS (GMLOS) (days): 2 60  Days to GMLOS:-1 2        OBJECTIVE:        Current admission status: Inpatient  Preferred Pharmacy:   [id number] [place], [place] - [address]  [address] [place] [address]  Phone: [phone] Fax: [phone]    Primary Care Provider: SUJATA Goncalves    Primary Insurance: BLUE CROSS  Secondary Insurance:     PROGRESS NOTE:    No needs are identified at the time of the initial assessment,  care manager will respond upon request or reassess patient for discharge needs as appropriate

## 2023-02-20 NOTE — ASSESSMENT & PLAN NOTE
· Presents with left lower quadrant pain and diarrhea  · CAT scan reveals sigmoid diverticulitis  · No leukocytosis, afebrile  · Lactate elevated on admission but cleared after IVF bolus  · Continue CTX/Flagyl  · Diet advanced per GI to low residue  · C/o distention and gas pain, AXR with nonobstructive gas pattern 2/18  · Developed n/v over the weekend, so GI consulted and advised continued IV abx as well as following up stool studies which were finally collected 2/20  · Bentyl prn abdominal cramping  · Pending stool study resutls and ability to tolerate full diet, may be able to discharge tomorrow for outpatient colonoscopy in 2-3 months

## 2023-02-20 NOTE — ASSESSMENT & PLAN NOTE
Lab Results   Component Value Date    HGBA1C 7 8 (H) 12/14/2022       Recent Labs     02/19/23  1625 02/19/23  2123 02/20/23  0743 02/20/23  1049   POCGLU 267* 269* 217* 271*       Blood Sugar Average: Last 72 hrs:  (P) 540 [id number]   · Home regimen: Insulin glargine 64 units daily and insulin lispro 16 units 3 times a day with meals, also takes metformin  · Gluc elevated here, goal 140-180  Given massive obesity would recommend that she go on twice a day Lantus    We will start 10 units every morning

## 2023-02-20 NOTE — PLAN OF CARE
Problem: PAIN - ADULT  Goal: Verbalizes/displays adequate comfort level or baseline comfort level  Description: Interventions:  - Encourage patient to monitor pain and request assistance  - Assess pain using appropriate pain scale  - Administer analgesics based on type and severity of pain and evaluate response  - Implement non-pharmacological measures as appropriate and evaluate response  - Consider cultural and social influences on pain and pain management  - Notify physician/advanced practitioner if interventions unsuccessful or patient reports new pain  Outcome: Progressing     Problem: INFECTION - ADULT  Goal: Absence or prevention of progression during hospitalization  Description: INTERVENTIONS:  - Assess and monitor for signs and symptoms of infection  - Monitor lab/diagnostic results  - Monitor all insertion sites, i e  indwelling lines, tubes, and drains  - Monitor endotracheal if appropriate and nasal secretions for changes in amount and color  - Warren appropriate cooling/warming therapies per order  - Administer medications as ordered  - Instruct and encourage patient and family to use good hand hygiene technique  - Identify and instruct in appropriate isolation precautions for identified infection/condition  Outcome: Progressing  Goal: Absence of fever/infection during neutropenic period  Description: INTERVENTIONS:  - Monitor WBC    Outcome: Progressing     Problem: SAFETY ADULT  Goal: Patient will remain free of falls  Description: INTERVENTIONS:  - Educate patient/family on patient safety including physical limitations  - Instruct patient to call for assistance with activity   - Consult OT/PT to assist with strengthening/mobility   - Keep Call bell within reach  - Keep bed low and locked with side rails adjusted as appropriate  - Keep care items and personal belongings within reach  - Initiate and maintain comfort rounds  - Make Fall Risk Sign visible to staff  - Offer Toileting every 2 Hours, in advance of need  - Initiate/Maintain alarm  - Obtain necessary fall risk management equipment  - Apply yellow socks and bracelet for high fall risk patients  - Consider moving patient to room near nurses station  Outcome: Progressing  Goal: Maintain or return to baseline ADL function  Description: INTERVENTIONS:  -  Assess patient's ability to carry out ADLs; assess patient's baseline for ADL function and identify physical deficits which impact ability to perform ADLs (bathing, care of mouth/teeth, toileting, grooming, dressing, etc )  - Assess/evaluate cause of self-care deficits   - Assess range of motion  - Assess patient's mobility; develop plan if impaired  - Assess patient's need for assistive devices and provide as appropriate  - Encourage maximum independence but intervene and supervise when necessary  - Involve family in performance of ADLs  - Assess for home care needs following discharge   - Consider OT consult to assist with ADL evaluation and planning for discharge  - Provide patient education as appropriate  Outcome: Progressing  Goal: Maintains/Returns to pre admission functional level  Description: INTERVENTIONS:  - Perform BMAT or MOVE assessment daily    - Set and communicate daily mobility goal to care team and patient/family/caregiver  - Collaborate with rehabilitation services on mobility goals if consulted  - Perform Range of Motion 3 times a day  - Reposition patient every 2 hours    - Dangle patient 3 times a day  - Stand patient 3 times a day  - Ambulate patient 3 times a day  - Out of bed to chair 3 times a day   - Out of bed for meals 3 times a day  - Out of bed for toileting  - Record patient progress and toleration of activity level   Outcome: Progressing     Problem: DISCHARGE PLANNING  Goal: Discharge to home or other facility with appropriate resources  Description: INTERVENTIONS:  - Identify barriers to discharge w/patient and caregiver  - Arrange for needed discharge resources and transportation as appropriate  - Identify discharge learning needs (meds, wound care, etc )  - Arrange for interpretive services to assist at discharge as needed  - Refer to Case Management Department for coordinating discharge planning if the patient needs post-hospital services based on physician/advanced practitioner order or complex needs related to functional status, cognitive ability, or social support system  Outcome: Progressing     Problem: Knowledge Deficit  Goal: Patient/family/caregiver demonstrates understanding of disease process, treatment plan, medications, and discharge instructions  Description: Complete learning assessment and assess knowledge base    Interventions:  - Provide teaching at level of understanding  - Provide teaching via preferred learning methods  Outcome: Progressing     Problem: Prexisting or High Potential for Compromised Skin Integrity  Goal: Skin integrity is maintained or improved  Description: INTERVENTIONS:  - Identify patients at risk for skin breakdown  - Assess and monitor skin integrity  - Assess and monitor nutrition and hydration status  - Monitor labs   - Assess for incontinence   - Turn and reposition patient  - Assist with mobility/ambulation  - Relieve pressure over bony prominences  - Avoid friction and shearing  - Provide appropriate hygiene as needed including keeping skin clean and dry  - Evaluate need for skin moisturizer/barrier cream  - Collaborate with interdisciplinary team   - Patient/family teaching  - Consider wound care consult   Outcome: Progressing

## 2023-02-20 NOTE — ASSESSMENT & PLAN NOTE
Lab Results   Component Value Date    HGBA1C 7 8 (H) 12/14/2022       Recent Labs     02/19/23  1625 02/19/23  2123 02/20/23  0743 02/20/23  1049   POCGLU 267* 269* 217* 271*       Blood Sugar Average: Last 72 hrs:  (P) 262 [id number]   · Home regimen: Insulin glargine 64 units daily and insulin lispro 16 units 3 times a day with meals, also takes metformin  · Glucose is acceptable here thus far  · We will continue home regimen

## 2023-02-20 NOTE — ASSESSMENT & PLAN NOTE
· UA with occult blood and 10-20 WBCs however is negative for nitrates, no LUTS  · UCx pos for Lactobacillus, unclear if this is colonizer  · Cont abx, consider Urology follow up given recurrent UTIs that seem to contribute to LLQ pain at times  · Fully empties bladder, multiple CTAP images mentioning ephrolithiasis reveal no obstruction or hydro

## 2023-02-20 NOTE — UTILIZATION REVIEW
Continued Stay Review    Date: 2/18, 2/19,                          Current Patient Class: inpatient     Current Level of Care: med surg     HPI:47 y o  female initially admitted on 2/16     Assessment/Plan:   2/18   Attending stool studies pending; patient missed hat collection on last episode diarrhea; cont pain management has signific ABD pain; hold off adv diet on CL Liq; has distention w gas pain; obtain AXR; if cont w TTP tomorrow consider repeat CT & possible GI consult  HR low tachy today   2/19/2023  Attending  Cont antibx/ Flagyl  Reports distention w gas pain w AXR w nonobstructive gas pattern; now w nausea & vomiting, L sided ABD pain; vomited 2 or 3 times yesterday not improving from pain perspective  Consult GI; unclear etiology of minimal symptomatic improvement; UCX + for lactobacillus; cont IV antibx  2/19/2023 GI  L lower quadrant abd pain w imaging suggestive of possible diverticulitis in prox sigmoid colon  Never had diverticulitis HX; CT reveals nonobstructive shan nephrolithiasis & results suggestive of of diverticulitis vs  colitis  Clinically improving; send stool studies & cont conservative RX  Gradually adv diet w trial full liquid diet w toast/ crackers & adv to low residue; Bentyl PRN, cont ABD exam vitals, IV antibx  Question whether symptoms are related to nephrolithiasis since onset occurred w urination & is recovering from UTI   Rec OP COY in 6-8 WKS     Vital Signs:   Date/Time Temp Pulse Resp BP MAP (mmHg) SpO2 O2 Device Patient Position - Orthostatic VS   02/20/23 07:45:55 98 1 °F (36 7 °C) 90 17 134/84 101 93 % None (Room air) --   02/19/23 21:25:39 98 5 °F (36 9 °C) 98 -- 124/58 80 91 % -- --   02/19/23 15:11:33 98 4 °F (36 9 °C) 86 -- 131/62 85 96 % -- --   02/19/23 0953 -- -- -- -- -- 95 % -- --   02/19/23 07:58:03 98 1 °F (36 7 °C) 94 16 142/69 93 88 % Abnormal  -- --   02/18/23 2330 -- 94 -- -- -- 93 % -- --   02/18/23 2230 -- 92 -- -- -- 80 % Abnormal  -- --   02/18/23 22:07:07 97 9 °F (36 6 °C) 98 -- 141/68 92 93 % -- --   02/18/23 16:11:07 98 3 °F (36 8 °C) 93 -- 141/82 102 95 % -- --   02/18/23 0831 -- -- -- -- -- 96 % -- --   02/18/23 07:52:40 98 5 °F (36 9 °C) 102 18 121/74 90 92 % -- --   02/18/23 05:41:32 98 2 °F (36 8 °C) 101 18 151/74 100 90 % None (Room air) Lying         Pertinent Labs/Diagnostic Results:   2/19 ABD XR   Nonobstructive bowel gas pattern      Results from last 7 days   Lab Units 02/20/23 0525 02/19/23 0517 02/18/23 0542 02/17/23 0451 02/16/23  1626   WBC Thousand/uL 6 47 6 50 6 75 7 56 10 38*   HEMOGLOBIN g/dL 13 8 13 9 14 1 13 5 14 8   HEMATOCRIT % 41 7 43 0 41 4 41 1 45 0   PLATELETS Thousands/uL 263 277 295 290 337   NEUTROS ABS Thousands/µL 3 78 3 81 4 52 4 60 6 95         Results from last 7 days   Lab Units 02/20/23 0525 02/19/23 0517 02/18/23 0542 02/17/23 0451 02/16/23  1626   SODIUM mmol/L 136 136 136 134* 134*   POTASSIUM mmol/L 3 5 3 4* 3 5 3 7 4 2   CHLORIDE mmol/L 101 100 99 99 98   CO2 mmol/L 28 29 28 25 26   ANION GAP mmol/L 7 7 9 10 10   BUN mg/dL 5 6 8 13 11   CREATININE mg/dL 0 65 0 69 0 72 0 82 0 79   EGFR ml/min/1 73sq m 104 102 98 84 88   CALCIUM mg/dL 9 1 9 3 9 4 8 9 9 5     Results from last 7 days   Lab Units 02/20/23 0525 02/17/23 0451 02/16/23  1626   AST U/L 24 33 22   ALT U/L 37 42 31   ALK PHOS U/L 61 83 66   TOTAL PROTEIN g/dL 6 7 6 9 7 4   ALBUMIN g/dL 3 5 3 6 3 9   TOTAL BILIRUBIN mg/dL 0 53 0 91 0 76     Results from last 7 days   Lab Units 02/20/23  0743 02/19/23  2123 02/19/23  1625 02/19/23  1211 02/18/23  2116 02/18/23  1609 02/18/23  1113 02/17/23  2029 02/17/23  1559 02/17/23  1120 02/17/23  0809   POC GLUCOSE mg/dl 217* 269* 267* 203* 194* 177* 260* 154* 182* 269* 259*     Results from last 7 days   Lab Units 02/20/23  0525 02/19/23  0517 02/18/23  0542 02/17/23  0451 02/16/23  1626   GLUCOSE RANDOM mg/dL 206* 210* 237* 269* 299*             BETA-HYDROXYBUTYRATE   Date Value Ref Range Status   04/17/2021 0 1 <0 6 mmol/L Final                                          Results from last 7 days   Lab Units 02/16/23  1931 02/16/23  1626   LACTIC ACID mmol/L 1 4 2 7*                         Results from last 7 days   Lab Units 02/16/23  1626   LIPASE u/L 59                 Results from last 7 days   Lab Units 02/17/23  1105   CLARITY UA  Clear   COLOR UA  Yellow   SPEC GRAV UA  >=1 050*   PH UA  5 5   GLUCOSE UA mg/dl 200 (1/5%)*   KETONES UA mg/dl Trace*   BLOOD UA  Small*   PROTEIN UA mg/dl 50 (1+)*   NITRITE UA  Negative   BILIRUBIN UA  Negative   UROBILINOGEN UA (BE) mg/dl <2 0   LEUKOCYTES UA  Negative   WBC UA /hpf 10-20*   RBC UA /hpf 4-10*   BACTERIA UA /hpf Occasional   EPITHELIAL CELLS WET PREP /hpf Occasional   MUCUS THREADS  Moderate*                                 Results from last 7 days   Lab Units 02/17/23  1105   URINE CULTURE  50,000-59,000 cfu/ml Lactobacillus species*       Medications:   Scheduled Medications:  cariprazine, 3 mg, Oral, Daily  cefTRIAXone, 2,000 mg, Intravenous, Q24H  desvenlafaxine succinate, 50 mg, Oral, Daily  dicyclomine, 20 mg, Oral, TID AC  enoxaparin, 60 mg, Subcutaneous, Q12H GERI  insulin glargine, 64 Units, Subcutaneous, HS  insulin lispro, 16 Units, Subcutaneous, TID With Meals  insulin lispro, 2-12 Units, Subcutaneous, TID AC  insulin lispro, 2-12 Units, Subcutaneous, HS  lamoTRIgine, 100 mg, Oral, BID  lisinopril, 20 mg, Oral, Daily  metroNIDAZOLE, 500 mg, Oral, Q8H  pantoprazole, 40 mg, Oral, Daily  pravastatin, 40 mg, Oral, Daily With Dinner      Continuous IV Infusions:     PRN Meds:  acetaminophen, 650 mg, Oral, Q6H PRN  LORazepam, 0 5 mg, Oral, Q8H PRN  ondansetron, 4 mg, Intravenous, Q8H PRN  oxyCODONE, 5 mg, Oral, Q6H PRN  traMADol, 50 mg, Oral, Q6H PRN      Discharge Plan: D   Network Utilization Review Department  ATTENTION: Please call with any questions or concerns to [phone] and carefully listen to the prompts so that you are directed to the right person  All voicemails are confidential   [facility] all requests for admission clinical reviews, approved or denied determinations and any other requests to dedicated fax number below belonging to the campus where the patient is receiving treatment   List of dedicated fax numbers for the Facilities:  [address] DENIALS (Administrative/Medical Necessity) [phone]   [address] (Maternity/NICU/Pediatrics) [phone]   [address] [place] [phone]   [facility]  [phone]   [address] [place] [address] [place] [address] [phone]   1552 [facility] 134 815 [facility] [phone]

## 2023-02-20 NOTE — PROGRESS NOTES
[facility]  Progress Note Reed Osbaldo 1975, 50 y o  female MRN: [id number]  Unit/Bed#:  W -01 Encounter: [id number]  Primary Care Provider: SUJATA Dwyer   Date and time admitted to hospital: 2/16/2023  2:59 PM    Tachycardia  Assessment & Plan  · Resolved  · Suspect related to pain and dehydration    Abnormal urinalysis  Assessment & Plan  · UA with occult blood and 10-20 WBCs however is negative for nitrates, no LUTS  · UCx pos for Lactobacillus, unclear if this is colonizer  · Cont abx, consider Urology follow up given recurrent UTIs that seem to contribute to LLQ pain at times  · Fully empties bladder, multiple CTAP images mentioning ephrolithiasis reveal no obstruction or hydro    Generalized anxiety disorder  Assessment & Plan  · Continue Ativan 3 times daily for anxiety    Class 3 severe obesity due to excess calories with body mass index (BMI) of 60 0 to 69 9 in adult [facility])  Assessment & Plan  · Counseled on nutrition  · May benefit from outpatient referral to bariatric surgery    Major depressive disorder, recurrent severe without psychotic features ([facility] [address] )  Assessment & Plan  · Continue Lamictal, home Julian Beam profiled as well    Irritable bowel syndrome with diarrhea  Assessment & Plan  · Stable prior to this episode, diarrhea likely due to diverticulitis    Essential hypertension  Assessment & Plan  · BP is acceptable  · Continue lisinopril 20 mg daily, consider holding if cannot tolerate PO fluid intake and becomes hypokalemic again    Type 2 diabetes mellitus with complication, with long-term current use of insulin [facility])  Assessment & Plan  Lab Results   Component Value Date    HGBA1C 7 8 (H) 12/14/2022       Recent Labs     02/19/23  1625 02/19/23  2123 02/20/23  0743 02/20/23  1049   POCGLU 267* 269* 217* 271*       Blood Sugar Average: Last 72 hrs:  (P) 392 [id number]   · Home regimen: Insulin glargine 64 units daily and insulin lispro 16 units 3 times a day with meals, also takes metformin  · Glucose is acceptable here thus far  · We will continue home regimen    * Sigmoid diverticulitis  Assessment & Plan  · Presents with left lower quadrant pain and diarrhea  · CAT scan reveals sigmoid diverticulitis  · No leukocytosis, afebrile  · Lactate elevated on admission but cleared after IVF bolus  · Continue CTX/Flagyl  · Diet advanced per GI to low residue  · C/o distention and gas pain, AXR with nonobstructive gas pattern 2/18  · Developed n/v over the weekend, so GI consulted and advised continued IV abx as well as following up stool studies which were finally collected 2/20  · Bentyl prn abdominal cramping  · Pending stool study resutls and ability to tolerate full diet, may be able to discharge tomorrow for outpatient colonoscopy in 2-3 months      VTE Pharmacologic Prophylaxis: VTE Score: 4 Moderate Risk (Score 3-4) - Pharmacological DVT Prophylaxis Ordered: enoxaparin (Lovenox)  Patient Centered Rounds: I performed bedside rounds with nursing staff today  Discussions with Specialists or Other Care Team Provider: discussed with GI, nursing and case management    Education and Discussions with Family / Patient: Patient declined call to   Total Time Spent on Date of Encounter in care of patient: 45 minutes This time was spent on one or more of the following: performing physical exam; counseling and coordination of care; obtaining or reviewing history; documenting in the medical record; reviewing/ordering tests, medications or procedures; communicating with other healthcare professionals and discussing with patient's family/caregivers  Current Length of Stay: 4 day(s)  Current Patient Status: Inpatient   Certification Statement: The patient will continue to require additional inpatient hospital stay due to requiring IV abx while awaiting further workup with stool studies  Discharge Plan: Anticipate discharge tomorrow to home      Code Status: Level 1 - Full Code    Subjective:   Patient does still have left-sided abdominal pain and gas related cramping still but says the pain is better today  She did have another episode of diarrhea this morning and managed to make it into the hat  She denies any fever or chills  She endorses some nausea but has tolerated her advance diet today so far  The diet has not worsened her pain  Denies headache or dizziness  Denies chest pain or shortness of breath  Objective:     Vitals:   Temp (24hrs), Av 3 °F (36 8 °C), Min:98 1 °F (36 7 °C), Max:98 5 °F (36 9 °C)    Temp:  [98 1 °F (36 7 °C)-98 5 °F (36 9 °C)] 98 1 °F (36 7 °C)  HR:  [86-98] 90  Resp:  [17] 17  BP: (124-134)/(58-84) 134/84  SpO2:  [91 %-96 %] 93 %  Body mass index is 67 57 kg/m²  Input and Output Summary (last 24 hours): Intake/Output Summary (Last 24 hours) at 2023 1320  Last data filed at 2023 0200  Gross per 24 hour   Intake 6985 ml   Output --   Net 6985 ml       Physical Exam:   Physical Exam  Vitals and nursing note reviewed  Constitutional:       General: She is not in acute distress  Appearance: She is obese  She is not toxic-appearing  HENT:      Head: Normocephalic and atraumatic  Right Ear: External ear normal       Left Ear: External ear normal       Nose: Nose normal       Mouth/Throat:      Mouth: Mucous membranes are moist       Pharynx: Oropharynx is clear  Eyes:      Conjunctiva/sclera: Conjunctivae normal    Cardiovascular:      Rate and Rhythm: Normal rate and regular rhythm  Pulses: Normal pulses  Pulmonary:      Effort: Pulmonary effort is normal  No respiratory distress  Breath sounds: Normal breath sounds  No stridor  No wheezing, rhonchi or rales  Abdominal:      General: Abdomen is flat  Bowel sounds are normal  There is no distension  Palpations: Abdomen is soft  There is no mass  Tenderness:  There is abdominal tenderness (ttp over all of left abdomen, improved form yesterday)  There is no guarding or rebound  Hernia: No hernia is present  Musculoskeletal:      Cervical back: Normal range of motion  Right lower leg: No edema  Left lower leg: No edema  Skin:     General: Skin is warm and dry  Neurological:      General: No focal deficit present  Mental Status: She is alert  Mental status is at baseline  Sensory: No sensory deficit  Motor: No weakness  Psychiatric:         Mood and Affect: Mood normal           Additional Data:     Labs:  Results from last 7 days   Lab Units 02/20/23  0525   WBC Thousand/uL 6 47   HEMOGLOBIN g/dL 13 8   HEMATOCRIT % 41 7   PLATELETS Thousands/uL 263   NEUTROS PCT % 57   LYMPHS PCT % 27   MONOS PCT % 9   EOS PCT % 5     Results from last 7 days   Lab Units 02/20/23  0525   SODIUM mmol/L 136   POTASSIUM mmol/L 3 5   CHLORIDE mmol/L 101   CO2 mmol/L 28   BUN mg/dL 5   CREATININE mg/dL 0 65   ANION GAP mmol/L 7   CALCIUM mg/dL 9 1   ALBUMIN g/dL 3 5   TOTAL BILIRUBIN mg/dL 0 53   ALK PHOS U/L 61   ALT U/L 37   AST U/L 24   GLUCOSE RANDOM mg/dL 206*         Results from last 7 days   Lab Units 02/20/23  1049 02/20/23  0743 02/19/23  2123 02/19/23  1625 02/19/23  1211 02/18/23  2116 02/18/23  1609 02/18/23  1113 02/17/23  2029 02/17/23  1559 02/17/23  1120 02/17/23  0809   POC GLUCOSE mg/dl 271* 217* 269* 267* 203* 194* 177* 260* 154* 182* 269* 259*         Results from last 7 days   Lab Units 02/16/23  1931 02/16/23  1626   LACTIC ACID mmol/L 1 4 2 7*       Lines/Drains:  Invasive Devices     Peripheral Intravenous Line  Duration           Peripheral IV 02/18/23 Proximal;Right;Ventral (anterior) Forearm 1 day                      Imaging: No pertinent imaging reviewed      Recent Cultures (last 7 days):   Results from last 7 days   Lab Units 02/17/23  1105   URINE CULTURE  50,000-59,000 cfu/ml Lactobacillus species*       Last 24 Hours Medication List:   Current Facility-Administered Medications   Medication Dose Route Frequency Provider Last Rate   • acetaminophen  650 mg Oral Q6H PRN Murphy Lima PA-C     • cariprazine  3 mg Oral Daily Fortino Sun MD     • cefTRIAXone  2,000 mg Intravenous Q24H Paul Pedraza MD 2,000 mg (02/20/23 8221)   • desvenlafaxine succinate  50 mg Oral Daily Fortino Sun MD     • dicyclomine  20 mg Oral TID AC Gabino Torrez PA-C     • enoxaparin  60 mg Subcutaneous Q12H Jessica Levy MD     • insulin glargine  64 Units Subcutaneous HS Miller Parents, MD     • insulin lispro  16 Units Subcutaneous TID With Meals Miller Parents, MD     • insulin lispro  2-12 Units Subcutaneous TID AC Massiel Marr PA-C     • insulin lispro  2-12 Units Subcutaneous HS Murphy Lima PA-C     • lamoTRIgine  100 mg Oral BID Fortino Sun MD     • lisinopril  20 mg Oral Daily Miller Parents, MD     • LORazepam  0 5 mg Oral Q8H PRN Paul Pedraza MD     • metroNIDAZOLE  500 mg Oral Q8H Paul Pedraza MD     • ondansetron  4 mg Intravenous Q8H PRN Murphy Lima PA-C     • oxyCODONE  5 mg Oral Q6H PRN Murphy Lima PA-C     • pantoprazole  40 mg Oral Daily Paul Pedraza MD     • pravastatin  40 mg Oral Daily With Shaista Kilgore MD     • traMADol  50 mg Oral Q6H PRN Murphy Lima PA-C          Today, Patient Was Seen By: Murphy Lima PA-C    **Please Note: This note may have been constructed using a voice recognition system  **

## 2023-02-20 NOTE — PROGRESS NOTES
Progress Note - Maicol Doan 50 y o  female MRN: [id number]    Unit/Bed#: W -01 Encounter: [id number]        Assessment/Plan:  42-year-old female with history of T2DM, HTN, HLD, obesity (BMI 79) who presents with acute LLQ pain associated with urination   Symptoms have also been associated with non-bloody diarrhea  She recently has been exposed to antibiotics for treatment of UTI  Her last EGD and colonoscopy was in August 2020 which showed sigmoid diverticulosis, benign gastric polyps  Biopsies were negative for HP, celiac and microscopic colitis  - She had a CT which showed a nonobstructing bilateral nephrolithiasis as well mild wall thickening in the proximal sigmoid colon with fat stranding, suggestive of diverticulitis vs  colitis  -Patient currently on ceftriaxone/ Flagyl  -Currently no evidence of leukocytosis, remains afebrile  -Stool studies have just been collected  -Patient was tolerating full liquid diet with toast and crackers, will advance to low residue lactose restricted diet  -Recommend colonoscopy as outpatient in about 6 weeks as outpt     Subjective:   Patient is lying in bed  She reports that she is having diarrhea, 4 times yesterday and she had 1 bowel movement today but was able to sleep overnight  She otherwise reports that she has no significant abdominal pain, currently about 3-4 out of 10  She had nausea and vomiting over the weekend which subsequently resolved  Complains of some vague gaseous discomfort throughout the abdomen      Objective:     Vitals: /84   Pulse 90   Temp 98 1 °F (36 7 °C) (Oral)   Resp 17   Ht 5' (1 524 m)   LMP  (LMP Unknown)   SpO2 93%   BMI 67 57 kg/m²       Physical Exam:  Gen-no acute distress  Abd-bowel sounds, obese, some mild left lower quadrant tenderness palpation, no rebound rigidity guarding    Lab, Imaging and other studies:   Recent Results (from the past 72 hour(s))   UA w Reflex to Microscopic w Reflex to Culture    Collection Time: 02/17/23 11:05 AM    Specimen: Urine, Clean Catch   Result Value Ref Range    Color, UA Yellow     Clarity, UA Clear     Specific Gravity, UA >=1 050 (H) 1 003 - 1 030    pH, UA 5 5 4 5, 5 0, 5 5, 6 0, 6 5, 7 0, 7 5, 8 0    Leukocytes, UA Negative Negative    Nitrite, UA Negative Negative    Protein, UA 50 (1+) (A) Negative mg/dl    Glucose,  (1/5%) (A) Negative mg/dl    Ketones, UA Trace (A) Negative mg/dl    Urobilinogen, UA <2 0 <2 0 mg/dl mg/dl    Bilirubin, UA Negative Negative    Occult Blood, UA Small (A) Negative   Urine Microscopic    Collection Time: 02/17/23 11:05 AM   Result Value Ref Range    RBC, UA 4-10 (A) None Seen, 1-2 /hpf    WBC, UA 10-20 (A) None Seen, 1-2 /hpf    Epithelial Cells Occasional None Seen, Occasional /hpf    Bacteria, UA Occasional None Seen, Occasional /hpf    MUCUS THREADS Moderate (A) None Seen   Urine culture    Collection Time: 02/17/23 11:05 AM    Specimen: Urine, Clean Catch   Result Value Ref Range    Urine Culture 50,000-59,000 cfu/ml Lactobacillus species (A)    Fingerstick Glucose (POCT)    Collection Time: 02/17/23 11:20 AM   Result Value Ref Range    POC Glucose 269 (H) 65 - 140 mg/dl   Fingerstick Glucose (POCT)    Collection Time: 02/17/23  3:59 PM   Result Value Ref Range    POC Glucose 182 (H) 65 - 140 mg/dl   Fingerstick Glucose (POCT)    Collection Time: 02/17/23  8:29 PM   Result Value Ref Range    POC Glucose 154 (H) 65 - 140 mg/dl   CBC and differential    Collection Time: 02/18/23  5:42 AM   Result Value Ref Range    WBC 6 75 4 31 - 10 16 Thousand/uL    RBC 4 70 3 81 - 5 12 Million/uL    Hemoglobin 14 1 11 5 - 15 4 g/dL    Hematocrit 41 4 34 8 - 46 1 %    MCV 88 82 - 98 fL    MCH 30 0 26 8 - 34 3 pg    MCHC 34 1 31 4 - 37 4 g/dL    RDW 14 0 11 6 - 15 1 %    MPV 9 7 8 9 - 12 7 fL    Platelets 552 546 - 116 Thousands/uL    nRBC 0 /100 WBCs    Neutrophils Relative 66 43 - 75 %    Immat GRANS % 1 0 - 2 %    Lymphocytes Relative 18 14 - 44 % Monocytes Relative 9 4 - 12 %    Eosinophils Relative 5 0 - 6 %    Basophils Relative 1 0 - 1 %    Neutrophils Absolute 4 52 1 85 - 7 62 Thousands/µL    Immature Grans Absolute 0 05 0 00 - 0 20 Thousand/uL    Lymphocytes Absolute 1 19 0 60 - 4 47 Thousands/µL    Monocytes Absolute 0 59 0 17 - 1 22 Thousand/µL    Eosinophils Absolute 0 33 0 00 - 0 61 Thousand/µL    Basophils Absolute 0 07 0 00 - 0 10 Thousands/µL   Basic metabolic panel    Collection Time: 02/18/23  5:42 AM   Result Value Ref Range    Sodium 136 135 - 147 mmol/L    Potassium 3 5 3 5 - 5 3 mmol/L    Chloride 99 96 - 108 mmol/L    CO2 28 21 - 32 mmol/L    ANION GAP 9 4 - 13 mmol/L    BUN 8 5 - 25 mg/dL    Creatinine 0 72 0 60 - 1 30 mg/dL    Glucose 237 (H) 65 - 140 mg/dL    Calcium 9 4 8 4 - 10 2 mg/dL    eGFR 98 ml/min/1 73sq m   Fingerstick Glucose (POCT)    Collection Time: 02/18/23 11:13 AM   Result Value Ref Range    POC Glucose 260 (H) 65 - 140 mg/dl   Fingerstick Glucose (POCT)    Collection Time: 02/18/23  4:09 PM   Result Value Ref Range    POC Glucose 177 (H) 65 - 140 mg/dl   Fingerstick Glucose (POCT)    Collection Time: 02/18/23  9:16 PM   Result Value Ref Range    POC Glucose 194 (H) 65 - 140 mg/dl   CBC and differential    Collection Time: 02/19/23  5:17 AM   Result Value Ref Range    WBC 6 50 4 31 - 10 16 Thousand/uL    RBC 4 71 3 81 - 5 12 Million/uL    Hemoglobin 13 9 11 5 - 15 4 g/dL    Hematocrit 43 0 34 8 - 46 1 %    MCV 91 82 - 98 fL    MCH 29 5 26 8 - 34 3 pg    MCHC 32 3 31 4 - 37 4 g/dL    RDW 13 2 11 6 - 15 1 %    MPV 9 3 8 9 - 12 7 fL    Platelets 625 467 - 114 Thousands/uL    nRBC 0 /100 WBCs    Neutrophils Relative 57 43 - 75 %    Immat GRANS % 1 0 - 2 %    Lymphocytes Relative 27 14 - 44 %    Monocytes Relative 9 4 - 12 %    Eosinophils Relative 5 0 - 6 %    Basophils Relative 1 0 - 1 %    Neutrophils Absolute 3 81 1 85 - 7 62 Thousands/µL    Immature Grans Absolute 0 05 0 00 - 0 20 Thousand/uL    Lymphocytes Absolute 1 72 0 60 - 4 47 Thousands/µL    Monocytes Absolute 0 58 0 17 - 1 22 Thousand/µL    Eosinophils Absolute 0 29 0 00 - 0 61 Thousand/µL    Basophils Absolute 0 05 0 00 - 0 10 Thousands/µL   Basic metabolic panel    Collection Time: 02/19/23  5:17 AM   Result Value Ref Range    Sodium 136 135 - 147 mmol/L    Potassium 3 4 (L) 3 5 - 5 3 mmol/L    Chloride 100 96 - 108 mmol/L    CO2 29 21 - 32 mmol/L    ANION GAP 7 4 - 13 mmol/L    BUN 6 5 - 25 mg/dL    Creatinine 0 69 0 60 - 1 30 mg/dL    Glucose 210 (H) 65 - 140 mg/dL    Calcium 9 3 8 4 - 10 2 mg/dL    eGFR 102 ml/min/1 73sq m   Fingerstick Glucose (POCT)    Collection Time: 02/19/23 12:11 PM   Result Value Ref Range    POC Glucose 203 (H) 65 - 140 mg/dl   Fingerstick Glucose (POCT)    Collection Time: 02/19/23  4:25 PM   Result Value Ref Range    POC Glucose 267 (H) 65 - 140 mg/dl   Fingerstick Glucose (POCT)    Collection Time: 02/19/23  9:23 PM   Result Value Ref Range    POC Glucose 269 (H) 65 - 140 mg/dl   CBC and differential    Collection Time: 02/20/23  5:25 AM   Result Value Ref Range    WBC 6 47 4 31 - 10 16 Thousand/uL    RBC 4 59 3 81 - 5 12 Million/uL    Hemoglobin 13 8 11 5 - 15 4 g/dL    Hematocrit 41 7 34 8 - 46 1 %    MCV 91 82 - 98 fL    MCH 30 1 26 8 - 34 3 pg    MCHC 33 1 31 4 - 37 4 g/dL    RDW 13 2 11 6 - 15 1 %    MPV 9 5 8 9 - 12 7 fL    Platelets 496 508 - 624 Thousands/uL    nRBC 0 /100 WBCs    Neutrophils Relative 57 43 - 75 %    Immat GRANS % 1 0 - 2 %    Lymphocytes Relative 27 14 - 44 %    Monocytes Relative 9 4 - 12 %    Eosinophils Relative 5 0 - 6 %    Basophils Relative 1 0 - 1 %    Neutrophils Absolute 3 78 1 85 - 7 62 Thousands/µL    Immature Grans Absolute 0 04 0 00 - 0 20 Thousand/uL    Lymphocytes Absolute 1 76 0 60 - 4 47 Thousands/µL    Monocytes Absolute 0 55 0 17 - 1 22 Thousand/µL    Eosinophils Absolute 0 29 0 00 - 0 61 Thousand/µL    Basophils Absolute 0 05 0 00 - 0 10 Thousands/µL   Comprehensive metabolic panel    Collection Time: 02/20/23  5:25 AM   Result Value Ref Range    Sodium 136 135 - 147 mmol/L    Potassium 3 5 3 5 - 5 3 mmol/L    Chloride 101 96 - 108 mmol/L    CO2 28 21 - 32 mmol/L    ANION GAP 7 4 - 13 mmol/L    BUN 5 5 - 25 mg/dL    Creatinine 0 65 0 60 - 1 30 mg/dL    Glucose 206 (H) 65 - 140 mg/dL    Calcium 9 1 8 4 - 10 2 mg/dL    AST 24 13 - 39 U/L    ALT 37 7 - 52 U/L    Alkaline Phosphatase 61 34 - 104 U/L    Total Protein 6 7 6 4 - 8 4 g/dL    Albumin 3 5 3 5 - 5 0 g/dL    Total Bilirubin 0 53 0 20 - 1 00 mg/dL    eGFR 104 ml/min/1 73sq m   Fingerstick Glucose (POCT)    Collection Time: 02/20/23  7:43 AM   Result Value Ref Range    POC Glucose 217 (H) 65 - 140 mg/dl

## 2023-02-21 VITALS
DIASTOLIC BLOOD PRESSURE: 71 MMHG | HEIGHT: 60 IN | HEART RATE: 91 BPM | OXYGEN SATURATION: 91 % | SYSTOLIC BLOOD PRESSURE: 124 MMHG | BODY MASS INDEX: 67.57 KG/M2 | TEMPERATURE: 98 F | RESPIRATION RATE: 17 BRPM

## 2023-02-21 LAB
ANION GAP SERPL CALCULATED.3IONS-SCNC: 9 MMOL/L (ref 4–13)
BASOPHILS # BLD AUTO: 0.05 THOUSANDS/ÂΜL (ref 0–0.1)
BASOPHILS NFR BLD AUTO: 1 % (ref 0–1)
BUN SERPL-MCNC: 8 MG/DL (ref 5–25)
C DIFF TOX GENS STL QL NAA+PROBE: NEGATIVE
CALCIUM SERPL-MCNC: 9 MG/DL (ref 8.4–10.2)
CAMPYLOBACTER DNA SPEC NAA+PROBE: NORMAL
CHLORIDE SERPL-SCNC: 102 MMOL/L (ref 96–108)
CO2 SERPL-SCNC: 25 MMOL/L (ref 21–32)
CREAT SERPL-MCNC: 0.87 MG/DL (ref 0.6–1.3)
EOSINOPHIL # BLD AUTO: 0.23 THOUSAND/ÂΜL (ref 0–0.61)
EOSINOPHIL NFR BLD AUTO: 4 % (ref 0–6)
ERYTHROCYTE [DISTWIDTH] IN BLOOD BY AUTOMATED COUNT: 13.2 % (ref 11.6–15.1)
GFR SERPL CREATININE-BSD FRML MDRD: 78 ML/MIN/1.73SQ M
GLUCOSE SERPL-MCNC: 266 MG/DL (ref 65–140)
GLUCOSE SERPL-MCNC: 272 MG/DL (ref 65–140)
GLUCOSE SERPL-MCNC: 274 MG/DL (ref 65–140)
HCT VFR BLD AUTO: 43.4 % (ref 34.8–46.1)
HGB BLD-MCNC: 14 G/DL (ref 11.5–15.4)
IMM GRANULOCYTES # BLD AUTO: 0.04 THOUSAND/UL (ref 0–0.2)
IMM GRANULOCYTES NFR BLD AUTO: 1 % (ref 0–2)
LYMPHOCYTES # BLD AUTO: 1.56 THOUSANDS/ÂΜL (ref 0.6–4.47)
LYMPHOCYTES NFR BLD AUTO: 24 % (ref 14–44)
MCH RBC QN AUTO: 29.6 PG (ref 26.8–34.3)
MCHC RBC AUTO-ENTMCNC: 32.3 G/DL (ref 31.4–37.4)
MCV RBC AUTO: 92 FL (ref 82–98)
MONOCYTES # BLD AUTO: 0.58 THOUSAND/ÂΜL (ref 0.17–1.22)
MONOCYTES NFR BLD AUTO: 9 % (ref 4–12)
NEUTROPHILS # BLD AUTO: 4.15 THOUSANDS/ÂΜL (ref 1.85–7.62)
NEUTS SEG NFR BLD AUTO: 61 % (ref 43–75)
NRBC BLD AUTO-RTO: 0 /100 WBCS
PLATELET # BLD AUTO: 249 THOUSANDS/UL (ref 149–390)
PMV BLD AUTO: 10 FL (ref 8.9–12.7)
POTASSIUM SERPL-SCNC: 3.7 MMOL/L (ref 3.5–5.3)
RBC # BLD AUTO: 4.73 MILLION/UL (ref 3.81–5.12)
SALMONELLA DNA SPEC QL NAA+PROBE: NORMAL
SHIGA TOXIN STX GENE SPEC NAA+PROBE: NORMAL
SHIGELLA DNA SPEC QL NAA+PROBE: NORMAL
SODIUM SERPL-SCNC: 136 MMOL/L (ref 135–147)
WBC # BLD AUTO: 6.61 THOUSAND/UL (ref 4.31–10.16)

## 2023-02-21 RX ORDER — DICYCLOMINE HYDROCHLORIDE 10 MG/1
20 CAPSULE ORAL EVERY 6 HOURS PRN
Qty: 20 CAPSULE | Refills: 0 | Status: SHIPPED | OUTPATIENT
Start: 2023-02-21

## 2023-02-21 RX ORDER — LAMOTRIGINE 100 MG/1
100 TABLET ORAL 2 TIMES DAILY
Refills: 0
Start: 2023-02-21

## 2023-02-21 RX ORDER — AMOXICILLIN AND CLAVULANATE POTASSIUM 875; 125 MG/1; MG/1
1 TABLET, FILM COATED ORAL EVERY 12 HOURS SCHEDULED
Qty: 10 TABLET | Refills: 0 | Status: SHIPPED | OUTPATIENT
Start: 2023-02-21 | End: 2023-02-26

## 2023-02-21 RX ORDER — DESVENLAFAXINE 50 MG/1
50 TABLET, EXTENDED RELEASE ORAL DAILY
Refills: 0
Start: 2023-02-22

## 2023-02-21 RX ORDER — INSULIN GLARGINE 100 [IU]/ML
10 INJECTION, SOLUTION SUBCUTANEOUS EVERY MORNING
Qty: 10 ML | Refills: 0 | Status: SHIPPED | OUTPATIENT
Start: 2023-02-22

## 2023-02-21 RX ORDER — INSULIN GLARGINE 100 [IU]/ML
10 INJECTION, SOLUTION SUBCUTANEOUS EVERY MORNING
Status: DISCONTINUED | OUTPATIENT
Start: 2023-02-21 | End: 2023-02-21 | Stop reason: HOSPADM

## 2023-02-21 RX ORDER — AMOXICILLIN AND CLAVULANATE POTASSIUM 875; 125 MG/1; MG/1
1 TABLET, FILM COATED ORAL EVERY 12 HOURS SCHEDULED
Status: DISCONTINUED | OUTPATIENT
Start: 2023-02-21 | End: 2023-02-21 | Stop reason: HOSPADM

## 2023-02-21 RX ORDER — ACETAMINOPHEN 325 MG/1
650 TABLET ORAL EVERY 6 HOURS PRN
Refills: 0
Start: 2023-02-21

## 2023-02-21 RX ADMIN — ONDANSETRON 4 MG: 2 INJECTION INTRAMUSCULAR; INTRAVENOUS at 06:24

## 2023-02-21 RX ADMIN — INSULIN LISPRO 16 UNITS: 100 INJECTION, SOLUTION INTRAVENOUS; SUBCUTANEOUS at 12:27

## 2023-02-21 RX ADMIN — CEFTRIAXONE SODIUM 2000 MG: 10 INJECTION, POWDER, FOR SOLUTION INTRAVENOUS at 06:15

## 2023-02-21 RX ADMIN — METRONIDAZOLE 500 MG: 500 TABLET ORAL at 04:05

## 2023-02-21 RX ADMIN — DICYCLOMINE HYDROCHLORIDE 20 MG: 10 CAPSULE ORAL at 11:03

## 2023-02-21 RX ADMIN — DICYCLOMINE HYDROCHLORIDE 20 MG: 10 CAPSULE ORAL at 06:15

## 2023-02-21 RX ADMIN — INSULIN LISPRO 6 UNITS: 100 INJECTION, SOLUTION INTRAVENOUS; SUBCUTANEOUS at 08:41

## 2023-02-21 RX ADMIN — LISINOPRIL 20 MG: 20 TABLET ORAL at 08:40

## 2023-02-21 RX ADMIN — PANTOPRAZOLE SODIUM 40 MG: 40 TABLET, DELAYED RELEASE ORAL at 08:40

## 2023-02-21 RX ADMIN — CARIPRAZINE 3 MG: 3 CAPSULE, GELATIN COATED ORAL at 11:03

## 2023-02-21 RX ADMIN — INSULIN GLARGINE 10 UNITS: 100 INJECTION, SOLUTION SUBCUTANEOUS at 11:03

## 2023-02-21 RX ADMIN — LAMOTRIGINE 100 MG: 100 TABLET ORAL at 08:40

## 2023-02-21 RX ADMIN — INSULIN LISPRO 16 UNITS: 100 INJECTION, SOLUTION INTRAVENOUS; SUBCUTANEOUS at 08:40

## 2023-02-21 RX ADMIN — INSULIN LISPRO 6 UNITS: 100 INJECTION, SOLUTION INTRAVENOUS; SUBCUTANEOUS at 12:30

## 2023-02-21 RX ADMIN — ENOXAPARIN SODIUM 60 MG: 60 INJECTION SUBCUTANEOUS at 08:39

## 2023-02-21 RX ADMIN — AMOXICILLIN AND CLAVULANATE POTASSIUM 1 TABLET: 875; 125 TABLET, FILM COATED ORAL at 08:40

## 2023-02-21 RX ADMIN — DESVENLAFAXINE 50 MG: 50 TABLET, FILM COATED, EXTENDED RELEASE ORAL at 08:41

## 2023-02-21 NOTE — UTILIZATION REVIEW
Continued Stay Review    Date: 2/20                        Current Patient Class: Inpatient  Current Level of Care: Med Surg    HPI:47 y o  female initially admitted on 2/16     Assessment/Plan:   Continues on Iv antibiotics  Stool studies pending  Pt still with left-sided abdominal pain and gas related cramping still but says the pain is better today  She did have another episode of diarrhea this morning  + Nausea but able to advance diet today       Vital Signs:   02/21/23 07:41:50 98 °F (36 7 °C) 91 17 124/71 89 91 % None (Room air) --   02/20/23 22:26:26 98 3 °F (36 8 °C) 91 18 134/75 95 88 %   Abnormal  -- --   02/20/23 15:24:04 98 1 °F (36 7 °C) 90 -- 112/74 87 91 % --      Pertinent Labs/Diagnostic Results:       Results from last 7 days   Lab Units 02/21/23  0550 02/20/23  0525 02/19/23  0517 02/18/23  0542 02/17/23  0451   WBC Thousand/uL 6 61 6 47 6 50 6 75 7 56   HEMOGLOBIN g/dL 14 0 13 8 13 9 14 1 13 5   HEMATOCRIT % 43 4 41 7 43 0 41 4 41 1   PLATELETS Thousands/uL 249 263 277 295 290   NEUTROS ABS Thousands/µL 4 15 3 78 3 81 4 52 4 60         Results from last 7 days   Lab Units 02/21/23  0550 02/20/23  0525 02/19/23  0517 02/18/23  0542 02/17/23  0451   SODIUM mmol/L 136 136 136 136 134*   POTASSIUM mmol/L 3 7 3 5 3 4* 3 5 3 7   CHLORIDE mmol/L 102 101 100 99 99   CO2 mmol/L 25 28 29 28 25   ANION GAP mmol/L 9 7 7 9 10   BUN mg/dL 8 5 6 8 13   CREATININE mg/dL 0 87 0 65 0 69 0 72 0 82   EGFR ml/min/1 73sq m 78 104 102 98 84   CALCIUM mg/dL 9 0 9 1 9 3 9 4 8 9     Results from last 7 days   Lab Units 02/20/23  0525 02/17/23  0451 02/16/23  1626   AST U/L 24 33 22   ALT U/L 37 42 31   ALK PHOS U/L 61 83 66   TOTAL PROTEIN g/dL 6 7 6 9 7 4   ALBUMIN g/dL 3 5 3 6 3 9   TOTAL BILIRUBIN mg/dL 0 53 0 91 0 76     Results from last 7 days   Lab Units 02/21/23  1141 02/21/23  0739 02/20/23  2042 02/20/23  1521 02/20/23  1049 02/20/23  0743 02/19/23  2123 02/19/23  1625 02/19/23  1211 02/18/23 2116 02/18/23  1609 02/18/23  1113   POC GLUCOSE mg/dl 266* 274* 193* 234* 271* 217* 269* 267* 203* 194* 177* 260*     Results from last 7 days   Lab Units 02/21/23  0550 02/20/23  0525 02/19/23  0517 02/18/23  0542 02/17/23  0451 02/16/23  1626   GLUCOSE RANDOM mg/dL 272* 206* 210* 237* 269* 299*             BETA-HYDROXYBUTYRATE   Date Value Ref Range Status   04/17/2021 0 1 <0 6 mmol/L Final              Results from last 7 days   Lab Units 02/16/23  1931 02/16/23  1626   LACTIC ACID mmol/L 1 4 2 7*       Results from last 7 days   Lab Units 02/16/23  1626   LIPASE u/L 59       Results from last 7 days   Lab Units 02/17/23  1105   CLARITY UA  Clear   COLOR UA  Yellow   SPEC GRAV UA  >=1 050*   PH UA  5 5   GLUCOSE UA mg/dl 200 (1/5%)*   KETONES UA mg/dl Trace*   BLOOD UA  Small*   PROTEIN UA mg/dl 50 (1+)*   NITRITE UA  Negative   BILIRUBIN UA  Negative   UROBILINOGEN UA (BE) mg/dl <2 0   LEUKOCYTES UA  Negative   WBC UA /hpf 10-20*   RBC UA /hpf 4-10*   BACTERIA UA /hpf Occasional   EPITHELIAL CELLS WET PREP /hpf Occasional   MUCUS THREADS  Moderate*         Results from last 7 days   Lab Units 02/20/23  1033   C DIFF TOXIN B BY PCR  Negative     Results from last 7 days   Lab Units 02/20/23  1033   SALMONELLA SP PCR  None Detected   SHIGELLA SP/ENTEROINVASIVE E  COLI (EIEC)  None Detected   CAMPYLOBACTER SP (JEJUNI AND COLI)  None Detected   SHIGA TOXIN 1/SHIGA TOXIN 2  None Detected         Results from last 7 days   Lab Units 02/17/23  1105   URINE CULTURE  50,000-59,000 cfu/ml Lactobacillus species*       Medications:   Scheduled Medications:  amoxicillin-clavulanate, 1 tablet, Oral, Q12H GERI  cariprazine, 3 mg, Oral, Daily  desvenlafaxine succinate, 50 mg, Oral, Daily  dicyclomine, 20 mg, Oral, TID AC  enoxaparin, 60 mg, Subcutaneous, Q12H GERI  insulin glargine, 10 Units, Subcutaneous, QAM  insulin glargine, 64 Units, Subcutaneous, HS  insulin lispro, 16 Units, Subcutaneous, TID With Meals  insulin lispro, 2-12 Units, Subcutaneous, TID AC  insulin lispro, 2-12 Units, Subcutaneous, HS  lamoTRIgine, 100 mg, Oral, BID  lisinopril, 20 mg, Oral, Daily  pantoprazole, 40 mg, Oral, Daily  pravastatin, 40 mg, Oral, Daily With Dinner    cefTRIAXone (ROCEPHIN) 2,000 mg in dextrose 5 % 50 mL IVPB  Dose: 2,000 mg  Freq: Every 24 hours Route: IV  Last Dose: 2,000 mg (02/21/23 0615)  Start: 02/16/23 0700 End: 02/21/23 0824      Continuous IV Infusions: None     PRN Meds:  acetaminophen, 650 mg, Oral, Q6H PRN  LORazepam, 0 5 mg, Oral, Q8H PRN  ondansetron, 4 mg, Intravenous, Q8H PRN  oxyCODONE, 5 mg, Oral, Q6H PRN  traMADol, 50 mg, Oral, Q6H PRN        Discharge Plan: Home when medically cleared    Network Utilization Review Department  ATTENTION: Please call with any questions or concerns to [phone] and carefully listen to the prompts so that you are directed to the right person  All voicemails are confidential   Cleotis Dirk all requests for admission clinical reviews, approved or denied determinations and any other requests to dedicated fax number below belonging to the campus where the patient is receiving treatment   List of dedicated fax numbers for the Facilities:  [address] DENIALS (Administrative/Medical Necessity) [phone]   [address] (Maternity/NICU/Pediatrics) [phone]   [address] [phone]   [facility] 77 [phone]   [address] [place] [id number] [place] [address] [facility] 28 [phone]   [address] [facility] 134 815 [facility] [phone]

## 2023-02-21 NOTE — DISCHARGE SUMMARY
[facility]  Discharge- Gerardo Yañez 1975, 50 y o  female MRN: [id number]  Unit/Bed#: W -01 Encounter: [id number]  Primary Care Provider: SUJATA Alva   Date and time admitted to hospital: 2/16/2023  2:59 PM    * Sigmoid diverticulitis  Assessment & Plan  · Presented with left lower quadrant pain and diarrhea  · Appreciate GI consult--plan for colonoscopy in 6 weeks  · CAT scan revealed sigmoid diverticulitis  · No leukocytosis, afebrile  · Lactate elevated on admission but cleared after IVF bolus  · Currently on day #5 CTX/Flagyl  · Diet advanced to low fiber low residue  · C/o'd distention and gas pain, AXR with nonobstructive gas pattern 2/18  · Developed n/v over the weekend, which she believes is due to Flagyl as she has had difficulty tolerating it in the past  Change to po augmentin to complete 10 day total  · Bentyl prn abdominal cramping  · C  difficile negative    Class 3 severe obesity due to excess calories with body mass index (BMI) of 60 0 to 69 9 in adult [facility])  Assessment & Plan  · BMI 67 57  Recommend weight loss   · May benefit from outpatient referral to bariatric surgery    Type 2 diabetes mellitus with complication, with long-term current use of insulin [facility])  Assessment & Plan  Lab Results   Component Value Date    HGBA1C 7 8 (H) 12/14/2022       Recent Labs     02/19/23  1625 02/19/23  2123 02/20/23  0743 02/20/23  1049   POCGLU 267* 269* 217* 271*       Blood Sugar Average: Last 72 hrs:  (P) 981 [id number]   · Home regimen: Insulin glargine 64 units daily and insulin lispro 16 units 3 times a day with meals, also takes metformin  · Gluc elevated here, goal 140-180  Given massive obesity would recommend that she go on twice a day Lantus    We will start 10 units every morning    Essential hypertension  Assessment & Plan  · BP is acceptable  · Continue lisinopril 20 mg daily      Medical Problems     Resolved Problems  Date Reviewed: 2/21/2023 None       Discharging Physician / Practitioner: Catracho Gonzalez PA-C  PCP: Martin Martini, [facility]  Admission Date:   Admission Orders (From admission, onward)     Ordered        02/16/23 1837  INPATIENT ADMISSION  Once                      Discharge Date: 02/21/23    Consultations During Hospital Stay:  · GI    Procedures Performed:   · CAT scan abdomen and pelvis    Significant Findings / Test Results:   · As above    Incidental Findings:   · none    Test Results Pending at Discharge (will require follow up): · Stool for enteric pathogen     Outpatient Tests Requested:  · Colonoscopy    Complications:  none    Reason for Admission: Abdominal pain and diarrhea    Hospital Course:   Eli Tracey is a 50 y o  female patient with morbid obesity and diabetes who originally presented to the hospital on 2/16/2023 due to abdominal pain and diarrhea  Patient was found on imaging to have evidence of sigmoid diverticulitis  This was her first bout  She was seen by GI and placed on IV ceftriaxone and Flagyl  Patient tells me she had difficulty tolerating Flagyl in the past and we believe this may have caused her to have some nausea  She is being transitioned to oral Augmentin to complete a full 10-day course of antibiotics  Cultures were also sent for C  difficile and noted to be negative  Clinically patient has improved, is tolerating diet and is stable for discharge home with outpatient follow-up with GI    Please see above list of diagnoses and related plan for additional information  Condition at Discharge: stable    Discharge Day Visit / Exam: Vital signs reviewed  Subjective: Patient reports minimal nausea/dry heaving  She states that it may be the Flagyl because she had difficulty tolerating this medicine in the past   She has had mild amounts of diarrhea and minimal discomfort in her abdomen  Overall she is feeling better and feels ready to go home  Physical Exam  Vitals reviewed     Constitutional: General: She is not in acute distress  Appearance: Normal appearance  She is obese  She is not ill-appearing, toxic-appearing or diaphoretic  Comments: Nontoxic and well-appearing with no evidence of pain   Eyes:      General: No scleral icterus  Right eye: No discharge  Left eye: No discharge  Conjunctiva/sclera: Conjunctivae normal    Cardiovascular:      Rate and Rhythm: Normal rate and regular rhythm  Heart sounds: No murmur heard  Pulmonary:      Effort: Pulmonary effort is normal  No respiratory distress  Breath sounds: No stridor  No wheezing, rhonchi or rales  Abdominal:      General: Bowel sounds are normal  There is no distension  Palpations: Abdomen is soft  Tenderness: There is no abdominal tenderness  There is no guarding  Musculoskeletal:      Right lower leg: No edema  Left lower leg: No edema  Skin:     General: Skin is warm and dry  Coloration: Skin is not jaundiced or pale  Findings: No bruising, erythema, lesion or rash  Neurological:      General: No focal deficit present  Mental Status: She is alert  Mental status is at baseline  Psychiatric:         Mood and Affect: Mood normal          Thought Content: Thought content normal          Discussion with Family: Patient declined call to   Discharge instructions/Information to patient and family:   See after visit summary for information provided to patient and family  Provisions for Follow-Up Care:  See after visit summary for information related to follow-up care and any pertinent home health orders  Disposition:   Home    Planned Readmission:      Discharge Statement:  I spent 35 minutes discharging the patient  This time was spent on the day of discharge  I had direct contact with the patient on the day of discharge   Greater than 50% of the total time was spent examining patient, answering all patient questions, arranging and discussing plan of care with patient as well as directly providing post-discharge instructions  Additional time then spent on discharge activities  Case discussed with patient's nurse    Discharge Medications:  See after visit summary for reconciled discharge medications provided to patient and/or family        **Please Note: This note may have been constructed using a voice recognition system**

## 2023-02-21 NOTE — DISCHARGE INSTR - AVS FIRST PAGE
Dear Jana Greco,     It was our pleasure to care for you here at [facility]  It is our hope that we were always able to exceed the expected standards for your care during your stay  You were hospitalized due to diverticulitis  You were cared for on the fourth floor by German Gama PA-C under the service of Mariam Egan MD with the [facility] who covers for your primary care physician (PCP), SUJATA Nguyễn, while you were hospitalized  If you have any questions or concerns related to this hospitalization, you may contact us at [phone]  For follow up as well as any medication refills, we recommend that you follow up with your primary care physician  A registered nurse will reach out to you by phone within a few days after your discharge to answer any additional questions that you may have after going home  However, at this time we provide for you here, the most important instructions / recommendations at discharge:     Notable Medication Adjustments -   Augmentin twice a day for 5 more days  Bentyl as needed for crampy diarrhea  Testing Required after Discharge -   Colonoscopy in 6 weeks  Important follow up information -   Follow-up with GI  Other Instructions -   Low fiber low residue diet for now  Monitor blood sugars closely  Since blood sugars were running high recommend doing a morning dose of Lantus as well  Please review this entire after visit summary as additional general instructions including medication list, appointments, activity, diet, any pertinent wound care, and other additional recommendations from your care team that may be provided for you        Sincerely,     German Gama PA-C

## 2023-02-21 NOTE — PLAN OF CARE
Problem: PAIN - ADULT  Goal: Verbalizes/displays adequate comfort level or baseline comfort level  Description: Interventions:  - Encourage patient to monitor pain and request assistance  - Assess pain using appropriate pain scale  - Administer analgesics based on type and severity of pain and evaluate response  - Implement non-pharmacological measures as appropriate and evaluate response  - Consider cultural and social influences on pain and pain management  - Notify physician/advanced practitioner if interventions unsuccessful or patient reports new pain  Outcome: Progressing     Problem: INFECTION - ADULT  Goal: Absence or prevention of progression during hospitalization  Description: INTERVENTIONS:  - Assess and monitor for signs and symptoms of infection  - Monitor lab/diagnostic results  - Monitor all insertion sites, i e  indwelling lines, tubes, and drains  - Monitor endotracheal if appropriate and nasal secretions for changes in amount and color  - [place] appropriate cooling/warming therapies per order  - Administer medications as ordered  - Instruct and encourage patient and family to use good hand hygiene technique  - Identify and instruct in appropriate isolation precautions for identified infection/condition  Outcome: Progressing  Goal: Absence of fever/infection during neutropenic period  Description: INTERVENTIONS:  - Monitor WBC    Outcome: Progressing     Problem: SAFETY ADULT  Goal: Patient will remain free of falls  Description: INTERVENTIONS:  - Educate patient/family on patient safety including physical limitations  - Instruct patient to call for assistance with activity   - Consult OT/PT to assist with strengthening/mobility   - Keep Call bell within reach  - Keep bed low and locked with side rails adjusted as appropriate  - Keep care items and personal belongings within reach  - Initiate and maintain comfort rounds  - Make Fall Risk Sign visible to staff  - Offer Toileting every 2 Hours, in advance of need  - Initiate/Maintain alarm  - Obtain necessary fall risk management equipment  - Apply yellow socks and bracelet for high fall risk patients  - Consider moving patient to room near nurses station  Outcome: Progressing  Goal: Maintain or return to baseline ADL function  Description: INTERVENTIONS:  -  Assess patient's ability to carry out ADLs; assess patient's baseline for ADL function and identify physical deficits which impact ability to perform ADLs (bathing, care of mouth/teeth, toileting, grooming, dressing, etc )  - Assess/evaluate cause of self-care deficits   - Assess range of motion  - Assess patient's mobility; develop plan if impaired  - Assess patient's need for assistive devices and provide as appropriate  - Encourage maximum independence but intervene and supervise when necessary  - Involve family in performance of ADLs  - Assess for home care needs following discharge   - Consider OT consult to assist with ADL evaluation and planning for discharge  - Provide patient education as appropriate  Outcome: Progressing  Goal: Maintains/Returns to pre admission functional level  Description: INTERVENTIONS:  - Perform BMAT or MOVE assessment daily    - Set and communicate daily mobility goal to care team and patient/family/caregiver  - Collaborate with rehabilitation services on mobility goals if consulted  - Perform Range of Motion 3 times a day  - Reposition patient every 2 hours    - Dangle patient 3 times a day  - Stand patient 3 times a day  - Ambulate patient 3 times a day  - Out of bed to chair 3 times a day   - Out of bed for meals 3 times a day  - Out of bed for toileting  - Record patient progress and toleration of activity level   Outcome: Progressing     Problem: DISCHARGE PLANNING  Goal: Discharge to home or other facility with appropriate resources  Description: INTERVENTIONS:  - Identify barriers to discharge w/patient and caregiver  - Arrange for needed discharge resources and transportation as appropriate  - Identify discharge learning needs (meds, wound care, etc )  - Arrange for interpretive services to assist at discharge as needed  - Refer to Case Management Department for coordinating discharge planning if the patient needs post-hospital services based on physician/advanced practitioner order or complex needs related to functional status, cognitive ability, or social support system  Outcome: Progressing     Problem: Knowledge Deficit  Goal: Patient/family/caregiver demonstrates understanding of disease process, treatment plan, medications, and discharge instructions  Description: Complete learning assessment and assess knowledge base    Interventions:  - Provide teaching at level of understanding  - Provide teaching via preferred learning methods  Outcome: Progressing     Problem: Prexisting or High Potential for Compromised Skin Integrity  Goal: Skin integrity is maintained or improved  Description: INTERVENTIONS:  - Identify patients at risk for skin breakdown  - Assess and monitor skin integrity  - Assess and monitor nutrition and hydration status  - Monitor labs   - Assess for incontinence   - Turn and reposition patient  - Assist with mobility/ambulation  - Relieve pressure over bony prominences  - Avoid friction and shearing  - Provide appropriate hygiene as needed including keeping skin clean and dry  - Evaluate need for skin moisturizer/barrier cream  - Collaborate with interdisciplinary team   - Patient/family teaching  - Consider wound care consult   Outcome: Progressing

## 2023-02-21 NOTE — PLAN OF CARE
Problem: PAIN - ADULT  Goal: Verbalizes/displays adequate comfort level or baseline comfort level  Description: Interventions:  - Encourage patient to monitor pain and request assistance  - Assess pain using appropriate pain scale  - Administer analgesics based on type and severity of pain and evaluate response  - Implement non-pharmacological measures as appropriate and evaluate response  - Consider cultural and social influences on pain and pain management  - Notify physician/advanced practitioner if interventions unsuccessful or patient reports new pain  2/21/2023 1205 by Kayy Rueda  Outcome: Adequate for Discharge  2/21/2023 1023 by Kayy Rueda  Outcome: Progressing     Problem: INFECTION - ADULT  Goal: Absence or prevention of progression during hospitalization  Description: INTERVENTIONS:  - Assess and monitor for signs and symptoms of infection  - Monitor lab/diagnostic results  - Monitor all insertion sites, i e  indwelling lines, tubes, and drains  - Monitor endotracheal if appropriate and nasal secretions for changes in amount and color  - [place] appropriate cooling/warming therapies per order  - Administer medications as ordered  - Instruct and encourage patient and family to use good hand hygiene technique  - Identify and instruct in appropriate isolation precautions for identified infection/condition  2/21/2023 1205 by Kayy Rueda  Outcome: Adequate for Discharge  2/21/2023 1023 by Kayy Rueda  Outcome: Progressing  Goal: Absence of fever/infection during neutropenic period  Description: INTERVENTIONS:  - Monitor WBC    2/21/2023 1205 by Kayy Rueda  Outcome: Adequate for Discharge  2/21/2023 1023 by Kayy Rueda  Outcome: Progressing     Problem: SAFETY ADULT  Goal: Patient will remain free of falls  Description: INTERVENTIONS:  - Educate patient/family on patient safety including physical limitations  - Instruct patient to call for assistance with activity   - Consult OT/PT to assist with strengthening/mobility   - Keep Call bell within reach  - Keep bed low and locked with side rails adjusted as appropriate  - Keep care items and personal belongings within reach  - Initiate and maintain comfort rounds  - Make Fall Risk Sign visible to staff  - Offer Toileting every 2 Hours, in advance of need  - Initiate/Maintain alarm  - Obtain necessary fall risk management equipment  - Apply yellow socks and bracelet for high fall risk patients  - Consider moving patient to room near nurses station  2/21/2023 1205 by Diana Tobin  Outcome: Adequate for Discharge  2/21/2023 1023 by Diana Tobin  Outcome: Progressing  Goal: Maintain or return to baseline ADL function  Description: INTERVENTIONS:  -  Assess patient's ability to carry out ADLs; assess patient's baseline for ADL function and identify physical deficits which impact ability to perform ADLs (bathing, care of mouth/teeth, toileting, grooming, dressing, etc )  - Assess/evaluate cause of self-care deficits   - Assess range of motion  - Assess patient's mobility; develop plan if impaired  - Assess patient's need for assistive devices and provide as appropriate  - Encourage maximum independence but intervene and supervise when necessary  - Involve family in performance of ADLs  - Assess for home care needs following discharge   - Consider OT consult to assist with ADL evaluation and planning for discharge  - Provide patient education as appropriate  2/21/2023 1205 by Diana Tobin  Outcome: Adequate for Discharge  2/21/2023 1023 by Diana Tobin  Outcome: Progressing  Goal: Maintains/Returns to pre admission functional level  Description: INTERVENTIONS:  - Perform BMAT or MOVE assessment daily    - Set and communicate daily mobility goal to care team and patient/family/caregiver  - Collaborate with rehabilitation services on mobility goals if consulted  - Perform Range of Motion 3 times a day  - Reposition patient every 2 hours    - Dangle patient 3 times a day  - Stand patient 3 times a day  - Ambulate patient 3 times a day  - Out of bed to chair 3 times a day   - Out of bed for meals 3 times a day  - Out of bed for toileting  - Record patient progress and toleration of activity level   2/21/2023 1205 by Siddhartha Glaser  Outcome: Adequate for Discharge  2/21/2023 1023 by Siddhartha Glaser  Outcome: Progressing     Problem: DISCHARGE PLANNING  Goal: Discharge to home or other facility with appropriate resources  Description: INTERVENTIONS:  - Identify barriers to discharge w/patient and caregiver  - Arrange for needed discharge resources and transportation as appropriate  - Identify discharge learning needs (meds, wound care, etc )  - Arrange for interpretive services to assist at discharge as needed  - Refer to Case Management Department for coordinating discharge planning if the patient needs post-hospital services based on physician/advanced practitioner order or complex needs related to functional status, cognitive ability, or social support system  2/21/2023 1205 by Siddhartha Glaser  Outcome: Adequate for Discharge  2/21/2023 1023 by Siddhartha Glaser  Outcome: Progressing     Problem: Knowledge Deficit  Goal: Patient/family/caregiver demonstrates understanding of disease process, treatment plan, medications, and discharge instructions  Description: Complete learning assessment and assess knowledge base    Interventions:  - Provide teaching at level of understanding  - Provide teaching via preferred learning methods  2/21/2023 1205 by Siddhartha Glaser  Outcome: Adequate for Discharge  2/21/2023 1023 by Siddhartha Glaser  Outcome: Progressing     Problem: Prexisting or High Potential for Compromised Skin Integrity  Goal: Skin integrity is maintained or improved  Description: INTERVENTIONS:  - Identify patients at risk for skin breakdown  - Assess and monitor skin integrity  - Assess and monitor nutrition and hydration status  - Monitor labs   - Assess for incontinence   - Turn and reposition patient  - Assist with mobility/ambulation  - Relieve pressure over bony prominences  - Avoid friction and shearing  - Provide appropriate hygiene as needed including keeping skin clean and dry  - Evaluate need for skin moisturizer/barrier cream  - Collaborate with interdisciplinary team   - Patient/family teaching  - Consider wound care consult   2/21/2023 1205 by Joelle Burgess  Outcome: Adequate for Discharge  2/21/2023 1023 by Joelle Burgess  Outcome: Progressing

## 2023-02-21 NOTE — PLAN OF CARE
Problem: PAIN - ADULT  Goal: Verbalizes/displays adequate comfort level or baseline comfort level  Description: Interventions:  - Encourage patient to monitor pain and request assistance  - Assess pain using appropriate pain scale  - Administer analgesics based on type and severity of pain and evaluate response  - Implement non-pharmacological measures as appropriate and evaluate response  - Consider cultural and social influences on pain and pain management  - Notify physician/advanced practitioner if interventions unsuccessful or patient reports new pain  Outcome: Progressing     Problem: INFECTION - ADULT  Goal: Absence or prevention of progression during hospitalization  Description: INTERVENTIONS:  - Assess and monitor for signs and symptoms of infection  - Monitor lab/diagnostic results  - Monitor all insertion sites, i e  indwelling lines, tubes, and drains  - Monitor endotracheal if appropriate and nasal secretions for changes in amount and color  - [place] appropriate cooling/warming therapies per order  - Administer medications as ordered  - Instruct and encourage patient and family to use good hand hygiene technique  - Identify and instruct in appropriate isolation precautions for identified infection/condition  Outcome: Progressing  Goal: Absence of fever/infection during neutropenic period  Description: INTERVENTIONS:  - Monitor WBC    Outcome: Progressing     Problem: SAFETY ADULT  Goal: Patient will remain free of falls  Description: INTERVENTIONS:  - Educate patient/family on patient safety including physical limitations  - Instruct patient to call for assistance with activity   - Consult OT/PT to assist with strengthening/mobility   - Keep Call bell within reach  - Keep bed low and locked with side rails adjusted as appropriate  - Keep care items and personal belongings within reach  - Initiate and maintain comfort rounds  - Make Fall Risk Sign visible to staff  - Offer Toileting every  Hours, in advance of need  - Initiate/Maintain alarm  - Obtain necessary fall risk management equipment:   - Apply yellow socks and bracelet for high fall risk patients  - Consider moving patient to room near nurses station  Outcome: Progressing  Goal: Maintain or return to baseline ADL function  Description: INTERVENTIONS:  -  Assess patient's ability to carry out ADLs; assess patient's baseline for ADL function and identify physical deficits which impact ability to perform ADLs (bathing, care of mouth/teeth, toileting, grooming, dressing, etc )  - Assess/evaluate cause of self-care deficits   - Assess range of motion  - Assess patient's mobility; develop plan if impaired  - Assess patient's need for assistive devices and provide as appropriate  - Encourage maximum independence but intervene and supervise when necessary  - Involve family in performance of ADLs  - Assess for home care needs following discharge   - Consider OT consult to assist with ADL evaluation and planning for discharge  - Provide patient education as appropriate  Outcome: Progressing  Goal: Maintains/Returns to pre admission functional level  Description: INTERVENTIONS:  - Perform BMAT or MOVE assessment daily    - Set and communicate daily mobility goal to care team and patient/family/caregiver  - Collaborate with rehabilitation services on mobility goals if consulted  - Perform Range of Motion  times a day  - Reposition patient every  hours    - Dangle patient  times a day  - Stand patient  times a day  - Ambulate patient  times a day  - Out of bed to chair  times a day   - Out of bed for meals  times a day  - Out of bed for toileting  - Record patient progress and toleration of activity level   Outcome: Progressing     Problem: DISCHARGE PLANNING  Goal: Discharge to home or other facility with appropriate resources  Description: INTERVENTIONS:  - Identify barriers to discharge w/patient and caregiver  - Arrange for needed discharge resources and transportation as appropriate  - Identify discharge learning needs (meds, wound care, etc )  - Arrange for interpretive services to assist at discharge as needed  - Refer to Case Management Department for coordinating discharge planning if the patient needs post-hospital services based on physician/advanced practitioner order or complex needs related to functional status, cognitive ability, or social support system  Outcome: Progressing     Problem: Knowledge Deficit  Goal: Patient/family/caregiver demonstrates understanding of disease process, treatment plan, medications, and discharge instructions  Description: Complete learning assessment and assess knowledge base    Interventions:  - Provide teaching at level of understanding  - Provide teaching via preferred learning methods  Outcome: Progressing     Problem: Prexisting or High Potential for Compromised Skin Integrity  Goal: Skin integrity is maintained or improved  Description: INTERVENTIONS:  - Identify patients at risk for skin breakdown  - Assess and monitor skin integrity  - Assess and monitor nutrition and hydration status  - Monitor labs   - Assess for incontinence   - Turn and reposition patient  - Assist with mobility/ambulation  - Relieve pressure over bony prominences  - Avoid friction and shearing  - Provide appropriate hygiene as needed including keeping skin clean and dry  - Evaluate need for skin moisturizer/barrier cream  - Collaborate with interdisciplinary team   - Patient/family teaching  - Consider wound care consult   Outcome: Progressing

## 2023-02-21 NOTE — PROGRESS NOTES
Progress Note - Adam Maradiaga 50 y o  female MRN: [id number]    Unit/Bed#: W -01 Encounter: [id number]        Assessment/Plan:  49-year-old female with history of T2DM, HTN, HLD, obesity (BMI 79) who presents with acute LLQ pain associated with urination  Symptoms have also been associated with non-bloody diarrhea  She recently has been exposed to antibiotics for treatment of UTI  [facility] last EGD and colonoscopy was in August 2020 which showed sigmoid diverticulosis, benign gastric polyps  Biopsies were negative for HP, celiac and microscopic colitis  - She had a CT which showed a nonobstructing bilateral nephrolithiasis as well mild wall thickening in the proximal sigmoid colon with fat stranding, suggestive of diverticulitis vs  colitis  -Patient was on ceftriaxone/ Flagyl, now transitioned to Augmentin, can complete a total of 10-day course of antibiotics  -Currently no evidence of leukocytosis, remains afebrile  -Stool studies pending for enteric panel, c diff negative  -Patient's diet was advanced to low residue lactose restricted diet  -Recommend colonoscopy as outpatient in about 6 weeks  -Can follow-up with us in the office in about 2 weeks    Subjective:   Patient is lying in bed  Pain is significantly better  Had about 4 bowel movements yesterday  She reports she had a bowel bowel movement today and did have some mild nausea this morning and was transitioned to Augmentin        Objective:     Vitals: /71   Pulse 91   Temp 98 °F (36 7 °C) (Oral)   Resp 17   Ht 5' (1 524 m)   LMP  (LMP Unknown)   SpO2 91%   BMI 67 57 kg/m²       Physical Exam:  Gen-alert no acute distress  Abd-obese positive bowel sounds nondistended, no significant tenderness palpation, no rebound rigidity guarding       Lab, Imaging and other studies:   Recent Results (from the past 72 hour(s))   Fingerstick Glucose (POCT)    Collection Time: 02/18/23 11:13 AM   Result Value Ref Range    POC Glucose 260 (H) 65 - 140 mg/dl   Fingerstick Glucose (POCT)    Collection Time: 02/18/23  4:09 PM   Result Value Ref Range    POC Glucose 177 (H) 65 - 140 mg/dl   Fingerstick Glucose (POCT)    Collection Time: 02/18/23  9:16 PM   Result Value Ref Range    POC Glucose 194 (H) 65 - 140 mg/dl   CBC and differential    Collection Time: 02/19/23  5:17 AM   Result Value Ref Range    WBC 6 50 4 31 - 10 16 Thousand/uL    RBC 4 71 3 81 - 5 12 Million/uL    Hemoglobin 13 9 11 5 - 15 4 g/dL    Hematocrit 43 0 34 8 - 46 1 %    MCV 91 82 - 98 fL    MCH 29 5 26 8 - 34 3 pg    MCHC 32 3 31 4 - 37 4 g/dL    RDW 13 2 11 6 - 15 1 %    MPV 9 3 8 9 - 12 7 fL    Platelets 642 209 - 484 Thousands/uL    nRBC 0 /100 WBCs    Neutrophils Relative 57 43 - 75 %    Immat GRANS % 1 0 - 2 %    Lymphocytes Relative 27 14 - 44 %    Monocytes Relative 9 4 - 12 %    Eosinophils Relative 5 0 - 6 %    Basophils Relative 1 0 - 1 %    Neutrophils Absolute 3 81 1 85 - 7 62 Thousands/µL    Immature Grans Absolute 0 05 0 00 - 0 20 Thousand/uL    Lymphocytes Absolute 1 72 0 60 - 4 47 Thousands/µL    Monocytes Absolute 0 58 0 17 - 1 22 Thousand/µL    Eosinophils Absolute 0 29 0 00 - 0 61 Thousand/µL    Basophils Absolute 0 05 0 00 - 0 10 Thousands/µL   Basic metabolic panel    Collection Time: 02/19/23  5:17 AM   Result Value Ref Range    Sodium 136 135 - 147 mmol/L    Potassium 3 4 (L) 3 5 - 5 3 mmol/L    Chloride 100 96 - 108 mmol/L    CO2 29 21 - 32 mmol/L    ANION GAP 7 4 - 13 mmol/L    BUN 6 5 - 25 mg/dL    Creatinine 0 69 0 60 - 1 30 mg/dL    Glucose 210 (H) 65 - 140 mg/dL    Calcium 9 3 8 4 - 10 2 mg/dL    eGFR 102 ml/min/1 73sq m   Fingerstick Glucose (POCT)    Collection Time: 02/19/23 12:11 PM   Result Value Ref Range    POC Glucose 203 (H) 65 - 140 mg/dl   Fingerstick Glucose (POCT)    Collection Time: 02/19/23  4:25 PM   Result Value Ref Range    POC Glucose 267 (H) 65 - 140 mg/dl   Fingerstick Glucose (POCT)    Collection Time: 02/19/23  9:23 PM   Result Value Ref Range POC Glucose 269 (H) 65 - 140 mg/dl   CBC and differential    Collection Time: 02/20/23  5:25 AM   Result Value Ref Range    WBC 6 47 4 31 - 10 16 Thousand/uL    RBC 4 59 3 81 - 5 12 Million/uL    Hemoglobin 13 8 11 5 - 15 4 g/dL    Hematocrit 41 7 34 8 - 46 1 %    MCV 91 82 - 98 fL    MCH 30 1 26 8 - 34 3 pg    MCHC 33 1 31 4 - 37 4 g/dL    RDW 13 2 11 6 - 15 1 %    MPV 9 5 8 9 - 12 7 fL    Platelets 262 270 - 991 Thousands/uL    nRBC 0 /100 WBCs    Neutrophils Relative 57 43 - 75 %    Immat GRANS % 1 0 - 2 %    Lymphocytes Relative 27 14 - 44 %    Monocytes Relative 9 4 - 12 %    Eosinophils Relative 5 0 - 6 %    Basophils Relative 1 0 - 1 %    Neutrophils Absolute 3 78 1 85 - 7 62 Thousands/µL    Immature Grans Absolute 0 04 0 00 - 0 20 Thousand/uL    Lymphocytes Absolute 1 76 0 60 - 4 47 Thousands/µL    Monocytes Absolute 0 55 0 17 - 1 22 Thousand/µL    Eosinophils Absolute 0 29 0 00 - 0 61 Thousand/µL    Basophils Absolute 0 05 0 00 - 0 10 Thousands/µL   Comprehensive metabolic panel    Collection Time: 02/20/23  5:25 AM   Result Value Ref Range    Sodium 136 135 - 147 mmol/L    Potassium 3 5 3 5 - 5 3 mmol/L    Chloride 101 96 - 108 mmol/L    CO2 28 21 - 32 mmol/L    ANION GAP 7 4 - 13 mmol/L    BUN 5 5 - 25 mg/dL    Creatinine 0 65 0 60 - 1 30 mg/dL    Glucose 206 (H) 65 - 140 mg/dL    Calcium 9 1 8 4 - 10 2 mg/dL    AST 24 13 - 39 U/L    ALT 37 7 - 52 U/L    Alkaline Phosphatase 61 34 - 104 U/L    Total Protein 6 7 6 4 - 8 4 g/dL    Albumin 3 5 3 5 - 5 0 g/dL    Total Bilirubin 0 53 0 20 - 1 00 mg/dL    eGFR 104 ml/min/1 73sq m   Fingerstick Glucose (POCT)    Collection Time: 02/20/23  7:43 AM   Result Value Ref Range    POC Glucose 217 (H) 65 - 140 mg/dl   Fingerstick Glucose (POCT)    Collection Time: 02/20/23 10:49 AM   Result Value Ref Range    POC Glucose 271 (H) 65 - 140 mg/dl   Fingerstick Glucose (POCT)    Collection Time: 02/20/23  3:21 PM   Result Value Ref Range    POC Glucose 234 (H) 65 - 140 mg/dl   Fingerstick Glucose (POCT)    Collection Time: 02/20/23  8:42 PM   Result Value Ref Range    POC Glucose 193 (H) 65 - 140 mg/dl   CBC and differential    Collection Time: 02/21/23  5:50 AM   Result Value Ref Range    WBC 6 61 4 31 - 10 16 Thousand/uL    RBC 4 73 3 81 - 5 12 Million/uL    Hemoglobin 14 0 11 5 - 15 4 g/dL    Hematocrit 43 4 34 8 - 46 1 %    MCV 92 82 - 98 fL    MCH 29 6 26 8 - 34 3 pg    MCHC 32 3 31 4 - 37 4 g/dL    RDW 13 2 11 6 - 15 1 %    MPV 10 0 8 9 - 12 7 fL    Platelets 698 120 - 487 Thousands/uL    nRBC 0 /100 WBCs    Neutrophils Relative 61 43 - 75 %    Immat GRANS % 1 0 - 2 %    Lymphocytes Relative 24 14 - 44 %    Monocytes Relative 9 4 - 12 %    Eosinophils Relative 4 0 - 6 %    Basophils Relative 1 0 - 1 %    Neutrophils Absolute 4 15 1 85 - 7 62 Thousands/µL    Immature Grans Absolute 0 04 0 00 - 0 20 Thousand/uL    Lymphocytes Absolute 1 56 0 60 - 4 47 Thousands/µL    Monocytes Absolute 0 58 0 17 - 1 22 Thousand/µL    Eosinophils Absolute 0 23 0 00 - 0 61 Thousand/µL    Basophils Absolute 0 05 0 00 - 0 10 Thousands/µL   Basic metabolic panel    Collection Time: 02/21/23  5:50 AM   Result Value Ref Range    Sodium 136 135 - 147 mmol/L    Potassium 3 7 3 5 - 5 3 mmol/L    Chloride 102 96 - 108 mmol/L    CO2 25 21 - 32 mmol/L    ANION GAP 9 4 - 13 mmol/L    BUN 8 5 - 25 mg/dL    Creatinine 0 87 0 60 - 1 30 mg/dL    Glucose 272 (H) 65 - 140 mg/dL    Calcium 9 0 8 4 - 10 2 mg/dL    eGFR 78 ml/min/1 73sq m   Fingerstick Glucose (POCT)    Collection Time: 02/21/23  7:39 AM   Result Value Ref Range    POC Glucose 274 (H) 65 - 140 mg/dl

## 2023-02-22 ENCOUNTER — TRANSITIONAL CARE MANAGEMENT (OUTPATIENT)
Dept: FAMILY MEDICINE CLINIC | Facility: CLINIC | Age: 48
End: 2023-02-22

## 2023-02-22 DIAGNOSIS — Z71.89 COMPLEX CARE COORDINATION: Primary | ICD-10-CM

## 2023-02-22 NOTE — UTILIZATION REVIEW
NOTIFICATION OF ADMISSION DISCHARGE   This is a Notification of Discharge from [facility]  Please be advised that this patient has been discharge from our facility  Below you will find the admission and discharge date and time including the patient’s disposition  UTILIZATION REVIEW CONTACT:  Kusum Adame MA  Utilization   Network Utilization Review Department  Phone: [phone] x carefully listen to the prompts  All voicemails are confidential   Email: [email or web address]     ADMISSION INFORMATION  PRESENTATION DATE: 2/16/2023  2:59 PM  OBERVATION ADMISSION DATE:   INPATIENT ADMISSION DATE: 2/16/23  6:37 PM   DISCHARGE DATE: 2/21/2023  4:04 PM   DISPOSITION:Home/Self Care    IMPORTANT INFORMATION:  Send all requests for admission clinical reviews, approved or denied determinations and any other requests to dedicated fax number below belonging to the campus where the patient is receiving treatment   List of dedicated fax numbers:  [address] DENIALS (Administrative/Medical Necessity) [phone]   [address] (Maternity/NICU/Pediatrics) [phone]   [facility] [phone]   [place] [address] [phone]   [facility] [address] [phone]   [address] [facility] [phone]   [address] [place] [phone]   [facility] 119 [phone]   [facility]  [phone] 0[address] [facility] [phone]   [facility] [address] [facility] [phone]

## 2023-02-23 ENCOUNTER — PATIENT OUTREACH (OUTPATIENT)
Dept: FAMILY MEDICINE CLINIC | Facility: CLINIC | Age: 48
End: 2023-02-23

## 2023-02-23 NOTE — PROGRESS NOTES
Received referral for HRR via inbasket message  Chart reviewed  Call to patient, provided my name, number and request for return call

## 2023-02-24 NOTE — PROGRESS NOTES
Referred to [facility]  Patient will be reviewed for a care plan  Patient is referred to complex care management as a [employer] Utilizer  In basket sent to OP RN CM regarding the same

## 2023-02-27 ENCOUNTER — PATIENT OUTREACH (OUTPATIENT)
Dept: FAMILY MEDICINE CLINIC | Facility: CLINIC | Age: 48
End: 2023-02-27

## 2023-02-27 NOTE — PROGRESS NOTES
2nd outreach via telephone  Left message on machine with my name, contact number and request for return call  Unable to reach letter mailed to patients home

## 2023-02-27 NOTE — LETTER
Date: 02/27/23    Dear Anamika Monteiro,   My name is Kae Do,  I am a registered nurse care manager working with Shaji Nelson at  [facility]   I have not been able to reach you and would like to set a time that I can talk with you over the phone  My work is to help patients that have complex medical conditions get the care they need  This includes patients who may have been in the hospital or emergency room  I have enclosed information for you  Please call me with any questions you may have  I look forward to speaking with you    Sincerely,  Sharad Powers RN  [phone]  Outpatient Care Manager  Copy:  SUJATA Barker

## 2023-03-09 ENCOUNTER — APPOINTMENT (EMERGENCY)
Dept: CT IMAGING | Facility: HOSPITAL | Age: 48
End: 2023-03-09

## 2023-03-09 ENCOUNTER — APPOINTMENT (EMERGENCY)
Dept: ULTRASOUND IMAGING | Facility: HOSPITAL | Age: 48
End: 2023-03-09

## 2023-03-09 ENCOUNTER — HOSPITAL ENCOUNTER (OUTPATIENT)
Facility: HOSPITAL | Age: 48
Setting detail: OBSERVATION
Discharge: HOME | End: 2023-03-10
Attending: EMERGENCY MEDICINE | Admitting: INTERNAL MEDICINE

## 2023-03-09 DIAGNOSIS — R93.5 ABNORMAL CT OF THE ABDOMEN: ICD-10-CM

## 2023-03-09 DIAGNOSIS — K57.92 DIVERTICULITIS: Primary | ICD-10-CM

## 2023-03-09 LAB
ALBUMIN SERPL BCP-MCNC: 4.3 G/DL (ref 3.5–5)
ALP SERPL-CCNC: 71 U/L (ref 34–104)
ALT SERPL W P-5'-P-CCNC: 41 U/L (ref 7–52)
ANION GAP SERPL CALCULATED.3IONS-SCNC: 9 MMOL/L (ref 4–13)
ANION GAP SERPL CALCULATED.3IONS-SCNC: 9 MMOL/L (ref 4–13)
AST SERPL W P-5'-P-CCNC: 47 U/L (ref 13–39)
BACTERIA UR QL AUTO: ABNORMAL /HPF
BASOPHILS # BLD AUTO: 0.04 THOUSANDS/ÂΜL (ref 0–0.1)
BASOPHILS NFR BLD AUTO: 0 % (ref 0–1)
BILIRUB SERPL-MCNC: 0.73 MG/DL (ref 0.2–1)
BILIRUB UR QL STRIP: NEGATIVE
BUN SERPL-MCNC: 12 MG/DL (ref 5–25)
BUN SERPL-MCNC: 13 MG/DL (ref 5–25)
CALCIUM SERPL-MCNC: 9.4 MG/DL (ref 8.4–10.2)
CALCIUM SERPL-MCNC: 9.6 MG/DL (ref 8.4–10.2)
CHLORIDE SERPL-SCNC: 97 MMOL/L (ref 96–108)
CHLORIDE SERPL-SCNC: 98 MMOL/L (ref 96–108)
CLARITY UR: CLEAR
CO2 SERPL-SCNC: 27 MMOL/L (ref 21–32)
CO2 SERPL-SCNC: 27 MMOL/L (ref 21–32)
COLOR UR: ABNORMAL
CREAT SERPL-MCNC: 0.78 MG/DL (ref 0.6–1.3)
CREAT SERPL-MCNC: 0.92 MG/DL (ref 0.6–1.3)
EOSINOPHIL # BLD AUTO: 0.14 THOUSAND/ÂΜL (ref 0–0.61)
EOSINOPHIL NFR BLD AUTO: 1 % (ref 0–6)
ERYTHROCYTE [DISTWIDTH] IN BLOOD BY AUTOMATED COUNT: 13.2 % (ref 11.6–15.1)
GFR SERPL CREATININE-BSD FRML MDRD: 72 ML/MIN/1.73SQ M
GFR SERPL CREATININE-BSD FRML MDRD: 88 ML/MIN/1.73SQ M
GLUCOSE P FAST SERPL-MCNC: 275 MG/DL (ref 65–99)
GLUCOSE SERPL-MCNC: 274 MG/DL (ref 65–140)
GLUCOSE SERPL-MCNC: 275 MG/DL (ref 65–140)
GLUCOSE SERPL-MCNC: 292 MG/DL (ref 65–140)
GLUCOSE UR STRIP-MCNC: ABNORMAL MG/DL
HCT VFR BLD AUTO: 48.1 % (ref 34.8–46.1)
HGB BLD-MCNC: 15.4 G/DL (ref 11.5–15.4)
HGB UR QL STRIP.AUTO: NEGATIVE
IMM GRANULOCYTES # BLD AUTO: 0.05 THOUSAND/UL (ref 0–0.2)
IMM GRANULOCYTES NFR BLD AUTO: 0 % (ref 0–2)
KETONES UR STRIP-MCNC: NEGATIVE MG/DL
LEUKOCYTE ESTERASE UR QL STRIP: NEGATIVE
LYMPHOCYTES # BLD AUTO: 2.18 THOUSANDS/ÂΜL (ref 0.6–4.47)
LYMPHOCYTES NFR BLD AUTO: 19 % (ref 14–44)
MCH RBC QN AUTO: 29.4 PG (ref 26.8–34.3)
MCHC RBC AUTO-ENTMCNC: 32 G/DL (ref 31.4–37.4)
MCV RBC AUTO: 92 FL (ref 82–98)
MONOCYTES # BLD AUTO: 0.67 THOUSAND/ÂΜL (ref 0.17–1.22)
MONOCYTES NFR BLD AUTO: 6 % (ref 4–12)
MUCOUS THREADS UR QL AUTO: ABNORMAL
NEUTROPHILS # BLD AUTO: 8.44 THOUSANDS/ÂΜL (ref 1.85–7.62)
NEUTS SEG NFR BLD AUTO: 74 % (ref 43–75)
NITRITE UR QL STRIP: NEGATIVE
NON-SQ EPI CELLS URNS QL MICRO: ABNORMAL /HPF
NRBC BLD AUTO-RTO: 0 /100 WBCS
PH UR STRIP.AUTO: 6 [PH]
PLATELET # BLD AUTO: 334 THOUSANDS/UL (ref 149–390)
PMV BLD AUTO: 10.2 FL (ref 8.9–12.7)
POTASSIUM SERPL-SCNC: 4.2 MMOL/L (ref 3.5–5.3)
POTASSIUM SERPL-SCNC: 5.8 MMOL/L (ref 3.5–5.3)
PROT SERPL-MCNC: 7.7 G/DL (ref 6.4–8.4)
PROT UR STRIP-MCNC: ABNORMAL MG/DL
RBC # BLD AUTO: 5.24 MILLION/UL (ref 3.81–5.12)
RBC #/AREA URNS AUTO: ABNORMAL /HPF
SODIUM SERPL-SCNC: 133 MMOL/L (ref 135–147)
SODIUM SERPL-SCNC: 134 MMOL/L (ref 135–147)
SP GR UR STRIP.AUTO: >=1.05 (ref 1–1.03)
UROBILINOGEN UR STRIP-ACNC: <2 MG/DL
WBC # BLD AUTO: 11.52 THOUSAND/UL (ref 4.31–10.16)
WBC #/AREA URNS AUTO: ABNORMAL /HPF

## 2023-03-09 RX ORDER — ACETAMINOPHEN 325 MG/1
650 TABLET ORAL EVERY 6 HOURS PRN
Status: DISCONTINUED | OUTPATIENT
Start: 2023-03-09 | End: 2023-03-10 | Stop reason: HOSPADM

## 2023-03-09 RX ORDER — ENOXAPARIN SODIUM 100 MG/ML
40 INJECTION SUBCUTANEOUS DAILY
Status: DISCONTINUED | OUTPATIENT
Start: 2023-03-10 | End: 2023-03-09

## 2023-03-09 RX ORDER — OXYCODONE HYDROCHLORIDE 5 MG/1
5 TABLET ORAL EVERY 6 HOURS PRN
Status: DISCONTINUED | OUTPATIENT
Start: 2023-03-09 | End: 2023-03-10 | Stop reason: HOSPADM

## 2023-03-09 RX ORDER — LORAZEPAM 0.5 MG/1
0.5 TABLET ORAL EVERY 8 HOURS PRN
Status: DISCONTINUED | OUTPATIENT
Start: 2023-03-09 | End: 2023-03-10 | Stop reason: HOSPADM

## 2023-03-09 RX ORDER — HYDROMORPHONE HCL IN WATER/PF 6 MG/30 ML
0.2 PATIENT CONTROLLED ANALGESIA SYRINGE INTRAVENOUS EVERY 6 HOURS PRN
Status: DISCONTINUED | OUTPATIENT
Start: 2023-03-09 | End: 2023-03-10 | Stop reason: HOSPADM

## 2023-03-09 RX ORDER — MORPHINE SULFATE 10 MG/ML
6 INJECTION, SOLUTION INTRAMUSCULAR; INTRAVENOUS ONCE
Status: COMPLETED | OUTPATIENT
Start: 2023-03-09 | End: 2023-03-09

## 2023-03-09 RX ORDER — ENOXAPARIN SODIUM 100 MG/ML
60 INJECTION SUBCUTANEOUS EVERY 12 HOURS SCHEDULED
Status: DISCONTINUED | OUTPATIENT
Start: 2023-03-10 | End: 2023-03-10 | Stop reason: HOSPADM

## 2023-03-09 RX ORDER — DICYCLOMINE HYDROCHLORIDE 10 MG/1
20 CAPSULE ORAL EVERY 6 HOURS PRN
Status: DISCONTINUED | OUTPATIENT
Start: 2023-03-09 | End: 2023-03-10 | Stop reason: HOSPADM

## 2023-03-09 RX ORDER — PANTOPRAZOLE SODIUM 40 MG/1
40 TABLET, DELAYED RELEASE ORAL
Status: DISCONTINUED | OUTPATIENT
Start: 2023-03-10 | End: 2023-03-10 | Stop reason: HOSPADM

## 2023-03-09 RX ORDER — LAMOTRIGINE 100 MG/1
100 TABLET ORAL 2 TIMES DAILY
Status: DISCONTINUED | OUTPATIENT
Start: 2023-03-09 | End: 2023-03-10 | Stop reason: HOSPADM

## 2023-03-09 RX ORDER — MORPHINE SULFATE 4 MG/ML
4 INJECTION, SOLUTION INTRAMUSCULAR; INTRAVENOUS ONCE
Status: COMPLETED | OUTPATIENT
Start: 2023-03-09 | End: 2023-03-09

## 2023-03-09 RX ORDER — DESVENLAFAXINE SUCCINATE 50 MG/1
50 TABLET, EXTENDED RELEASE ORAL DAILY
Status: DISCONTINUED | OUTPATIENT
Start: 2023-03-10 | End: 2023-03-10 | Stop reason: HOSPADM

## 2023-03-09 RX ORDER — INSULIN LISPRO 100 [IU]/ML
16 INJECTION, SOLUTION INTRAVENOUS; SUBCUTANEOUS
Status: DISCONTINUED | OUTPATIENT
Start: 2023-03-10 | End: 2023-03-10 | Stop reason: HOSPADM

## 2023-03-09 RX ORDER — LISINOPRIL 20 MG/1
20 TABLET ORAL DAILY
Status: DISCONTINUED | OUTPATIENT
Start: 2023-03-10 | End: 2023-03-10 | Stop reason: HOSPADM

## 2023-03-09 RX ORDER — INSULIN LISPRO 100 [IU]/ML
4-20 INJECTION, SOLUTION INTRAVENOUS; SUBCUTANEOUS
Status: DISCONTINUED | OUTPATIENT
Start: 2023-03-10 | End: 2023-03-10 | Stop reason: HOSPADM

## 2023-03-09 RX ORDER — INSULIN GLARGINE 100 [IU]/ML
64 INJECTION, SOLUTION SUBCUTANEOUS
Status: DISCONTINUED | OUTPATIENT
Start: 2023-03-09 | End: 2023-03-10 | Stop reason: HOSPADM

## 2023-03-09 RX ADMIN — LAMOTRIGINE 100 MG: 100 TABLET ORAL at 22:14

## 2023-03-09 RX ADMIN — PIPERACILLIN AND TAZOBACTAM 3.38 G: 36; 4.5 INJECTION, POWDER, FOR SOLUTION INTRAVENOUS at 17:13

## 2023-03-09 RX ADMIN — MORPHINE SULFATE 6 MG: 10 INJECTION INTRAVENOUS at 16:09

## 2023-03-09 RX ADMIN — IOHEXOL 100 ML: 350 INJECTION, SOLUTION INTRAVENOUS at 14:39

## 2023-03-09 RX ADMIN — MORPHINE SULFATE 4 MG: 4 INJECTION INTRAVENOUS at 18:11

## 2023-03-09 RX ADMIN — SODIUM CHLORIDE 1000 ML: 0.9 INJECTION, SOLUTION INTRAVENOUS at 18:09

## 2023-03-09 RX ADMIN — INSULIN GLARGINE 64 UNITS: 100 INJECTION, SOLUTION SUBCUTANEOUS at 22:14

## 2023-03-09 RX ADMIN — MORPHINE SULFATE 4 MG: 4 INJECTION INTRAVENOUS at 12:49

## 2023-03-09 NOTE — ED PROVIDER NOTES
History  Chief Complaint   Patient presents with   • Abdominal Pain     Pt reports lower left abd pain onset today, hx diverticulitis and feels the same, denies n/v/d     78-year-old female with history of diverticulitis presents with left lower quadrant marychuy pain for 3 days  No nausea vomiting  No fevers or chills  No dysuria hematuria  History provided by:  Patient  Abdominal Pain  Associated symptoms: no anorexia, no chest pain, no chills, no cough, no diarrhea, no dysuria, no fatigue, no fever, no hematemesis, no hematuria, no melena, no shortness of breath, no sore throat and no vomiting        Prior to Admission Medications   Prescriptions Last Dose Informant Patient Reported? Taking? HumaLOG KwikPen 100 units/mL injection pen   Yes No   Sig: INJECT 16 UNITS 3 TIMES A DAY BEFORE MEALS   LORazepam (ATIVAN) 0 5 mg tablet   Yes No   Si 5 mg every 8 (eight) hours as needed for anxiety   acetaminophen (TYLENOL) 325 mg tablet   No No   Sig: Take 2 tablets (650 mg total) by mouth every 6 (six) hours as needed for mild pain   cariprazine (VRAYLAR) 3 MG capsule   No No   Sig: Take 1 capsule (3 mg total) by mouth daily Do not start before 2023  desvenlafaxine succinate (PRISTIQ) 50 mg 24 hr tablet   No No   Sig: Take 1 tablet (50 mg total) by mouth daily Do not start before 2023  dicyclomine (BENTYL) 10 mg capsule   No No   Sig: Take 2 capsules (20 mg total) by mouth every 6 (six) hours as needed (crampy diarrhea)   escitalopram (LEXAPRO) 20 mg tablet   No No   Sig: Take 1 tablet (20 mg total) by mouth daily   insulin glargine (LANTUS) 100 units/mL subcutaneous injection   No No   Sig: Inject 64 Units under the skin daily at bedtime   insulin glargine (LANTUS) 100 units/mL subcutaneous injection   No No   Sig: Inject 10 Units under the skin every morning Do not start before 2023     lamoTRIgine (LaMICtal) 100 mg tablet   No No   Sig: Take 1 tablet (100 mg total) by mouth 2 (two) times a day   lisinopril (ZESTRIL) 10 mg tablet   No No   Sig: Take 2 tablets (20 mg total) by mouth daily   metFORMIN (GLUCOPHAGE) 500 mg tablet   No No   Sig: Take 1 tablet (500 mg total) by mouth 2 (two) times a day with meals   Patient taking differently: Take 500 mg by mouth 2 (two) times a day with meals   pantoprazole (PROTONIX) 40 mg tablet   No No   Sig: Take 1 tablet (40 mg total) by mouth daily      Facility-Administered Medications: None       Past Medical History:   Diagnosis Date   • Anemia    • Anxiety    • Candidal intertrigo    • Depression    • Diabetes mellitus ([facility])    • GERD (gastroesophageal reflux disease)    • Hyperlipidemia    • Hypertension    • Irritable bowel syndrome    • Morbid obesity with BMI of 60 0-69 9, adult ([facility])    • Osteoarthritis    • Seasonal allergies    • Sleep difficulties    • Suicide attempt [facility])        Past Surgical History:   Procedure Laterality Date   •  SECTION     • CHOLECYSTECTOMY     • WISDOM TOOTH EXTRACTION         Family History   Problem Relation Age of Onset   • Diabetes Mother    • Hypertension Father      I have reviewed and agree with the history as documented  E-Cigarette/Vaping   • E-Cigarette Use Never User      E-Cigarette/Vaping Substances   • Nicotine No    • THC No    • CBD No    • Flavoring No    • Other No    • Unknown No      Social History     Tobacco Use   • Smoking status: Never   • Smokeless tobacco: Never   Vaping Use   • Vaping Use: Never used   Substance Use Topics   • Alcohol use: Not Currently     Comment: occassionaly    • Drug use: No       Review of Systems   Constitutional: Negative for chills, fatigue and fever  HENT: Negative for ear pain and sore throat  Eyes: Negative for pain, discharge and visual disturbance  Respiratory: Negative for cough and shortness of breath  Cardiovascular: Negative for chest pain and palpitations  Gastrointestinal: Positive for abdominal pain  Negative for abdominal distention, anorexia, diarrhea, hematemesis, melena and vomiting  Endocrine: Negative for cold intolerance, polydipsia and polyphagia  Genitourinary: Negative for dysuria and hematuria  Musculoskeletal: Negative for arthralgias and back pain  Skin: Negative for color change and rash  Allergic/Immunologic: Negative for environmental allergies  Neurological: Negative for seizures and syncope  Hematological: Negative for adenopathy  Psychiatric/Behavioral: Negative for agitation  All other systems reviewed and are negative  Physical Exam  Physical Exam  Vitals and nursing note reviewed  Constitutional:       General: She is not in acute distress  Appearance: She is well-developed  HENT:      Head: Normocephalic and atraumatic  Eyes:      Conjunctiva/sclera: Conjunctivae normal    Cardiovascular:      Rate and Rhythm: Normal rate and regular rhythm  Heart sounds: No murmur heard  Pulmonary:      Effort: Pulmonary effort is normal  No respiratory distress  Breath sounds: Normal breath sounds  Abdominal:      Palpations: Abdomen is soft  Tenderness: There is abdominal tenderness in the left lower quadrant  Negative signs include Willis's sign and Rovsing's sign  Hernia: No hernia is present  Musculoskeletal:         General: No swelling  Cervical back: Neck supple  Skin:     General: Skin is warm and dry  Capillary Refill: Capillary refill takes less than 2 seconds  Neurological:      Mental Status: She is alert     Psychiatric:         Mood and Affect: Mood normal          Vital Signs  ED Triage Vitals   Temperature Pulse Respirations Blood Pressure SpO2   03/09/23 1203 03/09/23 1203 03/09/23 1203 03/09/23 1203 03/09/23 1203   98 5 °F (36 9 °C) (!) 117 20 (!) 175/101 97 %      Temp Source Heart Rate Source Patient Position - Orthostatic VS BP Location FiO2 (%)   03/09/23 1203 03/09/23 1203 03/09/23 1203 03/09/23 1609 --   Oral Monitor Lying Left arm       Pain Score       03/09/23 1203       10 - Worst Possible Pain           Vitals:    03/09/23 1203 03/09/23 1609   BP: (!) 175/101 136/60   Pulse: (!) 117 (!) 120   Patient Position - Orthostatic VS: Lying Lying         Visual Acuity      ED Medications  Medications   sodium chloride 0 9 % bolus 1,000 mL (1,000 mL Intravenous New Bag 3/9/23 1809)   morphine injection 4 mg (4 mg Intravenous Given 3/9/23 1249)   iohexol (OMNIPAQUE) 350 MG/ML injection (SINGLE-DOSE) 100 mL (100 mL Intravenous Given 3/9/23 1439)   morphine injection 6 mg (6 mg Intravenous Given 3/9/23 1609)   piperacillin-tazobactam (ZOSYN) 3 375 g in sodium chloride 0 9 % 100 mL IVPB (3 375 g Intravenous New Bag 3/9/23 1713)   morphine injection 4 mg (4 mg Intravenous Given 3/9/23 1811)       Diagnostic Studies  Results Reviewed     Procedure Component Value Units Date/Time    Urinalysis with microscopic [[id number]]  (Abnormal) Collected: 03/09/23 1713    Lab Status: Final result Specimen: Urine, Clean Catch Updated: 03/09/23 1721     Color, UA Light Yellow     Clarity, UA Clear     Specific Gravity, UA >=1 050     pH, UA 6 0     Leukocytes, UA Negative     Nitrite, UA Negative     Protein, UA Trace mg/dl      Glucose, UA Trace mg/dl      Ketones, UA Negative mg/dl      Urobilinogen, UA <2 0 mg/dl      Bilirubin, UA Negative     Occult Blood, UA Negative     RBC, UA None Seen /hpf      WBC, UA 4-10 /hpf      Epithelial Cells Occasional /hpf      Bacteria, UA Occasional /hpf      MUCUS THREADS Occasional    Comprehensive metabolic panel [[id number]]  (Abnormal) Collected: 03/09/23 1314    Lab Status: Final result Specimen: Blood from Arm, Right Updated: 03/09/23 1347     Sodium 133 mmol/L      Potassium 5 8 mmol/L      Chloride 97 mmol/L      CO2 27 mmol/L      ANION GAP 9 mmol/L      BUN 12 mg/dL      Creatinine 0 78 mg/dL      Glucose 292 mg/dL      Calcium 9 6 mg/dL      AST 47 U/L      ALT 41 U/L      Alkaline Phosphatase 71 U/L      Total Protein 7 7 g/dL      Albumin 4 3 g/dL      Total Bilirubin 0 73 mg/dL      eGFR 88 ml/min/1 73sq m     Narrative:      National Kidney Disease Foundation guidelines for Chronic Kidney Disease (CKD):   •  Stage 1 with normal or high GFR (GFR > 90 mL/min/1 73 square meters)  •  Stage 2 Mild CKD (GFR = 60-89 mL/min/1 73 square meters)  •  Stage 3A Moderate CKD (GFR = 45-59 mL/min/1 73 square meters)  •  Stage 3B Moderate CKD (GFR = 30-44 mL/min/1 73 square meters)  •  Stage 4 Severe CKD (GFR = 15-29 mL/min/1 73 square meters)  •  Stage 5 End Stage CKD (GFR <15 mL/min/1 73 square meters)  Note: GFR calculation is accurate only with a steady state creatinine    CBC and differential [[id number]]  (Abnormal) Collected: 03/09/23 1248    Lab Status: Final result Specimen: Blood from Arm, Right Updated: 03/09/23 1315     WBC 11 52 Thousand/uL      RBC 5 24 Million/uL      Hemoglobin 15 4 g/dL      Hematocrit 48 1 %      MCV 92 fL      MCH 29 4 pg      MCHC 32 0 g/dL      RDW 13 2 %      MPV 10 2 fL      Platelets 616 Thousands/uL      nRBC 0 /100 WBCs      Neutrophils Relative 74 %      Immat GRANS % 0 %      Lymphocytes Relative 19 %      Monocytes Relative 6 %      Eosinophils Relative 1 %      Basophils Relative 0 %      Neutrophils Absolute 8 44 Thousands/µL      Immature Grans Absolute 0 05 Thousand/uL      Lymphocytes Absolute 2 18 Thousands/µL      Monocytes Absolute 0 67 Thousand/µL      Eosinophils Absolute 0 14 Thousand/µL      Basophils Absolute 0 04 Thousands/µL                  US pelvis complete w transvaginal   Final Result by Cornelio Galloway MD (03/09 1729)       Technically limited study  Patient unable to completely empty urinary bladder, which contains debris  Recommend correlation with urinalysis  Right ovary could not be visualized either via transabdominal or transvaginal technique        Loops of bowel inseparable from left adnexa, however without a discrete fluid collection to suggest a tubo-ovarian abscess  Left ovary itself appears within normal limits  Endometrium measuring 6 mm in thickness, mildly thickened for postmenopausal state  Fluid seen in the endocervical canal       The study was marked in EPIC for immediate notification  Workstation performed: [id number]         CT abdomen pelvis with contrast   Final Result by Sharon Luong MD (03/09 1557)      *  Again seen is left adnexa in close proximity of proximal left sigmoid colon with persistent adjacent fat stranding  Differential diagnoses include acute uncomplicated sigmoid diverticulitis with secondary involvement of the left adnexa or left-sided    tubo-ovarian abscess with reactive changes to the left sigmoid colon  Pelvic ultrasound is recommended for better evaluation of the left adnexa  *  Nonobstructing bilateral multiple renal calculi, measuring up to 0 5 cm, greater on the left  *  Focal dependent groundglass opacity in the right lower lobe concerning for acute pneumonitis, including atypical viral pneumonitis  Clinical correlation is recommended  *  A 3 0 cm intermediate attenuation masslike density in the left lateral anterior abdominal wall, likely representing evolving hematoma status post subcutaneous injection administration or recent trauma  Clinical correlation is recommended  Follow-up    CT pelvis in 2 months can be obtained as clinically warranted to assess for resolution to exclude underlying hemorrhagic neoplasm  *  Additional findings as above  The study was marked in [facility] for immediate notification  Workstation performed: [id number]                    Procedures  Procedures         ED Course        Patient presented with left lower quadrant nominal pain  CT shows diverticulitis however radiologist recommended getting ultrasound to rule out tubo-ovarian abscess  Ultrasound does not show tubo-ovarian abscess  To biotics administered    Patient admitted to medicine service for acute diverticulitis  Medical Decision Making  Patient presented with left lower quadrant nominal pain  CT shows diverticulitis however radiologist recommended getting ultrasound to rule out tubo-ovarian abscess  Ultrasound does not show tubo-ovarian abscess  To biotics administered  Patient admitted to medicine service for acute diverticulitis  Amount and/or Complexity of Data Reviewed  External Data Reviewed: labs and radiology  Details: Diverticulitis  Labs: ordered  Radiology: ordered  Risk  Prescription drug management  Decision regarding hospitalization  Disposition  Final diagnoses:   Diverticulitis     Time reflects when diagnosis was documented in both MDM as applicable and the Disposition within this note     Time User Action Codes Description Comment    3/9/2023  6:37 PM Sneha Kong Add [K57 92] Diverticulitis       ED Disposition     ED Disposition   Admit    Condition   Stable    Date/Time   Thu Mar 9, 2023  6:36 PM    Comment   Case was discussed with Dr Michael Lugo and the patient's admission status was agreed to be Admission Status: inpatient status to the service of Dr Michael Lugo   Follow-up Information    None         Patient's Medications   Discharge Prescriptions    No medications on file       No discharge procedures on file      PDMP Review       Value Time User    PDMP Reviewed  Yes 1/29/2023  4:14 AM Sharan Cook MD          ED Provider  Electronically Signed by           Mouna Dodd DO  03/09/23 3669

## 2023-03-10 VITALS
SYSTOLIC BLOOD PRESSURE: 106 MMHG | OXYGEN SATURATION: 93 % | HEART RATE: 113 BPM | WEIGHT: 293 LBS | TEMPERATURE: 99 F | HEIGHT: 60 IN | DIASTOLIC BLOOD PRESSURE: 63 MMHG | BODY MASS INDEX: 57.52 KG/M2 | RESPIRATION RATE: 18 BRPM

## 2023-03-10 LAB
ANION GAP SERPL CALCULATED.3IONS-SCNC: 8 MMOL/L (ref 4–13)
ATRIAL RATE: 116 BPM
ATRIAL RATE: 127 BPM
BASOPHILS # BLD AUTO: 0.05 THOUSANDS/ÂΜL (ref 0–0.1)
BASOPHILS NFR BLD AUTO: 1 % (ref 0–1)
BUN SERPL-MCNC: 11 MG/DL (ref 5–25)
CALCIUM SERPL-MCNC: 8.8 MG/DL (ref 8.4–10.2)
CHLORIDE SERPL-SCNC: 99 MMOL/L (ref 96–108)
CO2 SERPL-SCNC: 26 MMOL/L (ref 21–32)
CREAT SERPL-MCNC: 0.74 MG/DL (ref 0.6–1.3)
EOSINOPHIL # BLD AUTO: 0.06 THOUSAND/ÂΜL (ref 0–0.61)
EOSINOPHIL NFR BLD AUTO: 1 % (ref 0–6)
ERYTHROCYTE [DISTWIDTH] IN BLOOD BY AUTOMATED COUNT: 13.3 % (ref 11.6–15.1)
GFR SERPL CREATININE-BSD FRML MDRD: 94 ML/MIN/1.73SQ M
GLUCOSE P FAST SERPL-MCNC: 278 MG/DL (ref 65–99)
GLUCOSE SERPL-MCNC: 198 MG/DL (ref 65–140)
GLUCOSE SERPL-MCNC: 213 MG/DL (ref 65–140)
GLUCOSE SERPL-MCNC: 228 MG/DL (ref 65–140)
GLUCOSE SERPL-MCNC: 278 MG/DL (ref 65–140)
HCT VFR BLD AUTO: 41.9 % (ref 34.8–46.1)
HGB BLD-MCNC: 13.6 G/DL (ref 11.5–15.4)
IMM GRANULOCYTES # BLD AUTO: 0.04 THOUSAND/UL (ref 0–0.2)
IMM GRANULOCYTES NFR BLD AUTO: 1 % (ref 0–2)
LYMPHOCYTES # BLD AUTO: 1.68 THOUSANDS/ÂΜL (ref 0.6–4.47)
LYMPHOCYTES NFR BLD AUTO: 19 % (ref 14–44)
MCH RBC QN AUTO: 29.6 PG (ref 26.8–34.3)
MCHC RBC AUTO-ENTMCNC: 32.5 G/DL (ref 31.4–37.4)
MCV RBC AUTO: 91 FL (ref 82–98)
MONOCYTES # BLD AUTO: 0.9 THOUSAND/ÂΜL (ref 0.17–1.22)
MONOCYTES NFR BLD AUTO: 10 % (ref 4–12)
NEUTROPHILS # BLD AUTO: 6.08 THOUSANDS/ÂΜL (ref 1.85–7.62)
NEUTS SEG NFR BLD AUTO: 68 % (ref 43–75)
NRBC BLD AUTO-RTO: 0 /100 WBCS
P AXIS: 49 DEGREES
P AXIS: 60 DEGREES
PLATELET # BLD AUTO: 296 THOUSANDS/UL (ref 149–390)
PMV BLD AUTO: 9.8 FL (ref 8.9–12.7)
POTASSIUM SERPL-SCNC: 4.1 MMOL/L (ref 3.5–5.3)
PR INTERVAL: 144 MS
PR INTERVAL: 152 MS
QRS AXIS: 22 DEGREES
QRS AXIS: 38 DEGREES
QRSD INTERVAL: 76 MS
QRSD INTERVAL: 78 MS
QT INTERVAL: 308 MS
QT INTERVAL: 320 MS
QTC INTERVAL: 428 MS
QTC INTERVAL: 465 MS
RBC # BLD AUTO: 4.59 MILLION/UL (ref 3.81–5.12)
SODIUM SERPL-SCNC: 133 MMOL/L (ref 135–147)
T WAVE AXIS: 37 DEGREES
T WAVE AXIS: 38 DEGREES
VENTRICULAR RATE: 116 BPM
VENTRICULAR RATE: 127 BPM
WBC # BLD AUTO: 8.81 THOUSAND/UL (ref 4.31–10.16)

## 2023-03-10 RX ORDER — AMOXICILLIN AND CLAVULANATE POTASSIUM 875; 125 MG/1; MG/1
1 TABLET, FILM COATED ORAL EVERY 12 HOURS SCHEDULED
Qty: 26 TABLET | Refills: 0 | Status: SHIPPED | OUTPATIENT
Start: 2023-03-10 | End: 2023-03-23

## 2023-03-10 RX ORDER — INSULIN GLARGINE 100 [IU]/ML
10 INJECTION, SOLUTION SUBCUTANEOUS EVERY MORNING
Status: DISCONTINUED | OUTPATIENT
Start: 2023-03-10 | End: 2023-03-10 | Stop reason: HOSPADM

## 2023-03-10 RX ADMIN — ENOXAPARIN SODIUM 60 MG: 60 INJECTION SUBCUTANEOUS at 08:46

## 2023-03-10 RX ADMIN — HYDROMORPHONE HYDROCHLORIDE 0.2 MG: 0.2 INJECTION, SOLUTION INTRAMUSCULAR; INTRAVENOUS; SUBCUTANEOUS at 12:21

## 2023-03-10 RX ADMIN — INSULIN LISPRO 4 UNITS: 100 INJECTION, SOLUTION INTRAVENOUS; SUBCUTANEOUS at 12:25

## 2023-03-10 RX ADMIN — OXYCODONE HYDROCHLORIDE 5 MG: 5 TABLET ORAL at 14:35

## 2023-03-10 RX ADMIN — PANTOPRAZOLE SODIUM 40 MG: 40 TABLET, DELAYED RELEASE ORAL at 06:14

## 2023-03-10 RX ADMIN — INSULIN LISPRO 16 UNITS: 100 INJECTION, SOLUTION INTRAVENOUS; SUBCUTANEOUS at 08:34

## 2023-03-10 RX ADMIN — INSULIN LISPRO 8 UNITS: 100 INJECTION, SOLUTION INTRAVENOUS; SUBCUTANEOUS at 08:32

## 2023-03-10 RX ADMIN — ENOXAPARIN SODIUM 60 MG: 60 INJECTION SUBCUTANEOUS at 00:59

## 2023-03-10 RX ADMIN — LAMOTRIGINE 100 MG: 100 TABLET ORAL at 08:35

## 2023-03-10 RX ADMIN — INSULIN GLARGINE 10 UNITS: 100 INJECTION, SOLUTION SUBCUTANEOUS at 08:31

## 2023-03-10 RX ADMIN — INSULIN LISPRO 4 UNITS: 100 INJECTION, SOLUTION INTRAVENOUS; SUBCUTANEOUS at 17:04

## 2023-03-10 RX ADMIN — OXYCODONE HYDROCHLORIDE 5 MG: 5 TABLET ORAL at 08:36

## 2023-03-10 RX ADMIN — SODIUM CHLORIDE 500 ML: 0.9 INJECTION, SOLUTION INTRAVENOUS at 00:59

## 2023-03-10 RX ADMIN — DICYCLOMINE HYDROCHLORIDE 20 MG: 10 CAPSULE ORAL at 12:27

## 2023-03-10 RX ADMIN — INSULIN LISPRO 16 UNITS: 100 INJECTION, SOLUTION INTRAVENOUS; SUBCUTANEOUS at 12:26

## 2023-03-10 RX ADMIN — LAMOTRIGINE 100 MG: 100 TABLET ORAL at 17:07

## 2023-03-10 RX ADMIN — LISINOPRIL 20 MG: 20 TABLET ORAL at 08:35

## 2023-03-10 RX ADMIN — DESVENLAFAXINE 50 MG: 50 TABLET, FILM COATED, EXTENDED RELEASE ORAL at 08:35

## 2023-03-10 RX ADMIN — INSULIN LISPRO 16 UNITS: 100 INJECTION, SOLUTION INTRAVENOUS; SUBCUTANEOUS at 17:05

## 2023-03-10 NOTE — ASSESSMENT & PLAN NOTE
Presented with left lower quadrant pain for one day  Very similar to previous presentation  She denies n/v/d  Most recent bowel movement yesterday  Endorses constipation this week  Normally every 24 hours  CT scan significant for fat stranding and evidence of sigmoid diverticulitis, concerning for involvement of left adnexa    Transvaginal ultrasound found no evidence of tubo-ovarian abscess   -Sigmoid/adnexal fat stranding similar to first documented 2/16, new from 1/29  ED: WBC 11 52     • Clear liquid diet  • Begin ceftriaxone and metronidazole  • Chart review concerning for possible nausea response to Flagyl  • Bentyl prn abdominal cramping  • Given recent similar presentation, patient should follow-up outpatient with gastroenterology for further work-up for causes of repeat diverticulitis

## 2023-03-10 NOTE — UTILIZATION REVIEW
Initial Clinical Review    Admission: Date/Time/Statement:   Admission Orders (From admission, onward)     Ordered        03/09/23 1840  Place in Observation  Once                      Orders Placed This Encounter   Procedures   • Place in Observation     Standing Status:   Standing     Number of Occurrences:   1     Order Specific Question:   Level of Care     Answer:   Med Surg [16]     ED Arrival Information     Expected   -    Arrival   3/9/2023 11:45    Acuity   Urgent            Means of arrival   Walk-In    Escorted by   Self    Service   Hospitalist    Admission type   Emergency            Arrival complaint   Extreme abd pain              Chief Complaint   Patient presents with   • Abdominal Pain     Pt reports lower left abd pain onset today, hx diverticulitis and feels the same, denies n/v/d       Initial Presentation: 46 y o  female  With hx DM2, MO who presents to Ed from home with LLQ abdominal pain  X 1 day, pain 10/10, pt reports this feels similar to previous presentations for which she was treated for diverticulitis  Has not eaten since yesterday  No bowel movement since yesterday  On exam, pt tachycardic, BP elevated   Has abdominal tenderness, abdomen soft  Labs - WBC 11 52, sodium 133   CT A/P concerning for multiple nonobstructing renal calculi as well as fat stranding in the area of the sigmoid and left adnexa  Initially concerning for possible abscess left left adnexa, followed up with transvaginal ultrasound which not demonstrate any abscess nor fluid levels  Ultrasound did determine bladder had debris and that endometrium was thicker than typical for menopausal woman  Pt given Iv analgesic, IVf, IV abx in ED  PT admitted as OBS with sigmoid diverticulitis  Plan -  CL diet, IV abx0 ceftriaxone and Flagyl, prn bentyl    Labs in am   Outpt GI f/u     Date:3/10   Pt tachycardic overnight into 130's with BP in 07'G systolic     ECG done showing ST , PT given IVF bolus with BP up 066 systolic ,   Continue to monitor   ED Triage Vitals   Temperature Pulse Respirations Blood Pressure SpO2   03/09/23 1203 03/09/23 1203 03/09/23 1203 03/09/23 1203 03/09/23 1203   98 5 °F (36 9 °C) (!) 117 20 (!) 175/101 97 %      Temp Source Heart Rate Source Patient Position - Orthostatic VS BP Location FiO2 (%)   03/09/23 1203 03/09/23 1203 03/09/23 1203 03/09/23 1609 --   Oral Monitor Lying Left arm       Pain Score       03/09/23 1203       10 - Worst Possible Pain          Wt Readings from Last 1 Encounters:   03/09/23 (!) 154 kg (339 lb 1 1 oz)     Additional Vital Signs:   Date/Time Temp Pulse Resp BP MAP (mmHg) SpO2   03/10/23 07:39:10 98 7 °F (37 1 °C) 111 Abnormal  20 112/75 87 95 %   03/10/23 00:02:59 98 1 °F (36 7 °C) 118 Abnormal  18 112/72 85 95 %   03/09/23 21:27:27 98 1 °F (36 7 °C) 123 Abnormal  16 99/59 72 93 %   03/09/23 1924 -- 131 Abnormal  20 137/63 91 92 %   03/09/23 1609 -- 120 Abnormal  20 136/60 -- 96 %   03/09/23 1226 -- -- -- -- -- --     Pertinent Labs/Diagnostic Test Results:   3/9 ECG- Sinus Tachycardia    US pelvis complete w transvaginal   Final Result by Adam Jackson MD (03/09 8719)       Technically limited study  Patient unable to completely empty urinary bladder, which contains debris  Recommend correlation with urinalysis  Right ovary could not be visualized either via transabdominal or transvaginal technique  Loops of bowel inseparable from left adnexa, however without a discrete fluid collection to suggest a tubo-ovarian abscess  Left ovary itself appears within normal limits  Endometrium measuring 6 mm in thickness, mildly thickened for postmenopausal state  Fluid seen in the endocervical canal       The study was marked in EPIC for immediate notification           Workstation performed: [id number]         CT abdomen pelvis with contrast   Final Result by Marjorie Vera MD (03/09 3067)      *  Again seen is left adnexa in close proximity of proximal left sigmoid colon with persistent adjacent fat stranding  Differential diagnoses include acute uncomplicated sigmoid diverticulitis with secondary involvement of the left adnexa or left-sided    tubo-ovarian abscess with reactive changes to the left sigmoid colon  Pelvic ultrasound is recommended for better evaluation of the left adnexa  *  Nonobstructing bilateral multiple renal calculi, measuring up to 0 5 cm, greater on the left  *  Focal dependent groundglass opacity in the right lower lobe concerning for acute pneumonitis, including atypical viral pneumonitis  Clinical correlation is recommended  *  A 3 0 cm intermediate attenuation masslike density in the left lateral anterior abdominal wall, likely representing evolving hematoma status post subcutaneous injection administration or recent trauma  Clinical correlation is recommended  Follow-up    CT pelvis in 2 months can be obtained as clinically warranted to assess for resolution to exclude underlying hemorrhagic neoplasm  *  Additional findings as above  The study was marked in [facility] for immediate notification        Workstation performed: [id number]               Results from last 7 days   Lab Units 03/10/23  0430 03/09/23  1248   WBC Thousand/uL 8 81 11 52*   HEMOGLOBIN g/dL 13 6 15 4   HEMATOCRIT % 41 9 48 1*   PLATELETS Thousands/uL 296 334   NEUTROS ABS Thousands/µL 6 08 8 44*         Results from last 7 days   Lab Units 03/10/23  0430 03/09/23  1926 03/09/23  1314   SODIUM mmol/L 133* 134* 133*   POTASSIUM mmol/L 4 1 4 2 5 8*   CHLORIDE mmol/L 99 98 97   CO2 mmol/L 26 27 27   ANION GAP mmol/L 8 9 9   BUN mg/dL 11 13 12   CREATININE mg/dL 0 74 0 92 0 78   EGFR ml/min/1 73sq m 94 72 88   CALCIUM mg/dL 8 8 9 4 9 6     Results from last 7 days   Lab Units 03/09/23  1314   AST U/L 47*   ALT U/L 41   ALK PHOS U/L 71   TOTAL PROTEIN g/dL 7 7   ALBUMIN g/dL 4 3   TOTAL BILIRUBIN mg/dL 0 73     Results from last 7 days Lab Units 03/10/23  0739 03/09/23  2202   POC GLUCOSE mg/dl 228* 274*     Results from last 7 days   Lab Units 03/10/23  0430 03/09/23  1926 03/09/23  1314   GLUCOSE RANDOM mg/dL 278* 275* 292*             BETA-HYDROXYBUTYRATE   Date Value Ref Range Status   04/17/2021 0 1 <0 6 mmol/L Final                                                                                  Results from last 7 days   Lab Units 03/09/23  1713   CLARITY UA  Clear   COLOR UA  Light Yellow   SPEC GRAV UA  >=1 050*   PH UA  6 0   GLUCOSE UA mg/dl Trace*   KETONES UA mg/dl Negative   BLOOD UA  Negative   PROTEIN UA mg/dl Trace*   NITRITE UA  Negative   BILIRUBIN UA  Negative   UROBILINOGEN UA (BE) mg/dl <2 0   LEUKOCYTES UA  Negative   WBC UA /hpf 4-10*   RBC UA /hpf None Seen   BACTERIA UA /hpf Occasional   EPITHELIAL CELLS WET PREP /hpf Occasional   MUCUS THREADS  Occasional*                                               ED Treatment:   Medication Administration from 03/09/2023 1145 to 03/09/2023 2117       Date/Time Order Dose Route Action     03/09/2023 1249 EST morphine injection 4 mg 4 mg Intravenous Given     03/09/2023 1439 EST iohexol (OMNIPAQUE) 350 MG/ML injection (SINGLE-DOSE) 100 mL 100 mL Intravenous Given     03/09/2023 1609 EST morphine injection 6 mg 6 mg Intravenous Given     03/09/2023 1713 EST piperacillin-tazobactam (ZOSYN) 3 375 g in sodium chloride 0 9 % 100 mL IVPB 3 375 g Intravenous New Bag     03/09/2023 1809 EST sodium chloride 0 9 % bolus 1,000 mL 1,000 mL Intravenous New Bag     03/09/2023 1811 EST morphine injection 4 mg 4 mg Intravenous Given        Past Medical History:   Diagnosis Date   • Anemia    • Anxiety    • Candidal intertrigo    • Depression    • Diabetes mellitus (HCC)    • GERD (gastroesophageal reflux disease)    • Hyperlipidemia    • Hypertension    • Irritable bowel syndrome    • Morbid obesity with BMI of 60 0-69 9, adult (HCC)    • Osteoarthritis    • Seasonal allergies    • Sleep difficulties    • Suicide attempt [facility])      Present on Admission:  • Sigmoid diverticulitis      Admitting Diagnosis: Diverticulitis [K57 92]  Abdominal pain [R10 9]  Age/Sex: 46 y o  female  Admission Orders:  Scheduled Medications:  cariprazine, 3 mg, Oral, Daily  desvenlafaxine succinate, 50 mg, Oral, Daily  enoxaparin, 60 mg, Subcutaneous, Q12H GERI  insulin glargine, 10 Units, Subcutaneous, QAM  insulin glargine, 64 Units, Subcutaneous, HS  insulin lispro, 16 Units, Subcutaneous, TID With Meals  insulin lispro, 4-20 Units, Subcutaneous, TID AC  lamoTRIgine, 100 mg, Oral, BID  lisinopril, 20 mg, Oral, Daily  pantoprazole, 40 mg, Oral, Early Morning    sodium chloride 0 9 % bolus 500 mL  Dose: 500 mL  Freq: Once Route: IV  Last Dose: 500 mL (03/10/23 0059)  Start: 03/10/23 0015 End: 03/10/23 0259  Continuous IV Infusions:     PRN Meds:  acetaminophen, 650 mg, Oral, Q6H PRN  dicyclomine, 20 mg, Oral, Q6H PRN  HYDROmorphone, 0 2 mg, Intravenous, Q6H PRN  LORazepam, 0 5 mg, Oral, Q8H PRN  oxyCODONE, 5 mg, Oral, Q6H PRN  oxyCODONE, 2 5 mg, Oral, Q6H PRN    CL diet      Network Utilization Review Department  ATTENTION: Please call with any questions or concerns to [phone] and carefully listen to the prompts so that you are directed to the right person  All voicemails are confidential   Cara Candelario all requests for admission clinical reviews, approved or denied determinations and any other requests to dedicated fax number below belonging to the campus where the patient is receiving treatment   List of dedicated fax numbers for the Facilities:  [address] [place] [address] DENIALS (Administrative/Medical Necessity) [phone]   [address] [place] [address] (Maternity/NICU/Pediatrics) [phone] [address]  [phone] [address] [place] [address] [facility] Hospital[facility] [address] [facility] 134 815 [facility] [phone]

## 2023-03-10 NOTE — ASSESSMENT & PLAN NOTE
Presented with left lower quadrant pain for one day  Very similar to previous presentation  She denies n/v/d  Most recent bowel movement yesterday  Endorses constipation this week  Normally every 24 hours  CT scan significant for fat stranding and evidence of sigmoid diverticulitis, concerning for involvement of left adnexa  Transvaginal ultrasound found no evidence of tubo-ovarian abscess   -Sigmoid/adnexal fat stranding similar to first documented 2/16, new from 1/29  ED: WBC 11 52  Received a dose of Zosyn in the ED     Patient had improvement in abdominal pain this morning  She was able to progress to a regular diet without any nausea or vomiting  Patient is discharged with Augmentin every 12 hours for 13 days  And advised to take Tylenol as needed for pain control  Patient is also referred to colorectal surgery as this is her second admission with diverticulitis

## 2023-03-10 NOTE — ASSESSMENT & PLAN NOTE
A 3 0 cm intermediate attenuation masslike density in the left lateral anterior abdominal wall, likely representing evolving hematoma status post subcutaneous injection administration or recent trauma  Clinical correlation is recommended  Follow-up CT pelvis in 2 months can be obtained as clinically warranted to assess for resolution to exclude underlying hemorrhagic neoplasm

## 2023-03-10 NOTE — ASSESSMENT & PLAN NOTE
• BMI 67 57  Recommend weight loss   • Brief diabetic nutrition counseling provided at bedside  • Follow-up with PCP outpatient to discuss, outpatient services with weight management, nutrition to help provide further education regarding dietary and exercise to promote weight loss and sugar control

## 2023-03-10 NOTE — INCIDENTAL FINDINGS
The following findings require follow up:  Radiographic finding  Finding: CT abdomen, pelvis  Follow up required:  "A 3 0 cm intermediate attenuation masslike density in the left lateral anterior abdominal wall, likely representing evolving hematoma status post subcutaneous injection administration or recent trauma  Clinical correlation is recommended    Follow-up , CT pelvis in 2 months can be obtained as clinically warranted to assess for resolution to exclude underlying hemorrhagic neoplasm "    Follow up should be done within 2 month(s)    Please notify the following clinician to assist with the follow up:   Dr Gildardo Krishna

## 2023-03-10 NOTE — DISCHARGE INSTR - AVS FIRST PAGE
Dear Mary Ibarra,     It was our pleasure to care for you here at [facility], [facility]  It is our hope that we were always able to exceed the expected standards for your care during your stay  You were hospitalized due to sigmoid diverticulitis  You were cared for on the 4th floor by Prisca Cabrera MD under the service of Bria Tracey MD with the [facility] who covers for your primary care physician (PCP), SUJATA Lam, while you were hospitalized  If you have any questions or concerns related to this hospitalization, you may contact us at [phone]  For follow up as well as any medication refills, we recommend that you follow up with your primary care physician  A registered nurse will reach out to you by phone within a few days after your discharge to answer any additional questions that you may have after going home  However, at this time we provide for you here, the most important instructions / recommendations at discharge:     Notable Medication Adjustments -   Start taking Augmentin every 12 hours for the next 13 days  You can continue to take Tylenol for pain  Testing Required after Discharge -   None  Important follow up information -   A referral has been made to colorectal surgery  Please follow-up with them regarding recurrent diverticulitis  Please follow-up with your primary care provider within 1 week and discuss optimization of your diabetes medications  Other Instructions -   If you experience worsening of symptoms, return to the ED  Please review this entire after visit summary as additional general instructions including medication list, appointments, activity, diet, any pertinent wound care, and other additional recommendations from your care team that may be provided for you        Sincerely,     Prisca Cabrera MD

## 2023-03-10 NOTE — H&P
[address] 1975, 46 y o  female MRN: [id number]  Unit/Bed#: W -55 Encounter: [id number]  Primary Care Provider: SUJATA Miller   Date and time admitted to hospital: 3/9/2023 11:59 AM    * Sigmoid diverticulitis  Assessment & Plan  Presented with left lower quadrant pain for one day  Very similar to previous presentation  She denies n/v/d  Most recent bowel movement yesterday  Endorses constipation this week  Normally every 24 hours  CT scan significant for fat stranding and evidence of sigmoid diverticulitis, concerning for involvement of left adnexa  Transvaginal ultrasound found no evidence of tubo-ovarian abscess   -Sigmoid/adnexal fat stranding similar to first documented 2/16, new from 1/29  ED: WBC 11 52     • Clear liquid diet  • Begin ceftriaxone and metronidazole  • Chart review concerning for possible nausea response to Flagyl  • Bentyl prn abdominal cramping  • Given recent similar presentation, patient should follow-up outpatient with gastroenterology for further work-up for causes of repeat diverticulitis      Type 2 diabetes mellitus with complication, with long-term current use of insulin ([facility] [address] )  Assessment & Plan  Lab Results   Component Value Date    HGBA1C 7 8 (H) 12/14/2022       No results for input(s): POCGLU in the last 72 hours  Blood Sugar Average: Last 72 hrs:       • Home regimen: Insulin glargine 64 units daily and insulin lispro 16 units 3 times a day with meals, also takes metformin  • Gluc elevated here, goal 140-180  Given massive obesity would recommend that she go on twice a day Lantus  We will start 10 units every morning    Abnormal CT of the abdomen  Assessment & Plan   A 3 0 cm intermediate attenuation masslike density in the left lateral anterior abdominal wall, likely representing evolving hematoma status post subcutaneous injection administration or recent trauma  Clinical correlation is recommended  Follow-up CT pelvis in 2 months can be obtained as clinically warranted to assess for resolution to exclude underlying hemorrhagic neoplasm  Class 3 severe obesity due to excess calories with body mass index (BMI) of 60 0 to 69 9 in adult [facility])  Assessment & Plan  • BMI 67 57  Recommend weight loss   • May benefit from outpatient referral to bariatric surgery    VTE Pharmacologic Prophylaxis: VTE Score: 6 High Risk (Score >/= 5) - Pharmacological DVT Prophylaxis Ordered: enoxaparin (Lovenox)  Sequential Compression Devices Ordered  Code Status: Prior  Level 1  Discussion with family: Patient declined call to   Anticipated Length of Stay: Patient will be admitted on an observation basis with an anticipated length of stay of less than 2 midnights secondary to Abdominal pain  Chief Complaint: Abdominal pain    History of Present Illness:  Patricia Cooney is a 46 y o  female with a PMH of T2DM on long-term insulin, HTN, HLD, morbid obesity (BMI 67) who presents with 1 day of acute lower left quadrant abdominal pain  She states the pain began this morning  She says that it has not increased decreased appreciably  States that it was 10 out of 10 pain when it started  She states that it is now 8 out of 10 after having received morphine  She denies fevers, N/V/D,/recent illness or travel  She states that this feels similar to previous presentations for which she was treated for diverticulitis  She has not eaten since yesterday  She has not had a bowel movement since yesterday  She has had recent constipation, states that normally she goes about once a day  CT in ED concerning for multiple nonobstructing renal calculi as well as fat stranding in the area of the sigmoid and left adnexa  Initially concerning for possible abscess left left adnexa, followed up with transvaginal ultrasound which not demonstrate any abscess nor fluid levels    Ultrasound did determine bladder had debris and that endometrium was thicker than typical for menopausal woman  We will admit to treat with antibiotics  Follow-up labs in the morning  Review of Systems:  Review of Systems   Constitutional: Positive for appetite change  Negative for activity change and fever  HENT: Negative for congestion, hearing loss and voice change  Eyes: Negative for visual disturbance  Respiratory: Negative for chest tightness and shortness of breath  Cardiovascular: Negative for chest pain  Gastrointestinal: Positive for abdominal pain and constipation  Negative for abdominal distention, anal bleeding, blood in stool, diarrhea, nausea and vomiting  Genitourinary: Negative for dysuria and vaginal pain  Musculoskeletal: Negative for back pain  Skin: Negative for wound  Neurological: Negative for dizziness, seizures and light-headedness  Past Medical and Surgical History:   Past Medical History:   Diagnosis Date   • Anemia    • Anxiety    • Candidal intertrigo    • Depression    • Diabetes mellitus ([place] [address] )    • GERD (gastroesophageal reflux disease)    • Hyperlipidemia    • Hypertension    • Irritable bowel syndrome    • Morbid obesity with BMI of 60 0-69 9, adult ([place] [address] )    • Osteoarthritis    • Seasonal allergies    • Sleep difficulties    • Suicide attempt [facility])        Past Surgical History:   Procedure Laterality Date   •  SECTION     • CHOLECYSTECTOMY     • WISDOM TOOTH EXTRACTION         Meds/Allergies:  Prior to Admission medications    Medication Sig Start Date End Date Taking?  Authorizing Provider   acetaminophen (TYLENOL) 325 mg tablet Take 2 tablets (650 mg total) by mouth every 6 (six) hours as needed for mild pain 23  Yes Julieta Doshi PA-C   cariprazine (VRAYLAR) 3 MG capsule Take 1 capsule (3 mg total) by mouth daily Do not start before 23  Yes Julieta Doshi PA-C   desvenlafaxine succinate (PRISTIQ) 50 mg 24 hr tablet Take 1 tablet (50 mg total) by mouth daily Do not start before February 22, 2023 2/22/23  Yes Julieta Doshi PA-C   dicyclomine (BENTYL) 10 mg capsule Take 2 capsules (20 mg total) by mouth every 6 (six) hours as needed (crampy diarrhea) 2/21/23  Yes Julieta Doshi PA-C   HumaLOG KwikPen 100 units/mL injection pen INJECT 16 UNITS 3 TIMES A DAY BEFORE MEALS 9/13/21  Yes Historical Provider, MD   insulin glargine (LANTUS) 100 units/mL subcutaneous injection Inject 64 Units under the skin daily at bedtime 8/2/21  Yes Ashanti Alonzo PA-C   lamoTRIgine (LaMICtal) 100 mg tablet Take 1 tablet (100 mg total) by mouth 2 (two) times a day 2/21/23  Yes Julieta Doshi PA-C   lisinopril (ZESTRIL) 10 mg tablet Take 2 tablets (20 mg total) by mouth daily 12/4/22  Yes Joni Zhu MD   LORazepam (ATIVAN) 0 5 mg tablet 0 5 mg every 8 (eight) hours as needed for anxiety 10/6/21  Yes Historical Provider, MD   metFORMIN (GLUCOPHAGE) 500 mg tablet Take 1 tablet (500 mg total) by mouth 2 (two) times a day with meals 8/31/20 3/9/23 Yes SUJATA Frost   pantoprazole (PROTONIX) 40 mg tablet Take 1 tablet (40 mg total) by mouth daily 9/20/22  Yes SUJATA Frost   escitalopram (LEXAPRO) 20 mg tablet Take 1 tablet (20 mg total) by mouth daily 8/4/21 9/20/22  Andre Ibarra DO   insulin glargine (LANTUS) 100 units/mL subcutaneous injection Inject 10 Units under the skin every morning Do not start before February 22, 2023  2/22/23 3/9/23  Alisha Mckeon PA-C     I have reviewed home medications with patient personally  Allergies:    Allergies   Allergen Reactions   • Cephalexin Vomiting     Other reaction(s): Nausea and/or vomiting   • Insulin Degludec GI Intolerance   • Sulfamethoxazole-Trimethoprim Vomiting     Other reaction(s): Nausea and/or vomiting       Social History:  Marital Status:    Occupation: -  Patient Pre-hospital Living Situation: Home, With spouse  Patient Pre-hospital Level of Mobility: walks  Patient Pre-hospital Diet Restrictions: -  Substance Use History:   Social History     Substance and Sexual Activity   Alcohol Use Not Currently    Comment: occassionaly      Social History     Tobacco Use   Smoking Status Never   Smokeless Tobacco Never     Social History     Substance and Sexual Activity   Drug Use No       Family History:  Family History   Problem Relation Age of Onset   • Diabetes Mother    • Hypertension Father        Physical Exam:     Vitals:   Blood Pressure: 99/59 (03/09/23 2127)  Pulse: (!) 123 (03/09/23 2127)  Temperature: 98 1 °F (36 7 °C) (03/09/23 2127)  Temp Source: Oral (03/09/23 2127)  Respirations: 16 (03/09/23 2127)  Height: 5' (152 4 cm) (03/09/23 2127)  Weight - Scale: (!) 154 kg (339 lb 1 1 oz) (03/09/23 2127)  SpO2: 93 % (03/09/23 2127)    Physical Exam  Vitals and nursing note reviewed  Constitutional:       General: She is not in acute distress  Appearance: Normal appearance  She is obese  She is ill-appearing  She is not toxic-appearing or diaphoretic  HENT:      Head: Normocephalic and atraumatic  Right Ear: External ear normal       Left Ear: External ear normal       Nose: Nose normal       Mouth/Throat:      Mouth: Mucous membranes are moist       Pharynx: No oropharyngeal exudate  Eyes:      General: No scleral icterus  Conjunctiva/sclera: Conjunctivae normal    Cardiovascular:      Rate and Rhythm: Normal rate and regular rhythm  Pulses: Normal pulses  Heart sounds: No murmur heard  No friction rub  No gallop  Comments: Heart sounds distant  Pulmonary:      Effort: Pulmonary effort is normal  No respiratory distress  Breath sounds: Normal breath sounds  No stridor  No wheezing, rhonchi or rales  Abdominal:      General: There is no distension  Palpations: Abdomen is soft  Tenderness: There is abdominal tenderness  There is no guarding  Rebound: Per patient this was results of gas  Musculoskeletal:         General: No deformity or signs of injury  Cervical back: Neck supple  No tenderness  Right lower leg: No edema  Left lower leg: No edema  Lymphadenopathy:      Cervical: No cervical adenopathy  Skin:     General: Skin is warm and dry  Capillary Refill: Capillary refill takes less than 2 seconds  Coloration: Skin is not jaundiced or pale  Neurological:      General: No focal deficit present  Mental Status: She is alert  Psychiatric:         Behavior: Behavior normal           Additional Data:     Lab Results:  Results from last 7 days   Lab Units 03/09/23  1248   WBC Thousand/uL 11 52*   HEMOGLOBIN g/dL 15 4   HEMATOCRIT % 48 1*   PLATELETS Thousands/uL 334   NEUTROS PCT % 74   LYMPHS PCT % 19   MONOS PCT % 6   EOS PCT % 1     Results from last 7 days   Lab Units 03/09/23  1926 03/09/23  1314   SODIUM mmol/L 134* 133*   POTASSIUM mmol/L 4 2 5 8*   CHLORIDE mmol/L 98 97   CO2 mmol/L 27 27   BUN mg/dL 13 12   CREATININE mg/dL 0 92 0 78   ANION GAP mmol/L 9 9   CALCIUM mg/dL 9 4 9 6   ALBUMIN g/dL  --  4 3   TOTAL BILIRUBIN mg/dL  --  0 73   ALK PHOS U/L  --  71   ALT U/L  --  41   AST U/L  --  47*   GLUCOSE RANDOM mg/dL 275* 292*                       Lines/Drains:  Invasive Devices     Peripheral Intravenous Line  Duration           Peripheral IV 03/09/23 Left Antecubital <1 day                    Imaging: Reviewed radiology reports from this admission including: abdominal/pelvic CT and ultrasound(s)  US pelvis complete w transvaginal   Final Result by Edith Dubois MD (03/09 1729)       Technically limited study  Patient unable to completely empty urinary bladder, which contains debris  Recommend correlation with urinalysis  Right ovary could not be visualized either via transabdominal or transvaginal technique  Loops of bowel inseparable from left adnexa, however without a discrete fluid collection to suggest a tubo-ovarian abscess  Left ovary itself appears within normal limits        Endometrium measuring 6 mm in thickness, mildly thickened for postmenopausal state  Fluid seen in the endocervical canal       The study was marked in EPIC for immediate notification  Workstation performed: [id number]         CT abdomen pelvis with contrast   Final Result by Aurelio Martinez MD (03/09 1557)      *  Again seen is left adnexa in close proximity of proximal left sigmoid colon with persistent adjacent fat stranding  Differential diagnoses include acute uncomplicated sigmoid diverticulitis with secondary involvement of the left adnexa or left-sided    tubo-ovarian abscess with reactive changes to the left sigmoid colon  Pelvic ultrasound is recommended for better evaluation of the left adnexa  *  Nonobstructing bilateral multiple renal calculi, measuring up to 0 5 cm, greater on the left  *  Focal dependent groundglass opacity in the right lower lobe concerning for acute pneumonitis, including atypical viral pneumonitis  Clinical correlation is recommended  *  A 3 0 cm intermediate attenuation masslike density in the left lateral anterior abdominal wall, likely representing evolving hematoma status post subcutaneous injection administration or recent trauma  Clinical correlation is recommended  Follow-up    CT pelvis in 2 months can be obtained as clinically warranted to assess for resolution to exclude underlying hemorrhagic neoplasm  *  Additional findings as above  The study was marked in [facility] for immediate notification  Workstation performed: [id number]             EKG and Other Studies Reviewed on Admission:   · EKG: Sinus Tachycardia       ** Please Note: This note has been constructed using a voice recognition system   **

## 2023-03-10 NOTE — ASSESSMENT & PLAN NOTE
Lab Results   Component Value Date    HGBA1C 7 8 (H) 12/14/2022       Recent Labs     03/09/23  2202 03/10/23  0739 03/10/23  1129 03/10/23  1635   POCGLU 274* 228* 198* 213*       Blood Sugar Average: Last 72 hrs:  (P) 228 25     • Home regimen: Insulin glargine 64 units daily and insulin lispro 16 units 3 times a day with meals, also takes metformin  • Gluc elevated here, goal 140-180  • Patient was given extra 10 unites of Lantus in this morning  Sugars continue to be elevated 190s to 200s  • Patient advised to follow-up outpatient with her primary care physician to optimize her diabetes medications

## 2023-03-10 NOTE — DISCHARGE SUMMARY
[facility]  Discharge- [facility] 1975, 46 y o  female MRN: [id number]  Unit/Bed#: W -85 Encounter: [id number]  Primary Care Provider: SUJATA Greenwood   Date and time admitted to hospital: 3/9/2023 11:59 AM    * Sigmoid diverticulitis  Assessment & Plan  Presented with left lower quadrant pain for one day  Very similar to previous presentation  She denies n/v/d  Most recent bowel movement yesterday  Endorses constipation this week  Normally every 24 hours  CT scan significant for fat stranding and evidence of sigmoid diverticulitis, concerning for involvement of left adnexa  Transvaginal ultrasound found no evidence of tubo-ovarian abscess   -Sigmoid/adnexal fat stranding similar to first documented 2/16, new from 1/29  ED: WBC 11 52  Received a dose of Zosyn in the ED     Patient had improvement in abdominal pain this morning  She was able to progress to a regular diet without any nausea or vomiting  Patient is discharged with Augmentin every 12 hours for 13 days  And advised to take Tylenol as needed for pain control  Patient is also referred to colorectal surgery as this is her second admission with diverticulitis  Type 2 diabetes mellitus with complication, with long-term current use of insulin [facility])  Assessment & Plan  Lab Results   Component Value Date    HGBA1C 7 8 (H) 12/14/2022       Recent Labs     03/09/23  2202 03/10/23  0739 03/10/23  1129 03/10/23  1635   POCGLU 274* 228* 198* 213*       Blood Sugar Average: Last 72 hrs:  (P) 228 25     • Home regimen: Insulin glargine 64 units daily and insulin lispro 16 units 3 times a day with meals, also takes metformin  • Gluc elevated here, goal 140-180  • Patient was given extra 10 unites of Lantus in this morning  Sugars continue to be elevated 190s to 200s  • Patient advised to follow-up outpatient with her primary care physician to optimize her diabetes medications      Abnormal CT of the abdomen  Assessment & Plan   A 3 0 cm intermediate attenuation masslike density in the left lateral anterior abdominal wall, likely representing evolving hematoma status post subcutaneous injection administration or recent trauma  Clinical correlation is recommended  Follow-up CT pelvis in 2 months can be obtained as clinically warranted to assess for resolution to exclude underlying hemorrhagic neoplasm  Patient advised to follow-up with PCP and get a follow-up CT pelvis in 2 months  Incidental findings noted completed  Class 3 severe obesity due to excess calories with body mass index (BMI) of 60 0 to 69 9 in adult [facility])  Assessment & Plan  • BMI 67 57  Recommend weight loss   • Brief diabetic nutrition counseling provided at bedside  • Follow-up with PCP outpatient to discuss, outpatient services with weight management, nutrition to help provide further education regarding dietary and exercise to promote weight loss and sugar control  Medical Problems     Resolved Problems  Date Reviewed: 3/10/2023   None       Discharging Resident: Claude Garibay MD  Discharging Attending: No att  providers found  PCP: Jennifer Carranza  Admission Date:   Admission Orders (From admission, onward)     Ordered        03/09/23 1840  Place in Observation  Once                      Discharge Date: 03/10/23    Consultations During Hospital Stay:  · None    Procedures Performed:   · None    Significant Findings / Test Results:   · CT abdomen and pelvis, sigmoid diverticulitis without abscess  Concern for tubo-ovarian abscess ultrasound was recommended    · Pelvic ultrasound showed no evidence of tubo-ovarian abscess    Incidental Findings:   · CT abdomen pelvis: " A 3 0 cm intermediate attenuation masslike density in the left lateral anterior abdominal wall, likely representing evolving hematoma status post subcutaneous injection administration or recent trauma   Clinical correlation is recommended   Follow-up   CT pelvis in 2 months can be obtained as clinically warranted to assess for resolution to exclude underlying hemorrhagic neoplasm "   · I reviewed the above mentioned incidental findings with the patient and/or family and they expressed understanding  Test Results Pending at Discharge (will require follow up): · None     Outpatient Tests Requested:  · None    Complications: None    Reason for Admission: Left lower quadrant abdominal pain    Hospital Course:   Nani Cruz is a 46 y o  female patient with past medical history of type 2 diabetes mellitus on insulin, hyperlipidemia, hypertension, morbid obesity who originally presented to the hospital on 3/9/2023 due to 1 day history of acute left lower quadrant abdominal pain  Patient received IV hydration in the ED along with a dose of Zosyn  CT scan in the ED showed evidence of sigmoid diverticulitis without without any abscess  Ultrasound of the pelvis was done to rule out any tubo-ovarian abscess  Patient slept well overnight and had some improvement in pain in the morning  Patient was able to progress and tolerate a regular diet without any nausea or vomiting  Patient was discharged on 13-day course of Augmentin and advised to follow-up with colorectal surgery outpatient as this is her second inpatient admission with diverticulitis  Patient also advised to follow-up with her primary care physician and discuss optimization of her outpatient diabetes regimen as hospital has per patient at home also her sugars have been ranging from 190s to 250s  She was also advised to discuss the incidental finding on her CT her PCP in order to schedule a 2-month CT pelvis follow-up  please see above list of diagnoses and related plan for additional information  Condition at Discharge: stable    Discharge Day Visit / Exam:   Subjective: Patient states she has some improvement of abdominal pain in the left lower quadrant  She denied any nausea vomiting    States that she feels comfortable and progressing to a regular diet  Vitals: Blood Pressure: 106/63 (03/10/23 1419)  Pulse: (!) 113 (03/10/23 1419)  Temperature: 99 °F (37 2 °C) (03/10/23 1419)  Temp Source: Oral (03/10/23 1419)  Respirations: 18 (03/10/23 1419)  Height: 5' (152 4 cm) (03/09/23 2127)  Weight - Scale: (!) 154 kg (339 lb 1 1 oz) (03/09/23 2127)  SpO2: 93 % (03/10/23 1419)  Exam:   Physical Exam  Constitutional:       General: She is not in acute distress  Appearance: She is obese  She is not ill-appearing  HENT:      Head: Normocephalic and atraumatic  Nose: Nose normal  No congestion or rhinorrhea  Mouth/Throat:      Mouth: Mucous membranes are moist       Pharynx: Oropharynx is clear  No oropharyngeal exudate or posterior oropharyngeal erythema  Eyes:      General: No scleral icterus  Extraocular Movements: Extraocular movements intact  Conjunctiva/sclera: Conjunctivae normal    Cardiovascular:      Rate and Rhythm: Regular rhythm  Tachycardia present  Heart sounds: No murmur heard  Pulmonary:      Effort: Pulmonary effort is normal  No respiratory distress  Breath sounds: Normal breath sounds  No stridor  Abdominal:      General: Bowel sounds are normal  There is no distension  Palpations: Abdomen is soft  There is no mass  Tenderness: There is abdominal tenderness (Left lower quadrant)  There is no guarding  Hernia: No hernia is present  Musculoskeletal:         General: No swelling, tenderness or signs of injury  Normal range of motion  Right lower leg: No edema  Left lower leg: No edema  Skin:     Coloration: Skin is not jaundiced or pale  Findings: No erythema  Neurological:      General: No focal deficit present  Mental Status: She is alert and oriented to person, place, and time  Mental status is at baseline     Psychiatric:         Mood and Affect: Mood normal          Behavior: Behavior normal          Thought Content: Thought content normal           Discussion with Family: Patient declined call to   Discharge instructions/Information to patient and family:   See after visit summary for information provided to patient and family  Provisions for Follow-Up Care:  See after visit summary for information related to follow-up care and any pertinent home health orders  Disposition:   Home    Planned Readmission: No    Discharge Medications:  See after visit summary for reconciled discharge medications provided to patient and/or family        **Please Note: This note may have been constructed using a voice recognition system**

## 2023-03-10 NOTE — QUICK NOTE
Notified by RN that patient was tachycardic in the 130s but asymptomatic while sleeping  Got an ECG which was normal  Patient also had drop in BP to 58J systolic  Gave 500cc NS bolus  BP recheck 112/72  HR down to 118  Will continue to monitor  Past Medical History:   Diagnosis Date    RAQUEL (acute kidney injury) 2/3/2022    Anxiety disorder     Bell's palsy     Chronic back pain     Chronic osteoarthritis     Essential tremor     Fibromyalgia     Hypertension     Mild acid reflux     Neck pain     Other and unspecified hyperlipidemia     Sleep apnea     wear c-pap at night; does not use regularly    Unspecified mood (affective) disorder        Past Surgical History:   Procedure Laterality Date    ADENOIDECTOMY      APPENDECTOMY      BLADDER SUSPENSION      BREAST BIOPSY Left     1995  negative    Breast reduction      BREAST SURGERY Bilateral     breast reduction    CARPAL TUNNEL RELEASE Right     COLONOSCOPY  2008    COLONOSCOPY N/A 11/10/2017    Procedure: COLONOSCOPY - Miralax split prep;  Surgeon: Annetta Powell MD;  Location: [facility];  Service: Endoscopy;  Laterality: N/A;    COSMETIC SURGERY Bilateral     breast reduction    CYSTOSCOPY N/A 10/23/2018    Procedure: CYSTOSCOPY;  Surgeon: Feli Garza MD;  Location: [address];  Service: Urology;  Laterality: N/A;    ESOPHAGOGASTRODUODENOSCOPY N/A 5/30/2019    Procedure: ESOPHAGOGASTRODUODENOSCOPY (EGD);  Surgeon: Oscar Wylie MD;  Location: [facility] (4TH FLR);  Service: Endoscopy;  Laterality: N/A;  Off PPI/H2 x 7 days prior- ERW    HYSTERECTOMY      JOINT REPLACEMENT Left     knee    KNEE SURGERY      bilateral    left shoulder Left     rotator cuff    OPEN REDUCTION AND INTERNAL FIXATION (ORIF) OF FRACTURE OF DISTAL RADIUS Right 11/16/2021    Procedure: ORIF, FRACTURE, RADIUS, DISTAL;  Surgeon: Evelio Lynn Jr., MD;  Location: [facility];  Service: Orthopedics;  Laterality: Right;  need accumed plate and mini c arm, [place] confirmed 11/15/21 AM    PLACEMENT OF TRANSOBTURATOR TAPE N/A 10/23/2018    Procedure: PLACEMENT, TRANSOBTURATOR TAPE;  Surgeon: Feli Garza MD;  Location: [address];  Service: Urology;  Laterality: N/A;  1.5 hours     Tonsillectomy      TONSILLECTOMY      TOTAL REDUCTION MAMMOPLASTY      1995    TUBAL LIGATION         Review of patient's allergies indicates:   Allergen Reactions    Hyoscyamine Rash    Msg [glutamic acid] Swelling       No current facility-administered medications on file prior to encounter.     Current Outpatient Medications on File Prior to Encounter   Medication Sig    acetaminophen (TYLENOL) 500 MG tablet Take 500 mg by mouth every 6 (six) hours as needed.    albuterol (PROVENTIL/VENTOLIN HFA) 90 mcg/actuation inhaler Inhale 2 puffs into the lungs every 6 (six) hours as needed for Wheezing or Shortness of Breath. Rescue    clonazePAM (KLONOPIN) 0.5 MG tablet 2  pills PO as needed at bedtime for the RLS    cyclobenzaprine (FLEXERIL) 10 MG tablet Take 10 mg by mouth 3 (three) times daily as needed.     fluticasone propionate (FLONASE) 50 mcg/actuation nasal spray 1 spray (50 mcg total) by Each Nostril route once daily.    furosemide (LASIX) 40 MG tablet TAKE 1 TABLET BY MOUTH UP TO TWO TIMES A DAY IF SWELLING OCCURS    gabapentin (NEURONTIN) 600 MG tablet TAKE 1 TABLET BY MOUTH THREE TIMES A DAY    imipramine (TOFRANIL) 25 MG tablet TAKE 1 TABLET BY MOUTH EVERY EVENING TO HELP CALM STOMACH    losartan (COZAAR) 100 MG tablet TAKE 1 TABLET BY MOUTH ONCE A DAY FOR BLOOD PRESSURE    lovastatin (MEVACOR) 20 MG tablet TAKE 1 TABLET BY MOUTH EVERY EVENING    mometasone (ELOCON) 0.1 % ointment Apply topically once daily. For 7 days and may repeat.    morphine (MS CONTIN) 15 MG 12 hr tablet Take 1 tablet by mouth every 12 (twelve) hours.    oxycodone-acetaminophen (PERCOCET)  mg per tablet Take 1 tablet by mouth every 8 (eight) hours as needed.     oxyCODONE-acetaminophen (PERCOCET) 7.5-325 mg per tablet Take 1 tablet by mouth every 4 (four) hours as needed for Pain.    pantoprazole (PROTONIX) 40 MG tablet Take 1 tablet (40 mg total) by mouth once daily.    potassium chloride SA  (K-DUR,KLOR-CON) 20 MEQ tablet TAKE 1 TABLET BY MOUTH ONCE A DAY    pramipexole (MIRAPEX) 0.125 MG tablet TAKE 2 TABLETS BY MOUTH IN THE LATE AFTERNOON AND TAKE 4 TABLETS BY MOUTH AT BEDTIME    promethazine-dextromethorphan (PROMETHAZINE-DM) 6.25-15 mg/5 mL Syrp     venlafaxine (EFFEXOR-XR) 150 MG Cp24 1 tablet by mouth in morning and 1 tablet by mouth at night     Family History     Problem Relation (Age of Onset)    Cancer Brother    Depression Sister    Diabetes Mother, Father, Sister, Brother    Heart disease Father    Hypertension Mother, Father    Kidney disease Son, Son    Learning disabilities Son    Liver disease Mother    Mental retardation Son    No Known Problems Daughter    Tremor Mother, Son, Son        Tobacco Use    Smoking status: Former Smoker     Packs/day: 2.00     Years: 30.00     Pack years: 60.00     Types: Cigarettes     Start date:      Quit date: 1990     Years since quittin.5    Smokeless tobacco: Never Used   Substance and Sexual Activity    Alcohol use: No    Drug use: No    Sexual activity: Not Currently     Partners: Male     Birth control/protection: None     Review of Systems   Unable to perform ROS: Mental status change     Objective:     Vital Signs (Most Recent):  Temp: 99.1 °F (37.3 °C) (22)  Pulse: 94 (22)  Resp: 15 (22)  BP: (!) 85/42 (22)  SpO2: 99 % (22) Vital Signs (24h Range):  Temp:  [98.4 °F (36.9 °C)-99.1 °F (37.3 °C)] 99.1 °F (37.3 °C)  Pulse:  [91-99] 94  Resp:  [15-44] 15  SpO2:  [93 %-100 %] 99 %  BP: ()/() 85/42     Weight: 108.9 kg (240 lb)  Body mass index is 45.35 kg/m².    Physical Exam  Vitals and nursing note reviewed.   Constitutional:       General: She is not in acute distress.     Appearance: She is morbidly obese.      Comments: BIPAP   HENT:      Head: Normocephalic and atraumatic.      Right Ear: External ear normal.      Left Ear: External ear normal.      Nose:  Nose normal. No congestion.      Mouth/Throat:      Mouth: Mucous membranes are dry.      Pharynx: Oropharynx is clear.   Eyes:      Extraocular Movements: Extraocular movements intact.      Pupils: Pupils are equal, round, and reactive to light.   Cardiovascular:      Rate and Rhythm: Normal rate and regular rhythm.      Pulses: Normal pulses.      Heart sounds: Normal heart sounds.   Pulmonary:      Effort: Pulmonary effort is normal.      Breath sounds: Normal breath sounds.   Abdominal:      General: Bowel sounds are normal.      Palpations: Abdomen is soft.   Musculoskeletal:         General: Normal range of motion.      Cervical back: Normal range of motion. No rigidity.      Right lower leg: No edema.      Left lower leg: No edema.   Skin:     General: Skin is warm and dry.      Capillary Refill: Capillary refill takes less than 2 seconds.   Neurological:      General: No focal deficit present.      Mental Status: She is easily aroused. She is lethargic and disoriented.      Motor: Weakness present.      Gait: Gait abnormal.   Psychiatric:         Behavior: Behavior is slowed.         Cognition and Memory: Cognition is impaired.           CRANIAL NERVES     CN III, IV, VI   Pupils are equal, round, and reactive to light.       Significant Labs:   ABGs:   Recent Labs   Lab 02/03/22 2223   PH 7.188*   PCO2 65.4*   HCO3 24.9   POCSATURATED 97   BE -3   PO2 117*     CBC:   Recent Labs   Lab 02/03/22 2129   WBC 18.97*   HGB 12.0   HCT 37.5        CMP:   Recent Labs   Lab 02/03/22 2152   *   K 5.6*   CL 97   CO2 15*   *   BUN 69*   CREATININE 7.1*   CALCIUM 7.6*   PROT 6.9   ALBUMIN 3.7   BILITOT 0.5   ALKPHOS 90   AST 84*   ALT 43   ANIONGAP 19*   EGFRNONAA 5*     Lactic Acid:   Recent Labs   Lab 02/03/22 2129   LACTATE 1.1     Troponin:   Recent Labs   Lab 02/03/22 2152   TROPONINI 0.021     Urine Studies:   Recent Labs   Lab 02/03/22 2145   COLORU Yellow   APPEARANCEUA Clear   PHUR 5.0    SPECGRAV 1.020   PROTEINUA 1+*   GLUCUA Negative   KETONESU Negative   BILIRUBINUA Negative   OCCULTUA Negative   NITRITE Negative   UROBILINOGEN Negative   LEUKOCYTESUR 3+*   RBCUA 2   WBCUA 4   BACTERIA Many*   SQUAMEPITHEL 2   HYALINECASTS 1       Significant Imaging: I have reviewed all pertinent imaging results/findings within the past 24 hours.

## 2023-03-10 NOTE — ASSESSMENT & PLAN NOTE
Lab Results   Component Value Date    HGBA1C 7 8 (H) 12/14/2022       No results for input(s): POCGLU in the last 72 hours  Blood Sugar Average: Last 72 hrs:       • Home regimen: Insulin glargine 64 units daily and insulin lispro 16 units 3 times a day with meals, also takes metformin  • Gluc elevated here, goal 140-180  Given massive obesity would recommend that she go on twice a day Lantus    We will start 10 units every morning

## 2023-03-13 ENCOUNTER — PATIENT OUTREACH (OUTPATIENT)
Dept: FAMILY MEDICINE CLINIC | Facility: CLINIC | Age: 48
End: 2023-03-13

## 2023-03-13 ENCOUNTER — TRANSITIONAL CARE MANAGEMENT (OUTPATIENT)
Dept: FAMILY MEDICINE CLINIC | Facility: CLINIC | Age: 48
End: 2023-03-13

## 2023-03-13 NOTE — PROGRESS NOTES
Chart reviewed  Readmitted to [facility] 3/9/23-3/10/23 sigmoid diverticulitis  PCP office has been unsuccessful in contacting patient or scheduling for hospital follow up appointment  No return call from patient  UTR letter mailed 2/27/23, patient has my contact information if future assistance needed  Will close to Complex Care Management at this time

## 2023-03-30 PROBLEM — R65.20 SEVERE SEPSIS (HCC): Status: RESOLVED | Noted: 2023-01-29 | Resolved: 2023-03-30

## 2023-03-30 PROBLEM — N39.0 UTI (URINARY TRACT INFECTION): Status: RESOLVED | Noted: 2023-01-29 | Resolved: 2023-03-30

## 2023-03-30 PROBLEM — A41.9 SEVERE SEPSIS (HCC): Status: RESOLVED | Noted: 2023-01-29 | Resolved: 2023-03-30

## 2023-04-04 ENCOUNTER — TELEPHONE (OUTPATIENT)
Dept: PSYCHIATRY | Facility: CLINIC | Age: 48
End: 2023-04-04

## 2023-04-04 NOTE — TELEPHONE ENCOUNTER
Patient has been added to the Non-referralMedication Management wait list     Confirmed Insurance: Yes [x]  Location Preference: [place] or [place]  Provider Preference: none  Virtual: Yes [x] No []

## 2023-06-13 ENCOUNTER — HOSPITAL ENCOUNTER (EMERGENCY)
Facility: HOSPITAL | Age: 48
Discharge: HOME | End: 2023-06-14
Attending: EMERGENCY MEDICINE
Payer: COMMERCIAL

## 2023-06-13 ENCOUNTER — APPOINTMENT (EMERGENCY)
Dept: CT IMAGING | Facility: HOSPITAL | Age: 48
End: 2023-06-13
Payer: COMMERCIAL

## 2023-06-13 DIAGNOSIS — K57.92 ACUTE DIVERTICULITIS: Primary | ICD-10-CM

## 2023-06-13 LAB
ALBUMIN SERPL BCP-MCNC: 4 G/DL (ref 3.5–5)
ALP SERPL-CCNC: 79 U/L (ref 34–104)
ALT SERPL W P-5'-P-CCNC: 43 U/L (ref 7–52)
ANION GAP SERPL CALCULATED.3IONS-SCNC: 11 MMOL/L (ref 4–13)
AST SERPL W P-5'-P-CCNC: 30 U/L (ref 13–39)
BASOPHILS # BLD AUTO: 0.07 THOUSANDS/ÂΜL (ref 0–0.1)
BASOPHILS NFR BLD AUTO: 1 % (ref 0–1)
BILIRUB SERPL-MCNC: 0.56 MG/DL (ref 0.2–1)
BUN SERPL-MCNC: 11 MG/DL (ref 5–25)
CALCIUM SERPL-MCNC: 9.4 MG/DL (ref 8.4–10.2)
CHLORIDE SERPL-SCNC: 99 MMOL/L (ref 96–108)
CO2 SERPL-SCNC: 27 MMOL/L (ref 21–32)
CREAT SERPL-MCNC: 0.82 MG/DL (ref 0.6–1.3)
EOSINOPHIL # BLD AUTO: 0.4 THOUSAND/ÂΜL (ref 0–0.61)
EOSINOPHIL NFR BLD AUTO: 4 % (ref 0–6)
ERYTHROCYTE [DISTWIDTH] IN BLOOD BY AUTOMATED COUNT: 13.1 % (ref 11.6–15.1)
GFR SERPL CREATININE-BSD FRML MDRD: 83 ML/MIN/1.73SQ M
GLUCOSE SERPL-MCNC: 225 MG/DL (ref 65–140)
HCT VFR BLD AUTO: 45.7 % (ref 34.8–46.1)
HGB BLD-MCNC: 14.5 G/DL (ref 11.5–15.4)
HOLD SPECIMEN: NORMAL
IMM GRANULOCYTES # BLD AUTO: 0.09 THOUSAND/UL (ref 0–0.2)
IMM GRANULOCYTES NFR BLD AUTO: 1 % (ref 0–2)
LACTATE SERPL-SCNC: 2.5 MMOL/L (ref 0.5–2)
LACTATE SERPL-SCNC: 2.6 MMOL/L (ref 0.5–2)
LIPASE SERPL-CCNC: 56 U/L (ref 11–82)
LYMPHOCYTES # BLD AUTO: 2.63 THOUSANDS/ÂΜL (ref 0.6–4.47)
LYMPHOCYTES NFR BLD AUTO: 25 % (ref 14–44)
MAGNESIUM SERPL-MCNC: 1.6 MG/DL (ref 1.9–2.7)
MCH RBC QN AUTO: 29.2 PG (ref 26.8–34.3)
MCHC RBC AUTO-ENTMCNC: 31.7 G/DL (ref 31.4–37.4)
MCV RBC AUTO: 92 FL (ref 82–98)
MONOCYTES # BLD AUTO: 0.68 THOUSAND/ÂΜL (ref 0.17–1.22)
MONOCYTES NFR BLD AUTO: 6 % (ref 4–12)
NEUTROPHILS # BLD AUTO: 6.78 THOUSANDS/ÂΜL (ref 1.85–7.62)
NEUTS SEG NFR BLD AUTO: 63 % (ref 43–75)
NRBC BLD AUTO-RTO: 0 /100 WBCS
PLATELET # BLD AUTO: 306 THOUSANDS/UL (ref 149–390)
PMV BLD AUTO: 9.7 FL (ref 8.9–12.7)
POTASSIUM SERPL-SCNC: 4.6 MMOL/L (ref 3.5–5.3)
PROT SERPL-MCNC: 7.6 G/DL (ref 6.4–8.4)
RBC # BLD AUTO: 4.96 MILLION/UL (ref 3.81–5.12)
SODIUM SERPL-SCNC: 137 MMOL/L (ref 135–147)
WBC # BLD AUTO: 10.65 THOUSAND/UL (ref 4.31–10.16)

## 2023-06-13 PROCEDURE — 85025 COMPLETE CBC W/AUTO DIFF WBC: CPT | Performed by: EMERGENCY MEDICINE

## 2023-06-13 PROCEDURE — 80053 COMPREHEN METABOLIC PANEL: CPT | Performed by: EMERGENCY MEDICINE

## 2023-06-13 PROCEDURE — 36415 COLL VENOUS BLD VENIPUNCTURE: CPT

## 2023-06-13 PROCEDURE — 83605 ASSAY OF LACTIC ACID: CPT | Performed by: EMERGENCY MEDICINE

## 2023-06-13 PROCEDURE — 83735 ASSAY OF MAGNESIUM: CPT | Performed by: EMERGENCY MEDICINE

## 2023-06-13 PROCEDURE — 74177 CT ABD & PELVIS W/CONTRAST: CPT

## 2023-06-13 PROCEDURE — G1004 CDSM NDSC: HCPCS

## 2023-06-13 PROCEDURE — 83690 ASSAY OF LIPASE: CPT | Performed by: EMERGENCY MEDICINE

## 2023-06-13 RX ORDER — MAGNESIUM SULFATE HEPTAHYDRATE 40 MG/ML
2 INJECTION, SOLUTION INTRAVENOUS ONCE
Status: COMPLETED | OUTPATIENT
Start: 2023-06-13 | End: 2023-06-13

## 2023-06-13 RX ORDER — SODIUM CHLORIDE, SODIUM GLUCONATE, SODIUM ACETATE, POTASSIUM CHLORIDE, MAGNESIUM CHLORIDE, SODIUM PHOSPHATE, DIBASIC, AND POTASSIUM PHOSPHATE .53; .5; .37; .037; .03; .012; .00082 G/100ML; G/100ML; G/100ML; G/100ML; G/100ML; G/100ML; G/100ML
1000 INJECTION, SOLUTION INTRAVENOUS ONCE
Status: COMPLETED | OUTPATIENT
Start: 2023-06-13 | End: 2023-06-13

## 2023-06-13 RX ORDER — AMOXICILLIN AND CLAVULANATE POTASSIUM 875; 125 MG/1; MG/1
1 TABLET, FILM COATED ORAL EVERY 12 HOURS
Qty: 14 TABLET | Refills: 0 | Status: SHIPPED | OUTPATIENT
Start: 2023-06-13 | End: 2023-06-20

## 2023-06-13 RX ORDER — METRONIDAZOLE 500 MG/1
500 TABLET ORAL ONCE
Status: COMPLETED | OUTPATIENT
Start: 2023-06-13 | End: 2023-06-13

## 2023-06-13 RX ORDER — SODIUM CHLORIDE 9 MG/ML
150 INJECTION, SOLUTION INTRAVENOUS CONTINUOUS
Status: DISCONTINUED | OUTPATIENT
Start: 2023-06-14 | End: 2023-06-14 | Stop reason: HOSPADM

## 2023-06-13 RX ORDER — MORPHINE SULFATE 10 MG/ML
5 INJECTION, SOLUTION INTRAMUSCULAR; INTRAVENOUS ONCE
Status: COMPLETED | OUTPATIENT
Start: 2023-06-13 | End: 2023-06-13

## 2023-06-13 RX ADMIN — METRONIDAZOLE 500 MG: 500 TABLET ORAL at 23:08

## 2023-06-13 RX ADMIN — CEFTRIAXONE SODIUM 1000 MG: 10 INJECTION, POWDER, FOR SOLUTION INTRAVENOUS at 23:08

## 2023-06-13 RX ADMIN — MAGNESIUM SULFATE HEPTAHYDRATE 2 G: 40 INJECTION, SOLUTION INTRAVENOUS at 22:26

## 2023-06-13 RX ADMIN — MORPHINE SULFATE 5 MG: 10 INJECTION INTRAVENOUS at 20:58

## 2023-06-13 RX ADMIN — SODIUM CHLORIDE, SODIUM GLUCONATE, SODIUM ACETATE, POTASSIUM CHLORIDE, MAGNESIUM CHLORIDE, SODIUM PHOSPHATE, DIBASIC, AND POTASSIUM PHOSPHATE 1000 ML: .53; .5; .37; .037; .03; .012; .00082 INJECTION, SOLUTION INTRAVENOUS at 20:57

## 2023-06-13 RX ADMIN — IOHEXOL 100 ML: 350 INJECTION, SOLUTION INTRAVENOUS at 21:46

## 2023-06-14 VITALS
SYSTOLIC BLOOD PRESSURE: 117 MMHG | TEMPERATURE: 99 F | DIASTOLIC BLOOD PRESSURE: 59 MMHG | OXYGEN SATURATION: 93 % | RESPIRATION RATE: 20 BRPM | HEART RATE: 90 BPM

## 2023-06-14 LAB — LACTATE SERPL-SCNC: 1.3 MMOL/L (ref 0.5–2)

## 2023-06-14 PROCEDURE — 36415 COLL VENOUS BLD VENIPUNCTURE: CPT | Performed by: EMERGENCY MEDICINE

## 2023-06-14 PROCEDURE — 83605 ASSAY OF LACTIC ACID: CPT | Performed by: EMERGENCY MEDICINE

## 2023-06-14 RX ADMIN — SODIUM CHLORIDE 2000 ML: 0.9 INJECTION, SOLUTION INTRAVENOUS at 00:43

## 2023-06-14 NOTE — ED PROVIDER NOTES
History  Chief Complaint   Patient presents with   • Abdominal Pain     Abd pain and diarrhea since yesterday  Hx diverticulosis  49-year-old woman with noted past medical hx presents to the emergency department with bilateral lower quadrant abdominal pain, worse in the left lower quadrant, for the past 48 hours preceded by several days of relatively loose stool  Pain is aching/throbbing and has remained localized in the lower quadrants since onset  She has not had any bowel movements since the onset of the pain yesterday  She has felt significantly fatigued with occasional mild chills but no objective fever at home (Tmax 99F) over the past several days  No feeling of abdominal distension  No nausea or vomiting  No dysuria/hematuria/urgency or frequency  She did not take or use anything for symptoms at home  Symptoms very similar to previous episodes of diverticulitis of which she has had multiple episodes, most recently in March of this year for which she was treated in this facility  She reports never having had complicated diverticulitis and not undergoing any interventional radiology or surgical procedures related to diverticulitis  No antibiotic use in past 30 days  States she was otherwise normal state of health until symptom onset  This episode is about of equal severity to the episode that she had earlier this year  Prior cholecystectomy and   Patient reports never having been treated as an outpatient for diverticulitis up to this point  DDx includes but is not limited to: Uncomplicated diverticulitis, colitis, enteritis, diverticular abscess, ruptured diverticulitis, etc   Suspicion is for diverticulitis of course  Primary concern would be for a more complicated  diverticulitis than simple episodes patient has had previously  CBC/CMP/lipase/lactic acid  Symptomatic treatment while work-up ongoing  CT abdomen/pelvis with IV contrast   Disposition pending            History provided by:  Medical records, patient and relative  Abdominal Pain  Associated symptoms: constipation and fatigue    Associated symptoms: no chills, no dysuria, no fever, no hematuria, no nausea and no vomiting        Prior to Admission Medications   Prescriptions Last Dose Informant Patient Reported? Taking? HumaLOG KwikPen 100 units/mL injection pen  Self Yes No   Sig: INJECT 16 UNITS 3 TIMES A DAY BEFORE MEALS   LORazepam (ATIVAN) 0 5 mg tablet   Yes No   Si 5 mg every 8 (eight) hours as needed for anxiety   acetaminophen (TYLENOL) 325 mg tablet   No No   Sig: Take 2 tablets (650 mg total) by mouth every 6 (six) hours as needed for mild pain   cariprazine (VRAYLAR) 3 MG capsule   No No   Sig: Take 1 capsule (3 mg total) by mouth daily Do not start before 2023  desvenlafaxine succinate (PRISTIQ) 50 mg 24 hr tablet   No No   Sig: Take 1 tablet (50 mg total) by mouth daily Do not start before 2023     dicyclomine (BENTYL) 10 mg capsule   No No   Sig: Take 2 capsules (20 mg total) by mouth every 6 (six) hours as needed (crampy diarrhea)   escitalopram (LEXAPRO) 20 mg tablet  Self No No   Sig: Take 1 tablet (20 mg total) by mouth daily   insulin glargine (LANTUS) 100 units/mL subcutaneous injection   No No   Sig: Inject 64 Units under the skin daily at bedtime   lamoTRIgine (LaMICtal) 100 mg tablet   No No   Sig: Take 1 tablet (100 mg total) by mouth 2 (two) times a day   lisinopril (ZESTRIL) 10 mg tablet   No No   Sig: Take 2 tablets (20 mg total) by mouth daily   metFORMIN (GLUCOPHAGE) 500 mg tablet  Self No No   Sig: Take 1 tablet (500 mg total) by mouth 2 (two) times a day with meals   pantoprazole (PROTONIX) 40 mg tablet   No No   Sig: Take 1 tablet (40 mg total) by mouth daily      Facility-Administered Medications: None       Past Medical History:   Diagnosis Date   • Anemia    • Anxiety    • Candidal intertrigo    • Depression    • Diabetes mellitus (HCC)    • GERD (gastroesophageal reflux disease)    • Hyperlipidemia    • Hypertension    • Irritable bowel syndrome    • Morbid obesity with BMI of 60 0-69 9, adult ([facility] [address] )    • Osteoarthritis    • Seasonal allergies    • Sleep difficulties    • Suicide attempt [facility])        Past Surgical History:   Procedure Laterality Date   •  SECTION     • CHOLECYSTECTOMY     • WISDOM TOOTH EXTRACTION         Family History   Problem Relation Age of Onset   • Diabetes Mother    • Hypertension Father      I have reviewed and agree with the history as documented  E-Cigarette/Vaping   • E-Cigarette Use Never User      E-Cigarette/Vaping Substances   • Nicotine No    • THC No    • CBD No    • Flavoring No    • Other No    • Unknown No      Social History     Tobacco Use   • Smoking status: Never   • Smokeless tobacco: Never   Vaping Use   • Vaping Use: Never used   Substance Use Topics   • Alcohol use: Not Currently     Comment: occassionaly    • Drug use: No       Review of Systems   Constitutional: Positive for fatigue  Negative for chills, diaphoresis and fever  Respiratory: Negative  Cardiovascular: Negative  Gastrointestinal: Positive for abdominal pain and constipation  Negative for abdominal distention, blood in stool, nausea and vomiting  Genitourinary: Negative for difficulty urinating, dysuria, flank pain, frequency and hematuria  Musculoskeletal: Negative  Skin: Negative  Neurological: Negative  Hematological: Negative  All other systems reviewed and are negative  Physical Exam  Physical Exam  Vitals and nursing note reviewed  Constitutional:       General: She is awake  She is not in acute distress  Appearance: Normal appearance  She is well-developed  HENT:      Head: Normocephalic and atraumatic        Right Ear: Hearing and external ear normal       Left Ear: Hearing and external ear normal    Neck:      Trachea: Trachea and phonation normal    Cardiovascular:      Rate and Rhythm: Normal rate and regular rhythm  Pulses:           Radial pulses are 2+ on the right side and 2+ on the left side  Dorsalis pedis pulses are 2+ on the right side and 2+ on the left side  Posterior tibial pulses are 2+ on the right side and 2+ on the left side  Heart sounds: Normal heart sounds, S1 normal and S2 normal  No murmur heard  No friction rub  No gallop  Pulmonary:      Effort: Pulmonary effort is normal  No respiratory distress  Breath sounds: Normal breath sounds  No stridor  No decreased breath sounds, wheezing, rhonchi or rales  Abdominal:      General: There is no distension  Palpations: There is no mass  Tenderness: There is abdominal tenderness in the right lower quadrant and left lower quadrant  There is no right CVA tenderness, left CVA tenderness, guarding or rebound  Comments: Obese; nondistended   Skin:     General: Skin is warm and dry  Neurological:      Mental Status: She is alert and oriented to person, place, and time  GCS: GCS eye subscore is 4  GCS verbal subscore is 5  GCS motor subscore is 6  Cranial Nerves: No cranial nerve deficit  Sensory: No sensory deficit  Motor: No abnormal muscle tone  Comments: PERRLA; EOMI  Sensation intact to light touch over face in V1-V3 distribution bilaterally  Facial expressions symmetric  Tongue/uvula midline  Shoulder shrug equal bilaterally  Strength 5/5 in UE/LE bilaterally  Sensation intact to light touch in UE/LE bilaterally           Vital Signs  ED Triage Vitals [06/13/23 1909]   Temperature Pulse Respirations Blood Pressure SpO2   99 °F (37 2 °C) (!) 114 20 133/73 97 %      Temp Source Heart Rate Source Patient Position - Orthostatic VS BP Location FiO2 (%)   Oral Monitor Sitting Right arm --      Pain Score       7           Vitals:    06/13/23 1909 06/13/23 2100 06/13/23 2130 06/14/23 0030   BP: 133/73 127/67 146/68 117/59   Pulse: (!) 114 98 (!) 106 90 Patient Position - Orthostatic VS: Sitting   Lying         Visual Acuity      ED Medications  Medications   multi-electrolyte (ISOLYTE-S PH 7 4) bolus 1,000 mL (0 mL Intravenous Stopped 6/13/23 2157)   morphine injection 5 mg (5 mg Intravenous Given 6/13/23 2058)   iohexol (OMNIPAQUE) 350 MG/ML injection (SINGLE-DOSE) 100 mL (100 mL Intravenous Given 6/13/23 2146)   magnesium sulfate 2 g/50 mL IVPB (premix) 2 g (0 g Intravenous Stopped 6/13/23 2320)   ceftriaxone (ROCEPHIN) 1 g/50 mL in dextrose IVPB (0 mg Intravenous Stopped 6/13/23 2338)   metroNIDAZOLE (FLAGYL) tablet 500 mg (500 mg Oral Given 6/13/23 2308)   sodium chloride 0 9 % bolus 2,000 mL (0 mL Intravenous Stopped 6/14/23 0151)       Diagnostic Studies  Results Reviewed     Procedure Component Value Units Date/Time    Lactic acid, plasma (w/reflex if result > 2 0) [[id number]]  (Normal) Collected: 06/14/23 0042    Lab Status: Final result Specimen: Blood from Arm, Right Updated: 06/14/23 0113     LACTIC ACID 1 3 mmol/L     Narrative:      Result may be elevated if tourniquet was used during collection  Lactic acid 2 Hours [[id number]]  (Abnormal) Collected: 06/13/23 2226    Lab Status: Final result Specimen: Blood from Arm, Right Updated: 06/13/23 2341     LACTIC ACID 2 5 mmol/L     Narrative:      Result may be elevated if tourniquet was used during collection  Magnesium [[id number]]  (Abnormal) Collected: 06/13/23 2016    Lab Status: Final result Specimen: Blood from Arm, Right Updated: 06/13/23 2203     Magnesium 1 6 mg/dL     [place] draw [[id number]] Collected: 06/13/23 2016    Lab Status: Final result Specimen: Blood from Arm, Right Updated: 06/13/23 2201    Narrative: The following orders were created for panel order [place] draw    Procedure                               Abnormality         Status                     ---------                               -----------         ------                     [id number] Final result               Green / Black tube on [facility][[id number]]                       Final result                 Please view results for these tests on the individual orders  Lactic acid, plasma (w/reflex if result > 2 0) [[id number]]  (Abnormal) Collected: 06/13/23 2055    Lab Status: Final result Specimen: Blood from Arm, Right Updated: 06/13/23 2136     LACTIC ACID 2 6 mmol/L     Narrative:      Result may be elevated if tourniquet was used during collection      Comprehensive metabolic panel [[id number]]  (Abnormal) Collected: 06/13/23 2016    Lab Status: Final result Specimen: Blood from Arm, Right Updated: 06/13/23 2057     Sodium 137 mmol/L      Potassium 4 6 mmol/L      Chloride 99 mmol/L      CO2 27 mmol/L      ANION GAP 11 mmol/L      BUN 11 mg/dL      Creatinine 0 82 mg/dL      Glucose 225 mg/dL      Calcium 9 4 mg/dL      AST 30 U/L      ALT 43 U/L      Alkaline Phosphatase 79 U/L      Total Protein 7 6 g/dL      Albumin 4 0 g/dL      Total Bilirubin 0 56 mg/dL      eGFR 83 ml/min/1 73sq m     Narrative:      [place] guidelines for Chronic Kidney Disease (CKD):   •  Stage 1 with normal or high GFR (GFR > 90 mL/min/1 73 square meters)  •  Stage 2 Mild CKD (GFR = 60-89 mL/min/1 73 square meters)  •  Stage 3A Moderate CKD (GFR = 45-59 mL/min/1 73 square meters)  •  Stage 3B Moderate CKD (GFR = 30-44 mL/min/1 73 square meters)  •  Stage 4 Severe CKD (GFR = 15-29 mL/min/1 73 square meters)  •  Stage 5 End Stage CKD (GFR <15 mL/min/1 73 square meters)  Note: GFR calculation is accurate only with a steady state creatinine    Lipase [[id number]]  (Normal) Collected: 06/13/23 2016    Lab Status: Final result Specimen: Blood from Arm, Right Updated: 06/13/23 2057     Lipase 56 u/L     CBC and differential [[id number]]  (Abnormal) Collected: 06/13/23 2016    Lab Status: Final result Specimen: Blood from Arm, Right Updated: 06/13/23 2037     WBC 10 65 Thousand/uL RBC 4 96 Million/uL      Hemoglobin 14 5 g/dL      Hematocrit 45 7 %      MCV 92 fL      MCH 29 2 pg      MCHC 31 7 g/dL      RDW 13 1 %      MPV 9 7 fL      Platelets 413 Thousands/uL      nRBC 0 /100 WBCs      Neutrophils Relative 63 %      Immat GRANS % 1 %      Lymphocytes Relative 25 %      Monocytes Relative 6 %      Eosinophils Relative 4 %      Basophils Relative 1 %      Neutrophils Absolute 6 78 Thousands/µL      Immature Grans Absolute 0 09 Thousand/uL      Lymphocytes Absolute 2 63 Thousands/µL      Monocytes Absolute 0 68 Thousand/µL      Eosinophils Absolute 0 40 Thousand/µL      Basophils Absolute 0 07 Thousands/µL                  CT abdomen pelvis with contrast   Final Result by Bernie Graham DO (06/13 2246)      Colonic diverticulosis with minimal inflammatory changes around the sigmoid colon representing acute diverticulitis  No drainable collection  Recommend posttreatment colonoscopy to rule out underlying neoplasm  The study was marked in [facility] for immediate notification  Workstation performed: [id number]                    Procedures  Procedures         ED Course  ED Course as of 06/15/23 1648   Tue Jun 13, 2023 2037 CBC and differential(!)  Mild leukocytosis  Hg/Hct wnl  Plt wnl   2111 Lipase  Normal   2111 Comprehensive metabolic panel(!)  Mild hyperglycemia  Electrolytes normal   Transaminases normal    2144 Lactic acid, plasma (w/reflex if result > 2 0)(!!)  Elevated; will reheck after IV fluid administration   2203 CT completed and awaiting interpretation   2249 CT abdomen pelvis with contrast  FINDINGS:     ABDOMEN     LOWER CHEST: Trace pericardial effusion is seen   The heart is mildly enlarged      LIVER/BILIARY TREE: Hepatic steatosis and marked hepatomegaly     GALLBLADDER: Gallbladder is surgically absent      SPLEEN:  Unremarkable      PANCREAS:  Unremarkable      ADRENAL GLANDS:  Unremarkable      KIDNEYS/URETERS: Nonobstructing bilateral punctate intrarenal calculi  No evidence of hydronephrosis      STOMACH AND BOWEL: Colonic diverticulosis with minimal inflammatory changes around the sigmoid colon     APPENDIX:  No findings to suggest appendicitis      ABDOMINOPELVIC CAVITY:  No ascites  No pneumoperitoneum  No lymphadenopathy      VESSELS:  Unremarkable for patient's age      PELVIS     REPRODUCTIVE ORGANS:  Unremarkable for patient's age      URINARY BLADDER:  Unremarkable      ABDOMINAL WALL/INGUINAL REGIONS:  Unremarkable      OSSEOUS STRUCTURES:  No acute fracture or destructive osseous lesion      IMPRESSION:     Colonic diverticulosis with minimal inflammatory changes around the sigmoid colon representing acute diverticulitis  No drainable collection  Recommend posttreatment colonoscopy to rule out underlying neoplasm      The study was marked in [employer] for immediate notification  2300 CT findings were reviewed with the patient  She has relatively mild findings of diverticulitis without any obvious complication  This is even a less severe finding than what she had from the March 2023 episode  It would certainly be reasonable to manage her as an outpatient  I would await repeat lactic acid result as well as reassessment of pain control after completion of IV antibiotic  She was agreeable to this plan  A copy of the CT report was provided to patient  The recommendation for f/u colonoscopy was reviewed  Patient was advised to see GI after the most recent prior episode in March 2023: I advised that she see GI and discuss both the episodes of diverticulitis and need for colonoscopy     2336 Sign over to Dr Silvia Collier pending re-evaluation after abx administration and repeat lactic acid level         MDM    Disposition  Final diagnoses:   Acute diverticulitis     Time reflects when diagnosis was documented in both MDM as applicable and the Disposition within this note     Time User Action Codes Description Comment    6/13/2023 11:14 PM Jose Guadalupe Lopez Add [K57 [id number]] Acute diverticulitis       ED Disposition     ED Disposition   Discharge    Condition   Stable    Date/Time   Wed Jun 14, 2023  1:32 AM    Comment   Ofelia Smith discharge to home/self care                 Follow-up Information     Follow up With Specialties Details Why Bill 8057, [address]  Nurse Practitioner Schedule an appointment as soon as possible for a visit in 1 week For recheck of your symptoms [id number] [facility] [address]  [phone]            Discharge Medication List as of 6/14/2023  1:32 AM      START taking these medications    Details   amoxicillin-clavulanate (AUGMENTIN) 875-125 mg per tablet Take 1 tablet by mouth every 12 (twelve) hours for 7 days, Starting Tue 6/13/2023, Until Tue 6/20/2023, Normal         CONTINUE these medications which have NOT CHANGED    Details   acetaminophen (TYLENOL) 325 mg tablet Take 2 tablets (650 mg total) by mouth every 6 (six) hours as needed for mild pain, Starting Tue 2/21/2023, No Print      cariprazine (VRAYLAR) 3 MG capsule Take 1 capsule (3 mg total) by mouth daily Do not start before February 22, 2023 , Starting Wed 2/22/2023, No Print      desvenlafaxine succinate (PRISTIQ) 50 mg 24 hr tablet Take 1 tablet (50 mg total) by mouth daily Do not start before February 22, 2023 , Starting Wed 2/22/2023, No Print      dicyclomine (BENTYL) 10 mg capsule Take 2 capsules (20 mg total) by mouth every 6 (six) hours as needed (crampy diarrhea), Starting Tue 2/21/2023, Normal      escitalopram (LEXAPRO) 20 mg tablet Take 1 tablet (20 mg total) by mouth daily, Starting Wed 8/4/2021, Until Tue 9/20/2022, Normal      HumaLOG KwikPen 100 units/mL injection pen INJECT 16 UNITS 3 TIMES A DAY BEFORE MEALS, Historical Med      insulin glargine (LANTUS) 100 units/mL subcutaneous injection Inject 64 Units under the skin daily at bedtime, Starting Mon 8/2/2021, No Print      lamoTRIgine (LaMICtal) 100 mg tablet Take 1 tablet (100 mg total) by mouth 2 (two) times a day, Starting Tue 2/21/2023, No Print      lisinopril (ZESTRIL) 10 mg tablet Take 2 tablets (20 mg total) by mouth daily, Starting Sun 12/4/2022, No Print      LORazepam (ATIVAN) 0 5 mg tablet 0 5 mg every 8 (eight) hours as needed for anxiety, Starting Wed 10/6/2021, Historical Med      metFORMIN (GLUCOPHAGE) 500 mg tablet Take 1 tablet (500 mg total) by mouth 2 (two) times a day with meals, Starting Mon 8/31/2020, Until Thu 3/9/2023, Normal      pantoprazole (PROTONIX) 40 mg tablet Take 1 tablet (40 mg total) by mouth daily, Starting Tue 9/20/2022, Normal             No discharge procedures on file      PDMP Review       Value Time User    PDMP Reviewed  Yes 1/29/2023  4:14 AM Tay Love MD          ED Provider  Electronically Signed by           Azeem Camilo DO  06/15/23 9843

## 2023-06-14 NOTE — DISCHARGE INSTRUCTIONS
Please take the antibiotic for the full course  You can be rechecked by your regular doctor in about 1 week  You will need a colonoscopy in the near future after this episode is resolved  If you develop fever, shaking chills, lightheadedness, or any episodes of passing out, please go to the ER  Please continue your other medications at their current dosages and frequencies  This is the result of the CT scan:    CT abdomen pelvis with contrast: Colonic diverticulosis with minimal inflammatory changes around the sigmoid colon representing acute diverticulitis  No drainable collection   Recommend posttreatment colonoscopy to rule out underlying neoplasm

## 2023-06-14 NOTE — ED CARE HANDOFF
Emergency Department Sign Out Note        Sign out and transfer of care from Dr Jossie Heimlich  See Separate Emergency Department note  The patient, Mai Hay, was evaluated by the previous provider for diverticulitis  Workup Completed:  Yes except repeat lactic acid pending    ED Course / Workup Pending (followup): If repeat lactic acid level normal will discharge as per prior instructions and Rx already done  2nd repeat lactate 1 3 (normal) after more IVFs  Procedures  MDM        Disposition  Final diagnoses:   Acute diverticulitis     Time reflects when diagnosis was documented in both MDM as applicable and the Disposition within this note     Time User Action Codes Description Comment    6/13/2023 11:14 PM Antoinette Mccoy Add [K57 [id number]] Acute diverticulitis       ED Disposition     ED Disposition   Discharge    Condition   Stable    Date/Time   Wed Jun 14, 2023  1:32 AM    Comment   Cristiano Aponte Yochum discharge to home/self care  Follow-up Information     Follow up With Specialties Details Why Bill 8057, SUJATA Nurse Practitioner Schedule an appointment as soon as possible for a visit in 1 week For recheck of your symptoms [id number] [facility] [address] [place] [address]  [phone]          Patient's Medications   Discharge Prescriptions    AMOXICILLIN-CLAVULANATE (AUGMENTIN) 875-125 MG PER TABLET    Take 1 tablet by mouth every 12 (twelve) hours for 7 days       Start Date: 6/13/2023 End Date: 6/20/2023       Order Dose: 1 tablet       Quantity: 14 tablet    Refills: 0     No discharge procedures on file         ED Provider  Electronically Signed by     Torres Schultz MD  06/13/23 2832       Torres Schultz MD  06/14/23 5256

## 2023-06-30 ENCOUNTER — VBI (OUTPATIENT)
Dept: ADMINISTRATIVE | Facility: OTHER | Age: 48
End: 2023-06-30

## 2023-11-23 ENCOUNTER — HOSPITAL ENCOUNTER (INPATIENT)
Facility: HOSPITAL | Age: 48
LOS: 5 days | Discharge: HOME HEALTH | DRG: 720 | End: 2023-11-29
Attending: EMERGENCY MEDICINE | Admitting: INTERNAL MEDICINE
Payer: COMMERCIAL

## 2023-11-23 ENCOUNTER — APPOINTMENT (EMERGENCY)
Dept: CT IMAGING | Facility: HOSPITAL | Age: 48
DRG: 720 | End: 2023-11-23
Payer: COMMERCIAL

## 2023-11-23 DIAGNOSIS — L03.818 CELLULITIS OF OTHER SPECIFIED SITE: ICD-10-CM

## 2023-11-23 DIAGNOSIS — A41.9 SEVERE SEPSIS (HCC): ICD-10-CM

## 2023-11-23 DIAGNOSIS — L08.9 SOFT TISSUE INFECTION: ICD-10-CM

## 2023-11-23 DIAGNOSIS — E11.65 TYPE 2 DIABETES MELLITUS WITH HYPERGLYCEMIA, WITH LONG-TERM CURRENT USE OF INSULIN (HCC): Primary | ICD-10-CM

## 2023-11-23 DIAGNOSIS — R65.20 SEVERE SEPSIS (HCC): ICD-10-CM

## 2023-11-23 DIAGNOSIS — F41.1 GENERALIZED ANXIETY DISORDER: ICD-10-CM

## 2023-11-23 DIAGNOSIS — Z79.4 TYPE 2 DIABETES MELLITUS WITH HYPERGLYCEMIA, WITH LONG-TERM CURRENT USE OF INSULIN (HCC): Primary | ICD-10-CM

## 2023-11-23 DIAGNOSIS — B37.31 VAGINAL CANDIDIASIS: ICD-10-CM

## 2023-11-23 DIAGNOSIS — L02.91 ABSCESS: ICD-10-CM

## 2023-11-23 DIAGNOSIS — E11.65 UNCONTROLLED TYPE 2 DIABETES MELLITUS WITH HYPERGLYCEMIA (HCC): ICD-10-CM

## 2023-11-23 DIAGNOSIS — F33.2 MAJOR DEPRESSIVE DISORDER, RECURRENT SEVERE WITHOUT PSYCHOTIC FEATURES (HCC): ICD-10-CM

## 2023-11-23 LAB
ALBUMIN SERPL BCP-MCNC: 3.9 G/DL (ref 3.5–5)
ALP SERPL-CCNC: 107 U/L (ref 34–104)
ALT SERPL W P-5'-P-CCNC: 52 U/L (ref 7–52)
ANION GAP SERPL CALCULATED.3IONS-SCNC: 10 MMOL/L
AST SERPL W P-5'-P-CCNC: 27 U/L (ref 13–39)
BASE EX.OXY STD BLDV CALC-SCNC: 74.5 % (ref 60–80)
BASE EXCESS BLDV CALC-SCNC: 1.3 MMOL/L
BASOPHILS # BLD AUTO: 0.07 THOUSANDS/ÂΜL (ref 0–0.1)
BASOPHILS NFR BLD AUTO: 1 % (ref 0–1)
BETA-HYDROXYBUTYRATE: 0.2 MMOL/L
BILIRUB SERPL-MCNC: 0.59 MG/DL (ref 0.2–1)
BUN SERPL-MCNC: 15 MG/DL (ref 5–25)
CALCIUM SERPL-MCNC: 9.8 MG/DL (ref 8.4–10.2)
CHLORIDE SERPL-SCNC: 94 MMOL/L (ref 96–108)
CO2 SERPL-SCNC: 26 MMOL/L (ref 21–32)
CREAT SERPL-MCNC: 0.97 MG/DL (ref 0.6–1.3)
CRP SERPL HS-MCNC: 39.38 MG/L
EOSINOPHIL # BLD AUTO: 0.1 THOUSAND/ÂΜL (ref 0–0.61)
EOSINOPHIL NFR BLD AUTO: 1 % (ref 0–6)
ERYTHROCYTE [DISTWIDTH] IN BLOOD BY AUTOMATED COUNT: 13.2 % (ref 11.6–15.1)
GFR SERPL CREATININE-BSD FRML MDRD: 68 ML/MIN/1.73SQ M
GLUCOSE SERPL-MCNC: 518 MG/DL (ref 65–140)
GLUCOSE SERPL-MCNC: >500 MG/DL (ref 65–140)
HCO3 BLDV-SCNC: 26.9 MMOL/L (ref 24–30)
HCT VFR BLD AUTO: 46.2 % (ref 34.8–46.1)
HGB BLD-MCNC: 15.3 G/DL (ref 11.5–15.4)
IMM GRANULOCYTES # BLD AUTO: 0.09 THOUSAND/UL (ref 0–0.2)
IMM GRANULOCYTES NFR BLD AUTO: 1 % (ref 0–2)
LACTATE SERPL-SCNC: 3.3 MMOL/L (ref 0.5–2)
LYMPHOCYTES # BLD AUTO: 1.92 THOUSANDS/ÂΜL (ref 0.6–4.47)
LYMPHOCYTES NFR BLD AUTO: 16 % (ref 14–44)
MCH RBC QN AUTO: 30.4 PG (ref 26.8–34.3)
MCHC RBC AUTO-ENTMCNC: 33.1 G/DL (ref 31.4–37.4)
MCV RBC AUTO: 92 FL (ref 82–98)
MONOCYTES # BLD AUTO: 1.04 THOUSAND/ÂΜL (ref 0.17–1.22)
MONOCYTES NFR BLD AUTO: 9 % (ref 4–12)
NEUTROPHILS # BLD AUTO: 9.05 THOUSANDS/ÂΜL (ref 1.85–7.62)
NEUTS SEG NFR BLD AUTO: 72 % (ref 43–75)
NRBC BLD AUTO-RTO: 0 /100 WBCS
O2 CT BLDV-SCNC: 16.3 ML/DL
PCO2 BLDV: 45.9 MM HG (ref 42–50)
PH BLDV: 7.39 [PH] (ref 7.3–7.4)
PLATELET # BLD AUTO: 267 THOUSANDS/UL (ref 149–390)
PMV BLD AUTO: 10.2 FL (ref 8.9–12.7)
PO2 BLDV: 39 MM HG (ref 35–45)
POTASSIUM SERPL-SCNC: 4.3 MMOL/L (ref 3.5–5.3)
PROT SERPL-MCNC: 7.1 G/DL (ref 6.4–8.4)
RBC # BLD AUTO: 5.04 MILLION/UL (ref 3.81–5.12)
SODIUM SERPL-SCNC: 130 MMOL/L (ref 135–147)
WBC # BLD AUTO: 12.27 THOUSAND/UL (ref 4.31–10.16)

## 2023-11-23 PROCEDURE — 99285 EMERGENCY DEPT VISIT HI MDM: CPT | Performed by: EMERGENCY MEDICINE

## 2023-11-23 PROCEDURE — 96366 THER/PROPH/DIAG IV INF ADDON: CPT

## 2023-11-23 PROCEDURE — 84145 PROCALCITONIN (PCT): CPT | Performed by: NURSE PRACTITIONER

## 2023-11-23 PROCEDURE — 74177 CT ABD & PELVIS W/CONTRAST: CPT

## 2023-11-23 PROCEDURE — 87040 BLOOD CULTURE FOR BACTERIA: CPT

## 2023-11-23 PROCEDURE — 82010 KETONE BODYS QUAN: CPT

## 2023-11-23 PROCEDURE — 96375 TX/PRO/DX INJ NEW DRUG ADDON: CPT

## 2023-11-23 PROCEDURE — 99285 EMERGENCY DEPT VISIT HI MDM: CPT

## 2023-11-23 PROCEDURE — 96365 THER/PROPH/DIAG IV INF INIT: CPT

## 2023-11-23 PROCEDURE — 82948 REAGENT STRIP/BLOOD GLUCOSE: CPT

## 2023-11-23 PROCEDURE — 82805 BLOOD GASES W/O2 SATURATION: CPT

## 2023-11-23 PROCEDURE — 96367 TX/PROPH/DG ADDL SEQ IV INF: CPT

## 2023-11-23 PROCEDURE — 96361 HYDRATE IV INFUSION ADD-ON: CPT

## 2023-11-23 PROCEDURE — G1004 CDSM NDSC: HCPCS

## 2023-11-23 PROCEDURE — 86141 C-REACTIVE PROTEIN HS: CPT

## 2023-11-23 PROCEDURE — 36415 COLL VENOUS BLD VENIPUNCTURE: CPT

## 2023-11-23 PROCEDURE — 85025 COMPLETE CBC W/AUTO DIFF WBC: CPT

## 2023-11-23 PROCEDURE — 80053 COMPREHEN METABOLIC PANEL: CPT

## 2023-11-23 PROCEDURE — 83605 ASSAY OF LACTIC ACID: CPT

## 2023-11-23 RX ORDER — ONDANSETRON 2 MG/ML
4 INJECTION INTRAMUSCULAR; INTRAVENOUS ONCE
Status: COMPLETED | OUTPATIENT
Start: 2023-11-23 | End: 2023-11-23

## 2023-11-23 RX ORDER — FLUCONAZOLE 2 MG/ML
200 INJECTION, SOLUTION INTRAVENOUS ONCE
Status: COMPLETED | OUTPATIENT
Start: 2023-11-23 | End: 2023-11-24

## 2023-11-23 RX ORDER — ONDANSETRON 2 MG/ML
INJECTION INTRAMUSCULAR; INTRAVENOUS
Status: COMPLETED
Start: 2023-11-23 | End: 2023-11-23

## 2023-11-23 RX ADMIN — ONDANSETRON 4 MG: 2 INJECTION INTRAMUSCULAR; INTRAVENOUS at 19:30

## 2023-11-23 RX ADMIN — SODIUM CHLORIDE 1000 ML: 0.9 INJECTION, SOLUTION INTRAVENOUS at 21:58

## 2023-11-23 RX ADMIN — FLUCONAZOLE 200 MG: 2 INJECTION, SOLUTION INTRAVENOUS at 22:29

## 2023-11-23 RX ADMIN — IOHEXOL 100 ML: 350 INJECTION, SOLUTION INTRAVENOUS at 22:02

## 2023-11-23 RX ADMIN — SODIUM CHLORIDE 1000 ML: 0.9 INJECTION, SOLUTION INTRAVENOUS at 20:12

## 2023-11-23 RX ADMIN — PIPERACILLIN AND TAZOBACTAM 3.38 G: 36; 4.5 INJECTION, POWDER, FOR SOLUTION INTRAVENOUS at 21:14

## 2023-11-24 PROBLEM — E11.65 TYPE 2 DIABETES MELLITUS WITH HYPERGLYCEMIA, WITH LONG-TERM CURRENT USE OF INSULIN (HCC): Status: ACTIVE | Noted: 2019-09-04

## 2023-11-24 PROBLEM — L03.90 CELLULITIS: Status: ACTIVE | Noted: 2023-11-24

## 2023-11-24 LAB
BACTERIA UR QL AUTO: ABNORMAL /HPF
BILIRUB UR QL STRIP: NEGATIVE
CLARITY UR: CLEAR
COLOR UR: ABNORMAL
EST. AVERAGE GLUCOSE BLD GHB EST-MCNC: 303 MG/DL
FLUAV RNA RESP QL NAA+PROBE: NEGATIVE
FLUBV RNA RESP QL NAA+PROBE: NEGATIVE
GLUCOSE SERPL-MCNC: 233 MG/DL (ref 65–140)
GLUCOSE SERPL-MCNC: 240 MG/DL (ref 65–140)
GLUCOSE SERPL-MCNC: 274 MG/DL (ref 65–140)
GLUCOSE SERPL-MCNC: 295 MG/DL (ref 65–140)
GLUCOSE SERPL-MCNC: 298 MG/DL (ref 65–140)
GLUCOSE SERPL-MCNC: 315 MG/DL (ref 65–140)
GLUCOSE SERPL-MCNC: 337 MG/DL (ref 65–140)
GLUCOSE SERPL-MCNC: 347 MG/DL (ref 65–140)
GLUCOSE SERPL-MCNC: 390 MG/DL (ref 65–140)
GLUCOSE SERPL-MCNC: 408 MG/DL (ref 65–140)
GLUCOSE SERPL-MCNC: 439 MG/DL (ref 65–140)
GLUCOSE UR STRIP-MCNC: ABNORMAL MG/DL
HBA1C MFR BLD: 12.2 %
HGB UR QL STRIP.AUTO: ABNORMAL
KETONES UR STRIP-MCNC: NEGATIVE MG/DL
LACTATE SERPL-SCNC: 1.3 MMOL/L (ref 0.5–2)
LEUKOCYTE ESTERASE UR QL STRIP: ABNORMAL
NITRITE UR QL STRIP: NEGATIVE
NON-SQ EPI CELLS URNS QL MICRO: ABNORMAL /HPF
PH UR STRIP.AUTO: 5.5 [PH]
PROCALCITONIN SERPL-MCNC: 0.13 NG/ML
PROCALCITONIN SERPL-MCNC: 0.17 NG/ML
PROT UR STRIP-MCNC: NEGATIVE MG/DL
RBC #/AREA URNS AUTO: ABNORMAL /HPF
RSV RNA RESP QL NAA+PROBE: NEGATIVE
SARS-COV-2 RNA RESP QL NAA+PROBE: NEGATIVE
SP GR UR STRIP.AUTO: 1.04 (ref 1–1.03)
UROBILINOGEN UR STRIP-ACNC: <2 MG/DL
WBC #/AREA URNS AUTO: ABNORMAL /HPF

## 2023-11-24 PROCEDURE — 83036 HEMOGLOBIN GLYCOSYLATED A1C: CPT | Performed by: NURSE PRACTITIONER

## 2023-11-24 PROCEDURE — 99223 1ST HOSP IP/OBS HIGH 75: CPT | Performed by: STUDENT IN AN ORGANIZED HEALTH CARE EDUCATION/TRAINING PROGRAM

## 2023-11-24 PROCEDURE — 81001 URINALYSIS AUTO W/SCOPE: CPT | Performed by: NURSE PRACTITIONER

## 2023-11-24 PROCEDURE — 82948 REAGENT STRIP/BLOOD GLUCOSE: CPT

## 2023-11-24 PROCEDURE — 87205 SMEAR GRAM STAIN: CPT

## 2023-11-24 PROCEDURE — 0U9M0ZX DRAINAGE OF VULVA, OPEN APPROACH, DIAGNOSTIC: ICD-10-PCS | Performed by: SURGERY

## 2023-11-24 PROCEDURE — 87070 CULTURE OTHR SPECIMN AEROBIC: CPT

## 2023-11-24 PROCEDURE — 90471 IMMUNIZATION ADMIN: CPT | Performed by: INTERNAL MEDICINE

## 2023-11-24 PROCEDURE — 96367 TX/PROPH/DG ADDL SEQ IV INF: CPT

## 2023-11-24 PROCEDURE — 36415 COLL VENOUS BLD VENIPUNCTURE: CPT

## 2023-11-24 PROCEDURE — 99223 1ST HOSP IP/OBS HIGH 75: CPT | Performed by: INTERNAL MEDICINE

## 2023-11-24 PROCEDURE — 84145 PROCALCITONIN (PCT): CPT | Performed by: NURSE PRACTITIONER

## 2023-11-24 PROCEDURE — 10061 I&D ABSCESS COMP/MULTIPLE: CPT | Performed by: SURGERY

## 2023-11-24 PROCEDURE — 0241U HB NFCT DS VIR RESP RNA 4 TRGT: CPT | Performed by: NURSE PRACTITIONER

## 2023-11-24 PROCEDURE — 90686 IIV4 VACC NO PRSV 0.5 ML IM: CPT | Performed by: INTERNAL MEDICINE

## 2023-11-24 PROCEDURE — 83605 ASSAY OF LACTIC ACID: CPT

## 2023-11-24 PROCEDURE — 99222 1ST HOSP IP/OBS MODERATE 55: CPT | Performed by: SURGERY

## 2023-11-24 RX ORDER — LORAZEPAM 0.5 MG/1
0.5 TABLET ORAL EVERY 8 HOURS PRN
Status: DISCONTINUED | OUTPATIENT
Start: 2023-11-24 | End: 2023-11-29 | Stop reason: HOSPADM

## 2023-11-24 RX ORDER — DESVENLAFAXINE SUCCINATE 50 MG/1
50 TABLET, EXTENDED RELEASE ORAL DAILY
Status: DISCONTINUED | OUTPATIENT
Start: 2023-11-24 | End: 2023-11-24

## 2023-11-24 RX ORDER — INSULIN LISPRO 100 [IU]/ML
2-12 INJECTION, SOLUTION INTRAVENOUS; SUBCUTANEOUS
Status: DISCONTINUED | OUTPATIENT
Start: 2023-11-24 | End: 2023-11-24

## 2023-11-24 RX ORDER — LAMOTRIGINE 100 MG/1
100 TABLET ORAL 2 TIMES DAILY
Status: DISCONTINUED | OUTPATIENT
Start: 2023-11-24 | End: 2023-11-29 | Stop reason: HOSPADM

## 2023-11-24 RX ORDER — LIDOCAINE HYDROCHLORIDE 10 MG/ML
10 INJECTION, SOLUTION EPIDURAL; INFILTRATION; INTRACAUDAL; PERINEURAL ONCE
Status: COMPLETED | OUTPATIENT
Start: 2023-11-24 | End: 2023-11-24

## 2023-11-24 RX ORDER — LISINOPRIL 20 MG/1
20 TABLET ORAL DAILY
Status: DISCONTINUED | OUTPATIENT
Start: 2023-11-24 | End: 2023-11-29 | Stop reason: HOSPADM

## 2023-11-24 RX ORDER — HEPARIN SODIUM 5000 [USP'U]/ML
7500 INJECTION, SOLUTION INTRAVENOUS; SUBCUTANEOUS EVERY 8 HOURS SCHEDULED
Status: DISCONTINUED | OUTPATIENT
Start: 2023-11-24 | End: 2023-11-29 | Stop reason: HOSPADM

## 2023-11-24 RX ORDER — INSULIN LISPRO 100 [IU]/ML
16 INJECTION, SOLUTION INTRAVENOUS; SUBCUTANEOUS
Status: DISCONTINUED | OUTPATIENT
Start: 2023-11-24 | End: 2023-11-24

## 2023-11-24 RX ORDER — CEFAZOLIN SODIUM 2 G/50ML
2000 SOLUTION INTRAVENOUS EVERY 8 HOURS
Status: DISCONTINUED | OUTPATIENT
Start: 2023-11-24 | End: 2023-11-24

## 2023-11-24 RX ORDER — ACETAMINOPHEN 325 MG/1
650 TABLET ORAL EVERY 6 HOURS PRN
Status: DISCONTINUED | OUTPATIENT
Start: 2023-11-24 | End: 2023-11-29 | Stop reason: HOSPADM

## 2023-11-24 RX ORDER — METOCLOPRAMIDE HYDROCHLORIDE 5 MG/ML
10 INJECTION INTRAMUSCULAR; INTRAVENOUS EVERY 6 HOURS PRN
Status: DISCONTINUED | OUTPATIENT
Start: 2023-11-24 | End: 2023-11-28 | Stop reason: ALTCHOICE

## 2023-11-24 RX ORDER — INSULIN LISPRO 100 [IU]/ML
1-5 INJECTION, SOLUTION INTRAVENOUS; SUBCUTANEOUS
Status: DISCONTINUED | OUTPATIENT
Start: 2023-11-24 | End: 2023-11-24

## 2023-11-24 RX ORDER — INSULIN LISPRO 100 [IU]/ML
1-5 INJECTION, SOLUTION INTRAVENOUS; SUBCUTANEOUS ONCE
Status: COMPLETED | OUTPATIENT
Start: 2023-11-24 | End: 2023-11-24

## 2023-11-24 RX ORDER — LABETALOL HYDROCHLORIDE 5 MG/ML
10 INJECTION, SOLUTION INTRAVENOUS EVERY 4 HOURS PRN
Status: DISCONTINUED | OUTPATIENT
Start: 2023-11-24 | End: 2023-11-29 | Stop reason: HOSPADM

## 2023-11-24 RX ORDER — PANTOPRAZOLE SODIUM 40 MG/1
40 TABLET, DELAYED RELEASE ORAL DAILY
Status: DISCONTINUED | OUTPATIENT
Start: 2023-11-24 | End: 2023-11-29 | Stop reason: HOSPADM

## 2023-11-24 RX ORDER — DICYCLOMINE HYDROCHLORIDE 10 MG/1
20 CAPSULE ORAL EVERY 6 HOURS PRN
Status: DISCONTINUED | OUTPATIENT
Start: 2023-11-24 | End: 2023-11-29 | Stop reason: HOSPADM

## 2023-11-24 RX ORDER — INSULIN GLARGINE 100 [IU]/ML
70 INJECTION, SOLUTION SUBCUTANEOUS
Status: DISCONTINUED | OUTPATIENT
Start: 2023-11-24 | End: 2023-11-24

## 2023-11-24 RX ORDER — HYDROMORPHONE HCL/PF 1 MG/ML
1 SYRINGE (ML) INJECTION ONCE
Status: COMPLETED | OUTPATIENT
Start: 2023-11-24 | End: 2023-11-24

## 2023-11-24 RX ADMIN — DESVENLAFAXINE SUCCINATE 50 MG: 50 TABLET, EXTENDED RELEASE ORAL at 10:19

## 2023-11-24 RX ADMIN — LISINOPRIL 20 MG: 20 TABLET ORAL at 09:12

## 2023-11-24 RX ADMIN — HEPARIN SODIUM 7500 UNITS: 5000 INJECTION INTRAVENOUS; SUBCUTANEOUS at 23:06

## 2023-11-24 RX ADMIN — CEFAZOLIN SODIUM 2000 MG: 2 SOLUTION INTRAVENOUS at 05:10

## 2023-11-24 RX ADMIN — HYDROMORPHONE HYDROCHLORIDE 1 MG: 1 INJECTION, SOLUTION INTRAMUSCULAR; INTRAVENOUS; SUBCUTANEOUS at 20:14

## 2023-11-24 RX ADMIN — PANTOPRAZOLE SODIUM 40 MG: 40 TABLET, DELAYED RELEASE ORAL at 09:12

## 2023-11-24 RX ADMIN — VANCOMYCIN HYDROCHLORIDE 750 MG: 750 INJECTION, SOLUTION INTRAVENOUS at 13:57

## 2023-11-24 RX ADMIN — HEPARIN SODIUM 7500 UNITS: 5000 INJECTION INTRAVENOUS; SUBCUTANEOUS at 15:53

## 2023-11-24 RX ADMIN — LORAZEPAM 0.5 MG: 0.5 TABLET ORAL at 19:50

## 2023-11-24 RX ADMIN — INSULIN LISPRO 16 UNITS: 100 INJECTION, SOLUTION INTRAVENOUS; SUBCUTANEOUS at 08:02

## 2023-11-24 RX ADMIN — METOCLOPRAMIDE 10 MG: 5 INJECTION, SOLUTION INTRAMUSCULAR; INTRAVENOUS at 06:23

## 2023-11-24 RX ADMIN — INSULIN LISPRO 10 UNITS: 100 INJECTION, SOLUTION INTRAVENOUS; SUBCUTANEOUS at 08:01

## 2023-11-24 RX ADMIN — HEPARIN SODIUM 7500 UNITS: 5000 INJECTION INTRAVENOUS; SUBCUTANEOUS at 05:20

## 2023-11-24 RX ADMIN — SODIUM CHLORIDE 15.2 UNITS/HR: 9 INJECTION, SOLUTION INTRAVENOUS at 12:15

## 2023-11-24 RX ADMIN — VANCOMYCIN HYDROCHLORIDE 1250 MG: 5 INJECTION, POWDER, LYOPHILIZED, FOR SOLUTION INTRAVENOUS at 00:12

## 2023-11-24 RX ADMIN — INSULIN LISPRO 4 UNITS: 100 INJECTION, SOLUTION INTRAVENOUS; SUBCUTANEOUS at 06:23

## 2023-11-24 RX ADMIN — LAMOTRIGINE 100 MG: 100 TABLET ORAL at 09:12

## 2023-11-24 RX ADMIN — LIDOCAINE HYDROCHLORIDE 10 ML: 10 INJECTION, SOLUTION EPIDURAL; INFILTRATION; INTRACAUDAL; PERINEURAL at 19:00

## 2023-11-24 RX ADMIN — INFLUENZA VIRUS VACCINE 0.5 ML: 15; 15; 15; 15 SUSPENSION INTRAMUSCULAR at 18:02

## 2023-11-24 RX ADMIN — SODIUM CHLORIDE 12 UNITS/HR: 9 INJECTION, SOLUTION INTRAVENOUS at 10:10

## 2023-11-24 RX ADMIN — LAMOTRIGINE 100 MG: 100 TABLET ORAL at 18:02

## 2023-11-24 RX ADMIN — INSULIN GLARGINE 35 UNITS: 100 INJECTION, SOLUTION SUBCUTANEOUS at 05:20

## 2023-11-24 NOTE — ASSESSMENT & PLAN NOTE
Patient reports history of recurrent abscess that is normally in her mons pubis. Patient reports recurrent vaginal yeast infections as well. Received 200 mg of IV Diflucan in the ED. Received IV Zosyn and IV Vanco in ED. Continue IV Ancef.   General surgery was consulted  Abscess was drained and packed    Plan:  General surgery continues to follow patient  Continue IV vancomycin at this time

## 2023-11-24 NOTE — ASSESSMENT & PLAN NOTE
Lab Results   Component Value Date    HGBA1C 12.2 (H) 11/24/2023       Recent Labs     11/25/23  0918 11/25/23  1103 11/25/23  1334 11/25/23  1506   POCGLU 244* 253* 247* 206*       Blood Sugar Average: Last 72 hrs:  (P) [id number]  Patient presented with complaints of dizziness and nausea with associated elevated blood sugar readings. POA, serum glucose 518, patient was not in DKA. Received 2 L NSS in ED. Repeat glucose 347  Home regimen includes Metformin, will hold while inpatient. Home regimen of 64 units of Lantus HS nd 16 units of Lispro TID w/ meals. Hemoglobin A1c 12.2    Plan:  Continue to hold metformin while hospitalized  No chronology consulted appreciate recommendation  Continue patient on insulin drip while waiting for endocrinology consult  Hypoglycemia protocol.

## 2023-11-24 NOTE — ED ATTENDING ATTESTATION
11/23/2023  IKeisha MD, saw and evaluated the patient. I have discussed the patient with the resident/non-physician practitioner and agree with the resident's/non-physician practitioner's findings, Plan of Care, and MDM as documented in the resident's/non-physician practitioner's note, except where noted. All available labs and Radiology studies were reviewed. I was present for key portions of any procedure(s) performed by the resident/non-physician practitioner and I was immediately available to provide assistance. At this point I agree with the current assessment done in the Emergency Department. I have conducted an independent evaluation of this patient a history and physical is as follows:    ED Course  ED Course as of 11/23/23 2051   Thu Nov 23, 2023   230 68-year-old female presenting to the emergency department with acute onset of nausea, dizziness, weakness, increased urination and thirst since earlier this morning. Patient additionally reports frequent yeast infections and a burst abscess on her pannus surrounding erythema, notable skin discoloration along her thighs and vulva. [place] white discharge noted in vagina. Pain on palpation of the suprapubic region/pannus. Patient also with notable round scaly lesions, 3 on her sacral region. Tachycardia noted. Lungs clear to auscultation. Clinical presentation puts her at risk for the following differential diagnoses: Viki's gangrene, abscess, cellulitis, sepsis, vaginal yeast infection, tinea corporis, uncontrolled diabetes, DKA, HHS. Lab work and imaging was ordered. 1910 POC Glucose(!!): >500   1911 Temperature: 99.4 °F (37.4 °C)   1911 Pulse(!): 116   1911 SpO2: 94 %   2024 WBC(!): 12.27   2025 pH, Gavin: 7.386   2050 [place] antibiotic therapy was ordered due to elevated white count, obvious source of infection at this time, tachycardia. Zosyn and vancomycin ordered in addition to additional fluid bolus.   Diflucan was also ordered for yeast infection. Lab work is still pending at this time in addition to imaging. Anticipate admission. Signed out to Dr. Barrera Memos.          Critical Care Time  Procedures

## 2023-11-24 NOTE — PLAN OF CARE
Problem: Potential for Falls  Goal: Patient will remain free of falls  Description: INTERVENTIONS:  - Educate patient/family on patient safety including physical limitations  - Instruct patient to call for assistance with activity   - Consult OT/PT to assist with strengthening/mobility   - Keep Call bell within reach  - Keep bed low and locked with side rails adjusted as appropriate  - Keep care items and personal belongings within reach  - Initiate and maintain comfort rounds  - Make Fall Risk Sign visible to staff  - Offer Toileting every 3 Hours, in advance of need  - Initiate/Maintain alarm  - Obtain necessary fall risk management equipment:   - Apply yellow socks and bracelet for high fall risk patients  - Consider moving patient to room near nurses station  Outcome: Progressing

## 2023-11-24 NOTE — PROGRESS NOTES
Brittany Rowe is a 46 y.o. female who is currently ordered Vancomycin IV with management by the Pharmacy Consult service. Relevant clinical data and objective / subjective history reviewed. Vancomycin Assessment:  Indication and Goal AUC/Trough: Soft tissue (goal -600, trough >10), MRSA  Clinical Status: worsening  Micro:   No growth noted currently  Renal Function:  SCr: 0.97 mg/dL  CrCl: 85.1 mL/min  Renal replacement: Not on dialysis  Days of Therapy: 1  Current Dose: 2,250mg IV once  Vancomycin Plan:  New Dosin,250mg IV once as loading dose then 750mg IV q12h  Estimated AUC: 413 mcg*hr/mL  Estimated Trough: 12.4 mcg/mL  Next Level: 23 0600  Renal Function Monitoring: Daily BMP and [place] will continue to follow closely for s/sx of nephrotoxicity, infusion reactions and appropriateness of therapy. BMP and CBC will be ordered per protocol. We will continue to follow the patient’s culture results and clinical progress daily.     Lisa Domingo, Pharmacist

## 2023-11-24 NOTE — ASSESSMENT & PLAN NOTE
Blood pressure is reviewed and acceptable. Last recorded pressure: 135/58  Continue Lisinopril. Monitor blood pressure.

## 2023-11-24 NOTE — ED CARE HANDOFF
Emergency Department Sign Out Note        Sign out and transfer of care from Dr. Meredith Guerra. See Separate Emergency Department note. The patient, Patrick Martinez, was evaluated by the previous provider for symptomatic hyperglycema. Workup Completed:  Labs  Imaging    ED Course / Workup Pending (followup):  IV antibiotics   IV fluids  Antifungal  CT abd/pelvis to r/o jen's gangrene                                  ED Course as of 11/24/23 0349   Thu Nov 23, 2023   2256 HIGH SENSITIVITY C-REACTIVE PROTEIN(!): 39.38   Fri Nov 24, 2023   0008 CT abdomen pelvis with contrast  IMPRESSION:     Hepatomegaly and mild diffuse hepatic steatosis. Bilateral nephrolithiasis without hydronephrosis. No evidence of bowel obstruction, inflammation, appendicitis, free air, or free fluid. A few small superficial varicosities noted. A nonspecific   pathologically enlarged 2.7 x 1.5 cm right inguinal lymph node is incidentally seen. Scattered groundglass opacity in the visualized lung bases noted which may be secondary to infectious/inflammatory process of the lung parenchyma, recommend clinical   correlation. 0037 POC Glucose(!): 347   0051 Pt meets criteria for severe sepsis likely 2/2 soft tissue infection. CT without evidence of nec fasc/jen gangrene. Will admit to medicine for IV abx. Procedures  Medical Decision Making  CT abd/pelvis without evidence of jen's gangrene. Pt meets criteria for severe sepsis likely 2/2 soft-tissue infection. Pt will be admitted for IV antibiotics. She remained stable in the department while under my care. Amount and/or Complexity of Data Reviewed  Labs: ordered. Decision-making details documented in ED Course. Radiology: ordered. Decision-making details documented in ED Course. Risk  Prescription drug management. Decision regarding hospitalization.             Disposition  Final diagnoses:   Vaginal candidiasis   Uncontrolled type 2 diabetes mellitus with hyperglycemia ([address])   Severe sepsis ([address])   Soft tissue infection     Time reflects when diagnosis was documented in both MDM as applicable and the Disposition within this note       Time User Action Codes Description Comment    11/23/2023  9:00 PM Woodson Mendiola Add [B37.31] Vaginal candidiasis     11/23/2023  9:00 PM Woodson Mendiola Add [E11.9] Diabetes mellitus, new onset ([address])     11/23/2023  9:01 PM Christie Olmos [E11.9] Diabetes mellitus, new onset ([address])     11/23/2023  9:01 PM Woodson Mendiola Add [E11.65] Uncontrolled type 2 diabetes mellitus with hyperglycemia ([address])     11/23/2023  9:01 PM Woodson Mendiola Modify [B37.31] Vaginal candidiasis     11/23/2023  9:01 PM Woodson Mendiola Modify [E11.65] Uncontrolled type 2 diabetes mellitus with hyperglycemia ([address])     11/24/2023 12:17 AM Mandi Mihai Add [A41.9,  R65.20] Severe sepsis ([address])     11/24/2023 12:17 AM Mandi Mihai Add [L08.9] Soft tissue infection     11/24/2023 12:17 AM Mandi Mihai Modify [E11.65] Uncontrolled type 2 diabetes mellitus with hyperglycemia ([address])     11/24/2023 12:17 AM Mandi Mihai Modify [A41.9,  R65.20] Severe sepsis [facility])           ED Disposition       ED Disposition   Admit    Condition   Stable    Date/Time   Fri Nov 24, 2023 12:51 AM    Comment   Case was discussed with NANCY and the patient's admission status was agreed to be Admission Status: inpatient status to the service of Dr. Martha Carlos . Follow-up Information    None       Current Discharge Medication List        CONTINUE these medications which have NOT CHANGED    Details   acetaminophen (TYLENOL) 325 mg tablet Take 2 tablets (650 mg total) by mouth every 6 (six) hours as needed for mild pain  Refills: 0    Associated Diagnoses: Colitis      cariprazine (VRAYLAR) 3 MG capsule Take 1 capsule (3 mg total) by mouth daily Do not start before February 22, 2023.   Refills: 0    Associated Diagnoses: Major depressive disorder, recurrent severe without psychotic features (HCC)      desvenlafaxine succinate (PRISTIQ) 50 mg 24 hr tablet Take 1 tablet (50 mg total) by mouth daily Do not start before February 22, 2023. Refills: 0    Associated Diagnoses: Major depressive disorder, recurrent severe without psychotic features (HCC)      dicyclomine (BENTYL) 10 mg capsule Take 2 capsules (20 mg total) by mouth every 6 (six) hours as needed (crampy diarrhea)  Qty: 20 capsule, Refills: 0    Associated Diagnoses: Colitis      escitalopram (LEXAPRO) 20 mg tablet Take 1 tablet (20 mg total) by mouth daily  Qty: 30 tablet, Refills: 0    Associated Diagnoses: Major depressive disorder, recurrent severe without psychotic features (HCC)      HumaLOG KwikPen 100 units/mL injection pen INJECT 16 UNITS 3 TIMES A DAY BEFORE MEALS      insulin glargine (LANTUS) 100 units/mL subcutaneous injection Inject 64 Units under the skin daily at bedtime    Associated Diagnoses: Type 2 diabetes mellitus with complication, with long-term current use of insulin (HCC)      lamoTRIgine (LaMICtal) 100 mg tablet Take 1 tablet (100 mg total) by mouth 2 (two) times a day  Refills: 0    Associated Diagnoses: Major depressive disorder, recurrent severe without psychotic features (HCC)      lisinopril (ZESTRIL) 10 mg tablet Take 2 tablets (20 mg total) by mouth daily  Qty: 90 tablet, Refills: 0    Associated Diagnoses: Essential hypertension      LORazepam (ATIVAN) 0.5 mg tablet 0.5 mg every 8 (eight) hours as needed for anxiety      metFORMIN (GLUCOPHAGE) 500 mg tablet Take 1 tablet (500 mg total) by mouth 2 (two) times a day with meals  Qty: 60 tablet, Refills: 4    Associated Diagnoses: Type 2 diabetes mellitus with complication, with long-term current use of insulin ([address]);  Other fatigue      pantoprazole (PROTONIX) 40 mg tablet Take 1 tablet (40 mg total) by mouth daily  Qty: 90 tablet, Refills: 1    Associated Diagnoses: Gastroesophageal reflux disease without esophagitis           No discharge procedures on file.       ED Provider  Electronically Signed by     Abril Cleveland MD  11/24/23 1181

## 2023-11-24 NOTE — CASE MANAGEMENT
Case Management Progress Note    Patient name Chandrakant Montero  Location S /S -44 MRN [id number]  : 1975 Date 2023       LOS (days): 0  Geometric Mean LOS (GMLOS) (days):   Days to GMLOS:        OBJECTIVE:        Current admission status: Inpatient  Preferred Pharmacy:   [facility]/pharmacy #[id number]- [facility] [address] [facility]  Phone: [phone] Fax: [phone]    Primary Care Provider: SUJATA Garcia    Primary Insurance: [address] [place] [address]  Secondary Insurance:     PROGRESS NOTE:  CM made a in basket referral to [facility] [employer] to Issac Roche to assist with the Pt's request for the services of a partial program.

## 2023-11-24 NOTE — H&P
[address]  H&P  Name: Jered Granger 46 y.o. female I MRN: [id number]  Unit/Bed#: S -59 I Date of Admission: 11/23/2023   Date of Service: 11/24/2023 I Hospital Day: 0      Assessment/Plan   * Severe sepsis [facility])  Assessment & Plan  SIRS criteria: Tachycardia, leukocytosis  Suspected source: Cellulitis of mons pubis with associated candida vulvovaginitis   Obtain UA. Lactic acid: 3.3 --> 1.3  End organ damage: Lactic acidosis  IV Fluids: Received 2 L NC in ED. IV antibiotics: Received IV Zosyn and IV Vancomycin in ED. Continue with IV Ancef. Follow up on culture results. Monitor vital signs, laboratory studies. Cellulitis of mons pubis  Assessment & Plan  Patient reports history of recurrent abscess that is normally in her mons pubis. Patient reports recurrent vaginal yeast infections as well. Received 200 mg of IV Diflucan in the ED. Received IV Zosyn and IV Vanco in ED. Continue IV Ancef. Type 2 diabetes mellitus with hyperglycemia, with long-term current use of insulin [facility])  Assessment & Plan  Lab Results   Component Value Date    HGBA1C 7.8 (H) 12/14/2022       Recent Labs     11/23/23  1904 11/24/23  0030   POCGLU >500* 347*         Blood Sugar Average: Last 72 hrs:  (P) 347  Patient presented with complaints of dizziness and nausea with associated elevated blood sugar readings. POA, serum glucose 518, patient was not in DKA. Received 2 L NSS in ED. Repeat glucose 347  Home regimen includes Metformin, will hold while inpatient. Continue home regimen of 64 units of Lantus HS nd 16 units of Lispro TID w/ meals. Obtain A1C  Accu-checks and SSI. Hypoglycemia protocol. Mixed hyperlipidemia  Assessment & Plan  Patient is not currently on a statin. Essential hypertension  Assessment & Plan  Blood pressure is reviewed and acceptable. Last recorded pressure: 135/58  Continue Lisinopril. Monitor blood pressure.     Class 3 severe obesity due to excess calories with body mass index (BMI) of 60.0 to 69.9 in adult [facility])  Assessment & Plan  Encourage lifestyle modifications. GERD (gastroesophageal reflux disease)  Assessment & Plan  Continue PPI and Bentyl prn. Generalized anxiety disorder  Assessment & Plan  Continue Cariprazine and Desvenlafaxine. VTE Pharmacologic Prophylaxis: VTE Score: 6 High Risk (Score >/= 5) - Pharmacological DVT Prophylaxis Ordered: enoxaparin (Lovenox). Sequential Compression Devices Ordered. Code Status: Level 1 - Full Code   Discussion with family: Patient declined call to . Anticipated Length of Stay: Patient will be admitted on an inpatient basis with an anticipated length of stay of greater than 2 midnights secondary to severe sepsis. Total Time Spent on Date of Encounter in care of patient: 75 mins. This time was spent on one or more of the following: performing physical exam; counseling and coordination of care; obtaining or reviewing history; documenting in the medical record; reviewing/ordering tests, medications or procedures; communicating with other healthcare professionals and discussing with patient's family/caregivers. Chief Complaint: Nausea, vomiting, weakness and high blood sugar    History of Present Illness:  Eileen Damon is a 46 y.o. female with a PMH of anxiety, depression, IDDM2, GERD, HLD, HTN, IBS and morbid obesity who presents with complaints of dizziness and nausea with associated elevated blood sugar readings. Patient endorses one episode of non bloody vomiting. She endorses recurrent abscess in the mons pubis and recurrent vaginal candidiasis infections. She report redness worsening in the mons pubis over the past week. Upon evaluation, patient met severe sepsis with suspected source being cellulitis of the mons pubis. Patient will be admitted for IV anibiotics. Review of Systems:  Review of Systems   Constitutional:  Negative for chills and fever.    Respiratory:  Negative for shortness of breath. Gastrointestinal:  Positive for nausea and vomiting. Negative for abdominal pain. Genitourinary:  Positive for pelvic pain. Skin:  Positive for color change and wound. All other systems reviewed and are negative. Past Medical and Surgical History:   Past Medical History:   Diagnosis Date    Anemia     Anxiety     Candidal intertrigo     Depression     Diabetes mellitus ([facility])     GERD (gastroesophageal reflux disease)     Hyperlipidemia     Hypertension     Irritable bowel syndrome     Morbid obesity with BMI of 60.0-69.9, adult ([facility])     Osteoarthritis     Seasonal allergies     Sleep difficulties     Suicide attempt [facility])        Past Surgical History:   Procedure Laterality Date     SECTION      CHOLECYSTECTOMY      WISDOM TOOTH EXTRACTION         Meds/Allergies:  Prior to Admission medications    Medication Sig Start Date End Date Taking?  Authorizing Provider   acetaminophen (TYLENOL) 325 mg tablet Take 2 tablets (650 mg total) by mouth every 6 (six) hours as needed for mild pain 23   Julieta Doshi PA-C   cariprazine (VRAYLAR) 3 MG capsule Take 1 capsule (3 mg total) by mouth daily Do not start before 2023. 23   Julieta Doshi PA-C   desvenlafaxine succinate (PRISTIQ) 50 mg 24 hr tablet Take 1 tablet (50 mg total) by mouth daily Do not start before 2023. 23   Julieta Dsohi PA-C   dicyclomine (BENTYL) 10 mg capsule Take 2 capsules (20 mg total) by mouth every 6 (six) hours as needed (crampy diarrhea) 23   Julieta Doshi PA-C   escitalopram (LEXAPRO) 20 mg tablet Take 1 tablet (20 mg total) by mouth daily 21  Devon Hagan DO   HumaLOG KwikPen 100 units/mL injection pen INJECT 16 UNITS 3 TIMES A DAY BEFORE MEALS 21   Historical Provider, MD   insulin glargine (LANTUS) 100 units/mL subcutaneous injection Inject 64 Units under the skin daily at bedtime 21   Soraya Candelario PA-C   lamoTRIgine (LaMICtal) 100 mg tablet Take 1 tablet (100 mg total) by mouth 2 (two) times a day 2/21/23   Julieta Doshi PA-C   lisinopril (ZESTRIL) 10 mg tablet Take 2 tablets (20 mg total) by mouth daily 12/4/22   Stacia Scanlon MD   LORazepam (ATIVAN) 0.5 mg tablet 0.5 mg every 8 (eight) hours as needed for anxiety 10/6/21   Historical Provider, MD   metFORMIN (GLUCOPHAGE) 500 mg tablet Take 1 tablet (500 mg total) by mouth 2 (two) times a day with meals 8/31/20 3/9/23  SUJATA Albrecht   pantoprazole (PROTONIX) 40 mg tablet Take 1 tablet (40 mg total) by mouth daily 9/20/22   SUJATA Albrecht     I have reviewed home medications with patient personally. Allergies: Allergies   Allergen Reactions    Cephalexin Vomiting     Other reaction(s): Nausea and/or vomiting    Insulin Degludec GI Intolerance    Sulfamethoxazole-Trimethoprim Vomiting     Other reaction(s): Nausea and/or vomiting       Social History:  Marital Status:    Occupation: Unknown  Patient Pre-hospital Living Situation: Home, With spouse  Patient Pre-hospital Level of Mobility: walks  Patient Pre-hospital Diet Restrictions: Diabetic  Substance Use History:   Social History     Substance and Sexual Activity   Alcohol Use Not Currently    Comment: occassionaly      Social History     Tobacco Use   Smoking Status Never   Smokeless Tobacco Never     Social History     Substance and Sexual Activity   Drug Use No       Family History:  Family History   Problem Relation Age of Onset    Diabetes Mother     Hypertension Father        Physical Exam:     Vitals:   Blood Pressure: 148/75 (11/24/23 0237)  Pulse: 100 (11/24/23 0237)  Temperature: 98.4 °F (36.9 °C) (11/24/23 0237)  Temp Source: Oral (11/23/23 1907)  Respirations: 18 (11/24/23 0237)  Height: 5' (152.4 cm) (11/24/23 0145)  SpO2: 91 % (11/24/23 0237)    Physical Exam  Vitals and nursing note reviewed. Constitutional:       Appearance: Normal appearance. HENT:      Head: Normocephalic. Right Ear: External ear normal.      Nose: Nose normal.      Mouth/Throat:      Pharynx: Oropharynx is clear. Eyes:      General: No scleral icterus. Extraocular Movements: Extraocular movements intact. Conjunctiva/sclera: Conjunctivae normal.   Cardiovascular:      Rate and Rhythm: Normal rate and regular rhythm. Pulses:           Dorsalis pedis pulses are 1+ on the right side and 1+ on the left side. Heart sounds: Normal heart sounds. Pulmonary:      Effort: Pulmonary effort is normal. No respiratory distress. Breath sounds: Normal breath sounds. No wheezing, rhonchi or rales. Chest:      Chest wall: No tenderness. Abdominal:      General: Bowel sounds are normal. There is no distension. Palpations: Abdomen is soft. Tenderness: There is no abdominal tenderness. Musculoskeletal:         General: Normal range of motion. Cervical back: Normal range of motion. Right lower le+ Pitting Edema present. Left lower le+ Pitting Edema present. Skin:     General: Skin is warm and dry. Capillary Refill: Capillary refill takes 2 to 3 seconds. Findings: Erythema (mons pubis) and rash (bilateral groin folds) present. Neurological:      General: No focal deficit present. Mental Status: She is alert and oriented to person, place, and time.           Additional Data:     Lab Results:  Results from last 7 days   Lab Units 23   WBC Thousand/uL 12.27*   HEMOGLOBIN g/dL 15.3   HEMATOCRIT % 46.2*   PLATELETS Thousands/uL 267   NEUTROS PCT % 72   LYMPHS PCT % 16   MONOS PCT % 9   EOS PCT % 1     Results from last 7 days   Lab Units 23   SODIUM mmol/L 130*   POTASSIUM mmol/L 4.3   CHLORIDE mmol/L 94*   CO2 mmol/L 26   BUN mg/dL 15   CREATININE mg/dL 0.97   ANION GAP mmol/L 10   CALCIUM mg/dL 9.8   ALBUMIN g/dL 3.9   TOTAL BILIRUBIN mg/dL 0.59   ALK PHOS U/L 107*   ALT U/L 52   AST U/L 27   GLUCOSE RANDOM mg/dL 518* Results from last 7 days   Lab Units 11/24/23  0030 11/23/23  1904   POC GLUCOSE mg/dl 347* >500*         Results from last 7 days   Lab Units 11/24/23  0001 11/23/23 2003   LACTIC ACID mmol/L 1.3 3.3*       Lines/Drains:  Invasive Devices       Peripheral Intravenous Line  Duration             Peripheral IV 11/23/23 Distal;Dorsal (posterior); Left Forearm <1 day    Peripheral IV 11/23/23 Dorsal (posterior); Left Forearm <1 day                        Imaging: Reviewed radiology reports from this admission including: abdominal/pelvic CT  CT abdomen pelvis with contrast   Final Result by Lan Perla MD (11/23 2355)      Hepatomegaly and mild diffuse hepatic steatosis. Bilateral nephrolithiasis without hydronephrosis. No evidence of bowel obstruction, inflammation, appendicitis, free air, or free fluid. A few small superficial varicosities noted. A nonspecific    pathologically enlarged 2.7 x 1.5 cm right inguinal lymph node is incidentally seen. Scattered groundglass opacity in the visualized lung bases noted which may be secondary to infectious/inflammatory process of the lung parenchyma, recommend clinical    correlation. Workstation performed: [id number]             EKG and Other Studies Reviewed on Admission:   EKG: No EKG obtained. ** Please Note: This note has been constructed using a voice recognition system.  **

## 2023-11-24 NOTE — ASSESSMENT & PLAN NOTE
SIRS criteria: Tachycardia, leukocytosis  Suspected source: Cellulitis of mons pubis with associated candida vulvovaginitis   Obtain UA. Lactic acid: 3.3 --> 1.3  End organ damage: Lactic acidosis  IV Fluids: Received 2 L NC in ED. IV antibiotics: Received IV Zosyn and IV Vancomycin in ED. Continue with IV Ancef. Follow up on culture results. Monitor vital signs, laboratory studies.

## 2023-11-24 NOTE — ASSESSMENT & PLAN NOTE
Patient reports history of recurrent abscess that is normally in her mons pubis. Patient reports recurrent vaginal yeast infections as well. Received 200 mg of IV Diflucan in the ED. Received IV Zosyn and IV Vanco in ED. Continue IV Ancef.

## 2023-11-24 NOTE — ASSESSMENT & PLAN NOTE
Lab Results   Component Value Date    HGBA1C 7.8 (H) 12/14/2022       Recent Labs     11/23/23  1904 11/24/23  0030   POCGLU >500* 347*       Blood Sugar Average: Last 72 hrs:  (P) 347  Patient presented with complaints of dizziness and nausea with associated elevated blood sugar readings. POA, serum glucose 518, patient was not in DKA. Received 2 L NSS in ED. Repeat glucose 347  Home regimen includes Metformin, will hold while inpatient. Continue home regimen of 64 units of Lantus HS nd 16 units of Lispro TID w/ meals. Obtain A1C  Accu-checks and SSI. Hypoglycemia protocol.

## 2023-11-24 NOTE — CONSULTS
PSYCHIATRY CONSULTATION NOTE    Amy Davis 46 y.o. female MRN: [id number]  Unit/Bed#: S -16 Encounter: [id number]     [address] is a 46 y.o. female, with history of anxiety and depression, who presented to the ED after she began feeling unwell after eating Thanksgiving dinner at her mother's house, and the family called EMS because she did not have her glucometer, glucose was 439 read by EMS. Today, she presents with severe depression, endorsing all symptoms of depression and particularly low motivation, anhedonia, low energy. She does not meet criteria for inpatient commitment, no SI and not an imminent risk of harm to herself, however pt does wish to continue with partial program and feels she would benefit from therapy as well. She noted prominent symptoms of frustration and guilt regarding allowing her medical conditions to worsen in setting of low motivation. Holding some psychotropic medications for now in light of sepsis, HTN, tachycardia, hyperglycemia, see below plan. Principal Psychiatric Problem:  Major Depressive Disorder, severe, without psychotic symptoms (HCC)    Principal Problem:    Severe sepsis ([address])  Active Problems:    Type 2 diabetes mellitus with hyperglycemia, with long-term current use of insulin (HCC)    Essential hypertension    GERD (gastroesophageal reflux disease)    Class 3 severe obesity due to excess calories with body mass index (BMI) of 60.0 to 69.9 in adult [facility])    Generalized anxiety disorder    Mixed hyperlipidemia    Cellulitis of mons pubis      Plan     Treatment Recommendations     Discussed plan with primary team as follows:  Hospital labs reviewed. Collaborate with collaterals for baseline assessment and disposition as indicated. Inpatient psychiatric hospitalization is not indicated at this time.  However, recommend patient follow-up with partial program, patient agrees this will be helpful, have communciated this plan to social work today. Recommend no changes to psychotropic medications at this time  Continue Lamictal 100 mg BID and Ativan 0.5 mg q8 hours for breakthrough anxiety, continue to hold Abilify 10 mg qdaily and Pristiq. May restart Pristiq when ready at 50 mg qdaily as this is both starting and therapeutic dose, higher doses not shown to have additional effect, however for now hold Pristiq due to pt's sepsis, HTN, tachycardia, hold Abilify due to pt's hyperglycemia. Continue medical management per primary team.  Observation level: Not on observation, currently not indicated for psychiatric reasons. Psychiatry will continue to follow as needed. Please contact our service via Myngle with any additional questions or concerns. If contacting after hours, please call or TigerText the on-call team ([facility]: [phone]) with any questions or concerns. Please reach out to on-call Psychiatry team via Myngle, or if after hours/Fri/Sat/Sunday contact on-call psychiatric service via [address] [place] [address] ([phone]) with any questions or concerns. Risks, benefits and possible side effects of Medications:   Risks, benefits, and possible side effects of medications explained to patient and patient verbalizes understanding. History of Present Illness      Provider Requesting Consult: Marshall Dyer DO   Reason for Consult / Principal Problem:   Patient appears to be non compliant with diabetes medication. Mood disorder possibly contributing. Has not seen Psych yet. Chief Complaint: "The depression makes it hard to do things"    Charles Christopher is a 46 y.o. female, with history of anxiety and depression, who presented to the ED after she began feeling unwell after eating Thanksgiving dinner at her mother's house, and the family called EMS because she did not have her glucometer, glucose was 439 read by EMS.      Hospital course notable for patient found to be in severe sepsis, pt receiving IV antibiotics, cellulitis of mons pubis being managed also, pt also with uncontrolled type 2 DM and hyperglycemia. On initial evaluation, Nury Nye discussed that she feels she has not been keeping up with her medical care due to low mood, decreased energy, anhedonia, and decreased motivation, all of which pt reported. She notes she at times "stares at her insulin bottle", noting she must take her insulin, but simply cannot muster the energy to do so. She also notes not having energy to do many other things, such as showering and searching for jobs (she is currently not employed), noting that she took a medical leave from work and was supposed to return to work February 2023 but did not as she got diverticulitis. She feels her medical conditions are partially the result of her ongoing depression and specifically very low motivation to care for herself. Pt became tearful relating that she is frustrated with herself for not caring for herself. She does note, however, she has been able to take her psychotropic medications almost every day (missing about one day a week) and makes a point to take these, because she knows her mood will suffer if she does not. She denied SI and adamantly denied that her not taking insulin was any suicidal gesture. She notes remote history of SA but nothing recent. She feels Lamictal in particular has been helpful for her depressed mood. Patient expressed a wish to get better and feel better. She feels she would benefit from therapy and more "structure". Discussed both voluntary hospitalization (pt does not meet criteria for 302) and discussed partial program, pt was very interested in continuing with partial program.     Patient denied AH, VH, HI, paranoia, delusions. She denies symptoms of cholo.      Medical Review Of Systems:  A comprehensive review of systems was negative except for: Constitutional: positive for weakness, per pt      Psychiatric Review of Systems     Sleep: Sleep disturbance, pt gets anywhere from >5 hours to 7-8 hours of sleep. Loss of Interest/Anhedonia: yes  Guilt/hopeless: Yes  Low energy/anergy: yes  Poor Concentration: yes  Appetite changes: Yes, increased  Weight changes: No  Somatic symptoms: no  Anxiety/panic: Yes, pt notes she worries at night before bed about her job, future, medical problems, finances (noting she feels she cannot work and her fiancee cannot support them both financially)  Nita: no  Self injurious behavior/risky behavior: no  Trauma: no      Historical Information      Past Psychiatric History:   Past Inpatient Psychiatric management:   Yes, pt notes two hospitalizations at [facility] this past December 2022 and 1 in August of 2022, both were for depressed mood, no SI per patient. She has other pat hospitalizations in 2021 (overdose) and 2019 (SI with a plan to OD)  Past Outpatient Psychiatric management:   [place] [facility], she notes "Adron Schaumann NP"  Past Medication trials: Wellbutrin, Seroquel, Abilify, Effexor, Rexulti, Zoloft, Prozac, Lexapro, Celexa, obtained from pt and from chart  Past Suicide attempts:   Yes, overdose in 2021, gabapentin overdose 2019 per chart  History of non-suicidal self injury:   Pt denies  Past Violent behavior:   Pt denies, no record of this  Substance Abuse History:    Social History       Tobacco History       Smoking Status  Never      Smokeless Tobacco Use  Never              Alcohol History       Alcohol Use Status  Not Currently Comment  occassionaly               Drug Use       Drug Use Status  No              Sexual Activity       Sexually Active  Not Currently              Activities of Daily Living    Not Asked                 Additional Substance Use Detail       Questions Responses    Problems Due to Past Use of Alcohol? No    Problems Due to Past Use of Substances?  No    Substance Use Assessment Denies substance use within the past 12 months    Alcohol Use Frequency Denies use in past 12 months    Cannabis frequency Never used    Comment: Never used on 7/30/2021     Heroin Frequency Denies use in past 12 months    Cocaine frequency Never used    Comment: Never used on 7/30/2021     Crack Cocaine Frequency Denies use in past 12 months    Methamphetamine Frequency Denies use in past 12 months    Narcotic Frequency Denies use in past 12 months    Benzodiazepine Frequency Denies use in past 12 months    Amphetamine frequency Denies use in past 12 months    Barbituate Frequency Denies use use in past 12 months    Inhalant frequency Never used    Comment: Never used on 7/30/2021     Hallucinogen frequency Never used    Comment: Never used on 7/30/2021     Ecstasy frequency Never used    Comment: Never used on 7/30/2021     Other drug frequency Never used    Comment: Never used on 7/30/2021     Opiate frequency Denies use in past 12 months    Last reviewed by Zee Bautista RN on 11/23/2023          I have assessed this patient for substance use within the past 12 months   Alcohol use: Pt denies  Recreational drug use:   Cocaine:  Pt denies  Heroin:  Pt denies  Marijuana:  Pt denies  Other drugs: Pt denies  Longest clean time: not applicable  History of Inpatient/Outpatient rehabilitation program: no  Smoking history: Pt denies    Family Psychiatric History:   Psychiatric Illness:  Mother, father - depression, anxiety     Mother - bipolar disorder, several suicide attempts     Daughter - bipolar disorder  Substance Abuse: Pt denies  Suicide Attempts: Yes, mother, as above    Social History:  Education: Pt completed some college  Learning Disabilities: Pt denies  Marital history: Pt has fiancee  Children:  1 daughter age 32  Living Arrangement: lives in home with ancee  Access to firearms: Pt denies  Occupational History: 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  Functioning Relationships: Pt notes fiancee is a support, daughter is supportive  Legal History: Pt denies   History: Pt denies      Traumatic History:   Abuse: Pt denies  Other Traumatic Events: Pt denies    Past Medical History:   Diagnosis Date    Anemia     Anxiety     Candidal intertrigo     Depression     Diabetes mellitus ([facility])     GERD (gastroesophageal reflux disease)     Hyperlipidemia     Hypertension     Irritable bowel syndrome     Morbid obesity with BMI of 60.0-69.9, adult ([facility])     Osteoarthritis     Seasonal allergies     Sleep difficulties     Suicide attempt ([facility])      History of Seizures: Pt denies  History of Head injury with loss of consciousness: Pt denies    Meds/Allergies   all current active meds have been reviewed  Allergies   Allergen Reactions    Cephalexin Vomiting     Other reaction(s): Nausea and/or vomiting    Insulin Degludec GI Intolerance    Sulfamethoxazole-Trimethoprim Vomiting     Other reaction(s): Nausea and/or vomiting         Objective      Vital signs in last 24 hours:  Temp:  [98.4 °F (36.9 °C)-99.4 °F (37.4 °C)] 98.7 °F (37.1 °C)  HR:  [] 104  Resp:  [18-20] 18  BP: ()/(48-85) 125/67    Intake/Output Summary (Last 24 hours) at 11/24/2023 1759  Last data filed at 11/24/2023 1010  Gross per 24 hour   Intake 2200 ml   Output --   Net 2200 ml       Mental Status Evaluation:    Appearance:   In hospital gown, obese   Behavior:  normal, pleasant, cooperative   Speech:  normal rate, normal volume, normal pitch   Mood:  "Depressed"   Affect:  Tearful, constricted other times   Language: No aphasia   Thought Process:  organized, logical, coherent, goal directed   Associations: intact associations   Thought Content:  normal, no overt delusions   Perceptual Disturbances: no auditory hallucinations, no visual hallucinations, does not appear responding to internal stimuli   Risk Potential: Suicidal ideation - None  Homicidal ideation - None  Potential for aggression - No   Sensorium:  oriented to person, place, and time/date   Memory:  recent and remote memory grossly intact   Consciousness:  alert and awake   Attention/Concentration: attention span and concentration are age appropriate   Intellect: average   Fund of Knowledge: past history: yes  vocabulary: yes   Insight:  fair   Judgment: fair   Muscle Strength:   Muscle Tone: normal  normal   Gait/Station: Pt restricted to bed   Motor Activity: no abnormal movements             Risk Assessment     Risk of Harm to Self:   The following ratings are based on assessment at the time of the interview  Demographic risk factors include: , age: over 48 or older  Historical Risk Factors include: history of depression, history of anxiety, history of suicide attempts  Current Specific Risk Factors include: chronic health problems, unemployment, pt with depression  Protective Factors: no current suicidal ideation, compliant with mental health treatment, safe and stable living environment, pt has good relationship with [facility], wishes to get better  Weapons/Firearms: none. The following steps have been taken to ensure weapons are properly secured: not applicable  Based on today's [facility] Binning presents the following risk of harm to self: low    Risk of Harm to Others: The following ratings are based on assessment at the time of the interview  Demographic Risk Factors include: none. Historical Risk Factors include: none. Current Specific Risk Factors include: none  Protective Factors: no current homicidal ideation , compliant with mental health treatment, safe and stable living environment, pt has good relationship with [facility], wishes to get better  Weapons/Firearms: none.  The following steps have been taken to ensure weapons are properly secured: not applicable  Based on today's [facility] Binning presents the following risk of harm to others: minimal         ACTIVE MEDICATIONS     Current Medications:  Current Facility-Administered Medications   Medication Dose Route Frequency Provider Last Rate    acetaminophen  650 mg Oral Q6H PRN Rebeca Conception, CRNP      dicyclomine  20 mg Oral Q6H PRN Rebeca Conception, CRNP      heparin (porcine)  7,500 Units Subcutaneous Q8H Savita, SUJATA      influenza vaccine  0.5 mL Intramuscular Once Graciela Marie MD      insulin regular (HumuLIN R,NovoLIN R) 1 Units/mL in sodium chloride 0.9 % 100 mL infusion  0.3-21 Units/hr Intravenous Titrated Stephanie Valiente MD      labetalol  10 mg Intravenous Q4H PRN Wilhemina Peat Amauri, DO      lamoTRIgine  100 mg Oral BID Rebeca Conception, CRNP      lisinopril  20 mg Oral Daily Shital Vasquez, CRNP      LORazepam  0.5 mg Oral Q8H PRN Rebeca Conception, CRNP      metoclopramide  10 mg Intravenous Q6H PRN Rebeca Conception, CRNP      pantoprazole  40 mg Oral Daily Rebeca Conception, CRNP      vancomycin  750 mg Intravenous Q12H Go Akil,  mg (11/24/23 1357)       Behavioral Health Medications: I have personally reviewed all current active medications. Changes as in plan section above. ADDITIONAL DATA     EKG Results: I have personally reviewed pertinent reports. QTc= (no recent EKG)  No results found for this visit on 11/23/23 (from the past 1000 hour(s)). Radiology Results: I have personally reviewed pertinent reports. I have personally reviewed pertinent films in PACS. CT abdomen pelvis with contrast    Result Date: 11/23/2023  Impression: Hepatomegaly and mild diffuse hepatic steatosis. Bilateral nephrolithiasis without hydronephrosis. No evidence of bowel obstruction, inflammation, appendicitis, free air, or free fluid. A few small superficial varicosities noted. A nonspecific pathologically enlarged 2.7 x 1.5 cm right inguinal lymph node is incidentally seen. Scattered groundglass opacity in the visualized lung bases noted which may be secondary to infectious/inflammatory process of the lung parenchyma, recommend clinical correlation.  Workstation performed: [id number]     No Chest XR results available for this patient. Laboratory Results: I have personally reviewed all pertinent laboratory/tests results.   Recent Results (from the past 48 hour(s))   Fingerstick Glucose (POCT)    Collection Time: 11/23/23  7:04 PM   Result Value Ref Range    POC Glucose >500 (HH) 65 - 140 mg/dl   CBC and differential    Collection Time: 11/23/23  8:03 PM   Result Value Ref Range    WBC 12.27 (H) 4.31 - 10.16 Thousand/uL    RBC 5.04 3.81 - 5.12 Million/uL    Hemoglobin 15.3 11.5 - 15.4 g/dL    Hematocrit 46.2 (H) 34.8 - 46.1 %    MCV 92 82 - 98 fL    MCH 30.4 26.8 - 34.3 pg    MCHC 33.1 31.4 - 37.4 g/dL    RDW 13.2 11.6 - 15.1 %    MPV 10.2 8.9 - 12.7 fL    Platelets 251 923 - 542 Thousands/uL    nRBC 0 /100 WBCs    Neutrophils Relative 72 43 - 75 %    Immat GRANS % 1 0 - 2 %    Lymphocytes Relative 16 14 - 44 %    Monocytes Relative 9 4 - 12 %    Eosinophils Relative 1 0 - 6 %    Basophils Relative 1 0 - 1 %    Neutrophils Absolute 9.05 (H) 1.85 - 7.62 Thousands/µL    Immature Grans Absolute 0.09 0.00 - 0.20 Thousand/uL    Lymphocytes Absolute 1.92 0.60 - 4.47 Thousands/µL    Monocytes Absolute 1.04 0.17 - 1.22 Thousand/µL    Eosinophils Absolute 0.10 0.00 - 0.61 Thousand/µL    Basophils Absolute 0.07 0.00 - 0.10 Thousands/µL   Comprehensive metabolic panel    Collection Time: 11/23/23  8:03 PM   Result Value Ref Range    Sodium 130 (L) 135 - 147 mmol/L    Potassium 4.3 3.5 - 5.3 mmol/L    Chloride 94 (L) 96 - 108 mmol/L    CO2 26 21 - 32 mmol/L    ANION GAP 10 mmol/L    BUN 15 5 - 25 mg/dL    Creatinine 0.97 0.60 - 1.30 mg/dL    Glucose 518 (HH) 65 - 140 mg/dL    Calcium 9.8 8.4 - 10.2 mg/dL    AST 27 13 - 39 U/L    ALT 52 7 - 52 U/L    Alkaline Phosphatase 107 (H) 34 - 104 U/L    Total Protein 7.1 6.4 - 8.4 g/dL    Albumin 3.9 3.5 - 5.0 g/dL    Total Bilirubin 0.59 0.20 - 1.00 mg/dL    eGFR 68 ml/min/1.73sq m   Blood culture #1    Collection Time: 11/23/23  8:03 PM    Specimen: Arm, Right; Blood   Result Value Ref Range    Blood Culture Received in Microbiology Lab. Culture in Progress. Blood culture #2    Collection Time: 11/23/23  8:03 PM    Specimen: Line, Venous; Blood   Result Value Ref Range    Blood Culture Received in Microbiology Lab. Culture in Progress.     Blood gas, venous    Collection Time: 11/23/23  8:03 PM   Result Value Ref Range    pH, Gavin 7.386 7.300 - 7.400    pCO2, Gavin 45.9 42.0 - 50.0 mm Hg    pO2, Gavin 39.0 35.0 - 45.0 mm Hg    HCO3, Gavin 26.9 24 - 30 mmol/L    Base Excess, Gavin 1.3 mmol/L    O2 Content, Gavin 16.3 ml/dL    O2 HGB, VENOUS 74.5 60.0 - 80.0 %   Lactic acid, plasma (w/reflex if result > 2.0)    Collection Time: 11/23/23  8:03 PM   Result Value Ref Range    LACTIC ACID 3.3 (HH) 0.5 - 2.0 mmol/L   High sensitivity CRP    Collection Time: 11/23/23  8:03 PM   Result Value Ref Range    CRP, High Sensitivity 39.38 (H) <3.00 mg/L   Beta Hydroxybutyrate    Collection Time: 11/23/23  8:03 PM   Result Value Ref Range    BETA-HYDROXYBUTYRATE 0.2 <0.6 mmol/L   Procalcitonin    Collection Time: 11/23/23  8:03 PM   Result Value Ref Range    Procalcitonin 0.17 <=0.25 ng/ml   Lactic acid 2 Hours    Collection Time: 11/24/23 12:01 AM   Result Value Ref Range    LACTIC ACID 1.3 0.5 - 2.0 mmol/L   Fingerstick Glucose (POCT)    Collection Time: 11/24/23 12:30 AM   Result Value Ref Range    POC Glucose 347 (H) 65 - 140 mg/dl   COVID/FLU/RSV    Collection Time: 11/24/23  1:01 AM    Specimen: Nose; Nares   Result Value Ref Range    SARS-CoV-2 Negative Negative    INFLUENZA A PCR Negative Negative    INFLUENZA B PCR Negative Negative    RSV PCR Negative Negative   Fingerstick Glucose (POCT)    Collection Time: 11/24/23  4:55 AM   Result Value Ref Range    POC Glucose 439 (H) 65 - 140 mg/dl   Procalcitonin, Next Day AM Collection    Collection Time: 11/24/23  5:11 AM   Result Value Ref Range    Procalcitonin 0.13 <=0.25 ng/ml   Hemoglobin A1c w/EAG Estimation (Orders if not completed within the last 90 days)    Collection Time: 11/24/23  5:11 AM   Result Value Ref Range    Hemoglobin A1C 12.2 (H) Normal 4.0-5.6%; PreDiabetic 5.7-6.4%; Diabetic >=6.5%; Glycemic control for adults with diabetes <7.0% %     mg/dl   Fingerstick Glucose (POCT)    Collection Time: 11/24/23  7:12 AM   Result Value Ref Range    POC Glucose 390 (H) 65 - 140 mg/dl   Fingerstick Glucose (POCT)    Collection Time: 11/24/23 10:07 AM   Result Value Ref Range    POC Glucose 337 (H) 65 - 140 mg/dl   UA w Reflex to Microscopic w Reflex to Culture    Collection Time: 11/24/23 10:49 AM    Specimen: Urine, Clean Catch   Result Value Ref Range    Color, UA Light Yellow     Clarity, UA Clear     Specific Gravity, UA 1.043 (H) 1.003 - 1.030    pH, UA 5.5 4.5, 5.0, 5.5, 6.0, 6.5, 7.0, 7.5, 8.0    Leukocytes, UA (A) Negative     Elevated glucose may cause decreased leukocyte values.  See urine microscopic for UWBC result    Nitrite, UA Negative Negative    Protein, UA Negative Negative mg/dl    Glucose, UA >=1000 (1%) (A) Negative mg/dl    Ketones, UA Negative Negative mg/dl    Urobilinogen, UA <2.0 <2.0 mg/dl mg/dl    Bilirubin, UA Negative Negative    Occult Blood, UA Small (A) Negative   Urine Microscopic    Collection Time: 11/24/23 10:49 AM   Result Value Ref Range    RBC, UA 4-10 (A) None Seen, 1-2 /hpf    WBC, UA 2-4 (A) None Seen, 1-2 /hpf    Epithelial Cells Occasional None Seen, Occasional /hpf    Bacteria, UA Occasional None Seen, Occasional /hpf   Fingerstick Glucose (POCT)    Collection Time: 11/24/23 11:51 AM   Result Value Ref Range    POC Glucose 408 (H) 65 - 140 mg/dl   Fingerstick Glucose (POCT)    Collection Time: 11/24/23  1:19 PM   Result Value Ref Range    POC Glucose 298 (H) 65 - 140 mg/dl   Fingerstick Glucose (POCT)    Collection Time: 11/24/23  2:25 PM   Result Value Ref Range    POC Glucose 274 (H) 65 - 140 mg/dl   Fingerstick Glucose (POCT)    Collection Time: 11/24/23  3:34 PM   Result Value Ref Range    POC Glucose 240 (H) 65 - 140 mg/dl Fingerstick Glucose (POCT)    Collection Time: 11/24/23  5:45 PM   Result Value Ref Range    POC Glucose 295 (H) 65 - 140 mg/dl          This note was not shared with the patient due to reasonable likelihood of causing patient harm      Felecia Cedeño MD  Department of Psychiatry and [facility]

## 2023-11-24 NOTE — PLAN OF CARE
Problem: Potential for Falls  Goal: Patient will remain free of falls  Description: INTERVENTIONS:  - Educate patient/family on patient safety including physical limitations  - Instruct patient to call for assistance with activity   - Consult OT/PT to assist with strengthening/mobility   - Keep Call bell within reach  - Keep bed low and locked with side rails adjusted as appropriate  - Keep care items and personal belongings within reach  - Initiate and maintain comfort rounds  - Make Fall Risk Sign visible to staff  - Offer Toileting every 4 Hours, in advance of need  - Initiate/Maintain BED alarm  - Obtain necessary fall risk management equipment: YELLOW BRACELET  - Apply yellow socks and bracelet for high fall risk patients  - Consider moving patient to room near nurses station  Outcome: Progressing

## 2023-11-24 NOTE — ED PROVIDER NOTES
History  Chief Complaint   Patient presents with    Hyperglycemia - Symptomatic     Pt arrives to ED via EMS from home. Before eating dinner, pt took 16U of Humalog. After eating, pt began feeling dizzy & nauseous. Pt reports vomiting once.  per EMS. BS taken upon arrival and is now >500. Ms. Joel Blanco is a 78-year-old female with a past medical history of anemia, anxiety, depression, GERD, HLD, HTN, IBS, morbid obesity, OA, sleep difficulties, who presents to the ED for uncontrolled blood sugars. Patient reports that she has been depressed for the past months and has been taking her diabetes medications intermittently. Reports that on the occasions that she does take her blood sugar her blood sugar has been in the 500s. Reports that she began feeling unwell after eating Thanksgiving dinner at her mother's house. She did not have her glucometer there so they decided to call EMS. When EMS checked at their monitor read 439. Reports that she has felt dizzy, nauseous, and weak. Reports that before all of this began she took 16 units before dinner. Patient also complains of a recurring abscess that she typically gets in her mons pubis. As well as recurring yeast infections. Reports that she has been treating the yeast infections but have not resolved. Prior to Admission Medications   Prescriptions Last Dose Informant Patient Reported? Taking? HumaLOG KwikPen 100 units/mL injection pen  Self Yes No   Sig: INJECT 16 UNITS 3 TIMES A DAY BEFORE MEALS   LORazepam (ATIVAN) 0.5 mg tablet   Yes No   Si.5 mg every 8 (eight) hours as needed for anxiety   acetaminophen (TYLENOL) 325 mg tablet   No No   Sig: Take 2 tablets (650 mg total) by mouth every 6 (six) hours as needed for mild pain   cariprazine (VRAYLAR) 3 MG capsule   No No   Sig: Take 1 capsule (3 mg total) by mouth daily Do not start before 2023.    desvenlafaxine succinate (PRISTIQ) 50 mg 24 hr tablet   No No   Sig: Take 1 tablet (50 mg total) by mouth daily Do not start before 2023. dicyclomine (BENTYL) 10 mg capsule   No No   Sig: Take 2 capsules (20 mg total) by mouth every 6 (six) hours as needed (crampy diarrhea)   escitalopram (LEXAPRO) 20 mg tablet  Self No No   Sig: Take 1 tablet (20 mg total) by mouth daily   insulin glargine (LANTUS) 100 units/mL subcutaneous injection   No No   Sig: Inject 64 Units under the skin daily at bedtime   lamoTRIgine (LaMICtal) 100 mg tablet   No No   Sig: Take 1 tablet (100 mg total) by mouth 2 (two) times a day   lisinopril (ZESTRIL) 10 mg tablet   No No   Sig: Take 2 tablets (20 mg total) by mouth daily   metFORMIN (GLUCOPHAGE) 500 mg tablet  Self No No   Sig: Take 1 tablet (500 mg total) by mouth 2 (two) times a day with meals   pantoprazole (PROTONIX) 40 mg tablet   No No   Sig: Take 1 tablet (40 mg total) by mouth daily      Facility-Administered Medications: None       Past Medical History:   Diagnosis Date    Anemia     Anxiety     Candidal intertrigo     Depression     Diabetes mellitus (HCC)     GERD (gastroesophageal reflux disease)     Hyperlipidemia     Hypertension     Irritable bowel syndrome     Morbid obesity with BMI of 60.0-69.9, adult (HCC)     Osteoarthritis     Seasonal allergies     Sleep difficulties     Suicide attempt [facility])        Past Surgical History:   Procedure Laterality Date     SECTION      CHOLECYSTECTOMY      WISDOM TOOTH EXTRACTION  2009       Family History   Problem Relation Age of Onset    Diabetes Mother     Hypertension Father      I have reviewed and agree with the history as documented.     E-Cigarette/Vaping    E-Cigarette Use Never User      E-Cigarette/Vaping Substances    Nicotine No     THC No     CBD No     Flavoring No     Other No     Unknown No      Social History     Tobacco Use    Smoking status: Never    Smokeless tobacco: Never   Vaping Use    Vaping Use: Never used   Substance Use Topics    Alcohol use: Not Currently     Comment: occassionaly     Drug use: No        Review of Systems   Constitutional:  Positive for chills. Negative for activity change. Eyes:  Negative for pain and visual disturbance. Respiratory:  Negative for chest tightness and shortness of breath. Cardiovascular:  Negative for chest pain. Gastrointestinal:  Positive for nausea. Negative for vomiting. Genitourinary:  Positive for vaginal discharge and vaginal pain. Negative for dysuria and hematuria. Skin:  Negative for rash and wound. Neurological:  Negative for dizziness, syncope, light-headedness and headaches. Psychiatric/Behavioral: Negative. Physical Exam  ED Triage Vitals [11/23/23 1907]   Temperature Pulse Respirations Blood Pressure SpO2   99.4 °F (37.4 °C) (!) 116 20 151/72 94 %      Temp Source Heart Rate Source Patient Position - Orthostatic VS BP Location FiO2 (%)   Oral Monitor Lying Left arm --      Pain Score       --             Orthostatic Vital Signs  Vitals:    11/23/23 1907 11/23/23 1915 11/23/23 2100   BP: 151/72 151/72 110/54   Pulse: (!) 116 (!) 118 (!) 113   Patient Position - Orthostatic VS: Lying Lying Lying       Physical Exam  Vitals and nursing note reviewed. Constitutional:       Appearance: She is obese. HENT:      Head: Normocephalic and atraumatic. Right Ear: External ear normal.      Left Ear: External ear normal.      Nose: Nose normal.      Mouth/Throat:      Mouth: Mucous membranes are moist.      Pharynx: Oropharynx is clear. Eyes:      Extraocular Movements: Extraocular movements intact. Conjunctiva/sclera: Conjunctivae normal.   Cardiovascular:      Rate and Rhythm: Regular rhythm. Tachycardia present. Heart sounds: Normal heart sounds. Pulmonary:      Effort: Pulmonary effort is normal.      Breath sounds: Normal breath sounds. Abdominal:      General: Abdomen is flat. Palpations: Abdomen is soft. Tenderness: There is no abdominal tenderness. Genitourinary:     Comments: It appears that the abscess that the patient mentioned had opened and drained at some point, not much fluid was palpated beneath the lesion. The patient does have skin changes in the surrounding area, redness, irritation, increased warmth. Thick white discharge noted on exam of the vagina. Musculoskeletal:         General: Normal range of motion. Cervical back: Normal range of motion and neck supple. Skin:     General: Skin is warm and dry. Neurological:      General: No focal deficit present. Mental Status: She is alert and oriented to person, place, and time.    Psychiatric:         Mood and Affect: Mood normal.         Behavior: Behavior normal.         ED Medications  Medications   sodium chloride 0.9 % bolus 1,000 mL (1,000 mL Intravenous New Bag 11/23/23 2012)   sodium chloride 0.9 % bolus 1,000 mL (has no administration in time range)   fluconazole (DIFLUCAN) IVPB (premix) 200 mg (has no administration in time range)   vancomycin (VANCOCIN) 2,250 mg in sodium chloride 0.9 % 500 mL IVPB (has no administration in time range)   piperacillin-tazobactam (ZOSYN) 3.375 g in sodium chloride 0.9 % 100 mL IVPB (has no administration in time range)   ondansetron (ZOFRAN) injection 4 mg (4 mg Intravenous Given 11/23/23 1930)       Diagnostic Studies  Results Reviewed       Procedure Component Value Units Date/Time    Beta Hydroxybutyrate [[id number]]  (Normal) Collected: 11/23/23 2003    Lab Status: Final result Specimen: Blood from Arm, Right Updated: 11/23/23 2025     BETA-HYDROXYBUTYRATE 0.2 mmol/L     CBC and differential [[id number]]  (Abnormal) Collected: 11/23/23 2003    Lab Status: Final result Specimen: Blood from Arm, Right Updated: 11/23/23 2018     WBC 12.27 Thousand/uL      RBC 5.04 Million/uL      Hemoglobin 15.3 g/dL      Hematocrit 46.2 %      MCV 92 fL      MCH 30.4 pg      MCHC 33.1 g/dL      RDW 13.2 %      MPV 10.2 fL      Platelets 135 Thousands/uL nRBC 0 /100 WBCs      Neutrophils Relative 72 %      Immat GRANS % 1 %      Lymphocytes Relative 16 %      Monocytes Relative 9 %      Eosinophils Relative 1 %      Basophils Relative 1 %      Neutrophils Absolute 9.05 Thousands/µL      Immature Grans Absolute 0.09 Thousand/uL      Lymphocytes Absolute 1.92 Thousands/µL      Monocytes Absolute 1.04 Thousand/µL      Eosinophils Absolute 0.10 Thousand/µL      Basophils Absolute 0.07 Thousands/µL     Blood gas, venous [[id number]] Collected: 11/23/23 2003    Lab Status: Final result Specimen: Blood from Arm, Right Updated: 11/23/23 2012     pH, Gavin 7.386     pCO2, Gavin 45.9 mm Hg      pO2, Gavin 39.0 mm Hg      HCO3, Gavin 26.9 mmol/L      Base Excess, Gavin 1.3 mmol/L      O2 Content, Gavin 16.3 ml/dL      O2 HGB, VENOUS 74.5 %     Lactic acid, plasma (w/reflex if result > 2.0) [[id number]] Collected: 11/23/23 2003    Lab Status: In process Specimen: Blood from Arm, Right Updated: 11/23/23 2009    Comprehensive metabolic panel [[id number]] Collected: 11/23/23 2003    Lab Status: In process Specimen: Blood from Arm, Right Updated: 11/23/23 2009    High sensitivity CRP [[id number]] Collected: 11/23/23 2003    Lab Status: In process Specimen: Blood from Arm, Right Updated: 11/23/23 2009    Blood culture #2 [[id number]] Collected: 11/23/23 2003    Lab Status: In process Specimen: Blood from Line, Venous Updated: 11/23/23 2009    Blood culture #1 [[id number]] Collected: 11/23/23 2003    Lab Status: In process Specimen: Blood from Arm, Right Updated: 11/23/23 2009    Fingerstick Glucose (POCT) [[id number]]  (Abnormal) Collected: 11/23/23 1904    Lab Status: Final result Updated: 11/23/23 1906     POC Glucose >500 mg/dl                    CT abdomen pelvis with contrast    (Results Pending)         Procedures  Procedures      ED Course                             SBIRT 20yo+      Flowsheet Row Most Recent Value   Initial Alcohol Screen: US AUDIT-C     1.  How often do you have a drink containing alcohol? 0 Filed at: 11/23/2023 1908   2. How many drinks containing alcohol do you have on a typical day you are drinking? 0 Filed at: 11/23/2023 1908   3a. Male UNDER 65: How often do you have five or more drinks on one occasion? 0 Filed at: 11/23/2023 1908   3b. FEMALE Any Age, or MALE 65+: How often do you have 4 or more drinks on one occassion? 0 Filed at: 11/23/2023 1908   Audit-C Score 0 Filed at: 11/23/2023 7192   JUAN: How many times in the past year have you. .. Used an illegal drug or used a prescription medication for non-medical reasons? Never Filed at: 11/23/2023 2316                  Medical Decision Making  Ms. Monique Lima is a 49-year-old female with a past medical history of anemia, anxiety, depression, GERD, HLD, HTN, IBS, morbid obesity, OA, sleep difficulties, who presents to the ED for uncontrolled blood sugars. Based on history and physical DDx includes but is not limited to: Viki's gangrene, sepsis, recurrent yeast infection secondary to uncontrolled blood sugars, uncontrolled diabetes with hyperglycemia, DKA. Labs and physical exam remarkable for VBG is within normal limits, white blood cells are elevated at 12.2, SpO2 is 94, and patient is tachycardic along with skin changes in the pubic region. Suspicious for Viki's gangrene. CT ordered to rule this out. Concerns for sepsis. Empiric antibiotics, and fluids started. Due to the concern for sepsis in the uncontrolled blood sugars as well as the abscess appearing as though it is likely already drained we will not be opening it tonight due to increased risk of infection. Patient handed off to Dr. Patti White pending CT results. Amount and/or Complexity of Data Reviewed  Labs: ordered. Radiology: ordered. Risk  Prescription drug management. VBG is within normal limits, white blood cells are elevated at 12.2, SpO2 is 94, and patient is tachycardic.   Disposition  Final diagnoses:   Vaginal candidiasis Uncontrolled type 2 diabetes mellitus with hyperglycemia ([address])     Time reflects when diagnosis was documented in both MDM as applicable and the Disposition within this note       Time User Action Codes Description Comment    11/23/2023  9:00 PM Savannah Ricketts Add [B37.31] Vaginal candidiasis     11/23/2023  9:00 PM Savannah Ricketts Add [E11.9] Diabetes mellitus, new onset ([address])     11/23/2023  9:01 PM [facility] [E11.9] Diabetes mellitus, new onset ([address])     11/23/2023  9:01 PM Savannah Ricketts Add [E11.65] Uncontrolled type 2 diabetes mellitus with hyperglycemia ([address])     11/23/2023  9:01 PM Savannah Ricketts Modify [B37.31] Vaginal candidiasis     11/23/2023  9:01 PM Savannah Ricketts Modify [E11.65] Uncontrolled type 2 diabetes mellitus with hyperglycemia [facility])           ED Disposition       None          Follow-up Information    None         Patient's Medications   Discharge Prescriptions    No medications on file     No discharge procedures on file. PDMP Review         Value Time User    PDMP Reviewed  Yes 1/29/2023  4:14 AM Petr Woodward MD             ED Provider  Attending physically available and evaluated Kervin Wiseman. I managed the patient along with the ED Attending.     Electronically Signed by           Savannah Ricketts MD  11/23/23 4931

## 2023-11-24 NOTE — ASSESSMENT & PLAN NOTE
SIRS criteria: Tachycardia, leukocytosis  Suspected source: Cellulitis of mons pubis with associated candida vulvovaginitis   Obtain UA. Lactic acid: 3.3 --> 1.3  End organ damage: Lactic acidosis  IV Fluids: Received 2 L NC in ED. IV antibiotics: Received IV Zosyn and IV Vancomycin in ED. Continue with IV Ancef. Follow up on culture results. Monitor vital signs, laboratory studies.     Plan:   Resolved at this time

## 2023-11-25 PROBLEM — F32.9 MDD (MAJOR DEPRESSIVE DISORDER): Status: ACTIVE | Noted: 2023-11-25

## 2023-11-25 PROBLEM — B37.31 YEAST INFECTION OF THE VAGINA: Status: ACTIVE | Noted: 2023-11-25

## 2023-11-25 PROBLEM — L02.91 ABSCESS: Status: ACTIVE | Noted: 2023-11-24

## 2023-11-25 LAB
ANION GAP SERPL CALCULATED.3IONS-SCNC: 8 MMOL/L
BASOPHILS # BLD AUTO: 0.06 THOUSANDS/ÂΜL (ref 0–0.1)
BASOPHILS NFR BLD AUTO: 1 % (ref 0–1)
BUN SERPL-MCNC: 11 MG/DL (ref 5–25)
CALCIUM SERPL-MCNC: 8.9 MG/DL (ref 8.4–10.2)
CHLORIDE SERPL-SCNC: 100 MMOL/L (ref 96–108)
CO2 SERPL-SCNC: 24 MMOL/L (ref 21–32)
CREAT SERPL-MCNC: 0.63 MG/DL (ref 0.6–1.3)
EOSINOPHIL # BLD AUTO: 0.17 THOUSAND/ÂΜL (ref 0–0.61)
EOSINOPHIL NFR BLD AUTO: 2 % (ref 0–6)
ERYTHROCYTE [DISTWIDTH] IN BLOOD BY AUTOMATED COUNT: 13.2 % (ref 11.6–15.1)
GFR SERPL CREATININE-BSD FRML MDRD: 104 ML/MIN/1.73SQ M
GLUCOSE SERPL-MCNC: 126 MG/DL (ref 65–140)
GLUCOSE SERPL-MCNC: 173 MG/DL (ref 65–140)
GLUCOSE SERPL-MCNC: 179 MG/DL (ref 65–140)
GLUCOSE SERPL-MCNC: 181 MG/DL (ref 65–140)
GLUCOSE SERPL-MCNC: 191 MG/DL (ref 65–140)
GLUCOSE SERPL-MCNC: 193 MG/DL (ref 65–140)
GLUCOSE SERPL-MCNC: 201 MG/DL (ref 65–140)
GLUCOSE SERPL-MCNC: 206 MG/DL (ref 65–140)
GLUCOSE SERPL-MCNC: 206 MG/DL (ref 65–140)
GLUCOSE SERPL-MCNC: 244 MG/DL (ref 65–140)
GLUCOSE SERPL-MCNC: 247 MG/DL (ref 65–140)
GLUCOSE SERPL-MCNC: 249 MG/DL (ref 65–140)
GLUCOSE SERPL-MCNC: 253 MG/DL (ref 65–140)
HCT VFR BLD AUTO: 44.9 % (ref 34.8–46.1)
HGB BLD-MCNC: 14.1 G/DL (ref 11.5–15.4)
IMM GRANULOCYTES # BLD AUTO: 0.09 THOUSAND/UL (ref 0–0.2)
IMM GRANULOCYTES NFR BLD AUTO: 1 % (ref 0–2)
LYMPHOCYTES # BLD AUTO: 2.07 THOUSANDS/ÂΜL (ref 0.6–4.47)
LYMPHOCYTES NFR BLD AUTO: 26 % (ref 14–44)
MCH RBC QN AUTO: 29.7 PG (ref 26.8–34.3)
MCHC RBC AUTO-ENTMCNC: 31.4 G/DL (ref 31.4–37.4)
MCV RBC AUTO: 95 FL (ref 82–98)
MONOCYTES # BLD AUTO: 0.83 THOUSAND/ÂΜL (ref 0.17–1.22)
MONOCYTES NFR BLD AUTO: 10 % (ref 4–12)
NEUTROPHILS # BLD AUTO: 4.78 THOUSANDS/ÂΜL (ref 1.85–7.62)
NEUTS SEG NFR BLD AUTO: 60 % (ref 43–75)
NRBC BLD AUTO-RTO: 0 /100 WBCS
PLATELET # BLD AUTO: 254 THOUSANDS/UL (ref 149–390)
PMV BLD AUTO: 10.2 FL (ref 8.9–12.7)
POTASSIUM SERPL-SCNC: 4 MMOL/L (ref 3.5–5.3)
RBC # BLD AUTO: 4.74 MILLION/UL (ref 3.81–5.12)
SODIUM SERPL-SCNC: 132 MMOL/L (ref 135–147)
VANCOMYCIN SERPL-MCNC: 10.2 UG/ML (ref 10–20)
WBC # BLD AUTO: 8 THOUSAND/UL (ref 4.31–10.16)

## 2023-11-25 PROCEDURE — 80048 BASIC METABOLIC PNL TOTAL CA: CPT

## 2023-11-25 PROCEDURE — 82948 REAGENT STRIP/BLOOD GLUCOSE: CPT

## 2023-11-25 PROCEDURE — NC001 PR NO CHARGE: Performed by: SURGERY

## 2023-11-25 PROCEDURE — 85025 COMPLETE CBC W/AUTO DIFF WBC: CPT

## 2023-11-25 PROCEDURE — 99232 SBSQ HOSP IP/OBS MODERATE 35: CPT | Performed by: FAMILY MEDICINE

## 2023-11-25 PROCEDURE — 80202 ASSAY OF VANCOMYCIN: CPT

## 2023-11-25 RX ORDER — DESVENLAFAXINE SUCCINATE 50 MG/1
50 TABLET, EXTENDED RELEASE ORAL DAILY
Status: DISCONTINUED | OUTPATIENT
Start: 2023-11-25 | End: 2023-11-27

## 2023-11-25 RX ADMIN — VANCOMYCIN HYDROCHLORIDE 1500 MG: 10 INJECTION, POWDER, LYOPHILIZED, FOR SOLUTION INTRAVENOUS at 20:25

## 2023-11-25 RX ADMIN — LAMOTRIGINE 100 MG: 100 TABLET ORAL at 08:24

## 2023-11-25 RX ADMIN — VANCOMYCIN HYDROCHLORIDE 750 MG: 750 INJECTION, SOLUTION INTRAVENOUS at 01:21

## 2023-11-25 RX ADMIN — PANTOPRAZOLE SODIUM 40 MG: 40 TABLET, DELAYED RELEASE ORAL at 08:24

## 2023-11-25 RX ADMIN — DESVENLAFAXINE SUCCINATE 50 MG: 50 TABLET, EXTENDED RELEASE ORAL at 14:28

## 2023-11-25 RX ADMIN — HEPARIN SODIUM 7500 UNITS: 5000 INJECTION INTRAVENOUS; SUBCUTANEOUS at 06:32

## 2023-11-25 RX ADMIN — SODIUM CHLORIDE 4 UNITS/HR: 9 INJECTION, SOLUTION INTRAVENOUS at 23:31

## 2023-11-25 RX ADMIN — LISINOPRIL 20 MG: 20 TABLET ORAL at 08:24

## 2023-11-25 RX ADMIN — LAMOTRIGINE 100 MG: 100 TABLET ORAL at 18:02

## 2023-11-25 RX ADMIN — SODIUM CHLORIDE 4 UNITS/HR: 9 INJECTION, SOLUTION INTRAVENOUS at 06:18

## 2023-11-25 RX ADMIN — VANCOMYCIN HYDROCHLORIDE 1500 MG: 10 INJECTION, POWDER, LYOPHILIZED, FOR SOLUTION INTRAVENOUS at 09:00

## 2023-11-25 RX ADMIN — HEPARIN SODIUM 7500 UNITS: 5000 INJECTION INTRAVENOUS; SUBCUTANEOUS at 14:28

## 2023-11-25 RX ADMIN — HEPARIN SODIUM 7500 UNITS: 5000 INJECTION INTRAVENOUS; SUBCUTANEOUS at 21:28

## 2023-11-25 NOTE — PROGRESS NOTES
Progress Note - General Surgery   LINUS Resident on Surgery Service   Kervin Wiseman 46 y.o. female MRN: [id number]  Unit/Bed#: S MS Yee-57 Encounter: [id number]    Assessment:  45 yo female with pain over her mons pubis in the setting of an abscess s/p bedside I and D 11/24    Afebrile. VSS. Currently on 2L NC saturating greater than 92%  WBC 8 from 12.2  Hgb 14.1 from 15.3  Cr 0.63 from 0.97    Plan:  Carb controlled diet  Continue IV abx  F/u wound cultures  Packing change today  DVT prophylaxis  PRN pain meds  PRN anti nausea meds  Rest of care per primary team    Subjective/Objective     Subjective: No acute events overnight. Patient denies having nausea, vomiting, fevers, chills, chest pain, shortness of breath. Tolerating PO intake. No bowel movements and no flatus at this point in time. Voiding without difficulty. Objective:     Blood pressure 125/67, pulse 104, temperature 98.7 °F (37.1 °C), resp. rate 18, height 5' (1.524 m), weight (!) 159 kg (349 lb 10.4 oz), SpO2 95 %. ,Body mass index is 68.29 kg/m². Intake/Output Summary (Last 24 hours) at 11/25/2023 0647  Last data filed at 11/24/2023 1700  Gross per 24 hour   Intake 0 ml   Output 850 ml   Net -850 ml       Invasive Devices       Peripheral Intravenous Line  Duration             Peripheral IV 11/23/23 Dorsal (posterior); Left Forearm 1 day                    General: NAD  HENT: NCAT MMM  Neck: supple, no JVD  CV: nl rate  Lungs: nl wob. No resp distress  ABD: Soft, nontender, nondistended. Previous incision with moderate sanguinous drainage. Minimally tender to palpation. Packing in place.   Extrem: No CCE  Neuro: AAOx3       Scheduled Meds:  Current Facility-Administered Medications   Medication Dose Route Frequency Provider Last Rate    acetaminophen  650 mg Oral Q6H PRN Lollie Stallion, CRNP      dicyclomine  20 mg Oral Q6H PRN Lollie Dannyion, CRNP      heparin (porcine)  7,500 Units Subcutaneous [facility] Reed insulin regular (HumuLIN R,NovoLIN R) 1 Units/mL in sodium chloride 0.9 % 100 mL infusion  0.3-21 Units/hr Intravenous Titrated Fahad Inman MD 4 Units/hr (11/25/23 7773)    labetalol  10 mg Intravenous Q4H PRN Marcluigi Gastelumole Amauri, DO      lamoTRIgine  100 mg Oral BID Tari Roup, CRNP      lisinopril  20 mg Oral Daily Tari Roup, CRNP      LORazepam  0.5 mg Oral Q8H PRN Tari Roup, CRNP      metoclopramide  10 mg Intravenous Q6H PRN Tari Roup, CRNP      pantoprazole  40 mg Oral Daily Tari Roup, CRNP      vancomycin  750 mg Intravenous Q12H Angel Contreras  mg (11/25/23 0121)     Continuous Infusions:insulin regular (HumuLIN R,NovoLIN R) 1 Units/mL in sodium chloride 0.9 % 100 mL infusion, 0.3-21 Units/hr, Last Rate: 4 Units/hr (11/25/23 0618)      PRN Meds:.  acetaminophen    dicyclomine    labetalol    LORazepam    metoclopramide      Lab, Imaging and other studies:I have personally reviewed pertinent lab results.     VTE Pharmacologic Prophylaxis: Heparin  VTE Mechanical Prophylaxis: sequential compression device      Hany Craig MD  11/25/2023 6:47 AM

## 2023-11-25 NOTE — PLAN OF CARE
Problem: Potential for Falls  Goal: Patient will remain free of falls  Description: INTERVENTIONS:  - Educate patient/family on patient safety including physical limitations  - Instruct patient to call for assistance with activity   - Consult OT/PT to assist with strengthening/mobility   - Keep Call bell within reach  - Keep bed low and locked with side rails adjusted as appropriate  - Keep care items and personal belongings within reach  - Initiate and maintain comfort rounds  - Make Fall Risk Sign visible to staff  - Offer Toileting every  Hours, in advance of need  - Initiate/Maintain alarm  - Obtain necessary fall risk management equipment:   - Apply yellow socks and bracelet for high fall risk patients  - Consider moving patient to room near nurses station  Outcome: Progressing     Problem: PAIN - ADULT  Goal: Verbalizes/displays adequate comfort level or baseline comfort level  Description: Interventions:  - Encourage patient to monitor pain and request assistance  - Assess pain using appropriate pain scale  - Administer analgesics based on type and severity of pain and evaluate response  - Implement non-pharmacological measures as appropriate and evaluate response  - Consider cultural and social influences on pain and pain management  - Notify physician/advanced practitioner if interventions unsuccessful or patient reports new pain  Outcome: Progressing     Problem: INFECTION - ADULT  Goal: Absence or prevention of progression during hospitalization  Description: INTERVENTIONS:  - Assess and monitor for signs and symptoms of infection  - Monitor lab/diagnostic results  - Monitor all insertion sites, i.e. indwelling lines, tubes, and drains  - Monitor endotracheal if appropriate and nasal secretions for changes in amount and color  - [place] appropriate cooling/warming therapies per order  - Administer medications as ordered  - Instruct and encourage patient and family to use good hand hygiene technique  - Identify and instruct in appropriate isolation precautions for identified infection/condition  Outcome: Progressing  Goal: Absence of fever/infection during neutropenic period  Description: INTERVENTIONS:  - Monitor WBC    Outcome: Progressing     Problem: SAFETY ADULT  Goal: Patient will remain free of falls  Description: INTERVENTIONS:  - Educate patient/family on patient safety including physical limitations  - Instruct patient to call for assistance with activity   - Consult OT/PT to assist with strengthening/mobility   - Keep Call bell within reach  - Keep bed low and locked with side rails adjusted as appropriate  - Keep care items and personal belongings within reach  - Initiate and maintain comfort rounds  - Make Fall Risk Sign visible to staff  - Offer Toileting every  Hours, in advance of need  - Initiate/Maintain alarm  - Obtain necessary fall risk management equipment:   - Apply yellow socks and bracelet for high fall risk patients  - Consider moving patient to room near nurses station  Outcome: Progressing  Goal: Maintain or return to baseline ADL function  Description: INTERVENTIONS:  -  Assess patient's ability to carry out ADLs; assess patient's baseline for ADL function and identify physical deficits which impact ability to perform ADLs (bathing, care of mouth/teeth, toileting, grooming, dressing, etc.)  - Assess/evaluate cause of self-care deficits   - Assess range of motion  - Assess patient's mobility; develop plan if impaired  - Assess patient's need for assistive devices and provide as appropriate  - Encourage maximum independence but intervene and supervise when necessary  - Involve family in performance of ADLs  - Assess for home care needs following discharge   - Consider OT consult to assist with ADL evaluation and planning for discharge  - Provide patient education as appropriate  Outcome: Progressing  Goal: Maintains/Returns to pre admission functional level  Description: INTERVENTIONS:  - Perform AM-PAC 6 Click Basic Mobility/ Daily Activity assessment daily.  - Set and communicate daily mobility goal to care team and patient/family/caregiver. - Collaborate with rehabilitation services on mobility goals if consulted  - Perform Range of Motion times a day. - Reposition patient every  hours. - Dangle patient  times a day  - Stand patient  times a day  - Ambulate patient  times a day  - Out of bed to chair  times a day   - Out of bed for meals  times a day  - Out of bed for toileting  - Record patient progress and toleration of activity level   Outcome: Progressing     Problem: DISCHARGE PLANNING  Goal: Discharge to home or other facility with appropriate resources  Description: INTERVENTIONS:  - Identify barriers to discharge w/patient and caregiver  - Arrange for needed discharge resources and transportation as appropriate  - Identify discharge learning needs (meds, wound care, etc.)  - Arrange for interpretive services to assist at discharge as needed  - Refer to Case Management Department for coordinating discharge planning if the patient needs post-hospital services based on physician/advanced practitioner order or complex needs related to functional status, cognitive ability, or social support system  Outcome: Progressing     Problem: Knowledge Deficit  Goal: Patient/family/caregiver demonstrates understanding of disease process, treatment plan, medications, and discharge instructions  Description: Complete learning assessment and assess knowledge base.   Interventions:  - Provide teaching at level of understanding  - Provide teaching via preferred learning methods  Outcome: Progressing     Problem: Prexisting or High Potential for Compromised Skin Integrity  Goal: Skin integrity is maintained or improved  Description: INTERVENTIONS:  - Identify patients at risk for skin breakdown  - Assess and monitor skin integrity  - Assess and monitor nutrition and hydration status  - Monitor labs   - Assess for incontinence   - Turn and reposition patient  - Assist with mobility/ambulation  - Relieve pressure over bony prominences  - Avoid friction and shearing  - Provide appropriate hygiene as needed including keeping skin clean and dry  - Evaluate need for skin moisturizer/barrier cream  - Collaborate with interdisciplinary team   - Patient/family teaching  - Consider wound care consult   Outcome: Progressing

## 2023-11-25 NOTE — PROGRESS NOTES
[address] [facility]  Progress Note  Name: Raghu Santana  MRN: [id number]  Unit/Bed#: S -65 I Date of Admission: 11/23/2023   Date of Service: 11/25/2023 I Hospital Day: 1    Assessment/Plan   Yeast infection of the vagina  Assessment & Plan  Patient has a history of ongoing, recurrent yeast infections  Vaginal yeast infection most likely due to uncontrolled blood sugar  Patient has tried Monistat but with no relief or resolution  One time dose of 200mg diflucan given   Symptoms as of 11/25 are slowly improving       Plan:  Continue to watch for symptoms improvement  Given another dose of diflucan if needed       Abscess  Assessment & Plan  Patient reports history of recurrent abscess that is normally in her mons pubis. Patient reports recurrent vaginal yeast infections as well. Received 200 mg of IV Diflucan in the ED. Received IV Zosyn and IV Vanco in ED. Continue IV Ancef. General surgery was consulted  Abscess was drained and packed    Plan:  General surgery continues to follow patient  Continue IV vancomycin at this time        Mixed hyperlipidemia  Assessment & Plan  Patient is not currently on a statin. Anxiety  Assessment & Plan  History of anxiety     Plan:  Continue ativan 0.5 mg every 8hours  as needed      Class 3 severe obesity due to excess calories with body mass index (BMI) of 60.0 to 69.9 in adult [facility])  Assessment & Plan  Encourage lifestyle modifications. GERD (gastroesophageal reflux disease)  Assessment & Plan  Continue PPI and Bentyl prn.     Major depressive disorder, recurrent severe without psychotic features [facility])  Assessment & Plan  History of depression  Psych was consulted during this admission to help reassess patient's current medication    Plan:  Pristiq 50 mg was restarted in order to avoid withdrawal related symptoms  Psych recommendation holding Abilify  Per psych recommendation continue lamotrigine 100 mg twice daily  Patient has agreed to partial psychiatric hospitalization program after being medically cleared    Essential hypertension  Assessment & Plan  Blood pressure is reviewed and acceptable. Last recorded pressure: 135/58  Continue Lisinopril. Monitor blood pressure. Type 2 diabetes mellitus with hyperglycemia, with long-term current use of insulin [facility])  Assessment & Plan  Lab Results   Component Value Date    HGBA1C 12.2 (H) 11/24/2023       Recent Labs     11/25/23  0918 11/25/23  1103 11/25/23  1334 11/25/23  1506   POCGLU 244* 253* 247* 206*       Blood Sugar Average: Last 72 hrs:  (P) [id number]  Patient presented with complaints of dizziness and nausea with associated elevated blood sugar readings. POA, serum glucose 518, patient was not in DKA. Received 2 L NSS in ED. Repeat glucose 347  Home regimen includes Metformin, will hold while inpatient. Home regimen of 64 units of Lantus HS nd 16 units of Lispro TID w/ meals. Hemoglobin A1c 12.2    Plan:  Continue to hold metformin while hospitalized  No chronology consulted appreciate recommendation  Continue patient on insulin drip while waiting for endocrinology consult  Hypoglycemia protocol. * Severe sepsis (HCC)  Assessment & Plan  SIRS criteria: Tachycardia, leukocytosis  Suspected source: Cellulitis of mons pubis with associated candida vulvovaginitis   Obtain UA. Lactic acid: 3.3 --> 1.3  End organ damage: Lactic acidosis  IV Fluids: Received 2 L NC in ED. IV antibiotics: Received IV Zosyn and IV Vancomycin in ED. Continue with IV Ancef. Follow up on culture results. Monitor vital signs, laboratory studies. Plan:   Resolved at this time                VTE Pharmacologic Prophylaxis: VTE Score: 6 High Risk (Score >/= 5) - Pharmacological DVT Prophylaxis Ordered: heparin. Sequential Compression Devices Ordered.     Mobility:   Basic Mobility Inpatient Raw Score: 20  JH-HLM Goal: 6: Walk 10 steps or more  JH-HLM Achieved: 2: Bed activities/Dependent transfer  [facility] Goal NOT achieved. Continue with multidisciplinary rounding and encourage appropriate mobility to improve upon [facility] goals. Patient Centered Rounds: I performed bedside rounds with nursing staff today. Discussions with Specialists or Other Care Team Provider: Senior resident and attending physician    Education and Discussions with Family / Patient: Patient declined call to . Current Length of Stay: 1 day(s)  Current Patient Status: Inpatient   Discharge Plan:  EGD pending better control of blood sugar    Code Status: Level 1 - Full Code    Subjective:   Seen and examined at bedside today for continuity of care. Patient was calm and cooperative with examination. Patient denies chills, fever, chest pain, abdominal pain, nausea, vomiting, sore throat, polyuria patient reports chronic joint pain in knees. Patient also reports that her diabetes thirst  has decreased since coming to the hospital.  Patient reported concern about having withdrawal from her psych medications. This writer explained to patient that they would restart Pristiq at 50 mg once daily to help avoid any withdrawal symptoms. Objective:     Vitals:   Temp (24hrs), Av.8 °F (37.1 °C), Min:98.8 °F (37.1 °C), Max:98.8 °F (37.1 °C)    Temp:  [98.8 °F (37.1 °C)] 98.8 °F (37.1 °C)  HR:  [92-97] 92  Resp:  [18] 18  BP: (133-135)/(75-76) 133/75  SpO2:  [94 %-95 %] 94 %  Body mass index is 68.29 kg/m². Input and Output Summary (last 24 hours): Intake/Output Summary (Last 24 hours) at 2023 1633  Last data filed at 2023 1613  Gross per 24 hour   Intake 118 ml   Output 2500 ml   Net -2382 ml       Physical Exam:   Physical Exam  Vitals and nursing note reviewed. Constitutional:       General: She is not in acute distress. Appearance: She is well-developed.    HENT:      Mouth/Throat:      Mouth: Mucous membranes are moist.   Eyes:      Conjunctiva/sclera: Conjunctivae normal.   Cardiovascular:      Rate and Rhythm: Normal rate and regular rhythm. Pulses: Normal pulses. Heart sounds: Normal heart sounds. No murmur heard. Pulmonary:      Effort: Pulmonary effort is normal. No respiratory distress. Breath sounds: Normal breath sounds. Comments: Nasal cannula in place for as needed oxygen especially when sleeping  Abdominal:      General: Bowel sounds are normal.      Palpations: Abdomen is soft. Tenderness: There is no abdominal tenderness. Genitourinary:     Comments: Packed drained abscess on left side of mons pubis, white vagina discharge   Musculoskeletal:         General: No tenderness. Cervical back: Neck supple. Skin:     General: Skin is warm and dry. Capillary Refill: Capillary refill takes less than 2 seconds. Neurological:      Mental Status: She is alert.    Psychiatric:         Mood and Affect: Mood normal.          Additional Data:     Labs:  Results from last 7 days   Lab Units 11/25/23 0446   WBC Thousand/uL 8.00   HEMOGLOBIN g/dL 14.1   HEMATOCRIT % 44.9   PLATELETS Thousands/uL 254   NEUTROS PCT % 60   LYMPHS PCT % 26   MONOS PCT % 10   EOS PCT % 2     Results from last 7 days   Lab Units 11/25/23  0446 11/23/23 2003   SODIUM mmol/L 132* 130*   POTASSIUM mmol/L 4.0 4.3   CHLORIDE mmol/L 100 94*   CO2 mmol/L 24 26   BUN mg/dL 11 15   CREATININE mg/dL 0.63 0.97   ANION GAP mmol/L 8 10   CALCIUM mg/dL 8.9 9.8   ALBUMIN g/dL  --  3.9   TOTAL BILIRUBIN mg/dL  --  0.59   ALK PHOS U/L  --  107*   ALT U/L  --  52   AST U/L  --  27   GLUCOSE RANDOM mg/dL 181* 518*         Results from last 7 days   Lab Units 11/25/23  1506 11/25/23  1334 11/25/23  1103 11/25/23  0918 11/25/23  0712 11/25/23  0555 11/25/23  0304 11/25/23  0112 11/24/23  2249 11/24/23  1941 11/24/23  1745 11/24/23  1534   POC GLUCOSE mg/dl 206* 247* 253* 244* 191* 179* 173* 201* 233* 315* 295* 240*     Results from last 7 days   Lab Units 11/24/23  0511   HEMOGLOBIN A1C % 12.2*     Results from last 7 days   Lab Units 11/24/23  0511 11/24/23  0001 11/23/23 2003   LACTIC ACID mmol/L  --  1.3 3.3*   PROCALCITONIN ng/ml 0.13  --  0.17       Lines/Drains:  Invasive Devices       Peripheral Intravenous Line  Duration             Peripheral IV 11/23/23 Dorsal (posterior); Left Forearm 1 day    Peripheral IV 11/25/23 Dorsal (posterior); Right Forearm <1 day                          Imaging: Reviewed radiology reports from this admission including: abdominal/pelvic CT    Recent Cultures (last 7 days):   Results from last 7 days   Lab Units 11/24/23  2319 11/23/23 2003   BLOOD CULTURE   --  No Growth at 24 hrs. No Growth at 24 hrs. GRAM STAIN RESULT  3+ Polys*  1+ Gram negative rods*  1+ Gram positive cocci in pairs*  --        Last 24 Hours Medication List:   Current Facility-Administered Medications   Medication Dose Route Frequency Provider Last Rate    acetaminophen  650 mg Oral Q6H PRN MINNA RobbinsNP      desvenlafaxine succinate  50 mg Oral Daily Rashawn Hammond DO      dicyclomine  20 mg Oral Q6H PRN SUJATA Robbins      heparin (porcine)  7,500 Units Subcutaneous Q8H [address] [facility], SUJATA      insulin regular (HumuLIN R,NovoLIN R) 1 Units/mL in sodium chloride 0.9 % 100 mL infusion  0.3-21 Units/hr Intravenous Titrated Trevor Hsu MD 12 Units/hr (11/25/23 1540)    labetalol  10 mg Intravenous Q4H PRN Charline Baugh DO      lamoTRIgine  100 mg Oral BID SUJATA Robbins      lisinopril  20 mg Oral Daily Shital SUJATA Ovalle      LORazepam  0.5 mg Oral Q8H PRN SUJATA Robbins      metoclopramide  10 mg Intravenous Q6H PRN SUJATA Robbins      pantoprazole  40 mg Oral Daily SUJATA Robbins      vancomycin  1,500 mg Intravenous Q12H Arielle Arreguin MD          Today, Patient Was Seen By: Rashawn Hammond DO    **Please Note: This note may have been constructed using a voice recognition system. **

## 2023-11-25 NOTE — CONSULTS
Consultation - General Surgery  Abdifatah Gonzalez 46 y.o. female MRN: [id number]  Unit/Bed#: S -57 Encounter: [id number]        Assessment/Plan     Assessment:  45 yo female with pain over her mons pubis most likely in the setting of an abscess    Plan:  Ordered IV lidocaine for local anesthesia  Will give IV pain meds  Will proceed with bedside incision and drainage  Will obtain wound cultures  Will pack the wound  Wound check on   Continue IV abx  PRN pain meds  PRN anti nausea meds  Recommend DVT prophylaxis  Rest of care per primary team    History of Present Illness     HPI:  Abdifatah Gonzalez is a 46 y.o. female who originally presented to the ED with lightheadedness and dizziness in the setting of hyperglycemia. General surgery consulted for further assessment of the patient's mons pubis infection/cellulitis. The patient states that the pain in her mons pubis began within the past 2 days, however, she has a history of significant infections in this region requiring antibiotics and tight glucose control. The patient currently endorses pain and redness but no drainage. She denies having nausea, vomiting, fevers, chills, chest pain, shortness of breath. PMHX is notable for anxiety, depression, insulin dependent T2DM, GERD, HLD, HTN, IBS, and obesity. PSHX is most notable for a cholecystectomy and . She is currently afebrile with stable vitals. She was tachycardic to the 100's and hypertensive to the 180's, however, her vitals have normalized. She does have a leukocytosis to 12.27 and she did have a lactic acidosis to 3.3 but it did clear to 1.3. No significant findings were appreciated on her  CT AP with contrast in regards to her mons pubis infection/abscess. See above for assessment and plan.     Review of Systems  See above, otherwise negative    Historical Information   Past Medical History:   Diagnosis Date    Anemia     Anxiety     Candidal intertrigo     Depression     Diabetes mellitus ([address]) GERD (gastroesophageal reflux disease)     Hyperlipidemia     Hypertension     Irritable bowel syndrome     Morbid obesity with BMI of 60.0-69.9, adult ([address] )     Osteoarthritis     Seasonal allergies     Sleep difficulties     Suicide attempt [facility])      Past Surgical History:   Procedure Laterality Date    [address] [facility]  2008    WISDOM TOOTH EXTRACTION  2009     Social History   Social History     Substance and Sexual Activity   Alcohol Use Not Currently    Comment: occassionaly      Social History     Substance and Sexual Activity   Drug Use No     Social History     Tobacco Use   Smoking Status Never   Smokeless Tobacco Never     Family History: non-contributory    Meds/Allergies   all medications and allergies reviewed  Allergies   Allergen Reactions    Cephalexin Vomiting     Other reaction(s): Nausea and/or vomiting    Insulin Degludec GI Intolerance    Sulfamethoxazole-Trimethoprim Vomiting     Other reaction(s): Nausea and/or vomiting       Objective   First Vitals:   Blood Pressure: 151/72 (11/23/23 1907)  Pulse: (!) 116 (11/23/23 1907)  Temperature: 99.4 °F (37.4 °C) (11/23/23 1907)  Temp Source: Oral (11/23/23 1907)  Respirations: 20 (11/23/23 1907)  Height: 5' (152.4 cm) (11/24/23 0145)  Weight - Scale: (!) 152 kg (335 lb 1.6 oz) (11/24/23 0427)  SpO2: 94 % (11/23/23 1907)    Current Vitals:   Blood Pressure: 125/67 (11/24/23 1534)  Pulse: 104 (11/24/23 1534)  Temperature: 98.7 °F (37.1 °C) (11/24/23 1534)  Temp Source: Axillary (11/24/23 0706)  Respirations: 18 (11/24/23 0706)  Height: 5' (152.4 cm) (11/24/23 1700)  Weight - Scale: (!) 160 kg (352 lb 11.8 oz) (11/24/23 1700)  SpO2: 94 % (11/24/23 1534)      Intake/Output Summary (Last 24 hours) at 11/24/2023 1946  Last data filed at 11/24/2023 1010  Gross per 24 hour   Intake 2200 ml   Output --   Net 2200 ml       Invasive Devices       Peripheral Intravenous Line  Duration             Peripheral IV 11/23/23 Dorsal (posterior); Left Forearm 1 day                    Physical Exam:  General: No acute distress  Neuro: Alert, oriented to person and place  Eyes: Extraocular movements intact  CV: Well perfused, regular rate and rhythm  Lungs: Normal work of breathing, no increased respiratory effort  Abdomen: Soft, non-tender, non-distended. Mons pubis with a wound measuring about 2x2 in size. Eschar appreciated with fluctuance underneath and inferior to the eschar. No obvious purulent drainage from the wound. Will plan to do bedside incision and drainage. See attached image for a visual of the wound:    Extremities: No edema, clubbing or cyanosis  Skin: Warm, dry  Lymph: No palpable lymph nodes  Psych: Normal mentation      Lab Results: I have personally reviewed pertinent lab results. Imaging: I have personally reviewed pertinent reports. EKG, Pathology, and Other Studies: I have personally reviewed pertinent reports.       Code Status: Level 1 - Full Code  Advance Directive and Living Will:      Power of :    POLST:      Abraham Angeles MD  General Surgery Resident

## 2023-11-25 NOTE — PROGRESS NOTES
Patrick Martinez is a 46 y.o. female who is currently ordered Vancomycin IV with management by the Pharmacy Consult service. Relevant clinical data and objective / subjective history reviewed. Vancomycin Assessment:  Indication and Goal AUC/Trough: Soft tissue (goal -600, trough >10), MRSA  Micro:     Renal Function:  SCr: 0.63 mg/dL  CrCl: 153 mL/min  Renal replacement: Not on dialysis  Days of Therapy: 2  Current Dose: 750 mg IV every 12 hours   Vancomycin Plan:  New Dosin mg IV every 12 hours  Estimated AUC: 443 mcg*hr/mL  Estimated Trough: 13.2 mcg/mL  Next Level:  at 0600  Renal Function Monitoring: Daily BMP and [place] will continue to follow closely for s/sx of nephrotoxicity, infusion reactions and appropriateness of therapy. BMP and CBC will be ordered per protocol. We will continue to follow the patient’s culture results and clinical progress daily.     Olamide Carlisle

## 2023-11-25 NOTE — PLAN OF CARE
Problem: Potential for Falls  Goal: Patient will remain free of falls  Description: INTERVENTIONS:  - Educate patient/family on patient safety including physical limitations  - Instruct patient to call for assistance with activity   - Consult OT/PT to assist with strengthening/mobility   - Keep Call bell within reach  - Keep bed low and locked with side rails adjusted as appropriate  - Keep care items and personal belongings within reach  - Initiate and maintain comfort rounds  - Make Fall Risk Sign visible to staff  - Offer Toileting every two Hours, in advance of need  - Initiate/Maintain bed and chair alarm  - Obtain necessary fall risk management equipment:   - Apply yellow socks and bracelet for high fall risk patients  - Consider moving patient to room near nurses station  Outcome: Progressing     Problem: PAIN - ADULT  Goal: Verbalizes/displays adequate comfort level or baseline comfort level  Description: Interventions:  - Encourage patient to monitor pain and request assistance  - Assess pain using appropriate pain scale  - Administer analgesics based on type and severity of pain and evaluate response  - Implement non-pharmacological measures as appropriate and evaluate response  - Consider cultural and social influences on pain and pain management  - Notify physician/advanced practitioner if interventions unsuccessful or patient reports new pain  Outcome: Progressing     Problem: INFECTION - ADULT  Goal: Absence or prevention of progression during hospitalization  Description: INTERVENTIONS:  - Assess and monitor for signs and symptoms of infection  - Monitor lab/diagnostic results  - Monitor all insertion sites, i.e. indwelling lines, tubes, and drains  - Monitor endotracheal if appropriate and nasal secretions for changes in amount and color  - [place] appropriate cooling/warming therapies per order  - Administer medications as ordered  - Instruct and encourage patient and family to use good hand hygiene technique  - Identify and instruct in appropriate isolation precautions for identified infection/condition  Outcome: Progressing     Problem: SAFETY ADULT  Goal: Patient will remain free of falls  Description: INTERVENTIONS:  - Educate patient/family on patient safety including physical limitations  - Instruct patient to call for assistance with activity   - Consult OT/PT to assist with strengthening/mobility   - Keep Call bell within reach  - Keep bed low and locked with side rails adjusted as appropriate  - Keep care items and personal belongings within reach  - Initiate and maintain comfort rounds  - Make Fall Risk Sign visible to staff  - Offer Toileting every two Hours, in advance of need  - Initiate/Maintain bed and chair alarm  - Obtain necessary fall risk management equipment:   - Apply yellow socks and bracelet for high fall risk patients  - Consider moving patient to room near nurses station  Outcome: Progressing     Problem: Prexisting or High Potential for Compromised Skin Integrity  Goal: Skin integrity is maintained or improved  Description: INTERVENTIONS:  - Identify patients at risk for skin breakdown  - Assess and monitor skin integrity  - Assess and monitor nutrition and hydration status  - Monitor labs   - Assess for incontinence   - Turn and reposition patient  - Assist with mobility/ambulation  - Relieve pressure over bony prominences  - Avoid friction and shearing  - Provide appropriate hygiene as needed including keeping skin clean and dry  - Evaluate need for skin moisturizer/barrier cream  - Collaborate with interdisciplinary team   - Patient/family teaching  - Consider wound care consult   Outcome: Progressing

## 2023-11-25 NOTE — UTILIZATION REVIEW
Initial Clinical Review    Admission: Date/Time/Statement:   Admission Orders (From admission, onward)       Ordered        11/24/23 0051  INPATIENT ADMISSION  Once                          Orders Placed This Encounter   Procedures    INPATIENT ADMISSION     Standing Status:   Standing     Number of Occurrences:   1     Order Specific Question:   Level of Care     Answer:   Med Surg [16]     Order Specific Question:   Estimated length of stay     Answer:   More than 2 Midnights     Order Specific Question:   Certification     Answer:   I certify that inpatient services are medically necessary for this patient for a duration of greater than two midnights. See H&P and MD Progress Notes for additional information about the patient's course of treatment. ED Arrival Information       Expected   -    Arrival   11/23/2023 19:00    Acuity   Emergent              Means of arrival   Ambulance    Escorted by   [facility]    Service   Hospitalist    Admission type   Emergency              Arrival complaint   Dizziness             Chief Complaint   Patient presents with    Hyperglycemia - Symptomatic     Pt arrives to ED via EMS from home. Before eating dinner, pt took 16U of Humalog. After eating, pt began feeling dizzy & nauseous. Pt reports vomiting once.  per EMS. BS taken upon arrival and is now >500. Initial Presentation: 46 y.o. female with hx HLD, HTN, MO, DM2, ALLYSON , MDD who presents to ED11/23/23 from home via EMS with dizziness and nausea with associated elevated blood sugar readings. Patient endorses one episode of non bloody vomiting. Pt reports having recurrent abscess in the mons pubis and recurrent vaginal candidiasis infections with redness worsening in the mons pubis over the past week. On exam, erythema on mons pubis with increased warmth. It appears that the abscess that the patient mentioned had opened and drained at some point, not much fluid was palpated beneath the lesion.    , rash in bilat groin folds . Thick white discharge on vaginal exam .Pt tachycardic, temp 99.4 F  . Labs WBC 12.27, glucose 518--->347 after 2 L NSS , LA 3.3--->1.3 . Pt given IVF, IV antiemetic, IV Diflucan, IV abx in ED. PT admitted as Inpatient 11/24 with severe sepsis, suspected source cellulitis mons pubis . DM2 with hyperglycemia . PLan - IV Ancef. General sx consult. F/U wound cx. Monitor vitals and labs . Continue home regimen of 64 units of Lantus HS and 16 units of Lispro TID w/ meals. Obtain A1c. Accucheks . SSI .     11/24 update- IV Ancef changed to IV vanco for now with concerns for staph infection . F/U  I&D cultures. Blood glucose is severely elevated with interval increase in A1c to 10.5, diabetes is severely uncontrolled. Given infection and hyperglycemia, continue insulin gtt for now for at least another 24 hours or so before transitioning back to a basal/bolus regimen . Pt on multiple psych meds, unclear as to what she should be on at this point. There is an element of untreated depression . Behavioral health consult placed . Behavioral health consult- presents with severe depression, endorsing all symptoms of depression and particularly low motivation, anhedonia, low energy. She does not meet criteria for inpatient commitment. pt does wish to continue with partial program and feels she would benefit from therapy as well. She noted prominent symptoms of frustration and guilt regarding allowing her medical conditions to worsen in setting of low motivation. PLan - Continue Lamictal 100 mg BID and Ativan 0.5 mg q8 hours for breakthrough anxiety . Continue to hold Abilify 10 mg qdaily and Pristiq 100 mg qdaily. May increase Pristiq by 50 mg to 150 mg once daily once this is restarted to address depression, however for now hold Pristiq due to pt's sepsis, HTN, tachycardia, hold Abilify due to pt's hyperglycemia. General sx consult- pain over her mons pubis most likely in the setting of an abscess . No significant findings on 11/23 CT AP in regards to her mons pubis infection/abscess. Mons pubis with a wound measuring about 2x2 in size. Eschar appreciated with fluctuance underneath and inferior to the eschar. No obvious purulent drainage from the wound. Ordered IV lidocaine for local anesthesia for bedside I and D . Obtain wound cx. Pack wound . Pain control. Continue IV abx.   11/24/23 @ 2027   INCISION AND DRAINAGE  Was able to express 2-5 cc of pus. Obtained wound cultures. Throughly irrigated the wound with sterile saline. Packed the wound with 1/2'' iodoform. Dressed with wound with 4x4 gauze, and ABD pad, and tape. Will perform packing change on 11/25. The wound measured approximately 2 x 2 cm in size     Date: 11/25   Day 2: s/p I and D at bedside yesterday    Pt afebrile . WBC normalized today. Pt denies fever or chills. Previous incision with moderate sanguinous drainage. Minimally tender to palpation. Packing in place. Pt continues IV vanco. F/U wound and blood cx. Pt on insulin drip with blood sugars 173-253 this am . A1c 12.2 . Endocrinology consult placed today. Pt reports her diabetic thirst has decreased since admitted . Pt  concerned about having withdrawal from her psych medications. Restart Pristiq at 50 mg once daily to help avoid any withdrawal symptoms . PRN Ativan for anxiety . Nasal cannula in place for as needed oxygen especially when sleeping . Has white vagina discharge, sx improving  slowly  -vaginal yeast infection .          11/24 Mons pubis  ED Triage Vitals   Temperature Pulse Respirations Blood Pressure SpO2   11/23/23 1907 11/23/23 1907 11/23/23 1907 11/23/23 1907 11/23/23 1907   99.4 °F (37.4 °C) (!) 116 20 151/72 94 %      Temp Source Heart Rate Source Patient Position - Orthostatic VS BP Location FiO2 (%)   11/23/23 1907 11/23/23 1907 11/23/23 1907 11/23/23 1907 --   Oral Monitor Lying Left arm       Pain Score       11/24/23 0231       7          Wt Readings from Last 1 Encounters:   11/25/23 (!) 159 kg (349 lb 10.4 oz)     Additional Vital Signs:      ate/Time Temp Pulse Resp BP MAP (mmHg) SpO2   11/25/23 07:09:27 98.8 °F (37.1 °C) 97 18 135/76 96 94 %   11/25/23 0300 -- -- -- -- -- 95 %   11/24/23 15:34:03 98.7 °F (37.1 °C) 104 -- 125/67 86 94 %   11/24/23 0800 -- -- -- -- -- --   11/24/23 07:06:04 99 °F (37.2 °C) 108 Abnormal  18 179/85 Abnormal  116 93 %   11/24/23 02:37:52 98.4 °F (36.9 °C) 100 18 148/75 99 91 %   11/24/23 0145 -- 104 20 135/58 89 91 %   11/24/23 0130 -- 103 20 151/75 103 92 %   11/24/23 0115 -- 101 20 149/82 108 92 %   11/24/23 0045 -- 98 20 78/48 Abnormal  55 Abnormal  92 %   11/24/23 0030 -- 101 20 110/58 78 93 %   11/24/23 0015 -- 100 20 115/56 81 93 %   11/24/23 0011 -- 100 20 119/57 82 94 %   11/23/23 2130 -- 108 Abnormal  20 95/54 73 96 %   11/23/23 2100 -- 113 Abnormal  20 110/54 78 96 %   11/23/23 1915 -- 118 Abnormal  20 151/72 101 93 %         Pertinent Labs/Diagnostic Test Results:   CT abdomen pelvis with contrast   Final Result by Abdirahman Ha MD (11/23 2355)      Hepatomegaly and mild diffuse hepatic steatosis. Bilateral nephrolithiasis without hydronephrosis. No evidence of bowel obstruction, inflammation, appendicitis, free air, or free fluid. A few small superficial varicosities noted. A nonspecific    pathologically enlarged 2.7 x 1.5 cm right inguinal lymph node is incidentally seen. Scattered groundglass opacity in the visualized lung bases noted which may be secondary to infectious/inflammatory process of the lung parenchyma, recommend clinical    correlation.             Workstation performed: [id number]           Results from last 7 days   Lab Units 11/24/23  0101   SARS-COV-2  Negative     Results from last 7 days   Lab Units 11/25/23  0446 11/23/23 2003   WBC Thousand/uL 8.00 12.27*   HEMOGLOBIN g/dL 14.1 15.3   HEMATOCRIT % 44.9 46.2*   PLATELETS Thousands/uL 254 267   NEUTROS ABS Thousands/µL 4.78 9.05*         Results from last 7 days   Lab Units 11/25/23  0446 11/23/23 2003   SODIUM mmol/L 132* 130*   POTASSIUM mmol/L 4.0 4.3   CHLORIDE mmol/L 100 94*   CO2 mmol/L 24 26   ANION GAP mmol/L 8 10   BUN mg/dL 11 15   CREATININE mg/dL 0.63 0.97   EGFR ml/min/1.73sq m 104 68   CALCIUM mg/dL 8.9 9.8     Results from last 7 days   Lab Units 11/23/23 2003   AST U/L 27   ALT U/L 52   ALK PHOS U/L 107*   TOTAL PROTEIN g/dL 7.1   ALBUMIN g/dL 3.9   TOTAL BILIRUBIN mg/dL 0.59     Results from last 7 days   Lab Units 11/25/23  1103 11/25/23  0918 11/25/23  0712 11/25/23  0555 11/25/23  0304 11/25/23  0112 11/24/23  2249 11/24/23  1941 11/24/23  1745 11/24/23  1534 11/24/23  1425 11/24/23  1319   POC GLUCOSE mg/dl 253* 244* 191* 179* 173* 201* 233* 315* 295* 240* 274* 298*     Results from last 7 days   Lab Units 11/25/23  0446 11/23/23 2003   GLUCOSE RANDOM mg/dL 181* 518*         Results from last 7 days   Lab Units 11/24/23  0511   HEMOGLOBIN A1C % 12.2*   EAG mg/dl 303     BETA-HYDROXYBUTYRATE   Date Value Ref Range Status   11/23/2023 0.2 <0.6 mmol/L Final   04/17/2021 0.1 <0.6 mmol/L Final          Results from last 7 days   Lab Units 11/23/23 2003   PH BRANDIN  7.386   PCO2 BRANDIN mm Hg 45.9   PO2 BRANDIN mm Hg 39.0   HCO3 BRANDIN mmol/L 26.9   BASE EXC BRANDIN mmol/L 1.3   O2 CONTENT BRANDIN ml/dL 16.3   O2 HGB, VENOUS % 74.5           Results from last 7 days   Lab Units 11/24/23  0511 11/23/23 2003   PROCALCITONIN ng/ml 0.13 0.17     Results from last 7 days   Lab Units 11/24/23  0001 11/23/23 2003   LACTIC ACID mmol/L 1.3 3.3*               Results from last 7 days   Lab Units 11/24/23  1049   CLARITY UA  Clear   COLOR UA  Light Yellow   SPEC GRAV UA  1.043*   PH UA  5.5   GLUCOSE UA mg/dl >=1000 (1%)*   KETONES UA mg/dl Negative   BLOOD UA  Small*   PROTEIN UA mg/dl Negative   NITRITE UA  Negative   BILIRUBIN UA  Negative   UROBILINOGEN UA (BE) mg/dl <2.0   LEUKOCYTES UA  Elevated glucose may cause decreased leukocyte values.  See urine microscopic for UWBC result*   WBC UA /hpf 2-4*   RBC UA /hpf 4-10*   BACTERIA UA /hpf Occasional   EPITHELIAL CELLS WET PREP /hpf Occasional     Results from last 7 days   Lab Units 11/24/23  0101   INFLUENZA A PCR  Negative   INFLUENZA B PCR  Negative   RSV PCR  Negative                             Results from last 7 days   Lab Units 11/24/23  2319 11/23/23 2003   BLOOD CULTURE   --  No Growth at 24 hrs. No Growth at 24 hrs.    GRAM STAIN RESULT  3+ Polys*  1+ Gram negative rods*  1+ Gram positive cocci in pairs*  --              Results from last 7 days   Lab Units 11/25/23  0446   VANCOMYCIN RM ug/mL 10.2       ED Treatment:   Medication Administration from 11/23/2023 1900 to 11/24/2023 0224         Date/Time Order Dose Route Action     11/23/2023 1930 EST ondansetron (ZOFRAN) injection 4 mg 4 mg Intravenous Given     11/23/2023 2158 EST sodium chloride 0.9 % bolus 1,000 mL 0 mL Intravenous Stopped     11/23/2023 2012 EST sodium chloride 0.9 % bolus 1,000 mL 1,000 mL Intravenous New Bag     11/24/2023 0007 EST sodium chloride 0.9 % bolus 1,000 mL 0 mL Intravenous Stopped     11/23/2023 2158 EST sodium chloride 0.9 % bolus 1,000 mL 1,000 mL Intravenous New Bag     11/24/2023 0006 EST fluconazole (DIFLUCAN) IVPB (premix) 200 mg 0 mg Intravenous Stopped     11/23/2023 2229 EST fluconazole (DIFLUCAN) IVPB (premix) 200 mg 200 mg Intravenous New Bag     11/23/2023 2111 EST vancomycin (VANCOCIN) 2,250 mg in sodium chloride 0.9 % 500 mL IVPB 2,250 mg Intravenous Not Given     11/23/2023 2228 EST piperacillin-tazobactam (ZOSYN) 3.375 g in sodium chloride 0.9 % 100 mL IVPB 0 g Intravenous Stopped     11/23/2023 2114 EST piperacillin-tazobactam (ZOSYN) 3.375 g in sodium chloride 0.9 % 100 mL IVPB 3.375 g Intravenous New Bag     11/24/2023 0012 EST vancomycin (VANCOCIN) 1,250 mg in sodium chloride 0.9 % 250 mL IVPB 1,250 mg Intravenous New Bag     11/23/2023 2202 EST iohexol (OMNIPAQUE) 350 MG/ML injection (MULTI-DOSE) 100 mL 100 mL Intravenous Given          Past Medical History:   Diagnosis Date    Anemia     Anxiety     Candidal intertrigo     Depression     Diabetes mellitus ([address] [facility])     GERD (gastroesophageal reflux disease)     Hyperlipidemia     Hypertension     Irritable bowel syndrome     Morbid obesity with BMI of 60.0-69.9, adult ([address] [facility])     Osteoarthritis     Seasonal allergies     Sleep difficulties     Suicide attempt ([address] [facility])      Present on Admission:   Essential hypertension   Generalized anxiety disorder   GERD (gastroesophageal reflux disease)   Mixed hyperlipidemia   Severe sepsis ([facility])      Admitting Diagnosis: Vaginal candidiasis [B37.31]  Hyperglycemia [R73.9]  Soft tissue infection [L08.9]  Severe sepsis (HCC) [A41.9, R65.20]  Uncontrolled type 2 diabetes mellitus with hyperglycemia ([address] [facility]) [E11.65]  Age/Sex: 46 y.o. female  Admission Orders:  Scheduled Medications:  heparin (porcine), 7,500 Units, Subcutaneous, Q8H [address]  lamoTRIgine, 100 mg, Oral, BID  lisinopril, 20 mg, Oral, Daily  pantoprazole, 40 mg, Oral, Daily  vancomycin, 1,500 mg, Intravenous, Q12H    insulin lispro (HumaLOG) 100 units/mL subcutaneous injection 16 Units  Dose: 16 Units  Freq: 3 times daily with meals Route: SC  Start: 11/24/23 0730 End: 11/24/23 1128    insulin lispro (HumaLOG) 100 units/mL subcutaneous injection 2-12 Units  Dose: 2-12 Units  Freq: 3 times daily before meals Route: SC  Start: 11/24/23 0700 End: 11/24/23 0920   insulin lispro (HumaLOG) 100 units/mL subcutaneous injection 1-5 Units  Dose: 1-5 Units  Freq: Once Route: SC  Start: 11/24/23 0430 End: 11/24/23 0623   insulin glargine (LANTUS) subcutaneous injection 70 Units 0.7 mL  Dose: 70 Units  Freq: Daily at bedtime Route: SC  Start: 11/24/23 0430 End: 11/24/23 1128   HYDROmorphone (DILAUDID) injection 1 mg  Dose: 1 mg  Freq:  Once Route: IV  Start: 11/24/23 2000 End: 11/24/23 2014   ceFAZolin (ANCEF) IVPB (premix in dextrose) 2,000 mg 50 mL  Dose: 2,000 mg  Freq: Every 8 hours Route: IV  Last Dose: Stopped (11/24/23 1219)  Start: 11/24/23 0600 End: 11/24/23 1200   desvenlafaxine succinate (PRISTIQ) 24 hr tablet 50 mg  Dose: 50 mg  Freq: Daily Route: PO  Start: 11/25/23 1300       Continuous IV Infusions:  insulin regular (HumuLIN R,NovoLIN R) 1 Units/mL in sodium chloride 0.9 % 100 mL infusion, 0.3-21 Units/hr, Intravenous, Titrated    insulin regular (HumuLIN R,NovoLIN R) 1 Units/mL in sodium chloride 0.9 % 100 mL infusion  Rate: 0.3-21 mL/hr Dose: 0.3-21 Units/hr  Freq: Titrated Route: IV  Last Dose: 12 Units/hr (11/24/23 1010)  Start: 11/24/23 1000 End: 11/24/23 1128   insulin regular (HumuLIN R,NovoLIN R) 1 Units/mL in sodium chloride 0.9 % 100 mL infusion  Rate: 0.1-30 mL/hr Dose: 0.1-30 Units/hr  Freq: Continuous Route: IV  Last Dose: 7.51 Units/hr (11/24/23 1420)  Start: 11/24/23 1130 End: 11/24/23 1752         PRN Meds:  acetaminophen, 650 mg, Oral, Q6H PRN  dicyclomine, 20 mg, Oral, Q6H PRN  labetalol, 10 mg, Intravenous, Q4H PRN  LORazepam, 0.5 mg, Oral, Q8H PRN x1 11/24  metoclopramide, 10 mg, Intravenous, Q6H PRN x1 11/24     2 Gm Na , level 2 carb diet   [place] for hypoglycemia   POCT q2h until goal anion gap < 13 on BMP. IP CONSULT TO PSYCHIATRY  IP CONSULT TO PHARMACY  IP CONSULT TO ACUTE CARE SURGERY    Network Utilization Review Department  ATTENTION: Please call with any questions or concerns to [phone] and carefully listen to the prompts so that you are directed to the right person. All voicemails are confidential.   For Discharge needs, contact Care Management DC Support Team at [phone] opt. 2  Send all requests for admission clinical reviews, approved or denied determinations and any other requests to dedicated fax number below belonging to the campus where the patient is receiving treatment.  List of dedicated fax numbers for the Facilities:  FACILITY NAME UR FAX NUMBER   ADMISSION DENIALS (Administrative/Medical Necessity) [phone]   DISCHARGE SUPPORT TEAM [phone]   [facility] [phone]   [address] [facility] [address] [facility] [phone]   [facility] [address] [place] [address] [address] [address] [facility] [address] [facility] [address] [phone]

## 2023-11-25 NOTE — ASSESSMENT & PLAN NOTE
History of depression  Psych was consulted during this admission to help reassess patient's current medication    Plan:  Pristiq 50 mg was restarted in order to avoid withdrawal related symptoms  Psych recommendation holding Abilify  Per psych recommendation continue lamotrigine 100 mg twice daily  Patient has agreed to partial psychiatric hospitalization program after being medically cleared

## 2023-11-25 NOTE — ASSESSMENT & PLAN NOTE
Patient has a history of ongoing, recurrent yeast infections  Vaginal yeast infection most likely due to uncontrolled blood sugar  Patient has tried Monistat but with no relief or resolution  One time dose of 200mg diflucan given   Symptoms as of 11/25 are slowly improving       Plan:  Continue to watch for symptoms improvement  Given another dose of diflucan if needed

## 2023-11-26 PROBLEM — E87.1 HYPONATREMIA: Status: ACTIVE | Noted: 2023-11-26

## 2023-11-26 LAB
ANION GAP SERPL CALCULATED.3IONS-SCNC: 7 MMOL/L
BASOPHILS # BLD AUTO: 0.04 THOUSANDS/ÂΜL (ref 0–0.1)
BASOPHILS NFR BLD AUTO: 1 % (ref 0–1)
BUN SERPL-MCNC: 10 MG/DL (ref 5–25)
CALCIUM SERPL-MCNC: 8.8 MG/DL (ref 8.4–10.2)
CHLORIDE SERPL-SCNC: 103 MMOL/L (ref 96–108)
CO2 SERPL-SCNC: 22 MMOL/L (ref 21–32)
CREAT SERPL-MCNC: 0.6 MG/DL (ref 0.6–1.3)
EOSINOPHIL # BLD AUTO: 0.19 THOUSAND/ÂΜL (ref 0–0.61)
EOSINOPHIL NFR BLD AUTO: 3 % (ref 0–6)
ERYTHROCYTE [DISTWIDTH] IN BLOOD BY AUTOMATED COUNT: 13.2 % (ref 11.6–15.1)
GFR SERPL CREATININE-BSD FRML MDRD: 106 ML/MIN/1.73SQ M
GLUCOSE SERPL-MCNC: 125 MG/DL (ref 65–140)
GLUCOSE SERPL-MCNC: 128 MG/DL (ref 65–140)
GLUCOSE SERPL-MCNC: 134 MG/DL (ref 65–140)
GLUCOSE SERPL-MCNC: 136 MG/DL (ref 65–140)
GLUCOSE SERPL-MCNC: 162 MG/DL (ref 65–140)
GLUCOSE SERPL-MCNC: 162 MG/DL (ref 65–140)
GLUCOSE SERPL-MCNC: 164 MG/DL (ref 65–140)
GLUCOSE SERPL-MCNC: 173 MG/DL (ref 65–140)
GLUCOSE SERPL-MCNC: 210 MG/DL (ref 65–140)
GLUCOSE SERPL-MCNC: 213 MG/DL (ref 65–140)
GLUCOSE SERPL-MCNC: 217 MG/DL (ref 65–140)
GLUCOSE SERPL-MCNC: 223 MG/DL (ref 65–140)
GLUCOSE SERPL-MCNC: 234 MG/DL (ref 65–140)
GLUCOSE SERPL-MCNC: 256 MG/DL (ref 65–140)
HCT VFR BLD AUTO: 42.3 % (ref 34.8–46.1)
HGB BLD-MCNC: 13.6 G/DL (ref 11.5–15.4)
IMM GRANULOCYTES # BLD AUTO: 0.07 THOUSAND/UL (ref 0–0.2)
IMM GRANULOCYTES NFR BLD AUTO: 1 % (ref 0–2)
LYMPHOCYTES # BLD AUTO: 1.57 THOUSANDS/ÂΜL (ref 0.6–4.47)
LYMPHOCYTES NFR BLD AUTO: 26 % (ref 14–44)
MCH RBC QN AUTO: 30.3 PG (ref 26.8–34.3)
MCHC RBC AUTO-ENTMCNC: 32.2 G/DL (ref 31.4–37.4)
MCV RBC AUTO: 94 FL (ref 82–98)
MONOCYTES # BLD AUTO: 0.72 THOUSAND/ÂΜL (ref 0.17–1.22)
MONOCYTES NFR BLD AUTO: 12 % (ref 4–12)
NEUTROPHILS # BLD AUTO: 3.56 THOUSANDS/ÂΜL (ref 1.85–7.62)
NEUTS SEG NFR BLD AUTO: 57 % (ref 43–75)
NRBC BLD AUTO-RTO: 0 /100 WBCS
PLATELET # BLD AUTO: 247 THOUSANDS/UL (ref 149–390)
PMV BLD AUTO: 9.8 FL (ref 8.9–12.7)
POTASSIUM SERPL-SCNC: 4.3 MMOL/L (ref 3.5–5.3)
RBC # BLD AUTO: 4.49 MILLION/UL (ref 3.81–5.12)
SODIUM SERPL-SCNC: 132 MMOL/L (ref 135–147)
WBC # BLD AUTO: 6.15 THOUSAND/UL (ref 4.31–10.16)

## 2023-11-26 PROCEDURE — 80048 BASIC METABOLIC PNL TOTAL CA: CPT

## 2023-11-26 PROCEDURE — 99254 IP/OBS CNSLTJ NEW/EST MOD 60: CPT | Performed by: STUDENT IN AN ORGANIZED HEALTH CARE EDUCATION/TRAINING PROGRAM

## 2023-11-26 PROCEDURE — 82948 REAGENT STRIP/BLOOD GLUCOSE: CPT

## 2023-11-26 PROCEDURE — 85025 COMPLETE CBC W/AUTO DIFF WBC: CPT

## 2023-11-26 PROCEDURE — 99232 SBSQ HOSP IP/OBS MODERATE 35: CPT | Performed by: FAMILY MEDICINE

## 2023-11-26 PROCEDURE — 94760 N-INVAS EAR/PLS OXIMETRY 1: CPT

## 2023-11-26 RX ORDER — INSULIN GLARGINE 100 [IU]/ML
30 INJECTION, SOLUTION SUBCUTANEOUS
Status: DISCONTINUED | OUTPATIENT
Start: 2023-11-26 | End: 2023-11-26

## 2023-11-26 RX ORDER — POLYETHYLENE GLYCOL 3350 17 G/17G
17 POWDER, FOR SOLUTION ORAL DAILY PRN
Status: DISCONTINUED | OUTPATIENT
Start: 2023-11-26 | End: 2023-11-28

## 2023-11-26 RX ORDER — INSULIN LISPRO 100 [IU]/ML
16 INJECTION, SOLUTION INTRAVENOUS; SUBCUTANEOUS
Status: DISCONTINUED | OUTPATIENT
Start: 2023-11-26 | End: 2023-11-29 | Stop reason: HOSPADM

## 2023-11-26 RX ORDER — INSULIN LISPRO 100 [IU]/ML
2-12 INJECTION, SOLUTION INTRAVENOUS; SUBCUTANEOUS
Status: DISCONTINUED | OUTPATIENT
Start: 2023-11-26 | End: 2023-11-29 | Stop reason: HOSPADM

## 2023-11-26 RX ORDER — INSULIN GLARGINE 100 [IU]/ML
40 INJECTION, SOLUTION SUBCUTANEOUS
Status: DISCONTINUED | OUTPATIENT
Start: 2023-11-26 | End: 2023-11-29 | Stop reason: HOSPADM

## 2023-11-26 RX ORDER — INSULIN GLARGINE 100 [IU]/ML
40 INJECTION, SOLUTION SUBCUTANEOUS EVERY MORNING
Status: DISCONTINUED | OUTPATIENT
Start: 2023-11-26 | End: 2023-11-29 | Stop reason: HOSPADM

## 2023-11-26 RX ADMIN — LAMOTRIGINE 100 MG: 100 TABLET ORAL at 17:34

## 2023-11-26 RX ADMIN — PANTOPRAZOLE SODIUM 40 MG: 40 TABLET, DELAYED RELEASE ORAL at 08:50

## 2023-11-26 RX ADMIN — LAMOTRIGINE 100 MG: 100 TABLET ORAL at 08:50

## 2023-11-26 RX ADMIN — INSULIN GLARGINE 40 UNITS: 100 INJECTION, SOLUTION SUBCUTANEOUS at 09:54

## 2023-11-26 RX ADMIN — SODIUM CHLORIDE 14 UNITS/HR: 9 INJECTION, SOLUTION INTRAVENOUS at 14:44

## 2023-11-26 RX ADMIN — HEPARIN SODIUM 7500 UNITS: 5000 INJECTION INTRAVENOUS; SUBCUTANEOUS at 06:43

## 2023-11-26 RX ADMIN — INSULIN LISPRO 4 UNITS: 100 INJECTION, SOLUTION INTRAVENOUS; SUBCUTANEOUS at 11:31

## 2023-11-26 RX ADMIN — VANCOMYCIN HYDROCHLORIDE 1500 MG: 10 INJECTION, POWDER, LYOPHILIZED, FOR SOLUTION INTRAVENOUS at 08:49

## 2023-11-26 RX ADMIN — INSULIN GLARGINE 40 UNITS: 100 INJECTION, SOLUTION SUBCUTANEOUS at 21:00

## 2023-11-26 RX ADMIN — ACETAMINOPHEN 650 MG: 325 TABLET, FILM COATED ORAL at 10:04

## 2023-11-26 RX ADMIN — INSULIN LISPRO 16 UNITS: 100 INJECTION, SOLUTION INTRAVENOUS; SUBCUTANEOUS at 16:36

## 2023-11-26 RX ADMIN — LISINOPRIL 20 MG: 20 TABLET ORAL at 08:50

## 2023-11-26 RX ADMIN — DESVENLAFAXINE SUCCINATE 50 MG: 50 TABLET, EXTENDED RELEASE ORAL at 08:50

## 2023-11-26 RX ADMIN — INSULIN LISPRO 16 UNITS: 100 INJECTION, SOLUTION INTRAVENOUS; SUBCUTANEOUS at 11:32

## 2023-11-26 RX ADMIN — LORAZEPAM 0.5 MG: 0.5 TABLET ORAL at 21:11

## 2023-11-26 RX ADMIN — VANCOMYCIN HYDROCHLORIDE 1500 MG: 10 INJECTION, POWDER, LYOPHILIZED, FOR SOLUTION INTRAVENOUS at 21:00

## 2023-11-26 RX ADMIN — INSULIN LISPRO 16 UNITS: 100 INJECTION, SOLUTION INTRAVENOUS; SUBCUTANEOUS at 09:54

## 2023-11-26 RX ADMIN — INSULIN LISPRO 4 UNITS: 100 INJECTION, SOLUTION INTRAVENOUS; SUBCUTANEOUS at 16:36

## 2023-11-26 RX ADMIN — HEPARIN SODIUM 7500 UNITS: 5000 INJECTION INTRAVENOUS; SUBCUTANEOUS at 13:05

## 2023-11-26 RX ADMIN — HEPARIN SODIUM 7500 UNITS: 5000 INJECTION INTRAVENOUS; SUBCUTANEOUS at 21:01

## 2023-11-26 RX ADMIN — POLYETHYLENE GLYCOL 3350 17 G: 17 POWDER, FOR SOLUTION ORAL at 17:40

## 2023-11-26 NOTE — ASSESSMENT & PLAN NOTE
Patient reports history of recurrent abscess that is normally in her mons pubis. Patient reports recurrent vaginal yeast infections as well. Received 200 mg of IV Diflucan in the ED. Received IV Zosyn and IV Vanco in ED. Continue IV Vancomycin. General surgery was consulted  Abscess was drained and packed  Wound cx: 3+ polys, 1+ gram neg rods, 1+ gram + cocci pairs     Plan:  General surgery continues to follow patient, wound packing currently still in place  Continue IV vancomycin at this time, likely can discontinue tomorrow. Random vancomycin lab wnl.

## 2023-11-26 NOTE — ASSESSMENT & PLAN NOTE
Lab Results   Component Value Date    HGBA1C 12.2 (H) 11/24/2023       Recent Labs     11/26/23  0641 11/26/23  0920 11/26/23  1052 11/26/23  1239   POCGLU 173* 234* 223* 210*       Blood Sugar Average: Last 72 hrs:  (P) 244.6  Patient presented with complaints of dizziness and nausea with associated elevated blood sugar readings. POA, serum glucose 518, patient was not in DKA. Received 2 L NSS in ED. Repeat glucose 347  Home regimen includes Metformin, will hold while inpatient. Home regimen of 64 units of Lantus HS nd 16 units of Lispro TID w/ meals.   Hemoglobin A1c 12.2    Plan:  Continue to hold metformin while hospitalized  Endocrinology consulted- they recommend starting lantus 40 units in am and 20 units in pm, tapering patient off insulin drip, starting lispro 16 tid & alg 4 scale- ordered placed by endocrinology, appreciate continued recommendations  Hypoglycemia protocol

## 2023-11-26 NOTE — PLAN OF CARE
Problem: Potential for Falls  Goal: Patient will remain free of falls  Description: INTERVENTIONS:  - Educate patient/family on patient safety including physical limitations  - Instruct patient to call for assistance with activity   - Consult OT/PT to assist with strengthening/mobility   - Keep Call bell within reach  - Keep bed low and locked with side rails adjusted as appropriate  - Keep care items and personal belongings within reach  - Initiate and maintain comfort rounds  - Make Fall Risk Sign visible to staff  - Offer Toileting every 2 Hours, in advance of need  - Initiate/Maintain bed alarm  - Obtain necessary fall risk management equipment: alarms, call bell in reach  - Apply yellow socks and bracelet for high fall risk patients  - Consider moving patient to room near nurses station  Outcome: Progressing     Problem: PAIN - ADULT  Goal: Verbalizes/displays adequate comfort level or baseline comfort level  Description: Interventions:  - Encourage patient to monitor pain and request assistance  - Assess pain using appropriate pain scale  - Administer analgesics based on type and severity of pain and evaluate response  - Implement non-pharmacological measures as appropriate and evaluate response  - Consider cultural and social influences on pain and pain management  - Notify physician/advanced practitioner if interventions unsuccessful or patient reports new pain  Outcome: Progressing     Problem: INFECTION - ADULT  Goal: Absence or prevention of progression during hospitalization  Description: INTERVENTIONS:  - Assess and monitor for signs and symptoms of infection  - Monitor lab/diagnostic results  - Monitor all insertion sites, i.e. indwelling lines, tubes, and drains  - Monitor endotracheal if appropriate and nasal secretions for changes in amount and color  - [place] appropriate cooling/warming therapies per order  - Administer medications as ordered  - Instruct and encourage patient and family to use good hand hygiene technique  - Identify and instruct in appropriate isolation precautions for identified infection/condition  Outcome: Progressing  Goal: Absence of fever/infection during neutropenic period  Description: INTERVENTIONS:  - Monitor WBC    Outcome: Progressing     Problem: SAFETY ADULT  Goal: Patient will remain free of falls  Description: INTERVENTIONS:  - Educate patient/family on patient safety including physical limitations  - Instruct patient to call for assistance with activity   - Consult OT/PT to assist with strengthening/mobility   - Keep Call bell within reach  - Keep bed low and locked with side rails adjusted as appropriate  - Keep care items and personal belongings within reach  - Initiate and maintain comfort rounds  - Make Fall Risk Sign visible to staff  - Offer Toileting every 2 Hours, in advance of need  - Initiate/Maintain bed alarm  - Obtain necessary fall risk management equipment: alarms, call bell in reach  - Apply yellow socks and bracelet for high fall risk patients  - Consider moving patient to room near nurses station  Outcome: Progressing  Goal: Maintain or return to baseline ADL function  Description: INTERVENTIONS:  -  Assess patient's ability to carry out ADLs; assess patient's baseline for ADL function and identify physical deficits which impact ability to perform ADLs (bathing, care of mouth/teeth, toileting, grooming, dressing, etc.)  - Assess/evaluate cause of self-care deficits   - Assess range of motion  - Assess patient's mobility; develop plan if impaired  - Assess patient's need for assistive devices and provide as appropriate  - Encourage maximum independence but intervene and supervise when necessary  - Involve family in performance of ADLs  - Assess for home care needs following discharge   - Consider OT consult to assist with ADL evaluation and planning for discharge  - Provide patient education as appropriate  Outcome: Progressing  Goal: Maintains/Returns to pre admission functional level  Description: INTERVENTIONS:  - Perform AM-PAC 6 Click Basic Mobility/ Daily Activity assessment daily.  - Set and communicate daily mobility goal to care team and patient/family/caregiver. - Collaborate with rehabilitation services on mobility goals if consulted  - Perform Range of Motion 3 times a day. - Reposition patient every 2 hours. - Dangle patient 3 times a day  - Stand patient 3 times a day  - Ambulate patient 3 times a day  - Out of bed to chair 3 times a day   - Out of bed for meals 3 times a day  - Out of bed for toileting  - Record patient progress and toleration of activity level   Outcome: Progressing     Problem: DISCHARGE PLANNING  Goal: Discharge to home or other facility with appropriate resources  Description: INTERVENTIONS:  - Identify barriers to discharge w/patient and caregiver  - Arrange for needed discharge resources and transportation as appropriate  - Identify discharge learning needs (meds, wound care, etc.)  - Arrange for interpretive services to assist at discharge as needed  - Refer to Case Management Department for coordinating discharge planning if the patient needs post-hospital services based on physician/advanced practitioner order or complex needs related to functional status, cognitive ability, or social support system  Outcome: Progressing     Problem: Knowledge Deficit  Goal: Patient/family/caregiver demonstrates understanding of disease process, treatment plan, medications, and discharge instructions  Description: Complete learning assessment and assess knowledge base.   Interventions:  - Provide teaching at level of understanding  - Provide teaching via preferred learning methods  Outcome: Progressing     Problem: Prexisting or High Potential for Compromised Skin Integrity  Goal: Skin integrity is maintained or improved  Description: INTERVENTIONS:  - Identify patients at risk for skin breakdown  - Assess and monitor skin integrity  - Assess and monitor nutrition and hydration status  - Monitor labs   - Assess for incontinence   - Turn and reposition patient  - Assist with mobility/ambulation  - Relieve pressure over bony prominences  - Avoid friction and shearing  - Provide appropriate hygiene as needed including keeping skin clean and dry  - Evaluate need for skin moisturizer/barrier cream  - Collaborate with interdisciplinary team   - Patient/family teaching  - Consider wound care consult   Outcome: Progressing

## 2023-11-26 NOTE — ASSESSMENT & PLAN NOTE
- 130 on 11/23, currently 132 on 11/26  - likely pseudohyponatremia from high glucose in blood   - am cbc ordered

## 2023-11-26 NOTE — ASSESSMENT & PLAN NOTE
SIRS criteria: Tachycardia, leukocytosis  Suspected source: Cellulitis of mons pubis with associated candida vulvovaginitis   UA: gluconuria, small occult blood  Lactic acid: 3.3 --> 1.3  End organ damage: Lactic acidosis  IV Fluids: Received 2 L NC in ED. IV antibiotics: Received IV Zosyn and IV Vancomycin in ED. Continue with IV Vancomycin (day 3 today)  Bcxs- NG at 48 hours   Monitor vital signs, laboratory studies    Plan:   Resolved at this time. Continue vancomycin, likely one more day needed.

## 2023-11-26 NOTE — PLAN OF CARE
Problem: Potential for Falls  Goal: Patient will remain free of falls  Description: INTERVENTIONS:  - Educate patient/family on patient safety including physical limitations  - Instruct patient to call for assistance with activity   - Consult OT/PT to assist with strengthening/mobility   - Keep Call bell within reach  - Keep bed low and locked with side rails adjusted as appropriate  - Keep care items and personal belongings within reach  - Initiate and maintain comfort rounds  - Make Fall Risk Sign visible to staff  - Offer Toileting every 2 Hours, in advance of need  - Initiate/Maintain bed alarm  - Obtain necessary fall risk management equipment  - Apply yellow socks and bracelet for high fall risk patients  - Consider moving patient to room near nurses station  Outcome: Progressing     Problem: PAIN - ADULT  Goal: Verbalizes/displays adequate comfort level or baseline comfort level  Description: Interventions:  - Encourage patient to monitor pain and request assistance  - Assess pain using appropriate pain scale  - Administer analgesics based on type and severity of pain and evaluate response  - Implement non-pharmacological measures as appropriate and evaluate response  - Consider cultural and social influences on pain and pain management  - Notify physician/advanced practitioner if interventions unsuccessful or patient reports new pain  Outcome: Progressing     Problem: INFECTION - ADULT  Goal: Absence or prevention of progression during hospitalization  Description: INTERVENTIONS:  - Assess and monitor for signs and symptoms of infection  - Monitor lab/diagnostic results  - Monitor all insertion sites, i.e. indwelling lines, tubes, and drains  - Monitor endotracheal if appropriate and nasal secretions for changes in amount and color  - [place] appropriate cooling/warming therapies per order  - Administer medications as ordered  - Instruct and encourage patient and family to use good hand hygiene technique  - Identify and instruct in appropriate isolation precautions for identified infection/condition  Outcome: Progressing  Goal: Absence of fever/infection during neutropenic period  Description: INTERVENTIONS:  - Monitor WBC    Outcome: Progressing     Problem: SAFETY ADULT  Goal: Patient will remain free of falls  Description: INTERVENTIONS:  - Educate patient/family on patient safety including physical limitations  - Instruct patient to call for assistance with activity   - Consult OT/PT to assist with strengthening/mobility   - Keep Call bell within reach  - Keep bed low and locked with side rails adjusted as appropriate  - Keep care items and personal belongings within reach  - Initiate and maintain comfort rounds  - Make Fall Risk Sign visible to staff  - Offer Toileting every 2 Hours, in advance of need  - Initiate/Maintain bed alarm  - Obtain necessary fall risk management equipment  - Apply yellow socks and bracelet for high fall risk patients  - Consider moving patient to room near nurses station  Outcome: Progressing  Goal: Maintain or return to baseline ADL function  Description: INTERVENTIONS:  -  Assess patient's ability to carry out ADLs; assess patient's baseline for ADL function and identify physical deficits which impact ability to perform ADLs (bathing, care of mouth/teeth, toileting, grooming, dressing, etc.)  - Assess/evaluate cause of self-care deficits   - Assess range of motion  - Assess patient's mobility; develop plan if impaired  - Assess patient's need for assistive devices and provide as appropriate  - Encourage maximum independence but intervene and supervise when necessary  - Involve family in performance of ADLs  - Assess for home care needs following discharge   - Consider OT consult to assist with ADL evaluation and planning for discharge  - Provide patient education as appropriate  Outcome: Progressing  Goal: Maintains/Returns to pre admission functional level  Description: INTERVENTIONS:  - Perform AM-PAC 6 Click Basic Mobility/ Daily Activity assessment daily.  - Set and communicate daily mobility goal to care team and patient/family/caregiver. - Collaborate with rehabilitation services on mobility goals if consulted  - Perform Range of Motion 4 times a day. - Reposition patient every 2 hours. - Dangle patient 3 times a day  - Stand patient 3 times a day  - Ambulate patient 3 times a day  - Out of bed to chair 3 times a day   - Out of bed for meals 3 times a day  - Out of bed for toileting  - Record patient progress and toleration of activity level   Outcome: Progressing     Problem: DISCHARGE PLANNING  Goal: Discharge to home or other facility with appropriate resources  Description: INTERVENTIONS:  - Identify barriers to discharge w/patient and caregiver  - Arrange for needed discharge resources and transportation as appropriate  - Identify discharge learning needs (meds, wound care, etc.)  - Arrange for interpretive services to assist at discharge as needed  - Refer to Case Management Department for coordinating discharge planning if the patient needs post-hospital services based on physician/advanced practitioner order or complex needs related to functional status, cognitive ability, or social support system  Outcome: Progressing     Problem: Knowledge Deficit  Goal: Patient/family/caregiver demonstrates understanding of disease process, treatment plan, medications, and discharge instructions  Description: Complete learning assessment and assess knowledge base.   Interventions:  - Provide teaching at level of understanding  - Provide teaching via preferred learning methods  Outcome: Progressing     Problem: Prexisting or High Potential for Compromised Skin Integrity  Goal: Skin integrity is maintained or improved  Description: INTERVENTIONS:  - Identify patients at risk for skin breakdown  - Assess and monitor skin integrity  - Assess and monitor nutrition and hydration status  - Monitor labs   - Assess for incontinence   - Turn and reposition patient  - Assist with mobility/ambulation  - Relieve pressure over bony prominences  - Avoid friction and shearing  - Provide appropriate hygiene as needed including keeping skin clean and dry  - Evaluate need for skin moisturizer/barrier cream  - Collaborate with interdisciplinary team   - Patient/family teaching  - Consider wound care consult   Outcome: Progressing

## 2023-11-26 NOTE — ASSESSMENT & PLAN NOTE
Blood pressure is reviewed and acceptable.   Last recorded pressure: 136/83  Continue Lisinopril  Monitor blood pressure

## 2023-11-26 NOTE — PROGRESS NOTES
Linda Leonard is a 46 y.o. female who is currently ordered Vancomycin IV with management by the Pharmacy Consult service. Relevant clinical data and objective / subjective history reviewed. Vancomycin Assessment:  Indication and Goal AUC/Trough: Soft tissue (goal -600, trough >10), MRSA    Micro:   Gram + rods  Renal Function:  SCr: 0.63 mg/dL  CrCl: 153 mL/min  Renal replacement: Not on dialysis  Days of Therapy: 3  Current Dose: 2,250mg IV once  Vancomycin Plan:  New Dosin,250mg IV once as loading dose then 750mg IV q12h  Estimated AUC: 444 mcg*hr/mL  Estimated Trough: 13.2 mcg/mL  Next Level:  0600  Renal Function Monitoring: Daily BMP and [place] will continue to follow closely for s/sx of nephrotoxicity, infusion reactions and appropriateness of therapy. BMP and CBC will be ordered per protocol. We will continue to follow the patient’s culture results and clinical progress daily.     Dick Bryan, Pharmacist

## 2023-11-26 NOTE — CONSULTS
Consultation - Charles Christopher 46 y.o. female MRN: [id number]    Unit/Bed#: S -01 Encounter: [id number]      Assessment/Plan     Assessment: This is a 46y.o.-year-old female with diabetes with hyperglycemia with poor control d/t non compliance without any complications with other PMHx of Hypertension, hyperlipidemia, morbid obesity who presents with severe sepsis d/t mons pubis abscess found to have severe hyperglycemia and mild symptomatology. Endocrine consulted for BG management. Patients currently on insulin drip without DKA, eating ok, no AG therefore can transition from non DKA drip to MDI now. Given high insulin resistance and need for high basal needs given her morbid obesity, will switch to BID lantus regime to improve absorption. Start lantus 40u BID, given high insulin requirements, taper patient off insulin drip per protocol rather than d/c drip in 2 hrs after basal insulin. Start lispro 16u with meals for now. Can adjust further in future based on more BG data    Type 2 diabetes mellitus with hyperglycemia  HbA1c - 12.2%  Home regimen: Lantus 64u, lispro 16u with each meal  Inpatient regimen: non DKA drip     Plan:  - Start lantus 40u BID  - Taper off insulin drip   - Start lispro 16u with meals + Alg 4 scale. Inpatient consult to Endocrinology  Consult performed by: Tung Ricci MD  Consult ordered by: Marshall Dyer DO          CC: T2DM    History of Present Illness     HPI: Charles Christopher is a 46y.o. year old female with type 2 DM since 2014 Without any complications On basal/bolus regime at home presented with nausea, vomiting, dizziness and elevated BG at home. On arrival, patient found to have recurrent mons pubis abscess and also vaginal candidiasis. Admitted for severe sepsis d/t infection and hyperglycemia management. BG on admission >500 without DKA, started on non DKA drip. High insulin drip requirements ~8-12u/hr.      Patient reports she was compliant with her insulin before but since last 2 months because of her depression, has not been taking her insulin or checking her BG. Prior to that also periodically had issues with compliance. Reports started having more polyuria, polydipsia recently. Denies any fevers, chills. Does report her mercedes is now better controlled and eating all her meals well. Home regime- Lantus 70u, lispro 16u with meals   BG control   A1C- was 7-9% range since 2432-2609, now 12.2% 11/23   Current regime- non DKA drip     Review of Systems   Constitutional:  Negative for diaphoresis, fatigue and unexpected weight change. Eyes:  Negative for photophobia and visual disturbance. Gastrointestinal:  Negative for constipation, diarrhea and vomiting. Endocrine: Negative for polydipsia and polyuria. Skin: Negative.         Historical Information   Past Medical History:   Diagnosis Date    Anemia     Anxiety     Candidal intertrigo     Depression     Diabetes mellitus (HCC)     GERD (gastroesophageal reflux disease)     Hyperlipidemia     Hypertension     Irritable bowel syndrome     Morbid obesity with BMI of 60.0-69.9, adult ([address] [facility])     Osteoarthritis     Seasonal allergies     Sleep difficulties     Suicide attempt [facility])      Past Surgical History:   Procedure Laterality Date    [address] [facility]  2008    WISDOM TOOTH EXTRACTION  2009     Social History   Social History     Substance and Sexual Activity   Alcohol Use Not Currently    Comment: occassionaly      Social History     Substance and Sexual Activity   Drug Use No     Social History     Tobacco Use   Smoking Status Never   Smokeless Tobacco Never     Family History:   Family History   Problem Relation Age of Onset    Diabetes Mother     Hypertension Father        Meds/Allergies   Current Facility-Administered Medications   Medication Dose Route Frequency Provider Last Rate Last Admin    acetaminophen (TYLENOL) tablet 650 mg  650 mg Oral Q6H PRN SUJATA Choudhury desvenlafaxine succinate (PRISTIQ) 24 hr tablet 50 mg  50 mg Oral Daily Leighann Castellano, DO   50 mg at 11/26/23 0850    dicyclomine (BENTYL) capsule 20 mg  20 mg Oral Q6H PRN SUJATA Wolfe        heparin (porcine) subcutaneous injection 7,500 Units  7,500 Units Subcutaneous Q8H SUJATA Barney   7,500 Units at 11/26/23 0643    insulin glargine (LANTUS) subcutaneous injection 30 Units 0.3 mL  30 Units Subcutaneous HS Ryan Castro MD        insulin glargine (LANTUS) subcutaneous injection 40 Units 0.4 mL  40 Units Subcutaneous QAM Ryan Castro MD        insulin lispro (HumaLOG) 100 units/mL subcutaneous injection 16 Units  16 Units Subcutaneous TID With Meals Ryan Castro MD        insulin lispro (HumaLOG) 100 units/mL subcutaneous injection 2-12 Units  2-12 Units Subcutaneous TID AC Ryan Castro MD        insulin regular (HumuLIN R,NovoLIN R) 1 Units/mL in sodium chloride 0.9 % 100 mL infusion  0.3-21 Units/hr Intravenous Titrated Inell MD Janes 8 mL/hr at 11/26/23 0230 8 Units/hr at 11/26/23 0230    labetalol (NORMODYNE) injection 10 mg  10 mg Intravenous Q4H PRN Mercedes Baugh DO        lamoTRIgine (LaMICtal) tablet 100 mg  100 mg Oral BID SUJATA Wolfe   100 mg at 11/26/23 0850    lisinopril (ZESTRIL) tablet 20 mg  20 mg Oral Daily SUJATA Wolfe   20 mg at 11/26/23 0850    LORazepam (ATIVAN) tablet 0.5 mg  0.5 mg Oral Q8H PRN SUJATA Wolfe   0.5 mg at 11/24/23 1950    metoclopramide (REGLAN) injection 10 mg  10 mg Intravenous Q6H PRN SUJATA Wolfe   10 mg at 11/24/23 0623    pantoprazole (PROTONIX) EC tablet 40 mg  40 mg Oral Daily SUJATA Wolfe   40 mg at 11/26/23 0850    vancomycin (VANCOCIN) 1500 mg in sodium chloride 0.9% 250 mL IVPB  1,500 mg Intravenous Q12H Dominick Markham MD   1,500 mg at 11/26/23 0849     Allergies   Allergen Reactions    Cephalexin Vomiting     Other reaction(s): Nausea and/or vomiting    Insulin Degludec GI Intolerance    Sulfamethoxazole-Trimethoprim Vomiting     Other reaction(s): Nausea and/or vomiting       Objective   Vitals: Blood pressure 136/83, pulse 93, temperature 98.8 °F (37.1 °C), resp. rate 17, height 5' (1.524 m), weight (!) 159 kg (350 lb 8.5 oz), SpO2 92 %. Intake/Output Summary (Last 24 hours) at 11/26/2023 0852  Last data filed at 11/25/2023 1613  Gross per 24 hour   Intake --   Output 1300 ml   Net -1300 ml     Invasive Devices       Peripheral Intravenous Line  Duration             Peripheral IV 11/25/23 Distal;Right;Upper;Ventral (anterior) Arm <1 day                    Physical Exam  Constitutional:       Appearance: Normal appearance. She is obese. Cardiovascular:      Rate and Rhythm: Normal rate and regular rhythm. Pulses: Normal pulses. Pulmonary:      Effort: Pulmonary effort is normal.   Abdominal:      General: Abdomen is flat. Palpations: Abdomen is soft. Musculoskeletal:      Cervical back: Normal range of motion and neck supple. Skin:     Capillary Refill: Capillary refill takes less than 2 seconds. Neurological:      General: No focal deficit present. Mental Status: She is alert and oriented to person, place, and time. Psychiatric:         Mood and Affect: Mood normal.         The history was obtained from the review of the chart, patient.     Lab Results:   Results from last 7 days   Lab Units 11/24/23  0511   HEMOGLOBIN A1C % 12.2*     Lab Results   Component Value Date    WBC 8.00 11/25/2023    HGB 14.1 11/25/2023    HCT 44.9 11/25/2023    MCV 95 11/25/2023     11/25/2023     Lab Results   Component Value Date/Time    BUN 11 11/25/2023 04:46 AM    BUN 16 09/30/2015 03:33 PM     09/30/2015 03:33 PM    K 4.0 11/25/2023 04:46 AM    K 3.9 09/30/2015 03:33 PM     11/25/2023 04:46 AM     09/30/2015 03:33 PM    CO2 24 11/25/2023 04:46 AM    CO2 29.1 09/30/2015 03:33 PM    CREATININE 0.63 11/25/2023 04:46 AM    CREATININE 0.79 09/30/2015 03:33 PM    AST 27 11/23/2023 08:03 PM    AST 12 09/30/2015 03:33 PM    ALT 52 11/23/2023 08:03 PM    ALT 26 09/30/2015 03:33 PM    TP 7.1 11/23/2023 08:03 PM    ALB 3.9 11/23/2023 08:03 PM    ALB 3.5 09/30/2015 03:33 PM     No results for input(s): "CHOL", "HDL", "LDL", "TRIG", "VLDL" in the last 72 hours. No results found for: "Karel Ly", "[id number]"  POC Glucose (mg/dl)   Date Value   11/26/2023 173 (H)   11/26/2023 164 (H)       Imaging Studies: I have personally reviewed pertinent reports. Portions of the record may have been created with voice recognition software.

## 2023-11-26 NOTE — PROGRESS NOTES
[address] [facility]  Progress Note  Name: Marci Adkins  MRN: [id number]  Unit/Bed#: S -33 I Date of Admission: 11/23/2023   Date of Service: 11/26/2023 I Hospital Day: 2    Assessment/Plan   Hyponatremia  Assessment & Plan  - 130 on 11/23, currently 132 on 11/26  - likely pseudohyponatremia from high glucose in blood   - am cbc ordered     Yeast infection of the vagina  Assessment & Plan  Patient has a history of ongoing, recurrent yeast infections  Vaginal yeast infection most likely due to uncontrolled blood sugar  Patient has tried Monistat but with no relief or resolution  One time dose of 200mg diflucan given   Symptoms as of 11/25 are slowly improving       Plan:  Continue to watch for symptoms improvement  Given another dose of diflucan if needed       Abscess  Assessment & Plan  Patient reports history of recurrent abscess that is normally in her mons pubis. Patient reports recurrent vaginal yeast infections as well. Received 200 mg of IV Diflucan in the ED. Received IV Zosyn and IV Vanco in ED. Continue IV Vancomycin. General surgery was consulted  Abscess was drained and packed  Wound cx: 3+ polys, 1+ gram neg rods, 1+ gram + cocci pairs     Plan:  General surgery continues to follow patient, wound packing currently still in place  Continue IV vancomycin at this time, likely can discontinue tomorrow. Random vancomycin lab wnl. Mixed hyperlipidemia  Assessment & Plan  Patient is not currently on a statin. Anxiety  Assessment & Plan  History of anxiety     Plan:  Continue ativan 0.5 mg every 8hours as needed      Class 3 severe obesity due to excess calories with body mass index (BMI) of 60.0 to 69.9 in adult [facility])  Assessment & Plan  Encourage lifestyle modifications. GERD (gastroesophageal reflux disease)  Assessment & Plan  Continue PPI and Bentyl prn.     Major depressive disorder, recurrent severe without psychotic features [facility])  Assessment & Plan  History of depression  Psych was consulted during this admission to help reassess patient's current medication    Plan:  Pristiq 50 mg was restarted in order to avoid withdrawal related symptoms  Psych recommendation holding Abilify  Per psych recommendation continue lamotrigine 100 mg twice daily  Patient has agreed to partial psychiatric hospitalization program after being medically cleared    Essential hypertension  Assessment & Plan  Blood pressure is reviewed and acceptable. Last recorded pressure: 136/83  Continue Lisinopril  Monitor blood pressure    Type 2 diabetes mellitus with hyperglycemia, with long-term current use of insulin [facility])  Assessment & Plan  Lab Results   Component Value Date    HGBA1C 12.2 (H) 11/24/2023       Recent Labs     11/26/23  0641 11/26/23  0920 11/26/23  1052 11/26/23  1239   POCGLU 173* 234* 223* 210*       Blood Sugar Average: Last 72 hrs:  (P) 244.6  Patient presented with complaints of dizziness and nausea with associated elevated blood sugar readings. POA, serum glucose 518, patient was not in DKA. Received 2 L NSS in ED. Repeat glucose 347  Home regimen includes Metformin, will hold while inpatient. Home regimen of 64 units of Lantus HS nd 16 units of Lispro TID w/ meals. Hemoglobin A1c 12.2    Plan:  Continue to hold metformin while hospitalized  Endocrinology consulted- they recommend starting lantus 40 units in am and 20 units in pm, tapering patient off insulin drip, starting lispro 16 tid & alg 4 scale- ordered placed by endocrinology, appreciate continued recommendations  Hypoglycemia protocol    * Severe sepsis ([address])  Assessment & Plan  SIRS criteria: Tachycardia, leukocytosis  Suspected source: Cellulitis of mons pubis with associated candida vulvovaginitis   UA: gluconuria, small occult blood  Lactic acid: 3.3 --> 1.3  End organ damage: Lactic acidosis  IV Fluids: Received 2 L NC in ED. IV antibiotics: Received IV Zosyn and IV Vancomycin in ED.  Continue with IV Vancomycin (day 3 today)  Bcxs- NG at 48 hours   Monitor vital signs, laboratory studies    Plan:   Resolved at this time. Continue vancomycin, likely one more day needed. VTE Pharmacologic Prophylaxis: VTE Score: 6 High Risk (Score >/= 5) - Pharmacological DVT Prophylaxis Ordered: heparin. Sequential Compression Devices Ordered. Mobility:   Basic Mobility Inpatient Raw Score: 20  -[facility] Goal: 6: Walk 10 steps or more  -[facility] Achieved: 3: Sit at edge of bed  [facility] Goal NOT achieved. Continue with multidisciplinary rounding and encourage appropriate mobility to improve upon [facility] goals. Patient Centered Rounds: I performed bedside rounds with nursing staff today. Discussions with Specialists or Other Care Team Provider: psychiatry, acute care surgery, endocrinology, pt/ot    Education and Discussions with Family / Patient: Attempted to update  (significant other) via phone. Left voicemail. Total Time Spent on Date of Encounter in care of patient: 45 mins. This time was spent on one or more of the following: performing physical exam; counseling and coordination of care; obtaining or reviewing history; documenting in the medical record; reviewing/ordering tests, medications or procedures; communicating with other healthcare professionals and discussing with patient's family/caregivers. Current Length of Stay: 2 day(s)  Current Patient Status: Inpatient   Certification Statement: The patient will continue to require additional inpatient hospital stay due to uncontrolled diabetes pending endocrinology recommendations, continued antibiotics for mons pubis abscess s/p I&D   Discharge Plan: Anticipate discharge in 24-48 hrs to home.     Code Status: Level 1 - Full Code    Subjective:   No acute issues or events overnight per nursing except lost IV access this morning but was reestablished per venous access consult, has not had a BM since 11/23, no endorsement of pain, patient not as thirsty as yesterday, urinating well    Objective:     Vitals:   Temp (24hrs), Av.8 °F (37.1 °C), Min:98.8 °F (37.1 °C), Max:98.9 °F (37.2 °C)    Temp:  [98.8 °F (37.1 °C)-98.9 °F (37.2 °C)] 98.8 °F (37.1 °C)  HR:  [92-93] 93  Resp:  [16-18] 16  BP: (112-136)/(75-94) 136/83  SpO2:  [92 %-94 %] 92 %  Body mass index is 68.46 kg/m². Input and Output Summary (last 24 hours): Intake/Output Summary (Last 24 hours) at 2023 1408  Last data filed at 2023 1613  Gross per 24 hour   Intake --   Output 850 ml   Net -850 ml       Physical Exam:   Physical Exam  Vitals reviewed. Constitutional:       General: She is not in acute distress. Appearance: Normal appearance. She is not ill-appearing, toxic-appearing or diaphoretic. Comments: Body mass index is 68.46 kg/m². HENT:      Head: Normocephalic and atraumatic. Right Ear: External ear normal.      Left Ear: External ear normal.      Nose: Nose normal.      Mouth/Throat:      Mouth: Mucous membranes are moist.      Pharynx: Oropharynx is clear. Eyes:      Extraocular Movements: Extraocular movements intact. Conjunctiva/sclera: Conjunctivae normal.      Pupils: Pupils are equal, round, and reactive to light. Cardiovascular:      Rate and Rhythm: Normal rate and regular rhythm. Pulses: Normal pulses. Heart sounds: Normal heart sounds. Pulmonary:      Effort: Pulmonary effort is normal.      Breath sounds: Normal breath sounds. Abdominal:      General: Abdomen is flat. Palpations: Abdomen is soft. Musculoskeletal:         General: Normal range of motion. Cervical back: Normal range of motion and neck supple. Right lower leg: No edema. Left lower leg: No edema. Skin:     General: Skin is warm. Capillary Refill: Capillary refill takes less than 2 seconds.       Comments: mons pubis abscess s/p I&D appears to be healing well, packing still in place per surgery, picture uploaded to chart   Neurological: General: No focal deficit present. Mental Status: She is alert and oriented to person, place, and time. Mental status is at baseline. Psychiatric:         Mood and Affect: Mood normal.         Behavior: Behavior normal.         Additional Data:     Labs:  Results from last 7 days   Lab Units 11/26/23  0925   WBC Thousand/uL 6.15   HEMOGLOBIN g/dL 13.6   HEMATOCRIT % 42.3   PLATELETS Thousands/uL 247   NEUTROS PCT % 57   LYMPHS PCT % 26   MONOS PCT % 12   EOS PCT % 3     Results from last 7 days   Lab Units 11/26/23  0925 11/25/23  0446 11/23/23 2003   SODIUM mmol/L 132*   < > 130*   POTASSIUM mmol/L 4.3   < > 4.3   CHLORIDE mmol/L 103   < > 94*   CO2 mmol/L 22   < > 26   BUN mg/dL 10   < > 15   CREATININE mg/dL 0.60   < > 0.97   ANION GAP mmol/L 7   < > 10   CALCIUM mg/dL 8.8   < > 9.8   ALBUMIN g/dL  --   --  3.9   TOTAL BILIRUBIN mg/dL  --   --  0.59   ALK PHOS U/L  --   --  107*   ALT U/L  --   --  52   AST U/L  --   --  27   GLUCOSE RANDOM mg/dL 256*   < > 518*    < > = values in this interval not displayed. Results from last 7 days   Lab Units 11/26/23  1239 11/26/23  1052 11/26/23  0920 11/26/23  3013 11/26/23  0446 11/26/23  0208 11/26/23  0125 11/25/23  2248 11/25/23  2058 11/25/23  1904 11/25/23  1656 11/25/23  1506   POC GLUCOSE mg/dl 210* 223* 234* 173* 164* 162* 128 126 193* 206* 249* 206*     Results from last 7 days   Lab Units 11/24/23  0511   HEMOGLOBIN A1C % 12.2*     Results from last 7 days   Lab Units 11/24/23  0511 11/24/23  0001 11/23/23 2003   LACTIC ACID mmol/L  --  1.3 3.3*   PROCALCITONIN ng/ml 0.13  --  0.17       Lines/Drains:  Invasive Devices       Peripheral Intravenous Line  Duration             Peripheral IV 11/26/23 Dorsal (posterior); Right Forearm <1 day                          Imaging: Reviewed radiology reports from this admission including: abdominal/pelvic CT    Recent Cultures (last 7 days):   Results from last 7 days   Lab Units 11/24/23  7857 11/23/23 2003   BLOOD CULTURE   --  No Growth at 48 hrs. No Growth at 48 hrs. GRAM STAIN RESULT  3+ Polys*  1+ Gram negative rods*  1+ Gram positive cocci in pairs*  --    WOUND CULTURE  Culture too young- will reincubate  --        Last 24 Hours Medication List:   Current Facility-Administered Medications   Medication Dose Route Frequency Provider Last Rate    acetaminophen  650 mg Oral Q6H PRN SUJATA Jacobson      desvenlafaxine succinate  50 mg Oral Daily Morteza Arzola,       dicyclomine  20 mg Oral Q6H PRN SUJATA Jacobson      heparin (porcine)  7,500 Units Subcutaneous Q8H [address] SUJATA Ray      insulin glargine  40 Units Subcutaneous QAM Kyle Abreu MD      insulin glargine  40 Units Subcutaneous HS Kyle Abreu MD      insulin lispro  16 Units Subcutaneous TID With Meals Kyle Abreu MD      insulin lispro  2-12 Units Subcutaneous TID AC Kyle Abreu MD      insulin regular (HumuLIN R,NovoLIN R) 1 Units/mL in sodium chloride 0.9 % 100 mL infusion  0.3-21 Units/hr Intravenous Titrated More Coello MD 14 Units/hr (11/26/23 1251)    labetalol  10 mg Intravenous Q4H PRN Kevin Baugh DO      lamoTRIgine  100 mg Oral BID SUJATA Jacobson      lisinopril  20 mg Oral Daily SUJATA Jacobson      LORazepam  0.5 mg Oral Q8H PRN SUJATA Jacobson      metoclopramide  10 mg Intravenous Q6H PRN SUJATA Jacobson      pantoprazole  40 mg Oral Daily SUJATA Ray      polyethylene glycol  17 g Oral Daily PRN Aarti Zaldivar DO      vancomycin  1,500 mg Intravenous Q12H Vijaya Agrawal MD          Today, Patient Was Seen By: Casie Modi DO    **Please Note: This note may have been constructed using a voice recognition system. **

## 2023-11-26 NOTE — UTILIZATION REVIEW
NOTIFICATION OF INPATIENT ADMISSION   AUTHORIZATION REQUEST   SERVICING FACILITY:   [address] [place] [address]  Tax ID: [id number]  NPI: [id number]   ATTENDING PROVIDER:  Attending Name and NPI#: Rosa Marr Md [[id number]]  Address: [address] [place] [address]  Phone: [phone]     ADMISSION INFORMATION:  [facility]patient [facility]   Place of Service Code: 21  Inpatient Admission Date/Time: 11/24/23 12:51 AM  Discharge Date/Time: No discharge date for patient encounter. Admitting Diagnosis Code/Description:  Vaginal candidiasis [B37.31]  Hyperglycemia [R73.9]  Soft tissue infection [L08.9]  Severe sepsis ([address]) [A41.9, R65.20]  Uncontrolled type 2 diabetes mellitus with hyperglycemia ([address]) [E11.65]     UTILIZATION REVIEW CONTACT:  Stephen Narayanan Utilization   Network Utilization Review Department  Phone: [phone]  Fax: [phone]  Email: [email or web address]  Contact for approvals/pending authorizations, clinical reviews, and discharge. PHYSICIAN ADVISORY SERVICES:  Medical Necessity Denial & Vyvl-df-Pokp Review  Phone: [phone]  Fax: [phone]  Email: [email or web address]     DISCHARGE SUPPORT TEAM:  For Patients Discharge Needs & Updates  Phone: [phone] opt. 2 Fax: [phone]  Email: [email or web address]

## 2023-11-27 ENCOUNTER — HOME HEALTH ADMISSION (OUTPATIENT)
Dept: HOME HEALTH SERVICES | Facility: HOME HEALTHCARE | Age: 48
End: 2023-11-27

## 2023-11-27 LAB
ANION GAP SERPL CALCULATED.3IONS-SCNC: 6 MMOL/L
BASOPHILS # BLD AUTO: 0.06 THOUSANDS/ÂΜL (ref 0–0.1)
BASOPHILS NFR BLD AUTO: 1 % (ref 0–1)
BUN SERPL-MCNC: 8 MG/DL (ref 5–25)
CALCIUM SERPL-MCNC: 9.1 MG/DL (ref 8.4–10.2)
CHLORIDE SERPL-SCNC: 102 MMOL/L (ref 96–108)
CO2 SERPL-SCNC: 28 MMOL/L (ref 21–32)
CREAT SERPL-MCNC: 0.55 MG/DL (ref 0.6–1.3)
EOSINOPHIL # BLD AUTO: 0.18 THOUSAND/ÂΜL (ref 0–0.61)
EOSINOPHIL NFR BLD AUTO: 3 % (ref 0–6)
ERYTHROCYTE [DISTWIDTH] IN BLOOD BY AUTOMATED COUNT: 13.2 % (ref 11.6–15.1)
GFR SERPL CREATININE-BSD FRML MDRD: 109 ML/MIN/1.73SQ M
GLUCOSE SERPL-MCNC: 111 MG/DL (ref 65–140)
GLUCOSE SERPL-MCNC: 117 MG/DL (ref 65–140)
GLUCOSE SERPL-MCNC: 118 MG/DL (ref 65–140)
GLUCOSE SERPL-MCNC: 119 MG/DL (ref 65–140)
GLUCOSE SERPL-MCNC: 126 MG/DL (ref 65–140)
GLUCOSE SERPL-MCNC: 129 MG/DL (ref 65–140)
GLUCOSE SERPL-MCNC: 131 MG/DL (ref 65–140)
GLUCOSE SERPL-MCNC: 132 MG/DL (ref 65–140)
GLUCOSE SERPL-MCNC: 133 MG/DL (ref 65–140)
GLUCOSE SERPL-MCNC: 135 MG/DL (ref 65–140)
GLUCOSE SERPL-MCNC: 139 MG/DL (ref 65–140)
GLUCOSE SERPL-MCNC: 156 MG/DL (ref 65–140)
GLUCOSE SERPL-MCNC: 176 MG/DL (ref 65–140)
GLUCOSE SERPL-MCNC: 223 MG/DL (ref 65–140)
HCT VFR BLD AUTO: 41.8 % (ref 34.8–46.1)
HGB BLD-MCNC: 13.3 G/DL (ref 11.5–15.4)
IMM GRANULOCYTES # BLD AUTO: 0.08 THOUSAND/UL (ref 0–0.2)
IMM GRANULOCYTES NFR BLD AUTO: 1 % (ref 0–2)
LYMPHOCYTES # BLD AUTO: 1.66 THOUSANDS/ÂΜL (ref 0.6–4.47)
LYMPHOCYTES NFR BLD AUTO: 29 % (ref 14–44)
MCH RBC QN AUTO: 29.9 PG (ref 26.8–34.3)
MCHC RBC AUTO-ENTMCNC: 31.8 G/DL (ref 31.4–37.4)
MCV RBC AUTO: 94 FL (ref 82–98)
MONOCYTES # BLD AUTO: 0.56 THOUSAND/ÂΜL (ref 0.17–1.22)
MONOCYTES NFR BLD AUTO: 10 % (ref 4–12)
NEUTROPHILS # BLD AUTO: 3.17 THOUSANDS/ÂΜL (ref 1.85–7.62)
NEUTS SEG NFR BLD AUTO: 56 % (ref 43–75)
NRBC BLD AUTO-RTO: 0 /100 WBCS
PLATELET # BLD AUTO: 278 THOUSANDS/UL (ref 149–390)
PMV BLD AUTO: 9.7 FL (ref 8.9–12.7)
POTASSIUM SERPL-SCNC: 4 MMOL/L (ref 3.5–5.3)
RBC # BLD AUTO: 4.45 MILLION/UL (ref 3.81–5.12)
SODIUM SERPL-SCNC: 136 MMOL/L (ref 135–147)
WBC # BLD AUTO: 5.71 THOUSAND/UL (ref 4.31–10.16)

## 2023-11-27 PROCEDURE — 82948 REAGENT STRIP/BLOOD GLUCOSE: CPT

## 2023-11-27 PROCEDURE — 99232 SBSQ HOSP IP/OBS MODERATE 35: CPT | Performed by: INTERNAL MEDICINE

## 2023-11-27 PROCEDURE — 85025 COMPLETE CBC W/AUTO DIFF WBC: CPT

## 2023-11-27 PROCEDURE — 80048 BASIC METABOLIC PNL TOTAL CA: CPT

## 2023-11-27 PROCEDURE — 94762 N-INVAS EAR/PLS OXIMTRY CONT: CPT

## 2023-11-27 RX ORDER — DOXYCYCLINE HYCLATE 100 MG/1
100 CAPSULE ORAL EVERY 12 HOURS SCHEDULED
Status: DISCONTINUED | OUTPATIENT
Start: 2023-11-27 | End: 2023-11-29 | Stop reason: HOSPADM

## 2023-11-27 RX ORDER — DOXYCYCLINE HYCLATE 100 MG/1
100 CAPSULE ORAL EVERY 12 HOURS SCHEDULED
Status: DISCONTINUED | OUTPATIENT
Start: 2023-11-27 | End: 2023-11-27

## 2023-11-27 RX ORDER — DESVENLAFAXINE SUCCINATE 50 MG/1
100 TABLET, EXTENDED RELEASE ORAL DAILY
Status: DISCONTINUED | OUTPATIENT
Start: 2023-11-28 | End: 2023-11-29 | Stop reason: HOSPADM

## 2023-11-27 RX ADMIN — PANTOPRAZOLE SODIUM 40 MG: 40 TABLET, DELAYED RELEASE ORAL at 08:11

## 2023-11-27 RX ADMIN — LAMOTRIGINE 100 MG: 100 TABLET ORAL at 17:55

## 2023-11-27 RX ADMIN — INSULIN LISPRO 16 UNITS: 100 INJECTION, SOLUTION INTRAVENOUS; SUBCUTANEOUS at 12:03

## 2023-11-27 RX ADMIN — HEPARIN SODIUM 7500 UNITS: 5000 INJECTION INTRAVENOUS; SUBCUTANEOUS at 22:07

## 2023-11-27 RX ADMIN — INSULIN GLARGINE 40 UNITS: 100 INJECTION, SOLUTION SUBCUTANEOUS at 09:57

## 2023-11-27 RX ADMIN — DOXYCYCLINE 100 MG: 100 CAPSULE ORAL at 11:45

## 2023-11-27 RX ADMIN — VANCOMYCIN HYDROCHLORIDE 1500 MG: 10 INJECTION, POWDER, LYOPHILIZED, FOR SOLUTION INTRAVENOUS at 08:16

## 2023-11-27 RX ADMIN — HEPARIN SODIUM 7500 UNITS: 5000 INJECTION INTRAVENOUS; SUBCUTANEOUS at 05:43

## 2023-11-27 RX ADMIN — LAMOTRIGINE 100 MG: 100 TABLET ORAL at 08:11

## 2023-11-27 RX ADMIN — DESVENLAFAXINE SUCCINATE 50 MG: 50 TABLET, EXTENDED RELEASE ORAL at 08:11

## 2023-11-27 RX ADMIN — INSULIN LISPRO 16 UNITS: 100 INJECTION, SOLUTION INTRAVENOUS; SUBCUTANEOUS at 08:06

## 2023-11-27 RX ADMIN — DOXYCYCLINE 100 MG: 100 CAPSULE ORAL at 22:07

## 2023-11-27 RX ADMIN — INSULIN LISPRO 16 UNITS: 100 INJECTION, SOLUTION INTRAVENOUS; SUBCUTANEOUS at 16:40

## 2023-11-27 RX ADMIN — HEPARIN SODIUM 7500 UNITS: 5000 INJECTION INTRAVENOUS; SUBCUTANEOUS at 14:20

## 2023-11-27 RX ADMIN — SODIUM CHLORIDE 14 UNITS/HR: 9 INJECTION, SOLUTION INTRAVENOUS at 09:58

## 2023-11-27 RX ADMIN — LISINOPRIL 20 MG: 20 TABLET ORAL at 08:11

## 2023-11-27 RX ADMIN — INSULIN GLARGINE 40 UNITS: 100 INJECTION, SOLUTION SUBCUTANEOUS at 22:07

## 2023-11-27 NOTE — PROGRESS NOTES
Vancomycin IV Pharmacy-to-Dose Consultation    Mimi Horne is a 46 y.o. female who was receiving Vancomycin IV with management by the Pharmacy Consult service for treatment of Soft tissue (goal -600, trough >10) MRSA. The patient's Vancomycin therapy has been completed / discontinued. Thank you for allowing us to take part in this patient's care. Pharmacy will sign-off now; please call or re-consult if there are any questions.         John White  Pharmacist

## 2023-11-27 NOTE — DISCHARGE INSTR - OTHER ORDERS
Skin care plans:  1-Hydraguard to bilateral sacrum, buttock and heels BID and PRN  2-Elevate heels to offload pressure. 3-Ehob cushion in chair when out of bed. 4-Moisturize skin daily with skin nourishing cream.  5-Turn/reposition q2h or when medically stable for pressure re-distribution on skin. 6-Cleanse pubis wound with normal saline, loosely pack wound with 1/4 inch iodoform packing, cover with Allevyn foam, change every other day and as needed for Soilage/displacement.

## 2023-11-27 NOTE — ASSESSMENT & PLAN NOTE
History of depression  Psych was consulted during this admission to help reassess patient's current medication    Plan:  Pristiq 50 mg was restarted in order to avoid withdrawal related symptoms  Psych recommendation holding Abilify and stated that Pristiq could be increased to her home regimen of 100 mg daily, orders placed   Per psych recommendation continue lamotrigine 100 mg twice daily  Patient has agreed to partial psychiatric hospitalization program after being medically cleared

## 2023-11-27 NOTE — PROGRESS NOTES
[facility]  Progress Note  Name: Marci Adkins  MRN: [id number]  Unit/Bed#: S -95 I Date of Admission: 11/23/2023   Date of Service: 11/27/2023 I Hospital Day: 3    Assessment/Plan   No new Assessment & Plan notes have been filed under this hospital service since the last note was generated. Service: Hospitalist       Medical Problems       Resolved Problems  Date Reviewed: 11/27/2023   None       Discharging Physician / Practitioner: Jayjay Allred DO  PCP: El Gonzalez, [address] [place] [address]  Admission Date:   Admission Orders (From admission, onward)       Ordered        11/24/23 0051  INPATIENT ADMISSION  Once                          Discharge Date: 11/27/23    Consultations During Hospital Stay:  General surgery, psychiatry, endocrinology, pharmacy    Procedures Performed:   Incision and drainage of abscess in mons pubis     Significant Findings / Test Results:   ***    Incidental Findings:   ***   {SLIM Discharge Incidential Findings:86795}    Test Results Pending at Discharge (will require follow up):   ***     Outpatient Tests Requested:  ***    Complications:  ***    Reason for Admission: ***    Hospital Course:   Jomar Ramey is a 46 y.o. female patient who originally presented to the hospital on 11/23/2023 due to ***    {Complete this smartphrase if the patient is an inpatient and being discharged earlier than 2 midnights. If this does not apply, please delete this line:76248}    Please see above list of diagnoses and related plan for additional information.      Condition at Discharge: {Condition:23750}    Discharge Day Visit / Exam:   Subjective:  ***  Vitals: Blood Pressure: 125/83 (11/27/23 1535)  Pulse: 88 (11/27/23 1535)  Temperature: 97.9 °F (36.6 °C) (11/27/23 1535)  Temp Source: Oral (11/27/23 0736)  Respirations: 17 (11/27/23 1535)  Height: 5' (152.4 cm) (11/24/23 1700)  Weight - Scale: (!) 159 kg (350 lb 8.5 oz) (11/27/23 0600)  SpO2: 93 % (11/27/23 1535)  Exam:   Physical Exam ***    Discussion with Family: {Family Communication:77170}    Discharge instructions/Information to patient and family:   See after visit summary for information provided to patient and family. Provisions for Follow-Up Care:  See after visit summary for information related to follow-up care and any pertinent home health orders. Mobility at time of Discharge:   Basic Mobility Inpatient Raw Score: 19  [facility] Goal: 6: Walk 10 steps or more  [facility] Achieved: 3: Sit at edge of bed  {Mobility:64213}     Disposition:   {Disposition on Discharge:89873}    Planned Readmission: ***     Discharge Statement:  I spent *** minutes discharging the patient. This time was spent on the day of discharge. I had direct contact with the patient on the day of discharge. Greater than 50% of the total time was spent examining patient, answering all patient questions, arranging and discussing plan of care with patient as well as directly providing post-discharge instructions. Additional time then spent on discharge activities. Discharge Medications:  See after visit summary for reconciled discharge medications provided to patient and/or family.       **Please Note: This note may have been constructed using a voice recognition system**

## 2023-11-27 NOTE — WOUND OSTOMY CARE
Consult Note - Wound   Melene Contras 46 y.o. female MRN: [id number]  Unit/Bed#: S -10 Encounter: [id number]        History and Present Illness:  Patient is 45 yo male [facility] with severe sepsis. Patient has history of anxiety, depression, DM2, GERD, HLD, HTN, IBS and morbid obesity. Patient is incontinent of bowel and bladder, purewick in place. Patient is max assist with turning from side to side for assessment. Patient is independent with meals. Assessment Findings:   Sacral/buttocks and B/L heels intact and blanching, preventative skin care orders placed. I&D site mons pubis- wound is full thickness with 75% beefy red tissue and 25% yellow slough tissue, periwound skin is indurated and fragile, small serosanguineous drainage present. No induration, fluctuance, odor, warmth/temperature differences, redness, or purulence noted to the above noted wounds and skin areas assessed. New dressings applied per orders listed below. Patient tolerated well- no s/s of non-verbal pain or discomfort observed during the encounter. Bedside nurse aware of plan of care. See flow sheets for more detailed assessment findings. Wound care will sign off as surgery team is managing patient. Skin care plans:  1-Hydraguard to bilateral sacrum, buttock and heels BID and PRN  2-Elevate heels to offload pressure. 3-Ehob cushion in chair when out of bed. 4-Moisturize skin daily with skin nourishing cream.  5-Turn/reposition q2h or when medically stable for pressure re-distribution on skin. 6-Cleanse pubis wound with normal saline, gently pack wound with 1/4 inch iodoform packing, cover with Allevyn foam, change every other day and PRN Soilage/displacement. Wounds:  Wound 11/23/23 Incision Abdomen Anterior (Active)   Wound Image   11/27/23 1400   Wound Description Beefy red;Slough 11/27/23 1400   Becky-wound Assessment Fragile; Induration 11/27/23 1400   Wound Length (cm) 1.5 cm 11/27/23 1400   Wound Width (cm) 1.5 cm 11/27/23 1400   Wound Depth (cm) 0.8 cm 11/27/23 1400   Wound Surface Area (cm^2) 2.25 cm^2 11/27/23 1400   Wound Volume (cm^3) 1.8 cm^3 11/27/23 1400   Calculated Wound Volume (cm^3) 1.8 cm^3 11/27/23 1400   Tunneling 0 cm 11/27/23 1400   Tunneling in depth located at 0 11/27/23 1400   Undermining 0 11/27/23 1400   Undermining is depth extending from 0 11/27/23 1400   Wound Site Closure ANGELY 11/27/23 1400   Drainage Amount Small 11/27/23 1400   Drainage Description Serosanguineous 11/27/23 1400   Non-staged Wound Description Full thickness 11/27/23 1400   Treatments Cleansed 11/27/23 1400   Dressing Packings; Foam, Silicon (eg. Allevyn, etc) 11/27/23 1400   Wound packed? Yes 11/27/23 1400   Packing- # removed 1 11/27/23 1400   Packing- # inserted 1 11/27/23 1400   Dressing Changed New 11/27/23 1400   Patient Tolerance Tolerated well 11/27/23 1400   Dressing Status Clean;Dry; Intact 11/27/23 1400           Mikki Salinas RN, [facility]

## 2023-11-27 NOTE — PROGRESS NOTES
[address] [facility]  Progress Note  Name: Anahi Antunez  MRN: [id number]  Unit/Bed#: S -03 I Date of Admission: 11/23/2023   Date of Service: 11/27/2023 I Hospital Day: 3    Assessment/Plan   Hyponatremia  Assessment & Plan  - 130 on 11/23, currently 132 on 11/26  - Likely pseudohyponatremia from high glucose in blood   - Per nursing, unable to get am labs today. Nursing consulted venous access who was not able to see patient today but will try for am labs again tomorrow. Orders placed. Yeast infection of the vagina  Assessment & Plan  Patient has a history of ongoing, recurrent yeast infections  Vaginal yeast infection most likely due to uncontrolled blood sugar  Patient has tried Monistat but with no relief or resolution  One time dose of 200mg diflucan given   Symptoms as of 11/25 are slowly improving       Plan:  Continue to watch for symptoms improvement  Given another dose of diflucan if needed       Abscess  Assessment & Plan  Patient reports history of recurrent abscess that is normally in her mons pubis. Patient reports recurrent vaginal yeast infections as well. Received 200 mg of IV Diflucan in the ED. Received IV Zosyn and IV Vanco in ED. Vancomycin continued for 4 days. Transitioned to po doxycycline 11/27, continue for 7 days. General surgery was consulted  Abscess was drained and packed  Wound cx: 3+ polys, 1+ gram neg rods, 1+ gram + cocci pairs     Plan:  - General surgery continues to follow patient, wound packing fell out on its own during examination this am; reached out to general surgery who placed nursing orders for wound care   - Continue po doxycycline for 7 days. Mixed hyperlipidemia  Assessment & Plan  Patient is not currently on a statin.     Anxiety  Assessment & Plan  History of anxiety     Plan:  Continue ativan 0.5 mg every 8hours as needed      Class 3 severe obesity due to excess calories with body mass index (BMI) of 60.0 to 69.9 in adult [facility])  Assessment & Plan  Encourage lifestyle modifications. GERD (gastroesophageal reflux disease)  Assessment & Plan  Continue PPI and Bentyl prn. Major depressive disorder, recurrent severe without psychotic features [facility])  Assessment & Plan  History of depression  Psych was consulted during this admission to help reassess patient's current medication    Plan:  Pristiq 50 mg was restarted in order to avoid withdrawal related symptoms  Psych recommendation holding Abilify and stated that Pristiq could be increased to her home regimen of 100 mg daily, orders placed   Per psych recommendation continue lamotrigine 100 mg twice daily  Patient has agreed to partial psychiatric hospitalization program after being medically cleared    Essential hypertension  Assessment & Plan  Blood pressure is reviewed and acceptable. Last recorded pressure: 143/88  Continue Lisinopril  Monitor blood pressure    Type 2 diabetes mellitus with hyperglycemia, with long-term current use of insulin [facility])  Assessment & Plan  Lab Results   Component Value Date    HGBA1C 12.2 (H) 11/24/2023       Recent Labs     11/27/23  0612 11/27/23  0706 11/27/23  0954 11/27/23  1126   POCGLU 119 117 223* 139       Blood Sugar Average: Last 72 hrs:  (P) 285.918784797810852  Patient presented with complaints of dizziness and nausea with associated elevated blood sugar readings. POA, serum glucose 518, patient was not in DKA. Received 2 L NSS in ED. Repeat glucose 347  Home regimen includes Metformin, will hold while inpatient. Home regimen of 64 units of Lantus HS and 16 units of Lispro TID w/ meals.   Hemoglobin A1c 12.2    Plan:  Continue to hold metformin while hospitalized  Endocrinology consulted- they recommend starting lantus 40 units in am and 20 units in pm, tapering patient off insulin drip, starting lispro 16 tid & alg 4 scale- ordered placed by endocrinology, appreciate continued recommendations  Hypoglycemia protocol    * Severe sepsis [facility])  Assessment & Plan  SIRS criteria: Tachycardia, leukocytosis  Suspected source: Cellulitis of mons pubis with associated candida vulvovaginitis   UA: gluconuria, small occult blood  Lactic acid: 3.3 --> 1.3  End organ damage: Lactic acidosis  IV Fluids: Received 2 L NC in ED. IV antibiotics: Received IV Zosyn and IV Vancomycin in ED. Completed 4 days of IV vancomycin. Transitioned to oral 100 mg doxycycline bid for 7 days. Consulted pharmacy who has not weight based recommendations for doxycycline. Bcxs- NG at 48 hours   Monitor vital signs, laboratory studies    Plan:   Resolved at this time. Continue doxycyline for 7 days. VTE Pharmacologic Prophylaxis: VTE Score: 6 High Risk (Score >/= 5) - Pharmacological DVT Prophylaxis Ordered: heparin. Sequential Compression Devices Ordered. Mobility:   Basic Mobility Inpatient Raw Score: 19  -[facility] Goal: 6: Walk 10 steps or more  -HL Achieved: 3: Sit at edge of bed  [facility] Goal NOT achieved. Continue with multidisciplinary rounding and encourage appropriate mobility to improve upon [facility] goals. Patient Centered Rounds: I performed bedside rounds with nursing staff today. Discussions with Specialists or Other Care Team Provider: acute care surgery, endocrinology    Education and Discussions with Family / Patient: Attempted to update  (significant other) via phone. Left voicemail. Total Time Spent on Date of Encounter in care of patient: 45 mins. This time was spent on one or more of the following: performing physical exam; counseling and coordination of care; obtaining or reviewing history; documenting in the medical record; reviewing/ordering tests, medications or procedures; communicating with other healthcare professionals and discussing with patient's family/caregivers.     Current Length of Stay: 3 day(s)  Current Patient Status: Inpatient   Certification Statement: The patient will continue to require additional inpatient hospital stay due to tapering off insulin drip, continued antibiotic for mons pubis abscess s/p I&D  Discharge Plan: Anticipate discharge tomorrow to home. Code Status: Level 1 - Full Code    Subjective:   Overall doing well, no SOB, used oxygen via NC last night due to possible FAREED, no issues with BM or urination, hasn't been ambulating well in her room as she is scared to walk due to being unbalanced, no overnight issues or events per nursing     Objective:     Vitals:   Temp (24hrs), Av.1 °F (36.7 °C), Min:97.7 °F (36.5 °C), Max:98.6 °F (37 °C)    Temp:  [97.7 °F (36.5 °C)-98.6 °F (37 °C)] 97.7 °F (36.5 °C)  HR:  [91-94] 94  Resp:  [16-20] 20  BP: (125-146)/(73-88) 143/88  SpO2:  [87 %-95 %] 93 %  Body mass index is 68.46 kg/m². Input and Output Summary (last 24 hours): Intake/Output Summary (Last 24 hours) at 2023 1402  Last data filed at 2023 0801  Gross per 24 hour   Intake 250 ml   Output 2800 ml   Net -2550 ml       Physical Exam:   Physical Exam  Vitals reviewed. Constitutional:       Appearance: Normal appearance. Comments: Body mass index is 68.46 kg/m². HENT:      Head: Normocephalic and atraumatic. Right Ear: External ear normal.      Left Ear: External ear normal.      Nose: Nose normal.      Mouth/Throat:      Mouth: Mucous membranes are moist.      Pharynx: Oropharynx is clear. Eyes:      Extraocular Movements: Extraocular movements intact. Conjunctiva/sclera: Conjunctivae normal.      Pupils: Pupils are equal, round, and reactive to light. Cardiovascular:      Rate and Rhythm: Normal rate and regular rhythm. Pulses: Normal pulses. Heart sounds: Normal heart sounds. Pulmonary:      Effort: Pulmonary effort is normal.      Breath sounds: Normal breath sounds. Abdominal:      General: Abdomen is flat. Palpations: Abdomen is soft. Musculoskeletal:         General: Normal range of motion.       Cervical back: Normal range of motion and neck supple. Right lower leg: No edema. Left lower leg: No edema. Skin:     General: Skin is warm. Capillary Refill: Capillary refill takes less than 2 seconds. Coloration: Skin is not jaundiced. Findings: No erythema. Comments: Abscess noted on mons pubis, picture in media tab of chart    Neurological:      General: No focal deficit present. Mental Status: She is alert and oriented to person, place, and time. Mental status is at baseline. Psychiatric:         Mood and Affect: Mood normal.         Behavior: Behavior normal.          Additional Data:     Labs:  Results from last 7 days   Lab Units 11/27/23  1054   WBC Thousand/uL 5.71   HEMOGLOBIN g/dL 13.3   HEMATOCRIT % 41.8   PLATELETS Thousands/uL 278   NEUTROS PCT % 56   LYMPHS PCT % 29   MONOS PCT % 10   EOS PCT % 3     Results from last 7 days   Lab Units 11/27/23  1054 11/25/23  0446 11/23/23 2003   SODIUM mmol/L 136   < > 130*   POTASSIUM mmol/L 4.0   < > 4.3   CHLORIDE mmol/L 102   < > 94*   CO2 mmol/L 28   < > 26   BUN mg/dL 8   < > 15   CREATININE mg/dL 0.55*   < > 0.97   ANION GAP mmol/L 6   < > 10   CALCIUM mg/dL 9.1   < > 9.8   ALBUMIN g/dL  --   --  3.9   TOTAL BILIRUBIN mg/dL  --   --  0.59   ALK PHOS U/L  --   --  107*   ALT U/L  --   --  52   AST U/L  --   --  27   GLUCOSE RANDOM mg/dL 131   < > 518*    < > = values in this interval not displayed.          Results from last 7 days   Lab Units 11/27/23  1126 11/27/23  0954 11/27/23  0706 11/27/23  0612 11/27/23  0519 11/27/23  0258 11/27/23  0129 11/26/23  2310 11/26/23  2112 11/26/23  1954 11/26/23  1904 11/26/23  1706   POC GLUCOSE mg/dl 139 223* 117 119 118 126 156* 134 125 136 162* 217*     Results from last 7 days   Lab Units 11/24/23  0511   HEMOGLOBIN A1C % 12.2*     Results from last 7 days   Lab Units 11/24/23  0511 11/24/23  0001 11/23/23 2003   LACTIC ACID mmol/L  --  1.3 3.3*   PROCALCITONIN ng/ml 0.13  --  0.17       Lines/Drains:  Invasive Devices       Peripheral Intravenous Line  Duration             Peripheral IV 11/26/23 Dorsal (posterior); Right Forearm 1 day                          Imaging: Reviewed radiology reports from this admission including: abdominal/pelvic CT    Recent Cultures (last 7 days):   Results from last 7 days   Lab Units 11/24/23  2319 11/23/23 2003   BLOOD CULTURE   --  No Growth at 72 hrs. No Growth at 72 hrs. GRAM STAIN RESULT  3+ Polys*  1+ Gram negative rods*  1+ Gram positive cocci in pairs*  --    WOUND CULTURE  Culture results to follow.   --        Last 24 Hours Medication List:   Current Facility-Administered Medications   Medication Dose Route Frequency Provider Last Rate    acetaminophen  650 mg Oral Q6H PRN Lollie Stallion, CRNP      [START ON 11/28/2023] desvenlafaxine succinate  100 mg Oral Daily Hussaindrandrew Hart DO      dicyclomine  20 mg Oral Q6H PRN Lollie Stallion, CRNP      doxycycline hyclate  100 mg Oral Q12H [facility] Aarti Zaldivar DO      heparin (porcine)  7,500 Units Subcutaneous Q8H [facility] SUJATA Ray      insulin glargine  40 Units Subcutaneous QAM Ashwin Noland MD      insulin glargine  40 Units Subcutaneous HS Ashwin Noland MD      insulin lispro  16 Units Subcutaneous TID With Meals Ashwin Noland MD      insulin lispro  2-12 Units Subcutaneous TID AC Ashwin Noland MD      insulin regular (HumuLIN R,NovoLIN R) 1 Units/mL in sodium chloride 0.9 % 100 mL infusion  0.3-21 Units/hr Intravenous Titrated Rayne Ahumada, MD 4 Units/hr (11/27/23 1130)    labetalol  10 mg Intravenous Q4H PRN [employer], DO      lamoTRIgine  100 mg Oral BID Lollie Stallion, CRNP      lisinopril  20 mg Oral Daily Lollie Stallion, CRNP      LORazepam  0.5 mg Oral Q8H PRN Lollie Stallion, CRNP      metoclopramide  10 mg Intravenous Q6H PRN Lollie Stallion, CRNP      pantoprazole  40 mg Oral Daily SUJATA Ray      polyethylene glycol  17 g Oral Daily PRN Eli Alejandro, DO Today, Patient Was Seen By: Marvin mAaya DO    **Please Note: This note may have been constructed using a voice recognition system. **

## 2023-11-27 NOTE — PLAN OF CARE
Problem: Potential for Falls  Goal: Patient will remain free of falls  Description: INTERVENTIONS:  - Educate patient/family on patient safety including physical limitations  - Instruct patient to call for assistance with activity   - Consult OT/PT to assist with strengthening/mobility   - Keep Call bell within reach  - Keep bed low and locked with side rails adjusted as appropriate  - Keep care items and personal belongings within reach  - Initiate and maintain comfort rounds  - Make Fall Risk Sign visible to staff  - Offer Toileting every 2 Hours, in advance of need  - Initiate/Maintain bed alarm  - Obtain necessary fall risk management equipment  - Apply yellow socks and bracelet for high fall risk patients  - Consider moving patient to room near nurses station  Outcome: Progressing     Problem: PAIN - ADULT  Goal: Verbalizes/displays adequate comfort level or baseline comfort level  Description: Interventions:  - Encourage patient to monitor pain and request assistance  - Assess pain using appropriate pain scale  - Administer analgesics based on type and severity of pain and evaluate response  - Implement non-pharmacological measures as appropriate and evaluate response  - Consider cultural and social influences on pain and pain management  - Notify physician/advanced practitioner if interventions unsuccessful or patient reports new pain  Outcome: Progressing     Problem: INFECTION - ADULT  Goal: Absence or prevention of progression during hospitalization  Description: INTERVENTIONS:  - Assess and monitor for signs and symptoms of infection  - Monitor lab/diagnostic results  - Monitor all insertion sites, i.e. indwelling lines, tubes, and drains  - Monitor endotracheal if appropriate and nasal secretions for changes in amount and color  - [place] appropriate cooling/warming therapies per order  - Administer medications as ordered  - Instruct and encourage patient and family to use good hand hygiene technique  - Identify and instruct in appropriate isolation precautions for identified infection/condition  Outcome: Progressing  Goal: Absence of fever/infection during neutropenic period  Description: INTERVENTIONS:  - Monitor WBC    Outcome: Progressing     Problem: SAFETY ADULT  Goal: Patient will remain free of falls  Description: INTERVENTIONS:  - Educate patient/family on patient safety including physical limitations  - Instruct patient to call for assistance with activity   - Consult OT/PT to assist with strengthening/mobility   - Keep Call bell within reach  - Keep bed low and locked with side rails adjusted as appropriate  - Keep care items and personal belongings within reach  - Initiate and maintain comfort rounds  - Make Fall Risk Sign visible to staff  - Offer Toileting every 2 Hours, in advance of need  - Initiate/Maintain bed alarm  - Obtain necessary fall risk management equipment  - Apply yellow socks and bracelet for high fall risk patients  - Consider moving patient to room near nurses station  Outcome: Progressing  Goal: Maintain or return to baseline ADL function  Description: INTERVENTIONS:  -  Assess patient's ability to carry out ADLs; assess patient's baseline for ADL function and identify physical deficits which impact ability to perform ADLs (bathing, care of mouth/teeth, toileting, grooming, dressing, etc.)  - Assess/evaluate cause of self-care deficits   - Assess range of motion  - Assess patient's mobility; develop plan if impaired  - Assess patient's need for assistive devices and provide as appropriate  - Encourage maximum independence but intervene and supervise when necessary  - Involve family in performance of ADLs  - Assess for home care needs following discharge   - Consider OT consult to assist with ADL evaluation and planning for discharge  - Provide patient education as appropriate  Outcome: Progressing  Goal: Maintains/Returns to pre admission functional level  Description: INTERVENTIONS:  - Perform AM-PAC 6 Click Basic Mobility/ Daily Activity assessment daily.  - Set and communicate daily mobility goal to care team and patient/family/caregiver. - Collaborate with rehabilitation services on mobility goals if consulted  - Perform Range of Motion 4 times a day. - Reposition patient every 2 hours. - Dangle patient 3 times a day  - Stand patient 3 times a day  - Ambulate patient 3 times a day  - Out of bed to chair 3 times a day   - Out of bed for meals 3 times a day  - Out of bed for toileting  - Record patient progress and toleration of activity level   Outcome: Progressing     Problem: DISCHARGE PLANNING  Goal: Discharge to home or other facility with appropriate resources  Description: INTERVENTIONS:  - Identify barriers to discharge w/patient and caregiver  - Arrange for needed discharge resources and transportation as appropriate  - Identify discharge learning needs (meds, wound care, etc.)  - Arrange for interpretive services to assist at discharge as needed  - Refer to Case Management Department for coordinating discharge planning if the patient needs post-hospital services based on physician/advanced practitioner order or complex needs related to functional status, cognitive ability, or social support system  Outcome: Progressing     Problem: Knowledge Deficit  Goal: Patient/family/caregiver demonstrates understanding of disease process, treatment plan, medications, and discharge instructions  Description: Complete learning assessment and assess knowledge base.   Interventions:  - Provide teaching at level of understanding  - Provide teaching via preferred learning methods  Outcome: Progressing     Problem: Prexisting or High Potential for Compromised Skin Integrity  Goal: Skin integrity is maintained or improved  Description: INTERVENTIONS:  - Identify patients at risk for skin breakdown  - Assess and monitor skin integrity  - Assess and monitor nutrition and hydration status  - Monitor labs   - Assess for incontinence   - Turn and reposition patient  - Assist with mobility/ambulation  - Relieve pressure over bony prominences  - Avoid friction and shearing  - Provide appropriate hygiene as needed including keeping skin clean and dry  - Evaluate need for skin moisturizer/barrier cream  - Collaborate with interdisciplinary team   - Patient/family teaching  - Consider wound care consult   Outcome: Progressing

## 2023-11-27 NOTE — PLAN OF CARE
Problem: Potential for Falls  Goal: Patient will remain free of falls  Description: INTERVENTIONS:  - Educate patient/family on patient safety including physical limitations  - Instruct patient to call for assistance with activity   - Consult OT/PT to assist with strengthening/mobility   - Keep Call bell within reach  - Keep bed low and locked with side rails adjusted as appropriate  - Keep care items and personal belongings within reach  - Initiate and maintain comfort rounds  - Make Fall Risk Sign visible to staff  - Offer Toileting every 3 Hours, in advance of need  - Initiate/Maintain bedalarm  - Obtain necessary fall risk management equipment: walker  - Apply yellow socks and bracelet for high fall risk patients  - Consider moving patient to room near nurses station  Outcome: Progressing

## 2023-11-27 NOTE — ASSESSMENT & PLAN NOTE
Lab Results   Component Value Date    HGBA1C 12.2 (H) 11/24/2023       Recent Labs     11/27/23  0612 11/27/23  0706 11/27/23  0954 11/27/23  1126   POCGLU 119 117 223* 139       Blood Sugar Average: Last 72 hrs:  (P) [id number]  Patient presented with complaints of dizziness and nausea with associated elevated blood sugar readings. POA, serum glucose 518, patient was not in DKA. Received 2 L NSS in ED. Repeat glucose 347  Home regimen includes Metformin, will hold while inpatient. Home regimen of 64 units of Lantus HS and 16 units of Lispro TID w/ meals.   Hemoglobin A1c 12.2    Plan:  Continue to hold metformin while hospitalized  Endocrinology consulted- they recommend starting lantus 40 units in am and 20 units in pm, tapering patient off insulin drip, starting lispro 16 tid & alg 4 scale- ordered placed by endocrinology, appreciate continued recommendations  Hypoglycemia protocol

## 2023-11-27 NOTE — ASSESSMENT & PLAN NOTE
Patient reports history of recurrent abscess that is normally in her mons pubis. Patient reports recurrent vaginal yeast infections as well. Received 200 mg of IV Diflucan in the ED. Received IV Zosyn and IV Vanco in ED. Vancomycin continued for 4 days. Transitioned to po doxycycline 11/27, continue for 7 days. General surgery was consulted  Abscess was drained and packed  Wound cx: 3+ polys, 1+ gram neg rods, 1+ gram + cocci pairs     Plan:  - General surgery continues to follow patient, wound packing fell out on its own during examination this am; reached out to general surgery who placed nursing orders for wound care   - Continue po doxycycline for 7 days.

## 2023-11-27 NOTE — ASSESSMENT & PLAN NOTE
SIRS criteria: Tachycardia, leukocytosis  Suspected source: Cellulitis of mons pubis with associated candida vulvovaginitis   UA: gluconuria, small occult blood  Lactic acid: 3.3 --> 1.3  End organ damage: Lactic acidosis  IV Fluids: Received 2 L NC in ED. IV antibiotics: Received IV Zosyn and IV Vancomycin in ED. Completed 4 days of IV vancomycin. Transitioned to oral 100 mg doxycycline bid for 7 days. Consulted pharmacy who has not weight based recommendations for doxycycline. Bcxs- NG at 48 hours   Monitor vital signs, laboratory studies    Plan:   Resolved at this time. Continue doxycyline for 7 days.

## 2023-11-27 NOTE — CASE MANAGEMENT
Case Management Discharge Planning Note    Patient name Broderick Romero  Location S /S -12 MRN [id number]  : 1975 Date 2023       Current Admission Date: 2023  Current Admission Diagnosis:Severe sepsis [facility])   Patient Active Problem List    Diagnosis Date Noted    Hyponatremia 2023    MDD (major depressive disorder) 2023    Yeast infection of the vagina 2023    Abscess 2023    Abnormal CT of the abdomen 2023    Tachycardia 2023    Abnormal urinalysis 2023    Sigmoid diverticulitis 2023    Thickened endometrium 2023    Severe sepsis ([address]) 2023    Class 3 severe obesity due to excess calories with serious comorbidity and body mass index (BMI) greater than or equal to 70 in adult [facility]) 2022    Elevated lipids 2022    Class 3 severe obesity due to excess calories with body mass index (BMI) of 60.0 to 69.9 in adult ([address]) 2021    Primary osteoarthritis of both knees 2021    Epigastric pain 2020    GERD (gastroesophageal reflux disease) 2020    Diarrhea 2020    Major depressive disorder, recurrent severe without psychotic features ([address]) 2019    Diabetes 1.5, managed as type 2 ([address]) 2019    Type 2 diabetes mellitus with hyperglycemia, with long-term current use of insulin ([address]) 2019    Essential hypertension 2019    Irritable bowel syndrome with diarrhea 2019    Mixed hyperlipidemia 2018    Primary osteoarthritis of right knee 2017    Anxiety 10/15/2012      LOS (days): 3  Geometric Mean LOS (GMLOS) (days):   Days to GMLOS:     OBJECTIVE:  Risk of Unplanned Readmission Score: 11.02         Current admission status: Inpatient   Preferred Pharmacy:   [id number] [place], [place] - [address]  [place] [id number]  Phone: [phone] Fax: [phone]    Primary Care Provider: SUJATA Salazar    Primary Insurance: [id number] [facility] 7 [place]  Secondary Insurance:     DISCHARGE DETAILS:    Discharge planning discussed with[de-identified] Patient  Freedom of Choice: Yes  Comments - Freedom of Choice: Discussed VNA at DC for SN  CM contacted family/caregiver?: No- see comments  Were Treatment Team discharge recommendations reviewed with patient/caregiver?: Yes  Did patient/caregiver verbalize understanding of patient care needs?: Yes  Were patient/caregiver advised of the risks associated with not following Treatment Team discharge recommendations?: Yes    Contacts  Patient Contacts: Patient  Relationship to Patient[de-identified] Other (Comment)  Contact Method: In Person  Reason/Outcome: Discharge Planning, Referral, Continuity of [facility]         Is the patient interested in [facility] at discharge?: Yes  [facility] requested[de-identified] [facility] Name[de-identified] [place] Provider[de-identified] PCP  Home Health Services Needed[de-identified] Wound/Ostomy Care, Evaluate Functional Status and Safety  Homebound Criteria Met[de-identified] Requires the Assistance of Another Person for Safe Ambulation or to Leave the Home  Supporting Clincal Findings[de-identified] Limited Endurance, Fatigues Easliy in [employer]    DME Referral Provided  Referral made for DME?: No              Treatment Team Recommendation: Home with [address]  Discharge Destination Plan[de-identified] Home with [address]                                            CM notified by surgery team that patient will need VNA at DC for wound care and packing. CM discussed with patient making a blanket referral to see what agencies have availability. Preference would be for [facility]. [place] referral was made and [facility] is able to accept. They were reserved in Rm and their contact information was placed on the AVS.    CM department will continue to follow to assist with discharge coordination.

## 2023-11-27 NOTE — PROGRESS NOTES
Patient:    MRN:  [id number]    [place] Request ID:  [id number]    Level of care reserved:  [facility] [place]    Partner Reserved:  [facility], JACQUELINE,  [facility] [phone]    Clinical needs requested:    Geography searched:  [address]    Start of Service:    Request sent:  9:51am EST on 11/27/2023 by [employer]    Partner reserved:  3:05pm EST on 11/27/2023 by [employer]    Choice list shared:  12:26pm EST on 11/27/2023 by [employer]

## 2023-11-27 NOTE — PROGRESS NOTES
Vinay Quintero is a 46 y.o. female who is currently ordered Vancomycin IV with management by the Pharmacy Consult service. Relevant clinical data and objective / subjective history reviewed. Vancomycin Assessment:  Indication and Goal AUC/Trough: Soft tissue (goal -600, trough >10), MRSA  Clinical Status: stable  Micro:     Renal Function:  SCr: 0.6 mg/dL  CrCl: 116 mL/min  Renal replacement: Not on dialysis  Days of Therapy: 4  Vancomycin Plan:  New Dosinmg IV q12h - good fit will continue   Estimated AUC: 423 mcg*hr/mL  Estimated Trough: 12.4 mcg/mL  Next Level:  @0600 with AM labs   Renal Function Monitoring: Daily BMP and [place] will continue to follow closely for s/sx of nephrotoxicity, infusion reactions and appropriateness of therapy. BMP and CBC will be ordered per protocol. We will continue to follow the patient’s culture results and clinical progress daily.     Mai Mitchell, Pharmacist

## 2023-11-27 NOTE — ASSESSMENT & PLAN NOTE
Patient has a history of ongoing, recurrent yeast infections  Vaginal yeast infection most likely due to uncontrolled blood sugar  Patient has tried Monistat but with no relief or resolution  One time dose of 200mg diflucan given   Symptoms as of 11/25 are slowly improving       Plan:  Continue to watch for symptoms improvement  Given another dose of diflucan if needed PAIN SCALE 0 OF 10.

## 2023-11-27 NOTE — ASSESSMENT & PLAN NOTE
Blood pressure is reviewed and acceptable.   Last recorded pressure: 143/88  Continue Lisinopril  Monitor blood pressure

## 2023-11-27 NOTE — ASSESSMENT & PLAN NOTE
- 130 on 11/23, currently 132 on 11/26  - Likely pseudohyponatremia from high glucose in blood   - Per nursing, unable to get am labs today. Nursing consulted venous access who was not able to see patient today but will try for am labs again tomorrow. Orders placed.

## 2023-11-28 LAB
ANION GAP SERPL CALCULATED.3IONS-SCNC: 8 MMOL/L
BACTERIA BLD CULT: NORMAL
BACTERIA BLD CULT: NORMAL
BACTERIA WND AEROBE CULT: ABNORMAL
BASOPHILS # BLD AUTO: 0.06 THOUSANDS/ÂΜL (ref 0–0.1)
BASOPHILS NFR BLD AUTO: 1 % (ref 0–1)
BUN SERPL-MCNC: 8 MG/DL (ref 5–25)
CALCIUM SERPL-MCNC: 9.2 MG/DL (ref 8.4–10.2)
CHLORIDE SERPL-SCNC: 102 MMOL/L (ref 96–108)
CO2 SERPL-SCNC: 27 MMOL/L (ref 21–32)
CREAT SERPL-MCNC: 0.55 MG/DL (ref 0.6–1.3)
EOSINOPHIL # BLD AUTO: 0.23 THOUSAND/ÂΜL (ref 0–0.61)
EOSINOPHIL NFR BLD AUTO: 4 % (ref 0–6)
ERYTHROCYTE [DISTWIDTH] IN BLOOD BY AUTOMATED COUNT: 13.3 % (ref 11.6–15.1)
GFR SERPL CREATININE-BSD FRML MDRD: 109 ML/MIN/1.73SQ M
GLUCOSE SERPL-MCNC: 127 MG/DL (ref 65–140)
GLUCOSE SERPL-MCNC: 129 MG/DL (ref 65–140)
GLUCOSE SERPL-MCNC: 131 MG/DL (ref 65–140)
GLUCOSE SERPL-MCNC: 137 MG/DL (ref 65–140)
GLUCOSE SERPL-MCNC: 140 MG/DL (ref 65–140)
GLUCOSE SERPL-MCNC: 154 MG/DL (ref 65–140)
GLUCOSE SERPL-MCNC: 171 MG/DL (ref 65–140)
GLUCOSE SERPL-MCNC: 204 MG/DL (ref 65–140)
GLUCOSE SERPL-MCNC: 219 MG/DL (ref 65–140)
GLUCOSE SERPL-MCNC: 241 MG/DL (ref 65–140)
GRAM STN SPEC: ABNORMAL
HCT VFR BLD AUTO: 45 % (ref 34.8–46.1)
HGB BLD-MCNC: 14.1 G/DL (ref 11.5–15.4)
IMM GRANULOCYTES # BLD AUTO: 0.12 THOUSAND/UL (ref 0–0.2)
IMM GRANULOCYTES NFR BLD AUTO: 2 % (ref 0–2)
LYMPHOCYTES # BLD AUTO: 2.12 THOUSANDS/ÂΜL (ref 0.6–4.47)
LYMPHOCYTES NFR BLD AUTO: 33 % (ref 14–44)
MCH RBC QN AUTO: 30.2 PG (ref 26.8–34.3)
MCHC RBC AUTO-ENTMCNC: 31.3 G/DL (ref 31.4–37.4)
MCV RBC AUTO: 96 FL (ref 82–98)
MONOCYTES # BLD AUTO: 0.67 THOUSAND/ÂΜL (ref 0.17–1.22)
MONOCYTES NFR BLD AUTO: 10 % (ref 4–12)
NEUTROPHILS # BLD AUTO: 3.33 THOUSANDS/ÂΜL (ref 1.85–7.62)
NEUTS SEG NFR BLD AUTO: 50 % (ref 43–75)
NRBC BLD AUTO-RTO: 0 /100 WBCS
PLATELET # BLD AUTO: 265 THOUSANDS/UL (ref 149–390)
PMV BLD AUTO: 9.6 FL (ref 8.9–12.7)
POTASSIUM SERPL-SCNC: 3.9 MMOL/L (ref 3.5–5.3)
RBC # BLD AUTO: 4.67 MILLION/UL (ref 3.81–5.12)
SODIUM SERPL-SCNC: 137 MMOL/L (ref 135–147)
WBC # BLD AUTO: 6.53 THOUSAND/UL (ref 4.31–10.16)

## 2023-11-28 PROCEDURE — 85025 COMPLETE CBC W/AUTO DIFF WBC: CPT

## 2023-11-28 PROCEDURE — 99232 SBSQ HOSP IP/OBS MODERATE 35: CPT | Performed by: INTERNAL MEDICINE

## 2023-11-28 PROCEDURE — 80048 BASIC METABOLIC PNL TOTAL CA: CPT

## 2023-11-28 PROCEDURE — 97162 PT EVAL MOD COMPLEX 30 MIN: CPT

## 2023-11-28 PROCEDURE — 82948 REAGENT STRIP/BLOOD GLUCOSE: CPT

## 2023-11-28 RX ORDER — ONDANSETRON 2 MG/ML
4 INJECTION INTRAMUSCULAR; INTRAVENOUS EVERY 4 HOURS PRN
Status: DISCONTINUED | OUTPATIENT
Start: 2023-11-28 | End: 2023-11-29 | Stop reason: HOSPADM

## 2023-11-28 RX ORDER — INSULIN LISPRO 100 [IU]/ML
2-12 INJECTION, SOLUTION INTRAVENOUS; SUBCUTANEOUS
Status: DISCONTINUED | OUTPATIENT
Start: 2023-11-29 | End: 2023-11-29 | Stop reason: HOSPADM

## 2023-11-28 RX ORDER — INSULIN LISPRO 100 [IU]/ML
2-12 INJECTION, SOLUTION INTRAVENOUS; SUBCUTANEOUS
Status: DISCONTINUED | OUTPATIENT
Start: 2023-11-28 | End: 2023-11-29 | Stop reason: HOSPADM

## 2023-11-28 RX ORDER — POLYETHYLENE GLYCOL 3350 17 G/17G
17 POWDER, FOR SOLUTION ORAL DAILY
Status: DISCONTINUED | OUTPATIENT
Start: 2023-11-28 | End: 2023-11-29 | Stop reason: HOSPADM

## 2023-11-28 RX ADMIN — PANTOPRAZOLE SODIUM 40 MG: 40 TABLET, DELAYED RELEASE ORAL at 08:16

## 2023-11-28 RX ADMIN — DOXYCYCLINE 100 MG: 100 CAPSULE ORAL at 08:16

## 2023-11-28 RX ADMIN — INSULIN LISPRO 16 UNITS: 100 INJECTION, SOLUTION INTRAVENOUS; SUBCUTANEOUS at 08:16

## 2023-11-28 RX ADMIN — POLYETHYLENE GLYCOL 3350 17 G: 17 POWDER, FOR SOLUTION ORAL at 09:24

## 2023-11-28 RX ADMIN — LISINOPRIL 20 MG: 20 TABLET ORAL at 08:16

## 2023-11-28 RX ADMIN — LAMOTRIGINE 100 MG: 100 TABLET ORAL at 08:16

## 2023-11-28 RX ADMIN — LAMOTRIGINE 100 MG: 100 TABLET ORAL at 16:59

## 2023-11-28 RX ADMIN — HEPARIN SODIUM 7500 UNITS: 5000 INJECTION INTRAVENOUS; SUBCUTANEOUS at 15:33

## 2023-11-28 RX ADMIN — HEPARIN SODIUM 7500 UNITS: 5000 INJECTION INTRAVENOUS; SUBCUTANEOUS at 05:05

## 2023-11-28 RX ADMIN — INSULIN LISPRO 4 UNITS: 100 INJECTION, SOLUTION INTRAVENOUS; SUBCUTANEOUS at 21:02

## 2023-11-28 RX ADMIN — ONDANSETRON 4 MG: 2 INJECTION INTRAMUSCULAR; INTRAVENOUS at 23:25

## 2023-11-28 RX ADMIN — HEPARIN SODIUM 7500 UNITS: 5000 INJECTION INTRAVENOUS; SUBCUTANEOUS at 21:01

## 2023-11-28 RX ADMIN — INSULIN GLARGINE 40 UNITS: 100 INJECTION, SOLUTION SUBCUTANEOUS at 21:02

## 2023-11-28 RX ADMIN — DESVENLAFAXINE SUCCINATE 100 MG: 50 TABLET, EXTENDED RELEASE ORAL at 08:16

## 2023-11-28 RX ADMIN — DOXYCYCLINE 100 MG: 100 CAPSULE ORAL at 21:01

## 2023-11-28 RX ADMIN — INSULIN LISPRO 16 UNITS: 100 INJECTION, SOLUTION INTRAVENOUS; SUBCUTANEOUS at 16:35

## 2023-11-28 RX ADMIN — INSULIN LISPRO 4 UNITS: 100 INJECTION, SOLUTION INTRAVENOUS; SUBCUTANEOUS at 16:35

## 2023-11-28 RX ADMIN — METOCLOPRAMIDE 10 MG: 5 INJECTION, SOLUTION INTRAMUSCULAR; INTRAVENOUS at 05:05

## 2023-11-28 RX ADMIN — INSULIN GLARGINE 40 UNITS: 100 INJECTION, SOLUTION SUBCUTANEOUS at 08:16

## 2023-11-28 RX ADMIN — INSULIN LISPRO 16 UNITS: 100 INJECTION, SOLUTION INTRAVENOUS; SUBCUTANEOUS at 11:30

## 2023-11-28 RX ADMIN — SODIUM CHLORIDE 4 UNITS/HR: 9 INJECTION, SOLUTION INTRAVENOUS at 00:07

## 2023-11-28 NOTE — PLAN OF CARE
Problem: PAIN - ADULT  Goal: Verbalizes/displays adequate comfort level or baseline comfort level  Description: Interventions:  - Encourage patient to monitor pain and request assistance  - Assess pain using appropriate pain scale  - Administer analgesics based on type and severity of pain and evaluate response  - Implement non-pharmacological measures as appropriate and evaluate response  - Consider cultural and social influences on pain and pain management  - Notify physician/advanced practitioner if interventions unsuccessful or patient reports new pain  Outcome: Progressing     Problem: INFECTION - ADULT  Goal: Absence or prevention of progression during hospitalization  Description: INTERVENTIONS:  - Assess and monitor for signs and symptoms of infection  - Monitor lab/diagnostic results  - Monitor all insertion sites, i.e. indwelling lines, tubes, and drains  - Monitor endotracheal if appropriate and nasal secretions for changes in amount and color  - [place] appropriate cooling/warming therapies per order  - Administer medications as ordered  - Instruct and encourage patient and family to use good hand hygiene technique  - Identify and instruct in appropriate isolation precautions for identified infection/condition  Outcome: Progressing     Problem: INFECTION - ADULT  Goal: Absence of fever/infection during neutropenic period  Description: INTERVENTIONS:  - Monitor WBC    Outcome: Progressing     Problem: SAFETY ADULT  Goal: Maintain or return to baseline ADL function  Description: INTERVENTIONS:  -  Assess patient's ability to carry out ADLs; assess patient's baseline for ADL function and identify physical deficits which impact ability to perform ADLs (bathing, care of mouth/teeth, toileting, grooming, dressing, etc.)  - Assess/evaluate cause of self-care deficits   - Assess range of motion  - Assess patient's mobility; develop plan if impaired  - Assess patient's need for assistive devices and provide as appropriate  - Encourage maximum independence but intervene and supervise when necessary  - Involve family in performance of ADLs  - Assess for home care needs following discharge   - Consider OT consult to assist with ADL evaluation and planning for discharge  - Provide patient education as appropriate  Outcome: Progressing     Problem: Knowledge Deficit  Goal: Patient/family/caregiver demonstrates understanding of disease process, treatment plan, medications, and discharge instructions  Description: Complete learning assessment and assess knowledge base.   Interventions:  - Provide teaching at level of understanding  - Provide teaching via preferred learning methods  Outcome: Progressing     Problem: Prexisting or High Potential for Compromised Skin Integrity  Goal: Skin integrity is maintained or improved  Description: INTERVENTIONS:  - Identify patients at risk for skin breakdown  - Assess and monitor skin integrity  - Assess and monitor nutrition and hydration status  - Monitor labs   - Assess for incontinence   - Turn and reposition patient  - Assist with mobility/ambulation  - Relieve pressure over bony prominences  - Avoid friction and shearing  - Provide appropriate hygiene as needed including keeping skin clean and dry  - Evaluate need for skin moisturizer/barrier cream  - Collaborate with interdisciplinary team   - Patient/family teaching  - Consider wound care consult   Outcome: Progressing

## 2023-11-28 NOTE — PHYSICAL THERAPY NOTE
PT EVALUATION    Patient is not in need of further skilled inpatient PT services at this time as patient is functioning at a mod I level for bed mobility, transfers and ambulation in her room, is independent with toileting. Will dc skilled IP PT services. 11/28/23 1527   PT Last Visit   PT Visit Date 11/28/23   Note Type   Note type Evaluation   Pain Assessment   Pain Assessment Tool 0-10   Pain Score 5   Pain Location/Orientation Orientation: Bilateral;Location: Knee   Pain Onset/Description Frequency: Intermittent   Effect of Pain on Daily Activities Limits comfort and mobility   Patient's Stated Pain Goal No pain   Hospital Pain Intervention(s) Repositioned; Ambulation/increased activity; Emotional support; Rest   Restrictions/Precautions   Other Precautions Pain   Home Living   Type of [address] [place] [address] Two level;Stairs to enter with rails  (3 steps to enter)   Bathroom Shower/Tub Tub/shower unit   Bathroom Toilet Standard   Bathroom Equipment Shower chair   [address]   (No DME per patient)   Prior Function   Level of [place] Independent with ADLs; Independent with functional mobility; Independent with IADLS   Lives With Spouse   Receives Help From Family   IADLs Independent with driving; Independent with meal prep; Independent with medication management   Falls in the last 6 months 0   Vocational Unemployed   Comments Patient ambulatory without an assistive device prior to admission. Patient reports ambulating short distances only prior to admission   General   Additional Pertinent History Patient is admitted with hyperglycemia, blood sugar 439, with nausea, vomiting, weakness, and septic 2/2 mons pubis infection.   Patient is status post I&D mons pubis abscess on 11/24/2023   Family/Caregiver Present No   Cognition   Overall Cognitive Status WFL   Arousal/Participation Cooperative   Orientation Level Oriented X4   Memory Within functional limits   Following Commands Follows all commands and directions without difficulty   Comments At least 2 patient identifiers including name and date of birth   Subjective   Subjective Patient reporting chronic pain to bilateral knees   RLE Assessment   RLE Assessment WFL  (Functionally 3+ to 4 -/5)   LLE Assessment   LLE Assessment WFL  (Functionally 3+ to 4 -/5)   Bed Mobility   Supine to Sit 6  Modified independent   Additional items Increased time required   Sit to Supine 6  Modified independent   Additional items Increased time required   Transfers   Sit to Stand 6  Modified independent   Additional items Increased time required   Stand to Sit 6  Modified independent   Additional items Increased time required   Toilet transfer 6  Modified independent   Additional items Increased time required  (Patient is independent with hygiene after urination and mod I to wash hands at sink)   Ambulation/Elevation   Gait pattern Step through pattern;Decreased foot clearance  (Increased lateral sway left and right)   Gait Assistance 6  Modified independent   Assistive Device None   Distance 12 feet to and from the bathroom; 20 feet with change in direction in patient's room -patient minimally short of breath at end of ambulation but states that she is very close to her "baseline"   Stair Management Assistance   (Stairs N/A during PT eval but patient feels she will be able to manage them on discharge)   Balance   Static Sitting Good   Static Standing Fair +   Ambulatory Fair   Activity Tolerance   Activity Tolerance Patient limited by fatigue;Patient limited by pain;Treatment limited secondary to medical complications (Comment)  (Minimal shortness of breath)   Assessment   Problem List Decreased strength;Decreased endurance;Decreased mobility; Decreased safety awareness; Obesity;Pain   Assessment Patient seen for Physical Therapy evaluation. Patient admitted with Severe sepsis ([address]).   Comorbidities affecting patient's physical performance include: Hypertension, DM 2, obesity, mixed HLD, abscess, diabetes, OA bilateral knees, tachycardia, IBS. Personal factors affecting patient at time of initial evaluation include: lives in two story house, stairs to enter home, inability to navigate community distances, inability to navigate level surfaces without external assistance, and inability to perform dynamic tasks in community. Prior to admission, patient was independent with functional mobility without assistive device, independent with ADLS, independent with IADLS, living with spouse in a two level home with 3 steps to enter, and ambulating household distance. Please find objective findings from Physical Therapy assessment regarding body systems outlined above with impairments and limitations including weakness, impaired balance, decreased endurance, gait deviations, pain, decreased activity tolerance, decreased functional mobility tolerance, decreased safety awareness, fall risk, SOB upon exertion, and decreased skin integrity. The Barthel Index was used as a functional outcome tool presenting with a score of Barthel Index Score: 60 today indicating moderate limitations of functional mobility and ADLS. Patient's clinical presentation is currently evolving as seen in patient's presentation of vital sign response, changing level of pain, increased fall risk, new onset of impairment of functional mobility, decreased endurance, and new onset of weakness. Patient is not in need of further skilled inpatient PT services at this time as patient is functioning at a mod I level for bed mobility, transfers and ambulation in her room, is independent with toileting. Will DC skilled inpatient PT services at this time. As demonstrated by objective findings, the assigned level of complexity for this evaluation is moderate. The patient's AM-PAC Basic Mobility Inpatient Short Form Raw Score is 20.  A Raw score of greater than 16 suggests the patient may benefit from discharge to home. Please also refer to the recommendation of the Physical Therapist for safe discharge planning. Goals   Patient Goals To go home   STG Expiration Date 12/08/23   Short Term Goal #1 Patient will: Increase bilateral LE strength 1/2 grade to facilitate independent mobility, Perform all bed mobility tasks independently to improve pt's independence w/ repositioning for decrease risk of skin breakdown, Perform all transfers independently consistently from various height surfaces in order to improve I w/ engagement w/ real-world environments/situations, Ambulate at least 50 ft. without assistive device independently w/o LOB to facilitate return and engagement w/ previous living environment, Navigate flight of stairs independently with unilateral handrail to either improve independence w/ entering home and/or so patient can fully access living areas in home, Increase ambulatory and static and dynamic standing balance 1 grade to decrease risk for falls, Tolerate at least 15 consecutive minutes of activity to demonstrate improved activity tolerance and endurance, and Tolerate 3 hr OOB to faciliate upright tolerance   Plan   Treatment/Interventions ADL retraining;Functional transfer training;LE strengthening/ROM; Elevations; Therapeutic exercise; Endurance training;Patient/family training;Equipment eval/education; Bed mobility;Gait training; Compensatory technique education;Spoke to case management   PT Frequency Other (Comment)  (1 visit only)   Discharge Recommendation   Rehab Resource Intensity Level, PT No post-acute rehabilitation needs   AM-PAC Basic Mobility Inpatient   Turning in Flat Bed Without Bedrails 4   Lying on Back to Sitting on Edge of Flat Bed Without Bedrails 4   Moving Bed to Chair 3   Standing Up From Chair Using Arms 3   Walk in Room 3   Climb 3-5 Stairs With Railing 3   Basic Mobility Inpatient Raw Score 20   Basic Mobility Standardized Score 43.99   Highest Level Of Mobility   [facility] Goal 6: Walk 10 steps or more   JH-HLM Achieved 7: Walk 25 feet or more   Barthel Index   Feeding 10   Bathing 0   Grooming Score 5   Dressing Score 5   Bladder Score 10   Bowels Score 10   Toilet Use Score 5   Transfers (Bed/Chair) Score 10   Mobility (Level Surface) Score 0   Stairs Score 5   Barthel Index Score 60   End of Consult   Patient Position at End of Consult Supine; All needs within reach;Bed/Chair alarm activated   Licensure   [place] License Number  Christiano Cleaves, PT   Portions of the documentation may have been created using voice recognition software. Occasional wrong word or sound alike substitutions may have occurred due to the inherent limitation of the voice recognition software. Read the chart carefully and recognize, using context, where substitutions have occurred.

## 2023-11-28 NOTE — PLAN OF CARE
Problem: Potential for Falls  Goal: Patient will remain free of falls  Description: INTERVENTIONS:  - Educate patient/family on patient safety including physical limitations  - Instruct patient to call for assistance with activity   - Consult OT/PT to assist with strengthening/mobility   - Keep Call bell within reach  - Keep bed low and locked with side rails adjusted as appropriate  - Keep care items and personal belongings within reach  - Initiate and maintain comfort rounds  - Make Fall Risk Sign visible to staff  - Offer Toileting every  Hours, in advance of need  - Initiate/Maintain alarm  - Obtain necessary fall risk management equipment:   - Apply yellow socks and bracelet for high fall risk patients  - Consider moving patient to room near nurses station  Outcome: Progressing     Problem: PAIN - ADULT  Goal: Verbalizes/displays adequate comfort level or baseline comfort level  Description: Interventions:  - Encourage patient to monitor pain and request assistance  - Assess pain using appropriate pain scale  - Administer analgesics based on type and severity of pain and evaluate response  - Implement non-pharmacological measures as appropriate and evaluate response  - Consider cultural and social influences on pain and pain management  - Notify physician/advanced practitioner if interventions unsuccessful or patient reports new pain  Outcome: Progressing     Problem: INFECTION - ADULT  Goal: Absence or prevention of progression during hospitalization  Description: INTERVENTIONS:  - Assess and monitor for signs and symptoms of infection  - Monitor lab/diagnostic results  - Monitor all insertion sites, i.e. indwelling lines, tubes, and drains  - Monitor endotracheal if appropriate and nasal secretions for changes in amount and color  - [place] appropriate cooling/warming therapies per order  - Administer medications as ordered  - Instruct and encourage patient and family to use good hand hygiene technique  - Identify and instruct in appropriate isolation precautions for identified infection/condition  Outcome: Progressing  Goal: Absence of fever/infection during neutropenic period  Description: INTERVENTIONS:  - Monitor WBC    Outcome: Progressing     Problem: SAFETY ADULT  Goal: Patient will remain free of falls  Description: INTERVENTIONS:  - Educate patient/family on patient safety including physical limitations  - Instruct patient to call for assistance with activity   - Consult OT/PT to assist with strengthening/mobility   - Keep Call bell within reach  - Keep bed low and locked with side rails adjusted as appropriate  - Keep care items and personal belongings within reach  - Initiate and maintain comfort rounds  - Make Fall Risk Sign visible to staff  - Offer Toileting every  Hours, in advance of need  - Initiate/Maintain alarm  - Obtain necessary fall risk management equipment:   - Apply yellow socks and bracelet for high fall risk patients  - Consider moving patient to room near nurses station  Outcome: Progressing  Goal: Maintain or return to baseline ADL function  Description: INTERVENTIONS:  -  Assess patient's ability to carry out ADLs; assess patient's baseline for ADL function and identify physical deficits which impact ability to perform ADLs (bathing, care of mouth/teeth, toileting, grooming, dressing, etc.)  - Assess/evaluate cause of self-care deficits   - Assess range of motion  - Assess patient's mobility; develop plan if impaired  - Assess patient's need for assistive devices and provide as appropriate  - Encourage maximum independence but intervene and supervise when necessary  - Involve family in performance of ADLs  - Assess for home care needs following discharge   - Consider OT consult to assist with ADL evaluation and planning for discharge  - Provide patient education as appropriate  Outcome: Progressing  Goal: Maintains/Returns to pre admission functional level  Description: INTERVENTIONS:  - Perform AM-PAC 6 Click Basic Mobility/ Daily Activity assessment daily.  - Set and communicate daily mobility goal to care team and patient/family/caregiver. - Collaborate with rehabilitation services on mobility goals if consulted  - Perform Range of Motion  times a day. - Reposition patient every  hours. - Dangle patient  times a day  - Stand patient  times a day  - Ambulate patient  times a day  - Out of bed to chair  times a day   - Out of bed for meals times a day  - Out of bed for toileting  - Record patient progress and toleration of activity level   Outcome: Progressing     Problem: DISCHARGE PLANNING  Goal: Discharge to home or other facility with appropriate resources  Description: INTERVENTIONS:  - Identify barriers to discharge w/patient and caregiver  - Arrange for needed discharge resources and transportation as appropriate  - Identify discharge learning needs (meds, wound care, etc.)  - Arrange for interpretive services to assist at discharge as needed  - Refer to Case Management Department for coordinating discharge planning if the patient needs post-hospital services based on physician/advanced practitioner order or complex needs related to functional status, cognitive ability, or social support system  Outcome: Progressing     Problem: Knowledge Deficit  Goal: Patient/family/caregiver demonstrates understanding of disease process, treatment plan, medications, and discharge instructions  Description: Complete learning assessment and assess knowledge base.   Interventions:  - Provide teaching at level of understanding  - Provide teaching via preferred learning methods  Outcome: Progressing     Problem: Prexisting or High Potential for Compromised Skin Integrity  Goal: Skin integrity is maintained or improved  Description: INTERVENTIONS:  - Identify patients at risk for skin breakdown  - Assess and monitor skin integrity  - Assess and monitor nutrition and hydration status  - Monitor labs   - Assess for incontinence   - Turn and reposition patient  - Assist with mobility/ambulation  - Relieve pressure over bony prominences  - Avoid friction and shearing  - Provide appropriate hygiene as needed including keeping skin clean and dry  - Evaluate need for skin moisturizer/barrier cream  - Collaborate with interdisciplinary team   - Patient/family teaching  - Consider wound care consult   Outcome: Progressing

## 2023-11-28 NOTE — ASSESSMENT & PLAN NOTE
Blood pressure is reviewed and acceptable.   Last recorded pressure: 139/84  Continue Lisinopril  Monitor blood pressure

## 2023-11-28 NOTE — ASSESSMENT & PLAN NOTE
- 130 on 11/23, 132 on 11/26  - 11/28: 137  - Likely was pseudohyponatremia from high glucose in blood

## 2023-11-28 NOTE — ASSESSMENT & PLAN NOTE
SIRS criteria: Tachycardia, leukocytosis  Suspected source: Cellulitis of mons pubis with associated candida vulvovaginitis   UA: gluconuria, small occult blood  Lactic acid: 3.3 --> 1.3  End organ damage: Lactic acidosis  IV Fluids: Received 2 L NC in ED. IV antibiotics: Received IV Zosyn and IV Vancomycin in ED. Completed 4 days of IV vancomycin. Transitioned to oral 100 mg doxycycline bid for 7 days. Consulted pharmacy who has not weight based recommendations for doxycycline. Bcxs- NG at 48 hours   Monitor vital signs, laboratory studies    Plan:   Resolved at this time.  Continue doxycyline for 7 days total.

## 2023-11-28 NOTE — ASSESSMENT & PLAN NOTE
Lab Results   Component Value Date    HGBA1C 12.2 (H) 11/24/2023       Recent Labs     11/28/23  0821 11/28/23  0930 11/28/23  1116 11/28/23  1207   POCGLU 204* 171* 129 154*       Blood Sugar Average: Last 72 hrs:  (P) [id number]  Patient presented with complaints of dizziness and nausea with associated elevated blood sugar readings. POA, serum glucose 518, patient was not in DKA. Received 2 L NSS in ED. Repeat glucose 347  Home regimen includes Metformin, will hold while inpatient. Home regimen of 64 units of Lantus HS and 16 units of Lispro TID w/ meals.   Hemoglobin A1c 12.2    Plan:  Continue to hold metformin while hospitalized  Endocrinology consulted 11/26- they recommend starting lantus 40 units in am and 20 units in pm, tapering patient off insulin drip, starting lispro 16 tid & alg 4 scale  Endocrinology reevaluated patient 11/28, and they recommended stopping the insulin drip and adding an algorithm 4 correction scale at 10 pm and 2 am in addition to mealtime; final recommendations pending as they would like to monitor patient off insulin drip   Discontinued insulin drip; insulin regimen orders placed per endocrinology  Hypoglycemia protocol

## 2023-11-28 NOTE — PROGRESS NOTES
Endocrinoloy Progress Note   Victoriano Philippe 46 y.o. female MRN: [id number]  Unit/Bed#: S -24 Encounter: [id number]      CC: diabetes f/u    Subjective: Victoriano Philippe is a 46y.o. year old female with uncontrolled type 2 diabetes and medication noncompliance, as well as HTN, HLD, super-super morbid obesity, admitted with abscess of mons pubis. She was seen in consult for glycemic management on a non-DKA insulin infusion and initiated on Lantus 40 units twice daily, lispro 16 units with meals, and plan was to slowly taper off drip. She remains on basal bolus plus insulin infusion today with significant additional requirements. Has received an additional 132 units in 24 hours via insulin infusion on top of current 40 twice daily of Lantus, 16 lispro with meals. Has not been receiving correctional scale, rather has been receiving insulin infusion adjustments. Feels well. No complaints. No hypoglycemia. Eating reliably. Hopeful for discharge tomorrow. Objective:     Vitals: Blood pressure 146/95, pulse 91, temperature 98.5 °F (36.9 °C), resp. rate 18, height 5' (1.524 m), weight (!) 156 kg (345 lb), SpO2 90 %. ,Body mass index is 67.38 kg/m². Intake/Output Summary (Last 24 hours) at 11/28/2023 1542  Last data filed at 11/28/2023 0827  Gross per 24 hour   Intake 480 ml   Output 3050 ml   Net -2570 ml       Physical Exam:  General Appearance: awake, super morbidly obese, appears stated age and cooperative  Head: Normocephalic, without obvious abnormality, atraumatic  Extremities: moves all extremities  Skin: Skin color and temperature normal.   Pulm: no labored breathing    Lab, Imaging and other studies: I have personally reviewed pertinent reports.       POC Glucose (mg/dl)   Date Value   11/28/2023 154 (H)   11/28/2023 129   11/28/2023 171 (H)   11/28/2023 204 (H)   11/28/2023 131   11/28/2023 127   11/28/2023 137   11/27/2023 132   11/27/2023 111   11/27/2023 135       Assessment and plan:  -Diabetes Mellitus type 2   -A1c 12.2   -home regimen Lantus 70u daily,, Lispro 16u TID QA, Metformin 0720ZG BID, Trulicity at some dosing  -medication noncompliance  -super super morbid obesity  -Mons pubis abscess    --stop insulin infusion  --Continue Lantus at 40u BID  --Continue Lispro 16 units 3 times daily before every meal  --Continue Algorithm 4 correction scale with meals, will also add Algorithm 4 correctional scale at bedtime and at 2 AM.  --Hypoglycemia protocol  --Will continue to follow and make adjustments as appropriate  -- Patient does have upcoming appointment with her [facility] endocrinologist on December 14  -- Discussed with with patient that medication compliance rather than minute dosage adjustments are really the most important factor in her diabetic control at this time. She has been motivated by this admission and hearing that her A1c is now 12.2%. [place]   Endocrinology PGY-4    Please Tigertext questions to the physician covering the "[id number]" Role. Thank you.

## 2023-11-28 NOTE — ASSESSMENT & PLAN NOTE
Patient reports history of recurrent abscess that is normally in her mons pubis. Patient reports recurrent vaginal yeast infections as well. Received 200 mg of IV Diflucan in the ED. Received IV Zosyn and IV Vanco in ED. Vancomycin continued for 4 days. Transitioned to po doxycycline 11/27, continue for 7 days.    General surgery was consulted  Abscess was drained and packed  Wound cx: 3+ polys, 1+ gram neg rods, 1+ gram + cocci pairs     Plan:  - General surgery continues to follow patient, nursing wound care consulted and have since signed off   - Continue po doxycycline for 7 days total

## 2023-11-28 NOTE — PROGRESS NOTES
[address]  Progress Note  Name: Shawn Segovia  MRN: [id number]  Unit/Bed#: S -63 I Date of Admission: 11/23/2023   Date of Service: 11/28/2023 I Hospital Day: 4    Assessment/Plan   Hyponatremia  Assessment & Plan  - 130 on 11/23, 132 on 11/26  - 11/28: 137  - Likely was pseudohyponatremia from high glucose in blood       Yeast infection of the vagina  Assessment & Plan  Patient has a history of ongoing, recurrent yeast infections  Vaginal yeast infection most likely due to uncontrolled blood sugar  Patient has tried Monistat but with no relief or resolution  One time dose of 200mg diflucan given   Symptoms as of 11/25 are slowly improving       Plan:  Continue to watch for symptoms improvement  Given another dose of diflucan if needed       Abscess  Assessment & Plan  Patient reports history of recurrent abscess that is normally in her mons pubis. Patient reports recurrent vaginal yeast infections as well. Received 200 mg of IV Diflucan in the ED. Received IV Zosyn and IV Vanco in ED. Vancomycin continued for 4 days. Transitioned to po doxycycline 11/27, continue for 7 days. General surgery was consulted  Abscess was drained and packed  Wound cx: 3+ polys, 1+ gram neg rods, 1+ gram + cocci pairs     Plan:  - General surgery continues to follow patient, nursing wound care consulted and have since signed off   - Continue po doxycycline for 7 days total        Mixed hyperlipidemia  Assessment & Plan  Patient is not currently on a statin. Anxiety  Assessment & Plan  History of anxiety     Plan:  Continue ativan 0.5 mg every 8 hours as needed      Class 3 severe obesity due to excess calories with body mass index (BMI) of 60.0 to 69.9 in adult [facility])  Assessment & Plan  Encourage lifestyle modifications.   Per patient, hasn't been ambulating much throughout this hospitalization as she has fear to in regards to possible dizziness and feeling unbalanced, PT/OT orders placed and reached out to     GERD (gastroesophageal reflux disease)  Assessment & Plan  Continue PPI and Bentyl prn. Major depressive disorder, recurrent severe without psychotic features [facility])  Assessment & Plan  History of depression  Psych was consulted during this admission to help reassess patient's current medication    Plan:  Pristiq 50 mg was restarted in order to avoid withdrawal related symptoms  Psych recommendation holding Abilify and stated that Pristiq could be increased to her home regimen of 100 mg daily, orders placed   Per psych recommendation continue lamotrigine 100 mg twice daily  Patient has agreed to partial psychiatric hospitalization program after being medically cleared    Essential hypertension  Assessment & Plan  Blood pressure is reviewed and acceptable. Last recorded pressure: 139/84  Continue Lisinopril  Monitor blood pressure    Type 2 diabetes mellitus with hyperglycemia, with long-term current use of insulin [facility])  Assessment & Plan  Lab Results   Component Value Date    HGBA1C 12.2 (H) 11/24/2023       Recent Labs     11/28/23  0821 11/28/23  0930 11/28/23  1116 11/28/23  1207   POCGLU 204* 171* 129 154*       Blood Sugar Average: Last 72 hrs:  (P) [id number]  Patient presented with complaints of dizziness and nausea with associated elevated blood sugar readings. POA, serum glucose 518, patient was not in DKA. Received 2 L NSS in ED. Repeat glucose 347  Home regimen includes Metformin, will hold while inpatient. Home regimen of 64 units of Lantus HS and 16 units of Lispro TID w/ meals.   Hemoglobin A1c 12.2    Plan:  Continue to hold metformin while hospitalized  Endocrinology consulted 11/26- they recommend starting lantus 40 units in am and 20 units in pm, tapering patient off insulin drip, starting lispro 16 tid & alg 4 scale  Endocrinology reevaluated patient 11/28, and they recommended stopping the insulin drip and adding an algorithm 4 correction scale at 10 pm and 2 am in addition to mealtime; final recommendations pending as they would like to monitor patient off insulin drip   Discontinued insulin drip; insulin regimen orders placed per endocrinology  Hypoglycemia protocol    * Severe sepsis ([address])  Assessment & Plan  SIRS criteria: Tachycardia, leukocytosis  Suspected source: Cellulitis of mons pubis with associated candida vulvovaginitis   UA: gluconuria, small occult blood  Lactic acid: 3.3 --> 1.3  End organ damage: Lactic acidosis  IV Fluids: Received 2 L NC in ED. IV antibiotics: Received IV Zosyn and IV Vancomycin in ED. Completed 4 days of IV vancomycin. Transitioned to oral 100 mg doxycycline bid for 7 days. Consulted pharmacy who has not weight based recommendations for doxycycline. Bcxs- NG at 48 hours   Monitor vital signs, laboratory studies    Plan:   Resolved at this time. Continue doxycyline for 7 days total.            VTE Pharmacologic Prophylaxis: VTE Score: 6 High Risk (Score >/= 5) - Pharmacological DVT Prophylaxis Ordered: heparin. Sequential Compression Devices Ordered. Mobility:   Basic Mobility Inpatient Raw Score: 18  -[facility] Goal: 6: Walk 10 steps or more  -HL Achieved: 4: Move to chair/commode  HLM Goal NOT achieved. Continue with multidisciplinary rounding and encourage appropriate mobility to improve upon [facility] goals. Patient Centered Rounds: I performed bedside rounds with nursing staff today. Discussions with Specialists or Other Care Team Provider: endocrinology, acute care surgery, psychiatry     Education and Discussions with Family / Patient: Patient declined call to . Total Time Spent on Date of Encounter in care of patient: 45 mins.  This time was spent on one or more of the following: performing physical exam; counseling and coordination of care; obtaining or reviewing history; documenting in the medical record; reviewing/ordering tests, medications or procedures; communicating with other healthcare professionals and discussing with patient's family/caregivers. Current Length of Stay: 4 day(s)  Current Patient Status: Inpatient   Certification Statement: The patient will continue to require additional inpatient hospital stay due to pending endocrinology reccomendations; insulin drip stopped and insulin regimen managed per endocrinology  Discharge Plan: Anticipate discharge tomorrow to home. Code Status: Level 1 - Full Code    Subjective:   Some nausea before meals which then goes away with meals, hasn't been ambulating well around the room as she is nervous to, no BM yesterday and would like miralax prn changed to daily, no other acute events or issues overnight per nursing     Objective:     Vitals:   Temp (24hrs), Av.9 °F (36.6 °C), Min:97.9 °F (36.6 °C), Max:97.9 °F (36.6 °C)    Temp:  [97.9 °F (36.6 °C)] 97.9 °F (36.6 °C)  HR:  [87-94] 91  Resp:  [17-21] 18  BP: (125-139)/(82-84) 139/84  SpO2:  [87 %-93 %] 90 %  Body mass index is 67.38 kg/m². Input and Output Summary (last 24 hours): Intake/Output Summary (Last 24 hours) at 2023 1356  Last data filed at 2023 0827  Gross per 24 hour   Intake 480 ml   Output 3050 ml   Net -2570 ml       Physical Exam:   Physical Exam  Vitals reviewed. Constitutional:       Appearance: Normal appearance. Comments: Body mass index is 67.38 kg/m². HENT:      Head: Normocephalic and atraumatic. Right Ear: External ear normal.      Left Ear: External ear normal.      Nose: Nose normal.      Mouth/Throat:      Mouth: Mucous membranes are moist.      Pharynx: Oropharynx is clear. Eyes:      Extraocular Movements: Extraocular movements intact. Conjunctiva/sclera: Conjunctivae normal.      Pupils: Pupils are equal, round, and reactive to light. Cardiovascular:      Rate and Rhythm: Normal rate and regular rhythm. Pulses: Normal pulses. Heart sounds: Normal heart sounds.    Pulmonary:      Effort: Pulmonary effort is normal.      Breath sounds: Normal breath sounds. Abdominal:      General: Abdomen is flat. Palpations: Abdomen is soft. Musculoskeletal:         General: Normal range of motion. Cervical back: Normal range of motion and neck supple. Right lower leg: No edema. Left lower leg: No edema. Skin:     General: Skin is warm. Capillary Refill: Capillary refill takes less than 2 seconds. Comments: Dressing for mons pubis wound in place   Neurological:      General: No focal deficit present. Mental Status: She is alert and oriented to person, place, and time. Mental status is at baseline. Psychiatric:         Mood and Affect: Mood normal.         Behavior: Behavior normal.         Additional Data:     Labs:  Results from last 7 days   Lab Units 11/28/23  0430   WBC Thousand/uL 6.53   HEMOGLOBIN g/dL 14.1   HEMATOCRIT % 45.0   PLATELETS Thousands/uL 265   NEUTROS PCT % 50   LYMPHS PCT % 33   MONOS PCT % 10   EOS PCT % 4     Results from last 7 days   Lab Units 11/28/23  0428 11/25/23  0446 11/23/23 2003   SODIUM mmol/L 137   < > 130*   POTASSIUM mmol/L 3.9   < > 4.3   CHLORIDE mmol/L 102   < > 94*   CO2 mmol/L 27   < > 26   BUN mg/dL 8   < > 15   CREATININE mg/dL 0.55*   < > 0.97   ANION GAP mmol/L 8   < > 10   CALCIUM mg/dL 9.2   < > 9.8   ALBUMIN g/dL  --   --  3.9   TOTAL BILIRUBIN mg/dL  --   --  0.59   ALK PHOS U/L  --   --  107*   ALT U/L  --   --  52   AST U/L  --   --  27   GLUCOSE RANDOM mg/dL 140   < > 518*    < > = values in this interval not displayed.          Results from last 7 days   Lab Units 11/28/23  1207 11/28/23  1116 11/28/23  0930 11/28/23  0821 11/28/23  4206 11/28/23  0418 11/28/23  0151 11/27/23  2348 11/27/23  2201 11/27/23  1953 11/27/23  1744 11/27/23  1544   POC GLUCOSE mg/dl 154* 129 171* 204* 131 127 137 132 111 135 176* 133     Results from last 7 days   Lab Units 11/24/23  0511   HEMOGLOBIN A1C % 12.2*     Results from last 7 days   Lab Units 11/24/23  0534 11/24/23  0001 11/23/23 2003   LACTIC ACID mmol/L  --  1.3 3.3*   PROCALCITONIN ng/ml 0.13  --  0.17       Lines/Drains:  Invasive Devices       Peripheral Intravenous Line  Duration             Peripheral IV 11/26/23 Dorsal (posterior); Right Forearm 2 days                          Imaging: Reviewed radiology reports from this admission including: abdominal/pelvic CT    Recent Cultures (last 7 days):   Results from last 7 days   Lab Units 11/24/23  2319 11/23/23 2003   BLOOD CULTURE   --  No Growth After 4 Days. No Growth After 4 Days.    GRAM STAIN RESULT  3+ Polys*  1+ Gram negative rods*  1+ Gram positive cocci in pairs*  --    WOUND CULTURE  1+ Growth of  --        Last 24 Hours Medication List:   Current Facility-Administered Medications   Medication Dose Route Frequency Provider Last Rate    acetaminophen  650 mg Oral Q6H PRN SUJATA Cruz      desvenlafaxine succinate  100 mg Oral Daily Valetta Duff, DO      dicyclomine  20 mg Oral Q6H PRN SUJATA rCuz      doxycycline hyclate  100 mg Oral Q12H [address] Aarti Sostorec, DO      heparin (porcine)  7,500 Units Subcutaneous Q8H [address] SUJATA Ray      insulin glargine  40 Units Subcutaneous QAM Tung Ricci MD      insulin glargine  40 Units Subcutaneous HS Tung Ricci MD      insulin lispro  16 Units Subcutaneous TID With Meals Tung Ricci MD      insulin lispro  2-12 Units Subcutaneous TID AC Tung Ricci MD      insulin lispro  2-12 Units Subcutaneous HS Genoveva Andrew DO      [START ON 11/29/2023] insulin lispro  2-12 Units Subcutaneous 0200 Genoveva Andrew DO      labetalol  10 mg Intravenous Q4H PRN Amparo Baugh DO      lamoTRIgine  100 mg Oral BID SUJATA Cruz      lisinopril  20 mg Oral Daily SUJATA Ray      LORazepam  0.5 mg Oral Q8H PRN SUJATA Cruz      ondansetron  4 mg Intravenous Q4H PRN Mj Schneider MD      pantoprazole  40 mg Oral Daily Mukesh Wilfredo SUJATA Ovalle      polyethylene glycol  17 g Oral Daily Marvin Amaya DO          Today, Patient Was Seen By: Marvin Amaya DO    **Please Note: This note may have been constructed using a voice recognition system. **

## 2023-11-28 NOTE — ASSESSMENT & PLAN NOTE
Encourage lifestyle modifications.   Per patient, hasn't been ambulating much throughout this hospitalization as she has fear to in regards to possible dizziness and feeling unbalanced, PT/OT orders placed and reached out to

## 2023-11-28 NOTE — UTILIZATION REVIEW
Continued Stay Review    Date: 11/26/23                          Current Patient Class: IP  Current Level of Care:  MS    HPI:48 y.o. female initially admitted on 11/24/23 with abscess mons pubis, DM with hyperglycemia-insulin drip. . S/P bedside I and D 11/24 by general sx. IV abx . Behavioral health consulted for severe depression     Assessment/Plan:   11/26   Endocrinology consult- severe sepsis d/t mons pubis abscess found to have severe hyperglycemia and mild symptomatology. Endocrine consulted for BG management. Patients currently on insulin drip without DKA, eating ok, no AG therefore can transition from non DKA drip to MDI now. Given high insulin resistance and need for high basal needs given her morbid obesity, will switch to BID lantus regime to improve absorption. Start lantus 40u BID, given high insulin requirements, taper patient off insulin drip per protocol rather than d/c drip in 2 hrs after basal insulin. Start lispro 16u with meals for now . SSI . A1c 12.2   Medicine- Wound cx: 3+ polys, 1+ gram neg rods, 1+ gram + cocci pairs . Blood cx - no growth x 48 hrs . Pt remains on IV vanco.  Mons pubis abscess s/p I&D appears to be healing wel   Wound packing in place . Pt tolerating Pristiq . BP stable . Has not had a BM since 11/23 Continue to taper off insulin drip, still requires a high amount of insulin. - likely Pseudo hyponatremia.  CBC, BMP  in am .       11/26 Mons pubis    Vital Signs:  Date/Time Temp Pulse Resp BP MAP (mmHg) SpO2 Calculated FIO2 (%) - Nasal Cannula Nasal Cannula O2 Flow Rate (L/min) O2 Device Patient Position - Orthostatic VS   11/27/23 15:35:17 97.9 °F (36.6 °C) 87 17 125/83 97 88 % Abnormal  -- -- -- --   11/27/23 0910 -- -- -- -- -- 93 % -- -- None (Room air) --   11/27/23 07:36:32 97.7 °F (36.5 °C) 94 20 143/88 106 92 % -- -- None (Room air) Sitting   11/27/23 0448 -- -- -- -- -- -- 28 2 L/min Nasal cannula --   11/26/23 22:00:21 98.6 °F (37 °C) 91 17 146/73 97 95 % -- -- -- --   11/26/23 1909 -- -- -- -- -- -- 26 1.5 L/min Nasal cannula --   11/26/23 14:58:59 97.9 °F (36.6 °C) 91 16 125/74 91 87 % Abnormal  -- -- -- --   11/26/23 1355 -- -- 16 -- -- -- 24 1 L/min Nasal cannula --   11/26/23 0900 -- -- -- -- -- -- -- -- Nasal cannula --   11/26/23 07:19:54 98.8 °F (37.1 °C) 93 17 136/83 101 92 % -- --       Pertinent Labs/Diagnostic Results:   Results from last 7 days   Lab Units 11/24/23  0101   SARS-COV-2  Negative     Results from last 7 days   Lab Units 11/26/23 0925 11/25/23 0446 11/23/23 2003   WBC Thousand/uL 6.15 8.00 12.27*   HEMOGLOBIN g/dL 13.6 14.1 15.3   HEMATOCRIT % 42.3 44.9 46.2*   PLATELETS Thousands/uL 247 254 267   NEUTROS ABS Thousands/µL 3.56 4.78 9.05*         Results from last 7 days   Lab Units 11/26/23 0925 11/25/23 0446 11/23/23 2003   SODIUM mmol/L 132* 132* 130*   POTASSIUM mmol/L 4.3 4.0 4.3   CHLORIDE mmol/L 103 100 94*   CO2 mmol/L 22 24 26   ANION GAP mmol/L 7 8 10   BUN mg/dL 10 11 15   CREATININE mg/dL 0.60 0.63 0.97   EGFR ml/min/1.73sq m 106 104 68   CALCIUM mg/dL 8.8 8.9 9.8     Results from last 7 days   Lab Units 11/23/23 2003   AST U/L 27   ALT U/L 52   ALK PHOS U/L 107*   TOTAL PROTEIN g/dL 7.1   ALBUMIN g/dL 3.9   TOTAL BILIRUBIN mg/dL 0.59     Results from last 7 days   Lab Units   POC GLUCOSE mg/dl     Results from last 7 days   Lab Units 11/26/23 0925 11/25/23 0446 11/23/23 2003   GLUCOSE RANDOM mg/dL 256* 181* 518*      Latest Reference Range & Units 11/25/23 11:03 11/25/23 13:34 11/25/23 15:06 11/25/23 16:56 11/25/23 19:04 11/25/23 20:58 11/25/23 22:48 11/26/23 01:25 11/26/23 02:08 11/26/23 04:46 11/26/23 06:41 11/26/23 09:20 11/26/23 10:52 11/26/23 12:39 11/26/23 14:56 11/26/23 17:06 11/26/23 19:04 11/26/23 19:54 11/26/23 21:12 11/26/23 23:10 11/27/23 01:29   POC Glucose 65 - 140 mg/dl 253 (H) 247 (H) 206 (H) 249 (H) 206 (H) 193 (H) 126 128 162 (H) 164 (H) 173 (H) 234 (H) 223 (H) 210 (H) 213 (H) 217 (H) 162 (H) 136 125 134 156 (H)   (HH): Data is critically high  (H): Data is abnormally high      Results from last 7 days   Lab Units 11/24/23  0511   HEMOGLOBIN A1C % 12.2*   EAG mg/dl 303     BETA-HYDROXYBUTYRATE   Date Value Ref Range Status   11/23/2023 0.2 <0.6 mmol/L Final   04/17/2021 0.1 <0.6 mmol/L Final          Results from last 7 days   Lab Units 11/23/23 2003   PH BRANDIN  7.386   PCO2 BRANDIN mm Hg 45.9   PO2 BRANDIN mm Hg 39.0   HCO3 BRANDIN mmol/L 26.9   BASE EXC BRANDIN mmol/L 1.3   O2 CONTENT BRANDIN ml/dL 16.3   O2 HGB, VENOUS % 74.5                             Results from last 7 days   Lab Units 11/24/23  0511 11/23/23 2003   PROCALCITONIN ng/ml 0.13 0.17     Results from last 7 days   Lab Units 11/24/23  0001 11/23/23 2003   LACTIC ACID mmol/L 1.3 3.3*               Results from last 7 days   Lab Units 11/24/23  1049   CLARITY UA  Clear   COLOR UA  Light Yellow   SPEC GRAV UA  1.043*   PH UA  5.5   GLUCOSE UA mg/dl >=1000 (1%)*   KETONES UA mg/dl Negative   BLOOD UA  Small*   PROTEIN UA mg/dl Negative   NITRITE UA  Negative   BILIRUBIN UA  Negative   UROBILINOGEN UA (BE) mg/dl <2.0   LEUKOCYTES UA  Elevated glucose may cause decreased leukocyte values. See urine microscopic for UWBC result*   WBC UA /hpf 2-4*   RBC UA /hpf 4-10*   BACTERIA UA /hpf Occasional   EPITHELIAL CELLS WET PREP /hpf Occasional     Results from last 7 days   Lab Units 11/24/23  0101   INFLUENZA A PCR  Negative   INFLUENZA B PCR  Negative   RSV PCR  Negative                             Results from last 7 days   Lab Units 11/24/23  2319 11/23/23 2003   BLOOD CULTURE   --  No Growth After 4 Days. No Growth After 4 Days.    GRAM STAIN RESULT  3+ Polys*  1+ Gram negative rods*  1+ Gram positive cocci in pairs*  --    WOUND CULTURE  1+ Growth of  --              Results from last 7 days   Lab Units 11/25/23  0446   VANCOMYCIN RM ug/mL 10.2       Medications:   Scheduled Medications:  desvenlafaxine succinate, 50 mg, Oral, Daily  heparin (porcine), 7,500 Units, Subcutaneous, Q8H [facility]  insulin glargine, 40 Units, Subcutaneous, QAM  Start: 11/26/23 2200   insulin glargine, 40 Units, Subcutaneous, HS      Start: 11/26/23 0900  insulin lispro, 16 Units, Subcutaneous, TID With Meals  Start: 11/26/23 0900   insulin lispro, 2-12 Units, Subcutaneous, TID AC   Start: 11/26/23 1130   lamoTRIgine, 100 mg, Oral, BID  lisinopril, 20 mg, Oral, Daily  pantoprazole, 40 mg, Oral, Daily  vancomycin (VANCOCIN) 1500 mg in sodium chloride 0.9% 250 mL IVPB  Dose: 1,500 mg  Freq: Every 12 hours Route: IV  Last Dose: 1,500 mg (11/27/23 0816)  Start: 11/25/23 0830       Continuous IV Infusions:  insulin regular (HumuLIN R,NovoLIN R) 1 Units/mL in sodium chloride 0.9 % 100 mL infusion, 0.3-21 Units/hr, Intravenous, Titrated      PRN Meds:  acetaminophen, 650 mg, Oral, Q6H PRN x1 11/26  dicyclomine, 20 mg, Oral, Q6H PRN  labetalol, 10 mg, Intravenous, Q4H PRN  LORazepam, 0.5 mg, Oral, Q8H PRN x1 11/26  metoclopramide, 10 mg, Intravenous, Q6H PRN  polyethylene glycol (MIRALAX) packet 17 g  Dose: 17 g  Freq: Daily PRN Route: PO  PRN Reason: constipation  Start: 11/26/23 0920   x1 11/26      Discharge Plan: D    Network Utilization Review Department  ATTENTION: Please call with any questions or concerns to [phone] and carefully listen to the prompts so that you are directed to the right person. All voicemails are confidential.   For Discharge needs, contact Care Management DC Support Team at [phone] opt. 2  Send all requests for admission clinical reviews, approved or denied determinations and any other requests to dedicated fax number below belonging to the campus where the patient is receiving treatment.  List of dedicated fax numbers for the Facilities:  [place] DENIALS (Administrative/Medical Necessity) [phone]   DISCHARGE SUPPORT TEAM (NETWORK) [id number] (Maternity/NICU/Pediatrics) [address] [facility],  - Manish Barragan [phone]   [facility] [address] [facility] [phone]   [address] [facility] [address] [place] [address] [phone]   [address] [address] [phone] normal...

## 2023-11-28 NOTE — PLAN OF CARE
Problem: Potential for Falls  Goal: Patient will remain free of falls  Description: INTERVENTIONS:  - Educate patient/family on patient safety including physical limitations  - Instruct patient to call for assistance with activity   - Consult OT/PT to assist with strengthening/mobility   - Keep Call bell within reach  - Keep bed low and locked with side rails adjusted as appropriate  - Keep care items and personal belongings within reach  - Initiate and maintain comfort rounds  - Make Fall Risk Sign visible to staff  - Offer Toileting every 25 Hours, in advance of need  - Initiate/Maintain bed alarm  - Obtain necessary fall risk management equipment  - Apply yellow socks and bracelet for high fall risk patients  - Consider moving patient to room near nurses station  Outcome: Progressing     Problem: PAIN - ADULT  Goal: Verbalizes/displays adequate comfort level or baseline comfort level  Description: Interventions:  - Encourage patient to monitor pain and request assistance  - Assess pain using appropriate pain scale  - Administer analgesics based on type and severity of pain and evaluate response  - Implement non-pharmacological measures as appropriate and evaluate response  - Consider cultural and social influences on pain and pain management  - Notify physician/advanced practitioner if interventions unsuccessful or patient reports new pain  Outcome: Progressing     Problem: INFECTION - ADULT  Goal: Absence or prevention of progression during hospitalization  Description: INTERVENTIONS:  - Assess and monitor for signs and symptoms of infection  - Monitor lab/diagnostic results  - Monitor all insertion sites, i.e. indwelling lines, tubes, and drains  - Monitor endotracheal if appropriate and nasal secretions for changes in amount and color  - [place] appropriate cooling/warming therapies per order  - Administer medications as ordered  - Instruct and encourage patient and family to use good hand hygiene technique  - Identify and instruct in appropriate isolation precautions for identified infection/condition  Outcome: Progressing  Goal: Absence of fever/infection during neutropenic period  Description: INTERVENTIONS:  - Monitor WBC    Outcome: Progressing     Problem: SAFETY ADULT  Goal: Patient will remain free of falls  Description: INTERVENTIONS:  - Educate patient/family on patient safety including physical limitations  - Instruct patient to call for assistance with activity   - Consult OT/PT to assist with strengthening/mobility   - Keep Call bell within reach  - Keep bed low and locked with side rails adjusted as appropriate  - Keep care items and personal belongings within reach  - Initiate and maintain comfort rounds  - Make Fall Risk Sign visible to staff  - Offer Toileting every 2 Hours, in advance of need  - Initiate/Maintain bed alarm  - Obtain necessary fall risk management equipment  - Apply yellow socks and bracelet for high fall risk patients  - Consider moving patient to room near nurses station  Outcome: Progressing  Goal: Maintain or return to baseline ADL function  Description: INTERVENTIONS:  -  Assess patient's ability to carry out ADLs; assess patient's baseline for ADL function and identify physical deficits which impact ability to perform ADLs (bathing, care of mouth/teeth, toileting, grooming, dressing, etc.)  - Assess/evaluate cause of self-care deficits   - Assess range of motion  - Assess patient's mobility; develop plan if impaired  - Assess patient's need for assistive devices and provide as appropriate  - Encourage maximum independence but intervene and supervise when necessary  - Involve family in performance of ADLs  - Assess for home care needs following discharge   - Consider OT consult to assist with ADL evaluation and planning for discharge  - Provide patient education as appropriate  Outcome: Progressing  Goal: Maintains/Returns to pre admission functional level  Description: INTERVENTIONS:  - Perform AM-PAC 6 Click Basic Mobility/ Daily Activity assessment daily.  - Set and communicate daily mobility goal to care team and patient/family/caregiver. - Collaborate with rehabilitation services on mobility goals if consulted  - Perform Range of Motion 2 times a day. - Reposition patient every 2 hours. - Dangle patient 2 times a day  - Stand patient 3 times a day  - Ambulate patient 3 times a day  - Out of bed to chair 3 times a day   - Out of bed for meals 3 times a day  - Out of bed for toileting  - Record patient progress and toleration of activity level   Outcome: Progressing     Problem: DISCHARGE PLANNING  Goal: Discharge to home or other facility with appropriate resources  Description: INTERVENTIONS:  - Identify barriers to discharge w/patient and caregiver  - Arrange for needed discharge resources and transportation as appropriate  - Identify discharge learning needs (meds, wound care, etc.)  - Arrange for interpretive services to assist at discharge as needed  - Refer to Case Management Department for coordinating discharge planning if the patient needs post-hospital services based on physician/advanced practitioner order or complex needs related to functional status, cognitive ability, or social support system  Outcome: Progressing     Problem: Knowledge Deficit  Goal: Patient/family/caregiver demonstrates understanding of disease process, treatment plan, medications, and discharge instructions  Description: Complete learning assessment and assess knowledge base.   Interventions:  - Provide teaching at level of understanding  - Provide teaching via preferred learning methods  Outcome: Progressing     Problem: Prexisting or High Potential for Compromised Skin Integrity  Goal: Skin integrity is maintained or improved  Description: INTERVENTIONS:  - Identify patients at risk for skin breakdown  - Assess and monitor skin integrity  - Assess and monitor nutrition and hydration status  - Monitor labs   - Assess for incontinence   - Turn and reposition patient  - Assist with mobility/ambulation  - Relieve pressure over bony prominences  - Avoid friction and shearing  - Provide appropriate hygiene as needed including keeping skin clean and dry  - Evaluate need for skin moisturizer/barrier cream  - Collaborate with interdisciplinary team   - Patient/family teaching  - Consider wound care consult   Outcome: Progressing

## 2023-11-29 VITALS
SYSTOLIC BLOOD PRESSURE: 150 MMHG | DIASTOLIC BLOOD PRESSURE: 81 MMHG | RESPIRATION RATE: 18 BRPM | BODY MASS INDEX: 57.52 KG/M2 | WEIGHT: 293 LBS | TEMPERATURE: 98.5 F | OXYGEN SATURATION: 90 % | HEART RATE: 88 BPM | HEIGHT: 60 IN

## 2023-11-29 PROBLEM — E87.1 HYPONATREMIA: Status: RESOLVED | Noted: 2023-11-26 | Resolved: 2023-11-29

## 2023-11-29 LAB
GLUCOSE SERPL-MCNC: 140 MG/DL (ref 65–140)
GLUCOSE SERPL-MCNC: 188 MG/DL (ref 65–140)
GLUCOSE SERPL-MCNC: 199 MG/DL (ref 65–140)
GLUCOSE SERPL-MCNC: 203 MG/DL (ref 65–140)

## 2023-11-29 PROCEDURE — 99239 HOSP IP/OBS DSCHRG MGMT >30: CPT | Performed by: INTERNAL MEDICINE

## 2023-11-29 PROCEDURE — 82948 REAGENT STRIP/BLOOD GLUCOSE: CPT

## 2023-11-29 RX ORDER — INSULIN GLARGINE 100 [IU]/ML
40 INJECTION, SOLUTION SUBCUTANEOUS
Qty: 10 ML | Refills: 0 | Status: SHIPPED | OUTPATIENT
Start: 2023-11-29

## 2023-11-29 RX ORDER — INSULIN GLARGINE 100 [IU]/ML
40 INJECTION, SOLUTION SUBCUTANEOUS EVERY MORNING
Qty: 10 ML | Refills: 0 | Status: SHIPPED | OUTPATIENT
Start: 2023-11-30

## 2023-11-29 RX ORDER — DOXYCYCLINE HYCLATE 100 MG/1
100 CAPSULE ORAL EVERY 12 HOURS SCHEDULED
Qty: 4 CAPSULE | Refills: 0 | Status: SHIPPED | OUTPATIENT
Start: 2023-11-29 | End: 2023-12-01

## 2023-11-29 RX ADMIN — INSULIN LISPRO 2 UNITS: 100 INJECTION, SOLUTION INTRAVENOUS; SUBCUTANEOUS at 08:51

## 2023-11-29 RX ADMIN — INSULIN LISPRO 2 UNITS: 100 INJECTION, SOLUTION INTRAVENOUS; SUBCUTANEOUS at 13:04

## 2023-11-29 RX ADMIN — DESVENLAFAXINE SUCCINATE 100 MG: 50 TABLET, EXTENDED RELEASE ORAL at 08:55

## 2023-11-29 RX ADMIN — LISINOPRIL 20 MG: 20 TABLET ORAL at 08:51

## 2023-11-29 RX ADMIN — ACETAMINOPHEN 650 MG: 325 TABLET, FILM COATED ORAL at 08:51

## 2023-11-29 RX ADMIN — POLYETHYLENE GLYCOL 3350 17 G: 17 POWDER, FOR SOLUTION ORAL at 08:51

## 2023-11-29 RX ADMIN — HEPARIN SODIUM 7500 UNITS: 5000 INJECTION INTRAVENOUS; SUBCUTANEOUS at 13:04

## 2023-11-29 RX ADMIN — INSULIN LISPRO 16 UNITS: 100 INJECTION, SOLUTION INTRAVENOUS; SUBCUTANEOUS at 16:42

## 2023-11-29 RX ADMIN — INSULIN GLARGINE 40 UNITS: 100 INJECTION, SOLUTION SUBCUTANEOUS at 08:50

## 2023-11-29 RX ADMIN — HEPARIN SODIUM 7500 UNITS: 5000 INJECTION INTRAVENOUS; SUBCUTANEOUS at 05:15

## 2023-11-29 RX ADMIN — LAMOTRIGINE 100 MG: 100 TABLET ORAL at 19:37

## 2023-11-29 RX ADMIN — INSULIN LISPRO 16 UNITS: 100 INJECTION, SOLUTION INTRAVENOUS; SUBCUTANEOUS at 13:05

## 2023-11-29 RX ADMIN — INSULIN LISPRO 16 UNITS: 100 INJECTION, SOLUTION INTRAVENOUS; SUBCUTANEOUS at 08:53

## 2023-11-29 RX ADMIN — INSULIN LISPRO 4 UNITS: 100 INJECTION, SOLUTION INTRAVENOUS; SUBCUTANEOUS at 02:34

## 2023-11-29 RX ADMIN — LAMOTRIGINE 100 MG: 100 TABLET ORAL at 08:51

## 2023-11-29 RX ADMIN — DOXYCYCLINE 100 MG: 100 CAPSULE ORAL at 08:51

## 2023-11-29 RX ADMIN — PANTOPRAZOLE SODIUM 40 MG: 40 TABLET, DELAYED RELEASE ORAL at 08:53

## 2023-11-29 NOTE — ASSESSMENT & PLAN NOTE
Patient reports history of recurrent abscess that is normally in her mons pubis. Patient reports recurrent vaginal yeast infections as well. Received 200 mg of IV Diflucan in the ED. Received IV Zosyn and IV Vanco in ED. Vancomycin continued for 4 days. Transitioned to po doxycycline 11/27, continue for 7 days.    General surgery was consulted  Abscess was drained and packed  Wound cx: 3+ polys, 1+ gram neg rods, 1+ gram + cocci pairs     Plan:  - Continue outpatient wound care   - Continue po doxycycline for 7 days total of antibiotics

## 2023-11-29 NOTE — ASSESSMENT & PLAN NOTE
Blood pressure is reviewed and acceptable.   Last recorded pressure: 150/81  Continue Lisinopril  Monitor blood pressure

## 2023-11-29 NOTE — DISCHARGE INSTR - AVS FIRST PAGE
Wound care instructions:  Cleanse pubis wound with normal saline, pat dry. loosely pack wound with 1/4 inch iodoform packing, cover with Allevyn  foam, change every other day and as needed for Soilage/displacement. Follow up in one week. Dear Marquita Shaffer,     It was our pleasure to care for you here at [facility]. It is our hope that we were always able to exceed the expected standards for your care during your stay. You were hospitalized due to sepsis, hyperglycemia. You were cared for on the [address] [place] [address] 4th floor by Calista Curling, DO under the service of Pat Malik MD with the [facility] who covers for your primary care physician (PCP), SUJATA Simpson, while you were hospitalized. If you have any questions or concerns related to this hospitalization, you may contact us at [phone]. For follow up as well as any medication refills, we recommend that you follow up with your primary care physician. A registered nurse will reach out to you by phone within a few days after your discharge to answer any additional questions that you may have after going home.   However, at this time we provide for you here, the most important instructions / recommendations at discharge:     Notable Medication Adjustments -   START Lantus 40 units in the morning and at bedtime  Continue Humalog 16 units three times daily with meals   Continue Doxycyline for 2 more days, taking the medication twice daily to complete 7 day course of antibiotics  Continue Metformin 500 mg twice daily   Testing Required after Discharge -   None   ** Please contact your PCP to request testing orders for any of the testing recommended here **  Important follow up information -   Please follow up with Endocrinology in 2 weeks as scheduled   Please follow up with your PCP in 1-2 weeks   Other Instructions -   Adhere to wound care recommendations as provided   Please review this entire after visit summary as additional general instructions including medication list, appointments, activity, diet, any pertinent wound care, and other additional recommendations from your care team that may be provided for you.       Sincerely,     Isabell Kwan, DO

## 2023-11-29 NOTE — QUICK NOTE
Glycemic trends reviewed  Patient has done well off of insulin infusion  Continue current regimen: Lantus 40 units twice daily and 16 units three times  day with meals   If discharge is imminent, may be discharged on this regimen  She has a follow-up with outpatient endocrinology in 2 weeks

## 2023-11-29 NOTE — QUICK NOTE
Performed a wound check. Removed packing, flushed w/ 10 cc NS. Repacked and dressed. Continue dressing and wound care per [facility] instructions. General surgery will sign off at this time. Please text general surgery tigertext role with further questions.

## 2023-11-29 NOTE — DISCHARGE SUMMARY
[address] [facility]  Discharge- Jomar Ramey 1975, 46 y.o. female MRN: [id number]  Unit/Bed#: S -01 Encounter: [id number]  Primary Care Provider: SUJATA Mccain   Date and time admitted to hospital: 11/23/2023  7:00 PM    * Severe sepsis [facility])  Assessment & Plan  SIRS criteria: Tachycardia, leukocytosis  Suspected source: Cellulitis of mons pubis with associated candida vulvovaginitis   UA: gluconuria, small occult blood  Lactic acid: 3.3 --> 1.3  End organ damage: Lactic acidosis  IV Fluids: Received 2 L NC in ED. IV antibiotics: Received IV Zosyn and IV Vancomycin in ED. Completed 4 days of IV vancomycin. Transitioned to oral 100 mg doxycycline bid for 7 days. Consulted pharmacy who has not weight based recommendations for doxycycline. Bcxs- NG at 48 hours   Monitor vital signs, laboratory studies    Plan:   Resolved at this time. Continue doxycyline for 7 days of antibiotics. Type 2 diabetes mellitus with hyperglycemia, with long-term current use of insulin [facility])  Assessment & Plan  Lab Results   Component Value Date    HGBA1C 12.2 (H) 11/24/2023       Recent Labs     11/28/23  2043 11/29/23  0231 11/29/23  0702 11/29/23  1112   POCGLU 241* 203* 199* 188*         Blood Sugar Average: Last 72 hrs:  (P) [id number]  Patient presented with complaints of dizziness and nausea with associated elevated blood sugar readings. POA, serum glucose 518, patient was not in DKA. Received 2 L NSS in ED. Repeat glucose 347  Home regimen includes Metformin, will hold while inpatient. Home regimen of 64 units of Lantus HS and 16 units of Lispro TID w/ meals.   Hemoglobin A1c 12.2    Plan:  Restart Metformin at discharge   Endocrinology consulted recommend starting lantus 40 units in am and 40 units in pm, lispro 16 tid & SSI    Yeast infection of the vagina  Assessment & Plan  Patient has a history of ongoing, recurrent yeast infections  Vaginal yeast infection most likely due to uncontrolled blood sugar  Patient has tried Monistat but with no relief or resolution  One time dose of 200mg diflucan given   Symptoms as of 11/25 are slowly improving       Plan:  Continue to watch for symptoms improvement  Given another dose of diflucan if needed       Abscess  Assessment & Plan  Patient reports history of recurrent abscess that is normally in her mons pubis. Patient reports recurrent vaginal yeast infections as well. Received 200 mg of IV Diflucan in the ED. Received IV Zosyn and IV Vanco in ED. Vancomycin continued for 4 days. Transitioned to po doxycycline 11/27, continue for 7 days. General surgery was consulted  Abscess was drained and packed  Wound cx: 3+ polys, 1+ gram neg rods, 1+ gram + cocci pairs     Plan:  - Continue outpatient wound care   - Continue po doxycycline for 7 days total of antibiotics     Mixed hyperlipidemia  Assessment & Plan  Patient is not currently on a statin. Anxiety  Assessment & Plan  History of anxiety     Plan:  Continue ativan 0.5 mg every 8 hours as needed      Class 3 severe obesity due to excess calories with body mass index (BMI) of 60.0 to 69.9 in adult [facility])  Assessment & Plan  Encourage lifestyle modifications. Per patient, hasn't been ambulating much throughout this hospitalization as she has fear to in regards to possible dizziness and feeling unbalanced, PT/OT orders placed and reached out to     GERD (gastroesophageal reflux disease)  Assessment & Plan  Continue PPI and Bentyl prn.     Major depressive disorder, recurrent severe without psychotic features [facility])  Assessment & Plan  History of depression  Psych was consulted during this admission to help reassess patient's current medication    Plan:  Pristiq 50 mg was restarted in order to avoid withdrawal related symptoms  Psych recommendation holding Abilify and stated that Pristiq could be increased to her home regimen of 100 mg daily, orders placed   Per psych recommendation continue lamotrigine 100 mg twice daily  Patient has agreed to partial psychiatric hospitalization program after being medically cleared    Essential hypertension  Assessment & Plan  Blood pressure is reviewed and acceptable. Last recorded pressure: 150/81  Continue Lisinopril  Monitor blood pressure    Hyponatremia-resolved as of 11/29/2023  Assessment & Plan  - 130 on 11/23, 132 on 11/26  - 11/28: 137  - Likely was pseudohyponatremia from high glucose in blood       Discharging Resident Physician: Hedy Baumann DO  Attending: Paco Talley MD  PCP: Ramesh Langston, [address] [place] [address]  Admission Date: 11/23/2023  Discharge Date: 11/29/23    Disposition:     Home with VNA Services (Reminder: Complete face to face encounter)    Reason for Admission: Sepsis    Consultations During Hospital Stay:  Surgery  Endocrinology   [address] [place] [address]     Procedures Performed:     I&D    Significant Findings / Test Results:     None     Incidental Findings:   None     Test Results Pending at Discharge (will require follow up): None     Outpatient Tests Requested:  None    Complications:  Uncontrolled hyperglycemia requiring insulin gtt     Hospital Course:     Brittany Rowe is a 46 y.o. female patient who originally presented to the hospital on 11/23/2023 due to onset of nausea, vomiting and dizziness with associated high blood sugar. In ED patient found to have BG of >500, not in DKA, lactic acid of 3.3. Patients vitals stable. WBC mildly elevated at 12.27,  meeting SIRS criteria with severe sepsis likely secondary to an abscess with overlying cellulitis of her mons pubis. On examination fluctuance with likely underlying abscess noted. General surgery was consulted for I&D. Patient started on antibiotics with concerns for staph infection and follow-up I&D cultures growing gram negative rods and gram positive cocci in pairs.  Given infection and hyperglycemia, patient started on insulin gtt before transitioning back to a basal/bolus regimen determined by Endocrinology. Additionally, patient noted to be on multiple psychiatric medications and it was unclear as to what she should be on. An element of untreated depression was suspected and a behavioral health consult was placed see the patient here in the hospital for medication recommendations as well. Patient ultimately determined to be a candidate for follow up partial program which the the patient agreed to. Patients Pristiq restarted at 50 mg. Otherwise continued on Lamictal 100 mg BID and Ativan 0.5 mg q8 PRN anxiety. Pristiq temporarily held then resumed. Patients wound would be cleaned and packed  regularly and with good would healing noted. At time of discharge home health established for further management. Patients BG had been well controlled after having been on an insulin drip for longer than expected due to uncontrolled hyperglycemia. By time of discharge and with Endocrine on board patients BG was managed at more reasonable levels on new regimen of Lantus 40 units BID, 16 units Humalog and SSI. Patient to follow up with partial program as well as Endocrine outpatient for further management. Patient was without any acute complaints or concerns at discharge and stated she felt well overall. Discharged on 2 day course of Doxycycline to complete 7 day course of antibiotics. Condition at Discharge: good     Discharge Day Visit / Exam:     Subjective:  Patient reports feeling well overall. Nicolas any acute complaints or concerns. States she is ready to go home.    Vitals: Blood Pressure: 150/81 (11/29/23 1521)  Pulse: 88 (11/29/23 1521)  Temperature: 98.5 °F (36.9 °C) (11/29/23 1521)  Temp Source: Oral (11/27/23 0736)  Respirations: 18 (11/29/23 1521)  Height: 5' (152.4 cm) (11/24/23 1700)  Weight - Scale: (!) 155 kg (341 lb 11.4 oz) (11/29/23 0600)  SpO2: 90 % (11/29/23 1521)  Exam:   Physical Exam  Constitutional:       General: She is not in acute distress. Appearance: Normal appearance. She is not ill-appearing. HENT:      Head: Normocephalic and atraumatic. Right Ear: External ear normal.      Left Ear: External ear normal.      Nose: Nose normal.   Eyes:      Extraocular Movements: Extraocular movements intact. Conjunctiva/sclera: Conjunctivae normal.   Cardiovascular:      Rate and Rhythm: Normal rate and regular rhythm. Pulses: Normal pulses. Heart sounds: Normal heart sounds. Pulmonary:      Effort: Pulmonary effort is normal.      Breath sounds: Normal breath sounds. No wheezing, rhonchi or rales. Abdominal:      General: Abdomen is flat. There is no distension. Palpations: Abdomen is soft. There is no mass. Tenderness: There is no abdominal tenderness. There is no guarding. Musculoskeletal:      Right lower leg: No edema. Left lower leg: No edema. Skin:     General: Skin is warm and dry. Neurological:      Mental Status: She is alert. Motor: No weakness. Gait: Gait normal.       Discussion with Family: Discharged     Discharge instructions/Information to patient and family:   See after visit summary for information provided to patient and family. Provisions for Follow-Up Care:  See after visit summary for information related to follow-up care and any pertinent home health orders. Planned Readmission: No     Discharge Medications:  See after visit summary for reconciled discharge medications provided to patient and family.       ** Please Note: This note has been constructed using a voice recognition system **

## 2023-11-29 NOTE — ASSESSMENT & PLAN NOTE
Lab Results   Component Value Date    HGBA1C 12.2 (H) 11/24/2023       Recent Labs     11/28/23  2043 11/29/23  0231 11/29/23  0702 11/29/23  1112   POCGLU 241* 203* 199* 188*         Blood Sugar Average: Last 72 hrs:  (P) [id number]  Patient presented with complaints of dizziness and nausea with associated elevated blood sugar readings. POA, serum glucose 518, patient was not in DKA. Received 2 L NSS in ED. Repeat glucose 347  Home regimen includes Metformin, will hold while inpatient. Home regimen of 64 units of Lantus HS and 16 units of Lispro TID w/ meals.   Hemoglobin A1c 12.2    Plan:  Restart Metformin at discharge   Endocrinology consulted recommend starting lantus 40 units in am and 40 units in pm, lispro 16 tid & SSI

## 2023-11-29 NOTE — ASSESSMENT & PLAN NOTE
SIRS criteria: Tachycardia, leukocytosis  Suspected source: Cellulitis of mons pubis with associated candida vulvovaginitis   UA: gluconuria, small occult blood  Lactic acid: 3.3 --> 1.3  End organ damage: Lactic acidosis  IV Fluids: Received 2 L NC in ED. IV antibiotics: Received IV Zosyn and IV Vancomycin in ED. Completed 4 days of IV vancomycin. Transitioned to oral 100 mg doxycycline bid for 7 days. Consulted pharmacy who has not weight based recommendations for doxycycline. Bcxs- NG at 48 hours   Monitor vital signs, laboratory studies    Plan:   Resolved at this time. Continue doxycyline for 7 days of antibiotics.

## 2023-12-01 ENCOUNTER — HOME CARE VISIT (OUTPATIENT)
Dept: HOME HEALTH SERVICES | Facility: HOME HEALTHCARE | Age: 48
End: 2023-12-01

## 2023-12-01 ENCOUNTER — TELEPHONE (OUTPATIENT)
Dept: PSYCHOLOGY | Facility: CLINIC | Age: 48
End: 2023-12-01

## 2023-12-02 ENCOUNTER — HOME CARE VISIT (OUTPATIENT)
Dept: HOME HEALTH SERVICES | Facility: HOME HEALTHCARE | Age: 48
End: 2023-12-02

## 2023-12-02 NOTE — CASE COMMUNICATION
SN called pt on 12/1 to schedule [facility] visit for 12/2. Left voicemail with visit time and call back number, but received no answer.

## 2023-12-02 NOTE — CASE COMMUNICATION
12.2.23     unable to reach pt or emergecy contact today. No answer on phone and no call back message received .

## 2023-12-03 ENCOUNTER — HOME CARE VISIT (OUTPATIENT)
Dept: HOME HEALTH SERVICES | Facility: HOME HEALTHCARE | Age: 48
End: 2023-12-03

## 2023-12-04 ENCOUNTER — HOME CARE VISIT (OUTPATIENT)
Dept: HOME HEALTH SERVICES | Facility: HOME HEALTHCARE | Age: 48
End: 2023-12-04

## 2023-12-08 ENCOUNTER — HOSPITAL ENCOUNTER (EMERGENCY)
Facility: HOSPITAL | Age: 48
Discharge: HOME | End: 2023-12-08
Attending: EMERGENCY MEDICINE
Payer: COMMERCIAL

## 2023-12-08 VITALS
DIASTOLIC BLOOD PRESSURE: 81 MMHG | HEART RATE: 100 BPM | RESPIRATION RATE: 18 BRPM | TEMPERATURE: 98.1 F | OXYGEN SATURATION: 95 % | SYSTOLIC BLOOD PRESSURE: 147 MMHG

## 2023-12-08 DIAGNOSIS — H53.8 BLURRY VISION, BILATERAL: Primary | ICD-10-CM

## 2023-12-08 PROCEDURE — 99284 EMERGENCY DEPT VISIT MOD MDM: CPT | Performed by: EMERGENCY MEDICINE

## 2023-12-08 PROCEDURE — 99284 EMERGENCY DEPT VISIT MOD MDM: CPT

## 2023-12-08 RX ORDER — FLUCONAZOLE 100 MG/1
200 TABLET ORAL ONCE
Status: COMPLETED | OUTPATIENT
Start: 2023-12-08 | End: 2023-12-08

## 2023-12-08 RX ORDER — ACETAMINOPHEN 325 MG/1
975 TABLET ORAL ONCE
Status: COMPLETED | OUTPATIENT
Start: 2023-12-08 | End: 2023-12-08

## 2023-12-08 RX ADMIN — FLUCONAZOLE 200 MG: 100 TABLET ORAL at 22:45

## 2023-12-08 RX ADMIN — ACETAMINOPHEN 975 MG: 325 TABLET, FILM COATED ORAL at 22:44

## 2023-12-09 NOTE — ED PROVIDER NOTES
History  Chief Complaint   Patient presents with    Blurred Vision     Pt states she had a new onset of blurry vision on Monday. Pt states it has been worsening and having a hard time reading. Pt states she has a slight headache and pressure in her eys. Pt was recently discharged from the hospital last Wednesday and given antibiotics for an abscess in her left groin. Denies CP/ Dizziness/SOB. HPI    Prior to Admission Medications   Prescriptions Last Dose Informant Patient Reported? Taking? HumaLOG KwikPen 100 units/mL injection pen  Self Yes No   Sig: INJECT 16 UNITS 3 TIMES A DAY BEFORE MEALS   LORazepam (ATIVAN) 0.5 mg tablet   Yes No   Si.5 mg every 8 (eight) hours as needed for anxiety   acetaminophen (TYLENOL) 325 mg tablet   No No   Sig: Take 2 tablets (650 mg total) by mouth every 6 (six) hours as needed for mild pain   desvenlafaxine succinate (PRISTIQ) 50 mg 24 hr tablet   No No   Sig: Take 1 tablet (50 mg total) by mouth daily Do not start before 2023. dicyclomine (BENTYL) 10 mg capsule   No No   Sig: Take 2 capsules (20 mg total) by mouth every 6 (six) hours as needed (crampy diarrhea)   escitalopram (LEXAPRO) 20 mg tablet  Self No No   Sig: Take 1 tablet (20 mg total) by mouth daily   insulin glargine (LANTUS) 100 units/mL subcutaneous injection   No No   Sig: Inject 40 Units under the skin daily at bedtime   insulin glargine (LANTUS) 100 units/mL subcutaneous injection   No No   Sig: Inject 40 Units under the skin every morning Do not start before 2023.    lamoTRIgine (LaMICtal) 100 mg tablet   No No   Sig: Take 1 tablet (100 mg total) by mouth 2 (two) times a day   lisinopril (ZESTRIL) 10 mg tablet   No No   Sig: Take 2 tablets (20 mg total) by mouth daily   metFORMIN (GLUCOPHAGE) 500 mg tablet  Self No No   Sig: Take 1 tablet (500 mg total) by mouth 2 (two) times a day with meals   pantoprazole (PROTONIX) 40 mg tablet   No No   Sig: Take 1 tablet (40 mg total) by mouth daily      Facility-Administered Medications: None       Past Medical History:   Diagnosis Date    Anemia     Anxiety     Candidal intertrigo     Depression     Diabetes mellitus (HCC)     GERD (gastroesophageal reflux disease)     Hyperlipidemia     Hypertension     Hyponatremia 11/26/2023    Irritable bowel syndrome     Morbid obesity with BMI of 60.0-69.9, adult ([address] [facility])     Osteoarthritis     Seasonal allergies     Sleep difficulties     Suicide attempt [facility])        Past Surgical History:   Procedure Laterality Date    [address] [facility]  2008    WISDOM TOOTH EXTRACTION  2009       Family History   Problem Relation Age of Onset    Diabetes Mother     Hypertension Father      I have reviewed and agree with the history as documented.     E-Cigarette/Vaping    E-Cigarette Use Never User      E-Cigarette/Vaping Substances    Nicotine No     THC No     CBD No     Flavoring No     Other No     Unknown No      Social History     Tobacco Use    Smoking status: Never    Smokeless tobacco: Never   Vaping Use    Vaping Use: Never used   Substance Use Topics    Alcohol use: Not Currently     Comment: occassionaly     Drug use: No        Review of Systems    Physical Exam  ED Triage Vitals [12/08/23 2149]   Temperature Pulse Respirations Blood Pressure SpO2   98.1 °F (36.7 °C) 100 18 147/81 95 %      Temp Source Heart Rate Source Patient Position - Orthostatic VS BP Location FiO2 (%)   Oral Monitor Lying Right arm --      Pain Score       --             Orthostatic Vital Signs  Vitals:    12/08/23 2149   BP: 147/81   Pulse: 100   Patient Position - Orthostatic VS: Lying       Physical Exam    ED Medications  Medications - No data to display    Diagnostic Studies  Results Reviewed       None                   No orders to display         Procedures  Procedures      ED Course                                       MDM      Disposition  Final diagnoses:   None     ED Disposition       None Follow-up Information    None         Patient's Medications   Discharge Prescriptions    No medications on file     No discharge procedures on file. PDMP Review         Value Time User    PDMP Reviewed  Yes 11/24/2023  4:27 AM Suyapa Corona, [address] [place] [address]             ED Provider  Attending physically available and evaluated Ramon Chang. I managed the patient along with the ED Attending.     Electronically Signed by acuity:  Right eye 20/50  Left eye 20/30  Both eyes 20/30   Cardiovascular:      Rate and Rhythm: Normal rate and regular rhythm. Pulses: Normal pulses. Heart sounds: Normal heart sounds. No murmur heard. Pulmonary:      Effort: Pulmonary effort is normal. No respiratory distress. Breath sounds: Normal breath sounds. Abdominal:      Palpations: Abdomen is soft. Tenderness: There is no abdominal tenderness. Musculoskeletal:         General: No swelling. Cervical back: Neck supple. Right lower leg: No edema. Left lower leg: No edema. Skin:     General: Skin is warm and dry. Capillary Refill: Capillary refill takes less than 2 seconds. Findings: No rash. Neurological:      General: No focal deficit present. Mental Status: She is alert and oriented to person, place, and time. Cranial Nerves: No cranial nerve deficit. Motor: No weakness. Psychiatric:         Mood and Affect: Mood normal.         ED Medications  Medications   fluconazole (DIFLUCAN) tablet 200 mg (200 mg Oral Given 12/8/23 2245)   acetaminophen (TYLENOL) tablet 975 mg (975 mg Oral Given 12/8/23 2244)       Diagnostic Studies  Results Reviewed       None                   No orders to display         Procedures  Procedures      ED Course                                       Medical Decision Making  57-year-old female with history of poorly controlled type 2 diabetes on insulin presents emerged part for evaluation of bilateral blurry vision, states been going on for about a week. She denies any trauma to the eye or any focal visual field loss. She has no pain with extraocular movements. She states she recently got out of the hospital for an episode of hypoglycemia with her control sugars, was placed on insulin drip.   States that during her inpatient stay, her insulin regimen was adjusted, and she has been having good control of her sugars since she last, sugars have been in the range of 120-150. She denies any chest pain, shortness of breath, fevers. She denies any fevers or chills. She denies any weakness in the arms or legs, no dysarthria. On exam, bilateral visual fields are intact. Visual acuity listed in physical exam section. Normal extraocular movements. Suspect this presentation is secondary to poorly controlled diabetes rather than a central intracranial process such as stroke, retinal detachment, vitreous hemorrhage. However, did reach out to ophthalmology on-call via [employer] text, spoke to Dr. Pop Carrizales. He agrees this is likely due to poorly controlled diabetes and does not present as an emergent concern. He recommends the patient call his office first thing Monday morning to set up an appointment. This is discussed with the patient, she is amenable with this plan. Discussion of strict return precautions. Patient discharged home in stable condition. Risk  OTC drugs. Prescription drug management. Disposition  Final diagnoses:   Blurry vision, bilateral     Time reflects when diagnosis was documented in both MDM as applicable and the Disposition within this note       Time User Action Codes Description Comment    12/8/2023 10:36 PM Hannah Prasad Add [H53.8] Blurry vision, bilateral           ED Disposition       ED Disposition   Discharge    Condition   Stable    Date/Time   Fri Dec 8, 2023 10:37 PM    [address] discharge to home/self care.                    Follow-up Information       Follow up With Specialties Details Why [address] [facility], DO Ophthalmology Call in 3 days  [address] [facility]              Discharge Medication List as of 12/8/2023 10:37 PM        CONTINUE these medications which have NOT CHANGED    Details   acetaminophen (TYLENOL) 325 mg tablet Take 2 tablets (650 mg total) by mouth every 6 (six) hours as needed for mild pain, Starting Tue 2/21/2023, No Print desvenlafaxine succinate (PRISTIQ) 50 mg 24 hr tablet Take 1 tablet (50 mg total) by mouth daily Do not start before February 22, 2023., Starting Wed 2/22/2023, No Print      dicyclomine (BENTYL) 10 mg capsule Take 2 capsules (20 mg total) by mouth every 6 (six) hours as needed (crampy diarrhea), Starting Tue 2/21/2023, Normal      escitalopram (LEXAPRO) 20 mg tablet Take 1 tablet (20 mg total) by mouth daily, Starting Wed 8/4/2021, Until Tue 9/20/2022, Normal      HumaLOG KwikPen 100 units/mL injection pen INJECT 16 UNITS 3 TIMES A DAY BEFORE MEALS, Historical Med      !! insulin glargine (LANTUS) 100 units/mL subcutaneous injection Inject 40 Units under the skin daily at bedtime, Starting Wed 11/29/2023, Normal      !! insulin glargine (LANTUS) 100 units/mL subcutaneous injection Inject 40 Units under the skin every morning Do not start before November 30, 2023., Starting Thu 11/30/2023, Normal      lamoTRIgine (LaMICtal) 100 mg tablet Take 1 tablet (100 mg total) by mouth 2 (two) times a day, Starting Tue 2/21/2023, No Print      lisinopril (ZESTRIL) 10 mg tablet Take 2 tablets (20 mg total) by mouth daily, Starting Sun 12/4/2022, No Print      LORazepam (ATIVAN) 0.5 mg tablet 0.5 mg every 8 (eight) hours as needed for anxiety, Starting Wed 10/6/2021, Historical Med      metFORMIN (GLUCOPHAGE) 500 mg tablet Take 1 tablet (500 mg total) by mouth 2 (two) times a day with meals, Starting Mon 8/31/2020, Until Fri 11/24/2023, Normal      pantoprazole (PROTONIX) 40 mg tablet Take 1 tablet (40 mg total) by mouth daily, Starting Tue 9/20/2022, Normal       !! - Potential duplicate medications found. Please discuss with provider. No discharge procedures on file. PDMP Review         Value Time User    PDMP Reviewed  Yes 11/24/2023  4:27 AM Mami Oakley, [address] [place] [address]             ED Provider  Attending physically available and evaluated Idaliacelina Quintero.  I managed the patient along with the ED Attending.     Electronically Signed by           Kiera Garner MD  12/11/23 0624

## 2023-12-13 NOTE — ED ATTENDING ATTESTATION
12/8/2023  I, Stephanie Wolfe MD, saw and evaluated the patient. I have discussed the patient with the resident/non-physician practitioner and agree with the resident's/non-physician practitioner's findings, Plan of Care, and MDM as documented in the resident's/non-physician practitioner's note, except where noted. All available labs and Radiology studies were reviewed. I was present for key portions of any procedure(s) performed by the resident/non-physician practitioner and I was immediately available to provide assistance. see h and p above- agree with er resident tx barbara / Christopher Sainz       At this point I agree with the current assessment done in the Emergency Department.   I have conducted an independent evaluation of this patient a history and physical is as follows:    ED Course  ED Course as of 12/13/23 1347   Fri Dec 08, 2023   2224 - er md note- recent labs- reviewed by er md    6984 - er md note- pt states bs have vikki better controlled since recent admit bs 120 earlier in day          Critical Care Time  Procedures

## 2023-12-20 DIAGNOSIS — E11.65 UNCONTROLLED TYPE 2 DIABETES MELLITUS WITH HYPERGLYCEMIA (HCC): ICD-10-CM

## 2023-12-20 DIAGNOSIS — Z79.4 TYPE 2 DIABETES MELLITUS WITH HYPERGLYCEMIA, WITH LONG-TERM CURRENT USE OF INSULIN (HCC): ICD-10-CM

## 2023-12-20 DIAGNOSIS — E11.65 TYPE 2 DIABETES MELLITUS WITH HYPERGLYCEMIA, WITH LONG-TERM CURRENT USE OF INSULIN (HCC): ICD-10-CM

## 2023-12-20 RX ORDER — INSULIN GLARGINE 100 [IU]/ML
40 INJECTION, SOLUTION SUBCUTANEOUS EVERY MORNING
Qty: 30 ML | Refills: 1 | Status: SHIPPED | OUTPATIENT
Start: 2023-12-20

## 2024-01-23 DIAGNOSIS — E11.65 TYPE 2 DIABETES MELLITUS WITH HYPERGLYCEMIA, WITH LONG-TERM CURRENT USE OF INSULIN (HCC): ICD-10-CM

## 2024-01-23 DIAGNOSIS — Z79.4 TYPE 2 DIABETES MELLITUS WITH HYPERGLYCEMIA, WITH LONG-TERM CURRENT USE OF INSULIN (HCC): ICD-10-CM

## 2024-01-23 DIAGNOSIS — E11.65 UNCONTROLLED TYPE 2 DIABETES MELLITUS WITH HYPERGLYCEMIA (HCC): ICD-10-CM

## 2024-01-23 PROBLEM — A41.9 SEVERE SEPSIS (HCC): Status: RESOLVED | Noted: 2023-01-29 | Resolved: 2024-01-23

## 2024-01-23 PROBLEM — R65.20 SEVERE SEPSIS (HCC): Status: RESOLVED | Noted: 2023-01-29 | Resolved: 2024-01-23

## 2024-01-23 RX ORDER — INSULIN GLARGINE 100 [IU]/ML
40 INJECTION, SOLUTION SUBCUTANEOUS
Qty: 30 ML | Refills: 1 | Status: SHIPPED | OUTPATIENT
Start: 2024-01-23

## 2024-03-21 ENCOUNTER — HOSPITAL ENCOUNTER (INPATIENT)
Facility: HOSPITAL | Age: 49
LOS: 3 days | Discharge: HOME | DRG: 463 | End: 2024-03-25
Attending: EMERGENCY MEDICINE | Admitting: INTERNAL MEDICINE
Payer: COMMERCIAL

## 2024-03-21 ENCOUNTER — APPOINTMENT (OUTPATIENT)
Dept: RADIOLOGY | Facility: HOSPITAL | Age: 49
DRG: 463 | End: 2024-03-21
Payer: COMMERCIAL

## 2024-03-21 ENCOUNTER — ANESTHESIA EVENT (OUTPATIENT)
Dept: PERIOP | Facility: HOSPITAL | Age: 49
DRG: 463 | End: 2024-03-21
Payer: COMMERCIAL

## 2024-03-21 ENCOUNTER — TELEPHONE (OUTPATIENT)
Dept: UROLOGY | Facility: CLINIC | Age: 49
End: 2024-03-21

## 2024-03-21 ENCOUNTER — APPOINTMENT (EMERGENCY)
Dept: CT IMAGING | Facility: HOSPITAL | Age: 49
DRG: 463 | End: 2024-03-21
Payer: COMMERCIAL

## 2024-03-21 ENCOUNTER — PREP FOR PROCEDURE (OUTPATIENT)
Dept: UROLOGY | Facility: CLINIC | Age: 49
End: 2024-03-21

## 2024-03-21 ENCOUNTER — ANESTHESIA (OUTPATIENT)
Dept: PERIOP | Facility: HOSPITAL | Age: 49
DRG: 463 | End: 2024-03-21
Payer: COMMERCIAL

## 2024-03-21 DIAGNOSIS — N13.2 HYDRONEPHROSIS WITH URETERAL CALCULUS: Primary | ICD-10-CM

## 2024-03-21 DIAGNOSIS — R78.81 BACTEREMIA: ICD-10-CM

## 2024-03-21 DIAGNOSIS — R11.2 NAUSEA AND VOMITING: ICD-10-CM

## 2024-03-21 DIAGNOSIS — N20.1 LEFT URETERAL STONE: Primary | ICD-10-CM

## 2024-03-21 DIAGNOSIS — N39.0 UTI (URINARY TRACT INFECTION): ICD-10-CM

## 2024-03-21 DIAGNOSIS — N13.2 HYDRONEPHROSIS WITH URETERAL CALCULUS: ICD-10-CM

## 2024-03-21 DIAGNOSIS — N13.2 HYDRONEPHROSIS WITH OBSTRUCTING CALCULUS: Primary | ICD-10-CM

## 2024-03-21 LAB
ALBUMIN SERPL BCP-MCNC: 4.2 G/DL (ref 3.5–5)
ALP SERPL-CCNC: 61 U/L (ref 34–104)
ALT SERPL W P-5'-P-CCNC: 46 U/L (ref 7–52)
ANION GAP SERPL CALCULATED.3IONS-SCNC: 14 MMOL/L (ref 4–13)
AST SERPL W P-5'-P-CCNC: 32 U/L (ref 13–39)
BACTERIA UR QL AUTO: ABNORMAL /HPF
BASOPHILS # BLD AUTO: 0.06 THOUSANDS/ÂΜL (ref 0–0.1)
BASOPHILS NFR BLD AUTO: 1 % (ref 0–1)
BILIRUB SERPL-MCNC: 0.8 MG/DL (ref 0.2–1)
BILIRUB UR QL STRIP: NEGATIVE
BUN SERPL-MCNC: 13 MG/DL (ref 5–25)
CALCIUM SERPL-MCNC: 9.4 MG/DL (ref 8.4–10.2)
CHLORIDE SERPL-SCNC: 96 MMOL/L (ref 96–108)
CLARITY UR: ABNORMAL
CO2 SERPL-SCNC: 22 MMOL/L (ref 21–32)
COLOR UR: ABNORMAL
CREAT SERPL-MCNC: 0.77 MG/DL (ref 0.6–1.3)
EOSINOPHIL # BLD AUTO: 0.09 THOUSAND/ÂΜL (ref 0–0.61)
EOSINOPHIL NFR BLD AUTO: 1 % (ref 0–6)
ERYTHROCYTE [DISTWIDTH] IN BLOOD BY AUTOMATED COUNT: 12.9 % (ref 11.6–15.1)
GFR SERPL CREATININE-BSD FRML MDRD: 89 ML/MIN/1.73SQ M
GLUCOSE SERPL-MCNC: 184 MG/DL (ref 65–140)
GLUCOSE SERPL-MCNC: 316 MG/DL (ref 65–140)
GLUCOSE SERPL-MCNC: 347 MG/DL (ref 65–140)
GLUCOSE SERPL-MCNC: 352 MG/DL (ref 65–140)
GLUCOSE SERPL-MCNC: 388 MG/DL (ref 65–140)
GLUCOSE UR STRIP-MCNC: ABNORMAL MG/DL
HCT VFR BLD AUTO: 50.8 % (ref 34.8–46.1)
HGB BLD-MCNC: 15.5 G/DL (ref 11.5–15.4)
HGB UR QL STRIP.AUTO: ABNORMAL
IMM GRANULOCYTES # BLD AUTO: 0.07 THOUSAND/UL (ref 0–0.2)
IMM GRANULOCYTES NFR BLD AUTO: 1 % (ref 0–2)
INR PPP: 0.77 (ref 0.84–1.19)
KETONES UR STRIP-MCNC: ABNORMAL MG/DL
LACTATE SERPL-SCNC: 1.5 MMOL/L (ref 0.5–2)
LACTATE SERPL-SCNC: 2.4 MMOL/L (ref 0.5–2)
LEUKOCYTE ESTERASE UR QL STRIP: ABNORMAL
LIPASE SERPL-CCNC: 64 U/L (ref 11–82)
LYMPHOCYTES # BLD AUTO: 1.47 THOUSANDS/ÂΜL (ref 0.6–4.47)
LYMPHOCYTES NFR BLD AUTO: 13 % (ref 14–44)
MCH RBC QN AUTO: 30.5 PG (ref 26.8–34.3)
MCHC RBC AUTO-ENTMCNC: 30.5 G/DL (ref 31.4–37.4)
MCV RBC AUTO: 100 FL (ref 82–98)
MONOCYTES # BLD AUTO: 0.86 THOUSAND/ÂΜL (ref 0.17–1.22)
MONOCYTES NFR BLD AUTO: 7 % (ref 4–12)
MUCOUS THREADS UR QL AUTO: ABNORMAL
NEUTROPHILS # BLD AUTO: 9.2 THOUSANDS/ÂΜL (ref 1.85–7.62)
NEUTS SEG NFR BLD AUTO: 77 % (ref 43–75)
NITRITE UR QL STRIP: POSITIVE
NON-SQ EPI CELLS URNS QL MICRO: ABNORMAL /HPF
NRBC BLD AUTO-RTO: 0 /100 WBCS
PH UR STRIP.AUTO: 6 [PH]
PLATELET # BLD AUTO: 282 THOUSANDS/UL (ref 149–390)
PMV BLD AUTO: 10.5 FL (ref 8.9–12.7)
POTASSIUM SERPL-SCNC: 4.8 MMOL/L (ref 3.5–5.3)
PROT SERPL-MCNC: 7.7 G/DL (ref 6.4–8.4)
PROT UR STRIP-MCNC: ABNORMAL MG/DL
PROTHROMBIN TIME: 11.3 SECONDS (ref 11.6–14.5)
RBC # BLD AUTO: 5.09 MILLION/UL (ref 3.81–5.12)
RBC #/AREA URNS AUTO: ABNORMAL /HPF
SODIUM SERPL-SCNC: 132 MMOL/L (ref 135–147)
SP GR UR STRIP.AUTO: >=1.05 (ref 1–1.03)
UROBILINOGEN UR STRIP-ACNC: <2 MG/DL
WBC # BLD AUTO: 11.75 THOUSAND/UL (ref 4.31–10.16)
WBC #/AREA URNS AUTO: ABNORMAL /HPF
WBC CLUMPS # UR AUTO: PRESENT /UL

## 2024-03-21 PROCEDURE — 99285 EMERGENCY DEPT VISIT HI MDM: CPT

## 2024-03-21 PROCEDURE — 82948 REAGENT STRIP/BLOOD GLUCOSE: CPT

## 2024-03-21 PROCEDURE — 83690 ASSAY OF LIPASE: CPT

## 2024-03-21 PROCEDURE — 99223 1ST HOSP IP/OBS HIGH 75: CPT | Performed by: UROLOGY

## 2024-03-21 PROCEDURE — 99223 1ST HOSP IP/OBS HIGH 75: CPT | Performed by: INTERNAL MEDICINE

## 2024-03-21 PROCEDURE — 87086 URINE CULTURE/COLONY COUNT: CPT

## 2024-03-21 PROCEDURE — C1769 GUIDE WIRE: HCPCS | Performed by: UROLOGY

## 2024-03-21 PROCEDURE — BT1F1ZZ FLUOROSCOPY OF LEFT KIDNEY, URETER AND BLADDER USING LOW OSMOLAR CONTRAST: ICD-10-PCS | Performed by: UROLOGY

## 2024-03-21 PROCEDURE — 96375 TX/PRO/DX INJ NEW DRUG ADDON: CPT

## 2024-03-21 PROCEDURE — 87086 URINE CULTURE/COLONY COUNT: CPT | Performed by: UROLOGY

## 2024-03-21 PROCEDURE — 96361 HYDRATE IV INFUSION ADD-ON: CPT

## 2024-03-21 PROCEDURE — 0T778DZ DILATION OF LEFT URETER WITH INTRALUMINAL DEVICE, VIA NATURAL OR ARTIFICIAL OPENING ENDOSCOPIC: ICD-10-PCS | Performed by: UROLOGY

## 2024-03-21 PROCEDURE — 87040 BLOOD CULTURE FOR BACTERIA: CPT | Performed by: INTERNAL MEDICINE

## 2024-03-21 PROCEDURE — 81001 URINALYSIS AUTO W/SCOPE: CPT

## 2024-03-21 PROCEDURE — 87186 SC STD MICRODIL/AGAR DIL: CPT

## 2024-03-21 PROCEDURE — C1758 CATHETER, URETERAL: HCPCS | Performed by: UROLOGY

## 2024-03-21 PROCEDURE — 87077 CULTURE AEROBIC IDENTIFY: CPT | Performed by: UROLOGY

## 2024-03-21 PROCEDURE — 96374 THER/PROPH/DIAG INJ IV PUSH: CPT

## 2024-03-21 PROCEDURE — 52332 CYSTOSCOPY AND TREATMENT: CPT | Performed by: UROLOGY

## 2024-03-21 PROCEDURE — 85025 COMPLETE CBC W/AUTO DIFF WBC: CPT

## 2024-03-21 PROCEDURE — 80053 COMPREHEN METABOLIC PANEL: CPT

## 2024-03-21 PROCEDURE — 83605 ASSAY OF LACTIC ACID: CPT | Performed by: INTERNAL MEDICINE

## 2024-03-21 PROCEDURE — C2617 STENT, NON-COR, TEM W/O DEL: HCPCS | Performed by: UROLOGY

## 2024-03-21 PROCEDURE — 87077 CULTURE AEROBIC IDENTIFY: CPT

## 2024-03-21 PROCEDURE — 74177 CT ABD & PELVIS W/CONTRAST: CPT

## 2024-03-21 PROCEDURE — 87186 SC STD MICRODIL/AGAR DIL: CPT | Performed by: UROLOGY

## 2024-03-21 PROCEDURE — 85610 PROTHROMBIN TIME: CPT | Performed by: INTERNAL MEDICINE

## 2024-03-21 PROCEDURE — 36415 COLL VENOUS BLD VENIPUNCTURE: CPT | Performed by: INTERNAL MEDICINE

## 2024-03-21 PROCEDURE — 87154 CUL TYP ID BLD PTHGN 6+ TRGT: CPT | Performed by: INTERNAL MEDICINE

## 2024-03-21 PROCEDURE — 74420 UROGRAPHY RTRGR +-KUB: CPT

## 2024-03-21 PROCEDURE — 87077 CULTURE AEROBIC IDENTIFY: CPT | Performed by: INTERNAL MEDICINE

## 2024-03-21 DEVICE — STENT URETERAL 6FR 22CM INLAY OPTIMA W/NITINOL GDWR: Type: IMPLANTABLE DEVICE | Site: URETER | Status: FUNCTIONAL

## 2024-03-21 RX ORDER — MAGNESIUM HYDROXIDE 1200 MG/15ML
LIQUID ORAL AS NEEDED
Status: DISCONTINUED | OUTPATIENT
Start: 2024-03-21 | End: 2024-03-21 | Stop reason: HOSPADM

## 2024-03-21 RX ORDER — DESVENLAFAXINE SUCCINATE 50 MG/1
50 TABLET, EXTENDED RELEASE ORAL DAILY
Status: DISCONTINUED | OUTPATIENT
Start: 2024-03-21 | End: 2024-03-25 | Stop reason: HOSPADM

## 2024-03-21 RX ORDER — ONDANSETRON 2 MG/ML
4 INJECTION INTRAMUSCULAR; INTRAVENOUS EVERY 4 HOURS PRN
Status: DISCONTINUED | OUTPATIENT
Start: 2024-03-21 | End: 2024-03-25 | Stop reason: HOSPADM

## 2024-03-21 RX ORDER — ACETAMINOPHEN 325 MG/1
975 TABLET ORAL EVERY 8 HOURS SCHEDULED
Status: DISCONTINUED | OUTPATIENT
Start: 2024-03-21 | End: 2024-03-25 | Stop reason: HOSPADM

## 2024-03-21 RX ORDER — LIDOCAINE HYDROCHLORIDE 10 MG/ML
INJECTION, SOLUTION EPIDURAL; INFILTRATION; INTRACAUDAL; PERINEURAL AS NEEDED
Status: DISCONTINUED | OUTPATIENT
Start: 2024-03-21 | End: 2024-03-21

## 2024-03-21 RX ORDER — LABETALOL HYDROCHLORIDE 5 MG/ML
10 INJECTION, SOLUTION INTRAVENOUS EVERY 6 HOURS PRN
Status: DISCONTINUED | OUTPATIENT
Start: 2024-03-21 | End: 2024-03-25 | Stop reason: HOSPADM

## 2024-03-21 RX ORDER — SODIUM CHLORIDE, SODIUM GLUCONATE, SODIUM ACETATE, POTASSIUM CHLORIDE, MAGNESIUM CHLORIDE, SODIUM PHOSPHATE, DIBASIC, AND POTASSIUM PHOSPHATE .53; .5; .37; .037; .03; .012; .00082 G/100ML; G/100ML; G/100ML; G/100ML; G/100ML; G/100ML; G/100ML
100 INJECTION, SOLUTION INTRAVENOUS CONTINUOUS
Status: DISCONTINUED | OUTPATIENT
Start: 2024-03-21 | End: 2024-03-21

## 2024-03-21 RX ORDER — DICYCLOMINE HYDROCHLORIDE 10 MG/1
20 CAPSULE ORAL EVERY 6 HOURS PRN
Status: DISCONTINUED | OUTPATIENT
Start: 2024-03-21 | End: 2024-03-25 | Stop reason: HOSPADM

## 2024-03-21 RX ORDER — ROCURONIUM BROMIDE 10 MG/ML
INJECTION, SOLUTION INTRAVENOUS AS NEEDED
Status: DISCONTINUED | OUTPATIENT
Start: 2024-03-21 | End: 2024-03-21

## 2024-03-21 RX ORDER — OXYCODONE HYDROCHLORIDE 5 MG/1
5 TABLET ORAL EVERY 4 HOURS PRN
Status: DISCONTINUED | OUTPATIENT
Start: 2024-03-21 | End: 2024-03-25 | Stop reason: HOSPADM

## 2024-03-21 RX ORDER — INSULIN LISPRO 100 [IU]/ML
2-12 INJECTION, SOLUTION INTRAVENOUS; SUBCUTANEOUS
Status: DISCONTINUED | OUTPATIENT
Start: 2024-03-21 | End: 2024-03-25 | Stop reason: HOSPADM

## 2024-03-21 RX ORDER — SODIUM CHLORIDE, SODIUM GLUCONATE, SODIUM ACETATE, POTASSIUM CHLORIDE, MAGNESIUM CHLORIDE, SODIUM PHOSPHATE, DIBASIC, AND POTASSIUM PHOSPHATE .53; .5; .37; .037; .03; .012; .00082 G/100ML; G/100ML; G/100ML; G/100ML; G/100ML; G/100ML; G/100ML
500 INJECTION, SOLUTION INTRAVENOUS ONCE
Status: COMPLETED | OUTPATIENT
Start: 2024-03-21 | End: 2024-03-21

## 2024-03-21 RX ORDER — SODIUM CHLORIDE, SODIUM LACTATE, POTASSIUM CHLORIDE, CALCIUM CHLORIDE 600; 310; 30; 20 MG/100ML; MG/100ML; MG/100ML; MG/100ML
100 INJECTION, SOLUTION INTRAVENOUS CONTINUOUS
Status: DISCONTINUED | OUTPATIENT
Start: 2024-03-21 | End: 2024-03-21

## 2024-03-21 RX ORDER — INSULIN GLARGINE 100 [IU]/ML
40 INJECTION, SOLUTION SUBCUTANEOUS
Status: DISCONTINUED | OUTPATIENT
Start: 2024-03-21 | End: 2024-03-25 | Stop reason: HOSPADM

## 2024-03-21 RX ORDER — ONDANSETRON 2 MG/ML
4 INJECTION INTRAMUSCULAR; INTRAVENOUS ONCE
Status: COMPLETED | OUTPATIENT
Start: 2024-03-21 | End: 2024-03-21

## 2024-03-21 RX ORDER — HYDROMORPHONE HCL IN WATER/PF 6 MG/30 ML
0.2 PATIENT CONTROLLED ANALGESIA SYRINGE INTRAVENOUS EVERY 2 HOUR PRN
Status: DISCONTINUED | OUTPATIENT
Start: 2024-03-21 | End: 2024-03-25 | Stop reason: HOSPADM

## 2024-03-21 RX ORDER — ONDANSETRON 2 MG/ML
4 INJECTION INTRAMUSCULAR; INTRAVENOUS ONCE AS NEEDED
Status: DISCONTINUED | OUTPATIENT
Start: 2024-03-21 | End: 2024-03-21 | Stop reason: HOSPADM

## 2024-03-21 RX ORDER — MIDAZOLAM HYDROCHLORIDE 2 MG/2ML
INJECTION, SOLUTION INTRAMUSCULAR; INTRAVENOUS AS NEEDED
Status: DISCONTINUED | OUTPATIENT
Start: 2024-03-21 | End: 2024-03-21

## 2024-03-21 RX ORDER — SUCCINYLCHOLINE/SOD CL,ISO/PF 100 MG/5ML
SYRINGE (ML) INTRAVENOUS AS NEEDED
Status: DISCONTINUED | OUTPATIENT
Start: 2024-03-21 | End: 2024-03-21

## 2024-03-21 RX ORDER — LAMOTRIGINE 100 MG/1
100 TABLET ORAL 2 TIMES DAILY
Status: DISCONTINUED | OUTPATIENT
Start: 2024-03-21 | End: 2024-03-25 | Stop reason: HOSPADM

## 2024-03-21 RX ORDER — FENTANYL CITRATE 50 UG/ML
INJECTION, SOLUTION INTRAMUSCULAR; INTRAVENOUS AS NEEDED
Status: DISCONTINUED | OUTPATIENT
Start: 2024-03-21 | End: 2024-03-21

## 2024-03-21 RX ORDER — KETOROLAC TROMETHAMINE 30 MG/ML
15 INJECTION, SOLUTION INTRAMUSCULAR; INTRAVENOUS EVERY 6 HOURS PRN
Status: DISCONTINUED | OUTPATIENT
Start: 2024-03-21 | End: 2024-03-21

## 2024-03-21 RX ORDER — SODIUM CHLORIDE, SODIUM LACTATE, POTASSIUM CHLORIDE, CALCIUM CHLORIDE 600; 310; 30; 20 MG/100ML; MG/100ML; MG/100ML; MG/100ML
INJECTION, SOLUTION INTRAVENOUS CONTINUOUS PRN
Status: DISCONTINUED | OUTPATIENT
Start: 2024-03-21 | End: 2024-03-21

## 2024-03-21 RX ORDER — FENTANYL CITRATE/PF 50 MCG/ML
50 SYRINGE (ML) INJECTION
Status: DISCONTINUED | OUTPATIENT
Start: 2024-03-21 | End: 2024-03-21 | Stop reason: HOSPADM

## 2024-03-21 RX ORDER — LEVOFLOXACIN 5 MG/ML
750 INJECTION, SOLUTION INTRAVENOUS ONCE
OUTPATIENT
Start: 2024-03-21 | End: 2024-03-21

## 2024-03-21 RX ORDER — KETOROLAC TROMETHAMINE 30 MG/ML
15 INJECTION, SOLUTION INTRAMUSCULAR; INTRAVENOUS ONCE
Status: COMPLETED | OUTPATIENT
Start: 2024-03-21 | End: 2024-03-21

## 2024-03-21 RX ORDER — ONDANSETRON 2 MG/ML
INJECTION INTRAMUSCULAR; INTRAVENOUS AS NEEDED
Status: DISCONTINUED | OUTPATIENT
Start: 2024-03-21 | End: 2024-03-21

## 2024-03-21 RX ORDER — DEXAMETHASONE SODIUM PHOSPHATE 10 MG/ML
INJECTION, SOLUTION INTRAMUSCULAR; INTRAVENOUS AS NEEDED
Status: DISCONTINUED | OUTPATIENT
Start: 2024-03-21 | End: 2024-03-21

## 2024-03-21 RX ORDER — LISINOPRIL 20 MG/1
20 TABLET ORAL DAILY
Status: DISCONTINUED | OUTPATIENT
Start: 2024-03-21 | End: 2024-03-25 | Stop reason: HOSPADM

## 2024-03-21 RX ORDER — KETOROLAC TROMETHAMINE 30 MG/ML
15 INJECTION, SOLUTION INTRAMUSCULAR; INTRAVENOUS EVERY 6 HOURS PRN
Status: DISPENSED | OUTPATIENT
Start: 2024-03-21 | End: 2024-03-23

## 2024-03-21 RX ORDER — INSULIN LISPRO 100 [IU]/ML
18 INJECTION, SOLUTION INTRAVENOUS; SUBCUTANEOUS
Status: DISCONTINUED | OUTPATIENT
Start: 2024-03-21 | End: 2024-03-21

## 2024-03-21 RX ORDER — PROPOFOL 10 MG/ML
INJECTION, EMULSION INTRAVENOUS AS NEEDED
Status: DISCONTINUED | OUTPATIENT
Start: 2024-03-21 | End: 2024-03-21

## 2024-03-21 RX ORDER — ENOXAPARIN SODIUM 100 MG/ML
60 INJECTION SUBCUTANEOUS 2 TIMES DAILY
Status: DISCONTINUED | OUTPATIENT
Start: 2024-03-21 | End: 2024-03-25 | Stop reason: HOSPADM

## 2024-03-21 RX ORDER — INSULIN GLARGINE 100 [IU]/ML
40 INJECTION, SOLUTION SUBCUTANEOUS EVERY MORNING
Status: DISCONTINUED | OUTPATIENT
Start: 2024-03-21 | End: 2024-03-25 | Stop reason: HOSPADM

## 2024-03-21 RX ADMIN — CEFTRIAXONE SODIUM 2000 MG: 10 INJECTION, POWDER, FOR SOLUTION INTRAVENOUS at 06:49

## 2024-03-21 RX ADMIN — MIDAZOLAM 2 MG: 1 INJECTION INTRAMUSCULAR; INTRAVENOUS at 13:30

## 2024-03-21 RX ADMIN — SODIUM CHLORIDE 1000 ML: 0.9 INJECTION, SOLUTION INTRAVENOUS at 03:15

## 2024-03-21 RX ADMIN — ACETAMINOPHEN 975 MG: 325 TABLET, FILM COATED ORAL at 21:23

## 2024-03-21 RX ADMIN — ENOXAPARIN SODIUM 60 MG: 60 INJECTION SUBCUTANEOUS at 18:46

## 2024-03-21 RX ADMIN — INSULIN LISPRO 10 UNITS: 100 INJECTION, SOLUTION INTRAVENOUS; SUBCUTANEOUS at 20:23

## 2024-03-21 RX ADMIN — FENTANYL CITRATE 50 MCG: 50 INJECTION INTRAMUSCULAR; INTRAVENOUS at 14:02

## 2024-03-21 RX ADMIN — LIDOCAINE HYDROCHLORIDE 100 MG: 10 INJECTION, SOLUTION EPIDURAL; INFILTRATION; INTRACAUDAL; PERINEURAL at 13:44

## 2024-03-21 RX ADMIN — PROPOFOL 40 MG: 10 INJECTION, EMULSION INTRAVENOUS at 13:46

## 2024-03-21 RX ADMIN — IOHEXOL 100 ML: 350 INJECTION, SOLUTION INTRAVENOUS at 03:51

## 2024-03-21 RX ADMIN — FENTANYL CITRATE 50 MCG: 50 INJECTION INTRAMUSCULAR; INTRAVENOUS at 13:44

## 2024-03-21 RX ADMIN — INSULIN GLARGINE 40 UNITS: 100 INJECTION, SOLUTION SUBCUTANEOUS at 21:23

## 2024-03-21 RX ADMIN — ACETAMINOPHEN 975 MG: 325 TABLET, FILM COATED ORAL at 06:56

## 2024-03-21 RX ADMIN — SODIUM CHLORIDE, SODIUM GLUCONATE, SODIUM ACETATE, POTASSIUM CHLORIDE, MAGNESIUM CHLORIDE, SODIUM PHOSPHATE, DIBASIC, AND POTASSIUM PHOSPHATE 500 ML: .53; .5; .37; .037; .03; .012; .00082 INJECTION, SOLUTION INTRAVENOUS at 08:36

## 2024-03-21 RX ADMIN — SODIUM CHLORIDE, SODIUM GLUCONATE, SODIUM ACETATE, POTASSIUM CHLORIDE, MAGNESIUM CHLORIDE, SODIUM PHOSPHATE, DIBASIC, AND POTASSIUM PHOSPHATE 100 ML/HR: .53; .5; .37; .037; .03; .012; .00082 INJECTION, SOLUTION INTRAVENOUS at 08:43

## 2024-03-21 RX ADMIN — SUGAMMADEX 300 MG: 100 INJECTION, SOLUTION INTRAVENOUS at 14:09

## 2024-03-21 RX ADMIN — INSULIN GLARGINE 40 UNITS: 100 INJECTION, SOLUTION SUBCUTANEOUS at 08:55

## 2024-03-21 RX ADMIN — DEXAMETHASONE SODIUM PHOSPHATE 10 MG: 10 INJECTION, SOLUTION INTRAMUSCULAR; INTRAVENOUS at 13:50

## 2024-03-21 RX ADMIN — ONDANSETRON 4 MG: 2 INJECTION INTRAMUSCULAR; INTRAVENOUS at 13:50

## 2024-03-21 RX ADMIN — OXYCODONE HYDROCHLORIDE 5 MG: 5 TABLET ORAL at 06:56

## 2024-03-21 RX ADMIN — KETOROLAC TROMETHAMINE 15 MG: 30 INJECTION, SOLUTION INTRAMUSCULAR; INTRAVENOUS at 08:35

## 2024-03-21 RX ADMIN — PROPOFOL 200 MG: 10 INJECTION, EMULSION INTRAVENOUS at 13:44

## 2024-03-21 RX ADMIN — LISINOPRIL 20 MG: 20 TABLET ORAL at 08:55

## 2024-03-21 RX ADMIN — SODIUM CHLORIDE, SODIUM LACTATE, POTASSIUM CHLORIDE, AND CALCIUM CHLORIDE: .6; .31; .03; .02 INJECTION, SOLUTION INTRAVENOUS at 13:31

## 2024-03-21 RX ADMIN — DESVENLAFAXINE 50 MG: 50 TABLET, EXTENDED RELEASE ORAL at 09:24

## 2024-03-21 RX ADMIN — Medication 140 MG: at 13:44

## 2024-03-21 RX ADMIN — KETOROLAC TROMETHAMINE 15 MG: 30 INJECTION, SOLUTION INTRAMUSCULAR; INTRAVENOUS at 03:15

## 2024-03-21 RX ADMIN — LAMOTRIGINE 100 MG: 100 TABLET ORAL at 08:55

## 2024-03-21 RX ADMIN — LAMOTRIGINE 100 MG: 100 TABLET ORAL at 18:46

## 2024-03-21 RX ADMIN — DEXMEDETOMIDINE HYDROCHLORIDE 8 MCG: 100 INJECTION, SOLUTION INTRAVENOUS at 14:06

## 2024-03-21 RX ADMIN — OXYCODONE HYDROCHLORIDE 5 MG: 5 TABLET ORAL at 17:27

## 2024-03-21 RX ADMIN — PHENYLEPHRINE HYDROCHLORIDE 40 MCG/MIN: 50 INJECTION INTRAVENOUS at 13:53

## 2024-03-21 RX ADMIN — ROCURONIUM BROMIDE 30 MG: 10 INJECTION, SOLUTION INTRAVENOUS at 13:48

## 2024-03-21 RX ADMIN — ONDANSETRON 4 MG: 2 INJECTION INTRAMUSCULAR; INTRAVENOUS at 04:23

## 2024-03-21 NOTE — SEPSIS NOTE
Sepsis Note   Montse Smith 49 y.o. female MRN: [id number]  Unit/Bed#: ED-01 Encounter: [id number]       Initial Sepsis Screening       Row Name 03/21/24 0629                Is the patient's history suggestive of a new or worsening infection? Yes (Proceed)  -AB        Suspected source of infection urinary tract infection  -AB        Indicate SIRS criteria Tachycardia > 90 bpm;Tachypnea > 20 resp per min  -AB        Are two or more of the above signs & symptoms of infection both present and new to the patient? Yes (Proceed)  -AB        Assess for evidence of organ dysfunction: Are any of the below criteria present within 6 hours of suspected infection and SIRS criteria that are NOT considered to be chronic conditions? --                  User Key  (r) = Recorded By, (t) = Taken By, (c) = Cosigned By      Initials Name Provider Type    AB Kapil Jacobsen DO Resident                        Body mass index is 66.61 kg/m².  Wt Readings from Last 1 Encounters:   03/21/24 (!) 155 kg (341 lb 0.8 oz)        Ideal body weight: 45.5 kg (100 lb 4.9 oz)  Adjusted ideal body weight: 89.2 kg (196 lb 9.7 oz)

## 2024-03-21 NOTE — PLAN OF CARE
Problem: PAIN - ADULT  Goal: Verbalizes/displays adequate comfort level or baseline comfort level  Description: Interventions:  - Encourage patient to monitor pain and request assistance  - Assess pain using appropriate pain scale  - Administer analgesics based on type and severity of pain and evaluate response  - Implement non-pharmacological measures as appropriate and evaluate response  - Consider cultural and social influences on pain and pain management  - Notify physician/advanced practitioner if interventions unsuccessful or patient reports new pain  Outcome: Progressing     Problem: SAFETY ADULT  Goal: Patient will remain free of falls  Description: INTERVENTIONS:  - Educate patient/family on patient safety including physical limitations  - Instruct patient to call for assistance with activity   - Consult OT/PT to assist with strengthening/mobility   - Keep Call bell within reach  - Keep bed low and locked with side rails adjusted as appropriate  - Keep care items and personal belongings within reach  - Initiate and maintain comfort rounds  - Make Fall Risk Sign visible to staff  - Offer Toileting every  Hours, in advance of need  - Initiate/Maintain alarm  - Obtain necessary fall risk management equipment:   - Apply yellow socks and bracelet for high fall risk patients  - Consider moving patient to room near nurses station  Outcome: Progressing  Goal: Maintain or return to baseline ADL function  Description: INTERVENTIONS:  -  Assess patient's ability to carry out ADLs; assess patient's baseline for ADL function and identify physical deficits which impact ability to perform ADLs (bathing, care of mouth/teeth, toileting, grooming, dressing, etc.)  - Assess/evaluate cause of self-care deficits   - Assess range of motion  - Assess patient's mobility; develop plan if impaired  - Assess patient's need for assistive devices and provide as appropriate  - Encourage maximum independence but intervene and supervise  when necessary  - Involve family in performance of ADLs  - Assess for home care needs following discharge   - Consider OT consult to assist with ADL evaluation and planning for discharge  - Provide patient education as appropriate  Outcome: Progressing  Goal: Maintains/Returns to pre admission functional level  Description: INTERVENTIONS:  - Perform AM-PAC 6 Click Basic Mobility/ Daily Activity assessment daily.  - Set and communicate daily mobility goal to care team and patient/family/caregiver.   - Collaborate with rehabilitation services on mobility goals if consulted  - Perform Range of Motion  times a day.  - Reposition patient every  hours.  - Dangle patient  times a day  - Stand patient  times a day  - Ambulate patient  times a day  - Out of bed to chair  times a day   - Out of bed for meals  times a day  - Out of bed for toileting  - Record patient progress and toleration of activity level   Outcome: Progressing     Problem: DISCHARGE PLANNING  Goal: Discharge to home or other facility with appropriate resources  Description: INTERVENTIONS:  - Identify barriers to discharge w/patient and caregiver  - Arrange for needed discharge resources and transportation as appropriate  - Identify discharge learning needs (meds, wound care, etc.)  - Arrange for interpretive services to assist at discharge as needed  - Refer to Case Management Department for coordinating discharge planning if the patient needs post-hospital services based on physician/advanced practitioner order or complex needs related to functional status, cognitive ability, or social support system  Outcome: Progressing

## 2024-03-21 NOTE — ED NOTES
Patient transported to room [address] and receiving RN notified of arrival     Melissa Ceballos  03/21/24 7740

## 2024-03-21 NOTE — SEPSIS NOTE
"  Sepsis Note   Montse Smith 49 y.o. female MRN: [id number]  Unit/Bed#: BERYL Encounter: [id number]       Initial Sepsis Screening       Row Name 03/21/24 0728 03/21/24 0629             Is the patient's history suggestive of a new or worsening infection? Yes (Proceed)  -AB Yes (Proceed)  -AB       Suspected source of infection urinary tract infection  -AB urinary tract infection  -AB       Indicate SIRS criteria Tachycardia > 90 bpm;Tachypnea > 20 resp per min  -AB Tachycardia > 90 bpm;Tachypnea > 20 resp per min  -AB       Are two or more of the above signs & symptoms of infection both present and new to the patient? Yes (Proceed)  -AB Yes (Proceed)  -AB       Assess for evidence of organ dysfunction: Are any of the below criteria present within 6 hours of suspected infection and SIRS criteria that are NOT considered to be chronic conditions? Lactate > 2.0  -AB --       Date of presentation of severe sepsis -- --       Time of presentation of severe sepsis 0728  -AB --       Sepsis Note: Click \"NEXT\" below (NOT \"close\") to generate sepsis note based on above information. YES (proceed by clicking \"NEXT\")  -AB --                 User Key  (r) = Recorded By, (t) = Taken By, (c) = Cosigned By      Initials Name Provider Type    AB Kapil Jacobsen DO Resident                        Body mass index is 66.61 kg/m².  Wt Readings from Last 1 Encounters:   03/21/24 (!) 155 kg (341 lb 0.8 oz)        Ideal body weight: 45.5 kg (100 lb 4.9 oz)  Adjusted ideal body weight: 89.2 kg (196 lb 9.7 oz)    Patient is already received 1 L bolus, will give 500 cc to complete 30 cc/kg based on ideal body weight`  "

## 2024-03-21 NOTE — TELEPHONE ENCOUNTER
Left stent for distal stone and infection  Had retained contrast on CT suggesting stone still presnt and obstructing but did not see obvious filling defect in distal ureter on RPG. Urine was cloudy after stent, collected and sent for culture.    Plan for URS in 2-5 weeks but with CT prior to determine if stone is still present

## 2024-03-21 NOTE — ASSESSMENT & PLAN NOTE
Meeting SIRS criteria with tachycardia, tachypnea.  More likely secondary to pain, however she does have a renal stone with possible superinfection.  We will cover with antibiotics as above under UTI.  Not meeting any criteria for severe sepsis.  Will collect INR, lactic acid for completeness

## 2024-03-21 NOTE — Clinical Note
Jimenez See accompanied Montse Smith to the emergency department on 3/21/2024.    Return date if applicable:         If you have any questions or concerns, please don't hesitate to call.      Cassandra Cade PA-C

## 2024-03-21 NOTE — OP NOTE
OPERATIVE REPORT  PATIENT NAME: Montse Smith    :  1975  MRN: [id number]  Pt Location: AN OR ROOM 04    SURGERY DATE: 3/21/2024    Surgeons and Role:     * Nabil Desir MD - Primary    Preop Diagnosis:  Left Hydronephrosis with ureteral calculus [N13.2]  Concern for complicated UTI    Post-Op Diagnosis Codes:     * Hydronephrosis with ureteral calculus [N13.2] Left  Concern for complicated UTI    Procedure(s):  Left - CYSTOSCOPY. RETROGRADE PYELOGRAM AND INSERTION STENT URETERAL    Specimen(s):  ID Type Source Tests Collected by Time Destination   A : LEFT KIDNEY URINE Urine Urine, Renal, Left URINE CULTURE Nabil Desir MD 3/21/2024 1405        Estimated Blood Loss:   Minimal    Drains:  Ureteral Internal Stent Left ureter 6 Fr. (Active)   Number of days: 0       Anesthesia Type:   General    Operative Indications:  Pt with left distal ureteral stone and concern for UTI based on presentation and labs. Discussed stent vs URS and pt wanted stent    Operative Findings:      Complications:   None    Procedure and Technique:    After informed consent including the risks of bleeding, infection, ureteral injury, and need for secondary procedures, patient was placed supine in the operating room theater.  Gen. anesthesia was administered.      They were then placed in the dorsal lithotomy position and sterilely prepped and draped in usual fashion. Antibiotic prophylaxis and DVT prophylaxis were administered Cystoscopy was performed the 21 [place] cystoscope 30° lens.  This revealed a normal urethra.  Inspection of the bladder revealed no abnormalities other than floating debris.  Fluoroscopy did not show evidence of a stone in the left ureter but did show retained contrast in ureter and collecting system from contrasted CT scan 10 hours ago.    Attention was turned to the left ureteral orifice. A 5fr open ended catheter was attempted to be passed into the ureteral orifice. A retrograde ureteropyelogram was performed  showing mildly dilated ureter without obvious filling defect. Then a 0.38 solo guidewire was passed through the open ended catheter and up the ureter into the renal pelvis.  A 6 x 22 double-J stent inserted without difficulty with good coil seen in left collecting system on fluoroscopy and visually in the bladder.  The string was not left on.  Cloudy urine drained quickly after stent placed and this was collected for culture. The bladder was drained.   The patient was placed back supine, awakened from general anesthesia and brought to recovery room in stable condition.     A qualified resident physician was not available.    Patient Disposition:  PACU     Plan: URS in 2-4 weeks. Consideration for CT scan prior to ensure stone is still present. She is not an ASC candidate for surgery because of BMI    SIGNATURE: Nabil Desir MD  DATE: March 21, 2024  TIME: 2:09 PM

## 2024-03-21 NOTE — CONSULTS
[facility]  Consult  Name: Montse Smith 49 y.o. female I MRN: [id number]  Unit/Bed#: S -01 I Date of Admission: 3/21/2024   Date of Service: 3/21/2024 I Hospital Day: 0    Inpatient consult to Urology  Consult performed by: Massiel Oakes PA-C  Consult ordered by: Kapil Jacobsen,           Assessment/Plan   * Hydronephrosis with ureteral calculus  Assessment & Plan  CT scan showing 6 mm distal left ureteral calculus just above UVJ  Lactic on admission 2.4  WBC 11.7  UA positive  Creatinine at baseline  Continued pain this a.m.  Discussed with patient cystoscopy, ureteroscopy, holmium laser lithotripsy, basket stone extraction, retrograde pyelogram, insertion of L ureteral stent.  Discussed risk associated with procedure including risk with anesthesia, bleeding, infection, damage to kidneys, ureter, bladder, inability to treat stone, ureteral stricture, need for delayed ureteroscopy for any signs of infection.  Discussed stent colic including frequency, urgency, hematuria, flank pain.  Discussed likely stent only in setting of sepsis.  Definitive ureteroscopy as an outpatient.  Consent signed  Proceed to OR      Sepsis ([facility])  Assessment & Plan  On admission  +UA, leukocytosis, tachycardia, elevated lactic. Reports mild dysuria  On ceftriaxone  Follow-up cultures               Subjective:   Montse is a 49-year-old past medical history diabetes, HTN, morbid obesity who presented to the ED with back pain.  Patient reports acute onset yesterday.  She reports a past history of kidney stones.  She has never had urologic procedures or seen a urologist.  In the ED patient had mild hematuria.  Her UA was positive.  She underwent a CT scan which showed a left 6 mm UVJ calculus with mild hydronephrosis.  She is admitted to medicine for further medical management.  Urology was consulted for surgical intervention.  Patient denies any fever or chills at home.    Review of Systems    Constitutional:  Negative for chills and fever.   Respiratory:  Negative for cough and shortness of breath.    Cardiovascular:  Negative for chest pain and palpitations.   Gastrointestinal:  Negative for abdominal pain and vomiting.   Genitourinary:  Negative for dysuria and hematuria.   Musculoskeletal:  Negative for arthralgias and back pain.   Skin:  Negative for color change and rash.   Neurological:  Negative for seizures and syncope.   All other systems reviewed and are negative.      Objective:  Vitals: Blood pressure 112/70, pulse (!) 113, temperature 98.6 °F (37 °C), resp. rate 17, weight (!) 155 kg (341 lb 0.8 oz), SpO2 93%.,Body mass index is 66.61 kg/m².    Physical Exam  Vitals reviewed.   Constitutional:       General: She is not in acute distress.     Appearance: Normal appearance. She is obese. She is not ill-appearing, toxic-appearing or diaphoretic.   HENT:      Head: Normocephalic and atraumatic.      Right Ear: External ear normal.      Left Ear: External ear normal.      Nose: Nose normal.      Mouth/Throat:      Mouth: Mucous membranes are moist.   Eyes:      General: No scleral icterus.     Conjunctiva/sclera: Conjunctivae normal.   Cardiovascular:      Rate and Rhythm: Normal rate and regular rhythm.      Pulses: Normal pulses.      Heart sounds: Normal heart sounds. No murmur heard.     No friction rub. No gallop.   Pulmonary:      Effort: Pulmonary effort is normal. No respiratory distress.      Breath sounds: Normal breath sounds. No stridor. No wheezing, rhonchi or rales.   Chest:      Chest wall: No tenderness.   Abdominal:      General: Abdomen is flat. There is no distension.      Palpations: Abdomen is soft.      Tenderness: There is no abdominal tenderness. There is no guarding.   Musculoskeletal:         General: Normal range of motion.      Cervical back: Normal range of motion.   Skin:     General: Skin is warm.      Findings: No rash.   Neurological:      General: No focal  deficit present.      Mental Status: She is alert and oriented to person, place, and time. Mental status is at baseline.   Psychiatric:         Mood and Affect: Mood normal.         Behavior: Behavior normal.         Thought Content: Thought content normal.         Judgment: Judgment normal.         Imaging:  CT ABDOMEN AND PELVIS WITH IV CONTRAST     INDICATION: abd pain/back pain/diarrhea.     COMPARISON: 11/23/2023.     TECHNIQUE: CT examination of the abdomen and pelvis was performed. Multiplanar 2D reformatted images were created from the source data.     This examination, like all CT scans performed in the [facility], was performed utilizing techniques to minimize radiation dose exposure, including the use of iterative reconstruction and automated exposure control. Radiation dose length   product (DLP) for this visit: 1622 mGy-cm     IV Contrast: 100 mL of iohexol (OMNIPAQUE)  Enteric Contrast: Not administered.     FINDINGS:     ABDOMEN     LOWER CHEST: No clinically significant abnormality in the visualized lower chest. Nonspecific stable 5 mm left lower lobe lung nodule is again seen.     LIVER/BILIARY TREE: Mild diffuse hepatic steatosis and hepatomegaly. No enhancing hepatic mass..     GALLBLADDER: Post cholecystectomy.     SPLEEN: Unremarkable.     PANCREAS: Unremarkable.     ADRENAL GLANDS: Unremarkable.     KIDNEYS/URETERS: Kidneys are normal in size and position. 2-3 mm nonobstructing calcifications in the mid to lower pole of the right kidney. No right obstructive uropathy. No suspicious renal mass or perinephric fluid collections bilaterally. 3 mm   nonobstructing calcification in the upper pole of the left kidney and 2 mm nonobstructing calcification in the lower pole of the left kidney. Mild left hydroureteronephrosis and periureteral hazy inflammatory stranding noted secondary to obstructing 6 mm   distal left ureteral stone just above the UVJ region.     STOMACH AND BOWEL:  Stomach is mildly distended with air and fluid/debris. The small and large bowel loops are normal in course and caliber without obstruction or inflammation. Terminal ileum and appendix appear grossly unremarkable..     APPENDIX: No findings to suggest appendicitis.     ABDOMINOPELVIC CAVITY: No ascites. No pneumoperitoneum. No lymphadenopathy.     VESSELS: Unremarkable for patient's age.     PELVIS     REPRODUCTIVE ORGANS: Uterus and right adnexa appear grossly unremarkable for age. 3.8 x 3.1 cm ovoid hypodensity in the left adnexa could represent ovarian cyst.     URINARY BLADDER: No intraluminal mass or calculi within the partially distended urinary bladder. Subtle diffuse wall thickening of the bladder could be due to under distention or mild cystitis, recommend correlation with urinalysis.     ABDOMINAL WALL/INGUINAL REGIONS: No subcutaneous mass or fluid collections. A few nonspecific subcentimeter bilateral inguinal lymph nodes are again seen.     BONES: No acute fracture or suspicious osseous lesion.     IMPRESSION:     1.  Bilateral nephrolithiasis noted with mild left hydroureteronephrosis and periureteral inflammatory stranding secondary to obstructing 6 mm distal left ureteral stone just above the left UVJ region.  2.  Mild diffuse hepatic steatosis and hepatomegaly.  3.  3.8 x 3.1 cm ovoid hypodensity in the left adnexa could represent ovarian cyst.  4.  No evidence for bowel obstruction, inflammation, appendicitis, free air, or free fluid.  5.  Subtle diffuse wall thickening of the urinary bladder could be due to under distention or mild cystitis, recommend correlation with urinalysis.        Workstation performed: [id number]    Labs:  Recent Labs     03/21/24 0314   WBC 11.75*     Recent Labs     03/21/24 0314   HGB 15.5*       Recent Labs     03/21/24 0314   CREATININE 0.77           History:  Social History     Socioeconomic History    Marital status:      Spouse name: None    Number of  children: 1    Years of education: None    Highest education level: None   Occupational History    None   Tobacco Use    Smoking status: Never    Smokeless tobacco: Never   Vaping Use    Vaping status: Never Used   Substance and Sexual Activity    Alcohol use: Not Currently     Comment: occassionaly     Drug use: No    Sexual activity: Not Currently   Other Topics Concern    None   Social History Narrative    · Most recent tobacco use screenin2019      · Do you currently or have you served in the [employer]:   No      · Were you activated, into active duty, as a member of the [employer] or as a Reservist:   No      · General stress level:   High       · Caffeine intake:   Occasional      · Seat belts used routinely:   Yes      · Sunscreen used routinely:   Yes      · Smoke alarm in home:   Yes      Social Determinants of Health     Financial Resource Strain: Patient Declined (2023)    Received from [facility] [employer] [facility]    Overall Financial Resource Strain (CARDIA)     Difficulty of Paying Living Expenses: Patient declined   Food Insecurity: Patient Declined (2023)    Received from [facility]    Hunger Vital Sign     Worried About Running Out of Food in the Last Year: Patient declined     Ran Out of Food in the Last Year: Patient declined   Transportation Needs: Patient Declined (2023)    Received from [facility]    PRAPARE - Transportation     Lack of Transportation (Medical): Patient declined     Lack of Transportation (Non-Medical): Patient declined   Physical Activity: Not on file   Stress: Not on file   Social Connections: Unknown (2023)    Received from [facility]    Social Connection and Isolation Panel [NHANES]     Frequency of Communication with Friends and Family: Three times a week     Frequency of Social Gatherings with Friends and Family: Once a week     Attends Druze Services: Patient declined      Active Member of Clubs or Organizations: Patient declined     Attends Club or Organization Meetings: Patient declined     Marital Status: Patient declined   Intimate Partner Violence: Patient Declined (2023)    Received from [facility]    Humiliation, Afraid, Rape, and Kick questionnaire     Fear of Current or Ex-Partner: Patient declined     Emotionally Abused: Patient declined     Physically Abused: Patient declined     Sexually Abused: Patient declined   Housing Stability: Unknown (2023)    Received from [facility]    Housing Stability Vital Sign     Unable to Pay for Housing in the Last Year: Patient refused     Number of Places Lived in the Last Year: Not on file     Unstable Housing in the Last Year: Patient refused       Past Medical History:   Diagnosis Date    Anemia     Anxiety     Candidal intertrigo     Depression     Diabetes mellitus (HCC)     GERD (gastroesophageal reflux disease)     Hyperlipidemia     Hypertension     Hyponatremia 2023    Irritable bowel syndrome     Morbid obesity with BMI of 60.0-69.9, adult (HCC)     Osteoarthritis     Seasonal allergies     Sleep difficulties     Suicide attempt (HCC)      Past Surgical History:   Procedure Laterality Date     SECTION  1992    CHOLECYSTECTOMY  2008    WISDOM TOOTH EXTRACTION       Family History   Problem Relation Age of Onset    Diabetes Mother     Hypertension Father        Massiel Oakes PA-C  Date: 3/21/2024 Time: 11:55 AM

## 2024-03-21 NOTE — ED PROVIDER NOTES
History  Chief Complaint   Patient presents with    Back Pain     Pt reports low back pain and diarrhea starting yesterday. Tonight pain has become worse on the left side.      Montse Smith is a 49 y.o. female with a PMH of anemia, anxiety, depression, diabetes, GERD, hyperlipidemia, hypertension, IBS, diverticulitis presenting to the ED on 2024 with back pain. Patient endorses she had 2 episodes of diarrhea and then started to have left-sided lower back pain after this.  Her last episode of diarrhea was around 2:30 PM today.  Patient states that she attempted to go about the rest of her day as normal and trying to go to sleep however she has been unable to sleep due to this left-sided back pain that she is having.  Patient also endorses some left lower quadrant and right lower quadrant abdominal pain.  Patient does have a history of diverticulitis and colitis.  She states that she believes she has had a kidney stone before however did not seek medical attention or require intervention for this. She is also endorsing some nausea and has not been able to eat anything today since her symptoms have started. Patient denies fevers, chills, dysuria, urgency, frequency, chest pain, shortness of breath or any other complaints at this time.         Back Pain  Associated symptoms: abdominal pain    Associated symptoms: no chest pain, no dysuria, no fever and no headaches        Prior to Admission Medications   Prescriptions Last Dose Informant Patient Reported? Taking?   HumaLOG KwikPen 100 units/mL injection pen  Self Yes No   Sig: INJECT 16 UNITS 3 TIMES A DAY BEFORE MEALS   LORazepam (ATIVAN) 0.5 mg tablet   Yes No   Si.5 mg every 8 (eight) hours as needed for anxiety   acetaminophen (TYLENOL) 325 mg tablet   No No   Sig: Take 2 tablets (650 mg total) by mouth every 6 (six) hours as needed for mild pain   desvenlafaxine succinate (PRISTIQ) 50 mg 24 hr tablet   No No   Sig: Take 1 tablet (50 mg total) by mouth  daily Do not start before 2023.   dicyclomine (BENTYL) 10 mg capsule   No No   Sig: Take 2 capsules (20 mg total) by mouth every 6 (six) hours as needed (crampy diarrhea)   escitalopram (LEXAPRO) 20 mg tablet  Self No No   Sig: Take 1 tablet (20 mg total) by mouth daily   insulin glargine (LANTUS) 100 units/mL subcutaneous injection   No No   Sig: INJECT 40 UNITS UNDER THE SKIN EVERY MORNING DO NOT START BEFORE 2023.   insulin glargine (LANTUS) 100 units/mL subcutaneous injection   No No   Sig: INJECT 40 UNITS UNDER THE SKIN DAILY AT BEDTIME   lamoTRIgine (LaMICtal) 100 mg tablet   No No   Sig: Take 1 tablet (100 mg total) by mouth 2 (two) times a day   lisinopril (ZESTRIL) 10 mg tablet   No No   Sig: Take 2 tablets (20 mg total) by mouth daily   metFORMIN (GLUCOPHAGE) 500 mg tablet  Self No No   Sig: Take 1 tablet (500 mg total) by mouth 2 (two) times a day with meals   pantoprazole (PROTONIX) 40 mg tablet   No No   Sig: Take 1 tablet (40 mg total) by mouth daily      Facility-Administered Medications: None       Past Medical History:   Diagnosis Date    Anemia     Anxiety     Candidal intertrigo     Depression     Diabetes mellitus (HCC)     GERD (gastroesophageal reflux disease)     Hyperlipidemia     Hypertension     Hyponatremia 2023    Irritable bowel syndrome     Morbid obesity with BMI of 60.0-69.9, adult (HCC)     Osteoarthritis     Seasonal allergies     Sleep difficulties     Suicide attempt (HCC)        Past Surgical History:   Procedure Laterality Date     SECTION  1992    CHOLECYSTECTOMY      WISDOM TOOTH EXTRACTION         Family History   Problem Relation Age of Onset    Diabetes Mother     Hypertension Father      I have reviewed and agree with the history as documented.    E-Cigarette/Vaping    E-Cigarette Use Never User      E-Cigarette/Vaping Substances    Nicotine No     THC No     CBD No     Flavoring No     Other No     Unknown No      Social  History     Tobacco Use    Smoking status: Never    Smokeless tobacco: Never   Vaping Use    Vaping status: Never Used   Substance Use Topics    Alcohol use: Not Currently     Comment: occassionaly     Drug use: No       Review of Systems   Constitutional:  Negative for chills and fever.   HENT:  Negative for congestion and sore throat.    Eyes:  Negative for pain and discharge.   Respiratory:  Negative for cough and shortness of breath.    Cardiovascular:  Negative for chest pain and palpitations.   Gastrointestinal:  Positive for abdominal pain and diarrhea. Negative for nausea and vomiting.   Genitourinary:  Negative for difficulty urinating, dysuria, frequency and urgency.   Musculoskeletal:  Positive for back pain. Negative for arthralgias.   Skin:  Negative for color change, rash and wound.   Neurological:  Negative for dizziness, seizures, syncope, light-headedness and headaches.   All other systems reviewed and are negative.      Physical Exam  Physical Exam  Vitals and nursing note reviewed.   Constitutional:       General: She is not in acute distress.     Appearance: Normal appearance. She is obese. She is not ill-appearing, toxic-appearing or diaphoretic.   HENT:      Head: Normocephalic and atraumatic.      Right Ear: External ear normal.      Left Ear: External ear normal.      Nose: Nose normal. No congestion or rhinorrhea.      Mouth/Throat:      Mouth: Mucous membranes are moist.   Eyes:      General: No scleral icterus.        Right eye: No discharge.         Left eye: No discharge.      Extraocular Movements: Extraocular movements intact.      Conjunctiva/sclera: Conjunctivae normal.   Cardiovascular:      Rate and Rhythm: Normal rate and regular rhythm.      Heart sounds: Normal heart sounds. No murmur heard.     No friction rub. No gallop.   Pulmonary:      Effort: Pulmonary effort is normal. No respiratory distress.      Breath sounds: Normal breath sounds. No wheezing, rhonchi or rales.    Abdominal:      General: Abdomen is flat. Bowel sounds are normal. There is no distension.      Palpations: Abdomen is soft.      Tenderness: There is abdominal tenderness. There is left CVA tenderness. There is no right CVA tenderness, guarding or rebound.      Comments: Tenderness to the right lower and left lower quadrant.   Musculoskeletal:         General: No signs of injury. Normal range of motion.      Cervical back: Normal range of motion and neck supple. No rigidity.   Skin:     General: Skin is warm.      Capillary Refill: Capillary refill takes less than 2 seconds.      Coloration: Skin is not pale.      Findings: No erythema.   Neurological:      General: No focal deficit present.      Mental Status: She is alert and oriented to person, place, and time. Mental status is at baseline.      Motor: No weakness.      Gait: Gait normal.   Psychiatric:         Mood and Affect: Mood normal.         Behavior: Behavior normal.         Vital Signs  ED Triage Vitals [03/21/24 0229]   Temperature Pulse Respirations Blood Pressure SpO2   98.5 °F (36.9 °C) (!) 109 22 (!) 175/99 97 %      Temp Source Heart Rate Source Patient Position - Orthostatic VS BP Location FiO2 (%)   Oral Monitor -- Right arm --      Pain Score       10 - Worst Possible Pain           Vitals:    03/21/24 0229   BP: (!) 175/99   Pulse: (!) 109         Visual Acuity      ED Medications  Medications   ceftriaxone (ROCEPHIN) 1 g/50 mL in dextrose IVPB (has no administration in time range)   sodium chloride 0.9 % bolus 1,000 mL (0 mL Intravenous Stopped 3/21/24 0415)   ketorolac (TORADOL) injection 15 mg (15 mg Intravenous Given 3/21/24 0315)   ondansetron (ZOFRAN) injection 4 mg (4 mg Intravenous Given 3/21/24 0423)   iohexol (OMNIPAQUE) 350 MG/ML injection (MULTI-DOSE) 100 mL (100 mL Intravenous Given 3/21/24 0351)       Diagnostic Studies  Results Reviewed       Procedure Component Value Units Date/Time    Urine Microscopic [[id number]]   (Abnormal) Collected: 03/21/24 0448    Lab Status: Final result Specimen: Urine, Clean Catch Updated: 03/21/24 0613     RBC, UA Innumerable /hpf      WBC, UA Innumerable /hpf      Epithelial Cells Occasional /hpf      Bacteria, UA Innumerable /hpf      MUCUS THREADS Moderate     WBC Clumps Present    Urine culture [[id number]] Collected: 03/21/24 0448    Lab Status: In process Specimen: Urine, Clean Catch Updated: 03/21/24 0613    UA w Reflex to Microscopic w Reflex to Culture [[id number]]  (Abnormal) Collected: 03/21/24 0448    Lab Status: Final result Specimen: Urine, Clean Catch Updated: 03/21/24 0542     Color, UA Light Orange     Clarity, UA Extra Turbid     Specific Gravity, UA >=1.050     pH, UA 6.0     Leukocytes, UA Large     Nitrite, UA Positive     Protein,  (2+) mg/dl      Glucose,  (3/20%) mg/dl      Ketones, UA Trace mg/dl      Urobilinogen, UA <2.0 mg/dl      Bilirubin, UA Negative     Occult Blood, UA Large    Comprehensive metabolic panel [[id number]]  (Abnormal) Collected: 03/21/24 0314    Lab Status: Final result Specimen: Blood from Arm, Right Updated: 03/21/24 0341     Sodium 132 mmol/L      Potassium 4.8 mmol/L      Chloride 96 mmol/L      CO2 22 mmol/L      ANION GAP 14 mmol/L      BUN 13 mg/dL      Creatinine 0.77 mg/dL      Glucose 316 mg/dL      Calcium 9.4 mg/dL      AST 32 U/L      ALT 46 U/L      Alkaline Phosphatase 61 U/L      Total Protein 7.7 g/dL      Albumin 4.2 g/dL      Total Bilirubin 0.80 mg/dL      eGFR 89 ml/min/1.73sq m     Narrative:      [employer] Kid[employer] guidelines for Chronic Kidney Disease (CKD):     Stage 1 with normal or high GFR (GFR > 90 mL/min/1.73 square meters)    Stage 2 Mild CKD (GFR = 60-89 mL/min/1.73 square meters)    Stage 3A Moderate CKD (GFR = 45-59 mL/min/1.73 square meters)    Stage 3B Moderate CKD (GFR = 30-44 mL/min/1.73 square meters)    Stage 4 Severe CKD (GFR = 15-29 mL/min/1.73 square meters)    Stage 5 End Stage CKD (GFR  <15 mL/min/1.73 square meters)  Note: GFR calculation is accurate only with a steady state creatinine    Lipase [[id number]]  (Normal) Collected: 03/21/24 0314    Lab Status: Final result Specimen: Blood from Arm, Right Updated: 03/21/24 0341     Lipase 64 u/L     CBC and differential [[id number]]  (Abnormal) Collected: 03/21/24 0314    Lab Status: Final result Specimen: Blood from Arm, Right Updated: 03/21/24 0328     WBC 11.75 Thousand/uL      RBC 5.09 Million/uL      Hemoglobin 15.5 g/dL      Hematocrit 50.8 %       fL      MCH 30.5 pg      MCHC 30.5 g/dL      RDW 12.9 %      MPV 10.5 fL      Platelets 282 Thousands/uL      nRBC 0 /100 WBCs      Neutrophils Relative 77 %      Immature Grans % 1 %      Lymphocytes Relative 13 %      Monocytes Relative 7 %      Eosinophils Relative 1 %      Basophils Relative 1 %      Neutrophils Absolute 9.20 Thousands/µL      Absolute Immature Grans 0.07 Thousand/uL      Absolute Lymphocytes 1.47 Thousands/µL      Absolute Monocytes 0.86 Thousand/µL      Eosinophils Absolute 0.09 Thousand/µL      Basophils Absolute 0.06 Thousands/µL                    CT abdomen pelvis with contrast   Final Result by Jimenez Gage MD (03/21 0416)      1.  Bilateral nephrolithiasis noted with mild left hydroureteronephrosis and periureteral inflammatory stranding secondary to obstructing 6 mm distal left ureteral stone just above the left UVJ region.   2.  Mild diffuse hepatic steatosis and hepatomegaly.   3.  3.8 x 3.1 cm ovoid hypodensity in the left adnexa could represent ovarian cyst.   4.  No evidence for bowel obstruction, inflammation, appendicitis, free air, or free fluid.   5.  Subtle diffuse wall thickening of the urinary bladder could be due to under distention or mild cystitis, recommend correlation with urinalysis.         Workstation performed: [id number]                    Procedures  Procedures         ED Course  ED Course as of 03/21/24 0622   u Mar 21, 2024   0345 Pt  requesting something for nausea   0440 Pt leaving urine sample now   0554 Nitrite, UA(!): Positive  Likely infected   0605 Pt tolerated rocephin in January, will give again                                             Medical Decision Making  Patient seen and examined noted to have hydronephrosis with obstructing calculus, UTI and nausea and vomiting.  Due to patient's clinical presentation CBC was ordered showing a sodium of 132 and a glucose of 316, CBC showed a white blood cell count of 11.75, lipase was 64, UA revealed large leukocytes, positive nitrates and large blood and urine microscopy revealed innumerable blood and innumerable white blood cells and innumerable bacteria.  Patient's CT scan showed an obstructing 6 mm distal left ureteral stone just above the left UVJ as well as mild left hydroureteronephrosis and periureteral inflammatory stranding.  Due to the size of the stone, the obstructive nature of the stone and the presence of an infection patient was admitted for IV antibiotics and further care.  Patient expressed her understanding of this and was in agreement with the plan.    Differential diagnosis includes but is not limited to appendicitis, gastroenteritis, gastritis, PUD, GERD, gastroparesis, hepatitis, pancreatitis, colitis, enteritis, diverticulitis, food poisoning, mesenteric adenitis, epiploic appendagitis, mesenteric panniculitis, mesenteric ischemia, IBD, IBS, ileus, bowel obstruction, volvulus, internal hernia, AAA, cholecystitis, biliary colic, choledocholithiasis, perforated viscus, tumor, splenic etiology, constipation, pelvic pathology, renal colic, pyelonephritis, UTI; doubt cardiac etiology.      Patient's labs notable for: mentioned above    Imaging revealed:   Mentioned above    Discussed patient's case with Dr. Kerr ([facility]) regarding admission who accepted the patient for further evaluation and management.    All labs reviewed and utilized in the medical decision making process  "(if labs were ordered). Portions of the record may have been created with voice recognition software.  Occasional wrong word or \"sound a like\" substitutions may have occurred due to the inherent limitations of voice recognition software.  Read the chart carefully and recognize, using context, where substitutions have occurred.      Amount and/or Complexity of Data Reviewed  Labs: ordered. Decision-making details documented in ED Course.  Radiology: ordered.    Risk  Prescription drug management.  Decision regarding hospitalization.             Disposition  Final diagnoses:   Hydronephrosis with obstructing calculus   UTI (urinary tract infection)   Nausea and vomiting     Time reflects when diagnosis was documented in both MDM as applicable and the Disposition within this note       Time User Action Codes Description Comment    3/21/2024  6:06 AM Cassandra Cade [N13.2] Hydronephrosis with obstructing calculus     3/21/2024  6:06 AM Cassandra Cade Add [N39.0] UTI (urinary tract infection)     3/21/2024  6:06 AM Cassandra Cade Add [R11.2] Nausea and vomiting           ED Disposition       ED Disposition   Admit    Condition   Stable    Date/Time   u Mar 21, 2024  6:05 AM    Comment   Case was discussed with Dr. Julius Kerr and the patient's admission status was agreed to be Admission Status: observation status to the service of Dr. Kerr .               Follow-up Information    None         Patient's Medications   Discharge Prescriptions    No medications on file       No discharge procedures on file.    PDMP Review         Value Time User    PDMP Reviewed  Yes 3/21/2024  2:27 AM Kapil Thomas MD            ED Provider  Electronically Signed by             Cassandra Cade PA-C  03/21/24 0622    "

## 2024-03-21 NOTE — ASSESSMENT & PLAN NOTE
UA with innumerable WBCs, RBCs, bacteria  In the setting of obstructing UVJ stone    Plan  Ceftriaxone 2 g daily  Follow urine cultures, blood cultures

## 2024-03-21 NOTE — ASSESSMENT & PLAN NOTE
CT scan showing 6 mm distal left ureteral calculus just above UVJ  Lactic on admission 2.4  WBC 11.7  UA positive  Creatinine at baseline  Continued pain this a.m.  Discussed with patient cystoscopy, ureteroscopy, holmium laser lithotripsy, basket stone extraction, retrograde pyelogram, insertion of L ureteral stent.  Discussed risk associated with procedure including risk with anesthesia, bleeding, infection, damage to kidneys, ureter, bladder, inability to treat stone, ureteral stricture, need for delayed ureteroscopy for any signs of infection.  Discussed stent colic including frequency, urgency, hematuria, flank pain.  Discussed likely stent only in setting of sepsis.  Definitive ureteroscopy as an outpatient.  Consent signed  Proceed to OR

## 2024-03-21 NOTE — ANESTHESIA PREPROCEDURE EVALUATION
Procedure:  CYSTOSCOPY URETEROSCOPY WITH LITHOTRIPSY HOLMIUM LASER, RETROGRADE PYELOGRAM AND INSERTION STENT URETERAL (Left: Bladder)    Relevant Problems   CARDIO   (+) Essential hypertension   (+) Mixed hyperlipidemia      ENDO   (+) Diabetes 1.5, managed as type 2 (HCC)   (+) Type 2 diabetes mellitus with hyperglycemia, with long-term current use of insulin (HCC)      GI/HEPATIC   (+) GERD (gastroesophageal reflux disease)      /RENAL   (+) Hydronephrosis with ureteral calculus      MUSCULOSKELETAL   (+) Primary osteoarthritis of both knees   (+) Primary osteoarthritis of right knee      NEURO/PSYCH   (+) Anxiety   (+) MDD (major depressive disorder)   (+) Major depressive disorder, recurrent severe without psychotic features ([facility])        Physical Exam    Airway    Mallampati score: III  TM Distance: >3 FB  Neck ROM: full     Dental   No notable dental hx     Cardiovascular  Rhythm: regular, Rate: normal, No weak pulses    Pulmonary   No stridor    Other Findings  post-pubertal.      Anesthesia Plan  ASA Score- 3     Anesthesia Type- general with ASA Monitors.         Additional Monitors:     Airway Plan: ETT.           Plan Factors-    Chart reviewed.   Existing labs reviewed. Patient summary reviewed.                  Induction- intravenous.    Postoperative Plan- Plan for postoperative opioid use. Planned trial extubation    Informed Consent- Anesthetic plan and risks discussed with patient.  I personally reviewed this patient with the CRNA. Discussed and agreed on the Anesthesia Plan with the CRNA..

## 2024-03-21 NOTE — ASSESSMENT & PLAN NOTE
On admission  +UA, leukocytosis, tachycardia, elevated lactic. Reports mild dysuria  On ceftriaxone  Follow-up cultures

## 2024-03-21 NOTE — ASSESSMENT & PLAN NOTE
"Lab Results   Component Value Date    HGBA1C 12.2 (H) 11/24/2023       No results for input(s): \"POCGLU\" in the last 72 hours.    Blood Sugar Average: Last 72 hrs:    Home regimen: Lantus 40 units twice daily, lispro 18 units 3 times daily with meals, metformin 500mg twice daily    Plan  Continue home Lantus  Hold on home lispro while n.p.o. for potential procedure  Sliding scale insulin  "

## 2024-03-21 NOTE — ASSESSMENT & PLAN NOTE
Presented with lower back pain, found to have bilateral kidney stones with left-sided UVJ obstruction causing hydronephrosis  UA with innumerable WBCs and bacteria, is having hematuria as well.    Plan  Consult urology for definitive management  Oxycodone 2.5 mg moderate pain, oxycodone 5 mg severe pain, Dilaudid 0.2 mg IV breakthrough pain  Isolate at 100 mL/hour

## 2024-03-21 NOTE — H&P
"[facility]  H&P  Name: Montse Smith 49 y.o. female I MRN: [id number]  Unit/Bed#: ED-01 I Date of Admission: 3/21/2024   Date of Service: 3/21/2024 I Hospital Day: 0      Assessment/Plan   * Hydronephrosis with ureteral calculus  Assessment & Plan  Presented with lower back pain, found to have bilateral kidney stones with left-sided UVJ obstruction causing hydronephrosis  UA with innumerable WBCs and bacteria, is having hematuria as well.    Plan  Consult urology for definitive management  Oxycodone 2.5 mg moderate pain, oxycodone 5 mg severe pain, Dilaudid 0.2 mg IV breakthrough pain  Isolate at 100 mL/hour    Sepsis (HCC)  Assessment & Plan  Meeting SIRS criteria with tachycardia, tachypnea.  More likely secondary to pain, however she does have a renal stone with possible superinfection.  We will cover with antibiotics as above under UTI.  Not meeting any criteria for severe sepsis.  Will collect INR, lactic acid for completeness    UTI (urinary tract infection)  Assessment & Plan  UA with innumerable WBCs, RBCs, bacteria  In the setting of obstructing UVJ stone    Plan  Ceftriaxone 2 g daily  Follow urine cultures, blood cultures    Diabetes 1.5, managed as type 2 ([facility])  Assessment & Plan  Lab Results   Component Value Date    HGBA1C 12.2 (H) 11/24/2023       No results for input(s): \"POCGLU\" in the last 72 hours.    Blood Sugar Average: Last 72 hrs:    Home regimen: Lantus 40 units twice daily, lispro 18 units 3 times daily with meals, metformin 500mg twice daily    Plan  Continue home Lantus  Hold on home lispro while n.p.o. for potential procedure  Sliding scale insulin    Major depressive disorder, recurrent severe without psychotic features ([facility])  Assessment & Plan  Continue home desvenlafaxine 50 mg daily, lamotrigine 100 mg twice daily    Essential hypertension  Assessment & Plan  Continue home lisinopril 20 mg daily           VTE Pharmacologic Prophylaxis: VTE Score: 5 High Risk " "(Score >/= 5) - Pharmacological DVT Prophylaxis Ordered: enoxaparin (Lovenox). Sequential Compression Devices Ordered.  Code Status: Level 1 - Full Code   Discussion with family: Updated  () at bedside.    Anticipated Length of Stay: Patient will be admitted on an observation basis with an anticipated length of stay of less than 2 midnights secondary to hydronephrosis with ureteral calculus.    Chief Complaint: Low back pain    History of Present Illness:  Montse Smith is a 49 y.o. female with a PMH of diabetes, hypertension, morbid obesity who presents with back pain.  Patient had back pain afternoon before admission.  Otherwise has been feeling well, no fevers or chills, no N/V.  She does have IBS and has occasional diarrhea.  She came to the ED for worsening lower back pain.  She notes when she provided a urine sample in the ED she had hematuria and a \"weird sensation\" when urinating but not necessarily pain.  Found to have bilateral nephrolithiasis with a left UVJ stone causing hydronephrosis.  Urine with innumerable WBCs RBCs and bacteria.    Review of Systems:  Review of Systems   Constitutional:  Negative for chills and fever.   Gastrointestinal:  Positive for diarrhea. Negative for nausea and vomiting.   Genitourinary:  Positive for dysuria and hematuria.   Musculoskeletal:  Positive for back pain.   Neurological:  Negative for dizziness, light-headedness and headaches.   All other systems reviewed and are negative.      Past Medical and Surgical History:   Past Medical History:   Diagnosis Date    Anemia     Anxiety     Candidal intertrigo     Depression     Diabetes mellitus (HCC)     GERD (gastroesophageal reflux disease)     Hyperlipidemia     Hypertension     Hyponatremia 11/26/2023    Irritable bowel syndrome     Morbid obesity with BMI of 60.0-69.9, adult (HCC)     Osteoarthritis     Seasonal allergies     Sleep difficulties     Suicide attempt ([facility])        Past Surgical History: "   Procedure Laterality Date     SECTION  1992    CHOLECYSTECTOMY      WISDOM TOOTH EXTRACTION         Meds/Allergies:  Prior to Admission medications    Medication Sig Start Date End Date Taking? Authorizing Provider   acetaminophen (TYLENOL) 325 mg tablet Take 2 tablets (650 mg total) by mouth every 6 (six) hours as needed for mild pain 23   Julieta Doshi PA-C   desvenlafaxine succinate (PRISTIQ) 50 mg 24 hr tablet Take 1 tablet (50 mg total) by mouth daily Do not start before 2023. 23   Julieta Doshi PA-C   dicyclomine (BENTYL) 10 mg capsule Take 2 capsules (20 mg total) by mouth every 6 (six) hours as needed (crampy diarrhea) 23   Julieta Doshi PA-C   escitalopram (LEXAPRO) 20 mg tablet Take 1 tablet (20 mg total) by mouth daily 21  Devon Hagan DO   HumaLOG KwikPen 100 units/mL injection pen INJECT 16 UNITS 3 TIMES A DAY BEFORE MEALS 21   Historical Provider, MD   insulin glargine (LANTUS) 100 units/mL subcutaneous injection INJECT 40 UNITS UNDER THE SKIN EVERY MORNING DO NOT START BEFORE 2023. 23   Benedicto Baugh DO   insulin glargine (LANTUS) 100 units/mL subcutaneous injection INJECT 40 UNITS UNDER THE SKIN DAILY AT BEDTIME 24   Benedicto Baugh DO   lamoTRIgine (LaMICtal) 100 mg tablet Take 1 tablet (100 mg total) by mouth 2 (two) times a day 23   Julieta Doshi PA-C   lisinopril (ZESTRIL) 10 mg tablet Take 2 tablets (20 mg total) by mouth daily 22   Stacia Carter MD   LORazepam (ATIVAN) 0.5 mg tablet 0.5 mg every 8 (eight) hours as needed for anxiety 10/6/21   Historical Provider, MD   metFORMIN (GLUCOPHAGE) 500 mg tablet Take 1 tablet (500 mg total) by mouth 2 (two) times a day with meals 20  SUJATA Fuchs   pantoprazole (PROTONIX) 40 mg tablet Take 1 tablet (40 mg total) by mouth daily 9/20/22 3/21/24  SUJATA Fuchs     I have reviewed home medications  with patient personally.    Allergies:   Allergies   Allergen Reactions    Cephalexin Vomiting     Other reaction(s): Nausea and/or vomiting    Insulin Degludec GI Intolerance    Sulfamethoxazole-Trimethoprim Vomiting     Other reaction(s): Nausea and/or vomiting       Social History:  Marital Status:    Occupation:   Patient Pre-hospital Living Situation: Home  Patient Pre-hospital Level of Mobility: walks  Patient Pre-hospital Diet Restrictions:   Substance Use History:   Social History     Substance and Sexual Activity   Alcohol Use Not Currently    Comment: occassionaly      Social History     Tobacco Use   Smoking Status Never   Smokeless Tobacco Never     Social History     Substance and Sexual Activity   Drug Use No       Family History:  Family History   Problem Relation Age of Onset    Diabetes Mother     Hypertension Father        Physical Exam:     Vitals:   Blood Pressure: (!) 175/99 (03/21/24 0229)  Pulse: (!) 109 (03/21/24 0229)  Temperature: 98.5 °F (36.9 °C) (03/21/24 0229)  Temp Source: Oral (03/21/24 0229)  Respirations: 22 (03/21/24 0229)  Weight - Scale: (!) 155 kg (341 lb 0.8 oz) (03/21/24 0229)  SpO2: 97 % (03/21/24 0229)    Physical Exam  Constitutional:       General: She is not in acute distress.     Appearance: She is obese. She is not ill-appearing.   HENT:      Mouth/Throat:      Mouth: Mucous membranes are moist.      Pharynx: Oropharynx is clear.   Cardiovascular:      Rate and Rhythm: Normal rate and regular rhythm.      Heart sounds: No murmur heard.  Pulmonary:      Effort: Pulmonary effort is normal. No respiratory distress.      Breath sounds: Normal breath sounds. No wheezing.   Abdominal:      General: Abdomen is flat. There is no distension.      Palpations: Abdomen is soft.      Tenderness: There is no abdominal tenderness. There is no right CVA tenderness, left CVA tenderness or guarding.   Musculoskeletal:      Right lower leg: No edema.      Left lower leg: No edema.    Skin:     General: Skin is warm and dry.   Neurological:      Mental Status: She is alert. Mental status is at baseline.   Psychiatric:         Mood and Affect: Mood normal.          Additional Data:     Lab Results:  Results from last 7 days   Lab Units 03/21/24  0314   WBC Thousand/uL 11.75*   HEMOGLOBIN g/dL 15.5*   HEMATOCRIT % 50.8*   PLATELETS Thousands/uL 282   NEUTROS PCT % 77*   LYMPHS PCT % 13*   MONOS PCT % 7   EOS PCT % 1     Results from last 7 days   Lab Units 03/21/24  0314   SODIUM mmol/L 132*   POTASSIUM mmol/L 4.8   CHLORIDE mmol/L 96   CO2 mmol/L 22   BUN mg/dL 13   CREATININE mg/dL 0.77   ANION GAP mmol/L 14*   CALCIUM mg/dL 9.4   ALBUMIN g/dL 4.2   TOTAL BILIRUBIN mg/dL 0.80   ALK PHOS U/L 61   ALT U/L 46   AST U/L 32   GLUCOSE RANDOM mg/dL 316*                       Lines/Drains:  Invasive Devices       Peripheral Intravenous Line  Duration             Peripheral IV 03/21/24 Left;Ventral (anterior) Forearm <1 day                        Imaging: Reviewed radiology reports from this admission including: abdominal/pelvic CT  CT abdomen pelvis with contrast   Final Result by Jimenez Gage MD (03/21 0416)      1.  Bilateral nephrolithiasis noted with mild left hydroureteronephrosis and periureteral inflammatory stranding secondary to obstructing 6 mm distal left ureteral stone just above the left UVJ region.   2.  Mild diffuse hepatic steatosis and hepatomegaly.   3.  3.8 x 3.1 cm ovoid hypodensity in the left adnexa could represent ovarian cyst.   4.  No evidence for bowel obstruction, inflammation, appendicitis, free air, or free fluid.   5.  Subtle diffuse wall thickening of the urinary bladder could be due to under distention or mild cystitis, recommend correlation with urinalysis.         Workstation performed: [id number]               ** Please Note: This note has been constructed using a voice recognition system. **

## 2024-03-22 PROBLEM — R78.81 POSITIVE BLOOD CULTURES: Status: ACTIVE | Noted: 2024-03-22

## 2024-03-22 LAB
ANION GAP SERPL CALCULATED.3IONS-SCNC: 9 MMOL/L (ref 4–13)
BASOPHILS # BLD AUTO: 0.02 THOUSANDS/ÂΜL (ref 0–0.1)
BASOPHILS NFR BLD AUTO: 0 % (ref 0–1)
BUN SERPL-MCNC: 18 MG/DL (ref 5–25)
CALCIUM SERPL-MCNC: 8.4 MG/DL (ref 8.4–10.2)
CHLORIDE SERPL-SCNC: 98 MMOL/L (ref 96–108)
CO2 SERPL-SCNC: 23 MMOL/L (ref 21–32)
CREAT SERPL-MCNC: 0.87 MG/DL (ref 0.6–1.3)
EOSINOPHIL # BLD AUTO: 0 THOUSAND/ÂΜL (ref 0–0.61)
EOSINOPHIL NFR BLD AUTO: 0 % (ref 0–6)
ERYTHROCYTE [DISTWIDTH] IN BLOOD BY AUTOMATED COUNT: 12.9 % (ref 11.6–15.1)
GFR SERPL CREATININE-BSD FRML MDRD: 77 ML/MIN/1.73SQ M
GLUCOSE SERPL-MCNC: 195 MG/DL (ref 65–140)
GLUCOSE SERPL-MCNC: 209 MG/DL (ref 65–140)
GLUCOSE SERPL-MCNC: 239 MG/DL (ref 65–140)
GLUCOSE SERPL-MCNC: 279 MG/DL (ref 65–140)
GLUCOSE SERPL-MCNC: 321 MG/DL (ref 65–140)
GLUCOSE SERPL-MCNC: 333 MG/DL (ref 65–140)
HCT VFR BLD AUTO: 39.4 % (ref 34.8–46.1)
HGB BLD-MCNC: 12.9 G/DL (ref 11.5–15.4)
IMM GRANULOCYTES # BLD AUTO: 0.06 THOUSAND/UL (ref 0–0.2)
IMM GRANULOCYTES NFR BLD AUTO: 1 % (ref 0–2)
LYMPHOCYTES # BLD AUTO: 1.02 THOUSANDS/ÂΜL (ref 0.6–4.47)
LYMPHOCYTES NFR BLD AUTO: 10 % (ref 14–44)
MCH RBC QN AUTO: 30.8 PG (ref 26.8–34.3)
MCHC RBC AUTO-ENTMCNC: 32.7 G/DL (ref 31.4–37.4)
MCV RBC AUTO: 94 FL (ref 82–98)
MECA+MECC ISLT/SPM QL: DETECTED
MONOCYTES # BLD AUTO: 0.53 THOUSAND/ÂΜL (ref 0.17–1.22)
MONOCYTES NFR BLD AUTO: 5 % (ref 4–12)
NEUTROPHILS # BLD AUTO: 8.49 THOUSANDS/ÂΜL (ref 1.85–7.62)
NEUTS SEG NFR BLD AUTO: 84 % (ref 43–75)
NRBC BLD AUTO-RTO: 0 /100 WBCS
PLATELET # BLD AUTO: 247 THOUSANDS/UL (ref 149–390)
PMV BLD AUTO: 9.8 FL (ref 8.9–12.7)
POTASSIUM SERPL-SCNC: 4.9 MMOL/L (ref 3.5–5.3)
RBC # BLD AUTO: 4.19 MILLION/UL (ref 3.81–5.12)
S EPIDERMIDIS DNA BLD POS QL NAA+NON-PRB: DETECTED
SODIUM SERPL-SCNC: 130 MMOL/L (ref 135–147)
WBC # BLD AUTO: 10.12 THOUSAND/UL (ref 4.31–10.16)

## 2024-03-22 PROCEDURE — 99232 SBSQ HOSP IP/OBS MODERATE 35: CPT | Performed by: INTERNAL MEDICINE

## 2024-03-22 PROCEDURE — 82948 REAGENT STRIP/BLOOD GLUCOSE: CPT

## 2024-03-22 PROCEDURE — 99255 IP/OBS CONSLTJ NEW/EST HI 80: CPT | Performed by: INTERNAL MEDICINE

## 2024-03-22 PROCEDURE — 85025 COMPLETE CBC W/AUTO DIFF WBC: CPT | Performed by: INTERNAL MEDICINE

## 2024-03-22 PROCEDURE — 87040 BLOOD CULTURE FOR BACTERIA: CPT

## 2024-03-22 PROCEDURE — 80048 BASIC METABOLIC PNL TOTAL CA: CPT | Performed by: INTERNAL MEDICINE

## 2024-03-22 PROCEDURE — 99232 SBSQ HOSP IP/OBS MODERATE 35: CPT | Performed by: UROLOGY

## 2024-03-22 RX ORDER — ARIPIPRAZOLE 5 MG/1
10 TABLET ORAL DAILY
Status: DISCONTINUED | OUTPATIENT
Start: 2024-03-22 | End: 2024-03-25 | Stop reason: HOSPADM

## 2024-03-22 RX ORDER — INSULIN LISPRO 100 [IU]/ML
5 INJECTION, SOLUTION INTRAVENOUS; SUBCUTANEOUS ONCE
Status: COMPLETED | OUTPATIENT
Start: 2024-03-22 | End: 2024-03-22

## 2024-03-22 RX ORDER — INSULIN LISPRO 100 [IU]/ML
18 INJECTION, SOLUTION INTRAVENOUS; SUBCUTANEOUS
Status: DISCONTINUED | OUTPATIENT
Start: 2024-03-22 | End: 2024-03-25 | Stop reason: HOSPADM

## 2024-03-22 RX ORDER — INSULIN LISPRO 100 [IU]/ML
5 INJECTION, SOLUTION INTRAVENOUS; SUBCUTANEOUS
Status: DISCONTINUED | OUTPATIENT
Start: 2024-03-22 | End: 2024-03-22

## 2024-03-22 RX ADMIN — ENOXAPARIN SODIUM 60 MG: 60 INJECTION SUBCUTANEOUS at 18:11

## 2024-03-22 RX ADMIN — INSULIN LISPRO 18 UNITS: 100 INJECTION, SOLUTION INTRAVENOUS; SUBCUTANEOUS at 09:48

## 2024-03-22 RX ADMIN — INSULIN LISPRO 18 UNITS: 100 INJECTION, SOLUTION INTRAVENOUS; SUBCUTANEOUS at 12:15

## 2024-03-22 RX ADMIN — ACETAMINOPHEN 975 MG: 325 TABLET, FILM COATED ORAL at 21:06

## 2024-03-22 RX ADMIN — INSULIN LISPRO 6 UNITS: 100 INJECTION, SOLUTION INTRAVENOUS; SUBCUTANEOUS at 09:48

## 2024-03-22 RX ADMIN — OXYCODONE HYDROCHLORIDE 5 MG: 5 TABLET ORAL at 00:18

## 2024-03-22 RX ADMIN — DESVENLAFAXINE 50 MG: 50 TABLET, EXTENDED RELEASE ORAL at 11:39

## 2024-03-22 RX ADMIN — LISINOPRIL 20 MG: 20 TABLET ORAL at 09:47

## 2024-03-22 RX ADMIN — CEFTRIAXONE SODIUM 2000 MG: 10 INJECTION, POWDER, FOR SOLUTION INTRAVENOUS at 06:07

## 2024-03-22 RX ADMIN — LAMOTRIGINE 100 MG: 100 TABLET ORAL at 09:47

## 2024-03-22 RX ADMIN — OXYCODONE HYDROCHLORIDE 5 MG: 5 TABLET ORAL at 09:52

## 2024-03-22 RX ADMIN — INSULIN LISPRO 18 UNITS: 100 INJECTION, SOLUTION INTRAVENOUS; SUBCUTANEOUS at 18:11

## 2024-03-22 RX ADMIN — Medication 2.5 MG: at 18:10

## 2024-03-22 RX ADMIN — INSULIN LISPRO 4 UNITS: 100 INJECTION, SOLUTION INTRAVENOUS; SUBCUTANEOUS at 12:15

## 2024-03-22 RX ADMIN — INSULIN GLARGINE 40 UNITS: 100 INJECTION, SOLUTION SUBCUTANEOUS at 09:47

## 2024-03-22 RX ADMIN — INSULIN LISPRO 2 UNITS: 100 INJECTION, SOLUTION INTRAVENOUS; SUBCUTANEOUS at 18:12

## 2024-03-22 RX ADMIN — ENOXAPARIN SODIUM 60 MG: 60 INJECTION SUBCUTANEOUS at 09:47

## 2024-03-22 RX ADMIN — INSULIN GLARGINE 40 UNITS: 100 INJECTION, SOLUTION SUBCUTANEOUS at 21:06

## 2024-03-22 RX ADMIN — ACETAMINOPHEN 975 MG: 325 TABLET, FILM COATED ORAL at 06:07

## 2024-03-22 RX ADMIN — INSULIN LISPRO 5 UNITS: 100 INJECTION, SOLUTION INTRAVENOUS; SUBCUTANEOUS at 01:18

## 2024-03-22 RX ADMIN — LAMOTRIGINE 100 MG: 100 TABLET ORAL at 18:10

## 2024-03-22 RX ADMIN — ARIPIPRAZOLE 10 MG: 5 TABLET ORAL at 12:15

## 2024-03-22 NOTE — PROGRESS NOTES
Vancomycin IV Pharmacy-to-Dose Consultation     Vancomycin has been discontinued pending blood cultures.  ID will place new consult if needed. Pharmacy will sign off.  Please contact or re-consult with questions.    Charley Cruz, Pharmacist

## 2024-03-22 NOTE — ASSESSMENT & PLAN NOTE
S/p cystoscopy, retropyelogram, insertion of right ureteral stent.  IntraOp Fluoroscopy did not show evidence of a stone in the left ureter but did show retained contrast in ureter and collecting system from contrasted CT scan 10 hours ago.  CT scan prior to ureteroscopy  Urine culture gram-negative rods.  Blood cultures positive for Staph epidermidis.  ID consulted  Vital signs stable, afebrile  Mild pain associated with stent  Stent colic medications ordered  Patient will follow-up in the office after CT scan.  If CT does not show stone, will be set up for cystoscopy stent removal.  If stone still present we will schedule ureteroscopy.  Antibiosis per ID  Urology will sign off at this time.  Please do not hesitate to reach out with any questions or concerns

## 2024-03-22 NOTE — CONSULTS
Consultation - Infectious Disease   Montse ANDRESSA Smith 49 y.o. female MRN: [id number]  Unit/Bed#: S -01 Encounter: [id number]      IMPRESSION & RECOMMENDATIONS:     Left sided hydroureteronephrosis due to obstructing stone:  -Found on CT on arrival, status post OR on 3/21 with left sided ureteral stent placed. Urine appeared cloudy in OR, but no mention of purulence. Patient has been afebrile, mild leukocytosis on arrival already resolved. Urine culture collected 3/21, pre-procedure, with >100K E. Coli. Urine culture collected in OR post stent placement still in process. Without fevers or major leukocytosis, possible there was not any infection and just stone obstruction with bacteriuria. The blood cultures are also growing a skin bacteria that does not match urine.  -For now ok to continue IV Ceftriaxone  -As unclear if this was all obstruction vs. any component of infection, will plan to complete 7 day course of antibiotic for possible obstructive UTI  - Anticipate change to PO antibiotic based on urine culture from OR (3/21 1405) as more likely to reflect what was in kidney  -Per Urology, plan is for eventual left ureteroscopy and laser lithotripsy as outpatient    2. Positive blood cultures:  -2 of 2 blood cultures from 3/21 are with GPCs in clusters. [facility] has detected Staph epidermidis. Low concern this is true infection, suspect likely skin contamination. She has not had any fever and only very mild leukocytosis that rapidly resolved. Staph epidermidis also is not growing in urine, and main issue was obstructing stone. Unlikely patient has a true coagulase negative bacteremia and unrelated obstructive nephropathy at same time. Per [facility], the Staph epidermidis is methicillin resistant. Thus Ceftriaxone would not cover and would expect to grow in repeat blood cultures if true infection. She has no cardiac devices.   -As patient clinically and hemodynamically stable, hold off on Vancomycin  -Obtain blood cultures  X2 now  -If repeat blood cultures negative, then clear this was skin contaminate  -Monitor for fevers, hypotension etc.  -Follow up official species identification for both blood cultures; if they end up being different Staph species then would also highly support contamination  -If patient develops clinical signs of bacteremia/sepsis in mean time can then start IV Vancomycin    3. Cephalexin Allergy  -Appears to be GI intolerance rather than true IgE mediated reaction. This is not a contra-indication to use in the future. She tolerates Ceftriaxone.     4. Obesity  -Will need to ensure antibiotics are dosed accordingly    I have discussed the above management plan in detail with the primary service    I have performed an extensive review of the medical records in Epic including review of the notes, radiographs, and laboratory results     HISTORY OF PRESENT ILLNESS:    Reason for Consult: bacteremia    HPI: Montse Smith is a 49 y.o. year old female with morbid obesity, poorly controlled T2DM, HTN, HLD. She presented to ED with acute back pain and diarrhea. The pain was left lower back/side. CT found bilateral nephrolithiasis, left sided hydroureteronephrosis with a 6 mm obstructing UPJ obstruction. Taken to OR for cystoscopy procedure on 3/21, a ureteral stent was placed and urine colleted for culture (cloudy in appearance).  2 of 2 blood cultures collected on 3/21 are growing GPCs in clusters now. [facility] has identified Staphylococcus epidermidis with mecA/C gene detected.     Patient denies any fevers or chills before her symptoms acutely started of back pain. Denies any recent wounds. No cardiac devices. Feeling better now but still with some twinges of back pain. She did note some possible dysuria over the last week off and on.       REVIEW OF SYSTEMS:  A complete review of systems is negative other than that noted in the HPI.    PAST MEDICAL HISTORY:  Past Medical History:   Diagnosis Date    Anemia     Anxiety      Candidal intertrigo     Depression     Diabetes mellitus (HCC)     GERD (gastroesophageal reflux disease)     Hyperlipidemia     Hypertension     Hyponatremia 2023    Irritable bowel syndrome     Morbid obesity with BMI of 60.0-69.9, adult (HCC)     Osteoarthritis     Seasonal allergies     Sleep difficulties     Suicide attempt ([facility])      Past Surgical History:   Procedure Laterality Date     SECTION  1992    CHOLECYSTECTOMY  2008    FL RETROGRADE PYELOGRAM  3/21/2024    PA CYSTO/URETERO W/LITHOTRIPSY &INDWELL STENT INSRT Left 3/21/2024    Procedure: CYSTOSCOPY, RETROGRADE PYELOGRAM AND INSERTION STENT URETERAL;  Surgeon: Nabil Desir MD;  Location: [facility];  Service: Urology    WISDOM TOOTH EXTRACTION         FAMILY HISTORY:  Non-contributory    SOCIAL HISTORY:  Social History   Social History     Substance and Sexual Activity   Alcohol Use Not Currently    Comment: occassionaly      Social History     Substance and Sexual Activity   Drug Use No     Social History     Tobacco Use   Smoking Status Never   Smokeless Tobacco Never       ALLERGIES:  Allergies   Allergen Reactions    Cephalexin Vomiting     Other reaction(s): Nausea and/or vomiting    Insulin Degludec GI Intolerance    Sulfamethoxazole-Trimethoprim Vomiting     Other reaction(s): Nausea and/or vomiting       MEDICATIONS:  All current active medications have been reviewed.      PHYSICAL EXAM:  Temp:  [96.5 °F (35.8 °C)-98.5 °F (36.9 °C)] 96.5 °F (35.8 °C)  HR:  [] 82  Resp:  [16-21] 16  BP: (116-152)/(56-85) 150/76  SpO2:  [88 %-94 %] 93 %  Temp (24hrs), Av.6 °F (36.4 °C), Min:96.5 °F (35.8 °C), Max:98.5 °F (36.9 °C)  Current: Temperature: (!) 96.5 °F (35.8 °C)    Intake/Output Summary (Last 24 hours) at 3/22/2024 1125  Last data filed at 3/21/2024 1415  Gross per 24 hour   Intake 300 ml   Output --   Net 300 ml       General Appearance:  Appearing well, nontoxic, and in no distress   Head:  Normocephalic, without  obvious abnormality, atraumatic   Eyes:  Conjunctiva pink and sclera anicteric, both eyes   Nose: Nares normal, mucosa normal, no drainage   Throat: Oropharynx moist without lesions   Neck: Supple   Back:   Symmetric, mild CVA tenderness on left    Lungs:   Clear to auscultation bilaterally, respirations unlabored   Chest Wall:  No tenderness or deformity   Heart:  RRR; no murmur, rub or gallop   Abdomen:   Soft, non-tender    Extremities: No cyanosis, clubbing   Skin: No rashes or lesions. No draining wounds noted.   Lymph nodes: Cervical, supraclavicular nodes normal   Neurologic: Alert and oriented        LABS, IMAGING, & OTHER STUDIES:  Lab Results:  I have personally reviewed pertinent labs.  Results from last 7 days   Lab Units 03/22/24  0056 03/21/24  0314   WBC Thousand/uL 10.12 11.75*   HEMOGLOBIN g/dL 12.9 15.5*   PLATELETS Thousands/uL 247 282     Results from last 7 days   Lab Units 03/22/24  0056 03/21/24  0314   SODIUM mmol/L 130* 132*   POTASSIUM mmol/L 4.9 4.8   CHLORIDE mmol/L 98 96   CO2 mmol/L 23 22   BUN mg/dL 18 13   CREATININE mg/dL 0.87 0.77   EGFR ml/min/1.73sq m 77 89   CALCIUM mg/dL 8.4 9.4   AST U/L  --  32   ALT U/L  --  46   ALK PHOS U/L  --  61     Results from last 7 days   Lab Units 03/21/24  0649 03/21/24  0448   GRAM STAIN RESULT  Gram positive cocci in clusters*  Gram positive cocci in clusters*  --    URINE CULTURE   --  >100,000 cfu/ml Escherichia coli*         Imaging Studies:   I have personally reviewed pertinent imaging study reports and images in PACS.      Other Studies:   I have personally reviewed pertinent reports.      Phill Avila MD  [facility]

## 2024-03-22 NOTE — ASSESSMENT & PLAN NOTE
2 of 2 blood cultures from 3/21 are with GPCs in clusters.   Blood cultures ID has detected Staph epidermidis.   Low concern this is true infection, suspect likely skin contamination.   3/22 patient has remained afebrile and clinically improving with leukocytosis resolving    Plan  Repeat blood cultures x 2  Continue IV ceftriaxone  If patient develops clinical signs of bacteremia/sepsis in mean time can then start IV Vancomycin   Monitor for fever, hypotension, a.m. CBC

## 2024-03-22 NOTE — ASSESSMENT & PLAN NOTE
Continue home desvenlafaxine 50 mg daily, lamotrigine 100 mg twice daily  Patient also reported taking Abilify 10 mg daily

## 2024-03-22 NOTE — ASSESSMENT & PLAN NOTE
Meeting SIRS criteria with tachycardia, tachypnea.  More likely secondary to pain, however she does have a renal stone with possible superinfection.  We will cover with antibiotics as above under UTI.  Not meeting any criteria for severe sepsis.  Resolving

## 2024-03-22 NOTE — ASSESSMENT & PLAN NOTE
Lab Results   Component Value Date    HGBA1C 12.2 (H) 11/24/2023       Recent Labs     03/21/24  2231 03/22/24  0057 03/22/24  0614 03/22/24  1142   POCGLU 347* 333* 279* 209*       Blood Sugar Average: Last 72 hrs:    Home regimen: Lantus 40 units twice daily, lispro 18 units 3 times daily with meals, metformin 500mg twice daily    Plan  Continue home Lantus 40 units twice daily  18 units 3 times daily with meals  SSI

## 2024-03-22 NOTE — ASSESSMENT & PLAN NOTE
Presented with lower back pain, found to have bilateral kidney stones with left-sided UVJ obstruction causing hydronephrosis  UA with innumerable WBCs and bacteria, is having hematuria as well.    3/21 s/p left-sided ureteral stent placed. Urology will arrange for left ureteroscopy and laser lithotripsy at an interval after convalescence. If no stone is noted on CT scan then we can perform ureteral stent removal in the office setting per urology

## 2024-03-22 NOTE — UTILIZATION REVIEW
Initial Clinical Review  OBSERVATION ADMISSION 3/21/2024 0606 CONVERTED TO INPATIENT ADMISSION 3/22/2024 1517 DUE TO HYDRONEPHROSIS W URETERAL CALCULI; L SIDED UPJ OBSTRUCTION S/P URETERAL STENT W BACTEREMIA REQ IV ANTIBX FURTHER MANAGEMENT   Admission: Date/Time/Statement:   Admission Orders (From admission, onward)       Ordered        03/22/24 1517  Inpatient Admission  Once            03/21/24 0606  Place in Observation  Once                          Orders Placed This Encounter   Procedures    Inpatient Admission     Standing Status:   Standing     Number of Occurrences:   1     Order Specific Question:   Level of Care     Answer:   Med Surg [16]     Order Specific Question:   Estimated length of stay     Answer:   More than 2 Midnights     Order Specific Question:   Certification     Answer:   I certify that inpatient services are medically necessary for this patient for a duration of greater than two midnights. See H&P and MD Progress Notes for additional information about the patient's course of treatment.     ED Arrival Information       Expected   -    Arrival   3/21/2024 02:20    Acuity   Urgent              Means of arrival   Wheelchair    Escorted by   Family Member    Service   Hospitalist    Admission type   Emergency              Arrival complaint   back pain             Chief Complaint   Patient presents with    Back Pain     Pt reports low back pain and diarrhea starting yesterday. Tonight pain has become worse on the left side.        Initial Presentation: 49 y.o. female pmh anemia, anxiety, depression, diabetes, GERD, hyperlipidemia, hypertension, IBS, diverticulitis to ED presents with back pain. Endorses 2 episodes of diarrhea proceeding to  left-sided lower back pain.  Last episode of diarrhea was around 2:30 PM today. Attempted to go about the rest of  day as normal then unable to sleep due to this left-sided back pain  now w  some left lower quadrant and right lower quadrant abdominal pain.  "Reports \"weird sensation\" when urinating but not necessarily pain   EXAM L flank pain, tachycardia/ tachypnea; CT a/p bilateral kidney stones with left-sided UVJ obstruction causing hydronephrosis, UA w UTI  Observation admission due L sided UVJ obstruction w hydronephrosis, sepsis, UTI. COnsult Urology, pain management; isolate IVF; IV antibx. Follow BCX, UCX  Urology  Continues w apin; Distal left stone with possible infection. Discussed L URS vs stent; patient desires stent    Urology Procedure(s):  Left - CYSTOSCOPY. RETROGRADE PYELOGRAM AND INSERTION STENT URETERAL   Anesthesia Type:   General   Finding  Then a 0.38 solo guidewire was passed through the open ended catheter and up the ureter into the renal pelvis.  A 6 x 22 double-J stent inserted without difficulty with good coil seen in left collecting system on fluoroscopy and visually in the bladder.  The string was not left on.  Cloudy urine drained quickly after stent placed and this was collected for culture. The bladder was drained   Date: 3/22   Day 2: changed to Inpatient  Urology   UCX w gram neg rods; BCX + for staph epidermitis; ID consult  Mild pain. arrange for left ureteroscopy and laser lithotripsy at an interval after convalescence. If no stone is noted on CT scan then we can perform ureteral stent removal in the office setting   ID  Urine culture collected 3/21, pre-procedure, with >100K E. Coli. Urine culture collected in OR post stent placement still in process. Without fevers or major leukocytosis, possible there was not any infection and just stone obstruction with bacteriuria. The blood cultures are also growing a skin bacteria that does not match urine.   continue IV Ceftriaxone  As unclear if this was all obstruction vs. any component of infection, plan to complete 7 day course of antibiotic for possible obstructive UTI  Anticipate change to PO antibiotic based on urine culture from OR (3/21 1405) as more likely to reflect what  in " kidney  Attending MD  Continue pain medications as needed  Acute Cystitis, continue IV ceftriaxone, follow-up cultures.  Cultures growing gram-negative rods  Gram-positive bacteremia 2 out of 2 sets of blood culture growing Staphylococcus epidermidis with BC ID for methicillin-resistant gene.  Start IV vancomycin, consult infectious disease team.  Secondary to acute cystitis, noted  elevated lactic acid-will give IV fluids, monitor lactic levels  DATE   3/23/2024  Day 3: Has surpassed a 2nd midnight with active treatments and services.  Repeat BCX x2, cont IV Ceftriaxone, if clinical signs of bacteremia/ Sepsis start IV Vanco; follow fever HD stability, AM CBC. S/P L sided ureteral stent; w OP removal, reports flank pain still remains but improving  ED Triage Vitals [03/21/24 0229]   Temperature Pulse Respirations Blood Pressure SpO2   98.5 °F (36.9 °C) (!) 109 22 (!) 175/99 97 %      Temp Source Heart Rate Source Patient Position - Orthostatic VS BP Location FiO2 (%)   Oral Monitor -- Right arm --      Pain Score       10 - Worst Possible Pain          Wt Readings from Last 1 Encounters:   03/21/24 (!) 155 kg (341 lb 0.8 oz)     Additional Vital Signs:   Date/Time Temp Pulse Resp BP MAP (mmHg) SpO2 Calculated FIO2 (%) - Nasal Cannula Nasal Cannula O2 Flow Rate (L/min) O2 Device   03/23/24 08:08:16 97.9 °F (36.6 °C) 82 16 154/78 103 94 % -- -- --   03/22/24 22:43:56 97.8 °F (36.6 °C) 82 18 116/54 75 96 % -- -- --   03/22/24 22:43:33 97.8 °F (36.6 °C) 78 16 116/54 75 94 % -- -- --   03/22/24 15:09:39 97.7 °F (36.5 °C) 90 16 134/64 87 95 % -- -- --   03/22/24 1215 -- -- -- -- -- -- 28 2 L/min Nasal cannula   03/22/24 07:46:51 96.5 °F (35.8 °C) Abnormal  82 16 150/76 101 93 % -- -- --   03/22/24 0100 -- -- -- -- -- -- -- -- None (Room air)   03/21/24 21:47:02 97.4 °F (36.3 °C) Abnormal  93 17 144/65 91 93 % -- -- --   03/21/24 16:06:03 -- 100 17 152/78 103 88 % Abnormal  -- -- --   03/21/24 15:30:54 97.9 °F (36.6 °C)  96 18 144/85 105 88 % Abnormal  -- -- --   03/21/24 15:07:05 97 °F (36.1 °C) Abnormal  98 17 121/68 86 93 % -- -- --   03/21/24 1445 -- 96 21 116/56 80 94 % -- -- --   03/21/24 1430 -- 94 18 119/61 83 94 % -- -- None (Room air)   03/21/24 1418 98.5 °F (36.9 °C) 100 20 125/64 87 91 % -- -- None (Room air)   03/21/24 1221 98 °F (36.7 °C) 101 20 122/78 -- 93 % -- -- None (Room air)   03/21/24 1000 -- -- -- -- -- 93 % -- -- --   03/21/24 07:49:18 98.6 °F (37 °C) 113 Abnormal  17 112/70 84 90 % -- -- --   03/21/24 0708 98.8 °F (37.1 °C) 111 Abnormal  18 144/65 -- 93 % -- -- None (Room air)   03/21/24 0229 98.5 °F (36.9 °C) 109 Abnormal  22 175/99 Abnormal  133 97 % -- -- None (Room air)     Weights (last 14 days)    Date/Time Weight Weight Method Height   03/21/24 1221 -- -- 5' (1.524 m)   03/21/24 0229 155 kg (341 lb 0.8 oz) Abnormal  Stretcher scale      Pertinent Labs/Diagnostic Test Results:   FL retrograde pyelogram   Final Result by Richard Holman MD (03/22 0703)   Fluoroscopic guidance for urologic procedure      Workstation performed: [id number]         CT abdomen pelvis with contrast   Final Result by Jimenez Gage MD (03/21 5064)      1.  Bilateral nephrolithiasis noted with mild left hydroureteronephrosis and periureteral inflammatory stranding secondary to obstructing 6 mm distal left ureteral stone just above the left UVJ region.   2.  Mild diffuse hepatic steatosis and hepatomegaly.   3.  3.8 x 3.1 cm ovoid hypodensity in the left adnexa could represent ovarian cyst.   4.  No evidence for bowel obstruction, inflammation, appendicitis, free air, or free fluid.   5.  Subtle diffuse wall thickening of the urinary bladder could be due to under distention or mild cystitis, recommend correlation with urinalysis.         Workstation performed: [id number]               Results from last 7 days   Lab Units 03/23/24  0452 03/22/24  0056 03/21/24  0314   WBC Thousand/uL 7.35 10.12 11.75*   HEMOGLOBIN g/dL 12.8  12.9 15.5*   HEMATOCRIT % 40.4 39.4 50.8*   PLATELETS Thousands/uL 271 247 282   NEUTROS ABS Thousands/µL 3.43 8.49* 9.20*         Results from last 7 days   Lab Units 03/23/24  0452 03/22/24  0056 03/21/24  0314   SODIUM mmol/L 137 130* 132*   POTASSIUM mmol/L 4.0 4.9 4.8   CHLORIDE mmol/L 102 98 96   CO2 mmol/L 28 23 22   ANION GAP mmol/L 7 9 14*   BUN mg/dL 12 18 13   CREATININE mg/dL 0.62 0.87 0.77   EGFR ml/min/1.73sq m 104 77 89   CALCIUM mg/dL 8.5 8.4 9.4     Results from last 7 days   Lab Units 03/21/24  0314   AST U/L 32   ALT U/L 46   ALK PHOS U/L 61   TOTAL PROTEIN g/dL 7.7   ALBUMIN g/dL 4.2   TOTAL BILIRUBIN mg/dL 0.80     Results from last 7 days   Lab Units 03/23/24  0831 03/22/24  2056 03/22/24  1557 03/22/24  1142 03/22/24  0614 03/22/24  0057 03/21/24  2231 03/21/24  2005 03/21/24  1917 03/21/24  1112   POC GLUCOSE mg/dl 155* 239* 195* 209* 279* 333* 347* 388* 352* 184*     Results from last 7 days   Lab Units 03/23/24  0452 03/22/24  0056 03/21/24  0314   GLUCOSE RANDOM mg/dL 166* 321* 316*             BETA-HYDROXYBUTYRATE   Date Value Ref Range Status   11/23/2023 0.2 <0.6 mmol/L Final   04/17/2021 0.1 <0.6 mmol/L Final                              Results from last 7 days   Lab Units 03/21/24  0649   PROTIME seconds 11.3*   INR  0.77*             Results from last 7 days   Lab Units 03/21/24  1024 03/21/24  0649   LACTIC ACID mmol/L 1.5 2.4*                                 Results from last 7 days   Lab Units 03/21/24  0314   LIPASE u/L 64                 Results from last 7 days   Lab Units 03/21/24  0448   CLARITY UA  Extra Turbid   COLOR UA  Light Orange   SPEC GRAV UA  >=1.050*   PH UA  6.0   GLUCOSE UA mg/dl 150 (3/20%)*   KETONES UA mg/dl Trace*   BLOOD UA  Large*   PROTEIN UA mg/dl 200 (2+)*   NITRITE UA  Positive*   BILIRUBIN UA  Negative   UROBILINOGEN UA (BE) mg/dl <2.0   LEUKOCYTES UA  Large*   WBC UA /hpf Innumerable*   RBC UA /hpf Innumerable*   BACTERIA UA /hpf Innumerable*    EPITHELIAL CELLS WET PREP /hpf Occasional   MUCUS THREADS  Moderate*           Results from last 7 days   Lab Units 03/22/24  1206 03/22/24  1149 03/21/24  1405 03/21/24  0649 03/21/24  0448   BLOOD CULTURE  Received in Microbiology Lab. Culture in Progress. Received in Microbiology Lab. Culture in Progress.  --   --   --    GRAM STAIN RESULT   --   --   --  Gram positive cocci in clusters*  Gram positive cocci in clusters*  --    URINE CULTURE   --   --  Culture too young- will reincubate  --  >100,000 cfu/ml Escherichia coli*                   ED Treatment:   Medication Administration from 03/21/2024 0220 to 03/21/2024 0730         Date/Time Order Dose Route Action     03/21/2024 0415 EDT sodium chloride 0.9 % bolus 1,000 mL 0 mL Intravenous Stopped     03/21/2024 0315 EDT sodium chloride 0.9 % bolus 1,000 mL 1,000 mL Intravenous New Bag     03/21/2024 0315 EDT ketorolac (TORADOL) injection 15 mg 15 mg Intravenous Given     03/21/2024 0423 EDT ondansetron (ZOFRAN) injection 4 mg 4 mg Intravenous Given     03/21/2024 0351 EDT iohexol (OMNIPAQUE) 350 MG/ML injection (MULTI-DOSE) 100 mL 100 mL Intravenous Given     03/21/2024 0631 EDT ceftriaxone (ROCEPHIN) 1 g/50 mL in dextrose IVPB 1,000 mg Intravenous Not Given     03/21/2024 0656 EDT acetaminophen (TYLENOL) tablet 975 mg 975 mg Oral Given     03/21/2024 0656 EDT oxyCODONE (ROXICODONE) split tablet 2.5 mg -- Oral See Alternative     03/21/2024 0656 EDT oxyCODONE (ROXICODONE) IR tablet 5 mg 5 mg Oral Given     03/21/2024 0649 EDT ceftriaxone (ROCEPHIN) 2 g/50 mL in dextrose IVPB 2,000 mg Intravenous New Bag          Past Medical History:   Diagnosis Date    Anemia     Anxiety     Candidal intertrigo     Depression     Diabetes mellitus (HCC)     GERD (gastroesophageal reflux disease)     Hyperlipidemia     Hypertension     Hyponatremia 11/26/2023    Irritable bowel syndrome     Morbid obesity with BMI of 60.0-69.9, adult (HCC)     Osteoarthritis     Seasonal  allergies     Sleep difficulties     Suicide attempt (HCC)      Present on Admission:   Diabetes 1.5, managed as type 2 (HCC)   Essential hypertension   Major depressive disorder, recurrent severe without psychotic features ([facility])   Hydronephrosis with ureteral calculus   UTI (urinary tract infection)   Sepsis ([facility])      Admitting Diagnosis: Back pain [M54.9]  UTI (urinary tract infection) [N39.0]  Nausea and vomiting [R11.2]  Hydronephrosis with obstructing calculus [N13.2]  Age/Sex: 49 y.o. female  Admission Orders:  I/O  12 lead ecg  OOB    Scheduled Medications:  acetaminophen, 975 mg, Oral, Q8H GERI  ARIPiprazole, 10 mg, Oral, Daily  cefTRIAXone, 2,000 mg, Intravenous, Q24H  desvenlafaxine succinate, 50 mg, Oral, Daily  enoxaparin, 60 mg, Subcutaneous, BID  insulin glargine, 40 Units, Subcutaneous, QAM  insulin glargine, 40 Units, Subcutaneous, HS  insulin lispro, 18 Units, Subcutaneous, TID With Meals  insulin lispro, 2-12 Units, Subcutaneous, TID AC  lamoTRIgine, 100 mg, Oral, BID  lisinopril, 20 mg, Oral, Daily      Continuous IV Infusions:   multi-electrolyte (PLASMALYTE-A/ISOLYTE-S PH 7.4) IV solution  Rate: 100 mL/hr Dose: 100 mL/hr  Freq: Continuous Route: IV  Indications of Use: IV Hydration  Last Dose: Stopped (03/21/24 2115)  Start: 03/21/24 0630 End: 03/21/24 1640       PRN Meds:  dicyclomine, 20 mg, Oral, Q6H PRN  HYDROmorphone, 0.2 mg, Intravenous, Q2H PRN  labetalol, 10 mg, Intravenous, Q6H PRN  LORazepam, 0.5 mg, Oral, Q8H PRN  ondansetron, 4 mg, Intravenous, Q4H PRN  oxyCODONE, 2.5 mg, Oral, Q4H PRN   Or  oxyCODONE, 5 mg, Oral, Q4H PRN        IP CONSULT TO UROLOGY  IP CONSULT TO INFECTIOUS DISEASES    Network Utilization Review Department  ATTENTION: Please call with any questions or concerns to [phone] and carefully listen to the prompts so that you are directed to the right person. All voicemails are confidential.   For Discharge needs, contact Care Management DC Support Team at  [phone] opt. 2  Send all requests for admission clinical reviews, approved or denied determinations and any other requests to dedicated fax number below belonging to the campus where the patient is receiving treatment. List of dedicated fax numbers for the Facilities:  FACILITY NAME UR FAX NUMBER   ADMISSION DENIALS (Administrative/Medical Necessity) [phone]   DISCHARGE SUPPORT TEAM (NETWORK) [phone]   PARENT CHILD HEALTH (Maternity/NICU/Pediatrics) [phone]   [facility] [phone]   [facility] [phone]   [facility] [phone]   [facility] [phone]   [facility] [phone]   [facility] [phone]   [facility] [phone]   [facility] [phone]   [facility] [phone]   [facility] [phone]   [facility] [phone]   [facility] [phone]

## 2024-03-22 NOTE — PROGRESS NOTES
[facility]  Progress Note  Name: Montse Smith I  MRN: [id number]  Unit/Bed#: S -01 I Date of Admission: 3/21/2024   Date of Service: 3/22/2024 I Hospital Day: 0    Assessment/Plan   UTI (urinary tract infection)  Assessment & Plan  UA with innumerable WBCs, RBCs, bacteria  In the setting of obstructing UVJ stone    Plan  Continue ceftriaxone-day 2 today  Follow-up on urine culture from OR 3/21  As needed pain medications    * Hydronephrosis with ureteral calculus  Assessment & Plan  Presented with lower back pain, found to have bilateral kidney stones with left-sided UVJ obstruction causing hydronephrosis  UA with innumerable WBCs and bacteria, is having hematuria as well.    3/21 s/p left-sided ureteral stent placed. Urology will arrange for left ureteroscopy and laser lithotripsy at an interval after convalescence. If no stone is noted on CT scan then we can perform ureteral stent removal in the office setting per urology    Positive blood cultures  Assessment & Plan  2 of 2 blood cultures from 3/21 are with GPCs in clusters.   Blood cultures ID has detected Staph epidermidis.   Low concern this is true infection, suspect likely skin contamination.   3/22 patient has remained afebrile and clinically improving with leukocytosis resolving    Plan  Repeat blood cultures x 2  Continue IV ceftriaxone  If patient develops clinical signs of bacteremia/sepsis in mean time can then start IV Vancomycin   Monitor for fever, hypotension, a.m. CBC    Sepsis (HCC)  Assessment & Plan  Meeting SIRS criteria with tachycardia, tachypnea.  More likely secondary to pain, however she does have a renal stone with possible superinfection.  We will cover with antibiotics as above under UTI.  Not meeting any criteria for severe sepsis.  Resolving    Major depressive disorder, recurrent severe without psychotic features (HCC)  Assessment & Plan  Continue home desvenlafaxine 50 mg daily, lamotrigine 100 mg  twice daily  Patient also reported taking Abilify 10 mg daily     Essential hypertension  Assessment & Plan  Continue home lisinopril 20 mg daily    Diabetes 1.5, managed as type 2 (HCC)  Assessment & Plan  Lab Results   Component Value Date    HGBA1C 12.2 (H) 11/24/2023       Recent Labs     03/21/24  2231 03/22/24  0057 03/22/24  0614 03/22/24  1142   POCGLU 347* 333* 279* 209*       Blood Sugar Average: Last 72 hrs:    Home regimen: Lantus 40 units twice daily, lispro 18 units 3 times daily with meals, metformin 500mg twice daily    Plan  Continue home Lantus 40 units twice daily  18 units 3 times daily with meals  SSI               VTE Pharmacologic Prophylaxis: VTE Score: 5 High Risk (Score >/= 5) - Pharmacological DVT Prophylaxis Ordered: enoxaparin (Lovenox). Sequential Compression Devices Ordered.    Mobility:   Basic Mobility Inpatient Raw Score: 23  JH-HLM Goal: 7: Walk 25 feet or more  JH-HLM Achieved: 7: Walk 25 feet or more      Patient Centered Rounds: I performed bedside rounds with nursing staff today.   Discussions with Specialists or Other Care Team Provider:     Education and Discussions with Family / Patient: Updated  (daughter) at bedside.    Total Time Spent on Date of Encounter in care of patient: 30 mins. This time was spent on one or more of the following: performing physical exam; counseling and coordination of care; obtaining or reviewing history; documenting in the medical record; reviewing/ordering tests, medications or procedures; communicating with other healthcare professionals and discussing with patient's family/caregivers.    Current Length of Stay: 0 day(s)  Current Patient Status: Observation   Certification Statement:   Discharge Plan: Anticipate discharge in 48 hrs to home with home services.    Code Status: Level 1 - Full Code    Subjective:   Patient was seen and examined at bedside this morning.  Patient comfortably laying in bed not in acute distress.  Patient  denies having any chest pain, shortness of breath although still endorses having flank pain which has been resolving.  Otherwise no questions or concerns at this time  Objective:     Vitals:   Temp (24hrs), Av.5 °F (36.4 °C), Min:96.5 °F (35.8 °C), Max:98.5 °F (36.9 °C)    Temp:  [96.5 °F (35.8 °C)-98.5 °F (36.9 °C)] 96.5 °F (35.8 °C)  HR:  [] 82  Resp:  [16-21] 16  BP: (116-152)/(56-85) 150/76  SpO2:  [88 %-94 %] 93 %  Body mass index is 66.61 kg/m².     Input and Output Summary (last 24 hours):     Intake/Output Summary (Last 24 hours) at 3/22/2024 1354  Last data filed at 3/21/2024 1415  Gross per 24 hour   Intake 300 ml   Output --   Net 300 ml       Physical Exam:   Physical Exam  Constitutional:       General: She is not in acute distress.     Appearance: She is obese. She is not ill-appearing.   HENT:      Mouth/Throat:      Mouth: Mucous membranes are moist.      Pharynx: Oropharynx is clear.   Cardiovascular:      Rate and Rhythm: Normal rate and regular rhythm.      Heart sounds: No murmur heard.  Pulmonary:      Effort: Pulmonary effort is normal. No respiratory distress.      Breath sounds: Normal breath sounds. No wheezing.   Abdominal:      General: Abdomen is flat. There is no distension.      Palpations: Abdomen is soft.      Tenderness: There is no abdominal tenderness. There is left CVA tenderness. There is no right CVA tenderness or guarding.   Musculoskeletal:      Right lower leg: No edema.      Left lower leg: No edema.   Skin:     General: Skin is warm and dry.   Neurological:      Mental Status: She is alert. Mental status is at baseline.   Psychiatric:         Mood and Affect: Mood normal.          Additional Data:     Labs:  Results from last 7 days   Lab Units 24  0056   WBC Thousand/uL 10.12   HEMOGLOBIN g/dL 12.9   HEMATOCRIT % 39.4   PLATELETS Thousands/uL 247   NEUTROS PCT % 84*   LYMPHS PCT % 10*   MONOS PCT % 5   EOS PCT % 0     Results from last 7 days   Lab Units  03/22/24  0056 03/21/24  0314   SODIUM mmol/L 130* 132*   POTASSIUM mmol/L 4.9 4.8   CHLORIDE mmol/L 98 96   CO2 mmol/L 23 22   BUN mg/dL 18 13   CREATININE mg/dL 0.87 0.77   ANION GAP mmol/L 9 14*   CALCIUM mg/dL 8.4 9.4   ALBUMIN g/dL  --  4.2   TOTAL BILIRUBIN mg/dL  --  0.80   ALK PHOS U/L  --  61   ALT U/L  --  46   AST U/L  --  32   GLUCOSE RANDOM mg/dL 321* 316*     Results from last 7 days   Lab Units 03/21/24  0649   INR  0.77*     Results from last 7 days   Lab Units 03/22/24  1142 03/22/24  0614 03/22/24  0057 03/21/24  2231 03/21/24 2005 03/21/24  1917 03/21/24  1112   POC GLUCOSE mg/dl 209* 279* 333* 347* 388* 352* 184*         Results from last 7 days   Lab Units 03/21/24  1024 03/21/24  0649   LACTIC ACID mmol/L 1.5 2.4*       Lines/Drains:  Invasive Devices       Peripheral Intravenous Line  Duration             Peripheral IV 03/22/24 Left;Ventral (anterior) Forearm <1 day              Drain  Duration             Ureteral Internal Stent Left ureter 6 Fr. <1 day                          Imaging:     Recent Cultures (last 7 days):   Results from last 7 days   Lab Units 03/21/24  0649 03/21/24  0448   GRAM STAIN RESULT  Gram positive cocci in clusters*  Gram positive cocci in clusters*  --    URINE CULTURE   --  >100,000 cfu/ml Escherichia coli*       Last 24 Hours Medication List:   Current Facility-Administered Medications   Medication Dose Route Frequency Provider Last Rate    acetaminophen  975 mg Oral Q8H [facility] Julisaeyan Kerr, DO      ARIPiprazole  10 mg Oral Daily Kalin Sands MD      cefTRIAXone  2,000 mg Intravenous Q24H Shreyan Kerr, DO 2,000 mg (03/22/24 0607)    desvenlafaxine succinate  50 mg Oral Daily Shreyan Kerr, DO      dicyclomine  20 mg Oral Q6H PRN Shreyan Kerr, DO      enoxaparin  60 mg Subcutaneous BID Shreyan Kerr, DO      HYDROmorphone  0.2 mg Intravenous Q2H PRN Shreyan Kerr, DO      insulin glargine  40 Units Subcutaneous QAM Ester Kerr, DO      insulin glargine  40 Units  Subcutaneous HS Shreyan Kerr, DO      insulin lispro  18 Units Subcutaneous TID With Meals Kalin Sands MD      insulin lispro  2-12 Units Subcutaneous TID AC Shreyan Kerr, DO      ketorolac  15 mg Intravenous Q6H PRN Shreyan Kerr, DO      labetalol  10 mg Intravenous Q6H PRN Shreyan Kerr, DO      lamoTRIgine  100 mg Oral BID Shreyan Kerr, DO      lisinopril  20 mg Oral Daily Shreyan Kerr, DO      ondansetron  4 mg Intravenous Q4H PRN Shreyan Kerr, DO      oxyCODONE  2.5 mg Oral Q4H PRN Shreyan Kerr, DO      Or    oxyCODONE  5 mg Oral Q4H PRN Shreyan Kerr, DO          Today, Patient Was Seen By: Kalin Sands MD    **Please Note: This note may have been constructed using a voice recognition system.**

## 2024-03-22 NOTE — PROGRESS NOTES
[facility] Note  Name: Montse Smith I  MRN: [id number]  Unit/Bed#: S -01 I Date of Admission: 3/21/2024   Date of Service: 3/22/2024 I Hospital Day: 0    Assessment/Plan   * Hydronephrosis with ureteral calculus  Assessment & Plan  S/p cystoscopy, retropyelogram, insertion of right ureteral stent.  IntraOp Fluoroscopy did not show evidence of a stone in the left ureter but did show retained contrast in ureter and collecting system from contrasted CT scan 10 hours ago.  CT scan prior to ureteroscopy  Urine culture gram-negative rods.  Blood cultures positive for Staph epidermidis.  ID consulted  Vital signs stable, afebrile  Mild pain associated with stent  Stent colic medications ordered  Patient will follow-up in the office after CT scan.  If CT does not show stone, will be set up for cystoscopy stent removal.  If stone still present we will schedule ureteroscopy.  Antibiosis per ID  Urology will sign off at this time.  Please do not hesitate to reach out with any questions or concerns    UTI (urinary tract infection)  Assessment & Plan  Urine culture gram-negative rods  Blood culture Staph epidermidis  ID on board.           Subjective:   Seen at bedside.  Reporting stent pain.  Denies any other complaints    Review of Systems   Constitutional:  Negative for chills and fever.   Respiratory:  Negative for cough and shortness of breath.    Cardiovascular:  Negative for chest pain and palpitations.   Gastrointestinal:  Positive for abdominal pain. Negative for vomiting.   Genitourinary:  Negative for dysuria and hematuria.   Musculoskeletal:  Negative for arthralgias and back pain.   Skin:  Negative for color change and rash.   Neurological:  Negative for seizures and syncope.   All other systems reviewed and are negative.      Objective:  Vitals: Blood pressure 150/76, pulse 82, temperature (!) 96.5 °F (35.8 °C), resp. rate 16, height 5' (1.524 m), weight (!) 155 kg (341 lb  0.8 oz), SpO2 93%.,Body mass index is 66.61 kg/m².    Physical Exam  Vitals reviewed.   Constitutional:       General: She is not in acute distress.     Appearance: Normal appearance. She is obese. She is not ill-appearing, toxic-appearing or diaphoretic.   HENT:      Head: Normocephalic and atraumatic.      Right Ear: External ear normal.      Left Ear: External ear normal.      Nose: Nose normal.      Mouth/Throat:      Mouth: Mucous membranes are moist.   Eyes:      General: No scleral icterus.     Conjunctiva/sclera: Conjunctivae normal.   Cardiovascular:      Rate and Rhythm: Normal rate.      Pulses: Normal pulses.   Pulmonary:      Effort: Pulmonary effort is normal.   Musculoskeletal:         General: Normal range of motion.      Cervical back: Normal range of motion.   Skin:     General: Skin is warm.      Findings: No rash.   Neurological:      General: No focal deficit present.      Mental Status: She is alert and oriented to person, place, and time. Mental status is at baseline.   Psychiatric:         Mood and Affect: Mood normal.         Behavior: Behavior normal.         Thought Content: Thought content normal.         Judgment: Judgment normal.           Labs:  Recent Labs     03/21/24  0314 03/22/24  0056   WBC 11.75* 10.12     Recent Labs     03/21/24  0314 03/22/24  0056   HGB 15.5* 12.9       Recent Labs     03/21/24 0314 03/22/24  0056   CREATININE 0.77 0.87         History:  Social History     Socioeconomic History    Marital status:      Spouse name: None    Number of children: 1    Years of education: None    Highest education level: None   Occupational History    None   Tobacco Use    Smoking status: Never    Smokeless tobacco: Never   Vaping Use    Vaping status: Never Used   Substance and Sexual Activity    Alcohol use: Not Currently     Comment: occassionaly     Drug use: No    Sexual activity: Not Currently   Other Topics Concern    None   Social History Narrative    · Most  recent tobacco use screenin2019      · Do you currently or have you served in the [place] [employer]:   No      · Were you activated, into active duty, as a member of the [employer] or as a Reservist:   No      · General stress level:   High       · Caffeine intake:   Occasional      · Seat belts used routinely:   Yes      · Sunscreen used routinely:   Yes      · Smoke alarm in home:   Yes      Social Determinants of Health     Financial Resource Strain: Patient Declined (2023)    Received from [facility]    Overall Financial Resource Strain (CARDIA)     Difficulty of Paying Living Expenses: Patient declined   Food Insecurity: Patient Declined (2023)    Received from [facility]    Hunger Vital Sign     Worried About Running Out of Food in the Last Year: Patient declined     Ran Out of Food in the Last Year: Patient declined   Transportation Needs: Patient Declined (2023)    Received from [facility]    PRAPARE - Transportation     Lack of Transportation (Medical): Patient declined     Lack of Transportation (Non-Medical): Patient declined   Physical Activity: Not on file   Stress: Not on file   Social Connections: Unknown (2023)    Received from [facility]    Social Connection and Isolation Panel [NHANES]     Frequency of Communication with Friends and Family: Three times a week     Frequency of Social Gatherings with Friends and Family: Once a week     Attends Hoahaoism Services: Patient declined     Active Member of Clubs or Organizations: Patient declined     Attends Club or Organization Meetings: Patient declined     Marital Status: Patient declined   Intimate Partner Violence: Patient Declined (2023)    Received from [facility]    Humiliation, Afraid, Rape, and Kick questionnaire     Fear of Current or Ex-Partner: Patient declined     Emotionally Abused: Patient declined     Physically  Abused: Patient declined     Sexually Abused: Patient declined   Housing Stability: Unknown (2023)    Received from [facility]    Housing Stability Vital Sign     Unable to Pay for Housing in the Last Year: Patient refused     Number of Places Lived in the Last Year: Not on file     Unstable Housing in the Last Year: Patient refused       Past Medical History:   Diagnosis Date    Anemia     Anxiety     Candidal intertrigo     Depression     Diabetes mellitus (HCC)     GERD (gastroesophageal reflux disease)     Hyperlipidemia     Hypertension     Hyponatremia 2023    Irritable bowel syndrome     Morbid obesity with BMI of 60.0-69.9, adult (HCC)     Osteoarthritis     Seasonal allergies     Sleep difficulties     Suicide attempt (HCC)      Past Surgical History:   Procedure Laterality Date     SECTION  1992    CHOLECYSTECTOMY  2008    FL RETROGRADE PYELOGRAM  3/21/2024    HI CYSTO/URETERO W/LITHOTRIPSY &INDWELL STENT INSRT Left 3/21/2024    Procedure: CYSTOSCOPY, RETROGRADE PYELOGRAM AND INSERTION STENT URETERAL;  Surgeon: Nabil Desir MD;  Location: [facility];  Service: Urology    WISDOM TOOTH EXTRACTION       Family History   Problem Relation Age of Onset    Diabetes Mother     Hypertension Father        Massiel Oakes PA-C  Date: 3/22/2024 Time: 10:56 AM

## 2024-03-22 NOTE — ASSESSMENT & PLAN NOTE
UA with innumerable WBCs, RBCs, bacteria  In the setting of obstructing UVJ stone    Plan  Continue ceftriaxone-day 2 today  Follow-up on urine culture from OR 3/21  As needed pain medications

## 2024-03-23 LAB
ANION GAP SERPL CALCULATED.3IONS-SCNC: 7 MMOL/L (ref 4–13)
BACTERIA UR CULT: ABNORMAL
BASOPHILS # BLD AUTO: 0.07 THOUSANDS/ÂΜL (ref 0–0.1)
BASOPHILS NFR BLD AUTO: 1 % (ref 0–1)
BUN SERPL-MCNC: 12 MG/DL (ref 5–25)
CALCIUM SERPL-MCNC: 8.5 MG/DL (ref 8.4–10.2)
CHLORIDE SERPL-SCNC: 102 MMOL/L (ref 96–108)
CO2 SERPL-SCNC: 28 MMOL/L (ref 21–32)
CREAT SERPL-MCNC: 0.62 MG/DL (ref 0.6–1.3)
EOSINOPHIL # BLD AUTO: 0.23 THOUSAND/ÂΜL (ref 0–0.61)
EOSINOPHIL NFR BLD AUTO: 3 % (ref 0–6)
ERYTHROCYTE [DISTWIDTH] IN BLOOD BY AUTOMATED COUNT: 13 % (ref 11.6–15.1)
GFR SERPL CREATININE-BSD FRML MDRD: 104 ML/MIN/1.73SQ M
GLUCOSE SERPL-MCNC: 151 MG/DL (ref 65–140)
GLUCOSE SERPL-MCNC: 155 MG/DL (ref 65–140)
GLUCOSE SERPL-MCNC: 166 MG/DL (ref 65–140)
GLUCOSE SERPL-MCNC: 178 MG/DL (ref 65–140)
GLUCOSE SERPL-MCNC: 250 MG/DL (ref 65–140)
GRAM STN SPEC: ABNORMAL
GRAM STN SPEC: ABNORMAL
HCT VFR BLD AUTO: 40.4 % (ref 34.8–46.1)
HGB BLD-MCNC: 12.8 G/DL (ref 11.5–15.4)
IMM GRANULOCYTES # BLD AUTO: 0.05 THOUSAND/UL (ref 0–0.2)
IMM GRANULOCYTES NFR BLD AUTO: 1 % (ref 0–2)
LYMPHOCYTES # BLD AUTO: 2.93 THOUSANDS/ÂΜL (ref 0.6–4.47)
LYMPHOCYTES NFR BLD AUTO: 40 % (ref 14–44)
MCH RBC QN AUTO: 30.1 PG (ref 26.8–34.3)
MCHC RBC AUTO-ENTMCNC: 31.7 G/DL (ref 31.4–37.4)
MCV RBC AUTO: 95 FL (ref 82–98)
MONOCYTES # BLD AUTO: 0.64 THOUSAND/ÂΜL (ref 0.17–1.22)
MONOCYTES NFR BLD AUTO: 9 % (ref 4–12)
NEUTROPHILS # BLD AUTO: 3.43 THOUSANDS/ÂΜL (ref 1.85–7.62)
NEUTS SEG NFR BLD AUTO: 46 % (ref 43–75)
NRBC BLD AUTO-RTO: 0 /100 WBCS
PLATELET # BLD AUTO: 271 THOUSANDS/UL (ref 149–390)
PMV BLD AUTO: 10 FL (ref 8.9–12.7)
POTASSIUM SERPL-SCNC: 4 MMOL/L (ref 3.5–5.3)
RBC # BLD AUTO: 4.25 MILLION/UL (ref 3.81–5.12)
SODIUM SERPL-SCNC: 137 MMOL/L (ref 135–147)
WBC # BLD AUTO: 7.35 THOUSAND/UL (ref 4.31–10.16)

## 2024-03-23 PROCEDURE — 99232 SBSQ HOSP IP/OBS MODERATE 35: CPT | Performed by: INTERNAL MEDICINE

## 2024-03-23 PROCEDURE — 97162 PT EVAL MOD COMPLEX 30 MIN: CPT

## 2024-03-23 PROCEDURE — 85025 COMPLETE CBC W/AUTO DIFF WBC: CPT

## 2024-03-23 PROCEDURE — 82948 REAGENT STRIP/BLOOD GLUCOSE: CPT

## 2024-03-23 PROCEDURE — 80048 BASIC METABOLIC PNL TOTAL CA: CPT

## 2024-03-23 RX ORDER — LORAZEPAM 0.5 MG/1
0.5 TABLET ORAL EVERY 8 HOURS PRN
Status: DISCONTINUED | OUTPATIENT
Start: 2024-03-23 | End: 2024-03-25 | Stop reason: HOSPADM

## 2024-03-23 RX ADMIN — INSULIN LISPRO 2 UNITS: 100 INJECTION, SOLUTION INTRAVENOUS; SUBCUTANEOUS at 17:55

## 2024-03-23 RX ADMIN — ENOXAPARIN SODIUM 60 MG: 60 INJECTION SUBCUTANEOUS at 09:02

## 2024-03-23 RX ADMIN — CEFTRIAXONE SODIUM 2000 MG: 10 INJECTION, POWDER, FOR SOLUTION INTRAVENOUS at 06:04

## 2024-03-23 RX ADMIN — ENOXAPARIN SODIUM 60 MG: 60 INJECTION SUBCUTANEOUS at 17:55

## 2024-03-23 RX ADMIN — Medication 2.5 MG: at 20:00

## 2024-03-23 RX ADMIN — ACETAMINOPHEN 975 MG: 325 TABLET, FILM COATED ORAL at 13:22

## 2024-03-23 RX ADMIN — ACETAMINOPHEN 975 MG: 325 TABLET, FILM COATED ORAL at 21:13

## 2024-03-23 RX ADMIN — INSULIN LISPRO 18 UNITS: 100 INJECTION, SOLUTION INTRAVENOUS; SUBCUTANEOUS at 09:04

## 2024-03-23 RX ADMIN — ACETAMINOPHEN 975 MG: 325 TABLET, FILM COATED ORAL at 06:05

## 2024-03-23 RX ADMIN — INSULIN LISPRO 18 UNITS: 100 INJECTION, SOLUTION INTRAVENOUS; SUBCUTANEOUS at 17:55

## 2024-03-23 RX ADMIN — DESVENLAFAXINE 50 MG: 50 TABLET, EXTENDED RELEASE ORAL at 09:04

## 2024-03-23 RX ADMIN — ARIPIPRAZOLE 10 MG: 5 TABLET ORAL at 09:01

## 2024-03-23 RX ADMIN — LISINOPRIL 20 MG: 20 TABLET ORAL at 09:01

## 2024-03-23 RX ADMIN — INSULIN LISPRO 2 UNITS: 100 INJECTION, SOLUTION INTRAVENOUS; SUBCUTANEOUS at 09:04

## 2024-03-23 RX ADMIN — INSULIN GLARGINE 40 UNITS: 100 INJECTION, SOLUTION SUBCUTANEOUS at 21:13

## 2024-03-23 RX ADMIN — Medication 2.5 MG: at 09:08

## 2024-03-23 RX ADMIN — LORAZEPAM 0.5 MG: 0.5 TABLET ORAL at 21:13

## 2024-03-23 RX ADMIN — INSULIN LISPRO 2 UNITS: 100 INJECTION, SOLUTION INTRAVENOUS; SUBCUTANEOUS at 13:22

## 2024-03-23 RX ADMIN — LAMOTRIGINE 100 MG: 100 TABLET ORAL at 17:55

## 2024-03-23 RX ADMIN — INSULIN GLARGINE 40 UNITS: 100 INJECTION, SOLUTION SUBCUTANEOUS at 09:01

## 2024-03-23 RX ADMIN — LAMOTRIGINE 100 MG: 100 TABLET ORAL at 09:01

## 2024-03-23 RX ADMIN — INSULIN LISPRO 18 UNITS: 100 INJECTION, SOLUTION INTRAVENOUS; SUBCUTANEOUS at 13:22

## 2024-03-23 NOTE — PLAN OF CARE
Problem: PAIN - ADULT  Goal: Verbalizes/displays adequate comfort level or baseline comfort level  Description: Interventions:  - Encourage patient to monitor pain and request assistance  - Assess pain using appropriate pain scale  - Administer analgesics based on type and severity of pain and evaluate response  - Implement non-pharmacological measures as appropriate and evaluate response  - Consider cultural and social influences on pain and pain management  - Notify physician/advanced practitioner if interventions unsuccessful or patient reports new pain  Outcome: Progressing     Problem: SAFETY ADULT  Goal: Patient will remain free of falls  Description: INTERVENTIONS:  - Educate patient/family on patient safety including physical limitations  - Instruct patient to call for assistance with activity   - Consult OT/PT to assist with strengthening/mobility   - Keep Call bell within reach  - Keep bed low and locked with side rails adjusted as appropriate  - Keep care items and personal belongings within reach  - Initiate and maintain comfort rounds  - Make Fall Risk Sign visible to staff  - Offer Toileting every  Hours, in advance of need  - Initiate/Maintain alarm  - Obtain necessary fall risk management equipment:   - Apply yellow socks and bracelet for high fall risk patients  - Consider moving patient to room near nurses station  Outcome: Progressing  Goal: Maintain or return to baseline ADL function  Description: INTERVENTIONS:  -  Assess patient's ability to carry out ADLs; assess patient's baseline for ADL function and identify physical deficits which impact ability to perform ADLs (bathing, care of mouth/teeth, toileting, grooming, dressing, etc.)  - Assess/evaluate cause of self-care deficits   - Assess range of motion  - Assess patient's mobility; develop plan if impaired  - Assess patient's need for assistive devices and provide as appropriate  - Encourage maximum independence but intervene and supervise  when necessary  - Involve family in performance of ADLs  - Assess for home care needs following discharge   - Consider OT consult to assist with ADL evaluation and planning for discharge  - Provide patient education as appropriate  Outcome: Progressing  Goal: Maintains/Returns to pre admission functional level  Description: INTERVENTIONS:  - Perform AM-PAC 6 Click Basic Mobility/ Daily Activity assessment daily.  - Set and communicate daily mobility goal to care team and patient/family/caregiver.   - Collaborate with rehabilitation services on mobility goals if consulted  - Perform Range of Motion  times a day.  - Reposition patient every  hours.  - Dangle patient  times a day  - Stand patient  times a day  - Ambulate patient  times a day  - Out of bed to chair  times a day   - Out of bed for meal times a day  - Out of bed for toileting  - Record patient progress and toleration of activity level   Outcome: Progressing     Problem: DISCHARGE PLANNING  Goal: Discharge to home or other facility with appropriate resources  Description: INTERVENTIONS:  - Identify barriers to discharge w/patient and caregiver  - Arrange for needed discharge resources and transportation as appropriate  - Identify discharge learning needs (meds, wound care, etc.)  - Arrange for interpretive services to assist at discharge as needed  - Refer to Case Management Department for coordinating discharge planning if the patient needs post-hospital services based on physician/advanced practitioner order or complex needs related to functional status, cognitive ability, or social support system  Outcome: Progressing     Problem: INFECTION - ADULT  Goal: Absence or prevention of progression during hospitalization  Description: INTERVENTIONS:  - Assess and monitor for signs and symptoms of infection  - Monitor lab/diagnostic results  - Monitor all insertion sites, i.e. indwelling lines, tubes, and drains  - Monitor endotracheal if appropriate and nasal  secretions for changes in amount and color  - Ovalo appropriate cooling/warming therapies per order  - Administer medications as ordered  - Instruct and encourage patient and family to use good hand hygiene technique  - Identify and instruct in appropriate isolation precautions for identified infection/condition  Outcome: Progressing  Goal: Absence of fever/infection during neutropenic period  Description: INTERVENTIONS:  - Monitor WBC    Outcome: Progressing     Problem: GENITOURINARY - ADULT  Goal: Maintains or returns to baseline urinary function  Description: INTERVENTIONS:  - Assess urinary function  - Encourage oral fluids to ensure adequate hydration if ordered  - Administer IV fluids as ordered to ensure adequate hydration  - Administer ordered medications as needed  - Offer frequent toileting  - Follow urinary retention protocol if ordered  Outcome: Progressing  Goal: Absence of urinary retention  Description: INTERVENTIONS:  - Assess patient’s ability to void and empty bladder  - Monitor I/O  - Bladder scan as needed  - Discuss with physician/AP medications to alleviate retention as needed  - Discuss catheterization for long term situations as appropriate  Outcome: Progressing

## 2024-03-23 NOTE — ASSESSMENT & PLAN NOTE
Lab Results   Component Value Date    HGBA1C 12.2 (H) 11/24/2023       Recent Labs     03/22/24  1142 03/22/24  1557 03/22/24 2056 03/23/24  0831   POCGLU 209* 195* 239* 155*         Blood Sugar Average: Last 72 hrs:    Home regimen: Lantus 40 units twice daily, lispro 18 units 3 times daily with meals, metformin 500mg twice daily    Plan  Continue home Lantus 40 units twice daily  18 units 3 times daily with meals  SSI

## 2024-03-23 NOTE — PHYSICAL THERAPY NOTE
PHYSICAL THERAPY EVALUATION  NAME: Montse Smith  AGE:   49 y.o.  MRN:  [id number]  ADMIT DX: Back pain [M54.9]  UTI (urinary tract infection) [N39.0]  Nausea and vomiting [R11.2]  Hydronephrosis with obstructing calculus [N13.2]    PMH:   Past Medical History:   Diagnosis Date    Anemia     Anxiety     Candidal intertrigo     Depression     Diabetes mellitus (HCC)     GERD (gastroesophageal reflux disease)     Hyperlipidemia     Hypertension     Hyponatremia 11/26/2023    Irritable bowel syndrome     Morbid obesity with BMI of 60.0-69.9, adult ([facility])     Osteoarthritis     Seasonal allergies     Sleep difficulties     Suicide attempt ([facility])      LENGTH OF STAY: 1        03/23/24 1309   PT Last Visit   PT Visit Date 03/23/24   Note Type   Note type Evaluation   Pain Assessment   Pain Assessment Tool 0-10   Pain Score 4   Pain Location/Orientation Location: Back;Orientation: Lower   Hospital Pain Intervention(s) Repositioned;Ambulation/increased activity   Restrictions/Precautions   Weight Bearing Precautions Per Order No   Other Precautions Pain   Home Living   Type of Home House   Home Layout Two level;Stairs to enter with rails  (4 ALISON)   Bathroom Equipment Shower chair   Additional Comments Ambulates independently without AD at baseline.  Pt does not ambulate far distances or stand long periods at baseline due to knee arthritis.   Prior Function   Level of [place] Independent with ADLs;Independent with functional mobility;Independent with IADLS   Lives With Spouse   IADLs Independent with driving;Independent with medication management;Independent with meal prep   Falls in the last 6 months 1 to 4  (1, tripped over tv stand)   General   Additional Pertinent History S/p cystoscopy, retrograde pyelogram and insertion of stent ureteral   Family/Caregiver Present Yes   Cognition   Overall Cognitive Status WFL   Arousal/Participation Cooperative   Orientation Level Oriented X4   Memory Within functional limits    Following Commands Follows all commands and directions without difficulty   Comments Pt identified by name and .   Subjective   Subjective Agrees to PT evaluation and is pleasant and cooperative throughout session.   RLE Assessment   RLE Assessment X   Strength RLE   RLE Overall Strength 4+/5  (functionally)   LLE Assessment   LLE Assessment X   Strength LLE   LLE Overall Strength 4+/5  (functionally)   Bed Mobility   Supine to Sit 6  Modified independent   Additional items HOB elevated;Bedrails;Increased time required   Sit to Supine 6  Modified independent   Additional items HOB elevated;Bedrails;Increased time required   Transfers   Sit to Stand 6  Modified independent   Additional items Increased time required   Stand to Sit 5  Supervision   Additional items Impulsive;Verbal cues   Ambulation/Elevation   Gait pattern Decreased foot clearance;Wide KETURAH;Short stride  (increased lateral sway due to body habitus)   Gait Assistance 6  Modified independent   Assistive Device None   Distance ~35` x1   Stair Management Assistance Not tested  (pt declined need for stair negotiation; reports she feels confident in being able to manage at home)   Balance   Static Sitting Good   Dynamic Sitting Fair +   Static Standing Fair   Dynamic Standing Fair   Ambulatory Fair   Endurance Deficit   Endurance Deficit Yes   Endurance Deficit Description limited ambulation distance, slow gait speed   Activity Tolerance   Activity Tolerance Patient limited by fatigue;Patient limited by pain   Nurse Made Aware Per RN, pt appropriate to evaluate   Assessment   Problem List Decreased endurance;Decreased mobility;Obesity;Pain   Assessment Pt is a 49 y.o. female seen for PT evaluation s/p admit to [facility] on 2022 w/ Hydronephrosis with ureteral calculus.  Order placed for PT. Comorbidities affecting pt's physical performance at time of assessment include: DM, HTN, and obesity. Personal factors affecting pt at time of IE  include: anxiety, depression, steps to enter environment, and multi-level environment. Prior to admission, pt was was independent w/ all functional mobility w/ out AD, lived in multi-level home, had 4 ALISON (+) railing, and lived with  . Upon evaluation: Pt requires mod I for bed mobility, mod I for sit to stand, and mod I for ambulation without AD. Pt declines stair trial as pt feels confident in being able to complete them at home without problem.  (Please find full objective findings from PT assessment regarding body systems outlined above). Impairments and limitations also listed above, especially due to  impaired balance, decreased endurance, gait deviations, pain, and fall risk.  Pt's clinical presentation is currently evolving seen in pt's presentation of fall risk, pain with mobility, recent surgery.  No acute skilled PT needs at this time as pt is close to her functional baseline.  Will discharge pt from PT caseload at this time.   Goals   Patient Goals to go home   Discharge Recommendation   Rehab Resource Intensity Level, PT No post-acute rehabilitation needs   AM-PAC Basic Mobility Inpatient   Turning in Flat Bed Without Bedrails 4   Lying on Back to Sitting on Edge of Flat Bed Without Bedrails 4   Moving Bed to Chair 4   Standing Up From Chair Using Arms 4   Walk in Room 4   Climb 3-5 Stairs With Railing 3   Basic Mobility Inpatient Raw Score 23   Basic Mobility Standardized Score 50.88   [facility] Highest Level Of Mobility   -HLM Goal 7: Walk 25 feet or more   -HLM Achieved 7: Walk 25 feet or more   End of Consult   Patient Position at End of Consult Supine;All needs within reach     The patient's AM-PAC Basic Mobility Inpatient Short Form Raw Score is 23, Standardized Score is 50.88.  A Raw Score of greater than or equal to 16 suggests the patient may benefit from discharge to home. However please refer to therapist recommendation for discharge planning given other factors that may  influence destination.     Adapted from Branden CRISOSTOMO, Chevy J, Reyna J, Tiff DEAN. Association of -PAC “6-Clicks” Basic Mobility and Daily Activity Scores With Discharge Destination. Physical Therapy, 2021;101:1-9. DOI: [email or web address]      Flavia Amor PT,DPT

## 2024-03-23 NOTE — OCCUPATIONAL THERAPY NOTE
Occupational Therapy Screen     03/23/24 1322   OT Last Visit   OT Visit Date 03/23/24   Note Type   Note type Screen   Additional Comments Spoke with pt who reports pt is independent mobilizing and completing self care tasks in the room.  No acute OT needs, and OT orders to be d/c at this time.  Please re-consult OT if functional concerns arise.   Pain Assessment   Pain Score 4   Pain Location/Orientation Location: Back;Orientation: Lower     SHI Li, OTR/L, CSRS, CBIS  [place] License [id number]  [place] License [id number]

## 2024-03-23 NOTE — PROGRESS NOTES
[facility]  Progress Note  Name: Montse Smith I  MRN: [id number]  Unit/Bed#: S -01 I Date of Admission: 3/21/2024   Date of Service: 3/23/2024 I Hospital Day: 1    Assessment/Plan   UTI (urinary tract infection)  Assessment & Plan  UA with innumerable WBCs, RBCs, bacteria  In the setting of obstructing UVJ stone    Plan  Continue ceftriaxone-day 3 today  Follow-up on urine culture from OR 3/21  As needed pain medications    * Hydronephrosis with ureteral calculus  Assessment & Plan  Presented with lower back pain, found to have bilateral kidney stones with left-sided UVJ obstruction causing hydronephrosis  UA with innumerable WBCs and bacteria, is having hematuria as well.    3/21 s/p left-sided ureteral stent placed. Urology will arrange for left ureteroscopy and laser lithotripsy at an interval after convalescence. If no stone is noted on CT scan then we can perform ureteral stent removal in the office setting per urology    Positive blood cultures  Assessment & Plan  2 of 2 blood cultures from 3/21 are with GPCs in clusters.   Blood cultures ID has detected Staph epidermidis.   Low concern this is true infection, suspect likely skin contamination.   3/22 patient has remained afebrile and clinically improving with leukocytosis resolving    Plan  Repeat blood cultures x 2  Continue IV ceftriaxone  If patient develops clinical signs of bacteremia/sepsis in mean time can then start IV Vancomycin   Monitor for fever, hypotension, a.m. CBC    Diabetes 1.5, managed as type 2 (HCC)  Assessment & Plan  Lab Results   Component Value Date    HGBA1C 12.2 (H) 11/24/2023       Recent Labs     03/22/24  1142 03/22/24  1557 03/22/24  2056 03/23/24  0831   POCGLU 209* 195* 239* 155*         Blood Sugar Average: Last 72 hrs:    Home regimen: Lantus 40 units twice daily, lispro 18 units 3 times daily with meals, metformin 500mg twice daily    Plan  Continue home Lantus 40 units twice daily  18 units  3 times daily with meals  SSI    Sepsis (HCC)  Assessment & Plan  Meeting SIRS criteria with tachycardia, tachypnea.  More likely secondary to pain, however she does have a renal stone with possible superinfection.  We will cover with antibiotics as above under UTI.  Not meeting any criteria for severe sepsis.  Resolving    Major depressive disorder, recurrent severe without psychotic features (HCC)  Assessment & Plan  Continue home desvenlafaxine 50 mg daily, lamotrigine 100 mg twice daily  Patient also reported taking Abilify 10 mg daily     Essential hypertension  Assessment & Plan  Continue home lisinopril 20 mg daily               VTE Pharmacologic Prophylaxis: VTE Score: 5 High Risk (Score >/= 5) - Pharmacological DVT Prophylaxis Ordered: enoxaparin (Lovenox). Sequential Compression Devices Ordered.    Mobility:   Basic Mobility Inpatient Raw Score: 23  -HLM Goal: 7: Walk 25 feet or more  JH-HLM Achieved: 7: Walk 25 feet or more      Patient Centered Rounds: I performed bedside rounds with nursing staff today.   Discussions with Specialists or Other Care Team Provider:     Education and Discussions with Family / Patient: Updated  (significant other) via phone.    Total Time Spent on Date of Encounter in care of patient: 30 mins. This time was spent on one or more of the following: performing physical exam; counseling and coordination of care; obtaining or reviewing history; documenting in the medical record; reviewing/ordering tests, medications or procedures; communicating with other healthcare professionals and discussing with patient's family/caregivers.    Current Length of Stay: 1 day(s)  Current Patient Status: Inpatient   Certification Statement:   Discharge Plan: Anticipate discharge in 24-48 hrs to home with home services.    Code Status: Level 1 - Full Code    Subjective:   Patient was seen and examined at bedside.  Patient was comfortably in bed not in acute distress.  Patient still  endorses having flank pain but much improved.  Otherwise no questions or concerns this morning    Objective:     Vitals:   Temp (24hrs), Av.8 °F (36.6 °C), Min:97.7 °F (36.5 °C), Max:97.9 °F (36.6 °C)    Temp:  [97.7 °F (36.5 °C)-97.9 °F (36.6 °C)] 97.9 °F (36.6 °C)  HR:  [78-90] 82  Resp:  [16-18] 16  BP: (116-154)/(54-78) 154/78  SpO2:  [94 %-96 %] 94 %  Body mass index is 66.61 kg/m².     Input and Output Summary (last 24 hours):   No intake or output data in the 24 hours ending 24 1111    Physical Exam:   Physical Exam  Constitutional:       General: She is not in acute distress.     Appearance: She is obese. She is not ill-appearing.   HENT:      Mouth/Throat:      Mouth: Mucous membranes are moist.      Pharynx: Oropharynx is clear.   Cardiovascular:      Rate and Rhythm: Normal rate and regular rhythm.      Heart sounds: No murmur heard.  Pulmonary:      Effort: Pulmonary effort is normal. No respiratory distress.      Breath sounds: Normal breath sounds. No wheezing.   Abdominal:      General: Abdomen is flat. There is no distension.      Palpations: Abdomen is soft.      Tenderness: There is no abdominal tenderness. There is left CVA tenderness. There is no right CVA tenderness or guarding.   Musculoskeletal:      Right lower leg: No edema.      Left lower leg: No edema.   Skin:     General: Skin is warm and dry.   Neurological:      Mental Status: She is alert. Mental status is at baseline.   Psychiatric:         Mood and Affect: Mood normal.          Additional Data:     Labs:  Results from last 7 days   Lab Units 24  0452   WBC Thousand/uL 7.35   HEMOGLOBIN g/dL 12.8   HEMATOCRIT % 40.4   PLATELETS Thousands/uL 271   NEUTROS PCT % 46   LYMPHS PCT % 40   MONOS PCT % 9   EOS PCT % 3     Results from last 7 days   Lab Units 24  0452 24  0056 24  0314   SODIUM mmol/L 137   < > 132*   POTASSIUM mmol/L 4.0   < > 4.8   CHLORIDE mmol/L 102   < > 96   CO2 mmol/L 28   < > 22   BUN  mg/dL 12   < > 13   CREATININE mg/dL 0.62   < > 0.77   ANION GAP mmol/L 7   < > 14*   CALCIUM mg/dL 8.5   < > 9.4   ALBUMIN g/dL  --   --  4.2   TOTAL BILIRUBIN mg/dL  --   --  0.80   ALK PHOS U/L  --   --  61   ALT U/L  --   --  46   AST U/L  --   --  32   GLUCOSE RANDOM mg/dL 166*   < > 316*    < > = values in this interval not displayed.     Results from last 7 days   Lab Units 03/21/24  0649   INR  0.77*     Results from last 7 days   Lab Units 03/23/24  0831 03/22/24  2056 03/22/24  1557 03/22/24  1142 03/22/24  0614 03/22/24  0057 03/21/24  2231 03/21/24 2005 03/21/24  1917 03/21/24  1112   POC GLUCOSE mg/dl 155* 239* 195* 209* 279* 333* 347* 388* 352* 184*         Results from last 7 days   Lab Units 03/21/24  1024 03/21/24  0649   LACTIC ACID mmol/L 1.5 2.4*       Lines/Drains:  Invasive Devices       Peripheral Intravenous Line  Duration             Peripheral IV 03/22/24 Left;Ventral (anterior) Forearm 1 day              Drain  Duration             Ureteral Internal Stent Left ureter 6 Fr. 1 day                          Imaging:     Recent Cultures (last 7 days):   Results from last 7 days   Lab Units 03/22/24  1206 03/22/24  1149 03/21/24  1405 03/21/24  0649 03/21/24  0448   BLOOD CULTURE  Received in Microbiology Lab. Culture in Progress. Received in Microbiology Lab. Culture in Progress.  --   --   --    GRAM STAIN RESULT   --   --   --  Gram positive cocci in clusters*  Gram positive cocci in clusters*  --    URINE CULTURE   --   --  Culture too young- will reincubate  --  >100,000 cfu/ml Escherichia coli*       Last 24 Hours Medication List:   Current Facility-Administered Medications   Medication Dose Route Frequency Provider Last Rate    acetaminophen  975 mg Oral Q8H [facility] Ester Kerr DO      ARIPiprazole  10 mg Oral Daily Kalin Sands MD      cefTRIAXone  2,000 mg Intravenous Q24H Ester Kerr DO 2,000 mg (03/23/24 0604)    desvenlafaxine succinate  50 mg Oral Daily Ester Kerr DO       dicyclomine  20 mg Oral Q6H PRN Shreyan Kerr, DO      enoxaparin  60 mg Subcutaneous BID Shreyan Kerr, DO      HYDROmorphone  0.2 mg Intravenous Q2H PRN Shreyan Kerr, DO      insulin glargine  40 Units Subcutaneous QAM Shreyan Kerr, DO      insulin glargine  40 Units Subcutaneous HS Shreyan Kerr, DO      insulin lispro  18 Units Subcutaneous TID With Meals Kalin Sands MD      insulin lispro  2-12 Units Subcutaneous TID AC Shreyarmando Kerr, DO      labetalol  10 mg Intravenous Q6H PRN Shreyan Kerr, DO      lamoTRIgine  100 mg Oral BID Shreyan Kerr, DO      lisinopril  20 mg Oral Daily Shreyan Kerr, DO      ondansetron  4 mg Intravenous Q4H PRN Shreyan Kerr, DO      oxyCODONE  2.5 mg Oral Q4H PRN Shreyan Kerr, DO      Or    oxyCODONE  5 mg Oral Q4H PRN Julisaeyarmando Kerr, DO          Today, Patient Was Seen By: Kalin Sands MD    **Please Note: This note may have been constructed using a voice recognition system.**

## 2024-03-23 NOTE — ASSESSMENT & PLAN NOTE
UA with innumerable WBCs, RBCs, bacteria  In the setting of obstructing UVJ stone    Plan  Continue ceftriaxone-day 3 today  Follow-up on urine culture from OR 3/21  As needed pain medications

## 2024-03-24 PROBLEM — A41.9 SEPSIS (HCC): Status: RESOLVED | Noted: 2023-01-29 | Resolved: 2024-03-24

## 2024-03-24 LAB
ANION GAP SERPL CALCULATED.3IONS-SCNC: 7 MMOL/L (ref 4–13)
BACTERIA UR CULT: ABNORMAL
BASOPHILS # BLD AUTO: 0.06 THOUSANDS/ÂΜL (ref 0–0.1)
BASOPHILS NFR BLD AUTO: 1 % (ref 0–1)
BUN SERPL-MCNC: 10 MG/DL (ref 5–25)
CALCIUM SERPL-MCNC: 8.5 MG/DL (ref 8.4–10.2)
CHLORIDE SERPL-SCNC: 102 MMOL/L (ref 96–108)
CO2 SERPL-SCNC: 26 MMOL/L (ref 21–32)
CREAT SERPL-MCNC: 0.57 MG/DL (ref 0.6–1.3)
EOSINOPHIL # BLD AUTO: 0.26 THOUSAND/ÂΜL (ref 0–0.61)
EOSINOPHIL NFR BLD AUTO: 4 % (ref 0–6)
ERYTHROCYTE [DISTWIDTH] IN BLOOD BY AUTOMATED COUNT: 12.8 % (ref 11.6–15.1)
GFR SERPL CREATININE-BSD FRML MDRD: 107 ML/MIN/1.73SQ M
GLUCOSE SERPL-MCNC: 151 MG/DL (ref 65–140)
GLUCOSE SERPL-MCNC: 162 MG/DL (ref 65–140)
GLUCOSE SERPL-MCNC: 163 MG/DL (ref 65–140)
GLUCOSE SERPL-MCNC: 182 MG/DL (ref 65–140)
GLUCOSE SERPL-MCNC: 200 MG/DL (ref 65–140)
HCT VFR BLD AUTO: 39.5 % (ref 34.8–46.1)
HGB BLD-MCNC: 12.6 G/DL (ref 11.5–15.4)
IMM GRANULOCYTES # BLD AUTO: 0.02 THOUSAND/UL (ref 0–0.2)
IMM GRANULOCYTES NFR BLD AUTO: 0 % (ref 0–2)
LYMPHOCYTES # BLD AUTO: 2.17 THOUSANDS/ÂΜL (ref 0.6–4.47)
LYMPHOCYTES NFR BLD AUTO: 34 % (ref 14–44)
MCH RBC QN AUTO: 30.5 PG (ref 26.8–34.3)
MCHC RBC AUTO-ENTMCNC: 31.9 G/DL (ref 31.4–37.4)
MCV RBC AUTO: 96 FL (ref 82–98)
MONOCYTES # BLD AUTO: 0.63 THOUSAND/ÂΜL (ref 0.17–1.22)
MONOCYTES NFR BLD AUTO: 10 % (ref 4–12)
NEUTROPHILS # BLD AUTO: 3.24 THOUSANDS/ÂΜL (ref 1.85–7.62)
NEUTS SEG NFR BLD AUTO: 51 % (ref 43–75)
NRBC BLD AUTO-RTO: 0 /100 WBCS
PLATELET # BLD AUTO: 260 THOUSANDS/UL (ref 149–390)
PMV BLD AUTO: 9.7 FL (ref 8.9–12.7)
POTASSIUM SERPL-SCNC: 4.4 MMOL/L (ref 3.5–5.3)
RBC # BLD AUTO: 4.13 MILLION/UL (ref 3.81–5.12)
SODIUM SERPL-SCNC: 135 MMOL/L (ref 135–147)
WBC # BLD AUTO: 6.38 THOUSAND/UL (ref 4.31–10.16)

## 2024-03-24 PROCEDURE — 82948 REAGENT STRIP/BLOOD GLUCOSE: CPT

## 2024-03-24 PROCEDURE — 99232 SBSQ HOSP IP/OBS MODERATE 35: CPT | Performed by: INTERNAL MEDICINE

## 2024-03-24 PROCEDURE — 85025 COMPLETE CBC W/AUTO DIFF WBC: CPT

## 2024-03-24 PROCEDURE — 80048 BASIC METABOLIC PNL TOTAL CA: CPT

## 2024-03-24 RX ADMIN — INSULIN GLARGINE 40 UNITS: 100 INJECTION, SOLUTION SUBCUTANEOUS at 22:10

## 2024-03-24 RX ADMIN — Medication 2.5 MG: at 09:41

## 2024-03-24 RX ADMIN — LISINOPRIL 20 MG: 20 TABLET ORAL at 09:39

## 2024-03-24 RX ADMIN — LAMOTRIGINE 100 MG: 100 TABLET ORAL at 09:39

## 2024-03-24 RX ADMIN — INSULIN LISPRO 18 UNITS: 100 INJECTION, SOLUTION INTRAVENOUS; SUBCUTANEOUS at 13:32

## 2024-03-24 RX ADMIN — INSULIN LISPRO 18 UNITS: 100 INJECTION, SOLUTION INTRAVENOUS; SUBCUTANEOUS at 18:24

## 2024-03-24 RX ADMIN — ACETAMINOPHEN 975 MG: 325 TABLET, FILM COATED ORAL at 14:08

## 2024-03-24 RX ADMIN — Medication 2.5 MG: at 14:09

## 2024-03-24 RX ADMIN — INSULIN LISPRO 2 UNITS: 100 INJECTION, SOLUTION INTRAVENOUS; SUBCUTANEOUS at 09:42

## 2024-03-24 RX ADMIN — INSULIN LISPRO 2 UNITS: 100 INJECTION, SOLUTION INTRAVENOUS; SUBCUTANEOUS at 18:24

## 2024-03-24 RX ADMIN — DESVENLAFAXINE 50 MG: 50 TABLET, EXTENDED RELEASE ORAL at 09:42

## 2024-03-24 RX ADMIN — LORAZEPAM 0.5 MG: 0.5 TABLET ORAL at 22:09

## 2024-03-24 RX ADMIN — ARIPIPRAZOLE 10 MG: 5 TABLET ORAL at 09:39

## 2024-03-24 RX ADMIN — ENOXAPARIN SODIUM 60 MG: 60 INJECTION SUBCUTANEOUS at 09:39

## 2024-03-24 RX ADMIN — ENOXAPARIN SODIUM 60 MG: 60 INJECTION SUBCUTANEOUS at 18:24

## 2024-03-24 RX ADMIN — INSULIN LISPRO 2 UNITS: 100 INJECTION, SOLUTION INTRAVENOUS; SUBCUTANEOUS at 13:33

## 2024-03-24 RX ADMIN — ACETAMINOPHEN 975 MG: 325 TABLET, FILM COATED ORAL at 22:09

## 2024-03-24 RX ADMIN — INSULIN GLARGINE 40 UNITS: 100 INJECTION, SOLUTION SUBCUTANEOUS at 09:40

## 2024-03-24 RX ADMIN — CEFTRIAXONE SODIUM 2000 MG: 10 INJECTION, POWDER, FOR SOLUTION INTRAVENOUS at 05:54

## 2024-03-24 RX ADMIN — ACETAMINOPHEN 975 MG: 325 TABLET, FILM COATED ORAL at 05:54

## 2024-03-24 RX ADMIN — INSULIN LISPRO 18 UNITS: 100 INJECTION, SOLUTION INTRAVENOUS; SUBCUTANEOUS at 09:42

## 2024-03-24 RX ADMIN — LAMOTRIGINE 100 MG: 100 TABLET ORAL at 18:24

## 2024-03-24 NOTE — PROGRESS NOTES
[facility]   PROGRESS NOTE- Montse Smith, 1975, 49 y.o. female MRN: [id number]   Unit/Bed#: S /S -01 Encounter: [id number]   Primary Care Physician: No primary care provider on file.   Date and Time Admitted to Hospital: 3/21/2024  2:22 AM     Assessment/Plan:   * Hydronephrosis with ureteral calculus  Assessment & Plan  Presented with lower back pain, found to have bilateral kidney stones with left-sided UVJ obstruction causing hydronephrosis  UA with innumerable WBCs and bacteria, is having hematuria as well.  3/21 s/p left-sided ureteral stent placed. Urology will arrange for left ureteroscopy and laser lithotripsy at an interval after convalescence. If no stone is noted on CT scan then we can perform ureteral stent removal in the office setting per urology  Outpatient urology follow-up    Positive blood cultures  Assessment & Plan  2 of 2 blood cultures from 3/21 are with GPCs in clusters.   Blood cultures ID has detected Staph epidermidis.   Low concern this is true infection, suspect likely skin contamination.   3/22 patient has remained afebrile and clinically improving with leukocytosis resolving    Plan  Repeat blood cultures x 2, no growth at 24hr   Continue IV ceftriaxone day 4  If patient develops clinical signs of bacteremia/sepsis in mean time can then start IV Vancomycin   Monitor for fever, hypotension, a.m. CBC  ID following - appreciate recommendations    UTI (urinary tract infection)  Assessment & Plan  UA with innumerable WBCs, RBCs, bacteria  In the setting of obstructing UVJ stone    Plan  Antibiotics as above  Follow-up on urine culture from OR 3/21  As needed pain medications    Sepsis (HCC)-resolved as of 3/24/2024  Assessment & Plan  Meeting SIRS criteria with tachycardia, tachypnea.  More likely secondary to pain, however she does have a renal stone with possible superinfection.  We will cover with antibiotics as above under UTI.  Not meeting any  criteria for severe sepsis.  Resolving    Major depressive disorder, recurrent severe without psychotic features (HCC)  Assessment & Plan  Continue home desvenlafaxine 50 mg daily, lamotrigine 100 mg twice daily  Patient also reported taking Abilify 10 mg daily     Essential hypertension  Assessment & Plan  Continue home lisinopril 20 mg daily    Diabetes 1.5, managed as type 2 (HCC)  Assessment & Plan  Lab Results   Component Value Date    HGBA1C 12.2 (H) 2023       Recent Labs     24  1736 24  0829 24  1301   POCGLU 178* 250* 163* 151*         Blood Sugar Average: Last 72 hrs:    Home regimen: Lantus 40 units twice daily, lispro 18 units 3 times daily with meals, metformin 500mg twice daily    Plan  Continue home Lantus 40 units twice daily  18 units 3 times daily with meals  SSI         VTE Pharmacologic Prophylaxis: VTE Score: 5 Moderate Risk (Score 3-4) - Pharmacological DVT Prophylaxis Ordered: enoxaparin (Lovenox).    Patient Centered Rounds:  Performed bedside rounds with nursing staff.   Discussions with Specialists or Other Care Team Provider: None  Education and Discussions with Family / Patient: Updated  () via phone.    Current Length of Stay: 2 day(s)  Current Patient Status: Inpatient   Discharge Plan: Anticipate discharge tomorrow to home. Pending repeat culture results.    Code Status: Level 1 - Full Code    Subjective:   Patient was seen and examined at bedside this a.m. in no acute distress. She reports feeling better today. She reports mild dysuria but denies any chest pain, SOB, abdominal pain, diarrhea, or flank pain. She remains hemodynamically stable with no significant events overnight and no new concerns at this time.    Objective:     Vitals:   Temp (24hrs), Av.8 °F (36.6 °C), Min:97.6 °F (36.4 °C), Max:97.9 °F (36.6 °C)    Temp:  [97.6 °F (36.4 °C)-97.9 °F (36.6 °C)] 97.9 °F (36.6 °C)  HR:  [75-76] 76  Resp:  [16-18] 16  BP:  (122-124)/(54-57) 122/57  SpO2:  [94 %-96 %] 96 %  Body mass index is 66.61 kg/m².     Input and Output Summary (last 24 hours):   No intake or output data in the 24 hours ending 03/24/24 1520    Physical Exam  Constitutional:       General: She is not in acute distress.     Appearance: She is morbidly obese. She is not toxic-appearing.   HENT:      Head: Normocephalic and atraumatic.      Mouth/Throat:      Mouth: Mucous membranes are moist.   Cardiovascular:      Rate and Rhythm: Normal rate and regular rhythm.      Heart sounds: Normal heart sounds.   Pulmonary:      Effort: No respiratory distress.      Breath sounds: Normal breath sounds. No wheezing, rhonchi or rales.   Abdominal:      General: Bowel sounds are normal. There is no distension.      Palpations: Abdomen is soft.      Tenderness: There is no abdominal tenderness.   Musculoskeletal:         General: Normal range of motion.      Cervical back: Neck supple.      Right lower leg: No edema.      Left lower leg: No edema.   Skin:     General: Skin is warm and dry.      Findings: No lesion or rash.   Neurological:      General: No focal deficit present.      Mental Status: She is alert and oriented to person, place, and time.      Motor: No weakness.   Psychiatric:         Behavior: Behavior normal.          Additional Data:     Labs:  Results from last 7 days   Lab Units 03/24/24  0630   WBC Thousand/uL 6.38   HEMOGLOBIN g/dL 12.6   HEMATOCRIT % 39.5   PLATELETS Thousands/uL 260   NEUTROS PCT % 51   LYMPHS PCT % 34   MONOS PCT % 10   EOS PCT % 4     Results from last 7 days   Lab Units 03/24/24  0630 03/22/24  0056 03/21/24  0314   SODIUM mmol/L 135   < > 132*   POTASSIUM mmol/L 4.4   < > 4.8   CHLORIDE mmol/L 102   < > 96   CO2 mmol/L 26   < > 22   BUN mg/dL 10   < > 13   CREATININE mg/dL 0.57*   < > 0.77   ANION GAP mmol/L 7   < > 14*   CALCIUM mg/dL 8.5   < > 9.4   ALBUMIN g/dL  --   --  4.2   TOTAL BILIRUBIN mg/dL  --   --  0.80   ALK PHOS U/L  --    --  61   ALT U/L  --   --  46   AST U/L  --   --  32   GLUCOSE RANDOM mg/dL 182*   < > 316*    < > = values in this interval not displayed.     Results from last 7 days   Lab Units 03/21/24  0649   INR  0.77*     Results from last 7 days   Lab Units 03/24/24  1301 03/24/24  0829 03/23/24  2006 03/23/24  1736 03/23/24  1248 03/23/24  0831 03/22/24  2056 03/22/24  1557 03/22/24  1142 03/22/24  0614 03/22/24  0057 03/21/24  2231   POC GLUCOSE mg/dl 151* 163* 250* 178* 151* 155* 239* 195* 209* 279* 333* 347*         Results from last 7 days   Lab Units 03/21/24  1024 03/21/24  0649   LACTIC ACID mmol/L 1.5 2.4*       Lines/Drains:  Invasive Devices       Peripheral Intravenous Line  Duration             Peripheral IV 03/22/24 Left;Ventral (anterior) Forearm 2 days              Drain  Duration             Ureteral Internal Stent Left ureter 6 Fr. 3 days                        Imaging: Reviewed pertinent radiology reports from this admission.   Recent Cultures (last 7 days):   Results from last 7 days   Lab Units 03/22/24  1206 03/22/24  1149 03/21/24  1405 03/21/24  0649 03/21/24  0448   BLOOD CULTURE  No Growth at 24 hrs. No Growth at 24 hrs.  --   --   --    GRAM STAIN RESULT   --   --   --  Gram positive cocci in clusters*  Gram positive cocci in clusters*  --    URINE CULTURE   --   --  <1000 cfu/ml Escherichia coli*  --  >100,000 cfu/ml Escherichia coli*       Last 24 Hours Medication List:   Current Facility-Administered Medications   Medication Dose Route Frequency Provider Last Rate    acetaminophen  975 mg Oral Q8H [facility] Julisaeyarmando Paceel, DO      ARIPiprazole  10 mg Oral Daily Kalin Sands MD      cefTRIAXone  2,000 mg Intravenous Q24H Shreyan Kerr, DO 2,000 mg (03/24/24 0554)    desvenlafaxine succinate  50 mg Oral Daily Shreyan Kerr, DO      dicyclomine  20 mg Oral Q6H PRN Julisaeyan Kerr, DO      enoxaparin  60 mg Subcutaneous BID Shreyan Kerr, DO      HYDROmorphone  0.2 mg Intravenous Q2H PRN Ester Kerr DO       insulin glargine  40 Units Subcutaneous QAM Shreyarmando Kerr, DO      insulin glargine  40 Units Subcutaneous HS Shreyarmando Paceel, DO      insulin lispro  18 Units Subcutaneous TID With Meals Kalin Sands MD      insulin lispro  2-12 Units Subcutaneous TID AC Shreyarmando Kerr, DO      labetalol  10 mg Intravenous Q6H PRN Ester Kerr, DO      lamoTRIgine  100 mg Oral BID Julisaeyarmando Kerr, DO      lisinopril  20 mg Oral Daily Ester Kerr, DO      LORazepam  0.5 mg Oral Q8H PRN Lily Mohan MD      ondansetron  4 mg Intravenous Q4H PRN Ester Kerr, DO      oxyCODONE  2.5 mg Oral Q4H PRN Ester Kerr, DO      Or    oxyCODONE  5 mg Oral Q4H PRN Ester Kerr, DO          Today, Patient Was Seen By: Anuradha Peña MD    **Please Note: This note may have been constructed using a voice recognition system.**

## 2024-03-24 NOTE — ASSESSMENT & PLAN NOTE
Meeting SIRS criteria with tachycardia, tachypnea.  More likely secondary to pain, however she does have a renal stone with possible superinfection.  We will cover with antibiotics as above under UTI.  Not meeting any criteria for severe sepsis

## 2024-03-24 NOTE — ASSESSMENT & PLAN NOTE
UA with innumerable WBCs, RBCs, bacteria  In the setting of obstructing UVJ stone    Plan  Antibiotics as above  Follow-up on urine culture from OR 3/21  As needed pain medications  Follow-up with infectious disease on discharge antibiotic regimen

## 2024-03-24 NOTE — PLAN OF CARE
Problem: PAIN - ADULT  Goal: Verbalizes/displays adequate comfort level or baseline comfort level  Description: Interventions:  - Encourage patient to monitor pain and request assistance  - Assess pain using appropriate pain scale  - Administer analgesics based on type and severity of pain and evaluate response  - Implement non-pharmacological measures as appropriate and evaluate response  - Consider cultural and social influences on pain and pain management  - Notify physician/advanced practitioner if interventions unsuccessful or patient reports new pain  Outcome: Progressing     Problem: SAFETY ADULT  Goal: Patient will remain free of falls  Description: INTERVENTIONS:  - Educate patient/family on patient safety including physical limitations  - Instruct patient to call for assistance with activity   - Consult OT/PT to assist with strengthening/mobility   - Keep Call bell within reach  - Keep bed low and locked with side rails adjusted as appropriate  - Keep care items and personal belongings within reach  - Initiate and maintain comfort rounds  - Make Fall Risk Sign visible to staff  - Offer Toileting every  Hours, in advance of need  - Initiate/Maintain alarm  - Obtain necessary fall risk management equipment:   - Apply yellow socks and bracelet for high fall risk patients  - Consider moving patient to room near nurses station  Outcome: Progressing  Goal: Maintain or return to baseline ADL function  Description: INTERVENTIONS:  -  Assess patient's ability to carry out ADLs; assess patient's baseline for ADL function and identify physical deficits which impact ability to perform ADLs (bathing, care of mouth/teeth, toileting, grooming, dressing, etc.)  - Assess/evaluate cause of self-care deficits   - Assess range of motion  - Assess patient's mobility; develop plan if impaired  - Assess patient's need for assistive devices and provide as appropriate  - Encourage maximum independence but intervene and supervise  when necessary  - Involve family in performance of ADLs  - Assess for home care needs following discharge   - Consider OT consult to assist with ADL evaluation and planning for discharge  - Provide patient education as appropriate  Outcome: Progressing  Goal: Maintains/Returns to pre admission functional level  Description: INTERVENTIONS:  - Perform AM-PAC 6 Click Basic Mobility/ Daily Activity assessment daily.  - Set and communicate daily mobility goal to care team and patient/family/caregiver.   - Collaborate with rehabilitation services on mobility goals if consulted  - Perform Range of Motion  times a day.  - Reposition patient every  hours.  - Dangle patient  times a day  - Stand patient  times a day  - Ambulate patient  times a day  - Out of bed to chair  times a day   - Out of bed for meals times a day  - Out of bed for toileting  - Record patient progress and toleration of activity level   Outcome: Progressing     Problem: DISCHARGE PLANNING  Goal: Discharge to home or other facility with appropriate resources  Description: INTERVENTIONS:  - Identify barriers to discharge w/patient and caregiver  - Arrange for needed discharge resources and transportation as appropriate  - Identify discharge learning needs (meds, wound care, etc.)  - Arrange for interpretive services to assist at discharge as needed  - Refer to Case Management Department for coordinating discharge planning if the patient needs post-hospital services based on physician/advanced practitioner order or complex needs related to functional status, cognitive ability, or social support system  Outcome: Progressing     Problem: INFECTION - ADULT  Goal: Absence or prevention of progression during hospitalization  Description: INTERVENTIONS:  - Assess and monitor for signs and symptoms of infection  - Monitor lab/diagnostic results  - Monitor all insertion sites, i.e. indwelling lines, tubes, and drains  - Monitor endotracheal if appropriate and nasal  secretions for changes in amount and color  - [place] appropriate cooling/warming therapies per order  - Administer medications as ordered  - Instruct and encourage patient and family to use good hand hygiene technique  - Identify and instruct in appropriate isolation precautions for identified infection/condition  Outcome: Progressing  Goal: Absence of fever/infection during neutropenic period  Description: INTERVENTIONS:  - Monitor WBC    Outcome: Progressing     Problem: GENITOURINARY - ADULT  Goal: Maintains or returns to baseline urinary function  Description: INTERVENTIONS:  - Assess urinary function  - Encourage oral fluids to ensure adequate hydration if ordered  - Administer IV fluids as ordered to ensure adequate hydration  - Administer ordered medications as needed  - Offer frequent toileting  - Follow urinary retention protocol if ordered  Outcome: Progressing  Goal: Absence of urinary retention  Description: INTERVENTIONS:  - Assess patient’s ability to void and empty bladder  - Monitor I/O  - Bladder scan as needed  - Discuss with physician/AP medications to alleviate retention as needed  - Discuss catheterization for long term situations as appropriate  Outcome: Progressing

## 2024-03-24 NOTE — ASSESSMENT & PLAN NOTE
2 of 2 blood cultures from 3/21 are with GPCs in clusters.   Blood cultures ID has detected Staph epidermidis.   Low concern this is true infection, suspect likely skin contamination.   3/22 patient has remained afebrile and clinically improving with leukocytosis resolving    Plan  Repeat blood cultures x 2, no growth at 48hr   Continue IV ceftriaxone day 5  If patient develops clinical signs of bacteremia/sepsis in mean time can then start IV Vancomycin   Monitor for fever, hypotension, a.m. CBC  ID following - appreciate recommendations

## 2024-03-24 NOTE — ASSESSMENT & PLAN NOTE
Lab Results   Component Value Date    HGBA1C 12.2 (H) 11/24/2023       Recent Labs     03/23/24  1736 03/23/24 2006 03/24/24  0829 03/24/24  1301   POCGLU 178* 250* 163* 151*         Blood Sugar Average: Last 72 hrs:    Home regimen: Lantus 40 units twice daily, lispro 18 units 3 times daily with meals, metformin 500mg twice daily    Plan  Continue home Lantus 40 units twice daily  18 units 3 times daily with meals  SSI

## 2024-03-24 NOTE — ASSESSMENT & PLAN NOTE
Presented with lower back pain, found to have bilateral kidney stones with left-sided UVJ obstruction causing hydronephrosis  UA with innumerable WBCs and bacteria, is having hematuria as well.  3/21 s/p left-sided ureteral stent placed. Urology will arrange for left ureteroscopy and laser lithotripsy at an interval after convalescence.  perform ureteral stent removal in the office setting per urology  Outpatient urology follow-up

## 2024-03-25 VITALS
WEIGHT: 293 LBS | TEMPERATURE: 97.8 F | BODY MASS INDEX: 57.52 KG/M2 | DIASTOLIC BLOOD PRESSURE: 78 MMHG | HEIGHT: 60 IN | RESPIRATION RATE: 18 BRPM | OXYGEN SATURATION: 93 % | SYSTOLIC BLOOD PRESSURE: 143 MMHG | HEART RATE: 78 BPM

## 2024-03-25 LAB
ERYTHROCYTE [DISTWIDTH] IN BLOOD BY AUTOMATED COUNT: 12.8 % (ref 11.6–15.1)
GLUCOSE SERPL-MCNC: 161 MG/DL (ref 65–140)
GLUCOSE SERPL-MCNC: 194 MG/DL (ref 65–140)
HCT VFR BLD AUTO: 40.4 % (ref 34.8–46.1)
HGB BLD-MCNC: 13.1 G/DL (ref 11.5–15.4)
MCH RBC QN AUTO: 30.4 PG (ref 26.8–34.3)
MCHC RBC AUTO-ENTMCNC: 32.4 G/DL (ref 31.4–37.4)
MCV RBC AUTO: 94 FL (ref 82–98)
PLATELET # BLD AUTO: 269 THOUSANDS/UL (ref 149–390)
PMV BLD AUTO: 9.3 FL (ref 8.9–12.7)
RBC # BLD AUTO: 4.31 MILLION/UL (ref 3.81–5.12)
WBC # BLD AUTO: 7.01 THOUSAND/UL (ref 4.31–10.16)

## 2024-03-25 PROCEDURE — 99232 SBSQ HOSP IP/OBS MODERATE 35: CPT | Performed by: INTERNAL MEDICINE

## 2024-03-25 PROCEDURE — 85027 COMPLETE CBC AUTOMATED: CPT | Performed by: INTERNAL MEDICINE

## 2024-03-25 PROCEDURE — 99239 HOSP IP/OBS DSCHRG MGMT >30: CPT | Performed by: INTERNAL MEDICINE

## 2024-03-25 PROCEDURE — 82948 REAGENT STRIP/BLOOD GLUCOSE: CPT

## 2024-03-25 RX ORDER — CEFPODOXIME PROXETIL 200 MG/1
200 TABLET, FILM COATED ORAL 2 TIMES DAILY
Qty: 11 TABLET | Refills: 0 | Status: SHIPPED | OUTPATIENT
Start: 2024-03-25 | End: 2024-03-31

## 2024-03-25 RX ORDER — CLOTRIMAZOLE 1 %
CREAM (GRAM) TOPICAL 2 TIMES DAILY
Qty: 28 G | Refills: 0 | Status: SHIPPED | OUTPATIENT
Start: 2024-03-25

## 2024-03-25 RX ORDER — ARIPIPRAZOLE 10 MG/1
10 TABLET ORAL DAILY
Qty: 30 TABLET | Refills: 0 | Status: SHIPPED | OUTPATIENT
Start: 2024-03-26

## 2024-03-25 RX ORDER — CLOTRIMAZOLE 1 %
CREAM (GRAM) TOPICAL 2 TIMES DAILY
Status: DISCONTINUED | OUTPATIENT
Start: 2024-03-25 | End: 2024-03-25 | Stop reason: HOSPADM

## 2024-03-25 RX ADMIN — INSULIN LISPRO 18 UNITS: 100 INJECTION, SOLUTION INTRAVENOUS; SUBCUTANEOUS at 08:11

## 2024-03-25 RX ADMIN — ENOXAPARIN SODIUM 60 MG: 60 INJECTION SUBCUTANEOUS at 08:07

## 2024-03-25 RX ADMIN — INSULIN LISPRO 18 UNITS: 100 INJECTION, SOLUTION INTRAVENOUS; SUBCUTANEOUS at 13:24

## 2024-03-25 RX ADMIN — INSULIN LISPRO 2 UNITS: 100 INJECTION, SOLUTION INTRAVENOUS; SUBCUTANEOUS at 13:24

## 2024-03-25 RX ADMIN — ARIPIPRAZOLE 10 MG: 5 TABLET ORAL at 08:10

## 2024-03-25 RX ADMIN — Medication 2.5 MG: at 01:52

## 2024-03-25 RX ADMIN — CLOTRIMAZOLE: 1 CREAM TOPICAL at 09:59

## 2024-03-25 RX ADMIN — ACETAMINOPHEN 975 MG: 325 TABLET, FILM COATED ORAL at 13:24

## 2024-03-25 RX ADMIN — LISINOPRIL 20 MG: 20 TABLET ORAL at 08:07

## 2024-03-25 RX ADMIN — INSULIN GLARGINE 40 UNITS: 100 INJECTION, SOLUTION SUBCUTANEOUS at 08:07

## 2024-03-25 RX ADMIN — CEFTRIAXONE SODIUM 2000 MG: 10 INJECTION, POWDER, FOR SOLUTION INTRAVENOUS at 05:50

## 2024-03-25 RX ADMIN — ACETAMINOPHEN 975 MG: 325 TABLET, FILM COATED ORAL at 05:46

## 2024-03-25 RX ADMIN — DESVENLAFAXINE 50 MG: 50 TABLET, EXTENDED RELEASE ORAL at 08:11

## 2024-03-25 RX ADMIN — LAMOTRIGINE 100 MG: 100 TABLET ORAL at 08:07

## 2024-03-25 RX ADMIN — INSULIN LISPRO 2 UNITS: 100 INJECTION, SOLUTION INTRAVENOUS; SUBCUTANEOUS at 08:10

## 2024-03-25 NOTE — DISCHARGE INSTR - AVS FIRST PAGE
Dear Montse Smith,     It was our pleasure to care for you here at [facility].  It is our hope that we were always able to exceed your expectations for your care during your stay.  You were hospitalized due to hydronephrosis and ureteral stone and cared for on the south fourth floor by Kyle Brunner, MD under the service of Lily Mohan MD with the [facility] who covers for your primary care physician (PCP), No primary care provider on file.. If you have any questions or concerns related to this hospitalization, you may contact us at [phone]. A nurse will call you within a few days to answer any additional questions that may arise after your discharge. We recommend that you follow up with your PCP for medication refills. Please note the following instructions / recommendations:       STOP taking -   No medication adjustments    START taking -  Cefpodoxime 200 mg twice a day starting today 3/25 until 3/31/2024  Clotrimazole topical cream twice daily on the affected area.  Do not apply to face    Testing Required after Discharge -   CBC and BMP in 1 week  Please follow up with your primary care provider to order these tests    Important follow up information -   Please schedule an appointment with your primary care provider as soon as possible    Other Instructions -   Please follow-up outpatient with urology for left ureteroscopy and laser lithotripsy once your infection is resolved    Please review your entire after visit summary including medication list, appointments, activity, diet, pertinent wound care, and any additional recommendations from your care team.    We wish you well.    Sincerely,     Kyle Brunner, MD

## 2024-03-25 NOTE — DISCHARGE SUMMARY
[facility]  Discharge- Montse Smith 1975, 49 y.o. female MRN: [id number]  Unit/Bed#: S -01 Encounter: [id number]  Primary Care Provider: No primary care provider on file.   Date and time admitted to hospital: 3/21/2024  2:22 AM    * Hydronephrosis with ureteral calculus  Assessment & Plan  Presented with lower back pain, found to have bilateral kidney stones with left-sided UVJ obstruction causing hydronephrosis  UA with innumerable WBCs and bacteria, is having hematuria as well.  3/21 s/p left-sided ureteral stent placed. Urology will arrange for left ureteroscopy and laser lithotripsy at an interval after convalescence.  perform ureteral stent removal in the office setting per urology  Outpatient urology follow-up    Positive blood cultures  Assessment & Plan  2 of 2 blood cultures from 3/21 are with GPCs in clusters.   Blood cultures ID has detected Staph epidermidis.   Low concern this is true infection, suspect likely skin contamination.   3/22 patient has remained afebrile and clinically improving with leukocytosis resolving    Plan  Repeat blood cultures x 2, no growth at 48hr   Continue IV ceftriaxone day 5  If patient develops clinical signs of bacteremia/sepsis in mean time can then start IV Vancomycin   Monitor for fever, hypotension, a.m. CBC  ID following - appreciate recommendations    UTI (urinary tract infection)  Assessment & Plan  UA with innumerable WBCs, RBCs, bacteria  In the setting of obstructing UVJ stone    Plan  Antibiotics as above  Follow-up on urine culture from OR 3/21  As needed pain medications  Follow-up with infectious disease on discharge antibiotic regimen    Major depressive disorder, recurrent severe without psychotic features (HCC)  Assessment & Plan  Continue home desvenlafaxine 50 mg daily, lamotrigine 100 mg twice daily  Patient also reported taking Abilify 10 mg daily     Essential hypertension  Assessment & Plan  Continue home lisinopril  20 mg daily    Diabetes 1.5, managed as type 2 (HCC)  Assessment & Plan  Lab Results   Component Value Date    HGBA1C 12.2 (H) 11/24/2023       Recent Labs     03/23/24  1736 03/23/24 2006 03/24/24  0829 03/24/24  1301   POCGLU 178* 250* 163* 151*         Blood Sugar Average: Last 72 hrs:    Home regimen: Lantus 40 units twice daily, lispro 18 units 3 times daily with meals, metformin 500mg twice daily    Plan  Continue home Lantus 40 units twice daily  18 units 3 times daily with meals  SSI    Sepsis (HCC)-resolved as of 3/24/2024  Assessment & Plan  Meeting SIRS criteria with tachycardia, tachypnea.  More likely secondary to pain, however she does have a renal stone with possible superinfection.  We will cover with antibiotics as above under UTI.  Not meeting any criteria for severe sepsis      Medical Problems       Resolved Problems  Date Reviewed: 3/21/2024            Resolved    Sepsis (HCC) 3/24/2024     Resolved by  Anuradha Peña MD        Discharging Resident: Kyle Brunner, MD  Discharging Attending: Lily Mohan MD  PCP: No primary care provider on file.  Admission Date:   Admission Orders (From admission, onward)       Ordered        03/22/24 1517  Inpatient Admission  Once            03/21/24 0606  Place in Observation  Once                          Discharge Date: 03/25/24    Consultations During Hospital Stay:  Urology  Infectious disease    Procedures Performed:   Left retrograde pyelogram for fluoroscopic guidance for urologic procedure    Significant Findings / Test Results:   3/21/2024 CT abdomen pelvis with contrast: Bilateral nephrolithiasis with mild left hydroureteronephrosis and periureteral symptoms with straining secondary to obstructing 6 mm distal left ureteral stone just above the UVJ junction left side.  Mild diffuse hepatic steatosis hepatomegaly.  Subtle diffuse wall thickening of urinary bladder    Incidental Findings:   3/21/2024 CT abdomen pelvis: Small ovoid hypodensity in  the left adnexa that could represent ovarian cyst  I reviewed the above mentioned incidental findings with the patient and/or family and they expressed understanding.    Test Results Pending at Discharge (will require follow up):  None     Outpatient Tests Requested:  CBC and CMP in 1 week    Complications: None    Reason for Admission: Lower back pain with flank pain and urinary symptoms    Hospital Course:   Montse Smith is a 49 y.o. female patient PMH of diabetes, hypertension, morbid obesity and status post OR on 3/21 with left-sided ureteral stent placed who originally presented to the hospital on 3/21/2024 due to flank pain and urinary symptoms.  Patient was noted to be afebrile, mild leukocytosis and symptoms meeting sepsis criteria with suspected source UTI.  Imaging showed hydronephrosis with ureteral calculus.  Urology was consulted and recommended outpatient follow-up for cystoscopy stent removal.  No stone was noted in the CT.  Infectious disease was consulted given patient's infection and instrumentation.  Culture showed over 100 K E. coli.  Patient was continued on IV ceftriaxone.  Infectious disease instructed to discharge on oral antibiotics for cefpodoxime 200 mg for total of 10 days since her stent placement.        Please see above list of diagnoses and related plan for additional information.     Condition at Discharge: good    Discharge Day Visit / Exam:   Subjective: Patient reports that she is feeling well and had an okay night.  She still endorsing some back pain on her side.  No further urinary symptoms.  She reports eating well and her pain is under control with her current regimen.  Vitals: Blood Pressure: 159/84 (03/25/24 0807)  Pulse: 76 (03/25/24 0807)  Temperature: 97.7 °F (36.5 °C) (03/25/24 0005)  Temp Source: Temporal (03/21/24 1221)  Respirations: 18 (03/24/24 1549)  Height: 5' (152.4 cm) (03/21/24 1221)  Weight - Scale: (!) 155 kg (341 lb 0.8 oz) (03/21/24 0229)  SpO2: 94 %  (03/25/24 0807)  Exam:   Physical Exam  Constitutional:       General: She is not in acute distress.     Appearance: She is obese. She is ill-appearing. She is not toxic-appearing.   HENT:      Head: Normocephalic and atraumatic.      Mouth/Throat:      Mouth: Mucous membranes are moist.      Pharynx: No oropharyngeal exudate.   Eyes:      General: No scleral icterus.  Cardiovascular:      Rate and Rhythm: Normal rate and regular rhythm.      Pulses: Normal pulses.      Heart sounds: No murmur heard.  Pulmonary:      Effort: No respiratory distress.      Breath sounds: Normal breath sounds. No wheezing, rhonchi or rales.   Abdominal:      General: Bowel sounds are normal. There is no distension.      Palpations: Abdomen is soft. There is no mass.      Tenderness: There is no abdominal tenderness. There is left CVA tenderness. There is no right CVA tenderness.   Musculoskeletal:         General: Normal range of motion.      Cervical back: Neck supple.      Right lower leg: No edema.      Left lower leg: No edema.   Skin:     General: Skin is warm and dry.      Capillary Refill: Capillary refill takes less than 2 seconds.      Findings: No lesion or rash.      Comments: Focal circular rash x2 on back consistent with fungal infection   Neurological:      General: No focal deficit present.      Mental Status: She is alert and oriented to person, place, and time.      Motor: No weakness.   Psychiatric:         Mood and Affect: Mood normal.         Behavior: Behavior normal.          Discussion with Family:  Updated family member.     Discharge instructions/Information to patient and family:   See after visit summary for information provided to patient and family.      Provisions for Follow-Up Care:  See after visit summary for information related to follow-up care and any pertinent home health orders.      Mobility at time of Discharge:   Basic Mobility Inpatient Raw Score: 23  -[facility] Goal: 7: Walk 25 feet or  more  JH-HLM Achieved: 6: Walk 10 steps or more  HLM Goal NOT achieved. Continue to encourage mobility in post discharge setting.     Disposition:   Home    Planned Readmission: No    Discharge Medications:  See after visit summary for reconciled discharge medications provided to patient and/or family.      **Please Note: This note may have been constructed using a voice recognition system**

## 2024-03-25 NOTE — PROGRESS NOTES
Progress Note - Infectious Disease   Montse Smith 49 y.o. female MRN: [id number]  Unit/Bed#: S -01 Encounter: [id number]      Impression/Recommendations:  1.  Left-sided obstructive pyelonephritis, secondary to obstructive stone.  Patient is status post placement of ureteral stent on 3/21.  She is clinically much improved.  Left flank pain is still present but decreasing.  No further urinary symptoms.  Continue IV ceftriaxone  Transition to p.o. cefpodoxime at discharge.  Treat x 10 days from stent placement.    2.  Staphylococcus epidermidis bacteremia.  Although there was growth in both admission blood cultures, patient has very difficult blood draw and IV access.  She has no indwelling intravenous foreign body.  She has remained clinically and systemically well throughout hospitalization.  Repeat blood cultures have no growth.  These were most likely contaminated blood draw.  No antibiotic needed for this.  Follow-up on final repeat blood cultures.    3.  Obstructing left ureteral stone, status post stent placement.  Urology follow-up for stone extraction.    4.  Cephalexin allergy versus intolerance.  Patient is tolerating ceftriaxone well.    Discussed with patient in detail regarding the above plan.  Discussed with Dr. Brunner from [facility] service regarding antibiotic plan above and plan for discharge.  He is planning to discharge patient later today.  Okay for discharge from ID viewpoint.    Antibiotics:  No  Post ureteral stent # 4    Subjective:  Patient feels well.  Left flank pain mild and improving.  No urinary symptoms.  Temperature stays down.  No chills.  She is tolerating antibiotic well.  No nausea, vomiting or diarrhea.    Objective:  Vitals:  Temp:  [97.7 °F (36.5 °C)-97.9 °F (36.6 °C)] 97.8 °F (36.6 °C)  HR:  [76-86] 78  Resp:  [18] 18  BP: (129-159)/(53-84) 143/78  SpO2:  [90 %-94 %] 93 %  Temp (24hrs), Av.8 °F (36.6 °C), Min:97.7 °F (36.5 °C), Max:97.9 °F (36.6 °C)  Current: Temperature:  97.8 °F (36.6 °C)    Physical Exam:     General: Awake, alert, cooperative, no distress.   Neck:  Supple. No mass.  No lymphadenopathy.   Lungs: Expansion symmetric, no rales, no wheezing, respirations unlabored.   Heart:  Regular rate and rhythm, S1 and S2 normal, no murmur.   Abdomen: Soft, nondistended, very mild left flank tenderness, bowel sounds active all four quadrants, no masses, no organomegaly.   Extremities: No edema. No erythema/warmth. No ulcer. Nontender to palpation.   Skin:  No rash.   Neuro: Moves all extremities.     Invasive Devices       Peripheral Intravenous Line  Duration             Peripheral IV 03/25/24 Proximal;Right;Ventral (anterior) Forearm <1 day              Drain  Duration             Ureteral Internal Stent Left ureter 6 Fr. 3 days                    Labs studies:   I have personally reviewed pertinent labs.  Results from last 7 days   Lab Units 03/24/24  0630 03/23/24  0452 03/22/24  0056 03/21/24  0314   POTASSIUM mmol/L 4.4 4.0 4.9 4.8   CHLORIDE mmol/L 102 102 98 96   CO2 mmol/L 26 28 23 22   BUN mg/dL 10 12 18 13   CREATININE mg/dL 0.57* 0.62 0.87 0.77   EGFR ml/min/1.73sq m 107 104 77 89   CALCIUM mg/dL 8.5 8.5 8.4 9.4   AST U/L  --   --   --  32   ALT U/L  --   --   --  46   ALK PHOS U/L  --   --   --  61     Results from last 7 days   Lab Units 03/25/24  0204 03/24/24  0630 03/23/24  0452   WBC Thousand/uL 7.01 6.38 7.35   HEMOGLOBIN g/dL 13.1 12.6 12.8   PLATELETS Thousands/uL 269 260 271     Results from last 7 days   Lab Units 03/22/24  1206 03/22/24  1149 03/21/24  1405 03/21/24  0649 03/21/24  0448   BLOOD CULTURE  No Growth at 48 hrs. No Growth at 48 hrs.  --   --   --    GRAM STAIN RESULT   --   --   --  Gram positive cocci in clusters*  Gram positive cocci in clusters*  --    URINE CULTURE   --   --  <1000 cfu/ml Escherichia coli*  --  >100,000 cfu/ml Escherichia coli*       Imaging Studies:   I have personally reviewed pertinent imaging study reports and images in  PACS.    EKG, Pathology, and Other Studies:   I have personally reviewed pertinent reports.

## 2024-03-25 NOTE — CASE MANAGEMENT
Case Management Discharge Planning Note    Patient name Montse Smith  [facility] S /S -01 MRN [id number]  : 1975 Date 3/25/2024       Current Admission Date: 3/21/2024  Current Admission Diagnosis:Hydronephrosis with ureteral calculus   Patient Active Problem List    Diagnosis Date Noted    Positive blood cultures 2024    Hydronephrosis with ureteral calculus 2024    MDD (major depressive disorder) 2023    Yeast infection of the vagina 2023    Abscess 2023    Abnormal CT of the abdomen 2023    Tachycardia 2023    Abnormal urinalysis 2023    Sigmoid diverticulitis 2023    Thickened endometrium 2023    UTI (urinary tract infection) 2023    Class 3 severe obesity due to excess calories with serious comorbidity and body mass index (BMI) greater than or equal to 70 in adult ([facility]) 2022    Elevated lipids 2022    Class 3 severe obesity due to excess calories with body mass index (BMI) of 60.0 to 69.9 in adult ([facility]) 2021    Primary osteoarthritis of both knees 2021    Epigastric pain 2020    GERD (gastroesophageal reflux disease) 2020    Diarrhea 2020    Major depressive disorder, recurrent severe without psychotic features ([facility]) 2019    Diabetes 1.5, managed as type 2 ([facility]) 2019    Type 2 diabetes mellitus with hyperglycemia, with long-term current use of insulin ([facility]) 2019    Essential hypertension 2019    Irritable bowel syndrome with diarrhea 2019    Mixed hyperlipidemia 2018    Primary osteoarthritis of right knee 2017    Anxiety 10/15/2012      LOS (days): 3  Geometric Mean LOS (GMLOS) (days):   Days to GMLOS:     OBJECTIVE:  Risk of Unplanned Readmission Score: 14.23         Current admission status: Inpatient   Preferred Pharmacy:   [id number] - [place] - [address] [place] [address]  Phone: [phone] Fax:  [phone]    Primary Care Provider: No primary care provider on file.    Primary Insurance: [facility]  Secondary Insurance:     DISCHARGE DETAILS:                                Social Determinants of Health (SDOH)      Flowsheet Row Most Recent Value   Housing Stability    In the last 12 months, was there a time when you were not able to pay the mortgage or rent on time? Y   In the last 12 months, how many places have you lived? 1   In the last 12 months, was there a time when you did not have a steady place to sleep or slept in a shelter (including now)? Y   Transportation Needs    In the past 12 months, has lack of transportation kept you from medical appointments or from getting medications? no   In the past 12 months, has lack of transportation kept you from meetings, work, or from getting things needed for daily living? No   Food Insecurity    Within the past 12 months, you worried that your food would run out before you got the money to buy more. Sometimes   Within the past 12 months, the food you bought just didn't last and you didn't have money to get more. Sometimes   Utilities    In the past 12 months has the electric, gas, oil, or water company threatened to shut off services in your home? Yes                            Patient was reviewed in care coordination rounds this morning -- Patient has no CM needs indicated as she was reviewed by PT/OT and deemed to have no STR or VNA needs. Patient is stable for discharge from ID standpoint and will transition to POA abx at discharge.    CM reviewed SDOH questions indicated above -- She declined CM's offer for additional resources to address housing instability as neither patient or her spouse are currently working. She reports already being involved with the [facility] and has recently enrolled in [employer].     No further CM needs indicated at this time -- CM will remain available.

## 2024-03-25 NOTE — PLAN OF CARE
Problem: PAIN - ADULT  Goal: Verbalizes/displays adequate comfort level or baseline comfort level  Description: Interventions:  - Encourage patient to monitor pain and request assistance  - Assess pain using appropriate pain scale  - Administer analgesics based on type and severity of pain and evaluate response  - Implement non-pharmacological measures as appropriate and evaluate response  - Consider cultural and social influences on pain and pain management  - Notify physician/advanced practitioner if interventions unsuccessful or patient reports new pain  Outcome: Progressing     Problem: SAFETY ADULT  Goal: Patient will remain free of falls  Description: INTERVENTIONS:  - Educate patient/family on patient safety including physical limitations  - Instruct patient to call for assistance with activity   - Consult OT/PT to assist with strengthening/mobility   - Keep Call bell within reach  - Keep bed low and locked with side rails adjusted as appropriate  - Keep care items and personal belongings within reach  - Initiate and maintain comfort rounds  - Make Fall Risk Sign visible to staff  - Apply yellow socks and bracelet for high fall risk patients  - Consider moving patient to room near nurses station  Outcome: Progressing  Goal: Maintain or return to baseline ADL function  Description: INTERVENTIONS:  -  Assess patient's ability to carry out ADLs; assess patient's baseline for ADL function and identify physical deficits which impact ability to perform ADLs (bathing, care of mouth/teeth, toileting, grooming, dressing, etc.)  - Assess/evaluate cause of self-care deficits   - Assess range of motion  - Assess patient's mobility; develop plan if impaired  - Assess patient's need for assistive devices and provide as appropriate  - Encourage maximum independence but intervene and supervise when necessary  - Involve family in performance of ADLs  - Assess for home care needs following discharge   - Consider OT consult  to assist with ADL evaluation and planning for discharge  - Provide patient education as appropriate  Outcome: Progressing  Goal: Maintains/Returns to pre admission functional level  Description: INTERVENTIONS:  - Perform AM-PAC 6 Click Basic Mobility/ Daily Activity assessment daily.  - Set and communicate daily mobility goal to care team and patient/family/caregiver.   - Collaborate with rehabilitation services on mobility goals if consulted  - Out of bed for toileting  - Record patient progress and toleration of activity level   Outcome: Progressing     Problem: DISCHARGE PLANNING  Goal: Discharge to home or other facility with appropriate resources  Description: INTERVENTIONS:  - Identify barriers to discharge w/patient and caregiver  - Arrange for needed discharge resources and transportation as appropriate  - Identify discharge learning needs (meds, wound care, etc.)  - Arrange for interpretive services to assist at discharge as needed  - Refer to Case Management Department for coordinating discharge planning if the patient needs post-hospital services based on physician/advanced practitioner order or complex needs related to functional status, cognitive ability, or social support system  Outcome: Progressing     Problem: INFECTION - ADULT  Goal: Absence or prevention of progression during hospitalization  Description: INTERVENTIONS:  - Assess and monitor for signs and symptoms of infection  - Monitor lab/diagnostic results  - Monitor all insertion sites, i.e. indwelling lines, tubes, and drains  - Monitor endotracheal if appropriate and nasal secretions for changes in amount and color  - [place] appropriate cooling/warming therapies per order  - Administer medications as ordered  - Instruct and encourage patient and family to use good hand hygiene technique  - Identify and instruct in appropriate isolation precautions for identified infection/condition  Outcome: Progressing  Goal: Absence of fever/infection  during neutropenic period  Description: INTERVENTIONS:  - Monitor WBC    Outcome: Progressing     Problem: GENITOURINARY - ADULT  Goal: Maintains or returns to baseline urinary function  Description: INTERVENTIONS:  - Assess urinary function  - Encourage oral fluids to ensure adequate hydration if ordered  - Administer IV fluids as ordered to ensure adequate hydration  - Administer ordered medications as needed  - Offer frequent toileting  - Follow urinary retention protocol if ordered  Outcome: Progressing  Goal: Absence of urinary retention  Description: INTERVENTIONS:  - Assess patient’s ability to void and empty bladder  - Monitor I/O  - Bladder scan as needed  - Discuss with physician/AP medications to alleviate retention as needed  - Discuss catheterization for long term situations as appropriate  Outcome: Progressing

## 2024-03-27 ENCOUNTER — PREP FOR PROCEDURE (OUTPATIENT)
Dept: UROLOGY | Facility: AMBULATORY SURGERY CENTER | Age: 49
End: 2024-03-27

## 2024-03-27 ENCOUNTER — TELEPHONE (OUTPATIENT)
Dept: UROLOGY | Facility: AMBULATORY SURGERY CENTER | Age: 49
End: 2024-03-27

## 2024-03-27 DIAGNOSIS — R39.89 SUSPECTED UTI: ICD-10-CM

## 2024-03-27 DIAGNOSIS — Z01.818 PRE-OPERATIVE CLEARANCE: ICD-10-CM

## 2024-03-27 DIAGNOSIS — E11.65 TYPE 2 DIABETES MELLITUS WITH HYPERGLYCEMIA, WITH LONG-TERM CURRENT USE OF INSULIN (HCC): ICD-10-CM

## 2024-03-27 DIAGNOSIS — Z79.4 TYPE 2 DIABETES MELLITUS WITH HYPERGLYCEMIA, WITH LONG-TERM CURRENT USE OF INSULIN (HCC): ICD-10-CM

## 2024-03-27 DIAGNOSIS — Z01.812 PRE-OPERATIVE LABORATORY EXAMINATION: ICD-10-CM

## 2024-03-27 DIAGNOSIS — N13.2 HYDRONEPHROSIS WITH URETERAL CALCULUS: Primary | ICD-10-CM

## 2024-03-27 LAB
BACTERIA BLD CULT: ABNORMAL
BACTERIA BLD CULT: NORMAL
BACTERIA BLD CULT: NORMAL
GRAM STN SPEC: ABNORMAL
GRAM STN SPEC: ABNORMAL
MECA+MECC ISLT/SPM QL: DETECTED
S EPIDERMIDIS DNA BLD POS QL NAA+NON-PRB: DETECTED

## 2024-03-27 NOTE — TELEPHONE ENCOUNTER
Patient was returning a call to the office to schedule surgery. Reached out to OR , currently assisting another patient. Went back to the caller from hold to let her know. No answer. But could hear the TV in the background. Asked hello like 10 times before disconnecting the call.    If the patient calls back. Rika stated she will call her back shortly.

## 2024-03-27 NOTE — TELEPHONE ENCOUNTER
Called patient to schedule Cysto/ L URS/ L RGP/ L stent with Dr. Desir. Patient was unable to answer call but I did leave a voicemail asking patient to please give the office a call back to schedule. Office number was provided.

## 2024-03-27 NOTE — TELEPHONE ENCOUNTER
LM to contact office. If she calls back please ask pt how she is feeling. Please conform appts for CT and follow up in [place] thereafter to review results to make sure she still needs second surgery. She was also contacted by the surgery scheduler (Rika) to call back to schedule the second surgery and left message. Please see if Rika is available to speak with her.

## 2024-03-27 NOTE — TELEPHONE ENCOUNTER
Returned call to schedule Cysto/ L URS/ L RGP/ L Stent with Dr. Desir. I offered patient date of 4/23/24 at the [facility] [place]. Patient was satisfied with offered date and location. I did let patient know that pre-op blood work and urine culture have been ordered and need to be completed 2 weeks prior to surgery. Patient is to have medical clearance with primary care provider prior to surgery which has been scheduled for 4/9 at 9:40 am. Patient is to have CT done prior to surgery which has been scheduled. All pre-op instructions have been reviewed with patient. Surgery packet to be mailed to patients address on file as well as scanned into patients chart. I did ask that if patient has any future questions or concerns to please give the office a call. Patient verbalized understanding.

## 2024-03-30 ENCOUNTER — HOSPITAL ENCOUNTER (OUTPATIENT)
Dept: CT IMAGING | Facility: HOSPITAL | Age: 49
Discharge: HOME | End: 2024-03-30
Attending: UROLOGY
Payer: COMMERCIAL

## 2024-03-30 DIAGNOSIS — N13.2 HYDRONEPHROSIS WITH URETERAL CALCULUS: ICD-10-CM

## 2024-03-30 PROCEDURE — 74176 CT ABD & PELVIS W/O CONTRAST: CPT

## 2024-04-05 NOTE — CASE MANAGEMENT
Rec'd message from Jatinder @ [facility] ([phone]) requesting DC date and disposition. Called and spoke to Jatinder and gave requested info.

## 2024-04-11 ENCOUNTER — APPOINTMENT (OUTPATIENT)
Dept: LAB | Facility: CLINIC | Age: 49
DRG: 466 | End: 2024-04-11
Payer: COMMERCIAL

## 2024-04-11 ENCOUNTER — APPOINTMENT (EMERGENCY)
Dept: CT IMAGING | Facility: HOSPITAL | Age: 49
DRG: 466 | End: 2024-04-11
Payer: COMMERCIAL

## 2024-04-11 ENCOUNTER — HOSPITAL ENCOUNTER (INPATIENT)
Facility: HOSPITAL | Age: 49
LOS: 1 days | Discharge: HOME | DRG: 466 | End: 2024-04-12
Attending: EMERGENCY MEDICINE | Admitting: INTERNAL MEDICINE
Payer: COMMERCIAL

## 2024-04-11 DIAGNOSIS — M54.50 ACUTE LEFT-SIDED LOW BACK PAIN WITHOUT SCIATICA: ICD-10-CM

## 2024-04-11 DIAGNOSIS — R39.89 SUSPECTED UTI: ICD-10-CM

## 2024-04-11 DIAGNOSIS — Z01.818 PRE-OPERATIVE CLEARANCE: ICD-10-CM

## 2024-04-11 DIAGNOSIS — Z01.812 PRE-OPERATIVE LABORATORY EXAMINATION: ICD-10-CM

## 2024-04-11 DIAGNOSIS — R10.9 LEFT FLANK PAIN: ICD-10-CM

## 2024-04-11 DIAGNOSIS — N39.0 UTI (URINARY TRACT INFECTION): Primary | ICD-10-CM

## 2024-04-11 DIAGNOSIS — E11.65 TYPE 2 DIABETES MELLITUS WITH HYPERGLYCEMIA, WITH LONG-TERM CURRENT USE OF INSULIN (HCC): ICD-10-CM

## 2024-04-11 DIAGNOSIS — N13.2 HYDRONEPHROSIS WITH URETERAL CALCULUS: ICD-10-CM

## 2024-04-11 DIAGNOSIS — Z79.4 TYPE 2 DIABETES MELLITUS WITH HYPERGLYCEMIA, WITH LONG-TERM CURRENT USE OF INSULIN (HCC): ICD-10-CM

## 2024-04-11 PROBLEM — N17.9 AKI (ACUTE KIDNEY INJURY) (HCC): Status: ACTIVE | Noted: 2024-04-11

## 2024-04-11 PROBLEM — N20.0 KIDNEY STONES: Status: ACTIVE | Noted: 2024-04-11

## 2024-04-11 LAB
ALBUMIN SERPL BCP-MCNC: 4.4 G/DL (ref 3.5–5)
ALP SERPL-CCNC: 63 U/L (ref 34–104)
ALT SERPL W P-5'-P-CCNC: 44 U/L (ref 7–52)
AMORPH URATE CRY URNS QL MICRO: ABNORMAL
ANION GAP SERPL CALCULATED.3IONS-SCNC: 11 MMOL/L (ref 4–13)
AST SERPL W P-5'-P-CCNC: 28 U/L (ref 13–39)
BACTERIA UR QL AUTO: ABNORMAL /HPF
BASOPHILS # BLD AUTO: 0.06 THOUSANDS/ÂΜL (ref 0–0.1)
BASOPHILS NFR BLD AUTO: 1 % (ref 0–1)
BILIRUB SERPL-MCNC: 0.71 MG/DL (ref 0.2–1)
BILIRUB UR QL STRIP: NEGATIVE
BUN SERPL-MCNC: 15 MG/DL (ref 5–25)
CALCIUM SERPL-MCNC: 9.9 MG/DL (ref 8.4–10.2)
CHLORIDE SERPL-SCNC: 99 MMOL/L (ref 96–108)
CLARITY UR: ABNORMAL
CO2 SERPL-SCNC: 26 MMOL/L (ref 21–32)
COLOR UR: YELLOW
CREAT SERPL-MCNC: 1 MG/DL (ref 0.6–1.3)
EOSINOPHIL # BLD AUTO: 0.16 THOUSAND/ÂΜL (ref 0–0.61)
EOSINOPHIL NFR BLD AUTO: 1 % (ref 0–6)
ERYTHROCYTE [DISTWIDTH] IN BLOOD BY AUTOMATED COUNT: 13.1 % (ref 11.6–15.1)
EST. AVERAGE GLUCOSE BLD GHB EST-MCNC: 160 MG/DL
EXT PREGNANCY TEST URINE: NEGATIVE
EXT PREGNANCY TEST URINE: NEGATIVE
EXT. CONTROL: NORMAL
EXT. CONTROL: NORMAL
FLUAV RNA RESP QL NAA+PROBE: NEGATIVE
FLUBV RNA RESP QL NAA+PROBE: NEGATIVE
GFR SERPL CREATININE-BSD FRML MDRD: 65 ML/MIN/1.73SQ M
GLUCOSE SERPL-MCNC: 207 MG/DL (ref 65–140)
GLUCOSE UR STRIP-MCNC: ABNORMAL MG/DL
HBA1C MFR BLD: 7.2 %
HCT VFR BLD AUTO: 48.6 % (ref 34.8–46.1)
HGB BLD-MCNC: 15.7 G/DL (ref 11.5–15.4)
HGB UR QL STRIP.AUTO: ABNORMAL
HYALINE CASTS #/AREA URNS LPF: ABNORMAL /LPF
IMM GRANULOCYTES # BLD AUTO: 0.06 THOUSAND/UL (ref 0–0.2)
IMM GRANULOCYTES NFR BLD AUTO: 1 % (ref 0–2)
KETONES UR STRIP-MCNC: NEGATIVE MG/DL
LEUKOCYTE ESTERASE UR QL STRIP: NEGATIVE
LIPASE SERPL-CCNC: 48 U/L (ref 11–82)
LYMPHOCYTES # BLD AUTO: 2.05 THOUSANDS/ÂΜL (ref 0.6–4.47)
LYMPHOCYTES NFR BLD AUTO: 18 % (ref 14–44)
MCH RBC QN AUTO: 30.4 PG (ref 26.8–34.3)
MCHC RBC AUTO-ENTMCNC: 32.3 G/DL (ref 31.4–37.4)
MCV RBC AUTO: 94 FL (ref 82–98)
MONOCYTES # BLD AUTO: 0.85 THOUSAND/ÂΜL (ref 0.17–1.22)
MONOCYTES NFR BLD AUTO: 8 % (ref 4–12)
MUCOUS THREADS UR QL AUTO: ABNORMAL
NEUTROPHILS # BLD AUTO: 7.94 THOUSANDS/ÂΜL (ref 1.85–7.62)
NEUTS SEG NFR BLD AUTO: 71 % (ref 43–75)
NITRITE UR QL STRIP: NEGATIVE
NON-SQ EPI CELLS URNS QL MICRO: ABNORMAL /HPF
NRBC BLD AUTO-RTO: 0 /100 WBCS
PH UR STRIP.AUTO: 6 [PH]
PLATELET # BLD AUTO: 350 THOUSANDS/UL (ref 149–390)
PMV BLD AUTO: 10.9 FL (ref 8.9–12.7)
POTASSIUM SERPL-SCNC: 4.6 MMOL/L (ref 3.5–5.3)
PROT SERPL-MCNC: 8 G/DL (ref 6.4–8.4)
PROT UR STRIP-MCNC: ABNORMAL MG/DL
RBC # BLD AUTO: 5.16 MILLION/UL (ref 3.81–5.12)
RBC #/AREA URNS AUTO: ABNORMAL /HPF
RSV RNA RESP QL NAA+PROBE: NEGATIVE
SARS-COV-2 RNA RESP QL NAA+PROBE: NEGATIVE
SODIUM SERPL-SCNC: 136 MMOL/L (ref 135–147)
SP GR UR STRIP.AUTO: >=1.05 (ref 1–1.03)
UROBILINOGEN UR STRIP-ACNC: <2 MG/DL
WBC # BLD AUTO: 11.12 THOUSAND/UL (ref 4.31–10.16)
WBC #/AREA URNS AUTO: ABNORMAL /HPF

## 2024-04-11 PROCEDURE — 87086 URINE CULTURE/COLONY COUNT: CPT

## 2024-04-11 PROCEDURE — 96361 HYDRATE IV INFUSION ADD-ON: CPT

## 2024-04-11 PROCEDURE — 0241U HB NFCT DS VIR RESP RNA 4 TRGT: CPT

## 2024-04-11 PROCEDURE — 81025 URINE PREGNANCY TEST: CPT

## 2024-04-11 PROCEDURE — 99223 1ST HOSP IP/OBS HIGH 75: CPT | Performed by: INTERNAL MEDICINE

## 2024-04-11 PROCEDURE — 74177 CT ABD & PELVIS W/CONTRAST: CPT

## 2024-04-11 PROCEDURE — 83036 HEMOGLOBIN GLYCOSYLATED A1C: CPT

## 2024-04-11 PROCEDURE — 99285 EMERGENCY DEPT VISIT HI MDM: CPT

## 2024-04-11 PROCEDURE — 96365 THER/PROPH/DIAG IV INF INIT: CPT

## 2024-04-11 PROCEDURE — 83690 ASSAY OF LIPASE: CPT | Performed by: EMERGENCY MEDICINE

## 2024-04-11 PROCEDURE — 81025 URINE PREGNANCY TEST: CPT | Performed by: UROLOGY

## 2024-04-11 PROCEDURE — 36415 COLL VENOUS BLD VENIPUNCTURE: CPT

## 2024-04-11 PROCEDURE — 99285 EMERGENCY DEPT VISIT HI MDM: CPT | Performed by: EMERGENCY MEDICINE

## 2024-04-11 PROCEDURE — 81001 URINALYSIS AUTO W/SCOPE: CPT

## 2024-04-11 PROCEDURE — 96375 TX/PRO/DX INJ NEW DRUG ADDON: CPT

## 2024-04-11 PROCEDURE — 85025 COMPLETE CBC W/AUTO DIFF WBC: CPT | Performed by: EMERGENCY MEDICINE

## 2024-04-11 PROCEDURE — 80053 COMPREHEN METABOLIC PANEL: CPT | Performed by: EMERGENCY MEDICINE

## 2024-04-11 RX ORDER — HYDROMORPHONE HCL/PF 1 MG/ML
0.5 SYRINGE (ML) INJECTION ONCE
Status: COMPLETED | OUTPATIENT
Start: 2024-04-11 | End: 2024-04-11

## 2024-04-11 RX ORDER — LISINOPRIL 10 MG/1
20 TABLET ORAL DAILY
Status: DISCONTINUED | OUTPATIENT
Start: 2024-04-12 | End: 2024-04-11

## 2024-04-11 RX ORDER — LAMOTRIGINE 100 MG/1
100 TABLET ORAL 2 TIMES DAILY
Status: DISCONTINUED | OUTPATIENT
Start: 2024-04-11 | End: 2024-04-12 | Stop reason: HOSPADM

## 2024-04-11 RX ORDER — HEPARIN SODIUM 5000 [USP'U]/ML
5000 INJECTION, SOLUTION INTRAVENOUS; SUBCUTANEOUS EVERY 8 HOURS SCHEDULED
Status: DISCONTINUED | OUTPATIENT
Start: 2024-04-11 | End: 2024-04-12 | Stop reason: HOSPADM

## 2024-04-11 RX ORDER — LORAZEPAM 0.5 MG/1
0.5 TABLET ORAL EVERY 8 HOURS PRN
Status: DISCONTINUED | OUTPATIENT
Start: 2024-04-11 | End: 2024-04-12 | Stop reason: HOSPADM

## 2024-04-11 RX ORDER — LEVOFLOXACIN 5 MG/ML
750 INJECTION, SOLUTION INTRAVENOUS ONCE
Status: COMPLETED | OUTPATIENT
Start: 2024-04-11 | End: 2024-04-12

## 2024-04-11 RX ORDER — INSULIN LISPRO 100 [IU]/ML
2-12 INJECTION, SOLUTION INTRAVENOUS; SUBCUTANEOUS EVERY 6 HOURS SCHEDULED
Status: DISCONTINUED | OUTPATIENT
Start: 2024-04-12 | End: 2024-04-12

## 2024-04-11 RX ORDER — ACETAMINOPHEN 325 MG/1
650 TABLET ORAL EVERY 4 HOURS PRN
Status: DISCONTINUED | OUTPATIENT
Start: 2024-04-11 | End: 2024-04-12 | Stop reason: HOSPADM

## 2024-04-11 RX ORDER — INSULIN GLARGINE 100 [IU]/ML
20 INJECTION, SOLUTION SUBCUTANEOUS EVERY 12 HOURS SCHEDULED
Status: DISCONTINUED | OUTPATIENT
Start: 2024-04-11 | End: 2024-04-12 | Stop reason: DRUGHIGH

## 2024-04-11 RX ORDER — SODIUM CHLORIDE, SODIUM LACTATE, POTASSIUM CHLORIDE, CALCIUM CHLORIDE 600; 310; 30; 20 MG/100ML; MG/100ML; MG/100ML; MG/100ML
125 INJECTION, SOLUTION INTRAVENOUS CONTINUOUS
Status: DISPENSED | OUTPATIENT
Start: 2024-04-11 | End: 2024-04-12

## 2024-04-11 RX ORDER — DROPERIDOL 2.5 MG/ML
0.62 INJECTION, SOLUTION INTRAMUSCULAR; INTRAVENOUS ONCE
Status: COMPLETED | OUTPATIENT
Start: 2024-04-11 | End: 2024-04-11

## 2024-04-11 RX ORDER — HYDROMORPHONE HCL IN WATER/PF 6 MG/30 ML
0.2 PATIENT CONTROLLED ANALGESIA SYRINGE INTRAVENOUS EVERY 2 HOUR PRN
Status: DISCONTINUED | OUTPATIENT
Start: 2024-04-11 | End: 2024-04-12 | Stop reason: HOSPADM

## 2024-04-11 RX ORDER — ARIPIPRAZOLE 10 MG/1
10 TABLET ORAL DAILY
Status: DISCONTINUED | OUTPATIENT
Start: 2024-04-12 | End: 2024-04-12 | Stop reason: HOSPADM

## 2024-04-11 RX ORDER — DESVENLAFAXINE SUCCINATE 50 MG/1
50 TABLET, EXTENDED RELEASE ORAL DAILY
Status: DISCONTINUED | OUTPATIENT
Start: 2024-04-12 | End: 2024-04-12 | Stop reason: HOSPADM

## 2024-04-11 RX ORDER — HYDROMORPHONE HCL/PF 1 MG/ML
0.5 SYRINGE (ML) INJECTION ONCE
Status: CANCELLED | OUTPATIENT
Start: 2024-04-11 | End: 2024-04-11

## 2024-04-11 RX ORDER — OXYCODONE HYDROCHLORIDE 5 MG/1
5 TABLET ORAL EVERY 4 HOURS PRN
Status: DISCONTINUED | OUTPATIENT
Start: 2024-04-11 | End: 2024-04-12 | Stop reason: HOSPADM

## 2024-04-11 RX ORDER — ONDANSETRON 2 MG/ML
4 INJECTION INTRAMUSCULAR; INTRAVENOUS ONCE
Status: COMPLETED | OUTPATIENT
Start: 2024-04-11 | End: 2024-04-11

## 2024-04-11 RX ORDER — DICYCLOMINE HYDROCHLORIDE 10 MG/1
20 CAPSULE ORAL EVERY 6 HOURS PRN
Status: DISCONTINUED | OUTPATIENT
Start: 2024-04-11 | End: 2024-04-12 | Stop reason: HOSPADM

## 2024-04-11 RX ORDER — ONDANSETRON 2 MG/ML
4 INJECTION INTRAMUSCULAR; INTRAVENOUS EVERY 4 HOURS PRN
Status: DISCONTINUED | OUTPATIENT
Start: 2024-04-11 | End: 2024-04-12 | Stop reason: HOSPADM

## 2024-04-11 RX ORDER — OXYCODONE HYDROCHLORIDE 5 MG/1
5 TABLET ORAL ONCE
Status: COMPLETED | OUTPATIENT
Start: 2024-04-11 | End: 2024-04-11

## 2024-04-11 RX ADMIN — SODIUM CHLORIDE, SODIUM LACTATE, POTASSIUM CHLORIDE, AND CALCIUM CHLORIDE 125 ML/HR: .6; .31; .03; .02 INJECTION, SOLUTION INTRAVENOUS at 22:37

## 2024-04-11 RX ADMIN — HYDROMORPHONE HYDROCHLORIDE 0.5 MG: 1 INJECTION, SOLUTION INTRAMUSCULAR; INTRAVENOUS; SUBCUTANEOUS at 17:18

## 2024-04-11 RX ADMIN — ONDANSETRON 4 MG: 2 INJECTION INTRAMUSCULAR; INTRAVENOUS at 17:19

## 2024-04-11 RX ADMIN — IOHEXOL 100 ML: 350 INJECTION, SOLUTION INTRAVENOUS at 18:00

## 2024-04-11 RX ADMIN — SODIUM CHLORIDE 1000 ML: 0.9 INJECTION, SOLUTION INTRAVENOUS at 18:47

## 2024-04-11 RX ADMIN — CEFTRIAXONE SODIUM 1000 MG: 10 INJECTION, POWDER, FOR SOLUTION INTRAVENOUS at 21:14

## 2024-04-11 RX ADMIN — OXYCODONE HYDROCHLORIDE 5 MG: 5 TABLET ORAL at 17:15

## 2024-04-11 RX ADMIN — SODIUM CHLORIDE 1000 ML: 0.9 INJECTION, SOLUTION INTRAVENOUS at 17:17

## 2024-04-11 RX ADMIN — HYDROMORPHONE HYDROCHLORIDE 0.5 MG: 1 INJECTION, SOLUTION INTRAMUSCULAR; INTRAVENOUS; SUBCUTANEOUS at 22:37

## 2024-04-11 RX ADMIN — DROPERIDOL 0.62 MG: 2.5 INJECTION, SOLUTION INTRAMUSCULAR; INTRAVENOUS at 18:45

## 2024-04-11 NOTE — ED PROVIDER NOTES
History  Chief Complaint   Patient presents with    Back Pain     Lower back pain radiating to the L flank. +diarrhea. Hx kidney stones, IBS, diverticulitis. No nausea/vomiting. No dysuria.      HPI Pt is a 48 y/o female presenting to the ED with left flank, low back pain, nausea, and vomiting beginning today. Recent stent placed, removal planned for end of month with urology. She states pain is different from prior kidney stones. Nausea and vomited prior to arrival.  Has had some diarrhea as well today. Pain began this afternoon after walking around at store. Has had some pain with urination, decreased urine output. Hx of IBS and diverticulitis as well.  No chest pain, dyspnea, fevers, chills, focal weakness, abdominal pain, headache, vision changes, vaginal discharge.    Prior to Admission Medications   Prescriptions Last Dose Informant Patient Reported? Taking?   ARIPiprazole (ABILIFY) 10 mg tablet   No No   Sig: Take 1 tablet (10 mg total) by mouth daily   HumaLOG KwikPen 100 units/mL injection pen  Self Yes No   Sig: INJECT 16 UNITS 3 TIMES A DAY BEFORE MEALS   LORazepam (ATIVAN) 0.5 mg tablet   Yes No   Si.5 mg every 8 (eight) hours as needed for anxiety   acetaminophen (TYLENOL) 325 mg tablet   No No   Sig: Take 2 tablets (650 mg total) by mouth every 6 (six) hours as needed for mild pain   clotrimazole (LOTRIMIN) 1 % cream   No No   Sig: Apply topically 2 (two) times a day   desvenlafaxine succinate (PRISTIQ) 50 mg 24 hr tablet   No No   Sig: Take 1 tablet (50 mg total) by mouth daily Do not start before 2023.   dicyclomine (BENTYL) 10 mg capsule   No No   Sig: Take 2 capsules (20 mg total) by mouth every 6 (six) hours as needed (crampy diarrhea)   escitalopram (LEXAPRO) 20 mg tablet  Self No No   Sig: Take 1 tablet (20 mg total) by mouth daily   insulin glargine (LANTUS) 100 units/mL subcutaneous injection   No No   Sig: INJECT 40 UNITS UNDER THE SKIN EVERY MORNING DO NOT START BEFORE  2023.   insulin glargine (LANTUS) 100 units/mL subcutaneous injection   No No   Sig: INJECT 40 UNITS UNDER THE SKIN DAILY AT BEDTIME   lamoTRIgine (LaMICtal) 100 mg tablet   No No   Sig: Take 1 tablet (100 mg total) by mouth 2 (two) times a day   lisinopril (ZESTRIL) 10 mg tablet   No No   Sig: Take 2 tablets (20 mg total) by mouth daily   metFORMIN (GLUCOPHAGE) 500 mg tablet  Self No No   Sig: Take 1 tablet (500 mg total) by mouth 2 (two) times a day with meals      Facility-Administered Medications: None       Past Medical History:   Diagnosis Date    Anemia     Anxiety     Candidal intertrigo     Depression     Diabetes mellitus (HCC)     GERD (gastroesophageal reflux disease)     Hyperlipidemia     Hypertension     Hyponatremia 2023    Irritable bowel syndrome     Morbid obesity with BMI of 60.0-69.9, adult (HCC)     Osteoarthritis     Seasonal allergies     Sleep difficulties     Suicide attempt ([facility])        Past Surgical History:   Procedure Laterality Date     SECTION  1992    CHOLECYSTECTOMY      FL RETROGRADE PYELOGRAM  3/21/2024    AZ CYSTO/URETERO W/LITHOTRIPSY &INDWELL STENT INSRT Left 3/21/2024    Procedure: CYSTOSCOPY, RETROGRADE PYELOGRAM AND INSERTION STENT URETERAL;  Surgeon: Nabil Desir MD;  Location: [facility];  Service: Urology    WISDOM TOOTH EXTRACTION         Family History   Problem Relation Age of Onset    Diabetes Mother     Hypertension Father      I have reviewed and agree with the history as documented.    E-Cigarette/Vaping    E-Cigarette Use Never User      E-Cigarette/Vaping Substances    Nicotine No     THC No     CBD No     Flavoring No     Other No     Unknown No      Social History     Tobacco Use    Smoking status: Never    Smokeless tobacco: Never   Vaping Use    Vaping status: Never Used   Substance Use Topics    Alcohol use: Not Currently     Comment: occassionaly     Drug use: No        Review of Systems   Gastrointestinal:  Positive for  nausea and vomiting.   Genitourinary:  Positive for flank pain.   Musculoskeletal:  Positive for back pain.   All other systems reviewed and are negative.      Physical Exam  ED Triage Vitals   Temperature Pulse Respirations Blood Pressure SpO2   04/11/24 1320 04/11/24 1320 04/11/24 1320 04/11/24 1320 04/11/24 1320   97.6 °F (36.4 °C) 105 18 166/78 100 %      Temp Source Heart Rate Source Patient Position - Orthostatic VS BP Location FiO2 (%)   04/11/24 1320 04/11/24 1320 04/11/24 1320 04/11/24 1320 --   Oral Monitor Sitting Right arm       Pain Score       04/11/24 1525       8             Orthostatic Vital Signs  Vitals:    04/11/24 1922 04/11/24 1930 04/11/24 2000 04/11/24 2100   BP: 124/57 124/57 127/61 119/58   Pulse: (!) 110 (!) 113 (!) 110 (!) 110   Patient Position - Orthostatic VS: Lying   Lying       Physical Exam  Vitals and nursing note reviewed.   Constitutional:       General: She is in acute distress.      Appearance: She is not toxic-appearing or diaphoretic.   HENT:      Head: Normocephalic and atraumatic.      Nose: Nose normal.      Mouth/Throat:      Mouth: Mucous membranes are moist.      Pharynx: Oropharynx is clear.   Eyes:      Extraocular Movements: Extraocular movements intact.      Conjunctiva/sclera: Conjunctivae normal.      Pupils: Pupils are equal, round, and reactive to light.   Cardiovascular:      Rate and Rhythm: Regular rhythm. Tachycardia present.      Pulses: Normal pulses.      Heart sounds: Normal heart sounds. No murmur heard.     No friction rub. No gallop.   Pulmonary:      Effort: Pulmonary effort is normal. No respiratory distress.      Breath sounds: Normal breath sounds. No stridor. No wheezing, rhonchi or rales.   Chest:      Chest wall: No tenderness.   Abdominal:      General: There is no distension.      Palpations: Abdomen is soft. There is no mass.      Tenderness: There is no abdominal tenderness. There is no right CVA tenderness, left CVA tenderness, guarding or  rebound.      Hernia: No hernia is present.   Musculoskeletal:         General: Tenderness (left QL region, as well as along the lumbosacral junction) present. No swelling, deformity or signs of injury. Normal range of motion.      Cervical back: Normal range of motion and neck supple. No rigidity or tenderness.      Right lower leg: No edema.      Left lower leg: No edema.   Skin:     General: Skin is warm and dry.      Coloration: Skin is not jaundiced or pale.      Findings: Rash (Right knee, back appears to be psoriasis) present. No bruising, erythema or lesion.   Neurological:      General: No focal deficit present.      Mental Status: She is alert and oriented to person, place, and time.      Motor: No weakness.      Coordination: Coordination normal.   Psychiatric:         Behavior: Behavior normal.         ED Medications  Medications   ceftriaxone (ROCEPHIN) 1 g/50 mL in dextrose IVPB (has no administration in time range)   lactated ringers infusion (125 mL/hr Intravenous New Bag 4/11/24 7626)   acetaminophen (TYLENOL) tablet 650 mg (has no administration in time range)   oxyCODONE (ROXICODONE) split tablet 2.5 mg (has no administration in time range)     Or   oxyCODONE (ROXICODONE) IR tablet 5 mg (has no administration in time range)   HYDROmorphone HCl (DILAUDID) injection 0.2 mg (has no administration in time range)   ondansetron (ZOFRAN) injection 4 mg (has no administration in time range)   ARIPiprazole (ABILIFY) tablet 10 mg (has no administration in time range)   desvenlafaxine succinate (PRISTIQ) 24 hr tablet 50 mg (has no administration in time range)   dicyclomine (BENTYL) capsule 20 mg (has no administration in time range)   lamoTRIgine (LaMICtal) tablet 100 mg (has no administration in time range)   LORazepam (ATIVAN) tablet 0.5 mg (has no administration in time range)   heparin (porcine) subcutaneous injection 5,000 Units (has no administration in time range)   insulin lispro (HumALOG/ADMELOG)  100 units/mL subcutaneous injection 2-12 Units (has no administration in time range)   insulin glargine (LANTUS) subcutaneous injection 20 Units 0.2 mL (has no administration in time range)   levofloxacin (LEVAQUIN) IVPB (premix in dextrose) 750 mg 150 mL (has no administration in time range)   sodium chloride 0.9 % bolus 1,000 mL (0 mL Intravenous Stopped 4/11/24 2045)   ondansetron (ZOFRAN) injection 4 mg (4 mg Intravenous Given 4/11/24 1719)   HYDROmorphone (DILAUDID) injection 0.5 mg (0.5 mg Intravenous Given 4/11/24 1718)   oxyCODONE (ROXICODONE) IR tablet 5 mg (5 mg Oral Given 4/11/24 1715)   iohexol (OMNIPAQUE) 350 MG/ML injection (MULTI-DOSE) 100 mL (100 mL Intravenous Given 4/11/24 1800)   sodium chloride 0.9 % bolus 1,000 mL (0 mL Intravenous Stopped 4/11/24 2046)   droperidol (INAPSINE) injection 0.625 mg (0.625 mg Intravenous Given 4/11/24 1845)   ceftriaxone (ROCEPHIN) 1 g/50 mL in dextrose IVPB (0 mg Intravenous Stopped 4/11/24 2144)   HYDROmorphone (DILAUDID) injection 0.5 mg (0.5 mg Intravenous Given 4/11/24 2237)       Diagnostic Studies  Results Reviewed       Procedure Component Value Units Date/Time    Urine pregnancy test-Holding area only [[id number]]     Lab Status: No result     Urine Microscopic [[id number]]  (Abnormal) Collected: 04/11/24 2039    Lab Status: Final result Specimen: Urine Updated: 04/11/24 2106     RBC, UA Innumerable /hpf      WBC, UA 20-30 /hpf      Epithelial Cells Occasional /hpf      Bacteria, UA Moderate /hpf      MUCUS THREADS Occasional     Hyaline Casts, UA 25-50 /lpf      Amorphous Crystals, UA Occasional    Urine culture [[id number]] Collected: 04/11/24 2039    Lab Status: In process Specimen: Urine Updated: 04/11/24 2106    UA w Reflex to Microscopic w Reflex to Culture [[id number]]  (Abnormal) Collected: 04/11/24 2039    Lab Status: Final result Specimen: Urine Updated: 04/11/24 2103     Color, UA Yellow     Clarity, UA Turbid     Specific Gravity, UA >=1.050     pH,  UA 6.0     Leukocytes, UA Negative     Nitrite, UA Negative     Protein,  (3+) mg/dl      Glucose, UA Trace mg/dl      Ketones, UA Negative mg/dl      Urobilinogen, UA <2.0 mg/dl      Bilirubin, UA Negative     Occult Blood, UA Large    POCT pregnancy, urine [[id number]]  (Normal) Resulted: 04/11/24 2043    Lab Status: Final result Updated: 04/11/24 2043     EXT Preg Test, Ur Negative     Control Valid    Comprehensive metabolic panel [[id number]]  (Abnormal) Collected: 04/11/24 1656    Lab Status: Final result Specimen: Blood from Arm, Left Updated: 04/11/24 1730     Sodium 136 mmol/L      Potassium 4.6 mmol/L      Chloride 99 mmol/L      CO2 26 mmol/L      ANION GAP 11 mmol/L      BUN 15 mg/dL      Creatinine 1.00 mg/dL      Glucose 207 mg/dL      Calcium 9.9 mg/dL      AST 28 U/L      ALT 44 U/L      Alkaline Phosphatase 63 U/L      Total Protein 8.0 g/dL      Albumin 4.4 g/dL      Total Bilirubin 0.71 mg/dL      eGFR 65 ml/min/1.73sq m     Narrative:      National Kidney Disease Foundation guidelines for Chronic Kidney Disease (CKD):     Stage 1 with normal or high GFR (GFR > 90 mL/min/1.73 square meters)    Stage 2 Mild CKD (GFR = 60-89 mL/min/1.73 square meters)    Stage 3A Moderate CKD (GFR = 45-59 mL/min/1.73 square meters)    Stage 3B Moderate CKD (GFR = 30-44 mL/min/1.73 square meters)    Stage 4 Severe CKD (GFR = 15-29 mL/min/1.73 square meters)    Stage 5 End Stage CKD (GFR <15 mL/min/1.73 square meters)  Note: GFR calculation is accurate only with a steady state creatinine    Lipase [[id number]]  (Normal) Collected: 04/11/24 1656    Lab Status: Final result Specimen: Blood from Arm, Left Updated: 04/11/24 1730     Lipase 48 u/L     FLU/RSV/COVID - if FLU/RSV clinically relevant [[id number]]  (Normal) Collected: 04/11/24 1537    Lab Status: Final result Specimen: Nares from Nose Updated: 04/11/24 1637     SARS-CoV-2 Negative     INFLUENZA A PCR Negative     INFLUENZA B PCR Negative     RSV PCR Negative     Narrative:      FOR PEDIATRIC PATIENTS - copy/paste COVID Guidelines URL to browser: [email or web address]    SARS-CoV-2 assay is a Nucleic Acid Amplification assay intended for the  qualitative detection of nucleic acid from SARS-CoV-2 in nasopharyngeal  swabs. Results are for the presumptive identification of SARS-CoV-2 RNA.    Positive results are indicative of infection with SARS-CoV-2, the virus  causing COVID-19, but do not rule out bacterial infection or co-infection  with other viruses. Laboratories within the [place] and its  territories are required to report all positive results to the appropriate  public health authorities. Negative results do not preclude SARS-CoV-2  infection and should not be used as the sole basis for treatment or other  patient management decisions. Negative results must be combined with  clinical observations, patient history, and epidemiological information.  This test has not been FDA cleared or approved.    This test has been authorized by FDA under an Emergency Use Authorization  (EUA). This test is only authorized for the duration of time the  declaration that circumstances exist justifying the authorization of the  emergency use of an in vitro diagnostic tests for detection of SARS-CoV-2  virus and/or diagnosis of COVID-19 infection under section 564(b)(1) of  the Act, 21 U.S.C. 360bbb-3(b)(1), unless the authorization is terminated  or revoked sooner. The test has been validated but independent review by FDA  and CLIA is pending.    Test performed using [employer] GeneXpert: This RT-PCR assay targets N2,  a region unique to SARS-CoV-2. A conserved region in the E-gene was chosen  for pan-Sarbecovirus detection which includes SARS-CoV-2.    According to CMS-2020-01-R, this platform meets the definition of high-throughput technology.    CBC and differential [[id number]]  (Abnormal) Collected: 04/11/24 1323    Lab  Status: Final result Specimen: Blood from Arm, Left Updated: 04/11/24 1418     WBC 11.12 Thousand/uL      RBC 5.16 Million/uL      Hemoglobin 15.7 g/dL      Hematocrit 48.6 %      MCV 94 fL      MCH 30.4 pg      MCHC 32.3 g/dL      RDW 13.1 %      MPV 10.9 fL      Platelets 350 Thousands/uL      nRBC 0 /100 WBCs      Segmented % 71 %      Immature Grans % 1 %      Lymphocytes % 18 %      Monocytes % 8 %      Eosinophils Relative 1 %      Basophils Relative 1 %      Absolute Neutrophils 7.94 Thousands/µL      Absolute Immature Grans 0.06 Thousand/uL      Absolute Lymphocytes 2.05 Thousands/µL      Absolute Monocytes 0.85 Thousand/µL      Eosinophils Absolute 0.16 Thousand/µL      Basophils Absolute 0.06 Thousands/µL                    CT abdomen pelvis with contrast   Final Result by Dinh Goetz MD (04/11 1847)      1. Punctate bilateral nephrolithiasis. Small 3 mm distal left ureteral calculus identified, also nonobstructive with a left double-J ureteral stent in place.   2. No acute inflammatory or infectious process.            Workstation performed: [id number]               Procedures  Procedures      ED Course  ED Course as of 04/11/24 2342   Thu Apr 11, 2024   1822 No hydronephrosis on bedside US. Pending CT read.    2154 D/W Urology, they will see the pt in morning - admit to [facility]. Unlikely to need urologic intervention.                             SBIRT 20yo+      Flowsheet Row Most Recent Value   Initial Alcohol Screen: US AUDIT-C     1. How often do you have a drink containing alcohol? 0 Filed at: 04/11/2024 1527   2. How many drinks containing alcohol do you have on a typical day you are drinking?  0 Filed at: 04/11/2024 1527   3a. Male UNDER 65: How often do you have five or more drinks on one occasion? 0 Filed at: 04/11/2024 1527   3b. FEMALE Any Age, or MALE 65+: How often do you have 4 or more drinks on one occassion? 0 Filed at: 04/11/2024 1527   Audit-C Score 0 Filed at: 04/11/2024 1527   JUAN: How  many times in the past year have you...    Used an illegal drug or used a prescription medication for non-medical reasons? Never Filed at: 04/11/2024 1527                  Medical Decision Making  DDx including but not limited to: renal colic, pyelonephritis, UTI, GI etiology, appendicitis, diverticulitis, pancreatitis, cholecystitis, biliary colic, AAA, rhabdomyolysis, tumor, zoster, low back pain.     48 y/o female presenting to the ED with left flank, low back pain, nausea, and vomiting beginning today. Recent stent placed, removal planned for end of month with urology. She states pain is different from prior kidney stones. Nausea and vomited prior to arrival.  Has had some diarrhea as well today. Pain began this afternoon after walking around at store. Has had some pain with urination, decreased urine output. Hx of IBS and diverticulitis as well.  No chest pain, dyspnea, fevers, chills, focal weakness, abdominal pain, headache, vision changes, vaginal discharge.    1. Punctate bilateral nephrolithiasis. Small 3 mm distal left ureteral calculus identified, also nonobstructive with a left double-J ureteral stent in place.  2. No acute inflammatory or infectious process.  No hydronephrosis on bedside US. Pending CT read.   D/W Urology, they will see the pt in morning - admit to [facility]. Unlikely to need urologic intervention.            Amount and/or Complexity of Data Reviewed  Labs: ordered.  Radiology: ordered.    Risk  Prescription drug management.  Decision regarding hospitalization.          Disposition  Final diagnoses:   Acute left-sided low back pain without sciatica   Left flank pain   UTI (urinary tract infection)     Time reflects when diagnosis was documented in both MDM as applicable and the Disposition within this note       Time User Action Codes Description Comment    4/11/2024  9:51 PM Alirio Medrano Add [M54.50] Acute left-sided low back pain without sciatica     4/11/2024  9:51 PM Mitchell  Alirio MILTON Add [R10.9] Left flank pain     4/11/2024  9:51 PM Alirio Medrano Add [N39.0] UTI (urinary tract infection)     4/11/2024  9:51 PM Alirio Medrano Add [D64.9] Anemia     4/11/2024  9:51 PM Alirio Medrano Modify [M54.50] Acute left-sided low back pain without sciatica     4/11/2024  9:51 PM Alirio Medrano Modify [N39.0] UTI (urinary tract infection)     4/11/2024  9:54 PM Alirio Medrano Remove [D64.9] Anemia           ED Disposition       ED Disposition   Admit    Condition   Stable    Date/Time   Thu Apr 11, 2024 2151    Comment   Case was discussed with Dr. Wells and the patient's admission status was agreed to be Admission Status: inpatient status to the service of Dr. Wells.               Follow-up Information    None         Patient's Medications   Discharge Prescriptions    No medications on file     No discharge procedures on file.    PDMP Review         Value Time User    PDMP Reviewed  Yes 3/21/2024  2:27 AM Kapil Thomas MD             ED Provider  Attending physically available and evaluated Montse Smith. I managed the patient along with the ED Attending.    Electronically Signed by           Alirio Medrano DO  04/11/24 3954

## 2024-04-11 NOTE — ED PROCEDURE NOTE
Procedure  POC Renal US    Date/Time: 4/11/2024 3:50 PM    Performed by: Kapil Jacobsen DO  Authorized by: Kapil Jacobsen DO    Performed by:  Resident  Other Assisting Provider: Yes (comment) (Alirio Medrano DO; Amanda Ann DO)    Procedure details:     Exam Type:  Diagnostic and educational    Indications: flank/back pain      Assessment for:  Bladder volume and suspected hydronephrosis    Views obtained: bladder (transverse and sagittal), left kidney and right kidney      Image quality: limited diagnostic      Image availability:  Images available in PACS and video obtained  Findings:     LEFT kidney findings: unremarkable      LEFT hydronephrosis: none      RIGHT kidney findings: unremarkable      RIGHT hydronephrosis: none      Bladder:  Not visualized  Interpretation:     Renal ultrasound impressions: normal exam                     Kapil Jacobsen DO  04/11/24 1559

## 2024-04-12 VITALS
HEART RATE: 95 BPM | OXYGEN SATURATION: 92 % | TEMPERATURE: 98 F | HEIGHT: 60 IN | RESPIRATION RATE: 16 BRPM | WEIGHT: 293 LBS | SYSTOLIC BLOOD PRESSURE: 152 MMHG | DIASTOLIC BLOOD PRESSURE: 70 MMHG | BODY MASS INDEX: 57.52 KG/M2

## 2024-04-12 PROBLEM — R31.29 MICROSCOPIC HEMATURIA: Status: ACTIVE | Noted: 2023-01-29

## 2024-04-12 PROBLEM — N17.9 AKI (ACUTE KIDNEY INJURY) (HCC): Status: RESOLVED | Noted: 2024-04-11 | Resolved: 2024-04-12

## 2024-04-12 LAB
ANION GAP SERPL CALCULATED.3IONS-SCNC: 8 MMOL/L (ref 4–13)
BUN SERPL-MCNC: 12 MG/DL (ref 5–25)
CALCIUM SERPL-MCNC: 8.5 MG/DL (ref 8.4–10.2)
CHLORIDE SERPL-SCNC: 104 MMOL/L (ref 96–108)
CO2 SERPL-SCNC: 22 MMOL/L (ref 21–32)
CREAT SERPL-MCNC: 0.74 MG/DL (ref 0.6–1.3)
ERYTHROCYTE [DISTWIDTH] IN BLOOD BY AUTOMATED COUNT: 13.1 % (ref 11.6–15.1)
GFR SERPL CREATININE-BSD FRML MDRD: 94 ML/MIN/1.73SQ M
GLUCOSE SERPL-MCNC: 185 MG/DL (ref 65–140)
GLUCOSE SERPL-MCNC: 188 MG/DL (ref 65–140)
GLUCOSE SERPL-MCNC: 188 MG/DL (ref 65–140)
GLUCOSE SERPL-MCNC: 237 MG/DL (ref 65–140)
GLUCOSE SERPL-MCNC: 288 MG/DL (ref 65–140)
HCT VFR BLD AUTO: 38.2 % (ref 34.8–46.1)
HGB BLD-MCNC: 12.5 G/DL (ref 11.5–15.4)
MCH RBC QN AUTO: 30.8 PG (ref 26.8–34.3)
MCHC RBC AUTO-ENTMCNC: 33 G/DL (ref 31.4–37.4)
MCV RBC AUTO: 93 FL (ref 82–98)
PLATELET # BLD AUTO: 294 THOUSANDS/UL (ref 149–390)
PMV BLD AUTO: 9.9 FL (ref 8.9–12.7)
POTASSIUM SERPL-SCNC: 3.9 MMOL/L (ref 3.5–5.3)
RBC # BLD AUTO: 4.13 MILLION/UL (ref 3.81–5.12)
SODIUM SERPL-SCNC: 134 MMOL/L (ref 135–147)
WBC # BLD AUTO: 7.47 THOUSAND/UL (ref 4.31–10.16)

## 2024-04-12 PROCEDURE — 85027 COMPLETE CBC AUTOMATED: CPT | Performed by: STUDENT IN AN ORGANIZED HEALTH CARE EDUCATION/TRAINING PROGRAM

## 2024-04-12 PROCEDURE — 36415 COLL VENOUS BLD VENIPUNCTURE: CPT | Performed by: STUDENT IN AN ORGANIZED HEALTH CARE EDUCATION/TRAINING PROGRAM

## 2024-04-12 PROCEDURE — 99239 HOSP IP/OBS DSCHRG MGMT >30: CPT | Performed by: INTERNAL MEDICINE

## 2024-04-12 PROCEDURE — 99222 1ST HOSP IP/OBS MODERATE 55: CPT | Performed by: UROLOGY

## 2024-04-12 PROCEDURE — 80048 BASIC METABOLIC PNL TOTAL CA: CPT | Performed by: STUDENT IN AN ORGANIZED HEALTH CARE EDUCATION/TRAINING PROGRAM

## 2024-04-12 PROCEDURE — 82948 REAGENT STRIP/BLOOD GLUCOSE: CPT

## 2024-04-12 RX ORDER — INSULIN LISPRO 100 [IU]/ML
2-12 INJECTION, SOLUTION INTRAVENOUS; SUBCUTANEOUS
Status: DISCONTINUED | OUTPATIENT
Start: 2024-04-12 | End: 2024-04-12 | Stop reason: HOSPADM

## 2024-04-12 RX ORDER — INSULIN GLARGINE 100 [IU]/ML
40 INJECTION, SOLUTION SUBCUTANEOUS EVERY MORNING
Status: DISCONTINUED | OUTPATIENT
Start: 2024-04-12 | End: 2024-04-12 | Stop reason: HOSPADM

## 2024-04-12 RX ORDER — OXYBUTYNIN CHLORIDE 5 MG/1
5 TABLET ORAL 3 TIMES DAILY
Qty: 90 TABLET | Refills: 0 | Status: SHIPPED | OUTPATIENT
Start: 2024-04-12

## 2024-04-12 RX ORDER — PHENAZOPYRIDINE HYDROCHLORIDE 100 MG/1
100 TABLET, FILM COATED ORAL
Status: DISCONTINUED | OUTPATIENT
Start: 2024-04-12 | End: 2024-04-12 | Stop reason: HOSPADM

## 2024-04-12 RX ORDER — OXYBUTYNIN CHLORIDE 5 MG/1
5 TABLET ORAL 3 TIMES DAILY
Status: DISCONTINUED | OUTPATIENT
Start: 2024-04-12 | End: 2024-04-12 | Stop reason: HOSPADM

## 2024-04-12 RX ORDER — DULAGLUTIDE 1.5 MG/.5ML
1.5 INJECTION, SOLUTION SUBCUTANEOUS WEEKLY
COMMUNITY
Start: 2024-01-18

## 2024-04-12 RX ORDER — INSULIN GLARGINE 100 [IU]/ML
40 INJECTION, SOLUTION SUBCUTANEOUS
Status: DISCONTINUED | OUTPATIENT
Start: 2024-04-12 | End: 2024-04-12 | Stop reason: HOSPADM

## 2024-04-12 RX ORDER — TAMSULOSIN HYDROCHLORIDE 0.4 MG/1
0.4 CAPSULE ORAL
Qty: 30 CAPSULE | Refills: 0 | Status: SHIPPED | OUTPATIENT
Start: 2024-04-13

## 2024-04-12 RX ORDER — PHENAZOPYRIDINE HYDROCHLORIDE 100 MG/1
100 TABLET, FILM COATED ORAL
Qty: 90 TABLET | Refills: 0 | Status: SHIPPED | OUTPATIENT
Start: 2024-04-12

## 2024-04-12 RX ORDER — INSULIN LISPRO 100 [IU]/ML
16 INJECTION, SOLUTION INTRAVENOUS; SUBCUTANEOUS
Status: DISCONTINUED | OUTPATIENT
Start: 2024-04-12 | End: 2024-04-12 | Stop reason: HOSPADM

## 2024-04-12 RX ORDER — TAMSULOSIN HYDROCHLORIDE 0.4 MG/1
0.4 CAPSULE ORAL
Status: DISCONTINUED | OUTPATIENT
Start: 2024-04-12 | End: 2024-04-12 | Stop reason: HOSPADM

## 2024-04-12 RX ADMIN — PHENAZOPYRIDINE 100 MG: 100 TABLET ORAL at 15:38

## 2024-04-12 RX ADMIN — HEPARIN SODIUM 5000 UNITS: 5000 INJECTION INTRAVENOUS; SUBCUTANEOUS at 05:55

## 2024-04-12 RX ADMIN — CEFTRIAXONE SODIUM 1000 MG: 10 INJECTION, POWDER, FOR SOLUTION INTRAVENOUS at 09:31

## 2024-04-12 RX ADMIN — INSULIN LISPRO 2 UNITS: 100 INJECTION, SOLUTION INTRAVENOUS; SUBCUTANEOUS at 05:55

## 2024-04-12 RX ADMIN — OXYBUTYNIN CHLORIDE 5 MG: 5 TABLET ORAL at 10:07

## 2024-04-12 RX ADMIN — INSULIN GLARGINE 20 UNITS: 100 INJECTION, SOLUTION SUBCUTANEOUS at 00:15

## 2024-04-12 RX ADMIN — TAMSULOSIN HYDROCHLORIDE 0.4 MG: 0.4 CAPSULE ORAL at 10:08

## 2024-04-12 RX ADMIN — LAMOTRIGINE 100 MG: 100 TABLET ORAL at 00:14

## 2024-04-12 RX ADMIN — INSULIN GLARGINE 40 UNITS: 100 INJECTION, SOLUTION SUBCUTANEOUS at 10:07

## 2024-04-12 RX ADMIN — LEVOFLOXACIN 750 MG: 750 INJECTION, SOLUTION INTRAVENOUS at 00:27

## 2024-04-12 RX ADMIN — INSULIN LISPRO 16 UNITS: 100 INJECTION, SOLUTION INTRAVENOUS; SUBCUTANEOUS at 11:06

## 2024-04-12 RX ADMIN — HEPARIN SODIUM 5000 UNITS: 5000 INJECTION INTRAVENOUS; SUBCUTANEOUS at 00:18

## 2024-04-12 RX ADMIN — INSULIN LISPRO 6 UNITS: 100 INJECTION, SOLUTION INTRAVENOUS; SUBCUTANEOUS at 00:22

## 2024-04-12 RX ADMIN — LAMOTRIGINE 100 MG: 100 TABLET ORAL at 09:31

## 2024-04-12 RX ADMIN — ARIPIPRAZOLE 10 MG: 10 TABLET ORAL at 09:31

## 2024-04-12 RX ADMIN — OXYCODONE HYDROCHLORIDE 5 MG: 5 TABLET ORAL at 02:37

## 2024-04-12 RX ADMIN — DESVENLAFAXINE 50 MG: 50 TABLET, EXTENDED RELEASE ORAL at 09:31

## 2024-04-12 RX ADMIN — HYDROMORPHONE HYDROCHLORIDE 0.2 MG: 0.2 INJECTION, SOLUTION INTRAMUSCULAR; INTRAVENOUS; SUBCUTANEOUS at 07:54

## 2024-04-12 RX ADMIN — INSULIN LISPRO 4 UNITS: 100 INJECTION, SOLUTION INTRAVENOUS; SUBCUTANEOUS at 12:27

## 2024-04-12 NOTE — ASSESSMENT & PLAN NOTE
POA with L flank pain and mid back pain  Not meeting SIRS criteria  UA: Specific gravity 1.050, protein 3+, occult blood large, rbc innum, wbc 20-30, bacteria moderate, hyalyce cast  Recent l stent placement on 3/21/24  Received Ceftriaxone for 1 dose in ED    Plan  Monitor vitals and fever curve  Follow am CBC  Follow urine culture  C/w broad spectrum Abx, previous urine culture was pan sensitive

## 2024-04-12 NOTE — ASSESSMENT & PLAN NOTE
Lab Results   Component Value Date    HGBA1C 7.2 (H) 04/11/2024       Recent Labs     04/12/24  0013 04/12/24  0550 04/12/24  1135 04/12/24  1219   POCGLU 288* 188* 188* 237*         Blood Sugar Average: Last 72 hrs:  (P) 225.25Home regimen Lantus 40 U BID,  lis pro 16 U TID with meals, and metformin  Continue home insulin regimen and hold metformin.  Insulin sliding scale and hypoglycemia protocol

## 2024-04-12 NOTE — ASSESSMENT & PLAN NOTE
POA with L flank and mid back pain started earlier today, 8/10 in intensity, cramping. Pt admits burning on urination. Denies fever or chills. Denies seeing blood or clots in her urine however mentioned that it was dark  Pt had L stent placement on 3/21/24 however her pain improved till current episode  Vitals , RR 18, 119/58  Hgb 15.7, WBC 11.1, Glu 200  UA Specific gravity 1.050, protein 3+, occult blood large, rbc innum, wbc 20-30, bacteria moderate, hyalyce cast  CT AP with contrast: Punctate bilateral nephrolithiasis. Small 3 mm distal left ureteral calculus identified, also nonobstructive with a left double-J ureteral stent in place. No acute inflammatory or infectious process.    In ED 2l of NS bolus, oxy 5, zofran, dilaudid, droperidol, CFX    Plan:  Follow am cbc  Follow wUA culture  C/w  cc/h for 12 h  Pain management with tylenol for moderate, oxy 2.5/5 mg for moderate/severe pain respectively, dilaudid 0.5 mg IV Q2 for breakthrough pain  Zofran for nausea  C/w Ceftriaxone for now, follow urine culture and adjust based on sensitivity  NPO from [place]  Urology consult

## 2024-04-12 NOTE — ED NOTES
[facility] bed found for patient. Patient transferred from stretcher into bed. Patient appreciative.      Kathrine Espinoza RN  04/11/24 4362

## 2024-04-12 NOTE — ASSESSMENT & PLAN NOTE
Assessment:  S/p L stent placement on 3/21/24 with L flank and mid back pain started earlier today, 8/10 in intensity, cramping. Pt admits burning on urination. Denies fever or chills. Denies seeing blood or clots in her urine however mentioned that it was dark. Finished 10d course of cefpodoxime 4/4  WBC 11.1. UA Specific gravity 1.050, protein 3+, occult blood large, rbc innum, wbc 20-30, bacteria moderate, hyalyce cast  CT AP with contrast: Punctate bilateral nephrolithiasis. Small 3 mm distal left ureteral calculus identified, also nonobstructive with a left double-J ureteral stent in place. No acute inflammatory or infectious process.    Plan:  Urology consulted and not recommending any further inpatient inventions.  Patient will follow-up outpatient and continue with outpatient lithotripsy on 4/23  Continue stent colic medications at discharge: Flomax, Pyridium, and oxybutynin

## 2024-04-12 NOTE — ED NOTES
Pt ambulated to and from bathroom with steady gait.  Medicated as ordered.  Discharge instructions reviewed.  Pt is calling her ride.     Tisha Hernandez RN  04/12/24 8752

## 2024-04-12 NOTE — ASSESSMENT & PLAN NOTE
Pt states that her diarrhea worsened during the last several days  She has h/o of chronic diarrhea due to IBS  Reports 3-4 BM daily, recent Abx use_ Cefpodoxime, finished on 3/27  Have low suspicion however would like to rule out inf-Obtain C.diff  If negative C.diff give imodium  C/w bentyl

## 2024-04-12 NOTE — PRE-PROCEDURE INSTRUCTIONS
Pre-Surgery Instructions:   Medication Instructions    acetaminophen (TYLENOL) 325 mg tablet Take Day of Surgery; unless usually taken at night     ARIPiprazole (ABILIFY) 10 mg tablet Take Day of Surgery; unless usually taken at night     clotrimazole (LOTRIMIN) 1 % cream Do not take day of surgery; continue as prescribed excluding DOS     desvenlafaxine succinate (PRISTIQ) 50 mg 24 hr tablet Take Day of Surgery; unless usually taken at night     dicyclomine (BENTYL) 10 mg capsule Take Day of Surgery; unless usually taken at night     dulaglutide (Trulicity) 1.5 MG/0.5ML injection Hold for 1 week    HumaLOG KwikPen 100 units/mL injection pen Do not take day of surgery; continue as prescribed excluding DOS     insulin glargine (LANTUS) 100 units/mL subcutaneous injection Take Day of Surgery; unless usually taken at night     insulin glargine (LANTUS) 100 units/mL subcutaneous injection Take Day of Surgery; unless usually taken at night     lamoTRIgine (LaMICtal) 100 mg tablet Take Day of Surgery; unless usually taken at night     lisinopril (ZESTRIL) 10 mg tablet Do not take day of surgery; continue as prescribed excluding DOS     LORazepam (ATIVAN) 0.5 mg tablet Take Day of Surgery; unless usually taken at night     metFORMIN (GLUCOPHAGE) 500 mg tablet Do not take day of surgery; continue as prescribed excluding DOS     Medication instructions for day surgery reviewed. Please use only a sip of water to take your instructed medications. Avoid all over the counter vitamins, supplements and NSAIDS for one week prior to surgery per anesthesia guidelines. Tylenol is ok to take as needed.     You will receive a call one business day prior to surgery with an arrival time and hospital directions. If your surgery is scheduled on a Monday, the hospital will be calling you on the Friday prior to your surgery. If you have not heard from anyone by 8pm, please call the hospital supervisor through the hospital  at  [phone]. ([place] [phone] or [place] [phone]).    Do not eat or drink anything after midnight the night before your surgery, including candy, mints, lifesavers, or chewing gum. Do not drink alcohol 24hrs before your surgery. Try not to smoke at least 24hrs before your surgery.       Follow the pre surgery showering instructions as listed in the “My Surgical Experience Booklet” or otherwise provided by your surgeon's office. Do not use a blade to shave the surgical area 1 week before surgery. It is okay to use a clean electric clippers up to 24 hours before surgery. Do not apply any lotions, creams, including makeup, cologne, deodorant, or perfumes after showering on the day of your surgery. Do not use dry shampoo, hair spray, hair gel, or any type of hair products.     No contact lenses, eye make-up, or artificial eyelashes. Remove nail polish, including gel polish, and any artificial, gel, or acrylic nails if possible. Remove all jewelry including rings and body piercing jewelry.     Wear causal clothing that is easy to take on and off. Consider your type of surgery.    Keep any valuables, jewelry, piercings at home. Please bring any specially ordered equipment (sling, braces) if indicated.    Arrange for a responsible person to drive you to and from the hospital on the day of your surgery. Please confirm the visitor policy for the day of your procedure when you receive your phone call with an arrival time.     Call the surgeon's office with any new illnesses, exposures, or additional questions prior to surgery.    Please reference your “My Surgical Experience Booklet” for additional information to prepare for your upcoming surgery.

## 2024-04-12 NOTE — UTILIZATION REVIEW
Initial Clinical Review    Admission: Date/Time/Statement:   Admission Orders (From admission, onward)       Ordered        04/11/24 2159  INPATIENT ADMISSION  Once                          Orders Placed This Encounter   Procedures    INPATIENT ADMISSION     Standing Status:   Standing     Number of Occurrences:   1     Order Specific Question:   Level of Care     Answer:   Med Surg [16]     Order Specific Question:   Estimated length of stay     Answer:   More than 2 Midnights     Order Specific Question:   Certification     Answer:   I certify that inpatient services are medically necessary for this patient for a duration of greater than two midnights. See H&P and MD Progress Notes for additional information about the patient's course of treatment.     ED Arrival Information       Expected   -    Arrival   4/11/2024 13:08    Acuity   Urgent              Means of arrival   Wheelchair    Escorted by   Self    Service   Hospitalist    Admission type   Emergency              Arrival complaint   Back Pain             Chief Complaint   Patient presents with    Back Pain     Lower back pain radiating to the L flank. +diarrhea. Hx kidney stones, IBS, diverticulitis. No nausea/vomiting. No dysuria.        Initial Presentation: 49 y.o. female presents to ED from home with worsening left flank pain, nausea and vomiting.  Had a  BM the morning of admission, felt severe cramping in left flank and mid lower back after that.  Had nausea and 1 episode of vomiting.   Does have chronic diarrhea  due to IBS,  worse the past  couple of days, now has   about  3-4 watery  BM's  daily.  Completed  antibiotics  1 week ago  for  left ureteral stent placed  3/21.  Given  2 L  NS bolus, IV dilaudid, IV  zofran, oxy, droperidol  and CFX in ED. Additional PMH is  HTN,  IDDM, anxiety/depression  and MO.  Labs reveal creatinine   1.0.  Meets KIDIGO creatinine  for  DEEP.  U/A  shows + protein, occult blood, wbc, bacteria. Ct abdomen shows  nephrolithiasis, ureteral calculus. Had  upcoming cystogram/lithotripsy   4/23.   Admit  Ip with  Left flank pain, Kidney stones,  UTI, DEEP and plan is  monitor labs, pain control, IVF, urine culture, MARTHA, urology consult and antiemetics as needed.         Date:   4/12   Day 2:   D/c  home    ED Triage Vitals   Temperature Pulse Respirations Blood Pressure SpO2   04/11/24 1320 04/11/24 1320 04/11/24 1320 04/11/24 1320 04/11/24 1320   97.6 °F (36.4 °C) 105 18 166/78 100 %      Temp Source Heart Rate Source Patient Position - Orthostatic VS BP Location FiO2 (%)   04/11/24 1320 04/11/24 1320 04/11/24 1320 04/11/24 1320 --   Oral Monitor Sitting Right arm       Pain Score       04/11/24 1525       8          Wt Readings from Last 1 Encounters:   04/11/24 (!) 152 kg (334 lb)     Additional Vital Signs:   98 °F (36.7 °C) 95 16 152/70 -- 92 % None (Room air) Lying    04/11/24 2100 -- 110 Abnormal  -- 119/58 83 94 % None (Room air) Lying   04/11/24 2000 -- 110 Abnormal  -- 127/61 87 92 % -- --   04/11/24 1930 -- 113 Abnormal  -- 124/57 82 94 % -- --   04/11/24 1922 -- 110 Abnormal  18 124/57 82 92 % None (Room air) Lying   04/11/24 1700 -- 111 Abnormal  18 141/63 90 98 % None (Room air) Lying   04/11/24 1630 -- 109 Abnormal  18 172/79 Abnormal  114 96 % None (Room air) Lying   04/11/24 1525 -- 104 18 138/79 103 97 % None (Room air) Lying   04/11/24 1320 97.6 °F (36.4 °C) 105 18 166/78 112 100 % None (Room air) Sitting     Pertinent Labs/Diagnostic Test Results:   CT abdomen pelvis with contrast   Final Result by Dinh Goetz MD (04/11 1847)      1. Punctate bilateral nephrolithiasis. Small 3 mm distal left ureteral calculus identified, also nonobstructive with a left double-J ureteral stent in place.   2. No acute inflammatory or infectious process.            Workstation performed: [id number]           Results from last 7 days   Lab Units 04/11/24  1537   SARS-COV-2  Negative     Results from last 7 days   Lab Units  04/12/24  0750 04/11/24  1323   WBC Thousand/uL 7.47 11.12*   HEMOGLOBIN g/dL 12.5 15.7*   HEMATOCRIT % 38.2 48.6*   PLATELETS Thousands/uL 294 350   TOTAL NEUT ABS Thousands/µL  --  7.94*         Results from last 7 days   Lab Units 04/12/24  0455 04/11/24  1656   SODIUM mmol/L 134* 136   POTASSIUM mmol/L 3.9 4.6   CHLORIDE mmol/L 104 99   CO2 mmol/L 22 26   ANION GAP mmol/L 8 11   BUN mg/dL 12 15   CREATININE mg/dL 0.74 1.00   EGFR ml/min/1.73sq m 94 65   CALCIUM mg/dL 8.5 9.9     Results from last 7 days   Lab Units 04/11/24  1656   AST U/L 28   ALT U/L 44   ALK PHOS U/L 63   TOTAL PROTEIN g/dL 8.0   ALBUMIN g/dL 4.4   TOTAL BILIRUBIN mg/dL 0.71     Results from last 7 days   Lab Units 04/12/24  1135 04/12/24  0550 04/12/24  0013   POC GLUCOSE mg/dl 188* 188* 288*     Results from last 7 days   Lab Units 04/12/24  0455 04/11/24  1656   GLUCOSE RANDOM mg/dL 185* 207*         Results from last 7 days   Lab Units 04/11/24  1112   HEMOGLOBIN A1C % 7.2*   EAG mg/dl 160         Results from last 7 days   Lab Units 04/11/24  1656   LIPASE u/L 48                 Results from last 7 days   Lab Units 04/11/24  2039   CLARITY UA  Turbid   COLOR UA  Yellow   SPEC GRAV UA  >=1.050*   PH UA  6.0   GLUCOSE UA mg/dl Trace*   KETONES UA mg/dl Negative   BLOOD UA  Large*   PROTEIN UA mg/dl 300 (3+)*   NITRITE UA  Negative   BILIRUBIN UA  Negative   UROBILINOGEN UA (BE) mg/dl <2.0   LEUKOCYTES UA  Negative   WBC UA /hpf 20-30*   RBC UA /hpf Innumerable*   BACTERIA UA /hpf Moderate*   EPITHELIAL CELLS WET PREP /hpf Occasional   MUCUS THREADS  Occasional*     Results from last 7 days   Lab Units 04/11/24  1537   INFLUENZA A PCR  Negative   INFLUENZA B PCR  Negative   RSV PCR  Negative                             Results from last 7 days   Lab Units 04/11/24  1112   URINE CULTURE  Culture too young- will reincubate                   ED Treatment:   Medication Administration from 04/11/2024 7063 to 04/12/2024 1137         Date/Time  Order Dose Route Action Comments     04/11/2024 2045 EDT sodium chloride 0.9 % bolus 1,000 mL 0 mL Intravenous Stopped --     04/11/2024 1717 EDT sodium chloride 0.9 % bolus 1,000 mL 1,000 mL Intravenous New Bag --     04/11/2024 1719 EDT ondansetron (ZOFRAN) injection 4 mg 4 mg Intravenous Given --     04/11/2024 1718 EDT HYDROmorphone (DILAUDID) injection 0.5 mg 0.5 mg Intravenous Given --     04/11/2024 1715 EDT oxyCODONE (ROXICODONE) IR tablet 5 mg 5 mg Oral Given --     04/11/2024 1800 EDT iohexol (OMNIPAQUE) 350 MG/ML injection (MULTI-DOSE) 100 mL 100 mL Intravenous Given --     04/11/2024 2046 EDT sodium chloride 0.9 % bolus 1,000 mL 0 mL Intravenous Stopped --     04/11/2024 1847 EDT sodium chloride 0.9 % bolus 1,000 mL 1,000 mL Intravenous New Bag --     04/11/2024 1845 EDT droperidol (INAPSINE) injection 0.625 mg 0.625 mg Intravenous Given --     04/11/2024 2144 EDT ceftriaxone (ROCEPHIN) 1 g/50 mL in dextrose IVPB 0 mg Intravenous Stopped --     04/11/2024 2114 EDT ceftriaxone (ROCEPHIN) 1 g/50 mL in dextrose IVPB 1,000 mg Intravenous New Bag --     04/11/2024 2237 EDT HYDROmorphone (DILAUDID) injection 0.5 mg 0.5 mg Intravenous Given --     04/12/2024 1001 EDT ceftriaxone (ROCEPHIN) 1 g/50 mL in dextrose IVPB 0 mg Intravenous Stopped --     04/12/2024 0931 EDT ceftriaxone (ROCEPHIN) 1 g/50 mL in dextrose IVPB 1,000 mg Intravenous New Bag --     04/12/2024 0027 EDT lactated ringers infusion 0 mL/hr Intravenous Stopped incompatible w/ levaquin     04/11/2024 2237 EDT lactated ringers infusion 125 mL/hr Intravenous New Bag --     04/12/2024 0237 EDT oxyCODONE (ROXICODONE) split tablet 2.5 mg -- Oral See Alternative --     04/12/2024 0237 EDT oxyCODONE (ROXICODONE) IR tablet 5 mg 5 mg Oral Given --     04/12/2024 0754 EDT HYDROmorphone HCl (DILAUDID) injection 0.2 mg 0.2 mg Intravenous Given --     04/12/2024 0931 EDT ARIPiprazole (ABILIFY) tablet 10 mg 10 mg Oral Given --     04/12/2024 0931 EDT  desvenlafaxine succinate (PRISTIQ) 24 hr tablet 50 mg 50 mg Oral Given --     04/12/2024 0931 EDT lamoTRIgine (LaMICtal) tablet 100 mg 100 mg Oral Given --     04/12/2024 0014 EDT lamoTRIgine (LaMICtal) tablet 100 mg 100 mg Oral Given --     04/12/2024 0555 EDT heparin (porcine) subcutaneous injection 5,000 Units 5,000 Units Subcutaneous Given --     04/12/2024 0018 EDT heparin (porcine) subcutaneous injection 5,000 Units 5,000 Units Subcutaneous Given --     04/12/2024 0555 EDT insulin lispro (HumALOG/ADMELOG) 100 units/mL subcutaneous injection 2-12 Units 2 Units Subcutaneous Given --     04/12/2024 0022 EDT insulin lispro (HumALOG/ADMELOG) 100 units/mL subcutaneous injection 2-12 Units 6 Units Subcutaneous Given --     04/12/2024 1106 EDT insulin glargine (LANTUS) subcutaneous injection 20 Units 0.2 mL 20 Units Subcutaneous Not Given DCed     04/12/2024 0015 EDT insulin glargine (LANTUS) subcutaneous injection 20 Units 0.2 mL 20 Units Subcutaneous Given --     04/12/2024 0200 EDT levofloxacin (LEVAQUIN) IVPB (premix in dextrose) 750 mg 150 mL 0 mg Intravenous Stopped --     04/12/2024 0027 EDT levofloxacin (LEVAQUIN) IVPB (premix in dextrose) 750 mg 150 mL 750 mg Intravenous New Bag --     04/12/2024 1007 EDT oxybutynin (DITROPAN) tablet 5 mg 5 mg Oral Given --     04/12/2024 1106 EDT insulin lispro (HumALOG/ADMELOG) 100 units/mL subcutaneous injection 16 Units 16 Units Subcutaneous Given --     04/12/2024 1007 EDT insulin glargine (LANTUS) subcutaneous injection 40 Units 0.4 mL 40 Units Subcutaneous Given --     04/12/2024 1008 EDT tamsulosin (FLOMAX) capsule 0.4 mg 0.4 mg Oral Given --            Present on Admission:   Major depressive disorder, recurrent severe without psychotic features (HCC)   Diabetes 1.5, managed as type 2 (HCC)   Essential hypertension   Irritable bowel syndrome with diarrhea   UTI (urinary tract infection)      Admitting Diagnosis: Back pain [M54.9]  Age/Sex: 49 y.o. female  Admission  Orders:  Scheduled Medications:  ARIPiprazole, 10 mg, Oral, Daily  desvenlafaxine succinate, 50 mg, Oral, Daily  heparin (porcine), 5,000 Units, Subcutaneous, Q8H GERI  insulin glargine, 40 Units, Subcutaneous, QAM  insulin glargine, 40 Units, Subcutaneous, HS  insulin lispro, 16 Units, Subcutaneous, TID With Meals  insulin lispro, 2-12 Units, Subcutaneous, 4x Daily (PC & HS)  lamoTRIgine, 100 mg, Oral, BID  oxybutynin, 5 mg, Oral, TID  tamsulosin, 0.4 mg, Oral, Daily With Dinner      Continuous IV Infusions:     PRN Meds:  acetaminophen, 650 mg, Oral, Q4H PRN  dicyclomine, 20 mg, Oral, Q6H PRN  HYDROmorphone, 0.2 mg, Intravenous, Q2H PRN  LORazepam, 0.5 mg, Oral, Q8H PRN  ondansetron, 4 mg, Intravenous, Q4H PRN  oxyCODONE, 2.5 mg, Oral, Q4H PRN   Or  oxyCODONE, 5 mg, Oral, Q4H PRN        IP CONSULT TO UROLOGY    Network Utilization Review Department  ATTENTION: Please call with any questions or concerns to [phone] and carefully listen to the prompts so that you are directed to the right person. All voicemails are confidential.   For Discharge needs, contact Care Management DC Support Team at [phone] opt. [phone]  Send all requests for admission clinical reviews, approved or denied determinations and any other requests to dedicated fax number below belonging to the campus where the patient is receiving treatment. List of dedicated fax numbers for the Facilities:  FACILITY NAME UR FAX NUMBER   ADMISSION DENIALS (Administrative/Medical Necessity) [phone]   DISCHARGE SUPPORT TEAM (NETWORK) [phone]   PARENT CHILD HEALTH (Maternity/NICU/Pediatrics) [phone]   General acute hospital [phone]   [facility] [phone]   [facility] [phone]   [facility] [phone]   Critical access hospital [phone]   [facility] [phone]   General acute hospital  [phone]   [facility] [phone]   [facility] [phone]   [facility] [phone]   General acute hospital [phone]   [facility] [phone]

## 2024-04-12 NOTE — DISCHARGE SUMMARY
Formerly [facility], 1928–1983  Discharge- Montse Smith 1975, 49 y.o. female MRN: [id number]  Unit/Bed#: ED-16 Encounter: [id number]  Primary Care Provider: Florecita Dietrich NP   Date and time admitted to hospital: 4/11/2024  3:16 PM    * Left flank pain  Assessment & Plan  Assessment:  S/p L stent placement on 3/21/24 with L flank and mid back pain started earlier today, 8/10 in intensity, cramping. Pt admits burning on urination. Denies fever or chills. Denies seeing blood or clots in her urine however mentioned that it was dark. Finished 10d course of cefpodoxime 4/4  WBC 11.1. UA Specific gravity 1.050, protein 3+, occult blood large, rbc innum, wbc 20-30, bacteria moderate, hyalyce cast  CT AP with contrast: Punctate bilateral nephrolithiasis. Small 3 mm distal left ureteral calculus identified, also nonobstructive with a left double-J ureteral stent in place. No acute inflammatory or infectious process.    Plan:  Urology consulted and not recommending any further inpatient inventions.  Patient will follow-up outpatient and continue with outpatient lithotripsy on 4/23  Continue stent colic medications at discharge: Flomax, Pyridium, and oxybutynin      Kidney stones  Assessment & Plan  Present with Left flank/back pain  H/o of kidney stones with recently placed Left double J ureteral stent  Not meeting SIRS  UA Specific gravity 1.050, protein 3+, occult blood large, rbc innum, wbc 20-30, bacteria moderate, hyalyce cast  CT AP with contrast: Punctate bilateral nephrolithiasis. Small 3 mm distal left ureteral calculus identified, also nonobstructive with a left double-J ureteral stent in place. No acute inflammatory or infectious process.  Pt suppose to see urology tomorrow and has scheduled cystogram with lithotripsy on 4/23/2024    Plan:  Outpatient urology follow-up.  Medication recommendations per urology      Microscopic hematuria  Assessment & Plan  Assessment  POA status post left stent placement  3/21/2024 with L flank pain and mid back pain. Pt states that she has dysuria   Not meeting SIRS criteria  UA: Specific gravity 1.050, protein 3+, occult blood large, rbc innum, wbc 20-30, bacteria moderate, hyalyce cast  Received Ceftriaxone for 1 dose in ED.  Not suspecting infection at this time given patient continued symptoms and risk symptoms antibiotic course.  No fever.    Plan  Hold off on further antibiotics at this time.  Symptomatic pain relief and follow-up outpatient with urology    Class 3 severe obesity due to excess calories with body mass index (BMI) of 60.0 to 69.9 in adult ([facility])  Assessment & Plan  Encourage life style modification and recommend outpatient sleep study on discharge      Major depressive disorder, recurrent severe without psychotic features ([facility])  Assessment & Plan  C/w abilify 10 mg daily  C/w desvenlafaxine 50 mg daily  Ativan 0.5 mg as needed    Irritable bowel syndrome with diarrhea  Assessment & Plan  She has h/o of chronic diarrhea due to IBS now worsening with 3-4 BM daily  C/w bentyl        Essential hypertension  Assessment & Plan  Resume home lisinopril at discharge    Diabetes 1.5, managed as type 2 ([facility])  Assessment & Plan  Lab Results   Component Value Date    HGBA1C 7.2 (H) 04/11/2024       Recent Labs     04/12/24  0013 04/12/24  0550 04/12/24  1135 04/12/24  1219   POCGLU 288* 188* 188* 237*         Blood Sugar Average: Last 72 hrs:  (P) 225.25Home regimen Lantus 40 U BID,  lis pro 16 U TID with meals, and metformin  Continue home insulin regimen and hold metformin.  Insulin sliding scale and hypoglycemia protocol    DEEP (acute kidney injury) ([facility])-resolved as of 4/12/2024  Assessment & Plan  Assessment:  POA with creatinine 1.00 with baseline creatinine 0.5-0.7 (one documentation of 0.87) s/p 2L in ED  Pt is meeting KIDIGO criteria for DEEP, most likely prerenal (pt didn't drink much water, had episode of vomiting and diarrha) less likely renal or postrenal (no  obstruction on imaging)    Plan:  Encourage fluid intake. avoid nephrotoxins      Medical Problems       Resolved Problems  Date Reviewed: 4/11/2024            Resolved    DEEP (acute kidney injury) (HCC) 4/12/2024     Resolved by  Kyle Brunner, MD        Discharging Resident: Kyle Brunner, MD  Discharging Attending: Nika Wells MD  PCP: Florecita Dietrich NP  Admission Date:   Admission Orders (From admission, onward)       Ordered        04/11/24 2159  INPATIENT ADMISSION  Once                          Discharge Date: 04/12/24    Consultations During Hospital Stay:  Urology    Procedures Performed:   None    Significant Findings / Test Results:   4/11 CT abdomen pelvis with contrast: Markedly enlarged fatty liver. Punctate nonobstructive bilateral nephrolithiasis. Left double-J ureteral stent noted terminating within the bladder. Small distal left ureteral calculus measuring 3 mm on 301/158. Left ureter remains decompressed.  No acute inflammatory infectious process.    Incidental Findings:   None    Test Results Pending at Discharge (will require follow up):   Urine culture     Outpatient Tests Requested:  BMP in 1 week    Complications: None    Reason for Admission: Left flank pain    Hospital Course:   Montse Smith is a 49 y.o. female patient PMH of HTN, HLD, IDT1.5DM, anxiety/depression, morbid obesity with BMI 65, recent L ureter stent placement who originally presented to the hospital on 4/11/2024 due to left flank pain.  Patient underwent imaging which redemonstrated left stent placement without any acute or infectious inflammatory process.  Urology was consulted, and they are not recommending any inpatient intervention at this time.  Recommended starting stent colic medications and she will follow-up outpatient and proceed with her planned urologic intervention on 4/23/2024.    Patient also had some complaints of diarrhea and stated that it felt more mucus and watery, however it had resolved at the  hospital and she had no further episodes.  Discussed plan with patient and she was agreeable to discharge.    The patient, initially admitted to the hospital as inpatient, was discharged earlier than expected given the following: Significant improvement in her symptoms and rapid resolution of her DEEP.    Please see above list of diagnoses and related plan for additional information.     Condition at Discharge: fair    Discharge Day Visit / Exam:   * Please refer to separate progress note for these details *    Discussion with Family: Patient declined call to .     Discharge instructions/Information to patient and family:   See after visit summary for information provided to patient and family.      Provisions for Follow-Up Care:  See after visit summary for information related to follow-up care and any pertinent home health orders.      Mobility at time of Discharge:   Basic Mobility Inpatient Raw Score: 13  JH-HLM Goal: 4: Move to chair/commode  JH-HLM Achieved: 1: Laying in bed  HLM Goal NOT achieved. Continue to encourage mobility in post discharge setting.     Disposition:   Home    Planned Readmission: no    Discharge Medications:  See after visit summary for reconciled discharge medications provided to patient and/or family.      **Please Note: This note may have been constructed using a voice recognition system**

## 2024-04-12 NOTE — ED NOTES
RN inquired w/ security about obtaining hospital bed for patient as she is so uncomfortable. Security searching for extra bed.     Kathrine Espinoza RN  04/11/24 4179

## 2024-04-12 NOTE — PROGRESS NOTES
[facility]  Progress Note  Name: Montse Smith I  MRN: [id number]  Unit/Bed#: ED-16 I Date of Admission: 4/11/2024   Date of Service: 4/12/2024 I Hospital Day: 1    Assessment/Plan   * Left flank pain  Assessment & Plan  Assessment:  S/p L stent placement on 3/21/24 with L flank and mid back pain started earlier today, 8/10 in intensity, cramping. Pt admits burning on urination. Denies fever or chills. Denies seeing blood or clots in her urine however mentioned that it was dark. Finished 10d course of cefpodoxime 4/4  WBC 11.1. UA Specific gravity 1.050, protein 3+, occult blood large, rbc innum, wbc 20-30, bacteria moderate, hyalyce cast  CT AP with contrast: Punctate bilateral nephrolithiasis. Small 3 mm distal left ureteral calculus identified, also nonobstructive with a left double-J ureteral stent in place. No acute inflammatory or infectious process.    Plan:  Urology consulted and not recommending any further inpatient inventions.  Patient will follow-up outpatient and continue with outpatient lithotripsy on 4/23      Kidney stones  Assessment & Plan  Present with Left flank/back pain  H/o of kidney stones with recently placed Left double J ureteral stent  Not meeting SIRS  UA Specific gravity 1.050, protein 3+, occult blood large, rbc innum, wbc 20-30, bacteria moderate, hyalyce cast  CT AP with contrast: Punctate bilateral nephrolithiasis. Small 3 mm distal left ureteral calculus identified, also nonobstructive with a left double-J ureteral stent in place. No acute inflammatory or infectious process.  Pt suppose to see urology tomorrow and has scheduled cystogram with lithotripsy on 4/23/2024    Plan:  Outpatient urology follow-up      DEEP (acute kidney injury) (HCC)  Assessment & Plan  Assessment:  POA with creatinine 1.00 with baseline creatinine 0.5-0.7 (one documentation of 0.87) s/p 2L in ED  Pt is meeting KIDIGO criteria for DEEP, most likely prerenal (pt didn't drink much  water, had episode of vomiting and diarrha) less likely renal or postrenal (no obstruction on imaging)    Plan:  Encourage fluid intake. avoid nephrotoxins    Microscopic hematuria  Assessment & Plan  Assessment  POA status post left stent placement 3/21/2024 with L flank pain and mid back pain. Pt states that she has dysuria   Not meeting SIRS criteria  UA: Specific gravity 1.050, protein 3+, occult blood large, rbc innum, wbc 20-30, bacteria moderate, hyalyce cast  Received Ceftriaxone for 1 dose in ED.  Not suspecting infection at this time given patient continued symptoms and risk symptoms antibiotic course.  No fever.    Plan  Hold off on further antibiotics at this time.  Symptomatic pain relief and follow-up outpatient with urology    Class 3 severe obesity due to excess calories with body mass index (BMI) of 60.0 to 69.9 in adult ([facility])  Assessment & Plan  Encourage life style modification and recommend outpatient sleep study on discharge      Major depressive disorder, recurrent severe without psychotic features ([facility])  Assessment & Plan  C/w abilify 10 mg daily  C/w desvenlafaxine 50 mg daily  Ativan 0.5 mg as needed    Irritable bowel syndrome with diarrhea  Assessment & Plan  She has h/o of chronic diarrhea due to IBS now worsening with 3-4 BM daily, and recent Abx use_ Cefpodoxime  Rule out C. difficile.  If negative, Imodium  C/w bentyl        Essential hypertension  Assessment & Plan  Hold lisinopril in settings of DEEP and normal BP  Restart when deep resolved    Diabetes 1.5, managed as type 2 ([facility])  Assessment & Plan  Lab Results   Component Value Date    HGBA1C 7.2 (H) 04/11/2024       Recent Labs     04/12/24  0013 04/12/24  0550 04/12/24  1135 04/12/24  1219   POCGLU 288* 188* 188* 237*         Blood Sugar Average: Last 72 hrs:  (P) 225.25Home regimen Lantus 40 U BID,  lis pro 16 U TID with meals, and metformin  Continue home insulin regimen and hold metformin.  Insulin sliding scale and  hypoglycemia protocol           VTE Pharmacologic Prophylaxis: VTE Score: 5 High Risk (Score >/= 5) - Pharmacological DVT Prophylaxis Ordered: heparin. Sequential Compression Devices Ordered.    Mobility:   Basic Mobility Inpatient Raw Score: 13  JH-HLM Goal: 4: Move to chair/commode  JH-HLM Achieved: 1: Laying in bed  JH-HLM Goal NOT achieved. Continue with multidisciplinary rounding and encourage appropriate mobility to improve upon JH-HLM goals.    Patient Centered Rounds: I performed bedside rounds with nursing staff today.  Discussions with Specialists or Other Care Team Provider: Urology    Education and Discussions with Family / Patient: Patient declined call to .     Current Length of Stay: 1 day(s)  Current Patient Status: Inpatient   Discharge Plan: Anticipate discharge later today or tomorrow to home.    Code Status: Level 1 - Full Code    Subjective:   Patient was seen resting at bedside.  She is still reporting some significant back pain.  No fever, chills, or headache overnight.  Still having episodes of diarrhea and burning on urination.    Objective:     Vitals:   Temp (24hrs), Av °F (36.7 °C), Min:98 °F (36.7 °C), Max:98 °F (36.7 °C)    Temp:  [98 °F (36.7 °C)] 98 °F (36.7 °C)  HR:  [] 95  Resp:  [16-18] 16  BP: (119-172)/(57-79) 152/70  SpO2:  [92 %-98 %] 92 %  Body mass index is 65.23 kg/m².     Input and Output Summary (last 24 hours):     Intake/Output Summary (Last 24 hours) at 2024 1406  Last data filed at 2024 1001  Gross per 24 hour   Intake 2970 ml   Output --   Net 2970 ml       Physical Exam:   Physical Exam  Vitals and nursing note reviewed.   Constitutional:       General: She is not in acute distress.     Appearance: She is well-developed. She is obese. She is not ill-appearing or diaphoretic.   HENT:      Head: Normocephalic and atraumatic.      Nose: Nose normal.   Eyes:      Conjunctiva/sclera: Conjunctivae normal.   Cardiovascular:      Rate and  Rhythm: Normal rate and regular rhythm.      Heart sounds: No murmur heard.  Pulmonary:      Effort: Pulmonary effort is normal. No respiratory distress.      Breath sounds: Normal breath sounds.   Abdominal:      General: Bowel sounds are normal.      Palpations: Abdomen is soft.      Tenderness: There is no abdominal tenderness. There is left CVA tenderness. There is no right CVA tenderness.   Genitourinary:     Comments: Left CVA +  Mid back pain  Musculoskeletal:         General: No swelling.      Cervical back: Neck supple.   Skin:     General: Skin is warm and dry.      Capillary Refill: Capillary refill takes less than 2 seconds.   Neurological:      Mental Status: She is alert and oriented to person, place, and time.   Psychiatric:         Mood and Affect: Mood normal.          Additional Data:     Labs:  Results from last 7 days   Lab Units 04/12/24  0750 04/11/24  1323   WBC Thousand/uL 7.47 11.12*   HEMOGLOBIN g/dL 12.5 15.7*   HEMATOCRIT % 38.2 48.6*   PLATELETS Thousands/uL 294 350   SEGS PCT %  --  71   LYMPHO PCT %  --  18   MONO PCT %  --  8   EOS PCT %  --  1     Results from last 7 days   Lab Units 04/12/24  0455 04/11/24  1656   SODIUM mmol/L 134* 136   POTASSIUM mmol/L 3.9 4.6   CHLORIDE mmol/L 104 99   CO2 mmol/L 22 26   BUN mg/dL 12 15   CREATININE mg/dL 0.74 1.00   ANION GAP mmol/L 8 11   CALCIUM mg/dL 8.5 9.9   ALBUMIN g/dL  --  4.4   TOTAL BILIRUBIN mg/dL  --  0.71   ALK PHOS U/L  --  63   ALT U/L  --  44   AST U/L  --  28   GLUCOSE RANDOM mg/dL 185* 207*         Results from last 7 days   Lab Units 04/12/24  1219 04/12/24  1135 04/12/24  0550 04/12/24  0013   POC GLUCOSE mg/dl 237* 188* 188* 288*     Results from last 7 days   Lab Units 04/11/24  1112   HEMOGLOBIN A1C % 7.2*           Lines/Drains:  Invasive Devices       Peripheral Intravenous Line  Duration             Peripheral IV 04/11/24 Right Antecubital <1 day              Drain  Duration             Ureteral Internal Stent Left  ureter 6 Fr. 22 days                          Imaging: Reviewed radiology reports from this admission including: abdominal/pelvic CT    Recent Cultures (last 7 days):   Results from last 7 days   Lab Units 04/11/24  1112   URINE CULTURE  Culture too young- will reincubate       Last 24 Hours Medication List:   Current Facility-Administered Medications   Medication Dose Route Frequency Provider Last Rate    acetaminophen  650 mg Oral Q4H PRN Elke King MD      ARIPiprazole  10 mg Oral Daily Elke King MD      desvenlafaxine succinate  50 mg Oral Daily Elke King MD      dicyclomine  20 mg Oral Q6H PRN Elke King MD      heparin (porcine)  5,000 Units Subcutaneous Q8H [facility] Elke King MD      HYDROmorphone  0.2 mg Intravenous Q2H PRN Elke King MD      insulin glargine  40 Units Subcutaneous QAM Kyle Brunner, MD      insulin glargine  40 Units Subcutaneous HS Kyle Brunner, MD      insulin lispro  16 Units Subcutaneous TID With Meals Kyle Brunner, MD      insulin lispro  2-12 Units Subcutaneous 4x Daily (PC & HS) Kyle Brunner, MD      lamoTRIgine  100 mg Oral BID Elke King MD      LORazepam  0.5 mg Oral Q8H PRN Elke King MD      ondansetron  4 mg Intravenous Q4H PRN Elke King MD      oxybutynin  5 mg Oral TID Massiel Oakes PA-C      oxyCODONE  2.5 mg Oral Q4H PRN Elke King MD      Or    oxyCODONE  5 mg Oral Q4H PRN Elke King MD      phenazopyridine  100 mg Oral TID With Meals Massiel Oakes PA-C      tamsulosin  0.4 mg Oral Daily With Dinner Kyle Brunner, MD          Today, Patient Was Seen By: Kyle Brunner, MD    **Please Note: This note may have been constructed using a voice recognition system.**

## 2024-04-12 NOTE — DISCHARGE INSTR - AVS FIRST PAGE
Dear Montse Smith,     It was our pleasure to care for you here at [facility].  It is our hope that we were always able to exceed your expectations for your care during your stay.  You were hospitalized due to left flank pain and cared for on the emergency department floor by Kyle Brunner, MD under the service of Nika Wells MD with the [facility] who covers for your primary care physician (PCP), Florecita Dietrich NP. If you have any questions or concerns related to this hospitalization, you may contact us at [phone]. A nurse will call you within a few days to answer any additional questions that may arise after your discharge. We recommend that you follow up with your PCP for medication refills. Please note the following instructions / recommendations:       STOP taking -   No medication adjustments    START taking -  Oxybutynin - Take 1 tablet (5 mg total) by mouth 3 (three) times a day  Pyridium - Take 1 tablet (100 mg total) by mouth 3 (three) times a day with meals  Flomax 0.4 mg - take 1 capsule by mouth daily with dinner    Testing Required after Discharge -   BMP in 1 week  Please follow up with your primary care provider to order these tests    Important follow up information -   Please schedule an appointment with your primary care provider as soon as possible    Other Instructions -   Please follow-up outpatient with urology and go to your appointment as scheduled.    Please review your entire after visit summary including medication list, appointments, activity, diet, pertinent wound care, and any additional recommendations from your care team.    We wish you well.    Sincerely,     Kyle Brunner, MD

## 2024-04-12 NOTE — ASSESSMENT & PLAN NOTE
Present with Left flank/back pain  H/o of kidney stones with recently placed Left double J ureteral stent  Not meeting SIRS  UA Specific gravity 1.050, protein 3+, occult blood large, rbc innum, wbc 20-30, bacteria moderate, hyalyce cast  CT AP with contrast: Punctate bilateral nephrolithiasis. Small 3 mm distal left ureteral calculus identified, also nonobstructive with a left double-J ureteral stent in place. No acute inflammatory or infectious process.  Pt suppose to see urology tomorrow and has scheduled cystogram with lithotripsy on 4/23/2024    Plan:  Follow ua culture   cc/h for 12 h  NPO from [place]  (low suspicion that pt will require procedure, can dc in am)  C/w CFX for now

## 2024-04-12 NOTE — ASSESSMENT & PLAN NOTE
Present with Left flank/back pain  H/o of kidney stones with recently placed Left double J ureteral stent  Not meeting SIRS  UA Specific gravity 1.050, protein 3+, occult blood large, rbc innum, wbc 20-30, bacteria moderate, hyalyce cast  CT AP with contrast: Punctate bilateral nephrolithiasis. Small 3 mm distal left ureteral calculus identified, also nonobstructive with a left double-J ureteral stent in place. No acute inflammatory or infectious process.  Pt suppose to see urology tomorrow and has scheduled cystogram with lithotripsy on 4/23/2024    Plan:  Outpatient urology follow-up

## 2024-04-12 NOTE — ASSESSMENT & PLAN NOTE
POA with creatinine 1.00  Baseline creatinine 0.5-0.7 (one documentation of 0.87)  Pt is meeting KIDIGO criteria for DEEP, most likely prerenal (pt didn't drink much water, had episode of vomiting and diarrha) less likely renal or postrenal (no obstruction on imaging)  Pt received 2 L of NS bolus in ED    Plan:  Follow am BMP  Follow Ua culture  C/w  cc/h for 12 h

## 2024-04-12 NOTE — ASSESSMENT & PLAN NOTE
Status post left cystoscopy, retrograde pyelogram, insertion of left ureteral stent on 3/21/2024  Since procedure patient is reported intermittent stent colic  Reports sharp severe pain yesterday prompting ED visit  CT scan showing good position of left double-J ureteral stent, there is a small distal left ureteral calculus measuring 3 mm.  There is no associated hydronephrosis  Currently patient reports pain 5 out of 10  Oxybutynin, Flomax, Pyridium for stent discomfort.  Can consider adding gabapentin.  Patient is scheduled for procedure 4/23/2024.  UA moderate bacteria. Levaquin today. Urine culture yesterday sent.  No inpatient urologic intervention required.  Patient has scheduled elective procedure in approximately a week and a half.  Recommend stent colic medications, pain regimen.  If urine culture positive would treat up until stone surgery.

## 2024-04-12 NOTE — ASSESSMENT & PLAN NOTE
"Lab Results   Component Value Date    HGBA1C 7.2 (H) 04/11/2024       No results for input(s): \"POCGLU\" in the last 72 hours.    Blood Sugar Average: Last 72 hrs:  Home regimen Lantus 40 U BID,  lis pro 16 U TID with meals, and metformin  Plan  Pt will be npo from [place]   Proceed with 20 U of lantus BID, increase to home dose if diet restarted  Hold premeal insulin while NPO, restart with diet  ISS Q6  Hypoglycemic protocol    " patient

## 2024-04-12 NOTE — ED ATTENDING ATTESTATION
4/11/2024  I, Amanda Rivers MD, saw and evaluated the patient. I have discussed the patient with the resident/non-physician practitioner and agree with the resident's/non-physician practitioner's findings, Plan of Care, and MDM as documented in the resident's/non-physician practitioner's note, except where noted. All available labs and Radiology studies were reviewed.  I was present for key portions of any procedure(s) performed by the resident/non-physician practitioner and I was immediately available to provide assistance.       At this point I agree with the current assessment done in the Emergency Department.  I have conducted an independent evaluation of this patient a history and physical is as follows:    49-year-old presented to the ER with worsening left flank pain.  History of kidney stones, has a stone currently, has a stent placed.  Finished a course of antibiotics.  States feeling worse.  No fever.  Has had chills.    Agree with workup, CT to eval for position of stent and stone.  Check kidney function, CBC and urine for signs infection.    Urine concerning for infection.  Already finished course of PO antibiotics.  Will admit on IV to antibiotics.    ED Course         Critical Care Time  Procedures

## 2024-04-12 NOTE — ASSESSMENT & PLAN NOTE
She has h/o of chronic diarrhea due to IBS now worsening with 3-4 BM daily, and recent Abx use_ Cefpodoxime  Rule out C. difficile.  If negative, Imodium  C/w bentyl

## 2024-04-12 NOTE — ASSESSMENT & PLAN NOTE
Assessment:  POA with creatinine 1.00 with baseline creatinine 0.5-0.7 (one documentation of 0.87) s/p 2L in ED  Pt is meeting KIDIGO criteria for DEEP, most likely prerenal (pt didn't drink much water, had episode of vomiting and diarrha) less likely renal or postrenal (no obstruction on imaging)    Plan:  Encourage fluid intake. avoid nephrotoxins

## 2024-04-12 NOTE — CONSULTS
[facility]  Consult  Name: Montse Smith 49 y.o. female I MRN: [id number]  Unit/Bed#: ED-16 I Date of Admission: 4/11/2024   Date of Service: 4/12/2024 I Hospital Day: 1    Inpatient consult to Urology  Consult performed by: Massiel Oakes PA-C  Consult ordered by: Elke King MD        Assessment/Plan   Kidney stones  Assessment & Plan  Status post left cystoscopy, retrograde pyelogram, insertion of left ureteral stent on 3/21/2024  Since procedure patient is reported intermittent stent colic  Reports sharp severe pain yesterday prompting ED visit  CT scan showing good position of left double-J ureteral stent, there is a small distal left ureteral calculus measuring 3 mm.  There is no associated hydronephrosis  Currently patient reports pain 5 out of 10  Oxybutynin, Flomax, Pyridium for stent discomfort.  Can consider adding gabapentin.  Patient is scheduled for procedure 4/23/2024.  UA moderate bacteria. Levaquin today. Urine culture yesterday sent.  No inpatient urologic intervention required.  Patient has scheduled elective procedure in approximately a week and a half.  Recommend stent colic medications, pain regimen.  If urine culture positive would treat up until stone surgery.    Urology will sign off. Available as needed for patient.      Subjective:   Amarilys is a 49-year-old female history of obesity, HTN, HLD, DM, anxiety/depression who presented to the ED with left flank pain.  Patient underwent ureteral stent placement on 3/21/2024.  Patient reports since that time she has had intermittent pain, however yesterday she developed severe pain in her left flank associated with nausea and vomiting.  She denies any fever or chills.  She does report her pain is worse with urination.  Patient with chronic diarrhea due to IBS, progressively worsening, C. difficile rule out.  Patient was admitted to medicine service for further management.  Urology was consulted due to left  flank pain.    Review of Systems   Constitutional:  Negative for chills and fever.   Respiratory:  Negative for cough and shortness of breath.    Cardiovascular:  Negative for chest pain and palpitations.   Gastrointestinal:  Negative for abdominal pain and vomiting.   Genitourinary:  Positive for flank pain. Negative for dysuria and hematuria.   Musculoskeletal:  Negative for arthralgias and back pain.   Skin:  Negative for color change and rash.   Neurological:  Negative for seizures and syncope.   All other systems reviewed and are negative.      Objective:  Vitals: Blood pressure 152/70, pulse 95, temperature 98 °F (36.7 °C), temperature source Oral, resp. rate 16, height 5' (1.524 m), weight (!) 152 kg (334 lb), SpO2 92%.,Body mass index is 65.23 kg/m².    Physical Exam  Vitals reviewed.   Constitutional:       General: She is not in acute distress.     Appearance: Normal appearance. She is obese. She is not ill-appearing, toxic-appearing or diaphoretic.   HENT:      Head: Normocephalic and atraumatic.      Right Ear: External ear normal.      Left Ear: External ear normal.      Nose: Nose normal.      Mouth/Throat:      Mouth: Mucous membranes are moist.   Eyes:      General: No scleral icterus.     Conjunctiva/sclera: Conjunctivae normal.   Cardiovascular:      Rate and Rhythm: Normal rate.      Pulses: Normal pulses.   Pulmonary:      Effort: Pulmonary effort is normal.   Abdominal:      General: Abdomen is flat. There is no distension.      Palpations: Abdomen is soft.      Tenderness: There is no abdominal tenderness. There is left CVA tenderness. There is no right CVA tenderness or guarding.   Musculoskeletal:         General: Normal range of motion.      Cervical back: Normal range of motion.   Skin:     General: Skin is warm.      Findings: No rash.   Neurological:      General: No focal deficit present.      Mental Status: She is alert and oriented to person, place, and time. Mental status is at  baseline.   Psychiatric:         Mood and Affect: Mood normal.         Behavior: Behavior normal.         Thought Content: Thought content normal.         Judgment: Judgment normal.         Imaging:  CT ABDOMEN AND PELVIS WITH IV CONTRAST     INDICATION: low bakc/ flank pain, stent placed recently recurrent stones.     COMPARISON: 3/30/2024.     TECHNIQUE: CT examination of the abdomen and pelvis was performed. Multiplanar 2D reformatted images were created from the source data.     This examination, like all CT scans performed in the [facility], was performed utilizing techniques to minimize radiation dose exposure, including the use of iterative reconstruction and automated exposure control. Radiation dose length   product (DLP) for this visit: 3202 mGy-cm     IV Contrast: 100 mL of iohexol (OMNIPAQUE)  Enteric Contrast: Not administered.     FINDINGS:     ABDOMEN     LOWER CHEST: No clinically significant abnormality in the visualized lower chest.     LIVER/BILIARY TREE: Markedly enlarged fatty liver.     GALLBLADDER: Post cholecystectomy.     SPLEEN: Unremarkable.     PANCREAS: Unremarkable.     ADRENAL GLANDS: Unremarkable.     KIDNEYS/URETERS: Punctate nonobstructive bilateral nephrolithiasis. Left double-J ureteral stent noted terminating within the bladder. Small distal left ureteral calculus measuring 3 mm on 301/158. Left ureter remains decompressed     STOMACH AND BOWEL: Unremarkable.     APPENDIX: Normal.     ABDOMINOPELVIC CAVITY: No ascites. No pneumoperitoneum. No lymphadenopathy.     VESSELS: Unremarkable for patient's age.     PELVIS     REPRODUCTIVE ORGANS: Unremarkable for patient's age.     URINARY BLADDER: Unremarkable.     ABDOMINAL WALL/INGUINAL REGIONS: Unremarkable.     BONES: No acute fracture or suspicious osseous lesion.     IMPRESSION:     1. Punctate bilateral nephrolithiasis. Small 3 mm distal left ureteral calculus identified, also nonobstructive with a left double-J  ureteral stent in place.  2. No acute inflammatory or infectious process.           Workstation performed: [id number]       Labs:  Recent Labs     24  1323 24  0750   WBC 11.12* 7.47     Recent Labs     24  1323 24  0750   HGB 15.7* 12.5       Recent Labs     24  1656 24  0455   CREATININE 1.00 0.74           History:  Social History     Socioeconomic History    Marital status:      Spouse name: None    Number of children: 1    Years of education: None    Highest education level: None   Occupational History    None   Tobacco Use    Smoking status: Never    Smokeless tobacco: Never   Vaping Use    Vaping status: Never Used   Substance and Sexual Activity    Alcohol use: Not Currently     Comment: occassionaly     Drug use: No    Sexual activity: Not Currently   Other Topics Concern    None   Social History Narrative    · Most recent tobacco use screenin2019      · Do you currently or have you served in the [employer]:   No      · Were you activated, into active duty, as a member of the [employer] or as a Reservist:   No      · General stress level:   High       · Caffeine intake:   Occasional      · Seat belts used routinely:   Yes      · Sunscreen used routinely:   Yes      · Smoke alarm in home:   Yes      Social Determinants of Health     Financial Resource Strain: Patient Declined (2023)    Received from [facility]    Overall Financial Resource Strain (CARDIA)     Difficulty of Paying Living Expenses: Patient declined   Food Insecurity: Food Insecurity Present (3/25/2024)    Hunger Vital Sign     Worried About Running Out of Food in the Last Year: Sometimes true     Ran Out of Food in the Last Year: Sometimes true   Transportation Needs: No Transportation Needs (3/25/2024)    PRAPARE - Transportation     Lack of Transportation (Medical): No     Lack of Transportation (Non-Medical): No   Physical Activity: Not on file   Stress:  Not on file   Social Connections: Unknown (2023)    Received from [facility]    Social Connection and Isolation Panel [NHANES]     Frequency of Communication with Friends and Family: Three times a week     Frequency of Social Gatherings with Friends and Family: Once a week     Attends [employer] Services: Patient declined     Active Member of Clubs or Organizations: Patient declined     Attends Club or Organization Meetings: Patient declined     Marital Status: Patient declined   Intimate Partner Violence: Patient Declined (2023)    Received from [facility]    Humiliation, Afraid, Rape, and Kick questionnaire     Fear of Current or Ex-Partner: Patient declined     Emotionally Abused: Patient declined     Physically Abused: Patient declined     Sexually Abused: Patient declined   Housing Stability: High Risk (3/25/2024)    Housing Stability Vital Sign     Unable to Pay for Housing in the Last Year: Yes     Number of Places Lived in the Last Year: 1     Unstable Housing in the Last Year: Yes       Past Medical History:   Diagnosis Date    Anemia     Anxiety     Candidal intertrigo     Depression     Diabetes mellitus (HCC)     GERD (gastroesophageal reflux disease)     Hyperlipidemia     Hypertension     Hyponatremia 2023    Irritable bowel syndrome     Morbid obesity with BMI of 60.0-69.9, adult (HCC)     Osteoarthritis     Seasonal allergies     Sleep difficulties     Suicide attempt (HCC)      Past Surgical History:   Procedure Laterality Date     SECTION  1992    CHOLECYSTECTOMY      FL RETROGRADE PYELOGRAM  3/21/2024    AL CYSTO/URETERO W/LITHOTRIPSY &INDWELL STENT INSRT Left 3/21/2024    Procedure: CYSTOSCOPY, RETROGRADE PYELOGRAM AND INSERTION STENT URETERAL;  Surgeon: Nabil Desir MD;  Location: [facility];  Service: Urology    WISDOM TOOTH EXTRACTION  2009     Family History   Problem Relation Age of Onset    Diabetes Mother     Hypertension  Father        Massiel Oakes PA-C  Date: 4/12/2024 Time: 1:20 PM

## 2024-04-12 NOTE — H&P
[facility]  H&P  Name: Montse Smith 49 y.o. female I MRN: [id number]  Unit/Bed#: ED-16 I Date of Admission: 4/11/2024   Date of Service: 4/12/2024 I Hospital Day: 1      Assessment/Plan   * Left flank pain  Assessment & Plan  POA with L flank and mid back pain started earlier today, 8/10 in intensity, cramping. Pt admits burning on urination. Denies fever or chills. Denies seeing blood or clots in her urine however mentioned that it was dark  Pt had L stent placement on 3/21/24 however her pain improved till current episode  Vitals , RR 18, 119/58  Hgb 15.7, WBC 11.1, Glu 200  UA Specific gravity 1.050, protein 3+, occult blood large, rbc innum, wbc 20-30, bacteria moderate, hyalyce cast  CT AP with contrast: Punctate bilateral nephrolithiasis. Small 3 mm distal left ureteral calculus identified, also nonobstructive with a left double-J ureteral stent in place. No acute inflammatory or infectious process.    In ED 2l of NS bolus, oxy 5, zofran, dilaudid, droperidol, CFX    Plan:  Follow am cbc  Follow wUA culture  C/w  cc/h for 12 h  Pain management with tylenol for moderate, oxy 2.5/5 mg for moderate/severe pain respectively, dilaudid 0.5 mg IV Q2 for breakthrough pain  Zofran for nausea  C/w Ceftriaxone for now, follow urine culture and adjust based on sensitivity  NPO from [place]  Urology consult      Kidney stones  Assessment & Plan  Present with Left flank/back pain  H/o of kidney stones with recently placed Left double J ureteral stent  Not meeting SIRS  UA Specific gravity 1.050, protein 3+, occult blood large, rbc innum, wbc 20-30, bacteria moderate, hyalyce cast  CT AP with contrast: Punctate bilateral nephrolithiasis. Small 3 mm distal left ureteral calculus identified, also nonobstructive with a left double-J ureteral stent in place. No acute inflammatory or infectious process.  Pt suppose to see urology tomorrow and has scheduled cystogram with lithotripsy on  4/23/2024    Plan:  Follow ua culture   cc/h for 12 h  NPO from [place]  (low suspicion that pt will require procedure, can dc in am)  C/w CFX for now      UTI (urinary tract infection)  Assessment & Plan  POA with L flank pain and mid back pain. Pt states that she has dysuria   Not meeting SIRS criteria  UA: Specific gravity 1.050, protein 3+, occult blood large, rbc innum, wbc 20-30, bacteria moderate, hyalyce cast  Recent l stent placement on 3/21/24  Received Ceftriaxone for 1 dose in ED    Plan  Monitor vitals and fever curve  Follow am CBC  Follow urine culture  C/w broad spectrum Abx, previous urine culture was pan sensitive    DEEP (acute kidney injury) ([facility])  Assessment & Plan  POA with creatinine 1.00  Baseline creatinine 0.5-0.7 (one documentation of 0.87)  Pt is meeting KIDIGO criteria for DEEP, most likely prerenal (pt didn't drink much water, had episode of vomiting and diarrha) less likely renal or postrenal (no obstruction on imaging)  Pt received 2 L of NS bolus in ED    Plan:  Follow am BMP  Follow Ua culture  C/w  cc/h for 12 h    Class 3 severe obesity due to excess calories with body mass index (BMI) of 60.0 to 69.9 in adult ([facility])  Assessment & Plan  Encourage life style modification on dc  Will benefit from sleep study on dc      Major depressive disorder, recurrent severe without psychotic features ([facility])  Assessment & Plan  C/w abilify 10 mg daily  C/w desvenlafaxine 50 mg daily  Ativan 0.5 mg as needed    Irritable bowel syndrome with diarrhea  Assessment & Plan  Pt states that her diarrhea worsened during the last several days  She has h/o of chronic diarrhea due to IBS  Reports 3-4 BM daily, recent Abx use_ Cefpodoxime, finished on 3/27  Have low suspicion however would like to rule out inf-Obtain C.diff  If negative C.diff give imodium  C/w bentyl        Essential hypertension  Assessment & Plan  Hold lisinopril in settings of DEEP and normal BP  Restart when deep resolved    Diabetes  1.5, managed as type 2 (HCC)  Assessment & Plan  Lab Results   Component Value Date    HGBA1C 7.2 (H) 04/11/2024       Recent Labs     04/12/24  0013 04/12/24  0550   POCGLU 288* 188*       Blood Sugar Average: Last 72 hrs:  (P) 238Home regimen Lantus 40 U BID,  lis pro 16 U TID with meals, and metformin  Plan  Hold oral dm meds  Pt will be npo from [place]   Proceed with 20 U of lantus BID, increase to home dose if diet restarted  Hold premeal insulin while NPO, restart with diet  ISS Q6  Hypoglycemic protocol             VTE Pharmacologic Prophylaxis: VTE Score: 5 High Risk (Score >/= 5) - Pharmacological DVT Prophylaxis Ordered: heparin. Sequential Compression Devices Ordered.  Code Status: Level 1 - Full Code   Discussion with family: Patient declined call to .     Anticipated Length of Stay: Patient will be admitted on an observation basis with an anticipated length of stay of less than 2 midnights secondary to uti.    Chief Complaint: left flank pain    History of Present Illness:  Montse Smith is a 49 y.o. female with a PMH of HTN, HLD, IDT1.5DM, anxiety/depression, morbid obesity with BMI 65, recent L ureter stent placement, came with worsening l flank pain associated with nausea and vomiting. Pt states that she had a bowel movement today and after that she felt sever cramping pain in her left flank and mid lower back. She denies having fever or seeing blood in her UA. She stated that her pain was associated with nausea and 1 episode of vomiting. Additionally, pt informs that she has chronic diarrhea due to IBS however last couple days it got worse, and now she has about 3-4 watery bowel movement during the day. Pt finished Abx about 1 week ago after L ureteral stent placed on 3/21.    In ED 2l of NS bolus, oxy 5, zofran, dilaudid, droperidol, CFX    Pt will be [place] [facility] [place] basis for possible urologic procedure and management of UTI.        Review of Systems:  Review of  Systems   HENT: Negative.     Respiratory: Negative.     Cardiovascular: Negative.    Gastrointestinal:  Positive for diarrhea, nausea and vomiting. Negative for abdominal distention and abdominal pain.   Genitourinary:  Positive for dysuria. Negative for frequency.   Musculoskeletal:  Positive for back pain.   Neurological: Negative.    Psychiatric/Behavioral: Negative.         Past Medical and Surgical History:   Past Medical History:   Diagnosis Date    Anemia     Anxiety     Candidal intertrigo     Depression     Diabetes mellitus (HCC)     GERD (gastroesophageal reflux disease)     Hyperlipidemia     Hypertension     Hyponatremia 2023    Irritable bowel syndrome     Morbid obesity with BMI of 60.0-69.9, adult (HCC)     Osteoarthritis     Seasonal allergies     Sleep difficulties     Suicide attempt ([facility])        Past Surgical History:   Procedure Laterality Date     SECTION  1992    CHOLECYSTECTOMY      FL RETROGRADE PYELOGRAM  3/21/2024    CT CYSTO/URETERO W/LITHOTRIPSY &INDWELL STENT INSRT Left 3/21/2024    Procedure: CYSTOSCOPY, RETROGRADE PYELOGRAM AND INSERTION STENT URETERAL;  Surgeon: Nabil Desir MD;  [facility];  Service: Urology    WISDOM TOOTH EXTRACTION         Meds/Allergies:  Prior to Admission medications    Medication Sig Start Date End Date Taking? Authorizing Provider   acetaminophen (TYLENOL) 325 mg tablet Take 2 tablets (650 mg total) by mouth every 6 (six) hours as needed for mild pain 23   Julieta Doshi PA-C   ARIPiprazole (ABILIFY) 10 mg tablet Take 1 tablet (10 mg total) by mouth daily 3/26/24   Kyle Brunner, MD   clotrimazole (LOTRIMIN) 1 % cream Apply topically 2 (two) times a day 3/25/24   Kyle Brunner, MD   desvenlafaxine succinate (PRISTIQ) 50 mg 24 hr tablet Take 1 tablet (50 mg total) by mouth daily Do not start before 2023. 23   Julieta Doshi PA-C   dicyclomine (BENTYL) 10 mg capsule Take 2 capsules (20 mg total) by mouth  every 6 (six) hours as needed (crampy diarrhea) 2/21/23   Julieta Doshi PA-C   escitalopram (LEXAPRO) 20 mg tablet Take 1 tablet (20 mg total) by mouth daily 8/4/21 9/20/22  Devon Hagan DO   HumaLOG KwikPen 100 units/mL injection pen INJECT 16 UNITS 3 TIMES A DAY BEFORE MEALS 9/13/21   Historical Provider, MD   insulin glargine (LANTUS) 100 units/mL subcutaneous injection INJECT 40 UNITS UNDER THE SKIN EVERY MORNING DO NOT START BEFORE NOVEMBER 30, 2023. 12/20/23   Benedicto Baugh DO   insulin glargine (LANTUS) 100 units/mL subcutaneous injection INJECT 40 UNITS UNDER THE SKIN DAILY AT BEDTIME 1/23/24   Benedicto Baugh DO   lamoTRIgine (LaMICtal) 100 mg tablet Take 1 tablet (100 mg total) by mouth 2 (two) times a day 2/21/23   Julieta Doshi PA-C   lisinopril (ZESTRIL) 10 mg tablet Take 2 tablets (20 mg total) by mouth daily 12/4/22   Stacia Carter MD   LORazepam (ATIVAN) 0.5 mg tablet 0.5 mg every 8 (eight) hours as needed for anxiety 10/6/21   Historical Provider, MD   metFORMIN (GLUCOPHAGE) 500 mg tablet Take 1 tablet (500 mg total) by mouth 2 (two) times a day with meals 8/31/20 3/21/24  SUJATA Fuchs     I have reviewed home medications with patient personally.    Allergies:   Allergies   Allergen Reactions    Cephalexin Vomiting     Other reaction(s): Nausea and/or vomiting    Insulin Degludec GI Intolerance    Sulfamethoxazole-Trimethoprim Vomiting     Other reaction(s): Nausea and/or vomiting       Social History:  Marital Status:    Occupation: not employed  Patient Pre-hospital Living Situation: Home  Patient Pre-hospital Level of Mobility: manual wheelchair  Patient Pre-hospital Diet Restrictions: diabetic  Substance Use History:   Social History     Substance and Sexual Activity   Alcohol Use Not Currently    Comment: occassionaly      Social History     Tobacco Use   Smoking Status Never   Smokeless Tobacco Never     Social History     Substance and Sexual  Activity   Drug Use No       Family History:  Family History   Problem Relation Age of Onset    Diabetes Mother     Hypertension Father        Physical Exam:     Vitals:   Blood Pressure: 119/58 (04/11/24 2100)  Pulse: (!) 110 (04/11/24 2100)  Temperature: 97.6 °F (36.4 °C) (04/11/24 1320)  Temp Source: Oral (04/11/24 1320)  Respirations: 18 (04/11/24 1922)  Height: 5' (152.4 cm) (04/11/24 1525)  Weight - Scale: (!) 152 kg (334 lb) (04/11/24 1525)  SpO2: 94 % (04/11/24 2100)    Physical Exam  Vitals and nursing note reviewed.   Constitutional:       General: She is not in acute distress.     Appearance: She is well-developed. She is obese. She is not ill-appearing or diaphoretic.   HENT:      Head: Normocephalic and atraumatic.      Nose: Nose normal.   Eyes:      Conjunctiva/sclera: Conjunctivae normal.   Cardiovascular:      Rate and Rhythm: Normal rate and regular rhythm.      Heart sounds: No murmur heard.  Pulmonary:      Effort: Pulmonary effort is normal. No respiratory distress.      Breath sounds: Normal breath sounds.   Abdominal:      General: Bowel sounds are normal.      Palpations: Abdomen is soft.      Tenderness: There is no abdominal tenderness.   Genitourinary:     Comments: Left CVA +  Mid back pain  Musculoskeletal:         General: No swelling.      Cervical back: Neck supple.   Skin:     General: Skin is warm and dry.      Capillary Refill: Capillary refill takes less than 2 seconds.   Neurological:      Mental Status: She is alert.   Psychiatric:         Mood and Affect: Mood normal.          Additional Data:     Lab Results:  Results from last 7 days   Lab Units 04/11/24  1323   WBC Thousand/uL 11.12*   HEMOGLOBIN g/dL 15.7*   HEMATOCRIT % 48.6*   PLATELETS Thousands/uL 350   SEGS PCT % 71   LYMPHO PCT % 18   MONO PCT % 8   EOS PCT % 1     Results from last 7 days   Lab Units 04/12/24  0455 04/11/24  1656   SODIUM mmol/L 134* 136   POTASSIUM mmol/L 3.9 4.6   CHLORIDE mmol/L 104 99   CO2  mmol/L 22 26   BUN mg/dL 12 15   CREATININE mg/dL 0.74 1.00   ANION GAP mmol/L 8 11   CALCIUM mg/dL 8.5 9.9   ALBUMIN g/dL  --  4.4   TOTAL BILIRUBIN mg/dL  --  0.71   ALK PHOS U/L  --  63   ALT U/L  --  44   AST U/L  --  28   GLUCOSE RANDOM mg/dL 185* 207*         Results from last 7 days   Lab Units 04/12/24  0550 04/12/24  0013   POC GLUCOSE mg/dl 188* 288*     Results from last 7 days   Lab Units 04/11/24  1112   HEMOGLOBIN A1C % 7.2*           Lines/Drains:  Invasive Devices       Peripheral Intravenous Line  Duration             Peripheral IV 04/11/24 Right Antecubital <1 day              Drain  Duration             Ureteral Internal Stent Left ureter 6 Fr. 21 days                        Imaging: Reviewed radiology reports from this admission including: abdominal/pelvic CT  CT abdomen pelvis with contrast   Final Result by Dinh Goetz MD (04/11 1847)      1. Punctate bilateral nephrolithiasis. Small 3 mm distal left ureteral calculus identified, also nonobstructive with a left double-J ureteral stent in place.   2. No acute inflammatory or infectious process.            Workstation performed: [id number]             EKG and Other Studies Reviewed on Admission:   EKG: no ecg obtained    ** Please Note: This note has been constructed using a voice recognition system. **

## 2024-04-12 NOTE — ASSESSMENT & PLAN NOTE
Assessment:  S/p L stent placement on 3/21/24 with L flank and mid back pain started earlier today, 8/10 in intensity, cramping. Pt admits burning on urination. Denies fever or chills. Denies seeing blood or clots in her urine however mentioned that it was dark. Finished 10d course of cefpodoxime 4/4  WBC 11.1. UA Specific gravity 1.050, protein 3+, occult blood large, rbc innum, wbc 20-30, bacteria moderate, hyalyce cast  CT AP with contrast: Punctate bilateral nephrolithiasis. Small 3 mm distal left ureteral calculus identified, also nonobstructive with a left double-J ureteral stent in place. No acute inflammatory or infectious process.    Plan:  Neurology consulted and not recommending any further inpatient inventions.  Patient will follow-up outpatient and continue with outpatient lithotripsy on 4/23

## 2024-04-12 NOTE — ED NOTES
Patient given turkey sandwhich lunchbox, 240 ml cranberry juice, 240 ml bottled water, and goldfish.     Kathrine Espinoza RN  04/11/24 7286

## 2024-04-12 NOTE — ED NOTES
2 fungal resembling lesions noted on patient's R anterior lower leg. Patient states she has had them for a few months but has not sought care for them. Provider notified and asked to examine.      Kathrine Espinoza RN  04/11/24 2030     negative Alert & oriented; no sensory, motor or coordination deficits, normal reflexes

## 2024-04-12 NOTE — ASSESSMENT & PLAN NOTE
Assessment  POA status post left stent placement 3/21/2024 with L flank pain and mid back pain. Pt states that she has dysuria   Not meeting SIRS criteria  UA: Specific gravity 1.050, protein 3+, occult blood large, rbc innum, wbc 20-30, bacteria moderate, hyalyce cast  Received Ceftriaxone for 1 dose in ED.  Not suspecting infection at this time given patient continued symptoms and risk symptoms antibiotic course.  No fever.    Plan  Hold off on further antibiotics at this time.  Symptomatic pain relief and follow-up outpatient with urology

## 2024-04-12 NOTE — ASSESSMENT & PLAN NOTE
Present with Left flank/back pain  H/o of kidney stones with recently placed Left double J ureteral stent  Not meeting SIRS  UA Specific gravity 1.050, protein 3+, occult blood large, rbc innum, wbc 20-30, bacteria moderate, hyalyce cast  CT AP with contrast: Punctate bilateral nephrolithiasis. Small 3 mm distal left ureteral calculus identified, also nonobstructive with a left double-J ureteral stent in place. No acute inflammatory or infectious process.  Pt suppose to see urology tomorrow and has scheduled cystogram with lithotripsy on 4/23/2024    Plan:  Outpatient urology follow-up.  Medication recommendations per urology

## 2024-04-13 LAB
BACTERIA UR CULT: NORMAL
BACTERIA UR CULT: NORMAL

## 2024-04-15 ENCOUNTER — ANESTHESIA EVENT (OUTPATIENT)
Dept: PERIOP | Facility: HOSPITAL | Age: 49
End: 2024-04-15
Payer: COMMERCIAL

## 2024-04-15 NOTE — UTILIZATION REVIEW
NOTIFICATION OF ADMISSION DISCHARGE   This is a Notification of Discharge from [facility]. Please be advised that this patient has been discharge from our facility. Below you will find the admission and discharge date and time including the patient’s disposition.   UTILIZATION REVIEW CONTACT:  Ren Melendrez  Utilization   Network Utilization Review Department  Phone: [phone] x carefully listen to the prompts. All voicemails are confidential.  Email: [email or web address]     ADMISSION INFORMATION  PRESENTATION DATE: 4/11/2024  3:16 PM  OBERVATION ADMISSION DATE:   INPATIENT ADMISSION DATE: 4/11/24  9:59 PM   DISCHARGE DATE: 4/12/2024  4:35 PM   DISPOSITION:Home/Self Care    Network Utilization Review Department  ATTENTION: Please call with any questions or concerns to [phone] and carefully listen to the prompts so that you are directed to the right person. All voicemails are confidential.   For Discharge needs, contact Care Management DC Support Team at [phone] opt. 2  Send all requests for admission clinical reviews, approved or denied determinations and any other requests to dedicated fax number below belonging to the campus where the patient is receiving treatment. List of dedicated fax numbers for the Facilities:  FACILITY NAME UR FAX NUMBER   ADMISSION DENIALS (Administrative/Medical Necessity) [phone]   DISCHARGE SUPPORT TEAM ([facility]) [phone]   PARENT CHILD HEALTH (Maternity/NICU/Pediatrics) [phone]   [facility] [phone]   [facility] [phone]   [facility] [phone]   [facility] [phone]   [facility] [phone]   [facility] [phone]   [facility] [phone]   [facility] [phone]   [facility]  [facility] [phone]   [facility] [phone]   [facility] [phone]   [facility] [phone]

## 2024-04-23 ENCOUNTER — HOSPITAL ENCOUNTER (OUTPATIENT)
Facility: HOSPITAL | Age: 49
Setting detail: OUTPATIENT SURGERY
Discharge: HOME | End: 2024-04-23
Attending: UROLOGY | Admitting: UROLOGY
Payer: COMMERCIAL

## 2024-04-23 ENCOUNTER — TELEPHONE (OUTPATIENT)
Dept: UROLOGY | Facility: CLINIC | Age: 49
End: 2024-04-23

## 2024-04-23 ENCOUNTER — APPOINTMENT (OUTPATIENT)
Dept: RADIOLOGY | Facility: HOSPITAL | Age: 49
End: 2024-04-23
Payer: COMMERCIAL

## 2024-04-23 ENCOUNTER — ANESTHESIA (OUTPATIENT)
Dept: PERIOP | Facility: HOSPITAL | Age: 49
End: 2024-04-23
Payer: COMMERCIAL

## 2024-04-23 VITALS
RESPIRATION RATE: 20 BRPM | HEIGHT: 60 IN | OXYGEN SATURATION: 98 % | DIASTOLIC BLOOD PRESSURE: 65 MMHG | WEIGHT: 293 LBS | SYSTOLIC BLOOD PRESSURE: 124 MMHG | HEART RATE: 101 BPM | TEMPERATURE: 97.8 F | BODY MASS INDEX: 57.52 KG/M2

## 2024-04-23 DIAGNOSIS — N20.0 KIDNEY STONES: ICD-10-CM

## 2024-04-23 DIAGNOSIS — N13.2 HYDRONEPHROSIS WITH URETERAL CALCULUS: Primary | ICD-10-CM

## 2024-04-23 DIAGNOSIS — R10.9 LEFT FLANK PAIN: ICD-10-CM

## 2024-04-23 LAB
GLUCOSE SERPL-MCNC: 305 MG/DL (ref 65–140)
GLUCOSE SERPL-MCNC: 319 MG/DL (ref 65–140)
GLUCOSE SERPL-MCNC: 344 MG/DL (ref 65–140)

## 2024-04-23 PROCEDURE — C1769 GUIDE WIRE: HCPCS | Performed by: UROLOGY

## 2024-04-23 PROCEDURE — C2617 STENT, NON-COR, TEM W/O DEL: HCPCS | Performed by: UROLOGY

## 2024-04-23 PROCEDURE — 82948 REAGENT STRIP/BLOOD GLUCOSE: CPT

## 2024-04-23 PROCEDURE — NC001 PR NO CHARGE: Performed by: UROLOGY

## 2024-04-23 PROCEDURE — 52351 CYSTOURETERO & OR PYELOSCOPE: CPT | Performed by: UROLOGY

## 2024-04-23 PROCEDURE — 74420 UROGRAPHY RTRGR +-KUB: CPT

## 2024-04-23 PROCEDURE — C1758 CATHETER, URETERAL: HCPCS | Performed by: UROLOGY

## 2024-04-23 PROCEDURE — 52332 CYSTOSCOPY AND TREATMENT: CPT | Performed by: UROLOGY

## 2024-04-23 DEVICE — INLAY OPTIMA URETERAL STENT W/O GUIDEWIRE
Type: IMPLANTABLE DEVICE | Site: URETER | Status: FUNCTIONAL
Brand: BARD® INLAY OPTIMA® URETERAL STENT

## 2024-04-23 RX ORDER — ONDANSETRON 2 MG/ML
4 INJECTION INTRAMUSCULAR; INTRAVENOUS ONCE AS NEEDED
Status: COMPLETED | OUTPATIENT
Start: 2024-04-23 | End: 2024-04-23

## 2024-04-23 RX ORDER — HYDROMORPHONE HCL/PF 1 MG/ML
0.5 SYRINGE (ML) INJECTION
Status: DISCONTINUED | OUTPATIENT
Start: 2024-04-23 | End: 2024-04-23 | Stop reason: HOSPADM

## 2024-04-23 RX ORDER — LIDOCAINE HYDROCHLORIDE 10 MG/ML
0.5 INJECTION, SOLUTION EPIDURAL; INFILTRATION; INTRACAUDAL; PERINEURAL ONCE AS NEEDED
Status: DISCONTINUED | OUTPATIENT
Start: 2024-04-23 | End: 2024-04-23 | Stop reason: HOSPADM

## 2024-04-23 RX ORDER — OXYCODONE HYDROCHLORIDE 5 MG/1
5 TABLET ORAL ONCE AS NEEDED
Status: COMPLETED | OUTPATIENT
Start: 2024-04-23 | End: 2024-04-23

## 2024-04-23 RX ORDER — SODIUM CHLORIDE, SODIUM LACTATE, POTASSIUM CHLORIDE, CALCIUM CHLORIDE 600; 310; 30; 20 MG/100ML; MG/100ML; MG/100ML; MG/100ML
125 INJECTION, SOLUTION INTRAVENOUS CONTINUOUS
Status: DISCONTINUED | OUTPATIENT
Start: 2024-04-23 | End: 2024-04-23 | Stop reason: HOSPADM

## 2024-04-23 RX ORDER — MAGNESIUM HYDROXIDE 1200 MG/15ML
LIQUID ORAL AS NEEDED
Status: DISCONTINUED | OUTPATIENT
Start: 2024-04-23 | End: 2024-04-23 | Stop reason: HOSPADM

## 2024-04-23 RX ORDER — SODIUM CHLORIDE, SODIUM LACTATE, POTASSIUM CHLORIDE, CALCIUM CHLORIDE 600; 310; 30; 20 MG/100ML; MG/100ML; MG/100ML; MG/100ML
INJECTION, SOLUTION INTRAVENOUS CONTINUOUS PRN
Status: DISCONTINUED | OUTPATIENT
Start: 2024-04-23 | End: 2024-04-23

## 2024-04-23 RX ORDER — ACETAMINOPHEN 325 MG/1
975 TABLET ORAL ONCE AS NEEDED
Status: COMPLETED | OUTPATIENT
Start: 2024-04-23 | End: 2024-04-23

## 2024-04-23 RX ORDER — ONDANSETRON 2 MG/ML
INJECTION INTRAMUSCULAR; INTRAVENOUS AS NEEDED
Status: DISCONTINUED | OUTPATIENT
Start: 2024-04-23 | End: 2024-04-23

## 2024-04-23 RX ORDER — CEFAZOLIN SODIUM 1 G/3ML
INJECTION, POWDER, FOR SOLUTION INTRAMUSCULAR; INTRAVENOUS AS NEEDED
Status: DISCONTINUED | OUTPATIENT
Start: 2024-04-23 | End: 2024-04-23

## 2024-04-23 RX ORDER — SUCCINYLCHOLINE/SOD CL,ISO/PF 100 MG/5ML
SYRINGE (ML) INTRAVENOUS AS NEEDED
Status: DISCONTINUED | OUTPATIENT
Start: 2024-04-23 | End: 2024-04-23

## 2024-04-23 RX ORDER — LEVOFLOXACIN 500 MG/1
500 TABLET, FILM COATED ORAL EVERY 24 HOURS
Qty: 3 TABLET | Refills: 0 | Status: SHIPPED | OUTPATIENT
Start: 2024-04-23 | End: 2024-04-26

## 2024-04-23 RX ORDER — LIDOCAINE HYDROCHLORIDE 10 MG/ML
INJECTION, SOLUTION EPIDURAL; INFILTRATION; INTRACAUDAL; PERINEURAL AS NEEDED
Status: DISCONTINUED | OUTPATIENT
Start: 2024-04-23 | End: 2024-04-23

## 2024-04-23 RX ORDER — FENTANYL CITRATE 50 UG/ML
INJECTION, SOLUTION INTRAMUSCULAR; INTRAVENOUS AS NEEDED
Status: DISCONTINUED | OUTPATIENT
Start: 2024-04-23 | End: 2024-04-23

## 2024-04-23 RX ORDER — ROCURONIUM BROMIDE 10 MG/ML
INJECTION, SOLUTION INTRAVENOUS AS NEEDED
Status: DISCONTINUED | OUTPATIENT
Start: 2024-04-23 | End: 2024-04-23

## 2024-04-23 RX ORDER — PROPOFOL 10 MG/ML
INJECTION, EMULSION INTRAVENOUS AS NEEDED
Status: DISCONTINUED | OUTPATIENT
Start: 2024-04-23 | End: 2024-04-23

## 2024-04-23 RX ORDER — FENTANYL CITRATE/PF 50 MCG/ML
25 SYRINGE (ML) INJECTION
Status: DISCONTINUED | OUTPATIENT
Start: 2024-04-23 | End: 2024-04-23 | Stop reason: HOSPADM

## 2024-04-23 RX ORDER — MIDAZOLAM HYDROCHLORIDE 2 MG/2ML
INJECTION, SOLUTION INTRAMUSCULAR; INTRAVENOUS AS NEEDED
Status: DISCONTINUED | OUTPATIENT
Start: 2024-04-23 | End: 2024-04-23

## 2024-04-23 RX ORDER — DIPHENHYDRAMINE HYDROCHLORIDE 50 MG/ML
12.5 INJECTION INTRAMUSCULAR; INTRAVENOUS ONCE
Status: COMPLETED | OUTPATIENT
Start: 2024-04-23 | End: 2024-04-23

## 2024-04-23 RX ADMIN — PROPOFOL 300 MG: 10 INJECTION, EMULSION INTRAVENOUS at 08:46

## 2024-04-23 RX ADMIN — ONDANSETRON 4 MG: 2 INJECTION INTRAMUSCULAR; INTRAVENOUS at 09:44

## 2024-04-23 RX ADMIN — DIPHENHYDRAMINE HYDROCHLORIDE 12.5 MG: 50 INJECTION, SOLUTION INTRAMUSCULAR; INTRAVENOUS at 09:59

## 2024-04-23 RX ADMIN — INSULIN HUMAN 10 UNITS: 100 INJECTION, SOLUTION PARENTERAL at 08:18

## 2024-04-23 RX ADMIN — SODIUM CHLORIDE, SODIUM LACTATE, POTASSIUM CHLORIDE, AND CALCIUM CHLORIDE: .6; .31; .03; .02 INJECTION, SOLUTION INTRAVENOUS at 08:41

## 2024-04-23 RX ADMIN — ACETAMINOPHEN 975 MG: 325 TABLET, FILM COATED ORAL at 10:46

## 2024-04-23 RX ADMIN — MIDAZOLAM 2 MG: 1 INJECTION INTRAMUSCULAR; INTRAVENOUS at 08:41

## 2024-04-23 RX ADMIN — FENTANYL CITRATE 100 MCG: 50 INJECTION INTRAMUSCULAR; INTRAVENOUS at 08:46

## 2024-04-23 RX ADMIN — ONDANSETRON 4 MG: 2 INJECTION INTRAMUSCULAR; INTRAVENOUS at 08:46

## 2024-04-23 RX ADMIN — Medication 200 MG: at 08:46

## 2024-04-23 RX ADMIN — CEFAZOLIN 3000 MG: 1 INJECTION, POWDER, FOR SOLUTION INTRAMUSCULAR; INTRAVENOUS at 08:49

## 2024-04-23 RX ADMIN — SUGAMMADEX 400 MG: 100 INJECTION, SOLUTION INTRAVENOUS at 09:23

## 2024-04-23 RX ADMIN — SODIUM CHLORIDE, SODIUM LACTATE, POTASSIUM CHLORIDE, AND CALCIUM CHLORIDE 125 ML/HR: .6; .31; .03; .02 INJECTION, SOLUTION INTRAVENOUS at 08:19

## 2024-04-23 RX ADMIN — ROCURONIUM BROMIDE 50 MG: 10 INJECTION, SOLUTION INTRAVENOUS at 08:46

## 2024-04-23 RX ADMIN — LIDOCAINE HYDROCHLORIDE 50 MG: 10 INJECTION, SOLUTION EPIDURAL; INFILTRATION; INTRACAUDAL; PERINEURAL at 08:46

## 2024-04-23 RX ADMIN — OXYCODONE HYDROCHLORIDE 5 MG: 5 TABLET ORAL at 11:32

## 2024-04-23 NOTE — DISCHARGE INSTR - AVS FIRST PAGE
Montse Smith:    Your surgery went very well.      You can remove your stent at home in 3 days via the string coming out of your urethra.  Instructions for how to do this are below.  If you prefer we can also do this in the clinic a via nursing visit    Please plan to take an antibiotic around the stent removal starting the day before, the day of and finishing the day after stent removal.    It is important to ensure you have healed well from surgery and therefore we will plan for an abdominal X-ray and kidney ultrasound approximately 4-6 weeks after the stent is removed.    Please take your medications as prescribed with caution for comfort.  Most importantly please drink 6-8 glasses of water per day    Please call with any questions or concerns.    Nabil Desir MD  [facility]  [phone]            WHAT IS A STENT?  At the end of the procedure, your doctor may place a stent into your ureter. A stent is a thin, flexible piece of plastic that will hold open your ureter while the remaining small pieces of stone pass. This allows your kidney to drain easily and prevents you from having to “pass” these small stone pieces on your own, which could be painful. The stent is about 12 inches long and looks and feels like a thin piece of spaghetti.    AFTER THE PROCEDURE  After the procedure you may experience the following symptoms. All of these are normal and should resolve within 1 or 2 days after your stent is removed.  Urinary frequency (urinating more often than usual)  Urinary urgency (the sensation that you need to urinate right away)  Painful urination (this can be pain in your bladder or in your back when  you urinate)  Blood in your urine ( a stent can irritate the lining of your bladder causing it to bleed)  Back/Flank pain, especially with urination    You may receive a prescription for narcotic pain medication after the procedure. You will also receive a prescription for tamsulosin which  you will take once a day for 2 weeks to help relax your ureter and decrease stent discomfort. You will also need to purchase a stool softener (i.e. Colace) or mild laxative (i.e. Miralax) as the narcotic pain medication can make you constipated. This is important as constipation can exacerbate stent related symptoms.     STENT REMOVAL  In some cases, your doctor will leave strings attached to your stent. The strings will be taped to your skin after the procedure. The strings will allow you to remove the thin flexible stent while you are at home. Normally, the stent can be removed 3-5 days after your procedure; your physician will tell you the specific date after your procedure.     On the day you are supposed to remove your stent, do the following:  When you wake up in the morning, take 1-2 pain pills with food.  Start your antibiotic pill the morning of schedule stent removal if prescribed  One hour later sit on the toilet or in the bath tub.  Take a deep breath in and while exhaling, pull the string.   Dispose of the stent in the garbage.    Alternatively, you will come back for an office procedure to remove the stent by placing a small camera into your bladder to remove the stent.    During the next 4-8 hours after removing your stent, you may experience additional blood in your urine, pain with urination or back/side pain. You should take the pain medication you were prescribed to help you with the pain, as well as continue the Flomax. If the pain is severe, you are vomiting, and/or have a fever > 101.4 please call the clinic.

## 2024-04-23 NOTE — OP NOTE
OPERATIVE REPORT  PATIENT NAME: Montse Smith    :  1975  MRN: [id number]  Pt Location: [facility] CYSTO ROOM 01    SURGERY DATE: 2024    Surgeons and Role:     * Nabil Desir MD - Primary    Preop Diagnosis:  Left ureteral stone [N20.1]    Post-Op Diagnosis Codes:     * Left ureteral stone [N20.1]    Procedure(s):  Left - CYSTOSCOPY URETEROSCOPY WITH LITHOTRIPSY HOLMIUM LASER. RETROGRADE PYELOGRAM AND INSERTION STENT URETERAL    Specimen(s):  * No specimens in log *    Estimated Blood Loss:   Minimal    Drains:  Ureteral Internal Stent Left ureter 6 Fr. (Active)   Number of days: 33       Ureteral Internal Stent Left ureter 6 Fr. (Active)   Number of days: 0       Anesthesia Type:   General    Operative Indications:  Patient present hospital in 2024 with 3 mm distal left ureteral stone and concern for urinary tract infection later growing E. coli resulting in stent placement and then had subsequent CT scan determine if stone still present given small distal location which it was now presents for definitive stone management    Operative Findings:  Small stone fragments seen in the distal ureter likely flushed out as not seen on uteroscopy and pyeloscopy to follow  Stent with string placed    Complications:   None    Procedure and Technique:    After informed consent including the risks of bleeding, infection, ureteral injury, and need for secondary procedures, patient was placed supine in the operating room theater.  Gen. anesthesia was administered.      They were then placed in the dorsal lithotomy position and sterilely prepped and draped in usual fashion. Antibiotic prophylaxis and DVT prophylaxis were administered Cystoscopy was performed the 21 Swedish cystoscope 30° lens.  This revealed a normal urethra.  Inspection of the bladder revealed no abnormalities.  Fluoroscopy showed left ureteral stent in proper position    Attention was turned to the left ureteral orifice. A 5fr open ended catheter  was attempted to be passed into the ureteral orifice and a 0.38 solo guidewire was passed through the open ended catheter and up the ureter into the renal pelvis. The scope was removed and reintroduced and stent removed with grasper.    A short semirigid ureteroscope was then passed into the ureter.  A small fragment of stone was seen in the distal ureter consistent with patient's stone but then was not seen despite following the ureter up to its proximal aspect suggesting the stone had flushed out.  To be safe and ensure there was no residual stone burden elected to perform uteroscopy after carefully performing exit ureteroscopy with a semirigid ureteroscope which again did not show any stone within the ureter.  A second wire was passed up through the ureteroscope before removing the ureteroscope from the ureter.  A flexible ureteroscope was then passed over the 2nd wire into the collecting system.       The collecting system was examined carefully and no stone was seen. The ureteroscope was backed down the ureter under vision and there were again no stone  and the ureter was noted to be intact with no injury and bullous edema in the mid ureter and distal ureter but otherwise unremarkable    A retrograde was performed to define the collecting system which was normal-appearing. A 6 x 22 double-J stent inserted without difficulty with good coil seen in left collecting system on fluoroscopy and visually in the bladder.  The string was left on. The bladder was drained.   The patient was placed back supine, awakened from general anesthesia and brought to recovery room in stable condition.     A qualified resident physician was not available.    Patient Disposition:  PACU     Plan: stent removal in 3 days        SIGNATURE: Nabil Desir MD  DATE: April 23, 2024  TIME: 9:21 AM

## 2024-04-23 NOTE — TELEPHONE ENCOUNTER
Patient with distal left ureteral stone status post ureteroscopy.  Stent with string that can be removed in 3 days.  Plan for KUB and ultrasound in 4 to 6 weeks.

## 2024-04-23 NOTE — ANESTHESIA POSTPROCEDURE EVALUATION
Post-Op Assessment Note    CV Status:  Stable  Pain Score: 0    Pain management: adequate       Mental Status:  Alert and awake   Hydration Status:  Stable   PONV Controlled:  None   Airway Patency:  Patent  Two or more mitigation strategies used for obstructive sleep apnea   Post Op Vitals Reviewed: Yes    No anethesia notable event occurred.    Staff: CRNA               BP   155/81   Temp   98.6   Pulse  81   Resp   12   SpO2   98

## 2024-04-23 NOTE — H&P
UROLOGY HISTORY AND PHYSICAL     Patient Identifiers: Montse Smith (MRN [id number])      Date of Service: 2024        ASSESSMENT:     49 y.o. old female with distal left ureteral stone status post stent placement with stone still in place based on repeat CT scan.      PLAN:     Left ureteroscopy with laser lithotripsy      History of Present Illness:     Montse Smith is a 49 y.o. old with a history ofobesity, HTN, HLD, DM, anxiety/depression who presented to the ED with left flank pain.  Patient underwent ureteral stent placement on 3/21/2024 because she had nitrite positive urine and poorly controlled diabetes.  Urine culture later grew E. coli      Patient reports since that time she has had intermittent pain, however on  she developed severe pain in her left flank associated with nausea and vomiting.  She denies any fever or chills.  She does report her pain is worse with urination.  She came into the emergency room where CT scan showed stent in good position and lab work showed mild leukocytosis urinalysis with pyuria and hematuria but nitrite negative  and urine culture that later returned contaminated but she was put on empiric Levaquin    Past Medical, Past Surgical History:     Past Medical History:   Diagnosis Date    Anemia     Anxiety     Candidal intertrigo     Depression     Diabetes mellitus (HCC)     GERD (gastroesophageal reflux disease)     Hyperlipidemia     Hypertension     Hyponatremia 2023    Irritable bowel syndrome     Morbid obesity with BMI of 60.0-69.9, adult (HCC)     Osteoarthritis     Seasonal allergies     Sleep difficulties     Suicide attempt (HCC)    :    Past Surgical History:   Procedure Laterality Date     SECTION      CHOLECYSTECTOMY      FL RETROGRADE PYELOGRAM  3/21/2024    IL CYSTO/URETERO W/LITHOTRIPSY &INDWELL STENT INSRT Left 3/21/2024    Procedure: CYSTOSCOPY, RETROGRADE PYELOGRAM AND INSERTION STENT URETERAL;  Surgeon: Nabil Desir MD;   Location: [facility];  Service: Urology    WISDOM TOOTH EXTRACTION  2009   :    Medications, Allergies:     Current Facility-Administered Medications:     insulin regular (HumuLIN R,NovoLIN R) injection 10 Units, 10 Units, Subcutaneous, Once, Lala Winston MD    lactated ringers infusion, 125 mL/hr, Intravenous, Continuous, Lala Winston MD    lidocaine (PF) (XYLOCAINE-MPF) 1 % injection 0.5 mL, 0.5 mL, Infiltration, Once PRN, Lala Winston MD    Allergies:  Allergies   Allergen Reactions    Cephalexin Vomiting     Other reaction(s): Nausea and/or vomiting    Insulin Degludec GI Intolerance    Sulfamethoxazole-Trimethoprim Vomiting     Other reaction(s): Nausea and/or vomiting   :    Social and Family History:   Social History:   Social History     Tobacco Use    Smoking status: Never    Smokeless tobacco: Never   Vaping Use    Vaping status: Never Used   Substance Use Topics    Alcohol use: Not Currently     Comment: occassionaly     Drug use: No   .    Social History     Tobacco Use   Smoking Status Never   Smokeless Tobacco Never       Family History:  Family History   Problem Relation Age of Onset    Diabetes Mother     Hypertension Father    :     Review of Systems:     General: Fever, chills, or night sweats: negative  Cardiac: Negative for chest pain.    Pulmonary: Negative for shortness of breath.  Gastrointestinal: Abdominal pain negative  Nausea, vomiting, or diarrhea negative  Genitourinary: See HPI above.  Patient does nothave hematuria.  All other systems queried were negative.    Physical Exam:   General: Patient is pleasant and in NAD. Awake and alert  /76 (BP Location: Right arm)   Pulse (!) 108   Temp (!) 96.9 °F (36.1 °C) (Temporal)   Resp (!) 24   Ht 5' (1.524 m)   Wt (!) 152 kg (335 lb 1.6 oz)   LMP  (LMP Unknown)   SpO2 97%   BMI 65.44 kg/m²   HEENT:  Normocephalic atraumatic  Cardiac:  Regular rate and rhythm, Peripheral edema: negative  Pulmonary: Non-labored breathing,  CTAB  Abdomen: Soft, non-tender, non-distended.  No surgical scars.  No masses, tenderness, hernias noted.    Genitourinary: negative CVA tenderness, neg suprapubic tenderness.  Extremities: normal movement in all 4       Labs:     Lab Results   Component Value Date    HGB 12.5 04/12/2024    HCT 38.2 04/12/2024    WBC 7.47 04/12/2024     04/12/2024   ]    Lab Results   Component Value Date     09/30/2015    K 3.9 04/12/2024     04/12/2024    CO2 22 04/12/2024    BUN 12 04/12/2024    CREATININE 0.74 04/12/2024    CALCIUM 8.5 04/12/2024    GLUCOSE 159 (H) 09/30/2015   ]    Imaging:   I personally reviewed the images and report of the following studies, and reviewed them with the patient:    CT scan showing distal left ureteral stone alongside stent and also possible calcification of distal coil of stent.    Thank you for allowing me to participate in this patients’ care.  Please do not hesitate to call with any additional questions.  Nabil Desir MD

## 2024-04-23 NOTE — ANESTHESIA PREPROCEDURE EVALUATION
Procedure:  CYSTOSCOPY URETEROSCOPY WITH LITHOTRIPSY HOLMIUM LASER, RETROGRADE PYELOGRAM AND INSERTION STENT URETERAL (Left: Abdomen)    Relevant Problems   CARDIO   (+) Essential hypertension   (+) Mixed hyperlipidemia      ENDO   (+) Diabetes 1.5, managed as type 2 (HCC)   (+) Type 2 diabetes mellitus with hyperglycemia, with long-term current use of insulin (HCC)      GI/HEPATIC   (+) GERD (gastroesophageal reflux disease)      /RENAL   (+) Hydronephrosis with ureteral calculus   (+) Kidney stones      MUSCULOSKELETAL   (+) Primary osteoarthritis of both knees   (+) Primary osteoarthritis of right knee      NEURO/PSYCH   (+) Anxiety   (+) MDD (major depressive disorder)   (+) Major depressive disorder, recurrent severe without psychotic features ([facility])        Physical Exam    Airway    Mallampati score: II  TM Distance: <3 FB  Neck ROM: limited     Dental   No notable dental hx     Cardiovascular  Cardiovascular exam normal    Pulmonary  Pulmonary exam normal     Other Findings  post-pubertal.      Anesthesia Plan  ASA Score- 3     Anesthesia Type- general with ASA Monitors.         Additional Monitors:     Airway Plan: ETT.    Comment: No issues with prev GA- last 3/23 without issues. ROBERSON for intubation  No current cardiac/respiratory/illness concerns.       Plan Factors-Exercise tolerance (METS): <4 METS.    Chart reviewed.  Imaging results reviewed. Existing labs reviewed. Patient summary reviewed.    Patient is not a current smoker.      Obstructive sleep apnea risk education given perioperatively.        Induction- intravenous.    Postoperative Plan- Plan for postoperative opioid use. Planned trial extubation    Informed Consent- Anesthetic plan and risks discussed with patient.  I personally reviewed this patient with the CRNA. Discussed and agreed on the Anesthesia Plan with the CRNA..

## 2024-04-24 NOTE — TELEPHONE ENCOUNTER
LM to contact office. IF she calls back, please ask how she is feeling. She is to remove stent on Friday, please ask if she is comfortable doing so. I will call her on Friday to make sure it came out if she is removing it at home. Please tell pt to jacki BERNAL to schedule US and KUB to be performed a week prior to visit with PA. Please confirm visit with PA as well. Should she have any questions or concerns, please contact office.

## 2024-04-24 NOTE — TELEPHONE ENCOUNTER
Post Op Note    Montse Smith is a 49 y.o. female s/p CYSTOSCOPY URETEROSCOPY WITH LITHOTRIPSY HOLMIUM LASER, RETROGRADE PYELOGRAM AND INSERTION STENT URETERAL (Left: Abdomen)  performed 04/23/2024.  Montse Smith is a patient of Dr. Dr. Desir and is seen at the [facility].     How would you rate your pain on a scale from 1 to 10, 10 being the worst pain ever? 2  Have you had a fever? No  Do you have any difficulty urinating? No  Do you have any other questions or concerns that I can address at this time? Spoke with pt and states is doing well. Has had stents placed before and is aware of post op expectations. Does feel comfortable removing stent at home. Advised will call Friday afternoon to make sure stent was indeed removed and to see how she is feeling. Advised to contact CS and schedule imaging to be done 1 week PTV with AP. Appt confirmed with AP for follow up. Advised to contact office with any questions or concerns in the meantime. Pt verbalized understanding.

## 2024-04-26 NOTE — TELEPHONE ENCOUNTER
LM to contact office. If she calls back please confirm that she removed her stent and see how she is feeling.

## 2024-05-02 ENCOUNTER — APPOINTMENT (EMERGENCY)
Dept: CT IMAGING | Facility: HOSPITAL | Age: 49
End: 2024-05-02
Payer: COMMERCIAL

## 2024-05-02 ENCOUNTER — HOSPITAL ENCOUNTER (EMERGENCY)
Facility: HOSPITAL | Age: 49
Discharge: HOME | End: 2024-05-03
Attending: EMERGENCY MEDICINE | Admitting: EMERGENCY MEDICINE
Payer: COMMERCIAL

## 2024-05-02 DIAGNOSIS — R10.32 ABDOMINAL WALL PAIN IN LEFT LOWER QUADRANT: ICD-10-CM

## 2024-05-02 DIAGNOSIS — B37.2 CANDIDAL INTERTRIGO: Primary | ICD-10-CM

## 2024-05-02 LAB
ALBUMIN SERPL BCP-MCNC: 4.3 G/DL (ref 3.5–5)
ALP SERPL-CCNC: 68 U/L (ref 34–104)
ALT SERPL W P-5'-P-CCNC: 42 U/L (ref 7–52)
ANION GAP SERPL CALCULATED.3IONS-SCNC: 11 MMOL/L (ref 4–13)
AST SERPL W P-5'-P-CCNC: 23 U/L (ref 13–39)
BASOPHILS # BLD AUTO: 0.06 THOUSANDS/ÂΜL (ref 0–0.1)
BASOPHILS NFR BLD AUTO: 1 % (ref 0–1)
BILIRUB SERPL-MCNC: 0.59 MG/DL (ref 0.2–1)
BUN SERPL-MCNC: 17 MG/DL (ref 5–25)
CALCIUM SERPL-MCNC: 9.6 MG/DL (ref 8.4–10.2)
CHLORIDE SERPL-SCNC: 101 MMOL/L (ref 96–108)
CO2 SERPL-SCNC: 25 MMOL/L (ref 21–32)
CREAT SERPL-MCNC: 0.87 MG/DL (ref 0.6–1.3)
EOSINOPHIL # BLD AUTO: 0.26 THOUSAND/ÂΜL (ref 0–0.61)
EOSINOPHIL NFR BLD AUTO: 2 % (ref 0–6)
ERYTHROCYTE [DISTWIDTH] IN BLOOD BY AUTOMATED COUNT: 13 % (ref 11.6–15.1)
GFR SERPL CREATININE-BSD FRML MDRD: 77 ML/MIN/1.73SQ M
GLUCOSE SERPL-MCNC: 240 MG/DL (ref 65–140)
HCT VFR BLD AUTO: 45.9 % (ref 34.8–46.1)
HGB BLD-MCNC: 15.1 G/DL (ref 11.5–15.4)
HOLD SPECIMEN: NORMAL
IMM GRANULOCYTES # BLD AUTO: 0.09 THOUSAND/UL (ref 0–0.2)
IMM GRANULOCYTES NFR BLD AUTO: 1 % (ref 0–2)
LIPASE SERPL-CCNC: 62 U/L (ref 11–82)
LYMPHOCYTES # BLD AUTO: 3.49 THOUSANDS/ÂΜL (ref 0.6–4.47)
LYMPHOCYTES NFR BLD AUTO: 32 % (ref 14–44)
MCH RBC QN AUTO: 30.2 PG (ref 26.8–34.3)
MCHC RBC AUTO-ENTMCNC: 32.9 G/DL (ref 31.4–37.4)
MCV RBC AUTO: 92 FL (ref 82–98)
MONOCYTES # BLD AUTO: 0.72 THOUSAND/ÂΜL (ref 0.17–1.22)
MONOCYTES NFR BLD AUTO: 7 % (ref 4–12)
NEUTROPHILS # BLD AUTO: 6.3 THOUSANDS/ÂΜL (ref 1.85–7.62)
NEUTS SEG NFR BLD AUTO: 57 % (ref 43–75)
NRBC BLD AUTO-RTO: 0 /100 WBCS
PLATELET # BLD AUTO: 348 THOUSANDS/UL (ref 149–390)
PMV BLD AUTO: 9.7 FL (ref 8.9–12.7)
POTASSIUM SERPL-SCNC: 3.9 MMOL/L (ref 3.5–5.3)
PROT SERPL-MCNC: 8 G/DL (ref 6.4–8.4)
RBC # BLD AUTO: 5 MILLION/UL (ref 3.81–5.12)
SODIUM SERPL-SCNC: 137 MMOL/L (ref 135–147)
WBC # BLD AUTO: 10.92 THOUSAND/UL (ref 4.31–10.16)

## 2024-05-02 PROCEDURE — 83690 ASSAY OF LIPASE: CPT | Performed by: EMERGENCY MEDICINE

## 2024-05-02 PROCEDURE — 99284 EMERGENCY DEPT VISIT MOD MDM: CPT

## 2024-05-02 PROCEDURE — 96374 THER/PROPH/DIAG INJ IV PUSH: CPT

## 2024-05-02 PROCEDURE — 96361 HYDRATE IV INFUSION ADD-ON: CPT

## 2024-05-02 PROCEDURE — 74177 CT ABD & PELVIS W/CONTRAST: CPT

## 2024-05-02 PROCEDURE — 85025 COMPLETE CBC W/AUTO DIFF WBC: CPT | Performed by: EMERGENCY MEDICINE

## 2024-05-02 PROCEDURE — 80053 COMPREHEN METABOLIC PANEL: CPT | Performed by: EMERGENCY MEDICINE

## 2024-05-02 PROCEDURE — 36415 COLL VENOUS BLD VENIPUNCTURE: CPT

## 2024-05-02 RX ORDER — HYDROMORPHONE HCL/PF 1 MG/ML
0.5 SYRINGE (ML) INJECTION ONCE
Status: COMPLETED | OUTPATIENT
Start: 2024-05-02 | End: 2024-05-02

## 2024-05-02 RX ORDER — NYSTATIN 100000 [USP'U]/G
POWDER TOPICAL 3 TIMES DAILY
Status: DISCONTINUED | OUTPATIENT
Start: 2024-05-02 | End: 2024-05-03 | Stop reason: HOSPADM

## 2024-05-02 RX ADMIN — SODIUM CHLORIDE 1000 ML: 0.9 INJECTION, SOLUTION INTRAVENOUS at 22:48

## 2024-05-02 RX ADMIN — IOHEXOL 100 ML: 350 INJECTION, SOLUTION INTRAVENOUS at 22:35

## 2024-05-02 RX ADMIN — NYSTATIN: 100000 POWDER TOPICAL at 22:49

## 2024-05-02 RX ADMIN — HYDROMORPHONE HYDROCHLORIDE 0.5 MG: 1 INJECTION, SOLUTION INTRAMUSCULAR; INTRAVENOUS; SUBCUTANEOUS at 22:45

## 2024-05-03 VITALS
RESPIRATION RATE: 18 BRPM | TEMPERATURE: 98.6 F | OXYGEN SATURATION: 96 % | HEART RATE: 97 BPM | DIASTOLIC BLOOD PRESSURE: 68 MMHG | SYSTOLIC BLOOD PRESSURE: 147 MMHG

## 2024-05-03 LAB
BACTERIA UR QL AUTO: ABNORMAL /HPF
BILIRUB UR QL STRIP: NEGATIVE
CLARITY UR: CLEAR
COLOR UR: YELLOW
GLUCOSE UR STRIP-MCNC: NEGATIVE MG/DL
HGB UR QL STRIP.AUTO: ABNORMAL
KETONES UR STRIP-MCNC: NEGATIVE MG/DL
LEUKOCYTE ESTERASE UR QL STRIP: NEGATIVE
MUCOUS THREADS UR QL AUTO: ABNORMAL
NITRITE UR QL STRIP: NEGATIVE
NON-SQ EPI CELLS URNS QL MICRO: ABNORMAL /HPF
PH UR STRIP.AUTO: 6 [PH]
PROT UR STRIP-MCNC: ABNORMAL MG/DL
RBC #/AREA URNS AUTO: ABNORMAL /HPF
SP GR UR STRIP.AUTO: >=1.05 (ref 1–1.03)
UROBILINOGEN UR STRIP-ACNC: <2 MG/DL
WBC #/AREA URNS AUTO: ABNORMAL /HPF

## 2024-05-03 PROCEDURE — 81001 URINALYSIS AUTO W/SCOPE: CPT | Performed by: EMERGENCY MEDICINE

## 2024-05-03 PROCEDURE — 96361 HYDRATE IV INFUSION ADD-ON: CPT

## 2024-05-03 PROCEDURE — 96375 TX/PRO/DX INJ NEW DRUG ADDON: CPT

## 2024-05-03 RX ORDER — KETOROLAC TROMETHAMINE 30 MG/ML
15 INJECTION, SOLUTION INTRAMUSCULAR; INTRAVENOUS ONCE
Status: COMPLETED | OUTPATIENT
Start: 2024-05-03 | End: 2024-05-03

## 2024-05-03 RX ORDER — NYSTATIN 100000 [USP'U]/G
POWDER TOPICAL 3 TIMES DAILY
Qty: 60 G | Refills: 1 | Status: SHIPPED | OUTPATIENT
Start: 2024-05-03

## 2024-05-03 RX ADMIN — KETOROLAC TROMETHAMINE 15 MG: 30 INJECTION, SOLUTION INTRAMUSCULAR; INTRAVENOUS at 00:21

## 2024-05-03 NOTE — ED PROVIDER NOTES
"History  Chief Complaint   Patient presents with    Abdominal Pain     Reports constant \"severe\" left lower abdominal pain since last night, denies N/V.  Reports diarrhea, +Hx diverticulitis.        History provided by:  Patient and spouse   used: No    Abdominal Pain  Associated symptoms: diarrhea and nausea    Associated symptoms: no chest pain, no fatigue, no fever, no shortness of breath and no vomiting      49-year-old postmenopausal female presented for evaluation of left lower quadrant pain beginning last night remaining persistent throughout the day.  States pain is worse with movement.  Has had a few episodes of diarrhea.  No blood.  History of diverticulitis and states it feels similar.  She has an abdominal surgical history of  and cholecystectomy.  Also history of DM, hypertension, GERD.  Appears uncomfortable on exam.  Tachycardic on arrival.  Tenderness in the left lower abdomen.  No guarding.  Has candidal intertrigo under the pannus.  Plan labs, CT, pain control.      Prior to Admission Medications   Prescriptions Last Dose Informant Patient Reported? Taking?   ARIPiprazole (ABILIFY) 10 mg tablet   No No   Sig: Take 1 tablet (10 mg total) by mouth daily   HumaLOG KwikPen 100 units/mL injection pen  Self Yes No   Si Units   LORazepam (ATIVAN) 0.5 mg tablet   Yes No   Si.5 mg every 8 (eight) hours as needed for anxiety   acetaminophen (TYLENOL) 325 mg tablet   No No   Sig: Take 2 tablets (650 mg total) by mouth every 6 (six) hours as needed for mild pain   clotrimazole (LOTRIMIN) 1 % cream   No No   Sig: Apply topically 2 (two) times a day   desvenlafaxine succinate (PRISTIQ) 50 mg 24 hr tablet   No No   Sig: Take 1 tablet (50 mg total) by mouth daily Do not start before 2023.   dicyclomine (BENTYL) 10 mg capsule   No No   Sig: Take 2 capsules (20 mg total) by mouth every 6 (six) hours as needed (crampy diarrhea)   dulaglutide (Trulicity) 1.5 MG/0.5ML " injection   Yes No   Sig: Inject 1.5 mg under the skin Once a week   insulin glargine (LANTUS) 100 units/mL subcutaneous injection   No No   Sig: INJECT 40 UNITS UNDER THE SKIN EVERY MORNING DO NOT START BEFORE 2023.   insulin glargine (LANTUS) 100 units/mL subcutaneous injection   No No   Sig: INJECT 40 UNITS UNDER THE SKIN DAILY AT BEDTIME   lamoTRIgine (LaMICtal) 100 mg tablet   No No   Sig: Take 1 tablet (100 mg total) by mouth 2 (two) times a day   lisinopril (ZESTRIL) 10 mg tablet   No No   Sig: Take 2 tablets (20 mg total) by mouth daily   metFORMIN (GLUCOPHAGE) 500 mg tablet  Self No No   Sig: Take 1 tablet (500 mg total) by mouth 2 (two) times a day with meals   oxybutynin (DITROPAN) 5 mg tablet   No No   Sig: Take 1 tablet (5 mg total) by mouth 3 (three) times a day   phenazopyridine (PYRIDIUM) 100 mg tablet   No No   Sig: Take 1 tablet (100 mg total) by mouth 3 (three) times a day with meals   tamsulosin (FLOMAX) 0.4 mg   No No   Sig: Take 1 capsule (0.4 mg total) by mouth daily with dinner      Facility-Administered Medications: None       Past Medical History:   Diagnosis Date    Anemia     Anxiety     Candidal intertrigo     Depression     Diabetes mellitus (HCC)     GERD (gastroesophageal reflux disease)     Hyperlipidemia     Hypertension     Hyponatremia 2023    Irritable bowel syndrome     Morbid obesity with BMI of 60.0-69.9, adult (HCC)     Osteoarthritis     Seasonal allergies     Sleep difficulties     Suicide attempt ([facility])        Past Surgical History:   Procedure Laterality Date     SECTION      CHOLECYSTECTOMY      FL RETROGRADE PYELOGRAM  3/21/2024    FL RETROGRADE PYELOGRAM  2024    ND CYSTO/URETERO W/LITHOTRIPSY &INDWELL STENT INSRT Left 3/21/2024    Procedure: CYSTOSCOPY, RETROGRADE PYELOGRAM AND INSERTION STENT URETERAL;  Surgeon: Nabil Desir MD;  Location: [facility];  Service: Urology    ND CYSTO/URETERO W/LITHOTRIPSY &INDWELL STENT INSRT  Left 4/23/2024    Procedure: CYSTOSCOPY URETEROSCOPY WITH LITHOTRIPSY HOLMIUM LASER, RETROGRADE PYELOGRAM AND INSERTION STENT URETERAL;  Surgeon: Nabil Desir MD;  Location: [facility];  Service: Urology    WISDOM TOOTH EXTRACTION  2009       Family History   Problem Relation Age of Onset    Diabetes Mother     Hypertension Father      I have reviewed and agree with the history as documented.    E-Cigarette/Vaping    E-Cigarette Use Never User      E-Cigarette/Vaping Substances    Nicotine No     THC No     CBD No     Flavoring No     Other No     Unknown No      Social History     Tobacco Use    Smoking status: Never    Smokeless tobacco: Never   Vaping Use    Vaping status: Never Used   Substance Use Topics    Alcohol use: Not Currently     Comment: occassionaly     Drug use: No       Review of Systems   Constitutional:  Negative for activity change, appetite change, fatigue and fever.   Respiratory:  Negative for chest tightness and shortness of breath.    Cardiovascular:  Negative for chest pain.   Gastrointestinal:  Positive for abdominal pain, diarrhea and nausea. Negative for blood in stool and vomiting.   Musculoskeletal:  Negative for back pain and neck pain.   Skin:  Negative for color change and rash.   Neurological:  Negative for dizziness, weakness and headaches.   All other systems reviewed and are negative.      Physical Exam  Physical Exam  Vitals and nursing note reviewed.   Constitutional:       Appearance: She is well-developed.   HENT:      Head: Normocephalic and atraumatic.   Cardiovascular:      Rate and Rhythm: Regular rhythm. Tachycardia present.   Pulmonary:      Effort: Pulmonary effort is normal. No respiratory distress.   Abdominal:      General: Abdomen is protuberant.      Palpations: Abdomen is soft. There is no fluid wave.      Hernia: No hernia is present.      Comments: Tenderness in the left lower quadrant without guarding.   Skin:     General: Skin is warm and dry.       Comments: Candidal intertrigo under abdominal pannus.   Neurological:      General: No focal deficit present.      Mental Status: She is alert and oriented to person, place, and time.   Psychiatric:         Mood and Affect: Mood normal.         Behavior: Behavior normal.         Vital Signs  ED Triage Vitals [05/02/24 2014]   Temperature Pulse Respirations Blood Pressure SpO2   98.6 °F (37 °C) (!) 107 18 143/87 94 %      Temp Source Heart Rate Source Patient Position - Orthostatic VS BP Location FiO2 (%)   Oral Monitor Sitting Right arm --      Pain Score       7           Vitals:    05/02/24 2014 05/02/24 2251 05/03/24 0105   BP: 143/87 138/68 147/68   Pulse: (!) 107 101 97   Patient Position - Orthostatic VS: Sitting Lying Lying         Visual Acuity      ED Medications  Medications   nystatin (MYCOSTATIN) powder ( Topical Given 5/2/24 2249)   HYDROmorphone (DILAUDID) injection 0.5 mg (0.5 mg Intravenous Given 5/2/24 2245)   sodium chloride 0.9 % bolus 1,000 mL (1,000 mL Intravenous New Bag 5/2/24 2248)   iohexol (OMNIPAQUE) 350 MG/ML injection (MULTI-DOSE) 100 mL (100 mL Intravenous Given 5/2/24 2235)   ketorolac (TORADOL) injection 15 mg (15 mg Intravenous Given 5/3/24 0021)       Diagnostic Studies  Results Reviewed       Procedure Component Value Units Date/Time    Urine Microscopic [[id number]]  (Abnormal) Collected: 05/03/24 0114    Lab Status: Final result Specimen: Urine, Clean Catch Updated: 05/03/24 0147     RBC, UA 2-4 /hpf      WBC, UA None Seen /hpf      Epithelial Cells Occasional /hpf      Bacteria, UA Occasional /hpf      MUCUS THREADS Occasional    UA (URINE) with reflex to Scope [[id number]]  (Abnormal) Collected: 05/03/24 0114    Lab Status: Final result Specimen: Urine, Clean Catch Updated: 05/03/24 0147     Color, UA Yellow     Clarity, UA Clear     Specific Gravity, UA >=1.050     pH, UA 6.0     Leukocytes, UA Negative     Nitrite, UA Negative     Protein, UA 30 (1+) mg/dl      Glucose, UA  Negative mg/dl      Ketones, UA Negative mg/dl      Urobilinogen, UA <2.0 mg/dl      Bilirubin, UA Negative     Occult Blood, UA Trace    [place] draw [[id number]] Collected: 05/02/24 2023    Lab Status: Final result Specimen: Blood from Arm, Left Updated: 05/02/24 2202    Narrative:      The following orders were created for panel order [place] draw.  Procedure                               Abnormality         Status                     ---------                               -----------         ------                     Light Blue Top on hold[[id number]]                           Final result               Gold top on hold[[id number]]                                 Final result               Green / Black tube on hold[[id number]]                       Final result                 Please view results for these tests on the individual orders.    Comprehensive metabolic panel [[id number]]  (Abnormal) Collected: 05/02/24 2019    Lab Status: Final result Specimen: Blood from Arm, Left Updated: 05/02/24 2048     Sodium 137 mmol/L      Potassium 3.9 mmol/L      Chloride 101 mmol/L      CO2 25 mmol/L      ANION GAP 11 mmol/L      BUN 17 mg/dL      Creatinine 0.87 mg/dL      Glucose 240 mg/dL      Calcium 9.6 mg/dL      AST 23 U/L      ALT 42 U/L      Alkaline Phosphatase 68 U/L      Total Protein 8.0 g/dL      Albumin 4.3 g/dL      Total Bilirubin 0.59 mg/dL      eGFR 77 ml/min/1.73sq m     Narrative:      National Kid[employer] Disease Foundation guidelines for Chronic Kidney Disease (CKD):     Stage 1 with normal or high GFR (GFR > 90 mL/min/1.73 square meters)    Stage 2 Mild CKD (GFR = 60-89 mL/min/1.73 square meters)    Stage 3A Moderate CKD (GFR = 45-59 mL/min/1.73 square meters)    Stage 3B Moderate CKD (GFR = 30-44 mL/min/1.73 square meters)    Stage 4 Severe CKD (GFR = 15-29 mL/min/1.73 square meters)    Stage 5 End Stage CKD (GFR <15 mL/min/1.73 square meters)  Note: GFR calculation is accurate only with a steady state  creatinine    Lipase [[id number]]  (Normal) Collected: 05/02/24 2019    Lab Status: Final result Specimen: Blood from Arm, Left Updated: 05/02/24 2048     Lipase 62 u/L     CBC and differential [[id number]]  (Abnormal) Collected: 05/02/24 2019    Lab Status: Final result Specimen: Blood from Arm, Left Updated: 05/02/24 2032     WBC 10.92 Thousand/uL      RBC 5.00 Million/uL      Hemoglobin 15.1 g/dL      Hematocrit 45.9 %      MCV 92 fL      MCH 30.2 pg      MCHC 32.9 g/dL      RDW 13.0 %      MPV 9.7 fL      Platelets 348 Thousands/uL      nRBC 0 /100 WBCs      Segmented % 57 %      Immature Grans % 1 %      Lymphocytes % 32 %      Monocytes % 7 %      Eosinophils Relative 2 %      Basophils Relative 1 %      Absolute Neutrophils 6.30 Thousands/µL      Absolute Immature Grans 0.09 Thousand/uL      Absolute Lymphocytes 3.49 Thousands/µL      Absolute Monocytes 0.72 Thousand/µL      Eosinophils Absolute 0.26 Thousand/µL      Basophils Absolute 0.06 Thousands/µL                    CT abdomen pelvis with contrast   Final Result by Rahul Mckeon DO (05/02 2309)      No acute findings in the abdomen or pelvis.      Punctate nonobstructing bilateral renal calculi, unchanged.      Workstation performed: [id number]                    Procedures  Procedures         ED Course  ED Course as of 05/03/24 0154   Fri May 03, 2024   0009 Discussed CT results and labs with patient.  Still awaiting urine.  She still reports having pain in the area.   0149 No evidence of UTI.  Plan discharge home.  Suspect musculoskeletal etiology of her pain.                                             Medical Decision Making  49-year-old postmenopausal female presented for evaluation of left lower quadrant pain beginning last night remaining persistent throughout the day.  States pain is worse with movement.  Has had a few episodes of diarrhea.  No blood.  History of diverticulitis and states it feels similar.  She has an abdominal surgical history of   and cholecystectomy.  Also history of DM, hypertension, GERD.  Appears uncomfortable on exam.  Tachycardic on arrival.  Tenderness in the left lower abdomen.  No guarding.  Has candidal intertrigo under the pannus.    Lab work and CT essentially unremarkable.  Suspect musculoskeletal abdominal wall pain.  No UTI.  Advising using nystatin 3 times a day for candidal infection.    Amount and/or Complexity of Data Reviewed  Labs: ordered.  Radiology: ordered.    Risk  Prescription drug management.             Disposition  Final diagnoses:   Candidal intertrigo   Abdominal wall pain in left lower quadrant     Time reflects when diagnosis was documented in both MDM as applicable and the Disposition within this note       Time User Action Codes Description Comment    5/3/2024  1:50 AM Ameya Brian [B37.2] Candidal intertrigo     5/3/2024  1:50 AM Ameya Brian Add [R10.32] Abdominal wall pain in left lower quadrant           ED Disposition       ED Disposition   Discharge    Condition   Stable    Date/Time   Fri May 3, 2024  1:50 AM    Comment   Montse Smith discharge to home/self care.                   Follow-up Information    None         Patient's Medications   Discharge Prescriptions    NYSTATIN (MYCOSTATIN) POWDER    Apply topically 3 (three) times a day --- Continue use for a week after rash resolves       Start Date: 5/3/2024  End Date: --       Order Dose: --       Quantity: 60 g    Refills: 1       No discharge procedures on file.    PDMP Review         Value Time User    PDMP Reviewed  Yes 3/21/2024  2:27 AM Kapil Thomas MD            ED Provider  Electronically Signed by             Ameya Brian MD  24 0154

## 2024-06-19 ENCOUNTER — TELEPHONE (OUTPATIENT)
Dept: PSYCHIATRY | Facility: CLINIC | Age: 49
End: 2024-06-19

## 2024-07-29 ENCOUNTER — TELEPHONE (OUTPATIENT)
Age: 49
End: 2024-07-29

## 2024-07-29 NOTE — TELEPHONE ENCOUNTER
The writer attempted to contact the patient off of the [facility] wait list to offer a potential appt. The writer LVM for the patient to contact the intake department for assistance with scheduling.

## 2024-07-29 NOTE — TELEPHONE ENCOUNTER
"Behavioral Health Outpatient Intake Questions    Referred By   : PCP    Please advise interviewee that they need to answer all questions truthfully to allow for best care, and any misrepresentations of information may affect their ability to be seen at this clinic   => Was this discussed? Yes     If Minor Child (under age 18)    Who is/are the legal guardian(s) of the child?     Is there a custody agreement?      If \"YES\"- Custody orders must be obtained prior to scheduling the first appointment  In addition, Consent to Treatment must be signed by all legal guardians prior to scheduling the first appointment    If \"NO\"- Consent to Treatment must be signed by all legal guardians prior to scheduling the first appointment    Behavioral Health Outpatient Intake History -     Presenting Problem (in patient's own words): Patient states has major depressive disorder and generalized anxiety.     Are there any communication barriers for this patient?     No                                               If yes, please describe barriers:   If there is a unique situation, please refer to Yobany Rodriguez/Emmanuelle Rowland for final determination.    Are you taking any psychiatric medications? Yes     If \"YES\" -What are they  Abilify, Pristiq, Lamictal and Lorazepam    If \"YES\" -Who prescribes? PCP and Psychiatry in [facility]     Has the Patient previously received outpatient Talk Therapy or Medication Management from [facility]        If \"YES\"- When, Where and with Whom? [facility]        If \"NO\" -Has Patient received these services elsewhere?       If \"YES\" -When, Where, and with Whom?    Has the Patient abused alcohol or other substances in the last 6 months ? No       If \"YES\" -What substance, How much, How often?     If illegal substance: Refer to [facility] (for ILAN) or [facility].   If Alcohol in excess of 10 drinks per week:  Refer to [facility] (for ILAN) or [facility]    Legal History-     Is this " "treatment court ordered? No   If \"yes \"send to :  Talk Therapy : Send to Yobany Rowland for final determination   Med Management: Send to Dr Santiago for final determination     Has the Patient been convicted of a felony?  NO   If \"Yes\" send to -When, What?  Talk Therapy: Send to Yobany Rowland for final determination   Med Management: Send to Dr Santiago for final determination     ACCEPTED as a patient Yes  If \"Yes\" Appointment Date:     Referred Elsewhere?   If “Yes” - (Where? Ex: [facility], [facility], [facility], [facility], etc.)       Name of Insurance Co:[facility]  Insurance ID#[id number]   Insurance Phone #[phone]  If ins is primary or secondary?primary  If patient is a minor, parents information such as Name, D.O.B of guarantor.  "

## 2024-08-05 ENCOUNTER — TELEPHONE (OUTPATIENT)
Dept: PSYCHIATRY | Facility: CLINIC | Age: 49
End: 2024-08-05

## 2024-08-05 NOTE — TELEPHONE ENCOUNTER
One week follow up call for New Patient appointment with Laurel Becerra [[id number]] on 10/30  was made on 7/29. Writer informed patient of New Patient paperwork needing to be completed 5 days prior to the appointment. Writer confirmed paperwork has been sent via My Chart.

## 2024-08-11 ENCOUNTER — HOSPITAL ENCOUNTER (EMERGENCY)
Facility: HOSPITAL | Age: 49
Discharge: HOME | End: 2024-08-11
Attending: EMERGENCY MEDICINE
Payer: COMMERCIAL

## 2024-08-11 ENCOUNTER — APPOINTMENT (EMERGENCY)
Dept: RADIOLOGY | Facility: HOSPITAL | Age: 49
End: 2024-08-11
Payer: COMMERCIAL

## 2024-08-11 VITALS
OXYGEN SATURATION: 96 % | SYSTOLIC BLOOD PRESSURE: 142 MMHG | HEART RATE: 105 BPM | DIASTOLIC BLOOD PRESSURE: 83 MMHG | TEMPERATURE: 97.5 F | RESPIRATION RATE: 16 BRPM

## 2024-08-11 DIAGNOSIS — S63.501A WRIST SPRAIN, RIGHT, INITIAL ENCOUNTER: Primary | ICD-10-CM

## 2024-08-11 PROCEDURE — 99284 EMERGENCY DEPT VISIT MOD MDM: CPT

## 2024-08-11 PROCEDURE — 99283 EMERGENCY DEPT VISIT LOW MDM: CPT

## 2024-08-11 PROCEDURE — 73110 X-RAY EXAM OF WRIST: CPT

## 2024-08-11 PROCEDURE — 96372 THER/PROPH/DIAG INJ SC/IM: CPT

## 2024-08-11 RX ORDER — KETOROLAC TROMETHAMINE 30 MG/ML
15 INJECTION, SOLUTION INTRAMUSCULAR; INTRAVENOUS ONCE
Status: COMPLETED | OUTPATIENT
Start: 2024-08-11 | End: 2024-08-11

## 2024-08-11 RX ADMIN — KETOROLAC TROMETHAMINE 15 MG: 30 INJECTION, SOLUTION INTRAMUSCULAR; INTRAVENOUS at 19:38

## 2024-08-11 NOTE — DISCHARGE INSTRUCTIONS
Can alternate tylenol and motrin for pain. Can also apply ice to wrist for additional pain relief. Follow up with PCP if symptoms do not improve in the next 3-4 days. Return to the ER if you develop numbness and tingling.

## 2024-08-11 NOTE — ED PROVIDER NOTES
History  Chief Complaint   Patient presents with    Wrist Pain     Patient states she slept on her right wrist wrong and now has been in excruciating pain ever since last night. Denies distal numbness or tingling.      Montse Smith is a 49 y.o. female with a PMH of anemia, anxiety, depression, diabetes, GERD, hyperlipidemia, HTN, IBS, osteoarthritis, presenting to the ED on 2024 with wrist pain. Patient endorses that she woke up this morning with right wrist pain that she describes as excruciating. She states that she slept with her right hand curled up beneath her under her pillow while she slept on top on the pillow on her stomach. Patient is able to move her wrist without difficulty but does endorses pain over the radial aspect of the right wrist. Patient is right handed. Denies trauma. Patient denies numbness, tingling, weakness or trauma to the wrist.         Wrist Pain  Associated symptoms: no myalgias and no rash        Prior to Admission Medications   Prescriptions Last Dose Informant Patient Reported? Taking?   ARIPiprazole (ABILIFY) 10 mg tablet   No No   Sig: Take 1 tablet (10 mg total) by mouth daily   HumaLOG KwikPen 100 units/mL injection pen  Self Yes No   Si Units   LORazepam (ATIVAN) 0.5 mg tablet   Yes No   Si.5 mg every 8 (eight) hours as needed for anxiety   acetaminophen (TYLENOL) 325 mg tablet   No No   Sig: Take 2 tablets (650 mg total) by mouth every 6 (six) hours as needed for mild pain   clotrimazole (LOTRIMIN) 1 % cream   No No   Sig: Apply topically 2 (two) times a day   desvenlafaxine succinate (PRISTIQ) 50 mg 24 hr tablet   No No   Sig: Take 1 tablet (50 mg total) by mouth daily Do not start before 2023.   dicyclomine (BENTYL) 10 mg capsule   No No   Sig: Take 2 capsules (20 mg total) by mouth every 6 (six) hours as needed (crampy diarrhea)   dulaglutide (Trulicity) 1.5 MG/0.5ML injection   Yes No   Sig: Inject 1.5 mg under the skin Once a week   insulin  glargine (LANTUS) 100 units/mL subcutaneous injection   No No   Sig: INJECT 40 UNITS UNDER THE SKIN EVERY MORNING DO NOT START BEFORE 2023.   insulin glargine (LANTUS) 100 units/mL subcutaneous injection   No No   Sig: INJECT 40 UNITS UNDER THE SKIN DAILY AT BEDTIME   lamoTRIgine (LaMICtal) 100 mg tablet   No No   Sig: Take 1 tablet (100 mg total) by mouth 2 (two) times a day   lisinopril (ZESTRIL) 10 mg tablet   No No   Sig: Take 2 tablets (20 mg total) by mouth daily   metFORMIN (GLUCOPHAGE) 500 mg tablet  Self No No   Sig: Take 1 tablet (500 mg total) by mouth 2 (two) times a day with meals   nystatin (MYCOSTATIN) powder   No No   Sig: Apply topically 3 (three) times a day --- Continue use for a week after rash resolves   oxybutynin (DITROPAN) 5 mg tablet   No No   Sig: Take 1 tablet (5 mg total) by mouth 3 (three) times a day   phenazopyridine (PYRIDIUM) 100 mg tablet   No No   Sig: Take 1 tablet (100 mg total) by mouth 3 (three) times a day with meals   tamsulosin (FLOMAX) 0.4 mg   No No   Sig: Take 1 capsule (0.4 mg total) by mouth daily with dinner      Facility-Administered Medications: None       Past Medical History:   Diagnosis Date    Anemia     Anxiety     Candidal intertrigo     Depression     Diabetes mellitus (HCC)     GERD (gastroesophageal reflux disease)     Hyperlipidemia     Hypertension     Hyponatremia 2023    Irritable bowel syndrome     Morbid obesity with BMI of 60.0-69.9, adult (HCC)     Osteoarthritis     Seasonal allergies     Sleep difficulties     Suicide attempt ([facility])        Past Surgical History:   Procedure Laterality Date     SECTION  1992    CHOLECYSTECTOMY      FL RETROGRADE PYELOGRAM  3/21/2024    FL RETROGRADE PYELOGRAM  2024    ND CYSTO/URETERO W/LITHOTRIPSY &INDWELL STENT INSRT Left 3/21/2024    Procedure: CYSTOSCOPY, RETROGRADE PYELOGRAM AND INSERTION STENT URETERAL;  Surgeon: Nabil Desir MD;  Location: [facility];  Service: Urology     MN CYSTO/URETERO W/LITHOTRIPSY &INDWELL STENT INSRT Left 4/23/2024    Procedure: CYSTOSCOPY URETEROSCOPY WITH LITHOTRIPSY HOLMIUM LASER, RETROGRADE PYELOGRAM AND INSERTION STENT URETERAL;  Surgeon: Nabil Desir MD;  Location: [facility];  Service: Urology    WISDOM TOOTH EXTRACTION  2009       Family History   Problem Relation Age of Onset    Diabetes Mother     Hypertension Father      I have reviewed and agree with the history as documented.    E-Cigarette/Vaping    E-Cigarette Use Never User      E-Cigarette/Vaping Substances    Nicotine No     THC No     CBD No     Flavoring No     Other No     Unknown No      Social History     Tobacco Use    Smoking status: Never    Smokeless tobacco: Never   Vaping Use    Vaping status: Never Used   Substance Use Topics    Alcohol use: Not Currently     Comment: occassionaly     Drug use: No       Review of Systems   Musculoskeletal:  Positive for arthralgias. Negative for joint swelling and myalgias.   Skin:  Negative for color change, pallor, rash and wound.   Neurological:  Negative for tremors, weakness and numbness.   All other systems reviewed and are negative.      Physical Exam  Physical Exam  Vitals and nursing note reviewed.   Constitutional:       General: She is not in acute distress.     Appearance: Normal appearance. She is not ill-appearing, toxic-appearing or diaphoretic.   HENT:      Head: Normocephalic and atraumatic.      Right Ear: External ear normal.      Left Ear: External ear normal.      Nose: Nose normal. No congestion or rhinorrhea.      Mouth/Throat:      Mouth: Mucous membranes are moist.   Eyes:      General: No scleral icterus.        Right eye: No discharge.         Left eye: No discharge.      Extraocular Movements: Extraocular movements intact.      Conjunctiva/sclera: Conjunctivae normal.   Cardiovascular:      Rate and Rhythm: Normal rate and regular rhythm.      Pulses: Normal pulses.      Heart sounds: Normal heart sounds. No murmur  heard.     No friction rub. No gallop.      Comments: Radial pulses 2+ bilaterally  Pulmonary:      Effort: Pulmonary effort is normal. No respiratory distress.      Breath sounds: Normal breath sounds. No wheezing, rhonchi or rales.   Abdominal:      General: Abdomen is flat.   Musculoskeletal:         General: Tenderness present. No swelling, deformity or signs of injury. Normal range of motion.      Right elbow: Normal.      Left elbow: Normal.      Right forearm: Normal.      Left forearm: Normal.      Right wrist: Tenderness present. No swelling, deformity, effusion, lacerations, bony tenderness or snuff box tenderness. Normal range of motion. Normal pulse.      Left wrist: Normal.        Arms:       Cervical back: Normal range of motion and neck supple. No rigidity.   Skin:     General: Skin is warm.      Capillary Refill: Capillary refill takes less than 2 seconds.      Coloration: Skin is not pale.      Findings: No bruising, erythema or rash.   Neurological:      General: No focal deficit present.      Mental Status: She is alert and oriented to person, place, and time. Mental status is at baseline.      Sensory: No sensory deficit.      Motor: No weakness.      Gait: Gait normal.   Psychiatric:         Mood and Affect: Mood normal.         Behavior: Behavior normal.         Vital Signs  ED Triage Vitals   Temperature Pulse Respirations Blood Pressure SpO2   08/11/24 1835 08/11/24 1835 08/11/24 1835 08/11/24 1835 08/11/24 1835   97.5 °F (36.4 °C) (!) 108 18 138/89 96 %      Temp Source Heart Rate Source Patient Position - Orthostatic VS BP Location FiO2 (%)   08/11/24 1835 08/11/24 1835 08/11/24 1835 08/11/24 1835 --   Oral Monitor Sitting Right arm       Pain Score       08/11/24 1927       5           Vitals:    08/11/24 1835 08/11/24 1925   BP: 138/89 142/83   Pulse: (!) 108 105   Patient Position - Orthostatic VS: Sitting Lying         Visual Acuity      ED Medications  Medications   ketorolac (TORADOL)  injection 15 mg (15 mg Intramuscular Given 8/11/24 1938)       Diagnostic Studies  Results Reviewed       None                   XR wrist 3+ vw right   ED Interpretation by Cassandra Cade PA-C (08/11 1957)   No fractures or dislocations per my read                 Procedures  Procedures         ED Course                                 SBIRT 20yo+      Flowsheet Row Most Recent Value   Initial Alcohol Screen: US AUDIT-C     1. How often do you have a drink containing alcohol? 0 Filed at: 08/11/2024 1925   2. How many drinks containing alcohol do you have on a typical day you are drinking?  0 Filed at: 08/11/2024 1925   3a. Male UNDER 65: How often do you have five or more drinks on one occasion? 0 Filed at: 08/11/2024 1925   3b. FEMALE Any Age, or MALE 65+: How often do you have 4 or more drinks on one occassion? 0 Filed at: 08/11/2024 1925   Audit-C Score 0 Filed at: 08/11/2024 1925   JUAN: How many times in the past year have you...    Used an illegal drug or used a prescription medication for non-medical reasons? Never Filed at: 08/11/2024 1925                      Medical Decision Making  Patient seen and examined noted to have for sprain of the right wrist.  Patient coming in after sleeping with her right wrist bent underneath her pillow.  She states she woke up in excruciating pain.  Patient has tried taking Tylenol at home without much relief.  Coming in to ensure that she did not fracture or dislocated anything. On physical exam patient has full range of motion.  X-ray revealed no fractures or dislocations.  Patient's pain was significantly improved with Toradol.  Supportive care measures were discussed at home and patient was encouraged to follow-up with her primary care.  Strict return precautions were discussed which patient expressed her understanding of.    Differential diagnosis includes but is not limited to fracture, dislocation, strain, sprain, contusion.       Imaging revealed:   Mentioned  "above    Patient appears well, is nontoxic appearing, she expresses understanding and agrees with plan of care at this time.  In light of this patient would benefit from outpatient management.    Patient at time of discharge well-appearing in no acute distress, all questions answered. Patient agreeable to plan.  Patient's vitals, lab/imaging results, diagnosis, and treatment plan were discussed with the patient. All new/changed medications were discussed with patient, specifically, route of administration, how often and when to take, and where they can be picked up. Strict return precautions as well as close follow up with PCP was discussed with the patient and the patient was agreeable to my recommendations. Patient verbally acknowledged understanding of the above communications. Strict return precautions were provided. All labs reviewed and utilized in the medical decision making process (if labs were ordered). Portions of the record may have been created with voice recognition software.  Occasional wrong word or \"sound a like\" substitutions may have occurred due to the inherent limitations of voice recognition software.  Read the chart carefully and recognize, using context, where substitutions have occurred.      Amount and/or Complexity of Data Reviewed  Radiology: ordered and independent interpretation performed.    Risk  Prescription drug management.                 Disposition  Final diagnoses:   Wrist sprain, right, initial encounter     Time reflects when diagnosis was documented in both MDM as applicable and the Disposition within this note       Time User Action Codes Description Comment    8/11/2024  7:58 PM Cassandra Cade Add [S63.501A] Wrist sprain, right, initial encounter           ED Disposition       ED Disposition   Discharge    Condition   Stable    Date/Time   Sun Aug 11, 2024 1958    Comment   Montse Smith discharge to home/self care.                   Follow-up Information       Follow up " With Specialties Details Why Contact Info Additional Information    Florecita Dietrich, GLORIA Nurse Practitioner   [address]  [place] [address] [phone]       [facility] Emergency Department Emergency Medicine  If symptoms worsen, As needed [address] [facility] [facility] [address]  [phone] [facility] Emergency Department, [address] [place], [place], [address]            Discharge Medication List as of 8/11/2024  7:59 PM        CONTINUE these medications which have NOT CHANGED    Details   acetaminophen (TYLENOL) 325 mg tablet Take 2 tablets (650 mg total) by mouth every 6 (six) hours as needed for mild pain, Starting Tue 2/21/2023, No Print      ARIPiprazole (ABILIFY) 10 mg tablet Take 1 tablet (10 mg total) by mouth daily, Starting Tue 3/26/2024, Normal      clotrimazole (LOTRIMIN) 1 % cream Apply topically 2 (two) times a day, Starting Mon 3/25/2024, Normal      desvenlafaxine succinate (PRISTIQ) 50 mg 24 hr tablet Take 1 tablet (50 mg total) by mouth daily Do not start before February 22, 2023., Starting Wed 2/22/2023, No Print      dicyclomine (BENTYL) 10 mg capsule Take 2 capsules (20 mg total) by mouth every 6 (six) hours as needed (crampy diarrhea), Starting Tue 2/21/2023, Normal      dulaglutide (Trulicity) 1.5 MG/0.5ML injection Inject 1.5 mg under the skin Once a week, Starting Thu 1/18/2024, Historical Med      HumaLOG KwikPen 100 units/mL injection pen 18 Units, Starting Mon 9/13/2021, Historical Med      !! insulin glargine (LANTUS) 100 units/mL subcutaneous injection INJECT 40 UNITS UNDER THE SKIN EVERY MORNING DO NOT START BEFORE NOVEMBER 30, 2023., Starting Wed 12/20/2023, Normal      !! insulin glargine (LANTUS) 100 units/mL subcutaneous injection INJECT 40 UNITS UNDER THE SKIN DAILY AT BEDTIME, Starting Tue 1/23/2024, Normal      lamoTRIgine (LaMICtal) 100 mg tablet Take 1 tablet (100 mg total) by mouth 2 (two)  times a day, Starting Tue 2/21/2023, No Print      lisinopril (ZESTRIL) 10 mg tablet Take 2 tablets (20 mg total) by mouth daily, Starting Sun 12/4/2022, No Print      LORazepam (ATIVAN) 0.5 mg tablet 0.5 mg every 8 (eight) hours as needed for anxiety, Starting Wed 10/6/2021, Historical Med      metFORMIN (GLUCOPHAGE) 500 mg tablet Take 1 tablet (500 mg total) by mouth 2 (two) times a day with meals, Starting Mon 8/31/2020, Until Tue 4/23/2024, Normal      nystatin (MYCOSTATIN) powder Apply topically 3 (three) times a day --- Continue use for a week after rash resolves, Starting Fri 5/3/2024, Normal      oxybutynin (DITROPAN) 5 mg tablet Take 1 tablet (5 mg total) by mouth 3 (three) times a day, Starting Fri 4/12/2024, Normal      phenazopyridine (PYRIDIUM) 100 mg tablet Take 1 tablet (100 mg total) by mouth 3 (three) times a day with meals, Starting Fri 4/12/2024, Normal      tamsulosin (FLOMAX) 0.4 mg Take 1 capsule (0.4 mg total) by mouth daily with dinner, Starting Sat 4/13/2024, Normal       !! - Potential duplicate medications found. Please discuss with provider.          No discharge procedures on file.    PDMP Review         Value Time User    PDMP Reviewed  Yes 3/21/2024  2:27 AM Kapil Thomas MD            ED Provider  Electronically Signed by             Cassandra Cade PA-C  08/11/24 2019

## 2024-08-25 ENCOUNTER — HOSPITAL ENCOUNTER (EMERGENCY)
Facility: HOSPITAL | Age: 49
Discharge: HOME | End: 2024-08-25
Attending: EMERGENCY MEDICINE
Payer: COMMERCIAL

## 2024-08-25 VITALS
SYSTOLIC BLOOD PRESSURE: 143 MMHG | DIASTOLIC BLOOD PRESSURE: 70 MMHG | HEART RATE: 106 BPM | TEMPERATURE: 98.1 F | RESPIRATION RATE: 20 BRPM | OXYGEN SATURATION: 93 %

## 2024-08-25 DIAGNOSIS — R11.2 NAUSEA & VOMITING: ICD-10-CM

## 2024-08-25 DIAGNOSIS — R73.9 HYPERGLYCEMIA: Primary | ICD-10-CM

## 2024-08-25 LAB
ALBUMIN SERPL BCG-MCNC: 4.2 G/DL (ref 3.5–5)
ALP SERPL-CCNC: 83 U/L (ref 34–104)
ALT SERPL W P-5'-P-CCNC: 59 U/L (ref 7–52)
ANION GAP SERPL CALCULATED.3IONS-SCNC: 11 MMOL/L (ref 4–13)
AST SERPL W P-5'-P-CCNC: 35 U/L (ref 13–39)
ATRIAL RATE: 110 BPM
BASE EX.OXY STD BLDV CALC-SCNC: 90.2 % (ref 60–80)
BASE EXCESS BLDV CALC-SCNC: -2.4 MMOL/L
BASOPHILS # BLD AUTO: 0.06 THOUSANDS/ÂΜL (ref 0–0.1)
BASOPHILS NFR BLD AUTO: 1 % (ref 0–1)
BILIRUB SERPL-MCNC: 0.51 MG/DL (ref 0.2–1)
BUN SERPL-MCNC: 19 MG/DL (ref 5–25)
CALCIUM SERPL-MCNC: 10 MG/DL (ref 8.4–10.2)
CHLORIDE SERPL-SCNC: 97 MMOL/L (ref 96–108)
CO2 SERPL-SCNC: 23 MMOL/L (ref 21–32)
CREAT SERPL-MCNC: 0.86 MG/DL (ref 0.6–1.3)
EOSINOPHIL # BLD AUTO: 0.21 THOUSAND/ÂΜL (ref 0–0.61)
EOSINOPHIL NFR BLD AUTO: 2 % (ref 0–6)
ERYTHROCYTE [DISTWIDTH] IN BLOOD BY AUTOMATED COUNT: 13.2 % (ref 11.6–15.1)
GFR SERPL CREATININE-BSD FRML MDRD: 78 ML/MIN/1.73SQ M
GLUCOSE SERPL-MCNC: 324 MG/DL (ref 65–140)
GLUCOSE SERPL-MCNC: 365 MG/DL (ref 65–140)
GLUCOSE SERPL-MCNC: 375 MG/DL (ref 65–140)
HCO3 BLDV-SCNC: 22.7 MMOL/L (ref 24–30)
HCT VFR BLD AUTO: 47 % (ref 34.8–46.1)
HGB BLD-MCNC: 15.4 G/DL (ref 11.5–15.4)
IMM GRANULOCYTES # BLD AUTO: 0.08 THOUSAND/UL (ref 0–0.2)
IMM GRANULOCYTES NFR BLD AUTO: 1 % (ref 0–2)
LYMPHOCYTES # BLD AUTO: 2.82 THOUSANDS/ÂΜL (ref 0.6–4.47)
LYMPHOCYTES NFR BLD AUTO: 28 % (ref 14–44)
MCH RBC QN AUTO: 30.3 PG (ref 26.8–34.3)
MCHC RBC AUTO-ENTMCNC: 32.8 G/DL (ref 31.4–37.4)
MCV RBC AUTO: 92 FL (ref 82–98)
MONOCYTES # BLD AUTO: 0.64 THOUSAND/ÂΜL (ref 0.17–1.22)
MONOCYTES NFR BLD AUTO: 6 % (ref 4–12)
NEUTROPHILS # BLD AUTO: 6.14 THOUSANDS/ÂΜL (ref 1.85–7.62)
NEUTS SEG NFR BLD AUTO: 62 % (ref 43–75)
NRBC BLD AUTO-RTO: 0 /100 WBCS
O2 CT BLDV-SCNC: 20.5 ML/DL
P AXIS: 65 DEGREES
PCO2 BLDV: 40.4 MM HG (ref 42–50)
PH BLDV: 7.37 [PH] (ref 7.3–7.4)
PLATELET # BLD AUTO: 341 THOUSANDS/UL (ref 149–390)
PMV BLD AUTO: 10 FL (ref 8.9–12.7)
PO2 BLDV: 64.2 MM HG (ref 35–45)
POTASSIUM SERPL-SCNC: 4.3 MMOL/L (ref 3.5–5.3)
PR INTERVAL: 146 MS
PROT SERPL-MCNC: 7.8 G/DL (ref 6.4–8.4)
QRS AXIS: 46 DEGREES
QRSD INTERVAL: 80 MS
QT INTERVAL: 334 MS
QTC INTERVAL: 452 MS
RBC # BLD AUTO: 5.09 MILLION/UL (ref 3.81–5.12)
SODIUM SERPL-SCNC: 131 MMOL/L (ref 135–147)
T WAVE AXIS: 63 DEGREES
VENTRICULAR RATE: 110 BPM
WBC # BLD AUTO: 9.95 THOUSAND/UL (ref 4.31–10.16)

## 2024-08-25 PROCEDURE — 96372 THER/PROPH/DIAG INJ SC/IM: CPT

## 2024-08-25 PROCEDURE — 93010 ELECTROCARDIOGRAM REPORT: CPT | Performed by: INTERNAL MEDICINE

## 2024-08-25 PROCEDURE — 82805 BLOOD GASES W/O2 SATURATION: CPT

## 2024-08-25 PROCEDURE — 99285 EMERGENCY DEPT VISIT HI MDM: CPT

## 2024-08-25 PROCEDURE — 85025 COMPLETE CBC W/AUTO DIFF WBC: CPT

## 2024-08-25 PROCEDURE — 96361 HYDRATE IV INFUSION ADD-ON: CPT

## 2024-08-25 PROCEDURE — 93005 ELECTROCARDIOGRAM TRACING: CPT

## 2024-08-25 PROCEDURE — 96374 THER/PROPH/DIAG INJ IV PUSH: CPT

## 2024-08-25 PROCEDURE — 36415 COLL VENOUS BLD VENIPUNCTURE: CPT

## 2024-08-25 PROCEDURE — 99285 EMERGENCY DEPT VISIT HI MDM: CPT | Performed by: EMERGENCY MEDICINE

## 2024-08-25 PROCEDURE — 82948 REAGENT STRIP/BLOOD GLUCOSE: CPT

## 2024-08-25 PROCEDURE — 80053 COMPREHEN METABOLIC PANEL: CPT

## 2024-08-25 RX ORDER — ACETAMINOPHEN 325 MG/1
650 TABLET ORAL ONCE
Status: COMPLETED | OUTPATIENT
Start: 2024-08-25 | End: 2024-08-25

## 2024-08-25 RX ORDER — ONDANSETRON 4 MG/1
4 TABLET, ORALLY DISINTEGRATING ORAL EVERY 6 HOURS PRN
Qty: 20 TABLET | Refills: 0 | Status: SHIPPED | OUTPATIENT
Start: 2024-08-25

## 2024-08-25 RX ORDER — ONDANSETRON 2 MG/ML
4 INJECTION INTRAMUSCULAR; INTRAVENOUS ONCE
Status: COMPLETED | OUTPATIENT
Start: 2024-08-25 | End: 2024-08-25

## 2024-08-25 RX ORDER — ONDANSETRON 4 MG/1
4 TABLET, ORALLY DISINTEGRATING ORAL EVERY 6 HOURS PRN
Qty: 20 TABLET | Refills: 0 | Status: SHIPPED | OUTPATIENT
Start: 2024-08-25 | End: 2024-08-25

## 2024-08-25 RX ADMIN — ACETAMINOPHEN 650 MG: 325 TABLET, FILM COATED ORAL at 03:43

## 2024-08-25 RX ADMIN — ONDANSETRON 4 MG: 2 INJECTION INTRAMUSCULAR; INTRAVENOUS at 02:06

## 2024-08-25 RX ADMIN — INSULIN HUMAN 10 UNITS: 100 INJECTION, SOLUTION PARENTERAL at 03:43

## 2024-08-25 RX ADMIN — SODIUM CHLORIDE 1000 ML: 0.9 INJECTION, SOLUTION INTRAVENOUS at 03:43

## 2024-08-25 NOTE — ED ATTENDING ATTESTATION
8/25/2024  I, Bryce Chavez MD, saw and evaluated the patient. I have discussed the patient with the resident/non-physician practitioner and agree with the resident's/non-physician practitioner's findings, Plan of Care, and MDM as documented in the resident's/non-physician practitioner's note, except where noted. All available labs and Radiology studies were reviewed.  I was present for key portions of any procedure(s) performed by the resident/non-physician practitioner and I was immediately available to provide assistance.       At this point I agree with the current assessment done in the Emergency Department.  I have conducted an independent evaluation of this patient a history and physical is as follows: Hyperglycemia, no DKA, improved with ED management, no infectious symptoms.  Likely due to dietary indiscretion, patient drank soda and ate a pretzel to die at the movies.  She has appropriate medications at home.  She is clinically improving and is stable for discharge home.    Results Reviewed       Procedure Component Value Units Date/Time    Fingerstick Glucose (POCT) [[id number]]  (Abnormal) Collected: 08/25/24 0438    Lab Status: Final result Specimen: Blood Updated: 08/25/24 0439     POC Glucose 324 mg/dl     Comprehensive metabolic panel [[id number]]  (Abnormal) Collected: 08/25/24 0206    Lab Status: Final result Specimen: Blood from Arm, Left Updated: 08/25/24 0232     Sodium 131 mmol/L      Potassium 4.3 mmol/L      Chloride 97 mmol/L      CO2 23 mmol/L      ANION GAP 11 mmol/L      BUN 19 mg/dL      Creatinine 0.86 mg/dL      Glucose 375 mg/dL      Calcium 10.0 mg/dL      AST 35 U/L      ALT 59 U/L      Alkaline Phosphatase 83 U/L      Total Protein 7.8 g/dL      Albumin 4.2 g/dL      Total Bilirubin 0.51 mg/dL      eGFR 78 ml/min/1.73sq m     Narrative:      [employer] guidelines for Chronic Kidney Disease (CKD):     Stage 1 with normal or high GFR (GFR > 90  mL/min/1.73 square meters)    Stage 2 Mild CKD (GFR = 60-89 mL/min/1.73 square meters)    Stage 3A Moderate CKD (GFR = 45-59 mL/min/1.73 square meters)    Stage 3B Moderate CKD (GFR = 30-44 mL/min/1.73 square meters)    Stage 4 Severe CKD (GFR = 15-29 mL/min/1.73 square meters)    Stage 5 End Stage CKD (GFR <15 mL/min/1.73 square meters)  Note: GFR calculation is accurate only with a steady state creatinine    CBC and differential [[id number]]  (Abnormal) Collected: 08/25/24 0206    Lab Status: Final result Specimen: Blood from Arm, Left Updated: 08/25/24 0212     WBC 9.95 Thousand/uL      RBC 5.09 Million/uL      Hemoglobin 15.4 g/dL      Hematocrit 47.0 %      MCV 92 fL      MCH 30.3 pg      MCHC 32.8 g/dL      RDW 13.2 %      MPV 10.0 fL      Platelets 341 Thousands/uL      nRBC 0 /100 WBCs      Segmented % 62 %      Immature Grans % 1 %      Lymphocytes % 28 %      Monocytes % 6 %      Eosinophils Relative 2 %      Basophils Relative 1 %      Absolute Neutrophils 6.14 Thousands/µL      Absolute Immature Grans 0.08 Thousand/uL      Absolute Lymphocytes 2.82 Thousands/µL      Absolute Monocytes 0.64 Thousand/µL      Eosinophils Absolute 0.21 Thousand/µL      Basophils Absolute 0.06 Thousands/µL     Blood gas, venous [[id number]]  (Abnormal) Collected: 08/25/24 0206    Lab Status: Final result Specimen: Blood from Arm, Left Updated: 08/25/24 0211     pH, Gavin 7.368     pCO2, Gavin 40.4 mm Hg      pO2, Gavin 64.2 mm Hg      HCO3, Gavin 22.7 mmol/L      Base Excess, Gavin -2.4 mmol/L      O2 Content, Gavin 20.5 ml/dL      O2 HGB, VENOUS 90.2 %     UA w Reflex to Microscopic w Reflex to Culture [[id number]]     Lab Status: No result Specimen: Urine     Fingerstick Glucose (POCT) [[id number]]  (Abnormal) Collected: 08/25/24 0130    Lab Status: Final result Specimen: Blood Updated: 08/25/24 0131     POC Glucose 365 mg/dl               ED Course         Critical Care Time  Procedures

## 2024-08-25 NOTE — ED PROVIDER NOTES
History  Chief Complaint   Patient presents with    Hyperglycemia - Symptomatic     Patient reports hyperglycemia. States sugar was 397 at home. States she is nauseous, fatigued, HA. Rates pain 6/10. Denies vision changes.      49-year-old female with significant PMH including DM, HTN, and HLD who presents to the emergency department for elevated blood sugar.  Patient states that tonight she was with her  at the movies when she started to feel nauseous was notified by her diabetic sensor and that her blood glucose was 390.  Patient states that her blood sugars been getting worse over the past few days as well as some nausea.  She states that while in the parking lot upon arrival to the emergency department, patient had 1 episode of vomiting.  She also endorses headache and fatigue.  Patient denies having a sliding scale but has been taking her medications as prescribed however, missed her metformin today.  No other acute concerns at this time. Denies chest pain, SOB, cough, abdominal pain, diarrhea, fever, chills, dizziness, lightheadedness, dysuria, hematuria, hematochezia, or melena.               Prior to Admission Medications   Prescriptions Last Dose Informant Patient Reported? Taking?   ARIPiprazole (ABILIFY) 10 mg tablet   No No   Sig: Take 1 tablet (10 mg total) by mouth daily   HumaLOG KwikPen 100 units/mL injection pen  Self Yes No   Si Units   LORazepam (ATIVAN) 0.5 mg tablet   Yes No   Si.5 mg every 8 (eight) hours as needed for anxiety   acetaminophen (TYLENOL) 325 mg tablet   No No   Sig: Take 2 tablets (650 mg total) by mouth every 6 (six) hours as needed for mild pain   clotrimazole (LOTRIMIN) 1 % cream   No No   Sig: Apply topically 2 (two) times a day   desvenlafaxine succinate (PRISTIQ) 50 mg 24 hr tablet   No No   Sig: Take 1 tablet (50 mg total) by mouth daily Do not start before 2023.   dicyclomine (BENTYL) 10 mg capsule   No No   Sig: Take 2 capsules (20 mg total)  by mouth every 6 (six) hours as needed (crampy diarrhea)   dulaglutide (Trulicity) 1.5 MG/0.5ML injection   Yes No   Sig: Inject 1.5 mg under the skin Once a week   insulin glargine (LANTUS) 100 units/mL subcutaneous injection   No No   Sig: INJECT 40 UNITS UNDER THE SKIN EVERY MORNING DO NOT START BEFORE 2023.   insulin glargine (LANTUS) 100 units/mL subcutaneous injection   No No   Sig: INJECT 40 UNITS UNDER THE SKIN DAILY AT BEDTIME   lamoTRIgine (LaMICtal) 100 mg tablet   No No   Sig: Take 1 tablet (100 mg total) by mouth 2 (two) times a day   lisinopril (ZESTRIL) 10 mg tablet   No No   Sig: Take 2 tablets (20 mg total) by mouth daily   metFORMIN (GLUCOPHAGE) 500 mg tablet  Self No No   Sig: Take 1 tablet (500 mg total) by mouth 2 (two) times a day with meals   nystatin (MYCOSTATIN) powder   No No   Sig: Apply topically 3 (three) times a day --- Continue use for a week after rash resolves   oxybutynin (DITROPAN) 5 mg tablet   No No   Sig: Take 1 tablet (5 mg total) by mouth 3 (three) times a day   phenazopyridine (PYRIDIUM) 100 mg tablet   No No   Sig: Take 1 tablet (100 mg total) by mouth 3 (three) times a day with meals   tamsulosin (FLOMAX) 0.4 mg   No No   Sig: Take 1 capsule (0.4 mg total) by mouth daily with dinner      Facility-Administered Medications: None       Past Medical History:   Diagnosis Date    Anemia     Anxiety     Candidal intertrigo     Depression     Diabetes mellitus (HCC)     GERD (gastroesophageal reflux disease)     Hyperlipidemia     Hypertension     Hyponatremia 2023    Irritable bowel syndrome     Morbid obesity with BMI of 60.0-69.9, adult (HCC)     Osteoarthritis     Seasonal allergies     Sleep difficulties     Suicide attempt ([facility])        Past Surgical History:   Procedure Laterality Date     SECTION  1992    CHOLECYSTECTOMY      FL RETROGRADE PYELOGRAM  3/21/2024    FL RETROGRADE PYELOGRAM  2024    [place] CYSTO/URETERO W/LITHOTRIPSY &INDWELL  STENT INSRT Left 3/21/2024    Procedure: CYSTOSCOPY, RETROGRADE PYELOGRAM AND INSERTION STENT URETERAL;  Surgeon: Nabil Desir MD;  Location: [facility];  Service: Urology    [place] CYSTO/URETERO W/LITHOTRIPSY &INDWELL STENT INSRT Left 4/23/2024    Procedure: CYSTOSCOPY URETEROSCOPY WITH LITHOTRIPSY HOLMIUM LASER, RETROGRADE PYELOGRAM AND INSERTION STENT URETERAL;  Surgeon: Nabil Desir MD;  Location: [facility];  Service: Urology    WISDOM TOOTH EXTRACTION  2009       Family History   Problem Relation Age of Onset    Diabetes Mother     Hypertension Father      I have reviewed and agree with the history as documented.    E-Cigarette/Vaping    E-Cigarette Use Never User      E-Cigarette/Vaping Substances    Nicotine No     THC No     CBD No     Flavoring No     Other No     Unknown No      Social History     Tobacco Use    Smoking status: Never    Smokeless tobacco: Never   Vaping Use    Vaping status: Never Used   Substance Use Topics    Alcohol use: Not Currently     Comment: occassionaly     Drug use: No        Review of Systems   Constitutional:  Negative for appetite change, chills, diaphoresis, fatigue and fever.   HENT:  Negative for congestion, ear pain, postnasal drip, rhinorrhea, sore throat and trouble swallowing.    Eyes:  Negative for pain and visual disturbance.   Respiratory:  Negative for cough and shortness of breath.    Cardiovascular:  Negative for chest pain and palpitations.   Gastrointestinal:  Positive for nausea and vomiting. Negative for abdominal pain, constipation and diarrhea.   Genitourinary:  Negative for decreased urine volume, dysuria and hematuria.   Musculoskeletal:  Negative for arthralgias and back pain.   Skin:  Negative for color change and rash.   Neurological:  Positive for headaches. Negative for dizziness, seizures, syncope, weakness and light-headedness.   All other systems reviewed and are negative.      Physical Exam  ED Triage Vitals [08/25/24 0128]   Temperature Pulse  Respirations Blood Pressure SpO2   98.1 °F (36.7 °C) (!) 112 20 (!) 152/104 93 %      Temp src Heart Rate Source Patient Position - Orthostatic VS BP Location FiO2 (%)   -- -- Sitting Left arm --      Pain Score       --             Orthostatic Vital Signs  Vitals:    08/25/24 0128 08/25/24 0346   BP: (!) 152/104 143/70   Pulse: (!) 112 (!) 106   Patient Position - Orthostatic VS: Sitting        Physical Exam  Vitals and nursing note reviewed.   Constitutional:       General: She is not in acute distress.     Appearance: Normal appearance. She is obese.   HENT:      Head: Normocephalic and atraumatic.      Right Ear: External ear normal.      Left Ear: External ear normal.      Nose: Nose normal.      Mouth/Throat:      Pharynx: Oropharynx is clear.   Eyes:      Conjunctiva/sclera: Conjunctivae normal.   Cardiovascular:      Rate and Rhythm: Normal rate and regular rhythm.      Pulses: Normal pulses.      Heart sounds: Normal heart sounds.      Comments: RRR with +S1 and S2, no murmurs appreciated on exam. Peripheral pulses intact.    Pulmonary:      Effort: Pulmonary effort is normal. No respiratory distress.      Breath sounds: Normal breath sounds. No wheezing, rhonchi or rales.      Comments: CTABL with no abnormal lung sounds such as wheezes or rales appreciated on exam.     Abdominal:      General: Abdomen is flat. Bowel sounds are normal. There is no distension.      Palpations: Abdomen is soft.      Tenderness: There is no abdominal tenderness.      Comments: Soft, non tender, normo-active bowel sounds. Without rigidity, guarding, or distension.     Musculoskeletal:         General: Normal range of motion.      Cervical back: Normal range of motion.   Skin:     General: Skin is warm and dry.   Neurological:      General: No focal deficit present.      Mental Status: She is alert and oriented to person, place, and time. Mental status is at baseline.      Comments: CN grossly intact on visualization. No focal  neurologic deficits noted on exam.  5/5 strength in all extremities. Neurovascularly intact with normal sensation and motor function.             ED Medications  Medications   ondansetron (ZOFRAN) injection 4 mg (4 mg Intravenous Given 8/25/24 0206)   insulin regular (HumuLIN R,NovoLIN R) injection 10 Units (10 Units Subcutaneous Given 8/25/24 0343)   acetaminophen (TYLENOL) tablet 650 mg (650 mg Oral Given 8/25/24 0343)   sodium chloride 0.9 % bolus 1,000 mL (0 mL Intravenous Stopped 8/25/24 0454)       Diagnostic Studies  Results Reviewed       Procedure Component Value Units Date/Time    Fingerstick Glucose (POCT) [[id number]]  (Abnormal) Collected: 08/25/24 0438    Lab Status: Final result Specimen: Blood Updated: 08/25/24 0439     POC Glucose 324 mg/dl     Comprehensive metabolic panel [[id number]]  (Abnormal) Collected: 08/25/24 0206    Lab Status: Final result Specimen: Blood from Arm, Left Updated: 08/25/24 0232     Sodium 131 mmol/L      Potassium 4.3 mmol/L      Chloride 97 mmol/L      CO2 23 mmol/L      ANION GAP 11 mmol/L      BUN 19 mg/dL      Creatinine 0.86 mg/dL      Glucose 375 mg/dL      Calcium 10.0 mg/dL      AST 35 U/L      ALT 59 U/L      Alkaline Phosphatase 83 U/L      Total Protein 7.8 g/dL      Albumin 4.2 g/dL      Total Bilirubin 0.51 mg/dL      eGFR 78 ml/min/1.73sq m     Narrative:      National Kidney Disease Foundation guidelines for Chronic Kidney Disease (CKD):     Stage 1 with normal or high GFR (GFR > 90 mL/min/1.73 square meters)    Stage 2 Mild CKD (GFR = 60-89 mL/min/1.73 square meters)    Stage 3A Moderate CKD (GFR = 45-59 mL/min/1.73 square meters)    Stage 3B Moderate CKD (GFR = 30-44 mL/min/1.73 square meters)    Stage 4 Severe CKD (GFR = 15-29 mL/min/1.73 square meters)    Stage 5 End Stage CKD (GFR <15 mL/min/1.73 square meters)  Note: GFR calculation is accurate only with a steady state creatinine    CBC and differential [[id number]]  (Abnormal) Collected: 08/25/24 0206     Lab Status: Final result Specimen: Blood from Arm, Left Updated: 08/25/24 0212     WBC 9.95 Thousand/uL      RBC 5.09 Million/uL      Hemoglobin 15.4 g/dL      Hematocrit 47.0 %      MCV 92 fL      MCH 30.3 pg      MCHC 32.8 g/dL      RDW 13.2 %      MPV 10.0 fL      Platelets 341 Thousands/uL      nRBC 0 /100 WBCs      Segmented % 62 %      Immature Grans % 1 %      Lymphocytes % 28 %      Monocytes % 6 %      Eosinophils Relative 2 %      Basophils Relative 1 %      Absolute Neutrophils 6.14 Thousands/µL      Absolute Immature Grans 0.08 Thousand/uL      Absolute Lymphocytes 2.82 Thousands/µL      Absolute Monocytes 0.64 Thousand/µL      Eosinophils Absolute 0.21 Thousand/µL      Basophils Absolute 0.06 Thousands/µL     Blood gas, venous [[id number]]  (Abnormal) Collected: 08/25/24 0206    Lab Status: Final result Specimen: Blood from Arm, Left Updated: 08/25/24 0211     pH, Gavin 7.368     pCO2, Gavin 40.4 mm Hg      pO2, Gavin 64.2 mm Hg      HCO3, Gavin 22.7 mmol/L      Base Excess, Gavin -2.4 mmol/L      O2 Content, Gavin 20.5 ml/dL      O2 HGB, VENOUS 90.2 %     UA w Reflex to Microscopic w Reflex to Culture [[id number]]     Lab Status: No result Specimen: Urine     Fingerstick Glucose (POCT) [[id number]]  (Abnormal) Collected: 08/25/24 0130    Lab Status: Final result Specimen: Blood Updated: 08/25/24 0131     POC Glucose 365 mg/dl                    No orders to display         Procedures  ECG 12 Lead Documentation Only    Date/Time: 8/25/2024 2:45 AM    Performed by: Oren Lawson MD  Authorized by: Oren Lawson MD    ECG reviewed by me, the ED Provider: yes    Patient location:  ED  Previous ECG:     Previous ECG:  Compared to current    Similarity:  No change  Interpretation:     Interpretation: normal    Rate:     ECG rate:  110    ECG rate assessment: tachycardic    Rhythm:     Rhythm: sinus tachycardia    Ectopy:     Ectopy: none    QRS:     QRS axis:  Normal    QRS intervals:  Normal  Conduction:     Conduction:  normal    ST segments:     ST segments:  Normal  T waves:     T waves: normal          ED Course  ED Course as of 08/25/24 0624   Sun Aug 25, 2024   0142 POC Glucose(!): 365   0142 Pulse(!): 112   0142 Respirations: 20   0217 pH, Gavin: 7.368   0442 POC Glucose(!): 324  Some improvement   0442 Plan to discharge patient and endocrine follow up.                              SBIRT 20yo+      Flowsheet Row Most Recent Value   Initial Alcohol Screen: US AUDIT-C     1. How often do you have a drink containing alcohol? 0 Filed at: 08/25/2024 0128   2. How many drinks containing alcohol do you have on a typical day you are drinking?  0 Filed at: 08/25/2024 0128   3a. Male UNDER 65: How often do you have five or more drinks on one occasion? 0 Filed at: 08/25/2024 0128   3b. FEMALE Any Age, or MALE 65+: How often do you have 4 or more drinks on one occassion? 0 Filed at: 08/25/2024 0128   Audit-C Score 0 Filed at: 08/25/2024 0128   JUAN: How many times in the past year have you...    Used an illegal drug or used a prescription medication for non-medical reasons? Never Filed at: 08/25/2024 0128                  Medical Decision Making  Female patient who presented to the emergency department for nausea as well as hyperglycemia.  Upon examination at bedside, patient was noted to have no acute physical exam findings.  Patient's labs showed a blood sugar of 365 however, no other acute concerns on lab work.  Patient's EKG showed normal sinus tachycardia with a rate of 110 bpm.  While in the emergency department, patient was given 1 L bolus of normal saline, 650 mg of Tylenol, 4 mg Zofran, and 10 units of regular insulin.  Through shared decision making between the patient and the provider, the patient was planned for discharge following a repeat blood glucose of 324.  Patient was advised to follow-up outpatient with her PCP and was also provided an ambulatory referral to endocrinology.  Patient was provided a prescription for Zofran  as needed for nausea and vomiting.  She was also instructed return to the ED if her symptoms worsen.    Amount and/or Complexity of Data Reviewed  Labs: ordered. Decision-making details documented in ED Course.    Risk  OTC drugs.  Prescription drug management.          Disposition  Final diagnoses:   Hyperglycemia   Nausea & vomiting     Time reflects when diagnosis was documented in both MDM as applicable and the Disposition within this note       Time User Action Codes Description Comment    8/25/2024  4:45 AM [place], Oren Add [R73.9] Hyperglycemia     8/25/2024  4:45 AM [place] Oren Add [R11.2] Nausea & vomiting           ED Disposition       ED Disposition   Discharge    Condition   Stable    Date/Time   Sun Aug 25, 2024 5749    Comment   Montse Smith discharge to home/self care.                   Follow-up Information       Follow up With Specialties Details Why Contact Info Additional Information    [facility] Emergency Department Emergency Medicine Go to  If symptoms worsen [address] [facility] [address]  [phone] [facility] Emergency Department, [facility] [place], [place], [address]    [facility] Schedule an appointment as soon as possible for a visit   [facility] [id number] [phone] [facility] [place], [address] [place] [address], [id number], [phone]            Discharge Medication List as of 8/25/2024  4:47 AM        CONTINUE these medications which have CHANGED    Details   ondansetron (ZOFRAN-ODT) 4 mg disintegrating tablet Take 1 tablet (4 mg total) by mouth every 6 (six) hours as needed for nausea or vomiting, Starting Sun 8/25/2024, Normal           CONTINUE these medications which have NOT CHANGED    Details   acetaminophen (TYLENOL) 325 mg tablet Take 2 tablets (650 mg total) by mouth every 6  (six) hours as needed for mild pain, Starting Tue 2/21/2023, No Print      ARIPiprazole (ABILIFY) 10 mg tablet Take 1 tablet (10 mg total) by mouth daily, Starting Tue 3/26/2024, Normal      clotrimazole (LOTRIMIN) 1 % cream Apply topically 2 (two) times a day, Starting Mon 3/25/2024, Normal      desvenlafaxine succinate (PRISTIQ) 50 mg 24 hr tablet Take 1 tablet (50 mg total) by mouth daily Do not start before February 22, 2023., Starting Wed 2/22/2023, No Print      dicyclomine (BENTYL) 10 mg capsule Take 2 capsules (20 mg total) by mouth every 6 (six) hours as needed (crampy diarrhea), Starting Tue 2/21/2023, Normal      dulaglutide (Trulicity) 1.5 MG/0.5ML injection Inject 1.5 mg under the skin Once a week, Starting Thu 1/18/2024, Historical Med      HumaLOG KwikPen 100 units/mL injection pen 18 Units, Starting Mon 9/13/2021, Historical Med      !! insulin glargine (LANTUS) 100 units/mL subcutaneous injection INJECT 40 UNITS UNDER THE SKIN EVERY MORNING DO NOT START BEFORE NOVEMBER 30, 2023., Starting Wed 12/20/2023, Normal      !! insulin glargine (LANTUS) 100 units/mL subcutaneous injection INJECT 40 UNITS UNDER THE SKIN DAILY AT BEDTIME, Starting Tue 1/23/2024, Normal      lamoTRIgine (LaMICtal) 100 mg tablet Take 1 tablet (100 mg total) by mouth 2 (two) times a day, Starting Tue 2/21/2023, No Print      lisinopril (ZESTRIL) 10 mg tablet Take 2 tablets (20 mg total) by mouth daily, Starting Sun 12/4/2022, No Print      LORazepam (ATIVAN) 0.5 mg tablet 0.5 mg every 8 (eight) hours as needed for anxiety, Starting Wed 10/6/2021, Historical Med      metFORMIN (GLUCOPHAGE) 500 mg tablet Take 1 tablet (500 mg total) by mouth 2 (two) times a day with meals, Starting Mon 8/31/2020, Until Tue 4/23/2024, Normal      nystatin (MYCOSTATIN) powder Apply topically 3 (three) times a day --- Continue use for a week after rash resolves, Starting Fri 5/3/2024, Normal      oxybutynin (DITROPAN) 5 mg tablet Take 1 tablet (5 mg  total) by mouth 3 (three) times a day, Starting Fri 4/12/2024, Normal      phenazopyridine (PYRIDIUM) 100 mg tablet Take 1 tablet (100 mg total) by mouth 3 (three) times a day with meals, Starting Fri 4/12/2024, Normal      tamsulosin (FLOMAX) 0.4 mg Take 1 capsule (0.4 mg total) by mouth daily with dinner, Starting Sat 4/13/2024, Normal       !! - Potential duplicate medications found. Please discuss with provider.            PDMP Review         Value Time User    PDMP Reviewed  Yes 3/21/2024  2:27 AM Kapil Thomas MD             ED Provider  Attending physically available and evaluated Montse Smith. I managed the patient along with the ED Attending.    Electronically Signed by           Oren Lawson MD  08/25/24 9462

## 2024-09-21 ENCOUNTER — HOSPITAL ENCOUNTER (EMERGENCY)
Facility: HOSPITAL | Age: 49
Discharge: HOME | End: 2024-09-22
Attending: EMERGENCY MEDICINE
Payer: COMMERCIAL

## 2024-09-21 ENCOUNTER — APPOINTMENT (EMERGENCY)
Dept: RADIOLOGY | Facility: HOSPITAL | Age: 49
End: 2024-09-21
Payer: COMMERCIAL

## 2024-09-21 VITALS
RESPIRATION RATE: 20 BRPM | TEMPERATURE: 98.5 F | BODY MASS INDEX: 65.79 KG/M2 | OXYGEN SATURATION: 91 % | DIASTOLIC BLOOD PRESSURE: 51 MMHG | SYSTOLIC BLOOD PRESSURE: 101 MMHG | HEART RATE: 100 BPM | WEIGHT: 293 LBS

## 2024-09-21 DIAGNOSIS — R51.9 HEADACHE: ICD-10-CM

## 2024-09-21 DIAGNOSIS — R11.0 NAUSEA: Primary | ICD-10-CM

## 2024-09-21 DIAGNOSIS — R11.10 VOMITING: ICD-10-CM

## 2024-09-21 DIAGNOSIS — R53.83 FATIGUE: ICD-10-CM

## 2024-09-21 DIAGNOSIS — R42 LIGHTHEADEDNESS: ICD-10-CM

## 2024-09-21 LAB
ALBUMIN SERPL BCG-MCNC: 4.3 G/DL (ref 3.5–5)
ALP SERPL-CCNC: 65 U/L (ref 34–104)
ALT SERPL W P-5'-P-CCNC: 50 U/L (ref 7–52)
ANION GAP SERPL CALCULATED.3IONS-SCNC: 11 MMOL/L (ref 4–13)
AST SERPL W P-5'-P-CCNC: 28 U/L (ref 13–39)
ATRIAL RATE: 111 BPM
BASOPHILS # BLD AUTO: 0.07 THOUSANDS/ΜL (ref 0–0.1)
BASOPHILS NFR BLD AUTO: 1 % (ref 0–1)
BILIRUB SERPL-MCNC: 0.59 MG/DL (ref 0.2–1)
BUN SERPL-MCNC: 25 MG/DL (ref 5–25)
CALCIUM SERPL-MCNC: 10 MG/DL (ref 8.4–10.2)
CARDIAC TROPONIN I PNL SERPL HS: 9 NG/L
CHLORIDE SERPL-SCNC: 100 MMOL/L (ref 96–108)
CO2 SERPL-SCNC: 25 MMOL/L (ref 21–32)
CREAT SERPL-MCNC: 1.28 MG/DL (ref 0.6–1.3)
EOSINOPHIL # BLD AUTO: 0.19 THOUSAND/ΜL (ref 0–0.61)
EOSINOPHIL NFR BLD AUTO: 2 % (ref 0–6)
ERYTHROCYTE [DISTWIDTH] IN BLOOD BY AUTOMATED COUNT: 12.8 % (ref 11.6–15.1)
GFR SERPL CREATININE-BSD FRML MDRD: 48 ML/MIN/1.73SQ M
GLUCOSE SERPL-MCNC: 190 MG/DL (ref 65–140)
HCT VFR BLD AUTO: 44.7 % (ref 34.8–46.1)
HGB BLD-MCNC: 14.7 G/DL (ref 11.5–15.4)
IMM GRANULOCYTES # BLD AUTO: 0.11 THOUSAND/UL (ref 0–0.2)
IMM GRANULOCYTES NFR BLD AUTO: 1 % (ref 0–2)
LYMPHOCYTES # BLD AUTO: 2.71 THOUSANDS/ΜL (ref 0.6–4.47)
LYMPHOCYTES NFR BLD AUTO: 22 % (ref 14–44)
MCH RBC QN AUTO: 30 PG (ref 26.8–34.3)
MCHC RBC AUTO-ENTMCNC: 32.9 G/DL (ref 31.4–37.4)
MCV RBC AUTO: 91 FL (ref 82–98)
MONOCYTES # BLD AUTO: 0.88 THOUSAND/ΜL (ref 0.17–1.22)
MONOCYTES NFR BLD AUTO: 7 % (ref 4–12)
NEUTROPHILS # BLD AUTO: 8.39 THOUSANDS/ΜL (ref 1.85–7.62)
NEUTS SEG NFR BLD AUTO: 67 % (ref 43–75)
NRBC BLD AUTO-RTO: 0 /100 WBCS
P AXIS: 60 DEGREES
PLATELET # BLD AUTO: 353 THOUSANDS/UL (ref 149–390)
PMV BLD AUTO: 9.7 FL (ref 8.9–12.7)
POTASSIUM SERPL-SCNC: 4.2 MMOL/L (ref 3.5–5.3)
PR INTERVAL: 156 MS
PROT SERPL-MCNC: 7.6 G/DL (ref 6.4–8.4)
QRS AXIS: 62 DEGREES
QRSD INTERVAL: 72 MS
QT INTERVAL: 330 MS
QTC INTERVAL: 448 MS
RBC # BLD AUTO: 4.9 MILLION/UL (ref 3.81–5.12)
SODIUM SERPL-SCNC: 136 MMOL/L (ref 135–147)
T WAVE AXIS: 37 DEGREES
VENTRICULAR RATE: 111 BPM
WBC # BLD AUTO: 12.35 THOUSAND/UL (ref 4.31–10.16)

## 2024-09-21 PROCEDURE — 96376 TX/PRO/DX INJ SAME DRUG ADON: CPT

## 2024-09-21 PROCEDURE — 36415 COLL VENOUS BLD VENIPUNCTURE: CPT | Performed by: EMERGENCY MEDICINE

## 2024-09-21 PROCEDURE — 96365 THER/PROPH/DIAG IV INF INIT: CPT

## 2024-09-21 PROCEDURE — 96361 HYDRATE IV INFUSION ADD-ON: CPT

## 2024-09-21 PROCEDURE — 84484 ASSAY OF TROPONIN QUANT: CPT | Performed by: EMERGENCY MEDICINE

## 2024-09-21 PROCEDURE — 85025 COMPLETE CBC W/AUTO DIFF WBC: CPT | Performed by: EMERGENCY MEDICINE

## 2024-09-21 PROCEDURE — 71045 X-RAY EXAM CHEST 1 VIEW: CPT

## 2024-09-21 PROCEDURE — 99284 EMERGENCY DEPT VISIT MOD MDM: CPT

## 2024-09-21 PROCEDURE — 96375 TX/PRO/DX INJ NEW DRUG ADDON: CPT

## 2024-09-21 PROCEDURE — 80053 COMPREHEN METABOLIC PANEL: CPT | Performed by: EMERGENCY MEDICINE

## 2024-09-21 PROCEDURE — 93005 ELECTROCARDIOGRAM TRACING: CPT

## 2024-09-21 PROCEDURE — 99285 EMERGENCY DEPT VISIT HI MDM: CPT | Performed by: EMERGENCY MEDICINE

## 2024-09-21 RX ORDER — ACETAMINOPHEN 10 MG/ML
1000 INJECTION, SOLUTION INTRAVENOUS ONCE
Status: COMPLETED | OUTPATIENT
Start: 2024-09-21 | End: 2024-09-21

## 2024-09-21 RX ORDER — ONDANSETRON 2 MG/ML
4 INJECTION INTRAMUSCULAR; INTRAVENOUS ONCE
Status: COMPLETED | OUTPATIENT
Start: 2024-09-21 | End: 2024-09-21

## 2024-09-21 RX ORDER — PANTOPRAZOLE SODIUM 40 MG/10ML
40 INJECTION, POWDER, LYOPHILIZED, FOR SOLUTION INTRAVENOUS ONCE
Status: COMPLETED | OUTPATIENT
Start: 2024-09-21 | End: 2024-09-21

## 2024-09-21 RX ADMIN — ACETAMINOPHEN 1000 MG: 10 INJECTION INTRAVENOUS at 22:37

## 2024-09-21 RX ADMIN — PANTOPRAZOLE SODIUM 40 MG: 40 INJECTION, POWDER, FOR SOLUTION INTRAVENOUS at 23:46

## 2024-09-21 RX ADMIN — ONDANSETRON 4 MG: 2 INJECTION INTRAMUSCULAR; INTRAVENOUS at 22:37

## 2024-09-21 RX ADMIN — ONDANSETRON 4 MG: 2 INJECTION INTRAMUSCULAR; INTRAVENOUS at 23:08

## 2024-09-21 RX ADMIN — SODIUM CHLORIDE 1000 ML: 0.9 INJECTION, SOLUTION INTRAVENOUS at 22:37

## 2024-09-22 LAB
2HR DELTA HS TROPONIN: -1 NG/L
CARDIAC TROPONIN I PNL SERPL HS: 8 NG/L

## 2024-09-22 RX ORDER — ONDANSETRON 4 MG/1
4 TABLET, ORALLY DISINTEGRATING ORAL EVERY 6 HOURS PRN
Qty: 20 TABLET | Refills: 0 | Status: SHIPPED | OUTPATIENT
Start: 2024-09-22 | End: 2024-09-27

## 2024-09-22 NOTE — ED ATTENDING ATTESTATION
9/21/2024  I, Bryce Chavez MD, saw and evaluated the patient. I have discussed the patient with the resident/non-physician practitioner and agree with the resident's/non-physician practitioner's findings, Plan of Care, and MDM as documented in the resident's/non-physician practitioner's note, except where noted. All available labs and Radiology studies were reviewed.  I was present for key portions of any procedure(s) performed by the resident/non-physician practitioner and I was immediately available to provide assistance.       At this point I agree with the current assessment done in the Emergency Department.  I have conducted an independent evaluation of this patient a history and physical is as follows: Nausea, vomiting, diarrhea, fatigue.  Laboratory studies with no significant abnormalities.  Feels better after ED management with Zofran, IV fluids, Tylenol.  Troponin x 2 not uptrending, EKG with no acute abnormalities.  No acute electrolyte abnormalities.  Patient stable for discharge home with symptomatic management provided.    Results Reviewed       Procedure Component Value Units Date/Time    HS Troponin I 2hr [[id number]]  (Normal) Collected: 09/21/24 2346    Lab Status: Final result Specimen: Blood from Arm, Right Updated: 09/22/24 0015     hs TnI 2hr 8 ng/L      Delta 2hr hsTnI -1 ng/L     HS Troponin I 4hr [[id number]]     Lab Status: No result Specimen: Blood     HS Troponin 0hr (reflex protocol) [[id number]]  (Normal) Collected: 09/21/24 2148    Lab Status: Final result Specimen: Blood from Arm, Right Updated: 09/21/24 2227     hs TnI 0hr 9 ng/L     Comprehensive metabolic panel [[id number]]  (Abnormal) Collected: 09/21/24 2148    Lab Status: Final result Specimen: Blood from Arm, Right Updated: 09/21/24 2221     Sodium 136 mmol/L      Potassium 4.2 mmol/L      Chloride 100 mmol/L      CO2 25 mmol/L      ANION GAP 11 mmol/L      BUN 25 mg/dL      Creatinine 1.28 mg/dL      Glucose 190 mg/dL       Calcium 10.0 mg/dL      AST 28 U/L      ALT 50 U/L      Alkaline Phosphatase 65 U/L      Total Protein 7.6 g/dL      Albumin 4.3 g/dL      Total Bilirubin 0.59 mg/dL      eGFR 48 ml/min/1.73sq m     Narrative:      National Kidney Disease Foundation guidelines for Chronic Kidney Disease (CKD):     Stage 1 with normal or high GFR (GFR > 90 mL/min/1.73 square meters)    Stage 2 Mild CKD (GFR = 60-89 mL/min/1.73 square meters)    Stage 3A Moderate CKD (GFR = 45-59 mL/min/1.73 square meters)    Stage 3B Moderate CKD (GFR = 30-44 mL/min/1.73 square meters)    Stage 4 Severe CKD (GFR = 15-29 mL/min/1.73 square meters)    Stage 5 End Stage CKD (GFR <15 mL/min/1.73 square meters)  Note: GFR calculation is accurate only with a steady state creatinine    CBC and differential [[id number]]  (Abnormal) Collected: 09/21/24 2148    Lab Status: Final result Specimen: Blood from Arm, Right Updated: 09/21/24 2204     WBC 12.35 Thousand/uL      RBC 4.90 Million/uL      Hemoglobin 14.7 g/dL      Hematocrit 44.7 %      MCV 91 fL      MCH 30.0 pg      MCHC 32.9 g/dL      RDW 12.8 %      MPV 9.7 fL      Platelets 353 Thousands/uL      nRBC 0 /100 WBCs      Segmented % 67 %      Immature Grans % 1 %      Lymphocytes % 22 %      Monocytes % 7 %      Eosinophils Relative 2 %      Basophils Relative 1 %      Absolute Neutrophils 8.39 Thousands/µL      Absolute Immature Grans 0.11 Thousand/uL      Absolute Lymphocytes 2.71 Thousands/µL      Absolute Monocytes 0.88 Thousand/µL      Eosinophils Absolute 0.19 Thousand/µL      Basophils Absolute 0.07 Thousands/µL           XR chest 1 view portable   ED Interpretation by Leah Graham MD (09/21 2209)   No acute cardiopulmonary abnormality.            ED Course         Critical Care Time  Procedures

## 2024-09-22 NOTE — ED PROVIDER NOTES
1. Nausea    2. Vomiting    3. Headache    4. Fatigue    5. Lightheadedness      ED Disposition       ED Disposition   Discharge    Condition   Stable    Date/Time   Sun Sep 22, 2024 12:19 AM    Comment   Montse Smith discharge to home/self care.                   Assessment & Plan       Medical Decision Making  49-year-old female presenting with fatigue, dizziness, nausea, and vomiting.     Differential diagnoses: Gastritis, gastroenteritis, acute viral syndrome, peptic ulcer, dehydration, electrolyte abnormality, ACS    Patient presents afebrile and tachycardic, but otherwise hemodynamically stable.  EKG shows sinus tachycardia.  Doubt ACS.  Initial troponin of 9 with 2-hour repeat of 8 with a delta of -1.  Heart score of 2.  CXR shows no acute cardiopulmonary abnormality.  Mild leukocytosis on CBC.  Started on IV NS 1L bolus.  Patient reports improvement in headache after IV Tylenol and nausea after IV Zofran.  Acid reflux improved after IV Protonix.  Patient tolerated po liquids without further nausea and vomiting.  Patient comfortable with and stable for discharge home with return precautions and an Rx for p.o. Zofran ODT.    Amount and/or Complexity of Data Reviewed  Labs: ordered. Decision-making details documented in ED Course.  Radiology: ordered and independent interpretation performed.    Risk  Prescription drug management.        HEART Risk Score      Flowsheet Row Most Recent Value   Heart Score Risk Calculator    History 0 Filed at: 09/21/2024 2342   ECG 0 Filed at: 09/21/2024 2342   Age 0 Filed at: 09/21/2024 2342   Risk Factors 2 Filed at: 09/21/2024 2342   Troponin 0 Filed at: 09/21/2024 2342   HEART Score 2 Filed at: 09/21/2024 2342               ED Course as of 09/22/24 0026   Sat Sep 21, 2024   2200 Pulse(!): 110  Tachycardic.   2200 Temperature: 98.5 °F (36.9 °C)  Afebrile.   2208 WBC(!): 12.35  Mild leukocytosis.   2233 Comprehensive metabolic panel(!)  CMP within normal limits.   2234 hs TnI  0hr: 9  Initial troponin of 9.   Sun Sep 22, 2024   0017 hs TnI 2hr: 8  2-hour troponin of 8, delta of -1.       Medications   sodium chloride 0.9 % bolus 1,000 mL (1,000 mL Intravenous New Bag 9/21/24 2237)   ondansetron (ZOFRAN) injection 4 mg (4 mg Intravenous Given 9/21/24 2237)   acetaminophen (Ofirmev) injection 1,000 mg (0 mg Intravenous Stopped 9/21/24 2340)   ondansetron (ZOFRAN) injection 4 mg (4 mg Intravenous Given 9/21/24 2308)   pantoprazole (PROTONIX) injection 40 mg (40 mg Intravenous Given 9/21/24 2346)       History of Present Illness       49-year-old female presenting with fatigue, dizziness, nausea, and vomiting.  Patient states that her nausea started yesterday and was subsequently relieved after an episode of nonbloody emesis.  Patient was feeling better earlier this morning, but started feeling more fatigued in the afternoon with associated headache, dizziness, lightheadedness, nausea, and vomiting.  Patient's appetite has been limited due to nausea since yesterday.  Denies any gait abnormalities and no sensations as if the room is spinning.  Had 1 episode of non-bloody, non-melenic diarrhea.  Denies any recent sick contacts.  Denies any fevers, chills, sweats, congestion, chest pain, shortness of breath, abdominal pain, hematuria, or dysuria.          Review of Systems   Constitutional:  Positive for appetite change and fatigue. Negative for chills and fever.   HENT:  Negative for congestion, ear pain and sore throat.    Eyes:  Negative for pain and visual disturbance.   Respiratory:  Negative for cough and shortness of breath.    Cardiovascular:  Negative for chest pain.   Gastrointestinal:  Positive for diarrhea, nausea and vomiting. Negative for abdominal pain.   Genitourinary:  Negative for dysuria and hematuria.   Musculoskeletal:  Positive for arthralgias. Negative for myalgias.        Bilateral knee pain at baseline.   Skin:  Negative for rash and wound.   Neurological:  Positive for  dizziness, light-headedness and headaches. Negative for weakness.   Psychiatric/Behavioral:  Negative for confusion.    All other systems reviewed and are negative.          Objective     ED Triage Vitals   Temperature Pulse Blood Pressure Respirations SpO2 Patient Position - Orthostatic VS   09/21/24 2140 09/21/24 2136 09/21/24 2136 09/21/24 2136 09/21/24 2136 09/21/24 2136   98.5 °F (36.9 °C) (!) 110 132/64 20 96 % Sitting      Temp src Heart Rate Source BP Location FiO2 (%) Pain Score    -- 09/21/24 2136 09/21/24 2136 -- 09/21/24 2136     Monitor Right arm  5        Physical Exam  Vitals and nursing note reviewed.   Constitutional:       General: She is not in acute distress.     Appearance: Normal appearance. She is obese. She is not ill-appearing, toxic-appearing or diaphoretic.   HENT:      Head: Normocephalic and atraumatic.      Right Ear: External ear normal.      Left Ear: External ear normal.      Nose: Nose normal.      Mouth/Throat:      Mouth: Mucous membranes are moist.   Eyes:      General: No scleral icterus.        Right eye: No discharge.         Left eye: No discharge.      Extraocular Movements: Extraocular movements intact.      Conjunctiva/sclera: Conjunctivae normal.   Cardiovascular:      Rate and Rhythm: Regular rhythm. Tachycardia present.      Heart sounds: Normal heart sounds. No murmur heard.  Pulmonary:      Effort: Pulmonary effort is normal. No respiratory distress.      Breath sounds: Normal breath sounds. No wheezing, rhonchi or rales.   Abdominal:      General: Abdomen is flat.      Palpations: Abdomen is soft.      Tenderness: There is no abdominal tenderness. There is no guarding or rebound.   Musculoskeletal:         General: Normal range of motion.      Cervical back: Normal range of motion.      Comments: Bilateral lower extremity edema, consistent with baseline.   Skin:     General: Skin is warm and dry.      Capillary Refill: Capillary refill takes less than 2 seconds.    Neurological:      General: No focal deficit present.      Mental Status: She is alert and oriented to person, place, and time. Mental status is at baseline.   Psychiatric:         Mood and Affect: Mood normal.         Behavior: Behavior normal.         Labs Reviewed   CBC AND DIFFERENTIAL - Abnormal       Result Value    WBC 12.35 (*)     RBC 4.90      Hemoglobin 14.7      Hematocrit 44.7      MCV 91      MCH 30.0      MCHC 32.9      RDW 12.8      MPV 9.7      Platelets 353      nRBC 0      Segmented % 67      Immature Grans % 1      Lymphocytes % 22      Monocytes % 7      Eosinophils Relative 2      Basophils Relative 1      Absolute Neutrophils 8.39 (*)     Absolute Immature Grans 0.11      Absolute Lymphocytes 2.71      Absolute Monocytes 0.88      Eosinophils Absolute 0.19      Basophils Absolute 0.07     COMPREHENSIVE METABOLIC PANEL - Abnormal    Sodium 136      Potassium 4.2      Chloride 100      CO2 25      ANION GAP 11      BUN 25      Creatinine 1.28      Glucose 190 (*)     Calcium 10.0      AST 28      ALT 50      Alkaline Phosphatase 65      Total Protein 7.6      Albumin 4.3      Total Bilirubin 0.59      eGFR 48      Narrative:     [employer] guidelines for Chronic Kidney Disease (CKD):     Stage 1 with normal or high GFR (GFR > 90 mL/min/1.73 square meters)    Stage 2 Mild CKD (GFR = 60-89 mL/min/1.73 square meters)    Stage 3A Moderate CKD (GFR = 45-59 mL/min/1.73 square meters)    Stage 3B Moderate CKD (GFR = 30-44 mL/min/1.73 square meters)    Stage 4 Severe CKD (GFR = 15-29 mL/min/1.73 square meters)    Stage 5 End Stage CKD (GFR <15 mL/min/1.73 square meters)  Note: GFR calculation is accurate only with a steady state creatinine   HS TROPONIN I 0HR - Normal    hs TnI 0hr 9     HS TROPONIN I 2HR - Normal    hs TnI 2hr 8      Delta 2hr hsTnI -1     HS TROPONIN I 4HR     XR chest 1 view portable   ED Interpretation by Leah Graham MD (09/21 2209)   No acute  cardiopulmonary abnormality.          ECG 12 Lead Documentation Only    Date/Time: 2024 10:14 PM    Performed by: Leah Graham MD  Authorized by: Leah Graham MD    Indications / Diagnosis:  Tachycardia, dizziness  ECG reviewed by me, the ED Provider: yes    Patient location:  ED  Previous ECG:     Previous ECG:  Compared to current    Comparison ECG info:  EKG 2024: Sinus tachycardia at 110 bpm, no ST elevations, QTc 452 ms.    Similarity:  No change  Interpretation:     Interpretation: abnormal    Rate:     ECG rate:  111 bpm    ECG rate assessment: tachycardic    Rhythm:     Rhythm: sinus rhythm    Ectopy:     Ectopy: none    QRS:     QRS axis:  Normal    QRS intervals:  Normal  Conduction:     Conduction: normal    ST segments:     ST segments:  Normal  T waves:     T waves: normal    Comments:      Normal QTc at 448 ms.      ED Medication and Procedure Management   Prior to Admission Medications   Prescriptions Last Dose Informant Patient Reported? Taking?   ARIPiprazole (ABILIFY) 10 mg tablet   No No   Sig: Take 1 tablet (10 mg total) by mouth daily   HumaLOG KwikPen 100 units/mL injection pen  Self Yes No   Si Units   LORazepam (ATIVAN) 0.5 mg tablet   Yes No   Si.5 mg every 8 (eight) hours as needed for anxiety   acetaminophen (TYLENOL) 325 mg tablet   No No   Sig: Take 2 tablets (650 mg total) by mouth every 6 (six) hours as needed for mild pain   clotrimazole (LOTRIMIN) 1 % cream   No No   Sig: Apply topically 2 (two) times a day   desvenlafaxine succinate (PRISTIQ) 50 mg 24 hr tablet   No No   Sig: Take 1 tablet (50 mg total) by mouth daily Do not start before 2023.   dicyclomine (BENTYL) 10 mg capsule   No No   Sig: Take 2 capsules (20 mg total) by mouth every 6 (six) hours as needed (crampy diarrhea)   dulaglutide (Trulicity) 1.5 MG/0.5ML injection   Yes No   Sig: Inject 1.5 mg under the skin Once a week   insulin glargine (LANTUS) 100 units/mL subcutaneous  injection   No No   Sig: INJECT 40 UNITS UNDER THE SKIN EVERY MORNING DO NOT START BEFORE NOVEMBER 30, 2023.   insulin glargine (LANTUS) 100 units/mL subcutaneous injection   No No   Sig: INJECT 40 UNITS UNDER THE SKIN DAILY AT BEDTIME   lamoTRIgine (LaMICtal) 100 mg tablet   No No   Sig: Take 1 tablet (100 mg total) by mouth 2 (two) times a day   lisinopril (ZESTRIL) 10 mg tablet   No No   Sig: Take 2 tablets (20 mg total) by mouth daily   metFORMIN (GLUCOPHAGE) 500 mg tablet  Self No No   Sig: Take 1 tablet (500 mg total) by mouth 2 (two) times a day with meals   nystatin (MYCOSTATIN) powder   No No   Sig: Apply topically 3 (three) times a day --- Continue use for a week after rash resolves   ondansetron (ZOFRAN-ODT) 4 mg disintegrating tablet   No No   Sig: Take 1 tablet (4 mg total) by mouth every 6 (six) hours as needed for nausea or vomiting   oxybutynin (DITROPAN) 5 mg tablet   No No   Sig: Take 1 tablet (5 mg total) by mouth 3 (three) times a day   phenazopyridine (PYRIDIUM) 100 mg tablet   No No   Sig: Take 1 tablet (100 mg total) by mouth 3 (three) times a day with meals   tamsulosin (FLOMAX) 0.4 mg   No No   Sig: Take 1 capsule (0.4 mg total) by mouth daily with dinner      Facility-Administered Medications: None     Patient's Medications   Discharge Prescriptions    ONDANSETRON (ZOFRAN-ODT) 4 MG DISINTEGRATING TABLET    Take 1 tablet (4 mg total) by mouth every 6 (six) hours as needed for nausea or vomiting for up to 5 days       Start Date: 9/22/2024 End Date: 9/27/2024       Order Dose: 4 mg       Quantity: 20 tablet    Refills: 0     No discharge procedures on file.     Leah Graham MD  09/22/24 0026

## 2024-09-26 LAB
ATRIAL RATE: 111 BPM
P AXIS: 60 DEGREES
PR INTERVAL: 156 MS
QRS AXIS: 62 DEGREES
QRSD INTERVAL: 72 MS
QT INTERVAL: 330 MS
QTC INTERVAL: 448 MS
T WAVE AXIS: 37 DEGREES
VENTRICULAR RATE: 111 BPM

## 2024-09-26 PROCEDURE — 93010 ELECTROCARDIOGRAM REPORT: CPT | Performed by: INTERNAL MEDICINE

## 2024-10-31 ENCOUNTER — TELEPHONE (OUTPATIENT)
Dept: PSYCHIATRY | Facility: CLINIC | Age: 49
End: 2024-10-31

## 2024-10-31 NOTE — TELEPHONE ENCOUNTER
NO-SHOW LETTER MAILED TO Montse Smith.  ADDRESS: [address] [address]  SENT VIA [employer]

## 2024-10-31 NOTE — LETTER
10/31/24     Montse Smith   : 1975   [address]  [facility] [address]       It is the policy of [facility] to monitor and manage appointments that have been no-showed or cancelled with less than 48-hour notice. This is necessary to ensure that we are able to provide timely access for all patients to our providers. Undue numbers of unutilized appointments delays necessary medical care for all patients.      Dear Montse Smith       We are sorry that you missed your appointment with Rocky Becerra LCSW on 10/30/2024. Your health and follow-up care are important to us. We want to make you aware that you do not have another appointment with Rocky Becerra LCSW scheduled. If you have already rescheduled this appointment, please disregard.    Please be aware that our office policy states that if you 'no show' two or more Therapy appointments without prior notice of cancellation within in a calendar year, you may be discharged from Therapy treatment.  We want to bring this to your attention now to prevent an interruption of your care.  If you have any questions about this policy, please call us at the number above.     If we do not hear from you within 10 business days to make a follow up appointment, we will assume you are no longer interested in care here.    Thank you in advance for your cooperation and assistance.       Sincerely,      [facility] Support Staff

## 2024-12-26 ENCOUNTER — HOSPITAL ENCOUNTER (EMERGENCY)
Facility: HOSPITAL | Age: 49
Discharge: HOME | End: 2024-12-26
Attending: EMERGENCY MEDICINE
Payer: COMMERCIAL

## 2024-12-26 ENCOUNTER — HOSPITAL ENCOUNTER (EMERGENCY)
Facility: HOSPITAL | Age: 49
Discharge: STILL ADMITTED OR ADMINISTRATIVE | End: 2024-12-26
Payer: COMMERCIAL

## 2024-12-26 ENCOUNTER — APPOINTMENT (EMERGENCY)
Dept: RADIOLOGY | Facility: HOSPITAL | Age: 49
End: 2024-12-26
Payer: COMMERCIAL

## 2024-12-26 VITALS
SYSTOLIC BLOOD PRESSURE: 161 MMHG | RESPIRATION RATE: 20 BRPM | HEART RATE: 106 BPM | OXYGEN SATURATION: 94 % | DIASTOLIC BLOOD PRESSURE: 88 MMHG | TEMPERATURE: 98.3 F

## 2024-12-26 VITALS
BODY MASS INDEX: 66.35 KG/M2 | DIASTOLIC BLOOD PRESSURE: 92 MMHG | WEIGHT: 293 LBS | SYSTOLIC BLOOD PRESSURE: 175 MMHG | OXYGEN SATURATION: 95 % | RESPIRATION RATE: 16 BRPM | HEART RATE: 105 BPM | TEMPERATURE: 98.8 F

## 2024-12-26 DIAGNOSIS — J18.9 PNEUMONIA: ICD-10-CM

## 2024-12-26 DIAGNOSIS — E11.65 HYPERGLYCEMIA DUE TO TYPE 2 DIABETES MELLITUS (HCC): Primary | ICD-10-CM

## 2024-12-26 LAB
ALBUMIN SERPL BCG-MCNC: 4.1 G/DL (ref 3.5–5)
ALP SERPL-CCNC: 82 U/L (ref 34–104)
ALT SERPL W P-5'-P-CCNC: 38 U/L (ref 7–52)
ANION GAP SERPL CALCULATED.3IONS-SCNC: 13 MMOL/L (ref 4–13)
AST SERPL W P-5'-P-CCNC: 23 U/L (ref 13–39)
B-OH-BUTYR SERPL-MCNC: 0.08 MMOL/L (ref 0.02–0.27)
BASOPHILS # BLD AUTO: 0.08 THOUSANDS/ÂΜL (ref 0–0.1)
BASOPHILS NFR BLD AUTO: 1 % (ref 0–1)
BILIRUB SERPL-MCNC: 0.4 MG/DL (ref 0.2–1)
BUN SERPL-MCNC: 19 MG/DL (ref 5–25)
CALCIUM SERPL-MCNC: 10.1 MG/DL (ref 8.4–10.2)
CHLORIDE SERPL-SCNC: 99 MMOL/L (ref 96–108)
CO2 SERPL-SCNC: 24 MMOL/L (ref 21–32)
CREAT SERPL-MCNC: 0.89 MG/DL (ref 0.6–1.3)
EOSINOPHIL # BLD AUTO: 0.22 THOUSAND/ÂΜL (ref 0–0.61)
EOSINOPHIL NFR BLD AUTO: 2 % (ref 0–6)
ERYTHROCYTE [DISTWIDTH] IN BLOOD BY AUTOMATED COUNT: 12.9 % (ref 11.6–15.1)
FLUAV AG UPPER RESP QL IA.RAPID: NEGATIVE
FLUBV AG UPPER RESP QL IA.RAPID: NEGATIVE
GFR SERPL CREATININE-BSD FRML MDRD: 75 ML/MIN/1.73SQ M
GLUCOSE SERPL-MCNC: 192 MG/DL (ref 65–140)
GLUCOSE SERPL-MCNC: 377 MG/DL (ref 65–140)
GLUCOSE SERPL-MCNC: 383 MG/DL (ref 65–140)
GLUCOSE SERPL-MCNC: 429 MG/DL (ref 65–140)
HCT VFR BLD AUTO: 45.1 % (ref 34.8–46.1)
HGB BLD-MCNC: 14.6 G/DL (ref 11.5–15.4)
IMM GRANULOCYTES # BLD AUTO: 0.07 THOUSAND/UL (ref 0–0.2)
IMM GRANULOCYTES NFR BLD AUTO: 1 % (ref 0–2)
LACTATE SERPL-SCNC: 2.1 MMOL/L (ref 0.5–2)
LACTATE SERPL-SCNC: 2.9 MMOL/L (ref 0.5–2)
LIPASE SERPL-CCNC: 84 U/L (ref 11–82)
LYMPHOCYTES # BLD AUTO: 3 THOUSANDS/ÂΜL (ref 0.6–4.47)
LYMPHOCYTES NFR BLD AUTO: 31 % (ref 14–44)
MCH RBC QN AUTO: 30.1 PG (ref 26.8–34.3)
MCHC RBC AUTO-ENTMCNC: 32.4 G/DL (ref 31.4–37.4)
MCV RBC AUTO: 93 FL (ref 82–98)
MONOCYTES # BLD AUTO: 0.82 THOUSAND/ÂΜL (ref 0.17–1.22)
MONOCYTES NFR BLD AUTO: 9 % (ref 4–12)
NEUTROPHILS # BLD AUTO: 5.38 THOUSANDS/ÂΜL (ref 1.85–7.62)
NEUTS SEG NFR BLD AUTO: 56 % (ref 43–75)
NRBC BLD AUTO-RTO: 0 /100 WBCS
PLATELET # BLD AUTO: 344 THOUSANDS/UL (ref 149–390)
PMV BLD AUTO: 10.3 FL (ref 8.9–12.7)
POTASSIUM SERPL-SCNC: 4.2 MMOL/L (ref 3.5–5.3)
PROT SERPL-MCNC: 7.6 G/DL (ref 6.4–8.4)
RBC # BLD AUTO: 4.85 MILLION/UL (ref 3.81–5.12)
SARS-COV+SARS-COV-2 AG RESP QL IA.RAPID: NEGATIVE
SODIUM SERPL-SCNC: 136 MMOL/L (ref 135–147)
WBC # BLD AUTO: 9.57 THOUSAND/UL (ref 4.31–10.16)

## 2024-12-26 PROCEDURE — 71045 X-RAY EXAM CHEST 1 VIEW: CPT

## 2024-12-26 PROCEDURE — 87804 INFLUENZA ASSAY W/OPTIC: CPT | Performed by: PHYSICIAN ASSISTANT

## 2024-12-26 PROCEDURE — 82948 REAGENT STRIP/BLOOD GLUCOSE: CPT

## 2024-12-26 PROCEDURE — 82010 KETONE BODYS QUAN: CPT | Performed by: PHYSICIAN ASSISTANT

## 2024-12-26 PROCEDURE — 87811 SARS-COV-2 COVID19 W/OPTIC: CPT | Performed by: PHYSICIAN ASSISTANT

## 2024-12-26 PROCEDURE — 99284 EMERGENCY DEPT VISIT MOD MDM: CPT | Performed by: PHYSICIAN ASSISTANT

## 2024-12-26 PROCEDURE — 83605 ASSAY OF LACTIC ACID: CPT | Performed by: PHYSICIAN ASSISTANT

## 2024-12-26 PROCEDURE — 80053 COMPREHEN METABOLIC PANEL: CPT | Performed by: PHYSICIAN ASSISTANT

## 2024-12-26 PROCEDURE — 96361 HYDRATE IV INFUSION ADD-ON: CPT

## 2024-12-26 PROCEDURE — 99285 EMERGENCY DEPT VISIT HI MDM: CPT

## 2024-12-26 PROCEDURE — 96374 THER/PROPH/DIAG INJ IV PUSH: CPT

## 2024-12-26 PROCEDURE — 85025 COMPLETE CBC W/AUTO DIFF WBC: CPT | Performed by: PHYSICIAN ASSISTANT

## 2024-12-26 PROCEDURE — 36415 COLL VENOUS BLD VENIPUNCTURE: CPT | Performed by: PHYSICIAN ASSISTANT

## 2024-12-26 PROCEDURE — 83690 ASSAY OF LIPASE: CPT | Performed by: PHYSICIAN ASSISTANT

## 2024-12-26 RX ORDER — AZITHROMYCIN 250 MG/1
TABLET, FILM COATED ORAL
Qty: 6 TABLET | Refills: 0 | Status: SHIPPED | OUTPATIENT
Start: 2024-12-26 | End: 2024-12-26

## 2024-12-26 RX ORDER — AZITHROMYCIN 250 MG/1
TABLET, FILM COATED ORAL
Qty: 6 TABLET | Refills: 0 | Status: SHIPPED | OUTPATIENT
Start: 2024-12-26 | End: 2024-12-26 | Stop reason: CLARIF

## 2024-12-26 RX ORDER — BACITRACIN, NEOMYCIN, POLYMYXIN B 400; 3.5; 5 [USP'U]/G; MG/G; [USP'U]/G
1 OINTMENT TOPICAL ONCE
Status: COMPLETED | OUTPATIENT
Start: 2024-12-26 | End: 2024-12-26

## 2024-12-26 RX ORDER — AZITHROMYCIN 250 MG/1
TABLET, FILM COATED ORAL
Qty: 6 TABLET | Refills: 0 | Status: SHIPPED | OUTPATIENT
Start: 2024-12-27 | End: 2024-12-31

## 2024-12-26 RX ORDER — AZITHROMYCIN 250 MG/1
TABLET, FILM COATED ORAL
Qty: 6 TABLET | Refills: 0 | Status: SHIPPED | OUTPATIENT
Start: 2024-12-26 | End: 2024-12-26 | Stop reason: SDUPTHER

## 2024-12-26 RX ADMIN — BACITRACIN ZINC, NEOMYCIN, POLYMYXIN B 1 SMALL APPLICATION: 400; 3.5; 5 OINTMENT TOPICAL at 20:11

## 2024-12-26 RX ADMIN — SODIUM CHLORIDE 1000 ML: 0.9 INJECTION, SOLUTION INTRAVENOUS at 16:51

## 2024-12-26 RX ADMIN — SODIUM CHLORIDE 1000 ML: 0.9 INJECTION, SOLUTION INTRAVENOUS at 19:33

## 2024-12-26 RX ADMIN — INSULIN HUMAN 10 UNITS: 100 INJECTION, SOLUTION PARENTERAL at 17:29

## 2024-12-26 RX ADMIN — AMOXICILLIN AND CLAVULANATE POTASSIUM 1 TABLET: 875; 125 TABLET, FILM COATED ORAL at 20:11

## 2024-12-26 NOTE — ED NOTES
Report received from previous shift. Pt is resting at this time.     Danielle Bansal, JANUSZ  12/26/24 5089

## 2024-12-26 NOTE — ED PROVIDER NOTES
Time reflects when diagnosis was documented in both MDM as applicable and the Disposition within this note       Time User Action Codes Description Comment    12/26/2024  8:10 PM Emilia Purcell Add [J18.9] Pneumonia     12/26/2024  8:10 PM Emilia Purcell Modify [J18.9] Pneumonia Left basilar    12/26/2024  8:30 PM Emilia Purcell Remove [J18.9] Pneumonia Left basilar    12/26/2024  8:30 PM Emilia Purcell Add [E11.65] Hyperglycemia due to type 2 diabetes mellitus (HCC)     12/26/2024  8:31 PM Emilia Purcell Add [J18.9] Pneumonia     12/26/2024  8:31 PM Emilia Purcell Modify [J18.9] Pneumonia basilar          ED Disposition       ED Disposition   Discharge    Condition   Stable    Date/Time   Thu Dec 26, 2024  8:42 PM    Comment   Montse Smith discharge to home/self care.                   Assessment & Plan       Medical Decision Making  Patient with hyperglycemia that was treated here in emergency department with IV fluids and insulin.  Otherwise labs are reassuring, lactic acid is improving, patient not acidotic, viral panel negative, chest x-ray read as subtle left basilar infiltrate will treat as pneumonia due to hyperglycemia  Wound to leg was cleaned, antibiotic ointment, Band-Aid placed by nurse.  Other scattered fungal and psoriatic rashes are chronic  Patient stable for discharge outpatient follow-up    Amount and/or Complexity of Data Reviewed  Labs: ordered. Decision-making details documented in ED Course.  Radiology: ordered.    Risk  OTC drugs.  Prescription drug management.        ED Course as of 12/26/24 2042   Thu Dec 26, 2024   1655 Cbc unremarkable   1717 LIPASE(!): 84  Just slightly elevated, no clinical significance   1717 Beta- Hydroxybutyrate: 0.08  normal   1718 GLUCOSE(!!): 429  hyperglycemia   1718 LACTIC ACID(!): 2.9  Pt getting IVF   1718 COVID/Flu neg   2001 LACTIC ACID(!): 2.1  Improving with IVF, stable for DC       Medications   sodium chloride 0.9 % bolus 1,000 mL (0 mL Intravenous  "Stopped 24)   sodium chloride 0.9 % bolus 1,000 mL (1,000 mL Intravenous New Bag 24)   insulin regular (HumuLIN R,NovoLIN R) injection 10 Units (10 Units Intravenous Given 24 172)   neomycin-bacitracin-polymyxin b (NEOSPORIN) ointment 1 small application (1 small application Topical Given 24)   amoxicillin-clavulanate (AUGMENTIN) 875-125 mg per tablet 1 tablet (1 tablet Oral Given 24)       ED Risk Strat Scores                          SBIRT 20yo+      Flowsheet Row Most Recent Value   Initial Alcohol Screen: US AUDIT-C     1. How often do you have a drink containing alcohol? 0 Filed at: 2024 1552   2. How many drinks containing alcohol do you have on a typical day you are drinking?  0 Filed at: 2024 1552   3b. FEMALE Any Age, or MALE 65+: How often do you have 4 or more drinks on one occassion? 0 Filed at: 2024 155   Audit-C Score 0 Filed at: 2024 1559   JUAN: How many times in the past year have you...    Used an illegal drug or used a prescription medication for non-medical reasons? Never Filed at: 2024 1550                            History of Present Illness       Chief Complaint   Patient presents with    Hyperglycemia - Symptomatic     Reports blood sugar over 400 at home.  Took 18u humalog and 40u lantus at 0830.  Reports dizziness and \"peeing a lot\"       Past Medical History:   Diagnosis Date    Anemia     Anxiety     Candidal intertrigo     Depression     Diabetes mellitus (HCC)     GERD (gastroesophageal reflux disease)     Hyperlipidemia     Hypertension     Hyponatremia 2023    Irritable bowel syndrome     Morbid obesity with BMI of 60.0-69.9, adult (HCC)     Osteoarthritis     Seasonal allergies     Sleep difficulties     Suicide attempt ([facility])       Past Surgical History:   Procedure Laterality Date     SECTION  1992    CHOLECYSTECTOMY      FL RETROGRADE PYELOGRAM  3/21/2024    FL RETROGRADE PYELOGRAM  " 2024    AR CYSTO/URETERO W/LITHOTRIPSY &INDWELL STENT INSRT Left 3/21/2024    Procedure: CYSTOSCOPY, RETROGRADE PYELOGRAM AND INSERTION STENT URETERAL;  Surgeon: Nabil Desir MD;  Location: [facility];  Service: Urology    AR CYSTO/URETERO W/LITHOTRIPSY &INDWELL STENT INSRT Left 2024    Procedure: CYSTOSCOPY URETEROSCOPY WITH LITHOTRIPSY HOLMIUM LASER, RETROGRADE PYELOGRAM AND INSERTION STENT URETERAL;  Surgeon: Nabil Desir MD;  Location: [facility];  Service: Urology    WISDOM TOOTH EXTRACTION  2009      Family History   Problem Relation Age of Onset    Diabetes Mother     Hypertension Father       Social History     Tobacco Use    Smoking status: Never    Smokeless tobacco: Never   Vaping Use    Vaping status: Never Used   Substance Use Topics    Alcohol use: Not Currently     Comment: occassionaly     Drug use: No      E-Cigarette/Vaping    E-Cigarette Use Never User       E-Cigarette/Vaping Substances    Nicotine No     THC No     CBD No     Flavoring No     Other No     Unknown No       I have reviewed and agree with the history as documented.     PMH: Diabetes mellitus, HTN, Depression/Anxiety, GERD, IBS, Anemia, Morbid obesity with BMI of 60.0-69.9, adult , Osteoarthritis, Candidal intertrigo, Hyperlipidemia  PSH: Cholecystectomy,   section, [place] tooth extraction, CYSTO/URETERO W/LITHOTRIPSY &INDWELL STENT INSRT     Patient presents emergency department complaining of elevated blood sugars at home although she has been taking her insulin medication does admit to poor diet over this holiday season.  PT stopped her Dulaglutide for the past 2 weeks.  no fever, CP, SOB, abdominal pain, NVD, urinary complaints no new source of infection: Does complain of chronic wound to left lateral distal thigh that scabs over and then the scab falls off and the wound appears again but there is no acute discharge or redness to that area        Review of Systems   Constitutional:  Negative for fever.    Respiratory:  Negative for cough, shortness of breath and wheezing.    Cardiovascular:  Negative for leg swelling.   Endocrine: Positive for polydipsia, polyphagia and polyuria.   Skin:  Positive for wound.   Neurological:  Positive for weakness. Negative for headaches.   All other systems reviewed and are negative.          Objective       ED Triage Vitals [12/26/24 1553]   Temperature Pulse Blood Pressure Respirations SpO2 Patient Position - Orthostatic VS   98.8 °F (37.1 °C) 105 (!) 175/92 16 95 % Lying      Temp Source Heart Rate Source BP Location FiO2 (%) Pain Score    Oral Monitor Right arm -- No Pain      Vitals      Date and Time Temp Pulse SpO2 Resp BP Pain Score FACES Pain Rating User   12/26/24 1553 98.8 °F (37.1 °C) 105 95 % 16 175/92 No Pain -- RR            Physical Exam  Vitals and nursing note reviewed.   Constitutional:       General: She is not in acute distress.     Appearance: She is well-developed. She is obese.   HENT:      Head: Normocephalic and atraumatic.      Nose: Nose normal.      Mouth/Throat:      Mouth: Mucous membranes are moist.      Pharynx: Oropharynx is clear.   Eyes:      Conjunctiva/sclera: Conjunctivae normal.   Cardiovascular:      Rate and Rhythm: Normal rate and regular rhythm.   Pulmonary:      Effort: Pulmonary effort is normal.      Breath sounds: Normal breath sounds.   Abdominal:      General: Bowel sounds are normal.      Palpations: Abdomen is soft.   Musculoskeletal:         General: Normal range of motion.      Cervical back: Normal range of motion.   Skin:     General: Skin is warm and dry.      Findings: Lesion and rash present.      Comments: Fungal rash noted to skin folds of right leg and left arm  Circular ulcerative dry lesion to distal aspect of left lateral thigh with dry skin surrounding, likely from pressure leaning against leg, no discharge, no erythema  Scattered psoriatic rash to right knee   Neurological:      General: No focal deficit present.       Mental Status: She is alert and oriented to person, place, and time.      Motor: No weakness.   Psychiatric:         Behavior: Behavior normal.         Results Reviewed       Procedure Component Value Units Date/Time    Lactic acid 2 Hours [[id number]]  (Abnormal) Collected: 12/26/24 1912    Lab Status: Final result Specimen: Blood from Arm, Left Updated: 12/26/24 1950     LACTIC ACID 2.1 mmol/L     Narrative:      Result may be elevated if tourniquet was used during collection.    Comprehensive metabolic panel [[id number]]  (Abnormal) Collected: 12/26/24 1637    Lab Status: Final result Specimen: Blood from Arm, Left Updated: 12/26/24 1707     Sodium 136 mmol/L      Potassium 4.2 mmol/L      Chloride 99 mmol/L      CO2 24 mmol/L      ANION GAP 13 mmol/L      BUN 19 mg/dL      Creatinine 0.89 mg/dL      Glucose 429 mg/dL      Calcium 10.1 mg/dL      AST 23 U/L      ALT 38 U/L      Alkaline Phosphatase 82 U/L      Total Protein 7.6 g/dL      Albumin 4.1 g/dL      Total Bilirubin 0.40 mg/dL      eGFR 75 ml/min/1.73sq m     Narrative:      National Kid[employer] Disease Foundation guidelines for Chronic Kidney Disease (CKD):     Stage 1 with normal or high GFR (GFR > 90 mL/min/1.73 square meters)    Stage 2 Mild CKD (GFR = 60-89 mL/min/1.73 square meters)    Stage 3A Moderate CKD (GFR = 45-59 mL/min/1.73 square meters)    Stage 3B Moderate CKD (GFR = 30-44 mL/min/1.73 square meters)    Stage 4 Severe CKD (GFR = 15-29 mL/min/1.73 square meters)    Stage 5 End Stage CKD (GFR <15 mL/min/1.73 square meters)  Note: GFR calculation is accurate only with a steady state creatinine    Lactic acid, plasma (w/reflex if result > 2.0) [[id number]]  (Abnormal) Collected: 12/26/24 1637    Lab Status: Final result Specimen: Blood from Arm, Left Updated: 12/26/24 1703     LACTIC ACID 2.9 mmol/L     Narrative:      Result may be elevated if tourniquet was used during collection.    Lipase [[id number]]  (Abnormal) Collected: 12/26/24 1637    Lab  Status: Final result Specimen: Blood from Arm, Left Updated: 12/26/24 1703     Lipase 84 u/L     Beta Hydroxybutyrate [[id number]]  (Normal) Collected: 12/26/24 1637    Lab Status: Final result Specimen: Blood from Arm, Left Updated: 12/26/24 1703     Beta- Hydroxybutyrate 0.08 mmol/L     FLU/COVID Rapid Antigen (30 min. TAT) - Preferred screening test in ED [[id number]]  (Normal) Collected: 12/26/24 1637    Lab Status: Final result Specimen: Nares from Nose Updated: 12/26/24 1703     SARS COV Rapid Antigen Negative     Influenza A Rapid Antigen Negative     Influenza B Rapid Antigen Negative    Narrative:      This test has been performed using the [employer] Annette 2 FLU+SARS Antigen test under the Emergency Use Authorization (EUA). This test has been validated by the  and verified by the performing laboratory. The Annette uses lateral flow immunofluorescent sandwich assay to detect SARS-COV, Influenza A and Influenza B Antigen.     The Quidel Annette 2 SARS Antigen test does not differentiate between SARS-CoV and SARS-CoV-2.     Negative results are presumptive and may be confirmed with a molecular assay, if necessary, for patient management. Negative results do not rule out SARS-CoV-2 or influenza infection and should not be used as the sole basis for treatment or patient management decisions. A negative test result may occur if the level of antigen in a sample is below the limit of detection of this test.     Positive results are indicative of the presence of viral antigens, but do not rule out bacterial infection or co-infection with other viruses.     All test results should be used as an adjunct to clinical observations and other information available to the provider.    FOR PEDIATRIC PATIENTS - copy/paste COVID Guidelines URL to browser: [email or web address]    CBC and differential [[id number]] Collected: 12/26/24 1637    Lab Status: Final result  Specimen: Blood from Arm, Left Updated: 24 1646     WBC 9.57 Thousand/uL      RBC 4.85 Million/uL      Hemoglobin 14.6 g/dL      Hematocrit 45.1 %      MCV 93 fL      MCH 30.1 pg      MCHC 32.4 g/dL      RDW 12.9 %      MPV 10.3 fL      Platelets 344 Thousands/uL      nRBC 0 /100 WBCs      Segmented % 56 %      Immature Grans % 1 %      Lymphocytes % 31 %      Monocytes % 9 %      Eosinophils Relative 2 %      Basophils Relative 1 %      Absolute Neutrophils 5.38 Thousands/µL      Absolute Immature Grans 0.07 Thousand/uL      Absolute Lymphocytes 3.00 Thousands/µL      Absolute Monocytes 0.82 Thousand/µL      Eosinophils Absolute 0.22 Thousand/µL      Basophils Absolute 0.08 Thousands/µL     UA w Reflex to Microscopic w Reflex to Culture [[id number]]     Lab Status: No result Specimen: Urine, Clean Catch     Fingerstick Glucose (POCT) [[id number]]  (Abnormal) Collected: 24 155    Lab Status: Final result Specimen: Blood Updated: 24     POC Glucose 377 mg/dl             XR chest 1 view portable   Final Interpretation by Meera Goldsmith MD ([id number])      Subtle opacity in the left base, question pneumonia in the appropriate clinical setting.            Workstation performed: [id number]             Procedures    ED Medication and Procedure Management   Prior to Admission Medications   Prescriptions Last Dose Informant Patient Reported? Taking?   ARIPiprazole (ABILIFY) 10 mg tablet   No No   Sig: Take 1 tablet (10 mg total) by mouth daily   HumaLOG KwikPen 100 units/mL injection pen  Self Yes No   Si Units   LORazepam (ATIVAN) 0.5 mg tablet   Yes No   Si.5 mg every 8 (eight) hours as needed for anxiety   acetaminophen (TYLENOL) 325 mg tablet   No No   Sig: Take 2 tablets (650 mg total) by mouth every 6 (six) hours as needed for mild pain   clotrimazole (LOTRIMIN) 1 % cream   No No   Sig: Apply topically 2 (two) times a day   desvenlafaxine succinate (PRISTIQ) 50 mg 24 hr tablet   No  CSF sampling No   Sig: Take 1 tablet (50 mg total) by mouth daily Do not start before February 22, 2023.   dicyclomine (BENTYL) 10 mg capsule   No No   Sig: Take 2 capsules (20 mg total) by mouth every 6 (six) hours as needed (crampy diarrhea)   dulaglutide (Trulicity) 1.5 MG/0.5ML injection   Yes No   Sig: Inject 1.5 mg under the skin Once a week   insulin glargine (LANTUS) 100 units/mL subcutaneous injection   No No   Sig: INJECT 40 UNITS UNDER THE SKIN EVERY MORNING DO NOT START BEFORE NOVEMBER 30, 2023.   insulin glargine (LANTUS) 100 units/mL subcutaneous injection   No No   Sig: INJECT 40 UNITS UNDER THE SKIN DAILY AT BEDTIME   lamoTRIgine (LaMICtal) 100 mg tablet   No No   Sig: Take 1 tablet (100 mg total) by mouth 2 (two) times a day   lisinopril (ZESTRIL) 10 mg tablet   No No   Sig: Take 2 tablets (20 mg total) by mouth daily   metFORMIN (GLUCOPHAGE) 500 mg tablet  Self No No   Sig: Take 1 tablet (500 mg total) by mouth 2 (two) times a day with meals   nystatin (MYCOSTATIN) powder   No No   Sig: Apply topically 3 (three) times a day --- Continue use for a week after rash resolves   ondansetron (ZOFRAN-ODT) 4 mg disintegrating tablet   No No   Sig: Take 1 tablet (4 mg total) by mouth every 6 (six) hours as needed for nausea or vomiting   ondansetron (ZOFRAN-ODT) 4 mg disintegrating tablet   No No   Sig: Take 1 tablet (4 mg total) by mouth every 6 (six) hours as needed for nausea or vomiting for up to 5 days   oxybutynin (DITROPAN) 5 mg tablet   No No   Sig: Take 1 tablet (5 mg total) by mouth 3 (three) times a day   phenazopyridine (PYRIDIUM) 100 mg tablet   No No   Sig: Take 1 tablet (100 mg total) by mouth 3 (three) times a day with meals   tamsulosin (FLOMAX) 0.4 mg   No No   Sig: Take 1 capsule (0.4 mg total) by mouth daily with dinner      Facility-Administered Medications: None     Patient's Medications   Discharge Prescriptions    AMOXICILLIN-CLAVULANATE (AUGMENTIN) 875-125 MG PER TABLET    Take 1 tablet by mouth  every 12 (twelve) hours for 7 days       Start Date: 12/26/2024End Date: 1/2/2025       Order Dose: 1 tablet       Quantity: 14 tablet    Refills: 0    AZITHROMYCIN (ZITHROMAX Z-APRIL) 250 MG TABLET    Take 2 tablets today then 1 tablet daily x 4 days Do not start before December 27, 2024.       Start Date: 12/27/2024End Date: 12/31/2024       Order Dose: --       Quantity: 6 tablet    Refills: 0     No discharge procedures on file.  ED SEPSIS DOCUMENTATION   Time reflects when diagnosis was documented in both MDM as applicable and the Disposition within this note       Time User Action Codes Description Comment    12/26/2024  8:10 PM Emilia Purcell Add [J18.9] Pneumonia     12/26/2024  8:10 PM Emilia Purcell Modify [J18.9] Pneumonia Left basilar    12/26/2024  8:30 PM Emilia Purcell Remove [J18.9] Pneumonia Left basilar    12/26/2024  8:30 PM Emilia Purcell Add [E11.65] Hyperglycemia due to type 2 diabetes mellitus (HCC)     12/26/2024  8:31 PM Emilia Purcell Add [J18.9] Pneumonia     12/26/2024  8:31 PM Emilia Purcell Modify [J18.9] Pneumonia basilar                 Emilia Purcell PA-C  12/26/24 2043

## 2024-12-27 NOTE — DISCHARGE INSTRUCTIONS
Continue to take your medications as directed.  Try to resume the Trulicity.    Take all oral antibiotics until done.      Use antifungal cream to skin fold areas.  Local wound care to left lateral thigh wound with soap and water, antibiotic ointment, Band-Aids, keep covered at all time.    Follow-up with your primary care doctor

## 2025-04-20 ENCOUNTER — HOSPITAL ENCOUNTER (EMERGENCY)
Facility: HOSPITAL | Age: 50
Discharge: HOME | End: 2025-04-20
Attending: EMERGENCY MEDICINE
Payer: COMMERCIAL

## 2025-04-20 VITALS
DIASTOLIC BLOOD PRESSURE: 66 MMHG | BODY MASS INDEX: 57.52 KG/M2 | SYSTOLIC BLOOD PRESSURE: 140 MMHG | WEIGHT: 293 LBS | RESPIRATION RATE: 22 BRPM | HEIGHT: 60 IN | HEART RATE: 100 BPM | TEMPERATURE: 98.1 F | OXYGEN SATURATION: 95 %

## 2025-04-20 DIAGNOSIS — M79.89 LEFT LEG SWELLING: Primary | ICD-10-CM

## 2025-04-20 DIAGNOSIS — R03.0 ELEVATED BLOOD PRESSURE READING: ICD-10-CM

## 2025-04-20 PROCEDURE — 99283 EMERGENCY DEPT VISIT LOW MDM: CPT

## 2025-04-20 PROCEDURE — 99285 EMERGENCY DEPT VISIT HI MDM: CPT | Performed by: EMERGENCY MEDICINE

## 2025-04-20 PROCEDURE — 96372 THER/PROPH/DIAG INJ SC/IM: CPT

## 2025-04-20 RX ORDER — ONDANSETRON 2 MG/ML
4 INJECTION INTRAMUSCULAR; INTRAVENOUS ONCE
Status: DISCONTINUED | OUTPATIENT
Start: 2025-04-20 | End: 2025-04-20

## 2025-04-20 RX ORDER — ENOXAPARIN SODIUM 100 MG/ML
60 INJECTION SUBCUTANEOUS ONCE
Status: COMPLETED | OUTPATIENT
Start: 2025-04-20 | End: 2025-04-20

## 2025-04-20 RX ORDER — ACETAMINOPHEN 325 MG/1
975 TABLET ORAL ONCE
Status: COMPLETED | OUTPATIENT
Start: 2025-04-20 | End: 2025-04-20

## 2025-04-20 RX ORDER — ENOXAPARIN SODIUM 100 MG/ML
50 INJECTION SUBCUTANEOUS ONCE
Status: COMPLETED | OUTPATIENT
Start: 2025-04-20 | End: 2025-04-20

## 2025-04-20 RX ADMIN — ENOXAPARIN SODIUM 50 MG: 100 INJECTION SUBCUTANEOUS at 22:37

## 2025-04-20 RX ADMIN — ACETAMINOPHEN 975 MG: 325 TABLET, FILM COATED ORAL at 21:33

## 2025-04-20 RX ADMIN — ENOXAPARIN SODIUM 60 MG: 60 INJECTION SUBCUTANEOUS at 21:41

## 2025-04-21 ENCOUNTER — HOSPITAL ENCOUNTER (EMERGENCY)
Facility: HOSPITAL | Age: 50
Discharge: HOME | End: 2025-04-21
Attending: STUDENT IN AN ORGANIZED HEALTH CARE EDUCATION/TRAINING PROGRAM
Payer: COMMERCIAL

## 2025-04-21 ENCOUNTER — HOSPITAL ENCOUNTER (OUTPATIENT)
Dept: VASCULAR ULTRASOUND | Facility: HOSPITAL | Age: 50
Discharge: HOME | End: 2025-04-21
Attending: EMERGENCY MEDICINE
Payer: COMMERCIAL

## 2025-04-21 VITALS
TEMPERATURE: 98.3 F | DIASTOLIC BLOOD PRESSURE: 82 MMHG | RESPIRATION RATE: 20 BRPM | SYSTOLIC BLOOD PRESSURE: 159 MMHG | HEART RATE: 105 BPM | OXYGEN SATURATION: 94 %

## 2025-04-21 DIAGNOSIS — M79.89 LEFT LEG SWELLING: ICD-10-CM

## 2025-04-21 DIAGNOSIS — M79.662 PAIN OF LEFT CALF: Primary | ICD-10-CM

## 2025-04-21 PROCEDURE — 93971 EXTREMITY STUDY: CPT | Performed by: SURGERY

## 2025-04-21 PROCEDURE — 93971 EXTREMITY STUDY: CPT

## 2025-04-21 PROCEDURE — 99284 EMERGENCY DEPT VISIT MOD MDM: CPT | Performed by: STUDENT IN AN ORGANIZED HEALTH CARE EDUCATION/TRAINING PROGRAM

## 2025-04-21 PROCEDURE — 99283 EMERGENCY DEPT VISIT LOW MDM: CPT

## 2025-04-21 PROCEDURE — 96372 THER/PROPH/DIAG INJ SC/IM: CPT

## 2025-04-21 RX ORDER — GABAPENTIN 100 MG/1
100 CAPSULE ORAL ONCE
Status: COMPLETED | OUTPATIENT
Start: 2025-04-21 | End: 2025-04-21

## 2025-04-21 RX ORDER — KETOROLAC TROMETHAMINE 30 MG/ML
15 INJECTION, SOLUTION INTRAMUSCULAR; INTRAVENOUS ONCE
Status: DISCONTINUED | OUTPATIENT
Start: 2025-04-21 | End: 2025-04-21

## 2025-04-21 RX ORDER — LIDOCAINE 50 MG/G
1 PATCH TOPICAL ONCE
Status: DISCONTINUED | OUTPATIENT
Start: 2025-04-21 | End: 2025-04-21 | Stop reason: HOSPADM

## 2025-04-21 RX ORDER — NAPROXEN 500 MG/1
500 TABLET ORAL 2 TIMES DAILY WITH MEALS
Qty: 20 TABLET | Refills: 0 | Status: SHIPPED | OUTPATIENT
Start: 2025-04-21 | End: 2025-05-01

## 2025-04-21 RX ORDER — GABAPENTIN 100 MG/1
100 CAPSULE ORAL 2 TIMES DAILY
Qty: 14 CAPSULE | Refills: 0 | Status: SHIPPED | OUTPATIENT
Start: 2025-04-21 | End: 2025-04-28

## 2025-04-21 RX ORDER — KETOROLAC TROMETHAMINE 30 MG/ML
15 INJECTION, SOLUTION INTRAMUSCULAR; INTRAVENOUS ONCE
Status: COMPLETED | OUTPATIENT
Start: 2025-04-21 | End: 2025-04-21

## 2025-04-21 RX ADMIN — KETOROLAC TROMETHAMINE 15 MG: 30 INJECTION, SOLUTION INTRAMUSCULAR; INTRAVENOUS at 20:50

## 2025-04-21 RX ADMIN — LIDOCAINE 1 PATCH: 700 PATCH TOPICAL at 20:50

## 2025-04-21 RX ADMIN — GABAPENTIN 100 MG: 100 CAPSULE ORAL at 20:50

## 2025-04-21 NOTE — ED PROVIDER NOTES
"Time reflects when diagnosis was documented in both MDM as applicable and the Disposition within this note       Time User Action Codes Description Comment    4/21/2025  8:22 PM Jer Avitia Add [M79.662] Pain of left calf           ED Disposition       ED Disposition   Discharge    Condition   Stable    Date/Time   Mon Apr 21, 2025  9:12 PM    Comment   Montse ANDRESSA Luis discharge to home/self care.                   Assessment & Plan       Medical Decision Making  Patient with history as below presented to triage with CC of \" Patient presents with:  Leg Swelling: L leg pain and swelling. Outpatient DVT study. No one called with results but states seen online negative. Pain is still present and would like further recommendations now that blood clot is ruled out   \"  Hx obtained from pt and spouse. Exam as below.    This 20-year-old female patient presents with atraumatic left leg pain, suspicious for referred pain from OA versus diabetic neuropathy. Able to flex and extend although somewhat limited by pain. Considered, but doubt, tibial plateau fracture, septic arthritis, compartment syndrome, other acute unstable fracture, or significant neurovascular compromise.  Patient with recent negative abdominal ultrasound, without hypoxia doubt acute DVT.  Discussed that initial ultrasound could be negative for acute DVT, advised repeat ultrasound in 1 week for further evaluation.  Will treat with NSAIDs, supportive care and gabapentin.    Plan: Multimodal pain control, reassessment, discharged with close outpatient follow-up with Ortho/PCP.      I have independently ordered, reviewed and interpreted the following: labs and/or imaging studies and or EKG listed below.  Reviewed external records including notes, and prior labs/imaging results.    Disposition: Patient stable for outpatient management. Discussed need for follow up with their primary doctor or specialist to review all results, including incidental findings as below. " Patient discharged with explanation of ED workup and diagnosis, instructions on how to obtain outpatient follow up, care instructions at home, and strict return precautions if patient develops new or worsening symptoms. Patients questions answered and agreeable with discharge plan.     See ED Course for further MDM.      PLEASE NOTE:  This encounter was completed utilizing the [employer] Direct Speech Voice Recognition Software. Grammatical errors, random word insertions, pronoun errors and incomplete sentences are occasional inherent consequences of the system due to software limitations, ambient noise and hardware issues.These may be missed by proof reading prior to affixing electronic signature. Any questions or concerns about the content, text or information contained within the body of this dictation should be directly addressed to the physician for clarification. Please do not hesitate to call me directly if you have any questions or concerns.      Amount and/or Complexity of Data Reviewed  Independent Historian: freddy  External Data Reviewed: radiology and notes.  Radiology:  Decision-making details documented in ED Course.    Risk  Prescription drug management.        ED Course as of 04/21/25 2347 Mon Apr 21, 2025 1958 Reviewed outpatient ultrasound of the left lower extremity done earlier today.    CONCLUSION:  Impression:  RIGHT LOWER LIMB LIMITED:  Unable to visualize the contralateral common femoral vein.     LEFT LOWER LIMB:  No gross evidence of acute or chronic deep vein thrombosis noted in the  visualized portions.  No evidence of superficial thrombophlebitis noted in the visualized portions.  Doppler evaluation shows a normal response to augmentation maneuvers.  Popliteal, posterior tibial and anterior tibial arterial Doppler waveforms are  triphasic.         Medications   gabapentin (NEURONTIN) capsule 100 mg (100 mg Oral Given 4/21/25 2050)   ketorolac (TORADOL) injection 15 mg (15 mg  Intramuscular Given 25)       ED Risk Strat Scores                    No data recorded                            History of Present Illness       Chief Complaint   Patient presents with    Leg Swelling     L leg pain and swelling. Outpatient DVT study. No one called with results but states seen online negative. Pain is still present and would like further recommendations now that blood clot is ruled out       Past Medical History:   Diagnosis Date    Anemia     Anxiety     Candidal intertrigo     Depression     Diabetes mellitus (HCC)     GERD (gastroesophageal reflux disease)     Hyperlipidemia     Hypertension     Hyponatremia 2023    Irritable bowel syndrome     Morbid obesity with BMI of 60.0-69.9, adult (HCC)     Osteoarthritis     Seasonal allergies     Sleep difficulties     Suicide attempt ([facility])       Past Surgical History:   Procedure Laterality Date     SECTION      CHOLECYSTECTOMY      FL RETROGRADE PYELOGRAM  3/21/2024    FL RETROGRADE PYELOGRAM  2024    VT CYSTO/URETERO W/LITHOTRIPSY &INDWELL STENT INSRT Left 3/21/2024    Procedure: CYSTOSCOPY, RETROGRADE PYELOGRAM AND INSERTION STENT URETERAL;  Surgeon: Nabil Desir MD;  Location: [facility];  Service: Urology    VT CYSTO/URETERO W/LITHOTRIPSY &INDWELL STENT INSRT Left 2024    Procedure: CYSTOSCOPY URETEROSCOPY WITH LITHOTRIPSY HOLMIUM LASER, RETROGRADE PYELOGRAM AND INSERTION STENT URETERAL;  Surgeon: Nabil Desir MD;  Location: [facility];  Service: Urology    WISDOM TOOTH EXTRACTION  2009      Family History   Problem Relation Age of Onset    Diabetes Mother     Hypertension Father       Social History     Tobacco Use    Smoking status: Never    Smokeless tobacco: Never   Vaping Use    Vaping status: Never Used   Substance Use Topics    Alcohol use: Not Currently     Comment: occassionaly     Drug use: No      E-Cigarette/Vaping    E-Cigarette Use Never User       E-Cigarette/Vaping Substances     Nicotine No     THC No     CBD No     Flavoring No     Other No     Unknown No       I have reviewed and agree with the history as documented.     50-year-old female with history of severe OA, obesity, diabetes, HTN presenting to the ED with complaints of left leg pain and swelling ongoing for the past 2 days.  Patient seen and evaluated in the ED last night for the same pain.  At that time ultrasound was unable to be performed due to time of day, however she had the ultrasound done earlier today which was negative for acute DVT.  Pain is described as throbbing and aching.  Taking Tylenol for the pain without much relief..  She presents today with the same pain and is concerned about possible other causes for her pain.  She denies any injury or trauma.  Denies fever, chills, overlying rash/erythema/swelling, urinary symptoms, shortness of breath, chest pain, numbness, tingling, weakness.        Review of Systems   Constitutional:  Negative for chills and fever.   HENT:  Negative for congestion and sore throat.    Eyes:  Negative for photophobia, pain, redness and visual disturbance.   Respiratory:  Negative for cough, chest tightness and shortness of breath.    Cardiovascular:  Negative for chest pain and palpitations.   Gastrointestinal:  Negative for abdominal pain, constipation, diarrhea, nausea and vomiting.   Genitourinary:  Negative for difficulty urinating, dysuria, flank pain, frequency, hematuria, pelvic pain, urgency, vaginal bleeding, vaginal discharge and vaginal pain.   Musculoskeletal:  Positive for arthralgias, joint swelling and myalgias. Negative for back pain, neck pain and neck stiffness.   Skin:  Negative for color change and rash.   Neurological:  Negative for dizziness, seizures, syncope, light-headedness, numbness and headaches.   All other systems reviewed and are negative.          Objective       ED Triage Vitals [04/21/25 1941]   Temperature Pulse Blood Pressure Respirations SpO2 Patient  Position - Orthostatic VS   98.3 °F (36.8 °C) 105 159/82 20 94 % --      Temp Source Heart Rate Source BP Location FiO2 (%) Pain Score    Oral Monitor Right arm -- 8      Vitals      Date and Time Temp Pulse SpO2 Resp BP Pain Score FACES Pain Rating User   04/21/25 2050 -- -- -- -- -- 8 -- SH   04/21/25 1941 98.3 °F (36.8 °C) 105 94 % 20 159/82 8 -- KB            Physical Exam  Vitals and nursing note reviewed.   Constitutional:       General: She is not in acute distress.     Appearance: She is well-developed. She is obese. She is not toxic-appearing.   HENT:      Head: Normocephalic and atraumatic.      Right Ear: External ear normal.      Left Ear: External ear normal.      Nose: Nose normal. No congestion.      Mouth/Throat:      Mouth: Mucous membranes are moist.      Pharynx: Oropharynx is clear. No oropharyngeal exudate or posterior oropharyngeal erythema.   Eyes:      Extraocular Movements: Extraocular movements intact.      Conjunctiva/sclera: Conjunctivae normal.      Pupils: Pupils are equal, round, and reactive to light.   Cardiovascular:      Rate and Rhythm: Normal rate and regular rhythm.      Pulses: Normal pulses.      Heart sounds: Normal heart sounds. No murmur heard.  Pulmonary:      Effort: Pulmonary effort is normal. No respiratory distress.      Breath sounds: Normal breath sounds. No stridor. No wheezing, rhonchi or rales.   Abdominal:      General: Bowel sounds are normal.      Palpations: Abdomen is soft.      Tenderness: There is no abdominal tenderness. There is no right CVA tenderness, left CVA tenderness, guarding or rebound.   Musculoskeletal:         General: Tenderness (Posterior lower leg.) present. No swelling or deformity.      Cervical back: Normal range of motion and neck supple. No rigidity or tenderness.      Right lower leg: No edema.      Left lower leg: No edema.   Lymphadenopathy:      Cervical: No cervical adenopathy.   Skin:     General: Skin is warm and dry.       Capillary Refill: Capillary refill takes less than 2 seconds.      Coloration: Skin is not jaundiced or pale.      Findings: No bruising, erythema, lesion or rash.   Neurological:      General: No focal deficit present.      Mental Status: She is alert and oriented to person, place, and time. Mental status is at baseline.      GCS: GCS eye subscore is 4. GCS verbal subscore is 5. GCS motor subscore is 6.      Cranial Nerves: Cranial nerves 2-12 are intact. No cranial nerve deficit, dysarthria or facial asymmetry.      Sensory: Sensation is intact. No sensory deficit.      Motor: Motor function is intact. No weakness.      Coordination: Coordination is intact. Coordination normal.      Gait: Gait is intact.   Psychiatric:         Mood and Affect: Mood normal.         Behavior: Behavior normal.         Thought Content: Thought content normal.         Results Reviewed       None            VAS VENOUS DUPLEX -LOWER LIMB UNILATERAL    (Results Pending)       Procedures    ED Medication and Procedure Management   Prior to Admission Medications   Prescriptions Last Dose Informant Patient Reported? Taking?   ARIPiprazole (ABILIFY) 10 mg tablet   No No   Sig: Take 1 tablet (10 mg total) by mouth daily   HumaLOG KwikPen 100 units/mL injection pen  Self Yes No   Si Units   LORazepam (ATIVAN) 0.5 mg tablet   Yes No   Si.5 mg every 8 (eight) hours as needed for anxiety   acetaminophen (TYLENOL) 325 mg tablet   No No   Sig: Take 2 tablets (650 mg total) by mouth every 6 (six) hours as needed for mild pain   clotrimazole (LOTRIMIN) 1 % cream   No No   Sig: Apply topically 2 (two) times a day   desvenlafaxine succinate (PRISTIQ) 50 mg 24 hr tablet   No No   Sig: Take 1 tablet (50 mg total) by mouth daily Do not start before 2023.   dicyclomine (BENTYL) 10 mg capsule   No No   Sig: Take 2 capsules (20 mg total) by mouth every 6 (six) hours as needed (crampy diarrhea)   dulaglutide (Trulicity) 1.5 MG/0.5ML  injection   Yes No   Sig: Inject 1.5 mg under the skin Once a week   insulin glargine (LANTUS) 100 units/mL subcutaneous injection   No No   Sig: INJECT 40 UNITS UNDER THE SKIN EVERY MORNING DO NOT START BEFORE NOVEMBER 30, 2023.   insulin glargine (LANTUS) 100 units/mL subcutaneous injection   No No   Sig: INJECT 40 UNITS UNDER THE SKIN DAILY AT BEDTIME   lamoTRIgine (LaMICtal) 100 mg tablet   No No   Sig: Take 1 tablet (100 mg total) by mouth 2 (two) times a day   lisinopril (ZESTRIL) 10 mg tablet   No No   Sig: Take 2 tablets (20 mg total) by mouth daily   metFORMIN (GLUCOPHAGE) 500 mg tablet  Self No No   Sig: Take 1 tablet (500 mg total) by mouth 2 (two) times a day with meals   nystatin (MYCOSTATIN) powder   No No   Sig: Apply topically 3 (three) times a day --- Continue use for a week after rash resolves   ondansetron (ZOFRAN-ODT) 4 mg disintegrating tablet   No No   Sig: Take 1 tablet (4 mg total) by mouth every 6 (six) hours as needed for nausea or vomiting   ondansetron (ZOFRAN-ODT) 4 mg disintegrating tablet   No No   Sig: Take 1 tablet (4 mg total) by mouth every 6 (six) hours as needed for nausea or vomiting for up to 5 days   oxybutynin (DITROPAN) 5 mg tablet   No No   Sig: Take 1 tablet (5 mg total) by mouth 3 (three) times a day   phenazopyridine (PYRIDIUM) 100 mg tablet   No No   Sig: Take 1 tablet (100 mg total) by mouth 3 (three) times a day with meals   tamsulosin (FLOMAX) 0.4 mg   No No   Sig: Take 1 capsule (0.4 mg total) by mouth daily with dinner      Facility-Administered Medications: None     Discharge Medication List as of 4/21/2025  9:12 PM        START taking these medications    Details   gabapentin (NEURONTIN) 100 mg capsule Take 1 capsule (100 mg total) by mouth 2 (two) times a day for 7 days, Starting Mon 4/21/2025, Until Mon 4/28/2025, Print      naproxen (EC NAPROSYN) 500 MG EC tablet Take 1 tablet (500 mg total) by mouth 2 (two) times a day with meals for 10 days, Starting Mon  4/21/2025, Until Thu 5/1/2025, Print           CONTINUE these medications which have NOT CHANGED    Details   acetaminophen (TYLENOL) 325 mg tablet Take 2 tablets (650 mg total) by mouth every 6 (six) hours as needed for mild pain, Starting Tue 2/21/2023, No Print      ARIPiprazole (ABILIFY) 10 mg tablet Take 1 tablet (10 mg total) by mouth daily, Starting Tue 3/26/2024, Normal      clotrimazole (LOTRIMIN) 1 % cream Apply topically 2 (two) times a day, Starting Mon 3/25/2024, Normal      desvenlafaxine succinate (PRISTIQ) 50 mg 24 hr tablet Take 1 tablet (50 mg total) by mouth daily Do not start before February 22, 2023., Starting Wed 2/22/2023, No Print      dicyclomine (BENTYL) 10 mg capsule Take 2 capsules (20 mg total) by mouth every 6 (six) hours as needed (crampy diarrhea), Starting Tue 2/21/2023, Normal      dulaglutide (Trulicity) 1.5 MG/0.5ML injection Inject 1.5 mg under the skin Once a week, Starting Thu 1/18/2024, Historical Med      HumaLOG KwikPen 100 units/mL injection pen 18 Units, Starting Mon 9/13/2021, Historical Med      !! insulin glargine (LANTUS) 100 units/mL subcutaneous injection INJECT 40 UNITS UNDER THE SKIN EVERY MORNING DO NOT START BEFORE NOVEMBER 30, 2023., Starting Wed 12/20/2023, Normal      !! insulin glargine (LANTUS) 100 units/mL subcutaneous injection INJECT 40 UNITS UNDER THE SKIN DAILY AT BEDTIME, Starting Tue 1/23/2024, Normal      lamoTRIgine (LaMICtal) 100 mg tablet Take 1 tablet (100 mg total) by mouth 2 (two) times a day, Starting Tue 2/21/2023, No Print      lisinopril (ZESTRIL) 10 mg tablet Take 2 tablets (20 mg total) by mouth daily, Starting Sun 12/4/2022, No Print      LORazepam (ATIVAN) 0.5 mg tablet 0.5 mg every 8 (eight) hours as needed for anxiety, Starting Wed 10/6/2021, Historical Med      metFORMIN (GLUCOPHAGE) 500 mg tablet Take 1 tablet (500 mg total) by mouth 2 (two) times a day with meals, Starting Mon 8/31/2020, Until Tue 4/23/2024, Normal      nystatin  (MYCOSTATIN) powder Apply topically 3 (three) times a day --- Continue use for a week after rash resolves, Starting Fri 5/3/2024, Normal      ondansetron (ZOFRAN-ODT) 4 mg disintegrating tablet Take 1 tablet (4 mg total) by mouth every 6 (six) hours as needed for nausea or vomiting, Starting Sun 8/25/2024, Normal      oxybutynin (DITROPAN) 5 mg tablet Take 1 tablet (5 mg total) by mouth 3 (three) times a day, Starting Fri 4/12/2024, Normal      phenazopyridine (PYRIDIUM) 100 mg tablet Take 1 tablet (100 mg total) by mouth 3 (three) times a day with meals, Starting Fri 4/12/2024, Normal      tamsulosin (FLOMAX) 0.4 mg Take 1 capsule (0.4 mg total) by mouth daily with dinner, Starting Sat 4/13/2024, Normal       !! - Potential duplicate medications found. Please discuss with provider.        Outpatient Discharge Orders   Ambulatory Referral to Orthopedic Surgery   Standing Status: Future Standing Exp. Date: 04/21/26      VAS VENOUS DUPLEX -LOWER LIMB UNILATERAL   Standing Status: Future Standing Exp. Date: 04/21/29     ED SEPSIS DOCUMENTATION   Time reflects when diagnosis was documented in both MDM as applicable and the Disposition within this note       Time User Action Codes Description Comment    4/21/2025  8:22 PM Jer Avitia Add [M79.662] Pain of left calf                  Jer Avitia DO  04/21/25 8984

## 2025-04-21 NOTE — ED PROVIDER NOTES
Time reflects when diagnosis was documented in both MDM as applicable and the Disposition within this note       Time User Action Codes Description Comment    4/20/2025  9:53 PM DanialJd garibayrobert Add [M79.89] Left leg swelling     4/20/2025 10:19 PM SitaSoumya talamantes Add [R03.0] Elevated blood pressure reading           ED Disposition       ED Disposition   Discharge    Condition   Stable    Date/Time   Sun Apr 20, 2025  9:53 PM    Comment   Montse Smith discharge to home/self care.                   Assessment & Plan       Medical Decision Making  50-year-old female presents for evaluation of leg swelling.  Differentials include but not limited to DVT, superficial thrombophlebitis, cellulitis.  Tenderness to palpation in posterior calf, with swelling and erythema noted.  Unable to obtain DVT in house as it is after hours, so had shared decision making with patient, and give her single dose of Lovenox here in the ED, and will  send her for outpatient DVT study tomorrow morning.  Tylenol given for pain management.  Discussed return precautions.  He is amenable to this plan.  Is stable for dispo.    Risk  OTC drugs.  Prescription drug management.             Medications   acetaminophen (TYLENOL) tablet 975 mg (975 mg Oral Given 4/20/25 2133)   enoxaparin (LOVENOX) subcutaneous injection 60 mg (60 mg Subcutaneous Given 4/20/25 2141)   enoxaparin (LOVENOX) subcutaneous injection 50 mg (50 mg Subcutaneous Given 4/20/25 2237)       ED Risk Strat Scores                    No data recorded        SBIRT 22yo+      Flowsheet Row Most Recent Value   Initial Alcohol Screen: US AUDIT-C     1. How often do you have a drink containing alcohol? 0 Filed at: 04/20/2025 2124   2. How many drinks containing alcohol do you have on a typical day you are drinking?  0 Filed at: 04/20/2025 2124   3a. Male UNDER 65: How often do you have five or more drinks on one occasion? 0 Filed at: 04/20/2025 2124   3b. FEMALE Any Age, or MALE 65+:  How often do you have 4 or more drinks on one occassion? 0 Filed at: 2025   Audit-C Score 0 Filed at: 2025   JUAN: How many times in the past year have you...    Used an illegal drug or used a prescription medication for non-medical reasons? Never Filed at: 2025                            History of Present Illness       Chief Complaint   Patient presents with    Leg Swelling     C/o left lower leg swelling since last night. Pt reports pain since this AM, throbbing pain. No HX dvt. Denies cp, sob.        Past Medical History:   Diagnosis Date    Anemia     Anxiety     Candidal intertrigo     Depression     Diabetes mellitus (HCC)     GERD (gastroesophageal reflux disease)     Hyperlipidemia     Hypertension     Hyponatremia 2023    Irritable bowel syndrome     Morbid obesity with BMI of 60.0-69.9, adult (HCC)     Osteoarthritis     Seasonal allergies     Sleep difficulties     Suicide attempt (HCC)       Past Surgical History:   Procedure Laterality Date     SECTION  1992    CHOLECYSTECTOMY      FL RETROGRADE PYELOGRAM  3/21/2024    FL RETROGRADE PYELOGRAM  2024    WY CYSTO/URETERO W/LITHOTRIPSY &INDWELL STENT INSRT Left 3/21/2024    Procedure: CYSTOSCOPY, RETROGRADE PYELOGRAM AND INSERTION STENT URETERAL;  Surgeon: Nabil Desir MD;  Location: [facility];  Service: Urology    WY CYSTO/URETERO W/LITHOTRIPSY &INDWELL STENT INSRT Left 2024    Procedure: CYSTOSCOPY URETEROSCOPY WITH LITHOTRIPSY HOLMIUM LASER, RETROGRADE PYELOGRAM AND INSERTION STENT URETERAL;  Surgeon: Nabil Desir MD;  Location: [facility];  Service: Urology    WISDOM TOOTH EXTRACTION  2009      Family History   Problem Relation Age of Onset    Diabetes Mother     Hypertension Father       Social History     Tobacco Use    Smoking status: Never    Smokeless tobacco: Never   Vaping Use    Vaping status: Never Used   Substance Use Topics    Alcohol use: Not Currently     Comment:  occassionaly     Drug use: No      E-Cigarette/Vaping    E-Cigarette Use Never User       E-Cigarette/Vaping Substances    Nicotine No     THC No     CBD No     Flavoring No     Other No     Unknown No       I have reviewed and agree with the history as documented.     49 yo female presents for evaluation of left leg swelling and pain. She reports left leg swelling that started yesterday evening, and pain that started this morning. Pain is constant, located in the posterior calf. No trauma noted to the leg.  She denies chest pain, SOB, pleurisy, abdominal pain.  Still able to ambulate.  No other concerns at this time.          Review of Systems   Respiratory:  Negative for shortness of breath.    Cardiovascular:  Positive for leg swelling. Negative for chest pain.   Musculoskeletal:  Positive for myalgias.           Objective       ED Triage Vitals [04/20/25 2047]   Temperature Pulse Blood Pressure Respirations SpO2 Patient Position - Orthostatic VS   98.1 °F (36.7 °C) 104 (!) 176/96 (!) 24 95 % Lying      Temp Source Heart Rate Source BP Location FiO2 (%) Pain Score    Oral Monitor Right arm -- 8      Vitals      Date and Time Temp Pulse SpO2 Resp BP Pain Score FACES Pain Rating User   04/20/25 2230 -- 100 95 % 22 140/66 -- -- EB   04/20/25 2200 -- 100 94 % 22 165/79 -- -- EB   04/20/25 2130 -- 99 97 % 22 163/79 -- -- EB   04/20/25 2100 -- 100 94 % 22 164/90 -- -- EB   04/20/25 2047 98.1 °F (36.7 °C) 104 95 % 24 176/96 8 --             Physical Exam  Vitals and nursing note reviewed.   Constitutional:       General: She is not in acute distress.     Appearance: She is well-developed. She is obese.   HENT:      Head: Normocephalic and atraumatic.   Eyes:      Conjunctiva/sclera: Conjunctivae normal.      Pupils: Pupils are equal, round, and reactive to light.   Cardiovascular:      Rate and Rhythm: Normal rate and regular rhythm.      Heart sounds: No murmur heard.  Pulmonary:      Effort: Pulmonary effort is  normal. No respiratory distress.      Breath sounds: Normal breath sounds.   Abdominal:      Palpations: Abdomen is soft.      Tenderness: There is no abdominal tenderness.   Musculoskeletal:         General: Swelling present.      Cervical back: Neck supple.      Comments: Mild swelling noted to left lower extremity, with tenderness to palpation of posterior calf, and mild erythema noted.  DP pulses intact.   Skin:     General: Skin is warm and dry.      Capillary Refill: Capillary refill takes less than 2 seconds.   Neurological:      Mental Status: She is alert.   Psychiatric:         Mood and Affect: Mood normal.         Results Reviewed       None            No orders to display       Procedures    ED Medication and Procedure Management   Prior to Admission Medications   Prescriptions Last Dose Informant Patient Reported? Taking?   ARIPiprazole (ABILIFY) 10 mg tablet   No No   Sig: Take 1 tablet (10 mg total) by mouth daily   HumaLOG KwikPen 100 units/mL injection pen  Self Yes No   Si Units   LORazepam (ATIVAN) 0.5 mg tablet   Yes No   Si.5 mg every 8 (eight) hours as needed for anxiety   acetaminophen (TYLENOL) 325 mg tablet   No No   Sig: Take 2 tablets (650 mg total) by mouth every 6 (six) hours as needed for mild pain   clotrimazole (LOTRIMIN) 1 % cream   No No   Sig: Apply topically 2 (two) times a day   desvenlafaxine succinate (PRISTIQ) 50 mg 24 hr tablet   No No   Sig: Take 1 tablet (50 mg total) by mouth daily Do not start before 2023.   dicyclomine (BENTYL) 10 mg capsule   No No   Sig: Take 2 capsules (20 mg total) by mouth every 6 (six) hours as needed (crampy diarrhea)   dulaglutide (Trulicity) 1.5 MG/0.5ML injection   Yes No   Sig: Inject 1.5 mg under the skin Once a week   insulin glargine (LANTUS) 100 units/mL subcutaneous injection   No No   Sig: INJECT 40 UNITS UNDER THE SKIN EVERY MORNING DO NOT START BEFORE 2023.   insulin glargine (LANTUS) 100 units/mL  subcutaneous injection   No No   Sig: INJECT 40 UNITS UNDER THE SKIN DAILY AT BEDTIME   lamoTRIgine (LaMICtal) 100 mg tablet   No No   Sig: Take 1 tablet (100 mg total) by mouth 2 (two) times a day   lisinopril (ZESTRIL) 10 mg tablet   No No   Sig: Take 2 tablets (20 mg total) by mouth daily   metFORMIN (GLUCOPHAGE) 500 mg tablet  Self No No   Sig: Take 1 tablet (500 mg total) by mouth 2 (two) times a day with meals   nystatin (MYCOSTATIN) powder   No No   Sig: Apply topically 3 (three) times a day --- Continue use for a week after rash resolves   ondansetron (ZOFRAN-ODT) 4 mg disintegrating tablet   No No   Sig: Take 1 tablet (4 mg total) by mouth every 6 (six) hours as needed for nausea or vomiting   ondansetron (ZOFRAN-ODT) 4 mg disintegrating tablet   No No   Sig: Take 1 tablet (4 mg total) by mouth every 6 (six) hours as needed for nausea or vomiting for up to 5 days   oxybutynin (DITROPAN) 5 mg tablet   No No   Sig: Take 1 tablet (5 mg total) by mouth 3 (three) times a day   phenazopyridine (PYRIDIUM) 100 mg tablet   No No   Sig: Take 1 tablet (100 mg total) by mouth 3 (three) times a day with meals   tamsulosin (FLOMAX) 0.4 mg   No No   Sig: Take 1 capsule (0.4 mg total) by mouth daily with dinner      Facility-Administered Medications: None     Discharge Medication List as of 4/20/2025 10:23 PM        CONTINUE these medications which have NOT CHANGED    Details   acetaminophen (TYLENOL) 325 mg tablet Take 2 tablets (650 mg total) by mouth every 6 (six) hours as needed for mild pain, Starting Tue 2/21/2023, No Print      ARIPiprazole (ABILIFY) 10 mg tablet Take 1 tablet (10 mg total) by mouth daily, Starting Tue 3/26/2024, Normal      clotrimazole (LOTRIMIN) 1 % cream Apply topically 2 (two) times a day, Starting Mon 3/25/2024, Normal      desvenlafaxine succinate (PRISTIQ) 50 mg 24 hr tablet Take 1 tablet (50 mg total) by mouth daily Do not start before February 22, 2023., Starting Wed 2/22/2023, No Print       dicyclomine (BENTYL) 10 mg capsule Take 2 capsules (20 mg total) by mouth every 6 (six) hours as needed (crampy diarrhea), Starting Tue 2/21/2023, Normal      dulaglutide (Trulicity) 1.5 MG/0.5ML injection Inject 1.5 mg under the skin Once a week, Starting Thu 1/18/2024, Historical Med      HumaLOG KwikPen 100 units/mL injection pen 18 Units, Starting Mon 9/13/2021, Historical Med      !! insulin glargine (LANTUS) 100 units/mL subcutaneous injection INJECT 40 UNITS UNDER THE SKIN EVERY MORNING DO NOT START BEFORE NOVEMBER 30, 2023., Starting Wed 12/20/2023, Normal      !! insulin glargine (LANTUS) 100 units/mL subcutaneous injection INJECT 40 UNITS UNDER THE SKIN DAILY AT BEDTIME, Starting Tue 1/23/2024, Normal      lamoTRIgine (LaMICtal) 100 mg tablet Take 1 tablet (100 mg total) by mouth 2 (two) times a day, Starting Tue 2/21/2023, No Print      lisinopril (ZESTRIL) 10 mg tablet Take 2 tablets (20 mg total) by mouth daily, Starting Sun 12/4/2022, No Print      LORazepam (ATIVAN) 0.5 mg tablet 0.5 mg every 8 (eight) hours as needed for anxiety, Starting Wed 10/6/2021, Historical Med      metFORMIN (GLUCOPHAGE) 500 mg tablet Take 1 tablet (500 mg total) by mouth 2 (two) times a day with meals, Starting Mon 8/31/2020, Until Tue 4/23/2024, Normal      nystatin (MYCOSTATIN) powder Apply topically 3 (three) times a day --- Continue use for a week after rash resolves, Starting Fri 5/3/2024, Normal      ondansetron (ZOFRAN-ODT) 4 mg disintegrating tablet Take 1 tablet (4 mg total) by mouth every 6 (six) hours as needed for nausea or vomiting, Starting Sun 8/25/2024, Normal      oxybutynin (DITROPAN) 5 mg tablet Take 1 tablet (5 mg total) by mouth 3 (three) times a day, Starting Fri 4/12/2024, Normal      phenazopyridine (PYRIDIUM) 100 mg tablet Take 1 tablet (100 mg total) by mouth 3 (three) times a day with meals, Starting Fri 4/12/2024, Normal      tamsulosin (FLOMAX) 0.4 mg Take 1 capsule (0.4 mg total) by mouth  daily with dinner, Starting Sat 4/13/2024, Normal       !! - Potential duplicate medications found. Please discuss with provider.        Outpatient Discharge Orders   VAS VENOUS DUPLEX -LOWER LIMB UNILATERAL   Standing Status: Future Number of Occurrences: 1 Standing Exp. Date: 04/20/29     ED SEPSIS DOCUMENTATION   Time reflects when diagnosis was documented in both MDM as applicable and the Disposition within this note       Time User Action Codes Description Comment    4/20/2025  9:53 PM Soumya Kim Add [M79.89] Left leg swelling     4/20/2025 10:19 PM Soumya Kim Add [R03.0] Elevated blood pressure reading                  Soumya Kim MD  04/22/25 0245

## 2025-04-21 NOTE — ED ATTENDING ATTESTATION
4/20/2025  I, Bobby Zhu MD, saw and evaluated the patient. I have discussed the patient with the resident/non-physician practitioner and agree with the resident's/non-physician practitioner's findings, Plan of Care, and MDM as documented in the resident's/non-physician practitioner's note, except where noted. All available labs and Radiology studies were reviewed.  I was present for key portions of any procedure(s) performed by the resident/non-physician practitioner and I was immediately available to provide assistance.       At this point I agree with the current assessment done in the Emergency Department.  I have conducted an independent evaluation of this patient a history and physical is as follows:    History    Patient is a 50-year-old female, with a history significant for diabetes and obesity per my review of the medical record, who presents to the ED today for evaluation of a 2-day history of gradual onset, constant, throbbing in character, atraumatic, progressively worsening left posterior calf pain.  Patient denies fever, chills, chest pain, dyspnea, polyuria, dysuria, abdominal pain, history of VTE, recent trauma or surgery, hemoptysis, urinary symptoms, weakness, numbness.  Patient has not taken anything to remit her symptoms.  Movement exacerbates her discomfort.  Patient reports associated swelling.    Patient's , present in room and finding collateral history, states patient is not confused.    Patient is without other concerns at this time.     ROS  Patient denies: Fever; dysphagia; vision change; chest pain; dyspnea; abdominal pain; polyuria; dysuria; rash; weakness; numbness; difficulty walking; confusion    Physical Exam    GENERAL APPEARANCE: NAD  NEURO: Patient is speaking clearly in complete sentences.  Patient is answering appropriately and able follow commands.  Patient is moving all four extremities spontaneously.  No facial droop.  Tongue midline.  HEENT: PERRL,  Moist mucous membranes, external ears normal, nose normal  Neck: No cervical adenopathy  CV: RRR. No murmurs, rubs, gallops.  2+ DP pulse on left.  LUNGS: Clear to auscultation: No wheezes, stridor, rhonchi, rales  GI: Abdomen non-distended. Soft. Non-tender and without rebound or guarding   : Deferred at this time  MSK: No deformity.  Soft compartments, no pain out of proportion to exam.  Tenderness to palpation in the posterior proximal calf on the left.  Full range of motion of the left knee.  No erythema/abnormal warmth.  Skin: Warm and dry  Capillary refill: <2 seconds    Patient is currently afebrile, slightly tachycardic (appears to be baseline for patient per my review of the medical record) and slightly tachypneic.  SPO2 at baseline for patient per my review of the medical record.  Otherwise hemodynamically stable.    Assessment/Plan/MDM  Left lower extremity pain and calf  -This presentation is concerning for: DVT, sprain, strain, Baker's cyst.  No reason to suspect septic arthritis, cellulitis, NSTI, compartment syndrome based on history/physical exam.  - Given time of day, duplex ultrasound not available in emergency department.  After discussion of risks and benefits, patient would like VTE treatment and duplex imaging tomorrow.  Strict return precautions provided.  Patient in agreement.    - Abnormal vital signs noted; however, given patient's conversational and not altered mental status, previously on multiple occasions elevated heart rate, lack of symptoms, and overall reassuring exam, low suspicion for acute pathology such as PE, metabolic disorder, pneumothorax, ACS, anemia.  Furthermore, we will be treating with therapeutic Lovenox.  Furthermore, given lack of bleeding and normal hemoglobin during last CBC months ago, doubt anemia.    ED Course  ED Course as of 04/20/25 2304   Sun Apr 20, 2025 2113 Pulse: 104  Persistently elevated/similar to prior    2119 Respirations(!): 24  Patient appeared  to be breathing heavily after walking. Will recheck    2232 SpO2: 94 %  Similar to prior evaluations    2246 Denies history of cancer, intracranial hemorrhage, vaginal bleeding, GI bleeding.  Patient continues to deny chest pain/dyspnea.  Respiratory rate improved and patient has no increased respiratory effort.  Saturating well.  Left lower extremity grossly unchanged on reevaluation.  Patient, after discussion of recent events, expressed preference for anticoagulation at this time.  Will use obesity adjusted therapeutic Lovenox dosing until patient can get duplex tomorrow.  Strict return precautions provided.         Critical Care Time  Procedures

## 2025-04-21 NOTE — DISCHARGE INSTRUCTIONS
Please follow up with your PCP for further discussion of elevated BP.     Please get your DVT US in the morning.

## 2025-04-22 NOTE — DISCHARGE INSTRUCTIONS
Today Montse Smith was seen in the emergency department for left leg pain. I believe the symptoms to be the result of arthritis with possible neuropathy. At this time there does not appear to be an emergent life threatening cause to explain these symptoms. Montse is stable for discharge with outpatient follow up.     Please take NSAIDs like we discussed, in conjunction with ice/heat, lidocaine patches as needed, and trial of gabapentin.  Please discuss with your PCP if you have continued pain, for repeat ultrasound to evaluate for blood clots as sometimes blood clots are not seen on on initial testing.    Please follow up with your primary care provider in the week.  Ortho referral was placed for you please call them tomorrow to be seen at the next available appointment. Please review all results discussed today with your primary care provider and/or specialist.    Please return to the emergency department as soon as possible if you develop uncontrollable fevers (Temp >100.4F), uncontrollable pain, vomiting, chest pain, trouble breathing, weakness on one side of your body, slurred speech, vision changes or any other concerning symptoms.

## 2025-04-28 NOTE — ED ATTENDING ATTESTATION
I, Taniya Dejesus MD, saw and evaluated the patient. I have discussed the patient with the resident/non-physician practitioner and agree with the resident's/non-physician practitioner's findings, Plan of Care, and MDM as documented in the resident's/non-physician practitioner's note, except where noted. All available labs and Radiology studies were reviewed.  I was present for key portions of any procedure(s) performed by the resident/non-physician practitioner and I was immediately available to provide assistance.       At this point I agree with the current assessment done in the Emergency Department.  I have conducted an independent evaluation of this patient a history and physical is as follows:    Subjective: 50-year-old female with history of osteoarthritis, obesity, diabetes, hypertension presenting with left lower extremity calf pain x 2 days.  She reports that she was seen in the emergency department yesterday evening and was evaluated DVT ultrasound today which was negative for DVT.  Given her ongoing pain she wanted to present to care for further evaluation.  She denies numbness/tingling/weakness, rash, fever/chills, recent trauma, cough/hemoptysis, shortness of breath, chest pain, recent immobilization, or any other complaints.    Objective: Vital signs stable and afebrile.  2+ DP/PT pulses with SILT in all dermatomes and 5/5 strength with dorsi/plantarflexion bilaterally.  No lower extremity edema and legs are equal in size.  Able to range the knee fully bilaterally.    Assessment/Plan: 50-year-old female with history of osteoarthritis, obesity, diabetes, hypertension presenting with left lower extremity pain.  Vital signs stable and afebrile.  Patient denying risk factors for DVT/PE and denies symptoms concerning for acute thromboembolus.  She is neurovascularly intact in the left lower extremity and the leg is equal in size with the right.  Differential includes arthritis, DVT, less likely Baker's cyst,  restless leg syndrome, musculoskeletal strain.  Patient advised that she should return for repeat DVT ultrasound in 1 week and was given strict return precautions in the intervening time.    Patient tolerated p.o. and ambulatory challenges in the emergency department and had stable vitals at time of discharge.    Patient was discharged to home with strict return precautions. Patient acknowledged understanding of plan and diagnostic results, and all their questions were answered to their satisfaction.

## 2025-05-04 ENCOUNTER — HOSPITAL ENCOUNTER (EMERGENCY)
Facility: HOSPITAL | Age: 50
Discharge: HOME | End: 2025-05-04
Attending: STUDENT IN AN ORGANIZED HEALTH CARE EDUCATION/TRAINING PROGRAM
Payer: COMMERCIAL

## 2025-05-04 VITALS
DIASTOLIC BLOOD PRESSURE: 63 MMHG | SYSTOLIC BLOOD PRESSURE: 124 MMHG | HEART RATE: 99 BPM | RESPIRATION RATE: 18 BRPM | OXYGEN SATURATION: 94 % | TEMPERATURE: 99 F

## 2025-05-04 DIAGNOSIS — M54.50 ACUTE RIGHT-SIDED LOW BACK PAIN WITHOUT SCIATICA: Primary | ICD-10-CM

## 2025-05-04 DIAGNOSIS — N39.0 UTI (URINARY TRACT INFECTION): ICD-10-CM

## 2025-05-04 LAB
BACTERIA UR QL AUTO: ABNORMAL /HPF
BILIRUB UR QL STRIP: NEGATIVE
CLARITY UR: ABNORMAL
COLOR UR: YELLOW
GLUCOSE UR STRIP-MCNC: NEGATIVE MG/DL
HGB UR QL STRIP.AUTO: ABNORMAL
KETONES UR STRIP-MCNC: NEGATIVE MG/DL
LEUKOCYTE ESTERASE UR QL STRIP: ABNORMAL
MUCOUS THREADS UR QL AUTO: ABNORMAL
NITRITE UR QL STRIP: NEGATIVE
NON-SQ EPI CELLS URNS QL MICRO: ABNORMAL /HPF
PH UR STRIP.AUTO: 5.5 [PH]
PROT UR STRIP-MCNC: ABNORMAL MG/DL
RBC #/AREA URNS AUTO: ABNORMAL /HPF
SP GR UR STRIP.AUTO: 1.02 (ref 1–1.03)
UROBILINOGEN UR STRIP-ACNC: <2 MG/DL
WBC #/AREA URNS AUTO: ABNORMAL /HPF

## 2025-05-04 PROCEDURE — 99283 EMERGENCY DEPT VISIT LOW MDM: CPT

## 2025-05-04 PROCEDURE — 99284 EMERGENCY DEPT VISIT MOD MDM: CPT | Performed by: STUDENT IN AN ORGANIZED HEALTH CARE EDUCATION/TRAINING PROGRAM

## 2025-05-04 PROCEDURE — 87186 SC STD MICRODIL/AGAR DIL: CPT | Performed by: STUDENT IN AN ORGANIZED HEALTH CARE EDUCATION/TRAINING PROGRAM

## 2025-05-04 PROCEDURE — 87086 URINE CULTURE/COLONY COUNT: CPT | Performed by: STUDENT IN AN ORGANIZED HEALTH CARE EDUCATION/TRAINING PROGRAM

## 2025-05-04 PROCEDURE — 81001 URINALYSIS AUTO W/SCOPE: CPT | Performed by: STUDENT IN AN ORGANIZED HEALTH CARE EDUCATION/TRAINING PROGRAM

## 2025-05-04 PROCEDURE — 87077 CULTURE AEROBIC IDENTIFY: CPT | Performed by: STUDENT IN AN ORGANIZED HEALTH CARE EDUCATION/TRAINING PROGRAM

## 2025-05-04 RX ORDER — ACETAMINOPHEN 325 MG/1
975 TABLET ORAL ONCE
Status: COMPLETED | OUTPATIENT
Start: 2025-05-04 | End: 2025-05-04

## 2025-05-04 RX ORDER — METHOCARBAMOL 500 MG/1
500 TABLET, FILM COATED ORAL 2 TIMES DAILY
Qty: 20 TABLET | Refills: 0 | Status: SHIPPED | OUTPATIENT
Start: 2025-05-04

## 2025-05-04 RX ORDER — METHOCARBAMOL 500 MG/1
500 TABLET, FILM COATED ORAL ONCE
Status: COMPLETED | OUTPATIENT
Start: 2025-05-04 | End: 2025-05-04

## 2025-05-04 RX ORDER — CIPROFLOXACIN 500 MG/1
500 TABLET, FILM COATED ORAL 2 TIMES DAILY
Qty: 14 TABLET | Refills: 0 | Status: SHIPPED | OUTPATIENT
Start: 2025-05-04 | End: 2025-05-11

## 2025-05-04 RX ORDER — CIPROFLOXACIN 500 MG/1
500 TABLET, FILM COATED ORAL ONCE
Status: COMPLETED | OUTPATIENT
Start: 2025-05-04 | End: 2025-05-04

## 2025-05-04 RX ORDER — IBUPROFEN 400 MG/1
400 TABLET, FILM COATED ORAL ONCE
Status: COMPLETED | OUTPATIENT
Start: 2025-05-04 | End: 2025-05-04

## 2025-05-04 RX ORDER — NITROFURANTOIN 25; 75 MG/1; MG/1
100 CAPSULE ORAL ONCE
Status: DISCONTINUED | OUTPATIENT
Start: 2025-05-04 | End: 2025-05-04

## 2025-05-04 RX ADMIN — IBUPROFEN 400 MG: 400 TABLET, FILM COATED ORAL at 14:48

## 2025-05-04 RX ADMIN — CIPROFLOXACIN 500 MG: 500 TABLET ORAL at 16:20

## 2025-05-04 RX ADMIN — ACETAMINOPHEN 975 MG: 325 TABLET, FILM COATED ORAL at 14:48

## 2025-05-04 RX ADMIN — METHOCARBAMOL 500 MG: 500 TABLET ORAL at 16:22

## 2025-05-04 NOTE — ED PROVIDER NOTES
Time reflects when diagnosis was documented in both MDM as applicable and the Disposition within this note       Time User Action Codes Description Comment    5/4/2025  4:13 PM Taniya Dejesus Add [M54.50] Acute right-sided low back pain without sciatica     5/4/2025  4:13 PM Taniya Dejesus Add [N39.0] UTI (urinary tract infection)           ED Disposition       ED Disposition   Discharge    Condition   Stable    Date/Time   Sun May 4, 2025  4:25 PM    Comment   Montse Smith discharge to home/self care.                   Assessment & Plan       Medical Decision Making  HPI  Pt is a 50 y.o. female who presents to the ED on May 4, 2025. Patient presents with right lower back pain.  Patient states that pain began in the middle of the night, last evening.  She describes it as dull with intermittent episodes of sharpness, worsened with movement.  Denied any urinary retention, fecal incontinence, urinary incontinence.  Denies any loss of sensation.  No history of immunocompromise state, no history of malignancy, denied IV drug use.  No associated fever, chills, ambulatory dysfunction.  Patient states that at baseline, she is able to ambulate within the home and to  mail.  However, patient at baseline unable to buy own groceries, due to body habitus.  Patient also denies any hematuria, dysuria, flank pain.  ROS otherwise negative.  No other concerns at this time.    MDM  This patient presents with acute right-sided lower back pain without sciatica. Differential diagnoses includes lumbago versus musculoskeletal spasm / strain versus sciatica. Less likely sciatica as straight leg raise test was negative. No back pain red flags on history or physical. Presentation not consistent with malignancy (lack of history of malignancy, lack of B symptoms), fracture (no trauma, no bony tenderness to palpation), cauda equina (no bowel or urinary incontinence/retention, no saddle anesthesia, no distal weakness), AAA, viscus perforation,  osteomyelitis or epidural abscess (no IVDU, vertebral tenderness), renal colic, pyelonephritis (afebrile, no CVAT, no urinary symptoms). Given the clinical picture, no indication for imaging at this time.  UA showed evidence of UTI, however likely dirty sample.  Patient started on course of ciprofloxacin, given patient's documented drug allergies.  Patient also provided course of Robaxin for symptom relief and advised to continue OTC pain management with Motrin and Tylenol as needed.  Ambulatory referral to [facility] spine provided as well.  Management and discharge plan discussed with patient and patient's fiancé at bedside, all of whom were in agreement, patient discharged home.    Amount and/or Complexity of Data Reviewed  Labs: ordered.    Risk  OTC drugs.  Prescription drug management.             Medications   acetaminophen (TYLENOL) tablet 975 mg (975 mg Oral Given 5/4/25 1448)   ibuprofen (MOTRIN) tablet 400 mg (400 mg Oral Given 5/4/25 1448)   ciprofloxacin (CIPRO) tablet 500 mg (500 mg Oral Given 5/4/25 1620)   methocarbamol (ROBAXIN) tablet 500 mg (500 mg Oral Given 5/4/25 1622)       ED Risk Strat Scores                    No data recorded                            History of Present Illness       Chief Complaint   Patient presents with    Back Pain     Back pain starting in the middle of the night in the lower back, more off to the right side. Radiating across the lower back. Denies any urinary symptoms. Hx of kidney stones       Past Medical History:   Diagnosis Date    Anemia     Anxiety     Candidal intertrigo     Depression     Diabetes mellitus (HCC)     GERD (gastroesophageal reflux disease)     Hyperlipidemia     Hypertension     Hyponatremia 11/26/2023    Irritable bowel syndrome     Morbid obesity with BMI of 60.0-69.9, adult (HCC)     Osteoarthritis     Seasonal allergies     Sleep difficulties     Suicide attempt (HCC)       Past Surgical History:   Procedure Laterality Date      SECTION  1992    CHOLECYSTECTOMY  2008    FL RETROGRADE PYELOGRAM  3/21/2024    FL RETROGRADE PYELOGRAM  2024    GA CYSTO/URETERO W/LITHOTRIPSY &INDWELL STENT INSRT Left 3/21/2024    Procedure: CYSTOSCOPY, RETROGRADE PYELOGRAM AND INSERTION STENT URETERAL;  Surgeon: Nabil Desir MD;  Location: [facility];  Service: Urology    GA CYSTO/URETERO W/LITHOTRIPSY &INDWELL STENT INSRT Left 2024    Procedure: CYSTOSCOPY URETEROSCOPY WITH LITHOTRIPSY HOLMIUM LASER, RETROGRADE PYELOGRAM AND INSERTION STENT URETERAL;  Surgeon: Nabil Desir MD;  Location: [facility];  Service: Urology    WISDOM TOOTH EXTRACTION  2009      Family History   Problem Relation Age of Onset    Diabetes Mother     Hypertension Father       Social History     Tobacco Use    Smoking status: Never    Smokeless tobacco: Never   Vaping Use    Vaping status: Never Used   Substance Use Topics    Alcohol use: Not Currently     Comment: occassionaly     Drug use: No      E-Cigarette/Vaping    E-Cigarette Use Never User       E-Cigarette/Vaping Substances    Nicotine No     THC No     CBD No     Flavoring No     Other No     Unknown No       I have reviewed and agree with the history as documented.       Back Pain      Review of Systems   Musculoskeletal:  Positive for back pain.           Objective       ED Triage Vitals   Temperature Pulse Blood Pressure Respirations SpO2 Patient Position - Orthostatic VS   25 1339 25 1339 25 1339 25 1339 25 1339 25 1339   99 °F (37.2 °C) 99 124/63 18 94 % Sitting      Temp Source Heart Rate Source BP Location FiO2 (%) Pain Score    25 1339 25 1339 25 1339 -- 25 1448    Oral Monitor Right arm  9      Vitals      Date and Time Temp Pulse SpO2 Resp BP Pain Score FACES Pain Rating User   25 1500 -- -- -- -- -- 8 --    25 1448 -- -- -- -- -- 9 --    25 1339 99 °F (37.2 °C) 99 94 % 18 124/63 -- -- HR            Physical  Exam  Vitals and nursing note reviewed.   Constitutional:       General: She is not in acute distress.     Appearance: She is well-developed.   HENT:      Head: Normocephalic and atraumatic.   Eyes:      Conjunctiva/sclera: Conjunctivae normal.   Cardiovascular:      Rate and Rhythm: Normal rate and regular rhythm.      Heart sounds: No murmur heard.  Pulmonary:      Effort: Pulmonary effort is normal. No respiratory distress.      Breath sounds: Normal breath sounds.   Abdominal:      Palpations: Abdomen is soft.      Tenderness: There is no abdominal tenderness. There is no right CVA tenderness or left CVA tenderness.   Musculoskeletal:         General: No swelling.      Cervical back: Neck supple.        Back:       Comments: 5/5 strength with hip flexion and extension, knee flexion and extension, thigh abduction and adduction, dorsiflexion and plantarflexion of the ankles, inversion and eversion of the feet, and dorsiflexion of the toes bilaterally. Intact sensation to light touch in the peripheral nerve distributions of the bilateral lower extremities. 2+ patellar and ankle reflexes bilaterally. No sustained ankle clonus. No saddle anesthesia.        Skin:     General: Skin is warm and dry.      Capillary Refill: Capillary refill takes less than 2 seconds.   Neurological:      Mental Status: She is alert.   Psychiatric:         Mood and Affect: Mood normal.         Results Reviewed       Procedure Component Value Units Date/Time    Urine Microscopic [[id number]]  (Abnormal) Collected: 05/04/25 1455    Lab Status: Final result Specimen: Urine, Clean Catch Updated: 05/04/25 7527     RBC, UA 10-20 /hpf      WBC, UA Innumerable /hpf      Epithelial Cells Occasional /hpf      Bacteria, UA Innumerable /hpf      MUCUS THREADS Occasional    Urine culture [[id number]] Collected: 05/04/25 1455    Lab Status: In process Specimen: Urine, Clean Catch Updated: 05/04/25 1519    UA w Reflex to Microscopic w Reflex to Culture  [[id number]]  (Abnormal) Collected: 25 1455    Lab Status: Final result Specimen: Urine, Clean Catch Updated: 25 1510     Color, UA Yellow     Clarity, UA Turbid     Specific Gravity, UA 1.021     pH, UA 5.5     Leukocytes, UA Moderate     Nitrite, UA Negative     Protein, UA 30 (1+) mg/dl      Glucose, UA Negative mg/dl      Ketones, UA Negative mg/dl      Urobilinogen, UA <2.0 mg/dl      Bilirubin, UA Negative     Occult Blood, UA Small            No orders to display       Procedures    ED Medication and Procedure Management   Prior to Admission Medications   Prescriptions Last Dose Informant Patient Reported? Taking?   ARIPiprazole (ABILIFY) 10 mg tablet   No No   Sig: Take 1 tablet (10 mg total) by mouth daily   HumaLOG KwikPen 100 units/mL injection pen  Self Yes No   Si Units   LORazepam (ATIVAN) 0.5 mg tablet   Yes No   Si.5 mg every 8 (eight) hours as needed for anxiety   acetaminophen (TYLENOL) 325 mg tablet   No No   Sig: Take 2 tablets (650 mg total) by mouth every 6 (six) hours as needed for mild pain   clotrimazole (LOTRIMIN) 1 % cream   No No   Sig: Apply topically 2 (two) times a day   desvenlafaxine succinate (PRISTIQ) 50 mg 24 hr tablet   No No   Sig: Take 1 tablet (50 mg total) by mouth daily Do not start before 2023.   dicyclomine (BENTYL) 10 mg capsule   No No   Sig: Take 2 capsules (20 mg total) by mouth every 6 (six) hours as needed (crampy diarrhea)   dulaglutide (Trulicity) 1.5 MG/0.5ML injection   Yes No   Sig: Inject 1.5 mg under the skin Once a week   gabapentin (NEURONTIN) 100 mg capsule   No No   Sig: Take 1 capsule (100 mg total) by mouth 2 (two) times a day for 7 days   insulin glargine (LANTUS) 100 units/mL subcutaneous injection   No No   Sig: INJECT 40 UNITS UNDER THE SKIN EVERY MORNING DO NOT START BEFORE 2023.   insulin glargine (LANTUS) 100 units/mL subcutaneous injection   No No   Sig: INJECT 40 UNITS UNDER THE SKIN DAILY AT BEDTIME    lamoTRIgine (LaMICtal) 100 mg tablet   No No   Sig: Take 1 tablet (100 mg total) by mouth 2 (two) times a day   lisinopril (ZESTRIL) 10 mg tablet   No No   Sig: Take 2 tablets (20 mg total) by mouth daily   metFORMIN (GLUCOPHAGE) 500 mg tablet  Self No No   Sig: Take 1 tablet (500 mg total) by mouth 2 (two) times a day with meals   naproxen (EC NAPROSYN) 500 MG EC tablet   No No   Sig: Take 1 tablet (500 mg total) by mouth 2 (two) times a day with meals for 10 days   nystatin (MYCOSTATIN) powder   No No   Sig: Apply topically 3 (three) times a day --- Continue use for a week after rash resolves   ondansetron (ZOFRAN-ODT) 4 mg disintegrating tablet   No No   Sig: Take 1 tablet (4 mg total) by mouth every 6 (six) hours as needed for nausea or vomiting   ondansetron (ZOFRAN-ODT) 4 mg disintegrating tablet   No No   Sig: Take 1 tablet (4 mg total) by mouth every 6 (six) hours as needed for nausea or vomiting for up to 5 days   oxybutynin (DITROPAN) 5 mg tablet   No No   Sig: Take 1 tablet (5 mg total) by mouth 3 (three) times a day   phenazopyridine (PYRIDIUM) 100 mg tablet   No No   Sig: Take 1 tablet (100 mg total) by mouth 3 (three) times a day with meals   tamsulosin (FLOMAX) 0.4 mg   No No   Sig: Take 1 capsule (0.4 mg total) by mouth daily with dinner      Facility-Administered Medications: None     Discharge Medication List as of 5/4/2025  4:43 PM        START taking these medications    Details   ciprofloxacin (CIPRO) 500 mg tablet Take 1 tablet (500 mg total) by mouth 2 (two) times a day for 7 days, Starting Sun 5/4/2025, Until Sun 5/11/2025, Normal      methocarbamol (ROBAXIN) 500 mg tablet Take 1 tablet (500 mg total) by mouth 2 (two) times a day, Starting Sun 5/4/2025, Normal           CONTINUE these medications which have NOT CHANGED    Details   acetaminophen (TYLENOL) 325 mg tablet Take 2 tablets (650 mg total) by mouth every 6 (six) hours as needed for mild pain, Starting Tue 2/21/2023, No Print       ARIPiprazole (ABILIFY) 10 mg tablet Take 1 tablet (10 mg total) by mouth daily, Starting Tue 3/26/2024, Normal      clotrimazole (LOTRIMIN) 1 % cream Apply topically 2 (two) times a day, Starting Mon 3/25/2024, Normal      desvenlafaxine succinate (PRISTIQ) 50 mg 24 hr tablet Take 1 tablet (50 mg total) by mouth daily Do not start before February 22, 2023., Starting Wed 2/22/2023, No Print      dicyclomine (BENTYL) 10 mg capsule Take 2 capsules (20 mg total) by mouth every 6 (six) hours as needed (crampy diarrhea), Starting Tue 2/21/2023, Normal      dulaglutide (Trulicity) 1.5 MG/0.5ML injection Inject 1.5 mg under the skin Once a week, Starting u 1/18/2024, Historical Med      gabapentin (NEURONTIN) 100 mg capsule Take 1 capsule (100 mg total) by mouth 2 (two) times a day for 7 days, Starting Mon 4/21/2025, Until Mon 4/28/2025, Print      HumaLOG KwikPen 100 units/mL injection pen 18 Units, Starting Mon 9/13/2021, Historical Med      !! insulin glargine (LANTUS) 100 units/mL subcutaneous injection INJECT 40 UNITS UNDER THE SKIN EVERY MORNING DO NOT START BEFORE NOVEMBER 30, 2023., Starting Wed 12/20/2023, Normal      !! insulin glargine (LANTUS) 100 units/mL subcutaneous injection INJECT 40 UNITS UNDER THE SKIN DAILY AT BEDTIME, Starting Tue 1/23/2024, Normal      lamoTRIgine (LaMICtal) 100 mg tablet Take 1 tablet (100 mg total) by mouth 2 (two) times a day, Starting Tue 2/21/2023, No Print      lisinopril (ZESTRIL) 10 mg tablet Take 2 tablets (20 mg total) by mouth daily, Starting Sun 12/4/2022, No Print      LORazepam (ATIVAN) 0.5 mg tablet 0.5 mg every 8 (eight) hours as needed for anxiety, Starting Wed 10/6/2021, Historical Med      metFORMIN (GLUCOPHAGE) 500 mg tablet Take 1 tablet (500 mg total) by mouth 2 (two) times a day with meals, Starting Mon 8/31/2020, Until Tue 4/23/2024, Normal      naproxen (EC NAPROSYN) 500 MG EC tablet Take 1 tablet (500 mg total) by mouth 2 (two) times a day with meals for  10 days, Starting Mon 4/21/2025, Until Thu 5/1/2025, Print      nystatin (MYCOSTATIN) powder Apply topically 3 (three) times a day --- Continue use for a week after rash resolves, Starting Fri 5/3/2024, Normal      ondansetron (ZOFRAN-ODT) 4 mg disintegrating tablet Take 1 tablet (4 mg total) by mouth every 6 (six) hours as needed for nausea or vomiting, Starting Sun 8/25/2024, Normal      oxybutynin (DITROPAN) 5 mg tablet Take 1 tablet (5 mg total) by mouth 3 (three) times a day, Starting Fri 4/12/2024, Normal      phenazopyridine (PYRIDIUM) 100 mg tablet Take 1 tablet (100 mg total) by mouth 3 (three) times a day with meals, Starting Fri 4/12/2024, Normal      tamsulosin (FLOMAX) 0.4 mg Take 1 capsule (0.4 mg total) by mouth daily with dinner, Starting Sat 4/13/2024, Normal       !! - Potential duplicate medications found. Please discuss with provider.          ED SEPSIS DOCUMENTATION   Time reflects when diagnosis was documented in both MDM as applicable and the Disposition within this note       Time User Action Codes Description Comment    5/4/2025  4:13 PM Taniya Dejesus Add [M54.50] Acute right-sided low back pain without sciatica     5/4/2025  4:13 PM Taniya Dejesus Add [N39.0] UTI (urinary tract infection)                  Sid Alfaro MD  05/04/25 1926

## 2025-05-04 NOTE — ED ATTENDING ATTESTATION
I, Taniya Dejesus MD, saw and evaluated the patient. I have discussed the patient with the resident/non-physician practitioner and agree with the resident's/non-physician practitioner's findings, Plan of Care, and MDM as documented in the resident's/non-physician practitioner's note, except where noted. All available labs and Radiology studies were reviewed.  I was present for key portions of any procedure(s) performed by the resident/non-physician practitioner and I was immediately available to provide assistance.       At this point I agree with the current assessment done in the Emergency Department.  I have conducted an independent evaluation of this patient a history and physical is as follows:    Subjective: 50-year-old female with history of hypertension, diabetes, depression, who presents with acute onset right low back pain and associated urinary frequency without hematuria/dysuria, fever/chills, recent trauma, nausea/vomiting, personal history of malignancy or IV drug abuse, numbness/tingling/weakness, bowel/bladder incontinence, urinary retention, constipation, saddle anesthesia, or any other complaints.    Objective: Vital signs stable and afebrile.  Right lumbar paraspinal muscle tenderness to palpation with no midline tenderness/step-offs/deformities.  5 out of 5 strength with sensation intact throughout the bilateral lower extremities.  No abdominal tenderness to palpation    Assessment/Plan: 50-year-old female with history of hypertension, diabetes, depression presenting with right low back pain and urinary frequency.  Vitals are stable and she has an exam notable for right-sided paraspinal tenderness.  No red flag symptoms for back pain.  Differential includes musculoskeletal back pain, UTI, less likely nephrolithiasis, pyelonephritis.  UA with concern for infection without significant blood therefore doubt nephrolithiasis.  Patient given analgesia with improvement in symptoms as well as antibiotics  for UTI.    Patient tolerated p.o. and ambulatory challenges in the emergency department and had stable vitals at time of discharge.    Patient was discharged to home with strict return precautions. Patient acknowledged understanding of plan and diagnostic results, and all their questions were answered to their satisfaction.

## 2025-05-05 ENCOUNTER — TELEPHONE (OUTPATIENT)
Dept: PHYSICAL THERAPY | Facility: OTHER | Age: 50
End: 2025-05-05

## 2025-05-05 ENCOUNTER — RESULTS FOLLOW-UP (OUTPATIENT)
Dept: OTHER | Facility: HOSPITAL | Age: 50
End: 2025-05-05

## 2025-05-05 NOTE — TELEPHONE ENCOUNTER
Call placed to the patient per [facility] referral.    Spoke with patient, explained program and reason for the call.    Patient declined services. Patient states she is feeling much better.    [facility] phone number and business hours provided in case she needs ur services in the future.    Referral closed per protocol.

## 2025-05-06 LAB — BACTERIA UR CULT: ABNORMAL

## 2025-06-22 ENCOUNTER — HOSPITAL ENCOUNTER (EMERGENCY)
Facility: HOSPITAL | Age: 50
Discharge: HOME | End: 2025-06-23
Attending: EMERGENCY MEDICINE | Admitting: EMERGENCY MEDICINE
Payer: COMMERCIAL

## 2025-06-22 VITALS
OXYGEN SATURATION: 96 % | SYSTOLIC BLOOD PRESSURE: 163 MMHG | HEART RATE: 104 BPM | RESPIRATION RATE: 18 BRPM | DIASTOLIC BLOOD PRESSURE: 84 MMHG | TEMPERATURE: 98.1 F

## 2025-06-22 DIAGNOSIS — M79.89 PAIN AND SWELLING OF LEFT LOWER LEG: Primary | ICD-10-CM

## 2025-06-22 DIAGNOSIS — M79.662 PAIN AND SWELLING OF LEFT LOWER LEG: Primary | ICD-10-CM

## 2025-06-22 DIAGNOSIS — L03.90 CELLULITIS: ICD-10-CM

## 2025-06-22 DIAGNOSIS — M79.605 LEFT LEG PAIN: ICD-10-CM

## 2025-06-22 PROCEDURE — 99283 EMERGENCY DEPT VISIT LOW MDM: CPT

## 2025-06-23 ENCOUNTER — HOSPITAL ENCOUNTER (OUTPATIENT)
Dept: VASCULAR ULTRASOUND | Facility: HOSPITAL | Age: 50
Discharge: HOME | End: 2025-06-23
Attending: EMERGENCY MEDICINE
Payer: COMMERCIAL

## 2025-06-23 DIAGNOSIS — M79.662 PAIN AND SWELLING OF LEFT LOWER LEG: ICD-10-CM

## 2025-06-23 DIAGNOSIS — M79.89 PAIN AND SWELLING OF LEFT LOWER LEG: ICD-10-CM

## 2025-06-23 LAB
ALBUMIN SERPL BCG-MCNC: 4.1 G/DL (ref 3.5–5)
ALP SERPL-CCNC: 66 U/L (ref 34–104)
ALT SERPL W P-5'-P-CCNC: 39 U/L (ref 7–52)
ANION GAP SERPL CALCULATED.3IONS-SCNC: 12 MMOL/L (ref 4–13)
AST SERPL W P-5'-P-CCNC: 35 U/L (ref 13–39)
BASOPHILS # BLD AUTO: 0.06 THOUSANDS/ÂΜL (ref 0–0.1)
BASOPHILS NFR BLD AUTO: 1 % (ref 0–1)
BILIRUB SERPL-MCNC: 0.9 MG/DL (ref 0.2–1)
BNP SERPL-MCNC: 38 PG/ML (ref 0–100)
BUN SERPL-MCNC: 25 MG/DL (ref 5–25)
CALCIUM SERPL-MCNC: 10.1 MG/DL (ref 8.4–10.2)
CHLORIDE SERPL-SCNC: 98 MMOL/L (ref 96–108)
CO2 SERPL-SCNC: 20 MMOL/L (ref 21–32)
CREAT SERPL-MCNC: 1.06 MG/DL (ref 0.6–1.3)
D DIMER PPP FEU-MCNC: 2.46 UG/ML FEU
EOSINOPHIL # BLD AUTO: 0.15 THOUSAND/ÂΜL (ref 0–0.61)
EOSINOPHIL NFR BLD AUTO: 2 % (ref 0–6)
ERYTHROCYTE [DISTWIDTH] IN BLOOD BY AUTOMATED COUNT: 13.1 % (ref 11.6–15.1)
GFR SERPL CREATININE-BSD FRML MDRD: 60 ML/MIN/1.73SQ M
GLUCOSE SERPL-MCNC: 335 MG/DL (ref 65–140)
HCT VFR BLD AUTO: 46.6 % (ref 34.8–46.1)
HGB BLD-MCNC: 15.2 G/DL (ref 11.5–15.4)
IMM GRANULOCYTES # BLD AUTO: 0.08 THOUSAND/UL (ref 0–0.2)
IMM GRANULOCYTES NFR BLD AUTO: 1 % (ref 0–2)
LYMPHOCYTES # BLD AUTO: 2.53 THOUSANDS/ÂΜL (ref 0.6–4.47)
LYMPHOCYTES NFR BLD AUTO: 26 % (ref 14–44)
MCH RBC QN AUTO: 29.8 PG (ref 26.8–34.3)
MCHC RBC AUTO-ENTMCNC: 32.6 G/DL (ref 31.4–37.4)
MCV RBC AUTO: 91 FL (ref 82–98)
MONOCYTES # BLD AUTO: 0.87 THOUSAND/ÂΜL (ref 0.17–1.22)
MONOCYTES NFR BLD AUTO: 9 % (ref 4–12)
NEUTROPHILS # BLD AUTO: 5.92 THOUSANDS/ÂΜL (ref 1.85–7.62)
NEUTS SEG NFR BLD AUTO: 61 % (ref 43–75)
NRBC BLD AUTO-RTO: 0 /100 WBCS
PLATELET # BLD AUTO: 207 THOUSANDS/UL (ref 149–390)
PMV BLD AUTO: 10.7 FL (ref 8.9–12.7)
POTASSIUM SERPL-SCNC: 5 MMOL/L (ref 3.5–5.3)
PROT SERPL-MCNC: 7.5 G/DL (ref 6.4–8.4)
RBC # BLD AUTO: 5.1 MILLION/UL (ref 3.81–5.12)
SODIUM SERPL-SCNC: 130 MMOL/L (ref 135–147)
WBC # BLD AUTO: 9.61 THOUSAND/UL (ref 4.31–10.16)

## 2025-06-23 PROCEDURE — 80053 COMPREHEN METABOLIC PANEL: CPT

## 2025-06-23 PROCEDURE — 85379 FIBRIN DEGRADATION QUANT: CPT

## 2025-06-23 PROCEDURE — 93971 EXTREMITY STUDY: CPT

## 2025-06-23 PROCEDURE — 99284 EMERGENCY DEPT VISIT MOD MDM: CPT | Performed by: EMERGENCY MEDICINE

## 2025-06-23 PROCEDURE — 83880 ASSAY OF NATRIURETIC PEPTIDE: CPT

## 2025-06-23 PROCEDURE — 93971 EXTREMITY STUDY: CPT | Performed by: SURGERY

## 2025-06-23 PROCEDURE — 96372 THER/PROPH/DIAG INJ SC/IM: CPT

## 2025-06-23 PROCEDURE — 85025 COMPLETE CBC W/AUTO DIFF WBC: CPT

## 2025-06-23 PROCEDURE — 36415 COLL VENOUS BLD VENIPUNCTURE: CPT

## 2025-06-23 RX ORDER — ACETAMINOPHEN 325 MG/1
975 TABLET ORAL ONCE
Status: COMPLETED | OUTPATIENT
Start: 2025-06-23 | End: 2025-06-23

## 2025-06-23 RX ORDER — IBUPROFEN 600 MG/1
600 TABLET, FILM COATED ORAL ONCE
Status: COMPLETED | OUTPATIENT
Start: 2025-06-23 | End: 2025-06-23

## 2025-06-23 RX ORDER — ENOXAPARIN SODIUM 300 MG/3ML
1.5 INJECTION INTRAVENOUS; SUBCUTANEOUS ONCE
Status: DISCONTINUED | OUTPATIENT
Start: 2025-06-23 | End: 2025-06-23

## 2025-06-23 RX ORDER — CLINDAMYCIN HYDROCHLORIDE 150 MG/1
450 CAPSULE ORAL ONCE
Status: DISCONTINUED | OUTPATIENT
Start: 2025-06-23 | End: 2025-06-23

## 2025-06-23 RX ORDER — CLINDAMYCIN HYDROCHLORIDE 150 MG/1
450 CAPSULE ORAL EVERY 6 HOURS
Qty: 60 CAPSULE | Refills: 0 | Status: SHIPPED | OUTPATIENT
Start: 2025-06-23 | End: 2025-06-28

## 2025-06-23 RX ORDER — ENOXAPARIN SODIUM 150 MG/ML
1 INJECTION SUBCUTANEOUS ONCE
Status: COMPLETED | OUTPATIENT
Start: 2025-06-23 | End: 2025-06-23

## 2025-06-23 RX ORDER — ENOXAPARIN SODIUM 150 MG/ML
1 INJECTION SUBCUTANEOUS
Status: DISCONTINUED | OUTPATIENT
Start: 2025-06-23 | End: 2025-06-23

## 2025-06-23 RX ORDER — DOXYCYCLINE 100 MG/1
100 CAPSULE ORAL ONCE
Status: COMPLETED | OUTPATIENT
Start: 2025-06-23 | End: 2025-06-23

## 2025-06-23 RX ADMIN — ACETAMINOPHEN 975 MG: 325 TABLET, FILM COATED ORAL at 01:50

## 2025-06-23 RX ADMIN — ENOXAPARIN SODIUM 150 MG: 150 INJECTION SUBCUTANEOUS at 03:03

## 2025-06-23 RX ADMIN — DOXYCYCLINE HYCLATE 100 MG: 100 CAPSULE ORAL at 01:42

## 2025-06-23 RX ADMIN — IBUPROFEN 600 MG: 600 TABLET ORAL at 01:50

## 2025-06-23 NOTE — ED PROVIDER NOTES
Time reflects when diagnosis was documented in both MDM as applicable and the Disposition within this note       Time User Action Codes Description Comment    6/23/2025  2:29 AM MedniolaBryceve Add [M79.605] Left leg pain     6/23/2025  2:29 AM Mendiola, Pamela Add [L03.90] Cellulitis     6/23/2025  2:29 AM Mendiola, Pamela Add [M79.662,  M79.89] Pain and swelling of left lower leg     6/23/2025  2:29 AM Mendiola, Pamela Modify [M79.605] Left leg pain     6/23/2025  2:29 AM Mendiola, Pamela Modify [M79.662,  M79.89] Pain and swelling of left lower leg           ED Disposition       ED Disposition   Discharge    Condition   Stable    Date/Time   Mon Jun 23, 2025  2:41 AM    Comment   Montse Smith discharge to home/self care.                   Assessment & Plan       Medical Decision Making  Montse Smith is a 50 y.o. female presenting with left lower leg pain, swelling and erythema. No shortness of breath or chest pain. Vitals remarkable for elevated blood pressure reading, no hypoxia. Exam remarkable for bilateral lower extremity swelling and mild erythema, left greater than right. Patient reporting tenderness to palpation in left calf, as well as with motion. Exam otherwise non-focal.      DDX including but not limited to: DVT, venous stasis, lymphedema, medication effect, cellulitis, other infectious etiology. Doubt septic arthritis, compartment syndrome.    See ED course for further updates and interpretation of results.    Based on these results and H&P, unclear exact etiology of symptoms. With elevated D-dimer, concern for DVT. Given lovenox and prescribed Duplex. Also prescribed clindamycin for soft tissue infection.    Results, clinical impressions, and plan were discussed with patient and family. They expressed understanding and were in agreement with plan. Patient was given the opportunity to ask questions in ED. All questions and concerns were addressed in ED.    Patient care was transitioned to  attending Dr. Sandoval pending final lab results and ultimate disposition planning.    Amount and/or Complexity of Data Reviewed  Labs: ordered. Decision-making details documented in ED Course.    Risk  OTC drugs.  Prescription drug management.        ED Course as of 06/25/25 0000   Sun Jun 22, 2025   2258 Pending triage   Mon Jun 23, 2025   0136 CBC and differential(!)  Within normal limits   0137 Comprehensive metabolic panel(!)  Grossly unremarkable for findings requiring acute intervention.   0145 BNP: 38  Within normal limits   0230 D-Dimer, Quant(!): 2.46  Will give lovenox, duplex ordered  Also will give clindamycin in setting of cellulitis   0234 Discussed with        Medications   doxycycline hyclate (VIBRAMYCIN) capsule 100 mg (100 mg Oral Given 6/23/25 0142)   acetaminophen (TYLENOL) tablet 975 mg (975 mg Oral Given 6/23/25 0150)   ibuprofen (MOTRIN) tablet 600 mg (600 mg Oral Given 6/23/25 0150)   enoxaparin (LOVENOX) subcutaneous injection 150 mg (150 mg Subcutaneous Given 6/23/25 0303)       ED Risk Strat Scores                    No data recorded        SBIRT 20yo+      Flowsheet Row Most Recent Value   Initial Alcohol Screen: US AUDIT-C     1. How often do you have a drink containing alcohol? 0 Filed at: 06/23/2025 0120   2. How many drinks containing alcohol do you have on a typical day you are drinking?  0 Filed at: 06/23/2025 0120   3b. FEMALE Any Age, or MALE 65+: How often do you have 4 or more drinks on one occassion? 0 Filed at: 06/23/2025 0120   Audit-C Score 0 Filed at: 06/23/2025 0120   JUAN: How many times in the past year have you...    Used an illegal drug or used a prescription medication for non-medical reasons? Never Filed at: 06/23/2025 0120              Vinay' Criteria for DVT      Flowsheet Row Most Recent Value   Wells' Criteria for DVT    Active cancer Treatment or palliation within 6 months 0 Filed at: 06/23/2025 0136   Bedridden recently >3 days or major surgery within 12 weeks 0  Filed at: 06/23/2025 0136   Calf swelling >3 cm compared to the other leg 1 Filed at: 06/23/2025 0136   Entire leg swollen 0 Filed at: 06/23/2025 0136   Collateral (nonvaricose) superficial veins present 0 Filed at: 06/23/2025 0136   Localized tenderness along the deep venous system 0 Filed at: 06/23/2025 0136   Pitting edema, confined to symptomatic leg 0 Filed at: 06/23/2025 0136   Paralysis, paresis, or recent plaster immobilization of the lower extremity 0 Filed at: 06/23/2025 0136   Previously documented DVT 0 Filed at: 06/23/2025 0136   Alternative diagnosis to DVT as likely or more likely 0 Filed at: 06/23/2025 0136   Wells DVT Critera Score 1 Filed at: 06/23/2025 0136                      History of Present Illness       Chief Complaint   Patient presents with    Leg Pain     Pt presents to the ED c/o L leg and calf pain that started earlier today.        Past Medical History[1]   Past Surgical History[2]   Family History[3]   Social History[4]   E-Cigarette/Vaping    E-Cigarette Use Never User       E-Cigarette/Vaping Substances    Nicotine No     THC No     CBD No     Flavoring No     Other No     Unknown No       I have reviewed and agree with the history as documented.     Montse Smith is a 50 y.o. female with history of T2DM, GERD, BIS, osteoarthritis, HLD presenting for left lower extremity pain.    Patient reports left lower extremity swelling and pain starting today, described as sharp and aching primarily in her calf, especially when walking or touching her left. No known injury, no recent medication changes. No history of DVT, recent surgery, prolonged immobility, scratches. Patient currently denies fevers, chills, headaches, dizziness, chest pain, palpitations, shortness of breath, abdominal pain, nausea, vomiting, constipation, diarrhea, urinary frequency, dysuria, and other new extremity weakness, swelling and pain. Because of swelling family presented for further evaluation, most concerned with  DVT despite no history of them.          Review of Systems   Constitutional:  Negative for chills and fever.   HENT:  Negative for congestion, hearing loss and trouble swallowing.    Eyes:  Negative for pain and visual disturbance.   Respiratory:  Negative for cough, chest tightness and shortness of breath.    Cardiovascular:  Positive for leg swelling (Left lower leg). Negative for chest pain and palpitations.   Gastrointestinal:  Negative for abdominal pain, constipation, diarrhea, nausea and vomiting.   Genitourinary:  Negative for dysuria, frequency and hematuria.   Musculoskeletal:  Negative for arthralgias and back pain.   Skin:  Negative for color change and rash.   Neurological:  Negative for dizziness, seizures, syncope, weakness, light-headedness and headaches.   Psychiatric/Behavioral:  Negative for dysphoric mood.    All other systems reviewed and are negative.          Objective       ED Triage Vitals [06/22/25 2310]   Temperature Pulse Blood Pressure Respirations SpO2 Patient Position - Orthostatic VS   98.1 °F (36.7 °C) 104 163/84 18 96 % Sitting      Temp Source Heart Rate Source BP Location FiO2 (%) Pain Score    Oral Monitor Right arm -- --      Vitals      Date and Time Temp Pulse SpO2 Resp BP Pain Score FACES Pain Rating User   06/22/25 2310 98.1 °F (36.7 °C) 104 96 % 18 163/84 -- -- JM            Physical Exam  Vitals and nursing note reviewed.   Constitutional:       General: She is not in acute distress.     Appearance: She is well-developed.   HENT:      Head: Normocephalic and atraumatic.      Mouth/Throat:      Mouth: Mucous membranes are moist.      Pharynx: Oropharynx is clear.     Eyes:      Extraocular Movements: Extraocular movements intact.      Conjunctiva/sclera: Conjunctivae normal.       Cardiovascular:      Rate and Rhythm: Normal rate and regular rhythm.      Pulses: Normal pulses.      Heart sounds: Normal heart sounds. No murmur heard.  Pulmonary:      Effort: Pulmonary effort  is normal. No respiratory distress.      Breath sounds: Normal breath sounds. No wheezing, rhonchi or rales.   Abdominal:      General: Abdomen is flat.      Palpations: Abdomen is soft.      Tenderness: There is no abdominal tenderness.     Musculoskeletal:         General: Swelling and tenderness present. No signs of injury. Normal range of motion.      Cervical back: Normal range of motion.      Right lower leg: Edema present.      Left lower leg: Edema present.      Comments: Swelling slightly more on LLE     Skin:     General: Skin is warm and dry.      Capillary Refill: Capillary refill takes less than 2 seconds.      Findings: Erythema (bilateral lower extremities on dependent portions of calves, slightly more on left lower extremity) present.     Neurological:      General: No focal deficit present.      Mental Status: She is alert and oriented to person, place, and time.      Sensory: No sensory deficit.      Motor: No weakness.     Psychiatric:         Mood and Affect: Mood normal.         Behavior: Behavior normal.         Results Reviewed       Procedure Component Value Units Date/Time    D-dimer, quantitative [[id number]]  (Abnormal) Collected: 06/23/25 0127    Lab Status: Final result Specimen: Blood from Arm, Right Updated: 06/23/25 0154     D-Dimer, Quant 2.46 ug/ml FEU     Narrative:      In the evaluation for possible pulmonary embolism, in the appropriate (Well's Score of 4 or less) patient, the age adjusted d-dimer cutoff for this patient can be calculated as:    Age x 0.01 (in ug/mL) for Age-adjusted D-dimer exclusion threshold for a patient over 50 years.    B-Type Natriuretic Peptide(BNP) [[id number]]  (Normal) Collected: 06/23/25 0101    Lab Status: Final result Specimen: Blood from Arm, Right Updated: 06/23/25 0145     BNP 38 pg/mL     Comprehensive metabolic panel [[id number]]  (Abnormal) Collected: 06/23/25 0101    Lab Status: Final result Specimen: Blood from Arm, Right Updated: 06/23/25  0126     Sodium 130 mmol/L      Potassium 5.0 mmol/L      Chloride 98 mmol/L      CO2 20 mmol/L      ANION GAP 12 mmol/L      BUN 25 mg/dL      Creatinine 1.06 mg/dL      Glucose 335 mg/dL      Calcium 10.1 mg/dL      AST 35 U/L      ALT 39 U/L      Alkaline Phosphatase 66 U/L      Total Protein 7.5 g/dL      Albumin 4.1 g/dL      Total Bilirubin 0.90 mg/dL      eGFR 60 ml/min/1.73sq m     Narrative:      [employer] Kid[employer] guidelines for Chronic Kidney Disease (CKD):     Stage 1 with normal or high GFR (GFR > 90 mL/min/1.73 square meters)    Stage 2 Mild CKD (GFR = 60-89 mL/min/1.73 square meters)    Stage 3A Moderate CKD (GFR = 45-59 mL/min/1.73 square meters)    Stage 3B Moderate CKD (GFR = 30-44 mL/min/1.73 square meters)    Stage 4 Severe CKD (GFR = 15-29 mL/min/1.73 square meters)    Stage 5 End Stage CKD (GFR <15 mL/min/1.73 square meters)  Note: GFR calculation is accurate only with a steady state creatinine    CBC and differential [[id number]]  (Abnormal) Collected: 06/23/25 0101    Lab Status: Final result Specimen: Blood from Arm, Right Updated: 06/23/25 0106     WBC 9.61 Thousand/uL      RBC 5.10 Million/uL      Hemoglobin 15.2 g/dL      Hematocrit 46.6 %      MCV 91 fL      MCH 29.8 pg      MCHC 32.6 g/dL      RDW 13.1 %      MPV 10.7 fL      Platelets 207 Thousands/uL      nRBC 0 /100 WBCs      Segmented % 61 %      Immature Grans % 1 %      Lymphocytes % 26 %      Monocytes % 9 %      Eosinophils Relative 2 %      Basophils Relative 1 %      Absolute Neutrophils 5.92 Thousands/µL      Absolute Immature Grans 0.08 Thousand/uL      Absolute Lymphocytes 2.53 Thousands/µL      Absolute Monocytes 0.87 Thousand/µL      Eosinophils Absolute 0.15 Thousand/µL      Basophils Absolute 0.06 Thousands/µL             No orders to display       Procedures    ED Medication and Procedure Management   Prior to Admission Medications   Prescriptions Last Dose Informant Patient Reported? Taking?    ARIPiprazole (ABILIFY) 10 mg tablet   No No   Sig: Take 1 tablet (10 mg total) by mouth daily   HumaLOG KwikPen 100 units/mL injection pen  Self Yes No   Si Units   LORazepam (ATIVAN) 0.5 mg tablet   Yes No   Si.5 mg every 8 (eight) hours as needed for anxiety   acetaminophen (TYLENOL) 325 mg tablet   No No   Sig: Take 2 tablets (650 mg total) by mouth every 6 (six) hours as needed for mild pain   clotrimazole (LOTRIMIN) 1 % cream   No No   Sig: Apply topically 2 (two) times a day   desvenlafaxine succinate (PRISTIQ) 50 mg 24 hr tablet   No No   Sig: Take 1 tablet (50 mg total) by mouth daily Do not start before 2023.   dicyclomine (BENTYL) 10 mg capsule   No No   Sig: Take 2 capsules (20 mg total) by mouth every 6 (six) hours as needed (crampy diarrhea)   dulaglutide (Trulicity) 1.5 MG/0.5ML injection   Yes No   Sig: Inject 1.5 mg under the skin Once a week   gabapentin (NEURONTIN) 100 mg capsule   No No   Sig: Take 1 capsule (100 mg total) by mouth 2 (two) times a day for 7 days   insulin glargine (LANTUS) 100 units/mL subcutaneous injection   No No   Sig: INJECT 40 UNITS UNDER THE SKIN EVERY MORNING DO NOT START BEFORE 2023.   insulin glargine (LANTUS) 100 units/mL subcutaneous injection   No No   Sig: INJECT 40 UNITS UNDER THE SKIN DAILY AT BEDTIME   lamoTRIgine (LaMICtal) 100 mg tablet   No No   Sig: Take 1 tablet (100 mg total) by mouth 2 (two) times a day   lisinopril (ZESTRIL) 10 mg tablet   No No   Sig: Take 2 tablets (20 mg total) by mouth daily   metFORMIN (GLUCOPHAGE) 500 mg tablet  Self No No   Sig: Take 1 tablet (500 mg total) by mouth 2 (two) times a day with meals   methocarbamol (ROBAXIN) 500 mg tablet   No No   Sig: Take 1 tablet (500 mg total) by mouth 2 (two) times a day   naproxen (EC NAPROSYN) 500 MG EC tablet   No No   Sig: Take 1 tablet (500 mg total) by mouth 2 (two) times a day with meals for 10 days   nystatin (MYCOSTATIN) powder   No No   Sig: Apply  topically 3 (three) times a day --- Continue use for a week after rash resolves   ondansetron (ZOFRAN-ODT) 4 mg disintegrating tablet   No No   Sig: Take 1 tablet (4 mg total) by mouth every 6 (six) hours as needed for nausea or vomiting   ondansetron (ZOFRAN-ODT) 4 mg disintegrating tablet   No No   Sig: Take 1 tablet (4 mg total) by mouth every 6 (six) hours as needed for nausea or vomiting for up to 5 days   oxybutynin (DITROPAN) 5 mg tablet   No No   Sig: Take 1 tablet (5 mg total) by mouth 3 (three) times a day   phenazopyridine (PYRIDIUM) 100 mg tablet   No No   Sig: Take 1 tablet (100 mg total) by mouth 3 (three) times a day with meals   tamsulosin (FLOMAX) 0.4 mg   No No   Sig: Take 1 capsule (0.4 mg total) by mouth daily with dinner      Facility-Administered Medications: None     Discharge Medication List as of 6/23/2025  2:41 AM        START taking these medications    Details   clindamycin (CLEOCIN) 150 mg capsule Take 3 capsules (450 mg total) by mouth every 6 (six) hours for 5 days, Starting Mon 6/23/2025, Until Sat 6/28/2025, Normal           CONTINUE these medications which have NOT CHANGED    Details   acetaminophen (TYLENOL) 325 mg tablet Take 2 tablets (650 mg total) by mouth every 6 (six) hours as needed for mild pain, Starting Tue 2/21/2023, No Print      ARIPiprazole (ABILIFY) 10 mg tablet Take 1 tablet (10 mg total) by mouth daily, Starting Tue 3/26/2024, Normal      clotrimazole (LOTRIMIN) 1 % cream Apply topically 2 (two) times a day, Starting Mon 3/25/2024, Normal      desvenlafaxine succinate (PRISTIQ) 50 mg 24 hr tablet Take 1 tablet (50 mg total) by mouth daily Do not start before February 22, 2023., Starting Wed 2/22/2023, No Print      dicyclomine (BENTYL) 10 mg capsule Take 2 capsules (20 mg total) by mouth every 6 (six) hours as needed (crampy diarrhea), Starting Tue 2/21/2023, Normal      dulaglutide (Trulicity) 1.5 MG/0.5ML injection Inject 1.5 mg under the skin Once a week,  Starting Thu 1/18/2024, Historical Med      gabapentin (NEURONTIN) 100 mg capsule Take 1 capsule (100 mg total) by mouth 2 (two) times a day for 7 days, Starting Mon 4/21/2025, Until Mon 4/28/2025, Print      HumaLOG KwikPen 100 units/mL injection pen 18 Units, Starting Mon 9/13/2021, Historical Med      !! insulin glargine (LANTUS) 100 units/mL subcutaneous injection INJECT 40 UNITS UNDER THE SKIN EVERY MORNING DO NOT START BEFORE NOVEMBER 30, 2023., Starting Wed 12/20/2023, Normal      !! insulin glargine (LANTUS) 100 units/mL subcutaneous injection INJECT 40 UNITS UNDER THE SKIN DAILY AT BEDTIME, Starting Tue 1/23/2024, Normal      lamoTRIgine (LaMICtal) 100 mg tablet Take 1 tablet (100 mg total) by mouth 2 (two) times a day, Starting Tue 2/21/2023, No Print      lisinopril (ZESTRIL) 10 mg tablet Take 2 tablets (20 mg total) by mouth daily, Starting Sun 12/4/2022, No Print      LORazepam (ATIVAN) 0.5 mg tablet 0.5 mg every 8 (eight) hours as needed for anxiety, Starting Wed 10/6/2021, Historical Med      metFORMIN (GLUCOPHAGE) 500 mg tablet Take 1 tablet (500 mg total) by mouth 2 (two) times a day with meals, Starting Mon 8/31/2020, Until Tue 4/23/2024, Normal      methocarbamol (ROBAXIN) 500 mg tablet Take 1 tablet (500 mg total) by mouth 2 (two) times a day, Starting Sun 5/4/2025, Normal      naproxen (EC NAPROSYN) 500 MG EC tablet Take 1 tablet (500 mg total) by mouth 2 (two) times a day with meals for 10 days, Starting Mon 4/21/2025, Until Thu 5/1/2025, Print      nystatin (MYCOSTATIN) powder Apply topically 3 (three) times a day --- Continue use for a week after rash resolves, Starting Fri 5/3/2024, Normal      ondansetron (ZOFRAN-ODT) 4 mg disintegrating tablet Take 1 tablet (4 mg total) by mouth every 6 (six) hours as needed for nausea or vomiting, Starting Sun 8/25/2024, Normal      oxybutynin (DITROPAN) 5 mg tablet Take 1 tablet (5 mg total) by mouth 3 (three) times a day, Starting Fri 4/12/2024, Normal       phenazopyridine (PYRIDIUM) 100 mg tablet Take 1 tablet (100 mg total) by mouth 3 (three) times a day with meals, Starting Fri 2024, Normal      tamsulosin (FLOMAX) 0.4 mg Take 1 capsule (0.4 mg total) by mouth daily with dinner, Starting Sat 2024, Normal       !! - Potential duplicate medications found. Please discuss with provider.        Outpatient Discharge Orders   VAS VENOUS DUPLEX -LOWER LIMB UNILATERAL   Standing Status: Future Number of Occurrences: 1 Standing Exp. Date: 29     ED SEPSIS DOCUMENTATION   Time reflects when diagnosis was documented in both MDM as applicable and the Disposition within this note       Time User Action Codes Description Comment    2025  2:29 AM MendiolaBryceve Add [M79.605] Left leg pain     2025  2:29 AM MendiolaBryceve Add [L03.90] Cellulitis     2025  2:29 AM MendiolaAndrePamela Add [M79.662,  M79.89] Pain and swelling of left lower leg     2025  2:29 AM Mendiola Pamela Modify [M79.605] Left leg pain     2025  2:29 AM Mendiola, Pamela Modify [M79.662,  M79.89] Pain and swelling of left lower leg                    [1]   Past Medical History:  Diagnosis Date    Anemia     Anxiety     Candidal intertrigo     Depression     Diabetes mellitus (HCC)     GERD (gastroesophageal reflux disease)     Hyperlipidemia     Hypertension     Hyponatremia 2023    Irritable bowel syndrome     Morbid obesity with BMI of 60.0-69.9, adult (HCC)     Osteoarthritis     Seasonal allergies     Sleep difficulties     Suicide attempt ([facility])    [2]   Past Surgical History:  Procedure Laterality Date     SECTION  1992    CHOLECYSTECTOMY      FL RETROGRADE PYELOGRAM  3/21/2024    FL RETROGRADE PYELOGRAM  2024    ME CYSTO/URETERO W/LITHOTRIPSY &INDWELL STENT INSRT Left 3/21/2024    Procedure: CYSTOSCOPY, RETROGRADE PYELOGRAM AND INSERTION STENT URETERAL;  Surgeon: Nabil Desir MD;  Location: [facility];  Service: Urology    ME  CYSTO/URETERO W/LITHOTRIPSY &INDWELL STENT INSRT Left 4/23/2024    Procedure: CYSTOSCOPY URETEROSCOPY WITH LITHOTRIPSY HOLMIUM LASER, RETROGRADE PYELOGRAM AND INSERTION STENT URETERAL;  Surgeon: Nabil Desir MD;  Location: [facility];  Service: Urology    WISDOM TOOTH EXTRACTION  2009   [3]   Family History  Problem Relation Name Age of Onset    Diabetes Mother      Hypertension Father     [4]   Social History  Tobacco Use    Smoking status: Never    Smokeless tobacco: Never   Vaping Use    Vaping status: Never Used   Substance Use Topics    Alcohol use: Not Currently     Comment: occassionaly     Drug use: No        Pamela Mendiola MD  06/25/25 0001

## 2025-06-23 NOTE — DISCHARGE INSTRUCTIONS
You were evaluated in the emergency department for: leg pain and swelling. You can access your current and pending results through [facility]'s [email or web address].    - You should follow-up with your primary care provider, as soon as possible, for re-evaluation.  - You are being prescribed and antibiotic, clindamycin for your cellulitis  - Please get the duplex ASAP    Please, return to the emergency department if you experience new or worsening symptoms, fever, chest pain, shortness of breath, difficulty breathing, dizziness, abdominal pain, persistent nausea/vomiting, syncope or passing out, blood in your urine or stool, coughing up blood, leg swelling/pain, urinary retention, bowel or bladder incontinence, numbness between your legs.

## 2025-06-30 ENCOUNTER — HOSPITAL ENCOUNTER (EMERGENCY)
Facility: HOSPITAL | Age: 50
Discharge: HOME | End: 2025-06-30
Attending: EMERGENCY MEDICINE | Admitting: EMERGENCY MEDICINE
Payer: COMMERCIAL

## 2025-06-30 VITALS
SYSTOLIC BLOOD PRESSURE: 169 MMHG | RESPIRATION RATE: 20 BRPM | TEMPERATURE: 98.3 F | OXYGEN SATURATION: 93 % | HEART RATE: 100 BPM | DIASTOLIC BLOOD PRESSURE: 71 MMHG

## 2025-06-30 DIAGNOSIS — M79.605 LEFT LEG PAIN: Primary | ICD-10-CM

## 2025-06-30 PROCEDURE — 99284 EMERGENCY DEPT VISIT MOD MDM: CPT | Performed by: EMERGENCY MEDICINE

## 2025-06-30 PROCEDURE — 96372 THER/PROPH/DIAG INJ SC/IM: CPT

## 2025-06-30 PROCEDURE — 99283 EMERGENCY DEPT VISIT LOW MDM: CPT

## 2025-06-30 RX ORDER — ACETAMINOPHEN 325 MG/1
975 TABLET ORAL ONCE
Status: COMPLETED | OUTPATIENT
Start: 2025-06-30 | End: 2025-06-30

## 2025-06-30 RX ORDER — METHOCARBAMOL 750 MG/1
750 TABLET, FILM COATED ORAL EVERY 8 HOURS PRN
Qty: 9 TABLET | Refills: 0 | Status: ON HOLD | OUTPATIENT
Start: 2025-07-01 | End: 2025-07-11

## 2025-06-30 RX ORDER — KETOROLAC TROMETHAMINE 30 MG/ML
60 INJECTION, SOLUTION INTRAMUSCULAR; INTRAVENOUS ONCE
Status: COMPLETED | OUTPATIENT
Start: 2025-06-30 | End: 2025-06-30

## 2025-06-30 RX ORDER — NAPROXEN 500 MG/1
500 TABLET ORAL EVERY 12 HOURS PRN
Qty: 14 TABLET | Refills: 0 | Status: ON HOLD | OUTPATIENT
Start: 2025-07-01 | End: 2025-07-11

## 2025-06-30 RX ORDER — METHOCARBAMOL 500 MG/1
750 TABLET, FILM COATED ORAL ONCE
Status: COMPLETED | OUTPATIENT
Start: 2025-06-30 | End: 2025-06-30

## 2025-06-30 RX ADMIN — ACETAMINOPHEN 975 MG: 325 TABLET, FILM COATED ORAL at 19:41

## 2025-06-30 RX ADMIN — METHOCARBAMOL 750 MG: 500 TABLET ORAL at 19:41

## 2025-06-30 RX ADMIN — KETOROLAC TROMETHAMINE 60 MG: 30 INJECTION, SOLUTION INTRAMUSCULAR at 19:41

## 2025-06-30 NOTE — ED PROVIDER NOTES
Time reflects when diagnosis was documented in both MDM as applicable and the Disposition within this note       Time User Action Codes Description Comment    6/30/2025  7:32 PM Waldo Niño Add [M79.605] Left leg pain     6/30/2025  7:36 PM Waldo Niño Modify [M79.605] Left leg pain           ED Disposition       ED Disposition   Discharge    Condition   Stable    Date/Time   Mon Jun 30, 2025  7:32 PM    Comment   Montse Smith discharge to home/self care.                   Assessment & Plan       Medical Decision Making  Patient is a 50-year-old female seen in the emergency department with concern for left leg pain.  Evaluation is not consistent with DVT or cellulitis at this time.  Evaluation is consistent with lipedema/lymphedema.  Patient was treated with medication for symptom control.  Compression stockings were ordered. Plan to treat patient with course of NSAID medication, and have patient follow-up with PT/OT, vascular surgery. Patient stable for discharge home. Discharge instructions were reviewed with patient.    Risk  OTC drugs.  Prescription drug management.             Medications   ketorolac (TORADOL) injection 60 mg (60 mg Intramuscular Given 6/30/25 1941)   acetaminophen (TYLENOL) tablet 975 mg (975 mg Oral Given 6/30/25 1941)   methocarbamol (ROBAXIN) tablet 750 mg (750 mg Oral Given 6/30/25 1941)       ED Risk Strat Scores                    No data recorded        SBIRT 22yo+      Flowsheet Row Most Recent Value   Initial Alcohol Screen: US AUDIT-C     1. How often do you have a drink containing alcohol? 0 Filed at: 06/30/2025 1913   2. How many drinks containing alcohol do you have on a typical day you are drinking?  0 Filed at: 06/30/2025 1913   3b. FEMALE Any Age, or MALE 65+: How often do you have 4 or more drinks on one occassion? 0 Filed at: 06/30/2025 1913   Audit-C Score 0 Filed at: 06/30/2025 1913   JUAN: How many times in the past year have you...    Used an illegal drug or used a  prescription medication for non-medical reasons? Never Filed at: 06/30/2025 1913                            History of Present Illness       Chief Complaint   Patient presents with    Leg Swelling     Pt dx with cellulitis last week and sent home course of antibiotics,took abx wrong and has one left instead of 3 and c/o no relief in L leg and achy       Past Medical History[1]   Past Surgical History[2]   Family History[3]   Social History[4]   E-Cigarette/Vaping    E-Cigarette Use Never User       E-Cigarette/Vaping Substances    Nicotine No     THC No     CBD No     Flavoring No     Other No     Unknown No       I have reviewed and agree with the history as documented.     Patient is a 50-year-old female seen in the emergency department with concern for left leg/calf soreness over approximately the past 2 months.  Review of records shows that patient was recently treated with course of antibiotics for suspected left lower extremity cellulitis.  Patient notes minimal improvement with ibuprofen at home in addition to her antibiotic medication.  Patient notes no fever, chest pain, shortness of breath, abdominal pain, nausea, vomiting, weakness, numbness, tingling.  Patient notes no other definite clear exacerbating or alleviating factors for her symptoms.  Patient notes history of chronic osteoarthritis in her knees.        Review of Systems   Constitutional:  Negative for chills and fever.   HENT:  Negative for ear pain and sore throat.    Eyes:  Negative for pain and visual disturbance.   Respiratory:  Negative for cough and shortness of breath.    Cardiovascular:  Negative for chest pain and palpitations.   Gastrointestinal:  Negative for abdominal pain and vomiting.   Genitourinary:  Negative for decreased urine volume and difficulty urinating.   Musculoskeletal:  Negative for neck stiffness.        Left leg pain   Skin:  Negative for color change and rash.   Neurological:  Negative for syncope, weakness and  numbness.   Psychiatric/Behavioral:  Negative for agitation and confusion.    All other systems reviewed and are negative.          Objective       ED Triage Vitals   Temperature Pulse Blood Pressure Respirations SpO2 Patient Position - Orthostatic VS   06/30/25 1912 06/30/25 1912 06/30/25 1912 06/30/25 1912 06/30/25 1912 06/30/25 1912   98.3 °F (36.8 °C) 100 169/71 20 93 % Lying      Temp Source Heart Rate Source BP Location FiO2 (%) Pain Score    06/30/25 1912 06/30/25 1912 06/30/25 1912 -- 06/30/25 1941    Oral Monitor Left arm  6      Vitals      Date and Time Temp Pulse SpO2 Resp BP Pain Score FACES Pain Rating User   06/30/25 1941 -- -- -- -- -- 6 -- JLM   06/30/25 1912 98.3 °F (36.8 °C) 100 93 % 20 169/71 -- -- BW            Physical Exam  Vitals and nursing note reviewed.   Constitutional:       General: She is not in acute distress.     Appearance: She is well-developed.   HENT:      Head: Normocephalic and atraumatic.      Right Ear: External ear normal.      Left Ear: External ear normal.      Nose: Nose normal.      Mouth/Throat:      Pharynx: Oropharynx is clear.     Eyes:      General: No scleral icterus.     Conjunctiva/sclera: Conjunctivae normal.       Cardiovascular:      Rate and Rhythm: Normal rate and regular rhythm.      Heart sounds: No murmur heard.  Pulmonary:      Effort: Pulmonary effort is normal. No respiratory distress.      Breath sounds: Normal breath sounds.   Abdominal:      General: There is no distension.      Palpations: Abdomen is soft.      Tenderness: There is no abdominal tenderness.     Musculoskeletal:         General: No tenderness, deformity or signs of injury.      Cervical back: Normal range of motion and neck supple.      Comments: Diffuse lipedema/lymphedema to bilateral lower extremities; no focal calf tenderness noted; 2+ DP pulse on left; neurovascularly intact extremities     Skin:     General: Skin is warm and dry.      Comments: No evidence of cellulitis to  bilateral lower extremities     Neurological:      General: No focal deficit present.      Mental Status: She is alert.      Cranial Nerves: No cranial nerve deficit.      Sensory: No sensory deficit.     Psychiatric:         Mood and Affect: Mood normal.         Thought Content: Thought content normal.         Results Reviewed       None            No orders to display       Procedures    ED Medication and Procedure Management   Prior to Admission Medications   Prescriptions Last Dose Informant Patient Reported? Taking?   ARIPiprazole (ABILIFY) 10 mg tablet 2025  No Yes   Sig: Take 1 tablet (10 mg total) by mouth daily   HumaLOG KwikPen 100 units/mL injection pen 2025 Self Yes Yes   Si Units   LORazepam (ATIVAN) 0.5 mg tablet 2025  Yes Yes   Si.5 mg every 8 (eight) hours as needed for anxiety   acetaminophen (TYLENOL) 325 mg tablet Past Week  No Yes   Sig: Take 2 tablets (650 mg total) by mouth every 6 (six) hours as needed for mild pain   clotrimazole (LOTRIMIN) 1 % cream Not Taking  No No   Sig: Apply topically 2 (two) times a day   Patient not taking: Reported on 2025   desvenlafaxine succinate (PRISTIQ) 50 mg 24 hr tablet 2025  No Yes   Sig: Take 1 tablet (50 mg total) by mouth daily Do not start before 2023.   dicyclomine (BENTYL) 10 mg capsule   No No   Sig: Take 2 capsules (20 mg total) by mouth every 6 (six) hours as needed (crampy diarrhea)   dulaglutide (Trulicity) 1.5 MG/0.5ML injection   Yes No   Sig: Inject 1.5 mg under the skin Once a week   gabapentin (NEURONTIN) 100 mg capsule Not Taking  No No   Sig: Take 1 capsule (100 mg total) by mouth 2 (two) times a day for 7 days   Patient not taking: Reported on 2025   insulin glargine (LANTUS) 100 units/mL subcutaneous injection 2025  No Yes   Sig: INJECT 40 UNITS UNDER THE SKIN EVERY MORNING DO NOT START BEFORE 2023.   insulin glargine (LANTUS) 100 units/mL subcutaneous injection  6/29/2025  No Yes   Sig: INJECT 40 UNITS UNDER THE SKIN DAILY AT BEDTIME   lamoTRIgine (LaMICtal) 100 mg tablet 6/30/2025  No Yes   Sig: Take 1 tablet (100 mg total) by mouth 2 (two) times a day   lisinopril (ZESTRIL) 10 mg tablet 6/30/2025  No Yes   Sig: Take 2 tablets (20 mg total) by mouth daily   metFORMIN (GLUCOPHAGE) 500 mg tablet 6/30/2025 Self No Yes   Sig: Take 1 tablet (500 mg total) by mouth 2 (two) times a day with meals   methocarbamol (ROBAXIN) 500 mg tablet Not Taking  No No   Sig: Take 1 tablet (500 mg total) by mouth 2 (two) times a day   Patient not taking: Reported on 6/30/2025   naproxen (EC NAPROSYN) 500 MG EC tablet Not Taking  No No   Sig: Take 1 tablet (500 mg total) by mouth 2 (two) times a day with meals for 10 days   Patient not taking: Reported on 6/30/2025   nystatin (MYCOSTATIN) powder   No No   Sig: Apply topically 3 (three) times a day --- Continue use for a week after rash resolves   ondansetron (ZOFRAN-ODT) 4 mg disintegrating tablet Not Taking  No No   Sig: Take 1 tablet (4 mg total) by mouth every 6 (six) hours as needed for nausea or vomiting   Patient not taking: Reported on 6/30/2025   ondansetron (ZOFRAN-ODT) 4 mg disintegrating tablet   No No   Sig: Take 1 tablet (4 mg total) by mouth every 6 (six) hours as needed for nausea or vomiting for up to 5 days   oxybutynin (DITROPAN) 5 mg tablet   No No   Sig: Take 1 tablet (5 mg total) by mouth 3 (three) times a day   phenazopyridine (PYRIDIUM) 100 mg tablet   No No   Sig: Take 1 tablet (100 mg total) by mouth 3 (three) times a day with meals   tamsulosin (FLOMAX) 0.4 mg   No No   Sig: Take 1 capsule (0.4 mg total) by mouth daily with dinner      Facility-Administered Medications: None     Patient's Medications   Discharge Prescriptions    METHOCARBAMOL (ROBAXIN) 750 MG TABLET    Take 1 tablet (750 mg total) by mouth every 8 (eight) hours as needed for muscle spasms for up to 10 days Do not start before July 1, 2025.       Start  Date: 2025  End Date: 2025       Order Dose: 750 mg       Quantity: 9 tablet    Refills: 0    NAPROXEN (NAPROSYN) 500 MG TABLET    Take 1 tablet (500 mg total) by mouth every 12 (twelve) hours as needed (pain) for up to 10 days Take with food. Do not start before 2025.       Start Date: 2025  End Date: 2025       Order Dose: 500 mg       Quantity: 14 tablet    Refills: 0       ED SEPSIS DOCUMENTATION   Time reflects when diagnosis was documented in both MDM as applicable and the Disposition within this note       Time User Action Codes Description Comment    2025  7:32 PM Waldo Niño Add [M79.605] Left leg pain     2025  7:36 PM Waldo Niño Modify [M79.605] Left leg pain                      [1]   Past Medical History:  Diagnosis Date    Anemia     Anxiety     Candidal intertrigo     Depression     Diabetes mellitus (HCC)     GERD (gastroesophageal reflux disease)     Hyperlipidemia     Hypertension     Hyponatremia 2023    Irritable bowel syndrome     Morbid obesity with BMI of 60.0-69.9, adult (HCC)     Osteoarthritis     Seasonal allergies     Sleep difficulties     Suicide attempt (HCC)    [2]   Past Surgical History:  Procedure Laterality Date     SECTION  1992    CHOLECYSTECTOMY      FL RETROGRADE PYELOGRAM  3/21/2024    FL RETROGRADE PYELOGRAM  2024    [place] CYSTO/URETERO W/LITHOTRIPSY &INDWELL STENT INSRT Left 3/21/2024    Procedure: CYSTOSCOPY, RETROGRADE PYELOGRAM AND INSERTION STENT URETERAL;  Surgeon: Nabil Desir MD;  Location: [facility];  Service: Urology    [place] CYSTO/URETERO W/LITHOTRIPSY &INDWELL STENT INSRT Left 2024    Procedure: CYSTOSCOPY URETEROSCOPY WITH LITHOTRIPSY HOLMIUM LASER, RETROGRADE PYELOGRAM AND INSERTION STENT URETERAL;  Surgeon: Nabil Desir MD;  Location: [facility];  Service: Urology    WISDOM TOOTH EXTRACTION     [3]   Family History  Problem Relation Name Age of Onset    Diabetes Mother      Hypertension  Father     [4]   Social History  Tobacco Use    Smoking status: Never    Smokeless tobacco: Never   Vaping Use    Vaping status: Never Used   Substance Use Topics    Alcohol use: Not Currently     Comment: occassionaly     Drug use: No        Waldo Niño MD  06/30/25 1946

## 2025-07-13 ENCOUNTER — HOSPITAL ENCOUNTER (INPATIENT)
Facility: HOSPITAL | Age: 50
LOS: 3 days | Discharge: HOME | End: 2025-07-16
Attending: EMERGENCY MEDICINE | Admitting: INTERNAL MEDICINE
Payer: COMMERCIAL

## 2025-07-13 ENCOUNTER — APPOINTMENT (EMERGENCY)
Dept: CT IMAGING | Facility: HOSPITAL | Age: 50
End: 2025-07-13
Payer: COMMERCIAL

## 2025-07-13 DIAGNOSIS — N39.0 UTI (URINARY TRACT INFECTION): ICD-10-CM

## 2025-07-13 DIAGNOSIS — K59.00 CONSTIPATION: ICD-10-CM

## 2025-07-13 DIAGNOSIS — N30.00 ACUTE CYSTITIS WITHOUT HEMATURIA: ICD-10-CM

## 2025-07-13 DIAGNOSIS — Z79.4 TYPE 2 DIABETES MELLITUS WITH HYPERGLYCEMIA, WITH LONG-TERM CURRENT USE OF INSULIN (HCC): ICD-10-CM

## 2025-07-13 DIAGNOSIS — B37.2 CANDIDAL INTERTRIGO: ICD-10-CM

## 2025-07-13 DIAGNOSIS — E11.65 TYPE 2 DIABETES MELLITUS WITH HYPERGLYCEMIA, WITH LONG-TERM CURRENT USE OF INSULIN (HCC): ICD-10-CM

## 2025-07-13 DIAGNOSIS — N95.0 POSTMENOPAUSAL BLEEDING: ICD-10-CM

## 2025-07-13 DIAGNOSIS — A41.9 SEPSIS (HCC): Primary | ICD-10-CM

## 2025-07-13 DIAGNOSIS — E11.65 HYPERGLYCEMIA DUE TO DIABETES MELLITUS (HCC): ICD-10-CM

## 2025-07-13 DIAGNOSIS — A41.9 SEPSIS WITHOUT ACUTE ORGAN DYSFUNCTION, DUE TO UNSPECIFIED ORGANISM (HCC): ICD-10-CM

## 2025-07-13 LAB
ALBUMIN SERPL BCG-MCNC: 4 G/DL (ref 3.5–5)
ALP SERPL-CCNC: 73 U/L (ref 34–104)
ALT SERPL W P-5'-P-CCNC: 49 U/L (ref 7–52)
ANION GAP SERPL CALCULATED.3IONS-SCNC: 11 MMOL/L (ref 4–13)
APTT PPP: 30 SECONDS (ref 23–34)
AST SERPL W P-5'-P-CCNC: 36 U/L (ref 13–39)
BACTERIA UR QL AUTO: ABNORMAL /HPF
BASOPHILS # BLD AUTO: 0.05 THOUSANDS/ÂΜL (ref 0–0.1)
BASOPHILS NFR BLD AUTO: 0 % (ref 0–1)
BILIRUB SERPL-MCNC: 0.86 MG/DL (ref 0.2–1)
BILIRUB UR QL STRIP: NEGATIVE
BUN SERPL-MCNC: 17 MG/DL (ref 5–25)
CALCIUM SERPL-MCNC: 9.1 MG/DL (ref 8.4–10.2)
CHLORIDE SERPL-SCNC: 100 MMOL/L (ref 96–108)
CLARITY UR: CLEAR
CO2 SERPL-SCNC: 23 MMOL/L (ref 21–32)
COLOR UR: YELLOW
CREAT SERPL-MCNC: 0.7 MG/DL (ref 0.6–1.3)
EOSINOPHIL # BLD AUTO: 0.09 THOUSAND/ÂΜL (ref 0–0.61)
EOSINOPHIL NFR BLD AUTO: 1 % (ref 0–6)
ERYTHROCYTE [DISTWIDTH] IN BLOOD BY AUTOMATED COUNT: 13.2 % (ref 11.6–15.1)
EST. AVERAGE GLUCOSE BLD GHB EST-MCNC: 226 MG/DL
GFR SERPL CREATININE-BSD FRML MDRD: 99 ML/MIN/1.73SQ M
GLUCOSE SERPL-MCNC: 302 MG/DL (ref 65–140)
GLUCOSE SERPL-MCNC: 326 MG/DL (ref 65–140)
GLUCOSE SERPL-MCNC: 359 MG/DL (ref 65–140)
GLUCOSE SERPL-MCNC: 381 MG/DL (ref 65–140)
GLUCOSE UR STRIP-MCNC: ABNORMAL MG/DL
HBA1C MFR BLD: 9.5 %
HCT VFR BLD AUTO: 47.1 % (ref 34.8–46.1)
HGB BLD-MCNC: 15.5 G/DL (ref 11.5–15.4)
HGB UR QL STRIP.AUTO: ABNORMAL
IMM GRANULOCYTES # BLD AUTO: 0.06 THOUSAND/UL (ref 0–0.2)
IMM GRANULOCYTES NFR BLD AUTO: 0 % (ref 0–2)
INR PPP: 0.96 (ref 0.85–1.19)
KETONES UR STRIP-MCNC: ABNORMAL MG/DL
LACTATE SERPL-SCNC: 1.8 MMOL/L (ref 0.5–2)
LEUKOCYTE ESTERASE UR QL STRIP: ABNORMAL
LIPASE SERPL-CCNC: 61 U/L (ref 11–82)
LYMPHOCYTES # BLD AUTO: 1.18 THOUSANDS/ÂΜL (ref 0.6–4.47)
LYMPHOCYTES NFR BLD AUTO: 8 % (ref 14–44)
MCH RBC QN AUTO: 30.3 PG (ref 26.8–34.3)
MCHC RBC AUTO-ENTMCNC: 32.9 G/DL (ref 31.4–37.4)
MCV RBC AUTO: 92 FL (ref 82–98)
MONOCYTES # BLD AUTO: 0.69 THOUSAND/ÂΜL (ref 0.17–1.22)
MONOCYTES NFR BLD AUTO: 5 % (ref 4–12)
MUCOUS THREADS UR QL AUTO: ABNORMAL
NEUTROPHILS # BLD AUTO: 12.21 THOUSANDS/ÂΜL (ref 1.85–7.62)
NEUTS SEG NFR BLD AUTO: 86 % (ref 43–75)
NITRITE UR QL STRIP: POSITIVE
NON-SQ EPI CELLS URNS QL MICRO: ABNORMAL /HPF
NRBC BLD AUTO-RTO: 0 /100 WBCS
PH UR STRIP.AUTO: 5.5 [PH]
PLATELET # BLD AUTO: 252 THOUSANDS/UL (ref 149–390)
PLATELET # BLD AUTO: 272 THOUSANDS/UL (ref 149–390)
PMV BLD AUTO: 10 FL (ref 8.9–12.7)
PMV BLD AUTO: 9.7 FL (ref 8.9–12.7)
POTASSIUM SERPL-SCNC: 4.4 MMOL/L (ref 3.5–5.3)
PROT SERPL-MCNC: 7.3 G/DL (ref 6.4–8.4)
PROT UR STRIP-MCNC: NEGATIVE MG/DL
PROTHROMBIN TIME: 13.5 SECONDS (ref 12.3–15)
RBC # BLD AUTO: 5.11 MILLION/UL (ref 3.81–5.12)
RBC #/AREA URNS AUTO: ABNORMAL /HPF
SODIUM SERPL-SCNC: 134 MMOL/L (ref 135–147)
SP GR UR STRIP.AUTO: 1.03 (ref 1–1.03)
UROBILINOGEN UR STRIP-ACNC: <2 MG/DL
WBC # BLD AUTO: 14.28 THOUSAND/UL (ref 4.31–10.16)
WBC #/AREA URNS AUTO: ABNORMAL /HPF

## 2025-07-13 PROCEDURE — 96365 THER/PROPH/DIAG IV INF INIT: CPT

## 2025-07-13 PROCEDURE — 85730 THROMBOPLASTIN TIME PARTIAL: CPT | Performed by: EMERGENCY MEDICINE

## 2025-07-13 PROCEDURE — 87186 SC STD MICRODIL/AGAR DIL: CPT | Performed by: PHYSICIAN ASSISTANT

## 2025-07-13 PROCEDURE — 87077 CULTURE AEROBIC IDENTIFY: CPT | Performed by: PHYSICIAN ASSISTANT

## 2025-07-13 PROCEDURE — 85049 AUTOMATED PLATELET COUNT: CPT | Performed by: PHYSICIAN ASSISTANT

## 2025-07-13 PROCEDURE — 99223 1ST HOSP IP/OBS HIGH 75: CPT | Performed by: INTERNAL MEDICINE

## 2025-07-13 PROCEDURE — 87086 URINE CULTURE/COLONY COUNT: CPT | Performed by: PHYSICIAN ASSISTANT

## 2025-07-13 PROCEDURE — 96374 THER/PROPH/DIAG INJ IV PUSH: CPT

## 2025-07-13 PROCEDURE — 82948 REAGENT STRIP/BLOOD GLUCOSE: CPT

## 2025-07-13 PROCEDURE — 36415 COLL VENOUS BLD VENIPUNCTURE: CPT | Performed by: EMERGENCY MEDICINE

## 2025-07-13 PROCEDURE — 83036 HEMOGLOBIN GLYCOSYLATED A1C: CPT | Performed by: PHYSICIAN ASSISTANT

## 2025-07-13 PROCEDURE — 83690 ASSAY OF LIPASE: CPT | Performed by: EMERGENCY MEDICINE

## 2025-07-13 PROCEDURE — 85610 PROTHROMBIN TIME: CPT | Performed by: EMERGENCY MEDICINE

## 2025-07-13 PROCEDURE — 99291 CRITICAL CARE FIRST HOUR: CPT | Performed by: EMERGENCY MEDICINE

## 2025-07-13 PROCEDURE — 80053 COMPREHEN METABOLIC PANEL: CPT | Performed by: EMERGENCY MEDICINE

## 2025-07-13 PROCEDURE — 87040 BLOOD CULTURE FOR BACTERIA: CPT | Performed by: EMERGENCY MEDICINE

## 2025-07-13 PROCEDURE — 99285 EMERGENCY DEPT VISIT HI MDM: CPT

## 2025-07-13 PROCEDURE — 85025 COMPLETE CBC W/AUTO DIFF WBC: CPT | Performed by: EMERGENCY MEDICINE

## 2025-07-13 PROCEDURE — 96375 TX/PRO/DX INJ NEW DRUG ADDON: CPT

## 2025-07-13 PROCEDURE — 81001 URINALYSIS AUTO W/SCOPE: CPT | Performed by: EMERGENCY MEDICINE

## 2025-07-13 PROCEDURE — 96361 HYDRATE IV INFUSION ADD-ON: CPT

## 2025-07-13 PROCEDURE — 83605 ASSAY OF LACTIC ACID: CPT | Performed by: EMERGENCY MEDICINE

## 2025-07-13 PROCEDURE — 74177 CT ABD & PELVIS W/CONTRAST: CPT

## 2025-07-13 RX ORDER — LISINOPRIL 20 MG/1
20 TABLET ORAL DAILY
Status: DISCONTINUED | OUTPATIENT
Start: 2025-07-13 | End: 2025-07-16 | Stop reason: HOSPADM

## 2025-07-13 RX ORDER — OXYCODONE HYDROCHLORIDE 5 MG/1
5 TABLET ORAL EVERY 6 HOURS PRN
Refills: 0 | Status: DISCONTINUED | OUTPATIENT
Start: 2025-07-13 | End: 2025-07-16 | Stop reason: HOSPADM

## 2025-07-13 RX ORDER — OXYBUTYNIN CHLORIDE 5 MG/1
5 TABLET ORAL 3 TIMES DAILY
Status: DISCONTINUED | OUTPATIENT
Start: 2025-07-13 | End: 2025-07-16 | Stop reason: HOSPADM

## 2025-07-13 RX ORDER — HYDROMORPHONE HCL/PF 1 MG/ML
1 SYRINGE (ML) INJECTION ONCE
Refills: 0 | Status: COMPLETED | OUTPATIENT
Start: 2025-07-13 | End: 2025-07-13

## 2025-07-13 RX ORDER — ACETAMINOPHEN 10 MG/ML
1000 INJECTION, SOLUTION INTRAVENOUS ONCE
Status: COMPLETED | OUTPATIENT
Start: 2025-07-13 | End: 2025-07-13

## 2025-07-13 RX ORDER — DESVENLAFAXINE 50 MG/1
50 TABLET, FILM COATED, EXTENDED RELEASE ORAL DAILY
Status: DISCONTINUED | OUTPATIENT
Start: 2025-07-13 | End: 2025-07-16 | Stop reason: HOSPADM

## 2025-07-13 RX ORDER — LIDOCAINE HYDROCHLORIDE 20 MG/ML
JELLY TOPICAL ONCE
Status: COMPLETED | OUTPATIENT
Start: 2025-07-13 | End: 2025-07-13

## 2025-07-13 RX ORDER — INSULIN GLARGINE 100 [IU]/ML
40 INJECTION, SOLUTION SUBCUTANEOUS
Status: DISCONTINUED | OUTPATIENT
Start: 2025-07-13 | End: 2025-07-15

## 2025-07-13 RX ORDER — METRONIDAZOLE 500 MG/100ML
500 INJECTION, SOLUTION INTRAVENOUS EVERY 12 HOURS
Status: DISCONTINUED | OUTPATIENT
Start: 2025-07-13 | End: 2025-07-13

## 2025-07-13 RX ORDER — HYDROMORPHONE HCL/PF 1 MG/ML
0.5 SYRINGE (ML) INJECTION ONCE
Status: COMPLETED | OUTPATIENT
Start: 2025-07-13 | End: 2025-07-13

## 2025-07-13 RX ORDER — TAMSULOSIN HYDROCHLORIDE 0.4 MG/1
0.4 CAPSULE ORAL
Status: DISCONTINUED | OUTPATIENT
Start: 2025-07-13 | End: 2025-07-16 | Stop reason: HOSPADM

## 2025-07-13 RX ORDER — INSULIN LISPRO 100 [IU]/ML
2-12 INJECTION, SOLUTION INTRAVENOUS; SUBCUTANEOUS
Status: DISCONTINUED | OUTPATIENT
Start: 2025-07-13 | End: 2025-07-16 | Stop reason: HOSPADM

## 2025-07-13 RX ORDER — INSULIN LISPRO 100 [IU]/ML
18 INJECTION, SOLUTION INTRAVENOUS; SUBCUTANEOUS
Status: DISCONTINUED | OUTPATIENT
Start: 2025-07-13 | End: 2025-07-15

## 2025-07-13 RX ORDER — LAMOTRIGINE 100 MG/1
100 TABLET ORAL 2 TIMES DAILY
Status: DISCONTINUED | OUTPATIENT
Start: 2025-07-13 | End: 2025-07-16 | Stop reason: HOSPADM

## 2025-07-13 RX ORDER — DICYCLOMINE HYDROCHLORIDE 10 MG/1
20 CAPSULE ORAL EVERY 6 HOURS PRN
Status: DISCONTINUED | OUTPATIENT
Start: 2025-07-13 | End: 2025-07-13

## 2025-07-13 RX ORDER — ACETAMINOPHEN 325 MG/1
650 TABLET ORAL EVERY 6 HOURS PRN
Status: DISCONTINUED | OUTPATIENT
Start: 2025-07-13 | End: 2025-07-16 | Stop reason: HOSPADM

## 2025-07-13 RX ORDER — ONDANSETRON 2 MG/ML
4 INJECTION INTRAMUSCULAR; INTRAVENOUS EVERY 6 HOURS PRN
Status: DISCONTINUED | OUTPATIENT
Start: 2025-07-13 | End: 2025-07-16 | Stop reason: HOSPADM

## 2025-07-13 RX ORDER — ONDANSETRON 2 MG/ML
4 INJECTION INTRAMUSCULAR; INTRAVENOUS ONCE
Status: COMPLETED | OUTPATIENT
Start: 2025-07-13 | End: 2025-07-13

## 2025-07-13 RX ORDER — LORAZEPAM 0.5 MG/1
0.5 TABLET ORAL EVERY 8 HOURS PRN
Status: DISCONTINUED | OUTPATIENT
Start: 2025-07-13 | End: 2025-07-16 | Stop reason: HOSPADM

## 2025-07-13 RX ORDER — ARIPIPRAZOLE 5 MG/1
10 TABLET ORAL DAILY
Status: DISCONTINUED | OUTPATIENT
Start: 2025-07-13 | End: 2025-07-16 | Stop reason: HOSPADM

## 2025-07-13 RX ORDER — SODIUM CHLORIDE, SODIUM GLUCONATE, SODIUM ACETATE, POTASSIUM CHLORIDE, MAGNESIUM CHLORIDE, SODIUM PHOSPHATE, DIBASIC, AND POTASSIUM PHOSPHATE .53; .5; .37; .037; .03; .012; .00082 G/100ML; G/100ML; G/100ML; G/100ML; G/100ML; G/100ML; G/100ML
75 INJECTION, SOLUTION INTRAVENOUS CONTINUOUS
Status: DISPENSED | OUTPATIENT
Start: 2025-07-13 | End: 2025-07-14

## 2025-07-13 RX ORDER — ENOXAPARIN SODIUM 100 MG/ML
60 INJECTION SUBCUTANEOUS 2 TIMES DAILY
Status: DISCONTINUED | OUTPATIENT
Start: 2025-07-13 | End: 2025-07-16 | Stop reason: HOSPADM

## 2025-07-13 RX ORDER — NYSTATIN 100000 [USP'U]/G
POWDER TOPICAL 3 TIMES DAILY
Status: DISCONTINUED | OUTPATIENT
Start: 2025-07-13 | End: 2025-07-16 | Stop reason: HOSPADM

## 2025-07-13 RX ORDER — DICYCLOMINE HYDROCHLORIDE 10 MG/1
20 CAPSULE ORAL
Status: DISCONTINUED | OUTPATIENT
Start: 2025-07-13 | End: 2025-07-16 | Stop reason: HOSPADM

## 2025-07-13 RX ORDER — INSULIN GLARGINE 100 [IU]/ML
40 INJECTION, SOLUTION SUBCUTANEOUS EVERY MORNING
Status: DISCONTINUED | OUTPATIENT
Start: 2025-07-13 | End: 2025-07-15

## 2025-07-13 RX ORDER — NYSTATIN 100000 [USP'U]/G
POWDER TOPICAL ONCE
Status: COMPLETED | OUTPATIENT
Start: 2025-07-13 | End: 2025-07-13

## 2025-07-13 RX ADMIN — NYSTATIN: 100000 POWDER TOPICAL at 20:36

## 2025-07-13 RX ADMIN — ACETAMINOPHEN 1000 MG: 10 INJECTION INTRAVENOUS at 14:00

## 2025-07-13 RX ADMIN — DESVENLAFAXINE 50 MG: 50 TABLET, EXTENDED RELEASE ORAL at 21:40

## 2025-07-13 RX ADMIN — IOHEXOL 100 ML: 350 INJECTION, SOLUTION INTRAVENOUS at 11:59

## 2025-07-13 RX ADMIN — CEFTRIAXONE 2000 MG: 10 INJECTION, POWDER, FOR SOLUTION INTRAVENOUS at 12:35

## 2025-07-13 RX ADMIN — ARIPIPRAZOLE 10 MG: 5 TABLET ORAL at 20:56

## 2025-07-13 RX ADMIN — INSULIN LISPRO 18 UNITS: 100 INJECTION, SOLUTION INTRAVENOUS; SUBCUTANEOUS at 16:47

## 2025-07-13 RX ADMIN — OXYCODONE HYDROCHLORIDE 5 MG: 5 TABLET ORAL at 23:38

## 2025-07-13 RX ADMIN — ONDANSETRON 4 MG: 2 INJECTION INTRAMUSCULAR; INTRAVENOUS at 10:44

## 2025-07-13 RX ADMIN — OXYBUTYNIN CHLORIDE 5 MG: 5 TABLET ORAL at 20:34

## 2025-07-13 RX ADMIN — LAMOTRIGINE 100 MG: 100 TABLET ORAL at 16:49

## 2025-07-13 RX ADMIN — ENOXAPARIN SODIUM 60 MG: 100 INJECTION SUBCUTANEOUS at 20:34

## 2025-07-13 RX ADMIN — SODIUM CHLORIDE 1000 ML: 0.9 INJECTION, SOLUTION INTRAVENOUS at 13:13

## 2025-07-13 RX ADMIN — LISINOPRIL 20 MG: 20 TABLET ORAL at 20:56

## 2025-07-13 RX ADMIN — OXYBUTYNIN CHLORIDE 5 MG: 5 TABLET ORAL at 16:48

## 2025-07-13 RX ADMIN — HYDROMORPHONE HYDROCHLORIDE 0.5 MG: 1 INJECTION, SOLUTION INTRAMUSCULAR; INTRAVENOUS; SUBCUTANEOUS at 10:45

## 2025-07-13 RX ADMIN — METRONIDAZOLE 500 MG: 500 INJECTION, SOLUTION INTRAVENOUS at 12:55

## 2025-07-13 RX ADMIN — LIDOCAINE HYDROCHLORIDE 5 APPLICATION: 20 JELLY TOPICAL at 12:45

## 2025-07-13 RX ADMIN — OXYCODONE HYDROCHLORIDE 5 MG: 5 TABLET ORAL at 17:27

## 2025-07-13 RX ADMIN — NYSTATIN: 100000 POWDER TOPICAL at 16:48

## 2025-07-13 RX ADMIN — INSULIN LISPRO 10 UNITS: 100 INJECTION, SOLUTION INTRAVENOUS; SUBCUTANEOUS at 21:41

## 2025-07-13 RX ADMIN — SODIUM CHLORIDE 1000 ML: 0.9 INJECTION, SOLUTION INTRAVENOUS at 10:43

## 2025-07-13 RX ADMIN — HYDROMORPHONE HYDROCHLORIDE 1 MG: 1 INJECTION, SOLUTION INTRAMUSCULAR; INTRAVENOUS; SUBCUTANEOUS at 12:39

## 2025-07-13 RX ADMIN — INSULIN GLARGINE 40 UNITS: 100 INJECTION, SOLUTION SUBCUTANEOUS at 21:40

## 2025-07-13 RX ADMIN — NYSTATIN: 100000 POWDER TOPICAL at 12:55

## 2025-07-13 RX ADMIN — SODIUM CHLORIDE, SODIUM GLUCONATE, SODIUM ACETATE, POTASSIUM CHLORIDE, MAGNESIUM CHLORIDE, SODIUM PHOSPHATE, DIBASIC, AND POTASSIUM PHOSPHATE 75 ML/HR: .53; .5; .37; .037; .03; .012; .00082 INJECTION, SOLUTION INTRAVENOUS at 16:48

## 2025-07-13 RX ADMIN — DICYCLOMINE HYDROCHLORIDE 20 MG: 10 CAPSULE ORAL at 17:27

## 2025-07-13 RX ADMIN — INSULIN LISPRO 8 UNITS: 100 INJECTION, SOLUTION INTRAVENOUS; SUBCUTANEOUS at 16:47

## 2025-07-13 RX ADMIN — TAMSULOSIN HYDROCHLORIDE 0.4 MG: 0.4 CAPSULE ORAL at 16:48

## 2025-07-13 NOTE — H&P
H&P - Hospitalist   Name: Montse Smith 50 y.o. female I MRN: [id number]  Unit/Bed#: ED-32 I Date of Admission: 7/13/2025   Date of Service: 7/13/2025 I Hospital Day: 0     Assessment & Plan  Sepsis without acute organ dysfunction (HCC)  POA, evidenced by tachycardia, tachypnea, and leukocytosis in the setting of urinary tract infection. Fluid collection noted around liver however this does not appear to be rim enhancing to suggest an abscess. Patient has candidal changes of her skin, but no overt cellulitis.   Admit patient to med/surg under inpatient status   Rocephin IV QD  Follow up urine and blood cultures   Close monitoring of RUQ exam   Trend endpoints  IVF x 24 hours   Acute cystitis without hematuria  Suspect POA given leukocytes and nitrites in urinalysis. She did have 1 episode of incontinence at home.   Follow up culture data   IV Rocephin ordered   Type 2 diabetes mellitus with hyperglycemia, with long-term current use of insulin (HCC)  Lab Results   Component Value Date    HGBA1C 7.2 (H) 04/11/2024       Recent Labs     07/13/25  1312   POCGLU 326*       Blood Sugar Average: Last 72 hrs:  (P) 326  Hyperglycemic in the setting of infection at this time. Prior A1c out of date  Update A1c  Continue Lantus 40 U BID   Humalog 18 U TID AC   SSI   QID Accuchecks   Hold Trulicity, Metformin   Class 3 severe obesity due to excess calories with body mass index (BMI) of 60.0 to 69.9 in adult  BMI noted   Diet and lifestyle modifications are needed   Thickened endometrium  This has been present in the past and had TVUS in 2023  Recommend TVUS non-urgently in the outpatient setting   Essential hypertension  BP elevated in the ER   Restart home regimen - Lisinopril 20 mg QD  Major depressive disorder, recurrent severe without psychotic features (HCC)  No overt symptoms at this time   Continue Pristiq, Abilify, Lamictal, and Ativan PRN       VTE Pharmacologic Prophylaxis: VTE Score: 6 High Risk (Score >/= 5) -  Pharmacological DVT Prophylaxis Ordered: enoxaparin (Lovenox). Sequential Compression Devices Ordered.  Code Status: Full Code   Discussion with family: None at bedside .     Anticipated Length of Stay: Patient will be admitted on an inpatient basis with an anticipated length of stay of greater than 2 midnights secondary to Sepsis secondary to UTI needing IV fluid and IV antibiotics. PT/OT needed due to morbid obesity and weakness. .    History of Present Illness   Chief Complaint: Abdominal Pain     Montse Smith is a 50 y.o. female with a PMH of T2DM on insulin, HTN, HLD, and morbid obesity who presents with abdominal pain that started today. She reported a strong urge to urinate and went to get up to do so and noted worsening extreme bilateral lower quadrant pain. She also noted incontinence with this. She did not have any dysuria when she was incontinent. She reported some vomiting today. She denies diarrhea or BMs and is still passing gas. Denies fevers or chills. Denies chest pain or shortness of breath. She reports that she has psoriasis on her legs and has yeast in the folds of her skin, but denies cellulitic changes at this time.    Review of Systems   Constitutional:  Negative for appetite change, chills, diaphoresis, fatigue and fever.   HENT:  Negative for congestion, rhinorrhea and sore throat.    Eyes:  Negative for visual disturbance.   Respiratory:  Negative for cough, chest tightness, shortness of breath and wheezing.    Cardiovascular:  Negative for chest pain, palpitations and leg swelling.   Gastrointestinal:  Positive for abdominal pain and vomiting. Negative for constipation, diarrhea and nausea.   Genitourinary:  Positive for urgency (Incontinence). Negative for difficulty urinating and dysuria.   Musculoskeletal:  Negative for arthralgias and myalgias.   Neurological:  Positive for weakness. Negative for dizziness, syncope, light-headedness, numbness and headaches.   All other systems reviewed  and are negative.      Historical Information   Past Medical History[1]  Past Surgical History[2]  Social History[3]  E-Cigarette/Vaping    E-Cigarette Use Never User      E-Cigarette/Vaping Substances    Nicotine No     THC No     CBD No     Flavoring No     Other No     Unknown No      Family History[4]  Social History:  Marital Status:    Occupation: Noncontributory   Patient Pre-hospital Living Situation: Home  Patient Pre-hospital Level of Mobility: unable to be assessed at time of evaluation  Patient Pre-hospital Diet Restrictions: None    Meds/Allergies   I have reviewed home medications with patient personally.  Prior to Admission medications    Medication Sig Start Date End Date Taking? Authorizing Provider   acetaminophen (TYLENOL) 325 mg tablet Take 2 tablets (650 mg total) by mouth every 6 (six) hours as needed for mild pain 2/21/23   Julieta Doshi PA-C   ARIPiprazole (ABILIFY) 10 mg tablet Take 1 tablet (10 mg total) by mouth daily 3/26/24   Kyle Brunner, MD   clotrimazole (LOTRIMIN) 1 % cream Apply topically 2 (two) times a day  Patient not taking: Reported on 6/30/2025 3/25/24   Kyle Brunner, MD   desvenlafaxine succinate (PRISTIQ) 50 mg 24 hr tablet Take 1 tablet (50 mg total) by mouth daily Do not start before February 22, 2023. 2/22/23   Julieta Doshi PA-C   dicyclomine (BENTYL) 10 mg capsule Take 2 capsules (20 mg total) by mouth every 6 (six) hours as needed (crampy diarrhea) 2/21/23   Julieta Doshi PA-C   dulaglutide (Trulicity) 1.5 MG/0.5ML injection Inject 1.5 mg under the skin Once a week 1/18/24   Historical Provider, MD   gabapentin (NEURONTIN) 100 mg capsule Take 1 capsule (100 mg total) by mouth 2 (two) times a day for 7 days  Patient not taking: Reported on 6/30/2025 4/21/25 4/28/25  Jer Avitia DO   HumaLOG KwikPen 100 units/mL injection pen 18 Units 9/13/21   Historical Provider, MD   insulin glargine (LANTUS) 100 units/mL subcutaneous injection INJECT 40 UNITS UNDER THE  SKIN EVERY MORNING DO NOT START BEFORE NOVEMBER 30, 2023. 12/20/23   Benedicto Baugh DO   insulin glargine (LANTUS) 100 units/mL subcutaneous injection INJECT 40 UNITS UNDER THE SKIN DAILY AT BEDTIME 1/23/24   Benedicto Baugh DO   lamoTRIgine (LaMICtal) 100 mg tablet Take 1 tablet (100 mg total) by mouth 2 (two) times a day 2/21/23   Julieta Doshi PA-C   lisinopril (ZESTRIL) 10 mg tablet Take 2 tablets (20 mg total) by mouth daily 12/4/22   Stacia Carter MD   LORazepam (ATIVAN) 0.5 mg tablet 0.5 mg every 8 (eight) hours as needed for anxiety 10/6/21   Janessa Douglas MD   metFORMIN (GLUCOPHAGE) 500 mg tablet Take 1 tablet (500 mg total) by mouth 2 (two) times a day with meals 8/31/20 6/30/25  SUJATA Fuchs   methocarbamol (ROBAXIN) 750 mg tablet Take 1 tablet (750 mg total) by mouth every 8 (eight) hours as needed for muscle spasms for up to 10 days Do not start before July 1, 2025. 7/1/25 7/11/25  Waldo Niño MD   naproxen (Naprosyn) 500 mg tablet Take 1 tablet (500 mg total) by mouth every 12 (twelve) hours as needed (pain) for up to 10 days Take with food. Do not start before July 1, 2025. 7/1/25 7/11/25  Waldo Niño MD   nystatin (MYCOSTATIN) powder Apply topically 3 (three) times a day --- Continue use for a week after rash resolves 5/3/24   Ameya Brian MD   ondansetron (ZOFRAN-ODT) 4 mg disintegrating tablet Take 1 tablet (4 mg total) by mouth every 6 (six) hours as needed for nausea or vomiting for up to 5 days 9/22/24 9/27/24  Leah Graham MD   oxybutynin (DITROPAN) 5 mg tablet Take 1 tablet (5 mg total) by mouth 3 (three) times a day 4/12/24   Kyle Brunner, MD   phenazopyridine (PYRIDIUM) 100 mg tablet Take 1 tablet (100 mg total) by mouth 3 (three) times a day with meals 4/12/24   Kyle Brunner, MD   tamsulosin (FLOMAX) 0.4 mg Take 1 capsule (0.4 mg total) by mouth daily with dinner 4/13/24   Kyle Brunner, MD   ondansetron (ZOFRAN-ODT) 4 mg  disintegrating tablet Take 1 tablet (4 mg total) by mouth every 6 (six) hours as needed for nausea or vomiting  Patient not taking: Reported on 6/30/2025 8/25/24 7/13/25  Oren Lawson MD     Allergies   Allergen Reactions    Cephalexin Vomiting     Other reaction(s): Nausea and/or vomiting    Insulin Degludec GI Intolerance    Sulfamethoxazole-Trimethoprim Vomiting     Other reaction(s): Nausea and/or vomiting       Objective :  Temp:  [98.2 °F (36.8 °C)] 98.2 °F (36.8 °C)  HR:  [114-127] 126  BP: (141-205)/(64-94) 141/64  Resp:  [22-24] 24  SpO2:  [94 %-96 %] 96 %  O2 Device: Nasal cannula  Nasal Cannula O2 Flow Rate (L/min):  [2 L/min] 2 L/min    Physical Exam  Vitals and nursing note reviewed.   Constitutional:       General: She is not in acute distress.     Appearance: Normal appearance. She is morbidly obese. She is not ill-appearing or diaphoretic.      Interventions: Nasal cannula in place.   HENT:      Head: Normocephalic and atraumatic.      Mouth/Throat:      Mouth: Mucous membranes are moist.     Eyes:      General: No scleral icterus.     Pupils: Pupils are equal, round, and reactive to light.       Cardiovascular:      Rate and Rhythm: Regular rhythm. Tachycardia present.      Pulses: Normal pulses.      Heart sounds: Normal heart sounds, S1 normal and S2 normal. No murmur heard.     No systolic murmur is present.      No diastolic murmur is present.      No gallop. No S3 or S4 sounds.   Pulmonary:      Effort: Pulmonary effort is normal. No accessory muscle usage or respiratory distress.      Breath sounds: Normal breath sounds. No stridor. No decreased breath sounds, wheezing, rhonchi or rales.   Chest:      Chest wall: No tenderness.   Abdominal:      General: Bowel sounds are normal. There is no distension.      Palpations: Abdomen is soft.      Tenderness: There is abdominal tenderness in the right upper quadrant, right lower quadrant, suprapubic area and left lower quadrant. There is no guarding.      Musculoskeletal:      Right lower leg: No edema.      Left lower leg: No edema.     Skin:     General: Skin is warm and dry.      Coloration: Skin is not jaundiced.      Comments: Candidal rash present in skin folds of abdomen/pannus      Neurological:      General: No focal deficit present.      Mental Status: She is alert. Mental status is at baseline.      Motor: No tremor or seizure activity.     Psychiatric:         Behavior: Behavior is cooperative.          Lines/Drains:            Lab Results: I have reviewed the following results:  Results from last 7 days   Lab Units 07/13/25  1041   WBC Thousand/uL 14.28*   HEMOGLOBIN g/dL 15.5*   HEMATOCRIT % 47.1*   PLATELETS Thousands/uL 272   SEGS PCT % 86*   LYMPHO PCT % 8*   MONO PCT % 5   EOS PCT % 1     Results from last 7 days   Lab Units 07/13/25  1041   SODIUM mmol/L 134*   POTASSIUM mmol/L 4.4   CHLORIDE mmol/L 100   CO2 mmol/L 23   BUN mg/dL 17   CREATININE mg/dL 0.70   ANION GAP mmol/L 11   CALCIUM mg/dL 9.1   ALBUMIN g/dL 4.0   TOTAL BILIRUBIN mg/dL 0.86   ALK PHOS U/L 73   ALT U/L 49   AST U/L 36   GLUCOSE RANDOM mg/dL 381*     Results from last 7 days   Lab Units 07/13/25  1041   INR  0.96     Results from last 7 days   Lab Units 07/13/25  1312   POC GLUCOSE mg/dl 326*     Lab Results   Component Value Date    HGBA1C 7.2 (H) 04/11/2024    HGBA1C 12.2 (H) 11/24/2023    HGBA1C 7.8 (H) 12/14/2022     Results from last 7 days   Lab Units 07/13/25  1041   LACTIC ACID mmol/L 1.8       Imaging Results Review: I reviewed radiology reports from this admission including: CT abdomen/pelvis.  Other Study Results Review: No additional pertinent studies reviewed.    Administrative Statements   I have spent a total time of 75 minutes in caring for this patient on the day of the visit/encounter including Diagnostic results, Instructions for management, Impressions, Counseling / Coordination of care, Documenting in the medical record, Reviewing/placing orders in the  medical record (including tests, medications, and/or procedures), Obtaining or reviewing history  , and Communicating with other healthcare professionals .    ** Please Note: This note has been constructed using a voice recognition system. **         [1]   Past Medical History:  Diagnosis Date    Anemia     Anxiety     Candidal intertrigo     Depression     Diabetes mellitus (HCC)     GERD (gastroesophageal reflux disease)     Hyperlipidemia     Hypertension     Hyponatremia 2023    Irritable bowel syndrome     Morbid obesity with BMI of 60.0-69.9, adult (HCC)     Osteoarthritis     Seasonal allergies     Sleep difficulties     Suicide attempt ([facility])    [2]   Past Surgical History:  Procedure Laterality Date     SECTION      CHOLECYSTECTOMY      FL RETROGRADE PYELOGRAM  3/21/2024    FL RETROGRADE PYELOGRAM  2024    [place] CYSTO/URETERO W/LITHOTRIPSY &INDWELL STENT INSRT Left 3/21/2024    Procedure: CYSTOSCOPY, RETROGRADE PYELOGRAM AND INSERTION STENT URETERAL;  Surgeon: Nabil Desir MD;  Location: [facility];  Service: Urology    [place] CYSTO/URETERO W/LITHOTRIPSY &INDWELL STENT INSRT Left 2024    Procedure: CYSTOSCOPY URETEROSCOPY WITH LITHOTRIPSY HOLMIUM LASER, RETROGRADE PYELOGRAM AND INSERTION STENT URETERAL;  Surgeon: Nabil Desir MD;  Location: [facility];  Service: Urology    WISDOM TOOTH EXTRACTION     [3]   Social History  Tobacco Use    Smoking status: Never    Smokeless tobacco: Never   Vaping Use    Vaping status: Never Used   Substance and Sexual Activity    Alcohol use: Not Currently     Comment: occassionaly     Drug use: No    Sexual activity: Not Currently   [4]   Family History  Problem Relation Name Age of Onset    Diabetes Mother      Hypertension Father

## 2025-07-13 NOTE — PLAN OF CARE
Problem: Potential for Falls  Goal: Patient will remain free of falls  Description: INTERVENTIONS:  - Educate patient/family on patient safety including physical limitations  - Instruct patient to call for assistance with activity   - Consider consulting OT/PT to assist with strengthening/mobility based on AM PAC & JH-HLM score  - Consult OT/PT to assist with strengthening/mobility   - Keep Call bell within reach  - Keep bed low and locked with side rails adjusted as appropriate  - Keep care items and personal belongings within reach  - Initiate and maintain comfort rounds  - Make Fall Risk Sign visible to staff  - Offer Toileting every  Hours, in advance of need  - Initiate/Maintain alarm  - Obtain necessary fall risk management equipment:   - Apply yellow socks and bracelet for high fall risk patients  - Consider moving patient to room near nurses station  7/13/2025 1642 by Dayanna Pruett RN  Outcome: Progressing  7/13/2025 1642 by Dayanna Pruett RN  Outcome: Progressing     Problem: PAIN - ADULT  Goal: Verbalizes/displays adequate comfort level or baseline comfort level  Description: Interventions:  - Encourage patient to monitor pain and request assistance  - Assess pain using appropriate pain scale  - Administer analgesics as ordered based on type and severity of pain and evaluate response  - Implement non-pharmacological measures as appropriate and evaluate response  - Consider cultural and social influences on pain and pain management  - Notify physician/advanced practitioner if interventions unsuccessful or patient reports new pain  - Educate patient/family on pain management process including their role and importance of  reporting pain   - Provide non-pharmacologic/complimentary pain relief interventions  Outcome: Progressing     Problem: INFECTION - ADULT  Goal: Absence or prevention of progression during hospitalization  Description: INTERVENTIONS:  - Assess and monitor for signs and symptoms of  infection  - Monitor lab/diagnostic results  - Monitor all insertion sites, i.e. indwelling lines, tubes, and drains  - Monitor endotracheal if appropriate and nasal secretions for changes in amount and color  - [place] appropriate cooling/warming therapies per order  - Administer medications as ordered  - Instruct and encourage patient and family to use good hand hygiene technique  - Identify and instruct in appropriate isolation precautions for identified infection/condition  Outcome: Progressing  Goal: Absence of fever/infection during neutropenic period  Description: INTERVENTIONS:  - Monitor WBC  - Perform strict hand hygiene  - Limit to healthy visitors only  - No plants, dried, fresh or silk flowers with vallejo in patient room  Outcome: Progressing     Problem: SAFETY ADULT  Goal: Patient will remain free of falls  Description: INTERVENTIONS:  - Educate patient/family on patient safety including physical limitations  - Instruct patient to call for assistance with activity   - Consider consulting OT/PT to assist with strengthening/mobility based on AM PAC & -HLM score  - Consult OT/PT to assist with strengthening/mobility   - Keep Call bell within reach  - Keep bed low and locked with side rails adjusted as appropriate  - Keep care items and personal belongings within reach  - Initiate and maintain comfort rounds  - Make Fall Risk Sign visible to staff  - Offer Toileting every  Hours, in advance of need  - Initiate/Maintain alarm  - Obtain necessary fall risk management equipment:   - Apply yellow socks and bracelet for high fall risk patients  - Consider moving patient to room near nurses station  7/13/2025 1642 by Dayanna Pruett RN  Outcome: Progressing  7/13/2025 1642 by Dayanna Pruett RN  Outcome: Progressing  Goal: Maintain or return to baseline ADL function  Description: INTERVENTIONS:  -  Assess patient's ability to carry out ADLs; assess patient's baseline for ADL function and identify physical  deficits which impact ability to perform ADLs (bathing, care of mouth/teeth, toileting, grooming, dressing, etc.)  - Assess/evaluate cause of self-care deficits   - Assess range of motion  - Assess patient's mobility; develop plan if impaired  - Assess patient's need for assistive devices and provide as appropriate  - Encourage maximum independence but intervene and supervise when necessary  - Involve family in performance of ADLs  - Assess for home care needs following discharge   - Consider OT consult to assist with ADL evaluation and planning for discharge  - Provide patient education as appropriate  - Monitor functional capacity and physical performance, use of AM PAC & JH-HLM   - Monitor gait, balance and fatigue with ambulation    Outcome: Progressing  Goal: Maintains/Returns to pre admission functional level  Description: INTERVENTIONS:  - Perform AM-PAC 6 Click Basic Mobility/ Daily Activity assessment daily.  - Set and communicate daily mobility goal to care team and patient/family/caregiver.   - Collaborate with rehabilitation services on mobility goals if consulted  - Perform Range of Motion  times a day.  - Reposition patient every  hours.  - Dangle patient  times a day  - Stand patient  times a day  - Ambulate patient  times a day  - Out of bed to chair  times a day   - Out of bed for meals  times a day  - Out of bed for toileting  - Record patient progress and toleration of activity level   Outcome: Progressing     Problem: DISCHARGE PLANNING  Goal: Discharge to home or other facility with appropriate resources  Description: INTERVENTIONS:  - Identify barriers to discharge w/patient and caregiver  - Arrange for needed discharge resources and transportation as appropriate  - Identify discharge learning needs (meds, wound care, etc.)  - Arrange for interpretive services to assist at discharge as needed  - Refer to Case Management Department for coordinating discharge planning if the patient needs  post-hospital services based on physician/advanced practitioner order or complex needs related to functional status, cognitive ability, or social support system  Outcome: Progressing

## 2025-07-13 NOTE — ASSESSMENT & PLAN NOTE
Lab Results   Component Value Date    HGBA1C 7.2 (H) 04/11/2024       Recent Labs     07/13/25  1312   POCGLU 326*       Blood Sugar Average: Last 72 hrs:  (P) 326  Hyperglycemic in the setting of infection at this time. Prior A1c out of date  Update A1c  Continue Lantus 40 U BID   Humalog 18 U TID AC   SSI   QID Accuchecks   Hold Trulicity, Metformin

## 2025-07-13 NOTE — ED PROVIDER NOTES
Time reflects when diagnosis was documented in both MDM as applicable and the Disposition within this note       Time User Action Codes Description Comment    7/13/2025  1:19 PM Eula Rodney Add [A41.9] Sepsis (HCC)     7/13/2025  1:19 PM Eula Rodney Add [N39.0] UTI (urinary tract infection)     7/13/2025  1:19 PM Eula Rodney Add [B37.2] Candidal intertrigo     7/13/2025  1:19 PM Eula Rodney Add [N95.0] Postmenopausal bleeding     7/13/2025  1:20 PM Eula Rodney Add [E11.65] Hyperglycemia due to diabetes mellitus (HCC)           ED Disposition       ED Disposition   Admit    Condition   Stable    Date/Time   Sun Jul 13, 2025  1:19 PM    Comment   Case was discussed with Dr. Pablo and the patient's admission status was agreed to be Admission Status: inpatient status to the service of Dr. Pablo .               Assessment & Plan       Medical Decision Making  Montse was accepted to [facility] service for treatment of w/ sepsis and abdominal pain.  She had repeated emesis this morning and generalized abdominal discomfort.  Source appears to be urinary based on urine macroscopy (+ leuks).  Urine microscopy is pending.  No acute abnormality noted on CT abdomen/pelvis.  Small fluid collection is appreciated lateral to the liver of uncertain source/significance.    She is hyperglycemic.  Reports sugars are typically in the 200s.  They are in the 300s today.  Glucose to be rechecked following fluid bolus.  Anticipate insulin to further manage if indicated.    As a result of her illness she was able to take only a couple of steps in her home today.  She continues to have difficulty with position changes-feeling exceptionally fatigued and uncomfortable with these.    She additionally has fairly extensive candidal intertrigo - inframammary & inguinal/ pannus.    Findings on CT concerning for endometrial thickening &  today she does have vaginal  bleeding which she notes is the first time in years which she will need some evaluation for.      Critical Care Time Statement: Upon my evaluation, this patient had a high probability of imminent or life-threatening deterioration due to sepsis, hyperglycemia, which required my direct attention, intervention, and personal management.  I spent a total of 45 minutes directly providing critical care services, including interpretation of complex medical databases, evaluating for the presence of life-threatening injuries or illnesses, complex medical decision making (to support/prevent further life-threatening deterioration)., and coordination of care with additional team members. This time is exclusive of procedures, teaching, treating other patients, family meetings, and any prior time recorded by providers other than myself.       Amount and/or Complexity of Data Reviewed  Labs: ordered.  Radiology: ordered.    Risk  Prescription drug management.  Decision regarding hospitalization.        ED Course as of 07/13/25 1512   Sun Jul 13, 2025   1012 Differential diagnosis includes but is not limited to diverticulitis, colitis, bowel obstruction, pancreatitis, choledocholithiasis, early gastroenteritis.  Doubt urinary pathology.    Does have candidal intertrigo for which nystatin powder has been ordered.  Additionally to receive IV analgesic, antiemetic and fluid.   1131 Still quite uncomfortable.  Additional analgesic ordered.   1220 Discomfort at this point is maximal in the left lower abdomen.  She just returned from CT scan.  I do appreciate fluid collection adjacent to the liver of uncertain etiology.  She is not specifically tender there and denies recall of any trauma.    Noted to have blood in genital region.  She notes that it has been years since having menses.  Labia minora is very irritated.  Much debris and some liquid blood cleared from this area.  Blood appears to be coming from region of introitus.  Spoke  with patient.  Will order lidocaine gel for discomfort in the area and pursue straight catheterization.   1307 No acute abnormality on CT abdomen/pelvis.  Urinalysis (microscopy) has returned notable for presence of nitrites not seen on other recent UAs.  Antibiotic should cover for UTI.  Microscopy pending.    Concern for endometrial thickening appreciated.  Patient is postmenopausal and experiencing bleeding.  Will require evaluation for this    Patient updated on CT and lab findings.  She is tearful and notes that it has been difficult to manage her diabetes.  At times she forgets to take her insulin and on other occasions her depression leads to her not taking medication consistently.  Abdominal discomfort continues with maximal tenderness in the left lower abdomen.  Accepted to [facility].         Medications   metroNIDAZOLE (FLAGYL) IVPB (premix) 500 mg 100 mL (0 mg Intravenous Stopped 7/13/25 1313)   nystatin (MYCOSTATIN) powder ( Topical Given 7/13/25 1255)   sodium chloride 0.9 % bolus 1,000 mL (0 mL Intravenous Stopped 7/13/25 1143)   ondansetron (ZOFRAN) injection 4 mg (4 mg Intravenous Given 7/13/25 1044)   HYDROmorphone (DILAUDID) injection 0.5 mg (0.5 mg Intravenous Given 7/13/25 1045)   HYDROmorphone (DILAUDID) injection 1 mg (1 mg Intravenous Given 7/13/25 1239)   ceftriaxone (ROCEPHIN) 2 g/50 mL in dextrose IVPB (0 mg Intravenous Stopped 7/13/25 1259)   iohexol (OMNIPAQUE) 350 MG/ML injection (MULTI-DOSE) 100 mL (100 mL Intravenous Given 7/13/25 1159)   lidocaine (URO-JET) 2 % urethral/mucosal gel (5 Applications Topical Given 7/13/25 1245)   sodium chloride 0.9 % bolus 1,000 mL (1,000 mL Intravenous New Bag 7/13/25 1313)   acetaminophen (Ofirmev) injection 1,000 mg (0 mg Intravenous Stopped 7/13/25 1445)       ED Risk Strat Scores                    No data recorded                            History of Present Illness       Chief Complaint   Patient presents with    Abdominal Pain     Pt comes  EMS from home, abd pain started at 6am today. Several bouts of vomiting.        Past Medical History[1]   Past Surgical History[2]   Family History[3]   Social History[4]   E-Cigarette/Vaping    E-Cigarette Use Never User       E-Cigarette/Vaping Substances    Nicotine No     THC No     CBD No     Flavoring No     Other No     Unknown No       I have reviewed and agree with the history as documented.     Montse Smith is a 50-year-old female presents to the emergency department for evaluation with abdominal discomfort and vomiting from home.  She felt well yesterday and on other recent days.  Today she developed discomfort in the entirety of the abdomen and experienced multiple episodes of emesis.  She describes having brought up bile.  No fever.  No known bad food ingestion or sick contact.  No change or abnormality with bowel movements or urination.  No chest discomfort, cough or dyspnea.  Past medical history significant for hypertension, diabetes, GERD, ureterolithiasis (for which she has required procedural intervention) and  diverticulitis for which she has been hospitalized though never found to have perforation or abscess.  She notes that discomfort feels similar to that with prior episodes of diverticulitis.  Abdominal surgical history significant for  section and cholecystectomy.        Review of Systems   All other systems reviewed and are negative.          Objective       ED Triage Vitals   Temperature Pulse Blood Pressure Respirations SpO2 Patient Position - Orthostatic VS   25 0956 25 0956 25 0956 25 0956 25 0956 25 1106   98.2 °F (36.8 °C) (!) 114 144/94 22 96 % Sitting      Temp Source Heart Rate Source BP Location FiO2 (%) Pain Score    25 0956 25 0956 25 0956 -- 25 09    Oral Monitor Right arm  10 - Worst Possible Pain      Vitals      Date and Time Temp Pulse SpO2 Resp BP Pain Score FACES Pain Rating User   25 1330 -- 126 96  % -- 141/64 -- -- MM   07/13/25 1315 -- 127 96 % -- 146/65 -- -- MM   07/13/25 1259 -- 125 96 % 24 187/88 9 -- LH   07/13/25 1240 -- 122 94 % 24 205/92 -- --    07/13/25 1239 -- -- -- -- -- 10 - Worst Possible Pain --    07/13/25 1238 -- -- -- -- -- 10 - Worst Possible Pain --    07/13/25 1106 -- 122 94 % 24 163/80 -- -- HVL   07/13/25 1043 -- -- -- -- -- 10 - Worst Possible Pain --    07/13/25 0956 98.2 °F (36.8 °C) 114 96 % 22 144/94 10 - Worst Possible Pain -- LH            Physical Exam  Vitals and nursing note reviewed.   Constitutional:       General: She is in acute distress.      Appearance: She is obese. She is ill-appearing.      Comments: Resting supine on stretcher appearing very uncomfortable holding emesis bag (empty).    HENT:      Mouth/Throat:      Mouth: Mucous membranes are dry.     Eyes:      General: No scleral icterus.     Extraocular Movements: Extraocular movements intact.       Cardiovascular:      Rate and Rhythm: Regular rhythm. Tachycardia present.   Pulmonary:      Effort: Pulmonary effort is normal.      Breath sounds: Normal breath sounds.   Abdominal:      General: Bowel sounds are normal.      Palpations: Abdomen is soft.      Tenderness: There is abdominal tenderness (Diffuse-maximal in the left lower quadrant). There is right CVA tenderness and left CVA tenderness. There is no guarding.   Genitourinary:     Comments: Labia majora are unremarkable.  Labia minora with large amounts of discharge/debris overlying this.  Mucosa is tender with some moist blood overlying this.  Blood appreciated to be coming from the endometritis-slowly.  No external lesion/source of bleeding.    Skin:     General: Skin is warm and dry.      Findings: Rash (Candidal intertrigo inframammary (left greater than right) and in the inguinal region below pannus.) present.      Comments: Patchy dry plaques clustered on the back especially in the low thoracic region.  Similar patchy dry plaque appreciated  over knees (appear most consistent with psoriasis).  No surrounding erythema or evidence of drainage.     Neurological:      General: No focal deficit present.      Mental Status: She is alert and oriented to person, place, and time.     Psychiatric:         Mood and Affect: Mood normal.         Behavior: Behavior normal.         Results Reviewed       Procedure Component Value Units Date/Time    Urine culture [[id number]] Collected: 07/13/25 1236    Lab Status: In process Specimen: Urine, Other Updated: 07/13/25 1413    Urine Microscopic [[id number]]  (Abnormal) Collected: 07/13/25 1236    Lab Status: Final result Specimen: Urine, Straight Cath Updated: 07/13/25 1323     RBC, UA Innumerable /hpf      WBC, UA 2-4 /hpf      Epithelial Cells Occasional /hpf      Bacteria, UA None Seen /hpf      MUCUS THREADS Occasional    Fingerstick Glucose (POCT) [[id number]]  (Abnormal) Collected: 07/13/25 1312    Lab Status: Final result Specimen: Blood Updated: 07/13/25 1313     POC Glucose 326 mg/dl     UA w Reflex to Microscopic w Reflex to Culture [[id number]]  (Abnormal) Collected: 07/13/25 1236    Lab Status: Final result Specimen: Urine, Straight Cath Updated: 07/13/25 1303     Color, UA Yellow     Clarity, UA Clear     Specific Gravity, UA 1.033     pH, UA 5.5     Leukocytes, UA Elevated glucose may cause decreased leukocyte values. See urine microscopic for UWBC result     Nitrite, UA Positive     Protein, UA Negative mg/dl      Glucose, UA >=1000 (1%) mg/dl      Ketones, UA 20 (1+) mg/dl      Urobilinogen, UA <2.0 mg/dl      Bilirubin, UA Negative     Occult Blood, UA Large    Lactic acid, plasma (w/reflex if result > 2.0) [[id number]]  (Normal) Collected: 07/13/25 1041    Lab Status: Final result Specimen: Blood from Arm, Right Updated: 07/13/25 1145     LACTIC ACID 1.8 mmol/L     Narrative:      Result may be elevated if tourniquet was used during collection.    Blood culture #2 [[id number]] Collected: 07/13/25 1103    Lab  Status: In process Specimen: Blood from Arm, Left Updated: 07/13/25 1136    Lipase [[id number]]  (Normal) Collected: 07/13/25 1041    Lab Status: Final result Specimen: Blood from Arm, Right Updated: 07/13/25 1132     Lipase 61 u/L     Comprehensive metabolic panel [[id number]]  (Abnormal) Collected: 07/13/25 1041    Lab Status: Final result Specimen: Blood from Arm, Right Updated: 07/13/25 1132     Sodium 134 mmol/L      Potassium 4.4 mmol/L      Chloride 100 mmol/L      CO2 23 mmol/L      ANION GAP 11 mmol/L      BUN 17 mg/dL      Creatinine 0.70 mg/dL      Glucose 381 mg/dL      Calcium 9.1 mg/dL      AST 36 U/L      ALT 49 U/L      Alkaline Phosphatase 73 U/L      Total Protein 7.3 g/dL      Albumin 4.0 g/dL      Total Bilirubin 0.86 mg/dL      eGFR 99 ml/min/1.73sq m     Narrative:      National Kid[employer] Foundation guidelines for Chronic Kidney Disease (CKD):     Stage 1 with normal or high GFR (GFR > 90 mL/min/1.73 square meters)    Stage 2 Mild CKD (GFR = 60-89 mL/min/1.73 square meters)    Stage 3A Moderate CKD (GFR = 45-59 mL/min/1.73 square meters)    Stage 3B Moderate CKD (GFR = 30-44 mL/min/1.73 square meters)    Stage 4 Severe CKD (GFR = 15-29 mL/min/1.73 square meters)    Stage 5 End Stage CKD (GFR <15 mL/min/1.73 square meters)  Note: GFR calculation is accurate only with a steady state creatinine    Protime-INR [[id number]]  (Normal) Collected: 07/13/25 1041    Lab Status: Final result Specimen: Blood from Arm, Right Updated: 07/13/25 1127     Protime 13.5 seconds      INR 0.96    Narrative:      INR Therapeutic Range    Indication                                             INR Range      Atrial Fibrillation                                               2.0-3.0  Hypercoagulable State                                    2.0.2.3  Left Ventricular Asist Device                            2.0-3.0  Mechanical Heart Valve                                  -    Aortic(with afib, MI, embolism, HF, LA  enlargement,    and/or coagulopathy)                                     2.0-3.0 (2.5-3.5)     Mitral                                                             2.5-3.5  Prosthetic/Bioprosthetic Heart Valve               2.0-3.0  Venous thromboembolism (VTE: VT, PE        2.0-3.0    APTT [[id number]]  (Normal) Collected: 07/13/25 1041    Lab Status: Final result Specimen: Blood from Arm, Right Updated: 07/13/25 1127     PTT 30 seconds     CBC and differential [[id number]]  (Abnormal) Collected: 07/13/25 1041    Lab Status: Final result Specimen: Blood from Arm, Right Updated: 07/13/25 1111     WBC 14.28 Thousand/uL      RBC 5.11 Million/uL      Hemoglobin 15.5 g/dL      Hematocrit 47.1 %      MCV 92 fL      MCH 30.3 pg      MCHC 32.9 g/dL      RDW 13.2 %      MPV 10.0 fL      Platelets 272 Thousands/uL      nRBC 0 /100 WBCs      Segmented % 86 %      Immature Grans % 0 %      Lymphocytes % 8 %      Monocytes % 5 %      Eosinophils Relative 1 %      Basophils Relative 0 %      Absolute Neutrophils 12.21 Thousands/µL      Absolute Immature Grans 0.06 Thousand/uL      Absolute Lymphocytes 1.18 Thousands/µL      Absolute Monocytes 0.69 Thousand/µL      Eosinophils Absolute 0.09 Thousand/µL      Basophils Absolute 0.05 Thousands/µL     Blood culture #1 [[id number]] Collected: 07/13/25 1041    Lab Status: In process Specimen: Blood from Arm, Right Updated: 07/13/25 1109            CT abdomen pelvis with contrast   Final Interpretation by Kevon Montiel MD (07/13 1319)   Addendum (preliminary) 1 of 1 by Kevon Montiel MD (07/13 1319)   ADDENDUM:      There is a small amount of fluid adjacent to the lateral aspect of the    liver, not present previously. This is of uncertain etiology. Given    hepatomegaly and hepatic steatosis, this could be related to liver disease    though the liver does not appear frankly    cirrhotic.      I personally discussed this study with LYODA VOGEL via    Epic chat on  2025 1:19 PM.            Final      1.  No acute abnormality in the abdomen or pelvis.      2.  Hepatomegaly and diffuse hepatic steatosis.      3.  Small nonobstructing right renal calculi.      4.  Possible endometrial thickening versus heterogeneous enhancement. If this patient is postmenopausal, a follow-up ultrasound is recommended. Otherwise this appearance is likely physiologic.      The study was marked in EPIC for immediate notification.                     Workstation performed: [id number]             Procedures    ED Medication and Procedure Management   Prior to Admission Medications   Prescriptions Last Dose Informant Patient Reported? Taking?   ARIPiprazole (ABILIFY) 10 mg tablet   No No   Sig: Take 1 tablet (10 mg total) by mouth daily   HumaLOG KwikPen 100 units/mL injection pen  Self Yes No   Si Units   LORazepam (ATIVAN) 0.5 mg tablet   Yes No   Si.5 mg every 8 (eight) hours as needed for anxiety   acetaminophen (TYLENOL) 325 mg tablet   No No   Sig: Take 2 tablets (650 mg total) by mouth every 6 (six) hours as needed for mild pain   clotrimazole (LOTRIMIN) 1 % cream   No No   Sig: Apply topically 2 (two) times a day   Patient not taking: Reported on 2025   desvenlafaxine succinate (PRISTIQ) 50 mg 24 hr tablet   No No   Sig: Take 1 tablet (50 mg total) by mouth daily Do not start before 2023.   dicyclomine (BENTYL) 10 mg capsule   No No   Sig: Take 2 capsules (20 mg total) by mouth every 6 (six) hours as needed (crampy diarrhea)   dulaglutide (Trulicity) 1.5 MG/0.5ML injection   Yes No   Sig: Inject 1.5 mg under the skin Once a week   gabapentin (NEURONTIN) 100 mg capsule   No No   Sig: Take 1 capsule (100 mg total) by mouth 2 (two) times a day for 7 days   Patient not taking: Reported on 2025   insulin glargine (LANTUS) 100 units/mL subcutaneous injection   No No   Sig: INJECT 40 UNITS UNDER THE SKIN EVERY MORNING DO NOT START BEFORE 2023.    insulin glargine (LANTUS) 100 units/mL subcutaneous injection   No No   Sig: INJECT 40 UNITS UNDER THE SKIN DAILY AT BEDTIME   lamoTRIgine (LaMICtal) 100 mg tablet   No No   Sig: Take 1 tablet (100 mg total) by mouth 2 (two) times a day   lisinopril (ZESTRIL) 10 mg tablet   No No   Sig: Take 2 tablets (20 mg total) by mouth daily   metFORMIN (GLUCOPHAGE) 500 mg tablet  Self No No   Sig: Take 1 tablet (500 mg total) by mouth 2 (two) times a day with meals   methocarbamol (ROBAXIN) 750 mg tablet   No No   Sig: Take 1 tablet (750 mg total) by mouth every 8 (eight) hours as needed for muscle spasms for up to 10 days Do not start before July 1, 2025.   naproxen (Naprosyn) 500 mg tablet   No No   Sig: Take 1 tablet (500 mg total) by mouth every 12 (twelve) hours as needed (pain) for up to 10 days Take with food. Do not start before July 1, 2025.   nystatin (MYCOSTATIN) powder   No No   Sig: Apply topically 3 (three) times a day --- Continue use for a week after rash resolves   ondansetron (ZOFRAN-ODT) 4 mg disintegrating tablet   No No   Sig: Take 1 tablet (4 mg total) by mouth every 6 (six) hours as needed for nausea or vomiting for up to 5 days   oxybutynin (DITROPAN) 5 mg tablet   No No   Sig: Take 1 tablet (5 mg total) by mouth 3 (three) times a day   phenazopyridine (PYRIDIUM) 100 mg tablet   No No   Sig: Take 1 tablet (100 mg total) by mouth 3 (three) times a day with meals   tamsulosin (FLOMAX) 0.4 mg   No No   Sig: Take 1 capsule (0.4 mg total) by mouth daily with dinner      Facility-Administered Medications: None     Patient's Medications   Discharge Prescriptions    No medications on file     No discharge procedures on file.  ED SEPSIS DOCUMENTATION   Time reflects when diagnosis was documented in both MDM as applicable and the Disposition within this note       Time User Action Codes Description Comment    7/13/2025  1:19 PM Eula Rodney Add [A41.9] Sepsis (HCC)     7/13/2025  1:19 PM  Eula Rodney Add [N39.0] UTI (urinary tract infection)     2025  1:19 PM Eula Rodney Add [B37.2] Candidal intertrigo     2025  1:19 PM Eula Rodney Add [N95.0] Postmenopausal bleeding     2025  1:20 PM Eula Rodney Add [E11.65] Hyperglycemia due to diabetes mellitus (HCC)            Initial Sepsis Screening       Row Name 25 1138                Is the patient's history suggestive of a new or worsening infection? Yes (Proceed)  -SZ        Suspected source of infection acute abdominal infection  -SZ        Indicate SIRS criteria Tachycardia > 90 bpm;Tachypnea > 20 resp per min;Leukocytosis (WBC > 53701 IJL) OR Leukopenia (WBC <4000 IJL) OR Bandemia (WBC >10% bands)  -SZ        Are two or more of the above signs & symptoms of infection both present and new to the patient? Yes (Proceed)  -SZ        Assess for evidence of organ dysfunction: Are any of the below criteria present within 6 hours of suspected infection and SIRS criteria that are NOT considered to be chronic conditions? --                  User Key  (r) = Recorded By, (t) = Taken By, (c) = Cosigned By      Initials Name Provider Type    SZ Eual Rodney MD Physician                         [1]   Past Medical History:  Diagnosis Date    Anemia     Anxiety     Candidal intertrigo     Depression     Diabetes mellitus (HCC)     GERD (gastroesophageal reflux disease)     Hyperlipidemia     Hypertension     Hyponatremia 2023    Irritable bowel syndrome     Morbid obesity with BMI of 60.0-69.9, adult (HCC)     Osteoarthritis     Seasonal allergies     Sleep difficulties     Suicide attempt ([facility])    [2]   Past Surgical History:  Procedure Laterality Date     SECTION  1992    CHOLECYSTECTOMY      FL RETROGRADE PYELOGRAM  3/21/2024    FL RETROGRADE PYELOGRAM  2024    ND CYSTO/URETERO W/LITHOTRIPSY &INDWELL STENT INSRT Left 3/21/2024    Procedure:  CYSTOSCOPY, RETROGRADE PYELOGRAM AND INSERTION STENT URETERAL;  Surgeon: Nabil Desir MD;  Location: [facility];  Service: Urology    NH CYSTO/URETERO W/LITHOTRIPSY &INDWELL STENT INSRT Left 4/23/2024    Procedure: CYSTOSCOPY URETEROSCOPY WITH LITHOTRIPSY HOLMIUM LASER, RETROGRADE PYELOGRAM AND INSERTION STENT URETERAL;  Surgeon: Nabil Desir MD;  Location: [facility];  Service: Urology    WISDOM TOOTH EXTRACTION  2009   [3]   Family History  Problem Relation Name Age of Onset    Diabetes Mother      Hypertension Father     [4]   Social History  Tobacco Use    Smoking status: Never    Smokeless tobacco: Never   Vaping Use    Vaping status: Never Used   Substance Use Topics    Alcohol use: Not Currently     Comment: occassionaly     Drug use: No        Eula Rodney MD  07/13/25 [id number]

## 2025-07-13 NOTE — ASSESSMENT & PLAN NOTE
Suspect POA given leukocytes and nitrites in urinalysis. She did have 1 episode of incontinence at home.   Follow up culture data   IV Rocephin ordered

## 2025-07-13 NOTE — ASSESSMENT & PLAN NOTE
This has been present in the past and had TVUS in 2023  Recommend TVUS non-urgently in the outpatient setting

## 2025-07-13 NOTE — ASSESSMENT & PLAN NOTE
POA, evidenced by tachycardia, tachypnea, and leukocytosis in the setting of urinary tract infection. Fluid collection noted around liver however this does not appear to be rim enhancing to suggest an abscess. Patient has candidal changes of her skin, but no overt cellulitis.   Admit patient to med/surg under inpatient status   Rocephin IV QD  Follow up urine and blood cultures   Close monitoring of RUQ exam   Trend endpoints  IVF x 24 hours

## 2025-07-14 LAB
ANION GAP SERPL CALCULATED.3IONS-SCNC: 5 MMOL/L (ref 4–13)
BUN SERPL-MCNC: 13 MG/DL (ref 5–25)
CALCIUM SERPL-MCNC: 8.3 MG/DL (ref 8.4–10.2)
CHLORIDE SERPL-SCNC: 102 MMOL/L (ref 96–108)
CO2 SERPL-SCNC: 26 MMOL/L (ref 21–32)
CREAT SERPL-MCNC: 0.77 MG/DL (ref 0.6–1.3)
ERYTHROCYTE [DISTWIDTH] IN BLOOD BY AUTOMATED COUNT: 13.6 % (ref 11.6–15.1)
GFR SERPL CREATININE-BSD FRML MDRD: 89 ML/MIN/1.73SQ M
GLUCOSE SERPL-MCNC: 243 MG/DL (ref 65–140)
GLUCOSE SERPL-MCNC: 247 MG/DL (ref 65–140)
GLUCOSE SERPL-MCNC: 253 MG/DL (ref 65–140)
GLUCOSE SERPL-MCNC: 281 MG/DL (ref 65–140)
GLUCOSE SERPL-MCNC: 300 MG/DL (ref 65–140)
GLUCOSE SERPL-MCNC: 342 MG/DL (ref 65–140)
HCT VFR BLD AUTO: 39.1 % (ref 34.8–46.1)
HGB BLD-MCNC: 12.6 G/DL (ref 11.5–15.4)
MCH RBC QN AUTO: 30.4 PG (ref 26.8–34.3)
MCHC RBC AUTO-ENTMCNC: 32.2 G/DL (ref 31.4–37.4)
MCV RBC AUTO: 94 FL (ref 82–98)
PLATELET # BLD AUTO: 218 THOUSANDS/UL (ref 149–390)
PMV BLD AUTO: 9.8 FL (ref 8.9–12.7)
POTASSIUM SERPL-SCNC: 3.9 MMOL/L (ref 3.5–5.3)
PROCALCITONIN SERPL-MCNC: 4.83 NG/ML
RBC # BLD AUTO: 4.15 MILLION/UL (ref 3.81–5.12)
SODIUM SERPL-SCNC: 133 MMOL/L (ref 135–147)
WBC # BLD AUTO: 15.04 THOUSAND/UL (ref 4.31–10.16)

## 2025-07-14 PROCEDURE — 85027 COMPLETE CBC AUTOMATED: CPT | Performed by: PHYSICIAN ASSISTANT

## 2025-07-14 PROCEDURE — 80048 BASIC METABOLIC PNL TOTAL CA: CPT | Performed by: PHYSICIAN ASSISTANT

## 2025-07-14 PROCEDURE — 82948 REAGENT STRIP/BLOOD GLUCOSE: CPT

## 2025-07-14 PROCEDURE — 99232 SBSQ HOSP IP/OBS MODERATE 35: CPT | Performed by: PHYSICIAN ASSISTANT

## 2025-07-14 PROCEDURE — 84145 PROCALCITONIN (PCT): CPT | Performed by: PHYSICIAN ASSISTANT

## 2025-07-14 RX ORDER — AMOXICILLIN 250 MG
2 CAPSULE ORAL
Status: DISCONTINUED | OUTPATIENT
Start: 2025-07-14 | End: 2025-07-15

## 2025-07-14 RX ORDER — HYDROMORPHONE HCL/PF 1 MG/ML
0.5 SYRINGE (ML) INJECTION ONCE
Refills: 0 | Status: COMPLETED | OUTPATIENT
Start: 2025-07-14 | End: 2025-07-14

## 2025-07-14 RX ORDER — POLYETHYLENE GLYCOL 3350 17 G/17G
17 POWDER, FOR SOLUTION ORAL DAILY
Status: DISCONTINUED | OUTPATIENT
Start: 2025-07-14 | End: 2025-07-15

## 2025-07-14 RX ORDER — OXYCODONE HYDROCHLORIDE 5 MG/1
5 TABLET ORAL ONCE
Refills: 0 | Status: COMPLETED | OUTPATIENT
Start: 2025-07-14 | End: 2025-07-14

## 2025-07-14 RX ADMIN — DICYCLOMINE HYDROCHLORIDE 20 MG: 10 CAPSULE ORAL at 11:42

## 2025-07-14 RX ADMIN — DICYCLOMINE HYDROCHLORIDE 20 MG: 10 CAPSULE ORAL at 17:34

## 2025-07-14 RX ADMIN — NYSTATIN: 100000 POWDER TOPICAL at 22:55

## 2025-07-14 RX ADMIN — INSULIN LISPRO 8 UNITS: 100 INJECTION, SOLUTION INTRAVENOUS; SUBCUTANEOUS at 11:43

## 2025-07-14 RX ADMIN — ONDANSETRON 4 MG: 2 INJECTION INTRAMUSCULAR; INTRAVENOUS at 20:40

## 2025-07-14 RX ADMIN — LAMOTRIGINE 100 MG: 100 TABLET ORAL at 17:34

## 2025-07-14 RX ADMIN — NYSTATIN: 100000 POWDER TOPICAL at 17:38

## 2025-07-14 RX ADMIN — INSULIN LISPRO 4 UNITS: 100 INJECTION, SOLUTION INTRAVENOUS; SUBCUTANEOUS at 22:55

## 2025-07-14 RX ADMIN — ARIPIPRAZOLE 10 MG: 5 TABLET ORAL at 08:52

## 2025-07-14 RX ADMIN — POLYETHYLENE GLYCOL 3350 17 G: 17 POWDER, FOR SOLUTION ORAL at 09:04

## 2025-07-14 RX ADMIN — ENOXAPARIN SODIUM 60 MG: 100 INJECTION SUBCUTANEOUS at 08:52

## 2025-07-14 RX ADMIN — HYDROMORPHONE HYDROCHLORIDE 0.5 MG: 1 INJECTION, SOLUTION INTRAMUSCULAR; INTRAVENOUS; SUBCUTANEOUS at 20:41

## 2025-07-14 RX ADMIN — ACETAMINOPHEN 650 MG: 325 TABLET ORAL at 23:43

## 2025-07-14 RX ADMIN — OXYBUTYNIN CHLORIDE 5 MG: 5 TABLET ORAL at 08:52

## 2025-07-14 RX ADMIN — OXYCODONE HYDROCHLORIDE 5 MG: 5 TABLET ORAL at 23:00

## 2025-07-14 RX ADMIN — INSULIN LISPRO 18 UNITS: 100 INJECTION, SOLUTION INTRAVENOUS; SUBCUTANEOUS at 17:35

## 2025-07-14 RX ADMIN — OXYBUTYNIN CHLORIDE 5 MG: 5 TABLET ORAL at 20:05

## 2025-07-14 RX ADMIN — ACETAMINOPHEN 650 MG: 325 TABLET ORAL at 00:17

## 2025-07-14 RX ADMIN — OXYBUTYNIN CHLORIDE 5 MG: 5 TABLET ORAL at 17:34

## 2025-07-14 RX ADMIN — TAMSULOSIN HYDROCHLORIDE 0.4 MG: 0.4 CAPSULE ORAL at 17:34

## 2025-07-14 RX ADMIN — OXYCODONE HYDROCHLORIDE 5 MG: 5 TABLET ORAL at 15:31

## 2025-07-14 RX ADMIN — INSULIN GLARGINE 40 UNITS: 100 INJECTION, SOLUTION SUBCUTANEOUS at 22:54

## 2025-07-14 RX ADMIN — DESVENLAFAXINE 50 MG: 50 TABLET, EXTENDED RELEASE ORAL at 08:53

## 2025-07-14 RX ADMIN — ENOXAPARIN SODIUM 60 MG: 100 INJECTION SUBCUTANEOUS at 20:05

## 2025-07-14 RX ADMIN — INSULIN LISPRO 18 UNITS: 100 INJECTION, SOLUTION INTRAVENOUS; SUBCUTANEOUS at 07:35

## 2025-07-14 RX ADMIN — SODIUM CHLORIDE, SODIUM GLUCONATE, SODIUM ACETATE, POTASSIUM CHLORIDE, MAGNESIUM CHLORIDE, SODIUM PHOSPHATE, DIBASIC, AND POTASSIUM PHOSPHATE 75 ML/HR: .53; .5; .37; .037; .03; .012; .00082 INJECTION, SOLUTION INTRAVENOUS at 07:30

## 2025-07-14 RX ADMIN — CEFTRIAXONE 2000 MG: 10 INJECTION, POWDER, FOR SOLUTION INTRAVENOUS at 12:06

## 2025-07-14 RX ADMIN — NYSTATIN: 100000 POWDER TOPICAL at 08:53

## 2025-07-14 RX ADMIN — INSULIN LISPRO 18 UNITS: 100 INJECTION, SOLUTION INTRAVENOUS; SUBCUTANEOUS at 11:43

## 2025-07-14 RX ADMIN — OXYCODONE HYDROCHLORIDE 5 MG: 5 TABLET ORAL at 07:29

## 2025-07-14 RX ADMIN — OXYCODONE 5 MG: 5 TABLET ORAL at 20:05

## 2025-07-14 RX ADMIN — INSULIN LISPRO 4 UNITS: 100 INJECTION, SOLUTION INTRAVENOUS; SUBCUTANEOUS at 07:35

## 2025-07-14 RX ADMIN — LISINOPRIL 20 MG: 20 TABLET ORAL at 08:52

## 2025-07-14 RX ADMIN — LAMOTRIGINE 100 MG: 100 TABLET ORAL at 08:42

## 2025-07-14 RX ADMIN — DICYCLOMINE HYDROCHLORIDE 20 MG: 10 CAPSULE ORAL at 07:29

## 2025-07-14 RX ADMIN — INSULIN LISPRO 8 UNITS: 100 INJECTION, SOLUTION INTRAVENOUS; SUBCUTANEOUS at 17:35

## 2025-07-14 NOTE — PLAN OF CARE
Problem: Potential for Falls  Goal: Patient will remain free of falls  Description: INTERVENTIONS:  - Educate patient/family on patient safety including physical limitations  - Instruct patient to call for assistance with activity   - Consider consulting OT/PT to assist with strengthening/mobility based on AM PAC & JH-HLM score  - Consult OT/PT to assist with strengthening/mobility   - Keep Call bell within reach  - Keep bed low and locked with side rails adjusted as appropriate  - Keep care items and personal belongings within reach  - Initiate and maintain comfort rounds  - Make Fall Risk Sign visible to staff  - Offer Toileting every  Hours, in advance of need  - Initiate/Maintainalarm  - Obtain necessary fall risk management equipment:   - Apply yellow socks and bracelet for high fall risk patients  - Consider moving patient to room near nurses station  Outcome: Progressing

## 2025-07-14 NOTE — INCIDENTAL FINDINGS
The following findings require follow up:  Radiographic finding   Finding: Thickened Endocmetrium   Follow up required: TVUS   Follow up should be done within 4-6 week(s)    Please notify the following clinician to assist with the follow up:   Dr. Florecita Dietrich NP      Incidental finding results were discussed with the Patient by Roe Rudd PA-C on 07/14/25.   They expressed understanding and all questions answered.

## 2025-07-14 NOTE — CASE MANAGEMENT
Case Management ED Assessment & Discharge Planning Note    Patient name Montse Smith  Location W /W -01 MRN [id number]  : 1975 Date 2025        OBJECTIVE:  Predictive Model Details          85%  Factor Value    Risk of Hospital Admission or ED Visit Model 50% Number of ED Visits 5+     18% Has Medicaid Yes     10% Is in Relationship No     10% Number of Hospitalizations 1     6% Has Depression Yes     5% Has Diabetes Yes     1% Has PCP Yes            Chief Complaint: Sepsis without acute organ dysfunction (HCC) .  Patient Class: Inpatient  Preferred Pharmacy:   [id number] - [place], [place] - [address]  [address] [facility] [place] [address]  Phone: [phone] Fax: [phone]    [facility] - [place], [place] - [address]  [address]  [place] [address]  Phone: [phone] Fax: [phone]    Primary Care Provider: Florecita Dietrich NP    Primary Insurance: [facility]  Secondary Insurance:       ED Assessment:  Active Health Care Proxies       [facility] Care Representative - Significant Other   Primary Phone: [phone] (Mobile)                    Readmission Root Cause  30 Day Readmission: No        Patient Information  Admitted from:: Home  Mental Status: Alert  During Assessment patient was accompanied by: Not accompanied during assessment  Assessment information provided by:: Patient  Primary Caregiver: Self  Support Systems: Spouse/significant other  Home entry access options. Select all that apply.: Stairs  Number of steps to enter home.: 3  Do the steps have railings?: Yes  Type of Current Residence: 2 story home  Upon entering residence, is there a bedroom on the main floor (no further steps)?: No  A bedroom is located on the following floor levels of residence (select all that apply):: 2nd Floor  Upon entering residence, is there a bathroom on the main floor (no further steps)?: No  Indicate which floors of current  residence have a bathroom (select all the apply):: 2nd Floor  Number of steps to 2nd floor from main floor: One Flight  Living Arrangements: Lives w/ Spouse/significant other  Patient Information Continued  Income Source: SSI/SSD  Does patient have prescription coverage?: Yes  Can the patient afford their medications and any related supplies (such as glucometers or test strips)?: Yes  Does patient receive dialysis treatments?: No  Does patient have a history of substance abuse?: No         ED Discharge Details:    Discharge planning discussed with:: Patient  Freedom of Choice: Yes  Comments - Freedom of Choice: CMmet with patient at bedside. CM introduced self and CM role. CM verifed PCP and pharmacy. Patient lives with SO, is independent, no ADs to ambulate, no O2, no HHC services. SO drives  CM contacted family/caregiver?: No- see comments (Patient self determinant)  Were Treatment Team discharge recommendations reviewed with patient/caregiver?: Yes  Did patient/caregiver verbalize understanding of patient care needs?: Yes  Were patient/caregiver advised of the risks associated with not following Treatment Team discharge recommendations?: Yes               Other Referral/Resources/Interventions Provided:  Referral Comments: Evaluations pending, CM will follow

## 2025-07-14 NOTE — ASSESSMENT & PLAN NOTE
POA, evidenced by tachycardia, tachypnea, and leukocytosis in the setting of urinary tract infection. Fluid collection noted around liver however this does not appear to be rim enhancing to suggest an abscess. Patient has candidal changes of her skin, but no overt cellulitis.   HR improving, RR improved, WBCs stable  No RUQ tenderness today   Rocephin IV QD  Follow up urine and blood cultures   Close monitoring of RUQ exam   Trend endpoints  Stop IVF

## 2025-07-14 NOTE — ASSESSMENT & PLAN NOTE
Suspect POA given leukocytes and nitrites in urinalysis. She did have 1 episode of incontinence at home.   Follow up culture data   IV Rocephin ordered   Procalcitonin elevated, will trend

## 2025-07-14 NOTE — PLAN OF CARE
Problem: Potential for Falls  Goal: Patient will remain free of falls  Description: INTERVENTIONS:  - Educate patient/family on patient safety including physical limitations  - Instruct patient to call for assistance with activity   - Consider consulting OT/PT to assist with strengthening/mobility based on AM PAC & JH-HLM score  - Consult OT/PT to assist with strengthening/mobility   - Keep Call bell within reach  - Keep bed low and locked with side rails adjusted as appropriate  - Keep care items and personal belongings within reach  - Initiate and maintain comfort rounds  - Make Fall Risk Sign visible to staff  - Offer Toileting every  Hours, in advance of need  - Initiate/Maintain alarm  - Obtain necessary fall risk management equipment:   - Apply yellow socks and bracelet for high fall risk patients  - Consider moving patient to room near nurses station  Outcome: Progressing     Problem: PAIN - ADULT  Goal: Verbalizes/displays adequate comfort level or baseline comfort level  Description: Interventions:  - Encourage patient to monitor pain and request assistance  - Assess pain using appropriate pain scale  - Administer analgesics as ordered based on type and severity of pain and evaluate response  - Implement non-pharmacological measures as appropriate and evaluate response  - Consider cultural and social influences on pain and pain management  - Notify physician/advanced practitioner if interventions unsuccessful or patient reports new pain  - Educate patient/family on pain management process including their role and importance of  reporting pain   - Provide non-pharmacologic/complimentary pain relief interventions  Outcome: Progressing     Problem: INFECTION - ADULT  Goal: Absence or prevention of progression during hospitalization  Description: INTERVENTIONS:  - Assess and monitor for signs and symptoms of infection  - Monitor lab/diagnostic results  - Monitor all insertion sites, i.e. indwelling lines,  tubes, and drains  - Monitor endotracheal if appropriate and nasal secretions for changes in amount and color  - [place] appropriate cooling/warming therapies per order  - Administer medications as ordered  - Instruct and encourage patient and family to use good hand hygiene technique  - Identify and instruct in appropriate isolation precautions for identified infection/condition  Outcome: Progressing  Goal: Absence of fever/infection during neutropenic period  Description: INTERVENTIONS:  - Monitor WBC  - Perform strict hand hygiene  - Limit to healthy visitors only  - No plants, dried, fresh or silk flowers with vallejo in patient room  Outcome: Progressing     Problem: SAFETY ADULT  Goal: Patient will remain free of falls  Description: INTERVENTIONS:  - Educate patient/family on patient safety including physical limitations  - Instruct patient to call for assistance with activity   - Consider consulting OT/PT to assist with strengthening/mobility based on AM PAC & -HLM score  - Consult OT/PT to assist with strengthening/mobility   - Keep Call bell within reach  - Keep bed low and locked with side rails adjusted as appropriate  - Keep care items and personal belongings within reach  - Initiate and maintain comfort rounds  - Make Fall Risk Sign visible to staff  - Offer Toileting every  Hours, in advance of need  - Initiate/Maintain alarm  - Obtain necessary fall risk management equipment:   - Apply yellow socks and bracelet for high fall risk patients  - Consider moving patient to room near nurses station  Outcome: Progressing  Goal: Maintain or return to baseline ADL function  Description: INTERVENTIONS:  -  Assess patient's ability to carry out ADLs; assess patient's baseline for ADL function and identify physical deficits which impact ability to perform ADLs (bathing, care of mouth/teeth, toileting, grooming, dressing, etc.)  - Assess/evaluate cause of self-care deficits   - Assess range of motion  - Assess  patient's mobility; develop plan if impaired  - Assess patient's need for assistive devices and provide as appropriate  - Encourage maximum independence but intervene and supervise when necessary  - Involve family in performance of ADLs  - Assess for home care needs following discharge   - Consider OT consult to assist with ADL evaluation and planning for discharge  - Provide patient education as appropriate  - Monitor functional capacity and physical performance, use of AM PAC & JH-HLM   - Monitor gait, balance and fatigue with ambulation    Outcome: Progressing  Goal: Maintains/Returns to pre admission functional level  Description: INTERVENTIONS:  - Perform AM-PAC 6 Click Basic Mobility/ Daily Activity assessment daily.  - Set and communicate daily mobility goal to care team and patient/family/caregiver.   - Collaborate with rehabilitation services on mobility goals if consulted  - Perform Range of Motion  times a day.  - Reposition patient every  hours.  - Dangle patient  times a day  - Stand patient  times a day  - Ambulate patient times a day  - Out of bed to chair  times a day   - Out of bed for meals  times a day  - Out of bed for toileting  - Record patient progress and toleration of activity level   Outcome: Progressing     Problem: DISCHARGE PLANNING  Goal: Discharge to home or other facility with appropriate resources  Description: INTERVENTIONS:  - Identify barriers to discharge w/patient and caregiver  - Arrange for needed discharge resources and transportation as appropriate  - Identify discharge learning needs (meds, wound care, etc.)  - Arrange for interpretive services to assist at discharge as needed  - Refer to Case Management Department for coordinating discharge planning if the patient needs post-hospital services based on physician/advanced practitioner order or complex needs related to functional status, cognitive ability, or social support system  Outcome: Progressing     Problem: Prexisting  or High Potential for Compromised Skin Integrity  Goal: Skin integrity is maintained or improved  Description: INTERVENTIONS:  - Identify patients at risk for skin breakdown  - Assess and monitor skin integrity including under and around medical devices   - Assess and monitor nutrition and hydration status  - Monitor labs  - Assess for incontinence   - Turn and reposition patient  - Assist with mobility/ambulation  - Relieve pressure over [place] prominences   - Avoid friction and shearing  - Provide appropriate hygiene as needed including keeping skin clean and dry  - Evaluate need for skin moisturizer/barrier cream  - Collaborate with interdisciplinary team  - Patient/family teaching  - Consider wound care consult    Assess:  - Review [place] scale daily  - Clean and moisturize skin every   - Inspect skin when repositioning, toileting, and assisting with ADLS  - Assess under medical devices such as  every   - Assess extremities for adequate circulation and sensation     Bed Management:  - Have minimal linens on bed & keep smooth, unwrinkled  - Change linens as needed when moist or perspiring  - Avoid sitting or lying in one position for more than  hours while in bed?Keep HOB at degrees   - Toileting:  - Offer bedside commode  - Assess for incontinence every   - Use incontinent care products after each incontinent episode such as     Activity:  - Mobilize patient times a day  - Encourage activity and walks on unit  - Encourage or provide ROM exercises   - Turn and reposition patient every Hours  - Use appropriate equipment to lift or move patient in bed  - Instruct/ Assist with weight shifting every  when out of bed in chair  - Consider limitation of chair time  hour intervals    Skin Care:  - Avoid use of baby powder, tape, friction and shearing, hot water or constrictive clothing  - Relieve pressure over bony prominences using   - Do not massage red bony areas    Next Steps:  - Teach patient strategies to minimize  risks such as   - Consider consults to  interdisciplinary teams such as   Outcome: Progressing

## 2025-07-14 NOTE — PROGRESS NOTES
Progress Note - Hospitalist   Name: Montse Smith 50 y.o. female I MRN: [id number]  Unit/Bed#: W -01 I Date of Admission: 7/13/2025   Date of Service: 7/14/2025 I Hospital Day: 1    Assessment & Plan  Sepsis without acute organ dysfunction (HCC)  POA, evidenced by tachycardia, tachypnea, and leukocytosis in the setting of urinary tract infection. Fluid collection noted around liver however this does not appear to be rim enhancing to suggest an abscess. Patient has candidal changes of her skin, but no overt cellulitis.   HR improving, RR improved, WBCs stable  No RUQ tenderness today   Rocephin IV QD  Follow up urine and blood cultures   Close monitoring of RUQ exam   Trend endpoints  Stop IVF  Acute cystitis without hematuria  Suspect POA given leukocytes and nitrites in urinalysis. She did have 1 episode of incontinence at home.   Follow up culture data   IV Rocephin ordered   Procalcitonin elevated, will trend  Type 2 diabetes mellitus with hyperglycemia, with long-term current use of insulin ([facility])  Lab Results   Component Value Date    HGBA1C 9.5 (H) 07/13/2025       Recent Labs     07/13/25  1312 07/13/25  1628 07/13/25  2101 07/14/25  0653   POCGLU 326* 302* 359* 247*       Blood Sugar Average: Last 72 hrs:  (P) 308.5  Hyperglycemic in the setting of infection at this time. A1c worsened to 9.5  Will likely need to increase below regimen  Continue Lantus 40 U BID   Humalog 18 U TID AC   SSI   QID Accuchecks   Hold Trulicity, Metformin   Class 3 severe obesity due to excess calories with body mass index (BMI) of 60.0 to 69.9 in adult  BMI noted   Diet and lifestyle modifications are needed   Thickened endometrium  This has been present in the past and had TVUS in 2023  Recommend TVUS non-urgently in the outpatient setting\  Discussed with patient 7/14, see incidental findings    Essential hypertension  BP improved  Restart home regimen - Lisinopril 20 mg QD  Major depressive disorder, recurrent severe without  "psychotic features (HCC)  No overt symptoms at this time   Continue Pristiq, Abilify, Lamictal, and Ativan PRN     VTE Pharmacologic Prophylaxis: VTE Score: 6 High Risk (Score >/= 5) - Pharmacological DVT Prophylaxis Ordered: enoxaparin (Lovenox). Sequential Compression Devices Ordered.    Mobility:   Basic Mobility Inpatient Raw Score: 12  JH-HLM Goal: 4: Move to chair/commode  JH-HLM Achieved: 2: Bed activities/Dependent transfer  JH-HLM Goal NOT achieved. Continue with multidisciplinary rounding and encourage appropriate mobility to improve upon JH-HLM goals.    Patient Centered Rounds: I performed bedside rounds with nursing staff today.   Discussions with Specialists or Other Care Team Provider: Discussed with RN, CANDY     Education and Discussions with Family / Patient: Patient declined call to .     Current Length of Stay: 1 day(s)  Current Patient Status: Inpatient   Certification Statement: The patient will continue to require additional inpatient hospital stay due to IV antibiotics, PT and OT evals  Discharge Plan: Anticipate discharge in 24-48 hrs to discharge location to be determined pending rehab evaluations.    Code Status: Level 1 - Full Code    Subjective   Patient reports a low grade temperature last night. Feeling a \"teeny bit\" better today. Still reports lower abdominal pain. No upper abdominal pain. No nausea or vomiting. Eating and drinking well.     Objective :  Temp:  [97.9 °F (36.6 °C)-100.5 °F (38.1 °C)] 97.9 °F (36.6 °C)  HR:  [105-127] 109  BP: (125-205)/(64-94) 134/82  Resp:  [20-24] 21  SpO2:  [91 %-98 %] 93 %  O2 Device: Nasal cannula  Nasal Cannula O2 Flow Rate (L/min):  [2 L/min] 2 L/min    Body mass index is 66.31 kg/m².     Input and Output Summary (last 24 hours):     Intake/Output Summary (Last 24 hours) at 7/14/2025 0841  Last data filed at 7/14/2025 0601  Gross per 24 hour   Intake 1960 ml   Output 501 ml   Net 1459 ml       Physical Exam  Vitals and nursing note " reviewed.   Constitutional:       General: She is not in acute distress.     Appearance: Normal appearance. She is morbidly obese. She is not ill-appearing or diaphoretic.      Interventions: Nasal cannula in place.   HENT:      Head: Normocephalic and atraumatic.      Mouth/Throat:      Mouth: Mucous membranes are moist.     Eyes:      General: No scleral icterus.     Pupils: Pupils are equal, round, and reactive to light.       Cardiovascular:      Rate and Rhythm: Regular rhythm. Tachycardia present.      Pulses: Normal pulses.      Heart sounds: Normal heart sounds, S1 normal and S2 normal. No murmur heard.     No systolic murmur is present.      No diastolic murmur is present.      No gallop. No S3 or S4 sounds.   Pulmonary:      Effort: Pulmonary effort is normal. No accessory muscle usage or respiratory distress.      Breath sounds: Normal breath sounds. No stridor. No decreased breath sounds, wheezing, rhonchi or rales.   Chest:      Chest wall: No tenderness.   Abdominal:      General: Bowel sounds are normal. There is no distension.      Palpations: Abdomen is soft.      Tenderness: There is no abdominal tenderness. There is no guarding.     Musculoskeletal:      Right lower leg: No edema.      Left lower leg: No edema.     Skin:     General: Skin is warm and dry.      Coloration: Skin is not jaundiced.     Neurological:      General: No focal deficit present.      Mental Status: She is alert. Mental status is at baseline.      Motor: No tremor or seizure activity.     Psychiatric:         Behavior: Behavior is cooperative.           Lines/Drains:              Lab Results: I have reviewed the following results:   Results from last 7 days   Lab Units 07/14/25  0510 07/13/25  1737 07/13/25  1041   WBC Thousand/uL 15.04*  --  14.28*   HEMOGLOBIN g/dL 12.6  --  15.5*   HEMATOCRIT % 39.1  --  47.1*   PLATELETS Thousands/uL 218   < > 272   SEGS PCT %  --   --  86*   LYMPHO PCT %  --   --  8*   MONO PCT %  --    --  5   EOS PCT %  --   --  1    < > = values in this interval not displayed.     Results from last 7 days   Lab Units 07/14/25  0510 07/13/25  1041   SODIUM mmol/L 133* 134*   POTASSIUM mmol/L 3.9 4.4   CHLORIDE mmol/L 102 100   CO2 mmol/L 26 23   BUN mg/dL 13 17   CREATININE mg/dL 0.77 0.70   ANION GAP mmol/L 5 11   CALCIUM mg/dL 8.3* 9.1   ALBUMIN g/dL  --  4.0   TOTAL BILIRUBIN mg/dL  --  0.86   ALK PHOS U/L  --  73   ALT U/L  --  49   AST U/L  --  36   GLUCOSE RANDOM mg/dL 253* 381*     Results from last 7 days   Lab Units 07/13/25  1041   INR  0.96     Results from last 7 days   Lab Units 07/14/25  0653 07/13/25  2101 07/13/25  1628 07/13/25  1312   POC GLUCOSE mg/dl 247* 359* 302* 326*     Results from last 7 days   Lab Units 07/13/25  1041   HEMOGLOBIN A1C % 9.5*     Results from last 7 days   Lab Units 07/14/25  0510 07/13/25  1041   LACTIC ACID mmol/L  --  1.8   PROCALCITONIN ng/ml 4.83*  --        Recent Cultures (last 7 days):   Results from last 7 days   Lab Units 07/13/25  1103 07/13/25  1041   BLOOD CULTURE  Received in Microbiology Lab. Culture in Progress. Received in Microbiology Lab. Culture in Progress.       Imaging Results Review: No pertinent imaging studies reviewed.  Other Study Results Review: No additional pertinent studies reviewed.    Last 24 Hours Medication List:     Current Facility-Administered Medications:     acetaminophen (TYLENOL) tablet 650 mg, Q6H PRN    ARIPiprazole (ABILIFY) tablet 10 mg, Daily    cefTRIAXone (ROCEPHIN) 2,000 mg in dextrose 5 % 50 mL IVPB, Q24H    desvenlafaxine succinate (PRISTIQ) 24 hr tablet 50 mg, Daily    dicyclomine (BENTYL) capsule 20 mg, TID AC    enoxaparin (LOVENOX) subcutaneous injection 60 mg, BID    insulin glargine (LANTUS) subcutaneous injection 40 Units 0.4 mL, QAM    insulin glargine (LANTUS) subcutaneous injection 40 Units 0.4 mL, HS    insulin lispro (HumALOG/ADMELOG) 100 units/mL subcutaneous injection 18 Units, TID With Meals    insulin  lispro (HumALOG/ADMELOG) 100 units/mL subcutaneous injection 2-12 Units, TID AC **AND** Fingerstick Glucose (POCT), TID AC    insulin lispro (HumALOG/ADMELOG) 100 units/mL subcutaneous injection 2-12 Units, HS    lamoTRIgine (LaMICtal) tablet 100 mg, BID    lisinopril (ZESTRIL) tablet 20 mg, Daily    LORazepam (ATIVAN) tablet 0.5 mg, Q8H PRN    multi-electrolyte (Plasmalyte-A/Isolyte-S PH 7.4/Normosol-R) IV solution, Continuous, Last Rate: 75 mL/hr (07/14/25 0730)    nystatin (MYCOSTATIN) powder, TID    ondansetron (ZOFRAN) injection 4 mg, Q6H PRN    oxybutynin (DITROPAN) tablet 5 mg, TID    oxyCODONE (ROXICODONE) IR tablet 5 mg, Q6H PRN    tamsulosin (FLOMAX) capsule 0.4 mg, Daily With Dinner    Administrative Statements   Today, Patient Was Seen By: Roe Rudd PA-C  I have spent a total time of 35 minutes in caring for this patient on the day of the visit/encounter including Diagnostic results, Patient and family education, Impressions, Counseling / Coordination of care, Documenting in the medical record, Reviewing/placing orders in the medical record (including tests, medications, and/or procedures), Obtaining or reviewing history  , and Communicating with other healthcare professionals .    **Please Note: This note may have been constructed using a voice recognition system.**

## 2025-07-14 NOTE — ASSESSMENT & PLAN NOTE
This has been present in the past and had TVUS in 2023  Recommend TVUS non-urgently in the outpatient setting\  Discussed with patient 7/14, see incidental findings

## 2025-07-14 NOTE — ASSESSMENT & PLAN NOTE
Lab Results   Component Value Date    HGBA1C 9.5 (H) 07/13/2025       Recent Labs     07/13/25  1312 07/13/25  1628 07/13/25  2101 07/14/25  0653   POCGLU 326* 302* 359* 247*       Blood Sugar Average: Last 72 hrs:  (P) 308.5  Hyperglycemic in the setting of infection at this time. A1c worsened to 9.5  Will likely need to increase below regimen  Continue Lantus 40 U BID   Humalog 18 U TID AC   SSI   QID Accuchecks   Hold Trulicity, Metformin

## 2025-07-15 LAB
ALBUMIN SERPL BCG-MCNC: 3.5 G/DL (ref 3.5–5)
ALP SERPL-CCNC: 66 U/L (ref 34–104)
ALT SERPL W P-5'-P-CCNC: 30 U/L (ref 7–52)
ANION GAP SERPL CALCULATED.3IONS-SCNC: 7 MMOL/L (ref 4–13)
AST SERPL W P-5'-P-CCNC: 17 U/L (ref 13–39)
BACTERIA UR CULT: ABNORMAL
BILIRUB SERPL-MCNC: 0.9 MG/DL (ref 0.2–1)
BUN SERPL-MCNC: 9 MG/DL (ref 5–25)
CALCIUM SERPL-MCNC: 8.6 MG/DL (ref 8.4–10.2)
CHLORIDE SERPL-SCNC: 100 MMOL/L (ref 96–108)
CO2 SERPL-SCNC: 26 MMOL/L (ref 21–32)
CREAT SERPL-MCNC: 0.7 MG/DL (ref 0.6–1.3)
ERYTHROCYTE [DISTWIDTH] IN BLOOD BY AUTOMATED COUNT: 13.6 % (ref 11.6–15.1)
GFR SERPL CREATININE-BSD FRML MDRD: 99 ML/MIN/1.73SQ M
GLUCOSE SERPL-MCNC: 227 MG/DL (ref 65–140)
GLUCOSE SERPL-MCNC: 248 MG/DL (ref 65–140)
GLUCOSE SERPL-MCNC: 257 MG/DL (ref 65–140)
GLUCOSE SERPL-MCNC: 277 MG/DL (ref 65–140)
GLUCOSE SERPL-MCNC: 336 MG/DL (ref 65–140)
HCT VFR BLD AUTO: 42.8 % (ref 34.8–46.1)
HGB BLD-MCNC: 13.4 G/DL (ref 11.5–15.4)
MCH RBC QN AUTO: 29.8 PG (ref 26.8–34.3)
MCHC RBC AUTO-ENTMCNC: 31.3 G/DL (ref 31.4–37.4)
MCV RBC AUTO: 95 FL (ref 82–98)
PLATELET # BLD AUTO: 222 THOUSANDS/UL (ref 149–390)
PMV BLD AUTO: 10.3 FL (ref 8.9–12.7)
POTASSIUM SERPL-SCNC: 4.2 MMOL/L (ref 3.5–5.3)
PROCALCITONIN SERPL-MCNC: 2.79 NG/ML
PROT SERPL-MCNC: 6.6 G/DL (ref 6.4–8.4)
RBC # BLD AUTO: 4.49 MILLION/UL (ref 3.81–5.12)
SODIUM SERPL-SCNC: 133 MMOL/L (ref 135–147)
WBC # BLD AUTO: 11.45 THOUSAND/UL (ref 4.31–10.16)

## 2025-07-15 PROCEDURE — 80053 COMPREHEN METABOLIC PANEL: CPT | Performed by: PHYSICIAN ASSISTANT

## 2025-07-15 PROCEDURE — 84145 PROCALCITONIN (PCT): CPT | Performed by: PHYSICIAN ASSISTANT

## 2025-07-15 PROCEDURE — 85027 COMPLETE CBC AUTOMATED: CPT | Performed by: PHYSICIAN ASSISTANT

## 2025-07-15 PROCEDURE — 99232 SBSQ HOSP IP/OBS MODERATE 35: CPT | Performed by: PHYSICIAN ASSISTANT

## 2025-07-15 PROCEDURE — 82948 REAGENT STRIP/BLOOD GLUCOSE: CPT

## 2025-07-15 RX ORDER — BISACODYL 10 MG
10 SUPPOSITORY, RECTAL RECTAL DAILY
Status: DISCONTINUED | OUTPATIENT
Start: 2025-07-15 | End: 2025-07-16 | Stop reason: HOSPADM

## 2025-07-15 RX ORDER — INSULIN LISPRO 100 [IU]/ML
20 INJECTION, SOLUTION INTRAVENOUS; SUBCUTANEOUS
Status: DISCONTINUED | OUTPATIENT
Start: 2025-07-15 | End: 2025-07-16 | Stop reason: HOSPADM

## 2025-07-15 RX ORDER — POLYETHYLENE GLYCOL 3350 17 G/17G
17 POWDER, FOR SOLUTION ORAL 2 TIMES DAILY
Status: DISCONTINUED | OUTPATIENT
Start: 2025-07-15 | End: 2025-07-16 | Stop reason: HOSPADM

## 2025-07-15 RX ORDER — INSULIN GLARGINE 100 [IU]/ML
50 INJECTION, SOLUTION SUBCUTANEOUS
Status: DISCONTINUED | OUTPATIENT
Start: 2025-07-15 | End: 2025-07-16 | Stop reason: HOSPADM

## 2025-07-15 RX ORDER — AMOXICILLIN 250 MG
2 CAPSULE ORAL 2 TIMES DAILY
Status: DISCONTINUED | OUTPATIENT
Start: 2025-07-15 | End: 2025-07-16 | Stop reason: HOSPADM

## 2025-07-15 RX ORDER — INSULIN GLARGINE 100 [IU]/ML
50 INJECTION, SOLUTION SUBCUTANEOUS EVERY MORNING
Status: DISCONTINUED | OUTPATIENT
Start: 2025-07-15 | End: 2025-07-15 | Stop reason: SDUPTHER

## 2025-07-15 RX ADMIN — ENOXAPARIN SODIUM 60 MG: 100 INJECTION SUBCUTANEOUS at 21:58

## 2025-07-15 RX ADMIN — OXYBUTYNIN CHLORIDE 5 MG: 5 TABLET ORAL at 17:20

## 2025-07-15 RX ADMIN — DOCUSATE SODIUM AND SENNOSIDES 2 TABLET: 8.6; 5 TABLET, FILM COATED ORAL at 08:28

## 2025-07-15 RX ADMIN — POLYETHYLENE GLYCOL 3350 17 G: 17 POWDER, FOR SOLUTION ORAL at 08:27

## 2025-07-15 RX ADMIN — NYSTATIN: 100000 POWDER TOPICAL at 17:21

## 2025-07-15 RX ADMIN — POLYETHYLENE GLYCOL 3350 17 G: 17 POWDER, FOR SOLUTION ORAL at 21:58

## 2025-07-15 RX ADMIN — DESVENLAFAXINE 50 MG: 50 TABLET, EXTENDED RELEASE ORAL at 08:28

## 2025-07-15 RX ADMIN — ENOXAPARIN SODIUM 60 MG: 100 INJECTION SUBCUTANEOUS at 08:27

## 2025-07-15 RX ADMIN — INSULIN GLARGINE 50 UNITS: 100 INJECTION, SOLUTION SUBCUTANEOUS at 21:58

## 2025-07-15 RX ADMIN — LORAZEPAM 0.5 MG: 0.5 TABLET ORAL at 12:37

## 2025-07-15 RX ADMIN — DOCUSATE SODIUM AND SENNOSIDES 2 TABLET: 8.6; 5 TABLET, FILM COATED ORAL at 17:20

## 2025-07-15 RX ADMIN — NYSTATIN: 100000 POWDER TOPICAL at 08:29

## 2025-07-15 RX ADMIN — LAMOTRIGINE 100 MG: 100 TABLET ORAL at 08:28

## 2025-07-15 RX ADMIN — TAMSULOSIN HYDROCHLORIDE 0.4 MG: 0.4 CAPSULE ORAL at 17:20

## 2025-07-15 RX ADMIN — INSULIN LISPRO 4 UNITS: 100 INJECTION, SOLUTION INTRAVENOUS; SUBCUTANEOUS at 08:29

## 2025-07-15 RX ADMIN — DICYCLOMINE HYDROCHLORIDE 20 MG: 10 CAPSULE ORAL at 17:20

## 2025-07-15 RX ADMIN — OXYBUTYNIN CHLORIDE 5 MG: 5 TABLET ORAL at 08:28

## 2025-07-15 RX ADMIN — ARIPIPRAZOLE 10 MG: 5 TABLET ORAL at 08:28

## 2025-07-15 RX ADMIN — LISINOPRIL 20 MG: 20 TABLET ORAL at 08:28

## 2025-07-15 RX ADMIN — BISACODYL 10 MG: 10 SUPPOSITORY RECTAL at 08:28

## 2025-07-15 RX ADMIN — INSULIN LISPRO 20 UNITS: 100 INJECTION, SOLUTION INTRAVENOUS; SUBCUTANEOUS at 17:22

## 2025-07-15 RX ADMIN — OXYCODONE HYDROCHLORIDE 5 MG: 5 TABLET ORAL at 21:58

## 2025-07-15 RX ADMIN — OXYBUTYNIN CHLORIDE 5 MG: 5 TABLET ORAL at 21:58

## 2025-07-15 RX ADMIN — DICYCLOMINE HYDROCHLORIDE 20 MG: 10 CAPSULE ORAL at 06:49

## 2025-07-15 RX ADMIN — INSULIN LISPRO 20 UNITS: 100 INJECTION, SOLUTION INTRAVENOUS; SUBCUTANEOUS at 12:32

## 2025-07-15 RX ADMIN — INSULIN LISPRO 8 UNITS: 100 INJECTION, SOLUTION INTRAVENOUS; SUBCUTANEOUS at 12:31

## 2025-07-15 RX ADMIN — INSULIN LISPRO 4 UNITS: 100 INJECTION, SOLUTION INTRAVENOUS; SUBCUTANEOUS at 17:22

## 2025-07-15 RX ADMIN — DICYCLOMINE HYDROCHLORIDE 20 MG: 10 CAPSULE ORAL at 12:31

## 2025-07-15 RX ADMIN — INSULIN LISPRO 6 UNITS: 100 INJECTION, SOLUTION INTRAVENOUS; SUBCUTANEOUS at 21:59

## 2025-07-15 RX ADMIN — LAMOTRIGINE 100 MG: 100 TABLET ORAL at 17:21

## 2025-07-15 RX ADMIN — CEFTRIAXONE 2000 MG: 10 INJECTION, POWDER, FOR SOLUTION INTRAVENOUS at 12:31

## 2025-07-15 RX ADMIN — NYSTATIN: 100000 POWDER TOPICAL at 22:06

## 2025-07-16 ENCOUNTER — HOSPITAL ENCOUNTER (INPATIENT)
Facility: HOSPITAL | Age: 50
LOS: 1 days | Discharge: HOME | End: 2025-07-18
Attending: EMERGENCY MEDICINE | Admitting: STUDENT IN AN ORGANIZED HEALTH CARE EDUCATION/TRAINING PROGRAM
Payer: COMMERCIAL

## 2025-07-16 ENCOUNTER — APPOINTMENT (EMERGENCY)
Dept: CT IMAGING | Facility: HOSPITAL | Age: 50
End: 2025-07-16
Payer: COMMERCIAL

## 2025-07-16 VITALS
OXYGEN SATURATION: 92 % | WEIGHT: 293 LBS | DIASTOLIC BLOOD PRESSURE: 87 MMHG | HEIGHT: 60 IN | SYSTOLIC BLOOD PRESSURE: 141 MMHG | RESPIRATION RATE: 18 BRPM | TEMPERATURE: 98.6 F | BODY MASS INDEX: 57.52 KG/M2 | HEART RATE: 100 BPM

## 2025-07-16 DIAGNOSIS — K59.00 CONSTIPATION: ICD-10-CM

## 2025-07-16 DIAGNOSIS — E11.65 TYPE 2 DIABETES MELLITUS WITH HYPERGLYCEMIA, WITH LONG-TERM CURRENT USE OF INSULIN (HCC): ICD-10-CM

## 2025-07-16 DIAGNOSIS — R19.7 DIARRHEA, UNSPECIFIED TYPE: ICD-10-CM

## 2025-07-16 DIAGNOSIS — M79.605 LEFT LEG PAIN: ICD-10-CM

## 2025-07-16 DIAGNOSIS — Z79.4 TYPE 2 DIABETES MELLITUS WITH HYPERGLYCEMIA, WITH LONG-TERM CURRENT USE OF INSULIN (HCC): ICD-10-CM

## 2025-07-16 DIAGNOSIS — R09.02 HYPOXIA: ICD-10-CM

## 2025-07-16 DIAGNOSIS — R09.89 PULMONARY VASCULAR CONGESTION: ICD-10-CM

## 2025-07-16 DIAGNOSIS — R10.11 RUQ PAIN: Primary | ICD-10-CM

## 2025-07-16 LAB
ALBUMIN SERPL BCG-MCNC: 3.5 G/DL (ref 3.5–5)
ALBUMIN SERPL BCG-MCNC: 3.6 G/DL (ref 3.5–5)
ALP SERPL-CCNC: 66 U/L (ref 34–104)
ALP SERPL-CCNC: 66 U/L (ref 34–104)
ALT SERPL W P-5'-P-CCNC: 24 U/L (ref 7–52)
ALT SERPL W P-5'-P-CCNC: 25 U/L (ref 7–52)
ANION GAP SERPL CALCULATED.3IONS-SCNC: 5 MMOL/L (ref 4–13)
ANION GAP SERPL CALCULATED.3IONS-SCNC: 7 MMOL/L (ref 4–13)
APTT PPP: 31 SECONDS (ref 23–34)
AST SERPL W P-5'-P-CCNC: 12 U/L (ref 13–39)
AST SERPL W P-5'-P-CCNC: 19 U/L (ref 13–39)
BASOPHILS # BLD AUTO: 0.07 THOUSANDS/ÂΜL (ref 0–0.1)
BASOPHILS NFR BLD AUTO: 1 % (ref 0–1)
BILIRUB SERPL-MCNC: 0.64 MG/DL (ref 0.2–1)
BILIRUB SERPL-MCNC: 0.66 MG/DL (ref 0.2–1)
BUN SERPL-MCNC: 13 MG/DL (ref 5–25)
BUN SERPL-MCNC: 9 MG/DL (ref 5–25)
CALCIUM SERPL-MCNC: 8.9 MG/DL (ref 8.4–10.2)
CALCIUM SERPL-MCNC: 9.2 MG/DL (ref 8.4–10.2)
CHLORIDE SERPL-SCNC: 100 MMOL/L (ref 96–108)
CHLORIDE SERPL-SCNC: 99 MMOL/L (ref 96–108)
CO2 SERPL-SCNC: 28 MMOL/L (ref 21–32)
CO2 SERPL-SCNC: 29 MMOL/L (ref 21–32)
CREAT SERPL-MCNC: 0.68 MG/DL (ref 0.6–1.3)
CREAT SERPL-MCNC: 0.78 MG/DL (ref 0.6–1.3)
EOSINOPHIL # BLD AUTO: 0.18 THOUSAND/ÂΜL (ref 0–0.61)
EOSINOPHIL NFR BLD AUTO: 2 % (ref 0–6)
ERYTHROCYTE [DISTWIDTH] IN BLOOD BY AUTOMATED COUNT: 13.4 % (ref 11.6–15.1)
ERYTHROCYTE [DISTWIDTH] IN BLOOD BY AUTOMATED COUNT: 13.5 % (ref 11.6–15.1)
GFR SERPL CREATININE-BSD FRML MDRD: 100 ML/MIN/1.73SQ M
GFR SERPL CREATININE-BSD FRML MDRD: 87 ML/MIN/1.73SQ M
GLUCOSE SERPL-MCNC: 201 MG/DL (ref 65–140)
GLUCOSE SERPL-MCNC: 237 MG/DL (ref 65–140)
GLUCOSE SERPL-MCNC: 283 MG/DL (ref 65–140)
GLUCOSE SERPL-MCNC: 328 MG/DL (ref 65–140)
HCT VFR BLD AUTO: 39.2 % (ref 34.8–46.1)
HCT VFR BLD AUTO: 40.3 % (ref 34.8–46.1)
HGB BLD-MCNC: 12.9 G/DL (ref 11.5–15.4)
HGB BLD-MCNC: 12.9 G/DL (ref 11.5–15.4)
IMM GRANULOCYTES # BLD AUTO: 0.25 THOUSAND/UL (ref 0–0.2)
IMM GRANULOCYTES NFR BLD AUTO: 2 % (ref 0–2)
INR PPP: 0.94 (ref 0.85–1.19)
LACTATE SERPL-SCNC: 1.2 MMOL/L (ref 0.5–2)
LIPASE SERPL-CCNC: 40 U/L (ref 11–82)
LYMPHOCYTES # BLD AUTO: 1.39 THOUSANDS/ÂΜL (ref 0.6–4.47)
LYMPHOCYTES NFR BLD AUTO: 14 % (ref 14–44)
MCH RBC QN AUTO: 30.3 PG (ref 26.8–34.3)
MCH RBC QN AUTO: 30.9 PG (ref 26.8–34.3)
MCHC RBC AUTO-ENTMCNC: 32 G/DL (ref 31.4–37.4)
MCHC RBC AUTO-ENTMCNC: 32.9 G/DL (ref 31.4–37.4)
MCV RBC AUTO: 94 FL (ref 82–98)
MCV RBC AUTO: 95 FL (ref 82–98)
MONOCYTES # BLD AUTO: 0.98 THOUSAND/ÂΜL (ref 0.17–1.22)
MONOCYTES NFR BLD AUTO: 10 % (ref 4–12)
NEUTROPHILS # BLD AUTO: 7.39 THOUSANDS/ÂΜL (ref 1.85–7.62)
NEUTS SEG NFR BLD AUTO: 71 % (ref 43–75)
NRBC BLD AUTO-RTO: 0 /100 WBCS
PLATELET # BLD AUTO: 219 THOUSANDS/UL (ref 149–390)
PLATELET # BLD AUTO: 250 THOUSANDS/UL (ref 149–390)
PMV BLD AUTO: 10 FL (ref 8.9–12.7)
PMV BLD AUTO: 9.9 FL (ref 8.9–12.7)
POTASSIUM SERPL-SCNC: 4 MMOL/L (ref 3.5–5.3)
POTASSIUM SERPL-SCNC: 4.6 MMOL/L (ref 3.5–5.3)
PROCALCITONIN SERPL-MCNC: 1.53 NG/ML
PROT SERPL-MCNC: 6.9 G/DL (ref 6.4–8.4)
PROT SERPL-MCNC: 7.3 G/DL (ref 6.4–8.4)
PROTHROMBIN TIME: 13.3 SECONDS (ref 12.3–15)
RBC # BLD AUTO: 4.18 MILLION/UL (ref 3.81–5.12)
RBC # BLD AUTO: 4.26 MILLION/UL (ref 3.81–5.12)
SODIUM SERPL-SCNC: 134 MMOL/L (ref 135–147)
SODIUM SERPL-SCNC: 134 MMOL/L (ref 135–147)
WBC # BLD AUTO: 10.26 THOUSAND/UL (ref 4.31–10.16)
WBC # BLD AUTO: 8.68 THOUSAND/UL (ref 4.31–10.16)

## 2025-07-16 PROCEDURE — 80053 COMPREHEN METABOLIC PANEL: CPT | Performed by: PHYSICIAN ASSISTANT

## 2025-07-16 PROCEDURE — 82948 REAGENT STRIP/BLOOD GLUCOSE: CPT

## 2025-07-16 PROCEDURE — 85027 COMPLETE CBC AUTOMATED: CPT | Performed by: PHYSICIAN ASSISTANT

## 2025-07-16 PROCEDURE — 83690 ASSAY OF LIPASE: CPT | Performed by: EMERGENCY MEDICINE

## 2025-07-16 PROCEDURE — 85730 THROMBOPLASTIN TIME PARTIAL: CPT | Performed by: EMERGENCY MEDICINE

## 2025-07-16 PROCEDURE — 85025 COMPLETE CBC W/AUTO DIFF WBC: CPT | Performed by: EMERGENCY MEDICINE

## 2025-07-16 PROCEDURE — 74177 CT ABD & PELVIS W/CONTRAST: CPT

## 2025-07-16 PROCEDURE — 96374 THER/PROPH/DIAG INJ IV PUSH: CPT

## 2025-07-16 PROCEDURE — 99285 EMERGENCY DEPT VISIT HI MDM: CPT | Performed by: EMERGENCY MEDICINE

## 2025-07-16 PROCEDURE — 87040 BLOOD CULTURE FOR BACTERIA: CPT | Performed by: EMERGENCY MEDICINE

## 2025-07-16 PROCEDURE — 96361 HYDRATE IV INFUSION ADD-ON: CPT

## 2025-07-16 PROCEDURE — 97162 PT EVAL MOD COMPLEX 30 MIN: CPT

## 2025-07-16 PROCEDURE — 99239 HOSP IP/OBS DSCHRG MGMT >30: CPT | Performed by: PHYSICIAN ASSISTANT

## 2025-07-16 PROCEDURE — 99284 EMERGENCY DEPT VISIT MOD MDM: CPT

## 2025-07-16 PROCEDURE — 83605 ASSAY OF LACTIC ACID: CPT | Performed by: EMERGENCY MEDICINE

## 2025-07-16 PROCEDURE — 84145 PROCALCITONIN (PCT): CPT | Performed by: EMERGENCY MEDICINE

## 2025-07-16 PROCEDURE — 36415 COLL VENOUS BLD VENIPUNCTURE: CPT | Performed by: EMERGENCY MEDICINE

## 2025-07-16 PROCEDURE — 85610 PROTHROMBIN TIME: CPT | Performed by: EMERGENCY MEDICINE

## 2025-07-16 PROCEDURE — 80053 COMPREHEN METABOLIC PANEL: CPT | Performed by: EMERGENCY MEDICINE

## 2025-07-16 RX ORDER — AMOXICILLIN 250 MG
2 CAPSULE ORAL 2 TIMES DAILY
Qty: 120 TABLET | Refills: 0 | Status: ON HOLD | OUTPATIENT
Start: 2025-07-16 | End: 2025-07-18

## 2025-07-16 RX ORDER — POLYETHYLENE GLYCOL 3350 17 G/17G
17 POWDER, FOR SOLUTION ORAL DAILY
Qty: 510 G | Refills: 0 | Status: SHIPPED | OUTPATIENT
Start: 2025-07-16

## 2025-07-16 RX ORDER — CEFPODOXIME PROXETIL 200 MG/1
400 TABLET, FILM COATED ORAL 2 TIMES DAILY WITH MEALS
Qty: 14 TABLET | Refills: 0 | Status: SHIPPED | OUTPATIENT
Start: 2025-07-16 | End: 2025-07-20

## 2025-07-16 RX ORDER — INSULIN LISPRO 100 [IU]/ML
20 INJECTION, SOLUTION INTRAVENOUS; SUBCUTANEOUS
Start: 2025-07-16 | End: 2025-07-18

## 2025-07-16 RX ORDER — HYDROMORPHONE HCL IN WATER/PF 6 MG/30 ML
0.2 PATIENT CONTROLLED ANALGESIA SYRINGE INTRAVENOUS ONCE
Status: COMPLETED | OUTPATIENT
Start: 2025-07-16 | End: 2025-07-16

## 2025-07-16 RX ORDER — KETOROLAC TROMETHAMINE 30 MG/ML
15 INJECTION, SOLUTION INTRAMUSCULAR; INTRAVENOUS ONCE
Status: COMPLETED | OUTPATIENT
Start: 2025-07-17 | End: 2025-07-17

## 2025-07-16 RX ORDER — CEFPODOXIME PROXETIL 200 MG/1
400 TABLET, FILM COATED ORAL 2 TIMES DAILY WITH MEALS
Status: DISCONTINUED | OUTPATIENT
Start: 2025-07-16 | End: 2025-07-16 | Stop reason: HOSPADM

## 2025-07-16 RX ORDER — INSULIN GLARGINE 100 [IU]/ML
50 INJECTION, SOLUTION SUBCUTANEOUS
Start: 2025-07-16 | End: 2025-07-18

## 2025-07-16 RX ORDER — BISACODYL 10 MG
10 SUPPOSITORY, RECTAL RECTAL DAILY
Qty: 12 SUPPOSITORY | Refills: 0 | Status: SHIPPED | OUTPATIENT
Start: 2025-07-17 | End: 2025-07-18

## 2025-07-16 RX ADMIN — INSULIN LISPRO 20 UNITS: 100 INJECTION, SOLUTION INTRAVENOUS; SUBCUTANEOUS at 07:47

## 2025-07-16 RX ADMIN — NYSTATIN: 100000 POWDER TOPICAL at 08:41

## 2025-07-16 RX ADMIN — ENOXAPARIN SODIUM 60 MG: 100 INJECTION SUBCUTANEOUS at 08:38

## 2025-07-16 RX ADMIN — IOHEXOL 100 ML: 350 INJECTION, SOLUTION INTRAVENOUS at 22:59

## 2025-07-16 RX ADMIN — INSULIN LISPRO 4 UNITS: 100 INJECTION, SOLUTION INTRAVENOUS; SUBCUTANEOUS at 07:46

## 2025-07-16 RX ADMIN — DICYCLOMINE HYDROCHLORIDE 20 MG: 10 CAPSULE ORAL at 05:38

## 2025-07-16 RX ADMIN — ARIPIPRAZOLE 10 MG: 5 TABLET ORAL at 08:39

## 2025-07-16 RX ADMIN — HYDROMORPHONE HYDROCHLORIDE 0.2 MG: 0.2 INJECTION, SOLUTION INTRAMUSCULAR; INTRAVENOUS; SUBCUTANEOUS at 22:31

## 2025-07-16 RX ADMIN — SODIUM CHLORIDE 1000 ML: 0.9 INJECTION, SOLUTION INTRAVENOUS at 22:30

## 2025-07-16 RX ADMIN — LISINOPRIL 20 MG: 20 TABLET ORAL at 08:39

## 2025-07-16 RX ADMIN — OXYCODONE HYDROCHLORIDE 5 MG: 5 TABLET ORAL at 09:02

## 2025-07-16 RX ADMIN — CEFPODOXIME PROXETIL 400 MG: 200 TABLET, FILM COATED ORAL at 08:48

## 2025-07-16 RX ADMIN — INSULIN LISPRO 8 UNITS: 100 INJECTION, SOLUTION INTRAVENOUS; SUBCUTANEOUS at 12:24

## 2025-07-16 RX ADMIN — OXYBUTYNIN CHLORIDE 5 MG: 5 TABLET ORAL at 08:39

## 2025-07-16 RX ADMIN — LAMOTRIGINE 100 MG: 100 TABLET ORAL at 08:39

## 2025-07-16 RX ADMIN — POLYETHYLENE GLYCOL 3350 17 G: 17 POWDER, FOR SOLUTION ORAL at 08:38

## 2025-07-16 RX ADMIN — DESVENLAFAXINE 50 MG: 50 TABLET, EXTENDED RELEASE ORAL at 08:40

## 2025-07-16 RX ADMIN — DOCUSATE SODIUM AND SENNOSIDES 2 TABLET: 8.6; 5 TABLET, FILM COATED ORAL at 08:39

## 2025-07-16 RX ADMIN — INSULIN LISPRO 20 UNITS: 100 INJECTION, SOLUTION INTRAVENOUS; SUBCUTANEOUS at 12:24

## 2025-07-16 NOTE — ASSESSMENT & PLAN NOTE
POA, evidenced by tachycardia, tachypnea, and leukocytosis in the setting of urinary tract infection. Fluid collection noted around liver however this does not appear to be rim enhancing to suggest an abscess. Patient has candidal changes of her skin, but no overt cellulitis.   HR improving (always elevated per chart and patient), RR improved, WBCs improving  No RUQ tenderness today   Blood cultures negative   Urine culture with E. Coli, pansensitive  Stable for discharge   Vantin for 4 more days

## 2025-07-16 NOTE — PLAN OF CARE
Problem: Potential for Falls  Goal: Patient will remain free of falls  Description: INTERVENTIONS:  - Educate patient/family on patient safety including physical limitations  - Instruct patient to call for assistance with activity   - Consider consulting OT/PT to assist with strengthening/mobility based on AM PAC & JH-HLM score  - Consult OT/PT to assist with strengthening/mobility   - Keep Call bell within reach  - Keep bed low and locked with side rails adjusted as appropriate  - Keep care items and personal belongings within reach  - Initiate and maintain comfort rounds  - Make Fall Risk Sign visible to staff  - Offer Toileting every  Hours, in advance of need  - Initiate/Maintain alarm  - Obtain necessary fall risk management equipment:   - Apply yellow socks and bracelet for high fall risk patients  - Consider moving patient to room near nurses station  Outcome: Progressing     Problem: PAIN - ADULT  Goal: Verbalizes/displays adequate comfort level or baseline comfort level  Description: Interventions:  - Encourage patient to monitor pain and request assistance  - Assess pain using appropriate pain scale  - Administer analgesics as ordered based on type and severity of pain and evaluate response  - Implement non-pharmacological measures as appropriate and evaluate response  - Consider cultural and social influences on pain and pain management  - Notify physician/advanced practitioner if interventions unsuccessful or patient reports new pain  - Educate patient/family on pain management process including their role and importance of  reporting pain   - Provide non-pharmacologic/complimentary pain relief interventions  Outcome: Progressing     Problem: INFECTION - ADULT  Goal: Absence or prevention of progression during hospitalization  Description: INTERVENTIONS:  - Assess and monitor for signs and symptoms of infection  - Monitor lab/diagnostic results  - Monitor all insertion sites, i.e. indwelling lines,  tubes, and drains  - Monitor endotracheal if appropriate and nasal secretions for changes in amount and color  - [place] appropriate cooling/warming therapies per order  - Administer medications as ordered  - Instruct and encourage patient and family to use good hand hygiene technique  - Identify and instruct in appropriate isolation precautions for identified infection/condition  Outcome: Progressing  Goal: Absence of fever/infection during neutropenic period  Description: INTERVENTIONS:  - Monitor WBC  - Perform strict hand hygiene  - Limit to healthy visitors only  - No plants, dried, fresh or silk flowers with [place] in patient room  Outcome: Progressing     Problem: SAFETY ADULT  Goal: Patient will remain free of falls  Description: INTERVENTIONS:  - Educate patient/family on patient safety including physical limitations  - Instruct patient to call for assistance with activity   - Consider consulting OT/PT to assist with strengthening/mobility based on AM PAC & -HLM score  - Consult OT/PT to assist with strengthening/mobility   - Keep Call bell within reach  - Keep bed low and locked with side rails adjusted as appropriate  - Keep care items and personal belongings within reach  - Initiate and maintain comfort rounds  - Make Fall Risk Sign visible to staff  - Offer Toileting every Hours, in advance of need  - Initiate/Maintain alarm  - Obtain necessary fall risk management equipment:   - Apply yellow socks and bracelet for high fall risk patients  - Consider moving patient to room near nurses station  Outcome: Progressing  Goal: Maintain or return to baseline ADL function  Description: INTERVENTIONS:  -  Assess patient's ability to carry out ADLs; assess patient's baseline for ADL function and identify physical deficits which impact ability to perform ADLs (bathing, care of mouth/teeth, toileting, grooming, dressing, etc.)  - Assess/evaluate cause of self-care deficits   - Assess range of motion  - Assess  patient's mobility; develop plan if impaired  - Assess patient's need for assistive devices and provide as appropriate  - Encourage maximum independence but intervene and supervise when necessary  - Involve family in performance of ADLs  - Assess for home care needs following discharge   - Consider OT consult to assist with ADL evaluation and planning for discharge  - Provide patient education as appropriate  - Monitor functional capacity and physical performance, use of AM PAC & JH-HLM   - Monitor gait, balance and fatigue with ambulation    Outcome: Progressing  Goal: Maintains/Returns to pre admission functional level  Description: INTERVENTIONS:  - Perform AM-PAC 6 Click Basic Mobility/ Daily Activity assessment daily.  - Set and communicate daily mobility goal to care team and patient/family/caregiver.   - Collaborate with rehabilitation services on mobility goals if consulted  - Perform Range of Motion  times a day.  - Reposition patient every  hours.  - Dangle patient  times a day  - Stand patient  times a day  - Ambulate patient  times a day  - Out of bed to chair  times a day   - Out of bed for meals  times a day  - Out of bed for toileting  - Record patient progress and toleration of activity level   Outcome: Progressing     Problem: DISCHARGE PLANNING  Goal: Discharge to home or other facility with appropriate resources  Description: INTERVENTIONS:  - Identify barriers to discharge w/patient and caregiver  - Arrange for needed discharge resources and transportation as appropriate  - Identify discharge learning needs (meds, wound care, etc.)  - Arrange for interpretive services to assist at discharge as needed  - Refer to Case Management Department for coordinating discharge planning if the patient needs post-hospital services based on physician/advanced practitioner order or complex needs related to functional status, cognitive ability, or social support system  Outcome: Progressing     Problem:  Prexisting or High Potential for Compromised Skin Integrity  Goal: Skin integrity is maintained or improved  Description: INTERVENTIONS:  - Identify patients at risk for skin breakdown  - Assess and monitor skin integrity including under and around medical devices   - Assess and monitor nutrition and hydration status  - Monitor labs  - Assess for incontinence   - Turn and reposition patient  - Assist with mobility/ambulation  - Relieve pressure over [place] prominences   - Avoid friction and shearing  - Provide appropriate hygiene as needed including keeping skin clean and dry  - Evaluate need for skin moisturizer/barrier cream  - Collaborate with interdisciplinary team  - Patient/family teaching  - Consider wound care consult    Assess:  - Review [place] scale daily  - Clean and moisturize skin every   - Inspect skin when repositioning, toileting, and assisting with ADLS  - Assess under medical devices such as  every   - Assess extremities for adequate circulation and sensation     Bed Management:  - Have minimal linens on bed & keep smooth, unwrinkled  - Change linens as needed when moist or perspiring  - Avoid sitting or lying in one position for more than  hours while in bed?Keep HOB at degrees   - Toileting:  - Offer bedside commode  - Assess for incontinence every   - Use incontinent care products after each incontinent episode such as     Activity:  - Mobilize patient  times a day  - Encourage activity and walks on unit  - Encourage or provide ROM exercises   - Turn and reposition patient every  Hours  - Use appropriate equipment to lift or move patient in bed  - Instruct/ Assist with weight shifting every  when out of bed in chair  - Consider limitation of chair time  hour intervals    Skin Care:  - Avoid use of baby powder, tape, friction and shearing, hot water or constrictive clothing  - Relieve pressure over bony prominences using   - Do not massage red bony areas    Next Steps:  - Teach patient strategies  to minimize risks such as   - Consider consults to  interdisciplinary teams such as   Outcome: Progressing

## 2025-07-16 NOTE — ASSESSMENT & PLAN NOTE
Lab Results   Component Value Date    HGBA1C 9.5 (H) 07/13/2025       Recent Labs     07/15/25  1613 07/15/25  2138 07/16/25  0714 07/16/25  1058   POCGLU 227* 277* 237* 328*       Blood Sugar Average: Last 72 hrs:  (P) 289.5  Hyperglycemic in the setting of infection at this time. A1c worsened to 9.5  Will likely need to increase below regimen  Increase Lantus to 50 U BID   Increase Humalog to 20 U TID AC   Continue above increased regimen at discharge  Restart Trulicity, Metformin

## 2025-07-16 NOTE — DISCHARGE SUMMARY
Discharge Summary - Hospitalist   Name: Montse Smith 50 y.o. female I MRN: [id number]  Unit/Bed#: W -01 I Date of Admission: 7/13/2025   Date of Service: 7/16/2025 I Hospital Day: 3     Assessment & Plan  Sepsis without acute organ dysfunction (HCC)  POA, evidenced by tachycardia, tachypnea, and leukocytosis in the setting of urinary tract infection. Fluid collection noted around liver however this does not appear to be rim enhancing to suggest an abscess. Patient has candidal changes of her skin, but no overt cellulitis.   HR improving (always elevated per chart and patient), RR improved, WBCs improving  No RUQ tenderness today   Blood cultures negative   Urine culture with E. Coli, pansensitive  Stable for discharge   Vantin for 4 more days   Acute cystitis without hematuria  Suspect POA given leukocytes and nitrites in urinalysis. She did have 1 episode of incontinence at home.   UCx growing E. Coli, pansensitive   Procalcitonin trending down  Complete 7 total days antibiotics, Vantin BID ordered  Type 2 diabetes mellitus with hyperglycemia, with long-term current use of insulin (HCC)  Lab Results   Component Value Date    HGBA1C 9.5 (H) 07/13/2025       Recent Labs     07/15/25  1613 07/15/25  2138 07/16/25  0714 07/16/25  1058   POCGLU 227* 277* 237* 328*       Blood Sugar Average: Last 72 hrs:  (P) 289.5  Hyperglycemic in the setting of infection at this time. A1c worsened to 9.5  Will likely need to increase below regimen  Increase Lantus to 50 U BID   Increase Humalog to 20 U TID AC   Continue above increased regimen at discharge  Restart Trulicity, Metformin   Class 3 severe obesity due to excess calories with body mass index (BMI) of 60.0 to 69.9 in adult  BMI noted   Diet and lifestyle modifications are needed   Thickened endometrium  This has been present in the past and had TVUS in 2023  Recommend TVUS non-urgently in the outpatient setting\  Discussed with patient 7/14, see incidental findings     Essential hypertension  BP improved  Restart home regimen - Lisinopril 20 mg QD  Major depressive disorder, recurrent severe without psychotic features (HCC)  No overt symptoms at this time   Continue Pristiq, Abilify, Lamictal, and Ativan PRN      Medical Problems       Resolved Problems  Date Reviewed: 7/16/2025   None       Discharging Physician / Practitioner: Roe Rudd PA-C  PCP: Florecita Dietrich NP  Admission Date:   Admission Orders (From admission, onward)       Ordered        07/13/25 1321  INPATIENT ADMISSION  Once                          Discharge Date: 07/16/25    Next Steps for Physician/AP Assuming Care:  None    Test Results Pending at Discharge (will require follow up):  None    Medication Changes for Discharge & Rationale:   Vantin  Increase Lantus 50 U QHS  Increase Humalog to 20 U TID AC   See after visit summary for reconciled discharge medications provided to patient and/or family.     Consultations During Hospital Stay:  None    Procedures Performed:   CT abdomen pelvis     Significant Findings / Test Results:   CT abdomen pelvis: No acute abnormality in the abdomen or pelvis. Hepatomegaly with diffuse hepatic steatosis. Small non-obstructing right renal calculi. Possible endometrial thickening versus heterogenous enhancement. There is a small amount of fluid adjacent to the lateral aspect of the liver, not present previously. This is of uncertain etiology. Given hepatomegaly and hepatic steatosis this could be related to liver disease though the liver does not appear frankly cirrhotic    Incidental Findings:   Thickened Endometrium    I reviewed the above mentioned incidental findings with the patient and/or family and they expressed understanding.    Hospital Course:   Montse Smith is a 50 y.o. female patient who originally presented to the hospital on 7/13/2025 due to abdominal pain. She was found to meet sepsis criteria secondary to UTI. She was admitted and started on IV  Rocephin and hydrated. Her SIRS criteria improved. Her blood cultures were negative. She was able to be transitioned to PO Vantin to complete course.     Her glucose was uncontrolled and her A1c had increased to 9.5. She was advised to increase her insulin as directed above.     She was evaluated by PT and OT and she was felt to be at her baseline. She elected to go home without VNA. She was discharged home in stable condition.     No notes on file      Please see above list of diagnoses and related plan for additional information.     Discharge Day Visit / Exam:   Subjective:  Patient states that she feels better. Denies BM today. Did well with PT and wanted to go home without VNA.   Vitals: Blood Pressure: 141/87 (07/16/25 0714)  Pulse: 100 (07/16/25 0714)  Temperature: 98.6 °F (37 °C) (07/16/25 0714)  Temp Source: Oral (07/14/25 2235)  Respirations: 18 (07/15/25 2139)  Height: 5' (152.4 cm) (07/13/25 1550)  Weight - Scale: (!) 154 kg (339 lb 8.1 oz) (07/13/25 1550)  SpO2: 92 % (07/16/25 0714)  Physical Exam  Vitals and nursing note reviewed.   Constitutional:       General: She is not in acute distress.     Appearance: Normal appearance. She is morbidly obese. She is not ill-appearing or diaphoretic.   HENT:      Head: Normocephalic and atraumatic.      Mouth/Throat:      Mouth: Mucous membranes are moist.     Eyes:      General: No scleral icterus.     Pupils: Pupils are equal, round, and reactive to light.       Cardiovascular:      Rate and Rhythm: Regular rhythm. Tachycardia present.      Pulses: Normal pulses.      Heart sounds: Normal heart sounds, S1 normal and S2 normal. No murmur heard.     No systolic murmur is present.      No diastolic murmur is present.      No gallop. No S3 or S4 sounds.   Pulmonary:      Effort: Pulmonary effort is normal. No accessory muscle usage or respiratory distress.      Breath sounds: Normal breath sounds. No stridor. No decreased breath sounds, wheezing, rhonchi or rales.    Chest:      Chest wall: No tenderness.   Abdominal:      General: Bowel sounds are normal. There is no distension.      Palpations: Abdomen is soft.      Tenderness: There is no abdominal tenderness. There is no guarding.     Musculoskeletal:      Right lower leg: No edema.      Left lower leg: No edema.     Skin:     General: Skin is warm and dry.      Coloration: Skin is not jaundiced.     Neurological:      General: No focal deficit present.      Mental Status: She is alert. Mental status is at baseline.      Motor: No tremor or seizure activity.     Psychiatric:         Behavior: Behavior is cooperative.          Discussion with Family: Patient declined call to .     Discharge instructions/Information to patient and family:   See after visit summary for information provided to patient and family.      Provisions for Follow-Up Care:  See after visit summary for information related to follow-up care and any pertinent home health orders.      Mobility at time of Discharge:   Basic Mobility Inpatient Raw Score: 12  JH-HLM Goal: 4: Move to chair/commode  JH-HLM Achieved: 7: Walk 25 feet or more  HLM Goal achieved. Continue to encourage appropriate mobility.     Disposition:   Home    Planned Readmission: None    Administrative Statements   Discharge Statement:  I have spent a total time of 45 minutes in caring for this patient on the day of the visit/encounter. >30 minutes of time was spent on: Diagnostic results, Instructions for management, Impressions, Counseling / Coordination of care, Documenting in the medical record, Reviewing / ordering tests, medicine, procedures  , and Communicating with other healthcare professionals .    **Please Note: This note may have been constructed using a voice recognition system**

## 2025-07-16 NOTE — OCCUPATIONAL THERAPY NOTE
Occupational Therapy Screen     Patient Name: Montse Smith  Today's Date: 2025  Problem List  Principal Problem:    Sepsis without acute organ dysfunction (HCC)  Active Problems:    Type 2 diabetes mellitus with hyperglycemia, with long-term current use of insulin (HCC)    Essential hypertension    Major depressive disorder, recurrent severe without psychotic features (HCC)    Class 3 severe obesity due to excess calories with body mass index (BMI) of 60.0 to 69.9 in adult    Thickened endometrium    Acute cystitis without hematuria    Past Medical History  Past Medical History[1]  Past Surgical History  Past Surgical History[2]        25 1205   OT Last Visit   OT Visit Date 25  (Wednesday)   Note Type   Note type Screen   Additional Comments OT orders received and chart review completed. Contact made w/ pt. Pt reports she is preparing to DC home today and has no concerns completing her ADLs. Pt reports supportive signicant other. Recommend active participation in ADLs. DC OT. Please re consult if acute needs arise       Laura Johns, OTR/L  [id number]  [id number]            [1]   Past Medical History:  Diagnosis Date    Anemia     Anxiety     Candidal intertrigo     Depression     Diabetes mellitus (HCC)     GERD (gastroesophageal reflux disease)     Hyperlipidemia     Hypertension     Hyponatremia 2023    Irritable bowel syndrome     Morbid obesity with BMI of 60.0-69.9, adult (HCC)     Osteoarthritis     Seasonal allergies     Sleep difficulties     Suicide attempt (HCC)    [2]   Past Surgical History:  Procedure Laterality Date     SECTION  1992    CHOLECYSTECTOMY      FL RETROGRADE PYELOGRAM  3/21/2024    FL RETROGRADE PYELOGRAM  2024    OK CYSTO/URETERO W/LITHOTRIPSY &INDWELL STENT INSRT Left 3/21/2024    Procedure: CYSTOSCOPY, RETROGRADE PYELOGRAM AND INSERTION STENT URETERAL;  Surgeon: Nabil Desir MD;  Location: [facility];  Service: Urology    OK  CYSTO/URETERO W/LITHOTRIPSY &INDWELL STENT INSRT Left 4/23/2024    Procedure: CYSTOSCOPY URETEROSCOPY WITH LITHOTRIPSY HOLMIUM LASER, RETROGRADE PYELOGRAM AND INSERTION STENT URETERAL;  Surgeon: Nabil Desir MD;  Location: [facility];  Service: Urology    WISDOM TOOTH EXTRACTION  2009

## 2025-07-16 NOTE — PHYSICAL THERAPY NOTE
Physical Therapy Evaluation    Patient's Name: Montse Smith    Admitting Diagnosis  Postmenopausal bleeding [N95.0]  Candidal intertrigo [B37.2]  UTI (urinary tract infection) [N39.0]  Abdominal pain [R10.9]  Sepsis (HCC) [A41.9]  Hyperglycemia due to diabetes mellitus (HCC) [E11.65]    Problem List  Problem List[1]    Past Medical History  Past Medical History[2]    Past Surgical History  Past Surgical History[3]         25 1042   PT Last Visit   PT Visit Date 25   Note Type   Note type Evaluation   Pain Assessment   Pain Assessment Tool 0-10   Pain Score 6   Pain Location/Orientation Location: Abdomen   Effect of Pain on Daily Activities limits comfort, limits mobility   Hospital Pain Intervention(s) Repositioned;Ambulation/increased activity   Restrictions/Precautions   Weight Bearing Precautions Per Order No   Other Precautions Chair Alarm;Bed Alarm;Fall Risk;Pain   Home Living   Type of Home House   Home Layout Two level;Stairs to enter with rails   Bathroom Equipment Shower chair   Prior Function   Lives With Significant other   Receives Help From Family   IADLs Independent with driving;Independent with meal prep;Independent with medication management   Falls in the last 6 months 1 to 4  (x1)   Comments independent at functional baseline   General   Family/Caregiver Present No   Cognition   Orientation Level Oriented X4   Following Commands Follows one step commands with increased time or repetition   Comments pt ID by wristband, name and    Subjective   Subjective OOB in recliner, agreeable to PT eval   RLE Assessment   RLE Assessment X  (functionally assessed to at least 4/5 with mobility)   LLE Assessment   LLE Assessment X  (functionally assessed to at least 4/5 with mobility)   Bed Mobility   Additional Comments OOB in recliner pre/post   Transfers   Sit to Stand 5  Supervision   Additional items Increased time required;Armrests   Stand to Sit 5  Supervision   Additional items Increased  "time required;Armrests   Toilet transfer 5  Supervision   Additional items Increased time required;Verbal cues   Additional Comments initally requires RW for STS from recliner and toilet due to L LE \"falling asleep\", subsequent STS performed without AD   Ambulation/Elevation   Gait pattern Decreased foot clearance;Short stride   Gait Assistance 6  Modified independent  (progressing to independent without device)   Additional items Assist x 1;Verbal cues   Assistive Device Rolling walker   Distance 12'x2(RW), 18'x2(no AD)   Stair Management Assistance Not tested  (pt declined stair trial)   Ambulation/Elevation Additional Comments pt declined further distances and stair trial. requires RW initally due to reported L LE sensation change, however, progressess to independnet without AD.   Balance   Static Sitting Fair +   Dynamic Sitting Fair   Static Standing Fair -   Dynamic Standing Poor +   Ambulatory Poor +   Activity Tolerance   Activity Tolerance Patient limited by fatigue   Medical Staff Made Aware spoke with CM, spoke with OT   Nurse Made Aware RN pre/post   Assessment   Prognosis Fair   Problem List Obesity   Assessment Pt is a 50 y.o. female seen for PT evaluation s/p admit to [facility] on 7/13/2025. Pt was admitted with a primary dx of: sepsis without acute organ dysfunction.  PT now consulted for assessment of mobility and d/c needs. Pt with Activity as tolerated orders.  Pts current comorbidities and personal factors effecting treatment include: BMI, depression, DM, GERD, OA, IBS, seasonal allergies . Pts current clinical presentation is Evolving (moderate complexity) due to Ongoing medical management for primary dx, Increased reliance on more restrictive AD compared to baseline, Decreased activity tolerance compared to baseline, Fall risk. Prior to admission, pt was independent without AD. Upon evaluation, pt currently is requiring ; Supervision for transfers and independent for ambulation 18 ft " w/ no AD. Per pt, appears at or very near functional baseline, given this, does not present with further need for IP PT. At conclusion of PT session chair alarm engaged, all needs in reach, RN notified of session findings/recommendations, and pt returned back in recliner chair with phone and call bell within reach. Pt denies any further questions at this time. Recommend No Post-Acute Rehab Needs  upon hospital D/C.   Goals   Patient Goals to go home   Plan   PT Frequency Other (Comment)  (1x PT eval only)   Discharge Recommendation   Rehab Resource Intensity Level, PT No post-acute rehabilitation needs   AM-PAC Basic Mobility Inpatient   Turning in Flat Bed Without Bedrails 3   Lying on Back to Sitting on Edge of Flat Bed Without Bedrails 3   Moving Bed to Chair 4   Standing Up From Chair Using Arms 4   Walk in Room 4   Climb 3-5 Stairs With Railing 3   Basic Mobility Inpatient Raw Score 21   Basic Mobility Standardized Score 45.55   [facility] Highest Level Of Mobility   -HL Goal 6: Walk 10 steps or more   -HLM Achieved 7: Walk 25 feet or more   End of Consult   Patient Position at End of Consult Bedside chair;All needs within reach;Bed/Chair alarm activated   The patient's AM-PAC Basic Mobility Inpatient Short Form Raw Score is 21. A Raw score of greater than 16 suggests the patient may benefit from discharge to home. Please also refer to the recommendation of the Physical Therapist for safe discharge planning.    Bobby Craig, PT             [1]   Patient Active Problem List  Diagnosis    Diabetes 1.5, managed as type 2 (HCC)    Type 2 diabetes mellitus with hyperglycemia, with long-term current use of insulin ([facility])    Essential hypertension    Irritable bowel syndrome with diarrhea    Major depressive disorder, recurrent severe without psychotic features ([facility])    Epigastric pain    GERD (gastroesophageal reflux disease)    Diarrhea    Class 3 severe obesity due to excess calories with body mass  index (BMI) of 60.0 to 69.9 in adult    Primary osteoarthritis of both knees    Anxiety    Mixed hyperlipidemia    Primary osteoarthritis of right knee    Class 3 severe obesity due to excess calories with serious comorbidity and body mass index (BMI) greater than or equal to 70 in adult    Elevated lipids    Microscopic hematuria    Sepsis without acute organ dysfunction (HCC)    Thickened endometrium    Sigmoid diverticulitis    Abnormal urinalysis    Tachycardia    Abnormal CT of the abdomen    Abscess    MDD (major depressive disorder)    Yeast infection of the vagina    Hydronephrosis with ureteral calculus    Positive blood cultures    Left flank pain    Kidney stones    Acute cystitis without hematuria   [2]   Past Medical History:  Diagnosis Date    Anemia     Anxiety     Candidal intertrigo     Depression     Diabetes mellitus (HCC)     GERD (gastroesophageal reflux disease)     Hyperlipidemia     Hypertension     Hyponatremia 2023    Irritable bowel syndrome     Morbid obesity with BMI of 60.0-69.9, adult (HCC)     Osteoarthritis     Seasonal allergies     Sleep difficulties     Suicide attempt (HCC)    [3]   Past Surgical History:  Procedure Laterality Date     SECTION      CHOLECYSTECTOMY      FL RETROGRADE PYELOGRAM  3/21/2024    FL RETROGRADE PYELOGRAM  2024    SD CYSTO/URETERO W/LITHOTRIPSY &INDWELL STENT INSRT Left 3/21/2024    Procedure: CYSTOSCOPY, RETROGRADE PYELOGRAM AND INSERTION STENT URETERAL;  Surgeon: Nabil Desir MD;  Location: [facility];  Service: Urology    SD CYSTO/URETERO W/LITHOTRIPSY &INDWELL STENT INSRT Left 2024    Procedure: CYSTOSCOPY URETEROSCOPY WITH LITHOTRIPSY HOLMIUM LASER, RETROGRADE PYELOGRAM AND INSERTION STENT URETERAL;  Surgeon: Nabil Desir MD;  Location: [facility];  Service: Urology    WISDOM TOOTH EXTRACTION

## 2025-07-16 NOTE — ASSESSMENT & PLAN NOTE
Suspect POA given leukocytes and nitrites in urinalysis. She did have 1 episode of incontinence at home.   UCx growing E. Coli, pansensitive   Procalcitonin trending down  Complete 7 total days antibiotics, Vantin BID ordered

## 2025-07-16 NOTE — PLAN OF CARE
Problem: Potential for Falls  Goal: Patient will remain free of falls  Description: INTERVENTIONS:  - Educate patient/family on patient safety including physical limitations  - Instruct patient to call for assistance with activity   - Consider consulting OT/PT to assist with strengthening/mobility based on AM PAC & JH-HLM score  - Consult OT/PT to assist with strengthening/mobility   - Keep Call bell within reach  - Keep bed low and locked with side rails adjusted as appropriate  - Keep care items and personal belongings within reach  - Initiate and maintain comfort rounds  - Make Fall Risk Sign visible to staff  - Offer Toileting every  Hours, in advance of need  - Initiate/Maintain alarm  - Obtain necessary fall risk management equipment:   - Apply yellow socks and bracelet for high fall risk patients  - Consider moving patient to room near nurses station  Outcome: Progressing     Problem: PAIN - ADULT  Goal: Verbalizes/displays adequate comfort level or baseline comfort level  Description: Interventions:  - Encourage patient to monitor pain and request assistance  - Assess pain using appropriate pain scale  - Administer analgesics as ordered based on type and severity of pain and evaluate response  - Implement non-pharmacological measures as appropriate and evaluate response  - Consider cultural and social influences on pain and pain management  - Notify physician/advanced practitioner if interventions unsuccessful or patient reports new pain  - Educate patient/family on pain management process including their role and importance of  reporting pain   - Provide non-pharmacologic/complimentary pain relief interventions  Outcome: Progressing     Problem: INFECTION - ADULT  Goal: Absence or prevention of progression during hospitalization  Description: INTERVENTIONS:  - Assess and monitor for signs and symptoms of infection  - Monitor lab/diagnostic results  - Monitor all insertion sites, i.e. indwelling lines,  tubes, and drains  - Monitor endotracheal if appropriate and nasal secretions for changes in amount and color  - [place] appropriate cooling/warming therapies per order  - Administer medications as ordered  - Instruct and encourage patient and family to use good hand hygiene technique  - Identify and instruct in appropriate isolation precautions for identified infection/condition  Outcome: Progressing  Goal: Absence of fever/infection during neutropenic period  Description: INTERVENTIONS:  - Monitor WBC  - Perform strict hand hygiene  - Limit to healthy visitors only  - No plants, dried, fresh or silk flowers with vallejo in patient room  Outcome: Progressing     Problem: SAFETY ADULT  Goal: Patient will remain free of falls  Description: INTERVENTIONS:  - Educate patient/family on patient safety including physical limitations  - Instruct patient to call for assistance with activity   - Consider consulting OT/PT to assist with strengthening/mobility based on AM PAC & -HLM score  - Consult OT/PT to assist with strengthening/mobility   - Keep Call bell within reach  - Keep bed low and locked with side rails adjusted as appropriate  - Keep care items and personal belongings within reach  - Initiate and maintain comfort rounds  - Make Fall Risk Sign visible to staff  - Offer Toileting every  Hours, in advance of need  - Initiate/Maintain alarm  - Obtain necessary fall risk management equipment:   - Apply yellow socks and bracelet for high fall risk patients  - Consider moving patient to room near nurses station  Outcome: Progressing  Goal: Maintain or return to baseline ADL function  Description: INTERVENTIONS:  -  Assess patient's ability to carry out ADLs; assess patient's baseline for ADL function and identify physical deficits which impact ability to perform ADLs (bathing, care of mouth/teeth, toileting, grooming, dressing, etc.)  - Assess/evaluate cause of self-care deficits   - Assess range of motion  - Assess  patient's mobility; develop plan if impaired  - Assess patient's need for assistive devices and provide as appropriate  - Encourage maximum independence but intervene and supervise when necessary  - Involve family in performance of ADLs  - Assess for home care needs following discharge   - Consider OT consult to assist with ADL evaluation and planning for discharge  - Provide patient education as appropriate  - Monitor functional capacity and physical performance, use of AM PAC & JH-HLM   - Monitor gait, balance and fatigue with ambulation    Outcome: Progressing  Goal: Maintains/Returns to pre admission functional level  Description: INTERVENTIONS:  - Perform AM-PAC 6 Click Basic Mobility/ Daily Activity assessment daily.  - Set and communicate daily mobility goal to care team and patient/family/caregiver.   - Collaborate with rehabilitation services on mobility goals if consulted  - Perform Range of Motion  times a day.  - Reposition patient every  hours.  - Dangle patient  times a day  - Stand patient  times a day  - Ambulate patient  times a day  - Out of bed to chair  times a day   - Out of bed for meals  times a day  - Out of bed for toileting  - Record patient progress and toleration of activity level   Outcome: Progressing     Problem: DISCHARGE PLANNING  Goal: Discharge to home or other facility with appropriate resources  Description: INTERVENTIONS:  - Identify barriers to discharge w/patient and caregiver  - Arrange for needed discharge resources and transportation as appropriate  - Identify discharge learning needs (meds, wound care, etc.)  - Arrange for interpretive services to assist at discharge as needed  - Refer to Case Management Department for coordinating discharge planning if the patient needs post-hospital services based on physician/advanced practitioner order or complex needs related to functional status, cognitive ability, or social support system  Outcome: Progressing     Problem:  Prexisting or High Potential for Compromised Skin Integrity  Goal: Skin integrity is maintained or improved  Description: INTERVENTIONS:  - Identify patients at risk for skin breakdown  - Assess and monitor skin integrity including under and around medical devices   - Assess and monitor nutrition and hydration status  - Monitor labs  - Assess for incontinence   - Turn and reposition patient  - Assist with mobility/ambulation  - Relieve pressure over broolkyn prominences   - Avoid friction and shearing  - Provide appropriate hygiene as needed including keeping skin clean and dry  - Evaluate need for skin moisturizer/barrier cream  - Collaborate with interdisciplinary team  - Patient/family teaching  - Consider wound care consult    Assess:  - Review [place] scale daily  - Clean and moisturize skin every   - Inspect skin when repositioning, toileting, and assisting with ADLS  - Assess under medical devices such as  every   - Assess extremities for adequate circulation and sensation     Bed Management:  - Have minimal linens on bed & keep smooth, unwrinkled  - Change linens as needed when moist or perspiring  - Avoid sitting or lying in one position for more than  hours while in bed?Keep HOB at degrees   - Toileting:  - Offer bedside commode  - Assess for incontinence every   - Use incontinent care products after each incontinent episode such as     Activity:  - Mobilize patient  times a day  - Encourage activity and walks on unit  - Encourage or provide ROM exercises   - Turn and reposition patient every  Hours  - Use appropriate equipment to lift or move patient in bed  - Instruct/ Assist with weight shifting every  when out of bed in chair  - Consider limitation of chair time hour intervals    Skin Care:  - Avoid use of baby powder, tape, friction and shearing, hot water or constrictive clothing  - Relieve pressure over bony prominences using   - Do not massage red bony areas    Next Steps:  - Teach patient strategies  to minimize risks such as   - Consider consults to  interdisciplinary teams such as   Outcome: Progressing

## 2025-07-17 ENCOUNTER — APPOINTMENT (INPATIENT)
Dept: NON INVASIVE DIAGNOSTICS | Facility: HOSPITAL | Age: 50
End: 2025-07-17
Payer: COMMERCIAL

## 2025-07-17 ENCOUNTER — APPOINTMENT (INPATIENT)
Dept: ULTRASOUND IMAGING | Facility: HOSPITAL | Age: 50
End: 2025-07-17
Payer: COMMERCIAL

## 2025-07-17 ENCOUNTER — APPOINTMENT (OUTPATIENT)
Dept: RADIOLOGY | Facility: HOSPITAL | Age: 50
End: 2025-07-17
Payer: COMMERCIAL

## 2025-07-17 ENCOUNTER — APPOINTMENT (EMERGENCY)
Dept: CT IMAGING | Facility: HOSPITAL | Age: 50
End: 2025-07-17
Payer: COMMERCIAL

## 2025-07-17 PROBLEM — N39.0 UTI (URINARY TRACT INFECTION): Status: ACTIVE | Noted: 2025-07-17

## 2025-07-17 PROBLEM — R09.89 PULMONARY CONGESTION: Status: ACTIVE | Noted: 2025-07-17

## 2025-07-17 PROBLEM — E13.9 DIABETES 1.5, MANAGED AS TYPE 2 (HCC): Status: RESOLVED | Noted: 2019-09-04 | Resolved: 2025-07-17

## 2025-07-17 PROBLEM — R65.10 SIRS (SYSTEMIC INFLAMMATORY RESPONSE SYNDROME) (HCC): Status: ACTIVE | Noted: 2025-07-17

## 2025-07-17 PROBLEM — R10.11 RIGHT UPPER QUADRANT ABDOMINAL PAIN: Status: ACTIVE | Noted: 2025-07-17

## 2025-07-17 PROBLEM — R93.89 ABNORMAL FINDING ON CT SCAN: Status: ACTIVE | Noted: 2025-07-17

## 2025-07-17 LAB
ANION GAP SERPL CALCULATED.3IONS-SCNC: 5 MMOL/L (ref 4–13)
AORTIC ROOT: 3.2 CM
ASCENDING AORTA: 3.2 CM
BNP SERPL-MCNC: 56 PG/ML (ref 0–100)
BSA FOR ECHO PROCEDURE: 2.34 M2
BUN SERPL-MCNC: 12 MG/DL (ref 5–25)
CALCIUM SERPL-MCNC: 8.6 MG/DL (ref 8.4–10.2)
CHLORIDE SERPL-SCNC: 101 MMOL/L (ref 96–108)
CO2 SERPL-SCNC: 28 MMOL/L (ref 21–32)
CREAT SERPL-MCNC: 0.74 MG/DL (ref 0.6–1.3)
E WAVE DECELERATION TIME: 157 MS
E/A RATIO: 1.21
ERYTHROCYTE [DISTWIDTH] IN BLOOD BY AUTOMATED COUNT: 13.5 % (ref 11.6–15.1)
FRACTIONAL SHORTENING: 32 (ref 28–44)
GFR SERPL CREATININE-BSD FRML MDRD: 93 ML/MIN/1.73SQ M
GLUCOSE SERPL-MCNC: 190 MG/DL (ref 65–140)
GLUCOSE SERPL-MCNC: 226 MG/DL (ref 65–140)
GLUCOSE SERPL-MCNC: 228 MG/DL (ref 65–140)
GLUCOSE SERPL-MCNC: 231 MG/DL (ref 65–140)
GLUCOSE SERPL-MCNC: 248 MG/DL (ref 65–140)
HCT VFR BLD AUTO: 37 % (ref 34.8–46.1)
HGB BLD-MCNC: 11.8 G/DL (ref 11.5–15.4)
INTERVENTRICULAR SEPTUM IN DIASTOLE (PARASTERNAL SHORT AXIS VIEW): 1.4 CM
INTERVENTRICULAR SEPTUM: 1.4 CM (ref 0.6–1.1)
LAAS-AP2: 23.3 CM2
LAAS-AP4: 24.1 CM2
LEFT ATRIUM SIZE: 4.5 CM
LEFT ATRIUM VOLUME (MOD BIPLANE): 77 ML
LEFT ATRIUM VOLUME INDEX (MOD BIPLANE): 32.9 ML/M2
LEFT INTERNAL DIMENSION IN SYSTOLE: 2.8 CM (ref 2.1–4)
LEFT VENTRICULAR INTERNAL DIMENSION IN DIASTOLE: 4.1 CM (ref 3.5–6)
LEFT VENTRICULAR POSTERIOR WALL IN END DIASTOLE: 1.3 CM
LEFT VENTRICULAR STROKE VOLUME: 43 ML
LV EF US.2D.A4C+ESTIMATED: 66 %
LVSV (TEICH): 43 ML
MCH RBC QN AUTO: 30.3 PG (ref 26.8–34.3)
MCHC RBC AUTO-ENTMCNC: 31.9 G/DL (ref 31.4–37.4)
MCV RBC AUTO: 95 FL (ref 82–98)
MV E'TISSUE VEL-LAT: 9 CM/S
MV E'TISSUE VEL-SEP: 8 CM/S
MV PEAK A VEL: 1.07 M/S
MV PEAK E VEL: 129 CM/S
MV STENOSIS PRESSURE HALF TIME: 45 MS
MV VALVE AREA P 1/2 METHOD: 4.89
PLATELET # BLD AUTO: 226 THOUSANDS/UL (ref 149–390)
PMV BLD AUTO: 9.7 FL (ref 8.9–12.7)
POTASSIUM SERPL-SCNC: 4.1 MMOL/L (ref 3.5–5.3)
RA PRESSURE ESTIMATED: 8 MMHG
RBC # BLD AUTO: 3.9 MILLION/UL (ref 3.81–5.12)
RIGHT ATRIUM AREA SYSTOLE A4C: 16.4 CM2
RIGHT VENTRICLE ID DIMENSION: 3.6 CM
RV PSP: 26 MMHG
SL CV LEFT ATRIUM LENGTH A2C: 5.6 CM
SL CV LV EF: 65
SL CV PED ECHO LEFT VENTRICLE DIASTOLIC VOLUME (MOD BIPLANE) 2D: 72 ML
SL CV PED ECHO LEFT VENTRICLE SYSTOLIC VOLUME (MOD BIPLANE) 2D: 30 ML
SODIUM SERPL-SCNC: 134 MMOL/L (ref 135–147)
TR MAX PG: 18 MMHG
TR PEAK VELOCITY: 2.2 M/S
TRICUSPID ANNULAR PLANE SYSTOLIC EXCURSION: 2.3 CM
TRICUSPID VALVE PEAK REGURGITATION VELOCITY: 2.15 M/S
WBC # BLD AUTO: 9.39 THOUSAND/UL (ref 4.31–10.16)

## 2025-07-17 PROCEDURE — 76705 ECHO EXAM OF ABDOMEN: CPT

## 2025-07-17 PROCEDURE — 93306 TTE W/DOPPLER COMPLETE: CPT

## 2025-07-17 PROCEDURE — 85027 COMPLETE CBC AUTOMATED: CPT

## 2025-07-17 PROCEDURE — 96361 HYDRATE IV INFUSION ADD-ON: CPT

## 2025-07-17 PROCEDURE — 96375 TX/PRO/DX INJ NEW DRUG ADDON: CPT

## 2025-07-17 PROCEDURE — 71045 X-RAY EXAM CHEST 1 VIEW: CPT

## 2025-07-17 PROCEDURE — 99223 1ST HOSP IP/OBS HIGH 75: CPT | Performed by: INTERNAL MEDICINE

## 2025-07-17 PROCEDURE — 80048 BASIC METABOLIC PNL TOTAL CA: CPT

## 2025-07-17 PROCEDURE — 36415 COLL VENOUS BLD VENIPUNCTURE: CPT

## 2025-07-17 PROCEDURE — 93306 TTE W/DOPPLER COMPLETE: CPT | Performed by: INTERNAL MEDICINE

## 2025-07-17 PROCEDURE — 83880 ASSAY OF NATRIURETIC PEPTIDE: CPT

## 2025-07-17 PROCEDURE — 71275 CT ANGIOGRAPHY CHEST: CPT

## 2025-07-17 PROCEDURE — 82948 REAGENT STRIP/BLOOD GLUCOSE: CPT

## 2025-07-17 RX ORDER — METHOCARBAMOL 750 MG/1
750 TABLET, FILM COATED ORAL EVERY 8 HOURS PRN
Status: DISCONTINUED | OUTPATIENT
Start: 2025-07-17 | End: 2025-07-18 | Stop reason: HOSPADM

## 2025-07-17 RX ORDER — CEFPODOXIME PROXETIL 200 MG/1
400 TABLET, FILM COATED ORAL 2 TIMES DAILY WITH MEALS
Status: DISCONTINUED | OUTPATIENT
Start: 2025-07-17 | End: 2025-07-18 | Stop reason: HOSPADM

## 2025-07-17 RX ORDER — INSULIN LISPRO 100 [IU]/ML
2-12 INJECTION, SOLUTION INTRAVENOUS; SUBCUTANEOUS
Status: DISCONTINUED | OUTPATIENT
Start: 2025-07-17 | End: 2025-07-18 | Stop reason: HOSPADM

## 2025-07-17 RX ORDER — ENOXAPARIN SODIUM 100 MG/ML
40 INJECTION SUBCUTANEOUS EVERY 12 HOURS SCHEDULED
Status: DISCONTINUED | OUTPATIENT
Start: 2025-07-17 | End: 2025-07-18 | Stop reason: HOSPADM

## 2025-07-17 RX ORDER — INSULIN GLARGINE 100 [IU]/ML
40 INJECTION, SOLUTION SUBCUTANEOUS EVERY 12 HOURS SCHEDULED
Status: DISCONTINUED | OUTPATIENT
Start: 2025-07-17 | End: 2025-07-18 | Stop reason: HOSPADM

## 2025-07-17 RX ORDER — ACETAMINOPHEN 325 MG/1
975 TABLET ORAL EVERY 6 HOURS PRN
Status: DISCONTINUED | OUTPATIENT
Start: 2025-07-17 | End: 2025-07-18 | Stop reason: HOSPADM

## 2025-07-17 RX ORDER — AMOXICILLIN 250 MG
2 CAPSULE ORAL 2 TIMES DAILY
Status: DISCONTINUED | OUTPATIENT
Start: 2025-07-17 | End: 2025-07-18 | Stop reason: HOSPADM

## 2025-07-17 RX ORDER — NYSTATIN 100000 [USP'U]/G
POWDER TOPICAL 3 TIMES DAILY
Status: DISCONTINUED | OUTPATIENT
Start: 2025-07-17 | End: 2025-07-18 | Stop reason: HOSPADM

## 2025-07-17 RX ORDER — LORAZEPAM 0.5 MG/1
0.5 TABLET ORAL EVERY 8 HOURS PRN
Status: DISCONTINUED | OUTPATIENT
Start: 2025-07-17 | End: 2025-07-18 | Stop reason: HOSPADM

## 2025-07-17 RX ORDER — POLYETHYLENE GLYCOL 3350 17 G/17G
17 POWDER, FOR SOLUTION ORAL DAILY
Status: DISCONTINUED | OUTPATIENT
Start: 2025-07-17 | End: 2025-07-18 | Stop reason: HOSPADM

## 2025-07-17 RX ORDER — TAMSULOSIN HYDROCHLORIDE 0.4 MG/1
0.4 CAPSULE ORAL
Status: DISCONTINUED | OUTPATIENT
Start: 2025-07-17 | End: 2025-07-18 | Stop reason: HOSPADM

## 2025-07-17 RX ORDER — INSULIN LISPRO 100 [IU]/ML
22 INJECTION, SOLUTION INTRAVENOUS; SUBCUTANEOUS
Status: DISCONTINUED | OUTPATIENT
Start: 2025-07-17 | End: 2025-07-18

## 2025-07-17 RX ORDER — LISINOPRIL 20 MG/1
20 TABLET ORAL DAILY
Status: DISCONTINUED | OUTPATIENT
Start: 2025-07-17 | End: 2025-07-18 | Stop reason: HOSPADM

## 2025-07-17 RX ORDER — FUROSEMIDE 10 MG/ML
20 INJECTION INTRAMUSCULAR; INTRAVENOUS ONCE
Status: COMPLETED | OUTPATIENT
Start: 2025-07-17 | End: 2025-07-17

## 2025-07-17 RX ORDER — DESVENLAFAXINE 50 MG/1
50 TABLET, FILM COATED, EXTENDED RELEASE ORAL DAILY
Status: DISCONTINUED | OUTPATIENT
Start: 2025-07-17 | End: 2025-07-18 | Stop reason: HOSPADM

## 2025-07-17 RX ORDER — ARIPIPRAZOLE 5 MG/1
10 TABLET ORAL DAILY
Status: DISCONTINUED | OUTPATIENT
Start: 2025-07-17 | End: 2025-07-18 | Stop reason: HOSPADM

## 2025-07-17 RX ORDER — LAMOTRIGINE 100 MG/1
100 TABLET ORAL 2 TIMES DAILY
Status: DISCONTINUED | OUTPATIENT
Start: 2025-07-17 | End: 2025-07-18 | Stop reason: HOSPADM

## 2025-07-17 RX ADMIN — ENOXAPARIN SODIUM 40 MG: 40 INJECTION SUBCUTANEOUS at 08:48

## 2025-07-17 RX ADMIN — INSULIN LISPRO 22 UNITS: 100 INJECTION, SOLUTION INTRAVENOUS; SUBCUTANEOUS at 16:20

## 2025-07-17 RX ADMIN — LISINOPRIL 20 MG: 20 TABLET ORAL at 08:47

## 2025-07-17 RX ADMIN — INSULIN LISPRO 22 UNITS: 100 INJECTION, SOLUTION INTRAVENOUS; SUBCUTANEOUS at 13:15

## 2025-07-17 RX ADMIN — POLYETHYLENE GLYCOL 3350 17 G: 17 POWDER, FOR SOLUTION ORAL at 15:46

## 2025-07-17 RX ADMIN — NYSTATIN 1 APPLICATION: 100000 POWDER TOPICAL at 20:50

## 2025-07-17 RX ADMIN — IOHEXOL 85 ML: 350 INJECTION, SOLUTION INTRAVENOUS at 01:50

## 2025-07-17 RX ADMIN — KETOROLAC TROMETHAMINE 15 MG: 30 INJECTION, SOLUTION INTRAMUSCULAR; INTRAVENOUS at 00:01

## 2025-07-17 RX ADMIN — METHOCARBAMOL 750 MG: 750 TABLET ORAL at 18:13

## 2025-07-17 RX ADMIN — INSULIN LISPRO 4 UNITS: 100 INJECTION, SOLUTION INTRAVENOUS; SUBCUTANEOUS at 16:20

## 2025-07-17 RX ADMIN — CEFPODOXIME PROXETIL 400 MG: 200 TABLET, FILM COATED ORAL at 15:47

## 2025-07-17 RX ADMIN — INSULIN LISPRO 2 UNITS: 100 INJECTION, SOLUTION INTRAVENOUS; SUBCUTANEOUS at 21:23

## 2025-07-17 RX ADMIN — INSULIN GLARGINE 40 UNITS: 100 INJECTION, SOLUTION SUBCUTANEOUS at 08:49

## 2025-07-17 RX ADMIN — NYSTATIN: 100000 POWDER TOPICAL at 15:46

## 2025-07-17 RX ADMIN — ENOXAPARIN SODIUM 40 MG: 40 INJECTION SUBCUTANEOUS at 20:50

## 2025-07-17 RX ADMIN — NYSTATIN 1 APPLICATION: 100000 POWDER TOPICAL at 08:50

## 2025-07-17 RX ADMIN — ACETAMINOPHEN 975 MG: 325 TABLET ORAL at 08:43

## 2025-07-17 RX ADMIN — ACETAMINOPHEN 975 MG: 325 TABLET ORAL at 18:13

## 2025-07-17 RX ADMIN — INSULIN LISPRO 4 UNITS: 100 INJECTION, SOLUTION INTRAVENOUS; SUBCUTANEOUS at 13:15

## 2025-07-17 RX ADMIN — TAMSULOSIN HYDROCHLORIDE 0.4 MG: 0.4 CAPSULE ORAL at 15:47

## 2025-07-17 RX ADMIN — INSULIN GLARGINE 40 UNITS: 100 INJECTION, SOLUTION SUBCUTANEOUS at 21:23

## 2025-07-17 RX ADMIN — SENNOSIDES, DOCUSATE SODIUM 2 TABLET: 50; 8.6 TABLET, FILM COATED ORAL at 18:13

## 2025-07-17 RX ADMIN — INSULIN LISPRO 22 UNITS: 100 INJECTION, SOLUTION INTRAVENOUS; SUBCUTANEOUS at 08:56

## 2025-07-17 RX ADMIN — CEFPODOXIME PROXETIL 400 MG: 200 TABLET, FILM COATED ORAL at 08:45

## 2025-07-17 RX ADMIN — LAMOTRIGINE 100 MG: 100 TABLET ORAL at 18:13

## 2025-07-17 RX ADMIN — LAMOTRIGINE 100 MG: 100 TABLET ORAL at 08:47

## 2025-07-17 RX ADMIN — INSULIN LISPRO 4 UNITS: 100 INJECTION, SOLUTION INTRAVENOUS; SUBCUTANEOUS at 08:57

## 2025-07-17 RX ADMIN — ARIPIPRAZOLE 10 MG: 10 TABLET ORAL at 08:44

## 2025-07-17 RX ADMIN — FUROSEMIDE 20 MG: 10 INJECTION, SOLUTION INTRAVENOUS at 05:09

## 2025-07-17 RX ADMIN — DESVENLAFAXINE 50 MG: 50 TABLET, EXTENDED RELEASE ORAL at 08:45

## 2025-07-17 NOTE — ASSESSMENT & PLAN NOTE
Continue Abilify 10 Mg daily  Continue lamotrigine 100 mg twice daily  Continue Pristiq 50 Mg daily

## 2025-07-17 NOTE — H&P
H&P - Hospitalist   Name: Montse Smith 50 y.o. female I MRN: [id number]  Unit/Bed#: ED-20 I Date of Admission: 7/16/2025   Date of Service: 7/17/2025 I Hospital Day: 0     Assessment & Plan  Right upper quadrant abdominal pain  Presented with severe 10 out of 10 sharp right-sided abdominal pain  Pain was nonradiating, not associated with any nausea vomiting, no exacerbating or relieving factors  Patient had a cholecystectomy done in 2008  Patient has mild leukocytosis however not remarkable, lipase and lactic acid normal  Elevated procalcitonin 1.53, most likely still downtrending from her last admission  CT abdomen read by [facility], Hepatomegaly and diffuse hepatic steatosis along with fluid around the liver which was also noted on the previous CT scan. There is possible stranding indicating a possible omental infarct but no other changes. Mild hepatic steatosis is also noted with trace ascites in Morison's pouch.  Differentials include gastritis, hepatitis, colitis, obstruction, hepatic hydrothorax    PLAN:  Follow-up with the official read of CT abdomen pelvis  Follow-up with right upper quadrant ultrasound  Pain control  SIRS (systemic inflammatory response syndrome) (HCC)  Presented with tachycardia ranging from 102-113, tachypnea respiratory rate 22  Patient was also hypoxic initially, requiring 5 L of oxygen.  Patient does not use any oxygen at baseline  With concerns of PE, CT PE was done with did not show any pulmonary embolism  Chest x-ray on my read showed pulmonary vascular congestion along with right-sided pleural effusion  Tachycardia and tachypnea along with hypoxia resolved    PLAN:  1 dose of IV Lasix 20 Mg  Echocardiogram  Abnormal finding on CT scan  Patient CT abdomen on July 17, 2025 showed Fluid collection noted around liver however this does not appear to be rim enhancing to suggest an abscess.   Possible endometrial thickening versus heterogeneous enhancement. If this patient is postmenopausal, a  follow-up ultrasound is recommended.     PLAN:  Follow-up with the official read of the new CT abdomen pelvis  Type 2 diabetes mellitus with hyperglycemia, with long-term current use of insulin (HCC)  Lab Results   Component Value Date    HGBA1C 9.5 (H) 07/13/2025       Recent Labs     07/15/25  1613 07/15/25  2138 07/16/25  0714 07/16/25  1058   POCGLU 227* 277* 237* 328*       Blood Sugar Average: Last 72 hrs:    Continue Lantus 40 units twice daily  Continue lispro 22 units 3 times daily  SSI    Major depressive disorder, recurrent severe without psychotic features (HCC)  Continue Abilify 10 Mg daily  Continue lamotrigine 100 mg twice daily  Continue Pristiq 50 Mg daily  Class 3 severe obesity due to excess calories with body mass index (BMI) of 60.0 to 69.9 in adult  Prior Authorization Clinical Questions for Weight Management Pharmacotherapy    2. Does the patient have a diagnosis of obesity, confirmed by a BMI greater than or equal to 30 kg/m^2?: Yes  3. Does the patient have a BMI of greater than or equal to 27 kg/m^2 with at least one weight-related comorbidity/risk factor/complication (e.g. diabetes, dyslipidemia, coronary artery disease)?: Yes  4. Weight-related co-morbidities/risk factors: metabolic syndrome, type 2 diabetes, depression  6. ZEPBOUND FAREED Indication: Does patient have documented FAREED diagnosed via sleep study (insurance will require copy of sleep study results for approval)?: Yes  9. Does the patient have a history of type 2 diabetes?: Yes     Baseline weight (in pounds): 339.51 lbs  Current weight (in pounds): 339.51 lbs  Weight loss percentage: 0%       UTI (urinary tract infection)  Patient was recently admitted secondary to UTI  Continue Vantin for 7 more doses to complete the antibiotic course      VTE Pharmacologic Prophylaxis: VTE Score: 4 Moderate Risk (Score 3-4) - Pharmacological DVT Prophylaxis Ordered: enoxaparin (Lovenox).  Code Status: Level 1 - Full Code   Discussion with  family: Morning team will update the patient's family.     Anticipated Length of Stay: Patient will be admitted on an observation basis with an anticipated length of stay of less than 2 midnights secondary to management of abdominal pain.    History of Present Illness   Chief Complaint: Right upper quadrant abdominal pain    Montse Smith is a 50 y.o. female with a PMH of UTI, depression, obesity who presents with right upper quadrant pain.  Patient rated the pain 10 out of 10, sharp in nature.  Pain was nonradiating, not associated with any nausea or vomiting.  Patient denies any exacerbating or relieving factors.  In the ED patient had mild leukocytosis, normal lipase, normal lactic acid.  Patient has mild elevated procalcitonin however if this is downtrending from her previous admission.  Patient denies any history of GERD, gastritis, PUD.  Patient denies any cramping abdominal pain, blood per rectum, signs of bowel ischemia.  In the ED patient was also hypoxic, required 5 L of oxygen.  Patient does not use any oxygen at baseline.  Patient was also tachypneic along with tachycardic.  Concerns of pulmonary embolisms were raised, CTA PE was done however was negative for any pulmonary embolism.  Chest x-ray on my read showed concerns of pulmonary vascular congestion along with right-sided pleural effusion.  Patient has been given 1 dose of Lasix IV for the volume overload.  Patient currently is not tachycardic, sinus rhythm in 90s.  Patient is also on room air, not requiring any oxygen.  Patient is being admitted to [facility] for management of acute abdominal pain.    Review of Systems   Constitutional: Negative.    HENT: Negative.     Eyes: Negative.    Respiratory: Negative.     Cardiovascular: Negative.    Gastrointestinal:  Positive for abdominal pain. Negative for nausea.   Genitourinary: Negative.    Musculoskeletal: Negative.    Skin: Negative.    Neurological: Negative.    Hematological: Negative.     Psychiatric/Behavioral: Negative.         Historical Information   Past Medical History[1]  Past Surgical History[2]  Social History[3]  E-Cigarette/Vaping    E-Cigarette Use Never User      E-Cigarette/Vaping Substances    Nicotine No     THC No     CBD No     Flavoring No     Other No     Unknown No      Family History[4]  Social History:  Marital Status:    Occupation: N/A  Patient Pre-hospital Living Situation: Home  Patient Pre-hospital Level of Mobility: walks  Patient Pre-hospital Diet Restrictions: N/A    Meds/Allergies   I have reviewed home medications with patient personally.  Prior to Admission medications    Medication Sig Start Date End Date Taking? Authorizing Provider   acetaminophen (TYLENOL) 325 mg tablet Take 2 tablets (650 mg total) by mouth every 6 (six) hours as needed for mild pain 2/21/23   Julieta Doshi PA-C   ARIPiprazole (ABILIFY) 10 mg tablet Take 1 tablet (10 mg total) by mouth daily 3/26/24   Kyle Brunner, MD   bisacodyl (DULCOLAX) 10 mg suppository Insert 1 suppository (10 mg total) into the rectum daily 7/17/25   Roe Rudd PA-C   cefpodoxime (VANTIN) 200 mg tablet Take 2 tablets (400 mg total) by mouth 2 (two) times a day with meals for 7 doses 7/16/25 7/20/25  Roe Rudd PA-C   desvenlafaxine succinate (PRISTIQ) 50 mg 24 hr tablet Take 1 tablet (50 mg total) by mouth daily Do not start before February 22, 2023. 2/22/23   Julieta Doshi PA-C   dicyclomine (BENTYL) 10 mg capsule Take 2 capsules (20 mg total) by mouth every 6 (six) hours as needed (crampy diarrhea) 2/21/23   Julieta Doshi PA-C   dulaglutide (Trulicity) 1.5 MG/0.5ML injection Inject 1.5 mg under the skin Once a week 1/18/24   Historical Provider, MD   insulin glargine (LANTUS) 100 units/mL subcutaneous injection Inject 50 Units under the skin daily at bedtime 7/16/25   Roe Rudd PA-C   insulin lispro (HumALOG/ADMELOG) 100 units/mL injection Inject 20 Units under the skin 3  (three) times a day with meals 7/16/25   Roe Rudd PA-C   lamoTRIgine (LaMICtal) 100 mg tablet Take 1 tablet (100 mg total) by mouth 2 (two) times a day 2/21/23   Julieta Doshi PA-C   lisinopril (ZESTRIL) 10 mg tablet Take 2 tablets (20 mg total) by mouth daily 12/4/22   Stacia Carter MD   LORazepam (ATIVAN) 0.5 mg tablet 0.5 mg every 8 (eight) hours as needed for anxiety 10/6/21   Janessa Douglas MD   metFORMIN (GLUCOPHAGE) 500 mg tablet Take 1 tablet (500 mg total) by mouth 2 (two) times a day with meals 8/31/20 6/30/25  SUJATA Fuchs   methocarbamol (ROBAXIN) 750 mg tablet Take 1 tablet (750 mg total) by mouth every 8 (eight) hours as needed for muscle spasms for up to 10 days Do not start before July 1, 2025. 7/1/25 7/11/25  Waldo Niño MD   naproxen (Naprosyn) 500 mg tablet Take 1 tablet (500 mg total) by mouth every 12 (twelve) hours as needed (pain) for up to 10 days Take with food. Do not start before July 1, 2025. 7/1/25 7/11/25  Waldo Niño MD   nystatin (MYCOSTATIN) powder Apply topically 3 (three) times a day --- Continue use for a week after rash resolves 5/3/24   Ameya Brian MD   ondansetron (ZOFRAN-ODT) 4 mg disintegrating tablet Take 1 tablet (4 mg total) by mouth every 6 (six) hours as needed for nausea or vomiting for up to 5 days 9/22/24 9/27/24  Leah Graham MD   oxybutynin (DITROPAN) 5 mg tablet Take 1 tablet (5 mg total) by mouth 3 (three) times a day 4/12/24   Kyle Brunner, MD   phenazopyridine (PYRIDIUM) 100 mg tablet Take 1 tablet (100 mg total) by mouth 3 (three) times a day with meals 4/12/24   Kyle Brunner, MD   polyethylene glycol (MIRALAX) 17 g packet Take 17 g by mouth daily 7/16/25   Roe Rudd PA-C   senna-docusate sodium (SENOKOT S) 8.6-50 mg per tablet Take 2 tablets by mouth 2 (two) times a day 7/16/25   Roe Rudd PA-C   tamsulosin (FLOMAX) 0.4 mg Take 1 capsule (0.4 mg total) by mouth daily with dinner  4/13/24   Kyle Brunner, MD     Allergies   Allergen Reactions    Cephalexin Vomiting     Other reaction(s): Nausea and/or vomiting    Insulin Degludec GI Intolerance    Sulfamethoxazole-Trimethoprim Vomiting     Other reaction(s): Nausea and/or vomiting       Objective :  Temp:  [98.4 °F (36.9 °C)-98.6 °F (37 °C)] 98.4 °F (36.9 °C)  HR:  [] 98  BP: (136-141)/(65-87) 140/65  Resp:  [22] 22  SpO2:  [91 %-98 %] 95 %  O2 Device: Nasal cannula  Nasal Cannula O2 Flow Rate (L/min):  [2 L/min-5 L/min] 2 L/min    Physical Exam  Constitutional:       Appearance: She is obese.   HENT:      Head: Normocephalic and atraumatic.      Mouth/Throat:      Pharynx: Oropharynx is clear.     Cardiovascular:      Rate and Rhythm: Normal rate and regular rhythm.      Heart sounds: Normal heart sounds.   Pulmonary:      Effort: Pulmonary effort is normal.      Breath sounds: Rales present.   Abdominal:      Palpations: Abdomen is soft.      Tenderness: There is abdominal tenderness in the right upper quadrant, epigastric area and left lower quadrant.     Musculoskeletal:      Right lower leg: Edema present.      Left lower leg: Edema present.     Skin:     General: Skin is warm.      Capillary Refill: Capillary refill takes less than 2 seconds.     Neurological:      General: No focal deficit present.      Mental Status: She is alert and oriented to person, place, and time.          Lines/Drains:            Lab Results: I have reviewed the following results:  Results from last 7 days   Lab Units 07/16/25  2225   WBC Thousand/uL 10.26*   HEMOGLOBIN g/dL 12.9   HEMATOCRIT % 39.2   PLATELETS Thousands/uL 250   SEGS PCT % 71   LYMPHO PCT % 14   MONO PCT % 10   EOS PCT % 2     Results from last 7 days   Lab Units 07/16/25  2225   SODIUM mmol/L 134*   POTASSIUM mmol/L 4.6   CHLORIDE mmol/L 99   CO2 mmol/L 28   BUN mg/dL 13   CREATININE mg/dL 0.78   ANION GAP mmol/L 7   CALCIUM mg/dL 9.2   ALBUMIN g/dL 3.6   TOTAL BILIRUBIN mg/dL 0.64   ALK  PHOS U/L 66   ALT U/L 24   AST U/L 19   GLUCOSE RANDOM mg/dL 283*     Results from last 7 days   Lab Units 25  2225   INR  0.94     Results from last 7 days   Lab Units 25  1058 25  0714 07/15/25  2138 07/15/25  1613 07/15/25  1046 07/15/25  0758 25  2117 25  1732 25  1632 25  1041 25  0653 25  2101   POC GLUCOSE mg/dl 328* 237* 277* 227* 336* 248* 243* 300* 281* 342* 247* 359*     Lab Results   Component Value Date    HGBA1C 9.5 (H) 2025    HGBA1C 7.2 (H) 2024    HGBA1C 12.2 (H) 2023     Results from last 7 days   Lab Units 25  2225 07/15/25  0632 25  0510 25  1041   LACTIC ACID mmol/L 1.2  --   --  1.8   PROCALCITONIN ng/ml 1.53* 2.79* 4.83*  --        Imaging Results Review: I reviewed radiology reports from this admission including: chest xray and CT abdomen/pelvis.  Other Study Results Review: EKG was reviewed.     Administrative Statements       ** Please Note: This note has been constructed using a voice recognition system. **         [1]   Past Medical History:  Diagnosis Date    Anemia     Anxiety     Candidal intertrigo     Depression     Diabetes 1.5, managed as type 2 (HCC) 2019    Diabetes mellitus (HCC)     GERD (gastroesophageal reflux disease)     Hyperlipidemia     Hypertension     Hyponatremia 2023    Irritable bowel syndrome     Morbid obesity with BMI of 60.0-69.9, adult (HCC)     Osteoarthritis     Seasonal allergies     Sleep difficulties     Suicide attempt (HCC)    [2]   Past Surgical History:  Procedure Laterality Date     SECTION  1992    CHOLECYSTECTOMY      FL RETROGRADE PYELOGRAM  3/21/2024    FL RETROGRADE PYELOGRAM  2024    CT CYSTO/URETERO W/LITHOTRIPSY &INDWELL STENT INSRT Left 3/21/2024    Procedure: CYSTOSCOPY, RETROGRADE PYELOGRAM AND INSERTION STENT URETERAL;  Surgeon: Nabil Desir MD;  Location: [facility];  Service: Urology    CT CYSTO/URETERO W/LITHOTRIPSY  &INDWELL STENT INSRT Left 4/23/2024    Procedure: CYSTOSCOPY URETEROSCOPY WITH LITHOTRIPSY HOLMIUM LASER, RETROGRADE PYELOGRAM AND INSERTION STENT URETERAL;  Surgeon: Nabil Desir MD;  Location: [facility];  Service: Urology    WISDOM TOOTH EXTRACTION  2009   [3]   Social History  Tobacco Use    Smoking status: Never    Smokeless tobacco: Never   Vaping Use    Vaping status: Never Used   Substance and Sexual Activity    Alcohol use: Not Currently     Comment: occassionaly     Drug use: No    Sexual activity: Not Currently   [4]   Family History  Problem Relation Name Age of Onset    Diabetes Mother      Hypertension Father

## 2025-07-17 NOTE — ED PROVIDER NOTES
Time reflects when diagnosis was documented in both MDM as applicable and the Disposition within this note       Time User Action Codes Description Comment    7/17/2025  3:23 AM Rebeca Barney Add [R10.11] RUQ pain     7/17/2025  3:24 AM Rebeca Barney Add [R09.02] Hypoxia     7/17/2025  3:24 AM Rebeca Barney Add [R09.89] Pulmonary vascular congestion           ED Disposition       ED Disposition   Admit    Condition   Stable    Date/Time   u Jul 17, 2025  3:25 AM    Comment   Case was discussed with internal medicine and the patient's admission status was agreed to be Admission Status: inpatient status to the service of Dr. Gordon .               Assessment & Plan       Medical Decision Making  50-year-old female presenting for evaluation of right upper quadrant pain.  Differential diagnosis includes gastritis, pancreatitis, hepatitis, appendicitis, diverticulitis, colitis, obstruction.  Patient recently treated for urinary tract infection so will not repeat urinalysis today.  Laboratory studies show leukocytosis which is mild but increased from discharge.  CMP is grossly normal aside from hyperglycemia.  Lipase and lactate are grossly normal, and procalcitonin is improving from previous studies.  Patient was reevaluated and reporting unbearable pain in the right upper quadrant.  History of cholecystectomy.  CT of the abdomen/pelvis was obtained, which per virtual radiology preliminary interpretation, shows mild fat stranding in the right upper quadrant adjacent to the transverse colon which is nonspecific and underlying omental infarct cannot be excluded.  There is also mild consolidation of the right base which may represent atelectasis or early pneumonia.  Mild hepatic steatosis is also noted with trace ascites in Morison's pouch.  Patient was hypoxic and tachycardic but denies respiratory symptoms and chest pain.  Within the clinical context, I am concerned for possible pulmonary infarct,  "pneumonia, pulmonary embolism, so CTA of the chest was obtained which is negative for pulmonary embolism and notable for mild pulmonary vascular congestion.  Patient was unable to be weaned to room air in the emergency department and would desaturate without exertion.  Unclear if the pulmonary vascular congestion is causing the patient's hypoxia.  Pain improved after supportive measures but stranding and fluid in the right upper quadrant is nonspecific.  Feel patient would benefit from admission for further evaluation of these complaints.  Discussed with internal medicine, and patient will be admitted to their service for further care.  Patient is in agreement with this plan.    Amount and/or Complexity of Data Reviewed  Labs: ordered. Decision-making details documented in ED Course.  Radiology: ordered and independent interpretation performed. Decision-making details documented in ED Course.    Risk  Prescription drug management.  Decision regarding hospitalization.      ED Course as of 07/17/25 0335   Wed Jul 16, 2025   2253 WBC(!): 10.26   2253 Hemoglobin: 12.9   2253 Platelet Count: 250   2253 LACTIC ACID: 1.2   2319 LIPASE: 40   2319 GLUCOSE(!): 283   2319 Comprehensive metabolic panel(!)  Hyperglycemia; otherwise grossly normal   2319 Procalcitonin(!): 1.53   2320 Prev procal 2.79; improving   2349 Patient re-evaluated; states the pain is \"unbearable\" in the right upper quadrant of her abdomen.    2355 POCT INR: 0.94   Thu Jul 17, 2025   0102 Pending CT scan interpretation which is on virtual radiology   0121 Patient failed trial of room air; desaturated to 89%   0123 Patient's CXR with consolidation of R lung; per my interpretation of CT A/P, patient with consolidations vs aspiration vs interstitial lung changes at bases. With hypoxia and tachycardia, I am now concerned for PE. Called reading room to request permission to give additional contrast for CT PE scan.   0128 Dr. Casas of radiology gave permission " for CTA PE   0132 Patient updated on plan; pain is mildly improved   0317 CTA chest pe study  IMPRESSION:     Mild pulmonary vascular congestion.     Otherwise, no acute abnormality         Medications   sodium chloride 0.9 % bolus 1,000 mL (1,000 mL Intravenous New Bag 7/16/25 2230)   HYDROmorphone HCl (DILAUDID) injection 0.2 mg (0.2 mg Intravenous Given 7/16/25 2231)   iohexol (OMNIPAQUE) 350 MG/ML injection (MULTI-DOSE) 100 mL (100 mL Intravenous Given 7/16/25 2259)   ketorolac (TORADOL) injection 15 mg (15 mg Intravenous Given 7/17/25 0001)   iohexol (OMNIPAQUE) 350 MG/ML injection (MULTI-DOSE) 85 mL (85 mL Intravenous Given 7/17/25 0150)       ED Risk Strat Scores            SBIRT 20yo+      Flowsheet Row Most Recent Value   Initial Alcohol Screen: US AUDIT-C     1. How often do you have a drink containing alcohol? 0 Filed at: 07/17/2025 0232   2. How many drinks containing alcohol do you have on a typical day you are drinking?  0 Filed at: 07/17/2025 0232   3a. Male UNDER 65: How often do you have five or more drinks on one occasion? 0 Filed at: 07/17/2025 0232   3b. FEMALE Any Age, or MALE 65+: How often do you have 4 or more drinks on one occassion? 0 Filed at: 07/17/2025 0232   Audit-C Score 0 Filed at: 07/17/2025 0232   JUAN: How many times in the past year have you...    Used an illegal drug or used a prescription medication for non-medical reasons? Never Filed at: 07/17/2025 0232              History of Present Illness       Chief Complaint   Patient presents with    Abdominal Pain     Pt was seen Sunday for same thing, possible bowel blockage. Admitted and d/c today. Last BM yesterday. Pt states pain isn't getting any better so called EMS.       Past Medical History[1]   Past Surgical History[2]   Family History[3]   Social History[4]   E-Cigarette/Vaping    E-Cigarette Use Never User       E-Cigarette/Vaping Substances    Nicotine No     THC No     CBD No     Flavoring No     Other No     Unknown No        I have reviewed and agree with the history as documented.     50-year-old female with history of hypertension, hyperlipidemia, GERD, diabetes, cholecystectomy with recent admission for sepsis secondary to urinary tract infection discharged yesterday presenting for evaluation of abdominal pain.  Patient states she developed sudden onset right upper quadrant pain earlier this evening.  Denies alleviating or aggravating factors.  Denies associated fever, chills, upper respiratory symptoms, chest pain, shortness of breath, vomiting, nausea, diarrhea, dysuria, hematuria.  States she had lower abdominal pain when she was admitted but this is improving.  Did not take anything for pain at home.  Reviewed patient CT imaging which showed fluid around the liver of unclear etiology on her previous admission.  Patient had urinary culture that grew E. coli.  Patient is taking her antibiotics as prescribed. EMS placed on oxygen en route due to desaturations to the low 80s. Patient denies use of oxygen at home.        Review of Systems   Constitutional:  Negative for chills and fever.   HENT:  Negative for congestion, rhinorrhea and sore throat.    Respiratory:  Negative for cough and shortness of breath.    Cardiovascular:  Negative for chest pain.   Gastrointestinal:  Positive for abdominal pain. Negative for diarrhea, nausea and vomiting.   Genitourinary:  Negative for dysuria, frequency and hematuria.   Musculoskeletal:  Negative for arthralgias and myalgias.   Skin:  Negative for color change and rash.   Neurological:  Negative for dizziness, weakness, light-headedness, numbness and headaches.   Hematological:  Does not bruise/bleed easily.       Objective       ED Triage Vitals   Temperature Pulse Blood Pressure Respirations SpO2 Patient Position - Orthostatic VS   07/16/25 2116 07/16/25 2116 07/16/25 2116 07/16/25 2116 07/16/25 2116 --   98.4 °F (36.9 °C) (!) 113 136/65 22 96 %       Temp Source Heart Rate Source BP  Location FiO2 (%) Pain Score    07/16/25 2116 07/16/25 2116 07/16/25 2116 -- 07/16/25 2231    Oral Monitor Right arm  10 - Worst Possible Pain      Vitals      Date and Time Temp Pulse SpO2 Resp BP Pain Score FACES Pain Rating User   07/17/25 0259 -- 102 -- -- -- -- -- EG   07/17/25 0001 -- -- -- -- -- 10 - Worst Possible Pain -- EG   07/16/25 2245 -- 110 93 % -- -- -- --    07/16/25 2231 -- -- -- -- -- 10 - Worst Possible Pain --    07/16/25 2200 -- 108 91 % -- -- -- --    07/16/25 2151 -- -- 96 % -- -- -- --    07/16/25 2130 -- 107 98 % -- 136/65 -- --    07/16/25 2116 98.4 °F (36.9 °C) 113 96 % 22 136/65 -- -- KB            Physical Exam  Vitals and nursing note reviewed.   Constitutional:       General: She is in acute distress.      Appearance: She is obese.   HENT:      Head: Normocephalic and atraumatic.     Eyes:      General: No scleral icterus.      Cardiovascular:      Rate and Rhythm: Regular rhythm. Tachycardia present.   Pulmonary:      Effort: Pulmonary effort is normal. No respiratory distress.      Breath sounds: No wheezing, rhonchi or rales.   Abdominal:      General: Abdomen is flat.      Palpations: Abdomen is soft.      Tenderness: There is abdominal tenderness in the right upper quadrant and periumbilical area. There is no right CVA tenderness, left CVA tenderness, guarding or rebound.     Skin:     General: Skin is warm and dry.      Findings: No rash.     Neurological:      General: No focal deficit present.      Mental Status: She is alert and oriented to person, place, and time.     Psychiatric:         Behavior: Behavior normal.         Results Reviewed       Procedure Component Value Units Date/Time    Protime-INR [[id number]]  (Normal) Collected: 07/16/25 2225    Lab Status: Final result Specimen: Blood from Arm, Left Updated: 07/16/25 2351     Protime 13.3 seconds      INR 0.94    Narrative:      INR Therapeutic Range    Indication                                              INR Range      Atrial Fibrillation                                               2.0-3.0  Hypercoagulable State                                    2.0.2.3  Left Ventricular Asist Device                            2.0-3.0  Mechanical Heart Valve                                  -    Aortic(with afib, MI, embolism, HF, LA enlargement,    and/or coagulopathy)                                     2.0-3.0 (2.5-3.5)     Mitral                                                             2.5-3.5  Prosthetic/Bioprosthetic Heart Valve               2.0-3.0  Venous thromboembolism (VTE: VT, PE        2.0-3.0    APTT [[id number]]  (Normal) Collected: 07/16/25 2225    Lab Status: Final result Specimen: Blood from Arm, Left Updated: 07/16/25 2354     PTT 31 seconds     Lipase [[id number]]  (Normal) Collected: 07/16/25 2225    Lab Status: Final result Specimen: Blood from Arm, Left Updated: 07/16/25 2304     Lipase 40 u/L     Procalcitonin [[id number]]  (Abnormal) Collected: 07/16/25 2225    Lab Status: Final result Specimen: Blood from Arm, Left Updated: 07/16/25 2304     Procalcitonin 1.53 ng/ml     Comprehensive metabolic panel [[id number]]  (Abnormal) Collected: 07/16/25 2225    Lab Status: Final result Specimen: Blood from Arm, Left Updated: 07/16/25 2303     Sodium 134 mmol/L      Potassium 4.6 mmol/L      Chloride 99 mmol/L      CO2 28 mmol/L      ANION GAP 7 mmol/L      BUN 13 mg/dL      Creatinine 0.78 mg/dL      Glucose 283 mg/dL      Calcium 9.2 mg/dL      AST 19 U/L      ALT 24 U/L      Alkaline Phosphatase 66 U/L      Total Protein 7.3 g/dL      Albumin 3.6 g/dL      Total Bilirubin 0.64 mg/dL      eGFR 87 ml/min/1.73sq m     Narrative:      National Kid[employer] guidelines for Chronic Kidney Disease (CKD):     Stage 1 with normal or high GFR (GFR > 90 mL/min/1.73 square meters)    Stage 2 Mild CKD (GFR = 60-89 mL/min/1.73 square meters)    Stage 3A Moderate CKD (GFR = 45-59 mL/min/1.73 square meters)    Stage  3B Moderate CKD (GFR = 30-44 mL/min/1.73 square meters)    Stage 4 Severe CKD (GFR = 15-29 mL/min/1.73 square meters)    Stage 5 End Stage CKD (GFR <15 mL/min/1.73 square meters)  Note: GFR calculation is accurate only with a steady state creatinine    Lactic acid [[id number]]  (Normal) Collected: 07/16/25 2225    Lab Status: Final result Specimen: Blood from Arm, Left Updated: 07/16/25 2252     LACTIC ACID 1.2 mmol/L     Narrative:      Result may be elevated if tourniquet was used during collection.    CBC and differential [[id number]]  (Abnormal) Collected: 07/16/25 2225    Lab Status: Final result Specimen: Blood from Arm, Left Updated: 07/16/25 2238     WBC 10.26 Thousand/uL      RBC 4.18 Million/uL      Hemoglobin 12.9 g/dL      Hematocrit 39.2 %      MCV 94 fL      MCH 30.9 pg      MCHC 32.9 g/dL      RDW 13.5 %      MPV 10.0 fL      Platelets 250 Thousands/uL      nRBC 0 /100 WBCs      Segmented % 71 %      Immature Grans % 2 %      Lymphocytes % 14 %      Monocytes % 10 %      Eosinophils Relative 2 %      Basophils Relative 1 %      Absolute Neutrophils 7.39 Thousands/µL      Absolute Immature Grans 0.25 Thousand/uL      Absolute Lymphocytes 1.39 Thousands/µL      Absolute Monocytes 0.98 Thousand/µL      Eosinophils Absolute 0.18 Thousand/µL      Basophils Absolute 0.07 Thousands/µL     Blood culture #1 [[id number]] Collected: 07/16/25 2225    Lab Status: In process Specimen: Blood from Arm, Left Updated: 07/16/25 2235            CTA chest pe study   Final Interpretation by Rahul Mckeon DO (07/17 8049)      Mild pulmonary vascular congestion.      Otherwise, no acute abnormality                        Computerized Assisted Algorithm (CAA) may have aided analysis of applicable images.      Workstation performed: [id number]         XR chest 1 view portable   ED Interpretation by Rebeca Barney MD (07/17 012)   R lung opacification      CT abdomen pelvis with contrast   ED Interpretation by Brie  Rika Coello MD ([id number])   IMPRESSION: 1. Mild fat stranding in the right upper quadrant adjacent to the transverse colon on series 301, image 83. This is nonspecific. Underlying omental infarct cannot be excluded. 2. Few nonobstructive renal stones bilaterally. No hydronephrosis in either kidney. 3. The bladder is slightly distended. A bladder outlet obstruction cannot be excluded. 4. Findings may represent cellulitis, as described. 5. Mild consolidation at the right lung base may represent atelectasis or early pneumonia.            Procedures    ED Medication and Procedure Management   Prior to Admission Medications   Prescriptions Last Dose Informant Patient Reported? Taking?   ARIPiprazole (ABILIFY) 10 mg tablet   No No   Sig: Take 1 tablet (10 mg total) by mouth daily   LORazepam (ATIVAN) 0.5 mg tablet   Yes No   Si.5 mg every 8 (eight) hours as needed for anxiety   acetaminophen (TYLENOL) 325 mg tablet   No No   Sig: Take 2 tablets (650 mg total) by mouth every 6 (six) hours as needed for mild pain   bisacodyl (DULCOLAX) 10 mg suppository   No No   Sig: Insert 1 suppository (10 mg total) into the rectum daily   cefpodoxime (VANTIN) 200 mg tablet   No No   Sig: Take 2 tablets (400 mg total) by mouth 2 (two) times a day with meals for 7 doses   desvenlafaxine succinate (PRISTIQ) 50 mg 24 hr tablet   No No   Sig: Take 1 tablet (50 mg total) by mouth daily Do not start before 2023.   dicyclomine (BENTYL) 10 mg capsule   No No   Sig: Take 2 capsules (20 mg total) by mouth every 6 (six) hours as needed (crampy diarrhea)   dulaglutide (Trulicity) 1.5 MG/0.5ML injection   Yes No   Sig: Inject 1.5 mg under the skin Once a week   insulin glargine (LANTUS) 100 units/mL subcutaneous injection   No No   Sig: Inject 50 Units under the skin daily at bedtime   insulin lispro (HumALOG/ADMELOG) 100 units/mL injection   No No   Sig: Inject 20 Units under the skin 3 (three) times a day with meals    lamoTRIgine (LaMICtal) 100 mg tablet   No No   Sig: Take 1 tablet (100 mg total) by mouth 2 (two) times a day   lisinopril (ZESTRIL) 10 mg tablet   No No   Sig: Take 2 tablets (20 mg total) by mouth daily   metFORMIN (GLUCOPHAGE) 500 mg tablet  Self No No   Sig: Take 1 tablet (500 mg total) by mouth 2 (two) times a day with meals   methocarbamol (ROBAXIN) 750 mg tablet   No No   Sig: Take 1 tablet (750 mg total) by mouth every 8 (eight) hours as needed for muscle spasms for up to 10 days Do not start before July 1, 2025.   naproxen (Naprosyn) 500 mg tablet   No No   Sig: Take 1 tablet (500 mg total) by mouth every 12 (twelve) hours as needed (pain) for up to 10 days Take with food. Do not start before July 1, 2025.   nystatin (MYCOSTATIN) powder   No No   Sig: Apply topically 3 (three) times a day --- Continue use for a week after rash resolves   ondansetron (ZOFRAN-ODT) 4 mg disintegrating tablet   No No   Sig: Take 1 tablet (4 mg total) by mouth every 6 (six) hours as needed for nausea or vomiting for up to 5 days   oxybutynin (DITROPAN) 5 mg tablet   No No   Sig: Take 1 tablet (5 mg total) by mouth 3 (three) times a day   phenazopyridine (PYRIDIUM) 100 mg tablet   No No   Sig: Take 1 tablet (100 mg total) by mouth 3 (three) times a day with meals   polyethylene glycol (MIRALAX) 17 g packet   No No   Sig: Take 17 g by mouth daily   senna-docusate sodium (SENOKOT S) 8.6-50 mg per tablet   No No   Sig: Take 2 tablets by mouth 2 (two) times a day   tamsulosin (FLOMAX) 0.4 mg   No No   Sig: Take 1 capsule (0.4 mg total) by mouth daily with dinner      Facility-Administered Medications: None     Patient's Medications   Discharge Prescriptions    No medications on file     No discharge procedures on file.  ED SEPSIS DOCUMENTATION   Time reflects when diagnosis was documented in both MDM as applicable and the Disposition within this note       Time User Action Codes Description Comment    7/17/2025  3:23 AM Ector  Rebeca Add [R10.11] RUQ pain     2025  3:24 AM Rebeca Barney Add [R09.02] Hypoxia     2025  3:24 AM Rebeca Barney Add [R09.89] Pulmonary vascular congestion                    [1]   Past Medical History:  Diagnosis Date    Anemia     Anxiety     Candidal intertrigo     Depression     Diabetes mellitus (HCC)     GERD (gastroesophageal reflux disease)     Hyperlipidemia     Hypertension     Hyponatremia 2023    Irritable bowel syndrome     Morbid obesity with BMI of 60.0-69.9, adult (HCC)     Osteoarthritis     Seasonal allergies     Sleep difficulties     Suicide attempt ([facility])    [2]   Past Surgical History:  Procedure Laterality Date     SECTION      CHOLECYSTECTOMY      FL RETROGRADE PYELOGRAM  3/21/2024    FL RETROGRADE PYELOGRAM  2024    IN CYSTO/URETERO W/LITHOTRIPSY &INDWELL STENT INSRT Left 3/21/2024    Procedure: CYSTOSCOPY, RETROGRADE PYELOGRAM AND INSERTION STENT URETERAL;  Surgeon: Nabil Desir MD;  Location: [facility];  Service: Urology    IN CYSTO/URETERO W/LITHOTRIPSY &INDWELL STENT INSRT Left 2024    Procedure: CYSTOSCOPY URETEROSCOPY WITH LITHOTRIPSY HOLMIUM LASER, RETROGRADE PYELOGRAM AND INSERTION STENT URETERAL;  Surgeon: Nabil Desir MD;  Location: [facility];  Service: Urology    WISDOM TOOTH EXTRACTION     [3]   Family History  Problem Relation Name Age of Onset    Diabetes Mother      Hypertension Father     [4]   Social History  Tobacco Use    Smoking status: Never    Smokeless tobacco: Never   Vaping Use    Vaping status: Never Used   Substance Use Topics    Alcohol use: Not Currently     Comment: occassionaly     Drug use: No        Rebeca Barney MD  [phone]

## 2025-07-17 NOTE — ASSESSMENT & PLAN NOTE
Patient was recently admitted secondary to UTI  Continue Vantin for 7 more doses to complete the antibiotic course

## 2025-07-17 NOTE — ASSESSMENT & PLAN NOTE
Presented with tachycardia ranging from 102-113, tachypnea respiratory rate 22  Patient was also hypoxic initially, requiring 5 L of oxygen.  Patient does not use any oxygen at baseline  With concerns of PE, CT PE was done with did not show any pulmonary embolism  Chest x-ray on my read showed pulmonary vascular congestion along with right-sided pleural effusion  Tachycardia and tachypnea along with hypoxia resolved    PLAN:  1 dose of IV Lasix 20 Mg  Echocardiogram

## 2025-07-17 NOTE — ASSESSMENT & PLAN NOTE
Patient CT abdomen on July 17, 2025 showed Fluid collection noted around liver however this does not appear to be rim enhancing to suggest an abscess.   Possible endometrial thickening versus heterogeneous enhancement. If this patient is postmenopausal, a follow-up ultrasound is recommended.     PLAN:  Follow-up with the official read of the new CT abdomen pelvis

## 2025-07-17 NOTE — ASSESSMENT & PLAN NOTE
Lab Results   Component Value Date    HGBA1C 9.5 (H) 07/13/2025       Recent Labs     07/15/25  1613 07/15/25  2138 07/16/25  0714 07/16/25  1058   POCGLU 227* 277* 237* 328*       Blood Sugar Average: Last 72 hrs:    Continue Lantus 40 units twice daily  Continue lispro 22 units 3 times daily  SSI

## 2025-07-17 NOTE — ASSESSMENT & PLAN NOTE
Presented with severe 10 out of 10 sharp right-sided abdominal pain  Pain was nonradiating, not associated with any nausea vomiting, no exacerbating or relieving factors  Patient had a cholecystectomy done in 2008  Patient has mild leukocytosis however not remarkable, lipase and lactic acid normal  Elevated procalcitonin 1.53, most likely still downtrending from her last admission  CT abdomen read by [facility], Hepatomegaly and diffuse hepatic steatosis along with fluid around the liver which was also noted on the previous CT scan. There is possible stranding indicating a possible omental infarct but no other changes. Mild hepatic steatosis is also noted with trace ascites in Morison's pouch.  Differentials include gastritis, hepatitis, colitis, obstruction, hepatic hydrothorax    PLAN:  Follow-up with the official read of CT abdomen pelvis  Follow-up with right upper quadrant ultrasound  Pain control

## 2025-07-17 NOTE — ASSESSMENT & PLAN NOTE
Prior Authorization Clinical Questions for Weight Management Pharmacotherapy    2. Does the patient have a diagnosis of obesity, confirmed by a BMI greater than or equal to 30 kg/m^2?: Yes  3. Does the patient have a BMI of greater than or equal to 27 kg/m^2 with at least one weight-related comorbidity/risk factor/complication (e.g. diabetes, dyslipidemia, coronary artery disease)?: Yes  4. Weight-related co-morbidities/risk factors: metabolic syndrome, type 2 diabetes, depression  6. ZEPBOUND FAREED Indication: Does patient have documented FAREED diagnosed via sleep study (insurance will require copy of sleep study results for approval)?: Yes  9. Does the patient have a history of type 2 diabetes?: Yes     Baseline weight (in pounds): 339.51 lbs  Current weight (in pounds): 339.51 lbs  Weight loss percentage: 0%

## 2025-07-18 VITALS
OXYGEN SATURATION: 93 % | WEIGHT: 293 LBS | HEIGHT: 60 IN | BODY MASS INDEX: 57.52 KG/M2 | SYSTOLIC BLOOD PRESSURE: 128 MMHG | HEART RATE: 91 BPM | DIASTOLIC BLOOD PRESSURE: 57 MMHG | TEMPERATURE: 98.3 F | RESPIRATION RATE: 18 BRPM

## 2025-07-18 PROBLEM — R09.02 HYPOXIA: Status: ACTIVE | Noted: 2025-07-17

## 2025-07-18 LAB
ANION GAP SERPL CALCULATED.3IONS-SCNC: 6 MMOL/L (ref 4–13)
BACTERIA BLD CULT: NORMAL
BACTERIA BLD CULT: NORMAL
BUN SERPL-MCNC: 10 MG/DL (ref 5–25)
CALCIUM SERPL-MCNC: 9 MG/DL (ref 8.4–10.2)
CHLORIDE SERPL-SCNC: 100 MMOL/L (ref 96–108)
CO2 SERPL-SCNC: 31 MMOL/L (ref 21–32)
CREAT SERPL-MCNC: 0.65 MG/DL (ref 0.6–1.3)
ERYTHROCYTE [DISTWIDTH] IN BLOOD BY AUTOMATED COUNT: 13.5 % (ref 11.6–15.1)
GFR SERPL CREATININE-BSD FRML MDRD: 102 ML/MIN/1.73SQ M
GLUCOSE SERPL-MCNC: 182 MG/DL (ref 65–140)
GLUCOSE SERPL-MCNC: 195 MG/DL (ref 65–140)
GLUCOSE SERPL-MCNC: 224 MG/DL (ref 65–140)
HCT VFR BLD AUTO: 38.4 % (ref 34.8–46.1)
HGB BLD-MCNC: 12.2 G/DL (ref 11.5–15.4)
MCH RBC QN AUTO: 30 PG (ref 26.8–34.3)
MCHC RBC AUTO-ENTMCNC: 31.8 G/DL (ref 31.4–37.4)
MCV RBC AUTO: 94 FL (ref 82–98)
PLATELET # BLD AUTO: 266 THOUSANDS/UL (ref 149–390)
PMV BLD AUTO: 9.3 FL (ref 8.9–12.7)
POTASSIUM SERPL-SCNC: 4.1 MMOL/L (ref 3.5–5.3)
RBC # BLD AUTO: 4.07 MILLION/UL (ref 3.81–5.12)
SODIUM SERPL-SCNC: 137 MMOL/L (ref 135–147)
WBC # BLD AUTO: 8.47 THOUSAND/UL (ref 4.31–10.16)

## 2025-07-18 PROCEDURE — 85027 COMPLETE CBC AUTOMATED: CPT

## 2025-07-18 PROCEDURE — 99239 HOSP IP/OBS DSCHRG MGMT >30: CPT | Performed by: INTERNAL MEDICINE

## 2025-07-18 PROCEDURE — 80048 BASIC METABOLIC PNL TOTAL CA: CPT

## 2025-07-18 PROCEDURE — 82948 REAGENT STRIP/BLOOD GLUCOSE: CPT

## 2025-07-18 RX ORDER — DICYCLOMINE HCL 20 MG
20 TABLET ORAL ONCE
Status: COMPLETED | OUTPATIENT
Start: 2025-07-18 | End: 2025-07-18

## 2025-07-18 RX ORDER — INSULIN GLARGINE 100 [IU]/ML
40 INJECTION, SOLUTION SUBCUTANEOUS EVERY 12 HOURS SCHEDULED
Qty: 10 ML | Refills: 0 | Status: SHIPPED | OUTPATIENT
Start: 2025-07-18

## 2025-07-18 RX ORDER — BISACODYL 10 MG
10 SUPPOSITORY, RECTAL RECTAL DAILY PRN
Status: DISCONTINUED | OUTPATIENT
Start: 2025-07-18 | End: 2025-07-18 | Stop reason: HOSPADM

## 2025-07-18 RX ORDER — AMOXICILLIN 250 MG
2 CAPSULE ORAL 2 TIMES DAILY
Qty: 120 TABLET | Refills: 0 | Status: SHIPPED | OUTPATIENT
Start: 2025-07-18

## 2025-07-18 RX ORDER — INSULIN LISPRO 100 [IU]/ML
25 INJECTION, SOLUTION INTRAVENOUS; SUBCUTANEOUS
Status: DISCONTINUED | OUTPATIENT
Start: 2025-07-18 | End: 2025-07-18 | Stop reason: HOSPADM

## 2025-07-18 RX ORDER — INSULIN LISPRO 100 [IU]/ML
22 INJECTION, SOLUTION INTRAVENOUS; SUBCUTANEOUS
Start: 2025-07-18

## 2025-07-18 RX ADMIN — INSULIN LISPRO 2 UNITS: 100 INJECTION, SOLUTION INTRAVENOUS; SUBCUTANEOUS at 08:34

## 2025-07-18 RX ADMIN — DICYCLOMINE HYDROCHLORIDE 20 MG: 20 TABLET ORAL at 12:46

## 2025-07-18 RX ADMIN — DESVENLAFAXINE 50 MG: 50 TABLET, EXTENDED RELEASE ORAL at 09:19

## 2025-07-18 RX ADMIN — INSULIN LISPRO 4 UNITS: 100 INJECTION, SOLUTION INTRAVENOUS; SUBCUTANEOUS at 12:35

## 2025-07-18 RX ADMIN — INSULIN GLARGINE 40 UNITS: 100 INJECTION, SOLUTION SUBCUTANEOUS at 08:31

## 2025-07-18 RX ADMIN — CEFPODOXIME PROXETIL 400 MG: 200 TABLET, FILM COATED ORAL at 09:19

## 2025-07-18 RX ADMIN — SENNOSIDES, DOCUSATE SODIUM 2 TABLET: 50; 8.6 TABLET, FILM COATED ORAL at 08:32

## 2025-07-18 RX ADMIN — ARIPIPRAZOLE 10 MG: 10 TABLET ORAL at 08:31

## 2025-07-18 RX ADMIN — ENOXAPARIN SODIUM 40 MG: 40 INJECTION SUBCUTANEOUS at 08:32

## 2025-07-18 RX ADMIN — LISINOPRIL 20 MG: 20 TABLET ORAL at 08:32

## 2025-07-18 RX ADMIN — INSULIN LISPRO 25 UNITS: 100 INJECTION, SOLUTION INTRAVENOUS; SUBCUTANEOUS at 12:36

## 2025-07-18 RX ADMIN — LAMOTRIGINE 100 MG: 100 TABLET ORAL at 08:32

## 2025-07-18 RX ADMIN — NYSTATIN: 100000 POWDER TOPICAL at 09:21

## 2025-07-18 RX ADMIN — POLYETHYLENE GLYCOL 3350 17 G: 17 POWDER, FOR SOLUTION ORAL at 08:31

## 2025-07-18 NOTE — ASSESSMENT & PLAN NOTE
Lab Results   Component Value Date    HGBA1C 9.5 (H) 07/13/2025       Recent Labs     07/17/25  0836 07/17/25  1200 07/17/25  1612 07/17/25  2104   POCGLU 248* 226* 228* 190*       Blood Sugar Average: Last 72 hrs:  (P) 223  Continue Lantus 40 units twice daily  Continue lispro 22 units 3 times daily  SSI

## 2025-07-18 NOTE — ASSESSMENT & PLAN NOTE
Presented with severe 10 out of 10 sharp right-sided abdominal pain  Pain was nonradiating, not associated with any nausea vomiting, no exacerbating or relieving factors  Patient had a cholecystectomy done in 2008  Patient has mild leukocytosis however not remarkable, lipase and lactic acid normal  Elevated procalcitonin 1.53, most likely still downtrending from her last admission  CT abdomen read by [facility], Hepatomegaly and diffuse hepatic steatosis along with fluid around the liver which was also noted on the previous CT scan. There is possible stranding indicating a possible omental infarct but no other changes. Mild hepatic steatosis is also noted with trace ascites in Morison's pouch.  Differentials include gastritis, hepatitis, colitis, obstruction, hepatic hydrothorax    PLAN:  Continue Tylenol for pain control, will add Bentyl  Bowel regimen with twice daily senna, twice daily MiraLAX, Dulcolax suppository

## 2025-07-18 NOTE — ASSESSMENT & PLAN NOTE
Presented with tachycardia ranging from 102-113, tachypnea respiratory rate 22  Patient was also hypoxic initially, requiring 5 L of oxygen.  Patient does not use any oxygen at baseline  With concerns of PE, CT PE was done with did not show any pulmonary embolism  Chest x-ray on my read showed pulmonary vascular congestion along with right-sided pleural effusion  Tachycardia and tachypnea likely in the setting of acute pain  Patient still intermittently requiring 1 L oxygen therapy to maintain sats above 90  Likely combined components of obesity hypoventilation syndrome as well as basilar atelectasis secondary to poor inspiratory effort.  Patient remained asymptomatic throughout these episodes of hypoxia on 7/17+7/18    PLAN:  Follow-up in the outpatient setting for sleep study

## 2025-07-18 NOTE — HOSPITAL COURSE
Patient is a 50-year-old female with a past medical history of UTI, depression, obesity who presented initially with right upper quadrant pain on 7/16.  Additionally she was noted with a mild leukocytosis, normal lipase, normal lactate.  Noted to have elevated Pro-Loy, however Traum trending from her previous admission which she was discharged from earlier in the day on 7/16.  She was additionally noted with hypoxia requiring 5 L of oxygen in the ED, with concern for PE CTA PE was done, negative for pulmonary embolism.  Chest x-ray showed some mild pulmonary vascular congestion and she was given 1 dose of Lasix IV for volume overload.  She did additionally have episodes of relative hypoxia down to 89 on 7/17 as well as 7/18, though patient remained asymptomatic throughout these episodes.  She was maintained on 1 L oxygen and was noted with improvement in hypoxia on this small amount.    Her abdominal pain was thought to be secondary to constipation, patient was noting that she had not had a bowel movement in approximately 3 days prior to admission.  She was started on a bowel regimen and noted to have multiple bowel movements throughout the day on 7/18 resulting in resolution of her right upper quadrant abdominal pain.  She developed no new symptoms and was discharged without complication on 7/18.    Notably, she was diagnosed with a urinary tract infection on that previous admission and still had remaining doses of Vantin, this course was scheduled to end on 7/20.  During her stay with us here we continued her on this course and gave instructions to continue this course through completion on 7/20 following discharge on 7/18.

## 2025-07-18 NOTE — ASSESSMENT & PLAN NOTE
Presented with severe 10 out of 10 sharp right-sided abdominal pain  Pain was nonradiating, not associated with any nausea vomiting, no exacerbating or relieving factors  Patient had a cholecystectomy done in 2008  Patient has mild leukocytosis however not remarkable, lipase and lactic acid normal  Elevated procalcitonin 1.53, most likely still downtrending from her last admission  CT abdomen read by [facility], Hepatomegaly and diffuse hepatic steatosis along with fluid around the liver which was also noted on the previous CT scan. There is possible stranding indicating a possible omental infarct but no other changes. Mild hepatic steatosis is also noted with trace ascites in Morison's pouch.  Differentials include gastritis, hepatitis, colitis, obstruction, hepatic hydrothorax    PLAN:  Pain was well-controlled with Tylenol as well as addition of Bentyl  Bowel regimen with twice daily senna, twice daily MiraLAX, Dulcolax suppository  Produced 3 bowel movements on 7/18 noting resolution of pain following.

## 2025-07-18 NOTE — DISCHARGE INSTR - AVS FIRST PAGE
Dear Montse Smith,     It was our pleasure to care for you here at [facility].  It is our hope that we were always able to exceed the expected standards for your care during your stay.  You were hospitalized due to abdominal pain.  You were cared for on the first floor by Kenny Trujillo DO under the service of Adrián Hobbs DO with the [facility] who covers for your primary care physician (PCP), Florecita Dietrich NP, while you were hospitalized.  If you have any questions or concerns related to this hospitalization, you may contact us at .  For follow up as well as any medication refills, we recommend that you follow up with your primary care physician.  A registered nurse will reach out to you by phone within a few days after your discharge to answer any additional questions that you may have after going home.  However, at this time we provide for you here, the most important instructions / recommendations at discharge:     Notable Medication Adjustments -   You should continue taking your cefpodoxime (Vantin) 200 mg tablets, 2 tablets by mouth for total of 400 mg 2 times a day with meals.  This is to complete your course for your previous diagnosis of UTI, this course will continue through 7/20/2025  You should change how you take your senna-docusate sodium (Senokot), you should now take this as 2 tablets by mouth twice daily if you fail to have a bowel movement within 48 hours on MiraLAX  You should start taking 1 packet of Metamucil daily  You should continue taking all other medications as previously prescribed by their respective providers.  Testing Required after Discharge -   You should follow-up with an outpatient pelvic ultrasound, this can be scheduled by their primary care provider or if you have an outpatient gynecologist.  This should be discussed during your follow-up visit with your primary care provider.  This was discussed with  you at the conclusion of your previous hospital admission, for which you were discharged on 7/16.  ** Please contact your PCP to request testing orders for any of the testing recommended here **  Important follow up information -   You should follow-up with your primary care provider within 1 week of discharge.  Other Instructions -   Should the symptoms that brought you to the hospital fail to resolve or continue to worsen, contact your primary care provider, if you are unable to contact your primary care provider you should promptly return to the hospital.  If any new symptoms should develop including continuous high fever, shortness of breath, chest pain, palpitations, worsening abdominal pain, blood in your stool, blood in vomit, new or worsening urinary frequency, urinary urgency, urinary bleeding, you should contact your primary care provider and if you are unable to contact your primary care provider you should promptly return to the hospital.  Please review this entire after visit summary as additional general instructions including medication list, appointments, activity, diet, any pertinent wound care, and other additional recommendations from your care team that may be provided for you.      Sincerely,     Kenny Trujillo, DO

## 2025-07-18 NOTE — PROGRESS NOTES
Progress Note - Hospitalist   Name: Montse Smith 50 y.o. female I MRN: [id number]  Unit/Bed#: [facility] I Date of Admission: 7/16/2025   Date of Service: 7/18/2025 I Hospital Day: 1    Assessment & Plan  Right upper quadrant abdominal pain  Presented with severe 10 out of 10 sharp right-sided abdominal pain  Pain was nonradiating, not associated with any nausea vomiting, no exacerbating or relieving factors  Patient had a cholecystectomy done in 2008  Patient has mild leukocytosis however not remarkable, lipase and lactic acid normal  Elevated procalcitonin 1.53, most likely still downtrending from her last admission  CT abdomen read by [facility], Hepatomegaly and diffuse hepatic steatosis along with fluid around the liver which was also noted on the previous CT scan. There is possible stranding indicating a possible omental infarct but no other changes. Mild hepatic steatosis is also noted with trace ascites in Morison's pouch.  Differentials include gastritis, hepatitis, colitis, obstruction, hepatic hydrothorax    PLAN:  Continue Tylenol for pain control, will add Bentyl  Bowel regimen with twice daily senna, twice daily MiraLAX, Dulcolax suppository  Hypoxia  Presented with tachycardia ranging from 102-113, tachypnea respiratory rate 22  Patient was also hypoxic initially, requiring 5 L of oxygen.  Patient does not use any oxygen at baseline  With concerns of PE, CT PE was done with did not show any pulmonary embolism  Chest x-ray on my read showed pulmonary vascular congestion along with right-sided pleural effusion  Tachycardia and tachypnea likely in the setting of acute pain  Patient still intermittently requiring 1 L oxygen therapy to maintain sats above 90  Likely combined components of obesity hypoventilation syndrome as well as basilar atelectasis secondary to poor inspiratory effort.    PLAN:  Trial off oxygen to assess need for outpatient oxygen therapy  Abnormal finding on CT scan  Final read on CT  abdomen/pelvis 7/16 noting resolution of trace perihepatic ascites.  Noting skin thickening and tissue stranding of the anterior abdominal wall, requesting correlation with exam for possible cellulitis.  No consistent with cellulitis or noted on physical exam.  Endometrial stripe measuring 7 mm, thickened for postmenopausal state.  Recommend outpatient pelvic ultrasound    PLAN:  Follow-up in outpatient setting with nonemergent pelvic ultrasound  Type 2 diabetes mellitus with hyperglycemia, with long-term current use of insulin (HCC)  Lab Results   Component Value Date    HGBA1C 9.5 (H) 07/13/2025       Recent Labs     07/17/25  0836 07/17/25  1200 07/17/25  1612 07/17/25  2104   POCGLU 248* 226* 228* 190*       Blood Sugar Average: Last 72 hrs:  (P) 223  Continue Lantus 40 units twice daily  Continue lispro 22 units 3 times daily  SSI    Major depressive disorder, recurrent severe without psychotic features (HCC)  Continue Abilify 10 Mg daily  Continue lamotrigine 100 mg twice daily  Continue Pristiq 50 Mg daily  Class 3 severe obesity due to excess calories with body mass index (BMI) of 60.0 to 69.9 in adult  Prior Authorization Clinical Questions for Weight Management Pharmacotherapy    2. Does the patient have a diagnosis of obesity, confirmed by a BMI greater than or equal to 30 kg/m^2?: Yes  3. Does the patient have a BMI of greater than or equal to 27 kg/m^2 with at least one weight-related comorbidity/risk factor/complication (e.g. diabetes, dyslipidemia, coronary artery disease)?: Yes  4. Weight-related co-morbidities/risk factors: metabolic syndrome, type 2 diabetes, depression  6. ZEPBOUND FAREED Indication: Does patient have documented FAREED diagnosed via sleep study (insurance will require copy of sleep study results for approval)?: Yes  9. Does the patient have a history of type 2 diabetes?: Yes     Baseline weight (in pounds): 339.51 lbs  Current weight (in pounds): 339.51 lbs  Weight loss percentage: 0%        UTI (urinary tract infection)  Patient was recently admitted secondary to UTI  Continue Vantin for 7 more doses to complete the antibiotic course    VTE Pharmacologic Prophylaxis: VTE Score: 4 Moderate Risk (Score 3-4) - Pharmacological DVT Prophylaxis Ordered: enoxaparin (Lovenox).    Mobility:   Basic Mobility Inpatient Raw Score: 21  JH-HLM Goal: 6: Walk 10 steps or more  JH-HLM Achieved: 1: Laying in bed  JH-HLM Goal NOT achieved. Continue with multidisciplinary rounding and encourage appropriate mobility to improve upon JH-HLM goals.    Patient Centered Rounds: I performed bedside rounds with nursing staff today.   Discussions with Specialists or Other Care Team Provider: Case management    Education and Discussions with Family / Patient: Patient declined call to .     Current Length of Stay: 1 day(s)  Current Patient Status: Inpatient   Certification Statement: Patient currently being managed with bowel regimen, will require follow-up assessment to ensure safety for discharge following bowel movement.  Discharge Plan: Discharge today or tomorrow pending bowel movement and resolution of symptoms.  Currently being worked up for possible requirement for outpatient oxygen therapy.    Code Status: Level 1 - Full Code    Subjective   Patient seen and examined at the bedside today.  She does note that she is able to ambulate and will did get up to go to the bathroom 1 time prior to my assessment today.  She is denying any new complaints including fever, headache, chest pain, shortness of breath, palpitations, nausea/vomiting/diarrhea, urinary symptoms including urgency/frequency/dysuria.  She does note continued constipation, she notes that earlier this morning prior to my assessment she had 1 very formed small-volume bowel movement that required manual disimpaction by nursing to remove the stool.  She feels that she still has significant amount of stool burden and is requesting that we advance her  bowel regimen even further.    On follow-up examination later in the day the patient was noted to have had an additional bowel movement, patient still noting at this time that she feels she has significant stool burden and once again requesting that we administer the Dulcolax suppository to aid in defecation.    Objective :  Temp:  [98 °F (36.7 °C)-98.7 °F (37.1 °C)] 98 °F (36.7 °C)  HR:  [92-95] 92  BP: (136-156)/(66-78) 156/78  Resp:  [18-20] 18  SpO2:  [90 %-96 %] 93 %  O2 Device: Nasal cannula  Nasal Cannula O2 Flow Rate (L/min):  [1 L/min] 1 L/min    Body mass index is 66.4 kg/m².     Input and Output Summary (last 24 hours):     Intake/Output Summary (Last 24 hours) at 7/18/2025 0532  Last data filed at 7/17/2025 1910  Gross per 24 hour   Intake 222 ml   Output --   Net 222 ml       Physical Exam  Vitals and nursing note reviewed.   Constitutional:       General: She is not in acute distress.     Appearance: She is well-developed. She is obese. She is not ill-appearing.   HENT:      Head: Normocephalic and atraumatic.      Nose: No congestion.      Mouth/Throat:      Mouth: Mucous membranes are moist.      Pharynx: Oropharynx is clear.     Eyes:      Conjunctiva/sclera: Conjunctivae normal.       Cardiovascular:      Rate and Rhythm: Normal rate and regular rhythm.      Heart sounds: No murmur heard.  Pulmonary:      Effort: Pulmonary effort is normal. No respiratory distress.      Breath sounds: Normal breath sounds. No wheezing or rales.   Abdominal:      Palpations: Abdomen is soft.      Tenderness: There is abdominal tenderness.      Comments: Abdomen mildly tender to palpation in the right upper quadrant, epigastric     Musculoskeletal:         General: No swelling.      Cervical back: Neck supple.     Skin:     General: Skin is warm and dry.      Capillary Refill: Capillary refill takes less than 2 seconds.     Neurological:      General: No focal deficit present.      Mental Status: She is alert and  oriented to person, place, and time.      Cranial Nerves: No cranial nerve deficit.      Motor: No weakness.     Psychiatric:         Mood and Affect: Mood normal.         Lines/Drains:              Lab Results: I have reviewed the following results:   Results from last 7 days   Lab Units 07/17/25  0518 07/16/25  2225   WBC Thousand/uL 9.39 10.26*   HEMOGLOBIN g/dL 11.8 12.9   HEMATOCRIT % 37.0 39.2   PLATELETS Thousands/uL 226 250   SEGS PCT %  --  71   LYMPHO PCT %  --  14   MONO PCT %  --  10   EOS PCT %  --  2     Results from last 7 days   Lab Units 07/17/25  0518 07/16/25  2225   SODIUM mmol/L 134* 134*   POTASSIUM mmol/L 4.1 4.6   CHLORIDE mmol/L 101 99   CO2 mmol/L 28 28   BUN mg/dL 12 13   CREATININE mg/dL 0.74 0.78   ANION GAP mmol/L 5 7   CALCIUM mg/dL 8.6 9.2   ALBUMIN g/dL  --  3.6   TOTAL BILIRUBIN mg/dL  --  0.64   ALK PHOS U/L  --  66   ALT U/L  --  24   AST U/L  --  19   GLUCOSE RANDOM mg/dL 231* 283*     Results from last 7 days   Lab Units 07/16/25  2225   INR  0.94     Results from last 7 days   Lab Units 07/17/25  2104 07/17/25  1612 07/17/25  1200 07/17/25  0836 07/16/25  1058 07/16/25  0714 07/15/25  2138 07/15/25  1613 07/15/25  1046 07/15/25  0758 07/14/25  2117 07/14/25  1732   POC GLUCOSE mg/dl 190* 228* 226* 248* 328* 237* 277* 227* 336* 248* 243* 300*     Results from last 7 days   Lab Units 07/13/25  1041   HEMOGLOBIN A1C % 9.5*     Results from last 7 days   Lab Units 07/16/25  2225 07/15/25  0632 07/14/25  0510 07/13/25  1041   LACTIC ACID mmol/L 1.2  --   --  1.8   PROCALCITONIN ng/ml 1.53* 2.79* 4.83*  --        Recent Cultures (last 7 days):   Results from last 7 days   Lab Units 07/16/25  2225 07/13/25  1236 07/13/25  1103 07/13/25  1041   BLOOD CULTURE  Received in Microbiology Lab. Culture in Progress.  --  No Growth After 4 Days. No Growth After 4 Days.   URINE CULTURE   --  >100,000 cfu/ml Escherichia coli*  --   --        US right upper quadrant   Final Result by Trace  MD [place] (07/17 141)      Enlarged fatty liver.      Workstation performed: [id number]         CTA chest pe study   Final Result by Rahul Mckeon DO (07/17 0308)      Mild pulmonary vascular congestion.      Otherwise, no acute abnormality                        Computerized Assisted Algorithm (CAA) may have aided analysis of applicable images.      Workstation performed: [id number]         XR chest 1 view portable   ED Interpretation by Rebeca Barney MD (07/17 0125)   R lung opacification      Final Result by Geeta Monahan MD (07/17 1451)   Technically limited study. No focal consolidation. No pleural effusion.            Workstation performed: [id number]         CT abdomen pelvis with contrast   ED Interpretation by Brie Coello MD (07/17 0226)   IMPRESSION: 1. Mild fat stranding in the right upper quadrant adjacent to the transverse colon on series 301, image 83. This is nonspecific. Underlying omental infarct cannot be excluded. 2. Few nonobstructive renal stones bilaterally. No hydronephrosis in either kidney. 3. The bladder is slightly distended. A bladder outlet obstruction cannot be excluded. 4. Findings may represent cellulitis, as described. 5. Mild consolidation at the right lung base may represent atelectasis or early pneumonia.        Final Result by Rossana Packer MD (07/17 5827)      1.  Trace perihepatic ascites seen on 7/13/2025 has resolved.      2.  No biliary ductal dilatation or radiopaque choledocholithiasis.      3.  No CT evidence of acute pancreatitis.      4.  Skin thickening and subcutaneous tissue stranding of the anterior abdominal wall, most pronounced along the pannus, which may indicate cellulitis. Recommend correlation with physical exam.      5.  No other acute abdominal or pelvic pathology.      6.  Endometrial stripe appears mildly thickened for postmenopausal state, measuring 7 mm. Recommend further evaluation with nonemergent pelvic ultrasound.       7.  Additional findings as above.      The findings are essentially concordant with the preliminary report provided by [facility] shortly after completion of the exam.               Workstation performed: [id number]               Last 24 Hours Medication List:     Current Facility-Administered Medications:     acetaminophen (TYLENOL) tablet 975 mg, Q6H PRN    ARIPiprazole (ABILIFY) tablet 10 mg, Daily    cefpodoxime (VANTIN) tablet 400 mg, BID With Meals    desvenlafaxine succinate (PRISTIQ) 24 hr tablet 50 mg, Daily    enoxaparin (LOVENOX) subcutaneous injection 40 mg, Q12H GERI    insulin glargine (LANTUS) subcutaneous injection 40 Units 0.4 mL, Q12H GERI    insulin lispro (HumALOG/ADMELOG) 100 units/mL subcutaneous injection 2-12 Units, TID AC **AND** Fingerstick Glucose (POCT), TID AC    insulin lispro (HumALOG/ADMELOG) 100 units/mL subcutaneous injection 2-12 Units, HS    insulin lispro (HumALOG/ADMELOG) 100 units/mL subcutaneous injection 22 Units, TID With Meals    lamoTRIgine (LaMICtal) tablet 100 mg, BID    lisinopril (ZESTRIL) tablet 20 mg, Daily    LORazepam (ATIVAN) tablet 0.5 mg, Q8H PRN    methocarbamol (ROBAXIN) tablet 750 mg, Q8H PRN    nystatin (MYCOSTATIN) powder, TID    polyethylene glycol (MIRALAX) packet 17 g, Daily    senna-docusate sodium (SENOKOT S) 8.6-50 mg per tablet 2 tablet, BID    tamsulosin (FLOMAX) capsule 0.4 mg, Daily With Dinner    Administrative Statements   Today, Patient Was Seen By: Kenny Trujillo DO      **Please Note: This note may have been constructed using a voice recognition system.**

## 2025-07-18 NOTE — ASSESSMENT & PLAN NOTE
Lab Results   Component Value Date    HGBA1C 9.5 (H) 07/13/2025       Recent Labs     07/17/25  1612 07/17/25  2104 07/18/25  0722 07/18/25  1054   POCGLU 228* 190* 182* 224*       Blood Sugar Average: Last 72 hrs:  (P) [id number]  Continue Lantus 40 units twice daily  Continue lispro 22 units 3 times daily  SSI

## 2025-07-18 NOTE — DISCHARGE SUMMARY
Discharge Summary - Hospitalist   Name: Montse Smith 50 y.o. female I MRN: [id number]  Unit/Bed#: [facility] I Date of Admission: 7/16/2025   Date of Service: 7/18/2025 I Hospital Day: 1     Assessment & Plan  Right upper quadrant abdominal pain  Presented with severe 10 out of 10 sharp right-sided abdominal pain  Pain was nonradiating, not associated with any nausea vomiting, no exacerbating or relieving factors  Patient had a cholecystectomy done in 2008  Patient has mild leukocytosis however not remarkable, lipase and lactic acid normal  Elevated procalcitonin 1.53, most likely still downtrending from her last admission  CT abdomen read by [facility], Hepatomegaly and diffuse hepatic steatosis along with fluid around the liver which was also noted on the previous CT scan. There is possible stranding indicating a possible omental infarct but no other changes. Mild hepatic steatosis is also noted with trace ascites in Morison's pouch.  Differentials include gastritis, hepatitis, colitis, obstruction, hepatic hydrothorax    PLAN:  Pain was well-controlled with Tylenol as well as addition of Bentyl  Bowel regimen with twice daily senna, twice daily MiraLAX, Dulcolax suppository  Produced 3 bowel movements on 7/18 noting resolution of pain following.  Hypoxia  Presented with tachycardia ranging from 102-113, tachypnea respiratory rate 22  Patient was also hypoxic initially, requiring 5 L of oxygen.  Patient does not use any oxygen at baseline  With concerns of PE, CT PE was done with did not show any pulmonary embolism  Chest x-ray on my read showed pulmonary vascular congestion along with right-sided pleural effusion  Tachycardia and tachypnea likely in the setting of acute pain  Patient still intermittently requiring 1 L oxygen therapy to maintain sats above 90  Likely combined components of obesity hypoventilation syndrome as well as basilar atelectasis secondary to poor inspiratory effort.  Patient remained asymptomatic  throughout these episodes of hypoxia on 7/17+7/18    PLAN:  Follow-up in the outpatient setting for sleep study  Abnormal finding on CT scan  Final read on CT abdomen/pelvis 7/16 during prior admission noting resolution of trace perihepatic ascites.  Noting skin thickening and tissue stranding of the anterior abdominal wall, requesting correlation with exam for possible cellulitis.  No consistent with cellulitis or noted on physical exam.  Endometrial stripe measuring 7 mm, thickened for postmenopausal state.  Recommend outpatient pelvic ultrasound    PLAN:  Follow-up in outpatient setting with nonemergent pelvic ultrasound  Type 2 diabetes mellitus with hyperglycemia, with long-term current use of insulin (HCC)  Lab Results   Component Value Date    HGBA1C 9.5 (H) 07/13/2025       Recent Labs     07/17/25  1612 07/17/25  2104 07/18/25  0722 07/18/25  1054   POCGLU 228* 190* 182* 224*       Blood Sugar Average: Last 72 hrs:  (P) [id number]  Continue Lantus 40 units twice daily  Continue lispro 22 units 3 times daily  SSI    Major depressive disorder, recurrent severe without psychotic features (HCC)  Continue Abilify 10 Mg daily  Continue lamotrigine 100 mg twice daily  Continue Pristiq 50 Mg daily  Class 3 severe obesity due to excess calories with body mass index (BMI) of 60.0 to 69.9 in adult  Prior Authorization Clinical Questions for Weight Management Pharmacotherapy    2. Does the patient have a diagnosis of obesity, confirmed by a BMI greater than or equal to 30 kg/m^2?: Yes  3. Does the patient have a BMI of greater than or equal to 27 kg/m^2 with at least one weight-related comorbidity/risk factor/complication (e.g. diabetes, dyslipidemia, coronary artery disease)?: Yes  4. Weight-related co-morbidities/risk factors: metabolic syndrome, type 2 diabetes, depression  6. ZEPBOUND FAREED Indication: Does patient have documented FAREED diagnosed via sleep study (insurance will require copy of sleep study  results for approval)?: Yes  9. Does the patient have a history of type 2 diabetes?: Yes     Baseline weight (in pounds): 339.51 lbs  Current weight (in pounds): 339.51 lbs  Weight loss percentage: 0%       UTI (urinary tract infection)  Patient was recently admitted secondary to UTI  Continue Vantin for 7 more doses to complete the antibiotic course     Medical Problems       Resolved Problems  Date Reviewed: 7/16/2025          Resolved    Diabetes 1.5, managed as type 2 (HCC) 7/17/2025     Resolved by  Nish Desir MD        Discharging Physician / Practitioner: Kenny Trujillo DO  PCP: Florecita Dietrich NP  Admission Date:   Admission Orders (From admission, onward)       Ordered        07/17/25 0326  INPATIENT ADMISSION  Once                          Discharge Date: 07/18/25    Next Steps for Physician/AP Assuming Care:  Patient was noted with hypoxia during her stay here, though asymptomatic, likely in the setting of obesity hypoventilation syndrome combined with poor inspiratory effort causing some basilar atelectasis of the lungs.  Though hypoxic to approximately 89%, she remained asymptomatic.    Test Results Pending at Discharge (will require follow up):  None    Medication Changes for Discharge & Rationale:   Bentyl was added for as needed treatment of abdominal pain and cramping.  See after visit summary for reconciled discharge medications provided to patient and/or family.     Consultations During Hospital Stay:  None    Procedures Performed:   None    Significant Findings / Test Results:   See after visit summary for previously noted thickened endometrial stripe seen on CT during prior admission ending on 7/16.    Incidental Findings:   None    Hospital Course:   Montse Smith is a 50 y.o. female patient who originally presented to the hospital on 7/16/2025 due to abdominal pain    Patient is a 50-year-old female with a past medical history of UTI, depression, obesity who presented initially with right  upper quadrant pain on 7/16.  Additionally she was noted with a mild leukocytosis, normal lipase, normal lactate.  Noted to have elevated Pro-Loy, however Traum trending from her previous admission which she was discharged from earlier in the day on 7/16.  She was additionally noted with hypoxia requiring 5 L of oxygen in the ED, with concern for PE CTA PE was done, negative for pulmonary embolism.  Chest x-ray showed some mild pulmonary vascular congestion and she was given 1 dose of Lasix IV for volume overload.  She did additionally have episodes of relative hypoxia down to 89 on 7/17 as well as 7/18, though patient remained asymptomatic throughout these episodes.  She was maintained on 1 L oxygen and was noted with improvement in hypoxia on this small amount.    Her abdominal pain was thought to be secondary to constipation, patient was noting that she had not had a bowel movement in approximately 3 days prior to admission.  She was started on a bowel regimen and noted to have multiple bowel movements throughout the day on 7/18 resulting in resolution of her right upper quadrant abdominal pain.  She developed no new symptoms and was discharged without complication on 7/18.    Notably, she was diagnosed with a urinary tract infection on that previous admission and still had remaining doses of Vantin, this course was scheduled to end on 7/20.  During her stay with us here we continued her on this course and gave instructions to continue this course through completion on 7/20 following discharge on 7/18.      Please see above list of diagnoses and related plan for additional information.     Discharge Day Visit / Exam:   * Please refer to separate progress note for these details *    Discussion with Family: Patient declined call to .     Discharge instructions/Information to patient and family:   See after visit summary for information provided to patient and family.      Provisions for Follow-Up  Care:  See after visit summary for information related to follow-up care and any pertinent home health orders.      Mobility at time of Discharge:   Basic Mobility Inpatient Raw Score: 21  JH-HLM Goal: 6: Walk 10 steps or more  JH-HLM Achieved: 3: Sit at edge of bed  HLM Goal NOT achieved. Continue to encourage mobility in post discharge setting.     Disposition:   Home    Planned Readmission: None    Administrative Statements   Discharge Statement:  I have spent a total time of 45 minutes in caring for this patient on the day of the visit/encounter. .    **Please Note: This note may have been constructed using a voice recognition system**

## 2025-07-18 NOTE — ASSESSMENT & PLAN NOTE
Final read on CT abdomen/pelvis 7/16 noting resolution of trace perihepatic ascites.  Noting skin thickening and tissue stranding of the anterior abdominal wall, requesting correlation with exam for possible cellulitis.  No consistent with cellulitis or noted on physical exam.  Endometrial stripe measuring 7 mm, thickened for postmenopausal state.  Recommend outpatient pelvic ultrasound    PLAN:  Follow-up in outpatient setting with nonemergent pelvic ultrasound

## 2025-07-18 NOTE — UTILIZATION REVIEW
Initial Clinical Review    Admission: Date/Time/Statement:   Admission Orders (From admission, onward)       Ordered        07/17/25 0326  INPATIENT ADMISSION  Once                          Orders Placed This Encounter   Procedures    INPATIENT ADMISSION     Standing Status:   Standing     Number of Occurrences:   1     Level of Care:   Med Surg [16]     Estimated length of stay:   More than 2 Midnights     Certification:   I certify that inpatient services are medically necessary for this patient for a duration of greater than two midnights. See H&P and MD Progress Notes for additional information about the patient's course of treatment.     ED Arrival Information       Expected   -    Arrival   7/16/2025 21:14    Acuity   Urgent              Means of arrival   Ambulance    Escorted by   [facility]    Service   Hospitalist    Admission type   Emergency              Arrival complaint   ems             Chief Complaint   Patient presents with    Abdominal Pain     Pt was seen Sunday for same thing, possible bowel blockage. Admitted and d/c today. Last BM yesterday. Pt states pain isn't getting any better so called EMS.       Initial Presentation: 50 y.o. female  with a PMH of UTI, depression, obesity who presents with right upper quadrant pain. Patient rated the pain 10 out of 10, sharp in nature. Pain was nonradiating, not associated with any nausea or vomiting.  Patient denies any exacerbating or relieving factors. In the ED patient had mild leukocytosis, normal lipase, normal lactic acid. Patient has mild elevated procalcitonin however if this is downtrending from her previous admission. In the ED patient was also hypoxic, required 5 L of oxygen. Patient does not use any oxygen at baseline. Patient was also tachypneic along with tachycardic. Concerns of pulmonary embolisms were raised, CTA PE was done however was negative for any pulmonary embolism. Chest x-ray on my read showed concerns of pulmonary vascular  congestion along with right-sided pleural effusion. Patient has been given 1 dose of Lasix IV for the volume overload. Patient currently is not tachycardic, sinus rhythm in 90s. Patient is also in room air, not requiring any oxygen. Plan: Inpatient admission for evaluation and treatment of RUQ abd pain, SIRS, abnormal CT scan, DM, MDD, obesity, UTI: CT abd/pelvis, RUQ US, pain control, IV Lasix 20 mg x 1 dose, Echo, continue home Lantus and lispro plus SSI, continue home Abilify, lamotrigine, Pristiq, Vantin.     Anticipated Length of Stay/Certification Statement: Patient will be admitted on an observation basis with an anticipated length of stay of less than 2 midnights secondary to management of abdominal pain.     Date: 7/18   Day 2:     Internal medicine: continue Tylenol, will add Bentyl. Bowel regimen with twice daily senna, twice daily MiraLAX, Dulcolax suppository. Trial off oxygen to assess need for outpatient oxygen therapy. Continue home insulin regimen plus SSI. Continue home Abilify, lamotrigine, Pristiq, Vantin. She does note continued constipation, she notes that earlier this morning prior to my assessment she had 1 very formed small-volume bowel movement that required manual disimpaction by nursing to remove the stool. She feels that she still has significant amount of stool burden and is requesting that we advance her bowel regimen even further.     ED Treatment-Medication Administration from 07/16/2025 2114 to 07/17/2025 1149         Date/Time Order Dose Route Action     07/16/2025 2230 sodium chloride 0.9 % bolus 1,000 mL 1,000 mL Intravenous New Bag     07/16/2025 2231 HYDROmorphone HCl (DILAUDID) injection 0.2 mg 0.2 mg Intravenous Given     07/16/2025 2259 iohexol (OMNIPAQUE) 350 MG/ML injection (MULTI-DOSE) 100 mL 100 mL Intravenous Given     07/17/2025 0001 ketorolac (TORADOL) injection 15 mg 15 mg Intravenous Given     07/17/2025 0150 iohexol (OMNIPAQUE) 350 MG/ML injection (MULTI-DOSE) 85  mL 85 mL Intravenous Given     07/17/2025 0850 nystatin (MYCOSTATIN) powder 1 Application Topical Given     07/17/2025 0844 ARIPiprazole (ABILIFY) tablet 10 mg 10 mg Oral Given     07/17/2025 0845 cefpodoxime (VANTIN) tablet 400 mg 400 mg Oral Given     07/17/2025 0845 desvenlafaxine succinate (PRISTIQ) 24 hr tablet 50 mg 50 mg Oral Given     07/17/2025 0856 insulin lispro (HumALOG/ADMELOG) 100 units/mL subcutaneous injection 22 Units 22 Units Subcutaneous Given     07/17/2025 0849 insulin glargine (LANTUS) subcutaneous injection 40 Units 0.4 mL 40 Units Subcutaneous Given     07/17/2025 0847 lamoTRIgine (LaMICtal) tablet 100 mg 100 mg Oral Given     07/17/2025 0847 lisinopril (ZESTRIL) tablet 20 mg 20 mg Oral Given     07/17/2025 0848 enoxaparin (LOVENOX) subcutaneous injection 40 mg 40 mg Subcutaneous Given     07/17/2025 0509 furosemide (LASIX) injection 20 mg 20 mg Intravenous Given     07/17/2025 0857 insulin lispro (HumALOG/ADMELOG) 100 units/mL subcutaneous injection 2-12 Units 4 Units Subcutaneous Given     07/17/2025 0843 acetaminophen (TYLENOL) tablet 975 mg 975 mg Oral Given            Scheduled Medications:  ARIPiprazole, 10 mg, Oral, Daily  cefpodoxime, 400 mg, Oral, BID With Meals  desvenlafaxine succinate, 50 mg, Oral, Daily  dicyclomine, 20 mg, Oral, Once  enoxaparin, 40 mg, Subcutaneous, Q12H GERI  insulin glargine, 40 Units, Subcutaneous, Q12H GERI  insulin lispro, 2-12 Units, Subcutaneous, TID AC  insulin lispro, 2-12 Units, Subcutaneous, HS  insulin lispro, 25 Units, Subcutaneous, TID With Meals  lamoTRIgine, 100 mg, Oral, BID  lisinopril, 20 mg, Oral, Daily  nystatin, , Topical, TID  polyethylene glycol, 17 g, Oral, Daily  senna-docusate sodium, 2 tablet, Oral, BID  tamsulosin, 0.4 mg, Oral, Daily With Dinner      Continuous IV Infusions:     PRN Meds:  acetaminophen, 975 mg, Oral, Q6H PRN  bisacodyl, 10 mg, Rectal, Daily PRN  LORazepam, 0.5 mg, Oral, Q8H PRN  methocarbamol, 750 mg, Oral, Q8H  PRN      ED Triage Vitals   Temperature Pulse Respirations Blood Pressure SpO2 Pain Score   07/16/25 2116 07/16/25 2116 07/16/25 2116 07/16/25 2116 07/16/25 2116 07/16/25 2231   98.4 °F (36.9 °C) (!) 113 22 136/65 96 % 10 - Worst Possible Pain     Weight (last 2 days)       Date/Time Weight    07/17/25 1439 154 (340)    07/17/25 0500 154 (340)            Vital Signs (last 3 days)       Date/Time Temp Pulse Resp BP MAP (mmHg) SpO2 Calculated FIO2 (%) - Nasal Cannula Nasal Cannula O2 Flow Rate (L/min) O2 Device Patient Position - Orthostatic VS [place] Coma Scale Score Pain    07/18/25 0717 98.3 °F (36.8 °C) 91 18 128/57 82 93 % 24 1 L/min Nasal cannula Lying -- --    07/17/25 2219 98 °F (36.7 °C) 92 18 156/78 109 93 % -- -- Nasal cannula Lying -- --    07/17/25 1813 -- -- -- -- -- -- -- -- -- -- -- 7    07/17/25 1458 98.3 °F (36.8 °C) 95 20 136/76 100 93 % -- -- Nasal cannula Lying -- --    07/17/25 1439 -- 93 -- 141/77 -- -- -- -- -- -- -- --    07/17/25 1434 -- 93 -- -- -- 90 % 24 1 L/min Nasal cannula -- -- --    07/17/25 1300 -- -- -- -- -- 91 % 24 1 L/min Nasal cannula -- -- --    07/17/25 1213 -- -- -- -- -- -- -- -- -- -- -- 6    07/17/25 1200 -- -- -- -- -- -- -- -- Nasal cannula -- 15 --    07/17/25 1156 98.7 °F (37.1 °C) 92 18 141/77 103 92 % 24 1 L/min Nasal cannula Lying -- --    07/17/25 1045 -- 94 -- 136/66 95 96 % -- -- None (Room air) Lying -- --    07/17/25 0500 -- 98 22 140/65 93 95 % 28 2 L/min Nasal cannula Lying -- 7    07/17/25 0259 -- 102 -- -- -- -- -- -- -- -- -- --    07/17/25 0001 -- -- -- -- -- -- -- -- -- -- -- 10 - Worst Possible Pain    07/16/25 2245 -- 110 -- -- -- 93 % 28 2 L/min Nasal cannula -- -- --    07/16/25 2243 -- -- -- -- -- -- -- -- -- -- 15 --    07/16/25 2231 -- -- -- -- -- -- -- -- -- -- -- 10 - Worst Possible Pain    07/16/25 2200 -- 108 -- -- -- 91 % -- -- None (Room air)  -- -- --    07/16/25 2151 -- -- -- -- -- 96 % 28 2 L/min  Nasal cannula -- -- --    07/16/25 2130  -- 107 -- 136/65 93 98 % 36 4 L/min  Nasal cannula -- -- --    07/16/25 2116 98.4 °F (36.9 °C) 113 22 136/65 93 96 % 40 5 L/min  Nasal cannula  -- -- --              Pertinent Labs/Diagnostic Test Results:   Radiology:  US right upper quadrant   Final Interpretation by Trace Atkinson MD (07/17 1416)      Enlarged fatty liver.      Workstation performed: [id number]         CTA chest pe study   Final Interpretation by Rahul Mckeon DO (07/17 0308)      Mild pulmonary vascular congestion.      Otherwise, no acute abnormality                        Computerized Assisted Algorithm (CAA) may have aided analysis of applicable images.      Workstation performed: [id number]         XR chest 1 view portable   ED Interpretation by Rebeca Barney MD (07/17 0125)   R lung opacification      Final Interpretation by Geeta Monahan MD (07/17 8194)   Technically limited study. No focal consolidation. No pleural effusion.            Workstation performed: [id number]         CT abdomen pelvis with contrast   ED Interpretation by Brie Coello MD (07/17 0228)   IMPRESSION: 1. Mild fat stranding in the right upper quadrant adjacent to the transverse colon on series 301, image 83. This is nonspecific. Underlying omental infarct cannot be excluded. 2. Few nonobstructive renal stones bilaterally. No hydronephrosis in either kidney. 3. The bladder is slightly distended. A bladder outlet obstruction cannot be excluded. 4. Findings may represent cellulitis, as described. 5. Mild consolidation at the right lung base may represent atelectasis or early pneumonia.        Final Interpretation by Rossana Packer MD (07/17 3112)      1.  Trace perihepatic ascites seen on 7/13/2025 has resolved.      2.  No biliary ductal dilatation or radiopaque choledocholithiasis.      3.  No CT evidence of acute pancreatitis.      4.  Skin thickening and subcutaneous tissue stranding of the anterior abdominal wall, most pronounced along the  pannus, which may indicate cellulitis. Recommend correlation with physical exam.      5.  No other acute abdominal or pelvic pathology.      6.  Endometrial stripe appears mildly thickened for postmenopausal state, measuring 7 mm. Recommend further evaluation with nonemergent pelvic ultrasound.      7.  Additional findings as above.      The findings are essentially concordant with the preliminary report provided by [facility] shortly after completion of the exam.               Workstation performed: [id number]           Cardiology:  Echo complete w/ contrast if indicated   Final Result by Sophia Aldridge MD (07/17 1622)        Left Ventricle: Left ventricular cavity size is normal. Wall thickness    is mildly increased. The left ventricular ejection fraction is 65%.    Systolic function is normal. Wall motion is normal. Diastolic function is    mildly abnormal, consistent with grade I (abnormal) relaxation.     IVS: There is sigmoid appearance of the septum.           GI:  No orders to display           Results from last 7 days   Lab Units 07/18/25  0634 07/17/25 0518 07/16/25 2225 07/16/25  0421 07/15/25  0632 07/13/25  1737 07/13/25  1041   WBC Thousand/uL 8.47 9.39 10.26* 8.68 11.45*   < > 14.28*   HEMOGLOBIN g/dL 12.2 11.8 12.9 12.9 13.4   < > 15.5*   HEMATOCRIT % 38.4 37.0 39.2 40.3 42.8   < > 47.1*   PLATELETS Thousands/uL 266 226 250 219 222   < > 272   TOTAL NEUT ABS Thousands/µL  --   --  7.39  --   --   --  12.21*    < > = values in this interval not displayed.         Results from last 7 days   Lab Units 07/18/25  0634 07/17/25 0518 07/16/25 2225 07/16/25  0421 07/15/25  0632   SODIUM mmol/L 137 134* 134* 134* 133*   POTASSIUM mmol/L 4.1 4.1 4.6 4.0 4.2   CHLORIDE mmol/L 100 101 99 100 100   CO2 mmol/L 31 28 28 29 26   ANION GAP mmol/L 6 5 7 5 7   BUN mg/dL 10 12 13 9 9   CREATININE mg/dL 0.65 0.74 0.78 0.68 0.70   EGFR ml/min/1.73sq m 102 93 87 100 99   CALCIUM mg/dL 9.0 8.6 9.2 8.9 8.6      Results from last 7 days   Lab Units 07/16/25  2225 07/16/25  0421 07/15/25  0632 07/13/25  1041   AST U/L 19 12* 17 36   ALT U/L 24 25 30 49   ALK PHOS U/L 66 66 66 73   TOTAL PROTEIN g/dL 7.3 6.9 6.6 7.3   ALBUMIN g/dL 3.6 3.5 3.5 4.0   TOTAL BILIRUBIN mg/dL 0.64 0.66 0.90 0.86     Results from last 7 days   Lab Units 07/18/25  1054 07/18/25  0722 07/17/25  2104 07/17/25  1612 07/17/25  1200 07/17/25  0836 07/16/25  1058 07/16/25  0714 07/15/25  2138 07/15/25  1613 07/15/25  1046 07/15/25  0758   POC GLUCOSE mg/dl 224* 182* 190* 228* 226* 248* 328* 237* 277* 227* 336* 248*     Results from last 7 days   Lab Units 07/18/25  0634 07/17/25  0518 07/16/25  2225 07/16/25  0421 07/15/25  0632 07/14/25  0510 07/13/25  1041   GLUCOSE RANDOM mg/dL 195* 231* 283* 201* 257* 253* 381*         Results from last 7 days   Lab Units 07/13/25  1041   HEMOGLOBIN A1C % 9.5*   EAG mg/dl 226     Beta- Hydroxybutyrate   Date Value Ref Range Status   12/26/2024 0.08 0.02 - 0.27 mmol/L Final     BETA-HYDROXYBUTYRATE   Date Value Ref Range Status   11/23/2023 0.2 <0.6 mmol/L Final   04/17/2021 0.1 <0.6 mmol/L Final            Results from last 7 days   Lab Units 07/16/25 2225 07/13/25  1041   PROTIME seconds 13.3 13.5   INR  0.94 0.96   PTT seconds 31 30         Results from last 7 days   Lab Units 07/16/25  2225 07/15/25  0632 07/14/25  0510   PROCALCITONIN ng/ml 1.53* 2.79* 4.83*     Results from last 7 days   Lab Units 07/16/25 2225 07/13/25  1041   LACTIC ACID mmol/L 1.2 1.8             Results from last 7 days   Lab Units 07/17/25  0518   BNP pg/mL 56           Results from last 7 days   Lab Units 07/16/25 2225 07/13/25  1041   LIPASE u/L 40 61           Results from last 7 days   Lab Units 07/13/25  1236   CLARITY UA  Clear   COLOR UA  Yellow   SPEC GRAV UA  1.033*   PH UA  5.5   GLUCOSE UA mg/dl >=1000 (1%)*   KETONES UA mg/dl 20 (1+)*   BLOOD UA  Large*   PROTEIN UA mg/dl Negative   NITRITE UA  Positive*   BILIRUBIN UA   Negative   UROBILINOGEN UA (BE) mg/dl <2.0   LEUKOCYTES UA  Elevated glucose may cause decreased leukocyte values. See urine microscopic for UWBC result*   WBC UA /hpf 2-4*   RBC UA /hpf Innumerable*   BACTERIA UA /hpf None Seen   EPITHELIAL CELLS WET PREP /hpf Occasional   MUCUS THREADS  Occasional*         Results from last 7 days   Lab Units 07/16/25  2225 07/13/25  1236 07/13/25  1103 07/13/25  1041   BLOOD CULTURE  No Growth at 24 hrs.  --  No Growth After 4 Days. No Growth After 4 Days.   URINE CULTURE   --  >100,000 cfu/ml Escherichia coli*  --   --          Past Medical History[1]  Present on Admission:   (Resolved) Diabetes 1.5, managed as type 2 (HCC)   Major depressive disorder, recurrent severe without psychotic features (HCC)      Admitting Diagnosis: Abdominal pain [R10.9]  RUQ pain [R10.11]  Hypoxia [R09.02]  Pulmonary vascular congestion [R09.89]  Age/Sex: 50 y.o. female    Network Utilization Review Department  ATTENTION: Please call with any questions or concerns to [phone] and carefully listen to the prompts so that you are directed to the right person. All voicemails are confidential.   For Discharge needs, contact Care Management DC Support Team at [phone] opt. 2  Send all requests for admission clinical reviews, approved or denied determinations and any other requests to dedicated fax number below belonging to the campus where the patient is receiving treatment. List of dedicated fax numbers for the Facilities:  FACILITY NAME UR FAX NUMBER   ADMISSION DENIALS (Administrative/Medical Necessity) [phone]   DISCHARGE SUPPORT TEAM (NETWORK) [phone]   PARENT CHILD HEALTH (Maternity/NICU/Pediatrics) [phone]   [facility] [phone]   [facility] [phone]   [facility] [phone]   [facility] [phone]   [facility] [phone]   [facility]  [facility] [phone]   [facility] [phone]   [facility] [phone]   [facility] [phone]   [facility] [phone]   [facility] [phone]   [facility] [phone]              [1]   Past Medical History:  Diagnosis Date    Anemia     Anxiety     Candidal intertrigo     Depression     Diabetes 1.5, 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    Diabetes mellitus ([facility])     GERD (gastroesophageal reflux disease)     Hyperlipidemia     Hypertension     Hyponatremia 11/26/2023    Irritable bowel syndrome     Morbid obesity with BMI of 60.0-69.9, adult (HCC)     Osteoarthritis     Seasonal allergies     Sleep difficulties     Suicide attempt ([facility])

## 2025-07-18 NOTE — ASSESSMENT & PLAN NOTE
Presented with tachycardia ranging from 102-113, tachypnea respiratory rate 22  Patient was also hypoxic initially, requiring 5 L of oxygen.  Patient does not use any oxygen at baseline  With concerns of PE, CT PE was done with did not show any pulmonary embolism  Chest x-ray on my read showed pulmonary vascular congestion along with right-sided pleural effusion  Tachycardia and tachypnea likely in the setting of acute pain  Patient still intermittently requiring 1 L oxygen therapy to maintain sats above 90  Likely combined components of obesity hypoventilation syndrome as well as basilar atelectasis secondary to poor inspiratory effort.    PLAN:  Trial off oxygen to assess need for outpatient oxygen therapy

## 2025-07-19 ENCOUNTER — APPOINTMENT (EMERGENCY)
Dept: RADIOLOGY | Facility: HOSPITAL | Age: 50
End: 2025-07-19
Payer: COMMERCIAL

## 2025-07-19 ENCOUNTER — APPOINTMENT (EMERGENCY)
Dept: CT IMAGING | Facility: HOSPITAL | Age: 50
End: 2025-07-19
Payer: COMMERCIAL

## 2025-07-19 ENCOUNTER — HOSPITAL ENCOUNTER (EMERGENCY)
Facility: HOSPITAL | Age: 50
Discharge: HOME | End: 2025-07-19
Attending: EMERGENCY MEDICINE | Admitting: EMERGENCY MEDICINE
Payer: COMMERCIAL

## 2025-07-19 VITALS
SYSTOLIC BLOOD PRESSURE: 183 MMHG | OXYGEN SATURATION: 95 % | DIASTOLIC BLOOD PRESSURE: 75 MMHG | WEIGHT: 293 LBS | TEMPERATURE: 98.1 F | HEART RATE: 92 BPM | RESPIRATION RATE: 20 BRPM | BODY MASS INDEX: 68.46 KG/M2

## 2025-07-19 DIAGNOSIS — K76.0 HEPATIC STEATOSIS: ICD-10-CM

## 2025-07-19 DIAGNOSIS — E11.65 HYPERGLYCEMIA DUE TO DIABETES MELLITUS (HCC): ICD-10-CM

## 2025-07-19 DIAGNOSIS — R03.0 ELEVATED BLOOD PRESSURE READING: ICD-10-CM

## 2025-07-19 DIAGNOSIS — R06.00 DYSPNEA: ICD-10-CM

## 2025-07-19 DIAGNOSIS — R31.9 HEMATURIA: ICD-10-CM

## 2025-07-19 DIAGNOSIS — R10.9 ACUTE ABDOMINAL PAIN: Primary | ICD-10-CM

## 2025-07-19 LAB
2HR DELTA HS TROPONIN: -1 NG/L
ALBUMIN SERPL BCG-MCNC: 3.7 G/DL (ref 3.5–5)
ALP SERPL-CCNC: 62 U/L (ref 34–104)
ALT SERPL W P-5'-P-CCNC: 23 U/L (ref 7–52)
ANION GAP SERPL CALCULATED.3IONS-SCNC: 8 MMOL/L (ref 4–13)
ANISOCYTOSIS BLD QL SMEAR: PRESENT
APTT PPP: 33 SECONDS (ref 23–34)
AST SERPL W P-5'-P-CCNC: 29 U/L (ref 13–39)
ATRIAL RATE: 96 BPM
BACTERIA UR QL AUTO: ABNORMAL /HPF
BASOPHILS # BLD MANUAL: 0 THOUSAND/UL (ref 0–0.1)
BASOPHILS NFR MAR MANUAL: 0 % (ref 0–1)
BILIRUB SERPL-MCNC: 0.52 MG/DL (ref 0.2–1)
BILIRUB UR QL STRIP: NEGATIVE
BNP SERPL-MCNC: 44 PG/ML (ref 0–100)
BUN SERPL-MCNC: 12 MG/DL (ref 5–25)
CALCIUM SERPL-MCNC: 9.2 MG/DL (ref 8.4–10.2)
CARDIAC TROPONIN I PNL SERPL HS: 6 NG/L (ref ?–50)
CARDIAC TROPONIN I PNL SERPL HS: 7 NG/L (ref ?–50)
CHLORIDE SERPL-SCNC: 100 MMOL/L (ref 96–108)
CLARITY UR: CLEAR
CO2 SERPL-SCNC: 28 MMOL/L (ref 21–32)
COLOR UR: YELLOW
CREAT SERPL-MCNC: 0.68 MG/DL (ref 0.6–1.3)
D DIMER PPP FEU-MCNC: 3.79 UG/ML FEU
EOSINOPHIL # BLD MANUAL: 0.1 THOUSAND/UL (ref 0–0.4)
EOSINOPHIL NFR BLD MANUAL: 1 % (ref 0–6)
ERYTHROCYTE [DISTWIDTH] IN BLOOD BY AUTOMATED COUNT: 13.5 % (ref 11.6–15.1)
GFR SERPL CREATININE-BSD FRML MDRD: 100 ML/MIN/1.73SQ M
GLUCOSE SERPL-MCNC: 242 MG/DL (ref 65–140)
GLUCOSE UR STRIP-MCNC: ABNORMAL MG/DL
HCT VFR BLD AUTO: 41.1 % (ref 34.8–46.1)
HGB BLD-MCNC: 13.2 G/DL (ref 11.5–15.4)
HGB UR QL STRIP.AUTO: ABNORMAL
INR PPP: 0.94 (ref 0.85–1.19)
KETONES UR STRIP-MCNC: NEGATIVE MG/DL
LACTATE SERPL-SCNC: 1.8 MMOL/L (ref 0.5–2)
LEUKOCYTE ESTERASE UR QL STRIP: NEGATIVE
LYMPHOCYTES # BLD AUTO: 2.38 THOUSAND/UL (ref 0.6–4.47)
LYMPHOCYTES # BLD AUTO: 21 % (ref 14–44)
MCH RBC QN AUTO: 30.6 PG (ref 26.8–34.3)
MCHC RBC AUTO-ENTMCNC: 32.1 G/DL (ref 31.4–37.4)
MCV RBC AUTO: 95 FL (ref 82–98)
METAMYELOCYTE ABSOLUTE CT: 0.1 THOUSAND/UL (ref 0–0.1)
METAMYELOCYTES NFR BLD MANUAL: 1 % (ref 0–1)
MONOCYTES # BLD AUTO: 0.5 THOUSAND/UL (ref 0–1.22)
MONOCYTES NFR BLD: 5 % (ref 4–12)
MUCOUS THREADS UR QL AUTO: ABNORMAL
NEUTROPHILS # BLD MANUAL: 6.85 THOUSAND/UL (ref 1.85–7.62)
NEUTS BAND NFR BLD MANUAL: 1 % (ref 0–8)
NEUTS SEG NFR BLD AUTO: 68 % (ref 43–75)
NITRITE UR QL STRIP: NEGATIVE
NON-SQ EPI CELLS URNS QL MICRO: ABNORMAL /HPF
P AXIS: 71 DEGREES
PH UR STRIP.AUTO: 6 [PH]
PLASMA CELLS NFR BLD: 2 % (ref 0–0)
PLATELET # BLD AUTO: 308 THOUSANDS/UL (ref 149–390)
PLATELET BLD QL SMEAR: ADEQUATE
PLATELET CLUMP BLD QL SMEAR: PRESENT
PMV BLD AUTO: 9.6 FL (ref 8.9–12.7)
POTASSIUM SERPL-SCNC: 4.7 MMOL/L (ref 3.5–5.3)
PR INTERVAL: 156 MS
PROCALCITONIN SERPL-MCNC: 0.48 NG/ML
PROT SERPL-MCNC: 7.4 G/DL (ref 6.4–8.4)
PROT UR STRIP-MCNC: ABNORMAL MG/DL
PROTHROMBIN TIME: 13.3 SECONDS (ref 12.3–15)
QRS AXIS: 79 DEGREES
QRSD INTERVAL: 74 MS
QT INTERVAL: 352 MS
QTC INTERVAL: 444 MS
RBC # BLD AUTO: 4.32 MILLION/UL (ref 3.81–5.12)
RBC #/AREA URNS AUTO: ABNORMAL /HPF
RBC MORPH BLD: PRESENT
SODIUM SERPL-SCNC: 136 MMOL/L (ref 135–147)
SP GR UR STRIP.AUTO: 1.03 (ref 1–1.03)
T WAVE AXIS: 22 DEGREES
UROBILINOGEN UR STRIP-ACNC: <2 MG/DL
VARIANT LYMPHS # BLD AUTO: 1 %
VENTRICULAR RATE: 96 BPM
WBC # BLD AUTO: 9.93 THOUSAND/UL (ref 4.31–10.16)
WBC #/AREA URNS AUTO: ABNORMAL /HPF

## 2025-07-19 PROCEDURE — 87040 BLOOD CULTURE FOR BACTERIA: CPT | Performed by: EMERGENCY MEDICINE

## 2025-07-19 PROCEDURE — 99285 EMERGENCY DEPT VISIT HI MDM: CPT

## 2025-07-19 PROCEDURE — 36000 PLACE NEEDLE IN VEIN: CPT | Performed by: EMERGENCY MEDICINE

## 2025-07-19 PROCEDURE — 85007 BL SMEAR W/DIFF WBC COUNT: CPT | Performed by: EMERGENCY MEDICINE

## 2025-07-19 PROCEDURE — 81001 URINALYSIS AUTO W/SCOPE: CPT | Performed by: EMERGENCY MEDICINE

## 2025-07-19 PROCEDURE — 84145 PROCALCITONIN (PCT): CPT | Performed by: EMERGENCY MEDICINE

## 2025-07-19 PROCEDURE — 96374 THER/PROPH/DIAG INJ IV PUSH: CPT

## 2025-07-19 PROCEDURE — 71275 CT ANGIOGRAPHY CHEST: CPT

## 2025-07-19 PROCEDURE — 85379 FIBRIN DEGRADATION QUANT: CPT | Performed by: EMERGENCY MEDICINE

## 2025-07-19 PROCEDURE — 71045 X-RAY EXAM CHEST 1 VIEW: CPT

## 2025-07-19 PROCEDURE — 99285 EMERGENCY DEPT VISIT HI MDM: CPT | Performed by: EMERGENCY MEDICINE

## 2025-07-19 PROCEDURE — 83880 ASSAY OF NATRIURETIC PEPTIDE: CPT | Performed by: EMERGENCY MEDICINE

## 2025-07-19 PROCEDURE — 93005 ELECTROCARDIOGRAM TRACING: CPT

## 2025-07-19 PROCEDURE — 85730 THROMBOPLASTIN TIME PARTIAL: CPT | Performed by: EMERGENCY MEDICINE

## 2025-07-19 PROCEDURE — 76942 ECHO GUIDE FOR BIOPSY: CPT | Performed by: EMERGENCY MEDICINE

## 2025-07-19 PROCEDURE — 85027 COMPLETE CBC AUTOMATED: CPT | Performed by: EMERGENCY MEDICINE

## 2025-07-19 PROCEDURE — 84484 ASSAY OF TROPONIN QUANT: CPT | Performed by: EMERGENCY MEDICINE

## 2025-07-19 PROCEDURE — 96365 THER/PROPH/DIAG IV INF INIT: CPT

## 2025-07-19 PROCEDURE — 83605 ASSAY OF LACTIC ACID: CPT | Performed by: EMERGENCY MEDICINE

## 2025-07-19 PROCEDURE — 80053 COMPREHEN METABOLIC PANEL: CPT | Performed by: EMERGENCY MEDICINE

## 2025-07-19 PROCEDURE — 36415 COLL VENOUS BLD VENIPUNCTURE: CPT | Performed by: EMERGENCY MEDICINE

## 2025-07-19 PROCEDURE — 85610 PROTHROMBIN TIME: CPT | Performed by: EMERGENCY MEDICINE

## 2025-07-19 RX ORDER — DROPERIDOL 2.5 MG/ML
0.62 INJECTION, SOLUTION INTRAMUSCULAR; INTRAVENOUS ONCE
Status: COMPLETED | OUTPATIENT
Start: 2025-07-19 | End: 2025-07-19

## 2025-07-19 RX ORDER — SUCRALFATE 1 G/1
1 TABLET ORAL ONCE
Status: COMPLETED | OUTPATIENT
Start: 2025-07-19 | End: 2025-07-19

## 2025-07-19 RX ORDER — ACETAMINOPHEN 10 MG/ML
1000 INJECTION, SOLUTION INTRAVENOUS ONCE
Status: COMPLETED | OUTPATIENT
Start: 2025-07-19 | End: 2025-07-19

## 2025-07-19 RX ADMIN — DROPERIDOL 0.62 MG: 2.5 INJECTION, SOLUTION INTRAMUSCULAR; INTRAVENOUS at 12:53

## 2025-07-19 RX ADMIN — ACETAMINOPHEN 1000 MG: 1000 INJECTION, SOLUTION INTRAVENOUS at 10:50

## 2025-07-19 RX ADMIN — SUCRALFATE 1 G: 1 TABLET ORAL at 12:53

## 2025-07-19 RX ADMIN — SODIUM CHLORIDE 500 ML: 0.9 INJECTION, SOLUTION INTRAVENOUS at 10:53

## 2025-07-19 RX ADMIN — IOHEXOL 80 ML: 350 INJECTION, SOLUTION INTRAVENOUS at 11:56

## 2025-07-19 NOTE — ED NOTES
Unable to obtain IV access, US guided IV access being attempted     Jaylene Gloria, RN  07/19/25 9338

## 2025-07-19 NOTE — DISCHARGE INSTRUCTIONS
You were evaluated in the emergency department for: abdominal pain. You can access your current and pending results through [facility]. A radiologist will take a second look at your X-Rays, if you had any, and you will be contacted with any new findings.     You should follow-up with your primary care provider, as soon as possible, for re-evaluation.  If you do not have a primary care provider, I have referred you to [facility]. You will be contacted about scheduling an appointment. Their phone number is also included on this paperwork.  You have also been referred to GI and urology and you should follow-up with them as well.    Your workup revealed no emergent features at this time; however, many disease processes are dynamic:    Please, return to the emergency department if you experience new or worsening symptoms, fever, chest pain, shortness of breath, difficulty breathing, dizziness, abdominal pain, persistent nausea/vomiting, syncope or passing out, blood in your urine or stool, coughing up blood, leg swelling/pain, urinary retention, bowel or bladder incontinence, numbness between your legs.    Additionally, your blood pressure was measured to be high. This is something that you should discuss with your primary care provider and have re-checked within one week.    
Adequate: hears normal conversation without difficulty

## 2025-07-19 NOTE — ED PROVIDER NOTES
Time reflects when diagnosis was documented in both MDM as applicable and the Disposition within this note       Time User Action Codes Description Comment    7/19/2025  2:07 PM Martellvanessa Bobby RANDY Add [R10.9] Acute abdominal pain     7/19/2025  2:07 PM Audra, Adamsericaer A Add [R06.00] Dyspnea     7/19/2025  2:07 PM Legvanessa, Adamsopher A Add [R03.0] Elevated blood pressure reading     7/19/2025  2:07 PM Martellvanessa, Tylorer A Add [R31.9] Hematuria     7/19/2025  2:08 PM Legvanessa, Tylorer A Add [E11.65] Hyperglycemia due to diabetes mellitus (HCC)     7/19/2025  2:20 PM Audra, Tylorer A Add [K76.0] Hepatic steatosis           ED Disposition       ED Disposition   Discharge    Condition   Stable    Date/Time   Sat Jul 19, 2025  2:21 PM    Comment   Montes Smith discharge to home/self care.                   Assessment & Plan       Medical Decision Making  Patient is a 50-year-old female, with a history significant for postmenopausal status, anxiety, diabetes, obesity per my review of the medical record, who presents to the ED today for evaluation of a 3-day history of gradual onset, constant, progressively worsening right upper quadrant/right flank pain.  Per my review of the medical record, patient was admitted to the hospital from July 13 to July 16 for UTI sepsis and treated with Vantin.  Patient subsequently represented and was readmitted on the 16th for right upper quadrant pain and hepatic steatosis/no pancreatitis noted on CT imaging and fatty liver noted on right upper quadrant ultrasound.  Patient reports persistence of this pain and she states it is worsened with deep breathing.  Patient denies fever, urinary symptoms, chest pain, history of VTE, recent trauma or surgery, hemoptysis.  Patient has not taken anything, such as Tylenol, to remit her discomfort. Patient's , present in room and providing collateral history, states patient is not confused.  Patient is currently afebrile and  hemodynamically stable.  Physical exam is notable for tenderness to palpation of the right upper quadrant/upper right flank, clear heart and lungs, no lower extremity edema or pain with calf squeeze.  This presentation is concerning for: Gastritis, ACS, PE, pneumothorax, anxiety.  Patient at risk for UTI.  Doubt ureterolithiasis, pancreatitis, obstruction based on history/physical exam.  Will investigate with sepsis panel order set, cardiac workup, D-dimer.  Will manage with multimodal analgesia and further based on workup.    Amount and/or Complexity of Data Reviewed  Labs: ordered. Decision-making details documented in ED Course.  Radiology: ordered and independent interpretation performed.    Risk  Prescription drug management.        ED Course as of 07/19/25 1722   Sat Jul 19, 2025   1102 WBC: 9.93  WNL - patient does not meet SIRS    1120 GLUCOSE(!): 242  Elevated, similar to prior   1120 ANION GAP: 8  Within normal limits   1130 hs TnI 0hr: 7  Within normal limits   1131 ECG per my independent interpretation: Normal rate, regular rhythm, no ectopy, normal axis, no ST elevations or depressions     1141 BNP: 44  WNL    1141 D-Dimer, Quant(!): 3.79  High    1143 Given multiple CT imagings of the abdomen over the recent past, will avoid additional radiation to abdomen; however, given worsening symptoms that is now described as pleuritic, will proceed with CTPA given elevated dimer more than prior   1146 Procalcitonin(!): 0.48  Downtrending   1146 Patient has continued pain.  Will administer droperidol   1312 Bacteria, UA: Occasional   1312 WBC, UA(!): 4-10  Not consistent with new/worsening UTI furthermore, patient has not yet finished Vantin   1347 On reevaluation, patient reports improvement in symptoms   1429 Results reviewed with patient.  Patient reports continued improvement in symptoms.  Appears in no acute distress on reevaluation.  Patient agreeable with outpatient follow-up. Patient has neither questions  nor concerns after receiving discharge instructions and return precautions        Medications   sodium chloride 0.9 % bolus 500 mL (0 mL Intravenous Stopped 7/19/25 1123)   acetaminophen (Ofirmev) injection 1,000 mg (0 mg Intravenous Stopped 7/19/25 1218)   droperidol (INAPSINE) injection 0.625 mg (0.625 mg Intravenous Given 7/19/25 1253)   sucralfate (CARAFATE) tablet 1 g (1 g Oral Given 7/19/25 1253)   iohexol (OMNIPAQUE) 350 MG/ML injection (MULTI-DOSE) 80 mL (80 mL Intravenous Given 7/19/25 1156)       ED Risk Strat Scores   HEART Risk Score      Flowsheet Row Most Recent Value   Heart Score Risk Calculator    History 0 Filed at: 07/19/2025 1131   ECG 0 Filed at: 07/19/2025 1131   Age 1 Filed at: 07/19/2025 1131   Risk Factors 2 Filed at: 07/19/2025 1131   Troponin 0 Filed at: 07/19/2025 1131   HEART Score 3 Filed at: 07/19/2025 1131          HEART Risk Score      Flowsheet Row Most Recent Value   Heart Score Risk Calculator    History 0 Filed at: 07/19/2025 1131   ECG 0 Filed at: 07/19/2025 1131   Age 1 Filed at: 07/19/2025 1131   Risk Factors 2 Filed at: 07/19/2025 1131   Troponin 0 Filed at: 07/19/2025 1131   HEART Score 3 Filed at: 07/19/2025 1131                      No data recorded            Wells' Criteria for PE      Flowsheet Row Most Recent Value   Wells' Criteria for PE    Clinical signs and symptoms of DVT 0 Filed at: 07/19/2025 1141   PE is primary diagnosis or equally likely 0 Filed at: 07/19/2025 1141   HR >100 0 Filed at: 07/19/2025 1141   Immobilization at least 3 days or Surgery in the previous 4 weeks 0 Filed at: 07/19/2025 1141   Previous, objectively diagnosed PE or DVT 0 Filed at: 07/19/2025 1141   Hemoptysis 0 Filed at: 07/19/2025 1141   Malignancy with treatment within 6 months or palliative 0 Filed at: 07/19/2025 1141   Wells' Criteria Total 0 Filed at: 07/19/2025 1141                        History of Present Illness       Chief Complaint   Patient presents with    Flank Pain      Recent admission d/t UTI and Sepsis. States abd pain she had has now moved to R flank . No n/v/c/d    Shortness of Breath     Sob worse with walking/going up stairs        Past Medical History[1]   Past Surgical History[2]   Family History[3]   Social History[4]   E-Cigarette/Vaping    E-Cigarette Use Never User       E-Cigarette/Vaping Substances    Nicotine No     THC No     CBD No     Flavoring No     Other No     Unknown No       I have reviewed and agree with the history as documented.     Patient is a 50-year-old female, with a history significant for postmenopausal status, anxiety, diabetes, obesity per my review of the medical record, who presents to the ED today for evaluation of a 3-day history of gradual onset, constant, progressively worsening right upper quadrant/right flank pain.  Per my review of the medical record, patient was admitted to the hospital from July 13 to July 16 for UTI sepsis and treated with Vantin.  Patient subsequently represented and was readmitted on the 16th for right upper quadrant pain and hepatic steatosis/no pancreatitis noted on CT imaging and fatty liver noted on right upper quadrant ultrasound.  Patient reports persistence of this pain and she states it is worsened with deep breathing.  Patient denies fever, urinary symptoms, chest pain, history of VTE, recent trauma or surgery, hemoptysis.  Patient has not taken anything, such as Tylenol, to remit her discomfort. Patient's , present in room and providing collateral history, states patient is not confused.  Patient is without other concerns at this time.        Review of Systems   Constitutional:  Negative for fever.   HENT:  Negative for trouble swallowing.    Eyes:  Negative for visual disturbance.   Respiratory:  Positive for shortness of breath.    Cardiovascular:  Negative for chest pain.   Gastrointestinal:  Positive for abdominal pain.   Endocrine: Negative for polyuria.   Genitourinary:  Negative for dysuria.    Musculoskeletal:  Negative for gait problem.   Skin:  Negative for rash.   Allergic/Immunologic: Positive for environmental allergies.   Neurological:  Negative for weakness and numbness.   Hematological:  Negative for adenopathy.   Psychiatric/Behavioral:  Negative for confusion.    All other systems reviewed and are negative.          Objective       ED Triage Vitals   Temperature Pulse Blood Pressure Respirations SpO2 Patient Position - Orthostatic VS   07/19/25 0947 07/19/25 0947 07/19/25 0949 07/19/25 0947 07/19/25 0949 --   98.1 °F (36.7 °C) 100 (!) 218/93 20 94 %       Temp src Heart Rate Source BP Location FiO2 (%) Pain Score    -- -- -- -- --              Vitals      Date and Time Temp Pulse SpO2 Resp BP Pain Score FACES Pain Rating User   07/19/25 1000 -- 92 95 % -- 183/75 -- -- TB   07/19/25 0949 -- -- 94 % -- 218/93 -- -- AP   07/19/25 0947 98.1 °F (36.7 °C) 100 -- 20 -- -- -- AP            Physical Exam  Vitals and nursing note reviewed.   Constitutional:       General: She is not in acute distress.     Appearance: She is not toxic-appearing.   HENT:      Head: Normocephalic and atraumatic.      Right Ear: External ear normal.      Left Ear: External ear normal.      Nose: Nose normal. No rhinorrhea.      Mouth/Throat:      Mouth: Mucous membranes are moist.      Pharynx: Oropharynx is clear. No oropharyngeal exudate or posterior oropharyngeal erythema.      Comments: Uvula midline. No oropharyngeal or submandibular mass/swelling    Eyes:      General: No scleral icterus.        Right eye: No discharge.         Left eye: No discharge.      Conjunctiva/sclera: Conjunctivae normal.      Pupils: Pupils are equal, round, and reactive to light.     Neck:      Comments: Patient is spontaneously rotating their neck to the left and right during the history and physical exam interaction without difficulty or apparent discomfort    Cardiovascular:      Rate and Rhythm: Normal rate and regular rhythm.       Pulses: Normal pulses.      Heart sounds: Normal heart sounds. No murmur heard.     No friction rub. No gallop.      Comments: 2+ Radial  Pulmonary:      Effort: Pulmonary effort is normal. No respiratory distress.      Breath sounds: Normal breath sounds. No stridor. No wheezing, rhonchi or rales.   Abdominal:      General: Abdomen is flat. There is no distension.      Palpations: Abdomen is soft.      Tenderness: There is abdominal tenderness (Right upper quadrant, right flank). There is no right CVA tenderness, left CVA tenderness, guarding or rebound.     Musculoskeletal:         General: No tenderness (No pain with calf squeeze) or deformity.      Cervical back: Neck supple. No tenderness. No muscular tenderness.      Right lower leg: No edema.      Left lower leg: No edema.   Lymphadenopathy:      Cervical: No cervical adenopathy.     Skin:     General: Skin is warm and dry.      Capillary Refill: Capillary refill takes less than 2 seconds.     Neurological:      Mental Status: She is alert.      Comments: Patient is speaking clearly in complete sentences.  Patient is answering appropriately and able follow commands.  Patient is moving all four extremities spontaneously.  No facial droop.  Tongue midline.      Psychiatric:         Mood and Affect: Mood normal.         Behavior: Behavior normal.         Results Reviewed       Procedure Component Value Units Date/Time    HS Troponin I 2hr [[id number]]  (Normal) Collected: 07/19/25 1300    Lab Status: Final result Specimen: Blood from Arm, Right Updated: 07/19/25 1334     hs TnI 2hr 6 ng/L      Delta 2hr hsTnI -1 ng/L     Urine Microscopic [[id number]]  (Abnormal) Collected: 07/19/25 1227    Lab Status: Final result Specimen: Urine, Other Updated: 07/19/25 1312     RBC, UA 30-50 /hpf      WBC, UA 4-10 /hpf      Epithelial Cells Occasional /hpf      Bacteria, UA Occasional /hpf      MUCUS THREADS Occasional    RBC Morphology Reflex Test [[id number]] Collected:  07/19/25 1050    Lab Status: Final result Specimen: Blood from Arm, Right Updated: 07/19/25 1302    UA w Reflex to Microscopic w Reflex to Culture [[id number]]  (Abnormal) Collected: 07/19/25 1227    Lab Status: Final result Specimen: Urine, Other Updated: 07/19/25 1253     Color, UA Yellow     Clarity, UA Clear     Specific Gravity, UA 1.027     pH, UA 6.0     Leukocytes, UA Negative     Nitrite, UA Negative     Protein, UA Trace mg/dl      Glucose, UA Trace mg/dl      Ketones, UA Negative mg/dl      Urobilinogen, UA <2.0 mg/dl      Bilirubin, UA Negative     Occult Blood, UA Moderate    Blood culture #1 [[id number]] Collected: 07/19/25 1213    Lab Status: In process Specimen: Blood from Arm, Left Updated: 07/19/25 1221    CBC and differential [[id number]]  (Normal) Collected: 07/19/25 1050    Lab Status: Final result Specimen: Blood from Arm, Right Updated: 07/19/25 1203     WBC 9.93 Thousand/uL      RBC 4.32 Million/uL      Hemoglobin 13.2 g/dL      Hematocrit 41.1 %      MCV 95 fL      MCH 30.6 pg      MCHC 32.1 g/dL      RDW 13.5 %      MPV 9.6 fL      Platelets 308 Thousands/uL     Narrative:      This is an appended report.  These results have been appended to a previously verified report.    Manual Differential(PHLEBS Do Not Order) [[id number]]  (Abnormal) Collected: 07/19/25 1050    Lab Status: Final result Specimen: Blood from Arm, Right Updated: 07/19/25 1203     Segmented % 68 %      Bands % 1 %      Lymphocytes % 21 %      Monocytes % 5 %      Eosinophils % 1 %      Basophils % 0 %      Metamyelocytes % 1 %      Atypical Lymphocytes % 1 %      Plasma Cells % 2 %      Absolute Neutrophils 6.85 Thousand/uL      Absolute Lymphocytes 2.38 Thousand/uL      Absolute Monocytes 0.50 Thousand/uL      Absolute Eosinophils 0.10 Thousand/uL      Absolute Basophils 0.00 Thousand/uL      Absolute Metamyelocytes 0.10 Thousand/uL      Total Counted --     RBC Morphology Present     Platelet Estimate Adequate     Clumped  Platelets Present     Anisocytosis Present    D-dimer, quantitative [[id number]]  (Abnormal) Collected: 07/19/25 1050    Lab Status: Final result Specimen: Blood from Arm, Right Updated: 07/19/25 1141     D-Dimer, Quant 3.79 ug/ml FEU     Narrative:      In the evaluation for possible pulmonary embolism, in the appropriate (Well's Score of 4 or less) patient, the age adjusted d-dimer cutoff for this patient can be calculated as:    Age x 0.01 (in ug/mL) for Age-adjusted D-dimer exclusion threshold for a patient over 50 years.    B-Type Natriuretic Peptide(BNP) [[id number]]  (Normal) Collected: 07/19/25 1050    Lab Status: Final result Specimen: Blood from Arm, Right Updated: 07/19/25 1139     BNP 44 pg/mL     Protime-INR [[id number]]  (Normal) Collected: 07/19/25 1050    Lab Status: Final result Specimen: Blood from Arm, Right Updated: 07/19/25 1133     Protime 13.3 seconds      INR 0.94    Narrative:      INR Therapeutic Range    Indication                                             INR Range      Atrial Fibrillation                                               2.0-3.0  Hypercoagulable State                                    2.0.2.3  Left Ventricular Asist Device                            2.0-3.0  Mechanical Heart Valve                                  -    Aortic(with afib, MI, embolism, HF, LA enlargement,    and/or coagulopathy)                                     2.0-3.0 (2.5-3.5)     Mitral                                                             2.5-3.5  Prosthetic/Bioprosthetic Heart Valve               2.0-3.0  Venous thromboembolism (VTE: VT, PE        2.0-3.0    APTT [[id number]]  (Normal) Collected: 07/19/25 1050    Lab Status: Final result Specimen: Blood from Arm, Right Updated: 07/19/25 1133     PTT 33 seconds     Procalcitonin [[id number]]  (Abnormal) Collected: 07/19/25 1050    Lab Status: Final result Specimen: Blood from Arm, Right Updated: 07/19/25 1127     Procalcitonin 0.48 ng/ml     HS  Troponin 0hr (reflex protocol) [[id number]]  (Normal) Collected: 07/19/25 1050    Lab Status: Final result Specimen: Blood from Arm, Right Updated: 07/19/25 1124     hs TnI 0hr 7 ng/L     Comprehensive metabolic panel [[id number]]  (Abnormal) Collected: 07/19/25 1050    Lab Status: Final result Specimen: Blood from Arm, Right Updated: 07/19/25 1118     Sodium 136 mmol/L      Potassium 4.7 mmol/L      Chloride 100 mmol/L      CO2 28 mmol/L      ANION GAP 8 mmol/L      BUN 12 mg/dL      Creatinine 0.68 mg/dL      Glucose 242 mg/dL      Calcium 9.2 mg/dL      AST 29 U/L      ALT 23 U/L      Alkaline Phosphatase 62 U/L      Total Protein 7.4 g/dL      Albumin 3.7 g/dL      Total Bilirubin 0.52 mg/dL      eGFR 100 ml/min/1.73sq m     Narrative:      [employer] guidelines for Chronic Kidney Disease (CKD):     Stage 1 with normal or high GFR (GFR > 90 mL/min/1.73 square meters)    Stage 2 Mild CKD (GFR = 60-89 mL/min/1.73 square meters)    Stage 3A Moderate CKD (GFR = 45-59 mL/min/1.73 square meters)    Stage 3B Moderate CKD (GFR = 30-44 mL/min/1.73 square meters)    Stage 4 Severe CKD (GFR = 15-29 mL/min/1.73 square meters)    Stage 5 End Stage CKD (GFR <15 mL/min/1.73 square meters)  Note: GFR calculation is accurate only with a steady state creatinine    Lactic acid [[id number]]  (Normal) Collected: 07/19/25 1050    Lab Status: Final result Specimen: Blood from Arm, Right Updated: 07/19/25 1114     LACTIC ACID 1.8 mmol/L     Narrative:      Result may be elevated if tourniquet was used during collection.    Blood culture #2 [[id number]] Collected: 07/19/25 1050    Lab Status: In process Specimen: Blood from Arm, Right Updated: 07/19/25 1055            CTA chest pe study   Final Interpretation by Ernesto Meyer MD (07/19 1415)      1.  Motion artifact and suboptimal contrast bolus limits evaluation of distal pulmonary arteries. Otherwise no central pulmonary embolus.   2.  Hepatic steatosis                   Computerized Assisted Algorithm (CAA) may have aided analysis of applicable images.      Workstation performed: [id number]         XR chest 1 view portable   ED Interpretation by Bobby Zhu MD ([id number])   Per my independent interpretation: No acute cardiopulmonary process          Complex Venous Access Line    Date/Time: 2025 12:20 PM    Performed by: Bobby Zhu MD  Authorized by: Bobby Zhu MD    Patient location:  ED  Other Assisting Provider: No    Consent:     Consent obtained:  Verbal    Consent given by:  Patient  Pre-procedure details:     Hand hygiene: Hand hygiene performed prior to insertion      Skin preparation:  2% chlorhexidine  Procedure details:     Complex Venous Access Line Type: US Guided Peripheral IV      Peripheral IV Indications: other specified disorders of the vein      Orientation:  Right    Location:  Antecubital    Catheter size:  18 gauge    Patient evaluated for contraindications to access (i.e. fistula, thrombosis, etc): Yes      Approach: percutaneous technique used      Ultrasound image availability:  Images available in PACS    Number of attempts:  2    Successful placement: yes    Anesthesia (see MAR for exact dosages):     Anesthesia method:  None  Post-procedure details:     Post-procedure:  Dressing applied    Post-procedure complications: none      Patient tolerance of procedure:  Tolerated well, no immediate complications      ED Medication and Procedure Management   Prior to Admission Medications   Prescriptions Last Dose Informant Patient Reported? Taking?   ARIPiprazole (ABILIFY) 10 mg tablet   No No   Sig: Take 1 tablet (10 mg total) by mouth daily   LORazepam (ATIVAN) 0.5 mg tablet   Yes No   Si.5 mg every 8 (eight) hours as needed for anxiety   acetaminophen (TYLENOL) 325 mg tablet   No No   Sig: Take 2 tablets (650 mg total) by mouth every 6 (six) hours as needed for mild pain   cefpodoxime (VANTIN) 200 mg tablet   No No    Sig: Take 2 tablets (400 mg total) by mouth 2 (two) times a day with meals for 7 doses   desvenlafaxine succinate (PRISTIQ) 50 mg 24 hr tablet   No No   Sig: Take 1 tablet (50 mg total) by mouth daily Do not start before February 22, 2023.   dicyclomine (BENTYL) 10 mg capsule   No No   Sig: Take 2 capsules (20 mg total) by mouth every 6 (six) hours as needed (crampy diarrhea)   dulaglutide (Trulicity) 1.5 MG/0.5ML injection   Yes No   Sig: Inject 1.5 mg under the skin Once a week   insulin glargine (LANTUS) 100 units/mL subcutaneous injection   No No   Sig: Inject 40 Units under the skin every 12 (twelve) hours   insulin lispro (HumALOG/ADMELOG) 100 units/mL injection   No No   Sig: Inject 22 Units under the skin 3 (three) times a day with meals   lamoTRIgine (LaMICtal) 100 mg tablet   No No   Sig: Take 1 tablet (100 mg total) by mouth 2 (two) times a day   lisinopril (ZESTRIL) 10 mg tablet   No No   Sig: Take 2 tablets (20 mg total) by mouth daily   metFORMIN (GLUCOPHAGE) 500 mg tablet  Self No No   Sig: Take 1 tablet (500 mg total) by mouth 2 (two) times a day with meals   nystatin (MYCOSTATIN) powder   No No   Sig: Apply topically 3 (three) times a day --- Continue use for a week after rash resolves   oxybutynin (DITROPAN) 5 mg tablet   No No   Sig: Take 1 tablet (5 mg total) by mouth 3 (three) times a day   phenazopyridine (PYRIDIUM) 100 mg tablet   No No   Sig: Take 1 tablet (100 mg total) by mouth 3 (three) times a day with meals   polyethylene glycol (MIRALAX) 17 g packet   No No   Sig: Take 17 g by mouth daily   psyllium (METAMUCIL) packet   No No   Sig: Take 1 packet by mouth daily for 10 days   senna-docusate sodium (SENOKOT S) 8.6-50 mg per tablet   No No   Sig: Take 2 tablets by mouth 2 (two) times a day If no bowel movement in 48 hours on MiraLAX.   tamsulosin (FLOMAX) 0.4 mg   No No   Sig: Take 1 capsule (0.4 mg total) by mouth daily with dinner      Facility-Administered Medications: None      Discharge Medication List as of 7/19/2025  2:21 PM        CONTINUE these medications which have NOT CHANGED    Details   acetaminophen (TYLENOL) 325 mg tablet Take 2 tablets (650 mg total) by mouth every 6 (six) hours as needed for mild pain, Starting Tue 2/21/2023, No Print      ARIPiprazole (ABILIFY) 10 mg tablet Take 1 tablet (10 mg total) by mouth daily, Starting Tue 3/26/2024, Normal      cefpodoxime (VANTIN) 200 mg tablet Take 2 tablets (400 mg total) by mouth 2 (two) times a day with meals for 7 doses, Starting Wed 7/16/2025, Until Sun 7/20/2025, Normal      desvenlafaxine succinate (PRISTIQ) 50 mg 24 hr tablet Take 1 tablet (50 mg total) by mouth daily Do not start before February 22, 2023., Starting Wed 2/22/2023, No Print      dicyclomine (BENTYL) 10 mg capsule Take 2 capsules (20 mg total) by mouth every 6 (six) hours as needed (crampy diarrhea), Starting Tue 2/21/2023, Normal      dulaglutide (Trulicity) 1.5 MG/0.5ML injection Inject 1.5 mg under the skin Once a week, Starting Thu 1/18/2024, Historical Med      insulin glargine (LANTUS) 100 units/mL subcutaneous injection Inject 40 Units under the skin every 12 (twelve) hours, Starting Fri 7/18/2025, Normal      insulin lispro (HumALOG/ADMELOG) 100 units/mL injection Inject 22 Units under the skin 3 (three) times a day with meals, Starting Fri 7/18/2025, No Print      lamoTRIgine (LaMICtal) 100 mg tablet Take 1 tablet (100 mg total) by mouth 2 (two) times a day, Starting Tue 2/21/2023, No Print      lisinopril (ZESTRIL) 10 mg tablet Take 2 tablets (20 mg total) by mouth daily, Starting Sun 12/4/2022, No Print      LORazepam (ATIVAN) 0.5 mg tablet 0.5 mg every 8 (eight) hours as needed for anxiety, Starting Wed 10/6/2021, Historical Med      metFORMIN (GLUCOPHAGE) 500 mg tablet Take 1 tablet (500 mg total) by mouth 2 (two) times a day with meals, Starting Mon 8/31/2020, Until Mon 6/30/2025, Normal      nystatin (MYCOSTATIN) powder Apply topically 3  (three) times a day --- Continue use for a week after rash resolves, Starting Fri 5/3/2024, Normal      oxybutynin (DITROPAN) 5 mg tablet Take 1 tablet (5 mg total) by mouth 3 (three) times a day, Starting Fri 4/12/2024, Normal      phenazopyridine (PYRIDIUM) 100 mg tablet Take 1 tablet (100 mg total) by mouth 3 (three) times a day with meals, Starting Fri 4/12/2024, Normal      polyethylene glycol (MIRALAX) 17 g packet Take 17 g by mouth daily, Starting Wed 7/16/2025, Normal      psyllium (METAMUCIL) packet Take 1 packet by mouth daily for 10 days, Starting Fri 7/18/2025, Until Mon 7/28/2025, No Print      senna-docusate sodium (SENOKOT S) 8.6-50 mg per tablet Take 2 tablets by mouth 2 (two) times a day If no bowel movement in 48 hours on MiraLAX., Starting Fri 7/18/2025, Normal      tamsulosin (FLOMAX) 0.4 mg Take 1 capsule (0.4 mg total) by mouth daily with dinner, Starting Sat 4/13/2024, Normal             ED SEPSIS DOCUMENTATION   Time reflects when diagnosis was documented in both MDM as applicable and the Disposition within this note       Time User Action Codes Description Comment    7/19/2025  2:07 PM Bobby Zhu Add [R10.9] Acute abdominal pain     7/19/2025  2:07 PM Bobby Zhu Add [R06.00] Dyspnea     7/19/2025  2:07 PM Bobby Zhu Add [R03.0] Elevated blood pressure reading     7/19/2025  2:07 PM Bobby Zhu Add [R31.9] Hematuria     7/19/2025  2:08 PM Bobby Zhu Add [E11.65] Hyperglycemia due to diabetes mellitus (HCC)     7/19/2025  2:20 PM Bobby Zhu Add [K76.0] Hepatic steatosis                      [1]   Past Medical History:  Diagnosis Date    Anemia     Anxiety     Candidal intertrigo     Depression     Diabetes 1.5, 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    Diabetes mellitus (HCC)     GERD (gastroesophageal reflux disease)     Hyperlipidemia     Hypertension     Hyponatremia 11/26/2023    Irritable bowel syndrome     Morbid obesity with  BMI of 60.0-69.9, adult (HCC)     Osteoarthritis     Seasonal allergies     Sleep difficulties     Suicide attempt (HCC)    [2]   Past Surgical History:  Procedure Laterality Date     SECTION  1992    CHOLECYSTECTOMY      FL RETROGRADE PYELOGRAM  3/21/2024    FL RETROGRADE PYELOGRAM  2024    IA CYSTO/URETERO W/LITHOTRIPSY &INDWELL STENT INSRT Left 3/21/2024    Procedure: CYSTOSCOPY, RETROGRADE PYELOGRAM AND INSERTION STENT URETERAL;  Surgeon: Nabil Desir MD;  Location: [facility];  Service: Urology    IA CYSTO/URETERO W/LITHOTRIPSY &INDWELL STENT INSRT Left 2024    Procedure: CYSTOSCOPY URETEROSCOPY WITH LITHOTRIPSY HOLMIUM LASER, RETROGRADE PYELOGRAM AND INSERTION STENT URETERAL;  Surgeon: Nabil Desir MD;  Location: [facility];  Service: Urology    WISDOM TOOTH EXTRACTION     [3]   Family History  Problem Relation Name Age of Onset    Diabetes Mother      Hypertension Father     [4]   Social History  Tobacco Use    Smoking status: Never    Smokeless tobacco: Never   Vaping Use    Vaping status: Never Used   Substance Use Topics    Alcohol use: Not Currently     Comment: occassionaly     Drug use: No        Bobby Zhu MD  [phone]

## 2025-07-20 LAB — BACTERIA BLD CULT: NORMAL

## 2025-07-21 LAB
ATRIAL RATE: 96 BPM
P AXIS: 71 DEGREES
PR INTERVAL: 156 MS
QRS AXIS: 79 DEGREES
QRSD INTERVAL: 74 MS
QT INTERVAL: 352 MS
QTC INTERVAL: 444 MS
T WAVE AXIS: 22 DEGREES
VENTRICULAR RATE: 96 BPM

## 2025-07-21 PROCEDURE — 93010 ELECTROCARDIOGRAM REPORT: CPT | Performed by: INTERNAL MEDICINE

## 2025-07-22 ENCOUNTER — TELEPHONE (OUTPATIENT)
Age: 50
End: 2025-07-22

## 2025-07-22 LAB — BACTERIA BLD CULT: NORMAL

## 2025-07-24 LAB
BACTERIA BLD CULT: NORMAL
BACTERIA BLD CULT: NORMAL

## 2025-07-30 NOTE — TELEPHONE ENCOUNTER
New Patient      Insurance   Current Insurance? yes      Insurance E-verified?  Yes      History   Reason for appointment/active symptoms?       R31.9 (ICD-10-CM) - Hematuria       Has the patient had any previous Urologist(s)?    no  Was the patient seen in the ED?  Yes       Labs/Imaging(Including Out Of Network)?     CT  KIDNEYS/URETERS: Symmetric renal enhancement. 4 calculi in the right kidney measuring up to 3 mm. 2 punctate calculi in the left kidney. No ureteral calculi. No hydronephrosis. No focal renal lesions.    Records Requested?  yes     Records Visible in EPIC?  yes        Personal history of cancer?    no      Appointment   Office location preference:  [place]   ?   Appointment Details   Date: 2/23  Time:  220  Location: [place]    Provider: josh    Does the appointment need further review?

## 2025-07-31 ENCOUNTER — OFFICE VISIT (OUTPATIENT)
Dept: DERMATOLOGY | Facility: CLINIC | Age: 50
End: 2025-07-31

## 2025-07-31 VITALS — TEMPERATURE: 98.2 F | BODY MASS INDEX: 57.52 KG/M2 | HEIGHT: 60 IN | WEIGHT: 293 LBS

## 2025-07-31 DIAGNOSIS — L40.9 PSORIASIS: Primary | ICD-10-CM

## 2025-07-31 DIAGNOSIS — Z79.899 HIGH RISK MEDICATION USE: ICD-10-CM

## 2025-07-31 RX ORDER — KETOCONAZOLE 20 MG/ML
SHAMPOO, SUSPENSION TOPICAL
Qty: 120 ML | Refills: 11 | Status: SHIPPED | OUTPATIENT
Start: 2025-07-31

## 2025-07-31 RX ORDER — FLUOCINONIDE TOPICAL SOLUTION USP, 0.05% 0.5 MG/ML
SOLUTION TOPICAL
Qty: 60 ML | Refills: 6 | Status: SHIPPED | OUTPATIENT
Start: 2025-07-31

## 2025-07-31 RX ORDER — CLOBETASOL PROPIONATE 0.5 MG/G
CREAM TOPICAL
Qty: 60 G | Refills: 1 | Status: SHIPPED | OUTPATIENT
Start: 2025-07-31

## 2025-08-06 ENCOUNTER — TELEPHONE (OUTPATIENT)
Dept: VASCULAR SURGERY | Facility: CLINIC | Age: 50
End: 2025-08-06

## 2025-08-08 ENCOUNTER — HOSPITAL ENCOUNTER (EMERGENCY)
Facility: HOSPITAL | Age: 50
Discharge: HOME | End: 2025-08-08
Payer: COMMERCIAL

## 2025-08-08 VITALS
HEART RATE: 67 BPM | SYSTOLIC BLOOD PRESSURE: 162 MMHG | DIASTOLIC BLOOD PRESSURE: 90 MMHG | RESPIRATION RATE: 18 BRPM | OXYGEN SATURATION: 95 % | TEMPERATURE: 98.2 F

## 2025-08-08 DIAGNOSIS — N39.0 UTI (URINARY TRACT INFECTION): Primary | ICD-10-CM

## 2025-08-08 DIAGNOSIS — R19.7 DIARRHEA: ICD-10-CM

## 2025-08-08 LAB
ALBUMIN SERPL BCG-MCNC: 4.3 G/DL (ref 3.5–5)
ALP SERPL-CCNC: 76 U/L (ref 34–104)
ALT SERPL W P-5'-P-CCNC: 58 U/L (ref 7–52)
ANION GAP SERPL CALCULATED.3IONS-SCNC: 12 MMOL/L (ref 4–13)
AST SERPL W P-5'-P-CCNC: 48 U/L (ref 13–39)
BACTERIA UR QL AUTO: ABNORMAL /HPF
BASOPHILS # BLD AUTO: 0.06 THOUSANDS/ÂΜL (ref 0–0.1)
BASOPHILS NFR BLD AUTO: 1 % (ref 0–1)
BILIRUB SERPL-MCNC: 0.7 MG/DL (ref 0.2–1)
BILIRUB UR QL STRIP: NEGATIVE
BUN SERPL-MCNC: 11 MG/DL (ref 5–25)
CALCIUM SERPL-MCNC: 9.3 MG/DL (ref 8.4–10.2)
CHLORIDE SERPL-SCNC: 100 MMOL/L (ref 96–108)
CLARITY UR: ABNORMAL
CO2 SERPL-SCNC: 23 MMOL/L (ref 21–32)
COLOR UR: YELLOW
CREAT SERPL-MCNC: 0.81 MG/DL (ref 0.6–1.3)
EOSINOPHIL # BLD AUTO: 0.17 THOUSAND/ÂΜL (ref 0–0.61)
EOSINOPHIL NFR BLD AUTO: 2 % (ref 0–6)
ERYTHROCYTE [DISTWIDTH] IN BLOOD BY AUTOMATED COUNT: 13.3 % (ref 11.6–15.1)
GFR SERPL CREATININE-BSD FRML MDRD: 83 ML/MIN/1.73SQ M
GLUCOSE SERPL-MCNC: 335 MG/DL (ref 65–140)
GLUCOSE UR STRIP-MCNC: ABNORMAL MG/DL
HCT VFR BLD AUTO: 45.4 % (ref 34.8–46.1)
HGB BLD-MCNC: 14.7 G/DL (ref 11.5–15.4)
HGB UR QL STRIP.AUTO: ABNORMAL
IMM GRANULOCYTES # BLD AUTO: 0.05 THOUSAND/UL (ref 0–0.2)
IMM GRANULOCYTES NFR BLD AUTO: 1 % (ref 0–2)
KETONES UR STRIP-MCNC: ABNORMAL MG/DL
LEUKOCYTE ESTERASE UR QL STRIP: ABNORMAL
LIPASE SERPL-CCNC: 58 U/L (ref 11–82)
LYMPHOCYTES # BLD AUTO: 2.76 THOUSANDS/ÂΜL (ref 0.6–4.47)
LYMPHOCYTES NFR BLD AUTO: 31 % (ref 14–44)
MCH RBC QN AUTO: 30.1 PG (ref 26.8–34.3)
MCHC RBC AUTO-ENTMCNC: 32.4 G/DL (ref 31.4–37.4)
MCV RBC AUTO: 93 FL (ref 82–98)
MONOCYTES # BLD AUTO: 0.64 THOUSAND/ÂΜL (ref 0.17–1.22)
MONOCYTES NFR BLD AUTO: 7 % (ref 4–12)
MUCOUS THREADS UR QL AUTO: ABNORMAL
NEUTROPHILS # BLD AUTO: 5.14 THOUSANDS/ÂΜL (ref 1.85–7.62)
NEUTS SEG NFR BLD AUTO: 58 % (ref 43–75)
NITRITE UR QL STRIP: NEGATIVE
NON-SQ EPI CELLS URNS QL MICRO: ABNORMAL /HPF
NRBC BLD AUTO-RTO: 0 /100 WBCS
PH UR STRIP.AUTO: 5.5 [PH]
PLATELET # BLD AUTO: 177 THOUSANDS/UL (ref 149–390)
PMV BLD AUTO: 11.2 FL (ref 8.9–12.7)
POTASSIUM SERPL-SCNC: 4.3 MMOL/L (ref 3.5–5.3)
PROT SERPL-MCNC: 7.9 G/DL (ref 6.4–8.4)
PROT UR STRIP-MCNC: ABNORMAL MG/DL
RBC # BLD AUTO: 4.89 MILLION/UL (ref 3.81–5.12)
RBC #/AREA URNS AUTO: ABNORMAL /HPF
SODIUM SERPL-SCNC: 135 MMOL/L (ref 135–147)
SP GR UR STRIP.AUTO: 1.04 (ref 1–1.03)
UROBILINOGEN UR STRIP-ACNC: 3 MG/DL
WBC # BLD AUTO: 8.82 THOUSAND/UL (ref 4.31–10.16)
WBC #/AREA URNS AUTO: ABNORMAL /HPF
WBC CLUMPS # UR AUTO: PRESENT /UL

## 2025-08-08 PROCEDURE — 99283 EMERGENCY DEPT VISIT LOW MDM: CPT

## 2025-08-08 PROCEDURE — 83690 ASSAY OF LIPASE: CPT

## 2025-08-08 PROCEDURE — 81001 URINALYSIS AUTO W/SCOPE: CPT

## 2025-08-08 PROCEDURE — 96361 HYDRATE IV INFUSION ADD-ON: CPT

## 2025-08-08 PROCEDURE — 96375 TX/PRO/DX INJ NEW DRUG ADDON: CPT

## 2025-08-08 PROCEDURE — 96372 THER/PROPH/DIAG INJ SC/IM: CPT

## 2025-08-08 PROCEDURE — 87086 URINE CULTURE/COLONY COUNT: CPT

## 2025-08-08 PROCEDURE — 80053 COMPREHEN METABOLIC PANEL: CPT

## 2025-08-08 PROCEDURE — 85025 COMPLETE CBC W/AUTO DIFF WBC: CPT

## 2025-08-08 PROCEDURE — 87077 CULTURE AEROBIC IDENTIFY: CPT

## 2025-08-08 PROCEDURE — 36415 COLL VENOUS BLD VENIPUNCTURE: CPT

## 2025-08-08 PROCEDURE — 96374 THER/PROPH/DIAG INJ IV PUSH: CPT

## 2025-08-08 RX ORDER — DICYCLOMINE HCL 20 MG
20 TABLET ORAL 2 TIMES DAILY
Qty: 10 TABLET | Refills: 0 | Status: SHIPPED | OUTPATIENT
Start: 2025-08-08 | End: 2025-08-15

## 2025-08-08 RX ORDER — ONDANSETRON 2 MG/ML
4 INJECTION INTRAMUSCULAR; INTRAVENOUS ONCE
Status: COMPLETED | OUTPATIENT
Start: 2025-08-08 | End: 2025-08-08

## 2025-08-08 RX ORDER — METOCLOPRAMIDE HYDROCHLORIDE 5 MG/ML
10 INJECTION INTRAMUSCULAR; INTRAVENOUS ONCE
Status: COMPLETED | OUTPATIENT
Start: 2025-08-08 | End: 2025-08-08

## 2025-08-08 RX ORDER — NITROFURANTOIN 25; 75 MG/1; MG/1
100 CAPSULE ORAL 2 TIMES DAILY WITH MEALS
Status: DISCONTINUED | OUTPATIENT
Start: 2025-08-09 | End: 2025-08-08

## 2025-08-08 RX ORDER — DIPHENHYDRAMINE HYDROCHLORIDE 50 MG/ML
12.5 INJECTION, SOLUTION INTRAMUSCULAR; INTRAVENOUS ONCE
Status: COMPLETED | OUTPATIENT
Start: 2025-08-08 | End: 2025-08-08

## 2025-08-08 RX ORDER — DICYCLOMINE HCL 20 MG
20 TABLET ORAL ONCE
Status: COMPLETED | OUTPATIENT
Start: 2025-08-08 | End: 2025-08-08

## 2025-08-08 RX ORDER — FAMOTIDINE 10 MG/ML
20 INJECTION, SOLUTION INTRAVENOUS ONCE
Status: COMPLETED | OUTPATIENT
Start: 2025-08-08 | End: 2025-08-08

## 2025-08-08 RX ORDER — NITROFURANTOIN 25; 75 MG/1; MG/1
100 CAPSULE ORAL ONCE
Status: COMPLETED | OUTPATIENT
Start: 2025-08-08 | End: 2025-08-08

## 2025-08-08 RX ORDER — NITROFURANTOIN 25; 75 MG/1; MG/1
100 CAPSULE ORAL 2 TIMES DAILY
Qty: 10 CAPSULE | Refills: 0 | Status: SHIPPED | OUTPATIENT
Start: 2025-08-08 | End: 2025-08-15

## 2025-08-08 RX ADMIN — DIPHENHYDRAMINE HYDROCHLORIDE 12.5 MG: 50 INJECTION, SOLUTION INTRAMUSCULAR; INTRAVENOUS at 20:18

## 2025-08-08 RX ADMIN — ONDANSETRON 4 MG: 2 INJECTION INTRAMUSCULAR; INTRAVENOUS at 19:09

## 2025-08-08 RX ADMIN — METOCLOPRAMIDE 10 MG: 5 INJECTION, SOLUTION INTRAMUSCULAR; INTRAVENOUS at 20:20

## 2025-08-08 RX ADMIN — SODIUM CHLORIDE 1000 ML: 0.9 INJECTION, SOLUTION INTRAVENOUS at 20:21

## 2025-08-08 RX ADMIN — FAMOTIDINE 20 MG: 10 INJECTION, SOLUTION INTRAVENOUS at 19:08

## 2025-08-08 RX ADMIN — NITROFURANTOIN (MONOHYDRATE/MACROCRYSTALS) 100 MG: 25; 75 CAPSULE ORAL at 21:52

## 2025-08-08 RX ADMIN — INSULIN HUMAN 5 UNITS: 100 INJECTION, SOLUTION PARENTERAL at 20:59

## 2025-08-08 RX ADMIN — DICYCLOMINE HYDROCHLORIDE 20 MG: 20 TABLET ORAL at 19:05

## 2025-08-10 LAB — BACTERIA UR CULT: ABNORMAL

## 2025-08-11 LAB — BACTERIA UR CULT: NORMAL

## 2025-08-12 PROBLEM — N30.00 ACUTE CYSTITIS WITHOUT HEMATURIA: Status: RESOLVED | Noted: 2025-07-13 | Resolved: 2025-08-12

## 2025-08-13 ENCOUNTER — APPOINTMENT (EMERGENCY)
Dept: RADIOLOGY | Facility: HOSPITAL | Age: 50
End: 2025-08-13
Payer: COMMERCIAL

## 2025-08-13 ENCOUNTER — HOSPITAL ENCOUNTER (INPATIENT)
Facility: HOSPITAL | Age: 50
LOS: 1 days | Discharge: HOME | End: 2025-08-15
Attending: EMERGENCY MEDICINE | Admitting: INTERNAL MEDICINE
Payer: COMMERCIAL

## 2025-08-13 DIAGNOSIS — M16.9 HIP OSTEOARTHRITIS: ICD-10-CM

## 2025-08-13 DIAGNOSIS — R10.9 ABDOMINAL PAIN: ICD-10-CM

## 2025-08-13 DIAGNOSIS — A41.9 SEVERE SEPSIS (HCC): ICD-10-CM

## 2025-08-13 DIAGNOSIS — Z79.4 TYPE 2 DIABETES MELLITUS WITH HYPERGLYCEMIA, WITH LONG-TERM CURRENT USE OF INSULIN (HCC): ICD-10-CM

## 2025-08-13 DIAGNOSIS — E11.65 TYPE 2 DIABETES MELLITUS WITH HYPERGLYCEMIA, WITH LONG-TERM CURRENT USE OF INSULIN (HCC): ICD-10-CM

## 2025-08-13 DIAGNOSIS — R65.20 SEVERE SEPSIS (HCC): ICD-10-CM

## 2025-08-13 DIAGNOSIS — R73.9 HYPERGLYCEMIA: Primary | ICD-10-CM

## 2025-08-13 LAB
BASOPHILS # BLD AUTO: 0.03 THOUSANDS/ÂΜL (ref 0–0.1)
BASOPHILS NFR BLD AUTO: 0 % (ref 0–1)
EOSINOPHIL # BLD AUTO: 0.21 THOUSAND/ÂΜL (ref 0–0.61)
EOSINOPHIL NFR BLD AUTO: 3 % (ref 0–6)
ERYTHROCYTE [DISTWIDTH] IN BLOOD BY AUTOMATED COUNT: 13.5 % (ref 11.6–15.1)
HCT VFR BLD AUTO: 45 % (ref 34.8–46.1)
HGB BLD-MCNC: 14.9 G/DL (ref 11.5–15.4)
IMM GRANULOCYTES # BLD AUTO: 0.07 THOUSAND/UL (ref 0–0.2)
IMM GRANULOCYTES NFR BLD AUTO: 1 % (ref 0–2)
LYMPHOCYTES # BLD AUTO: 2.47 THOUSANDS/ÂΜL (ref 0.6–4.47)
LYMPHOCYTES NFR BLD AUTO: 34 % (ref 14–44)
MCH RBC QN AUTO: 30.3 PG (ref 26.8–34.3)
MCHC RBC AUTO-ENTMCNC: 33.1 G/DL (ref 31.4–37.4)
MCV RBC AUTO: 92 FL (ref 82–98)
MONOCYTES # BLD AUTO: 0.55 THOUSAND/ÂΜL (ref 0.17–1.22)
MONOCYTES NFR BLD AUTO: 8 % (ref 4–12)
NEUTROPHILS # BLD AUTO: 3.94 THOUSANDS/ÂΜL (ref 1.85–7.62)
NEUTS SEG NFR BLD AUTO: 54 % (ref 43–75)
NRBC BLD AUTO-RTO: 0 /100 WBCS
PLATELET # BLD AUTO: 207 THOUSANDS/UL (ref 149–390)
PMV BLD AUTO: 11.4 FL (ref 8.9–12.7)
RBC # BLD AUTO: 4.91 MILLION/UL (ref 3.81–5.12)
WBC # BLD AUTO: 7.27 THOUSAND/UL (ref 4.31–10.16)

## 2025-08-13 PROCEDURE — 84145 PROCALCITONIN (PCT): CPT

## 2025-08-13 PROCEDURE — 82010 KETONE BODYS QUAN: CPT

## 2025-08-13 PROCEDURE — 80053 COMPREHEN METABOLIC PANEL: CPT

## 2025-08-13 PROCEDURE — 36415 COLL VENOUS BLD VENIPUNCTURE: CPT

## 2025-08-13 PROCEDURE — 85730 THROMBOPLASTIN TIME PARTIAL: CPT

## 2025-08-13 PROCEDURE — 83605 ASSAY OF LACTIC ACID: CPT

## 2025-08-13 PROCEDURE — 83690 ASSAY OF LIPASE: CPT

## 2025-08-13 PROCEDURE — 71045 X-RAY EXAM CHEST 1 VIEW: CPT

## 2025-08-13 PROCEDURE — 87040 BLOOD CULTURE FOR BACTERIA: CPT

## 2025-08-13 PROCEDURE — 93005 ELECTROCARDIOGRAM TRACING: CPT

## 2025-08-13 PROCEDURE — 83036 HEMOGLOBIN GLYCOSYLATED A1C: CPT

## 2025-08-13 PROCEDURE — 85610 PROTHROMBIN TIME: CPT

## 2025-08-13 PROCEDURE — 85025 COMPLETE CBC W/AUTO DIFF WBC: CPT

## 2025-08-13 RX ORDER — ONDANSETRON 2 MG/ML
4 INJECTION INTRAMUSCULAR; INTRAVENOUS ONCE
Status: COMPLETED | OUTPATIENT
Start: 2025-08-13 | End: 2025-08-13

## 2025-08-13 RX ORDER — KETOROLAC TROMETHAMINE 30 MG/ML
15 INJECTION, SOLUTION INTRAMUSCULAR; INTRAVENOUS ONCE
Status: COMPLETED | OUTPATIENT
Start: 2025-08-13 | End: 2025-08-13

## 2025-08-13 RX ADMIN — ONDANSETRON 4 MG: 2 INJECTION INTRAMUSCULAR; INTRAVENOUS at 23:34

## 2025-08-13 RX ADMIN — KETOROLAC TROMETHAMINE 15 MG: 30 INJECTION, SOLUTION INTRAMUSCULAR; INTRAVENOUS at 23:34

## 2025-08-13 RX ADMIN — SODIUM CHLORIDE 1000 ML: 0.9 INJECTION, SOLUTION INTRAVENOUS at 23:35

## 2025-08-14 ENCOUNTER — APPOINTMENT (EMERGENCY)
Dept: CT IMAGING | Facility: HOSPITAL | Age: 50
End: 2025-08-14
Payer: COMMERCIAL

## 2025-08-14 PROBLEM — E87.20 LACTIC ACIDOSIS: Status: ACTIVE | Noted: 2025-08-14

## 2025-08-14 PROBLEM — B36.9 FUNGAL SKIN INFECTION: Status: ACTIVE | Noted: 2025-08-14

## 2025-08-14 LAB
ALBUMIN SERPL BCG-MCNC: 3.9 G/DL (ref 3.5–5)
ALP SERPL-CCNC: 77 U/L (ref 34–104)
ALT SERPL W P-5'-P-CCNC: 39 U/L (ref 7–52)
ANION GAP SERPL CALCULATED.3IONS-SCNC: 12 MMOL/L (ref 4–13)
ANION GAP SERPL CALCULATED.3IONS-SCNC: 7 MMOL/L (ref 4–13)
APTT PPP: 31 SECONDS (ref 23–34)
AST SERPL W P-5'-P-CCNC: 31 U/L (ref 13–39)
B-OH-BUTYR SERPL-MCNC: <0.05 MMOL/L (ref 0.2–0.6)
BACTERIA UR QL AUTO: ABNORMAL /HPF
BASE EX.OXY STD BLDV CALC-SCNC: 93.5 % (ref 60–80)
BASE EXCESS BLDV CALC-SCNC: -1.7 MMOL/L
BILIRUB SERPL-MCNC: 0.61 MG/DL (ref 0.2–1)
BILIRUB UR QL STRIP: NEGATIVE
BUDDING YEAST: PRESENT
BUN SERPL-MCNC: 16 MG/DL (ref 5–25)
BUN SERPL-MCNC: 17 MG/DL (ref 5–25)
CALCIUM SERPL-MCNC: 8.8 MG/DL (ref 8.4–10.2)
CALCIUM SERPL-MCNC: 9.3 MG/DL (ref 8.4–10.2)
CHLORIDE SERPL-SCNC: 100 MMOL/L (ref 96–108)
CHLORIDE SERPL-SCNC: 102 MMOL/L (ref 96–108)
CLARITY UR: CLEAR
CO2 SERPL-SCNC: 21 MMOL/L (ref 21–32)
CO2 SERPL-SCNC: 27 MMOL/L (ref 21–32)
COLOR UR: ABNORMAL
CREAT SERPL-MCNC: 0.72 MG/DL (ref 0.6–1.3)
CREAT SERPL-MCNC: 0.88 MG/DL (ref 0.6–1.3)
EST. AVERAGE GLUCOSE BLD GHB EST-MCNC: 229 MG/DL
GFR SERPL CREATININE-BSD FRML MDRD: 75 ML/MIN/1.73SQ M
GFR SERPL CREATININE-BSD FRML MDRD: 96 ML/MIN/1.73SQ M
GLUCOSE SERPL-MCNC: 238 MG/DL (ref 65–140)
GLUCOSE SERPL-MCNC: 262 MG/DL (ref 65–140)
GLUCOSE SERPL-MCNC: 266 MG/DL (ref 65–140)
GLUCOSE SERPL-MCNC: 280 MG/DL (ref 65–140)
GLUCOSE SERPL-MCNC: 302 MG/DL (ref 65–140)
GLUCOSE SERPL-MCNC: 314 MG/DL (ref 65–140)
GLUCOSE SERPL-MCNC: 323 MG/DL (ref 65–140)
GLUCOSE SERPL-MCNC: 324 MG/DL (ref 65–140)
GLUCOSE SERPL-MCNC: 338 MG/DL (ref 65–140)
GLUCOSE SERPL-MCNC: 378 MG/DL (ref 65–140)
GLUCOSE SERPL-MCNC: 398 MG/DL (ref 65–140)
GLUCOSE SERPL-MCNC: 400 MG/DL (ref 65–140)
GLUCOSE SERPL-MCNC: 455 MG/DL (ref 65–140)
GLUCOSE UR STRIP-MCNC: ABNORMAL MG/DL
HBA1C MFR BLD: 9.6 %
HCO3 BLDV-SCNC: 23.9 MMOL/L (ref 24–30)
HGB UR QL STRIP.AUTO: NEGATIVE
INR PPP: 0.92 (ref 0.85–1.19)
KETONES UR STRIP-MCNC: NEGATIVE MG/DL
LACTATE SERPL-SCNC: 2 MMOL/L (ref 0.5–2)
LACTATE SERPL-SCNC: 2.5 MMOL/L (ref 0.5–2)
LEUKOCYTE ESTERASE UR QL STRIP: ABNORMAL
LIPASE SERPL-CCNC: 59 U/L (ref 11–82)
NITRITE UR QL STRIP: NEGATIVE
NON-SQ EPI CELLS URNS QL MICRO: ABNORMAL /HPF
O2 CT BLDV-SCNC: 19.6 ML/DL
PCO2 BLDV: 43.5 MM HG (ref 42–50)
PH BLDV: 7.36 [PH] (ref 7.3–7.4)
PH UR STRIP.AUTO: 5.5 [PH]
PO2 BLDV: 77.7 MM HG (ref 35–45)
POTASSIUM SERPL-SCNC: 4 MMOL/L (ref 3.5–5.3)
POTASSIUM SERPL-SCNC: 4.6 MMOL/L (ref 3.5–5.3)
PROCALCITONIN SERPL-MCNC: 0.13 NG/ML
PROT SERPL-MCNC: 7.3 G/DL (ref 6.4–8.4)
PROT UR STRIP-MCNC: NEGATIVE MG/DL
PROTHROMBIN TIME: 13 SECONDS (ref 12.3–15)
RBC #/AREA URNS AUTO: ABNORMAL /HPF
SODIUM SERPL-SCNC: 133 MMOL/L (ref 135–147)
SODIUM SERPL-SCNC: 136 MMOL/L (ref 135–147)
SP GR UR STRIP.AUTO: 1.03 (ref 1–1.03)
UROBILINOGEN UR STRIP-ACNC: <2 MG/DL
WBC #/AREA URNS AUTO: ABNORMAL /HPF

## 2025-08-14 PROCEDURE — 81001 URINALYSIS AUTO W/SCOPE: CPT

## 2025-08-14 PROCEDURE — 80048 BASIC METABOLIC PNL TOTAL CA: CPT

## 2025-08-14 PROCEDURE — 82948 REAGENT STRIP/BLOOD GLUCOSE: CPT

## 2025-08-14 PROCEDURE — 74177 CT ABD & PELVIS W/CONTRAST: CPT

## 2025-08-14 PROCEDURE — 99223 1ST HOSP IP/OBS HIGH 75: CPT | Performed by: INTERNAL MEDICINE

## 2025-08-14 PROCEDURE — 82805 BLOOD GASES W/O2 SATURATION: CPT

## 2025-08-14 PROCEDURE — 36415 COLL VENOUS BLD VENIPUNCTURE: CPT

## 2025-08-14 PROCEDURE — 83605 ASSAY OF LACTIC ACID: CPT

## 2025-08-14 RX ORDER — SENNOSIDES 8.6 MG
1 TABLET ORAL
Status: DISCONTINUED | OUTPATIENT
Start: 2025-08-14 | End: 2025-08-15 | Stop reason: HOSPADM

## 2025-08-14 RX ORDER — DESVENLAFAXINE 50 MG/1
50 TABLET, FILM COATED, EXTENDED RELEASE ORAL DAILY
Status: DISCONTINUED | OUTPATIENT
Start: 2025-08-14 | End: 2025-08-15 | Stop reason: HOSPADM

## 2025-08-14 RX ORDER — INSULIN LISPRO 100 [IU]/ML
25 INJECTION, SOLUTION INTRAVENOUS; SUBCUTANEOUS
Status: DISCONTINUED | OUTPATIENT
Start: 2025-08-14 | End: 2025-08-15 | Stop reason: HOSPADM

## 2025-08-14 RX ORDER — INSULIN LISPRO 100 [IU]/ML
22 INJECTION, SOLUTION INTRAVENOUS; SUBCUTANEOUS
Status: DISCONTINUED | OUTPATIENT
Start: 2025-08-14 | End: 2025-08-14

## 2025-08-14 RX ORDER — NYSTATIN 100000 [USP'U]/G
POWDER TOPICAL 3 TIMES DAILY
Status: DISCONTINUED | OUTPATIENT
Start: 2025-08-14 | End: 2025-08-14

## 2025-08-14 RX ORDER — LAMOTRIGINE 100 MG/1
100 TABLET ORAL 2 TIMES DAILY
Status: DISCONTINUED | OUTPATIENT
Start: 2025-08-14 | End: 2025-08-15 | Stop reason: HOSPADM

## 2025-08-14 RX ORDER — POLYETHYLENE GLYCOL 3350 17 G/17G
17 POWDER, FOR SOLUTION ORAL DAILY PRN
Status: DISCONTINUED | OUTPATIENT
Start: 2025-08-14 | End: 2025-08-14

## 2025-08-14 RX ORDER — ARIPIPRAZOLE 5 MG/1
10 TABLET ORAL DAILY
Status: DISCONTINUED | OUTPATIENT
Start: 2025-08-14 | End: 2025-08-15 | Stop reason: HOSPADM

## 2025-08-14 RX ORDER — DOCUSATE SODIUM 100 MG/1
100 CAPSULE, LIQUID FILLED ORAL 2 TIMES DAILY
Status: DISCONTINUED | OUTPATIENT
Start: 2025-08-14 | End: 2025-08-15 | Stop reason: HOSPADM

## 2025-08-14 RX ORDER — NYSTATIN 100000 [USP'U]/G
POWDER TOPICAL 3 TIMES DAILY
Status: DISCONTINUED | OUTPATIENT
Start: 2025-08-14 | End: 2025-08-15 | Stop reason: HOSPADM

## 2025-08-14 RX ORDER — LISINOPRIL 20 MG/1
20 TABLET ORAL DAILY
Status: DISCONTINUED | OUTPATIENT
Start: 2025-08-14 | End: 2025-08-15 | Stop reason: HOSPADM

## 2025-08-14 RX ORDER — CLOBETASOL PROPIONATE 0.5 MG/G
CREAM TOPICAL 2 TIMES DAILY
Status: DISCONTINUED | OUTPATIENT
Start: 2025-08-14 | End: 2025-08-15 | Stop reason: HOSPADM

## 2025-08-14 RX ORDER — LORAZEPAM 0.5 MG/1
0.5 TABLET ORAL EVERY 8 HOURS PRN
Status: DISCONTINUED | OUTPATIENT
Start: 2025-08-14 | End: 2025-08-15 | Stop reason: HOSPADM

## 2025-08-14 RX ORDER — KETOROLAC TROMETHAMINE 30 MG/ML
15 INJECTION, SOLUTION INTRAMUSCULAR; INTRAVENOUS ONCE
Status: COMPLETED | OUTPATIENT
Start: 2025-08-14 | End: 2025-08-14

## 2025-08-14 RX ORDER — INSULIN GLARGINE 100 [IU]/ML
40 INJECTION, SOLUTION SUBCUTANEOUS EVERY 12 HOURS SCHEDULED
Status: DISCONTINUED | OUTPATIENT
Start: 2025-08-14 | End: 2025-08-15

## 2025-08-14 RX ORDER — POLYETHYLENE GLYCOL 3350 17 G/17G
17 POWDER, FOR SOLUTION ORAL DAILY
Status: DISCONTINUED | OUTPATIENT
Start: 2025-08-14 | End: 2025-08-15 | Stop reason: HOSPADM

## 2025-08-14 RX ORDER — ACETAMINOPHEN 325 MG/1
975 TABLET ORAL EVERY 8 HOURS SCHEDULED
Status: DISCONTINUED | OUTPATIENT
Start: 2025-08-14 | End: 2025-08-15 | Stop reason: HOSPADM

## 2025-08-14 RX ORDER — ONDANSETRON 2 MG/ML
4 INJECTION INTRAMUSCULAR; INTRAVENOUS ONCE
Status: COMPLETED | OUTPATIENT
Start: 2025-08-14 | End: 2025-08-14

## 2025-08-14 RX ORDER — ENOXAPARIN SODIUM 100 MG/ML
60 INJECTION SUBCUTANEOUS 2 TIMES DAILY
Status: DISCONTINUED | OUTPATIENT
Start: 2025-08-14 | End: 2025-08-15 | Stop reason: HOSPADM

## 2025-08-14 RX ORDER — MICONAZOLE NITRATE 20 MG/G
CREAM TOPICAL 2 TIMES DAILY
Status: DISCONTINUED | OUTPATIENT
Start: 2025-08-14 | End: 2025-08-15 | Stop reason: HOSPADM

## 2025-08-14 RX ORDER — INSULIN LISPRO 100 [IU]/ML
5-25 INJECTION, SOLUTION INTRAVENOUS; SUBCUTANEOUS
Status: DISCONTINUED | OUTPATIENT
Start: 2025-08-14 | End: 2025-08-15 | Stop reason: HOSPADM

## 2025-08-14 RX ORDER — ACETAMINOPHEN 325 MG/1
975 TABLET ORAL EVERY 8 HOURS SCHEDULED
Status: DISCONTINUED | OUTPATIENT
Start: 2025-08-14 | End: 2025-08-14

## 2025-08-14 RX ADMIN — IOHEXOL 100 ML: 350 INJECTION, SOLUTION INTRAVENOUS at 00:52

## 2025-08-14 RX ADMIN — LAMOTRIGINE 100 MG: 100 TABLET ORAL at 08:55

## 2025-08-14 RX ADMIN — CEFTRIAXONE 2000 MG: 10 INJECTION, POWDER, FOR SOLUTION INTRAVENOUS at 02:37

## 2025-08-14 RX ADMIN — INSULIN LISPRO 20 UNITS: 100 INJECTION, SOLUTION INTRAVENOUS; SUBCUTANEOUS at 13:06

## 2025-08-14 RX ADMIN — SENNOSIDES 8.6 MG: 8.6 TABLET, FILM COATED ORAL at 21:13

## 2025-08-14 RX ADMIN — INSULIN HUMAN 8 UNITS: 100 INJECTION, SOLUTION PARENTERAL at 00:58

## 2025-08-14 RX ADMIN — ACETAMINOPHEN 975 MG: 325 TABLET ORAL at 21:13

## 2025-08-14 RX ADMIN — ONDANSETRON 4 MG: 2 INJECTION INTRAMUSCULAR; INTRAVENOUS at 02:32

## 2025-08-14 RX ADMIN — INSULIN LISPRO 22 UNITS: 100 INJECTION, SOLUTION INTRAVENOUS; SUBCUTANEOUS at 13:05

## 2025-08-14 RX ADMIN — INSULIN LISPRO 22 UNITS: 100 INJECTION, SOLUTION INTRAVENOUS; SUBCUTANEOUS at 07:24

## 2025-08-14 RX ADMIN — NYSTATIN: 100000 POWDER TOPICAL at 09:01

## 2025-08-14 RX ADMIN — LAMOTRIGINE 100 MG: 100 TABLET ORAL at 18:16

## 2025-08-14 RX ADMIN — INSULIN LISPRO 10 UNITS: 100 INJECTION, SOLUTION INTRAVENOUS; SUBCUTANEOUS at 21:42

## 2025-08-14 RX ADMIN — ARIPIPRAZOLE 10 MG: 10 TABLET ORAL at 08:53

## 2025-08-14 RX ADMIN — INSULIN LISPRO 15 UNITS: 100 INJECTION, SOLUTION INTRAVENOUS; SUBCUTANEOUS at 18:23

## 2025-08-14 RX ADMIN — INSULIN HUMAN 10 UNITS: 100 INJECTION, SOLUTION PARENTERAL at 05:07

## 2025-08-14 RX ADMIN — INSULIN LISPRO 25 UNITS: 100 INJECTION, SOLUTION INTRAVENOUS; SUBCUTANEOUS at 18:23

## 2025-08-14 RX ADMIN — POLYETHYLENE GLYCOL 3350 17 G: 17 POWDER, FOR SOLUTION ORAL at 14:57

## 2025-08-14 RX ADMIN — NYSTATIN: 100000 POWDER TOPICAL at 18:17

## 2025-08-14 RX ADMIN — ENOXAPARIN SODIUM 60 MG: 60 INJECTION SUBCUTANEOUS at 08:56

## 2025-08-14 RX ADMIN — LISINOPRIL 20 MG: 20 TABLET ORAL at 08:56

## 2025-08-14 RX ADMIN — ACETAMINOPHEN 975 MG: 325 TABLET ORAL at 12:27

## 2025-08-14 RX ADMIN — INSULIN GLARGINE 40 UNITS: 100 INJECTION, SOLUTION SUBCUTANEOUS at 21:43

## 2025-08-14 RX ADMIN — CLOBETASOL PROPIONATE: 0.5 CREAM TOPICAL at 18:17

## 2025-08-14 RX ADMIN — POLYETHYLENE GLYCOL 3350 17 G: 17 POWDER, FOR SOLUTION ORAL at 10:16

## 2025-08-14 RX ADMIN — MICONAZOLE NITRATE: 20 CREAM TOPICAL at 09:02

## 2025-08-14 RX ADMIN — INSULIN GLARGINE 40 UNITS: 100 INJECTION, SOLUTION SUBCUTANEOUS at 09:06

## 2025-08-14 RX ADMIN — MICONAZOLE NITRATE: 20 CREAM TOPICAL at 18:17

## 2025-08-14 RX ADMIN — DESVENLAFAXINE 50 MG: 50 TABLET, EXTENDED RELEASE ORAL at 08:53

## 2025-08-14 RX ADMIN — ENOXAPARIN SODIUM 60 MG: 60 INJECTION SUBCUTANEOUS at 18:16

## 2025-08-14 RX ADMIN — DOCUSATE SODIUM 100 MG: 100 CAPSULE, LIQUID FILLED ORAL at 10:13

## 2025-08-14 RX ADMIN — DOCUSATE SODIUM 100 MG: 100 CAPSULE, LIQUID FILLED ORAL at 18:16

## 2025-08-14 RX ADMIN — NYSTATIN: 100000 POWDER TOPICAL at 21:45

## 2025-08-14 RX ADMIN — KETOROLAC TROMETHAMINE 15 MG: 30 INJECTION, SOLUTION INTRAMUSCULAR; INTRAVENOUS at 10:13

## 2025-08-14 RX ADMIN — CLOBETASOL PROPIONATE: 0.5 CREAM TOPICAL at 08:58

## 2025-08-14 RX ADMIN — INSULIN LISPRO 15 UNITS: 100 INJECTION, SOLUTION INTRAVENOUS; SUBCUTANEOUS at 09:07

## 2025-08-15 ENCOUNTER — APPOINTMENT (INPATIENT)
Dept: RADIOLOGY | Facility: HOSPITAL | Age: 50
End: 2025-08-15
Payer: COMMERCIAL

## 2025-08-15 VITALS
SYSTOLIC BLOOD PRESSURE: 141 MMHG | WEIGHT: 293 LBS | HEIGHT: 60 IN | TEMPERATURE: 98 F | BODY MASS INDEX: 57.52 KG/M2 | DIASTOLIC BLOOD PRESSURE: 61 MMHG | HEART RATE: 88 BPM | RESPIRATION RATE: 20 BRPM | OXYGEN SATURATION: 92 %

## 2025-08-15 PROBLEM — R10.32 LLQ PAIN: Status: ACTIVE | Noted: 2025-08-15

## 2025-08-15 LAB
ANION GAP SERPL CALCULATED.3IONS-SCNC: 6 MMOL/L (ref 4–13)
ATRIAL RATE: 98 BPM
BUN SERPL-MCNC: 11 MG/DL (ref 5–25)
CALCIUM SERPL-MCNC: 8.9 MG/DL (ref 8.4–10.2)
CHLORIDE SERPL-SCNC: 101 MMOL/L (ref 96–108)
CO2 SERPL-SCNC: 29 MMOL/L (ref 21–32)
CREAT SERPL-MCNC: 0.59 MG/DL (ref 0.6–1.3)
GFR SERPL CREATININE-BSD FRML MDRD: 105 ML/MIN/1.73SQ M
GLUCOSE SERPL-MCNC: 219 MG/DL (ref 65–140)
GLUCOSE SERPL-MCNC: 227 MG/DL (ref 65–140)
GLUCOSE SERPL-MCNC: 262 MG/DL (ref 65–140)
P AXIS: 67 DEGREES
POTASSIUM SERPL-SCNC: 4.1 MMOL/L (ref 3.5–5.3)
PR INTERVAL: 140 MS
QRS AXIS: 33 DEGREES
QRSD INTERVAL: 74 MS
QT INTERVAL: 336 MS
QTC INTERVAL: 428 MS
SODIUM SERPL-SCNC: 136 MMOL/L (ref 135–147)
T WAVE AXIS: 43 DEGREES
VENTRICULAR RATE: 98 BPM

## 2025-08-15 PROCEDURE — 80048 BASIC METABOLIC PNL TOTAL CA: CPT

## 2025-08-15 PROCEDURE — 73502 X-RAY EXAM HIP UNI 2-3 VIEWS: CPT

## 2025-08-15 PROCEDURE — 82948 REAGENT STRIP/BLOOD GLUCOSE: CPT

## 2025-08-15 RX ORDER — OXYCODONE HYDROCHLORIDE 5 MG/1
5 TABLET ORAL EVERY 6 HOURS PRN
Qty: 12 TABLET | Refills: 0 | Status: SHIPPED | OUTPATIENT
Start: 2025-08-15

## 2025-08-15 RX ORDER — INSULIN LISPRO 100 [IU]/ML
25 INJECTION, SOLUTION INTRAVENOUS; SUBCUTANEOUS
Qty: 30 ML | Refills: 0 | Status: SHIPPED | OUTPATIENT
Start: 2025-08-15

## 2025-08-15 RX ORDER — INSULIN GLARGINE 100 [IU]/ML
45 INJECTION, SOLUTION SUBCUTANEOUS EVERY 12 HOURS SCHEDULED
Status: DISCONTINUED | OUTPATIENT
Start: 2025-08-15 | End: 2025-08-15 | Stop reason: HOSPADM

## 2025-08-15 RX ORDER — CYCLOBENZAPRINE HCL 10 MG
10 TABLET ORAL 3 TIMES DAILY PRN
Qty: 30 TABLET | Refills: 0 | Status: SHIPPED | OUTPATIENT
Start: 2025-08-15 | End: 2025-08-25

## 2025-08-15 RX ORDER — BISACODYL 10 MG
10 SUPPOSITORY, RECTAL RECTAL ONCE
Status: COMPLETED | OUTPATIENT
Start: 2025-08-15 | End: 2025-08-15

## 2025-08-15 RX ORDER — METHOCARBAMOL 500 MG/1
500 TABLET, FILM COATED ORAL EVERY 8 HOURS SCHEDULED
Status: DISCONTINUED | OUTPATIENT
Start: 2025-08-15 | End: 2025-08-15 | Stop reason: HOSPADM

## 2025-08-15 RX ORDER — INSULIN GLARGINE 100 [IU]/ML
45 INJECTION, SOLUTION SUBCUTANEOUS 2 TIMES DAILY
Qty: 30 ML | Refills: 0 | Status: SHIPPED | OUTPATIENT
Start: 2025-08-15

## 2025-08-15 RX ADMIN — LAMOTRIGINE 100 MG: 100 TABLET ORAL at 09:27

## 2025-08-15 RX ADMIN — INSULIN LISPRO 25 UNITS: 100 INJECTION, SOLUTION INTRAVENOUS; SUBCUTANEOUS at 11:46

## 2025-08-15 RX ADMIN — NYSTATIN: 100000 POWDER TOPICAL at 09:30

## 2025-08-15 RX ADMIN — MICONAZOLE NITRATE: 20 CREAM TOPICAL at 09:29

## 2025-08-15 RX ADMIN — DESVENLAFAXINE 50 MG: 50 TABLET, EXTENDED RELEASE ORAL at 09:24

## 2025-08-15 RX ADMIN — POLYETHYLENE GLYCOL 3350 17 G: 17 POWDER, FOR SOLUTION ORAL at 09:27

## 2025-08-15 RX ADMIN — INSULIN LISPRO 10 UNITS: 100 INJECTION, SOLUTION INTRAVENOUS; SUBCUTANEOUS at 09:25

## 2025-08-15 RX ADMIN — Medication 2.5 MG: at 11:44

## 2025-08-15 RX ADMIN — ACETAMINOPHEN 975 MG: 325 TABLET ORAL at 06:13

## 2025-08-15 RX ADMIN — INSULIN LISPRO 25 UNITS: 100 INJECTION, SOLUTION INTRAVENOUS; SUBCUTANEOUS at 07:43

## 2025-08-15 RX ADMIN — ENOXAPARIN SODIUM 60 MG: 60 INJECTION SUBCUTANEOUS at 09:27

## 2025-08-15 RX ADMIN — CLOBETASOL PROPIONATE: 0.5 CREAM TOPICAL at 09:29

## 2025-08-15 RX ADMIN — DOCUSATE SODIUM 100 MG: 100 CAPSULE, LIQUID FILLED ORAL at 09:27

## 2025-08-15 RX ADMIN — INSULIN LISPRO 15 UNITS: 100 INJECTION, SOLUTION INTRAVENOUS; SUBCUTANEOUS at 11:46

## 2025-08-15 RX ADMIN — BISACODYL 10 MG: 10 SUPPOSITORY RECTAL at 11:44

## 2025-08-15 RX ADMIN — ARIPIPRAZOLE 10 MG: 10 TABLET ORAL at 09:27

## 2025-08-15 RX ADMIN — INSULIN GLARGINE 45 UNITS: 100 INJECTION, SOLUTION SUBCUTANEOUS at 09:24

## 2025-08-15 RX ADMIN — LISINOPRIL 20 MG: 20 TABLET ORAL at 09:27

## 2025-08-15 RX ADMIN — ACETAMINOPHEN 975 MG: 325 TABLET ORAL at 13:37

## 2025-08-15 RX ADMIN — METHOCARBAMOL 500 MG: 500 TABLET ORAL at 13:37

## 2025-08-16 PROBLEM — N39.0 UTI (URINARY TRACT INFECTION): Status: RESOLVED | Noted: 2025-07-17 | Resolved: 2025-08-16

## 2025-08-17 LAB
BACTERIA BLD CULT: NORMAL
BACTERIA BLD CULT: NORMAL

## 2025-08-19 LAB
BACTERIA BLD CULT: NORMAL
BACTERIA BLD CULT: NORMAL

## (undated) DEVICE — SPECIMEN CONTAINER STERILE PEEL PACK

## (undated) DEVICE — PREMIUM DRY TRAY LF: Brand: MEDLINE INDUSTRIES, INC.

## (undated) DEVICE — SYRINGE 10ML LL

## (undated) DEVICE — GUIDEWIRE STRGHT TIP 0.035 IN  SOLO PLUS

## (undated) DEVICE — 3M™ TEGADERM™ TRANSPARENT FILM DRESSING FRAME STYLE, 1622W, 1-3/4 IN X 1-3/4 IN (4.4 CM X 4.4 CM), 100/CT 4CT/CASE: Brand: 3M™ TEGADERM™

## (undated) DEVICE — CATH URETERAL 5FR X 70 CM FLEX TIP POLYUR BARD

## (undated) DEVICE — PACK TUR

## (undated) DEVICE — SYRINGE 50ML LL

## (undated) DEVICE — GLOVE SRG BIOGEL 7.5

## (undated) DEVICE — UROLOGIC DRAIN BAG: Brand: UNBRANDED

## (undated) DEVICE — GLOVE SRG BIOGEL ECLIPSE 7.5

## (undated) DEVICE — STERILE LUBRICATING JELLY, TUBE: Brand: HR LUBRICATING JELLY

## (undated) DEVICE — CHLORHEXIDINE 4PCT 4 OZ

## (undated) DEVICE — VALVE SUCTION-IRR 2.5MM ADJ UROSEAL

## (undated) DEVICE — INVIEW CLEAR LEGGINGS: Brand: CONVERTORS